# Patient Record
Sex: MALE | Race: BLACK OR AFRICAN AMERICAN | NOT HISPANIC OR LATINO
[De-identification: names, ages, dates, MRNs, and addresses within clinical notes are randomized per-mention and may not be internally consistent; named-entity substitution may affect disease eponyms.]

---

## 2019-03-01 PROBLEM — Z00.00 ENCOUNTER FOR PREVENTIVE HEALTH EXAMINATION: Status: ACTIVE | Noted: 2019-03-01

## 2019-03-12 ENCOUNTER — APPOINTMENT (OUTPATIENT)
Dept: UROLOGY | Facility: CLINIC | Age: 69
End: 2019-03-12
Payer: MEDICARE

## 2019-03-12 VITALS
BODY MASS INDEX: 29.2 KG/M2 | DIASTOLIC BLOOD PRESSURE: 71 MMHG | TEMPERATURE: 98 F | HEIGHT: 70 IN | SYSTOLIC BLOOD PRESSURE: 125 MMHG | WEIGHT: 204 LBS

## 2019-03-12 DIAGNOSIS — Z87.39 PERSONAL HISTORY OF OTHER DISEASES OF THE MUSCULOSKELETAL SYSTEM AND CONNECTIVE TISSUE: ICD-10-CM

## 2019-03-12 DIAGNOSIS — Z87.09 PERSONAL HISTORY OF OTHER DISEASES OF THE RESPIRATORY SYSTEM: ICD-10-CM

## 2019-03-12 DIAGNOSIS — Z87.19 PERSONAL HISTORY OF OTHER DISEASES OF THE DIGESTIVE SYSTEM: ICD-10-CM

## 2019-03-12 DIAGNOSIS — Z82.3 FAMILY HISTORY OF STROKE: ICD-10-CM

## 2019-03-12 DIAGNOSIS — Z85.46 PERSONAL HISTORY OF MALIGNANT NEOPLASM OF PROSTATE: ICD-10-CM

## 2019-03-12 DIAGNOSIS — Z43.3 ENCOUNTER FOR ATTENTION TO COLOSTOMY: ICD-10-CM

## 2019-03-12 PROCEDURE — 99204 OFFICE O/P NEW MOD 45 MIN: CPT

## 2019-03-12 RX ORDER — TAMSULOSIN HYDROCHLORIDE 0.4 MG/1
0.4 CAPSULE ORAL
Refills: 0 | Status: ACTIVE | COMMUNITY

## 2019-03-12 RX ORDER — FINASTERIDE 5 MG/1
5 TABLET, FILM COATED ORAL
Refills: 0 | Status: ACTIVE | COMMUNITY

## 2019-03-12 RX ORDER — ARIPIPRAZOLE 10 MG/1
10 TABLET ORAL
Refills: 0 | Status: ACTIVE | COMMUNITY

## 2019-03-12 RX ORDER — GABAPENTIN 600 MG/1
600 TABLET, FILM COATED ORAL
Refills: 0 | Status: ACTIVE | COMMUNITY

## 2019-03-12 RX ORDER — HYDROMORPHONE HCL 3 MG
SUPPOSITORY, RECTAL RECTAL
Refills: 0 | Status: ACTIVE | COMMUNITY

## 2019-03-12 RX ORDER — LAMOTRIGINE 25-50-100
25 & 50 & 100 KIT ORAL
Refills: 0 | Status: ACTIVE | COMMUNITY

## 2019-03-12 RX ORDER — SODIUM BICARBONATE
POWDER (GRAM) MISCELLANEOUS
Refills: 0 | Status: ACTIVE | COMMUNITY

## 2019-03-12 RX ORDER — ASPIRIN 81 MG
81 TABLET, DELAYED RELEASE (ENTERIC COATED) ORAL
Refills: 0 | Status: ACTIVE | COMMUNITY

## 2019-03-12 RX ORDER — PANTOPRAZOLE SODIUM 40 MG/1
GRANULE, DELAYED RELEASE ORAL
Refills: 0 | Status: ACTIVE | COMMUNITY

## 2019-03-12 RX ORDER — ALPHA-D-GALACTOSIDASE 600 UNIT
CAPSULE ORAL
Refills: 0 | Status: ACTIVE | COMMUNITY

## 2019-03-12 NOTE — ASSESSMENT
[FreeTextEntry1] : Very pleasant 68-year-old gentleman with history of prostate cancer, weak urinary stream, nocturia, BPH\par -Continue Flomax and finasteride for now\par -We discussed alternative potential etiologies of his urinary symptoms, including radiation cystitis, bladder cancer, urethral stricture\par -Urinalysis\par -urine culture\par -PSA\par -Cystoscopy at next visit

## 2019-03-12 NOTE — PHYSICAL EXAM
[General Appearance - Well Developed] : well developed [General Appearance - Well Nourished] : well nourished [Normal Appearance] : normal appearance [Well Groomed] : well groomed [General Appearance - In No Acute Distress] : no acute distress [Edema] : no peripheral edema [Respiration, Rhythm And Depth] : normal respiratory rhythm and effort [Exaggerated Use Of Accessory Muscles For Inspiration] : no accessory muscle use [Abdomen Soft] : soft [Abdomen Tenderness] : non-tender [Costovertebral Angle Tenderness] : no ~M costovertebral angle tenderness [FreeTextEntry1] : ileostomy, multiple abdominal scars from prior surgery [Urethral Meatus] : meatus normal [Urinary Bladder Findings] : the bladder was normal on palpation [Scrotum] : the scrotum was normal [Testes Mass (___cm)] : there were no testicular masses [No Prostate Nodules] : no prostate nodules [Normal Station and Gait] : the gait and station were normal for the patient's age [] : no rash [No Focal Deficits] : no focal deficits [Oriented To Time, Place, And Person] : oriented to person, place, and time [Affect] : the affect was normal [Mood] : the mood was normal [Not Anxious] : not anxious [No Palpable Adenopathy] : no palpable adenopathy

## 2019-03-12 NOTE — HISTORY OF PRESENT ILLNESS
[FreeTextEntry1] : Very pleasant 68-year-old gentleman with history of prostate cancer for which he underwent radiation approximately 5-6 years ago who presents for evaluation of urinary frequency and nocturia. Patient reports that the symptoms have progressively worsened over the last few months. He also reports a history of diverticulitis for which he underwent a colectomy and now has an ileostomy. He denies dysuria. No hematuria. No flank pain or suprapubic pain. He reports voiding with a weak urinary stream. He has been taking Proscar and Flomax without significant improvement in his urinary symptoms. No specific timing to his symptoms. No aggravating or alleviating factors that he knows of. [Urinary Retention] : no urinary retention [Urinary Urgency] : no urinary urgency [Urinary Frequency] : urinary frequency [Nocturia] : nocturia [Straining] : no straining [Weak Stream] : no weak stream [Dysuria] : no dysuria [Hematuria - Gross] : no gross hematuria [Bladder Spasm] : no bladder spasm [Abdominal Pain] : no abdominal pain [Flank Pain] : no flank pain [Fever] : no fever [Fatigue] : no fatigue [Nausea] : no nausea [Anorexia] : no anorexia

## 2019-03-12 NOTE — REVIEW OF SYSTEMS
[see HPI] : see HPI [Wake up at night to urinate  How many times?  ___] : wakes up to urinate [unfilled] times during the night [Slow urine stream] : slow urine stream [Negative] : Heme/Lymph

## 2019-03-13 LAB
APPEARANCE: CLEAR
BACTERIA: NEGATIVE
BILIRUBIN URINE: NEGATIVE
BLOOD URINE: NEGATIVE
COLOR: YELLOW
GLUCOSE QUALITATIVE U: NEGATIVE
HYALINE CASTS: 3 /LPF
KETONES URINE: NEGATIVE
LEUKOCYTE ESTERASE URINE: NEGATIVE
MICROSCOPIC-UA: NORMAL
NITRITE URINE: NEGATIVE
PH URINE: 6
PROTEIN URINE: ABNORMAL
PSA FREE FLD-MCNC: NORMAL %
PSA FREE SERPL-MCNC: <0.01 NG/ML
PSA SERPL-MCNC: <0.01 NG/ML
RED BLOOD CELLS URINE: 2 /HPF
SPECIFIC GRAVITY URINE: 1.01
SQUAMOUS EPITHELIAL CELLS: 0 /HPF
UROBILINOGEN URINE: NORMAL
WHITE BLOOD CELLS URINE: 1 /HPF

## 2019-03-15 LAB — BACTERIA UR CULT: NORMAL

## 2019-03-19 ENCOUNTER — APPOINTMENT (OUTPATIENT)
Dept: UROLOGY | Facility: CLINIC | Age: 69
End: 2019-03-19
Payer: MEDICARE

## 2019-03-19 VITALS
RESPIRATION RATE: 14 BRPM | WEIGHT: 204 LBS | HEIGHT: 70 IN | TEMPERATURE: 98.3 F | SYSTOLIC BLOOD PRESSURE: 110 MMHG | DIASTOLIC BLOOD PRESSURE: 65 MMHG | HEART RATE: 78 BPM | BODY MASS INDEX: 29.2 KG/M2

## 2019-03-19 PROCEDURE — 99213 OFFICE O/P EST LOW 20 MIN: CPT | Mod: 25

## 2019-03-19 PROCEDURE — 52000 CYSTOURETHROSCOPY: CPT

## 2019-03-19 NOTE — ASSESSMENT
[FreeTextEntry1] : Very pleasant 68-year-old gentleman with history of prostate cancer status post radiation with urinary frequency and nocturia\par -Cystoscopy today demonstrates no urothelial lesions or urethral strictures; it does demonstrate an enlarged prostate\par -We discussed surgical options for BPH, however we also discussed the high rate of incontinence after radiation for prostate cancer\par -Patient would like to forego surgery at this time\par -We discussed the option to double Flomax, which patient would like to hold off if possible\par -Follow up in 6 months

## 2019-03-19 NOTE — PHYSICAL EXAM
[General Appearance - Well Developed] : well developed [General Appearance - Well Nourished] : well nourished [Well Groomed] : well groomed [Normal Appearance] : normal appearance [Abdomen Soft] : soft [General Appearance - In No Acute Distress] : no acute distress [Abdomen Tenderness] : non-tender [FreeTextEntry1] : ileostomy, multiple abdominal scars from prior surgery [Costovertebral Angle Tenderness] : no ~M costovertebral angle tenderness [Urethral Meatus] : meatus normal [Urinary Bladder Findings] : the bladder was normal on palpation [Scrotum] : the scrotum was normal [Testes Mass (___cm)] : there were no testicular masses [Edema] : no peripheral edema [] : no respiratory distress [Respiration, Rhythm And Depth] : normal respiratory rhythm and effort [Exaggerated Use Of Accessory Muscles For Inspiration] : no accessory muscle use [Oriented To Time, Place, And Person] : oriented to person, place, and time [Affect] : the affect was normal [Mood] : the mood was normal [Not Anxious] : not anxious [Normal Station and Gait] : the gait and station were normal for the patient's age [No Palpable Adenopathy] : no palpable adenopathy [No Focal Deficits] : no focal deficits

## 2019-03-19 NOTE — HISTORY OF PRESENT ILLNESS
[FreeTextEntry1] : Very pleasant 68-year-old gentleman with history of prostate cancer for which he underwent radiation approximately 5-6 years ago who presents for follow up of urinary frequency and nocturia. At last visit, PSA was undetectable and UA and culture were negative for infection or hematuria. He continues report the same symptoms of urinary frequency and nocturia. He denies dysuria. No gross hematuria. No flank pain or suprapubic pain. No other complaints. [Urinary Retention] : no urinary retention [Urinary Frequency] : urinary frequency [Urinary Urgency] : no urinary urgency [Nocturia] : nocturia [Straining] : no straining [Dysuria] : no dysuria [Weak Stream] : no weak stream [Hematuria - Gross] : no gross hematuria [Bladder Spasm] : no bladder spasm [Abdominal Pain] : no abdominal pain [Fever] : no fever [Fatigue] : no fatigue [Flank Pain] : no flank pain [Anorexia] : no anorexia [Nausea] : no nausea

## 2019-04-04 ENCOUNTER — INPATIENT (INPATIENT)
Facility: HOSPITAL | Age: 69
LOS: 3 days | Discharge: NURSING, REHAB OR LONG-TERM CARE | DRG: 871 | End: 2019-04-08
Attending: INTERNAL MEDICINE | Admitting: INTERNAL MEDICINE
Payer: MEDICARE

## 2019-04-04 VITALS
OXYGEN SATURATION: 96 % | HEIGHT: 70 IN | HEART RATE: 103 BPM | TEMPERATURE: 103 F | SYSTOLIC BLOOD PRESSURE: 122 MMHG | WEIGHT: 205.03 LBS | RESPIRATION RATE: 18 BRPM | DIASTOLIC BLOOD PRESSURE: 65 MMHG

## 2019-04-04 DIAGNOSIS — N17.9 ACUTE KIDNEY FAILURE, UNSPECIFIED: ICD-10-CM

## 2019-04-04 DIAGNOSIS — C61 MALIGNANT NEOPLASM OF PROSTATE: ICD-10-CM

## 2019-04-04 DIAGNOSIS — A41.9 SEPSIS, UNSPECIFIED ORGANISM: ICD-10-CM

## 2019-04-04 DIAGNOSIS — Z93.9 ARTIFICIAL OPENING STATUS, UNSPECIFIED: Chronic | ICD-10-CM

## 2019-04-04 DIAGNOSIS — R50.9 FEVER, UNSPECIFIED: ICD-10-CM

## 2019-04-04 DIAGNOSIS — J44.9 CHRONIC OBSTRUCTIVE PULMONARY DISEASE, UNSPECIFIED: ICD-10-CM

## 2019-04-04 DIAGNOSIS — Z29.9 ENCOUNTER FOR PROPHYLACTIC MEASURES, UNSPECIFIED: ICD-10-CM

## 2019-04-04 DIAGNOSIS — K52.9 NONINFECTIVE GASTROENTERITIS AND COLITIS, UNSPECIFIED: ICD-10-CM

## 2019-04-04 DIAGNOSIS — E78.5 HYPERLIPIDEMIA, UNSPECIFIED: ICD-10-CM

## 2019-04-04 LAB
ALBUMIN SERPL ELPH-MCNC: 3.5 G/DL — SIGNIFICANT CHANGE UP (ref 3.5–5)
ALP SERPL-CCNC: 189 U/L — HIGH (ref 40–120)
ALT FLD-CCNC: 28 U/L DA — SIGNIFICANT CHANGE UP (ref 10–60)
ANION GAP SERPL CALC-SCNC: 8 MMOL/L — SIGNIFICANT CHANGE UP (ref 5–17)
APPEARANCE UR: CLEAR — SIGNIFICANT CHANGE UP
APTT BLD: 25.5 SEC — LOW (ref 27.5–36.3)
AST SERPL-CCNC: 33 U/L — SIGNIFICANT CHANGE UP (ref 10–40)
BACTERIA # UR AUTO: ABNORMAL /HPF
BASE EXCESS BLDA CALC-SCNC: -8.4 MMOL/L — LOW (ref -2–2)
BASOPHILS # BLD AUTO: 0.04 K/UL — SIGNIFICANT CHANGE UP (ref 0–0.2)
BASOPHILS NFR BLD AUTO: 0.3 % — SIGNIFICANT CHANGE UP (ref 0–2)
BILIRUB SERPL-MCNC: 0.6 MG/DL — SIGNIFICANT CHANGE UP (ref 0.2–1.2)
BILIRUB UR-MCNC: NEGATIVE — SIGNIFICANT CHANGE UP
BLOOD GAS COMMENTS ARTERIAL: SIGNIFICANT CHANGE UP
BUN SERPL-MCNC: 38 MG/DL — HIGH (ref 7–18)
CALCIUM SERPL-MCNC: 8.5 MG/DL — SIGNIFICANT CHANGE UP (ref 8.4–10.5)
CHLORIDE SERPL-SCNC: 115 MMOL/L — HIGH (ref 96–108)
CO2 SERPL-SCNC: 16 MMOL/L — LOW (ref 22–31)
COLOR SPEC: YELLOW — SIGNIFICANT CHANGE UP
CREAT SERPL-MCNC: 2.64 MG/DL — HIGH (ref 0.5–1.3)
DIFF PNL FLD: NEGATIVE — SIGNIFICANT CHANGE UP
EOSINOPHIL # BLD AUTO: 0.18 K/UL — SIGNIFICANT CHANGE UP (ref 0–0.5)
EOSINOPHIL NFR BLD AUTO: 1.4 % — SIGNIFICANT CHANGE UP (ref 0–6)
EPI CELLS # UR: ABNORMAL /HPF
FLU A RESULT: SIGNIFICANT CHANGE UP
FLU A RESULT: SIGNIFICANT CHANGE UP
FLUAV AG NPH QL: SIGNIFICANT CHANGE UP
FLUBV AG NPH QL: SIGNIFICANT CHANGE UP
GLUCOSE SERPL-MCNC: 124 MG/DL — HIGH (ref 70–99)
GLUCOSE UR QL: NEGATIVE — SIGNIFICANT CHANGE UP
HCO3 BLDA-SCNC: 17 MMOL/L — LOW (ref 23–27)
HCT VFR BLD CALC: 36.4 % — LOW (ref 39–50)
HGB BLD-MCNC: 11.6 G/DL — LOW (ref 13–17)
HOROWITZ INDEX BLDA+IHG-RTO: 21 — SIGNIFICANT CHANGE UP
IMM GRANULOCYTES NFR BLD AUTO: 0.5 % — SIGNIFICANT CHANGE UP (ref 0–1.5)
INR BLD: 1.16 RATIO — SIGNIFICANT CHANGE UP (ref 0.88–1.16)
KETONES UR-MCNC: NEGATIVE — SIGNIFICANT CHANGE UP
LACTATE SERPL-SCNC: 1.1 MMOL/L — SIGNIFICANT CHANGE UP (ref 0.7–2)
LEUKOCYTE ESTERASE UR-ACNC: NEGATIVE — SIGNIFICANT CHANGE UP
LYMPHOCYTES # BLD AUTO: 1.15 K/UL — SIGNIFICANT CHANGE UP (ref 1–3.3)
LYMPHOCYTES # BLD AUTO: 8.6 % — LOW (ref 13–44)
MCHC RBC-ENTMCNC: 27.6 PG — SIGNIFICANT CHANGE UP (ref 27–34)
MCHC RBC-ENTMCNC: 31.9 GM/DL — LOW (ref 32–36)
MCV RBC AUTO: 86.5 FL — SIGNIFICANT CHANGE UP (ref 80–100)
MONOCYTES # BLD AUTO: 1.06 K/UL — HIGH (ref 0–0.9)
MONOCYTES NFR BLD AUTO: 8 % — SIGNIFICANT CHANGE UP (ref 2–14)
NEUTROPHILS # BLD AUTO: 10.83 K/UL — HIGH (ref 1.8–7.4)
NEUTROPHILS NFR BLD AUTO: 81.2 % — HIGH (ref 43–77)
NITRITE UR-MCNC: NEGATIVE — SIGNIFICANT CHANGE UP
NRBC # BLD: 0 /100 WBCS — SIGNIFICANT CHANGE UP (ref 0–0)
NT-PROBNP SERPL-SCNC: 71 PG/ML — SIGNIFICANT CHANGE UP (ref 0–125)
PCO2 BLDA: 35 MMHG — SIGNIFICANT CHANGE UP (ref 32–46)
PH BLDA: 7.3 — LOW (ref 7.35–7.45)
PH UR: 5 — SIGNIFICANT CHANGE UP (ref 5–8)
PLATELET # BLD AUTO: 174 K/UL — SIGNIFICANT CHANGE UP (ref 150–400)
PO2 BLDA: 76 MMHG — SIGNIFICANT CHANGE UP (ref 74–108)
POTASSIUM SERPL-MCNC: 5.5 MMOL/L — HIGH (ref 3.5–5.3)
POTASSIUM SERPL-SCNC: 5.5 MMOL/L — HIGH (ref 3.5–5.3)
PROT SERPL-MCNC: 8.8 G/DL — HIGH (ref 6–8.3)
PROT UR-MCNC: 15
PROTHROM AB SERPL-ACNC: 12.9 SEC — SIGNIFICANT CHANGE UP (ref 10–12.9)
RBC # BLD: 4.21 M/UL — SIGNIFICANT CHANGE UP (ref 4.2–5.8)
RBC # FLD: 15.5 % — HIGH (ref 10.3–14.5)
RBC CASTS # UR COMP ASSIST: SIGNIFICANT CHANGE UP /HPF (ref 0–2)
RSV RESULT: SIGNIFICANT CHANGE UP
RSV RNA RESP QL NAA+PROBE: SIGNIFICANT CHANGE UP
SAO2 % BLDA: 95 % — SIGNIFICANT CHANGE UP (ref 92–96)
SODIUM SERPL-SCNC: 139 MMOL/L — SIGNIFICANT CHANGE UP (ref 135–145)
SP GR SPEC: 1.01 — SIGNIFICANT CHANGE UP (ref 1.01–1.02)
UROBILINOGEN FLD QL: NEGATIVE — SIGNIFICANT CHANGE UP
WBC # BLD: 13.33 K/UL — HIGH (ref 3.8–10.5)
WBC # FLD AUTO: 13.33 K/UL — HIGH (ref 3.8–10.5)
WBC UR QL: SIGNIFICANT CHANGE UP /HPF (ref 0–5)

## 2019-04-04 PROCEDURE — 99285 EMERGENCY DEPT VISIT HI MDM: CPT

## 2019-04-04 PROCEDURE — 93010 ELECTROCARDIOGRAM REPORT: CPT

## 2019-04-04 PROCEDURE — 71045 X-RAY EXAM CHEST 1 VIEW: CPT | Mod: 26

## 2019-04-04 PROCEDURE — 74176 CT ABD & PELVIS W/O CONTRAST: CPT | Mod: 26

## 2019-04-04 RX ORDER — TRAZODONE HCL 50 MG
25 TABLET ORAL AT BEDTIME
Qty: 0 | Refills: 0 | Status: DISCONTINUED | OUTPATIENT
Start: 2019-04-04 | End: 2019-04-08

## 2019-04-04 RX ORDER — ARIPIPRAZOLE 15 MG/1
12 TABLET ORAL DAILY
Qty: 0 | Refills: 0 | Status: DISCONTINUED | OUTPATIENT
Start: 2019-04-04 | End: 2019-04-05

## 2019-04-04 RX ORDER — FINASTERIDE 5 MG/1
5 TABLET, FILM COATED ORAL DAILY
Qty: 0 | Refills: 0 | Status: DISCONTINUED | OUTPATIENT
Start: 2019-04-04 | End: 2019-04-08

## 2019-04-04 RX ORDER — PANTOPRAZOLE SODIUM 20 MG/1
40 TABLET, DELAYED RELEASE ORAL
Qty: 0 | Refills: 0 | Status: DISCONTINUED | OUTPATIENT
Start: 2019-04-04 | End: 2019-04-08

## 2019-04-04 RX ORDER — TAMSULOSIN HYDROCHLORIDE 0.4 MG/1
0.4 CAPSULE ORAL AT BEDTIME
Qty: 0 | Refills: 0 | Status: DISCONTINUED | OUTPATIENT
Start: 2019-04-04 | End: 2019-04-08

## 2019-04-04 RX ORDER — FERROUS SULFATE 325(65) MG
325 TABLET ORAL DAILY
Qty: 0 | Refills: 0 | Status: DISCONTINUED | OUTPATIENT
Start: 2019-04-04 | End: 2019-04-08

## 2019-04-04 RX ORDER — SODIUM BICARBONATE 1 MEQ/ML
650 SYRINGE (ML) INTRAVENOUS THREE TIMES A DAY
Qty: 0 | Refills: 0 | Status: DISCONTINUED | OUTPATIENT
Start: 2019-04-04 | End: 2019-04-08

## 2019-04-04 RX ORDER — CEFTRIAXONE 500 MG/1
1 INJECTION, POWDER, FOR SOLUTION INTRAMUSCULAR; INTRAVENOUS ONCE
Qty: 0 | Refills: 0 | Status: COMPLETED | OUTPATIENT
Start: 2019-04-04 | End: 2019-04-04

## 2019-04-04 RX ORDER — SODIUM CHLORIDE 9 MG/ML
2900 INJECTION INTRAMUSCULAR; INTRAVENOUS; SUBCUTANEOUS ONCE
Qty: 0 | Refills: 0 | Status: COMPLETED | OUTPATIENT
Start: 2019-04-04 | End: 2019-04-04

## 2019-04-04 RX ORDER — ACETAMINOPHEN 500 MG
650 TABLET ORAL EVERY 6 HOURS
Qty: 0 | Refills: 0 | Status: DISCONTINUED | OUTPATIENT
Start: 2019-04-04 | End: 2019-04-08

## 2019-04-04 RX ORDER — HYDROMORPHONE HYDROCHLORIDE 2 MG/ML
4 INJECTION INTRAMUSCULAR; INTRAVENOUS; SUBCUTANEOUS EVERY 8 HOURS
Qty: 0 | Refills: 0 | Status: DISCONTINUED | OUTPATIENT
Start: 2019-04-04 | End: 2019-04-08

## 2019-04-04 RX ORDER — ASPIRIN/CALCIUM CARB/MAGNESIUM 324 MG
81 TABLET ORAL DAILY
Qty: 0 | Refills: 0 | Status: DISCONTINUED | OUTPATIENT
Start: 2019-04-04 | End: 2019-04-08

## 2019-04-04 RX ORDER — ACETAMINOPHEN 500 MG
1000 TABLET ORAL ONCE
Qty: 0 | Refills: 0 | Status: COMPLETED | OUTPATIENT
Start: 2019-04-04 | End: 2019-04-04

## 2019-04-04 RX ORDER — ALBUTEROL 90 UG/1
2 AEROSOL, METERED ORAL EVERY 6 HOURS
Qty: 0 | Refills: 0 | Status: DISCONTINUED | OUTPATIENT
Start: 2019-04-04 | End: 2019-04-08

## 2019-04-04 RX ORDER — HEPARIN SODIUM 5000 [USP'U]/ML
5000 INJECTION INTRAVENOUS; SUBCUTANEOUS EVERY 8 HOURS
Qty: 0 | Refills: 0 | Status: DISCONTINUED | OUTPATIENT
Start: 2019-04-04 | End: 2019-04-08

## 2019-04-04 RX ORDER — LAMOTRIGINE 25 MG/1
200 TABLET, ORALLY DISINTEGRATING ORAL DAILY
Qty: 0 | Refills: 0 | Status: DISCONTINUED | OUTPATIENT
Start: 2019-04-04 | End: 2019-04-08

## 2019-04-04 RX ORDER — GABAPENTIN 400 MG/1
200 CAPSULE ORAL THREE TIMES A DAY
Qty: 0 | Refills: 0 | Status: DISCONTINUED | OUTPATIENT
Start: 2019-04-04 | End: 2019-04-05

## 2019-04-04 RX ADMIN — SODIUM CHLORIDE 2900 MILLILITER(S): 9 INJECTION INTRAMUSCULAR; INTRAVENOUS; SUBCUTANEOUS at 17:55

## 2019-04-04 RX ADMIN — Medication 400 MILLIGRAM(S): at 22:50

## 2019-04-04 RX ADMIN — CEFTRIAXONE 100 GRAM(S): 500 INJECTION, POWDER, FOR SOLUTION INTRAMUSCULAR; INTRAVENOUS at 18:37

## 2019-04-04 RX ADMIN — SODIUM CHLORIDE 2900 MILLILITER(S): 9 INJECTION INTRAMUSCULAR; INTRAVENOUS; SUBCUTANEOUS at 16:33

## 2019-04-04 RX ADMIN — Medication 1000 MILLIGRAM(S): at 23:35

## 2019-04-04 NOTE — H&P ADULT - PROBLEM SELECTOR PLAN 3
[] Previous VTE                                                3  [] Thrombophilia                                             2  [] Lower limb paralysis                                   2    [] Current Cancer                                             2   [x] Immobilization > 24 hrs                              1  [] ICU/CCU stay > 24 hours                             1  [x] Age > 60                                                         1    IMPROVE VTE Score: 2; Heparin sub q for DVT prophy -c/w statin therapy  -f/u lipid panel -Creat of 2.6 on admission- likely multifactorial from longstanding htn and obstructive uropathy  -no history of known renal disease   -UA sig for small amt of protein   -f/u Urine lytes  -f/u CT abd for hydro  -s/p 2.6L bolus in ED- monitor for improvement

## 2019-04-04 NOTE — ED PROVIDER NOTE - OBJECTIVE STATEMENT
67 y/o M patient with no significant PMHx and no significant PSHx presents to the ED with flu-like symptoms. Patient endorses body aches, feelings of being unwell, shivers, and frequency. Patient states he was at the doctor and referred her for an evaluation. Patient denies cough, dysuria, and any other complaints. Patient denies recent travel. NKDA.

## 2019-04-04 NOTE — H&P ADULT - PROBLEM SELECTOR PLAN 8
[] Previous VTE                                                3  [] Thrombophilia                                             2  [] Lower limb paralysis                                   2    [] Current Cancer                                             2   [x] Immobilization > 24 hrs                              1  [] ICU/CCU stay > 24 hours                             1  [x] Age > 60                                                         1    IMPROVE VTE Score: 2; Heparin sub q for DVT prophy

## 2019-04-04 NOTE — H&P ADULT - PROBLEM SELECTOR PLAN 7
[] Previous VTE                                                3  [] Thrombophilia                                             2  [] Lower limb paralysis                                   2    [] Current Cancer                                             2   [x] Immobilization > 24 hrs                              1  [] ICU/CCU stay > 24 hours                             1  [x] Age > 60                                                         1    IMPROVE VTE Score: 2; Heparin sub q for DVT prophy -s/p radiation therapy in 2015  -currently in remission

## 2019-04-04 NOTE — H&P ADULT - NSICDXPASTMEDICALHX_GEN_ALL_CORE_FT
PAST MEDICAL HISTORY:  No pertinent past medical history PAST MEDICAL HISTORY:  Anemia     Bipolar disorder     BPH with urinary obstruction     CKD (chronic kidney disease)     COPD, mild     HLD (hyperlipidemia)     HTN (hypertension)     IBD (inflammatory bowel disease)     PAD (peripheral artery disease)     Prostate CA

## 2019-04-04 NOTE — H&P ADULT - PROBLEM SELECTOR PLAN 2
-Creat of 2.6 on admission  -no history of known renal disease   -UA sig for small amt of protein   -f/u Urine lytes  -f/u CT abd for hydro  -s/p 2.6L bolus in ED- monitor for improvement -Creat of 2.6 on admission- likely multifactorial from longstanding htn and obstructive uropathy  -no history of known renal disease   -UA sig for small amt of protein   -f/u Urine lytes  -f/u CT abd for hydro  -s/p 2.6L bolus in ED- monitor for improvement -Likely PNA in setting of infiltrative process to rt lower lobe on chest CT insetting of leukocytosis, and fever   -will treat for CAP   -c/w Azithro/ Rocephin   -f/u Blood cultures  -f/u legionella, and strep

## 2019-04-04 NOTE — H&P ADULT - PROBLEM/PLAN-1
- likely malignant though primary is unknown at this time   - will need collateral from [facility]   - GI consult appreciated -> will attempt to do therapeutic paracentesis  - f/u paracentesis fluid studies and culture  - albumin infusion   - pain control  - monitor for bleeding DISPLAY PLAN FREE TEXT

## 2019-04-04 NOTE — H&P ADULT - PROBLEM SELECTOR PLAN 1
-Fever and chills x  -CXR sig for mild pulm congestion   -UA negative  -flu neg; f/u full RVP -Febrile to 102.7 and chills x 1 day, WBC 13K- no cough or SOB  -CXR sig for mild pulm congestion   -UA negative  -flu neg; f/u full RVP  -Code sepsis in ED- s/p Rocephin 1 g   -Pt reports watery stool from ileostomy site- will send stool cultures   -f/u blood cultures and urine culture   -f/u CT abdomen   -ID: Dr. Santos -Febrile to 102.7 and chills x 1 day, WBC 13K- no cough or SOB  -CXR sig for mild pulm congestion   -UA negative  -flu neg; f/u full RVP  -Code sepsis in ED- s/p Rocephin 1 g   -Pt reports watery stool from ileostomy site- will send stool cultures   -f/u blood cultures and urine culture   -f/u CT abdomen

## 2019-04-04 NOTE — H&P ADULT - HISTORY OF PRESENT ILLNESS
67 y/o M patient with no significant PMHx and no significant PSHx presents to the ED with fever and chills x Pt is a 69 y/o M, from [facility], with PMHx of COPD (not on O2), HTN, HLD, Bipolar, anemia, GERD, Ileostomy, Prostate Ca s/p rad, BPH, PAD, and CKD (unknown stage) who presents to the ED with fever and chills x 1 day.  Pt states yesterday morning he was feeling lethargic with decreased appetite  He felt subjective fever and was shivering.  Pt does not remember exact temp, but knows it was >100.  Pt states some of his neighbors are sick with flu like symptoms. Pt denies Cough, abd pain, HA, dizziness, weakness, or N/V.  Pt reports that the feces from his ileostomy has been a lot more watery over the last few days.  Pt denies recent travel or recent antibiotic exposure.  Pt also states that his renal function has been declining, and he is scheduled to see a nephrologist soon. Pt also reports chronic LE pitting edema.  Pt denies CP, palpitations, blurred vision, slurred speech, extremity numbness/ weakness, confusion, or syncope.    In the ED, pt presents in no acute distress, vitals sig for T: 102.7,  remaining WNL, and EKG NSR

## 2019-04-04 NOTE — H&P ADULT - ASSESSMENT
Pt is a 67 y/o M, from [facility], with PMHx of COPD (not on O2), HTN, HLD, Bipolar, anemia, GERD, Ileostomy, Prostate Ca s/p rad, BPH, PAD, and CKD (unknown stage) who presents to the ED with fever and chills x 1 day.  In the ED, pt presents in no acute distress, vitals sig for T: 102.7,  remaining WNL, and EKG NSR.  Pt presents febrile with leukocytosis of 13k w/ PMN predominance.  Remaining labs sig for  K+ of 5.5 (hemolyzed specimen and Creat of 2.64 likely multifactorial from longstanding htn and obstructive uropathy.  CXR sig for mild pulm congestion. Pt is a 67 y/o M, from [facility], with PMHx of COPD (not on O2), HTN, HLD, Bipolar, anemia, GERD, Ileostomy, Prostate Ca s/p rad, BPH, PAD, and CKD (unknown stage) who presents to the ED with fever and chills x 1 day.  In the ED, pt presents in no acute distress, vitals sig for T: 102.7,  remaining WNL, and EKG NSR.  Pt presents febrile with leukocytosis of 13k w/ PMN predominance.  Remaining labs sig for  K+ of 5.5 (hemolyzed specimen and Creat of 2.64 likely multifactorial from longstanding htn and obstructive uropathy.  CXR sig for mild pulm congestion.  UA negative, and Flu negative.    Pt will be admitted to medicine for management of Sepsis of unknown etiology Pt is a 67 y/o M, from [facility], with PMHx of COPD (not on O2), HTN, HLD, Bipolar, anemia, GERD, Ileostomy, Prostate Ca s/p rad, BPH, PAD, and CKD (unknown stage) who presents to the ED with fever and chills x 1 day.  In the ED, pt presents in no acute distress, vitals sig for T: 102.7,  remaining WNL, and EKG NSR.  Pt presents febrile with leukocytosis of 13k w/ PMN predominance.  Remaining labs sig for  K+ of 5.5 (hemolyzed specimen and Creat of 2.64 likely multifactorial from longstanding htn and obstructive uropathy.  CXR sig for mild pulm congestion.  UA negative, and Flu negative.    Pt will be admitted to medicine for management of Sepsis 2/2 PNA

## 2019-04-04 NOTE — H&P ADULT - NSHPPHYSICALEXAM_GEN_ALL_CORE
Vital Signs Last 24 Hrs  T(C): 37.8 (04 Apr 2019 17:55), Max: 39.3 (04 Apr 2019 15:24)  T(F): 100 (04 Apr 2019 17:55), Max: 102.7 (04 Apr 2019 15:24)  HR: 89 (04 Apr 2019 17:55) (89 - 103)  BP: 122/69 (04 Apr 2019 17:55) (122/65 - 122/69)  RR: 18 (04 Apr 2019 17:55) (18 - 18)  SpO2: 96% (04 Apr 2019 17:55) (96% - 96%)

## 2019-04-04 NOTE — H&P ADULT - NSICDXPASTSURGICALHX_GEN_ALL_CORE_FT
PAST SURGICAL HISTORY:  No significant past surgical history PAST SURGICAL HISTORY:  History of creation of ostomy

## 2019-04-04 NOTE — ED ADULT NURSE NOTE - ED STAT RN HANDOFF DETAILS
Report given to ASHLI Shen, Pt to be transferred to ED Holding. pt resting in bed, no acute distress noted, denies chest pain, no shortness of breath indicated. Safety maintained.

## 2019-04-04 NOTE — ED ADULT NURSE NOTE - OBJECTIVE STATEMENT
pt is a 67 y/o male coming from  assisted living [facility] with c/o  shaking, fever and congestion today . pt  no signs of any distress, no  c/o shortness of breath pt airway patent with VSS. pt is a 69 y/o male coming from  assisted living [facility] with c/o  shaking, fever and congestion today . pt  no signs of any distress, no  c/o shortness of breath pt airway patent with VSS. RLQ ileostomy noted. Stoma intact. Pt preforms self care to ileostomy.

## 2019-04-05 DIAGNOSIS — J18.9 PNEUMONIA, UNSPECIFIED ORGANISM: ICD-10-CM

## 2019-04-05 LAB
AMYLASE P1 CFR SERPL: 113 U/L — SIGNIFICANT CHANGE UP (ref 25–115)
ANION GAP SERPL CALC-SCNC: 9 MMOL/L — SIGNIFICANT CHANGE UP (ref 5–17)
BASOPHILS # BLD AUTO: 0.04 K/UL — SIGNIFICANT CHANGE UP (ref 0–0.2)
BASOPHILS NFR BLD AUTO: 0.5 % — SIGNIFICANT CHANGE UP (ref 0–2)
BUN SERPL-MCNC: 34 MG/DL — HIGH (ref 7–18)
C DIFF BY PCR RESULT: SIGNIFICANT CHANGE UP
C DIFF TOX GENS STL QL NAA+PROBE: SIGNIFICANT CHANGE UP
CALCIUM SERPL-MCNC: 7.9 MG/DL — LOW (ref 8.4–10.5)
CHLORIDE SERPL-SCNC: 118 MMOL/L — HIGH (ref 96–108)
CHLORIDE UR-SCNC: 91 MMOL/L — SIGNIFICANT CHANGE UP (ref 55–125)
CHOLEST SERPL-MCNC: 173 MG/DL — SIGNIFICANT CHANGE UP (ref 10–199)
CO2 SERPL-SCNC: 13 MMOL/L — LOW (ref 22–31)
CREAT ?TM UR-MCNC: 75 MG/DL — SIGNIFICANT CHANGE UP
CREAT SERPL-MCNC: 2.4 MG/DL — HIGH (ref 0.5–1.3)
CULTURE RESULTS: SIGNIFICANT CHANGE UP
EOSINOPHIL # BLD AUTO: 0.36 K/UL — SIGNIFICANT CHANGE UP (ref 0–0.5)
EOSINOPHIL NFR BLD AUTO: 4.9 % — SIGNIFICANT CHANGE UP (ref 0–6)
GLUCOSE SERPL-MCNC: 113 MG/DL — HIGH (ref 70–99)
HAV IGM SER-ACNC: SIGNIFICANT CHANGE UP
HBA1C BLD-MCNC: 5.7 % — HIGH (ref 4–5.6)
HBV CORE IGM SER-ACNC: REACTIVE
HBV SURFACE AG SER-ACNC: SIGNIFICANT CHANGE UP
HCT VFR BLD CALC: 33.2 % — LOW (ref 39–50)
HCV AB S/CO SERPL IA: 11.93 S/CO — HIGH (ref 0–0.99)
HCV AB SERPL-IMP: REACTIVE
HDLC SERPL-MCNC: 45 MG/DL — SIGNIFICANT CHANGE UP
HGB BLD-MCNC: 10.5 G/DL — LOW (ref 13–17)
HIV 1+2 AB+HIV1 P24 AG SERPL QL IA: SIGNIFICANT CHANGE UP
IMM GRANULOCYTES NFR BLD AUTO: 0.7 % — SIGNIFICANT CHANGE UP (ref 0–1.5)
LEGIONELLA AG UR QL: NEGATIVE — SIGNIFICANT CHANGE UP
LIPID PNL WITH DIRECT LDL SERPL: 98 MG/DL — SIGNIFICANT CHANGE UP
LYMPHOCYTES # BLD AUTO: 0.96 K/UL — LOW (ref 1–3.3)
LYMPHOCYTES # BLD AUTO: 13 % — SIGNIFICANT CHANGE UP (ref 13–44)
MAGNESIUM SERPL-MCNC: 1.8 MG/DL — SIGNIFICANT CHANGE UP (ref 1.6–2.6)
MCHC RBC-ENTMCNC: 27.1 PG — SIGNIFICANT CHANGE UP (ref 27–34)
MCHC RBC-ENTMCNC: 31.6 GM/DL — LOW (ref 32–36)
MCV RBC AUTO: 85.8 FL — SIGNIFICANT CHANGE UP (ref 80–100)
MONOCYTES # BLD AUTO: 0.57 K/UL — SIGNIFICANT CHANGE UP (ref 0–0.9)
MONOCYTES NFR BLD AUTO: 7.7 % — SIGNIFICANT CHANGE UP (ref 2–14)
NEUTROPHILS # BLD AUTO: 5.39 K/UL — SIGNIFICANT CHANGE UP (ref 1.8–7.4)
NEUTROPHILS NFR BLD AUTO: 73.2 % — SIGNIFICANT CHANGE UP (ref 43–77)
NRBC # BLD: 0 /100 WBCS — SIGNIFICANT CHANGE UP (ref 0–0)
NT-PROBNP SERPL-SCNC: 228 PG/ML — HIGH (ref 0–125)
OSMOLALITY UR: 423 MOS/KG — SIGNIFICANT CHANGE UP (ref 50–1200)
PHOSPHATE SERPL-MCNC: 3.2 MG/DL — SIGNIFICANT CHANGE UP (ref 2.5–4.5)
PLATELET # BLD AUTO: 149 K/UL — LOW (ref 150–400)
POTASSIUM SERPL-MCNC: 4.9 MMOL/L — SIGNIFICANT CHANGE UP (ref 3.5–5.3)
POTASSIUM SERPL-SCNC: 4.9 MMOL/L — SIGNIFICANT CHANGE UP (ref 3.5–5.3)
POTASSIUM UR-SCNC: 34 MMOL/L — SIGNIFICANT CHANGE UP (ref 25–125)
RAPID RVP RESULT: SIGNIFICANT CHANGE UP
RBC # BLD: 3.87 M/UL — LOW (ref 4.2–5.8)
RBC # FLD: 15.5 % — HIGH (ref 10.3–14.5)
SODIUM SERPL-SCNC: 140 MMOL/L — SIGNIFICANT CHANGE UP (ref 135–145)
SODIUM UR-SCNC: 71 MMOL/L — SIGNIFICANT CHANGE UP (ref 40–220)
SPECIMEN SOURCE: SIGNIFICANT CHANGE UP
TOTAL CHOLESTEROL/HDL RATIO MEASUREMENT: 3.8 RATIO — SIGNIFICANT CHANGE UP (ref 3.4–9.6)
TRIGL SERPL-MCNC: 151 MG/DL — HIGH (ref 10–149)
TSH SERPL-MCNC: 1.36 UU/ML — SIGNIFICANT CHANGE UP (ref 0.34–4.82)
VIT B12 SERPL-MCNC: 567 PG/ML — SIGNIFICANT CHANGE UP (ref 232–1245)
WBC # BLD: 7.37 K/UL — SIGNIFICANT CHANGE UP (ref 3.8–10.5)
WBC # FLD AUTO: 7.37 K/UL — SIGNIFICANT CHANGE UP (ref 3.8–10.5)

## 2019-04-05 RX ORDER — SODIUM CHLORIDE 9 MG/ML
1000 INJECTION, SOLUTION INTRAVENOUS
Qty: 0 | Refills: 0 | Status: DISCONTINUED | OUTPATIENT
Start: 2019-04-05 | End: 2019-04-08

## 2019-04-05 RX ORDER — CEFTRIAXONE 500 MG/1
1 INJECTION, POWDER, FOR SOLUTION INTRAMUSCULAR; INTRAVENOUS EVERY 24 HOURS
Qty: 0 | Refills: 0 | Status: DISCONTINUED | OUTPATIENT
Start: 2019-04-05 | End: 2019-04-08

## 2019-04-05 RX ORDER — GABAPENTIN 400 MG/1
200 CAPSULE ORAL THREE TIMES A DAY
Qty: 0 | Refills: 0 | Status: DISCONTINUED | OUTPATIENT
Start: 2019-04-05 | End: 2019-04-08

## 2019-04-05 RX ORDER — AZITHROMYCIN 500 MG/1
500 TABLET, FILM COATED ORAL ONCE
Qty: 0 | Refills: 0 | Status: COMPLETED | OUTPATIENT
Start: 2019-04-05 | End: 2019-04-05

## 2019-04-05 RX ORDER — AZITHROMYCIN 500 MG/1
500 TABLET, FILM COATED ORAL EVERY 24 HOURS
Qty: 0 | Refills: 0 | Status: DISCONTINUED | OUTPATIENT
Start: 2019-04-06 | End: 2019-04-08

## 2019-04-05 RX ORDER — SODIUM BICARBONATE 1 MEQ/ML
50 SYRINGE (ML) INTRAVENOUS ONCE
Qty: 0 | Refills: 0 | Status: COMPLETED | OUTPATIENT
Start: 2019-04-05 | End: 2019-04-05

## 2019-04-05 RX ORDER — ARIPIPRAZOLE 15 MG/1
12.5 TABLET ORAL DAILY
Qty: 0 | Refills: 0 | Status: DISCONTINUED | OUTPATIENT
Start: 2019-04-05 | End: 2019-04-08

## 2019-04-05 RX ORDER — AZITHROMYCIN 500 MG/1
TABLET, FILM COATED ORAL
Qty: 0 | Refills: 0 | Status: DISCONTINUED | OUTPATIENT
Start: 2019-04-05 | End: 2019-04-08

## 2019-04-05 RX ADMIN — HYDROMORPHONE HYDROCHLORIDE 4 MILLIGRAM(S): 2 INJECTION INTRAMUSCULAR; INTRAVENOUS; SUBCUTANEOUS at 14:02

## 2019-04-05 RX ADMIN — CEFTRIAXONE 100 GRAM(S): 500 INJECTION, POWDER, FOR SOLUTION INTRAMUSCULAR; INTRAVENOUS at 07:04

## 2019-04-05 RX ADMIN — FINASTERIDE 5 MILLIGRAM(S): 5 TABLET, FILM COATED ORAL at 11:07

## 2019-04-05 RX ADMIN — PANTOPRAZOLE SODIUM 40 MILLIGRAM(S): 20 TABLET, DELAYED RELEASE ORAL at 07:03

## 2019-04-05 RX ADMIN — HYDROMORPHONE HYDROCHLORIDE 4 MILLIGRAM(S): 2 INJECTION INTRAMUSCULAR; INTRAVENOUS; SUBCUTANEOUS at 07:33

## 2019-04-05 RX ADMIN — GABAPENTIN 200 MILLIGRAM(S): 400 CAPSULE ORAL at 07:03

## 2019-04-05 RX ADMIN — HYDROMORPHONE HYDROCHLORIDE 4 MILLIGRAM(S): 2 INJECTION INTRAMUSCULAR; INTRAVENOUS; SUBCUTANEOUS at 07:01

## 2019-04-05 RX ADMIN — HYDROMORPHONE HYDROCHLORIDE 4 MILLIGRAM(S): 2 INJECTION INTRAMUSCULAR; INTRAVENOUS; SUBCUTANEOUS at 21:15

## 2019-04-05 RX ADMIN — HYDROMORPHONE HYDROCHLORIDE 4 MILLIGRAM(S): 2 INJECTION INTRAMUSCULAR; INTRAVENOUS; SUBCUTANEOUS at 22:00

## 2019-04-05 RX ADMIN — GABAPENTIN 200 MILLIGRAM(S): 400 CAPSULE ORAL at 14:02

## 2019-04-05 RX ADMIN — TAMSULOSIN HYDROCHLORIDE 0.4 MILLIGRAM(S): 0.4 CAPSULE ORAL at 21:15

## 2019-04-05 RX ADMIN — HEPARIN SODIUM 5000 UNIT(S): 5000 INJECTION INTRAVENOUS; SUBCUTANEOUS at 07:03

## 2019-04-05 RX ADMIN — SODIUM CHLORIDE 75 MILLILITER(S): 9 INJECTION, SOLUTION INTRAVENOUS at 03:33

## 2019-04-05 RX ADMIN — Medication 650 MILLIGRAM(S): at 07:03

## 2019-04-05 RX ADMIN — GABAPENTIN 200 MILLIGRAM(S): 400 CAPSULE ORAL at 21:15

## 2019-04-05 RX ADMIN — Medication 325 MILLIGRAM(S): at 11:07

## 2019-04-05 RX ADMIN — Medication 81 MILLIGRAM(S): at 11:07

## 2019-04-05 RX ADMIN — LAMOTRIGINE 200 MILLIGRAM(S): 25 TABLET, ORALLY DISINTEGRATING ORAL at 11:06

## 2019-04-05 RX ADMIN — Medication 650 MILLIGRAM(S): at 21:15

## 2019-04-05 RX ADMIN — Medication 50 MILLIEQUIVALENT(S): at 04:26

## 2019-04-05 RX ADMIN — HYDROMORPHONE HYDROCHLORIDE 4 MILLIGRAM(S): 2 INJECTION INTRAMUSCULAR; INTRAVENOUS; SUBCUTANEOUS at 14:26

## 2019-04-05 RX ADMIN — Medication 650 MILLIGRAM(S): at 14:02

## 2019-04-05 RX ADMIN — Medication 25 MILLIGRAM(S): at 21:15

## 2019-04-05 RX ADMIN — AZITHROMYCIN 250 MILLIGRAM(S): 500 TABLET, FILM COATED ORAL at 03:33

## 2019-04-05 RX ADMIN — ARIPIPRAZOLE 12.5 MILLIGRAM(S): 15 TABLET ORAL at 11:07

## 2019-04-05 NOTE — PROGRESS NOTE ADULT - SUBJECTIVE AND OBJECTIVE BOX
PGY 1 Note discussed with supervising resident and primary attending    Patient is a 68y old  Male who presents with a chief complaint of Body aches (2019 21:01)      INTERVAL HPI/OVERNIGHT EVENTS:  Pt seen and examined at bedside. Pt has no new co    MEDICATIONS  (STANDING):  ARIPiprazole 12.5 milliGRAM(s) Oral daily  aspirin enteric coated 81 milliGRAM(s) Oral daily  azithromycin  IVPB      cefTRIAXone   IVPB 1 Gram(s) IV Intermittent every 24 hours  ferrous    sulfate 325 milliGRAM(s) Oral daily  finasteride 5 milliGRAM(s) Oral daily  gabapentin 200 milliGRAM(s) Oral three times a day  heparin  Injectable 5000 Unit(s) SubCutaneous every 8 hours  HYDROmorphone   Tablet 4 milliGRAM(s) Oral every 8 hours  lactated ringers. 1000 milliLiter(s) (75 mL/Hr) IV Continuous <Continuous>  lamoTRIgine 200 milliGRAM(s) Oral daily  pantoprazole    Tablet 40 milliGRAM(s) Oral before breakfast  sodium bicarbonate 650 milliGRAM(s) Oral three times a day  tamsulosin 0.4 milliGRAM(s) Oral at bedtime  traZODone 25 milliGRAM(s) Oral at bedtime    MEDICATIONS  (PRN):  acetaminophen   Tablet .. 650 milliGRAM(s) Oral every 6 hours PRN Temp greater or equal to 38C (100.4F), Mild Pain (1 - 3)  ALBUTerol    90 MICROgram(s) HFA Inhaler 2 Puff(s) Inhalation every 6 hours PRN Shortness of Breath and/or Wheezing      __________________________________________________  REVIEW OF SYSTEMS:    CONSTITUTIONAL: No fever,   EYES: no acute visual disturbances  NECK: No pain or stiffness  RESPIRATORY: No cough; No shortness of breath  CARDIOVASCULAR: No chest pain, no palpitations  GASTROINTESTINAL: No pain. No nausea or vomiting; No diarrhea   NEUROLOGICAL: No headache or numbness, no tremors  MUSCULOSKELETAL: No joint pain, no muscle pain  GENITOURINARY: no dysuria, no frequency, no hesitancy  PSYCHIATRY: no depression , no anxiety  ALL OTHER  ROS negative        Vital Signs Last 24 Hrs  T(C): 36.5 (2019 08:38), Max: 39.3 (2019 15:24)  T(F): 97.7 (2019 08:38), Max: 102.7 (2019 15:24)  HR: 64 (2019 08:38) (64 - 103)  BP: 116/59 (2019 08:38) (116/59 - 130/69)  BP(mean): --  RR: 17 (2019 08:38) (17 - 18)  SpO2: 95% (2019 08:38) (94% - 97%)    ________________________________________________  PHYSICAL EXAM:  GENERAL: NAD  HEENT: Normocephalic;  conjunctivae and sclerae clear; moist mucous membranes;   NECK : supple  CHEST/LUNG: Clear to auscultation bilaterally with good air entry   HEART: S1 S2  regular; no murmurs, gallops or rubs  ABDOMEN: Soft, Nontender, Nondistended; Bowel sounds present  EXTREMITIES: no cyanosis; no edema; no calf tenderness  SKIN: warm and dry; no rash  NERVOUS SYSTEM:  Awake and alert; Oriented  to place, person and time ; no new deficits    _________________________________________________  LABS:                        10.5   7.37  )-----------( 149      ( 2019 10:22 )             33.2     04-05    140  |  118<H>  |  34<H>  ----------------------------<  113<H>  4.9   |  13<L>  |  2.40<H>    Ca    7.9<L>      2019 01:12  Phos  3.2     04-05  Mg     1.8     -05    TPro  8.8<H>  /  Alb  3.5  /  TBili  0.6  /  DBili  x   /  AST  33  /  ALT  28  /  AlkPhos  189<H>  04-04    PT/INR - ( 2019 16:34 )   PT: 12.9 sec;   INR: 1.16 ratio         PTT - ( 2019 16:34 )  PTT:25.5 sec  Urinalysis Basic - ( 2019 19:17 )    Color: Yellow / Appearance: Clear / S.010 / pH: x  Gluc: x / Ketone: Negative  / Bili: Negative / Urobili: Negative   Blood: x / Protein: 15 / Nitrite: Negative   Leuk Esterase: Negative / RBC: 0-2 /HPF / WBC 0-2 /HPF   Sq Epi: x / Non Sq Epi: Occasional /HPF / Bacteria: Trace /HPF      CAPILLARY BLOOD GLUCOSE        Plan of care was discussed with patient and /or primary care giver; all questions and concerns were addressed and care was aligned with patient's wishes.

## 2019-04-06 LAB
ANION GAP SERPL CALC-SCNC: 6 MMOL/L — SIGNIFICANT CHANGE UP (ref 5–17)
BUN SERPL-MCNC: 27 MG/DL — HIGH (ref 7–18)
CALCIUM SERPL-MCNC: 8.2 MG/DL — LOW (ref 8.4–10.5)
CHLORIDE SERPL-SCNC: 117 MMOL/L — HIGH (ref 96–108)
CO2 SERPL-SCNC: 20 MMOL/L — LOW (ref 22–31)
CREAT SERPL-MCNC: 2.18 MG/DL — HIGH (ref 0.5–1.3)
GLUCOSE SERPL-MCNC: 99 MG/DL — SIGNIFICANT CHANGE UP (ref 70–99)
HCT VFR BLD CALC: 33.7 % — LOW (ref 39–50)
HCV AB S/CO SERPL IA: 10.32 S/CO — HIGH (ref 0–0.99)
HCV AB SERPL-IMP: REACTIVE
HGB BLD-MCNC: 10.7 G/DL — LOW (ref 13–17)
MCHC RBC-ENTMCNC: 27.2 PG — SIGNIFICANT CHANGE UP (ref 27–34)
MCHC RBC-ENTMCNC: 31.8 GM/DL — LOW (ref 32–36)
MCV RBC AUTO: 85.8 FL — SIGNIFICANT CHANGE UP (ref 80–100)
NRBC # BLD: 0 /100 WBCS — SIGNIFICANT CHANGE UP (ref 0–0)
PLATELET # BLD AUTO: 165 K/UL — SIGNIFICANT CHANGE UP (ref 150–400)
POTASSIUM SERPL-MCNC: 4.8 MMOL/L — SIGNIFICANT CHANGE UP (ref 3.5–5.3)
POTASSIUM SERPL-SCNC: 4.8 MMOL/L — SIGNIFICANT CHANGE UP (ref 3.5–5.3)
RBC # BLD: 3.93 M/UL — LOW (ref 4.2–5.8)
RBC # FLD: 15.6 % — HIGH (ref 10.3–14.5)
S PNEUM AG SER QL: SIGNIFICANT CHANGE UP
SODIUM SERPL-SCNC: 143 MMOL/L — SIGNIFICANT CHANGE UP (ref 135–145)
WBC # BLD: 5.47 K/UL — SIGNIFICANT CHANGE UP (ref 3.8–10.5)
WBC # FLD AUTO: 5.47 K/UL — SIGNIFICANT CHANGE UP (ref 3.8–10.5)

## 2019-04-06 RX ADMIN — HYDROMORPHONE HYDROCHLORIDE 4 MILLIGRAM(S): 2 INJECTION INTRAMUSCULAR; INTRAVENOUS; SUBCUTANEOUS at 05:29

## 2019-04-06 RX ADMIN — HYDROMORPHONE HYDROCHLORIDE 4 MILLIGRAM(S): 2 INJECTION INTRAMUSCULAR; INTRAVENOUS; SUBCUTANEOUS at 23:00

## 2019-04-06 RX ADMIN — FINASTERIDE 5 MILLIGRAM(S): 5 TABLET, FILM COATED ORAL at 11:52

## 2019-04-06 RX ADMIN — PANTOPRAZOLE SODIUM 40 MILLIGRAM(S): 20 TABLET, DELAYED RELEASE ORAL at 05:32

## 2019-04-06 RX ADMIN — GABAPENTIN 200 MILLIGRAM(S): 400 CAPSULE ORAL at 22:12

## 2019-04-06 RX ADMIN — Medication 25 MILLIGRAM(S): at 22:12

## 2019-04-06 RX ADMIN — LAMOTRIGINE 200 MILLIGRAM(S): 25 TABLET, ORALLY DISINTEGRATING ORAL at 11:52

## 2019-04-06 RX ADMIN — CEFTRIAXONE 100 GRAM(S): 500 INJECTION, POWDER, FOR SOLUTION INTRAMUSCULAR; INTRAVENOUS at 07:25

## 2019-04-06 RX ADMIN — TAMSULOSIN HYDROCHLORIDE 0.4 MILLIGRAM(S): 0.4 CAPSULE ORAL at 22:12

## 2019-04-06 RX ADMIN — HYDROMORPHONE HYDROCHLORIDE 4 MILLIGRAM(S): 2 INJECTION INTRAMUSCULAR; INTRAVENOUS; SUBCUTANEOUS at 14:05

## 2019-04-06 RX ADMIN — GABAPENTIN 200 MILLIGRAM(S): 400 CAPSULE ORAL at 05:31

## 2019-04-06 RX ADMIN — Medication 325 MILLIGRAM(S): at 11:52

## 2019-04-06 RX ADMIN — Medication 650 MILLIGRAM(S): at 22:12

## 2019-04-06 RX ADMIN — HYDROMORPHONE HYDROCHLORIDE 4 MILLIGRAM(S): 2 INJECTION INTRAMUSCULAR; INTRAVENOUS; SUBCUTANEOUS at 13:35

## 2019-04-06 RX ADMIN — HYDROMORPHONE HYDROCHLORIDE 4 MILLIGRAM(S): 2 INJECTION INTRAMUSCULAR; INTRAVENOUS; SUBCUTANEOUS at 22:12

## 2019-04-06 RX ADMIN — AZITHROMYCIN 250 MILLIGRAM(S): 500 TABLET, FILM COATED ORAL at 04:13

## 2019-04-06 RX ADMIN — GABAPENTIN 200 MILLIGRAM(S): 400 CAPSULE ORAL at 13:34

## 2019-04-06 RX ADMIN — ARIPIPRAZOLE 12.5 MILLIGRAM(S): 15 TABLET ORAL at 11:53

## 2019-04-06 RX ADMIN — Medication 81 MILLIGRAM(S): at 11:52

## 2019-04-06 RX ADMIN — Medication 650 MILLIGRAM(S): at 05:31

## 2019-04-06 RX ADMIN — Medication 650 MILLIGRAM(S): at 13:34

## 2019-04-06 RX ADMIN — HYDROMORPHONE HYDROCHLORIDE 4 MILLIGRAM(S): 2 INJECTION INTRAMUSCULAR; INTRAVENOUS; SUBCUTANEOUS at 06:00

## 2019-04-07 LAB
ANION GAP SERPL CALC-SCNC: 7 MMOL/L — SIGNIFICANT CHANGE UP (ref 5–17)
BUN SERPL-MCNC: 25 MG/DL — HIGH (ref 7–18)
CALCIUM SERPL-MCNC: 8.6 MG/DL — SIGNIFICANT CHANGE UP (ref 8.4–10.5)
CHLORIDE SERPL-SCNC: 115 MMOL/L — HIGH (ref 96–108)
CO2 SERPL-SCNC: 19 MMOL/L — LOW (ref 22–31)
CREAT SERPL-MCNC: 2.14 MG/DL — HIGH (ref 0.5–1.3)
CULTURE RESULTS: SIGNIFICANT CHANGE UP
GLUCOSE SERPL-MCNC: 91 MG/DL — SIGNIFICANT CHANGE UP (ref 70–99)
HCT VFR BLD CALC: 37.4 % — LOW (ref 39–50)
HGB BLD-MCNC: 11.5 G/DL — LOW (ref 13–17)
MCHC RBC-ENTMCNC: 26.5 PG — LOW (ref 27–34)
MCHC RBC-ENTMCNC: 30.7 GM/DL — LOW (ref 32–36)
MCV RBC AUTO: 86.2 FL — SIGNIFICANT CHANGE UP (ref 80–100)
NRBC # BLD: 0 /100 WBCS — SIGNIFICANT CHANGE UP (ref 0–0)
PLATELET # BLD AUTO: 111 K/UL — LOW (ref 150–400)
POTASSIUM SERPL-MCNC: 4.9 MMOL/L — SIGNIFICANT CHANGE UP (ref 3.5–5.3)
POTASSIUM SERPL-SCNC: 4.9 MMOL/L — SIGNIFICANT CHANGE UP (ref 3.5–5.3)
RBC # BLD: 4.34 M/UL — SIGNIFICANT CHANGE UP (ref 4.2–5.8)
RBC # FLD: 15.3 % — HIGH (ref 10.3–14.5)
SODIUM SERPL-SCNC: 141 MMOL/L — SIGNIFICANT CHANGE UP (ref 135–145)
SPECIMEN SOURCE: SIGNIFICANT CHANGE UP
WBC # BLD: 5.72 K/UL — SIGNIFICANT CHANGE UP (ref 3.8–10.5)
WBC # FLD AUTO: 5.72 K/UL — SIGNIFICANT CHANGE UP (ref 3.8–10.5)

## 2019-04-07 RX ADMIN — Medication 325 MILLIGRAM(S): at 11:05

## 2019-04-07 RX ADMIN — HYDROMORPHONE HYDROCHLORIDE 4 MILLIGRAM(S): 2 INJECTION INTRAMUSCULAR; INTRAVENOUS; SUBCUTANEOUS at 23:46

## 2019-04-07 RX ADMIN — Medication 650 MILLIGRAM(S): at 13:40

## 2019-04-07 RX ADMIN — GABAPENTIN 200 MILLIGRAM(S): 400 CAPSULE ORAL at 21:52

## 2019-04-07 RX ADMIN — Medication 650 MILLIGRAM(S): at 21:52

## 2019-04-07 RX ADMIN — PANTOPRAZOLE SODIUM 40 MILLIGRAM(S): 20 TABLET, DELAYED RELEASE ORAL at 05:33

## 2019-04-07 RX ADMIN — ARIPIPRAZOLE 12.5 MILLIGRAM(S): 15 TABLET ORAL at 11:05

## 2019-04-07 RX ADMIN — GABAPENTIN 200 MILLIGRAM(S): 400 CAPSULE ORAL at 13:40

## 2019-04-07 RX ADMIN — AZITHROMYCIN 250 MILLIGRAM(S): 500 TABLET, FILM COATED ORAL at 04:12

## 2019-04-07 RX ADMIN — GABAPENTIN 200 MILLIGRAM(S): 400 CAPSULE ORAL at 05:31

## 2019-04-07 RX ADMIN — Medication 81 MILLIGRAM(S): at 11:05

## 2019-04-07 RX ADMIN — HYDROMORPHONE HYDROCHLORIDE 4 MILLIGRAM(S): 2 INJECTION INTRAMUSCULAR; INTRAVENOUS; SUBCUTANEOUS at 14:51

## 2019-04-07 RX ADMIN — HYDROMORPHONE HYDROCHLORIDE 4 MILLIGRAM(S): 2 INJECTION INTRAMUSCULAR; INTRAVENOUS; SUBCUTANEOUS at 05:31

## 2019-04-07 RX ADMIN — HYDROMORPHONE HYDROCHLORIDE 4 MILLIGRAM(S): 2 INJECTION INTRAMUSCULAR; INTRAVENOUS; SUBCUTANEOUS at 21:55

## 2019-04-07 RX ADMIN — Medication 650 MILLIGRAM(S): at 05:31

## 2019-04-07 RX ADMIN — Medication 25 MILLIGRAM(S): at 21:52

## 2019-04-07 RX ADMIN — LAMOTRIGINE 200 MILLIGRAM(S): 25 TABLET, ORALLY DISINTEGRATING ORAL at 11:05

## 2019-04-07 RX ADMIN — TAMSULOSIN HYDROCHLORIDE 0.4 MILLIGRAM(S): 0.4 CAPSULE ORAL at 21:52

## 2019-04-07 RX ADMIN — HYDROMORPHONE HYDROCHLORIDE 4 MILLIGRAM(S): 2 INJECTION INTRAMUSCULAR; INTRAVENOUS; SUBCUTANEOUS at 13:40

## 2019-04-07 RX ADMIN — CEFTRIAXONE 100 GRAM(S): 500 INJECTION, POWDER, FOR SOLUTION INTRAMUSCULAR; INTRAVENOUS at 08:54

## 2019-04-07 RX ADMIN — HYDROMORPHONE HYDROCHLORIDE 4 MILLIGRAM(S): 2 INJECTION INTRAMUSCULAR; INTRAVENOUS; SUBCUTANEOUS at 06:02

## 2019-04-07 RX ADMIN — FINASTERIDE 5 MILLIGRAM(S): 5 TABLET, FILM COATED ORAL at 11:05

## 2019-04-07 NOTE — PROGRESS NOTE ADULT - PROBLEM SELECTOR PLAN 1
Patient afebrile overnight. WBC WNL  F/u blood culture. urine legionella antigen  Continue IV ceftriaxone and azithromycin
Patient afebrile overnight  F/u blood culture. urine legionella antigen  Continue IV ceftriaxone and azithromycin

## 2019-04-07 NOTE — PROGRESS NOTE ADULT - SUBJECTIVE AND OBJECTIVE BOX
PGY 1 Note discussed with supervising resident and primary attending    Patient is a 68y old  Male who presents with a chief complaint of Body aches (07 Apr 2019 09:40)      INTERVAL HPI/OVERNIGHT EVENTS:  Pt seen and examined at bedside. Pt has no new complains.    MEDICATIONS  (STANDING):  ARIPiprazole 12.5 milliGRAM(s) Oral daily  aspirin enteric coated 81 milliGRAM(s) Oral daily  azithromycin  IVPB      azithromycin  IVPB 500 milliGRAM(s) IV Intermittent every 24 hours  cefTRIAXone   IVPB 1 Gram(s) IV Intermittent every 24 hours  ferrous    sulfate 325 milliGRAM(s) Oral daily  finasteride 5 milliGRAM(s) Oral daily  gabapentin 200 milliGRAM(s) Oral three times a day  heparin  Injectable 5000 Unit(s) SubCutaneous every 8 hours  HYDROmorphone   Tablet 4 milliGRAM(s) Oral every 8 hours  lactated ringers. 1000 milliLiter(s) (75 mL/Hr) IV Continuous <Continuous>  lamoTRIgine 200 milliGRAM(s) Oral daily  pantoprazole    Tablet 40 milliGRAM(s) Oral before breakfast  sodium bicarbonate 650 milliGRAM(s) Oral three times a day  tamsulosin 0.4 milliGRAM(s) Oral at bedtime  traZODone 25 milliGRAM(s) Oral at bedtime    MEDICATIONS  (PRN):  acetaminophen   Tablet .. 650 milliGRAM(s) Oral every 6 hours PRN Temp greater or equal to 38C (100.4F), Mild Pain (1 - 3)  ALBUTerol    90 MICROgram(s) HFA Inhaler 2 Puff(s) Inhalation every 6 hours PRN Shortness of Breath and/or Wheezing      __________________________________________________  REVIEW OF SYSTEMS:    CONSTITUTIONAL: No fever,   EYES: no acute visual disturbances  NECK: No pain or stiffness  RESPIRATORY: No cough; No shortness of breath  CARDIOVASCULAR: No chest pain, no palpitations  GASTROINTESTINAL: No pain. No nausea or vomiting; No diarrhea   NEUROLOGICAL: No headache or numbness, no tremors  MUSCULOSKELETAL: No joint pain, no muscle pain  GENITOURINARY: no dysuria, no frequency, no hesitancy  PSYCHIATRY: no depression , no anxiety  ALL OTHER  ROS negative        Vital Signs Last 24 Hrs  T(C): 36.8 (07 Apr 2019 07:59), Max: 36.8 (07 Apr 2019 07:59)  T(F): 98.3 (07 Apr 2019 07:59), Max: 98.3 (07 Apr 2019 07:59)  HR: 72 (07 Apr 2019 07:59) (62 - 72)  BP: 116/65 (07 Apr 2019 07:59) (112/56 - 116/65)  BP(mean): --  RR: 20 (07 Apr 2019 07:59) (16 - 20)  SpO2: 96% (07 Apr 2019 07:59) (92% - 96%)    ________________________________________________  PHYSICAL EXAM:  GENERAL: NAD  HEENT: Normocephalic;  conjunctivae and sclerae clear; moist mucous membranes;   NECK : supple  CHEST/LUNG: Clear to auscultation bilaterally with good air entry   HEART: S1 S2  regular; no murmurs, gallops or rubs  ABDOMEN: Soft, Nontender, Nondistended; Bowel sounds present  EXTREMITIES: no cyanosis; no edema; no calf tenderness  SKIN: warm and dry; no rash  NERVOUS SYSTEM:  Awake and alert; Oriented  to place, person and time ; no new deficits    _________________________________________________  LABS:                        11.5   5.72  )-----------( 111      ( 07 Apr 2019 08:04 )             37.4     04-07    141  |  115<H>  |  25<H>  ----------------------------<  91  4.9   |  19<L>  |  2.14<H>    Ca    8.6      07 Apr 2019 08:04          CAPILLARY BLOOD GLUCOSE                Plan of care was discussed with patient and /or primary care giver; all questions and concerns were addressed and care was aligned with patient's wishes.

## 2019-04-07 NOTE — PROGRESS NOTE ADULT - ASSESSMENT
Pt is a 69 y/o M, from [facility], with PMHx of COPD (not on O2), HTN, HLD, Bipolar, anemia, GERD, Ileostomy, Prostate Ca s/p rad, BPH, PAD, and CKD (unknown stage) who presents to the ED with fever and chills x 1 day.
Pt is a 67 y/o M, from [facility], with PMHx of COPD (not on O2), HTN, HLD, Bipolar, anemia, GERD, Ileostomy, Prostate Ca s/p rad, BPH, PAD, and CKD (unknown stage) who presents to the ED with fever and chills x 1 day.

## 2019-04-07 NOTE — PROGRESS NOTE ADULT - SUBJECTIVE AND OBJECTIVE BOX
Patient is a 68y old  Male who presents with a chief complaint of Body aches (05 Apr 2019 13:30)    PATIENT IS SEEN AND EXAMINED IN MEDICAL FLOOR.    ALLERGIES:  No Known Allergies      VITALS:    Vital Signs Last 24 Hrs  T(C): 36.8 (07 Apr 2019 07:59), Max: 36.8 (07 Apr 2019 07:59)  T(F): 98.3 (07 Apr 2019 07:59), Max: 98.3 (07 Apr 2019 07:59)  HR: 72 (07 Apr 2019 07:59) (62 - 72)  BP: 116/65 (07 Apr 2019 07:59) (112/56 - 116/65)  BP(mean): --  RR: 20 (07 Apr 2019 07:59) (16 - 20)  SpO2: 96% (07 Apr 2019 07:59) (92% - 96%)    LABS:    CBC Full  -  ( 07 Apr 2019 08:04 )  WBC Count : 5.72 K/uL  RBC Count : 4.34 M/uL  Hemoglobin : 11.5 g/dL  Hematocrit : 37.4 %  Platelet Count - Automated : 111 K/uL  Mean Cell Volume : 86.2 fl  Mean Cell Hemoglobin : 26.5 pg  Mean Cell Hemoglobin Concentration : 30.7 gm/dL  Auto Neutrophil # : x  Auto Lymphocyte # : x  Auto Monocyte # : x  Auto Eosinophil # : x  Auto Basophil # : x  Auto Neutrophil % : x  Auto Lymphocyte % : x  Auto Monocyte % : x  Auto Eosinophil % : x  Auto Basophil % : x      04-07    141  |  115<H>  |  25<H>  ----------------------------<  91  4.9   |  19<L>  |  2.14<H>    Ca    8.6      07 Apr 2019 08:04  Phos  3.2     04-05  Mg     1.8     04-05      CAPILLARY BLOOD GLUCOSE              Creatinine Trend: 2.14<--, 2.18<--, 2.40<--, 2.64<--  I&O's Summary          .Stool erasmo alex  04-05 @ 10:10   No enteric pathogens to date: Final culture pending  No enteric gram negative rods isolated  --  --      .Stool para marleni  04-05 @ 10:09   Testing in progress  --  --      .Urine  04-05 @ 01:11   <10,000 CFU/mL Normal Urogenital Celina  --  --      .Blood  04-04 @ 22:48   No growth to date.  --  --      .Blood  04-04 @ 22:47   No growth to date.  --  --          MEDICATIONS:    MEDICATIONS  (STANDING):  ARIPiprazole 12.5 milliGRAM(s) Oral daily  aspirin enteric coated 81 milliGRAM(s) Oral daily  azithromycin  IVPB      azithromycin  IVPB 500 milliGRAM(s) IV Intermittent every 24 hours  cefTRIAXone   IVPB 1 Gram(s) IV Intermittent every 24 hours  ferrous    sulfate 325 milliGRAM(s) Oral daily  finasteride 5 milliGRAM(s) Oral daily  gabapentin 200 milliGRAM(s) Oral three times a day  heparin  Injectable 5000 Unit(s) SubCutaneous every 8 hours  HYDROmorphone   Tablet 4 milliGRAM(s) Oral every 8 hours  lactated ringers. 1000 milliLiter(s) (75 mL/Hr) IV Continuous <Continuous>  lamoTRIgine 200 milliGRAM(s) Oral daily  pantoprazole    Tablet 40 milliGRAM(s) Oral before breakfast  sodium bicarbonate 650 milliGRAM(s) Oral three times a day  tamsulosin 0.4 milliGRAM(s) Oral at bedtime  traZODone 25 milliGRAM(s) Oral at bedtime      MEDICATIONS  (PRN):  acetaminophen   Tablet .. 650 milliGRAM(s) Oral every 6 hours PRN Temp greater or equal to 38C (100.4F), Mild Pain (1 - 3)  ALBUTerol    90 MICROgram(s) HFA Inhaler 2 Puff(s) Inhalation every 6 hours PRN Shortness of Breath and/or Wheezing      REVIEW OF SYSTEMS:                           ALL ROS DONE [ X   ]    CONSTITUTIONAL:  LETHARGIC [   ], FEVER [   ], UNRESPONSIVE [   ]  CVS:  CP  [   ], SOB, [   ], PALPITATIONS [   ], DIZZYNESS [   ]  RS: COUGH [   ], SPUTUM [   ]  GI: ABDOMINAL PAIN [   ], NAUSEA [   ], VOMITINGS [   ], DIARRHEA [   ], CONSTIPATION [   ]  :  DYSURIA [   ], NOCTURIA [   ], INCREASED FREQUENCY [   ], DRIBLING [   ],  SKELETAL: PAINFUL JOINTS [   ], SWOLLEN JOINTS [   ], NECK ACHE [   ], LOW BACK ACHE [   ],  SKIN : ULCERS [   ], RASH [   ], ITCHING [   ]  CNS: HEAD ACHE [   ], DOUBLE VISION [   ], BLURRED VISION [   ], AMS / CONFUSION [   ], SEIZURES [   ], WEAKNESS [   ],TINGLING / NUMBNESS [   ]    PHYSICAL EXAMINATION:  GENERAL APPEARANCE: NO DISTRESS  HEENT:  NO PALLOR, NO  JVD,  NO   NODES, NECK SUPPLE  CVS: S1 +, S2 +,   RS: AEEB,  OCCASIONAL  RALES +,   NO RONCHI  ABD: SOFT, NT, NO, BS +            ILEOSTOMY +  EXT: PE +  SKIN: WARM,   SKELETAL:  ROM ACCEPTABLE  CNS:  AAO X  3  , NO   DEFICITS    RADIOLOGY :    < from: CT Abdomen and Pelvis No Cont (04.04.19 @ 21:30) >  IMPRESSION:   Limited evaluation of the liver due to lack of intravenous contrast.   Within this limitation, no discernible abscess or mass identified.  Porcelain gallbladder.  Additional findings, as abov    < end of copied text >    < from: Xray Chest 1 View-PORTABLE IMMEDIATE (04.04.19 @ 17:11) >  AP view of the chest demonstrates mild pulmonary vascular congestion.   There is no focal consolidation or pleural effusion. The heart is   enlarged. The aorta is tortuous. There is no mediastinal or hilar mass.    Mild thoracic degenerative changes are present. An old healed right   fourth rib fracture is noted.    IMPRESSION:    Mild pulmonary vascular congestion. Mild cardiomegaly.      < end of copied text >    ASSESSMENT :     Fever  BPH with urinary obstruction  Prostate CA  CKD (chronic kidney disease)  PAD (peripheral artery disease)  HLD (hyperlipidemia)  HTN (hypertension)  IBD (inflammatory bowel disease)  Bipolar disorder  Anemia  COPD, mild  History of creation of ostomy      PLAN:  HPI:  Pt is a 69 y/o M, from [facility], with PMHx of COPD (not on O2), HTN, HLD, Bipolar, anemia, GERD, Ileostomy, Prostate Ca s/p rad, BPH, PAD, and CKD (unknown stage) who presents to the ED with fever and chills x 1 day.  Pt states yesterday morning he was feeling lethargic with decreased appetite  He felt subjective fever and was shivering.  Pt does not remember exact temp, but knows it was >100.  Pt states some of his neighbors are sick with flu like symptoms. Pt denies Cough, abd pain, HA, dizziness, weakness, or N/V.  Pt reports that the feces from his ileostomy has been a lot more watery over the last few days.  Pt denies recent travel or recent antibiotic exposure.  Pt also states that his renal function has been declining, and he is scheduled to see a nephrologist soon. Pt also reports chronic LE pitting edema.  Pt denies CP, palpitations, blurred vision, slurred speech, extremity numbness/ weakness, confusion, or syncope.    In the ED, pt presents in no acute distress, vitals sig for T: 102.7,  remaining WNL, and EKG NSR (04 Apr 2019 21:01)    - SUSPECT PNEUMONIA AND ACUTE BRONCHITIS ON IV. ROCEPHIN, AZITHROMYCIN, NEBS  - RESOLVED ABDOMINAL PAIN  - CHRONIC LOW BACK PAIN  - CKD STAGE 4 - FOLLOWING NEPHROLOGIST AS AN OUT PATIENT  - GI AND DVT PROPHYLAXIS  - DR. IVY Patient is a 68y old  Male who presents with a chief complaint of Body aches 04-06-19    PATIENT IS SEEN AND EXAMINED IN MEDICAL FLOOR.    ALLERGIES:  No Known Allergies      VITALS    T(F): 97.7  HR: 66  BP: 119/63  RR: 18  SpO2: 94%    LABS:    LABS REVIEWED ON 04-06-19      Creatinine Trend: 2.14<--, 2.18<--, 2.40<--, 2.64<--  I&O's Summary          .Stool erasmo alex  04-05 @ 10:10   No enteric pathogens to date: Final culture pending  No enteric gram negative rods isolated  --  --      .Stool para marleni  04-05 @ 10:09   Testing in progress  --  --      .Urine  04-05 @ 01:11   <10,000 CFU/mL Normal Urogenital Celina  --  --      .Blood  04-04 @ 22:48   No growth to date.  --  --      .Blood  04-04 @ 22:47   No growth to date.  --  --          MEDICATIONS:    MEDICATIONS  (STANDING):  ARIPiprazole 12.5 milliGRAM(s) Oral daily  aspirin enteric coated 81 milliGRAM(s) Oral daily  azithromycin  IVPB      azithromycin  IVPB 500 milliGRAM(s) IV Intermittent every 24 hours  cefTRIAXone   IVPB 1 Gram(s) IV Intermittent every 24 hours  ferrous    sulfate 325 milliGRAM(s) Oral daily  finasteride 5 milliGRAM(s) Oral daily  gabapentin 200 milliGRAM(s) Oral three times a day  heparin  Injectable 5000 Unit(s) SubCutaneous every 8 hours  HYDROmorphone   Tablet 4 milliGRAM(s) Oral every 8 hours  lactated ringers. 1000 milliLiter(s) (75 mL/Hr) IV Continuous <Continuous>  lamoTRIgine 200 milliGRAM(s) Oral daily  pantoprazole    Tablet 40 milliGRAM(s) Oral before breakfast  sodium bicarbonate 650 milliGRAM(s) Oral three times a day  tamsulosin 0.4 milliGRAM(s) Oral at bedtime  traZODone 25 milliGRAM(s) Oral at bedtime      MEDICATIONS  (PRN):  acetaminophen   Tablet .. 650 milliGRAM(s) Oral every 6 hours PRN Temp greater or equal to 38C (100.4F), Mild Pain (1 - 3)  ALBUTerol    90 MICROgram(s) HFA Inhaler 2 Puff(s) Inhalation every 6 hours PRN Shortness of Breath and/or Wheezing      REVIEW OF SYSTEMS:                           ALL ROS DONE [ X   ]    CONSTITUTIONAL:  LETHARGIC [   ], FEVER [   ], UNRESPONSIVE [   ]  CVS:  CP  [   ], SOB, [   ], PALPITATIONS [   ], DIZZYNESS [   ]  RS: COUGH [   ], SPUTUM [   ]  GI: ABDOMINAL PAIN [   ], NAUSEA [   ], VOMITINGS [   ], DIARRHEA [   ], CONSTIPATION [   ]  :  DYSURIA [   ], NOCTURIA [   ], INCREASED FREQUENCY [   ], DRIBLING [   ],  SKELETAL: PAINFUL JOINTS [   ], SWOLLEN JOINTS [   ], NECK ACHE [   ], LOW BACK ACHE [   ],  SKIN : ULCERS [   ], RASH [   ], ITCHING [   ]  CNS: HEAD ACHE [   ], DOUBLE VISION [   ], BLURRED VISION [   ], AMS / CONFUSION [   ], SEIZURES [   ], WEAKNESS [   ],TINGLING / NUMBNESS [   ]    PHYSICAL EXAMINATION:  GENERAL APPEARANCE: NO DISTRESS  HEENT:  NO PALLOR, NO  JVD,  NO   NODES, NECK SUPPLE  CVS: S1 +, S2 +,   RS: AEEB,  OCCASIONAL  RALES +,   NO RONCHI  ABD: SOFT, NT, NO, BS +            ILEOSTOMY +  EXT: PE +  SKIN: WARM,   SKELETAL:  ROM ACCEPTABLE  CNS:  AAO X  3  , NO   DEFICITS    RADIOLOGY :    < from: CT Abdomen and Pelvis No Cont (04.04.19 @ 21:30) >  IMPRESSION:   Limited evaluation of the liver due to lack of intravenous contrast.   Within this limitation, no discernible abscess or mass identified.  Porcelain gallbladder.  Additional findings, as abov    < end of copied text >    < from: Xray Chest 1 View-PORTABLE IMMEDIATE (04.04.19 @ 17:11) >  AP view of the chest demonstrates mild pulmonary vascular congestion.   There is no focal consolidation or pleural effusion. The heart is   enlarged. The aorta is tortuous. There is no mediastinal or hilar mass.    Mild thoracic degenerative changes are present. An old healed right   fourth rib fracture is noted.    IMPRESSION:    Mild pulmonary vascular congestion. Mild cardiomegaly.      < end of copied text >    ASSESSMENT :     Fever  BPH with urinary obstruction  Prostate CA  CKD (chronic kidney disease)  PAD (peripheral artery disease)  HLD (hyperlipidemia)  HTN (hypertension)  IBD (inflammatory bowel disease)  Bipolar disorder  Anemia  COPD, mild  History of creation of ostomy      PLAN:  HPI:  Pt is a 67 y/o M, from [facility], with PMHx of COPD (not on O2), HTN, HLD, Bipolar, anemia, GERD, Ileostomy, Prostate Ca s/p rad, BPH, PAD, and CKD (unknown stage) who presents to the ED with fever and chills x 1 day.  Pt states yesterday morning he was feeling lethargic with decreased appetite  He felt subjective fever and was shivering.  Pt does not remember exact temp, but knows it was >100.  Pt states some of his neighbors are sick with flu like symptoms. Pt denies Cough, abd pain, HA, dizziness, weakness, or N/V.  Pt reports that the feces from his ileostomy has been a lot more watery over the last few days.  Pt denies recent travel or recent antibiotic exposure.  Pt also states that his renal function has been declining, and he is scheduled to see a nephrologist soon. Pt also reports chronic LE pitting edema.  Pt denies CP, palpitations, blurred vision, slurred speech, extremity numbness/ weakness, confusion, or syncope.    In the ED, pt presents in no acute distress, vitals sig for T: 102.7,  remaining WNL, and EKG NSR (04 Apr 2019 21:01)    - SUSPECT PNEUMONIA AND ACUTE BRONCHITIS ON IV. ROCEPHIN, AZITHROMYCIN, NEBS  - RESOLVED ABDOMINAL PAIN  - CHRONIC LOW BACK PAIN  - CKD STAGE 4 - FOLLOWING NEPHROLOGIST AS AN OUT PATIENT  - GI AND DVT PROPHYLAXIS  - DR. IVY Patient is a 68y old  Male who presents with a chief complaint of Body aches 04-06-19    PATIENT IS SEEN AND EXAMINED IN MEDICAL FLOOR.    ALLERGIES:  No Known Allergies      VITALS    T(F): 97.7  HR: 66  BP: 119/63  RR: 18  SpO2: 94%    LABS:    LABS REVIEWED ON 04-06-19      Creatinine Trend: 2.14<--, 2.18<--, 2.40<--, 2.64<--  I&O's Summary          .Stool erasmo alex  04-05 @ 10:10   No enteric pathogens to date: Final culture pending  No enteric gram negative rods isolated  --  --      .Stool para marleni  04-05 @ 10:09   Testing in progress  --  --      .Urine  04-05 @ 01:11   <10,000 CFU/mL Normal Urogenital Celina  --  --      .Blood  04-04 @ 22:48   No growth to date.  --  --      .Blood  04-04 @ 22:47   No growth to date.  --  --          MEDICATIONS:    MEDICATIONS  (STANDING):  ARIPiprazole 12.5 milliGRAM(s) Oral daily  aspirin enteric coated 81 milliGRAM(s) Oral daily  azithromycin  IVPB      azithromycin  IVPB 500 milliGRAM(s) IV Intermittent every 24 hours  cefTRIAXone   IVPB 1 Gram(s) IV Intermittent every 24 hours  ferrous    sulfate 325 milliGRAM(s) Oral daily  finasteride 5 milliGRAM(s) Oral daily  gabapentin 200 milliGRAM(s) Oral three times a day  heparin  Injectable 5000 Unit(s) SubCutaneous every 8 hours  HYDROmorphone   Tablet 4 milliGRAM(s) Oral every 8 hours  lactated ringers. 1000 milliLiter(s) (75 mL/Hr) IV Continuous <Continuous>  lamoTRIgine 200 milliGRAM(s) Oral daily  pantoprazole    Tablet 40 milliGRAM(s) Oral before breakfast  sodium bicarbonate 650 milliGRAM(s) Oral three times a day  tamsulosin 0.4 milliGRAM(s) Oral at bedtime  traZODone 25 milliGRAM(s) Oral at bedtime      MEDICATIONS  (PRN):  acetaminophen   Tablet .. 650 milliGRAM(s) Oral every 6 hours PRN Temp greater or equal to 38C (100.4F), Mild Pain (1 - 3)  ALBUTerol    90 MICROgram(s) HFA Inhaler 2 Puff(s) Inhalation every 6 hours PRN Shortness of Breath and/or Wheezing      REVIEW OF SYSTEMS:                           ALL ROS DONE [ X   ]    CONSTITUTIONAL:  LETHARGIC [   ], FEVER [   ], UNRESPONSIVE [   ]  CVS:  CP  [   ], SOB, [   ], PALPITATIONS [   ], DIZZYNESS [   ]  RS: COUGH [   ], SPUTUM [   ]  GI: ABDOMINAL PAIN [   ], NAUSEA [   ], VOMITINGS [   ], DIARRHEA [   ], CONSTIPATION [   ]  :  DYSURIA [   ], NOCTURIA [   ], INCREASED FREQUENCY [   ], DRIBLING [   ],  SKELETAL: PAINFUL JOINTS [   ], SWOLLEN JOINTS [   ], NECK ACHE [   ], LOW BACK ACHE [   ],  SKIN : ULCERS [   ], RASH [   ], ITCHING [   ]  CNS: HEAD ACHE [   ], DOUBLE VISION [   ], BLURRED VISION [   ], AMS / CONFUSION [   ], SEIZURES [   ], WEAKNESS [   ],TINGLING / NUMBNESS [   ]    PHYSICAL EXAMINATION:  GENERAL APPEARANCE: NO DISTRESS  HEENT:  NO PALLOR, NO  JVD,  NO   NODES, NECK SUPPLE  CVS: S1 +, S2 +,   RS: AEEB,  OCCASIONAL  RALES +,   NO RONCHI  ABD: SOFT, NT, NO, BS +            ILEOSTOMY +  EXT: PE +  SKIN: WARM,   SKELETAL:  ROM ACCEPTABLE  CNS:  AAO X  3  , NO   DEFICITS    RADIOLOGY :    < from: CT Abdomen and Pelvis No Cont (04.04.19 @ 21:30) >  IMPRESSION:   Limited evaluation of the liver due to lack of intravenous contrast.   Within this limitation, no discernible abscess or mass identified.  Porcelain gallbladder.  Additional findings, as abov    < end of copied text >    < from: Xray Chest 1 View-PORTABLE IMMEDIATE (04.04.19 @ 17:11) >  AP view of the chest demonstrates mild pulmonary vascular congestion.   There is no focal consolidation or pleural effusion. The heart is   enlarged. The aorta is tortuous. There is no mediastinal or hilar mass.    Mild thoracic degenerative changes are present. An old healed right   fourth rib fracture is noted.    IMPRESSION:    Mild pulmonary vascular congestion. Mild cardiomegaly.      < end of copied text >    ASSESSMENT :     Fever  BPH with urinary obstruction  Prostate CA  CKD (chronic kidney disease)  PAD (peripheral artery disease)  HLD (hyperlipidemia)  HTN (hypertension)  IBD (inflammatory bowel disease)  Bipolar disorder  Anemia  COPD, mild  History of creation of ostomy      PLAN:  HPI:  Pt is a 69 y/o M, from [facility], with PMHx of COPD (not on O2), HTN, HLD, Bipolar, anemia, GERD, Ileostomy, Prostate Ca s/p rad, BPH, PAD, and CKD (unknown stage) who presents to the ED with fever and chills x 1 day.  Pt states yesterday morning he was feeling lethargic with decreased appetite  He felt subjective fever and was shivering.  Pt does not remember exact temp, but knows it was >100.  Pt states some of his neighbors are sick with flu like symptoms. Pt denies Cough, abd pain, HA, dizziness, weakness, or N/V.  Pt reports that the feces from his ileostomy has been a lot more watery over the last few days.  Pt denies recent travel or recent antibiotic exposure.  Pt also states that his renal function has been declining, and he is scheduled to see a nephrologist soon. Pt also reports chronic LE pitting edema.  Pt denies CP, palpitations, blurred vision, slurred speech, extremity numbness/ weakness, confusion, or syncope.    In the ED, pt presents in no acute distress, vitals sig for T: 102.7,  remaining WNL, and EKG NSR (04 Apr 2019 21:01)    - PATIENT WAS SEEN AND EXAMINED ON 04-06-19, BY ERROR NOTE WAS NOT PLACED  - SUSPECT PNEUMONIA AND ACUTE BRONCHITIS ON IV. ROCEPHIN, AZITHROMYCIN, NEBS  - RESOLVED ABDOMINAL PAIN  - CHRONIC LOW BACK PAIN  - CKD STAGE 4 - FOLLOWING NEPHROLOGIST AS AN OUT PATIENT  - GI AND DVT PROPHYLAXIS  - DR. IVY

## 2019-04-07 NOTE — PROGRESS NOTE ADULT - PROBLEM SELECTOR PLAN 3
Pt likely has CKD  Serum creatinine trending down  Monitor BMP  Avoid nephrotoxic medications
Pt likely has CKD  Serum creatinine trending down  Monitor BMP  Avoid nephrotoxic medications

## 2019-04-07 NOTE — PROGRESS NOTE ADULT - SUBJECTIVE AND OBJECTIVE BOX
Patient is a 68y old  Male who presents with a chief complaint of Body aches (07 Apr 2019 11:44)    PATIENT IS SEEN AND EXAMINED IN MEDICAL FLOOR.    ALLERGIES:  No Known Allergies      VITALS:    Vital Signs Last 24 Hrs  T(C): 36.8 (07 Apr 2019 07:59), Max: 36.8 (07 Apr 2019 07:59)  T(F): 98.3 (07 Apr 2019 07:59), Max: 98.3 (07 Apr 2019 07:59)  HR: 72 (07 Apr 2019 07:59) (62 - 72)  BP: 116/65 (07 Apr 2019 07:59) (112/56 - 116/65)  BP(mean): --  RR: 20 (07 Apr 2019 07:59) (16 - 20)  SpO2: 96% (07 Apr 2019 07:59) (92% - 96%)    LABS:    CBC Full  -  ( 07 Apr 2019 08:04 )  WBC Count : 5.72 K/uL  RBC Count : 4.34 M/uL  Hemoglobin : 11.5 g/dL  Hematocrit : 37.4 %  Platelet Count - Automated : 111 K/uL  Mean Cell Volume : 86.2 fl  Mean Cell Hemoglobin : 26.5 pg  Mean Cell Hemoglobin Concentration : 30.7 gm/dL  Auto Neutrophil # : x  Auto Lymphocyte # : x  Auto Monocyte # : x  Auto Eosinophil # : x  Auto Basophil # : x  Auto Neutrophil % : x  Auto Lymphocyte % : x  Auto Monocyte % : x  Auto Eosinophil % : x  Auto Basophil % : x      04-07    141  |  115<H>  |  25<H>  ----------------------------<  91  4.9   |  19<L>  |  2.14<H>    Ca    8.6      07 Apr 2019 08:04      CAPILLARY BLOOD GLUCOSE              Creatinine Trend: 2.14<--, 2.18<--, 2.40<--, 2.64<--  I&O's Summary          .Stool erasmo alex  04-05 @ 10:10   No enteric pathogens isolated.  (Stool culture examined for Salmonella,  Shigella, Campylobacter, Aeromonas, Plesiomonas,  Vibrio, E.coli O157 and Yersinia)  No enteric gram negative rods isolated  --  --      .Stool para marleni  04-05 @ 10:09   Testing in progress  --  --      .Urine  04-05 @ 01:11   <10,000 CFU/mL Normal Urogenital Celina  --  --      .Blood  04-04 @ 22:48   No growth to date.  --  --      .Blood  04-04 @ 22:47   No growth to date.  --  --          MEDICATIONS:    MEDICATIONS  (STANDING):  ARIPiprazole 12.5 milliGRAM(s) Oral daily  aspirin enteric coated 81 milliGRAM(s) Oral daily  azithromycin  IVPB      azithromycin  IVPB 500 milliGRAM(s) IV Intermittent every 24 hours  cefTRIAXone   IVPB 1 Gram(s) IV Intermittent every 24 hours  ferrous    sulfate 325 milliGRAM(s) Oral daily  finasteride 5 milliGRAM(s) Oral daily  gabapentin 200 milliGRAM(s) Oral three times a day  heparin  Injectable 5000 Unit(s) SubCutaneous every 8 hours  HYDROmorphone   Tablet 4 milliGRAM(s) Oral every 8 hours  lactated ringers. 1000 milliLiter(s) (75 mL/Hr) IV Continuous <Continuous>  lamoTRIgine 200 milliGRAM(s) Oral daily  pantoprazole    Tablet 40 milliGRAM(s) Oral before breakfast  sodium bicarbonate 650 milliGRAM(s) Oral three times a day  tamsulosin 0.4 milliGRAM(s) Oral at bedtime  traZODone 25 milliGRAM(s) Oral at bedtime      MEDICATIONS  (PRN):  acetaminophen   Tablet .. 650 milliGRAM(s) Oral every 6 hours PRN Temp greater or equal to 38C (100.4F), Mild Pain (1 - 3)  ALBUTerol    90 MICROgram(s) HFA Inhaler 2 Puff(s) Inhalation every 6 hours PRN Shortness of Breath and/or Wheezing      REVIEW OF SYSTEMS:                           ALL ROS DONE [ X   ]    CONSTITUTIONAL:  LETHARGIC [   ], FEVER [   ], UNRESPONSIVE [   ]  CVS:  CP  [   ], SOB, [   ], PALPITATIONS [   ], DIZZYNESS [   ]  RS: COUGH [   ], SPUTUM [   ]  GI: ABDOMINAL PAIN [   ], NAUSEA [   ], VOMITINGS [   ], DIARRHEA [   ], CONSTIPATION [   ]  :  DYSURIA [   ], NOCTURIA [   ], INCREASED FREQUENCY [   ], DRIBLING [   ],  SKELETAL: PAINFUL JOINTS [   ], SWOLLEN JOINTS [   ], NECK ACHE [   ], LOW BACK ACHE [   ],  SKIN : ULCERS [   ], RASH [   ], ITCHING [   ]  CNS: HEAD ACHE [   ], DOUBLE VISION [   ], BLURRED VISION [   ], AMS / CONFUSION [   ], SEIZURES [   ], WEAKNESS [   ],TINGLING / NUMBNESS [   ]    PHYSICAL EXAMINATION:  GENERAL APPEARANCE: NO DISTRESS  HEENT:  NO PALLOR, NO  JVD,  NO   NODES, NECK SUPPLE  CVS: S1 +, S2 +,   RS: AEEB,  OCCASIONAL  RALES +,   NO RONCHI  ABD: SOFT, NT, NO, BS +            ILEOSTOMY +  EXT: PE +  SKIN: WARM,   SKELETAL:  ROM ACCEPTABLE  CNS:  AAO X  3  , NO   DEFICITS    RADIOLOGY :    < from: CT Abdomen and Pelvis No Cont (04.04.19 @ 21:30) >  IMPRESSION:   Limited evaluation of the liver due to lack of intravenous contrast.   Within this limitation, no discernible abscess or mass identified.  Porcelain gallbladder.  Additional findings, as abov    < end of copied text >    < from: Xray Chest 1 View-PORTABLE IMMEDIATE (04.04.19 @ 17:11) >  AP view of the chest demonstrates mild pulmonary vascular congestion.   There is no focal consolidation or pleural effusion. The heart is   enlarged. The aorta is tortuous. There is no mediastinal or hilar mass.    Mild thoracic degenerative changes are present. An old healed right   fourth rib fracture is noted.    IMPRESSION:    Mild pulmonary vascular congestion. Mild cardiomegaly.      < end of copied text >    ASSESSMENT :     Fever  BPH with urinary obstruction  Prostate CA  CKD (chronic kidney disease)  PAD (peripheral artery disease)  HLD (hyperlipidemia)  HTN (hypertension)  IBD (inflammatory bowel disease)  Bipolar disorder  Anemia  COPD, mild  History of creation of ostomy      PLAN:  HPI:  Pt is a 67 y/o M, from [facility], with PMHx of COPD (not on O2), HTN, HLD, Bipolar, anemia, GERD, Ileostomy, Prostate Ca s/p rad, BPH, PAD, and CKD (unknown stage) who presents to the ED with fever and chills x 1 day.  Pt states yesterday morning he was feeling lethargic with decreased appetite  He felt subjective fever and was shivering.  Pt does not remember exact temp, but knows it was >100.  Pt states some of his neighbors are sick with flu like symptoms. Pt denies Cough, abd pain, HA, dizziness, weakness, or N/V.  Pt reports that the feces from his ileostomy has been a lot more watery over the last few days.  Pt denies recent travel or recent antibiotic exposure.  Pt also states that his renal function has been declining, and he is scheduled to see a nephrologist soon. Pt also reports chronic LE pitting edema.  Pt denies CP, palpitations, blurred vision, slurred speech, extremity numbness/ weakness, confusion, or syncope.    In the ED, pt presents in no acute distress, vitals sig for T: 102.7,  remaining WNL, and EKG NSR (04 Apr 2019 21:01)    - DC PLAN IN AM BACK TO [facility]  - SUSPECT PNEUMONIA AND ACUTE BRONCHITIS ON IV. ROCEPHIN, AZITHROMYCIN, NEBS  - RESOLVED ABDOMINAL PAIN  - CHRONIC LOW BACK PAIN  - CKD STAGE 4 - FOLLOWING NEPHROLOGIST AS AN OUT PATIENT  - GI AND DVT PROPHYLAXIS  - DR. IVY

## 2019-04-07 NOTE — PROGRESS NOTE ADULT - PROBLEM SELECTOR PLAN 2
Likely secondary to pneumonia  F/u Blood culture  Continue IV ceftriaxone and azithromycin
Likely secondary to pneumonia  F/u Blood culture  Continue IV ceftriaxone and azithromycin

## 2019-04-08 ENCOUNTER — TRANSCRIPTION ENCOUNTER (OUTPATIENT)
Age: 69
End: 2019-04-08

## 2019-04-08 VITALS
HEART RATE: 72 BPM | SYSTOLIC BLOOD PRESSURE: 102 MMHG | RESPIRATION RATE: 17 BRPM | OXYGEN SATURATION: 97 % | DIASTOLIC BLOOD PRESSURE: 57 MMHG | TEMPERATURE: 98 F

## 2019-04-08 LAB
ANION GAP SERPL CALC-SCNC: 8 MMOL/L — SIGNIFICANT CHANGE UP (ref 5–17)
BUN SERPL-MCNC: 26 MG/DL — HIGH (ref 7–18)
CALCIUM SERPL-MCNC: 8.8 MG/DL — SIGNIFICANT CHANGE UP (ref 8.4–10.5)
CHLORIDE SERPL-SCNC: 111 MMOL/L — HIGH (ref 96–108)
CO2 SERPL-SCNC: 18 MMOL/L — LOW (ref 22–31)
CREAT SERPL-MCNC: 2.28 MG/DL — HIGH (ref 0.5–1.3)
GLUCOSE SERPL-MCNC: 95 MG/DL — SIGNIFICANT CHANGE UP (ref 70–99)
HCT VFR BLD CALC: 38.3 % — LOW (ref 39–50)
HGB BLD-MCNC: 12.2 G/DL — LOW (ref 13–17)
MCHC RBC-ENTMCNC: 27.1 PG — SIGNIFICANT CHANGE UP (ref 27–34)
MCHC RBC-ENTMCNC: 31.9 GM/DL — LOW (ref 32–36)
MCV RBC AUTO: 84.9 FL — SIGNIFICANT CHANGE UP (ref 80–100)
NRBC # BLD: 0 /100 WBCS — SIGNIFICANT CHANGE UP (ref 0–0)
PLATELET # BLD AUTO: 182 K/UL — SIGNIFICANT CHANGE UP (ref 150–400)
POTASSIUM SERPL-MCNC: 4.8 MMOL/L — SIGNIFICANT CHANGE UP (ref 3.5–5.3)
POTASSIUM SERPL-SCNC: 4.8 MMOL/L — SIGNIFICANT CHANGE UP (ref 3.5–5.3)
RBC # BLD: 4.51 M/UL — SIGNIFICANT CHANGE UP (ref 4.2–5.8)
RBC # FLD: 15.1 % — HIGH (ref 10.3–14.5)
SODIUM SERPL-SCNC: 137 MMOL/L — SIGNIFICANT CHANGE UP (ref 135–145)
WBC # BLD: 5.44 K/UL — SIGNIFICANT CHANGE UP (ref 3.8–10.5)
WBC # FLD AUTO: 5.44 K/UL — SIGNIFICANT CHANGE UP (ref 3.8–10.5)

## 2019-04-08 PROCEDURE — 87581 M.PNEUMON DNA AMP PROBE: CPT

## 2019-04-08 PROCEDURE — 86803 HEPATITIS C AB TEST: CPT

## 2019-04-08 PROCEDURE — 87798 DETECT AGENT NOS DNA AMP: CPT

## 2019-04-08 PROCEDURE — 84300 ASSAY OF URINE SODIUM: CPT

## 2019-04-08 PROCEDURE — 80061 LIPID PANEL: CPT

## 2019-04-08 PROCEDURE — 87040 BLOOD CULTURE FOR BACTERIA: CPT

## 2019-04-08 PROCEDURE — 87631 RESP VIRUS 3-5 TARGETS: CPT

## 2019-04-08 PROCEDURE — 80074 ACUTE HEPATITIS PANEL: CPT

## 2019-04-08 PROCEDURE — 87486 CHLMYD PNEUM DNA AMP PROBE: CPT

## 2019-04-08 PROCEDURE — 83935 ASSAY OF URINE OSMOLALITY: CPT

## 2019-04-08 PROCEDURE — 87493 C DIFF AMPLIFIED PROBE: CPT

## 2019-04-08 PROCEDURE — 80048 BASIC METABOLIC PNL TOTAL CA: CPT

## 2019-04-08 PROCEDURE — 82570 ASSAY OF URINE CREATININE: CPT

## 2019-04-08 PROCEDURE — 87449 NOS EACH ORGANISM AG IA: CPT

## 2019-04-08 PROCEDURE — 85610 PROTHROMBIN TIME: CPT

## 2019-04-08 PROCEDURE — 84133 ASSAY OF URINE POTASSIUM: CPT

## 2019-04-08 PROCEDURE — 83735 ASSAY OF MAGNESIUM: CPT

## 2019-04-08 PROCEDURE — 84100 ASSAY OF PHOSPHORUS: CPT

## 2019-04-08 PROCEDURE — 83036 HEMOGLOBIN GLYCOSYLATED A1C: CPT

## 2019-04-08 PROCEDURE — 96374 THER/PROPH/DIAG INJ IV PUSH: CPT

## 2019-04-08 PROCEDURE — 87899 AGENT NOS ASSAY W/OPTIC: CPT

## 2019-04-08 PROCEDURE — 82436 ASSAY OF URINE CHLORIDE: CPT

## 2019-04-08 PROCEDURE — 93005 ELECTROCARDIOGRAM TRACING: CPT

## 2019-04-08 PROCEDURE — 84443 ASSAY THYROID STIM HORMONE: CPT

## 2019-04-08 PROCEDURE — 87521 HEPATITIS C PROBE&RVRS TRNSC: CPT

## 2019-04-08 PROCEDURE — 87045 FECES CULTURE AEROBIC BACT: CPT

## 2019-04-08 PROCEDURE — 71045 X-RAY EXAM CHEST 1 VIEW: CPT

## 2019-04-08 PROCEDURE — 87177 OVA AND PARASITES SMEARS: CPT

## 2019-04-08 PROCEDURE — 87633 RESP VIRUS 12-25 TARGETS: CPT

## 2019-04-08 PROCEDURE — 82607 VITAMIN B-12: CPT

## 2019-04-08 PROCEDURE — 87389 HIV-1 AG W/HIV-1&-2 AB AG IA: CPT

## 2019-04-08 PROCEDURE — 74176 CT ABD & PELVIS W/O CONTRAST: CPT

## 2019-04-08 PROCEDURE — 99285 EMERGENCY DEPT VISIT HI MDM: CPT | Mod: 25

## 2019-04-08 PROCEDURE — 87046 STOOL CULTR AEROBIC BACT EA: CPT

## 2019-04-08 PROCEDURE — 85730 THROMBOPLASTIN TIME PARTIAL: CPT

## 2019-04-08 PROCEDURE — 80053 COMPREHEN METABOLIC PANEL: CPT

## 2019-04-08 PROCEDURE — 83605 ASSAY OF LACTIC ACID: CPT

## 2019-04-08 PROCEDURE — 82803 BLOOD GASES ANY COMBINATION: CPT

## 2019-04-08 PROCEDURE — 85027 COMPLETE CBC AUTOMATED: CPT

## 2019-04-08 PROCEDURE — 83880 ASSAY OF NATRIURETIC PEPTIDE: CPT

## 2019-04-08 PROCEDURE — 81001 URINALYSIS AUTO W/SCOPE: CPT

## 2019-04-08 PROCEDURE — 36415 COLL VENOUS BLD VENIPUNCTURE: CPT

## 2019-04-08 PROCEDURE — 87086 URINE CULTURE/COLONY COUNT: CPT

## 2019-04-08 PROCEDURE — 82150 ASSAY OF AMYLASE: CPT

## 2019-04-08 RX ORDER — AZITHROMYCIN 500 MG/1
1 TABLET, FILM COATED ORAL
Qty: 3 | Refills: 0
Start: 2019-04-08 | End: 2019-04-10

## 2019-04-08 RX ORDER — CEFUROXIME AXETIL 250 MG
1 TABLET ORAL
Qty: 12 | Refills: 0 | OUTPATIENT
Start: 2019-04-08 | End: 2019-04-13

## 2019-04-08 RX ORDER — CEFUROXIME AXETIL 250 MG
1 TABLET ORAL
Qty: 6 | Refills: 0
Start: 2019-04-08 | End: 2019-04-10

## 2019-04-08 RX ORDER — AZITHROMYCIN 500 MG/1
1 TABLET, FILM COATED ORAL
Qty: 6 | Refills: 0 | OUTPATIENT
Start: 2019-04-08 | End: 2019-04-13

## 2019-04-08 RX ADMIN — HYDROMORPHONE HYDROCHLORIDE 4 MILLIGRAM(S): 2 INJECTION INTRAMUSCULAR; INTRAVENOUS; SUBCUTANEOUS at 06:29

## 2019-04-08 RX ADMIN — LAMOTRIGINE 200 MILLIGRAM(S): 25 TABLET, ORALLY DISINTEGRATING ORAL at 11:12

## 2019-04-08 RX ADMIN — CEFTRIAXONE 100 GRAM(S): 500 INJECTION, POWDER, FOR SOLUTION INTRAMUSCULAR; INTRAVENOUS at 05:51

## 2019-04-08 RX ADMIN — AZITHROMYCIN 250 MILLIGRAM(S): 500 TABLET, FILM COATED ORAL at 04:08

## 2019-04-08 RX ADMIN — Medication 325 MILLIGRAM(S): at 11:11

## 2019-04-08 RX ADMIN — GABAPENTIN 200 MILLIGRAM(S): 400 CAPSULE ORAL at 05:52

## 2019-04-08 RX ADMIN — ARIPIPRAZOLE 12.5 MILLIGRAM(S): 15 TABLET ORAL at 11:11

## 2019-04-08 RX ADMIN — Medication 650 MILLIGRAM(S): at 05:52

## 2019-04-08 RX ADMIN — PANTOPRAZOLE SODIUM 40 MILLIGRAM(S): 20 TABLET, DELAYED RELEASE ORAL at 05:52

## 2019-04-08 RX ADMIN — HYDROMORPHONE HYDROCHLORIDE 4 MILLIGRAM(S): 2 INJECTION INTRAMUSCULAR; INTRAVENOUS; SUBCUTANEOUS at 05:54

## 2019-04-08 RX ADMIN — FINASTERIDE 5 MILLIGRAM(S): 5 TABLET, FILM COATED ORAL at 11:11

## 2019-04-08 RX ADMIN — Medication 81 MILLIGRAM(S): at 11:11

## 2019-04-08 NOTE — DISCHARGE NOTE PROVIDER - HOSPITAL COURSE
HPI:    Pt is a 69 y/o M, from [facility], with PMHx of COPD (not on O2), HTN, HLD, Bipolar, anemia, GERD, Ileostomy, Prostate Ca s/p rad, BPH, PAD, and CKD (unknown stage) who presents to the ED with fever and chills x 1 day.  Pt states yesterday morning he was feeling lethargic with decreased appetite  He felt subjective fever and was shivering.  Pt does not remember exact temp, but knows it was >100.  Pt states some of his neighbors are sick with flu like symptoms. Pt denies Cough, abd pain, HA, dizziness, weakness, or N/V.  Pt reports that the feces from his ileostomy has been a lot more watery over the last few days.  Pt denies recent travel or recent antibiotic exposure.  Pt also states that his renal function has been declining, and he is scheduled to see a nephrologist soon. Pt also reports chronic LE pitting edema.  Pt denies CP, palpitations, blurred vision, slurred speech, extremity numbness/ weakness, confusion, or syncope.        In the ED, pt presents in no acute distress, vitals sig for T: 102.7,  remaining WNL, and EKG NSR (04 Apr 2019 21:01)        Pt is a 69 y/o M, from [facility], with PMHx of COPD (not on O2), HTN, HLD, Bipolar, anemia, GERD, Ileostomy, Prostate Ca s/p rad, BPH, PAD, and CKD (unknown stage) who presents to the ED with fever and chills x 1 day.          Problem/Plan - 1:    ·  Problem: PNA (pneumonia).  Plan: Patient afebrile overnight. WBC WNL    Blood culture. urine legionella antigen all negative    Treated with Continue IV ceftriaxone and azithromycin.     Pt to be discharged on oral Azithromycin and Ceftin to complete 10 day course of antibiotics.         Problem/Plan - 3:    ·  Problem: PAWAN (acute kidney injury).  Plan: Pt likely has CKD    Serum creatinine trended down    Monitor BMP    Avoid nephrotoxic medications.          Problem/Plan - 4:    ·  Problem: HLD (hyperlipidemia).  Plan: Continue with atorvastatin.          Problem/Plan - 5:    ·  Problem: COPD, mild.  Plan: Continue ventolin as needed.          Problem/Plan - 6:    Problem: Prostate CA. Plan: Currently in remission    s/p radiation therapy.

## 2019-04-08 NOTE — DISCHARGE NOTE NURSING/CASE MANAGEMENT/SOCIAL WORK - NSDCDPATPORTLINK_GEN_ALL_CORE
You can access the [facility] Patient Portal, offered by [facility], by registering with the following website: [email or web address]

## 2019-04-08 NOTE — DISCHARGE NOTE PROVIDER - NSDCCPCAREPLAN_GEN_ALL_CORE_FT
PRINCIPAL DISCHARGE DIAGNOSIS  Diagnosis: PNA (pneumonia)  Assessment and Plan of Treatment: You were diagnosed with pneumonia. Blood culture. urine legionella antigen all negative. Treated with Continue IV ceftriaxone and azithromycin. You are being discharged on oral Azithromycin and Ceftin to complete 10 day course of antibiotics. Please take them as prescribed. Follow up with your PCP and a pulmonologist within 1 week of discharge.      SECONDARY DISCHARGE DIAGNOSES  Diagnosis: Prostate cancer  Assessment and Plan of Treatment: You presented with history of Prostate CA. Follow up with your heme/onc specialist on a regular basis.    Diagnosis: COPD, mild  Assessment and Plan of Treatment: You presented with history of COPD. Continue taking Ventolin as needed.    Diagnosis: HLD (hyperlipidemia)  Assessment and Plan of Treatment: You presented with history of HLD (hyperlipidemia). Continue taking atorvastatin.

## 2019-04-09 LAB
CULTURE RESULTS: SIGNIFICANT CHANGE UP
CULTURE RESULTS: SIGNIFICANT CHANGE UP
SPECIMEN SOURCE: SIGNIFICANT CHANGE UP
SPECIMEN SOURCE: SIGNIFICANT CHANGE UP

## 2019-04-10 LAB
CULTURE RESULTS: SIGNIFICANT CHANGE UP
SPECIMEN SOURCE: SIGNIFICANT CHANGE UP

## 2019-07-29 ENCOUNTER — INPATIENT (INPATIENT)
Facility: HOSPITAL | Age: 69
LOS: 6 days | Discharge: NURSING, REHAB OR LONG-TERM CARE | DRG: 690 | End: 2019-08-05
Attending: INTERNAL MEDICINE | Admitting: INTERNAL MEDICINE
Payer: MEDICARE

## 2019-07-29 VITALS
WEIGHT: 205.03 LBS | HEART RATE: 84 BPM | HEIGHT: 70 IN | DIASTOLIC BLOOD PRESSURE: 73 MMHG | SYSTOLIC BLOOD PRESSURE: 119 MMHG | RESPIRATION RATE: 16 BRPM | TEMPERATURE: 98 F | OXYGEN SATURATION: 96 %

## 2019-07-29 DIAGNOSIS — Z93.9 ARTIFICIAL OPENING STATUS, UNSPECIFIED: Chronic | ICD-10-CM

## 2019-07-29 DIAGNOSIS — R31.0 GROSS HEMATURIA: ICD-10-CM

## 2019-07-29 PROBLEM — C61 MALIGNANT NEOPLASM OF PROSTATE: Chronic | Status: ACTIVE | Noted: 2019-04-04

## 2019-07-29 PROBLEM — D64.9 ANEMIA, UNSPECIFIED: Chronic | Status: ACTIVE | Noted: 2019-04-04

## 2019-07-29 PROBLEM — N40.1 BENIGN PROSTATIC HYPERPLASIA WITH LOWER URINARY TRACT SYMPTOMS: Chronic | Status: ACTIVE | Noted: 2019-04-04

## 2019-07-29 PROBLEM — F31.9 BIPOLAR DISORDER, UNSPECIFIED: Chronic | Status: ACTIVE | Noted: 2019-04-04

## 2019-07-29 PROBLEM — I10 ESSENTIAL (PRIMARY) HYPERTENSION: Chronic | Status: ACTIVE | Noted: 2019-04-04

## 2019-07-29 PROBLEM — K52.9 NONINFECTIVE GASTROENTERITIS AND COLITIS, UNSPECIFIED: Chronic | Status: ACTIVE | Noted: 2019-04-04

## 2019-07-29 PROBLEM — J44.9 CHRONIC OBSTRUCTIVE PULMONARY DISEASE, UNSPECIFIED: Chronic | Status: ACTIVE | Noted: 2019-04-04

## 2019-07-29 PROBLEM — E78.5 HYPERLIPIDEMIA, UNSPECIFIED: Chronic | Status: ACTIVE | Noted: 2019-04-04

## 2019-07-29 PROBLEM — N18.9 CHRONIC KIDNEY DISEASE, UNSPECIFIED: Chronic | Status: ACTIVE | Noted: 2019-04-04

## 2019-07-29 PROBLEM — I73.9 PERIPHERAL VASCULAR DISEASE, UNSPECIFIED: Chronic | Status: ACTIVE | Noted: 2019-04-04

## 2019-07-29 LAB
ALBUMIN SERPL ELPH-MCNC: 3.6 G/DL — SIGNIFICANT CHANGE UP (ref 3.5–5)
ALP SERPL-CCNC: 209 U/L — HIGH (ref 40–120)
ALT FLD-CCNC: 47 U/L DA — SIGNIFICANT CHANGE UP (ref 10–60)
ANION GAP SERPL CALC-SCNC: 10 MMOL/L — SIGNIFICANT CHANGE UP (ref 5–17)
APPEARANCE UR: ABNORMAL
AST SERPL-CCNC: 25 U/L — SIGNIFICANT CHANGE UP (ref 10–40)
BASOPHILS # BLD AUTO: 0.06 K/UL — SIGNIFICANT CHANGE UP (ref 0–0.2)
BASOPHILS NFR BLD AUTO: 0.7 % — SIGNIFICANT CHANGE UP (ref 0–2)
BILIRUB SERPL-MCNC: 0.4 MG/DL — SIGNIFICANT CHANGE UP (ref 0.2–1.2)
BILIRUB UR-MCNC: NEGATIVE — SIGNIFICANT CHANGE UP
BUN SERPL-MCNC: 40 MG/DL — HIGH (ref 7–18)
CALCIUM SERPL-MCNC: 8.6 MG/DL — SIGNIFICANT CHANGE UP (ref 8.4–10.5)
CHLORIDE SERPL-SCNC: 115 MMOL/L — HIGH (ref 96–108)
CO2 SERPL-SCNC: 15 MMOL/L — LOW (ref 22–31)
COLOR SPEC: ABNORMAL
CREAT SERPL-MCNC: 2.66 MG/DL — HIGH (ref 0.5–1.3)
DIFF PNL FLD: ABNORMAL
EOSINOPHIL # BLD AUTO: 0.17 K/UL — SIGNIFICANT CHANGE UP (ref 0–0.5)
EOSINOPHIL NFR BLD AUTO: 2 % — SIGNIFICANT CHANGE UP (ref 0–6)
GLUCOSE SERPL-MCNC: 114 MG/DL — HIGH (ref 70–99)
GLUCOSE UR QL: NEGATIVE — SIGNIFICANT CHANGE UP
HCT VFR BLD CALC: 39.8 % — SIGNIFICANT CHANGE UP (ref 39–50)
HGB BLD-MCNC: 12.8 G/DL — LOW (ref 13–17)
IMM GRANULOCYTES NFR BLD AUTO: 1.4 % — SIGNIFICANT CHANGE UP (ref 0–1.5)
KETONES UR-MCNC: ABNORMAL
LEUKOCYTE ESTERASE UR-ACNC: ABNORMAL
LYMPHOCYTES # BLD AUTO: 1.39 K/UL — SIGNIFICANT CHANGE UP (ref 1–3.3)
LYMPHOCYTES # BLD AUTO: 16.4 % — SIGNIFICANT CHANGE UP (ref 13–44)
MCHC RBC-ENTMCNC: 27.6 PG — SIGNIFICANT CHANGE UP (ref 27–34)
MCHC RBC-ENTMCNC: 32.2 GM/DL — SIGNIFICANT CHANGE UP (ref 32–36)
MCV RBC AUTO: 85.8 FL — SIGNIFICANT CHANGE UP (ref 80–100)
MONOCYTES # BLD AUTO: 0.56 K/UL — SIGNIFICANT CHANGE UP (ref 0–0.9)
MONOCYTES NFR BLD AUTO: 6.6 % — SIGNIFICANT CHANGE UP (ref 2–14)
NEUTROPHILS # BLD AUTO: 6.16 K/UL — SIGNIFICANT CHANGE UP (ref 1.8–7.4)
NEUTROPHILS NFR BLD AUTO: 72.9 % — SIGNIFICANT CHANGE UP (ref 43–77)
NITRITE UR-MCNC: POSITIVE
NRBC # BLD: 0 /100 WBCS — SIGNIFICANT CHANGE UP (ref 0–0)
PH UR: 7 — SIGNIFICANT CHANGE UP (ref 5–8)
PLATELET # BLD AUTO: 166 K/UL — SIGNIFICANT CHANGE UP (ref 150–400)
POTASSIUM SERPL-MCNC: 4.6 MMOL/L — SIGNIFICANT CHANGE UP (ref 3.5–5.3)
POTASSIUM SERPL-SCNC: 4.6 MMOL/L — SIGNIFICANT CHANGE UP (ref 3.5–5.3)
PROT SERPL-MCNC: 9.1 G/DL — HIGH (ref 6–8.3)
PROT UR-MCNC: 500 MG/DL
RBC # BLD: 4.64 M/UL — SIGNIFICANT CHANGE UP (ref 4.2–5.8)
RBC # FLD: 16.6 % — HIGH (ref 10.3–14.5)
SODIUM SERPL-SCNC: 140 MMOL/L — SIGNIFICANT CHANGE UP (ref 135–145)
SP GR SPEC: 1.01 — SIGNIFICANT CHANGE UP (ref 1.01–1.02)
UROBILINOGEN FLD QL: 1
WBC # BLD: 8.46 K/UL — SIGNIFICANT CHANGE UP (ref 3.8–10.5)
WBC # FLD AUTO: 8.46 K/UL — SIGNIFICANT CHANGE UP (ref 3.8–10.5)

## 2019-07-29 PROCEDURE — 99285 EMERGENCY DEPT VISIT HI MDM: CPT

## 2019-07-29 RX ORDER — CEFTRIAXONE 500 MG/1
1000 INJECTION, POWDER, FOR SOLUTION INTRAMUSCULAR; INTRAVENOUS ONCE
Refills: 0 | Status: COMPLETED | OUTPATIENT
Start: 2019-07-29 | End: 2019-07-29

## 2019-07-29 RX ORDER — ARIPIPRAZOLE 15 MG/1
10 TABLET ORAL DAILY
Refills: 0 | Status: DISCONTINUED | OUTPATIENT
Start: 2019-07-29 | End: 2019-07-29

## 2019-07-29 RX ORDER — ARIPIPRAZOLE 15 MG/1
12.5 TABLET ORAL DAILY
Refills: 0 | Status: DISCONTINUED | OUTPATIENT
Start: 2019-07-29 | End: 2019-08-05

## 2019-07-29 RX ORDER — ARIPIPRAZOLE 15 MG/1
2.5 TABLET ORAL
Qty: 0 | Refills: 0 | DISCHARGE

## 2019-07-29 RX ORDER — PANTOPRAZOLE SODIUM 20 MG/1
40 TABLET, DELAYED RELEASE ORAL
Refills: 0 | Status: DISCONTINUED | OUTPATIENT
Start: 2019-07-29 | End: 2019-08-05

## 2019-07-29 RX ORDER — CEFTRIAXONE 500 MG/1
1000 INJECTION, POWDER, FOR SOLUTION INTRAMUSCULAR; INTRAVENOUS EVERY 24 HOURS
Refills: 0 | Status: COMPLETED | OUTPATIENT
Start: 2019-07-29 | End: 2019-08-04

## 2019-07-29 RX ORDER — TRAZODONE HCL 50 MG
50 TABLET ORAL AT BEDTIME
Refills: 0 | Status: DISCONTINUED | OUTPATIENT
Start: 2019-07-29 | End: 2019-08-05

## 2019-07-29 RX ORDER — SODIUM BICARBONATE 1 MEQ/ML
650 SYRINGE (ML) INTRAVENOUS THREE TIMES A DAY
Refills: 0 | Status: DISCONTINUED | OUTPATIENT
Start: 2019-07-29 | End: 2019-08-04

## 2019-07-29 RX ORDER — LAMOTRIGINE 25 MG/1
200 TABLET, ORALLY DISINTEGRATING ORAL DAILY
Refills: 0 | Status: DISCONTINUED | OUTPATIENT
Start: 2019-07-29 | End: 2019-08-05

## 2019-07-29 RX ORDER — GABAPENTIN 400 MG/1
200 CAPSULE ORAL
Qty: 0 | Refills: 0 | DISCHARGE

## 2019-07-29 RX ORDER — FINASTERIDE 5 MG/1
5 TABLET, FILM COATED ORAL DAILY
Refills: 0 | Status: DISCONTINUED | OUTPATIENT
Start: 2019-07-29 | End: 2019-08-05

## 2019-07-29 RX ORDER — HYDROMORPHONE HYDROCHLORIDE 2 MG/ML
4 INJECTION INTRAMUSCULAR; INTRAVENOUS; SUBCUTANEOUS EVERY 8 HOURS
Refills: 0 | Status: COMPLETED | OUTPATIENT
Start: 2019-07-29 | End: 2019-08-05

## 2019-07-29 RX ORDER — GABAPENTIN 400 MG/1
300 CAPSULE ORAL THREE TIMES A DAY
Refills: 0 | Status: DISCONTINUED | OUTPATIENT
Start: 2019-07-29 | End: 2019-08-05

## 2019-07-29 RX ORDER — TAMSULOSIN HYDROCHLORIDE 0.4 MG/1
0.4 CAPSULE ORAL AT BEDTIME
Refills: 0 | Status: DISCONTINUED | OUTPATIENT
Start: 2019-07-29 | End: 2019-08-05

## 2019-07-29 RX ORDER — FERROUS SULFATE 325(65) MG
325 TABLET ORAL DAILY
Refills: 0 | Status: DISCONTINUED | OUTPATIENT
Start: 2019-07-29 | End: 2019-08-05

## 2019-07-29 RX ADMIN — CEFTRIAXONE 100 MILLIGRAM(S): 500 INJECTION, POWDER, FOR SOLUTION INTRAMUSCULAR; INTRAVENOUS at 22:11

## 2019-07-29 RX ADMIN — Medication 50 MILLIGRAM(S): at 22:16

## 2019-07-29 RX ADMIN — TAMSULOSIN HYDROCHLORIDE 0.4 MILLIGRAM(S): 0.4 CAPSULE ORAL at 22:15

## 2019-07-29 RX ADMIN — HYDROMORPHONE HYDROCHLORIDE 4 MILLIGRAM(S): 2 INJECTION INTRAMUSCULAR; INTRAVENOUS; SUBCUTANEOUS at 22:16

## 2019-07-29 RX ADMIN — GABAPENTIN 300 MILLIGRAM(S): 400 CAPSULE ORAL at 22:16

## 2019-07-29 RX ADMIN — HYDROMORPHONE HYDROCHLORIDE 4 MILLIGRAM(S): 2 INJECTION INTRAMUSCULAR; INTRAVENOUS; SUBCUTANEOUS at 22:23

## 2019-07-29 NOTE — H&P ADULT - NEGATIVE GASTROINTESTINAL SYMPTOMS
no nausea/no vomiting/no constipation/no diarrhea
[FreeTextEntry1] : blood draw\par PFT 3/11/2019\par normal flow rates\par  Normal lung volumes\par  DLCO 78 % pred \par \par CT chest February 25, 2019\par Right middle lobe nodule 1.6 x 1.2 cm\par Nodule context and mediastinum and there is a thin strand extending from the nodule to the anterior pleural space\par Mediastinal adenopathy\par \par ADDENDUM\par Data review March 12, 2019\par CBC within normal limits\par Alpha-1 antitrypsin titer normal range pending ANCA  ACE level asthma profile with Rast testing\par ANCA negative\par serum IgE 172\par Serum Rast testing March 18, 2019\par Positive Common ragweed\par Positive Jude grass\par Negative for mold\par Flu panel negative

## 2019-07-29 NOTE — H&P ADULT - PROBLEM SELECTOR PLAN 1
p/w painless hematuria with blood clots X 1 week  Hb stable  ?malignancy vs infection  c/w CBI  Uro on board p/w painless hematuria with blood clots X 1 week  Hb stable  ?malignancy vs infection  c/w CBI  FU PSA level  Uro on board

## 2019-07-29 NOTE — H&P ADULT - NSHPPHYSICALEXAM_GEN_ALL_CORE
Vital Signs Last 24 Hrs  T(C): 36.7 (30 Jul 2019 02:31), Max: 37.2 (29 Jul 2019 19:16)  T(F): 98.1 (30 Jul 2019 02:31), Max: 99 (29 Jul 2019 19:16)  HR: 79 (30 Jul 2019 02:31) (78 - 91)  BP: 164/84 (30 Jul 2019 02:31) (116/67 - 164/84)  BP(mean): --  RR: 18 (30 Jul 2019 02:31) (16 - 18)  SpO2: 97% (30 Jul 2019 02:31) (96% - 97%)

## 2019-07-29 NOTE — ED PROVIDER NOTE - CLINICAL SUMMARY MEDICAL DECISION MAKING FREE TEXT BOX
68 M with Hx prostate ca with gross hematuria with clots. Labs, UA, 3-way Koehler catheter insertion with CBI. Anticipate admission.

## 2019-07-29 NOTE — H&P ADULT - NSICDXPASTMEDICALHX_GEN_ALL_CORE_FT
PAST MEDICAL HISTORY:  Anemia     Bipolar disorder     BPH with urinary obstruction     CKD (chronic kidney disease)     COPD, mild     HLD (hyperlipidemia)     HTN (hypertension)     IBD (inflammatory bowel disease)     PAD (peripheral artery disease)     Prostate CA

## 2019-07-29 NOTE — H&P ADULT - ASSESSMENT
68M from [facility], with PMHx of prostate Ca (s/p radiation in 2015, now in remission), asthma, COPD (not on O2), HTN, HLD, Bipolar, anemia, GERD, Ileostomy s/p sigmoidal resection, PAD, and CKD presented to ED c/o hematuria X 1 week.     ED course: Vitals stable  Labs significant for Hb 12.8, Cr 2.66 (2.14-2.6 on last admission), UA positive  s/p Rocephin 1gm

## 2019-07-29 NOTE — ED PROVIDER NOTE - PROGRESS NOTE DETAILS
Dr. Rush, PMD, accepts admission.  He requests consult inpatient to Dr. Cruz, urology.  Pt doing well with CBI via 3-way urinary catheter. Informed MAR Dr. Caraballo and informed him that Dr. Rush requests consult to Dr. Cruz inpatient.  Requested  to [facility] surgery for consult to urology.

## 2019-07-29 NOTE — ED ADULT NURSE NOTE - OBJECTIVE STATEMENT
axox3 ,nad , bloody urination x 1 week and  pain while urination , pt has colostomy , from assisted living

## 2019-07-29 NOTE — H&P ADULT - PROBLEM SELECTOR PLAN 6
IMPROVE VTE Individual Risk Assessment    RISK                                                                Points    [  ] Previous VTE                                                  3  [  ] Thrombophilia                                               2  [  ] Lower limb paralysis                                      2        (unable to hold up >15 seconds)    [  ] Current Cancer                                              2         (within 6 months)  [  ] Immobilization > 24 hrs                                1  [  ] ICU/CCU stay > 24 hours                              1  [ x ] Age > 60                                                      1    IMPROVE VTE Score 1  will hold DVT PPx due t hematuria

## 2019-07-29 NOTE — ED PROVIDER NOTE - CARE PLAN
Principal Discharge DX:	Gross hematuria  Secondary Diagnosis:	Urinary tract infection with hematuria, site unspecified

## 2019-07-29 NOTE — H&P ADULT - PROBLEM SELECTOR PLAN 3
p/w Cr 2.66 (2.14-2.6 on last admission), UA positive p/w Cr 2.66 (2.14-2.6 on last admission),   likely his baseline   FU BMP daily

## 2019-07-29 NOTE — CONSULT NOTE ADULT - SUBJECTIVE AND OBJECTIVE BOX
HPI:  69 y/o male with PMHx of prostate ca (treated in 2015 with radiation, now in remission), ileostomy, asthma sent from [facility] by Dr. Rush to the ED c/o gross hematuria x 1 week. Pt noted to have worsening hematuria last night, passing blood clots with difficulty urinating. Pt denies abd pain, nausea, vomiting, dysuria, or any other complaints. Pt currently on ASA but not on anticoags. Pt does not have a urologist currently. No smoking or EtOH use. Hx IV drug use x 50 years ago. NKDA.    PAST MEDICAL & SURGICAL HISTORY:  BPH with urinary obstruction  Prostate CA  CKD (chronic kidney disease)  PAD (peripheral artery disease)  HLD (hyperlipidemia)  HTN (hypertension)  IBD (inflammatory bowel disease)  Bipolar disorder  Anemia  COPD, mild  History of creation of ostomy      MEDICATIONS  (STANDING):  ARIPiprazole 12.5 milliGRAM(s) Oral daily  cefTRIAXone   IVPB 1000 milliGRAM(s) IV Intermittent every 24 hours  ferrous    sulfate 325 milliGRAM(s) Oral daily  finasteride 5 milliGRAM(s) Oral daily  gabapentin 300 milliGRAM(s) Oral three times a day  HYDROmorphone   Tablet 4 milliGRAM(s) Oral every 8 hours  lamoTRIgine 200 milliGRAM(s) Oral daily  pantoprazole    Tablet 40 milliGRAM(s) Oral before breakfast  sodium bicarbonate 650 milliGRAM(s) Oral three times a day  tamsulosin 0.4 milliGRAM(s) Oral at bedtime  traZODone 50 milliGRAM(s) Oral at bedtime    MEDICATIONS  (PRN):      Allergies    No Known Allergies    Intolerances        SOCIAL HISTORY:  No drug use    FAMILY HISTORY:    Father without Cardiac history.    REVIEW OF SYSTEMS:  CONSTITUTIONAL: In no acute distress.        Vital Signs Last 24 Hrs  T(C): 37.2 (2019 19:16), Max: 37.2 (2019 19:16)  T(F): 99 (2019 19:16), Max: 99 (2019 19:16)  HR: 91 (2019 19:16) (84 - 91)  BP: 122/77 (2019 19:16) (119/73 - 122/77)  BP(mean): --  RR: 16 (2019 19:16) (16 - 16)  SpO2: 97% (2019 19:16) (96% - 97%)    Daily Height in cm: 177.8 (2019 16:12)    Daily     PHYSICAL EXAM:  CONSTITIONAL/GENERAL: NAD, well-groomed, well-developed  HEAD:  Atraumatic, Normocephalic  VISUAL/EYES: EOMI, PERRL, conjunctiva and sclera clear  ENMT: Moist mucous membranes, Good dentition, No lesions  NECK: Supple, No JVD  NERVOUS SYSTEM:  Alert & Oriented X3, Good concentration  RESPIRATORY/CHEST: Clear to percussion bilaterally; Normal respiratory effort  CARDIOVASCULAR/HEART: Regular rate and rhythm  GASTROINTESTINAL/ABDOMEN: Soft, Nontender, Nondistended; Bowel sounds present  GENITOURINARY: normal external genitalia  CBI to Koehler  BREASTS: no breast lumps  MUSCULOSKELETAL/EXTREMITIES:  No clubbing, cyanosis, or edema  VASCULAR: equal peripheral pulses  LYMPHATIC: No lymphadenopathy noted  SKIN: No rashes or lesions  PSYCHIATRIC: normal affect      LABS:                        12.8   8.46  )-----------( 166      ( 2019 17:36 )             39.8     Neutrophil %:       140  |  115<H>  |  40<H>  ----------------------------<  114<H>  4.6   |  15<L>  |  2.66<H>    Ca    8.6      2019 17:36    TPro  9.1<H>  /  Alb  3.6  /  TBili  0.4  /  DBili  x   /  AST  25  /  ALT  47  /  AlkPhos  209<H>        Urinalysis Basic - ( 2019 18:49 )    Color: Red / Appearance: bloody / S.010 / pH: x  Gluc: x / Ketone: Moderate  / Bili: Negative / Urobili: 1   Blood: x / Protein: 500 mg/dL / Nitrite: Positive   Leuk Esterase: Small / RBC: >50 /HPF / WBC 11-25 /HPF   Sq Epi: x / Non Sq Epi: x / Bacteria: Moderate /HPF                RADIOLOGY & ADDITIONAL STUDIES:

## 2019-07-29 NOTE — ED ADULT NURSE NOTE - PMH
Anemia    Bipolar disorder    BPH with urinary obstruction    CKD (chronic kidney disease)    COPD, mild    HLD (hyperlipidemia)    HTN (hypertension)    IBD (inflammatory bowel disease)    PAD (peripheral artery disease)    Prostate CA

## 2019-07-29 NOTE — ED PROVIDER NOTE - OBJECTIVE STATEMENT
67 y/o male with PMHx of prostate ca (treated in 2015 with radiation, now in remission), ileostomy, asthma sent from [facility] by Dr. Rush to the ED c/o gross hematuria x 1 week. Pt noted to have worsening hematuria last night, passing blood clots with difficulty urinating. Pt denies abd pain, nausea, vomiting, dysuria, or any other complaints. Pt currently on ASA but not on anticoags. Pt does not have a urologist currently. No smoking or EtOH use. Hx IV drug use x 50 years ago. NKDA.

## 2019-07-29 NOTE — CONSULT NOTE ADULT - PROBLEM SELECTOR RECOMMENDATION 9
Gross Hematuria  Three way Koehler inserted  CBI started  Admitted to Medicine  med mgmt of medical conditions  Repeat Labs in am

## 2019-07-29 NOTE — H&P ADULT - HISTORY OF PRESENT ILLNESS
68M from [facility], with PMHx of prostate Ca (s/p radiation in 2015, now in remission), asthma, COPD (not on O2), HTN, HLD, Bipolar, anemia, GERD, Ileostomy s/p sigmoidal resection, PAD, and CKD presented to ED c/o hematuria X 1 week. He states he gradually developed hematuria since wednesday, which was worsening and yesterday evening he noticed blood clots. He had appointment made by [facility] for urologist but due to worsening hematuria, he came to ED. He also endorses painful micturition, chronic increased urinary frequency, nocturia. He denies any fever, back pain, abdominal pain, nausea, vomiting.

## 2019-07-30 DIAGNOSIS — C61 MALIGNANT NEOPLASM OF PROSTATE: ICD-10-CM

## 2019-07-30 DIAGNOSIS — Z29.9 ENCOUNTER FOR PROPHYLACTIC MEASURES, UNSPECIFIED: ICD-10-CM

## 2019-07-30 DIAGNOSIS — N18.9 CHRONIC KIDNEY DISEASE, UNSPECIFIED: ICD-10-CM

## 2019-07-30 DIAGNOSIS — J44.9 CHRONIC OBSTRUCTIVE PULMONARY DISEASE, UNSPECIFIED: ICD-10-CM

## 2019-07-30 DIAGNOSIS — N39.0 URINARY TRACT INFECTION, SITE NOT SPECIFIED: ICD-10-CM

## 2019-07-30 DIAGNOSIS — R31.0 GROSS HEMATURIA: ICD-10-CM

## 2019-07-30 LAB
ALBUMIN SERPL ELPH-MCNC: 3.4 G/DL — LOW (ref 3.5–5)
ALP SERPL-CCNC: 188 U/L — HIGH (ref 40–120)
ALT FLD-CCNC: 45 U/L DA — SIGNIFICANT CHANGE UP (ref 10–60)
ANION GAP SERPL CALC-SCNC: 7 MMOL/L — SIGNIFICANT CHANGE UP (ref 5–17)
AST SERPL-CCNC: 24 U/L — SIGNIFICANT CHANGE UP (ref 10–40)
BASOPHILS # BLD AUTO: 0.04 K/UL — SIGNIFICANT CHANGE UP (ref 0–0.2)
BASOPHILS NFR BLD AUTO: 0.4 % — SIGNIFICANT CHANGE UP (ref 0–2)
BILIRUB SERPL-MCNC: 0.7 MG/DL — SIGNIFICANT CHANGE UP (ref 0.2–1.2)
BUN SERPL-MCNC: 36 MG/DL — HIGH (ref 7–18)
CALCIUM SERPL-MCNC: 8.6 MG/DL — SIGNIFICANT CHANGE UP (ref 8.4–10.5)
CHLORIDE SERPL-SCNC: 115 MMOL/L — HIGH (ref 96–108)
CHLORIDE UR-SCNC: 86 MMOL/L — SIGNIFICANT CHANGE UP (ref 55–125)
CHOLEST SERPL-MCNC: 209 MG/DL — HIGH (ref 10–199)
CO2 SERPL-SCNC: 20 MMOL/L — LOW (ref 22–31)
CREAT ?TM UR-MCNC: 66 MG/DL — SIGNIFICANT CHANGE UP
CREAT SERPL-MCNC: 2.29 MG/DL — HIGH (ref 0.5–1.3)
EOSINOPHIL # BLD AUTO: 0.18 K/UL — SIGNIFICANT CHANGE UP (ref 0–0.5)
EOSINOPHIL NFR BLD AUTO: 1.9 % — SIGNIFICANT CHANGE UP (ref 0–6)
FERRITIN SERPL-MCNC: 137 NG/ML — SIGNIFICANT CHANGE UP (ref 30–400)
GLUCOSE SERPL-MCNC: 100 MG/DL — HIGH (ref 70–99)
HBA1C BLD-MCNC: 6 % — HIGH (ref 4–5.6)
HCT VFR BLD CALC: 37.1 % — LOW (ref 39–50)
HDLC SERPL-MCNC: 52 MG/DL — SIGNIFICANT CHANGE UP
HGB BLD-MCNC: 11.9 G/DL — LOW (ref 13–17)
IMM GRANULOCYTES NFR BLD AUTO: 1.3 % — SIGNIFICANT CHANGE UP (ref 0–1.5)
IRON SATN MFR SERPL: 23 % — SIGNIFICANT CHANGE UP (ref 20–55)
IRON SATN MFR SERPL: 62 UG/DL — LOW (ref 65–170)
LIPID PNL WITH DIRECT LDL SERPL: 121 MG/DL — SIGNIFICANT CHANGE UP
LYMPHOCYTES # BLD AUTO: 0.9 K/UL — LOW (ref 1–3.3)
LYMPHOCYTES # BLD AUTO: 9.4 % — LOW (ref 13–44)
MAGNESIUM SERPL-MCNC: 2 MG/DL — SIGNIFICANT CHANGE UP (ref 1.6–2.6)
MCHC RBC-ENTMCNC: 27.4 PG — SIGNIFICANT CHANGE UP (ref 27–34)
MCHC RBC-ENTMCNC: 32.1 GM/DL — SIGNIFICANT CHANGE UP (ref 32–36)
MCV RBC AUTO: 85.3 FL — SIGNIFICANT CHANGE UP (ref 80–100)
MONOCYTES # BLD AUTO: 0.55 K/UL — SIGNIFICANT CHANGE UP (ref 0–0.9)
MONOCYTES NFR BLD AUTO: 5.8 % — SIGNIFICANT CHANGE UP (ref 2–14)
NEUTROPHILS # BLD AUTO: 7.77 K/UL — HIGH (ref 1.8–7.4)
NEUTROPHILS NFR BLD AUTO: 81.2 % — HIGH (ref 43–77)
NRBC # BLD: 0 /100 WBCS — SIGNIFICANT CHANGE UP (ref 0–0)
PHOSPHATE SERPL-MCNC: 3.7 MG/DL — SIGNIFICANT CHANGE UP (ref 2.5–4.5)
PLATELET # BLD AUTO: 166 K/UL — SIGNIFICANT CHANGE UP (ref 150–400)
POTASSIUM SERPL-MCNC: 4.7 MMOL/L — SIGNIFICANT CHANGE UP (ref 3.5–5.3)
POTASSIUM SERPL-SCNC: 4.7 MMOL/L — SIGNIFICANT CHANGE UP (ref 3.5–5.3)
POTASSIUM UR-SCNC: 27 MMOL/L — SIGNIFICANT CHANGE UP (ref 25–125)
PROT ?TM UR-MCNC: 78 MG/DL — HIGH (ref 0–12)
PROT SERPL-MCNC: 8.3 G/DL — SIGNIFICANT CHANGE UP (ref 6–8.3)
RBC # BLD: 4.35 M/UL — SIGNIFICANT CHANGE UP (ref 4.2–5.8)
RBC # FLD: 16.6 % — HIGH (ref 10.3–14.5)
SODIUM SERPL-SCNC: 142 MMOL/L — SIGNIFICANT CHANGE UP (ref 135–145)
SODIUM UR-SCNC: 70 MMOL/L — SIGNIFICANT CHANGE UP (ref 40–220)
TIBC SERPL-MCNC: 272 UG/DL — SIGNIFICANT CHANGE UP (ref 250–450)
TOTAL CHOLESTEROL/HDL RATIO MEASUREMENT: 4 RATIO — SIGNIFICANT CHANGE UP (ref 3.4–9.6)
TRIGL SERPL-MCNC: 182 MG/DL — HIGH (ref 10–149)
TSH SERPL-MCNC: 3.04 UU/ML — SIGNIFICANT CHANGE UP (ref 0.34–4.82)
UIBC SERPL-MCNC: 210 UG/DL — SIGNIFICANT CHANGE UP (ref 110–370)
URATE UR-MCNC: 20.5 MG/DL — SIGNIFICANT CHANGE UP
VIT B12 SERPL-MCNC: 656 PG/ML — SIGNIFICANT CHANGE UP (ref 232–1245)
WBC # BLD: 9.56 K/UL — SIGNIFICANT CHANGE UP (ref 3.8–10.5)
WBC # FLD AUTO: 9.56 K/UL — SIGNIFICANT CHANGE UP (ref 3.8–10.5)

## 2019-07-30 PROCEDURE — 76775 US EXAM ABDO BACK WALL LIM: CPT | Mod: 26

## 2019-07-30 RX ORDER — IPRATROPIUM/ALBUTEROL SULFATE 18-103MCG
3 AEROSOL WITH ADAPTER (GRAM) INHALATION EVERY 6 HOURS
Refills: 0 | Status: DISCONTINUED | OUTPATIENT
Start: 2019-07-30 | End: 2019-08-04

## 2019-07-30 RX ADMIN — TAMSULOSIN HYDROCHLORIDE 0.4 MILLIGRAM(S): 0.4 CAPSULE ORAL at 22:30

## 2019-07-30 RX ADMIN — FINASTERIDE 5 MILLIGRAM(S): 5 TABLET, FILM COATED ORAL at 14:43

## 2019-07-30 RX ADMIN — GABAPENTIN 300 MILLIGRAM(S): 400 CAPSULE ORAL at 14:42

## 2019-07-30 RX ADMIN — HYDROMORPHONE HYDROCHLORIDE 4 MILLIGRAM(S): 2 INJECTION INTRAMUSCULAR; INTRAVENOUS; SUBCUTANEOUS at 22:30

## 2019-07-30 RX ADMIN — Medication 650 MILLIGRAM(S): at 00:40

## 2019-07-30 RX ADMIN — Medication 50 MILLIGRAM(S): at 22:30

## 2019-07-30 RX ADMIN — HYDROMORPHONE HYDROCHLORIDE 4 MILLIGRAM(S): 2 INJECTION INTRAMUSCULAR; INTRAVENOUS; SUBCUTANEOUS at 23:15

## 2019-07-30 RX ADMIN — HYDROMORPHONE HYDROCHLORIDE 4 MILLIGRAM(S): 2 INJECTION INTRAMUSCULAR; INTRAVENOUS; SUBCUTANEOUS at 15:10

## 2019-07-30 RX ADMIN — LAMOTRIGINE 200 MILLIGRAM(S): 25 TABLET, ORALLY DISINTEGRATING ORAL at 14:40

## 2019-07-30 RX ADMIN — Medication 325 MILLIGRAM(S): at 14:42

## 2019-07-30 RX ADMIN — GABAPENTIN 300 MILLIGRAM(S): 400 CAPSULE ORAL at 06:47

## 2019-07-30 RX ADMIN — PANTOPRAZOLE SODIUM 40 MILLIGRAM(S): 20 TABLET, DELAYED RELEASE ORAL at 06:47

## 2019-07-30 RX ADMIN — Medication 650 MILLIGRAM(S): at 06:47

## 2019-07-30 RX ADMIN — HYDROMORPHONE HYDROCHLORIDE 4 MILLIGRAM(S): 2 INJECTION INTRAMUSCULAR; INTRAVENOUS; SUBCUTANEOUS at 16:10

## 2019-07-30 RX ADMIN — Medication 650 MILLIGRAM(S): at 22:30

## 2019-07-30 RX ADMIN — GABAPENTIN 300 MILLIGRAM(S): 400 CAPSULE ORAL at 22:30

## 2019-07-30 RX ADMIN — CEFTRIAXONE 100 MILLIGRAM(S): 500 INJECTION, POWDER, FOR SOLUTION INTRAMUSCULAR; INTRAVENOUS at 22:30

## 2019-07-30 RX ADMIN — Medication 650 MILLIGRAM(S): at 14:42

## 2019-07-30 NOTE — PROGRESS NOTE ADULT - ASSESSMENT
68M from [facility], with PMHx of prostate Ca (s/p radiation in 2015, now in remission), asthma, COPD (not on O2), HTN, HLD, Bipolar, anemia, GERD, Ileostomy s/p sigmoidal resection, PAD, and CKD presented to ED c/o hematuria X 1 week.     ED course: Vitals stable  Labs significant for Hb 12.8, Cr 2.66 (2.14-2.6 on last admission), UA positive  s/p Rocephin 1gm 68M from [facility], with PMHx of prostate Ca (s/p radiation in 2015, now in remission), asthma, COPD (not on O2), HTN, HLD, Bipolar, anemia, GERD, Ileostomy s/p sigmoidal resection, PAD, and CKD presented to ED c/o hematuria X 1 week.     ED course: Vitals stable  Labs significant for Hb 12.8, Cr 2.66 (2.14-2.6 on last admission), UA positive  s/p Rocephin 1gm  hx renal cysts 68M from [facility], with PMHx of prostate Ca (s/p radiation in 2015, now in remission), asthma, COPD (not on O2), HTN, HLD, Bipolar, anemia, GERD, Ileostomy s/p sigmoidal resection, PAD, and CKD presented to ED c/o hematuria X 1 week.     ED course: Vitals stable  Labs significant for Hb 12.8, Cr 2.66 (2.14-2.6 on last admission), UA positive  s/p Rocephin 1gm  hx renal cysts as seen on previous CT

## 2019-07-30 NOTE — CONSULT NOTE ADULT - SUBJECTIVE AND OBJECTIVE BOX
Patient is a 68y Male whom presented to the hospital with     HPI:  68M from [facility], with PMHx of prostate Ca (s/p radiation in 2015, now in remission), asthma, COPD (not on O2), HTN, HLD, Bipolar, anemia, GERD, Ileostomy s/p sigmoidal resection, PAD, and CKD presented to ED c/o hematuria X 1 week. He states he gradually developed hematuria since wednesday, which was worsening and yesterday evening he noticed blood clots. He had appointment made by [facility] Manuel Russell for urologist but due to worsening hematuria, he came to ED. He also endorses painful micturition, chronic increased urinary frequency, nocturia. He denies any fever, back pain, abdominal pain, nausea, vomiting. (2019 21:20)    pts current chart reviewed and case discussed with resident  pt denies pmh of renal ds, proteinuria, renal stones, recurrent uti or nephrotic edema or nephrotic syndrome  pt give pmh of retinopathy and s/p laser treatment  pt denies Nsaids use or otc other nephrotoxic supplements  PAST MEDICAL & SURGICAL HISTORY:  BPH with urinary obstruction  Prostate CA  CKD (chronic kidney disease)  PAD (peripheral artery disease)  HLD (hyperlipidemia)  HTN (hypertension)  IBD (inflammatory bowel disease)  Bipolar disorder  Anemia  COPD, mild  History of creation of ostomy      Home Medications: Reviewed    MEDICATIONS  (STANDING):  ALBUTerol/ipratropium for Nebulization 3 milliLiter(s) Nebulizer every 6 hours  ARIPiprazole 12.5 milliGRAM(s) Oral daily  cefTRIAXone   IVPB 1000 milliGRAM(s) IV Intermittent every 24 hours  ferrous    sulfate 325 milliGRAM(s) Oral daily  finasteride 5 milliGRAM(s) Oral daily  gabapentin 300 milliGRAM(s) Oral three times a day  HYDROmorphone   Tablet 4 milliGRAM(s) Oral every 8 hours  lamoTRIgine 200 milliGRAM(s) Oral daily  pantoprazole    Tablet 40 milliGRAM(s) Oral before breakfast  sodium bicarbonate 650 milliGRAM(s) Oral three times a day  tamsulosin 0.4 milliGRAM(s) Oral at bedtime  traZODone 50 milliGRAM(s) Oral at bedtime    MEDICATIONS  (PRN):      Allergies    No Known Allergies    Intolerances        SOCIAL HISTORY:  Alcohol use [X ] No  [ ] Yes  Smoking  [ X] No  [ ] Yes  Drug Abuse [X ] No  [ ] Yes  Tattoo [X ] No  [ ] Yes  History of Blood transfusion [ X] No  [ ] Yes    FAMILY HISTORY:      Diabetes [ ]  Hypertension [ ]  Kidney Stones [ ]  Heart Disease [ ]  Hyperlipidemia [ ]  Cancer [ ]    REVIEW OF SYSTEMS:  CONSTITUTIONAL: No weakness, fevers or chills  EYES/ENT: No visual changes;  No vertigo or throat pain   NECK: No pain or stiffness  RESPIRATORY: No cough, wheezing, hemoptysis; No shortness of breath  CARDIOVASCULAR: No chest pain or palpitations  GASTROINTESTINAL: No abdominal or epigastric pain. No nausea, vomiting, or hematemesis; No diarrhea or constipation. No melena or hematochezia.  GENITOURINARY: No dysuria, frequency or hematuria  NEUROLOGICAL: No numbness or weakness  SKIN: No itching, burning, rashes, or lesions   All other review of systems is negative unless indicated above    Vital Signs  T(F): 97.6 (19 @ 05:18), Max: 99 (19 @ 19:16)  HR: 79 (19 @ 05:18) (78 - 91)  BP: 125/79 (19 @ 05:18) (116/67 - 164/84)  ABP: --  RR: 18 (19 @ 05:18) (16 - 18)  SpO2: 95% (19 @ 05:18) (95% - 97%)  Wt(kg): --  CVP(cm H2O): --  CO: --  PCWP: --    I and O's:     @ 07:01  -   @ 07:00  --------------------------------------------------------  IN:    Continuous Bladder Irrigation: 3000 mL  Total IN: 3000 mL    OUT:    Continuous Bladder Irrigation: 4000 mL  Total OUT: 4000 mL    Total NET: -1000 mL        Daily Height in cm: 177.8 (2019 16:12)    Daily Weight in k.6 (2019 05:18)    PHYSICAL EXAM:  Constitutional: well developed, well nourished  and in nad  HEENT: PERRLA,  no icteric sclera and mild pallor of conjunctiva noted  Neck: No JVD, thyromegaly or adenopathy  Respiratory: reduced air entry lower lungs with no rales, wheezing or rhonchi  Cardiovascular: S1 and S2 normally heard  Gastrointestinal: soft, nondistended, nontender and normal bowel sounds heard  Extremities: No peripheral edema or cyanosis  Neurological: A/O x 3, no focal deficits  Psychiatric: Normal mood, normal affect  : No flank tenderness  Skin: No rashes        LABS:                        11.9   9.56  )-----------( 166      ( 2019 07:42 )             37.1     3.7  --            142  |  115<H>  |  36<H>  ----------------------------<  100<H>  4.7   |  20<L>  |  2.29<H>      140  |  115<H>  |  40<H>  ----------------------------<  114<H>  4.6   |  15<L>  |  2.66<H>    Ca    8.6      2019 07:42  Ca    8.6      2019 17:36  Phos  3.7       Mg     2.0         TPro  8.3  /  Alb  3.4<L>  /  TBili  0.7  /  DBili  x   /  AST  24  /  ALT  45  /  AlkPhos  188<H>    TPro  9.1<H>  /  Alb  3.6  /  TBili  0.4  /  DBili  x   /  AST  25  /  ALT  47  /  AlkPhos  209<H>            URINE STUDIES:  Urinalysis Basic - ( 2019 18:49 )    Color: Red / Appearance: bloody / S.010 / pH: x  Gluc: x / Ketone: Moderate  / Bili: Negative / Urobili: 1   Blood: x / Protein: 500 mg/dL / Nitrite: Positive   Leuk Esterase: Small / RBC: >50 /HPF / WBC 11-25 /HPF   Sq Epi: x / Non Sq Epi: x / Bacteria: Moderate /HPF                      RADIOLOGY & ADDITIONAL STUDIES: Patient is a 68y Male whom presented to the hospital with     HPI:  68M from [facility], with PMHx of prostate Ca (s/p radiation in 2015, now in remission), asthma, COPD (not on O2), HTN, HLD, Bipolar, anemia, GERD, Ileostomy s/p sigmoidal resection, PAD, and CKD presented to ED c/o hematuria X 1 week. He states he gradually developed hematuria since wednesday, which was worsening and yesterday evening he noticed blood clots. He had appointment made by Victor Manuel Russell for urologist but due to worsening hematuria, he came to ED. He also endorses painful micturition, chronic increased urinary frequency, nocturia. He denies any fever, back pain, abdominal pain, nausea, vomiting. (2019 21:20)    RENAL HPI :     pt denies pmh of renal ds, proteinuria, renal stones, recurrent uti or nephrotic edema or nephrotic syndrome  pt give pmh of retinopathy and s/p laser treatment  pt denies Nsaids use or otc other nephrotoxic supplements  PAST MEDICAL & SURGICAL HISTORY:  BPH with urinary obstruction  Prostate CA  CKD (chronic kidney disease)  PAD (peripheral artery disease)  HLD (hyperlipidemia)  HTN (hypertension)  IBD (inflammatory bowel disease)  Bipolar disorder  Anemia  COPD, mild  History of creation of ostomy      Home Medications: Reviewed    MEDICATIONS  (STANDING):  ALBUTerol/ipratropium for Nebulization 3 milliLiter(s) Nebulizer every 6 hours  ARIPiprazole 12.5 milliGRAM(s) Oral daily  cefTRIAXone   IVPB 1000 milliGRAM(s) IV Intermittent every 24 hours  ferrous    sulfate 325 milliGRAM(s) Oral daily  finasteride 5 milliGRAM(s) Oral daily  gabapentin 300 milliGRAM(s) Oral three times a day  HYDROmorphone   Tablet 4 milliGRAM(s) Oral every 8 hours  lamoTRIgine 200 milliGRAM(s) Oral daily  pantoprazole    Tablet 40 milliGRAM(s) Oral before breakfast  sodium bicarbonate 650 milliGRAM(s) Oral three times a day  tamsulosin 0.4 milliGRAM(s) Oral at bedtime  traZODone 50 milliGRAM(s) Oral at bedtime    MEDICATIONS  (PRN):      Allergies    No Known Allergies    SOCIAL HISTORY:  Alcohol use [X ] No  [ ] Yes  Smoking  [ X] No  [ ] Yes  Drug Abuse [X ] No  [ ] Yes  Tattoo [X ] No  [ ] Yes  History of Blood transfusion [ X] No  [ ] Yes    REVIEW OF SYSTEMS:  CONSTITUTIONAL: No weakness, fevers or chills  EYES/ENT: No visual changes;  No vertigo or throat pain   NECK: No pain or stiffness  RESPIRATORY: No cough, wheezing, hemoptysis; No shortness of breath  CARDIOVASCULAR: No chest pain or palpitations  GASTROINTESTINAL: No abdominal or epigastric pain. No nausea, vomiting, or hematemesis; No diarrhea or constipation. No melena or hematochezia.  GENITOURINARY: No dysuria, frequency or hematuria  NEUROLOGICAL: No numbness or weakness  SKIN: No itching, burning, rashes, or lesions   All other review of systems is negative unless indicated above    Vital Signs  T(F): 97.6 (19 @ 05:18), Max: 99 (19 @ 19:16)  HR: 79 (19 @ 05:18) (78 - 91)  BP: 125/79 (19 @ 05:18) (116/67 - 164/84)  ABP: --  RR: 18 (19 @ 05:18) (16 - 18)  SpO2: 95% (19 @ 05:18) (95% - 97%)  Wt(kg): --  CVP(cm H2O): --  CO: --  PCWP: --    I and O's:     @ 07:01  -   @ 07:00  --------------------------------------------------------  IN:    Continuous Bladder Irrigation: 3000 mL  Total IN: 3000 mL    OUT:    Continuous Bladder Irrigation: 4000 mL  Total OUT: 4000 mL    Total NET: -1000 mL        Daily Height in cm: 177.8 (2019 16:12)    Daily Weight in k.6 (2019 05:18)    PHYSICAL EXAM:  Constitutional: well developed, well nourished  and in nad  HEENT: PERRLA,  no icteric sclera and mild pallor of conjunctiva noted  Neck: No JVD, thyromegaly or adenopathy  Respiratory: reduced air entry lower lungs with no rales, wheezing or rhonchi  Cardiovascular: S1 and S2 normally heard  Gastrointestinal: soft, nondistended, nontender and normal bowel sounds heard  Extremities: No peripheral edema or cyanosis  Neurological: A/O x 3, no focal deficits  Psychiatric: Normal mood, normal affect  : No flank tenderness  Skin: No rashes        LABS:                        11.9   9.56  )-----------( 166      ( 2019 07:42 )             37.1     3.7  --            142  |  115<H>  |  36<H>  ----------------------------<  100<H>  4.7   |  20<L>  |  2.29<H>      140  |  115<H>  |  40<H>  ----------------------------<  114<H>  4.6   |  15<L>  |  2.66<H>    Ca    8.6      2019 07:42  Ca    8.6      2019 17:36  Phos  3.7       Mg     2.0         TPro  8.3  /  Alb  3.4<L>  /  TBili  0.7  /  DBili  x   /  AST  24  /  ALT  45  /  AlkPhos  188<H>    TPro  9.1<H>  /  Alb  3.6  /  TBili  0.4  /  DBili  x   /  AST  25  /  ALT  47  /  AlkPhos  209<H>            URINE STUDIES:  Urinalysis Basic - ( 2019 18:49 )    Color: Red / Appearance: bloody / S.010 / pH: x  Gluc: x / Ketone: Moderate  / Bili: Negative / Urobili: 1   Blood: x / Protein: 500 mg/dL / Nitrite: Positive   Leuk Esterase: Small / RBC: >50 /HPF / WBC 11-25 /HPF   Sq Epi: x / Non Sq Epi: x / Bacteria: Moderate /HPF      RADIOLOGY & ADDITIONAL STUDIES: Patient is a 68y Male whom presented to the hospital with gross hematuria with blood clots, noted to have abnormal renal function.    Medical HPI:  68M from [facility], with PMHx of prostate Ca (s/p radiation in 2015, now in remission), asthma, COPD (not on O2), HTN, HLD, Bipolar, anemia, GERD, Ileostomy s/p sigmoidal resection, PAD, and CKD presented to ED c/o hematuria X 1 week. He states he gradually developed hematuria since wednesday, which was worsening and yesterday evening he noticed blood clots. He had appointment made by Victor Manuel Russell for urologist but due to worsening hematuria, he came to ED. He also endorses painful micturition, chronic increased urinary frequency, nocturia. He denies any fever, back pain, abdominal pain, nausea, vomiting. (2019 21:20)    RENAL HPI :   pts chart reviewed and case discussed with resident  pt is known to have ckd-stage 3 as per old labs with baseline cr 2.1 to 2.20  pt denies pmh of heavy  proteinuria, renal stones, recurrent uti or nephrotic edema or nephrotic syndrome  pt gives long standing hx of htn with ckd x many yrs  pt denies Nsaids use or otc other nephrotoxic supplements recently  pt currently denies cp,sob,gi symptoms,skin rash, fever,chills ,joint pain or leg edema  pt is voiding mild blood tinged urine via phillips cath     PAST MEDICAL & SURGICAL HISTORY:  BPH with urinary obstruction  Prostate CA  CKD (chronic kidney disease)  PAD (peripheral artery disease)  HLD (hyperlipidemia)  HTN (hypertension)  IBD (inflammatory bowel disease)  Bipolar disorder  Anemia  COPD, mild  History of creation of ostomy      Home Medications: Reviewed    MEDICATIONS  (STANDING):  ALBUTerol/ipratropium for Nebulization 3 milliLiter(s) Nebulizer every 6 hours  ARIPiprazole 12.5 milliGRAM(s) Oral daily  cefTRIAXone   IVPB 1000 milliGRAM(s) IV Intermittent every 24 hours  ferrous    sulfate 325 milliGRAM(s) Oral daily  finasteride 5 milliGRAM(s) Oral daily  gabapentin 300 milliGRAM(s) Oral three times a day  HYDROmorphone   Tablet 4 milliGRAM(s) Oral every 8 hours  lamoTRIgine 200 milliGRAM(s) Oral daily  pantoprazole    Tablet 40 milliGRAM(s) Oral before breakfast  sodium bicarbonate 650 milliGRAM(s) Oral three times a day  tamsulosin 0.4 milliGRAM(s) Oral at bedtime  traZODone 50 milliGRAM(s) Oral at bedtime    MEDICATIONS  (PRN):      Allergies    No Known Allergies    SOCIAL HISTORY:  Alcohol use [X ] No  [ ] Yes  Smoking  [ X] No  [ ] Yes  Drug Abuse [X ] No  [ ] Yes  Tattoo [X ] No  [ ] Yes  History of Blood transfusion [ X] No  [ ] Yes    REVIEW OF SYSTEMS:  CONSTITUTIONAL: No weakness, fevers or chills  EYES/ENT: No visual changes;  No vertigo or throat pain   NECK: No pain or stiffness  RESPIRATORY: No cough, wheezing, hemoptysis; No shortness of breath  CARDIOVASCULAR: No chest pain or palpitations  GASTROINTESTINAL: No abdominal or epigastric pain. No nausea, vomiting, or hematemesis; No diarrhea or constipation. No melena or hematochezia.  GENITOURINARY: No dysuria, frequency , +hematuria with clots  NEUROLOGICAL: No numbness or weakness  SKIN: No itching, burning, rashes, or lesions   All other review of systems is negative unless indicated above    Vital Signs  T(F): 97.6 (19 @ 05:18), Max: 99 (19 @ 19:16)  HR: 79 (19 @ 05:18) (78 - 91)  BP: 125/79 (19 @ 05:18) (116/67 - 164/84)  ABP: --  RR: 18 (19 @ 05:18) (16 - 18)  SpO2: 95% (19 @ 05:18) (95% - 97%)  Wt(kg): --  CVP(cm H2O): --  CO: --  PCWP: --    I and O's:     @ 07:01  -   @ 07:00  --------------------------------------------------------  IN:    Continuous Bladder Irrigation: 3000 mL  Total IN: 3000 mL    OUT:    Continuous Bladder Irrigation: 4000 mL  Total OUT: 4000 mL    Total NET: -1000 mL        Daily Height in cm: 177.8 (2019 16:12)    Daily Weight in k.6 (2019 05:18)    PHYSICAL EXAM:  Constitutional: well developed, well nourished  and in nad  HEENT: PERRLA,  no icteric sclera and mild pallor of conjunctiva noted  Neck: No JVD, thyromegaly or adenopathy  Respiratory: reduced air entry lower lungs with no rales, wheezing or rhonchi  Cardiovascular: S1 and S2 normally heard  Gastrointestinal: soft, nondistended, nontender and normal bowel sounds heard  Extremities: No peripheral edema or cyanosis  Neurological: A/O x 3, no focal deficits  Psychiatric: Normal mood, normal affect  : No flank tenderness  Skin: No rashes        LABS:                        11.9   9.56  )-----------( 166      ( 2019 07:42 )             37.1     3.7  --            142  |  115<H>  |  36<H>  ----------------------------<  100<H>  4.7   |  20<L>  |  2.29<H>      140  |  115<H>  |  40<H>  ----------------------------<  114<H>  4.6   |  15<L>  |  2.66<H>    Ca    8.6      2019 07:42  Ca    8.6      2019 17:36  Phos  3.7       Mg     2.0         TPro  8.3  /  Alb  3.4<L>  /  TBili  0.7  /  DBili  x   /  AST  24  /  ALT  45  /  AlkPhos  188<H>    TPro  9.1<H>  /  Alb  3.6  /  TBili  0.4  /  DBili  x   /  AST  25  /  ALT  47  /  AlkPhos  209<H>            URINE STUDIES:  Urinalysis Basic - ( 2019 18:49 )    Color: Red / Appearance: bloody / S.010 / pH: x  Gluc: x / Ketone: Moderate  / Bili: Negative / Urobili: 1   Blood: x / Protein: 500 mg/dL / Nitrite: Positive   Leuk Esterase: Small / RBC: >50 /HPF / WBC 11-25 /HPF   Sq Epi: x / Non Sq Epi: x / Bacteria: Moderate /HPF      RADIOLOGY & ADDITIONAL STUDIES:      < from: US Renal (19 @ 12:19) >  EXAM:  US KIDNEY(S)                            PROCEDURE DATE:  2019          INTERPRETATION:  Renal ultrasound    Indication: Gross hematuria.    Renal ultrasound is performed without a previous ultrasound for   comparison. The right kidney measures 10.8 cm in length and the left   kidney measures 12.5 cm in length. Multiple renal cysts bilaterally   measuring up to 4.6 cm on the left. No evidence for hydronephrosis or   calculus in either kidney.    The IVC and aorta appear grossly unremarkable.    The urinary bladder is collapsed.    Impression: Bilateral renal cysts.    No hydronephrosis.    If clinically indicated, CT urogram may be pursued for better evaluation.    < end of copied text >  < from: Xray Chest 1 View-PORTABLE IMMEDIATE (19 @ 17:11) >  EXAM:  XR CHEST PORTABLE IMMED 1V                            PROCEDURE DATE:  2019          INTERPRETATION:  CLINICAL HISTORY: 68 years  Male with Sepsis.    COMPARISON: None    AP view of the chest demonstrates mild pulmonary vascular congestion.   There is no focal consolidation or pleural effusion. The heart is   enlarged. The aorta is tortuous. There is no mediastinal or hilar mass.    Mild thoracic degenerative changes are present. An old healed right   fourth rib fracture is noted.    IMPRESSION:    Mild pulmonary vascular congestion. Mild cardiomegaly.    < end of copied text >

## 2019-07-30 NOTE — PROGRESS NOTE ADULT - PROBLEM SELECTOR PLAN 1
occasionally painful hematuria with blood clots X 1 week  Hb stable 12.8   ?malignancy vs infection  3 way phillips with CBI started- clear urine in the bag  c/w CBI  FU PSA level  Uro on board occasionally painful hematuria with blood clots X 1 week  Hb stable 12.8   ?malignancy vs infection  3 way phillips with CBI started- clear urine in the bag  c/w CBI clam it continues to be clear  FU PSA level  Uro on board recommends Renal US. occasionally painful hematuria with blood clots X 1 week  Hb stable 12.8   ?malignancy vs infection  3 way phillips with CBI started- clear urine in the bag c/w slow drip as per Urology  f/u renal US    FU PSA level  Uro on board recommends Renal US.

## 2019-07-30 NOTE — PROGRESS NOTE ADULT - PROBLEM SELECTOR PLAN 3
p/w Cr 2.66 (2.14-2.6 on last admission),   likely his baseline   FU BMP daily p/w Cr 2.66 (2.14-2.6 on last admission),   likely his baseline   FU BMP daily  f/u Dr. Curran

## 2019-07-30 NOTE — PROGRESS NOTE ADULT - SUBJECTIVE AND OBJECTIVE BOX
Pt s- complaints  ICU Vital Signs Last 24 Hrs  T(C): 36.4 (30 Jul 2019 05:18), Max: 37.2 (29 Jul 2019 19:16)  T(F): 97.6 (30 Jul 2019 05:18), Max: 99 (29 Jul 2019 19:16)  HR: 79 (30 Jul 2019 05:18) (78 - 91)  BP: 125/79 (30 Jul 2019 05:18) (116/67 - 164/84)  BP(mean): --  ABP: --  ABP(mean): --  RR: 18 (30 Jul 2019 05:18) (16 - 18)  SpO2: 95% (30 Jul 2019 05:18) (95% - 97%)    A&Ox3 nad  Abd: soft nt nd no cvat  no penile/scrotalswelling or erythema    cbi running slowly, yellow urine in tubing                            11.9   9.56  )-----------( 166      ( 30 Jul 2019 07:42 )             37.1     07-30    142  |  115<H>  |  36<H>  ----------------------------<  100<H>  4.7   |  20<L>  |  x     Ca    8.6      30 Jul 2019 07:42  Mg     2.0     07-30    TPro  x   /  Alb  3.4<L>  /  TBili  x   /  DBili  x   /  AST  x   /  ALT  x   /  AlkPhos  x   07-30    Recent CT s- contrast shows bilat renal cysts.

## 2019-07-30 NOTE — CONSULT NOTE ADULT - ASSESSMENT
A/P A/P     1. CKD : likely 2/2 HTN . baseline around 2.2-2.4 , was slightly high on Admission now at baseline   - f/u US renal and Bladder   -f/u U lytes and Protein/cr ratio   - repeat UA and protein/cr ratio once off CBI   - -Keep patient euvolemic   -Avoid Nephrotoxic Meds/ Agents such as (NSAIDs, IV contrast, Aminoglycosides such as gentamicin, -Gadolinium contrast, Phosphate containing enemas, etc..)  -Adjust Medications according to eGFR.   - Monitor BMP daily     2. Hematuria:   - h/o Prostate ca s/p radiation   - urology f/u   - cont CBI as per urology   - avoid IV cont     3. HTN :   - BP accepttable   -cont with current meds   - keep BP < 130/80 A/P     1. CKD(stage 3) : likely 2/2 HTN/Renovascular ds  . baseline around 2.2-2.4 , was slightly high on Admission now at baseline   - f/u US  Bladder   -f/u U lytes and Protein/cr ratio   - repeat UA and protein/cr ratio once off CBI   - -Keep patient euvolemic   -Avoid Nephrotoxic Meds/ Agents such as (NSAIDs, IV contrast, Aminoglycosides such as gentamicin, -Gadolinium contrast, Phosphate containing enemas, etc..)  -Adjust Medications according to eGFR.   - Monitor BMP daily     2. Hematuria: with clots  - h/o Prostate ca s/p radiation   - urology f/u   - cont CBI as per urology   - avoid IV cont     3. HTN :   - BP accepttable   -cont with current meds   - keep BP < 130/80   -low salt diet  4.METABOLIC ACIDOSIS:Mild, secondary to ckd  -f/u co2 daily  -continue nahco3 tab

## 2019-07-30 NOTE — PROGRESS NOTE ADULT - PROBLEM SELECTOR PLAN 2
UA positive- f/u with u cultures  pt. symptomatic, afebrile, no leukocytosis  started on rocephin   FU UCx UA positive- f/u with u cultures  pt. symptomatic, afebrile, no leukocytosis  started on rocephin

## 2019-07-30 NOTE — PROGRESS NOTE ADULT - ASSESSMENT
hematuria, resolving .  hx CaP s/p radiation in past  Pt does not follow up with a Urologist as OP  hx renal cysts    will discuss with Urologist timing of d/c cbi  upon discontinuing cbi, obtain urine for cytology study  please obtain renal sono and compare images of prior CT to determine size/stability of renal lesions, Alternatively may obtain CT with contrast however considering Renal insuffiency, benefit may not outweigh risk of renal impairment. May consider obtaining any records of renal imaging from pcp to determine size stability rather than risk contrast study.  nephrology consult  PSA hematuria, resolving .  hx CaP s/p radiation in past  Pt does not follow up with a Urologist as OP  hx renal cysts    will discuss with Urologist timing of d/c cbi  upon discontinuing cbi, obtain urine for cytology study  please obtain renal sono and compare images of prior CT to determine size/stability of renal lesions, Alternatively may obtain CT with contrast however considering Renal insuffiency, benefit may not outweigh risk of renal impairment.   PSA

## 2019-07-30 NOTE — PROGRESS NOTE ADULT - SUBJECTIVE AND OBJECTIVE BOX
PGY 1 Note discussed with supervising resident and primary attending    Patient is a 68y old  Male who presents with a chief complaint of Hematuria (2019 22:10)      INTERVAL HPI/OVERNIGHT EVENTS: complains of discomfort around the catheter. Pt wasn't able to sleep well due to room temperature    MEDICATIONS  (STANDING):  ALBUTerol/ipratropium for Nebulization 3 milliLiter(s) Nebulizer every 6 hours  ARIPiprazole 12.5 milliGRAM(s) Oral daily  cefTRIAXone   IVPB 1000 milliGRAM(s) IV Intermittent every 24 hours  ferrous    sulfate 325 milliGRAM(s) Oral daily  finasteride 5 milliGRAM(s) Oral daily  gabapentin 300 milliGRAM(s) Oral three times a day  HYDROmorphone   Tablet 4 milliGRAM(s) Oral every 8 hours  lamoTRIgine 200 milliGRAM(s) Oral daily  pantoprazole    Tablet 40 milliGRAM(s) Oral before breakfast  sodium bicarbonate 650 milliGRAM(s) Oral three times a day  tamsulosin 0.4 milliGRAM(s) Oral at bedtime  traZODone 50 milliGRAM(s) Oral at bedtime    MEDICATIONS  (PRN):      __________________________________________________  REVIEW OF SYSTEMS:    CONSTITUTIONAL: No fever,   EYES: no acute visual disturbances  NECK: No pain or stiffness  RESPIRATORY: No cough; No shortness of breath  CARDIOVASCULAR: No chest pain, no palpitations  GASTROINTESTINAL: No pain. No nausea or vomiting;   NEUROLOGICAL: No headache or numbness, no tremors  MUSCULOSKELETAL: No joint pain, no muscle pain  GENITOURINARY:  phillips in place, mild discomfort around the area. painful when he moves.   PSYCHIATRY: no depression , no anxiety  ALL OTHER  ROS negative        Vital Signs Last 24 Hrs  T(C): 36.4 (2019 05:18), Max: 37.2 (2019 19:16)  T(F): 97.6 (2019 05:18), Max: 99 (2019 19:16)  HR: 79 (2019 05:18) (78 - 91)  BP: 125/79 (2019 05:18) (116/67 - 164/84)  BP(mean): --  RR: 18 (2019 05:18) (16 - 18)  SpO2: 95% (2019 05:18) (95% - 97%)    ________________________________________________  PHYSICAL EXAM:  GENERAL: NAD  HEENT: Normocephalic;  conjunctivae and sclerae clear; moist mucous membranes;   NECK : supple  CHEST/LUNG: Clear to auscultation bilaterally with good air entry   HEART: S1 S2  regular; no murmurs, gallops or rubs  ABDOMEN: Soft, Nontender, Nondistended; colostomy bag in place, site is clean, no inflammation noticed. Bowel sounds present  : 3 way phillips in place. urine is yellow.   EXTREMITIES: no cyanosis; no edema; no calf tenderness  SKIN: warm and dry; no rash  NERVOUS SYSTEM:  Awake and alert; Oriented  to place, person and time ; no new deficits    _________________________________________________  LABS:                        12.8   8.46  )-----------( 166      ( 2019 17:36 )             39.8     -    140  |  115<H>  |  40<H>  ----------------------------<  114<H>  4.6   |  15<L>  |  2.66<H>    Ca    8.6      2019 17:36    TPro  9.1<H>  /  Alb  3.6  /  TBili  0.4  /  DBili  x   /  AST  25  /  ALT  47  /  AlkPhos  209<H>        Urinalysis Basic - ( 2019 18:49 )    Color: Red / Appearance: bloody / S.010 / pH: x  Gluc: x / Ketone: Moderate  / Bili: Negative / Urobili: 1   Blood: x / Protein: 500 mg/dL / Nitrite: Positive   Leuk Esterase: Small / RBC: >50 /HPF / WBC 11-25 /HPF   Sq Epi: x / Non Sq Epi: x / Bacteria: Moderate /HPF      CAPILLARY BLOOD GLUCOSE            RADIOLOGY & ADDITIONAL TESTS:    Imaging Personally Reviewed:  NO    Consultant(s) Notes Reviewed:   YES  Care Discussed with Consultants :     Plan of care was discussed with patient and /or primary care giver; all questions and concerns were addressed and care was aligned with patient's wishes. PGY 1 Note discussed with supervising resident and primary attending    Patient is a 68y old  Male who presents with a chief complaint of Hematuria (2019 22:10)      INTERVAL HPI/OVERNIGHT EVENTS: complains of discomfort around the catheter. Pt wasn't able to sleep well due to room temperature. His phillips bag has clear urine with no pink residue since 3am.     MEDICATIONS  (STANDING):  ALBUTerol/ipratropium for Nebulization 3 milliLiter(s) Nebulizer every 6 hours  ARIPiprazole 12.5 milliGRAM(s) Oral daily  cefTRIAXone   IVPB 1000 milliGRAM(s) IV Intermittent every 24 hours  ferrous    sulfate 325 milliGRAM(s) Oral daily  finasteride 5 milliGRAM(s) Oral daily  gabapentin 300 milliGRAM(s) Oral three times a day  HYDROmorphone   Tablet 4 milliGRAM(s) Oral every 8 hours  lamoTRIgine 200 milliGRAM(s) Oral daily  pantoprazole    Tablet 40 milliGRAM(s) Oral before breakfast  sodium bicarbonate 650 milliGRAM(s) Oral three times a day  tamsulosin 0.4 milliGRAM(s) Oral at bedtime  traZODone 50 milliGRAM(s) Oral at bedtime    MEDICATIONS  (PRN):      __________________________________________________  REVIEW OF SYSTEMS:    CONSTITUTIONAL: No fever,   EYES: no acute visual disturbances  NECK: No pain or stiffness  RESPIRATORY: No cough; No shortness of breath  CARDIOVASCULAR: No chest pain, no palpitations  GASTROINTESTINAL: No pain. No nausea or vomiting;   NEUROLOGICAL: No headache or numbness, no tremors  MUSCULOSKELETAL: No joint pain, no muscle pain  GENITOURINARY:  phillips in place, mild discomfort around the area. painful when he moves.   PSYCHIATRY: no depression , no anxiety  ALL OTHER  ROS negative        Vital Signs Last 24 Hrs  T(C): 36.4 (2019 05:18), Max: 37.2 (2019 19:16)  T(F): 97.6 (2019 05:18), Max: 99 (2019 19:16)  HR: 79 (2019 05:18) (78 - 91)  BP: 125/79 (2019 05:18) (116/67 - 164/84)  BP(mean): --  RR: 18 (2019 05:18) (16 - 18)  SpO2: 95% (2019 05:18) (95% - 97%)    ________________________________________________  PHYSICAL EXAM:  GENERAL: NAD  HEENT: Normocephalic;  conjunctivae and sclerae clear; moist mucous membranes;   NECK : supple  CHEST/LUNG: Clear to auscultation bilaterally with good air entry   HEART: S1 S2  regular; no murmurs, gallops or rubs  ABDOMEN: Soft, Nontender, Nondistended; colostomy bag in place, site is clean, no inflammation noticed. Bowel sounds present  : 3 way phillips in place. urine is yellow.   EXTREMITIES: no cyanosis; no edema; no calf tenderness  SKIN: warm and dry; no rash  NERVOUS SYSTEM:  Awake and alert; Oriented  to place, person and time ; no new deficits    _________________________________________________  LABS:                        12.8   8.46  )-----------( 166      ( 2019 17:36 )             39.8     07-29    140  |  115<H>  |  40<H>  ----------------------------<  114<H>  4.6   |  15<L>  |  2.66<H>    Ca    8.6      2019 17:36    TPro  9.1<H>  /  Alb  3.6  /  TBili  0.4  /  DBili  x   /  AST  25  /  ALT  47  /  AlkPhos  209<H>  07-29      Urinalysis Basic - ( 2019 18:49 )    Color: Red / Appearance: bloody / S.010 / pH: x  Gluc: x / Ketone: Moderate  / Bili: Negative / Urobili: 1   Blood: x / Protein: 500 mg/dL / Nitrite: Positive   Leuk Esterase: Small / RBC: >50 /HPF / WBC 11-25 /HPF   Sq Epi: x / Non Sq Epi: x / Bacteria: Moderate /HPF      CAPILLARY BLOOD GLUCOSE            RADIOLOGY & ADDITIONAL TESTS:    Imaging Personally Reviewed:  NO    Consultant(s) Notes Reviewed:   YES  Care Discussed with Consultants :     Plan of care was discussed with patient and /or primary care giver; all questions and concerns were addressed and care was aligned with patient's wishes. PGY 1 Note discussed with supervising resident and primary attending    Patient is a 68y old  Male who presents with a chief complaint of Hematuria (2019 22:10)      INTERVAL HPI/OVERNIGHT EVENTS: complains of discomfort around the catheter. Pt wasn't able to sleep well due to room temperature. His phillips bag has clear urine with no pink residue since 3am.     MEDICATIONS  (STANDING):  ALBUTerol/ipratropium for Nebulization 3 milliLiter(s) Nebulizer every 6 hours  ARIPiprazole 12.5 milliGRAM(s) Oral daily  cefTRIAXone   IVPB 1000 milliGRAM(s) IV Intermittent every 24 hours  ferrous    sulfate 325 milliGRAM(s) Oral daily  finasteride 5 milliGRAM(s) Oral daily  gabapentin 300 milliGRAM(s) Oral three times a day  HYDROmorphone   Tablet 4 milliGRAM(s) Oral every 8 hours  lamoTRIgine 200 milliGRAM(s) Oral daily  pantoprazole    Tablet 40 milliGRAM(s) Oral before breakfast  sodium bicarbonate 650 milliGRAM(s) Oral three times a day  tamsulosin 0.4 milliGRAM(s) Oral at bedtime  traZODone 50 milliGRAM(s) Oral at bedtime    MEDICATIONS  (PRN):      __________________________________________________  REVIEW OF SYSTEMS:    CONSTITUTIONAL: No fever,   EYES: no acute visual disturbances  NECK: No pain or stiffness  RESPIRATORY: No cough; No shortness of breath  CARDIOVASCULAR: No chest pain, no palpitations  GASTROINTESTINAL: No pain. No nausea or vomiting;   NEUROLOGICAL: No headache or numbness, no tremors  MUSCULOSKELETAL: No joint pain, no muscle pain  GENITOURINARY:  phillips in place, mild discomfort around the area. painful when he moves.   PSYCHIATRY: no depression , no anxiety  ALL OTHER  ROS negative        Vital Signs Last 24 Hrs  T(C): 36.4 (2019 05:18), Max: 37.2 (2019 19:16)  T(F): 97.6 (2019 05:18), Max: 99 (2019 19:16)  HR: 79 (2019 05:18) (78 - 91)  BP: 125/79 (2019 05:18) (116/67 - 164/84)  BP(mean): --  RR: 18 (2019 05:18) (16 - 18)  SpO2: 95% (2019 05:18) (95% - 97%)    ________________________________________________  PHYSICAL EXAM:  GENERAL: NAD  HEENT: Normocephalic;  conjunctivae and sclerae clear; moist mucous membranes;   NECK : supple  CHEST/LUNG: Clear to auscultation bilaterally with good air entry   HEART: S1 S2  regular; no murmurs, gallops or rubs  ABDOMEN: Soft, Nontender, Nondistended; colostomy bag in place, site is clean, no inflammation noticed. Bowel sounds present  : 3 way phillips in place. urine is yellow.   EXTREMITIES: no cyanosis; no edema; no calf tenderness  SKIN: warm and dry; no rash  NERVOUS SYSTEM:  Awake and alert; Oriented  to place, person and time ; no new deficits    _________________________________________________  LABS:                        12.8   8.46  )-----------( 166      ( 2019 17:36 )             39.8     07-29    140  |  115<H>  |  40<H>  ----------------------------<  114<H>  4.6   |  15<L>  |  2.66<H>    Ca    8.6      2019 17:36    TPro  9.1<H>  /  Alb  3.6  /  TBili  0.4  /  DBili  x   /  AST  25  /  ALT  47  /  AlkPhos  209<H>  07-29      Urinalysis Basic - ( 2019 18:49 )    Color: Red / Appearance: bloody / S.010 / pH: x  Gluc: x / Ketone: Moderate  / Bili: Negative / Urobili: 1   Blood: x / Protein: 500 mg/dL / Nitrite: Positive   Leuk Esterase: Small / RBC: >50 /HPF / WBC 11-25 /HPF   Sq Epi: x / Non Sq Epi: x / Bacteria: Moderate /HPF        RADIOLOGY & ADDITIONAL TESTS:  < from: US Renal (19 @ 12:19) >    Impression: Bilateral renal cysts.    No hydronephrosis.    If clinically indicated, CT urogram may be pursued for better evaluation.    < end of copied text >    < from: CT Abdomen and Pelvis No Cont (19 @ 21:30) >  IMPRESSION:   Limited evaluation of the liver due to lack of intravenous contrast.   Within this limitation, no discernible abscess or mass identified.  Porcelain gallbladder.    < end of copied text >      Imaging Personally Reviewed:  yes    Consultant(s) Notes Reviewed:   YES  Care Discussed with Consultants :     Plan of care was discussed with patient and /or primary care giver; all questions and concerns were addressed and care was aligned with patient's wishes.

## 2019-07-31 LAB
ANION GAP SERPL CALC-SCNC: 5 MMOL/L — SIGNIFICANT CHANGE UP (ref 5–17)
BLD GP AB SCN SERPL QL: SIGNIFICANT CHANGE UP
BUN SERPL-MCNC: 37 MG/DL — HIGH (ref 7–18)
CALCIUM SERPL-MCNC: 8.6 MG/DL — SIGNIFICANT CHANGE UP (ref 8.4–10.5)
CHLORIDE SERPL-SCNC: 115 MMOL/L — HIGH (ref 96–108)
CO2 SERPL-SCNC: 21 MMOL/L — LOW (ref 22–31)
CREAT SERPL-MCNC: 2.38 MG/DL — HIGH (ref 0.5–1.3)
GLUCOSE SERPL-MCNC: 145 MG/DL — HIGH (ref 70–99)
HCT VFR BLD CALC: 33.6 % — LOW (ref 39–50)
HCT VFR BLD CALC: 35 % — LOW (ref 39–50)
HGB BLD-MCNC: 10.7 G/DL — LOW (ref 13–17)
HGB BLD-MCNC: 11 G/DL — LOW (ref 13–17)
MCHC RBC-ENTMCNC: 26.9 PG — LOW (ref 27–34)
MCHC RBC-ENTMCNC: 27 PG — SIGNIFICANT CHANGE UP (ref 27–34)
MCHC RBC-ENTMCNC: 31.4 GM/DL — LOW (ref 32–36)
MCHC RBC-ENTMCNC: 31.8 GM/DL — LOW (ref 32–36)
MCV RBC AUTO: 84.6 FL — SIGNIFICANT CHANGE UP (ref 80–100)
MCV RBC AUTO: 85.6 FL — SIGNIFICANT CHANGE UP (ref 80–100)
NRBC # BLD: 0 /100 WBCS — SIGNIFICANT CHANGE UP (ref 0–0)
NRBC # BLD: 0 /100 WBCS — SIGNIFICANT CHANGE UP (ref 0–0)
OSMOLALITY UR: 413 MOSM/KG — SIGNIFICANT CHANGE UP (ref 50–1200)
PLATELET # BLD AUTO: 126 K/UL — LOW (ref 150–400)
PLATELET # BLD AUTO: 131 K/UL — LOW (ref 150–400)
POTASSIUM SERPL-MCNC: 4.4 MMOL/L — SIGNIFICANT CHANGE UP (ref 3.5–5.3)
POTASSIUM SERPL-SCNC: 4.4 MMOL/L — SIGNIFICANT CHANGE UP (ref 3.5–5.3)
PSA FLD-MCNC: <0.01 NG/ML — SIGNIFICANT CHANGE UP (ref 0–4)
RBC # BLD: 3.97 M/UL — LOW (ref 4.2–5.8)
RBC # BLD: 4.09 M/UL — LOW (ref 4.2–5.8)
RBC # FLD: 16.4 % — HIGH (ref 10.3–14.5)
RBC # FLD: 16.4 % — HIGH (ref 10.3–14.5)
SODIUM SERPL-SCNC: 141 MMOL/L — SIGNIFICANT CHANGE UP (ref 135–145)
WBC # BLD: 7.32 K/UL — SIGNIFICANT CHANGE UP (ref 3.8–10.5)
WBC # BLD: 7.45 K/UL — SIGNIFICANT CHANGE UP (ref 3.8–10.5)
WBC # FLD AUTO: 7.32 K/UL — SIGNIFICANT CHANGE UP (ref 3.8–10.5)
WBC # FLD AUTO: 7.45 K/UL — SIGNIFICANT CHANGE UP (ref 3.8–10.5)

## 2019-07-31 RX ADMIN — HYDROMORPHONE HYDROCHLORIDE 4 MILLIGRAM(S): 2 INJECTION INTRAMUSCULAR; INTRAVENOUS; SUBCUTANEOUS at 16:53

## 2019-07-31 RX ADMIN — HYDROMORPHONE HYDROCHLORIDE 4 MILLIGRAM(S): 2 INJECTION INTRAMUSCULAR; INTRAVENOUS; SUBCUTANEOUS at 22:10

## 2019-07-31 RX ADMIN — GABAPENTIN 300 MILLIGRAM(S): 400 CAPSULE ORAL at 15:54

## 2019-07-31 RX ADMIN — FINASTERIDE 5 MILLIGRAM(S): 5 TABLET, FILM COATED ORAL at 12:16

## 2019-07-31 RX ADMIN — GABAPENTIN 300 MILLIGRAM(S): 400 CAPSULE ORAL at 06:02

## 2019-07-31 RX ADMIN — Medication 650 MILLIGRAM(S): at 21:31

## 2019-07-31 RX ADMIN — Medication 650 MILLIGRAM(S): at 15:54

## 2019-07-31 RX ADMIN — LAMOTRIGINE 200 MILLIGRAM(S): 25 TABLET, ORALLY DISINTEGRATING ORAL at 12:16

## 2019-07-31 RX ADMIN — HYDROMORPHONE HYDROCHLORIDE 4 MILLIGRAM(S): 2 INJECTION INTRAMUSCULAR; INTRAVENOUS; SUBCUTANEOUS at 06:47

## 2019-07-31 RX ADMIN — HYDROMORPHONE HYDROCHLORIDE 4 MILLIGRAM(S): 2 INJECTION INTRAMUSCULAR; INTRAVENOUS; SUBCUTANEOUS at 06:02

## 2019-07-31 RX ADMIN — HYDROMORPHONE HYDROCHLORIDE 4 MILLIGRAM(S): 2 INJECTION INTRAMUSCULAR; INTRAVENOUS; SUBCUTANEOUS at 15:53

## 2019-07-31 RX ADMIN — GABAPENTIN 300 MILLIGRAM(S): 400 CAPSULE ORAL at 21:31

## 2019-07-31 RX ADMIN — TAMSULOSIN HYDROCHLORIDE 0.4 MILLIGRAM(S): 0.4 CAPSULE ORAL at 21:31

## 2019-07-31 RX ADMIN — PANTOPRAZOLE SODIUM 40 MILLIGRAM(S): 20 TABLET, DELAYED RELEASE ORAL at 06:02

## 2019-07-31 RX ADMIN — ARIPIPRAZOLE 12.5 MILLIGRAM(S): 15 TABLET ORAL at 12:16

## 2019-07-31 RX ADMIN — Medication 50 MILLIGRAM(S): at 21:31

## 2019-07-31 RX ADMIN — Medication 650 MILLIGRAM(S): at 06:02

## 2019-07-31 RX ADMIN — Medication 325 MILLIGRAM(S): at 12:16

## 2019-07-31 RX ADMIN — CEFTRIAXONE 100 MILLIGRAM(S): 500 INJECTION, POWDER, FOR SOLUTION INTRAMUSCULAR; INTRAVENOUS at 21:31

## 2019-07-31 RX ADMIN — HYDROMORPHONE HYDROCHLORIDE 4 MILLIGRAM(S): 2 INJECTION INTRAMUSCULAR; INTRAVENOUS; SUBCUTANEOUS at 21:31

## 2019-07-31 NOTE — PROGRESS NOTE ADULT - ASSESSMENT
68M from [facility], with PMHx of prostate Ca (s/p radiation in 2015, now in remission), asthma, COPD (not on O2), HTN, HLD, Bipolar, anemia, GERD, Ileostomy s/p sigmoidal resection, PAD, and CKD presented to ED c/o hematuria X 1 week.     ED course: Vitals stable  Labs significant for Hb 12.8, Cr 2.66 (2.14-2.6 on last admission), UA positive  s/p Rocephin 1gm  hx renal cysts as seen on previous CT 68M from [facility], with PMHx of prostate Ca (s/p radiation in 2015, now in remission), asthma, COPD (not on O2), HTN, HLD, Bipolar, anemia, GERD, Ileostomy s/p sigmoidal resection, PAD, and CKD presented to ED c/o hematuria X 1 week. Pt got a three way phillips and was started with CBI. Pt continues to have gross hematuria and will be seen by urology for possible cystoscopy.     ED course: Vitals stable  Labs significant for Hb 12.8, Cr 2.66 (2.14-2.6 on last admission), UA positive  s/p Rocephin 1gm  hx renal cysts as seen on previous CT

## 2019-07-31 NOTE — PROGRESS NOTE ADULT - ASSESSMENT
A/P 67 yo M s/p radiation for prostate cancer in 2015 now with gross hematuria    - Continue CBI and titrate to light pink  - Cysto, possible clot evacuation, possible bladder fulguration in OR tomorrow A/P 69 yo M s/p radiation for prostate cancer in 2015 now with gross hematuria, Ucxr: +GNR    - Continue CBI and titrate to light pink  - continue IV antibiotics  - possible cystoscopy planning when completes treatment of UTI  - await final urine cultures   - continue medical management

## 2019-07-31 NOTE — PROGRESS NOTE ADULT - SUBJECTIVE AND OBJECTIVE BOX
Pt resting comfortably with no complaints at this time    Vital Signs Last 24 Hrs  T(C): 36.7 (31 Jul 2019 14:32), Max: 36.9 (30 Jul 2019 21:46)  T(F): 98 (31 Jul 2019 14:32), Max: 98.5 (30 Jul 2019 21:46)  HR: 81 (31 Jul 2019 14:32) (69 - 81)  BP: 114/65 (31 Jul 2019 14:32) (110/651 - 115/54)  BP(mean): --  RR: 18 (31 Jul 2019 14:32) (18 - 18)  SpO2: 97% (31 Jul 2019 14:32) (94% - 98%)    Abd: soft, nt/nd, colostomy  : CBI running at slow drip with pink urine. Manually irrigated with no clots                          10.7   7.32  )-----------( 131      ( 31 Jul 2019 15:14 )             33.6   07-31    141  |  115<H>  |  37<H>  ----------------------------<  145<H>  4.4   |  21<L>  |  2.38<H>    Ca    8.6      31 Jul 2019 09:34  Phos  3.7     07-30  Mg     2.0     07-30    TPro  8.3  /  Alb  3.4<L>  /  TBili  0.7  /  DBili  x   /  AST  24  /  ALT  45  /  AlkPhos  188<H>  07-30    EXAM:  US KIDNEY(S)                            PROCEDURE DATE:  07/30/2019          INTERPRETATION:  Renal ultrasound    Indication: Gross hematuria.    Renal ultrasound is performed without a previous ultrasound for   comparison. The right kidney measures 10.8 cm in length and the left   kidney measures 12.5 cm in length. Multiple renal cysts bilaterally   measuring up to 4.6 cm on the left. No evidence for hydronephrosis or   calculus in either kidney.    The IVC and aorta appear grossly unremarkable.    The urinary bladder is collapsed.    Impression: Bilateral renal cysts.    No hydronephrosis.

## 2019-07-31 NOTE — PROGRESS NOTE ADULT - PROBLEM SELECTOR PLAN 2
UA positive- f/u with u cultures  pt. symptomatic, afebrile, no leukocytosis  started on rocephin UA positive- f/u with u cultures  pt. symptomatic, afebrile, no leukocytosis- on rocephin

## 2019-07-31 NOTE — PROGRESS NOTE ADULT - PROBLEM SELECTOR PLAN 3
p/w Cr 2.66 (2.14-2.6 on last admission),   likely his baseline   FU BMP daily  f/u Dr. Curran p/w Cr 2.66 (2.14-2.6 on last admission), Cr still elevated  likely his baseline   FU BMP daily  f/u Dr. Curran

## 2019-07-31 NOTE — PROGRESS NOTE ADULT - PROBLEM SELECTOR PLAN 1
occasionally painful hematuria with blood clots X 1 week  Hb coming down from 12.8-> 11-- repeat cbc at noon   ?malignancy vs infection  3 way phillips with CBI started- pt is having gross hematuria- Urology on board and says to cont CBI   renal US showed cysts    FU PSA level  Uro on board recommends Renal US. occasionally painful hematuria with blood clots X 1 week  Hb coming down from 12.8-> 11-- repeat cbc at noon   ?malignancy vs infection  3 way phillips with CBI started- pt is having gross hematuria- Urology on board and says to cont CBI will f/u   renal US showed cysts  normal PSA levels  F/U bladder US

## 2019-07-31 NOTE — PROGRESS NOTE ADULT - SUBJECTIVE AND OBJECTIVE BOX
SUBJECTIVE:  pt feels fine and denies cp,sob,gi or gu/uremic  symptoms      Current meds:    ALBUTerol/ipratropium for Nebulization 3 milliLiter(s) Nebulizer every 6 hours  ARIPiprazole 12.5 milliGRAM(s) Oral daily  cefTRIAXone   IVPB 1000 milliGRAM(s) IV Intermittent every 24 hours  ferrous    sulfate 325 milliGRAM(s) Oral daily  finasteride 5 milliGRAM(s) Oral daily  gabapentin 300 milliGRAM(s) Oral three times a day  HYDROmorphone   Tablet 4 milliGRAM(s) Oral every 8 hours  lamoTRIgine 200 milliGRAM(s) Oral daily  pantoprazole    Tablet 40 milliGRAM(s) Oral before breakfast  sodium bicarbonate 650 milliGRAM(s) Oral three times a day  tamsulosin 0.4 milliGRAM(s) Oral at bedtime  traZODone 50 milliGRAM(s) Oral at bedtime      Vital Signs    T(F): 97.6 (19 @ 05:03), Max: 98.5 (19 @ 21:46)  HR: 69 (19 @ 05:03) (69 - 91)  BP: 115/54 (19 @ 05:03) (110/651 - 127/76)  ABP: --  RR: 18 (19 @ 05:03) (18 - 18)  SpO2: 98% (19 @ 05:03) (94% - 98%)  Wt(kg): --  CVP(cm H2O): --  CO: --  PCWP: --    I and O's:     @ 07:  -   @ 07:00  --------------------------------------------------------  IN:    Continuous Bladder Irrigation: 3000 mL  Total IN: 3000 mL    OUT:    Continuous Bladder Irrigation: 4000 mL  Total OUT: 4000 mL    Total NET: -1000 mL       @ 07:  -   @ 07:00  --------------------------------------------------------  IN:    Continuous Bladder Irrigation: 800 mL  Total IN: 800 mL    OUT:    Continuous Bladder Irrigation: 3000 mL  Total OUT: 3000 mL    Total NET: -2200 mL        Daily     Daily Weight in k.2 (2019 05:03)    PHYSICAL EXAM:  Constitutional: well developed, well nourished  and in nad  HEENT: PERRLA,  no icteric sclera and mild pallor of conjunctiva noted  Neck: No JVD, thyromegaly or adenopathy  Respiratory: reduced air entry lower lungs with no rales, wheezing or rhonchi  Cardiovascular: S1 and S2 normally heard  Gastrointestinal: soft, nondistended, nontender and normal bowel sounds heard  Extremities: No peripheral edema or cyanosis  Neurological: A/O x 3, no focal deficits  : No flank or cva tenderness palpated.  Skin: No rashes      LABS:    CBC:                          11.0   7.45  )-----------( 126      ( 2019 09:34 )             35.0           BMP:    07    141  |  115<H>  |  37<H>  ----------------------------<  145<H>  4.4   |  21<L>  |  2.38<H>      142  |  115<H>  |  36<H>  ----------------------------<  100<H>  4.7   |  20<L>  |  2.29<H>      140  |  115<H>  |  40<H>  ----------------------------<  114<H>  4.6   |  15<L>  |  2.66<H>    Ca    8.6      2019 09:34  Ca    8.6      2019 07:42  Ca    8.6      2019 17:36  Phos  3.7       Mg     2.0         TPro  8.3  /  Alb  3.4<L>  /  TBili  0.7  /  DBili  x   /  AST  24  /  ALT  45  /  AlkPhos  188<H>    TPro  9.1<H>  /  Alb  3.6  /  TBili  0.4  /  DBili  x   /  AST  25  /  ALT  47  /  AlkPhos  209<H>        Thyroid Stimulating Hormone, Serum: 3.04 uU/mL ( @ 07:42)      URINE STUDIES:        Creatinine, Random Urine: 66 mg/dL ( @ 13:07)  Potassium, Random Urine: 27 mmol/L ( @ 13:07)  Sodium, Random Urine: 70 mmol/L ( @ :07)  Chloride, Random Urine: 86 mmol/L ( @ 13:07)  Osmolality, Random Urine: 413 mosm/Kg ( @ 13:07)    RADIOLOGY & ADDITIONAL STUDIES:  < from: US Renal (19 @ 12:19) >  Renal ultrasound is performed without a previous ultrasound for   comparison. The right kidney measures 10.8 cm in length and the left   kidney measures 12.5 cm in length. Multiple renal cysts bilaterally   measuring up to 4.6 cm on the left. No evidence for hydronephrosis or   calculus in either kidney.    The IVC and aorta appear grossly unremarkable.    The urinary bladder is collapsed.    Impression: Bilateral renal cysts.    No hydronephrosis.      < end of copied text > SUBJECTIVE:  pt feels fine and denies cp,sob,gi or gu/uremic  symptoms  Pt started having hematuria again this am , restarted on CBI       Current meds:    ALBUTerol/ipratropium for Nebulization 3 milliLiter(s) Nebulizer every 6 hours  ARIPiprazole 12.5 milliGRAM(s) Oral daily  cefTRIAXone   IVPB 1000 milliGRAM(s) IV Intermittent every 24 hours  ferrous    sulfate 325 milliGRAM(s) Oral daily  finasteride 5 milliGRAM(s) Oral daily  gabapentin 300 milliGRAM(s) Oral three times a day  HYDROmorphone   Tablet 4 milliGRAM(s) Oral every 8 hours  lamoTRIgine 200 milliGRAM(s) Oral daily  pantoprazole    Tablet 40 milliGRAM(s) Oral before breakfast  sodium bicarbonate 650 milliGRAM(s) Oral three times a day  tamsulosin 0.4 milliGRAM(s) Oral at bedtime  traZODone 50 milliGRAM(s) Oral at bedtime      Vital Signs    T(F): 97.6 (19 @ 05:03), Max: 98.5 (19 @ 21:46)  HR: 69 (19 @ 05:03) (69 - 91)  BP: 115/54 (19 @ 05:03) (110/651 - 127/76)  ABP: --  RR: 18 (19 @ 05:03) (18 - 18)  SpO2: 98% (19 @ 05:03) (94% - 98%)  Wt(kg): --  CVP(cm H2O): --  CO: --  PCWP: --    I and O's:     @ 07:  -   @ 07:00  --------------------------------------------------------  IN:    Continuous Bladder Irrigation: 3000 mL  Total IN: 3000 mL    OUT:    Continuous Bladder Irrigation: 4000 mL  Total OUT: 4000 mL    Total NET: -1000 mL       @ 07:  -   @ 07:00  --------------------------------------------------------  IN:    Continuous Bladder Irrigation: 800 mL  Total IN: 800 mL    OUT:    Continuous Bladder Irrigation: 3000 mL  Total OUT: 3000 mL    Total NET: -2200 mL        Daily     Daily Weight in k.2 (2019 05:03)    PHYSICAL EXAM:  Constitutional: well developed, well nourished  and in nad  HEENT: PERRLA,  no icteric sclera and mild pallor of conjunctiva noted  Neck: No JVD, thyromegaly or adenopathy  Respiratory: reduced air entry lower lungs with no rales, wheezing or rhonchi  Cardiovascular: S1 and S2 normally heard  Gastrointestinal: soft, nondistended, nontender and normal bowel sounds heard  Extremities: No peripheral edema or cyanosis  Neurological: A/O x 3, no focal deficits  : No flank or cva tenderness palpated. FC in place , draining blood tinged urine   Skin: No rashes      LABS:    CBC:                          11.0   7.45  )-----------( 126      ( 2019 09:34 )             35.0           BMP:    07-    141  |  115<H>  |  37<H>  ----------------------------<  145<H>  4.4   |  21<L>  |  2.38<H>      142  |  115<H>  |  36<H>  ----------------------------<  100<H>  4.7   |  20<L>  |  2.29<H>  07    140  |  115<H>  |  40<H>  ----------------------------<  114<H>  4.6   |  15<L>  |  2.66<H>    Ca    8.6      2019 09:34  Ca    8.6      2019 07:42  Ca    8.6      2019 17:36  Phos  3.7       Mg     2.0         TPro  8.3  /  Alb  3.4<L>  /  TBili  0.7  /  DBili  x   /  AST  24  /  ALT  45  /  AlkPhos  188<H>    TPro  9.1<H>  /  Alb  3.6  /  TBili  0.4  /  DBili  x   /  AST  25  /  ALT  47  /  AlkPhos  209<H>        Thyroid Stimulating Hormone, Serum: 3.04 uU/mL ( @ 07:42)      URINE STUDIES:        Creatinine, Random Urine: 66 mg/dL ( @ 13:07)  Potassium, Random Urine: 27 mmol/L ( @ 13:07)  Sodium, Random Urine: 70 mmol/L ( @ 13:07)  Chloride, Random Urine: 86 mmol/L ( @ 13:07)  Osmolality, Random Urine: 413 mosm/Kg ( @ 13:07)    RADIOLOGY & ADDITIONAL STUDIES:  < from: US Renal (19 @ 12:19) >  Renal ultrasound is performed without a previous ultrasound for   comparison. The right kidney measures 10.8 cm in length and the left   kidney measures 12.5 cm in length. Multiple renal cysts bilaterally   measuring up to 4.6 cm on the left. No evidence for hydronephrosis or   calculus in either kidney.    The IVC and aorta appear grossly unremarkable.    The urinary bladder is collapsed.    Impression: Bilateral renal cysts.    No hydronephrosis.      < end of copied text > pt seen and examined with renal resident  SUBJECTIVE:  pt feels fine and denies cp,sob or gi symptoms  Pt started having hematuria again this am , restarted on CBI       Current meds:    ALBUTerol/ipratropium for Nebulization 3 milliLiter(s) Nebulizer every 6 hours  ARIPiprazole 12.5 milliGRAM(s) Oral daily  cefTRIAXone   IVPB 1000 milliGRAM(s) IV Intermittent every 24 hours  ferrous    sulfate 325 milliGRAM(s) Oral daily  finasteride 5 milliGRAM(s) Oral daily  gabapentin 300 milliGRAM(s) Oral three times a day  HYDROmorphone   Tablet 4 milliGRAM(s) Oral every 8 hours  lamoTRIgine 200 milliGRAM(s) Oral daily  pantoprazole    Tablet 40 milliGRAM(s) Oral before breakfast  sodium bicarbonate 650 milliGRAM(s) Oral three times a day  tamsulosin 0.4 milliGRAM(s) Oral at bedtime  traZODone 50 milliGRAM(s) Oral at bedtime      Vital Signs    T(F): 97.6 (19 @ 05:03), Max: 98.5 (19 @ 21:46)  HR: 69 (19 @ 05:03) (69 - 91)  BP: 115/54 (19 @ 05:03) (110/651 - 127/76)  ABP: --  RR: 18 (19 @ 05:03) (18 - 18)  SpO2: 98% (19 @ 05:03) (94% - 98%)  Wt(kg): --  CVP(cm H2O): --  CO: --  PCWP: --    I and O's:     @ 07:  -   @ 07:00  --------------------------------------------------------  IN:    Continuous Bladder Irrigation: 3000 mL  Total IN: 3000 mL    OUT:    Continuous Bladder Irrigation: 4000 mL  Total OUT: 4000 mL    Total NET: -1000 mL       @ 07:  -   @ 07:00  --------------------------------------------------------  IN:    Continuous Bladder Irrigation: 800 mL  Total IN: 800 mL    OUT:    Continuous Bladder Irrigation: 3000 mL  Total OUT: 3000 mL    Total NET: -2200 mL        Daily     Daily Weight in k.2 (2019 05:03)    PHYSICAL EXAM:  Constitutional: well developed, well nourished  and in nad  HEENT: PERRLA,  no icteric sclera and mild pallor of conjunctiva noted  Neck: No JVD, thyromegaly or adenopathy  Respiratory: reduced air entry lower lungs with no rales, wheezing or rhonchi  Cardiovascular: S1 and S2 normally heard  Gastrointestinal: soft, nondistended, nontender and normal bowel sounds heard  Extremities: No peripheral edema or cyanosis  Neurological: A/O x 3, no focal deficits  : No flank or cva tenderness palpated. FC in place , draining blood tinged urine   Skin: No rashes      LABS:    CBC:                          11.0   7.45  )-----------( 126      ( 2019 09:34 )             35.0           BMP:    07    141  |  115<H>  |  37<H>  ----------------------------<  145<H>  4.4   |  21<L>  |  2.38<H>      142  |  115<H>  |  36<H>  ----------------------------<  100<H>  4.7   |  20<L>  |  2.29<H>  07    140  |  115<H>  |  40<H>  ----------------------------<  114<H>  4.6   |  15<L>  |  2.66<H>    Ca    8.6      2019 09:34  Ca    8.6      2019 07:42  Ca    8.6      2019 17:36  Phos  3.7       Mg     2.0         TPro  8.3  /  Alb  3.4<L>  /  TBili  0.7  /  DBili  x   /  AST  24  /  ALT  45  /  AlkPhos  188<H>    TPro  9.1<H>  /  Alb  3.6  /  TBili  0.4  /  DBili  x   /  AST  25  /  ALT  47  /  AlkPhos  209<H>        Thyroid Stimulating Hormone, Serum: 3.04 uU/mL ( @ 07:42)      URINE STUDIES:  Creatinine, Random Urine: 66 mg/dL ( @ 13:07)  Potassium, Random Urine: 27 mmol/L ( @ 13:07)  Sodium, Random Urine: 70 mmol/L ( @ 13:07)  Chloride, Random Urine: 86 mmol/L ( @ 13:07)  Osmolality, Random Urine: 413 mosm/Kg ( @ 13:07)  Total Protein, Random Urine (19 @ 13:07)    Total Protein, Random Urine: 78 mg/dL      RADIOLOGY & ADDITIONAL STUDIES:  < from: US Renal (19 @ 12:19) >  Renal ultrasound is performed without a previous ultrasound for   comparison. The right kidney measures 10.8 cm in length and the left   kidney measures 12.5 cm in length. Multiple renal cysts bilaterally   measuring up to 4.6 cm on the left. No evidence for hydronephrosis or   calculus in either kidney.    The IVC and aorta appear grossly unremarkable.    The urinary bladder is collapsed.    Impression: Bilateral renal cysts.    No hydronephrosis.      < end of copied text >

## 2019-07-31 NOTE — PROGRESS NOTE ADULT - ASSESSMENT
A/P     1. CKD(stage 3) : likely 2/2 HTN/Renovascular ds  . baseline around 2.2-2.4 , was slightly high on Admission   - Cr stable around baseline    - f/u US  Bladder   - Pr/Cr ratio 1.2 g/day,not nephrotic range proteinuria   - repeat UA and protein/cr ratio once off CBI   - -Keep patient euvolemic   -Avoid Nephrotoxic Meds/ Agents such as (NSAIDs, IV contrast, Aminoglycosides such as gentamicin, -Gadolinium contrast, Phosphate containing enemas, etc..)  -Adjust Medications according to eGFR.   - Monitor BMP daily     2. Hematuria: with clots  - h/o Prostate ca s/p radiation   - urology f/u   - cont CBI as per urology   - avoid IV cont     3. HTN :   - BP acceptable   -cont with current meds   - keep BP < 130/80   -low salt diet    4.METABOLIC ACIDOSIS:Mild, secondary to ckd  -f/u co2 daily  -continue nahco3 tab A/P     1. CKD(stage 3) : likely 2/2 HTN/Renovascular ds  . baseline around 2.2-2.4 , was slightly high on Admission   - Cr stable around baseline    - f/u US  Bladder   - Pr/Cr ratio 1.2 g/day,not nephrotic range proteinuria   - repeat UA and protein/cr ratio once off CBI   - -Keep patient euvolemic   -Avoid Nephrotoxic Meds/ Agents such as (NSAIDs, IV contrast, Aminoglycosides such as gentamicin, -Gadolinium contrast, Phosphate containing enemas, etc..)  -Adjust Medications according to eGFR.   - Monitor BMP daily   - recc adding low dose ACE/ARB once acute events resolve and BP allows for proteinuria     2. Hematuria: with clots  - h/o Prostate ca s/p radiation   - urology f/u   - cont CBI as per urology   - avoid IV cont     3. HTN :   - BP acceptable   -cont with current meds   - keep BP < 130/80   -low salt diet    4.METABOLIC ACIDOSIS:Mild, secondary to ckd  -f/u co2 daily  -continue nahco3 tab A/P     1. CKD(stage 3) : likely 2/2 HTN/Renovascular ds  . baseline around 2.2-2.4 , was slightly high on Admission   - Cr stable around baseline    - f/u US  Bladder   - Pr/Cr ratio 1.2 g/day,not nephrotic range proteinuria , c/w htn induced ckd  - repeat UA and protein/cr ratio once off CBI   - -Keep patient euvolemic   -Avoid Nephrotoxic Meds/ Agents such as (NSAIDs, IV contrast, Aminoglycosides such as gentamicin, -Gadolinium contrast, Phosphate containing enemas, etc..)  -Adjust Medications according to eGFR.   - Monitor BMP daily   - recc adding low dose ACE/ARB once acute events resolve and BP allows for proteinuria     2. Hematuria: with clots  - h/o Prostate ca s/p radiation   - urology f/u   - cont CBI as per urology   - avoid IV cont     3. HTN :   - BP acceptable   -cont with current meds   - keep BP < 130/80   -low salt diet    4.METABOLIC ACIDOSIS:Mild, secondary to ckd  -f/u co2 daily  -continue nahco3 tab   5.ANEMIA: Mild  -f/u Hb daily  6.B/L RENAL CYSTS: most likley benign  -suggest repeat renal us in 3 months to check cyst stablity

## 2019-07-31 NOTE — PROGRESS NOTE ADULT - SUBJECTIVE AND OBJECTIVE BOX
PGY 1 Note discussed with supervising resident and primary attending    Patient is a 68y old  Male who presents with a chief complaint of Hematuria (2019 10:12)      INTERVAL HPI/OVERNIGHT EVENTS: offers no new complaints; current symptoms resolving    MEDICATIONS  (STANDING):  ALBUTerol/ipratropium for Nebulization 3 milliLiter(s) Nebulizer every 6 hours  ARIPiprazole 12.5 milliGRAM(s) Oral daily  cefTRIAXone   IVPB 1000 milliGRAM(s) IV Intermittent every 24 hours  ferrous    sulfate 325 milliGRAM(s) Oral daily  finasteride 5 milliGRAM(s) Oral daily  gabapentin 300 milliGRAM(s) Oral three times a day  HYDROmorphone   Tablet 4 milliGRAM(s) Oral every 8 hours  lamoTRIgine 200 milliGRAM(s) Oral daily  pantoprazole    Tablet 40 milliGRAM(s) Oral before breakfast  sodium bicarbonate 650 milliGRAM(s) Oral three times a day  tamsulosin 0.4 milliGRAM(s) Oral at bedtime  traZODone 50 milliGRAM(s) Oral at bedtime    MEDICATIONS  (PRN):      __________________________________________________  REVIEW OF SYSTEMS:    CONSTITUTIONAL: No fever,   EYES: no acute visual disturbances  NECK: No pain or stiffness  RESPIRATORY: No cough; No shortness of breath  CARDIOVASCULAR: No chest pain, no palpitations  GASTROINTESTINAL: No pain. No nausea or vomiting; No diarrhea   NEUROLOGICAL: No headache or numbness, no tremors  MUSCULOSKELETAL: No joint pain, no muscle pain  GENITOURINARY: no dysuria, no frequency, no hesitancy  PSYCHIATRY: no depression , no anxiety  ALL OTHER  ROS negative        Vital Signs Last 24 Hrs  T(C): 36.4 (2019 05:03), Max: 36.9 (2019 21:46)  T(F): 97.6 (2019 05:03), Max: 98.5 (2019 21:46)  HR: 69 (2019 05:03) (69 - 91)  BP: 115/54 (2019 05:03) (110/651 - 127/76)  BP(mean): --  RR: 18 (2019 05:03) (18 - 18)  SpO2: 98% (2019 05:03) (94% - 98%)    ________________________________________________  PHYSICAL EXAM:  GENERAL: NAD  HEENT: Normocephalic;  conjunctivae and sclerae clear; moist mucous membranes;   NECK : supple  CHEST/LUNG: Clear to auscultation bilaterally with good air entry   HEART: S1 S2  regular; no murmurs, gallops or rubs  ABDOMEN: Soft, Nontender, Nondistended; Bowel sounds present  EXTREMITIES: no cyanosis; no edema; no calf tenderness  SKIN: warm and dry; no rash  NERVOUS SYSTEM:  Awake and alert; Oriented  to place, person and time ; no new deficits    _________________________________________________  LABS:                        11.0   7.45  )-----------( 126      ( 2019 09:34 )             35.0     07-31    141  |  115<H>  |  37<H>  ----------------------------<  145<H>  4.4   |  21<L>  |  2.38<H>    Ca    8.6      2019 09:34  Phos  3.7     07-30  Mg     2.0     07-30    TPro  8.3  /  Alb  3.4<L>  /  TBili  0.7  /  DBili  x   /  AST  24  /  ALT  45  /  AlkPhos  188<H>  07-30      Urinalysis Basic - ( 2019 18:49 )    Color: Red / Appearance: bloody / S.010 / pH: x  Gluc: x / Ketone: Moderate  / Bili: Negative / Urobili: 1   Blood: x / Protein: 500 mg/dL / Nitrite: Positive   Leuk Esterase: Small / RBC: >50 /HPF / WBC 11-25 /HPF   Sq Epi: x / Non Sq Epi: x / Bacteria: Moderate /HPF      CAPILLARY BLOOD GLUCOSE            RADIOLOGY & ADDITIONAL TESTS:    Imaging Personally Reviewed:  YES/NO    Consultant(s) Notes Reviewed:   YES/ No    Care Discussed with Consultants :     Plan of care was discussed with patient and /or primary care giver; all questions and concerns were addressed and care was aligned with patient's wishes. PGY 1 Note discussed with supervising resident and primary attending    Patient is a 68y old  Male who presents with a chief complaint of Hematuria (2019 10:12)      INTERVAL HPI/OVERNIGHT EVENTS: has gross hematuria last night which resolved- uro PA saw him and clammed phillips- pt started having gross hematuria again at 11am - CBI restarted. hgb 11    MEDICATIONS  (STANDING):  ALBUTerol/ipratropium for Nebulization 3 milliLiter(s) Nebulizer every 6 hours  ARIPiprazole 12.5 milliGRAM(s) Oral daily  cefTRIAXone   IVPB 1000 milliGRAM(s) IV Intermittent every 24 hours  ferrous    sulfate 325 milliGRAM(s) Oral daily  finasteride 5 milliGRAM(s) Oral daily  gabapentin 300 milliGRAM(s) Oral three times a day  HYDROmorphone   Tablet 4 milliGRAM(s) Oral every 8 hours  lamoTRIgine 200 milliGRAM(s) Oral daily  pantoprazole    Tablet 40 milliGRAM(s) Oral before breakfast  sodium bicarbonate 650 milliGRAM(s) Oral three times a day  tamsulosin 0.4 milliGRAM(s) Oral at bedtime  traZODone 50 milliGRAM(s) Oral at bedtime    MEDICATIONS  (PRN):      __________________________________________________  REVIEW OF SYSTEMS:    CONSTITUTIONAL: No fever,   EYES: no acute visual disturbances  NECK: No pain or stiffness  RESPIRATORY: No cough; No shortness of breath  CARDIOVASCULAR: No chest pain, no palpitations  GASTROINTESTINAL: No pain. No nausea or vomiting; No diarrhea   NEUROLOGICAL: No headache or numbness, no tremors  MUSCULOSKELETAL: No joint pain, no muscle pain  GENITOURINARY: no dysuria, no frequency, no hesitancy  PSYCHIATRY: no depression , no anxiety  ALL OTHER  ROS negative        Vital Signs Last 24 Hrs  T(C): 36.4 (2019 05:03), Max: 36.9 (2019 21:46)  T(F): 97.6 (2019 05:03), Max: 98.5 (2019 21:46)  HR: 69 (2019 05:03) (69 - 91)  BP: 115/54 (2019 05:03) (110/651 - 127/76)  BP(mean): --  RR: 18 (2019 05:03) (18 - 18)  SpO2: 98% (2019 05:03) (94% - 98%)    ________________________________________________  PHYSICAL EXAM:  GENERAL: NAD  HEENT: Normocephalic;  conjunctivae and sclerae clear; moist mucous membranes;   NECK : supple  CHEST/LUNG: Clear to auscultation bilaterally with good air entry   HEART: S1 S2  regular; no murmurs, gallops or rubs  ABDOMEN: Soft, Nontender, Nondistended; Bowel sounds present- colostomy bag in place  EXTREMITIES: no cyanosis; no edema; no calf tenderness  : phillips in place. gross hematuria seen in the tube and bag  SKIN: warm and dry; no rash  NERVOUS SYSTEM:  Awake and alert; Oriented  to place, person and time ; no new deficits    _________________________________________________  LABS:                        11.0   7.45  )-----------( 126      ( 2019 09:34 )             35.0     07-31    141  |  115<H>  |  37<H>  ----------------------------<  145<H>  4.4   |  21<L>  |  2.38<H>    Ca    8.6      2019 09:34  Phos  3.7     07-30  Mg     2.0     07-30    TPro  8.3  /  Alb  3.4<L>  /  TBili  0.7  /  DBili  x   /  AST  24  /  ALT  45  /  AlkPhos  188<H>  07-30      Urinalysis Basic - ( 2019 18:49 )    Color: Red / Appearance: bloody / S.010 / pH: x  Gluc: x / Ketone: Moderate  / Bili: Negative / Urobili: 1   Blood: x / Protein: 500 mg/dL / Nitrite: Positive   Leuk Esterase: Small / RBC: >50 /HPF / WBC 11-25 /HPF   Sq Epi: x / Non Sq Epi: x / Bacteria: Moderate /HPF      CAPILLARY BLOOD GLUCOSE            RADIOLOGY & ADDITIONAL TESTS:   < from: US Renal (19 @ 12:19) >  Impression: Bilateral renal cysts.    No hydronephrosis.    < end of copied text >      Imaging Personally Reviewed:  YES  Consultant(s) Notes Reviewed:   YES  Care Discussed with Consultants :     Plan of care was discussed with patient and /or primary care giver; all questions and concerns were addressed and care was aligned with patient's wishes.

## 2019-08-01 DIAGNOSIS — D64.9 ANEMIA, UNSPECIFIED: ICD-10-CM

## 2019-08-01 LAB
-  AMIKACIN: SIGNIFICANT CHANGE UP
-  AMPICILLIN/SULBACTAM: SIGNIFICANT CHANGE UP
-  AMPICILLIN: SIGNIFICANT CHANGE UP
-  AZTREONAM: SIGNIFICANT CHANGE UP
-  CEFAZOLIN: SIGNIFICANT CHANGE UP
-  CEFEPIME: SIGNIFICANT CHANGE UP
-  CEFOXITIN: SIGNIFICANT CHANGE UP
-  CEFTRIAXONE: SIGNIFICANT CHANGE UP
-  CIPROFLOXACIN: SIGNIFICANT CHANGE UP
-  ERTAPENEM: SIGNIFICANT CHANGE UP
-  GENTAMICIN: SIGNIFICANT CHANGE UP
-  IMIPENEM: SIGNIFICANT CHANGE UP
-  LEVOFLOXACIN: SIGNIFICANT CHANGE UP
-  MEROPENEM: SIGNIFICANT CHANGE UP
-  NITROFURANTOIN: SIGNIFICANT CHANGE UP
-  PIPERACILLIN/TAZOBACTAM: SIGNIFICANT CHANGE UP
-  TIGECYCLINE: SIGNIFICANT CHANGE UP
-  TOBRAMYCIN: SIGNIFICANT CHANGE UP
-  TRIMETHOPRIM/SULFAMETHOXAZOLE: SIGNIFICANT CHANGE UP
ANION GAP SERPL CALC-SCNC: 8 MMOL/L — SIGNIFICANT CHANGE UP (ref 5–17)
APTT BLD: 24.7 SEC — LOW (ref 27.5–36.3)
BLD GP AB SCN SERPL QL: SIGNIFICANT CHANGE UP
BUN SERPL-MCNC: 37 MG/DL — HIGH (ref 7–18)
CALCIUM SERPL-MCNC: 8.6 MG/DL — SIGNIFICANT CHANGE UP (ref 8.4–10.5)
CHLORIDE SERPL-SCNC: 112 MMOL/L — HIGH (ref 96–108)
CO2 SERPL-SCNC: 20 MMOL/L — LOW (ref 22–31)
CREAT SERPL-MCNC: 2.3 MG/DL — HIGH (ref 0.5–1.3)
CULTURE RESULTS: SIGNIFICANT CHANGE UP
GLUCOSE SERPL-MCNC: 103 MG/DL — HIGH (ref 70–99)
HCT VFR BLD CALC: 34.5 % — LOW (ref 39–50)
HGB BLD-MCNC: 11.1 G/DL — LOW (ref 13–17)
INR BLD: 1.08 RATIO — SIGNIFICANT CHANGE UP (ref 0.88–1.16)
MAGNESIUM SERPL-MCNC: 2 MG/DL — SIGNIFICANT CHANGE UP (ref 1.6–2.6)
MCHC RBC-ENTMCNC: 27.2 PG — SIGNIFICANT CHANGE UP (ref 27–34)
MCHC RBC-ENTMCNC: 32.2 GM/DL — SIGNIFICANT CHANGE UP (ref 32–36)
MCV RBC AUTO: 84.6 FL — SIGNIFICANT CHANGE UP (ref 80–100)
METHOD TYPE: SIGNIFICANT CHANGE UP
NRBC # BLD: 0 /100 WBCS — SIGNIFICANT CHANGE UP (ref 0–0)
ORGANISM # SPEC MICROSCOPIC CNT: SIGNIFICANT CHANGE UP
ORGANISM # SPEC MICROSCOPIC CNT: SIGNIFICANT CHANGE UP
PHOSPHATE SERPL-MCNC: 3.3 MG/DL — SIGNIFICANT CHANGE UP (ref 2.5–4.5)
PLATELET # BLD AUTO: 162 K/UL — SIGNIFICANT CHANGE UP (ref 150–400)
POTASSIUM SERPL-MCNC: 4.6 MMOL/L — SIGNIFICANT CHANGE UP (ref 3.5–5.3)
POTASSIUM SERPL-SCNC: 4.6 MMOL/L — SIGNIFICANT CHANGE UP (ref 3.5–5.3)
PROTHROM AB SERPL-ACNC: 12 SEC — SIGNIFICANT CHANGE UP (ref 10–12.9)
RBC # BLD: 4.08 M/UL — LOW (ref 4.2–5.8)
RBC # FLD: 16.2 % — HIGH (ref 10.3–14.5)
SODIUM SERPL-SCNC: 140 MMOL/L — SIGNIFICANT CHANGE UP (ref 135–145)
SPECIMEN SOURCE: SIGNIFICANT CHANGE UP
WBC # BLD: 6.82 K/UL — SIGNIFICANT CHANGE UP (ref 3.8–10.5)
WBC # FLD AUTO: 6.82 K/UL — SIGNIFICANT CHANGE UP (ref 3.8–10.5)

## 2019-08-01 PROCEDURE — 99232 SBSQ HOSP IP/OBS MODERATE 35: CPT

## 2019-08-01 PROCEDURE — 76857 US EXAM PELVIC LIMITED: CPT | Mod: 26

## 2019-08-01 RX ORDER — LOSARTAN POTASSIUM 100 MG/1
25 TABLET, FILM COATED ORAL DAILY
Refills: 0 | Status: DISCONTINUED | OUTPATIENT
Start: 2019-08-01 | End: 2019-08-01

## 2019-08-01 RX ADMIN — HYDROMORPHONE HYDROCHLORIDE 4 MILLIGRAM(S): 2 INJECTION INTRAMUSCULAR; INTRAVENOUS; SUBCUTANEOUS at 06:37

## 2019-08-01 RX ADMIN — CEFTRIAXONE 100 MILLIGRAM(S): 500 INJECTION, POWDER, FOR SOLUTION INTRAMUSCULAR; INTRAVENOUS at 22:13

## 2019-08-01 RX ADMIN — GABAPENTIN 300 MILLIGRAM(S): 400 CAPSULE ORAL at 06:36

## 2019-08-01 RX ADMIN — LOSARTAN POTASSIUM 25 MILLIGRAM(S): 100 TABLET, FILM COATED ORAL at 17:50

## 2019-08-01 RX ADMIN — TAMSULOSIN HYDROCHLORIDE 0.4 MILLIGRAM(S): 0.4 CAPSULE ORAL at 22:13

## 2019-08-01 RX ADMIN — FINASTERIDE 5 MILLIGRAM(S): 5 TABLET, FILM COATED ORAL at 14:08

## 2019-08-01 RX ADMIN — Medication 50 MILLIGRAM(S): at 22:13

## 2019-08-01 RX ADMIN — LAMOTRIGINE 200 MILLIGRAM(S): 25 TABLET, ORALLY DISINTEGRATING ORAL at 14:07

## 2019-08-01 RX ADMIN — HYDROMORPHONE HYDROCHLORIDE 4 MILLIGRAM(S): 2 INJECTION INTRAMUSCULAR; INTRAVENOUS; SUBCUTANEOUS at 14:09

## 2019-08-01 RX ADMIN — GABAPENTIN 300 MILLIGRAM(S): 400 CAPSULE ORAL at 22:13

## 2019-08-01 RX ADMIN — HYDROMORPHONE HYDROCHLORIDE 4 MILLIGRAM(S): 2 INJECTION INTRAMUSCULAR; INTRAVENOUS; SUBCUTANEOUS at 23:15

## 2019-08-01 RX ADMIN — Medication 650 MILLIGRAM(S): at 15:51

## 2019-08-01 RX ADMIN — Medication 650 MILLIGRAM(S): at 22:13

## 2019-08-01 RX ADMIN — GABAPENTIN 300 MILLIGRAM(S): 400 CAPSULE ORAL at 14:09

## 2019-08-01 RX ADMIN — Medication 325 MILLIGRAM(S): at 14:08

## 2019-08-01 RX ADMIN — ARIPIPRAZOLE 12.5 MILLIGRAM(S): 15 TABLET ORAL at 14:09

## 2019-08-01 RX ADMIN — Medication 650 MILLIGRAM(S): at 06:36

## 2019-08-01 RX ADMIN — PANTOPRAZOLE SODIUM 40 MILLIGRAM(S): 20 TABLET, DELAYED RELEASE ORAL at 06:36

## 2019-08-01 RX ADMIN — HYDROMORPHONE HYDROCHLORIDE 4 MILLIGRAM(S): 2 INJECTION INTRAMUSCULAR; INTRAVENOUS; SUBCUTANEOUS at 22:18

## 2019-08-01 RX ADMIN — HYDROMORPHONE HYDROCHLORIDE 4 MILLIGRAM(S): 2 INJECTION INTRAMUSCULAR; INTRAVENOUS; SUBCUTANEOUS at 15:09

## 2019-08-01 NOTE — PROGRESS NOTE ADULT - PROBLEM SELECTOR PLAN 2
UA positive- culture positive for Ecoli PAN sensitive  pt. symptomatic, afebrile, no leukocytosis- on rocephin day 4 08/01) dc after 3 days

## 2019-08-01 NOTE — PROGRESS NOTE ADULT - ASSESSMENT
A/P     1. CKD(stage 3) : likely 2/2 HTN/Renovascular ds  . baseline around 2.2-2.4 , was slightly high on Admission   - Cr stable around baseline    - f/u US  Bladder   - Pr/Cr ratio 1.2 g/day,not nephrotic range proteinuria , c/w htn induced ckd  - repeat UA and protein/cr ratio once off CBI   - -Keep patient euvolemic   -Avoid Nephrotoxic Meds/ Agents such as (NSAIDs, IV contrast, Aminoglycosides such as gentamicin, -Gadolinium contrast, Phosphate containing enemas, etc..)  -Adjust Medications according to eGFR.   - Monitor BMP daily   - recc adding low dose ACE/ARB once acute events resolve and BP allows for proteinuria     2. Hematuria: with clots  - h/o Prostate ca s/p radiation   - urology f/u   - cont CBI as per urology   - avoid IV cont     3. HTN :   - BP acceptable   -cont with current meds   - keep BP < 130/80   -low salt diet    4.METABOLIC ACIDOSIS:Mild, secondary to ckd  -f/u co2 daily  -continue nahco3 tab     5.ANEMIA: Mild  -f/u Hb daily    6.B/L RENAL CYSTS: most likely benign  -suggest repeat renal us in 3 months to check cyst stability A/P     1. CKD(stage 3) : likely 2/2 HTN/Renovascular ds  . baseline around 2.2-2.4 , was slightly high on Admission   - Cr stable around baseline    - f/u US  Bladder   - Pr/Cr ratio 1.2 g/day,not nephrotic range proteinuria , c/w htn induced ckd  - repeat UA and protein/cr ratio once off CBI   - -Keep patient euvolemic   -Avoid Nephrotoxic Meds/ Agents such as (NSAIDs, IV contrast, Aminoglycosides such as gentamicin, -Gadolinium contrast, Phosphate containing enemas, etc..)  -Adjust Medications according to eGFR.   - Monitor BMP daily   - we will add low dsoe Losartan to treat proteinuria and f/u K and Cr closely next 24 to 48 hrs  -d/c Losartan if cr rises >0.3 mg from baseline or K >5.3    2. Hematuria: with clots  - h/o Prostate ca s/p radiation   - urology f/u   - cont CBI as per urology   - avoid IV cont     3. HTN :   - BP acceptable   -cont with current meds   - keep BP < 130/80   -low salt diet    4.METABOLIC ACIDOSIS:Mild, secondary to ckd  -f/u co2 daily  -continue nahco3 tab     5.ANEMIA: Mild  -f/u Hb daily    6.B/L RENAL CYSTS: most likely benign  -suggest repeat renal us in 3 months to check cyst stability

## 2019-08-01 NOTE — PROGRESS NOTE ADULT - SUBJECTIVE AND OBJECTIVE BOX
Patient seen and examined at bedside with no complaints.     Vital Signs Last 24 Hrs  T(F): 97.6 (08-01-19 @ 05:10), Max: 98 (07-31-19 @ 14:32)  HR: 75 (08-01-19 @ 05:10)  BP: 116/69 (08-01-19 @ 05:10)  RR: 18 (08-01-19 @ 05:10)  SpO2: 94% (08-01-19 @ 05:10)    GENERAL: Alert, NAD  CHEST/LUNG: respirations nonlabored  : CBI running at slow rate via three way catheter, urine output clear yellow     I&O's Detail    LABS:                        11.1   6.82  )-----------( 162      ( 01 Aug 2019 06:53 )             34.5     08-01    140  |  112<H>  |  37<H>  ----------------------------<  103<H>  4.6   |  20<L>  |  2.30<H>    Ca    8.6      01 Aug 2019 06:53  Phos  3.3     08-01  Mg     2.0     08-01    PT/INR - ( 01 Aug 2019 06:53 )   PT: 12.0 sec;   INR: 1.08 ratio       PTT - ( 01 Aug 2019 06:53 )  PTT:24.7 sec    Culture - Urine (07.30.19 @ 00:29)    -  Amikacin: S <=16    -  Ampicillin: S <=8 These ampicillin results predict results for amoxicillin    -  Ampicillin/Sulbactam: S <=8/4 Enterobacter, Citrobacter, and Serratia may develop resistance during prolonged therapy (3-4 days)    -  Aztreonam: S <=4    -  Cefazolin: S <=8 (MIC_CL_[id number]) For uncomplicated UTI with K. pneumoniae, E. coli, or P. mirablis: CONCHITA <=16 is sensitive and CONCHITA >=32 is resistant. This also predicts results for oral agents cefaclor, cefdinir, cefpodoxime, cefprozil, cefuroxime axetil, cephalexin and locarbef for uncomplicated UTI. Note that some isolates may be susceptible to these agents while testing resistant to cefazolin.    -  Cefepime: S <=4    -  Cefoxitin: S <=8    -  Ceftriaxone: S <=1 Enterobacter, Citrobacter, and Serratia may develop resistance during prolonged therapy    -  Ciprofloxacin: S <=1    -  Ertapenem: S <=1    -  Gentamicin: S <=4    -  Imipenem: S <=1    -  Levofloxacin: S <=2    -  Meropenem: S <=1    -  Nitrofurantoin: S <=32 Should not be used to treat pyelonephritis    -  Piperacillin/Tazobactam: S <=16    -  Tigecycline: S <=2    -  Tobramycin: S <=4    -  Trimethoprim/Sulfamethoxazole: S <=2/38    Specimen Source: .Urine    Culture Results:   >100,000 CFU/ml Escherichia coli    Organism Identification: Escherichia coli    Organism: Escherichia coli    Method Type: CONCHITA

## 2019-08-01 NOTE — PROGRESS NOTE ADULT - ASSESSMENT
A/P 67 yo M s/p radiation for prostate cancer in 2015 now with gross hematuria, Ucxr: +GNR    - Continue CBI and titrate to light pink  - continue IV antibiotics  - possible cystoscopy planning when completes treatment of UTI  - await final urine cultures   - continue medical management

## 2019-08-01 NOTE — PROGRESS NOTE ADULT - SUBJECTIVE AND OBJECTIVE BOX
PGY 1 Note discussed with supervising resident and primary attending    Patient is a 68y old  Male who presents with a chief complaint of Hematuria (01 Aug 2019 11:04)      INTERVAL HPI/OVERNIGHT EVENTS: offers no new complaints; current symptoms resolving    MEDICATIONS  (STANDING):  ALBUTerol/ipratropium for Nebulization 3 milliLiter(s) Nebulizer every 6 hours  ARIPiprazole 12.5 milliGRAM(s) Oral daily  cefTRIAXone   IVPB 1000 milliGRAM(s) IV Intermittent every 24 hours  ferrous    sulfate 325 milliGRAM(s) Oral daily  finasteride 5 milliGRAM(s) Oral daily  gabapentin 300 milliGRAM(s) Oral three times a day  HYDROmorphone   Tablet 4 milliGRAM(s) Oral every 8 hours  lamoTRIgine 200 milliGRAM(s) Oral daily  pantoprazole    Tablet 40 milliGRAM(s) Oral before breakfast  sodium bicarbonate 650 milliGRAM(s) Oral three times a day  tamsulosin 0.4 milliGRAM(s) Oral at bedtime  traZODone 50 milliGRAM(s) Oral at bedtime    MEDICATIONS  (PRN):      __________________________________________________  REVIEW OF SYSTEMS:    CONSTITUTIONAL: No fever,   EYES: no acute visual disturbances  NECK: No pain or stiffness  RESPIRATORY: No cough; No shortness of breath  CARDIOVASCULAR: No chest pain, no palpitations  GASTROINTESTINAL: No pain. No nausea or vomiting; No diarrhea   NEUROLOGICAL: No headache or numbness, no tremors  MUSCULOSKELETAL: No joint pain, no muscle pain  GENITOURINARY: no dysuria, no frequency, no hesitancy  PSYCHIATRY: no depression , no anxiety  ALL OTHER  ROS negative        Vital Signs Last 24 Hrs  T(C): 36.4 (01 Aug 2019 05:10), Max: 36.7 (31 Jul 2019 14:32)  T(F): 97.6 (01 Aug 2019 05:10), Max: 98 (31 Jul 2019 14:32)  HR: 75 (01 Aug 2019 05:10) (70 - 81)  BP: 116/69 (01 Aug 2019 05:10) (112/70 - 116/69)  BP(mean): --  RR: 18 (01 Aug 2019 05:10) (18 - 18)  SpO2: 94% (01 Aug 2019 05:10) (94% - 97%)    ________________________________________________  PHYSICAL EXAM:  GENERAL: NAD  HEENT: Normocephalic;  conjunctivae and sclerae clear; moist mucous membranes;   NECK : supple  CHEST/LUNG: Clear to auscultation bilaterally with good air entry   HEART: S1 S2  regular; no murmurs, gallops or rubs  ABDOMEN: Soft, Nontender, Nondistended; Bowel sounds present  EXTREMITIES: no cyanosis; no edema; no calf tenderness  SKIN: warm and dry; no rash  NERVOUS SYSTEM:  Awake and alert; Oriented  to place, person and time ; no new deficits    _________________________________________________  LABS:                        11.1   6.82  )-----------( 162      ( 01 Aug 2019 06:53 )             34.5     08-01    140  |  112<H>  |  37<H>  ----------------------------<  103<H>  4.6   |  20<L>  |  2.30<H>    Ca    8.6      01 Aug 2019 06:53  Phos  3.3     08-01  Mg     2.0     08-01      PT/INR - ( 01 Aug 2019 06:53 )   PT: 12.0 sec;   INR: 1.08 ratio         PTT - ( 01 Aug 2019 06:53 )  PTT:24.7 sec    CAPILLARY BLOOD GLUCOSE            RADIOLOGY & ADDITIONAL TESTS:    Imaging Personally Reviewed:  YES/NO    Consultant(s) Notes Reviewed:   YES/ No    Care Discussed with Consultants :     Plan of care was discussed with patient and /or primary care giver; all questions and concerns were addressed and care was aligned with patient's wishes. PGY 1 Note discussed with supervising resident and primary attending    Patient is a 68y old  Male who presents with a chief complaint of Hematuria (01 Aug 2019 11:04)      INTERVAL HPI/OVERNIGHT EVENTS: Pt had gross hematuria overnight CBI was unclamped and irrigated.     MEDICATIONS  (STANDING):  ALBUTerol/ipratropium for Nebulization 3 milliLiter(s) Nebulizer every 6 hours  ARIPiprazole 12.5 milliGRAM(s) Oral daily  cefTRIAXone   IVPB 1000 milliGRAM(s) IV Intermittent every 24 hours  ferrous    sulfate 325 milliGRAM(s) Oral daily  finasteride 5 milliGRAM(s) Oral daily  gabapentin 300 milliGRAM(s) Oral three times a day  HYDROmorphone   Tablet 4 milliGRAM(s) Oral every 8 hours  lamoTRIgine 200 milliGRAM(s) Oral daily  pantoprazole    Tablet 40 milliGRAM(s) Oral before breakfast  sodium bicarbonate 650 milliGRAM(s) Oral three times a day  tamsulosin 0.4 milliGRAM(s) Oral at bedtime  traZODone 50 milliGRAM(s) Oral at bedtime    MEDICATIONS  (PRN):      __________________________________________________  REVIEW OF SYSTEMS:    CONSTITUTIONAL: No fever,   EYES: no acute visual disturbances  NECK: No pain or stiffness  RESPIRATORY: No cough; No shortness of breath  CARDIOVASCULAR: No chest pain, no palpitations  GASTROINTESTINAL: No pain. No nausea or vomiting; No diarrhea   NEUROLOGICAL: No headache or numbness, no tremors  MUSCULOSKELETAL: No joint pain, no muscle pain  GENITOURINARY: blood tinged urine  PSYCHIATRY: no depression , no anxiety  ALL OTHER  ROS negative        Vital Signs Last 24 Hrs  T(C): 36.4 (01 Aug 2019 05:10), Max: 36.7 (31 Jul 2019 14:32)  T(F): 97.6 (01 Aug 2019 05:10), Max: 98 (31 Jul 2019 14:32)  HR: 75 (01 Aug 2019 05:10) (70 - 81)  BP: 116/69 (01 Aug 2019 05:10) (112/70 - 116/69)  BP(mean): --  RR: 18 (01 Aug 2019 05:10) (18 - 18)  SpO2: 94% (01 Aug 2019 05:10) (94% - 97%)    ________________________________________________  PHYSICAL EXAM:  GENERAL: NAD  HEENT: Normocephalic;  conjunctivae and sclerae clear; moist mucous membranes;   NECK : supple  CHEST/LUNG: Clear to auscultation bilaterally with good air entry   HEART: S1 S2  regular; no murmurs, gallops or rubs  ABDOMEN: Soft, Nontender, Nondistended; Bowel sounds present, colostomy bag in place  : phillips in place, blood tinged urine in bag.   EXTREMITIES: no cyanosis; no edema; no calf tenderness  SKIN: warm and dry; no rash  NERVOUS SYSTEM:  Awake and alert; Oriented  to place, person and time ; no new deficits    _________________________________________________  LABS:                        11.1   6.82  )-----------( 162      ( 01 Aug 2019 06:53 )             34.5     08-01    140  |  112<H>  |  37<H>  ----------------------------<  103<H>  4.6   |  20<L>  |  2.30<H>    Ca    8.6      01 Aug 2019 06:53  Phos  3.3     08-01  Mg     2.0     08-01      PT/INR - ( 01 Aug 2019 06:53 )   PT: 12.0 sec;   INR: 1.08 ratio         PTT - ( 01 Aug 2019 06:53 )  PTT:24.7 sec    Creatinine, Random Urine: 66 mg/dL (07-30 @ 13:07)  Potassium, Random Urine: 27 mmol/L (07-30 @ 13:07)  Sodium, Random Urine: 70 mmol/L (07-30 @ 13:07)  Chloride, Random Urine: 86 mmol/L (07-30 @ 13:07)  Osmolality, Random Urine: 413 mosm/Kg (07-30 @ 13:07)        RADIOLOGY & ADDITIONAL TESTS:  < from: US Urinary Bladder (08.01.19 @ 13:40) >  Note is made of an indwelling Phillips catheter; the bladder is not   well-distended. Bladder wall thickening cannot be excluded. No gross   intraluminal masses are identified. Further assessment for described   hematuria can be performed with dedicated CT urography.    < end of copied text >  < from: US Renal (07.30.19 @ 12:19) >    Impression: Bilateral renal cysts.    No hydronephrosis.    < end of copied text >      Imaging Personally Reviewed:  YES  Consultant(s) Notes Reviewed:   YES    Care Discussed with Consultants : Nephrology, Urology    Plan of care was discussed with patient and /or primary care giver; all questions and concerns were addressed and care was aligned with patient's wishes.

## 2019-08-01 NOTE — PROGRESS NOTE ADULT - PROBLEM SELECTOR PLAN 3
p/w Cr 2.66 (2.14-2.6 on last admission), Cr still elevated  likely his baseline   pt started on low dose Losartan 25mg 08/01- will monitor his BMP  f/u Dr. Curran

## 2019-08-01 NOTE — PROGRESS NOTE ADULT - ASSESSMENT
68M from [facility], with PMHx of prostate Ca (s/p radiation in 2015, now in remission), asthma, COPD (not on O2), HTN, HLD, Bipolar, anemia, GERD, Ileostomy s/p sigmoidal resection, PAD, and CKD presented to ED c/o hematuria X 1 week. Pt got a three way phillips and was started with CBI. Pt continues to have gross hematuria and will be seen by urology for possible cystoscopy as outpt when he completes abx course.

## 2019-08-01 NOTE — PROGRESS NOTE ADULT - PROBLEM SELECTOR PLAN 1
occasionally painful hematuria with blood clots X 1 week  Hb coming down from 12.8-> stablized at hg 11  ?malignancy vs infection  Bladder US did not show anything significant  3 way phillips with CBI on slow flow- pt is having blood tinged urine- Urology recommend clamping of CBI and do a trial of void tomorrow if he's not having gross hematuria.   renal US showed cysts- Urology will do a cystoscopy when the abx course is completed  normal PSA levels

## 2019-08-01 NOTE — PROGRESS NOTE ADULT - SUBJECTIVE AND OBJECTIVE BOX
SUBJECTIVE:  pt feels fine and denies cp,sob,gi or gu/uremic  symptoms  Pt still on low rate CBI , Urine clear now       Current meds:    ALBUTerol/ipratropium for Nebulization 3 milliLiter(s) Nebulizer every 6 hours  ARIPiprazole 12.5 milliGRAM(s) Oral daily  cefTRIAXone   IVPB 1000 milliGRAM(s) IV Intermittent every 24 hours  ferrous    sulfate 325 milliGRAM(s) Oral daily  finasteride 5 milliGRAM(s) Oral daily  gabapentin 300 milliGRAM(s) Oral three times a day  HYDROmorphone   Tablet 4 milliGRAM(s) Oral every 8 hours  lamoTRIgine 200 milliGRAM(s) Oral daily  pantoprazole    Tablet 40 milliGRAM(s) Oral before breakfast  sodium bicarbonate 650 milliGRAM(s) Oral three times a day  tamsulosin 0.4 milliGRAM(s) Oral at bedtime  traZODone 50 milliGRAM(s) Oral at bedtime      Vital Signs    T(F): 97.6 (19 @ 05:10), Max: 98 (19 @ 14:32)  HR: 75 (19 @ 05:10) (70 - 81)  BP: 116/69 (19 @ 05:10) (112/70 - 116/69)  ABP: --  RR: 18 (19 @ 05:10) (18 - 18)  SpO2: 94% (19 @ 05:10) (94% - 97%)  Wt(kg): --  CVP(cm H2O): --  CO: --  PCWP: --    I and O's:     @ 07:01  -   @ 07:00  --------------------------------------------------------  IN:    Continuous Bladder Irrigation: 800 mL  Total IN: 800 mL    OUT:    Continuous Bladder Irrigation: 3000 mL  Total OUT: 3000 mL    Total NET: -2200 mL        Daily     Daily Weight in k.2 (01 Aug 2019 05:10)    PHYSICAL EXAM:  Constitutional: well developed, well nourished  and in nad  HEENT: PERRLA,  no icteric sclera and mild pallor of conjunctiva noted  Neck: No JVD, thyromegaly or adenopathy  Respiratory: reduced air entry lower lungs with no rales, wheezing or rhonchi  Cardiovascular: S1 and S2 normally heard  Gastrointestinal: soft, nondistended, nontender and normal bowel sounds heard  Extremities: No peripheral edema or cyanosis  Neurological: A/O x 3, no focal deficits  : No flank or cva tenderness palpated.  Skin: No rashes      LABS:    CBC:                          11.1   6.82  )-----------( 162      ( 01 Aug 2019 06:53 )             34.5           BMP:        140  |  112<H>  |  37<H>  ----------------------------<  103<H>  4.6   |  20<L>  |  2.30<H>      141  |  115<H>  |  37<H>  ----------------------------<  145<H>  4.4   |  21<L>  |  2.38<H>      142  |  115<H>  |  36<H>  ----------------------------<  100<H>  4.7   |  20<L>  |  2.29<H>      140  |  115<H>  |  40<H>  ----------------------------<  114<H>  4.6   |  15<L>  |  2.66<H>    Ca    8.6      01 Aug 2019 06:53  Ca    8.6      2019 09:34  Ca    8.6      2019 07:42  Ca    8.6      2019 17:36  Phos  3.3       Phos  3.7       Mg     2.0       Mg     2.0         TPro  8.3  /  Alb  3.4<L>  /  TBili  0.7  /  DBili  x   /  AST  24  /  ALT  45  /  AlkPhos  188<H>    TPro  9.1<H>  /  Alb  3.6  /  TBili  0.4  /  DBili  x   /  AST  25  /  ALT  47  /  AlkPhos  209<H>        Thyroid Stimulating Hormone, Serum: 3.04 uU/mL ( @ 07:42)      URINE STUDIES:        Creatinine, Random Urine: 66 mg/dL ( @ 13:07)  Potassium, Random Urine: 27 mmol/L ( @ 13:07)  Sodium, Random Urine: 70 mmol/L ( @ 13:07)  Chloride, Random Urine: 86 mmol/L ( @ 13:07)  Osmolality, Random Urine: 413 mosm/Kg ( @ 13:07) pt seen and examined with renal resident  SUBJECTIVE:  pt feels fine and denies cp,sob,gi or gu/uremic  symptoms  Pt still on low rate CBI , Urine clear now       Current meds:    ALBUTerol/ipratropium for Nebulization 3 milliLiter(s) Nebulizer every 6 hours  ARIPiprazole 12.5 milliGRAM(s) Oral daily  cefTRIAXone   IVPB 1000 milliGRAM(s) IV Intermittent every 24 hours  ferrous    sulfate 325 milliGRAM(s) Oral daily  finasteride 5 milliGRAM(s) Oral daily  gabapentin 300 milliGRAM(s) Oral three times a day  HYDROmorphone   Tablet 4 milliGRAM(s) Oral every 8 hours  lamoTRIgine 200 milliGRAM(s) Oral daily  pantoprazole    Tablet 40 milliGRAM(s) Oral before breakfast  sodium bicarbonate 650 milliGRAM(s) Oral three times a day  tamsulosin 0.4 milliGRAM(s) Oral at bedtime  traZODone 50 milliGRAM(s) Oral at bedtime      Vital Signs    T(F): 97.6 (19 @ 05:10), Max: 98 (19 @ 14:32)  HR: 75 (19 @ 05:10) (70 - 81)  BP: 116/69 (19 @ 05:10) (112/70 - 116/69)  ABP: --  RR: 18 (19 @ 05:10) (18 - 18)  SpO2: 94% (19 @ 05:10) (94% - 97%)  Wt(kg): --  CVP(cm H2O): --  CO: --  PCWP: --    I and O's:     @ 07:01  -   @ 07:00  --------------------------------------------------------  IN:    Continuous Bladder Irrigation: 800 mL  Total IN: 800 mL    OUT:    Continuous Bladder Irrigation: 3000 mL  Total OUT: 3000 mL    Total NET: -2200 mL        Daily     Daily Weight in k.2 (01 Aug 2019 05:10)    PHYSICAL EXAM:  Constitutional: well developed, well nourished  and in nad  HEENT: PERRLA,  no icteric sclera and mild pallor of conjunctiva noted  Neck: No JVD, thyromegaly or adenopathy  Respiratory: reduced air entry lower lungs with no rales, wheezing or rhonchi  Cardiovascular: S1 and S2 normally heard  Gastrointestinal: soft, nondistended, nontender and normal bowel sounds heard  Extremities: No peripheral edema or cyanosis  Neurological: A/O x 3, no focal deficits  : No flank or cva tenderness palpated.  Skin: No rashes      LABS:    CBC:                          11.1   6.82  )-----------( 162      ( 01 Aug 2019 06:53 )             34.5           BMP:        140  |  112<H>  |  37<H>  ----------------------------<  103<H>  4.6   |  20<L>  |  2.30<H>      141  |  115<H>  |  37<H>  ----------------------------<  145<H>  4.4   |  21<L>  |  2.38<H>      142  |  115<H>  |  36<H>  ----------------------------<  100<H>  4.7   |  20<L>  |  2.29<H>      140  |  115<H>  |  40<H>  ----------------------------<  114<H>  4.6   |  15<L>  |  2.66<H>    Ca    8.6      01 Aug 2019 06:53  Ca    8.6      2019 09:34  Ca    8.6      2019 07:42  Ca    8.6      2019 17:36  Phos  3.3       Phos  3.7     -30  Mg     2.0       Mg     2.0     -30    TPro  8.3  /  Alb  3.4<L>  /  TBili  0.7  /  DBili  x   /  AST  24  /  ALT  45  /  AlkPhos  188<H>    TPro  9.1<H>  /  Alb  3.6  /  TBili  0.4  /  DBili  x   /  AST  25  /  ALT  47  /  AlkPhos  209<H>        Thyroid Stimulating Hormone, Serum: 3.04 uU/mL ( @ 07:42)      URINE STUDIES:        Creatinine, Random Urine: 66 mg/dL ( @ 13:07)  Potassium, Random Urine: 27 mmol/L ( @ 13:07)  Sodium, Random Urine: 70 mmol/L ( @ 13:07)  Chloride, Random Urine: 86 mmol/L ( @ 13:07)  Osmolality, Random Urine: 413 mosm/Kg ( @ 13:07)

## 2019-08-02 LAB
ANION GAP SERPL CALC-SCNC: 7 MMOL/L — SIGNIFICANT CHANGE UP (ref 5–17)
BUN SERPL-MCNC: 37 MG/DL — HIGH (ref 7–18)
CALCIUM SERPL-MCNC: 8.6 MG/DL — SIGNIFICANT CHANGE UP (ref 8.4–10.5)
CHLORIDE SERPL-SCNC: 113 MMOL/L — HIGH (ref 96–108)
CO2 SERPL-SCNC: 21 MMOL/L — LOW (ref 22–31)
CREAT SERPL-MCNC: 2.24 MG/DL — HIGH (ref 0.5–1.3)
GLUCOSE SERPL-MCNC: 94 MG/DL — SIGNIFICANT CHANGE UP (ref 70–99)
HCT VFR BLD CALC: 35.8 % — LOW (ref 39–50)
HGB BLD-MCNC: 11.1 G/DL — LOW (ref 13–17)
MAGNESIUM SERPL-MCNC: 2.1 MG/DL — SIGNIFICANT CHANGE UP (ref 1.6–2.6)
MCHC RBC-ENTMCNC: 26.9 PG — LOW (ref 27–34)
MCHC RBC-ENTMCNC: 31 GM/DL — LOW (ref 32–36)
MCV RBC AUTO: 86.9 FL — SIGNIFICANT CHANGE UP (ref 80–100)
NRBC # BLD: 0 /100 WBCS — SIGNIFICANT CHANGE UP (ref 0–0)
PHOSPHATE SERPL-MCNC: 3.5 MG/DL — SIGNIFICANT CHANGE UP (ref 2.5–4.5)
PLATELET # BLD AUTO: 162 K/UL — SIGNIFICANT CHANGE UP (ref 150–400)
POTASSIUM SERPL-MCNC: 5.3 MMOL/L — SIGNIFICANT CHANGE UP (ref 3.5–5.3)
POTASSIUM SERPL-SCNC: 5.3 MMOL/L — SIGNIFICANT CHANGE UP (ref 3.5–5.3)
RBC # BLD: 4.12 M/UL — LOW (ref 4.2–5.8)
RBC # FLD: 16.4 % — HIGH (ref 10.3–14.5)
SODIUM SERPL-SCNC: 141 MMOL/L — SIGNIFICANT CHANGE UP (ref 135–145)
WBC # BLD: 7.26 K/UL — SIGNIFICANT CHANGE UP (ref 3.8–10.5)
WBC # FLD AUTO: 7.26 K/UL — SIGNIFICANT CHANGE UP (ref 3.8–10.5)

## 2019-08-02 RX ADMIN — LAMOTRIGINE 200 MILLIGRAM(S): 25 TABLET, ORALLY DISINTEGRATING ORAL at 13:00

## 2019-08-02 RX ADMIN — GABAPENTIN 300 MILLIGRAM(S): 400 CAPSULE ORAL at 13:02

## 2019-08-02 RX ADMIN — Medication 325 MILLIGRAM(S): at 13:00

## 2019-08-02 RX ADMIN — FINASTERIDE 5 MILLIGRAM(S): 5 TABLET, FILM COATED ORAL at 13:02

## 2019-08-02 RX ADMIN — HYDROMORPHONE HYDROCHLORIDE 4 MILLIGRAM(S): 2 INJECTION INTRAMUSCULAR; INTRAVENOUS; SUBCUTANEOUS at 22:08

## 2019-08-02 RX ADMIN — CEFTRIAXONE 100 MILLIGRAM(S): 500 INJECTION, POWDER, FOR SOLUTION INTRAMUSCULAR; INTRAVENOUS at 21:31

## 2019-08-02 RX ADMIN — GABAPENTIN 300 MILLIGRAM(S): 400 CAPSULE ORAL at 21:31

## 2019-08-02 RX ADMIN — Medication 650 MILLIGRAM(S): at 06:22

## 2019-08-02 RX ADMIN — TAMSULOSIN HYDROCHLORIDE 0.4 MILLIGRAM(S): 0.4 CAPSULE ORAL at 21:31

## 2019-08-02 RX ADMIN — HYDROMORPHONE HYDROCHLORIDE 4 MILLIGRAM(S): 2 INJECTION INTRAMUSCULAR; INTRAVENOUS; SUBCUTANEOUS at 13:45

## 2019-08-02 RX ADMIN — Medication 650 MILLIGRAM(S): at 21:31

## 2019-08-02 RX ADMIN — HYDROMORPHONE HYDROCHLORIDE 4 MILLIGRAM(S): 2 INJECTION INTRAMUSCULAR; INTRAVENOUS; SUBCUTANEOUS at 07:00

## 2019-08-02 RX ADMIN — HYDROMORPHONE HYDROCHLORIDE 4 MILLIGRAM(S): 2 INJECTION INTRAMUSCULAR; INTRAVENOUS; SUBCUTANEOUS at 13:03

## 2019-08-02 RX ADMIN — HYDROMORPHONE HYDROCHLORIDE 4 MILLIGRAM(S): 2 INJECTION INTRAMUSCULAR; INTRAVENOUS; SUBCUTANEOUS at 06:20

## 2019-08-02 RX ADMIN — GABAPENTIN 300 MILLIGRAM(S): 400 CAPSULE ORAL at 06:23

## 2019-08-02 RX ADMIN — ARIPIPRAZOLE 12.5 MILLIGRAM(S): 15 TABLET ORAL at 13:01

## 2019-08-02 RX ADMIN — Medication 650 MILLIGRAM(S): at 13:00

## 2019-08-02 RX ADMIN — PANTOPRAZOLE SODIUM 40 MILLIGRAM(S): 20 TABLET, DELAYED RELEASE ORAL at 06:23

## 2019-08-02 RX ADMIN — HYDROMORPHONE HYDROCHLORIDE 4 MILLIGRAM(S): 2 INJECTION INTRAMUSCULAR; INTRAVENOUS; SUBCUTANEOUS at 22:46

## 2019-08-02 RX ADMIN — Medication 50 MILLIGRAM(S): at 21:31

## 2019-08-02 NOTE — PROGRESS NOTE ADULT - PROBLEM SELECTOR PLAN 1
occasionally painful hematuria with blood clots X 1 week  Hb coming down from 12.8-> stablized at hg 11  ?malignancy vs infection  Bladder US did not show anything significant  3 way phillips with CBI on slow flow- pt is having blood tinged urine- Urology recommend clamping of CBI and do a trial of void tomorrow if he's not having gross hematuria.   renal US showed cysts- Urology will do a cystoscopy when the abx course is completed  normal PSA levels occasionally painful hematuria with blood clots X 1 week  Hb coming down from 12.8-> stablized at hg 11  ?malignancy vs infection  Bladder US did not show anything significant  3 way phillips with CBI on slow flow- pt is having blood tinged urine- will do a trial of void when he's not having gross hematuria.   renal US showed cysts- Urology will do a cystoscopy when the abx course is completed   normal PSA levels

## 2019-08-02 NOTE — PROGRESS NOTE ADULT - SUBJECTIVE AND OBJECTIVE BOX
Pt s- new complaints. cbi was clamped from 4am.  ICU Vital Signs Last 24 Hrs  T(C): 36.7 (02 Aug 2019 05:45), Max: 36.7 (01 Aug 2019 14:19)  T(F): 98 (02 Aug 2019 05:45), Max: 98 (01 Aug 2019 14:19)  HR: 70 (02 Aug 2019 05:45) (70 - 79)  BP: 101/63 (02 Aug 2019 05:45) (101/63 - 120/70)  BP(mean): --  ABP: --  ABP(mean): --  RR: 18 (02 Aug 2019 05:45) (16 - 18)  SpO2: 94% (02 Aug 2019 05:45) (92% - 96%)    Abd: soft nt nd no cvat  no penile/scrotal swelling or erythema    cbi: bag yellow, tubing proximally with dark tea colored urine. no clots seen                          11.1   7.26  )-----------( 162      ( 02 Aug 2019 07:39 )             35.8

## 2019-08-02 NOTE — PROGRESS NOTE ADULT - SUBJECTIVE AND OBJECTIVE BOX
SUBJECTIVE:  pt feels fine and denies cp,sob,gi or gu/uremic  symptoms      Current meds:    ALBUTerol/ipratropium for Nebulization 3 milliLiter(s) Nebulizer every 6 hours  ARIPiprazole 12.5 milliGRAM(s) Oral daily  cefTRIAXone   IVPB 1000 milliGRAM(s) IV Intermittent every 24 hours  ferrous    sulfate 325 milliGRAM(s) Oral daily  finasteride 5 milliGRAM(s) Oral daily  gabapentin 300 milliGRAM(s) Oral three times a day  HYDROmorphone   Tablet 4 milliGRAM(s) Oral every 8 hours  lamoTRIgine 200 milliGRAM(s) Oral daily  pantoprazole    Tablet 40 milliGRAM(s) Oral before breakfast  sodium bicarbonate 650 milliGRAM(s) Oral three times a day  tamsulosin 0.4 milliGRAM(s) Oral at bedtime  traZODone 50 milliGRAM(s) Oral at bedtime      Vital Signs    T(F): 98 (19 @ 05:45), Max: 98 (19 @ 14:19)  HR: 70 (19 @ 05:45) (70 - 79)  BP: 101/63 (19 @ 05:45) (101/63 - 120/70)  ABP: --  RR: 18 (19 @ 05:45) (16 - 18)  SpO2: 94% (19 @ 05:45) (92% - 96%)  Wt(kg): --  CVP(cm H2O): --  CO: --  PCWP: --    I and O's:     @ 07:01  -   @ 07:00  --------------------------------------------------------  IN:    Continuous Bladder Irrigation: 1250 mL  Total IN: 1250 mL    OUT:    Continuous Bladder Irrigation: 83090 mL    Ileostomy: 230 mL    Voided: 50 mL  Total OUT: 64939 mL    Total NET: -97745 mL        Daily     Daily Weight in k.2 (02 Aug 2019 05:45)    PHYSICAL EXAM:  Constitutional: well developed, well nourished  and in nad  HEENT: PERRLA,  no icteric sclera and mild pallor of conjunctiva noted  Neck: No JVD, thyromegaly or adenopathy  Respiratory: reduced air entry lower lungs with no rales, wheezing or rhonchi  Cardiovascular: S1 and S2 normally heard  Gastrointestinal: soft, nondistended, nontender and normal bowel sounds heard  Extremities: No peripheral edema or cyanosis  Neurological: A/O x 3, no focal deficits  : No flank or cva tenderness palpated.  Skin: No rashes      LABS:    CBC:                          11.1   7.26  )-----------( 162      ( 02 Aug 2019 07:39 )             35.8           BMP:        141  |  113<H>  |  37<H>  ----------------------------<  94  5.3   |  21<L>  |  2.24<H>      140  |  112<H>  |  37<H>  ----------------------------<  103<H>  4.6   |  20<L>  |  2.30<H>      141  |  115<H>  |  37<H>  ----------------------------<  145<H>  4.4   |  21<L>  |  2.38<H>    Ca    8.6      02 Aug 2019 07:39  Ca    8.6      01 Aug 2019 06:53  Ca    8.6      2019 09:34  Phos  3.5       Phos  3.3       Mg     2.1       Mg     2.0                 URINE STUDIES:        Creatinine, Random Urine: 66 mg/dL ( @ 13:07)  Potassium, Random Urine: 27 mmol/L ( @ 13:07)  Sodium, Random Urine: 70 mmol/L ( @ 13:07)  Chloride, Random Urine: 86 mmol/L ( @ 13:07)  Osmolality, Random Urine: 413 mosm/Kg ( @ 13:07) SUBJECTIVE:  pt feels fine and denies cp,sob,gi or gu/uremic  symptoms  cont to have blood tinged urine , currently CBI clamped       Current meds:    ALBUTerol/ipratropium for Nebulization 3 milliLiter(s) Nebulizer every 6 hours  ARIPiprazole 12.5 milliGRAM(s) Oral daily  cefTRIAXone   IVPB 1000 milliGRAM(s) IV Intermittent every 24 hours  ferrous    sulfate 325 milliGRAM(s) Oral daily  finasteride 5 milliGRAM(s) Oral daily  gabapentin 300 milliGRAM(s) Oral three times a day  HYDROmorphone   Tablet 4 milliGRAM(s) Oral every 8 hours  lamoTRIgine 200 milliGRAM(s) Oral daily  pantoprazole    Tablet 40 milliGRAM(s) Oral before breakfast  sodium bicarbonate 650 milliGRAM(s) Oral three times a day  tamsulosin 0.4 milliGRAM(s) Oral at bedtime  traZODone 50 milliGRAM(s) Oral at bedtime      Vital Signs    T(F): 98 (19 @ 05:45), Max: 98 (19 @ 14:19)  HR: 70 (19 @ 05:45) (70 - 79)  BP: 101/63 (19 @ 05:45) (101/63 - 120/70)  ABP: --  RR: 18 (19 @ 05:45) (16 - 18)  SpO2: 94% (19 @ 05:45) (92% - 96%)  Wt(kg): --  CVP(cm H2O): --  CO: --  PCWP: --    I and O's:     @ 07:01  -   @ 07:00  --------------------------------------------------------  IN:    Continuous Bladder Irrigation: 1250 mL  Total IN: 1250 mL    OUT:    Continuous Bladder Irrigation: 61089 mL    Ileostomy: 230 mL    Voided: 50 mL  Total OUT: 62514 mL    Total NET: -68174 mL        Daily     Daily Weight in k.2 (02 Aug 2019 05:45)    PHYSICAL EXAM:  Constitutional: well developed, well nourished  and in nad  HEENT: PERRLA,  no icteric sclera and mild pallor of conjunctiva noted  Neck: No JVD, thyromegaly or adenopathy  Respiratory: reduced air entry lower lungs with no rales, wheezing or rhonchi  Cardiovascular: S1 and S2 normally heard  Gastrointestinal: soft, nondistended, nontender and normal bowel sounds heard  Extremities: No peripheral edema or cyanosis  Neurological: A/O x 3, no focal deficits  : No flank or cva tenderness palpated.  Skin: No rashes      LABS:    CBC:                          11.1   7.26  )-----------( 162      ( 02 Aug 2019 07:39 )             35.8           BMP:        141  |  113<H>  |  37<H>  ----------------------------<  94  5.3   |  21<L>  |  2.24<H>      140  |  112<H>  |  37<H>  ----------------------------<  103<H>  4.6   |  20<L>  |  2.30<H>      141  |  115<H>  |  37<H>  ----------------------------<  145<H>  4.4   |  21<L>  |  2.38<H>    Ca    8.6      02 Aug 2019 07:39  Ca    8.6      01 Aug 2019 06:53  Ca    8.6      2019 09:34  Phos  3.5       Phos  3.3       Mg     2.1       Mg     2.0                 URINE STUDIES:        Creatinine, Random Urine: 66 mg/dL ( @ 13:07)  Potassium, Random Urine: 27 mmol/L ( @ 13:07)  Sodium, Random Urine: 70 mmol/L ( @ 13:07)  Chloride, Random Urine: 86 mmol/L ( @ 13:07)  Osmolality, Random Urine: 413 mosm/Kg ( @ 13:07) SUBJECTIVE:  pt feels fine and denies cp,sob,gi or gu/uremic  symptoms  cont to have blood tinged urine , currently CBI clamped       Current meds:    ALBUTerol/ipratropium for Nebulization 3 milliLiter(s) Nebulizer every 6 hours  ARIPiprazole 12.5 milliGRAM(s) Oral daily  cefTRIAXone   IVPB 1000 milliGRAM(s) IV Intermittent every 24 hours  ferrous    sulfate 325 milliGRAM(s) Oral daily  finasteride 5 milliGRAM(s) Oral daily  gabapentin 300 milliGRAM(s) Oral three times a day  HYDROmorphone   Tablet 4 milliGRAM(s) Oral every 8 hours  lamoTRIgine 200 milliGRAM(s) Oral daily  pantoprazole    Tablet 40 milliGRAM(s) Oral before breakfast  sodium bicarbonate 650 milliGRAM(s) Oral three times a day  tamsulosin 0.4 milliGRAM(s) Oral at bedtime  traZODone 50 milliGRAM(s) Oral at bedtime      Vital Signs    T(F): 98 (19 @ 05:45), Max: 98 (19 @ 14:19)  HR: 70 (19 @ 05:45) (70 - 79)  BP: 101/63 (19 @ 05:45) (101/63 - 120/70)  ABP: --  RR: 18 (19 @ 05:45) (16 - 18)  SpO2: 94% (19 @ 05:45) (92% - 96%)  Wt(kg): --  CVP(cm H2O): --  CO: --  PCWP: --    I and O's:     @ 07:01  -   @ 07:00  --------------------------------------------------------  IN:    Continuous Bladder Irrigation: 1250 mL  Total IN: 1250 mL    OUT:    Continuous Bladder Irrigation: 68714 mL    Ileostomy: 230 mL    Voided: 50 mL  Total OUT: 76611 mL    Total NET: -27464 mL        Daily     Daily Weight in k.2 (02 Aug 2019 05:45)    PHYSICAL EXAM:  Constitutional: well developed, well nourished  and in nad  HEENT: PERRLA,  no icteric sclera and mild pallor of conjunctiva noted  Neck: No JVD, thyromegaly or adenopathy  Respiratory: reduced air entry lower lungs with no rales, wheezing or rhonchi  Cardiovascular: S1 and S2 normally heard  Gastrointestinal: soft, nondistended, nontender and normal bowel sounds heard  Extremities: No peripheral edema or cyanosis  Neurological: A/O x 3, no focal deficits  Skin: No rashes      LABS:    CBC:                          11.1   7.26  )-----------( 162      ( 02 Aug 2019 07:39 )             35.8           BMP:        141  |  113<H>  |  37<H>  ----------------------------<  94  5.3   |  21<L>  |  2.24<H>      140  |  112<H>  |  37<H>  ----------------------------<  103<H>  4.6   |  20<L>  |  2.30<H>      141  |  115<H>  |  37<H>  ----------------------------<  145<H>  4.4   |  21<L>  |  2.38<H>    Ca    8.6      02 Aug 2019 07:39  Ca    8.6      01 Aug 2019 06:53  Ca    8.6      2019 09:34  Phos  3.5       Phos  3.3       Mg     2.1       Mg     2.0                 URINE STUDIES:        Creatinine, Random Urine: 66 mg/dL ( @ 13:07)  Potassium, Random Urine: 27 mmol/L ( @ 13:07)  Sodium, Random Urine: 70 mmol/L ( @ 13:07)  Chloride, Random Urine: 86 mmol/L ( @ 13:07)  Osmolality, Random Urine: 413 mosm/Kg ( @ 13:07)

## 2019-08-02 NOTE — PROGRESS NOTE ADULT - SUBJECTIVE AND OBJECTIVE BOX
PGY 1 Note discussed with supervising resident and primary attending    Patient is a 68y old  Male who presents with a chief complaint of Hematuria (02 Aug 2019 10:05)      INTERVAL HPI/OVERNIGHT EVENTS: had hematuria overnight- urine was clear at 4am. phillips clamped.     MEDICATIONS  (STANDING):  ALBUTerol/ipratropium for Nebulization 3 milliLiter(s) Nebulizer every 6 hours  ARIPiprazole 12.5 milliGRAM(s) Oral daily  cefTRIAXone   IVPB 1000 milliGRAM(s) IV Intermittent every 24 hours  ferrous    sulfate 325 milliGRAM(s) Oral daily  finasteride 5 milliGRAM(s) Oral daily  gabapentin 300 milliGRAM(s) Oral three times a day  HYDROmorphone   Tablet 4 milliGRAM(s) Oral every 8 hours  lamoTRIgine 200 milliGRAM(s) Oral daily  pantoprazole    Tablet 40 milliGRAM(s) Oral before breakfast  sodium bicarbonate 650 milliGRAM(s) Oral three times a day  tamsulosin 0.4 milliGRAM(s) Oral at bedtime  traZODone 50 milliGRAM(s) Oral at bedtime    MEDICATIONS  (PRN):      __________________________________________________  REVIEW OF SYSTEMS:    CONSTITUTIONAL: No fever,   EYES: no acute visual disturbances  NECK: No pain or stiffness  RESPIRATORY: No cough; No shortness of breath  CARDIOVASCULAR: No chest pain, no palpitations  GASTROINTESTINAL: No pain. No nausea or vomiting; No diarrhea   NEUROLOGICAL: No headache or numbness, no tremors  MUSCULOSKELETAL: No joint pain, no muscle pain  GENITOURINARY: pt has phillips, no pain  PSYCHIATRY: no depression , no anxiety  ALL OTHER  ROS negative        Vital Signs Last 24 Hrs  T(C): 36.7 (02 Aug 2019 05:45), Max: 36.7 (01 Aug 2019 14:19)  T(F): 98 (02 Aug 2019 05:45), Max: 98 (01 Aug 2019 14:19)  HR: 70 (02 Aug 2019 05:45) (70 - 79)  BP: 101/63 (02 Aug 2019 05:45) (101/63 - 120/70)  BP(mean): --  RR: 18 (02 Aug 2019 05:45) (16 - 18)  SpO2: 94% (02 Aug 2019 05:45) (92% - 96%)    ________________________________________________  PHYSICAL EXAM:  GENERAL: NAD  HEENT: Normocephalic;  conjunctivae and sclerae clear; moist mucous membranes;   NECK : supple  CHEST/LUNG: Clear to auscultation bilaterally with good air entry   HEART: S1 S2  regular; no murmurs, gallops or rubs  ABDOMEN: Soft, Nontender, Nondistended; Bowel sounds present, colostomy bag in place  : 3 way phillips in place- clamped. No pain.   EXTREMITIES: no cyanosis; no edema; no calf tenderness  SKIN: warm and dry; no rash  NERVOUS SYSTEM:  Awake and alert; Oriented  to place, person and time ; no new deficits    _________________________________________________  LABS:                        11.1   7.26  )-----------( 162      ( 02 Aug 2019 07:39 )             35.8     08-02    141  |  113<H>  |  37<H>  ----------------------------<  94  5.3   |  21<L>  |  2.24<H>    Ca    8.6      02 Aug 2019 07:39  Phos  3.5     08-02  Mg     2.1     08-02      PT/INR - ( 01 Aug 2019 06:53 )   PT: 12.0 sec;   INR: 1.08 ratio         PTT - ( 01 Aug 2019 06:53 )  PTT:24.7 sec    CAPILLARY BLOOD GLUCOSE            RADIOLOGY & ADDITIONAL TESTS:  < from: US Urinary Bladder (08.01.19 @ 13:40) >    Note is made of an indwelling Phillips catheter; the bladder is not   well-distended. Bladder wall thickening cannot be excluded. No gross   intraluminal masses are identified. Further assessment for described   hematuria can be performed with dedicated CT urography.    < end of copied text >  < from: US Renal (07.30.19 @ 12:19) >  Impression: Bilateral renal cysts.    No hydronephrosis.    < end of copied text >    Imaging Personally Reviewed:  YES  Consultant(s) Notes Reviewed:   YES  Care Discussed with Consultants : Urology, Nephrology    Plan of care was discussed with patient and /or primary care giver; all questions and concerns were addressed and care was aligned with patient's wishes.

## 2019-08-02 NOTE — PROGRESS NOTE ADULT - ASSESSMENT
A/P     1. CKD(stage 3) : likely 2/2 HTN/Renovascular ds  . baseline around 2.2-2.4 , was slightly high on Admission   - Cr stable around baseline    - f/u US  Bladder   - Pr/Cr ratio 1.2 g/day,not nephrotic range proteinuria , c/w htn induced ckd  - repeat UA and protein/cr ratio once off CBI   - -Keep patient euvolemic   -Avoid Nephrotoxic Meds/ Agents such as (NSAIDs, IV contrast, Aminoglycosides such as gentamicin, -Gadolinium contrast, Phosphate containing enemas, etc..)  -Adjust Medications according to eGFR.   - Monitor BMP daily   - we will add low dsoe Losartan to treat proteinuria and f/u K and Cr closely next 24 to 48 hrs  -d/c Losartan if cr rises >0.3 mg from baseline or K >5.3    2. Hematuria: with clots  - h/o Prostate ca s/p radiation   - urology f/u   - cont CBI as per urology   - avoid IV cont     3. HTN :   - BP acceptable   -cont with current meds   - keep BP < 130/80   -low salt diet    4.METABOLIC ACIDOSIS:Mild, secondary to ckd  -f/u co2 daily  -continue nahco3 tab     5.ANEMIA: Mild  -f/u Hb daily    6.B/L RENAL CYSTS: most likely benign  -suggest repeat renal us in 3 months to check cyst stability A/P     1. CKD(stage 3) : likely 2/2 HTN/Renovascular ds  . baseline around 2.2-2.4 , was slightly high on Admission   - Cr stable around baseline    - f/u US  Bladder   - Pr/Cr ratio 1.2 g/day,not nephrotic range proteinuria , c/w htn induced ckd  - repeat UA and protein/cr ratio once off CBI   - -Keep patient euvolemic   -Avoid Nephrotoxic Meds/ Agents such as (NSAIDs, IV contrast, Aminoglycosides such as gentamicin, -Gadolinium contrast, Phosphate containing enemas, etc..)  -Adjust Medications according to eGFR.   - Monitor BMP daily   - recc adding low dose Losartan to treat proteinuria and f/u K and Cr closely next 24 to 48 hrs  -d/c Losartan if cr rises >0.3 mg from baseline or K >5.3    2. Hematuria: with clots  - h/o Prostate ca s/p radiation   - urology f/u   - avoid IV cont     3. HTN :   - BP on the low side   - keep BP < 130/80   -monitor BP     4.METABOLIC ACIDOSIS:Mild, secondary to ckd  -f/u co2 daily  -continue nahco3 tab     5.ANEMIA: Mild  -f/u Hb daily    6.B/L RENAL CYSTS: most likely benign  -suggest repeat renal us in 3 months to check cyst stability A/P     1. CKD(stage 3) : likely 2/2 HTN/Renovascular ds  . baseline around 2.2-2.4 , was slightly high on Admission   - Cr stable around baseline; remains unchanged.   - f/u US  Bladder   - Pr/Cr ratio 1.2 g/day. non-nephrotic range proteinuria , c/w htn induced ckd  - repeat UA and protein/cr ratio once off CBI   - -Keep patient euvolemic   -Avoid Nephrotoxic Meds/ Agents such as (NSAIDs, IV contrast, Aminoglycosides such as gentamicin, -Gadolinium contrast, Phosphate containing enemas, etc..)  -Adjust Medications according to eGFR.   - Monitor BMP daily   -added low losartan however, patient blood pressure was low and discontinued by primary team.     2. Hematuria: with clots  - h/o Prostate ca s/p radiation   - urology f/u   - avoid IV cont     3. HTN :   - BP on the low normal and off ARBs.  - keep BP >110/80 and < 130/80   -monitor BP     4.METABOLIC ACIDOSIS: Mild, secondary to ckd  -f/u co2 daily  -continue nahco3 tab     5.ANEMIA: Mild  -f/u Hb daily    6.B/L RENAL CYSTS: most likely benign  -suggest repeat renal us in 3 months to check cyst stability

## 2019-08-02 NOTE — PROGRESS NOTE ADULT - PROBLEM SELECTOR PLAN 2
UA positive- culture positive for Ecoli PAN sensitive  pt. symptomatic, afebrile, no leukocytosis- on rocephin day 4 08/01) dc after 3 days UA positive- culture positive for Ecoli PAN sensitive  pt. symptomatic, afebrile, no leukocytosis- on rocephin day 5 (08/01) dc after 2 days

## 2019-08-02 NOTE — PROGRESS NOTE ADULT - ASSESSMENT
68M from [facility], with PMHx of prostate Ca (s/p radiation in 2015, now in remission), asthma, COPD (not on O2), HTN, HLD, Bipolar, anemia, GERD, Ileostomy s/p sigmoidal resection, PAD, and CKD presented to ED c/o hematuria X 1 week. Pt got a three way phillips and was started with CBI. Pt continues to have gross hematuria and will be seen by urology for possible cystoscopy as outpt when he completes abx course. 68M from [facility], with PMHx of prostate Ca (s/p radiation in 2015, now in remission), asthma, COPD (not on O2), HTN, HLD, Bipolar, anemia, GERD, Ileostomy s/p sigmoidal resection, PAD, and CKD presented to ED c/o hematuria X 1 week. Pt got a three way phillips and was started with CBI. Pt continues to have gross hematuria and will likely need cystoscopy after he completes abx. course.

## 2019-08-02 NOTE — PROGRESS NOTE ADULT - PROBLEM SELECTOR PLAN 3
p/w Cr 2.66 (2.14-2.6 on last admission), Cr still elevated  likely his baseline   pt started on low dose Losartan 25mg 08/01- will monitor his BMP  f/u Dr. Curran p/w Cr 2.66 (2.14-2.6 on last admission), Cr stable at 1.67  likely his baseline   will monitor his BMP  f/u Dr. Curran

## 2019-08-02 NOTE — PROGRESS NOTE ADULT - ASSESSMENT
hematuria- was resolving but some sign of recurrent hematuria presently.  hx CaP s/p radiation treatment years ago  uti    observe until noon today. If no signs of clots formation and urine remains tea colored, will d/c catheter and trail of void.  abx per culture  keep cbi clamped for now

## 2019-08-03 ENCOUNTER — TRANSCRIPTION ENCOUNTER (OUTPATIENT)
Age: 69
End: 2019-08-03

## 2019-08-03 LAB
ANION GAP SERPL CALC-SCNC: 10 MMOL/L — SIGNIFICANT CHANGE UP (ref 5–17)
BUN SERPL-MCNC: 47 MG/DL — HIGH (ref 7–18)
CALCIUM SERPL-MCNC: 8.2 MG/DL — LOW (ref 8.4–10.5)
CHLORIDE SERPL-SCNC: 114 MMOL/L — HIGH (ref 96–108)
CO2 SERPL-SCNC: 18 MMOL/L — LOW (ref 22–31)
CREAT SERPL-MCNC: 2.86 MG/DL — HIGH (ref 0.5–1.3)
GLUCOSE SERPL-MCNC: 109 MG/DL — HIGH (ref 70–99)
HCT VFR BLD CALC: 35.9 % — LOW (ref 39–50)
HGB BLD-MCNC: 11.1 G/DL — LOW (ref 13–17)
MAGNESIUM SERPL-MCNC: 2 MG/DL — SIGNIFICANT CHANGE UP (ref 1.6–2.6)
MCHC RBC-ENTMCNC: 26.8 PG — LOW (ref 27–34)
MCHC RBC-ENTMCNC: 30.9 GM/DL — LOW (ref 32–36)
MCV RBC AUTO: 86.7 FL — SIGNIFICANT CHANGE UP (ref 80–100)
NRBC # BLD: 0 /100 WBCS — SIGNIFICANT CHANGE UP (ref 0–0)
PHOSPHATE SERPL-MCNC: 3.8 MG/DL — SIGNIFICANT CHANGE UP (ref 2.5–4.5)
PLATELET # BLD AUTO: 146 K/UL — LOW (ref 150–400)
POTASSIUM SERPL-MCNC: 5.1 MMOL/L — SIGNIFICANT CHANGE UP (ref 3.5–5.3)
POTASSIUM SERPL-SCNC: 5.1 MMOL/L — SIGNIFICANT CHANGE UP (ref 3.5–5.3)
RBC # BLD: 4.14 M/UL — LOW (ref 4.2–5.8)
RBC # FLD: 16.3 % — HIGH (ref 10.3–14.5)
SODIUM SERPL-SCNC: 142 MMOL/L — SIGNIFICANT CHANGE UP (ref 135–145)
WBC # BLD: 6.43 K/UL — SIGNIFICANT CHANGE UP (ref 3.8–10.5)
WBC # FLD AUTO: 6.43 K/UL — SIGNIFICANT CHANGE UP (ref 3.8–10.5)

## 2019-08-03 PROCEDURE — 99231 SBSQ HOSP IP/OBS SF/LOW 25: CPT

## 2019-08-03 RX ORDER — ASPIRIN/CALCIUM CARB/MAGNESIUM 324 MG
1 TABLET ORAL
Qty: 0 | Refills: 0 | DISCHARGE

## 2019-08-03 RX ORDER — IPRATROPIUM/ALBUTEROL SULFATE 18-103MCG
3 AEROSOL WITH ADAPTER (GRAM) INHALATION
Qty: 0 | Refills: 0 | DISCHARGE
Start: 2019-08-03

## 2019-08-03 RX ORDER — SODIUM CHLORIDE 9 MG/ML
1000 INJECTION INTRAMUSCULAR; INTRAVENOUS; SUBCUTANEOUS
Refills: 0 | Status: DISCONTINUED | OUTPATIENT
Start: 2019-08-03 | End: 2019-08-04

## 2019-08-03 RX ADMIN — HYDROMORPHONE HYDROCHLORIDE 4 MILLIGRAM(S): 2 INJECTION INTRAMUSCULAR; INTRAVENOUS; SUBCUTANEOUS at 21:59

## 2019-08-03 RX ADMIN — TAMSULOSIN HYDROCHLORIDE 0.4 MILLIGRAM(S): 0.4 CAPSULE ORAL at 21:58

## 2019-08-03 RX ADMIN — HYDROMORPHONE HYDROCHLORIDE 4 MILLIGRAM(S): 2 INJECTION INTRAMUSCULAR; INTRAVENOUS; SUBCUTANEOUS at 23:21

## 2019-08-03 RX ADMIN — HYDROMORPHONE HYDROCHLORIDE 4 MILLIGRAM(S): 2 INJECTION INTRAMUSCULAR; INTRAVENOUS; SUBCUTANEOUS at 13:20

## 2019-08-03 RX ADMIN — CEFTRIAXONE 100 MILLIGRAM(S): 500 INJECTION, POWDER, FOR SOLUTION INTRAMUSCULAR; INTRAVENOUS at 22:01

## 2019-08-03 RX ADMIN — Medication 650 MILLIGRAM(S): at 05:36

## 2019-08-03 RX ADMIN — Medication 650 MILLIGRAM(S): at 14:09

## 2019-08-03 RX ADMIN — PANTOPRAZOLE SODIUM 40 MILLIGRAM(S): 20 TABLET, DELAYED RELEASE ORAL at 06:41

## 2019-08-03 RX ADMIN — GABAPENTIN 300 MILLIGRAM(S): 400 CAPSULE ORAL at 13:25

## 2019-08-03 RX ADMIN — Medication 325 MILLIGRAM(S): at 12:10

## 2019-08-03 RX ADMIN — HYDROMORPHONE HYDROCHLORIDE 4 MILLIGRAM(S): 2 INJECTION INTRAMUSCULAR; INTRAVENOUS; SUBCUTANEOUS at 14:00

## 2019-08-03 RX ADMIN — Medication 650 MILLIGRAM(S): at 21:58

## 2019-08-03 RX ADMIN — FINASTERIDE 5 MILLIGRAM(S): 5 TABLET, FILM COATED ORAL at 12:11

## 2019-08-03 RX ADMIN — HYDROMORPHONE HYDROCHLORIDE 4 MILLIGRAM(S): 2 INJECTION INTRAMUSCULAR; INTRAVENOUS; SUBCUTANEOUS at 05:40

## 2019-08-03 RX ADMIN — Medication 50 MILLIGRAM(S): at 21:58

## 2019-08-03 RX ADMIN — LAMOTRIGINE 200 MILLIGRAM(S): 25 TABLET, ORALLY DISINTEGRATING ORAL at 12:11

## 2019-08-03 RX ADMIN — HYDROMORPHONE HYDROCHLORIDE 4 MILLIGRAM(S): 2 INJECTION INTRAMUSCULAR; INTRAVENOUS; SUBCUTANEOUS at 06:31

## 2019-08-03 RX ADMIN — GABAPENTIN 300 MILLIGRAM(S): 400 CAPSULE ORAL at 05:36

## 2019-08-03 RX ADMIN — GABAPENTIN 300 MILLIGRAM(S): 400 CAPSULE ORAL at 21:58

## 2019-08-03 RX ADMIN — ARIPIPRAZOLE 12.5 MILLIGRAM(S): 15 TABLET ORAL at 12:10

## 2019-08-03 NOTE — PROGRESS NOTE ADULT - PROBLEM SELECTOR PLAN 5
on remission. s/p radiation
on duoneb
on remission. s/p radiation
on duoneb
on remission. s/p radiation

## 2019-08-03 NOTE — DISCHARGE NOTE PROVIDER - CARE PROVIDER_API CALL
Nallely Curran (MD)  Internal Medicine; Nephrology  [address] [place] [address]  Phone: [phone]  Fax: [phone]  Follow Up Time:     Amy Cruz; MPH)  Urology  [address] [facility]  [place] [id number]  Phone: [phone]  Fax: [phone]  Follow Up Time: Amy Cruz; MPH)  Urology  [address] [facility], [address] [place] [address]  Phone: [phone]  Fax: [phone]  Follow Up Time:     Kirby Angel (MD)  Nephrology  [address] [place] [address]  Phone: [phone]  Fax: [phone]  Follow Up Time:

## 2019-08-03 NOTE — PROGRESS NOTE ADULT - PROBLEM SELECTOR PLAN 1
occasionally painful hematuria with blood clots X 1 week  Hb coming down from 12.8-> stablized at hg 11  ?malignancy vs infection  Bladder US did not show anything significant  3 way phillips with CBI on slow flow- pt is having blood tinged urine- will do a trial of void when he's not having gross hematuria.   renal US showed cysts- Urology will do a cystoscopy when the abx course is completed   normal PSA levels  -urology Dr. Cruz

## 2019-08-03 NOTE — PROGRESS NOTE ADULT - ASSESSMENT
1. CKD(stage 3) : likely 2/2 HTN/Renovascular ds  . baseline around 2.2-2.4   - Cr worsening today and low bp, will start IVFs NS 70cc/hr.   - f/u US  Bladder shows no distension.   - Pr/Cr ratio 1.2 g/day. non-nephrotic range proteinuria , c/w htn induced ckd  - repeat UA and protein/cr ratio once off CBI   - -Keep patient euvolemic   -Avoid Nephrotoxic Meds/ Agents such as (NSAIDs, IV contrast, Aminoglycosides such as gentamicin, -Gadolinium contrast, Phosphate containing enemas, etc..)  -Adjust Medications according to eGFR.   - Monitor BMP daily   -added low losartan however, patient blood pressure was low and discontinued by primary team.     2. Hematuria: with clots  - h/o Prostate ca s/p radiation   - urology f/u   - avoid IV cont     3. HTN :   - BP on the low normal and off ARBs.  - keep BP >110/80 and < 130/80   -monitor BP     4.METABOLIC ACIDOSIS: Mild, secondary to ckd  -f/u co2 daily  -continue nahco3 tab     5.ANEMIA: Mild  -f/u Hb daily    6.B/L RENAL CYSTS: most likely benign  -suggest repeat renal us in 3 months to check cyst stability

## 2019-08-03 NOTE — PROGRESS NOTE ADULT - ATTENDING COMMENTS
Pt seen and examined. Agree with note as written above    Continue care as stated above
Pt seen and examined. Agree with note as written above    Continue care as stated above
Pt seen and examined. Agree with note as written above    - Follow-up renal US  - Continue 3way Koehler with slow drip CBI  - Follow-up urine culture  - Urology to continue to follow
PATIENT WAS SEEN AND EXAMINED  - HEMATURIA DUE TO UTI , SUSPECTED RADIATION CYSTITIS, OLD CANCER PROSTATE , S/P RADIATION Rx. CONTINUE CBI, & UROLOGY F/UP  - SUSPECT UTI ON IV ROCEPHIN. F/UP URINE CXS  - MILD NORMOCYTIC ANEMIA  - GI PROPHYLAXIS  - DR. IVY

## 2019-08-03 NOTE — DISCHARGE NOTE PROVIDER - NSDCCPCAREPLAN_GEN_ALL_CORE_FT
PRINCIPAL DISCHARGE DIAGNOSIS  Diagnosis: Gross hematuria  Assessment and Plan of Treatment: you came with complains of having blood in urine for 1 week which worsened over time and you were advised by Dr. mckeon to come to the ED. we placed a phillips and irrigated your bladder during your stay. Your creatinine was elevated and looks like you have chronic kidney disease. you were also found to have a urinary tract infection and we started you on antibiotics. We looked at yoru kidneys and bladder with ultrasound machine but it did not tell us what was causing the blood in urine. Urologist came to see you and he recommeded a cystoscopy to take a cook at your bladder after you finish your antibiotics course. You continued to have blood clots for a few days and when you started having clear urine your phillips was taken out. please follow up with a urologist for further management of this issue and follow up with a nephrologist for your kidneys.      SECONDARY DISCHARGE DIAGNOSES  Diagnosis: Urinary tract infection with hematuria, site unspecified  Assessment and Plan of Treatment: you came with blood in your urine. your urinalysis was significant for urinary tract infection and we started you on antibiotic. urine cultures were positive for ecoli sensitive to (rocephin). you completed a 7 day course of IV antibiotics. PRINCIPAL DISCHARGE DIAGNOSIS  Diagnosis: Gross hematuria  Assessment and Plan of Treatment: you came with complains of having blood in urine for 1 week which worsened over time and you were advised by Dr. mckeon to come to the ED. we placed a phillips and irrigated your bladder during your stay. Your creatinine was elevated and looks like you have chronic kidney disease. you were also found to have a urinary tract infection and we started you on antibiotics. We looked at yoru kidneys and bladder with ultrasound machine but it did not tell us what was causing the blood in urine. Urologist came to see you and he recommeded a cystoscopy to take a cook at your bladder after you finish your antibiotics course. You continued to have blood clots for a few days and when you started having clear urine your phillips was taken out. please follow up with a urologist for further management of this issue and a cystoscopy is recommended. Please follow up with a nephrologist for your kidneys as you had elevated creatinine and have chronic kidney disease stage 3.      SECONDARY DISCHARGE DIAGNOSES  Diagnosis: Urinary tract infection with hematuria, site unspecified  Assessment and Plan of Treatment: you came with blood in your urine. your urinalysis was significant for urinary tract infection and we started you on antibiotic. urine cultures were positive for ecoli sensitive to (rocephin). you completed a 7 day course of IV antibiotics. Please follow up with your PCP PRINCIPAL DISCHARGE DIAGNOSIS  Diagnosis: Gross hematuria  Assessment and Plan of Treatment: You came with complains of having blood in urine for 1 week which worsened over time and you were advised by Dr. Pinto to come to the ED. we placed a phillips and irrigated your bladder during your stay. Your creatinine was elevated and looks like you have chronic kidney disease. you were also found to have a urinary tract infection and we started you on antibiotics. We looked at yoru kidneys and bladder with ultrasound machine but it did not tell us what was causing the blood in urine. Urologist came to see you and he recommeded a cystoscopy to take a look at your urinary bladder after you finish antibiotic course. You conitnued to have hematuria for few days and when you started to have clear urine your phillips was taken out. please follow up with a urologist for further management of this issue and a cystoscopy is recommended. Please follow up with a nephrologist for your kidneys as you had elevated creatinine and have chronic kidney disease stage 3.  we are hold aspirin secondary to hematuria, please follow up with your primary care doctor regarding restarting aspirin.      SECONDARY DISCHARGE DIAGNOSES  Diagnosis: Anemia, unspecified type  Assessment and Plan of Treatment: you came with gross hematuria and your hemoglobin was found to be decreasing we started you on iron supplement. Please continue to take it and I recommend you follow up with your PCP.    Diagnosis: CKD (chronic kidney disease)  Assessment and Plan of Treatment: CKD stage undetermined due to DM/HTN/obesity  Follow up with PCP within 1-2 weeks after discharge  -Concitnue renal diet  -Avoid Nephrotoxic Meds/ Agents such as (NSAIDs, IV contrast, Aminoglycosides such as gentamicin, -Gadolinium contrast, Phosphate containing enemas, etc..)  -Ask your doctor to adjust your medications according to eGfr    Diagnosis: COPD (chronic obstructive pulmonary disease)  Assessment and Plan of Treatment: continue taking all your home medications    Diagnosis: Urinary tract infection with hematuria, site unspecified  Assessment and Plan of Treatment: you came with blood in your urine. your urinalysis was significant for urinary tract infection and we started you on antibiotic. urine cultures were positive for ecoli sensitive to (rocephin). you completed a 7 day course of IV antibiotics. Please follow up with your PCP PRINCIPAL DISCHARGE DIAGNOSIS  Diagnosis: Gross hematuria  Assessment and Plan of Treatment: You came with complains of having blood in urine for 1 week which worsened over time and you were advised by Dr. Pinto to come to the ED. we placed a phillips and irrigated your bladder during your stay. Your creatinine was elevated and looks like you have chronic kidney disease. you were also found to have a urinary tract infection and we started you on antibiotics. We looked at yoru kidneys and bladder with ultrasound machine but it did not tell us what was causing the blood in urine. Urologist came to see you and he recommeded a cystoscopy to take a look at your urinary bladder after you finish antibiotic course. You conitnued to have hematuria for few days and when you started to have clear urine your phillips was taken out. please follow up with a urologist for further management of this issue and a cystoscopy is recommended. Please follow up with a nephrologist for your kidneys as you had elevated creatinine and have chronic kidney disease stage 3.  we are hold aspirin secondary to hematuria, please follow up with your primary care doctor regarding restarting aspirin.      SECONDARY DISCHARGE DIAGNOSES  Diagnosis: Urinary tract infection with hematuria, site unspecified  Assessment and Plan of Treatment: you came with blood in your urine. your urinalysis was significant for urinary tract infection and we started you on antibiotic. urine cultures were positive for ecoli sensitive to (rocephin). you completed a 7 day course of IV antibiotics. Please follow up with your PCP    Diagnosis: COPD (chronic obstructive pulmonary disease)  Assessment and Plan of Treatment: continue taking all your home medications    Diagnosis: CKD (chronic kidney disease)  Assessment and Plan of Treatment: CKD stage undetermined due to DM/HTN/obesity  Follow up with PCP within 1-2 weeks after discharge  -Concitnue renal diet  -Avoid Nephrotoxic Meds/ Agents such as (NSAIDs, IV contrast, Aminoglycosides such as gentamicin, -Gadolinium contrast, Phosphate containing enemas, etc..)  -Ask your doctor to adjust your medications according to eGfr  - follow up Dr. Angel in 1-2 weeks and follow up with Dr. Pinto in a week to repeat BMP and start Losartan    Diagnosis: Anemia, unspecified type  Assessment and Plan of Treatment: you came with gross hematuria and your hemoglobin was found to be decreasing we started you on iron supplement. Please continue to take it and I recommend you follow up with your PCP.

## 2019-08-03 NOTE — PROGRESS NOTE ADULT - PROBLEM SELECTOR PLAN 3
p/w Cr 2.66 (2.14-2.6 on last admission), Cr 2.68   likely his baseline   will monitor his BMP  f/u Dr. Curran

## 2019-08-03 NOTE — PROGRESS NOTE ADULT - PROBLEM SELECTOR PROBLEM 3
CKD (chronic kidney disease)

## 2019-08-03 NOTE — PROGRESS NOTE ADULT - PROBLEM SELECTOR PROBLEM 2
Urinary tract infection with hematuria, site unspecified

## 2019-08-03 NOTE — PROGRESS NOTE ADULT - SUBJECTIVE AND OBJECTIVE BOX
Patient seen and examined at bedside with no complaints.     Vital Signs Last 24 Hrs  T(F): 98.1 (08-03-19 @ 05:12), Max: 98.1 (08-03-19 @ 05:12)  HR: 70 (08-03-19 @ 05:12)  BP: 103/63 (08-03-19 @ 05:12)  RR: 18 (08-03-19 @ 05:12)  SpO2: 94% (08-03-19 @ 05:12)    GENERAL: Alert, NAD  CHEST/LUNG: respirations nonlabored  ABDOMEN: soft, Nontender, Nondistended  : three way phillips catheter in place draining clear yellow urine. CBI clamped. urine output 2150ml/24hours   EXTREMITIES:  no calf tenderness, No edema    I&O's Detail    02 Aug 2019 07:01  -  03 Aug 2019 07:00  --------------------------------------------------------  IN:  Total IN: 0 mL    OUT:    Voided: 2150 mL  Total OUT: 2150 mL    Total NET: -2150 mL    LABS:                        11.1   6.43  )-----------( 146      ( 03 Aug 2019 05:38 )             35.9     08-03    142  |  114<H>  |  47<H>  ----------------------------<  109<H>  5.1   |  18<L>  |  2.86<H>    Ca    8.2<L>      03 Aug 2019 05:38  Phos  3.8     08-03  Mg     2.0     08-03

## 2019-08-03 NOTE — DISCHARGE NOTE PROVIDER - PROVIDER TOKENS
PROVIDER:[TOKEN:[[id number]]],PROVIDER:[TOKEN:[[id number]]] PROVIDER:[TOKEN:[[id number]]],PROVIDER:[TOKEN:[[id number]]]

## 2019-08-03 NOTE — PROGRESS NOTE ADULT - PROBLEM SELECTOR PROBLEM 5
Prostate CA
COPD (chronic obstructive pulmonary disease)
Prostate CA
COPD (chronic obstructive pulmonary disease)
Prostate CA

## 2019-08-03 NOTE — DISCHARGE NOTE PROVIDER - HOSPITAL COURSE
HPI:    68M from [facility], with PMHx of prostate Ca (s/p radiation in 2015, now in remission), asthma, COPD (not on O2), HTN, HLD, Bipolar, anemia, GERD, Ileostomy s/p sigmoidal resection, PAD, and CKD presented to ED c/o hematuria X 1 week. He states he gradually developed hematuria since wednesday, which was worsening and yesterday evening he noticed blood clots. He had appointment made by Victor Manuel Russell for urologist but due to worsening hematuria, he came to ED. He also endorses painful micturition, chronic increased urinary frequency, nocturia. He denies any fever, back pain, abdominal pain, nausea, vomiting. His labs were significant for UTI and he was started on rocephin 1gm for a seven day course. Pt was admitted and seen by urology, he was placed with 3 way phillips and started on CBI . He continued to have gross hematuria and he hgb was monitored along with BMP. His hgb was stable at 11 and he was getting Fe supplement. Nephrology saw him and suggested to keep him off of nephrotoxins - his protein to creatinine ratio was 1.2 g/day which is not in the nephrotic range proteinuria. F/U with UA and protein once he is off of CBI. Urology wants to do a cystoscopy to r/o malignancy vs radiation cystitis. US bladder was not significant for anything. Renal US showed cysts that were consistent with the past CT. Pt will complete 7 day course of rocephin 08/04. HPI:    68M from [facility], with PMHx of prostate Ca (s/p radiation in 2015, now in remission), asthma, COPD (not on O2), HTN, HLD, Bipolar, anemia, GERD, Ileostomy s/p sigmoidal resection, PAD, and CKD presented to ED c/o hematuria X 1 week. He states he gradually developed hematuria since wednesday, which was worsening and yesterday evening he noticed blood clots. He had appointment made by [facility] Manuel Russell for urologist but due to worsening hematuria, he came to ED. He also endorses painful micturition, chronic increased urinary frequency, nocturia. He denies any fever, back pain, abdominal pain, nausea, vomiting. His labs were significant for UTI and he was started on rocephin 1gm for a seven day course. Pt was admitted and seen by urology, he was placed with 3 way phillips and started on CBI . He continued to have gross hematuria and he hgb was monitored along with BMP. His hgb was stable at 11 and he was getting Fe supplement. Nephrology saw him and suggested to keep him off of nephrotoxins - his protein to creatinine ratio was 1.2 g/day which is not in the nephrotic range proteinuria. F/U with UA and protein once he is off of CBI. Urology wants to do a cystoscopy to r/o malignancy vs radiation cystitis. US bladder was not significant for anything. Renal US showed cysts that were consistent with the past CT. Pt will complete 7 day course of rocephin 08/04.     CBI clamped 08/03 will do a TOV noon HPI:    68M from [facility], with PMHx of prostate Ca (s/p radiation in 2015, now in remission), asthma, COPD (not on O2), HTN, HLD, Bipolar, anemia, GERD, Ileostomy s/p sigmoidal resection, PAD, and CKD presented to ED c/o hematuria X 1 week. He states he gradually developed hematuria since wednesday, which was worsening and yesterday evening he noticed blood clots. He had appointment made by Victor Manuel Russell for urologist but due to worsening hematuria, he came to ED. He also endorses painful micturition, chronic increased urinary frequency, nocturia. He denies any fever, back pain, abdominal pain, nausea, vomiting. His labs were significant for UTI and he was started on rocephin 1gm for a seven day course. Pt was admitted and seen by urology, he was placed with 3 way phillips and started on CBI . He continued to have gross hematuria and he hgb was monitored along with BMP. His hgb was stable at 11 and he was getting Fe supplement. Nephrology saw him and suggested to keep him off of nephrotoxins - his protein to creatinine ratio was 1.2 g/day which is not in the nephrotic range proteinuria. F/U with UA and protein once he is off of CBI. Urology wants to do a cystoscopy to r/o malignancy vs radiation cystitis. US bladder was not significant for anything. Renal US showed cysts that were consistent with the past CT. Pt will completed 7 day course of rocephin 08/04.     CBI clamped 08/03 on TOV he had zaki urine which was clear later at night. Pt still has elevated Cr and is to follow up with nephrology as outpt.    Pt is stable and ready to discharged back to [facility] and is recommended to follow up with urology as out patient and get a cystoscopy.

## 2019-08-03 NOTE — PROGRESS NOTE ADULT - PROBLEM SELECTOR PLAN 4
Hb at admission 12.8-> stablized at hg 11 now  f/u cbc   on ferrous sulfate 325mg oral daily
Hb at admission 12.8-> stablized at hg 11 now  f/u cbc   on ferrous sulfate 325mg oral daily
on remission. s/p radiation
Hb at admission 12.8-> stablized at hg 11 now  f/u cbc   on ferrous sulfate 325mg oral daily
on remission. s/p radiation

## 2019-08-03 NOTE — PROGRESS NOTE ADULT - SUBJECTIVE AND OBJECTIVE BOX
PGY 1 Note discussed with supervising resident and primary attending    Patient is a 68y old  Male who presents with a chief complaint of Hematuria (02 Aug 2019 11:09)      INTERVAL HPI/OVERNIGHT EVENTS: offers no new complaints, pt had lightly pink tinged urine overnight. He has clear urine in the am. CBI clamped.    MEDICATIONS  (STANDING):  ALBUTerol/ipratropium for Nebulization 3 milliLiter(s) Nebulizer every 6 hours  ARIPiprazole 12.5 milliGRAM(s) Oral daily  cefTRIAXone   IVPB 1000 milliGRAM(s) IV Intermittent every 24 hours  ferrous    sulfate 325 milliGRAM(s) Oral daily  finasteride 5 milliGRAM(s) Oral daily  gabapentin 300 milliGRAM(s) Oral three times a day  HYDROmorphone   Tablet 4 milliGRAM(s) Oral every 8 hours  lamoTRIgine 200 milliGRAM(s) Oral daily  pantoprazole    Tablet 40 milliGRAM(s) Oral before breakfast  sodium bicarbonate 650 milliGRAM(s) Oral three times a day  sodium chloride 0.9%. 1000 milliLiter(s) (70 mL/Hr) IV Continuous <Continuous>  tamsulosin 0.4 milliGRAM(s) Oral at bedtime  traZODone 50 milliGRAM(s) Oral at bedtime    MEDICATIONS  (PRN):      __________________________________________________  REVIEW OF SYSTEMS:    CONSTITUTIONAL: No fever,   EYES: no acute visual disturbances  NECK: No pain or stiffness  RESPIRATORY: No cough; No shortness of breath  CARDIOVASCULAR: No chest pain, no palpitations  GASTROINTESTINAL: No pain. No nausea or vomiting; No diarrhea   NEUROLOGICAL: No headache or numbness, no tremors  MUSCULOSKELETAL: No joint pain, no muscle pain  GENITOURINARY: phillips in place, no pain.   PSYCHIATRY: no depression , no anxiety  ALL OTHER  ROS negative        Vital Signs Last 24 Hrs  T(C): 36.7 (03 Aug 2019 05:12), Max: 36.7 (02 Aug 2019 14:32)  T(F): 98.1 (03 Aug 2019 05:12), Max: 98.1 (03 Aug 2019 05:12)  HR: 70 (03 Aug 2019 05:12) (70 - 72)  BP: 103/63 (03 Aug 2019 05:12) (99/55 - 105/58)  BP(mean): --  RR: 18 (03 Aug 2019 05:12) (17 - 18)  SpO2: 94% (03 Aug 2019 05:12) (94% - 95%)    ________________________________________________  PHYSICAL EXAM:  GENERAL: NAD  HEENT: Normocephalic;  conjunctivae and sclerae clear; moist mucous membranes;   NECK : supple  CHEST/LUNG: Clear to auscultation bilaterally with good air entry   HEART: S1 S2  regular; no murmurs, gallops or rubs  ABDOMEN: Soft, Nontender, Nondistended; Bowel sounds present- colostomy bag in place  : phillips in place, currently clamped, clear urine in phillips.   EXTREMITIES: no cyanosis; no edema; no calf tenderness  SKIN: warm and dry; no rash  NERVOUS SYSTEM:  Awake and alert; Oriented  to place, person and time ; no new deficits    _________________________________________________  LABS:                        11.1   6.43  )-----------( 146      ( 03 Aug 2019 05:38 )             35.9     08-03    142  |  114<H>  |  47<H>  ----------------------------<  109<H>  5.1   |  18<L>  |  2.86<H>    Ca    8.2<L>      03 Aug 2019 05:38  Phos  3.8     08-03  Mg     2.0     08-03          CAPILLARY BLOOD GLUCOSE            RADIOLOGY & ADDITIONAL TESTS:  < from: US Urinary Bladder (08.01.19 @ 13:40) >  FINDINGS:    Note is made of an indwelling Phillips catheter; the bladder is not   well-distended. Bladder wall thickening cannot be excluded. No gross   intraluminal masses are identified. Further assessment for described   hematuria can be performed with dedicated CT urography.      < end of copied text >  < from: US Renal (07.30.19 @ 12:19) >    Impression: Bilateral renal cysts.    No hydronephrosis.    If clinically indicated, CT urogram may be pursued for better evaluation.    < end of copied text >        Imaging Personally Reviewed:  YES  Consultant(s) Notes Reviewed:   YES    Care Discussed with Consultants :     Plan of care was discussed with patient and /or primary care giver; all questions and concerns were addressed and care was aligned with patient's wishes.

## 2019-08-03 NOTE — PROGRESS NOTE ADULT - SUBJECTIVE AND OBJECTIVE BOX
NEPHROLOGY MEDICAL CARE, [facility] - Dr. Emerson Pond/ Dr. Nallely Curran/ Dr. Kirby Angel/ Dr. Pippa Reis    Patient was seen and examined at bedside.    CC: patient no sob and doing okay.     Vital Signs Last 24 Hrs  T(C): 36.7 (03 Aug 2019 05:12), Max: 36.7 (02 Aug 2019 14:32)  T(F): 98.1 (03 Aug 2019 05:12), Max: 98.1 (03 Aug 2019 05:12)  HR: 70 (03 Aug 2019 05:12) (70 - 72)  BP: 103/63 (03 Aug 2019 05:12) (99/55 - 105/58)  BP(mean): --  RR: 18 (03 Aug 2019 05:12) (17 - 18)  SpO2: 94% (03 Aug 2019 05:12) (94% - 95%)    08-02 @ 07:01  -  08-03 @ 07:00  --------------------------------------------------------  IN: 0 mL / OUT: 2150 mL / NET: -2150 mL        PHYSICAL EXAM:  Constitutional: well developed, well nourished  and in nad  HEENT: PERRLA,  no icteric sclera and mild pallor of conjunctiva noted  Neck: No JVD, thyromegaly or adenopathy  Respiratory: reduced air entry lower lungs with no rales, wheezing or rhonchi  Cardiovascular: S1 and S2 normally heard  Gastrointestinal: soft, nondistended, nontender and normal bowel sounds heard  Extremities: No peripheral edema or cyanosis  Neurological: A/O x 3, no focal deficits  Skin: No rashes    MEDICATIONS:  ALBUTerol/ipratropium for Nebulization 3 milliLiter(s) Nebulizer every 6 hours  ARIPiprazole 12.5 milliGRAM(s) Oral daily  cefTRIAXone   IVPB 1000 milliGRAM(s) IV Intermittent every 24 hours  ferrous    sulfate 325 milliGRAM(s) Oral daily  finasteride 5 milliGRAM(s) Oral daily  gabapentin 300 milliGRAM(s) Oral three times a day  HYDROmorphone   Tablet 4 milliGRAM(s) Oral every 8 hours  lamoTRIgine 200 milliGRAM(s) Oral daily  pantoprazole    Tablet 40 milliGRAM(s) Oral before breakfast  sodium bicarbonate 650 milliGRAM(s) Oral three times a day  sodium chloride 0.9%. 1000 milliLiter(s) IV Continuous <Continuous>  tamsulosin 0.4 milliGRAM(s) Oral at bedtime  traZODone 50 milliGRAM(s) Oral at bedtime      LABS:                        11.1   6.43  )-----------( 146      ( 03 Aug 2019 05:38 )             35.9     08-03    142  |  114<H>  |  47<H>  ----------------------------<  109<H>  5.1   |  18<L>  |  2.86<H>    Ca    8.2<L>      03 Aug 2019 05:38  Phos  3.8     08-03  Mg     2.0     08-03          Magnesium, Serum: 2.0 mg/dL (08-03 @ 05:38)  Phosphorus Level, Serum: 3.8 mg/dL (08-03 @ 05:38)    Urine studies    PTH and Vit D:

## 2019-08-03 NOTE — PROGRESS NOTE ADULT - PROBLEM SELECTOR PLAN 6
on duoneb
IMPROVE VTE Individual Risk Assessment    RISK                                                                Points    [  ] Previous VTE                                                  3  [  ] Thrombophilia                                               2  [  ] Lower limb paralysis                                      2        (unable to hold up >15 seconds)    [  ] Current Cancer                                              2         (within 6 months)  [  ] Immobilization > 24 hrs                                1  [  ] ICU/CCU stay > 24 hours                              1  [ x ] Age > 60                                                      1    IMPROVE VTE Score 1  will hold DVT PPx due t hematuria
on duoneb
IMPROVE VTE Individual Risk Assessment    RISK                                                                Points    [  ] Previous VTE                                                  3  [  ] Thrombophilia                                               2  [  ] Lower limb paralysis                                      2        (unable to hold up >15 seconds)    [  ] Current Cancer                                              2         (within 6 months)  [  ] Immobilization > 24 hrs                                1  [  ] ICU/CCU stay > 24 hours                              1  [ x ] Age > 60                                                      1    IMPROVE VTE Score 1  will hold DVT PPx due t hematuria
on duoneb

## 2019-08-03 NOTE — PROGRESS NOTE ADULT - ASSESSMENT
68M from [facility], with PMHx of prostate Ca (s/p radiation in 2015, now in remission), asthma, COPD (not on O2), HTN, HLD, Bipolar, anemia, GERD, Ileostomy s/p sigmoidal resection, PAD, and CKD presented to ED c/o hematuria X 1 week. Pt got a three way phillips and was started with CBI. Pt has clear urine this morning, we will do a trial of void and he will likely need cystoscopy after he completes abx. course.

## 2019-08-03 NOTE — PROGRESS NOTE ADULT - ASSESSMENT
A/P 67 yo M s/p radiation for prostate cancer in 2015 presented with gross hematuria, now resolved Ucxr: E. coli    - keep CBI clamped  - remove phillips catheter for TOV  - continue antibiotics  - when completes treatment of UTI  - continue medical management   - discharge planning per medicine  - outpatient cystoscopy next week in the office

## 2019-08-03 NOTE — PROGRESS NOTE ADULT - PROBLEM SELECTOR PROBLEM 6
COPD (chronic obstructive pulmonary disease)
COPD (chronic obstructive pulmonary disease)
Prophylactic measure
COPD (chronic obstructive pulmonary disease)
Prophylactic measure

## 2019-08-03 NOTE — PROGRESS NOTE ADULT - PROBLEM SELECTOR PLAN 2
UA positive- culture positive for Ecoli PAN sensitive  pt. symptomatic, afebrile, no leukocytosis- on rocephin day 6 (08/03) dc after 1 day

## 2019-08-03 NOTE — DISCHARGE NOTE PROVIDER - CARE PROVIDERS DIRECT ADDRESSES
,DirectAddress_Unknown,[email or web address],DirectAddress_Unknown

## 2019-08-04 LAB
ANION GAP SERPL CALC-SCNC: 7 MMOL/L — SIGNIFICANT CHANGE UP (ref 5–17)
BUN SERPL-MCNC: 46 MG/DL — HIGH (ref 7–18)
CALCIUM SERPL-MCNC: 8 MG/DL — LOW (ref 8.4–10.5)
CHLORIDE SERPL-SCNC: 118 MMOL/L — HIGH (ref 96–108)
CO2 SERPL-SCNC: 18 MMOL/L — LOW (ref 22–31)
CREAT SERPL-MCNC: 2.44 MG/DL — HIGH (ref 0.5–1.3)
GLUCOSE SERPL-MCNC: 103 MG/DL — HIGH (ref 70–99)
HCT VFR BLD CALC: 33.6 % — LOW (ref 39–50)
HGB BLD-MCNC: 10.5 G/DL — LOW (ref 13–17)
MAGNESIUM SERPL-MCNC: 2.2 MG/DL — SIGNIFICANT CHANGE UP (ref 1.6–2.6)
MCHC RBC-ENTMCNC: 26.9 PG — LOW (ref 27–34)
MCHC RBC-ENTMCNC: 31.3 GM/DL — LOW (ref 32–36)
MCV RBC AUTO: 86.2 FL — SIGNIFICANT CHANGE UP (ref 80–100)
NRBC # BLD: 0 /100 WBCS — SIGNIFICANT CHANGE UP (ref 0–0)
PHOSPHATE SERPL-MCNC: 3.6 MG/DL — SIGNIFICANT CHANGE UP (ref 2.5–4.5)
PLATELET # BLD AUTO: 151 K/UL — SIGNIFICANT CHANGE UP (ref 150–400)
POTASSIUM SERPL-MCNC: 4.9 MMOL/L — SIGNIFICANT CHANGE UP (ref 3.5–5.3)
POTASSIUM SERPL-SCNC: 4.9 MMOL/L — SIGNIFICANT CHANGE UP (ref 3.5–5.3)
RBC # BLD: 3.9 M/UL — LOW (ref 4.2–5.8)
RBC # FLD: 15.9 % — HIGH (ref 10.3–14.5)
SODIUM SERPL-SCNC: 143 MMOL/L — SIGNIFICANT CHANGE UP (ref 135–145)
WBC # BLD: 5.63 K/UL — SIGNIFICANT CHANGE UP (ref 3.8–10.5)
WBC # FLD AUTO: 5.63 K/UL — SIGNIFICANT CHANGE UP (ref 3.8–10.5)

## 2019-08-04 RX ORDER — SODIUM BICARBONATE 1 MEQ/ML
0.06 SYRINGE (ML) INTRAVENOUS
Qty: 75 | Refills: 0 | Status: DISCONTINUED | OUTPATIENT
Start: 2019-08-04 | End: 2019-08-05

## 2019-08-04 RX ORDER — IPRATROPIUM/ALBUTEROL SULFATE 18-103MCG
3 AEROSOL WITH ADAPTER (GRAM) INHALATION
Refills: 0 | Status: DISCONTINUED | OUTPATIENT
Start: 2019-08-04 | End: 2019-08-05

## 2019-08-04 RX ADMIN — FINASTERIDE 5 MILLIGRAM(S): 5 TABLET, FILM COATED ORAL at 12:19

## 2019-08-04 RX ADMIN — GABAPENTIN 300 MILLIGRAM(S): 400 CAPSULE ORAL at 21:29

## 2019-08-04 RX ADMIN — HYDROMORPHONE HYDROCHLORIDE 4 MILLIGRAM(S): 2 INJECTION INTRAMUSCULAR; INTRAVENOUS; SUBCUTANEOUS at 05:43

## 2019-08-04 RX ADMIN — HYDROMORPHONE HYDROCHLORIDE 4 MILLIGRAM(S): 2 INJECTION INTRAMUSCULAR; INTRAVENOUS; SUBCUTANEOUS at 22:30

## 2019-08-04 RX ADMIN — CEFTRIAXONE 100 MILLIGRAM(S): 500 INJECTION, POWDER, FOR SOLUTION INTRAMUSCULAR; INTRAVENOUS at 22:02

## 2019-08-04 RX ADMIN — GABAPENTIN 300 MILLIGRAM(S): 400 CAPSULE ORAL at 05:44

## 2019-08-04 RX ADMIN — SODIUM CHLORIDE 70 MILLILITER(S): 9 INJECTION INTRAMUSCULAR; INTRAVENOUS; SUBCUTANEOUS at 12:18

## 2019-08-04 RX ADMIN — ARIPIPRAZOLE 12.5 MILLIGRAM(S): 15 TABLET ORAL at 12:21

## 2019-08-04 RX ADMIN — Medication 70 MEQ/KG/HR: at 15:25

## 2019-08-04 RX ADMIN — PANTOPRAZOLE SODIUM 40 MILLIGRAM(S): 20 TABLET, DELAYED RELEASE ORAL at 05:44

## 2019-08-04 RX ADMIN — HYDROMORPHONE HYDROCHLORIDE 4 MILLIGRAM(S): 2 INJECTION INTRAMUSCULAR; INTRAVENOUS; SUBCUTANEOUS at 06:30

## 2019-08-04 RX ADMIN — LAMOTRIGINE 200 MILLIGRAM(S): 25 TABLET, ORALLY DISINTEGRATING ORAL at 12:20

## 2019-08-04 RX ADMIN — HYDROMORPHONE HYDROCHLORIDE 4 MILLIGRAM(S): 2 INJECTION INTRAMUSCULAR; INTRAVENOUS; SUBCUTANEOUS at 21:29

## 2019-08-04 RX ADMIN — Medication 650 MILLIGRAM(S): at 05:44

## 2019-08-04 RX ADMIN — Medication 325 MILLIGRAM(S): at 12:19

## 2019-08-04 RX ADMIN — HYDROMORPHONE HYDROCHLORIDE 4 MILLIGRAM(S): 2 INJECTION INTRAMUSCULAR; INTRAVENOUS; SUBCUTANEOUS at 15:14

## 2019-08-04 RX ADMIN — HYDROMORPHONE HYDROCHLORIDE 4 MILLIGRAM(S): 2 INJECTION INTRAMUSCULAR; INTRAVENOUS; SUBCUTANEOUS at 13:37

## 2019-08-04 RX ADMIN — Medication 50 MILLIGRAM(S): at 21:29

## 2019-08-04 RX ADMIN — TAMSULOSIN HYDROCHLORIDE 0.4 MILLIGRAM(S): 0.4 CAPSULE ORAL at 21:29

## 2019-08-04 RX ADMIN — GABAPENTIN 300 MILLIGRAM(S): 400 CAPSULE ORAL at 13:36

## 2019-08-04 NOTE — PROGRESS NOTE ADULT - SUBJECTIVE AND OBJECTIVE BOX
NEPHROLOGY MEDICAL CARE, [facility] - Dr. Emersno Pond/ Dr. Nallely Curran/ Dr. Kirby Angel/ Dr. Pippa Reis    Patient was seen and examined at bedside.    CC: patient is doing okay; phillips's cath removed by urology.    Vital Signs Last 24 Hrs  T(C): 36.7 (04 Aug 2019 05:15), Max: 36.7 (03 Aug 2019 14:20)  T(F): 98 (04 Aug 2019 05:15), Max: 98.1 (03 Aug 2019 14:20)  HR: 69 (04 Aug 2019 05:15) (66 - 69)  BP: 120/73 (04 Aug 2019 05:15) (104/63 - 120/73)  BP(mean): --  RR: 18 (04 Aug 2019 05:15) (18 - 18)  SpO2: 95% (04 Aug 2019 05:15) (95% - 95%)    08-03 @ 07:01  -  08-04 @ 07:00  --------------------------------------------------------  IN: 0 mL / OUT: 700 mL / NET: -700 mL        PHYSICAL EXAM:  Constitutional: well developed, well nourished  and in nad  HEENT: PERRLA,  no icteric sclera and mild pallor of conjunctiva noted  Neck: No JVD, thyromegaly or adenopathy  Respiratory: reduced air entry lower lungs with no rales, wheezing or rhonchi  Cardiovascular: S1 and S2 normally heard  Gastrointestinal: soft, nondistended, nontender and normal bowel sounds heard  Extremities: No peripheral edema or cyanosis  Neurological: A/O x 3, no focal deficits  Skin: No rashes      MEDICATIONS:  ALBUTerol/ipratropium for Nebulization 3 milliLiter(s) Nebulizer every 6 hours  ARIPiprazole 12.5 milliGRAM(s) Oral daily  cefTRIAXone   IVPB 1000 milliGRAM(s) IV Intermittent every 24 hours  ferrous    sulfate 325 milliGRAM(s) Oral daily  finasteride 5 milliGRAM(s) Oral daily  gabapentin 300 milliGRAM(s) Oral three times a day  HYDROmorphone   Tablet 4 milliGRAM(s) Oral every 8 hours  lamoTRIgine 200 milliGRAM(s) Oral daily  pantoprazole    Tablet 40 milliGRAM(s) Oral before breakfast  sodium bicarbonate 650 milliGRAM(s) Oral three times a day  sodium chloride 0.9%. 1000 milliLiter(s) IV Continuous <Continuous>  tamsulosin 0.4 milliGRAM(s) Oral at bedtime  traZODone 50 milliGRAM(s) Oral at bedtime      LABS:                        10.5   5.63  )-----------( 151      ( 04 Aug 2019 06:23 )             33.6     08-04    143  |  118<H>  |  46<H>  ----------------------------<  103<H>  4.9   |  18<L>  |  2.44<H>    Ca    8.0<L>      04 Aug 2019 06:23  Phos  3.6     08-04  Mg     2.2     08-04          Magnesium, Serum: 2.2 mg/dL (08-04 @ 06:23)  Phosphorus Level, Serum: 3.6 mg/dL (08-04 @ 06:23)    Urine studies    PTH and Vit D:

## 2019-08-04 NOTE — PROGRESS NOTE ADULT - ASSESSMENT
1. CKD(stage 3) : likely 2/2 HTN/Renovascular ds  . baseline around 2.2-2.4   - scr improved and close to baseline, give IVFs x 1 day and changed to 1/2 NS with 75meq of sodium bicarbonate at 70cc/hr x day.   - f/u US  Bladder shows no distension.   - Pr/Cr ratio 1.2 g/day. non-nephrotic range proteinuria , c/w htn induced ckd  - repeat UA and protein/cr ratio once off CBI   - -Keep patient euvolemic   -Avoid Nephrotoxic Meds/ Agents such as (NSAIDs, IV contrast, Aminoglycosides such as gentamicin, -Gadolinium contrast, Phosphate containing enemas, etc..)  -Adjust Medications according to eGFR.   - Monitor BMP daily   -added low losartan however, patient blood pressure was low and discontinued by primary team.     2. Hematuria: with clots  - h/o Prostate ca s/p radiation   - urology f/u   - avoid IV cont     3. HTN :   - BP on the low normal and off ARBs.  - keep BP >110/80 and < 130/80   -monitor BP     4.METABOLIC ACIDOSIS: Mild, secondary to ckd  -CO2 is trending down. f/u co2 daily  -fluids as above; hold Naho3 tabs for now and restart once fluids discontinued.      5.ANEMIA: Mild  -f/u Hb daily    6.B/L RENAL CYSTS: most likely benign  -suggest repeat renal us in 3 months to check cyst stability

## 2019-08-04 NOTE — PROGRESS NOTE ADULT - SUBJECTIVE AND OBJECTIVE BOX
Patient is a 68y old  Male who presents with a chief complaint of Hematuria (03 Aug 2019 12:27)    PATIENT IS SEEN AND EXAMINED IN MEDICAL FLOOR.    ALLERGIES:  No Known Allergies    Daily Weight in k.1 (04 Aug 2019 05:15)    VITALS:    Vital Signs Last 24 Hrs  T(C): 36.7 (04 Aug 2019 05:15), Max: 36.7 (03 Aug 2019 14:20)  T(F): 98 (04 Aug 2019 05:15), Max: 98.1 (03 Aug 2019 14:20)  HR: 69 (04 Aug 2019 05:15) (66 - 69)  BP: 120/73 (04 Aug 2019 05:15) (104/63 - 120/73)  BP(mean): --  RR: 18 (04 Aug 2019 05:15) (18 - 18)  SpO2: 95% (04 Aug 2019 05:15) (95% - 95%)    LABS:    CBC Full  -  ( 04 Aug 2019 06:23 )  WBC Count : 5.63 K/uL  RBC Count : 3.90 M/uL  Hemoglobin : 10.5 g/dL  Hematocrit : 33.6 %  Platelet Count - Automated : 151 K/uL  Mean Cell Volume : 86.2 fl  Mean Cell Hemoglobin : 26.9 pg  Mean Cell Hemoglobin Concentration : 31.3 gm/dL  Auto Neutrophil # : x  Auto Lymphocyte # : x  Auto Monocyte # : x  Auto Eosinophil # : x  Auto Basophil # : x  Auto Neutrophil % : x  Auto Lymphocyte % : x  Auto Monocyte % : x  Auto Eosinophil % : x  Auto Basophil % : x      08-04    143  |  118<H>  |  46<H>  ----------------------------<  103<H>  4.9   |  18<L>  |  2.44<H>    Ca    8.0<L>      04 Aug 2019 06:23  Phos  3.6     08-04  Mg     2.2     08-04      Creatinine Trend: 2.44<--, 2.86<--, 2.24<--, 2.30<--, 2.38<--, 2.29<--  I&O's Summary    03 Aug 2019 07:01  -  04 Aug 2019 07:00  --------------------------------------------------------  IN: 0 mL / OUT: 700 mL / NET: -700 mL      .Urine   @ 00:29   >100,000 CFU/ml Escherichia coli  --  Escherichia coli      MEDICATIONS:    MEDICATIONS  (STANDING):  ALBUTerol/ipratropium for Nebulization 3 milliLiter(s) Nebulizer every 6 hours  ARIPiprazole 12.5 milliGRAM(s) Oral daily  cefTRIAXone   IVPB 1000 milliGRAM(s) IV Intermittent every 24 hours  ferrous    sulfate 325 milliGRAM(s) Oral daily  finasteride 5 milliGRAM(s) Oral daily  gabapentin 300 milliGRAM(s) Oral three times a day  HYDROmorphone   Tablet 4 milliGRAM(s) Oral every 8 hours  lamoTRIgine 200 milliGRAM(s) Oral daily  pantoprazole    Tablet 40 milliGRAM(s) Oral before breakfast  sodium bicarbonate 650 milliGRAM(s) Oral three times a day  sodium chloride 0.9%. 1000 milliLiter(s) (70 mL/Hr) IV Continuous <Continuous>  tamsulosin 0.4 milliGRAM(s) Oral at bedtime  traZODone 50 milliGRAM(s) Oral at bedtime      MEDICATIONS  (PRN):      REVIEW OF SYSTEMS:                           ALL ROS DONE [ X   ]    CONSTITUTIONAL:  LETHARGIC [   ], FEVER [   ], UNRESPONSIVE [   ]  CVS:  CP  [   ], SOB, [   ], PALPITATIONS [   ], DIZZYNESS [   ]  RS: COUGH [   ], SPUTUM [   ]  GI: ABDOMINAL PAIN [   ], NAUSEA [   ], VOMITINGS [   ], DIARRHEA [   ], CONSTIPATION [   ]  :  DYSURIA [   ], NOCTURIA [   ], INCREASED FREQUENCY [   ], DRIBLING [   ],  SKELETAL: PAINFUL JOINTS [   ], SWOLLEN JOINTS [   ], NECK ACHE [   ], LOW BACK ACHE [   ],  SKIN : ULCERS [   ], RASH [   ], ITCHING [   ]  CNS: HEAD ACHE [   ], DOUBLE VISION [   ], BLURRED VISION [   ], AMS / CONFUSION [   ], SEIZURES [   ], WEAKNESS [   ],TINGLING / NUMBNESS [   ]    PHYSICAL EXAMINATION:  GENERAL APPEARANCE: NO DISTRESS  HEENT:  NO PALLOR, NO  JVD,  NO   NODES, NECK SUPPLE  CVS: S1 +, S2 +,   RS: AEEB,  OCCASIONAL  RALES +,   NO RONCHI  ABD: SOFT, NT, NO, BS +                                   COLOSTOMY +  EXT: NO PE  SKIN: WARM,   SKELETAL:  ROM ACCEPTABLE  CNS:  AAO X 3   ,NO   DEFICITS    RADIOLOGY :    < from: US Urinary Bladder (19 @ 13:40) >  FINDINGS:    Note is made of an indwelling Koehler catheter; the bladder is not   well-distended. Bladder wall thickening cannot be excluded. No gross   intraluminal masses are identified. Further assessment for described   hematuria can be performed with dedicated CT urography.    < end of copied text >    < from: CT Abdomen and Pelvis No Cont (19 @ 21:30) >  IMPRESSION:   Limited evaluation of the liver due to lack of intravenous contrast.   Within this limitation, no discernible abscess or mass identified.  Porcelain gallbladder.  Additional findings, as above.    < end of copied text >      ASSESSMENT :     Gross hematuria  BPH with urinary obstruction  Prostate CA  CKD (chronic kidney disease)  PAD (peripheral artery disease)  HLD (hyperlipidemia)  HTN (hypertension)  IBD (inflammatory bowel disease)  Bipolar disorder  Anemia  COPD, mild  History of creation of ostomy      PLAN:  HPI:  68M from [facility], with PMHx of prostate Ca (s/p radiation in , now in remission), asthma, COPD (not on O2), HTN, HLD, Bipolar, anemia, GERD, Ileostomy s/p sigmoidal resection, PAD, and CKD presented to ED c/o hematuria X 1 week. He states he gradually developed hematuria since wednesday, which was worsening and yesterday evening he noticed blood clots. He had appointment made by [facility] for urologist but due to worsening hematuria, he came to ED. He also endorses painful micturition, chronic increased urinary frequency, nocturia. He denies any fever, back pain, abdominal pain, nausea, vomiting. (2019 21:20)    - DC PLAN BACK TO [facility] IN AM , IF STABLE  - HEMATURIA SUSPECT DUE TO RADIATION CYSTITIS , OLD CANCER PROSTATE AND S/P RADIATION Rx - RESOLVING HEMATURIA , S/P CBI. STABLE Hb.  - UROLOGY F/UP TO OBTAIN CYSTOSCOPY  - UTI ( E.COLI ) ON IV ROCEPHIN  - GI AND DVT PROPHYLAXIS  -

## 2019-08-05 ENCOUNTER — TRANSCRIPTION ENCOUNTER (OUTPATIENT)
Age: 69
End: 2019-08-05

## 2019-08-05 VITALS
OXYGEN SATURATION: 98 % | TEMPERATURE: 98 F | RESPIRATION RATE: 18 BRPM | HEART RATE: 81 BPM | DIASTOLIC BLOOD PRESSURE: 70 MMHG | SYSTOLIC BLOOD PRESSURE: 149 MMHG

## 2019-08-05 PROCEDURE — 81001 URINALYSIS AUTO W/SCOPE: CPT

## 2019-08-05 PROCEDURE — 86901 BLOOD TYPING SEROLOGIC RH(D): CPT

## 2019-08-05 PROCEDURE — 87186 SC STD MICRODIL/AGAR DIL: CPT

## 2019-08-05 PROCEDURE — 82607 VITAMIN B-12: CPT

## 2019-08-05 PROCEDURE — 83036 HEMOGLOBIN GLYCOSYLATED A1C: CPT

## 2019-08-05 PROCEDURE — 99285 EMERGENCY DEPT VISIT HI MDM: CPT | Mod: 25

## 2019-08-05 PROCEDURE — 83735 ASSAY OF MAGNESIUM: CPT

## 2019-08-05 PROCEDURE — 80048 BASIC METABOLIC PNL TOTAL CA: CPT

## 2019-08-05 PROCEDURE — 86900 BLOOD TYPING SEROLOGIC ABO: CPT

## 2019-08-05 PROCEDURE — 80053 COMPREHEN METABOLIC PANEL: CPT

## 2019-08-05 PROCEDURE — 82728 ASSAY OF FERRITIN: CPT

## 2019-08-05 PROCEDURE — 83935 ASSAY OF URINE OSMOLALITY: CPT

## 2019-08-05 PROCEDURE — 84300 ASSAY OF URINE SODIUM: CPT

## 2019-08-05 PROCEDURE — G0103: CPT

## 2019-08-05 PROCEDURE — 76775 US EXAM ABDO BACK WALL LIM: CPT

## 2019-08-05 PROCEDURE — 85027 COMPLETE CBC AUTOMATED: CPT

## 2019-08-05 PROCEDURE — 85730 THROMBOPLASTIN TIME PARTIAL: CPT

## 2019-08-05 PROCEDURE — 84133 ASSAY OF URINE POTASSIUM: CPT

## 2019-08-05 PROCEDURE — 84560 ASSAY OF URINE/URIC ACID: CPT

## 2019-08-05 PROCEDURE — 87086 URINE CULTURE/COLONY COUNT: CPT

## 2019-08-05 PROCEDURE — 86850 RBC ANTIBODY SCREEN: CPT

## 2019-08-05 PROCEDURE — 83540 ASSAY OF IRON: CPT

## 2019-08-05 PROCEDURE — 80061 LIPID PANEL: CPT

## 2019-08-05 PROCEDURE — 76857 US EXAM PELVIC LIMITED: CPT

## 2019-08-05 PROCEDURE — 83550 IRON BINDING TEST: CPT

## 2019-08-05 PROCEDURE — 36415 COLL VENOUS BLD VENIPUNCTURE: CPT

## 2019-08-05 PROCEDURE — 84100 ASSAY OF PHOSPHORUS: CPT

## 2019-08-05 PROCEDURE — 82436 ASSAY OF URINE CHLORIDE: CPT

## 2019-08-05 PROCEDURE — 84443 ASSAY THYROID STIM HORMONE: CPT

## 2019-08-05 PROCEDURE — 97161 PT EVAL LOW COMPLEX 20 MIN: CPT

## 2019-08-05 PROCEDURE — 84156 ASSAY OF PROTEIN URINE: CPT

## 2019-08-05 PROCEDURE — 82570 ASSAY OF URINE CREATININE: CPT

## 2019-08-05 PROCEDURE — 85610 PROTHROMBIN TIME: CPT

## 2019-08-05 RX ORDER — ARIPIPRAZOLE 15 MG/1
2.5 TABLET ORAL
Qty: 0 | Refills: 0 | DISCHARGE

## 2019-08-05 RX ORDER — ARIPIPRAZOLE 15 MG/1
1.5 TABLET ORAL
Qty: 0 | Refills: 0 | DISCHARGE

## 2019-08-05 RX ADMIN — GABAPENTIN 300 MILLIGRAM(S): 400 CAPSULE ORAL at 06:16

## 2019-08-05 RX ADMIN — PANTOPRAZOLE SODIUM 40 MILLIGRAM(S): 20 TABLET, DELAYED RELEASE ORAL at 06:16

## 2019-08-05 RX ADMIN — LAMOTRIGINE 200 MILLIGRAM(S): 25 TABLET, ORALLY DISINTEGRATING ORAL at 13:21

## 2019-08-05 RX ADMIN — GABAPENTIN 300 MILLIGRAM(S): 400 CAPSULE ORAL at 13:21

## 2019-08-05 RX ADMIN — FINASTERIDE 5 MILLIGRAM(S): 5 TABLET, FILM COATED ORAL at 13:22

## 2019-08-05 RX ADMIN — ARIPIPRAZOLE 12.5 MILLIGRAM(S): 15 TABLET ORAL at 13:21

## 2019-08-05 RX ADMIN — Medication 325 MILLIGRAM(S): at 13:21

## 2019-08-05 RX ADMIN — HYDROMORPHONE HYDROCHLORIDE 4 MILLIGRAM(S): 2 INJECTION INTRAMUSCULAR; INTRAVENOUS; SUBCUTANEOUS at 07:35

## 2019-08-05 RX ADMIN — HYDROMORPHONE HYDROCHLORIDE 4 MILLIGRAM(S): 2 INJECTION INTRAMUSCULAR; INTRAVENOUS; SUBCUTANEOUS at 06:21

## 2019-08-05 RX ADMIN — HYDROMORPHONE HYDROCHLORIDE 4 MILLIGRAM(S): 2 INJECTION INTRAMUSCULAR; INTRAVENOUS; SUBCUTANEOUS at 13:20

## 2019-08-05 NOTE — PROGRESS NOTE ADULT - NSHPATTENDINGPLANDISCUSS_GEN_ALL_CORE
signed out to specials PA
patient and nurse.
patient.
pt/pts brother and resident
pt and resident
pt and resident
patient
D/W STAFF AND PATIENT

## 2019-08-05 NOTE — PROGRESS NOTE ADULT - ASSESSMENT
1. CKD(stage 3) : likely 2/2 HTN/Renovascular ds  . baseline around 2.2-2.4   - scr improved and close to baseline,   - f/u US  Bladder shows no distension, mass    - Pr/Cr ratio 1.2 g/day. non-nephrotic range proteinuria , c/w htn induced ckd  - repeat UA and protein/cr ratio once off CBI   - -Keep patient euvolemic   -Avoid Nephrotoxic Meds/ Agents such as (NSAIDs, IV contrast, Aminoglycosides such as gentamicin, -Gadolinium contrast, Phosphate containing enemas, etc..)  -Adjust Medications according to eGFR.   - Monitor BMP daily   -added low losartan however, patient blood pressure was low and discontinued by primary team.     2. Hematuria: with clots  - h/o Prostate ca s/p radiation   - urology f/u : OP cystoscopy in a week   - avoid IV cont     3. HTN :   - BP on the low normal and off ARBs.  - keep BP >110/80 and < 130/80   -monitor BP     4.METABOLIC ACIDOSIS: Mild, secondary to ckd  -CO2 is trending down. f/u co2 daily  -fluids as above;cont with NaHCO3       5.ANEMIA: Mild  -f/u Hb daily    6.B/L RENAL CYSTS: most likely benign  -suggest repeat renal us in 3 months to check cyst stability 1. CKD(stage 3) : likely 2/2 HTN/Renovascular ds  . baseline around 2.2-2.4   - scr improved and close to baseline,   - no new labs today   -  US  Bladder shows no distension, mass    - Pr/Cr ratio 1.2 g/day. non-nephrotic range proteinuria , c/w htn induced ckd  - repeat UA and protein/cr ratio once off CBI   - -Keep patient euvolemic   -Avoid Nephrotoxic Meds/ Agents such as (NSAIDs, IV contrast, Aminoglycosides such as gentamicin, -Gadolinium contrast, Phosphate containing enemas, etc..)  -Adjust Medications according to eGFR.   -added low losartan however, patient blood pressure was low and discontinued by primary team.   - Pt will f/u Dr coleman for repeat BMP and Regarding adding ACEi/ARBs as outpatient     2. Hematuria: with clots  - h/o Prostate ca s/p radiation   - urology f/u : OP cystoscopy in a week   - avoid IV cont     3. HTN :   - BP on the low normal and off ARBs.  - keep BP >110/80 and < 130/80   -monitor BP     4.METABOLIC ACIDOSIS: Mild, secondary to ckd  -CO2 is trending down. f/u co2 daily  -dc with NaHCO3 tabs   - Pt to f/u PCP Dr Coleman for BMP      5.ANEMIA: Mild  -f/u Hb daily    6.B/L RENAL CYSTS: most likely benign  -suggest repeat renal us in 3 months to check cyst stability 1. CKD(stage 3) : likely 2/2 HTN/Renovascular ds  . baseline around 2.2-2.4   - scr improved and close to baseline,   - no new labs today   -  US  Bladder shows no distension, mass    - Pr/Cr ratio 1.2 g/day. non-nephrotic range proteinuria , c/w htn induced ckd  - repeat UA and protein/cr ratio once off CBI   - -Keep patient euvolemic   -Avoid Nephrotoxic Meds/ Agents such as (NSAIDs, IV contrast, Aminoglycosides such as gentamicin, -Gadolinium contrast, Phosphate containing enemas, etc..)  -Adjust Medications according to eGFR.   -added low losartan however, patient blood pressure was low and discontinued by primary team.   - Pt will f/u Dr coleman for repeat BMP and Regarding adding ACEi/ARBs as outpatient   -pt will need to f/u with Dr Stanley Orr for further renal care    2. Hematuria: with clots  - h/o Prostate ca s/p radiation   - urology f/u : OP cystoscopy in a week   - avoid IV cont     3. HTN :   - BP on the low normal and off ARBs.  - keep BP >110/80 and < 130/80   -monitor BP     4.METABOLIC ACIDOSIS: Mild, secondary to ckd  -CO2 is trending down. f/u co2 daily  -dc with NaHCO3 tabs   - Pt to f/u PCP Dr Coleman for BMP      5.ANEMIA: Mild  -f/u Hb daily    6.B/L RENAL CYSTS: most likely benign  -suggest repeat renal us in 3 months to check cyst stability

## 2019-08-05 NOTE — PROGRESS NOTE ADULT - PROVIDER SPECIALTY LIST ADULT
Internal Medicine
Nephrology
Urology
Internal Medicine
Urology
Internal Medicine

## 2019-08-05 NOTE — DISCHARGE NOTE NURSING/CASE MANAGEMENT/SOCIAL WORK - NSDCDPATPORTLINK_GEN_ALL_CORE
You can access the [facility] Portal, offered by [facility], by registering with the following website: [email or web address]

## 2019-08-05 NOTE — DIETITIAN INITIAL EVALUATION ADULT. - PERTINENT LABORATORY DATA
08-04 Na143 mmol/L Glu 103 mg/dL<H> K+ 4.9 mmol/L Cr  2.44 mg/dL<H> BUN 46 mg/dL<H>   08-04 Phos 3.6 mg/dL    eGFR=30   07-30 Alb 3.4 g/dL<L>     07-30 [id number] 6.0 %<H>   07-30 Chol 209 mg/dL<H>  mg/dL HDL 52 mg/dL Trig 182 mg/dL<H>

## 2019-08-05 NOTE — PROGRESS NOTE ADULT - SUBJECTIVE AND OBJECTIVE BOX
SUBJECTIVE:  pt feels fine and denies cp,sob,gi or gu/uremic  symptoms.  No more hematuria , Pt able to void without any difficulty without FC       Current meds:    ALBUTerol/ipratropium for Nebulization. 3 milliLiter(s) Nebulizer two times a day  ARIPiprazole 12.5 milliGRAM(s) Oral daily  ferrous    sulfate 325 milliGRAM(s) Oral daily  finasteride 5 milliGRAM(s) Oral daily  gabapentin 300 milliGRAM(s) Oral three times a day  HYDROmorphone   Tablet 4 milliGRAM(s) Oral every 8 hours  lamoTRIgine 200 milliGRAM(s) Oral daily  pantoprazole    Tablet 40 milliGRAM(s) Oral before breakfast  sodium bicarbonate  Infusion 0.056 mEq/kG/Hr IV Continuous <Continuous>  tamsulosin 0.4 milliGRAM(s) Oral at bedtime  traZODone 50 milliGRAM(s) Oral at bedtime      Vital Signs    T(F): 97.7 (19 @ 06:09), Max: 97.9 (19 @ 14:36)  HR: 93 (19 @ 11:48) (75 - 93)  BP: 136/78 (19 @ 11:48) (122/68 - 138/79)  ABP: --  RR: 17 (19 @ 06:09) (17 - 18)  SpO2: 98% (19 @ 11:48) (96% - 98%)  Wt(kg): --  CVP(cm H2O): --  CO: --  PCWP: --    I and O's:     @ 07:01  -   @ 07:00  --------------------------------------------------------  IN:  Total IN: 0 mL    OUT:    Indwelling Catheter - Urethral: 700 mL  Total OUT: 700 mL    Total NET: -700 mL       @ 07:01  -   @ 12:40  --------------------------------------------------------  IN:  Total IN: 0 mL    OUT:    Voided: 600 mL  Total OUT: 600 mL    Total NET: -600 mL        Daily     Daily Weight in k.2 (05 Aug 2019 10:32)    PHYSICAL EXAM:  Constitutional: well developed, well nourished  and in nad  HEENT: PERRLA,  no icteric sclera and mild pallor of conjunctiva noted  Neck: No JVD, thyromegaly or adenopathy  Respiratory: reduced air entry lower lungs with no rales, wheezing or rhonchi  Cardiovascular: S1 and S2 normally heard  Gastrointestinal: soft, nondistended, nontender and normal bowel sounds heard  Extremities: No peripheral edema or cyanosis  Neurological: A/O x 3, no focal deficits  : No flank or cva tenderness palpated.  Skin: No rashes      LABS:    CBC:                          10.5   5.63  )-----------( 151      ( 04 Aug 2019 06:23 )             33.6           BMP:        143  |  118<H>  |  46<H>  ----------------------------<  103<H>  4.9   |  18<L>  |  2.44<H>      142  |  114<H>  |  47<H>  ----------------------------<  109<H>  5.1   |  18<L>  |  2.86<H>    Ca    8.0<L>      04 Aug 2019 06:23  Ca    8.2<L>      03 Aug 2019 05:38  Phos  3.6       Phos  3.8       Mg     2.2       Mg     2.0                 URINE STUDIES:        Creatinine, Random Urine: 66 mg/dL ( @ 13:07)  Potassium, Random Urine: 27 mmol/L ( @ 13:07)  Sodium, Random Urine: 70 mmol/L ( @ 13:07)  Chloride, Random Urine: 86 mmol/L ( @ 13:07)  Osmolality, Random Urine: 413 mosm/Kg ( @ 13:07)      RADIOLOGY & ADDITIONAL STUDIES:    < from: US Urinary Bladder (19 @ 13:40) >    FINDINGS:    Note is made of an indwelling Koehler catheter; the bladder is not   well-distended. Bladder wall thickening cannot be excluded. No gross   intraluminal masses are identified. Further assessment for described   hematuria can be performed with dedicated CT urography.    IMPRESSION: As above.        < end of copied text > SUBJECTIVE:  pt feels fine and denies cp,sob,gi or gu/uremic  symptoms.  No more hematuria , Pt able to void without any difficulty without FC       Current meds:    ALBUTerol/ipratropium for Nebulization. 3 milliLiter(s) Nebulizer two times a day  ARIPiprazole 12.5 milliGRAM(s) Oral daily  ferrous    sulfate 325 milliGRAM(s) Oral daily  finasteride 5 milliGRAM(s) Oral daily  gabapentin 300 milliGRAM(s) Oral three times a day  HYDROmorphone   Tablet 4 milliGRAM(s) Oral every 8 hours  lamoTRIgine 200 milliGRAM(s) Oral daily  pantoprazole    Tablet 40 milliGRAM(s) Oral before breakfast  sodium bicarbonate  Infusion 0.056 mEq/kG/Hr IV Continuous <Continuous>  tamsulosin 0.4 milliGRAM(s) Oral at bedtime  traZODone 50 milliGRAM(s) Oral at bedtime      Vital Signs    T(F): 97.7 (19 @ 06:09), Max: 97.9 (19 @ 14:36)  HR: 93 (19 @ 11:48) (75 - 93)  BP: 136/78 (19 @ 11:48) (122/68 - 138/79)  ABP: --  RR: 17 (19 @ 06:09) (17 - 18)  SpO2: 98% (19 @ 11:48) (96% - 98%)  Wt(kg): --  CVP(cm H2O): --  CO: --  PCWP: --    I and O's:     @ 07:01  -   @ 07:00  --------------------------------------------------------  IN:  Total IN: 0 mL    OUT:    Indwelling Catheter - Urethral: 700 mL  Total OUT: 700 mL    Total NET: -700 mL       @ 07:01  -   @ 12:40  --------------------------------------------------------  IN:  Total IN: 0 mL    OUT:    Voided: 600 mL  Total OUT: 600 mL    Total NET: -600 mL        Daily     Daily Weight in k.2 (05 Aug 2019 10:32)    PHYSICAL EXAM:  Constitutional: well developed, well nourished  and in nad  HEENT: PERRLA,  no icteric sclera and mild pallor of conjunctiva noted  Neck: No JVD, thyromegaly or adenopathy  Respiratory: reduced air entry lower lungs with no rales, wheezing or rhonchi  Cardiovascular: S1 and S2 normally heard  Gastrointestinal: soft, nondistended, nontender and normal bowel sounds heard  Extremities: No peripheral edema or cyanosis  Neurological: A/O x 3, no focal deficits  : No flank or cva tenderness palpated.  Skin: No rashes      LABS:    CBC:  no new labs from today                        10.5   5.63  )-----------( 151      ( 04 Aug 2019 06:23 )             33.6           BMP:    04    143  |  118<H>  |  46<H>  ----------------------------<  103<H>  4.9   |  18<L>  |  2.44<H>      142  |  114<H>  |  47<H>  ----------------------------<  109<H>  5.1   |  18<L>  |  2.86<H>    Ca    8.0<L>      04 Aug 2019 06:23  Ca    8.2<L>      03 Aug 2019 05:38  Phos  3.6       Phos  3.8       Mg     2.2       Mg     2.0                 URINE STUDIES:        Creatinine, Random Urine: 66 mg/dL ( @ 13:07)  Potassium, Random Urine: 27 mmol/L ( @ 13:07)  Sodium, Random Urine: 70 mmol/L ( @ 13:07)  Chloride, Random Urine: 86 mmol/L ( @ 13:07)  Osmolality, Random Urine: 413 mosm/Kg ( @ 13:07)      RADIOLOGY & ADDITIONAL STUDIES:    < from: US Urinary Bladder (19 @ 13:40) >    FINDINGS:    Note is made of an indwelling Koehler catheter; the bladder is not   well-distended. Bladder wall thickening cannot be excluded. No gross   intraluminal masses are identified. Further assessment for described   hematuria can be performed with dedicated CT urography.    IMPRESSION: As above.        < end of copied text > pt seen and examined with renal resident  SUBJECTIVE:  pt feels fine and denies cp,sob,gi or gu/uremic  symptoms.  No more hematuria , Pt able to void without any difficulty without FC       Current meds:    ALBUTerol/ipratropium for Nebulization. 3 milliLiter(s) Nebulizer two times a day  ARIPiprazole 12.5 milliGRAM(s) Oral daily  ferrous    sulfate 325 milliGRAM(s) Oral daily  finasteride 5 milliGRAM(s) Oral daily  gabapentin 300 milliGRAM(s) Oral three times a day  HYDROmorphone   Tablet 4 milliGRAM(s) Oral every 8 hours  lamoTRIgine 200 milliGRAM(s) Oral daily  pantoprazole    Tablet 40 milliGRAM(s) Oral before breakfast  sodium bicarbonate  Infusion 0.056 mEq/kG/Hr IV Continuous <Continuous>  tamsulosin 0.4 milliGRAM(s) Oral at bedtime  traZODone 50 milliGRAM(s) Oral at bedtime      Vital Signs    T(F): 97.7 (19 @ 06:09), Max: 97.9 (19 @ 14:36)  HR: 93 (19 @ 11:48) (75 - 93)  BP: 136/78 (19 @ 11:48) (122/68 - 138/79)  ABP: --  RR: 17 (19 @ 06:09) (17 - 18)  SpO2: 98% (19 @ 11:48) (96% - 98%)  Wt(kg): --  CVP(cm H2O): --  CO: --  PCWP: --    I and O's:     @ 07:01  -   @ 07:00  --------------------------------------------------------  IN:  Total IN: 0 mL    OUT:    Indwelling Catheter - Urethral: 700 mL  Total OUT: 700 mL    Total NET: -700 mL       @ 07:01  -   @ 12:40  --------------------------------------------------------  IN:  Total IN: 0 mL    OUT:    Voided: 600 mL  Total OUT: 600 mL    Total NET: -600 mL        Daily     Daily Weight in k.2 (05 Aug 2019 10:32)    PHYSICAL EXAM:  Constitutional: well developed, well nourished  and in nad  HEENT: PERRLA,  no icteric sclera and mild pallor of conjunctiva noted  Neck: No JVD, thyromegaly or adenopathy  Respiratory: reduced air entry lower lungs with no rales, wheezing or rhonchi  Cardiovascular: S1 and S2 normally heard  Gastrointestinal: soft, nondistended, nontender and normal bowel sounds heard  Extremities: No peripheral edema or cyanosis  Neurological: A/O x 3, no focal deficits  : No flank or cva tenderness palpated.  Skin: No rashes      LABS:    CBC:  no new labs from today                        10.5   5.63  )-----------( 151      ( 04 Aug 2019 06:23 )             33.6           BMP:    08-04    143  |  118<H>  |  46<H>  ----------------------------<  103<H>  4.9   |  18<L>  |  2.44<H>      142  |  114<H>  |  47<H>  ----------------------------<  109<H>  5.1   |  18<L>  |  2.86<H>    Ca    8.0<L>      04 Aug 2019 06:23  Ca    8.2<L>      03 Aug 2019 05:38  Phos  3.6       Phos  3.8       Mg     2.2       Mg     2.0     -            URINE STUDIES:        Creatinine, Random Urine: 66 mg/dL ( @ 13:07)  Potassium, Random Urine: 27 mmol/L ( @ 13:07)  Sodium, Random Urine: 70 mmol/L ( @ 13:07)  Chloride, Random Urine: 86 mmol/L ( @ 13:07)  Osmolality, Random Urine: 413 mosm/Kg ( @ 13:07)      RADIOLOGY & ADDITIONAL STUDIES:    < from: US Urinary Bladder (19 @ 13:40) >    FINDINGS:    Note is made of an indwelling Koehler catheter; the bladder is not   well-distended. Bladder wall thickening cannot be excluded. No gross   intraluminal masses are identified. Further assessment for described   hematuria can be performed with dedicated CT urography.    IMPRESSION: As above.        < end of copied text >

## 2019-08-05 NOTE — DIETITIAN INITIAL EVALUATION ADULT. - PERTINENT MEDS FT
MEDICATIONS  (STANDING):  ALBUTerol/ipratropium for Nebulization. 3 milliLiter(s) Nebulizer two times a day  ARIPiprazole 12.5 milliGRAM(s) Oral daily  ferrous    sulfate 325 milliGRAM(s) Oral daily  finasteride 5 milliGRAM(s) Oral daily  gabapentin 300 milliGRAM(s) Oral three times a day  HYDROmorphone   Tablet 4 milliGRAM(s) Oral every 8 hours  lamoTRIgine 200 milliGRAM(s) Oral daily  pantoprazole    Tablet 40 milliGRAM(s) Oral before breakfast  sodium bicarbonate  Infusion 0.056 mEq/kG/Hr (70 mL/Hr) IV Continuous <Continuous>  tamsulosin 0.4 milliGRAM(s) Oral at bedtime  traZODone 50 milliGRAM(s) Oral at bedtime    MEDICATIONS  (PRN):

## 2019-08-05 NOTE — PROGRESS NOTE ADULT - REASON FOR ADMISSION
Hematuria

## 2019-08-05 NOTE — DIETITIAN INITIAL EVALUATION ADULT. - OTHER INFO
Pt alert, oriented, well-communicated, lives at assisted living facility [facility]; appetite good, but varied intake depending on food served, denied recent wt changes, denied GI distress, chewing or swallowing problem at present, food choices obtained; Pending discharge planning to assisted living facility when medically ready; Pt not interested in diet education/nutrition information at present

## 2019-08-09 ENCOUNTER — INPATIENT (INPATIENT)
Facility: HOSPITAL | Age: 69
LOS: 6 days | Discharge: NURSING, REHAB OR LONG-TERM CARE | DRG: 669 | End: 2019-08-16
Attending: INTERNAL MEDICINE | Admitting: INTERNAL MEDICINE
Payer: MEDICARE

## 2019-08-09 VITALS
TEMPERATURE: 97 F | HEART RATE: 86 BPM | RESPIRATION RATE: 16 BRPM | DIASTOLIC BLOOD PRESSURE: 78 MMHG | WEIGHT: 210.1 LBS | SYSTOLIC BLOOD PRESSURE: 119 MMHG | OXYGEN SATURATION: 97 % | HEIGHT: 68 IN

## 2019-08-09 DIAGNOSIS — Z29.9 ENCOUNTER FOR PROPHYLACTIC MEASURES, UNSPECIFIED: ICD-10-CM

## 2019-08-09 DIAGNOSIS — N17.9 ACUTE KIDNEY FAILURE, UNSPECIFIED: ICD-10-CM

## 2019-08-09 DIAGNOSIS — R31.9 HEMATURIA, UNSPECIFIED: ICD-10-CM

## 2019-08-09 DIAGNOSIS — Z93.9 ARTIFICIAL OPENING STATUS, UNSPECIFIED: Chronic | ICD-10-CM

## 2019-08-09 DIAGNOSIS — N40.1 BENIGN PROSTATIC HYPERPLASIA WITH LOWER URINARY TRACT SYMPTOMS: ICD-10-CM

## 2019-08-09 LAB
ALBUMIN SERPL ELPH-MCNC: 3.9 G/DL — SIGNIFICANT CHANGE UP (ref 3.5–5)
ALP SERPL-CCNC: 190 U/L — HIGH (ref 40–120)
ALT FLD-CCNC: 51 U/L DA — SIGNIFICANT CHANGE UP (ref 10–60)
ANION GAP SERPL CALC-SCNC: 11 MMOL/L — SIGNIFICANT CHANGE UP (ref 5–17)
ANION GAP SERPL CALC-SCNC: 11 MMOL/L — SIGNIFICANT CHANGE UP (ref 5–17)
APPEARANCE UR: ABNORMAL
AST SERPL-CCNC: 30 U/L — SIGNIFICANT CHANGE UP (ref 10–40)
BACTERIA # UR AUTO: NEGATIVE /HPF — SIGNIFICANT CHANGE UP
BILIRUB SERPL-MCNC: 0.5 MG/DL — SIGNIFICANT CHANGE UP (ref 0.2–1.2)
BILIRUB UR-MCNC: NEGATIVE — SIGNIFICANT CHANGE UP
BUN SERPL-MCNC: 52 MG/DL — HIGH (ref 7–18)
BUN SERPL-MCNC: 55 MG/DL — HIGH (ref 7–18)
CALCIUM SERPL-MCNC: 9.1 MG/DL — SIGNIFICANT CHANGE UP (ref 8.4–10.5)
CALCIUM SERPL-MCNC: 9.2 MG/DL — SIGNIFICANT CHANGE UP (ref 8.4–10.5)
CHLORIDE SERPL-SCNC: 112 MMOL/L — HIGH (ref 96–108)
CHLORIDE SERPL-SCNC: 113 MMOL/L — HIGH (ref 96–108)
CO2 SERPL-SCNC: 13 MMOL/L — LOW (ref 22–31)
CO2 SERPL-SCNC: 14 MMOL/L — LOW (ref 22–31)
COLOR SPEC: ABNORMAL
CREAT SERPL-MCNC: 3.76 MG/DL — HIGH (ref 0.5–1.3)
CREAT SERPL-MCNC: 4.02 MG/DL — HIGH (ref 0.5–1.3)
DIFF PNL FLD: ABNORMAL
EPI CELLS # UR: ABNORMAL /HPF
GLUCOSE SERPL-MCNC: 117 MG/DL — HIGH (ref 70–99)
GLUCOSE SERPL-MCNC: 124 MG/DL — HIGH (ref 70–99)
GLUCOSE UR QL: NEGATIVE — SIGNIFICANT CHANGE UP
HCT VFR BLD CALC: 38.9 % — LOW (ref 39–50)
HCT VFR BLD CALC: 44.5 % — SIGNIFICANT CHANGE UP (ref 39–50)
HGB BLD-MCNC: 12.1 G/DL — LOW (ref 13–17)
HGB BLD-MCNC: 13.6 G/DL — SIGNIFICANT CHANGE UP (ref 13–17)
KETONES UR-MCNC: NEGATIVE — SIGNIFICANT CHANGE UP
LEUKOCYTE ESTERASE UR-ACNC: NEGATIVE — SIGNIFICANT CHANGE UP
MCHC RBC-ENTMCNC: 26.9 PG — LOW (ref 27–34)
MCHC RBC-ENTMCNC: 26.9 PG — LOW (ref 27–34)
MCHC RBC-ENTMCNC: 30.6 GM/DL — LOW (ref 32–36)
MCHC RBC-ENTMCNC: 31.1 GM/DL — LOW (ref 32–36)
MCV RBC AUTO: 86.6 FL — SIGNIFICANT CHANGE UP (ref 80–100)
MCV RBC AUTO: 88.1 FL — SIGNIFICANT CHANGE UP (ref 80–100)
NITRITE UR-MCNC: NEGATIVE — SIGNIFICANT CHANGE UP
NRBC # BLD: 0 /100 WBCS — SIGNIFICANT CHANGE UP (ref 0–0)
NRBC # BLD: 0 /100 WBCS — SIGNIFICANT CHANGE UP (ref 0–0)
PH UR: 6.5 — SIGNIFICANT CHANGE UP (ref 5–8)
PLATELET # BLD AUTO: 204 K/UL — SIGNIFICANT CHANGE UP (ref 150–400)
PLATELET # BLD AUTO: 214 K/UL — SIGNIFICANT CHANGE UP (ref 150–400)
POTASSIUM SERPL-MCNC: 5.5 MMOL/L — HIGH (ref 3.5–5.3)
POTASSIUM SERPL-MCNC: 5.7 MMOL/L — HIGH (ref 3.5–5.3)
POTASSIUM SERPL-SCNC: 5.5 MMOL/L — HIGH (ref 3.5–5.3)
POTASSIUM SERPL-SCNC: 5.7 MMOL/L — HIGH (ref 3.5–5.3)
PROT SERPL-MCNC: 9.9 G/DL — HIGH (ref 6–8.3)
PROT UR-MCNC: 500 MG/DL
RBC # BLD: 4.49 M/UL — SIGNIFICANT CHANGE UP (ref 4.2–5.8)
RBC # BLD: 5.05 M/UL — SIGNIFICANT CHANGE UP (ref 4.2–5.8)
RBC # FLD: 16.6 % — HIGH (ref 10.3–14.5)
RBC # FLD: 16.6 % — HIGH (ref 10.3–14.5)
RBC CASTS # UR COMP ASSIST: >50 /HPF (ref 0–2)
SODIUM SERPL-SCNC: 136 MMOL/L — SIGNIFICANT CHANGE UP (ref 135–145)
SODIUM SERPL-SCNC: 138 MMOL/L — SIGNIFICANT CHANGE UP (ref 135–145)
SP GR SPEC: 1.01 — SIGNIFICANT CHANGE UP (ref 1.01–1.02)
UROBILINOGEN FLD QL: NEGATIVE — SIGNIFICANT CHANGE UP
WBC # BLD: 11 K/UL — HIGH (ref 3.8–10.5)
WBC # BLD: 11.18 K/UL — HIGH (ref 3.8–10.5)
WBC # FLD AUTO: 11 K/UL — HIGH (ref 3.8–10.5)
WBC # FLD AUTO: 11.18 K/UL — HIGH (ref 3.8–10.5)
WBC UR QL: SIGNIFICANT CHANGE UP /HPF (ref 0–5)

## 2019-08-09 PROCEDURE — 99223 1ST HOSP IP/OBS HIGH 75: CPT | Mod: GC

## 2019-08-09 PROCEDURE — 51700 IRRIGATION OF BLADDER: CPT

## 2019-08-09 PROCEDURE — 99222 1ST HOSP IP/OBS MODERATE 55: CPT | Mod: 25

## 2019-08-09 PROCEDURE — 99285 EMERGENCY DEPT VISIT HI MDM: CPT

## 2019-08-09 RX ORDER — PANTOPRAZOLE SODIUM 20 MG/1
40 TABLET, DELAYED RELEASE ORAL
Refills: 0 | Status: DISCONTINUED | OUTPATIENT
Start: 2019-08-09 | End: 2019-08-13

## 2019-08-09 RX ORDER — SODIUM POLYSTYRENE SULFONATE 4.1 MEQ/G
30 POWDER, FOR SUSPENSION ORAL ONCE
Refills: 0 | Status: COMPLETED | OUTPATIENT
Start: 2019-08-09 | End: 2019-08-09

## 2019-08-09 RX ORDER — HYDROMORPHONE HYDROCHLORIDE 2 MG/ML
4 INJECTION INTRAMUSCULAR; INTRAVENOUS; SUBCUTANEOUS EVERY 8 HOURS
Refills: 0 | Status: DISCONTINUED | OUTPATIENT
Start: 2019-08-09 | End: 2019-08-13

## 2019-08-09 RX ORDER — GABAPENTIN 400 MG/1
300 CAPSULE ORAL THREE TIMES A DAY
Refills: 0 | Status: DISCONTINUED | OUTPATIENT
Start: 2019-08-09 | End: 2019-08-13

## 2019-08-09 RX ORDER — IPRATROPIUM/ALBUTEROL SULFATE 18-103MCG
3 AEROSOL WITH ADAPTER (GRAM) INHALATION EVERY 6 HOURS
Refills: 0 | Status: DISCONTINUED | OUTPATIENT
Start: 2019-08-09 | End: 2019-08-13

## 2019-08-09 RX ORDER — FERROUS SULFATE 325(65) MG
325 TABLET ORAL DAILY
Refills: 0 | Status: DISCONTINUED | OUTPATIENT
Start: 2019-08-09 | End: 2019-08-13

## 2019-08-09 RX ORDER — SODIUM CHLORIDE 9 MG/ML
1000 INJECTION INTRAMUSCULAR; INTRAVENOUS; SUBCUTANEOUS
Refills: 0 | Status: DISCONTINUED | OUTPATIENT
Start: 2019-08-09 | End: 2019-08-13

## 2019-08-09 RX ORDER — LAMOTRIGINE 25 MG/1
200 TABLET, ORALLY DISINTEGRATING ORAL DAILY
Refills: 0 | Status: DISCONTINUED | OUTPATIENT
Start: 2019-08-09 | End: 2019-08-13

## 2019-08-09 RX ORDER — FINASTERIDE 5 MG/1
5 TABLET, FILM COATED ORAL DAILY
Refills: 0 | Status: DISCONTINUED | OUTPATIENT
Start: 2019-08-09 | End: 2019-08-13

## 2019-08-09 RX ORDER — ARIPIPRAZOLE 15 MG/1
10 TABLET ORAL DAILY
Refills: 0 | Status: DISCONTINUED | OUTPATIENT
Start: 2019-08-09 | End: 2019-08-13

## 2019-08-09 RX ORDER — TRAZODONE HCL 50 MG
25 TABLET ORAL DAILY
Refills: 0 | Status: DISCONTINUED | OUTPATIENT
Start: 2019-08-09 | End: 2019-08-13

## 2019-08-09 RX ORDER — SODIUM BICARBONATE 1 MEQ/ML
650 SYRINGE (ML) INTRAVENOUS THREE TIMES A DAY
Refills: 0 | Status: DISCONTINUED | OUTPATIENT
Start: 2019-08-09 | End: 2019-08-10

## 2019-08-09 RX ORDER — TAMSULOSIN HYDROCHLORIDE 0.4 MG/1
0.4 CAPSULE ORAL AT BEDTIME
Refills: 0 | Status: DISCONTINUED | OUTPATIENT
Start: 2019-08-09 | End: 2019-08-13

## 2019-08-09 RX ORDER — SODIUM POLYSTYRENE SULFONATE 4.1 MEQ/G
15 POWDER, FOR SUSPENSION ORAL ONCE
Refills: 0 | Status: DISCONTINUED | OUTPATIENT
Start: 2019-08-09 | End: 2019-08-09

## 2019-08-09 RX ADMIN — SODIUM CHLORIDE 75 MILLILITER(S): 9 INJECTION INTRAMUSCULAR; INTRAVENOUS; SUBCUTANEOUS at 19:40

## 2019-08-09 RX ADMIN — TAMSULOSIN HYDROCHLORIDE 0.4 MILLIGRAM(S): 0.4 CAPSULE ORAL at 23:21

## 2019-08-09 RX ADMIN — Medication 650 MILLIGRAM(S): at 23:20

## 2019-08-09 RX ADMIN — GABAPENTIN 300 MILLIGRAM(S): 400 CAPSULE ORAL at 23:20

## 2019-08-09 RX ADMIN — SODIUM POLYSTYRENE SULFONATE 30 GRAM(S): 4.1 POWDER, FOR SUSPENSION ORAL at 18:32

## 2019-08-09 RX ADMIN — SODIUM CHLORIDE 75 MILLILITER(S): 9 INJECTION INTRAMUSCULAR; INTRAVENOUS; SUBCUTANEOUS at 23:16

## 2019-08-09 NOTE — ED ADULT NURSE NOTE - OBJECTIVE STATEMENT
biba with c/o blood in the urine,  as per patient was recently discharged from the hospital for the same, denies any abdominal pain nausea or vomiting.

## 2019-08-09 NOTE — ED PROVIDER NOTE - OBJECTIVE STATEMENT
prostate Ca (s/p radiation in 2015, now in remission), asthma, COPD (not on O2), HTN, HLD, Bipolar, anemia, GERD, Ileostomy s/p sigmoidal resection, PAD, and CKD recently admitted for hematuria and dc 3 days ago. was supposed to get bladder scope - has not had scheduled ye. no pain. notes blood again today wo pain / dysuria. +clots. recent cx showing e coli that is pan sensitive. no fevers. notes a bit of increased ostomy output overnight, now resolved.

## 2019-08-09 NOTE — ED PROVIDER NOTE - CARE PLAN
Principal Discharge DX:	PAWAN (acute kidney injury)  Goal:	likely due to obstruction from blood clot in urine  Secondary Diagnosis:	Hematuria, unspecified type

## 2019-08-09 NOTE — H&P ADULT - NSHPPHYSICALEXAM_GEN_ALL_CORE
CONSTITUTIONAL: Well appearing, well nourished, awake, alert and in no apparent distress  ENMT: Airway patent, Nasal mucosa clear. Mouth with normal mucosa. Throat has no vesicles, no oropharyngeal exudates and uvula is midline.  EYES: Clear bilaterally, pupils equal, round and reactive to light. EOMI.  CARDIAC: Normal rate, regular rhythm.  Heart sounds S1, S2.  No murmurs, rubs or gallops   RESPIRATORY: Breath sounds clear and equal bilaterally. No wheezes, rhales or rhonchi  MUSCULOSKELETAL: Spine appears normal, range of motion is not limited, no muscle or joint tenderness  EXTREMITIES: No edema, cyanosis or deformity   NEUROLOGICAL: Alert and oriented, no focal deficits, no motor or sensory deficits.  SKIN: No rash, skin turgor   ABDOMEN: soft, distended, non tender, ileostomy bas with greenish contents

## 2019-08-09 NOTE — H&P ADULT - ASSESSMENT
68M from [facility], with PMHx of prostate Ca (s/p radiation in 2015, now in remission), asthma, COPD (not on O2), HTN, HLD, Bipolar, anemia, GERD, Ileostomy s/p sigmoid resection, PAD, and CKD presented to ED c/o hematuria x 1 day; pt states he noticed blood and clots today while urinating, admits to difficulties urinating, denies dysuria, no abd pain, no back pain; Denies fever, chills, SOB or CP. Pt recently admitted to [facility] w/ Hematuria and UTI, ucx Ecoli, was d/c home on 8/5.  Urology was consulted at that time, outpatient follow up for cystoscopy recommended once UTI resolved and pt completed abx tx; Pt did not have a chance to follow up with Dr Cruz. Pt also reports some RESENDIZ since morning, but denies cough, fever, chills,     in ED, patient's Hb noted to be stable , Hb 13 baseline around 10, Creatinine 4.4 baseline 2.3 . Labs seem aberrant will repeat  one set and follow results.  Urology was consulted in ED, CBI was placed. Currently, urine is clear with no hematuria. 68M from [facility], with PMHx of prostate Ca (s/p radiation in 2015, now in remission), asthma, COPD (not on O2), HTN, HLD, Bipolar, anemia, GERD, Ileostomy s/p sigmoid resection, PAD, and CKD presented to ED c/o hematuria x 1 day; pt states he noticed blood and clots today while urinating, admits to difficulties urinating, denies dysuria, no abd pain, no back pain; Denies fever, chills, SOB or CP. Pt recently admitted to [facility] w/ Hematuria and UTI, ucx Ecoli, was d/c home on 8/5.  Urology was consulted at that time, outpatient follow up for cystoscopy recommended once UTI resolved and pt completed abx tx; Pt did not have a chance to follow up with Dr Cruz. Pt also reports some RESENDIZ since morning, but denies cough, fever, chills,     in ED, patient's Hb noted to be stable , Hb 13 baseline around 10, Creatinine 4.4 baseline 2.3 . Labs seem aberrant will repeat  one set and follow results.  Urology was consulted in ED, CBI was placed. Currently, urine is clear with no hematuria.       Patient is admitted for gross hematuria, needs further evaluation.

## 2019-08-09 NOTE — ED PROVIDER NOTE - CLINICAL SUMMARY MEDICAL DECISION MAKING FREE TEXT BOX
given recent admit for same will start w ua/ucx and dw uro. uro requested labs and will see pt - prob needs cbi.

## 2019-08-09 NOTE — H&P ADULT - HISTORY OF PRESENT ILLNESS
68M from [facility], with PMHx of prostate Ca (s/p radiation in 2015, now in remission), asthma, COPD (not on O2), HTN, HLD, Bipolar, anemia, GERD, Ileostomy s/p sigmoid resection, PAD, and CKD presented to ED c/o hematuria x 1 day; pt states he noticed blood and clots today while urinating, admits to difficulties urinating, denies dysuria, no abd pain, no back pain; Denies fever, chills, SOB or CP. Pt recently admitted to [facility] w/ Hematuria and UTI, ucx Ecoli, was d/c home on 8/5.  Urology was consulted at that time, outpatient follow up for cystoscopy recommended once UTI resolved and pt completed abx tx; Pt did not have a chance to follow up with Dr Cruz. Pt also reports some RESENDIZ since morning, but denies cough, fever, chills,     in ED, patient's Hb noted to be stable , Hb 13 baseline around 10, Creatinine 4.4 baseline 2.3 . Labs seem aberrant will repeat  one set and follow results.  Urology was consulted in ED, CBI was placed. Currently, urine is clear with no hematuria.

## 2019-08-09 NOTE — CONSULT NOTE ADULT - SUBJECTIVE AND OBJECTIVE BOX
Attending: Dr Cruz      HPI:  68M from [facility], with PMHx of prostate Ca (s/p radiation in , now in remission), asthma, COPD (not on O2), HTN, HLD, Bipolar, anemia, GERD, Ileostomy s/p sigmoid resection, PAD, and CKD presented to ED c/o hematuria x 1 day; pt states he noticed blood and clots today while urinating, admits to difficulties urinating, denies dysuria, no abd pain, no back pain; Denies fever, chills, SOB or CP. Pt recently admitted to [facility] w/ Hematuria and UTI, ucx Ecoli, was d/c home on , urology consulted at that time, outpatient follow up for cystoscopy recommended once UTI resolved and pt completed abx tx; Pt did not have a chance to follow up with Dr Cruz; Pt offers no other complaints.      PAST MEDICAL & SURGICAL HISTORY:  BPH with urinary obstruction  Prostate CA  CKD (chronic kidney disease)  PAD (peripheral artery disease)  HLD (hyperlipidemia)  HTN (hypertension)  IBD (inflammatory bowel disease)  Bipolar disorder  Anemia  COPD, mild  History of creation of ostomy        Allergies  No Known Allergies  Intolerances        SOCIAL HISTORY:  Unknown   FAMILY HISTORY:  Unknown     Vital Signs Last 24 Hrs  T(C): 36.3 (09 Aug 2019 10:38), Max: 36.3 (09 Aug 2019 10:38)  T(F): 97.3 (09 Aug 2019 10:38), Max: 97.3 (09 Aug 2019 10:38)  HR: 86 (09 Aug 2019 10:38) (86 - 86)  BP: 119/78 (09 Aug 2019 10:38) (119/78 - 119/78)  BP(mean): --  RR: 16 (09 Aug 2019 10:38) (16 - 16)  SpO2: 97% (09 Aug 2019 10:38) (97% - 97%)      Labs:                13.6   11.18 )-----------( 204      ( 09 Aug 2019 13:18 )             44.5       Urinalysis Basic - ( 09 Aug 2019 11:48 )    Color: Red / Appearance: very cloudy / S.015 / pH: x  Gluc: x / Ketone: Negative  / Bili: Negative / Urobili: Negative   Blood: x / Protein: 500 mg/dL / Nitrite: Negative   Leuk Esterase: Negative / RBC: >50 /HPF / WBC 0-2 /HPF   Sq Epi: x / Non Sq Epi: Occasional /HPF / Bacteria: Negative /HPF          RADIOLOGY & ADDITIONAL STUDIES:  < from: US Urinary Bladder (19 @ 13:40) >  FINDINGS:    Note is made of an indwelling Koehler catheter; the bladder is not   well-distended. Bladder wall thickening cannot be excluded. No gross   intraluminal masses are identified. Further assessment for described   hematuria can be performed with dedicated CT urography.    IMPRESSION: As above.    < end of copied text >      < from: US Renal (19 @ 12:19) >  Indication: Gross hematuria.    Renal ultrasound is performed without a previous ultrasound for   comparison. The right kidney measures 10.8 cm in length and the left   kidney measures 12.5 cm in length. Multiple renal cysts bilaterally   measuring up to 4.6 cm on the left. No evidence for hydronephrosis or   calculus in either kidney.    The IVC and aorta appear grossly unremarkable.    The urinary bladder is collapsed.    Impression: Bilateral renal cysts.    No hydronephrosis.    If clinically indicated, CT urogram may be pursued for better evaluation.    < end of copied text >

## 2019-08-09 NOTE — CONSULT NOTE ADULT - ASSESSMENT
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

## 2019-08-09 NOTE — H&P ADULT - PROBLEM SELECTOR PLAN 4
IMPROVE VTE score:  [ ] Previous VTE                                                3  [ ] Thrombophilia                                             2  [ ] Lower limb paralysis                                  2        (unable to hold up >15 seconds)    [ ] Current Cancer (within 6 months)            2   [x] Immobilization > 24 hrs                              1  [ ] ICU/CCU stay > 24 hours                            1  [x] Age > 60                                                         1  IMPROVE SCORE- 2  Will hold PPX given hematuria

## 2019-08-09 NOTE — CONSULT NOTE ADULT - GENITOURINARY COMMENTS
Under sterile conditions 22 Fr 3 way phillips catheter placed, urinary bladder irrigated until UO clear and no blood lots seen; CBI started at moderate gtt

## 2019-08-09 NOTE — H&P ADULT - ATTENDING COMMENTS
Patient was seen and examined by myself. Case was discussed with house staff in details. I have reviewed and agree with the plan as outlined above with edits where appropriate.    Vital Signs Last 24 Hrs  T(C): 36.3 (09 Aug 2019 10:38), Max: 36.3 (09 Aug 2019 10:38)  T(F): 97.3 (09 Aug 2019 10:38), Max: 97.3 (09 Aug 2019 10:38)  HR: 86 (09 Aug 2019 10:38) (86 - 86)  BP: 119/78 (09 Aug 2019 10:38) (119/78 - 119/78)  BP(mean): --  RR: 16 (09 Aug 2019 10:38) (16 - 16)  SpO2: 97% (09 Aug 2019 10:38) (97% - 97%)                          12.1   11.00 )-----------( 214      ( 09 Aug 2019 18:07 )             38.9     08-09    136  |  112<H>  |  55<H>  ----------------------------<  124<H>  5.7<H>   |  13<L>  |  4.02<H>    Ca    9.2      09 Aug 2019 13:18    TPro  9.9<H>  /  Alb  3.9  /  TBili  0.5  /  DBili  x   /  AST  30  /  ALT  51  /  AlkPhos  190<H>  08-09    A/P: 67 y/o M with h/o prostate cancer post radiation, ostomy following a complicated diverticular disease presenting to ED from [facility] with   1. Gross hematuria  2. Personal h/o prostate cancer  3. Chronic back pain  4. CKD 4 with acute renal failure  5. PAWAN likely dehydration from ostomy volume loss    Seen in consultation by the urology team- continuos bladder irrigation  Plan for cystoscopy this admission  Monitor cbc serially for anemia due to blood loss  Continue chronic pain medication for back pain  Monitor renal functions; avoid nephrotoxic agents.

## 2019-08-09 NOTE — H&P ADULT - PROBLEM SELECTOR PLAN 1
-Pt presented with gross hematuria, concern for malignancy.  -H/h stable at present.  -C/w CBI irrigation via 3 way Koehler.  -Serial H/H  -Hematuria improved at present after irrigation.   -Plan for cystoscopy on Tuesday   -Urologist Dr Cruz

## 2019-08-09 NOTE — ED ADULT NURSE NOTE - NSIMPLEMENTINTERV_GEN_ALL_ED
Implemented All Fall Risk Interventions:  [place] to call system. Call bell, personal items and telephone within reach. Instruct patient to call for assistance. Room bathroom lighting operational. Non-slip footwear when patient is off stretcher. Physically safe environment: no spills, clutter or unnecessary equipment. Stretcher in lowest position, wheels locked, appropriate side rails in place. Provide visual cue, wrist band, yellow gown, etc. Monitor gait and stability. Monitor for mental status changes and reorient to person, place, and time. Review medications for side effects contributing to fall risk. Reinforce activity limits and safety measures with patient and family.

## 2019-08-09 NOTE — H&P ADULT - PROBLEM SELECTOR PLAN 3
-Pt is noted to have PAWAN on CKD  -Could be post-obstructive VS pre-renal given dehydration   -Follow urine lytes   -Start IVF,  -Monitor BMP  -Renal Sono.

## 2019-08-10 DIAGNOSIS — N18.4 CHRONIC KIDNEY DISEASE, STAGE 4 (SEVERE): ICD-10-CM

## 2019-08-10 LAB
ANION GAP SERPL CALC-SCNC: 11 MMOL/L — SIGNIFICANT CHANGE UP (ref 5–17)
ANION GAP SERPL CALC-SCNC: 8 MMOL/L — SIGNIFICANT CHANGE UP (ref 5–17)
BASOPHILS # BLD AUTO: 0.04 K/UL — SIGNIFICANT CHANGE UP (ref 0–0.2)
BASOPHILS NFR BLD AUTO: 0.5 % — SIGNIFICANT CHANGE UP (ref 0–2)
BUN SERPL-MCNC: 52 MG/DL — HIGH (ref 7–18)
BUN SERPL-MCNC: 53 MG/DL — HIGH (ref 7–18)
CALCIUM SERPL-MCNC: 8.6 MG/DL — SIGNIFICANT CHANGE UP (ref 8.4–10.5)
CALCIUM SERPL-MCNC: 8.7 MG/DL — SIGNIFICANT CHANGE UP (ref 8.4–10.5)
CHLORIDE SERPL-SCNC: 113 MMOL/L — HIGH (ref 96–108)
CHLORIDE SERPL-SCNC: 114 MMOL/L — HIGH (ref 96–108)
CO2 SERPL-SCNC: 14 MMOL/L — LOW (ref 22–31)
CO2 SERPL-SCNC: 17 MMOL/L — LOW (ref 22–31)
CREAT ?TM UR-MCNC: <13 MG/DL — SIGNIFICANT CHANGE UP
CREAT SERPL-MCNC: 3.52 MG/DL — HIGH (ref 0.5–1.3)
CREAT SERPL-MCNC: 3.75 MG/DL — HIGH (ref 0.5–1.3)
CULTURE RESULTS: SIGNIFICANT CHANGE UP
EOSINOPHIL # BLD AUTO: 0.13 K/UL — SIGNIFICANT CHANGE UP (ref 0–0.5)
EOSINOPHIL NFR BLD AUTO: 1.5 % — SIGNIFICANT CHANGE UP (ref 0–6)
GLUCOSE SERPL-MCNC: 108 MG/DL — HIGH (ref 70–99)
GLUCOSE SERPL-MCNC: 98 MG/DL — SIGNIFICANT CHANGE UP (ref 70–99)
HCT VFR BLD CALC: 35 % — LOW (ref 39–50)
HGB BLD-MCNC: 11.1 G/DL — LOW (ref 13–17)
IMM GRANULOCYTES NFR BLD AUTO: 1.6 % — HIGH (ref 0–1.5)
LYMPHOCYTES # BLD AUTO: 0.75 K/UL — LOW (ref 1–3.3)
LYMPHOCYTES # BLD AUTO: 8.7 % — LOW (ref 13–44)
MCHC RBC-ENTMCNC: 27.1 PG — SIGNIFICANT CHANGE UP (ref 27–34)
MCHC RBC-ENTMCNC: 31.7 GM/DL — LOW (ref 32–36)
MCV RBC AUTO: 85.6 FL — SIGNIFICANT CHANGE UP (ref 80–100)
MONOCYTES # BLD AUTO: 0.55 K/UL — SIGNIFICANT CHANGE UP (ref 0–0.9)
MONOCYTES NFR BLD AUTO: 6.4 % — SIGNIFICANT CHANGE UP (ref 2–14)
NEUTROPHILS # BLD AUTO: 7.01 K/UL — SIGNIFICANT CHANGE UP (ref 1.8–7.4)
NEUTROPHILS NFR BLD AUTO: 81.3 % — HIGH (ref 43–77)
NRBC # BLD: 0 /100 WBCS — SIGNIFICANT CHANGE UP (ref 0–0)
PLATELET # BLD AUTO: 193 K/UL — SIGNIFICANT CHANGE UP (ref 150–400)
POTASSIUM SERPL-MCNC: 4.6 MMOL/L — SIGNIFICANT CHANGE UP (ref 3.5–5.3)
POTASSIUM SERPL-MCNC: 5.2 MMOL/L — SIGNIFICANT CHANGE UP (ref 3.5–5.3)
POTASSIUM SERPL-SCNC: 4.6 MMOL/L — SIGNIFICANT CHANGE UP (ref 3.5–5.3)
POTASSIUM SERPL-SCNC: 5.2 MMOL/L — SIGNIFICANT CHANGE UP (ref 3.5–5.3)
RBC # BLD: 4.09 M/UL — LOW (ref 4.2–5.8)
RBC # FLD: 16.2 % — HIGH (ref 10.3–14.5)
SODIUM SERPL-SCNC: 138 MMOL/L — SIGNIFICANT CHANGE UP (ref 135–145)
SODIUM SERPL-SCNC: 139 MMOL/L — SIGNIFICANT CHANGE UP (ref 135–145)
SODIUM UR-SCNC: 141 MMOL/L — SIGNIFICANT CHANGE UP (ref 40–220)
SPECIMEN SOURCE: SIGNIFICANT CHANGE UP
WBC # BLD: 8.62 K/UL — SIGNIFICANT CHANGE UP (ref 3.8–10.5)
WBC # FLD AUTO: 8.62 K/UL — SIGNIFICANT CHANGE UP (ref 3.8–10.5)

## 2019-08-10 PROCEDURE — 99232 SBSQ HOSP IP/OBS MODERATE 35: CPT

## 2019-08-10 PROCEDURE — 99232 SBSQ HOSP IP/OBS MODERATE 35: CPT | Mod: GC

## 2019-08-10 PROCEDURE — 76775 US EXAM ABDO BACK WALL LIM: CPT | Mod: 26

## 2019-08-10 RX ORDER — SODIUM BICARBONATE 1 MEQ/ML
650 SYRINGE (ML) INTRAVENOUS THREE TIMES A DAY
Refills: 0 | Status: COMPLETED | OUTPATIENT
Start: 2019-08-10 | End: 2019-08-12

## 2019-08-10 RX ORDER — HEPARIN SODIUM 5000 [USP'U]/ML
5000 INJECTION INTRAVENOUS; SUBCUTANEOUS EVERY 8 HOURS
Refills: 0 | Status: DISCONTINUED | OUTPATIENT
Start: 2019-08-10 | End: 2019-08-13

## 2019-08-10 RX ADMIN — Medication 650 MILLIGRAM(S): at 21:47

## 2019-08-10 RX ADMIN — Medication 25 MILLIGRAM(S): at 12:15

## 2019-08-10 RX ADMIN — ARIPIPRAZOLE 10 MILLIGRAM(S): 15 TABLET ORAL at 12:15

## 2019-08-10 RX ADMIN — PANTOPRAZOLE SODIUM 40 MILLIGRAM(S): 20 TABLET, DELAYED RELEASE ORAL at 05:10

## 2019-08-10 RX ADMIN — LAMOTRIGINE 200 MILLIGRAM(S): 25 TABLET, ORALLY DISINTEGRATING ORAL at 12:15

## 2019-08-10 RX ADMIN — Medication 325 MILLIGRAM(S): at 12:15

## 2019-08-10 RX ADMIN — FINASTERIDE 5 MILLIGRAM(S): 5 TABLET, FILM COATED ORAL at 12:15

## 2019-08-10 RX ADMIN — TAMSULOSIN HYDROCHLORIDE 0.4 MILLIGRAM(S): 0.4 CAPSULE ORAL at 21:47

## 2019-08-10 RX ADMIN — GABAPENTIN 300 MILLIGRAM(S): 400 CAPSULE ORAL at 13:41

## 2019-08-10 RX ADMIN — Medication 650 MILLIGRAM(S): at 05:10

## 2019-08-10 RX ADMIN — GABAPENTIN 300 MILLIGRAM(S): 400 CAPSULE ORAL at 05:10

## 2019-08-10 RX ADMIN — GABAPENTIN 300 MILLIGRAM(S): 400 CAPSULE ORAL at 21:47

## 2019-08-10 RX ADMIN — Medication 650 MILLIGRAM(S): at 13:41

## 2019-08-10 NOTE — PROGRESS NOTE ADULT - SUBJECTIVE AND OBJECTIVE BOX
MEDICAL ATTENDING NOTE    Patient is a 68y old  Male who presents with a chief complaint of hematuria (09 Aug 2019 17:02)      INTERVAL HPI/OVERNIGHT EVENTS: no new complaints    MEDICATIONS  (STANDING):  ARIPiprazole 10 milliGRAM(s) Oral daily  ferrous    sulfate 325 milliGRAM(s) Oral daily  finasteride 5 milliGRAM(s) Oral daily  gabapentin 300 milliGRAM(s) Oral three times a day  lamoTRIgine 200 milliGRAM(s) Oral daily  pantoprazole    Tablet 40 milliGRAM(s) Oral before breakfast  sodium bicarbonate 650 milliGRAM(s) Oral three times a day  sodium chloride 0.9%. 1000 milliLiter(s) (75 mL/Hr) IV Continuous <Continuous>  tamsulosin 0.4 milliGRAM(s) Oral at bedtime  traZODone 25 milliGRAM(s) Oral daily    MEDICATIONS  (PRN):  ALBUTerol/ipratropium for Nebulization 3 milliLiter(s) Nebulizer every 6 hours PRN Shortness of Breath and/or Wheezing  HYDROmorphone   Tablet 4 milliGRAM(s) Oral every 8 hours PRN Severe Pain (7 - 10)      __________________________________________________  ----------------------------------------------------------------------------------  REVIEW OF SYSTEMS: no fever, no SOB, No Chest pain; feels well      Vital Signs Last 24 Hrs  T(C): 36.6 (10 Aug 2019 05:29), Max: 36.8 (09 Aug 2019 18:50)  T(F): 97.8 (10 Aug 2019 05:29), Max: 98.2 (09 Aug 2019 18:50)  HR: 76 (10 Aug 2019 05:29) (76 - 88)  BP: 125/68 (10 Aug 2019 05:29) (117/68 - 125/68)  BP(mean): --  RR: 18 (10 Aug 2019 05:29) (17 - 18)  SpO2: 97% (10 Aug 2019 05:29) (96% - 99%)    _________________  PHYSICAL EXAM:  ---------------------------   NAD; Normocephalic;   LUNGS - no wheezing  HEART: S1 S2+   ABDOMEN: Soft, Nontender, non distended  EXTREMITIES: no cyanosis; no edema  NERVOUS SYSTEM:  Awake and alert; no new deficits    _________________________________________________  LABS:                        11.1   8.62  )-----------( 193      ( 10 Aug 2019 06:52 )             35.0     08-10    138  |  113<H>  |  52<H>  ----------------------------<  98  4.6   |  14<L>  |  3.52<H>    Ca    8.7      10 Aug 2019 06:52    TPro  9.9<H>  /  Alb  3.9  /  TBili  0.5  /  DBili  x   /  AST  30  /  ALT  51  /  AlkPhos  190<H>  08-      Urinalysis Basic - ( 09 Aug 2019 11:48 )    Color: Red / Appearance: very cloudy / S.015 / pH: x  Gluc: x / Ketone: Negative  / Bili: Negative / Urobili: Negative   Blood: x / Protein: 500 mg/dL / Nitrite: Negative   Leuk Esterase: Negative / RBC: >50 /HPF / WBC 0-2 /HPF   Sq Epi: x / Non Sq Epi: Occasional /HPF / Bacteria: Negative /HPF      CAPILLARY BLOOD GLUCOSE                Care Discussed with Consultants :     Plan of care was discussed with patient ; all questions and concerns were addressed and care was aligned with patient's wishes.  Patient has been advised to follow up with PMD upon discharge from the hospital.  Discharge plans discussed with nursing staff , case manger and . MEDICAL ATTENDING NOTE    Patient is a 68y old  Male who presents with a chief complaint of hematuria (09 Aug 2019 17:02)      INTERVAL HPI/OVERNIGHT EVENTS: offers no new complaints    MEDICATIONS  (STANDING):  ARIPiprazole 10 milliGRAM(s) Oral daily  ferrous    sulfate 325 milliGRAM(s) Oral daily  finasteride 5 milliGRAM(s) Oral daily  gabapentin 300 milliGRAM(s) Oral three times a day  lamoTRIgine 200 milliGRAM(s) Oral daily  pantoprazole    Tablet 40 milliGRAM(s) Oral before breakfast  sodium bicarbonate 650 milliGRAM(s) Oral three times a day  sodium chloride 0.9%. 1000 milliLiter(s) (75 mL/Hr) IV Continuous <Continuous>  tamsulosin 0.4 milliGRAM(s) Oral at bedtime  traZODone 25 milliGRAM(s) Oral daily    MEDICATIONS  (PRN):  ALBUTerol/ipratropium for Nebulization 3 milliLiter(s) Nebulizer every 6 hours PRN Shortness of Breath and/or Wheezing  HYDROmorphone   Tablet 4 milliGRAM(s) Oral every 8 hours PRN Severe Pain (7 - 10)      __________________________________________________  ----------------------------------------------------------------------------------  REVIEW OF SYSTEMS: no fever, no SOB, No Chest pain; feels better       Vital Signs Last 24 Hrs  T(C): 36.6 (10 Aug 2019 05:29), Max: 36.8 (09 Aug 2019 18:50)  T(F): 97.8 (10 Aug 2019 05:29), Max: 98.2 (09 Aug 2019 18:50)  HR: 76 (10 Aug 2019 05:29) (76 - 88)  BP: 125/68 (10 Aug 2019 05:29) (117/68 - 125/68)  BP(mean): --  RR: 18 (10 Aug 2019 05:29) (17 - 18)  SpO2: 97% (10 Aug 2019 05:29) (96% - 99%)    _________________  PHYSICAL EXAM:  ---------------------------   NAD; Normocephalic;   LUNGS - no wheezing  HEART: S1 S2+   ABDOMEN: Soft, Nontender, non distended; BS+  EXTREMITIES: no cyanosis; no edema  NERVOUS SYSTEM:  Awake and alert; no new deficits    _________________________________________________  LABS:                        11.1   8.62  )-----------( 193      ( 10 Aug 2019 06:52 )             35.0     08-10    138  |  113<H>  |  52<H>  ----------------------------<  98  4.6   |  14<L>  |  3.52<H>    Ca    8.7      10 Aug 2019 06:52    TPro  9.9<H>  /  Alb  3.9  /  TBili  0.5  /  DBili  x   /  AST  30  /  ALT  51  /  AlkPhos  190<H>  08-      Urinalysis Basic - ( 09 Aug 2019 11:48 )    Color: Red / Appearance: very cloudy / S.015 / pH: x  Gluc: x / Ketone: Negative  / Bili: Negative / Urobili: Negative   Blood: x / Protein: 500 mg/dL / Nitrite: Negative   Leuk Esterase: Negative / RBC: >50 /HPF / WBC 0-2 /HPF   Sq Epi: x / Non Sq Epi: Occasional /HPF / Bacteria: Negative /HPF      CAPILLARY BLOOD GLUCOSE                Care Discussed with Consultants :     Plan of care was discussed with patient ; all questions and concerns were addressed and care was aligned with patient's wishes.

## 2019-08-10 NOTE — PROGRESS NOTE ADULT - PROBLEM SELECTOR PLAN 4
CKD 4 with metabolic acidosis- continue with bicarb replacement. Increase dose to 1300  3 x daily x 12 days; check BMP in am CKD 4 with metabolic acidosis- continue with bicarb replacement. Increase dose to 1300mg  3 x daily x 2 days; check BMP in am

## 2019-08-10 NOTE — PROGRESS NOTE ADULT - ASSESSMENT
68M from [facility], with PMHx of prostate Ca (s/p radiation in 2015, now in remission), asthma, COPD (not on O2), HTN, HLD, Bipolar, anemia, GERD, Ileostomy s/p sigmoid resection, PAD, and CKD presented to ED c/o hematuria x 1 day; pt states he noticed blood and clots today while urinating, admits to difficulties urinating, denies dysuria, no abd pain, no back pain; Denies fever, chills, SOB or CP. Pt recently admitted to [facility] w/ Hematuria and UTI, ucx Ecoli, was d/c home on 8/5  Readmitted due to gross hemtauria

## 2019-08-10 NOTE — PROGRESS NOTE ADULT - SUBJECTIVE AND OBJECTIVE BOX
INTERVAL HPI/OVERNIGHT EVENTS:  Pt resting comfortably. No acute complaints.   CBI running at moderate rate.   Denies abd/back pain.    MEDICATIONS  (STANDING):  ARIPiprazole 10 milliGRAM(s) Oral daily  ferrous    sulfate 325 milliGRAM(s) Oral daily  finasteride 5 milliGRAM(s) Oral daily  gabapentin 300 milliGRAM(s) Oral three times a day  heparin  Injectable 5000 Unit(s) SubCutaneous every 8 hours  lamoTRIgine 200 milliGRAM(s) Oral daily  pantoprazole    Tablet 40 milliGRAM(s) Oral before breakfast  sodium bicarbonate 650 milliGRAM(s) Oral three times a day  sodium chloride 0.9%. 1000 milliLiter(s) (75 mL/Hr) IV Continuous <Continuous>  tamsulosin 0.4 milliGRAM(s) Oral at bedtime  traZODone 25 milliGRAM(s) Oral daily    MEDICATIONS  (PRN):  ALBUTerol/ipratropium for Nebulization 3 milliLiter(s) Nebulizer every 6 hours PRN Shortness of Breath and/or Wheezing  HYDROmorphone   Tablet 4 milliGRAM(s) Oral every 8 hours PRN Severe Pain (7 - 10)      Vital Signs Last 24 Hrs  T(C): 36.6 (10 Aug 2019 05:29), Max: 36.8 (09 Aug 2019 18:50)  T(F): 97.8 (10 Aug 2019 05:29), Max: 98.2 (09 Aug 2019 18:50)  HR: 76 (10 Aug 2019 05:29) (76 - 88)  BP: 125/68 (10 Aug 2019 05:29) (117/68 - 125/68)  BP(mean): --  RR: 18 (10 Aug 2019 05:29) (17 - 18)  SpO2: 97% (10 Aug 2019 05:29) (96% - 99%)    Physical:  General: A&Ox3. NAD.  Abdomen: Soft nondistended, no suprapubic tenderness.  Back: No CVAT b/l    I&O's Detail    09 Aug 2019 07:01  -  10 Aug 2019 07:00  --------------------------------------------------------  IN:  Total IN: 0 mL    OUT:    Ileostomy: 400 mL    Indwelling Catheter - Urethral: 4500 mL clear  Total OUT: 4900 mL    Total NET: -4900 mL      10 Aug 2019 07:01  -  10 Aug 2019 13:00  --------------------------------------------------------  IN:  Total IN: 0 mL    OUT:    Indwelling Catheter - Urethral: 1600 mL clear  Total OUT: 1600 mL    Total NET: -1600 mL    LABS:                        11.1   8.62  )-----------( 193      ( 10 Aug 2019 06:52 )             35.0             08-10    138  |  113<H>  |  52<H>  ----------------------------<  98  4.6   |  14<L>  |  3.52<H>    Ca    8.7      10 Aug 2019 06:52    TPro  9.9<H>  /  Alb  3.9  /  TBili  0.5  /  DBili  x   /  AST  30  /  ALT  51  /  AlkPhos  190<H>  08-09

## 2019-08-10 NOTE — PROGRESS NOTE ADULT - ASSESSMENT
68y.o. Male with improving hematuria    -CBI slowed, titrate as needed for hematuria  -Irrigate phillips catheter as needed  -Possible cysto 8/13/19

## 2019-08-11 LAB
ANION GAP SERPL CALC-SCNC: 9 MMOL/L — SIGNIFICANT CHANGE UP (ref 5–17)
BUN SERPL-MCNC: 45 MG/DL — HIGH (ref 7–18)
CALCIUM SERPL-MCNC: 8.8 MG/DL — SIGNIFICANT CHANGE UP (ref 8.4–10.5)
CHLORIDE SERPL-SCNC: 115 MMOL/L — HIGH (ref 96–108)
CO2 SERPL-SCNC: 17 MMOL/L — LOW (ref 22–31)
CREAT SERPL-MCNC: 2.81 MG/DL — HIGH (ref 0.5–1.3)
GLUCOSE SERPL-MCNC: 130 MG/DL — HIGH (ref 70–99)
POTASSIUM SERPL-MCNC: 4.8 MMOL/L — SIGNIFICANT CHANGE UP (ref 3.5–5.3)
POTASSIUM SERPL-SCNC: 4.8 MMOL/L — SIGNIFICANT CHANGE UP (ref 3.5–5.3)
SODIUM SERPL-SCNC: 141 MMOL/L — SIGNIFICANT CHANGE UP (ref 135–145)

## 2019-08-11 PROCEDURE — 99232 SBSQ HOSP IP/OBS MODERATE 35: CPT | Mod: GC

## 2019-08-11 PROCEDURE — 99232 SBSQ HOSP IP/OBS MODERATE 35: CPT

## 2019-08-11 RX ADMIN — Medication 650 MILLIGRAM(S): at 05:20

## 2019-08-11 RX ADMIN — FINASTERIDE 5 MILLIGRAM(S): 5 TABLET, FILM COATED ORAL at 12:07

## 2019-08-11 RX ADMIN — LAMOTRIGINE 200 MILLIGRAM(S): 25 TABLET, ORALLY DISINTEGRATING ORAL at 12:07

## 2019-08-11 RX ADMIN — PANTOPRAZOLE SODIUM 40 MILLIGRAM(S): 20 TABLET, DELAYED RELEASE ORAL at 05:20

## 2019-08-11 RX ADMIN — Medication 25 MILLIGRAM(S): at 12:07

## 2019-08-11 RX ADMIN — ARIPIPRAZOLE 10 MILLIGRAM(S): 15 TABLET ORAL at 12:07

## 2019-08-11 RX ADMIN — GABAPENTIN 300 MILLIGRAM(S): 400 CAPSULE ORAL at 05:20

## 2019-08-11 RX ADMIN — GABAPENTIN 300 MILLIGRAM(S): 400 CAPSULE ORAL at 21:18

## 2019-08-11 RX ADMIN — Medication 325 MILLIGRAM(S): at 12:07

## 2019-08-11 RX ADMIN — Medication 650 MILLIGRAM(S): at 21:18

## 2019-08-11 RX ADMIN — Medication 650 MILLIGRAM(S): at 13:37

## 2019-08-11 RX ADMIN — GABAPENTIN 300 MILLIGRAM(S): 400 CAPSULE ORAL at 13:37

## 2019-08-11 RX ADMIN — TAMSULOSIN HYDROCHLORIDE 0.4 MILLIGRAM(S): 0.4 CAPSULE ORAL at 21:18

## 2019-08-11 NOTE — PROGRESS NOTE ADULT - SUBJECTIVE AND OBJECTIVE BOX
MEDICAL ATTENDING NOTE    Patient is a 68y old  Male who presents with a chief complaint of hematuria (11 Aug 2019 10:27)      INTERVAL HPI/OVERNIGHT EVENTS: no new complaints    MEDICATIONS  (STANDING):  ARIPiprazole 10 milliGRAM(s) Oral daily  ferrous    sulfate 325 milliGRAM(s) Oral daily  finasteride 5 milliGRAM(s) Oral daily  gabapentin 300 milliGRAM(s) Oral three times a day  heparin  Injectable 5000 Unit(s) SubCutaneous every 8 hours  lamoTRIgine 200 milliGRAM(s) Oral daily  pantoprazole    Tablet 40 milliGRAM(s) Oral before breakfast  sodium bicarbonate 650 milliGRAM(s) Oral three times a day  sodium chloride 0.9%. 1000 milliLiter(s) (75 mL/Hr) IV Continuous <Continuous>  tamsulosin 0.4 milliGRAM(s) Oral at bedtime  traZODone 25 milliGRAM(s) Oral daily    MEDICATIONS  (PRN):  ALBUTerol/ipratropium for Nebulization 3 milliLiter(s) Nebulizer every 6 hours PRN Shortness of Breath and/or Wheezing  HYDROmorphone   Tablet 4 milliGRAM(s) Oral every 8 hours PRN Severe Pain (7 - 10)      __________________________________________________  ----------------------------------------------------------------------------------  REVIEW OF SYSTEMS: no fever, no SOB, No Chest pain; feels well      Vital Signs Last 24 Hrs  T(C): 36.6 (11 Aug 2019 05:18), Max: 37.2 (10 Aug 2019 20:25)  T(F): 97.9 (11 Aug 2019 05:18), Max: 98.9 (10 Aug 2019 20:25)  HR: 76 (11 Aug 2019 05:18) (76 - 76)  BP: 109/63 (11 Aug 2019 05:18) (108/65 - 114/69)  BP(mean): --  RR: 17 (11 Aug 2019 05:18) (17 - 17)  SpO2: 95% (11 Aug 2019 05:18) (95% - 97%)    _________________  PHYSICAL EXAM:  ---------------------------   NAD; Normocephalic;   LUNGS - no wheezing  HEART: S1 S2+   ABDOMEN: Soft, Nontender, non distended  EXTREMITIES: no cyanosis; no edema  NERVOUS SYSTEM:  Awake and alert; no new deficits    _________________________________________________  LABS:                        11.1   8.62  )-----------( 193      ( 10 Aug 2019 06:52 )             35.0     08-10    138  |  113<H>  |  52<H>  ----------------------------<  98  4.6   |  14<L>  |  3.52<H>    Ca    8.7      10 Aug 2019 06:52    TPro  9.9<H>  /  Alb  3.9  /  TBili  0.5  /  DBili  x   /  AST  30  /  ALT  51  /  AlkPhos  190<H>  08-09      Urinalysis Basic - ( 09 Aug 2019 11:48 )    Color: Red / Appearance: very cloudy / S.015 / pH: x  Gluc: x / Ketone: Negative  / Bili: Negative / Urobili: Negative   Blood: x / Protein: 500 mg/dL / Nitrite: Negative   Leuk Esterase: Negative / RBC: >50 /HPF / WBC 0-2 /HPF   Sq Epi: x / Non Sq Epi: Occasional /HPF / Bacteria: Negative /HPF      CAPILLARY BLOOD GLUCOSE                Care Discussed with Consultants :     Plan of care was discussed with patient ; all questions and concerns were addressed and care was aligned with patient's wishes. MEDICAL ATTENDING NOTE    Patient is a 68y old  Male who presents with a chief complaint of hematuria (11 Aug 2019 10:27)      INTERVAL HPI/OVERNIGHT EVENTS: no new complaints    MEDICATIONS  (STANDING):  ARIPiprazole 10 milliGRAM(s) Oral daily  ferrous    sulfate 325 milliGRAM(s) Oral daily  finasteride 5 milliGRAM(s) Oral daily  gabapentin 300 milliGRAM(s) Oral three times a day  heparin  Injectable 5000 Unit(s) SubCutaneous every 8 hours  lamoTRIgine 200 milliGRAM(s) Oral daily  pantoprazole    Tablet 40 milliGRAM(s) Oral before breakfast  sodium bicarbonate 650 milliGRAM(s) Oral three times a day  sodium chloride 0.9%. 1000 milliLiter(s) (75 mL/Hr) IV Continuous <Continuous>  tamsulosin 0.4 milliGRAM(s) Oral at bedtime  traZODone 25 milliGRAM(s) Oral daily    MEDICATIONS  (PRN):  ALBUTerol/ipratropium for Nebulization 3 milliLiter(s) Nebulizer every 6 hours PRN Shortness of Breath and/or Wheezing  HYDROmorphone   Tablet 4 milliGRAM(s) Oral every 8 hours PRN Severe Pain (7 - 10)      __________________________________________________  ----------------------------------------------------------------------------------  REVIEW OF SYSTEMS: no fever, no SOB, No Chest pain; feels well      Vital Signs Last 24 Hrs  T(C): 36.6 (11 Aug 2019 05:18), Max: 37.2 (10 Aug 2019 20:25)  T(F): 97.9 (11 Aug 2019 05:18), Max: 98.9 (10 Aug 2019 20:25)  HR: 76 (11 Aug 2019 05:18) (76 - 76)  BP: 109/63 (11 Aug 2019 05:18) (108/65 - 114/69)  BP(mean): --  RR: 17 (11 Aug 2019 05:18) (17 - 17)  SpO2: 95% (11 Aug 2019 05:18) (95% - 97%)    _________________  PHYSICAL EXAM:  ---------------------------   NAD; Normocephalic;   LUNGS - no wheezing  HEART: S1 S2+   ABDOMEN: Soft, Nontender, non distended  EXTREMITIES: no cyanosis; no edema  NERVOUS SYSTEM:  Awake and alert; no new deficits    _________________________________________________  LABS:                        11.1   8.62  )-----------( 193      ( 10 Aug 2019 06:52 )             35.0     08-11    141  |  115<H>  |  45<H>  ----------------------------<  130<H>  4.8   |  17<L>  |  2.81<H>    Ca    8.8      11 Aug 2019 12:05        Urinalysis Basic - ( 09 Aug 2019 11:48 )    Color: Red / Appearance: very cloudy / S.015 / pH: x  Gluc: x / Ketone: Negative  / Bili: Negative / Urobili: Negative   Blood: x / Protein: 500 mg/dL / Nitrite: Negative   Leuk Esterase: Negative / RBC: >50 /HPF / WBC 0-2 /HPF   Sq Epi: x / Non Sq Epi: Occasional /HPF / Bacteria: Negative /HPF      CAPILLARY BLOOD GLUCOSE                Care Discussed with Consultants :     Plan of care was discussed with patient ; all questions and concerns were addressed and care was aligned with patient's wishes. MEDICAL ATTENDING NOTE    Patient is a 68y old  Male who presents with a chief complaint of hematuria (11 Aug 2019 10:27)      INTERVAL HPI/OVERNIGHT EVENTS: no new complaints    MEDICATIONS  (STANDING):  ARIPiprazole 10 milliGRAM(s) Oral daily  ferrous    sulfate 325 milliGRAM(s) Oral daily  finasteride 5 milliGRAM(s) Oral daily  gabapentin 300 milliGRAM(s) Oral three times a day  heparin  Injectable 5000 Unit(s) SubCutaneous every 8 hours  lamoTRIgine 200 milliGRAM(s) Oral daily  pantoprazole    Tablet 40 milliGRAM(s) Oral before breakfast  sodium bicarbonate 650 milliGRAM(s) Oral three times a day  sodium chloride 0.9%. 1000 milliLiter(s) (75 mL/Hr) IV Continuous <Continuous>  tamsulosin 0.4 milliGRAM(s) Oral at bedtime  traZODone 25 milliGRAM(s) Oral daily    MEDICATIONS  (PRN):  ALBUTerol/ipratropium for Nebulization 3 milliLiter(s) Nebulizer every 6 hours PRN Shortness of Breath and/or Wheezing  HYDROmorphone   Tablet 4 milliGRAM(s) Oral every 8 hours PRN Severe Pain (7 - 10)      __________________________________________________  ----------------------------------------------------------------------------------  REVIEW OF SYSTEMS: no fever, no SOB, No Chest pain; feels well      Vital Signs Last 24 Hrs  T(C): 36.6 (11 Aug 2019 05:18), Max: 37.2 (10 Aug 2019 20:25)  T(F): 97.9 (11 Aug 2019 05:18), Max: 98.9 (10 Aug 2019 20:25)  HR: 76 (11 Aug 2019 05:18) (76 - 76)  BP: 109/63 (11 Aug 2019 05:18) (108/65 - 114/69)  BP(mean): --  RR: 17 (11 Aug 2019 05:18) (17 - 17)  SpO2: 95% (11 Aug 2019 05:18) (95% - 97%)    _________________  PHYSICAL EXAM:  ---------------------------   NAD; Normocephalic;   LUNGS - no wheezing  HEART: S1 S2+   ABDOMEN: Soft, Nontender, non distended; Ostomy with soft stool+  EXTREMITIES: no cyanosis; no edema  NERVOUS SYSTEM:  Awake and alert; no new deficits    _________________________________________________  LABS:                        11.1   8.62  )-----------( 193      ( 10 Aug 2019 06:52 )             35.0     08-11    141  |  115<H>  |  45<H>  ----------------------------<  130<H>  4.8   |  17<L>  |  2.81<H>    Ca    8.8      11 Aug 2019 12:05        Urinalysis Basic - ( 09 Aug 2019 11:48 )    Color: Red / Appearance: very cloudy / S.015 / pH: x  Gluc: x / Ketone: Negative  / Bili: Negative / Urobili: Negative   Blood: x / Protein: 500 mg/dL / Nitrite: Negative   Leuk Esterase: Negative / RBC: >50 /HPF / WBC 0-2 /HPF   Sq Epi: x / Non Sq Epi: Occasional /HPF / Bacteria: Negative /HPF      CAPILLARY BLOOD GLUCOSE                Care Discussed with Consultants :     Plan of care was discussed with patient ; all questions and concerns were addressed and care was aligned with patient's wishes.

## 2019-08-11 NOTE — MEDICAL STUDENT PROGRESS NOTE(EDUCATION) - SUBJECTIVE AND OBJECTIVE BOX
PGY 1 Note discussed with supervising resident and primary attending    Patient is a 68y old  Male who presents with a chief complaint of hematuria (11 Aug 2019 10:27)      INTERVAL HPI/OVERNIGHT EVENTS: offers no new complaints; current symptoms resolving    MEDICATIONS  (STANDING):  ARIPiprazole 10 milliGRAM(s) Oral daily  ferrous    sulfate 325 milliGRAM(s) Oral daily  finasteride 5 milliGRAM(s) Oral daily  gabapentin 300 milliGRAM(s) Oral three times a day  heparin  Injectable 5000 Unit(s) SubCutaneous every 8 hours  lamoTRIgine 200 milliGRAM(s) Oral daily  pantoprazole    Tablet 40 milliGRAM(s) Oral before breakfast  sodium bicarbonate 650 milliGRAM(s) Oral three times a day  sodium chloride 0.9%. 1000 milliLiter(s) (75 mL/Hr) IV Continuous <Continuous>  tamsulosin 0.4 milliGRAM(s) Oral at bedtime  traZODone 25 milliGRAM(s) Oral daily    MEDICATIONS  (PRN):  ALBUTerol/ipratropium for Nebulization 3 milliLiter(s) Nebulizer every 6 hours PRN Shortness of Breath and/or Wheezing  HYDROmorphone   Tablet 4 milliGRAM(s) Oral every 8 hours PRN Severe Pain (7 - 10)      __________________________________________________  REVIEW OF SYSTEMS:    CONSTITUTIONAL: No fever,   EYES: no acute visual disturbances  NECK: No pain or stiffness  RESPIRATORY: No cough; No shortness of breath  CARDIOVASCULAR: No chest pain, no palpitations  GASTROINTESTINAL: No pain. No nausea or vomiting; No diarrhea   NEUROLOGICAL: No headache or numbness, no tremors  MUSCULOSKELETAL: No joint pain, no muscle pain  GENITOURINARY: no dysuria, no frequency, no hesitancy  PSYCHIATRY: no depression , no anxiety  ALL OTHER  ROS negative        Vital Signs Last 24 Hrs  T(C): 36.6 (11 Aug 2019 05:18), Max: 37.2 (10 Aug 2019 20:25)  T(F): 97.9 (11 Aug 2019 05:18), Max: 98.9 (10 Aug 2019 20:25)  HR: 76 (11 Aug 2019 05:18) (76 - 76)  BP: 109/63 (11 Aug 2019 05:18) (108/65 - 114/69)  BP(mean): --  RR: 17 (11 Aug 2019 05:18) (17 - 17)  SpO2: 95% (11 Aug 2019 05:18) (95% - 97%)    ________________________________________________  PHYSICAL EXAM:  GENERAL: NAD  HEENT: Normocephalic;  conjunctivae and sclerae clear; moist mucous membranes;   NECK : supple  CHEST/LUNG: Clear to auscultation bilaterally with good air entry   HEART: S1 S2  regular; no murmurs, gallops or rubs  ABDOMEN: Soft, Nontender, Nondistended; Bowel sounds present  EXTREMITIES: no cyanosis; no edema; no calf tenderness  SKIN: warm and dry; no rash  NERVOUS SYSTEM:  Awake and alert; Oriented  to place, person and time ; no new deficits    _________________________________________________  LABS:                        11.1   8.62  )-----------( 193      ( 10 Aug 2019 06:52 )             35.0     08-10    138  |  113<H>  |  52<H>  ----------------------------<  98  4.6   |  14<L>  |  3.52<H>    Ca    8.7      10 Aug 2019 06:52    TPro  9.9<H>  /  Alb  3.9  /  TBili  0.5  /  DBili  x   /  AST  30  /  ALT  51  /  AlkPhos  190<H>        Urinalysis Basic - ( 09 Aug 2019 11:48 )    Color: Red / Appearance: very cloudy / S.015 / pH: x  Gluc: x / Ketone: Negative  / Bili: Negative / Urobili: Negative   Blood: x / Protein: 500 mg/dL / Nitrite: Negative   Leuk Esterase: Negative / RBC: >50 /HPF / WBC 0-2 /HPF   Sq Epi: x / Non Sq Epi: Occasional /HPF / Bacteria: Negative /HPF      CAPILLARY BLOOD GLUCOSE            RADIOLOGY & ADDITIONAL TESTS:    Imaging Personally Reviewed:  YES/NO    Consultant(s) Notes Reviewed:   YES/ No    Care Discussed with Consultants :     Plan of care was discussed with patient and /or primary care giver; all questions and concerns were addressed and care was aligned with patient's wishes. PGY 1 Note discussed with supervising resident and primary attending    Patient is a 68y old  Male who presents with a chief complaint of hematuria (11 Aug 2019 10:27)      INTERVAL HPI/OVERNIGHT EVENTS: offers no new complaints; current symptoms resolving, no blood in urine is found in phillips.     MEDICATIONS  (STANDING):  ARIPiprazole 10 milliGRAM(s) Oral daily  ferrous    sulfate 325 milliGRAM(s) Oral daily  finasteride 5 milliGRAM(s) Oral daily  gabapentin 300 milliGRAM(s) Oral three times a day  heparin  Injectable 5000 Unit(s) SubCutaneous every 8 hours  lamoTRIgine 200 milliGRAM(s) Oral daily  pantoprazole    Tablet 40 milliGRAM(s) Oral before breakfast  sodium bicarbonate 650 milliGRAM(s) Oral three times a day  sodium chloride 0.9%. 1000 milliLiter(s) (75 mL/Hr) IV Continuous <Continuous>  tamsulosin 0.4 milliGRAM(s) Oral at bedtime  traZODone 25 milliGRAM(s) Oral daily    MEDICATIONS  (PRN):  ALBUTerol/ipratropium for Nebulization 3 milliLiter(s) Nebulizer every 6 hours PRN Shortness of Breath and/or Wheezing  HYDROmorphone   Tablet 4 milliGRAM(s) Oral every 8 hours PRN Severe Pain (7 - 10)      __________________________________________________  REVIEW OF SYSTEMS:    CONSTITUTIONAL: No fever,   EYES: no acute visual disturbances  NECK: No pain or stiffness  RESPIRATORY: No cough; No shortness of breath  CARDIOVASCULAR: No chest pain, no palpitations  GASTROINTESTINAL: No pain. No nausea or vomiting; No diarrhea   NEUROLOGICAL: No headache or numbness, no tremors  MUSCULOSKELETAL: No joint pain, no muscle pain  GENITOURINARY: no dysuria, no frequency, no hesitancy  PSYCHIATRY: no depression , no anxiety  ALL OTHER  ROS negative        Vital Signs Last 24 Hrs  T(C): 36.6 (11 Aug 2019 05:18), Max: 37.2 (10 Aug 2019 20:25)  T(F): 97.9 (11 Aug 2019 05:18), Max: 98.9 (10 Aug 2019 20:25)  HR: 76 (11 Aug 2019 05:18) (76 - 76)  BP: 109/63 (11 Aug 2019 05:18) (108/65 - 114/69)  BP(mean): --  RR: 17 (11 Aug 2019 05:18) (17 - 17)  SpO2: 95% (11 Aug 2019 05:18) (95% - 97%)    ________________________________________________  PHYSICAL EXAM:  GENERAL: NAD  HEENT: Normocephalic;  conjunctivae and sclerae clear; moist mucous membranes;   NECK : supple  CHEST/LUNG: Clear to auscultation bilaterally with good air entry   HEART: S1 S2  regular; no murmurs, gallops or rubs  ABDOMEN: Soft, Nontender, Nondistended; Bowel sounds present  EXTREMITIES: no cyanosis; no edema; no calf tenderness  SKIN: warm and dry; no rash  NERVOUS SYSTEM:  Awake and alert; Oriented  to place, person and time ; no new deficits    _________________________________________________  LABS:                        11.1   8.62  )-----------( 193      ( 10 Aug 2019 06:52 )             35.0     08-10    138  |  113<H>  |  52<H>  ----------------------------<  98  4.6   |  14<L>  |  3.52<H>    Ca    8.7      10 Aug 2019 06:52    TPro  9.9<H>  /  Alb  3.9  /  TBili  0.5  /  DBili  x   /  AST  30  /  ALT  51  /  AlkPhos  190<H>  08      Urinalysis Basic - ( 09 Aug 2019 11:48 )    Color: Red / Appearance: very cloudy / S.015 / pH: x  Gluc: x / Ketone: Negative  / Bili: Negative / Urobili: Negative   Blood: x / Protein: 500 mg/dL / Nitrite: Negative   Leuk Esterase: Negative / RBC: >50 /HPF / WBC 0-2 /HPF   Sq Epi: x / Non Sq Epi: Occasional /HPF / Bacteria: Negative /HPF      CAPILLARY BLOOD GLUCOSE            RADIOLOGY & ADDITIONAL TESTS:    Imaging Personally Reviewed:  YES/NO    Consultant(s) Notes Reviewed:   YES/ No    Care Discussed with Consultants :     Plan of care was discussed with patient and /or primary care giver; all questions and concerns were addressed and care was aligned with patient's wishes.

## 2019-08-11 NOTE — PROGRESS NOTE ADULT - ASSESSMENT
68y.o. Male with resolving hematuria    -Keep CBI clamped as long as UO clear  -Plan for cysto 8/13/19  -Medical optimization and clearance

## 2019-08-11 NOTE — PROGRESS NOTE ADULT - SUBJECTIVE AND OBJECTIVE BOX
INTERVAL HPI/OVERNIGHT EVENTS:  Pt resting comfortably. No acute complaints.   CBI not running secondary to depletion of saline bags.   Denies abd/back pain    MEDICATIONS  (STANDING):  ARIPiprazole 10 milliGRAM(s) Oral daily  ferrous    sulfate 325 milliGRAM(s) Oral daily  finasteride 5 milliGRAM(s) Oral daily  gabapentin 300 milliGRAM(s) Oral three times a day  heparin  Injectable 5000 Unit(s) SubCutaneous every 8 hours  lamoTRIgine 200 milliGRAM(s) Oral daily  pantoprazole    Tablet 40 milliGRAM(s) Oral before breakfast  sodium bicarbonate 650 milliGRAM(s) Oral three times a day  sodium chloride 0.9%. 1000 milliLiter(s) (75 mL/Hr) IV Continuous <Continuous>  tamsulosin 0.4 milliGRAM(s) Oral at bedtime  traZODone 25 milliGRAM(s) Oral daily    MEDICATIONS  (PRN):  ALBUTerol/ipratropium for Nebulization 3 milliLiter(s) Nebulizer every 6 hours PRN Shortness of Breath and/or Wheezing  HYDROmorphone   Tablet 4 milliGRAM(s) Oral every 8 hours PRN Severe Pain (7 - 10)      Vital Signs Last 24 Hrs  T(C): 36.6 (11 Aug 2019 05:18), Max: 37.2 (10 Aug 2019 20:25)  T(F): 97.9 (11 Aug 2019 05:18), Max: 98.9 (10 Aug 2019 20:25)  HR: 76 (11 Aug 2019 05:18) (76 - 76)  BP: 109/63 (11 Aug 2019 05:18) (108/65 - 114/69)  BP(mean): --  RR: 17 (11 Aug 2019 05:18) (17 - 17)  SpO2: 95% (11 Aug 2019 05:18) (95% - 97%)    Physical:  General: A&Ox3. NAD.  Abdomen: Soft nondistended, no suprapubic tenderness.  Back: No CVAT b/l    I&O's Detail    10 Aug 2019 07:01  -  11 Aug 2019 07:00  --------------------------------------------------------  IN:  Total IN: 0 mL    OUT:    Ileostomy: 450 mL    Indwelling Catheter - Urethral: 5100 mL clear  Total OUT: 5550 mL    Total NET: -5550 mL    LABS:                        11.1   8.62  )-----------( 193      ( 10 Aug 2019 06:52 )             35.0             08-10    138  |  113<H>  |  52<H>  ----------------------------<  98  4.6   |  14<L>  |  3.52<H>    Ca    8.7      10 Aug 2019 06:52    TPro  9.9<H>  /  Alb  3.9  /  TBili  0.5  /  DBili  x   /  AST  30  /  ALT  51  /  AlkPhos  190<H>  08-09

## 2019-08-11 NOTE — PROGRESS NOTE ADULT - PROBLEM SELECTOR PLAN 3
-renal functions improved. ARF likely due to volume depletion and obstructive uropathy -renal functions improving. ARF likely due to volume depletion and obstructive uropathy

## 2019-08-11 NOTE — MEDICAL STUDENT PROGRESS NOTE(EDUCATION) - NS MD HP STUD ASPLAN PLAN FT
Assessment and Plan:   · Assessment		  68M from [facility], with PMHx of prostate Ca (s/p radiation in 2015, now in remission), asthma, COPD (not on O2), HTN, HLD, Bipolar, anemia, GERD, Ileostomy s/p sigmoid resection, PAD, and CKD presented to ED c/o hematuria x 1 day; pt states he noticed blood and clots today while urinating, admits to difficulties urinating, denies dysuria, no abd pain, no back pain; Denies fever, chills, SOB or CP. Pt recently admitted to [facility] w/ Hematuria and UTI, ucx Ecoli, was d/c home on 8/5  Readmitted due to gross hemtauria    Pt is off CBI, no blood in urine seen in phillips.      Problem/Plan - 1:  ·  Problem: Hematuria, unspecified type.  Plan: Hematuria resolved - CBI  now clamped  Urology follow up ongoing  Plan for cystoscopy likely on Tuesday.      Problem/Plan - 2:  ·  Problem: BPH with urinary obstruction.  Plan: Continue Finasteride and Flomax.      Problem/Plan - 3:  ·  Problem: PAWAN (acute kidney injury).  Plan: -renal functions improved. ARF likely due to volume depletion and obstructive uropathy.      Problem/Plan - 4:  ·  Problem: CKD (chronic kidney disease), stage IV.  Plan: CKD 4 with metabolic acidosis- continue with bicarb replacement. check BMP today.      Problem/Plan - 5:  ·  Problem: Need for prophylactic measure.  Plan: Start heparin for DVT prophylaxis as hematuria is mostly resolved.

## 2019-08-11 NOTE — PROGRESS NOTE ADULT - SUBJECTIVE AND OBJECTIVE BOX
PGY 1 Note discussed with supervising resident and primary attending    Patient is a 68y old  Male who presents with a chief complaint of hematuria (11 Aug 2019 11:31)      INTERVAL HPI/OVERNIGHT EVENTS: offers no new complaints; urine clear. CBI clamped    MEDICATIONS  (STANDING):  ARIPiprazole 10 milliGRAM(s) Oral daily  ferrous    sulfate 325 milliGRAM(s) Oral daily  finasteride 5 milliGRAM(s) Oral daily  gabapentin 300 milliGRAM(s) Oral three times a day  heparin  Injectable 5000 Unit(s) SubCutaneous every 8 hours  lamoTRIgine 200 milliGRAM(s) Oral daily  pantoprazole    Tablet 40 milliGRAM(s) Oral before breakfast  sodium bicarbonate 650 milliGRAM(s) Oral three times a day  sodium chloride 0.9%. 1000 milliLiter(s) (75 mL/Hr) IV Continuous <Continuous>  tamsulosin 0.4 milliGRAM(s) Oral at bedtime  traZODone 25 milliGRAM(s) Oral daily    MEDICATIONS  (PRN):  ALBUTerol/ipratropium for Nebulization 3 milliLiter(s) Nebulizer every 6 hours PRN Shortness of Breath and/or Wheezing  HYDROmorphone   Tablet 4 milliGRAM(s) Oral every 8 hours PRN Severe Pain (7 - 10)      __________________________________________________  REVIEW OF SYSTEMS:    CONSTITUTIONAL: No fever,   EYES: no acute visual disturbances  NECK: No pain or stiffness  RESPIRATORY: No cough; No shortness of breath  CARDIOVASCULAR: No chest pain, no palpitations  GASTROINTESTINAL: No pain. No nausea or vomiting; No diarrhea   NEUROLOGICAL: No headache or numbness, no tremors  MUSCULOSKELETAL: No joint pain, no muscle pain  GENITOURINARY: no dysuria, no frequency, no hesitancy, phillips in place. cbi clamped, urine clear, mild discomfort  PSYCHIATRY: no depression , no anxiety  ALL OTHER  ROS negative        Vital Signs Last 24 Hrs  T(C): 36.6 (11 Aug 2019 05:18), Max: 37.2 (10 Aug 2019 20:25)  T(F): 97.9 (11 Aug 2019 05:18), Max: 98.9 (10 Aug 2019 20:25)  HR: 76 (11 Aug 2019 05:18) (76 - 76)  BP: 109/63 (11 Aug 2019 05:18) (108/65 - 114/69)  BP(mean): --  RR: 17 (11 Aug 2019 05:18) (17 - 17)  SpO2: 95% (11 Aug 2019 05:18) (95% - 97%)    ________________________________________________  PHYSICAL EXAM:  GENERAL: NAD  HEENT: Normocephalic;  conjunctivae and sclerae clear; moist mucous membranes;   NECK : supple  CHEST/LUNG: Clear to auscultation bilaterally with good air entry   HEART: S1 S2  regular; no murmurs, gallops or rubs  ABDOMEN: Soft, Nontender, Nondistended; Bowel sounds present  EXTREMITIES: no cyanosis; no edema; no calf tenderness  SKIN: warm and dry; no rash  NERVOUS SYSTEM:  Awake and alert; Oriented  to place, person and time ; no new deficits    _________________________________________________  LABS:                        11.1   8.62  )-----------( 193      ( 10 Aug 2019 06:52 )             35.0     08-11    141  |  115<H>  |  45<H>  ----------------------------<  130<H>  4.8   |  17<L>  |  2.81<H>    Ca    8.8      11 Aug 2019 12:05    TPro  9.9<H>  /  Alb  3.9  /  TBili  0.5  /  DBili  x   /  AST  30  /  ALT  51  /  AlkPhos  190<H>  08-09        CAPILLARY BLOOD GLUCOSE            RADIOLOGY & ADDITIONAL TESTS:    < from: US Renal (08.10.19 @ 19:54) >    IMPRESSION:    No evidence of hydronephrosis.    Thank you for the courtesy ofthis referral.    < end of copied text >  Imaging Personally Reviewed:  YES  Consultant(s) Notes Reviewed:   YES    Care Discussed with Consultants :     Plan of care was discussed with patient and /or primary care giver; all questions and concerns were addressed and care was aligned with patient's wishes.

## 2019-08-11 NOTE — PROGRESS NOTE ADULT - ASSESSMENT
68M from [facility], with PMHx of prostate Ca (s/p radiation in 2015, now in remission), asthma, COPD (not on O2), HTN, HLD, Bipolar, anemia, GERD, Ileostomy s/p sigmoid resection, PAD, and CKD presented to ED c/o hematuria x 1 day; pt states he noticed blood and clots today while urinating, admits to difficulties urinating, denies dysuria, no abd pain, no back pain; Denies fever, chills, SOB or CP. Pt recently admitted to [facility] w/ Hematuria and UTI, ucx Ecoli, was d/c home on 8/5  Readmitted due to gross hemtauria

## 2019-08-11 NOTE — PROGRESS NOTE ADULT - ASSESSMENT
68M from [facility], with PMHx of prostate Ca (s/p radiation in 2015, now in remission), asthma, COPD (not on O2), HTN, HLD, Bipolar, anemia, GERD, Ileostomy s/p sigmoid resection, PAD, and CKD presented to ED c/o hematuria x 1 day; pt states he noticed blood and clots today while urinating, admits to difficulties urinating, denies dysuria, no abd pain, no back pain; Denies fever, chills, SOB or CP. Pt recently admitted to [facility] w/ Hematuria and UTI, ucx Ecoli, was d/c home on 8/5  Readmitted due to gross hemtauria started on 08/08- medical optimization needed- RCRI class 1 risk with 3.9 % risk of death, [place] , [place]

## 2019-08-11 NOTE — PROGRESS NOTE ADULT - PROBLEM SELECTOR PLAN 5
Start heparin for DVT prophylaxis as hematuria is mostly resolved Continue heparin for DVT prophylaxis

## 2019-08-12 ENCOUNTER — TRANSCRIPTION ENCOUNTER (OUTPATIENT)
Age: 69
End: 2019-08-12

## 2019-08-12 LAB
ANION GAP SERPL CALC-SCNC: 8 MMOL/L — SIGNIFICANT CHANGE UP (ref 5–17)
BUN SERPL-MCNC: 44 MG/DL — HIGH (ref 7–18)
CALCIUM SERPL-MCNC: 9.2 MG/DL — SIGNIFICANT CHANGE UP (ref 8.4–10.5)
CHLORIDE SERPL-SCNC: 115 MMOL/L — HIGH (ref 96–108)
CO2 SERPL-SCNC: 16 MMOL/L — LOW (ref 22–31)
CREAT SERPL-MCNC: 2.86 MG/DL — HIGH (ref 0.5–1.3)
GLUCOSE SERPL-MCNC: 91 MG/DL — SIGNIFICANT CHANGE UP (ref 70–99)
HCT VFR BLD CALC: 36.6 % — LOW (ref 39–50)
HGB BLD-MCNC: 11.9 G/DL — LOW (ref 13–17)
MAGNESIUM SERPL-MCNC: 1.9 MG/DL — SIGNIFICANT CHANGE UP (ref 1.6–2.6)
MCHC RBC-ENTMCNC: 26.9 PG — LOW (ref 27–34)
MCHC RBC-ENTMCNC: 32.5 GM/DL — SIGNIFICANT CHANGE UP (ref 32–36)
MCV RBC AUTO: 82.6 FL — SIGNIFICANT CHANGE UP (ref 80–100)
NRBC # BLD: 0 /100 WBCS — SIGNIFICANT CHANGE UP (ref 0–0)
PHOSPHATE SERPL-MCNC: 3.1 MG/DL — SIGNIFICANT CHANGE UP (ref 2.5–4.5)
PLATELET # BLD AUTO: 209 K/UL — SIGNIFICANT CHANGE UP (ref 150–400)
POTASSIUM SERPL-MCNC: 4.5 MMOL/L — SIGNIFICANT CHANGE UP (ref 3.5–5.3)
POTASSIUM SERPL-SCNC: 4.5 MMOL/L — SIGNIFICANT CHANGE UP (ref 3.5–5.3)
RBC # BLD: 4.43 M/UL — SIGNIFICANT CHANGE UP (ref 4.2–5.8)
RBC # FLD: 16.6 % — HIGH (ref 10.3–14.5)
SODIUM SERPL-SCNC: 139 MMOL/L — SIGNIFICANT CHANGE UP (ref 135–145)
WBC # BLD: 7.75 K/UL — SIGNIFICANT CHANGE UP (ref 3.8–10.5)
WBC # FLD AUTO: 7.75 K/UL — SIGNIFICANT CHANGE UP (ref 3.8–10.5)

## 2019-08-12 PROCEDURE — 99232 SBSQ HOSP IP/OBS MODERATE 35: CPT

## 2019-08-12 RX ORDER — CHLORHEXIDINE GLUCONATE 213 G/1000ML
1 SOLUTION TOPICAL ONCE
Refills: 0 | Status: DISCONTINUED | OUTPATIENT
Start: 2019-08-12 | End: 2019-08-13

## 2019-08-12 RX ADMIN — PANTOPRAZOLE SODIUM 40 MILLIGRAM(S): 20 TABLET, DELAYED RELEASE ORAL at 05:40

## 2019-08-12 RX ADMIN — Medication 325 MILLIGRAM(S): at 11:18

## 2019-08-12 RX ADMIN — LAMOTRIGINE 200 MILLIGRAM(S): 25 TABLET, ORALLY DISINTEGRATING ORAL at 11:17

## 2019-08-12 RX ADMIN — Medication 25 MILLIGRAM(S): at 11:17

## 2019-08-12 RX ADMIN — GABAPENTIN 300 MILLIGRAM(S): 400 CAPSULE ORAL at 05:40

## 2019-08-12 RX ADMIN — ARIPIPRAZOLE 10 MILLIGRAM(S): 15 TABLET ORAL at 11:18

## 2019-08-12 RX ADMIN — GABAPENTIN 300 MILLIGRAM(S): 400 CAPSULE ORAL at 13:01

## 2019-08-12 RX ADMIN — GABAPENTIN 300 MILLIGRAM(S): 400 CAPSULE ORAL at 22:00

## 2019-08-12 RX ADMIN — Medication 650 MILLIGRAM(S): at 05:40

## 2019-08-12 RX ADMIN — TAMSULOSIN HYDROCHLORIDE 0.4 MILLIGRAM(S): 0.4 CAPSULE ORAL at 22:00

## 2019-08-12 RX ADMIN — FINASTERIDE 5 MILLIGRAM(S): 5 TABLET, FILM COATED ORAL at 11:18

## 2019-08-12 RX ADMIN — Medication 650 MILLIGRAM(S): at 13:01

## 2019-08-12 NOTE — PROGRESS NOTE ADULT - SUBJECTIVE AND OBJECTIVE BOX
PGY 1 Note discussed with supervising resident and primary attending    Patient is a 68y old  Male who presents with a chief complaint of hematuria (12 Aug 2019 07:44)      INTERVAL HPI/OVERNIGHT EVENTS: offers no new complaints; current symptoms resolving    MEDICATIONS  (STANDING):  ARIPiprazole 10 milliGRAM(s) Oral daily  ferrous    sulfate 325 milliGRAM(s) Oral daily  finasteride 5 milliGRAM(s) Oral daily  gabapentin 300 milliGRAM(s) Oral three times a day  heparin  Injectable 5000 Unit(s) SubCutaneous every 8 hours  lamoTRIgine 200 milliGRAM(s) Oral daily  pantoprazole    Tablet 40 milliGRAM(s) Oral before breakfast  sodium bicarbonate 650 milliGRAM(s) Oral three times a day  sodium chloride 0.9%. 1000 milliLiter(s) (75 mL/Hr) IV Continuous <Continuous>  tamsulosin 0.4 milliGRAM(s) Oral at bedtime  traZODone 25 milliGRAM(s) Oral daily    MEDICATIONS  (PRN):  ALBUTerol/ipratropium for Nebulization 3 milliLiter(s) Nebulizer every 6 hours PRN Shortness of Breath and/or Wheezing  HYDROmorphone   Tablet 4 milliGRAM(s) Oral every 8 hours PRN Severe Pain (7 - 10)      __________________________________________________  REVIEW OF SYSTEMS:    CONSTITUTIONAL: No fever,   EYES: no acute visual disturbances  NECK: No pain or stiffness  RESPIRATORY: No cough; No shortness of breath  CARDIOVASCULAR: No chest pain, no palpitations  GASTROINTESTINAL: No pain. No nausea or vomiting; No diarrhea   NEUROLOGICAL: No headache or numbness, no tremors  MUSCULOSKELETAL: No joint pain, no muscle pain  GENITOURINARY: no dysuria, no frequency, no hesitancy  PSYCHIATRY: no depression , no anxiety  ALL OTHER  ROS negative        Vital Signs Last 24 Hrs  T(C): 36.7 (12 Aug 2019 05:09), Max: 37 (11 Aug 2019 20:32)  T(F): 98.1 (12 Aug 2019 05:09), Max: 98.6 (11 Aug 2019 20:32)  HR: 81 (12 Aug 2019 05:09) (73 - 81)  BP: 114/70 (12 Aug 2019 05:09) (109/60 - 114/70)  BP(mean): --  RR: 16 (12 Aug 2019 05:09) (16 - 17)  SpO2: 94% (12 Aug 2019 05:09) (94% - 99%)    ________________________________________________  PHYSICAL EXAM:  GENERAL: NAD  HEENT: Normocephalic;  conjunctivae and sclerae clear; moist mucous membranes;   NECK : supple  CHEST/LUNG: Clear to auscultation bilaterally with good air entry   HEART: S1 S2  regular; no murmurs, gallops or rubs  ABDOMEN: Soft, Nontender, Nondistended; Bowel sounds present  EXTREMITIES: no cyanosis; no edema; no calf tenderness  SKIN: warm and dry; no rash  NERVOUS SYSTEM:  Awake and alert; Oriented  to place, person and time ; no new deficits    _________________________________________________  LABS:                        11.9   7.75  )-----------( 209      ( 12 Aug 2019 06:58 )             36.6     08-12    139  |  115<H>  |  44<H>  ----------------------------<  91  4.5   |  16<L>  |  2.86<H>    Ca    9.2      12 Aug 2019 06:58  Phos  3.1     08-12  Mg     1.9     08-12          CAPILLARY BLOOD GLUCOSE            RADIOLOGY & ADDITIONAL TESTS:    Imaging Personally Reviewed:  YES/NO    Consultant(s) Notes Reviewed:   YES/ No    Care Discussed with Consultants :     Plan of care was discussed with patient and /or primary care giver; all questions and concerns were addressed and care was aligned with patient's wishes. PGY 1 Note discussed with supervising resident and primary attending    Patient is a 68y old  Male who presents with a chief complaint of hematuria (12 Aug 2019 07:44)      INTERVAL HPI/OVERNIGHT EVENTS: offers no new complaints; current symptoms resolving    MEDICATIONS  (STANDING):  ARIPiprazole 10 milliGRAM(s) Oral daily  ferrous    sulfate 325 milliGRAM(s) Oral daily  finasteride 5 milliGRAM(s) Oral daily  gabapentin 300 milliGRAM(s) Oral three times a day  heparin  Injectable 5000 Unit(s) SubCutaneous every 8 hours  lamoTRIgine 200 milliGRAM(s) Oral daily  pantoprazole    Tablet 40 milliGRAM(s) Oral before breakfast  sodium bicarbonate 650 milliGRAM(s) Oral three times a day  sodium chloride 0.9%. 1000 milliLiter(s) (75 mL/Hr) IV Continuous <Continuous>  tamsulosin 0.4 milliGRAM(s) Oral at bedtime  traZODone 25 milliGRAM(s) Oral daily    MEDICATIONS  (PRN):  ALBUTerol/ipratropium for Nebulization 3 milliLiter(s) Nebulizer every 6 hours PRN Shortness of Breath and/or Wheezing  HYDROmorphone   Tablet 4 milliGRAM(s) Oral every 8 hours PRN Severe Pain (7 - 10)      __________________________________________________  REVIEW OF SYSTEMS:    CONSTITUTIONAL: No fever,   EYES: no acute visual disturbances  NECK: No pain or stiffness  RESPIRATORY: No cough; No shortness of breath  CARDIOVASCULAR: No chest pain, no palpitations  GASTROINTESTINAL: No pain. No nausea or vomiting; No diarrhea, colostomy bag in place  NEUROLOGICAL: No headache or numbness, no tremors  MUSCULOSKELETAL: No joint pain, no muscle pain  GENITOURINARY: no dysuria, no frequency, no hesitancy, phillips with clear urine  PSYCHIATRY: no depression , no anxiety  ALL OTHER  ROS negative        Vital Signs Last 24 Hrs  T(C): 36.7 (12 Aug 2019 05:09), Max: 37 (11 Aug 2019 20:32)  T(F): 98.1 (12 Aug 2019 05:09), Max: 98.6 (11 Aug 2019 20:32)  HR: 81 (12 Aug 2019 05:09) (73 - 81)  BP: 114/70 (12 Aug 2019 05:09) (109/60 - 114/70)  BP(mean): --  RR: 16 (12 Aug 2019 05:09) (16 - 17)  SpO2: 94% (12 Aug 2019 05:09) (94% - 99%)    ________________________________________________  PHYSICAL EXAM:  GENERAL: NAD  HEENT: Normocephalic;  conjunctivae and sclerae clear; moist mucous membranes;   NECK : supple  CHEST/LUNG: Clear to auscultation bilaterally with good air entry   HEART: S1 S2  regular; no murmurs, gallops or rubs  ABDOMEN: Soft, Nontender, Nondistended; Bowel sounds present  EXTREMITIES: no cyanosis; no edema; no calf tenderness  SKIN: warm and dry; no rash  NERVOUS SYSTEM:  Awake and alert; Oriented  to place, person and time ; no new deficits    _________________________________________________  LABS:                        11.9   7.75  )-----------( 209      ( 12 Aug 2019 06:58 )             36.6     08-12    139  |  115<H>  |  44<H>  ----------------------------<  91  4.5   |  16<L>  |  2.86<H>    Ca    9.2      12 Aug 2019 06:58  Phos  3.1     08-12  Mg     1.9     08-12          CAPILLARY BLOOD GLUCOSE            RADIOLOGY & ADDITIONAL TESTS:  < from: US Renal (08.10.19 @ 19:54) >  IMPRESSION:    No evidence of hydronephrosis.    Thank you for the courtesy ofthis referral.        < end of copied text >    Imaging Personally Reviewed:  YES    Consultant(s) Notes Reviewed:   YES    Care Discussed with Consultants : urology    Plan of care was discussed with patient and /or primary care giver; all questions and concerns were addressed and care was aligned with patient's wishes.

## 2019-08-12 NOTE — PROGRESS NOTE ADULT - ASSESSMENT
68y.o. Male with resolved hematuria    -Keep CBI clamped as long as UO clear  -Plan for cysto 8/13/19  -Medical optimization and clearance needed, please note in chart   -Will follow

## 2019-08-12 NOTE — PROGRESS NOTE ADULT - SUBJECTIVE AND OBJECTIVE BOX
INTERVAL HPI/OVERNIGHT EVENTS:    Pt seen and examined at bedside. No acute complaints at this time. CBI clamped.     Vital Signs Last 24 Hrs  T(C): 36.7 (12 Aug 2019 05:09), Max: 37 (11 Aug 2019 20:32)  T(F): 98.1 (12 Aug 2019 05:09), Max: 98.6 (11 Aug 2019 20:32)  HR: 81 (12 Aug 2019 05:09) (73 - 81)  BP: 114/70 (12 Aug 2019 05:09) (109/60 - 114/70)  BP(mean): --  RR: 16 (12 Aug 2019 05:09) (16 - 17)  SpO2: 94% (12 Aug 2019 05:09) (94% - 99%)  I&O's Detail    11 Aug 2019 07:01  -  12 Aug 2019 07:00  --------------------------------------------------------  IN:  Total IN: 0 mL    OUT:    Indwelling Catheter - Urethral: 300 mL  Total OUT: 300 mL    Total NET: -300 mL        pantoprazole    Tablet 40 milliGRAM(s) Oral before breakfast      Physical Exam  General: AAOx3, No acute distress  Skin: No jaundice, no icterus  Abdomen: soft,  nondistended, nontender, no rebound tenderness, no guarding, no palpable masses  : Normal external genitalia, 3 way phillips in place, CBI clamped, UO yellow and clear   Extremities: non edematous, no calf pain bilaterally      Labs:    08-12    139  |  115<H>  |  44<H>  ----------------------------<  91  4.5   |  16<L>  |  2.86<H>    Ca    9.2      12 Aug 2019 06:58  Phos  3.1     08-12  Mg     1.9     08-12

## 2019-08-12 NOTE — PROGRESS NOTE ADULT - ASSESSMENT
68M from [facility], with PMHx of prostate Ca (s/p radiation in 2015, now in remission), asthma, COPD (not on O2), HTN, HLD, Bipolar, anemia, GERD, Ileostomy s/p sigmoid resection, PAD, and CKD presented to ED c/o hematuria x 1 day; pt states he noticed blood and clots today while urinating, admits to difficulties urinating, denies dysuria, no abd pain, no back pain; Denies fever, chills, SOB or CP. Pt recently admitted to [facility] w/ Hematuria and UTI, ucx Ecoli, was d/cd home on 8/5  Readmitted due to gross hemtauria started on 08/08- medical optimization needed- RCRI class 1 risk with 3.9 % risk of death, [place] , [place]

## 2019-08-13 ENCOUNTER — RESULT REVIEW (OUTPATIENT)
Age: 69
End: 2019-08-13

## 2019-08-13 LAB
ANION GAP SERPL CALC-SCNC: 8 MMOL/L — SIGNIFICANT CHANGE UP (ref 5–17)
APTT BLD: 25.7 SEC — LOW (ref 27.5–36.3)
BUN SERPL-MCNC: 46 MG/DL — HIGH (ref 7–18)
CALCIUM SERPL-MCNC: 9 MG/DL — SIGNIFICANT CHANGE UP (ref 8.4–10.5)
CHLORIDE SERPL-SCNC: 112 MMOL/L — HIGH (ref 96–108)
CO2 SERPL-SCNC: 17 MMOL/L — LOW (ref 22–31)
CREAT SERPL-MCNC: 3.07 MG/DL — HIGH (ref 0.5–1.3)
GLUCOSE SERPL-MCNC: 99 MG/DL — SIGNIFICANT CHANGE UP (ref 70–99)
HCT VFR BLD CALC: 37.5 % — LOW (ref 39–50)
HGB BLD-MCNC: 11.9 G/DL — LOW (ref 13–17)
INR BLD: 1.09 RATIO — SIGNIFICANT CHANGE UP (ref 0.88–1.16)
MAGNESIUM SERPL-MCNC: 1.9 MG/DL — SIGNIFICANT CHANGE UP (ref 1.6–2.6)
MCHC RBC-ENTMCNC: 27 PG — SIGNIFICANT CHANGE UP (ref 27–34)
MCHC RBC-ENTMCNC: 31.7 GM/DL — LOW (ref 32–36)
MCV RBC AUTO: 85 FL — SIGNIFICANT CHANGE UP (ref 80–100)
NRBC # BLD: 0 /100 WBCS — SIGNIFICANT CHANGE UP (ref 0–0)
PHOSPHATE SERPL-MCNC: 3.5 MG/DL — SIGNIFICANT CHANGE UP (ref 2.5–4.5)
PLATELET # BLD AUTO: 215 K/UL — SIGNIFICANT CHANGE UP (ref 150–400)
POTASSIUM SERPL-MCNC: 4.4 MMOL/L — SIGNIFICANT CHANGE UP (ref 3.5–5.3)
POTASSIUM SERPL-SCNC: 4.4 MMOL/L — SIGNIFICANT CHANGE UP (ref 3.5–5.3)
PROTHROM AB SERPL-ACNC: 12.1 SEC — SIGNIFICANT CHANGE UP (ref 10–12.9)
RBC # BLD: 4.41 M/UL — SIGNIFICANT CHANGE UP (ref 4.2–5.8)
RBC # FLD: 16.2 % — HIGH (ref 10.3–14.5)
SODIUM SERPL-SCNC: 137 MMOL/L — SIGNIFICANT CHANGE UP (ref 135–145)
WBC # BLD: 6.59 K/UL — SIGNIFICANT CHANGE UP (ref 3.8–10.5)
WBC # FLD AUTO: 6.59 K/UL — SIGNIFICANT CHANGE UP (ref 3.8–10.5)

## 2019-08-13 PROCEDURE — 52224 CYSTOSCOPY AND TREATMENT: CPT

## 2019-08-13 PROCEDURE — 52001 CYSTO W/IRRG&EVAC MLT CLOTS: CPT | Mod: 59

## 2019-08-13 PROCEDURE — 88305 TISSUE EXAM BY PATHOLOGIST: CPT | Mod: 26

## 2019-08-13 RX ORDER — FENTANYL CITRATE 50 UG/ML
25 INJECTION INTRAVENOUS
Refills: 0 | Status: DISCONTINUED | OUTPATIENT
Start: 2019-08-13 | End: 2019-08-13

## 2019-08-13 RX ORDER — GABAPENTIN 400 MG/1
300 CAPSULE ORAL THREE TIMES A DAY
Refills: 0 | Status: DISCONTINUED | OUTPATIENT
Start: 2019-08-13 | End: 2019-08-16

## 2019-08-13 RX ORDER — LAMOTRIGINE 25 MG/1
200 TABLET, ORALLY DISINTEGRATING ORAL DAILY
Refills: 0 | Status: DISCONTINUED | OUTPATIENT
Start: 2019-08-13 | End: 2019-08-16

## 2019-08-13 RX ORDER — TAMSULOSIN HYDROCHLORIDE 0.4 MG/1
0.4 CAPSULE ORAL AT BEDTIME
Refills: 0 | Status: DISCONTINUED | OUTPATIENT
Start: 2019-08-13 | End: 2019-08-16

## 2019-08-13 RX ORDER — HYDROMORPHONE HYDROCHLORIDE 2 MG/ML
4 INJECTION INTRAMUSCULAR; INTRAVENOUS; SUBCUTANEOUS EVERY 8 HOURS
Refills: 0 | Status: DISCONTINUED | OUTPATIENT
Start: 2019-08-13 | End: 2019-08-16

## 2019-08-13 RX ORDER — SODIUM BICARBONATE 1 MEQ/ML
0.06 SYRINGE (ML) INTRAVENOUS
Qty: 75 | Refills: 0 | Status: DISCONTINUED | OUTPATIENT
Start: 2019-08-13 | End: 2019-08-13

## 2019-08-13 RX ORDER — TRAZODONE HCL 50 MG
25 TABLET ORAL DAILY
Refills: 0 | Status: DISCONTINUED | OUTPATIENT
Start: 2019-08-13 | End: 2019-08-16

## 2019-08-13 RX ORDER — FINASTERIDE 5 MG/1
5 TABLET, FILM COATED ORAL DAILY
Refills: 0 | Status: DISCONTINUED | OUTPATIENT
Start: 2019-08-13 | End: 2019-08-16

## 2019-08-13 RX ORDER — HEPARIN SODIUM 5000 [USP'U]/ML
5000 INJECTION INTRAVENOUS; SUBCUTANEOUS EVERY 8 HOURS
Refills: 0 | Status: DISCONTINUED | OUTPATIENT
Start: 2019-08-13 | End: 2019-08-16

## 2019-08-13 RX ORDER — SODIUM BICARBONATE 1 MEQ/ML
0.06 SYRINGE (ML) INTRAVENOUS
Qty: 75 | Refills: 0 | Status: DISCONTINUED | OUTPATIENT
Start: 2019-08-13 | End: 2019-08-16

## 2019-08-13 RX ORDER — CEFAZOLIN SODIUM 1 G
1000 VIAL (EA) INJECTION EVERY 8 HOURS
Refills: 0 | Status: COMPLETED | OUTPATIENT
Start: 2019-08-13 | End: 2019-08-14

## 2019-08-13 RX ORDER — ONDANSETRON 8 MG/1
4 TABLET, FILM COATED ORAL ONCE
Refills: 0 | Status: DISCONTINUED | OUTPATIENT
Start: 2019-08-13 | End: 2019-08-13

## 2019-08-13 RX ORDER — ARIPIPRAZOLE 15 MG/1
10 TABLET ORAL DAILY
Refills: 0 | Status: DISCONTINUED | OUTPATIENT
Start: 2019-08-13 | End: 2019-08-16

## 2019-08-13 RX ORDER — HYDROMORPHONE HYDROCHLORIDE 2 MG/ML
1 INJECTION INTRAMUSCULAR; INTRAVENOUS; SUBCUTANEOUS ONCE
Refills: 0 | Status: DISCONTINUED | OUTPATIENT
Start: 2019-08-13 | End: 2019-08-13

## 2019-08-13 RX ORDER — PANTOPRAZOLE SODIUM 20 MG/1
40 TABLET, DELAYED RELEASE ORAL
Refills: 0 | Status: DISCONTINUED | OUTPATIENT
Start: 2019-08-13 | End: 2019-08-16

## 2019-08-13 RX ADMIN — Medication 25 MILLIGRAM(S): at 16:02

## 2019-08-13 RX ADMIN — GABAPENTIN 300 MILLIGRAM(S): 400 CAPSULE ORAL at 21:42

## 2019-08-13 RX ADMIN — HYDROMORPHONE HYDROCHLORIDE 1 MILLIGRAM(S): 2 INJECTION INTRAMUSCULAR; INTRAVENOUS; SUBCUTANEOUS at 16:15

## 2019-08-13 RX ADMIN — GABAPENTIN 300 MILLIGRAM(S): 400 CAPSULE ORAL at 05:26

## 2019-08-13 RX ADMIN — HYDROMORPHONE HYDROCHLORIDE 1 MILLIGRAM(S): 2 INJECTION INTRAMUSCULAR; INTRAVENOUS; SUBCUTANEOUS at 16:00

## 2019-08-13 RX ADMIN — TAMSULOSIN HYDROCHLORIDE 0.4 MILLIGRAM(S): 0.4 CAPSULE ORAL at 21:42

## 2019-08-13 RX ADMIN — HYDROMORPHONE HYDROCHLORIDE 4 MILLIGRAM(S): 2 INJECTION INTRAMUSCULAR; INTRAVENOUS; SUBCUTANEOUS at 21:55

## 2019-08-13 RX ADMIN — ARIPIPRAZOLE 10 MILLIGRAM(S): 15 TABLET ORAL at 16:02

## 2019-08-13 RX ADMIN — FINASTERIDE 5 MILLIGRAM(S): 5 TABLET, FILM COATED ORAL at 16:03

## 2019-08-13 RX ADMIN — Medication 100 MILLIGRAM(S): at 21:41

## 2019-08-13 RX ADMIN — HYDROMORPHONE HYDROCHLORIDE 4 MILLIGRAM(S): 2 INJECTION INTRAMUSCULAR; INTRAVENOUS; SUBCUTANEOUS at 22:25

## 2019-08-13 RX ADMIN — GABAPENTIN 300 MILLIGRAM(S): 400 CAPSULE ORAL at 16:03

## 2019-08-13 RX ADMIN — LAMOTRIGINE 200 MILLIGRAM(S): 25 TABLET, ORALLY DISINTEGRATING ORAL at 16:03

## 2019-08-13 NOTE — PROGRESS NOTE ADULT - SUBJECTIVE AND OBJECTIVE BOX
Post op check:  Pt is hemodynamically stable s/p cystoscopy with bladder biopsy on 8/13. No complaints at this time. Koehler w/o gross hematuria.     T(F): 98.3 (08-13-19 @ 13:07), Max: 98.3 (08-13-19 @ 13:07)  HR: 78 (08-13-19 @ 14:57) (70 - 80)  BP: 123/74 (08-13-19 @ 14:57) (96/63 - 128/75)  RR: 16 (08-13-19 @ 14:57) (13 - 18)  SpO2: 98% (08-13-19 @ 14:57) (96% - 100%)    PHYSICAL EXAM:  General: NAD, WDWN  Neuro:  Alert & oriented x 3  Lung: respirations nonlabored, good inspiratory effort on room air  Abdomen: soft, NTND.   : Koehler in place draining clear pink urine   Extremities: no pedal edema or calf tenderness noted     LABS:                        11.9   6.59  )-----------( 215      ( 13 Aug 2019 05:48 )             37.5     08-13    137  |  112<H>  |  46<H>  ----------------------------<  99  4.4   |  17<L>  |  3.07<H>    Ca    9.0      13 Aug 2019 05:47  Phos  3.5     08-13  Mg     1.9     08-13      PT/INR - ( 13 Aug 2019 05:48 )   PT: 12.1 sec;   INR: 1.09 ratio         PTT - ( 13 Aug 2019 05:48 )  PTT:25.7 sec    I&O:   I&O's Detail    12 Aug 2019 07:01  -  13 Aug 2019 07:00  --------------------------------------------------------  IN:  Total IN: 0 mL    OUT:    Ileostomy: 675 mL    Indwelling Catheter - Urethral: 500 mL  Total OUT: 1175 mL    Total NET: -1175 mL    Culture Results:   <10,000 CFU/mL Normal Urogenital Celina (08-09 @ 18:04)

## 2019-08-13 NOTE — PROGRESS NOTE ADULT - PROBLEM SELECTOR PLAN 3
CKD 4 with metabolic acidosis- continue with bicarb replacement.   BMP today showed low bicarb - bicarb replaced  f/u BMP tomorrow  BUN/Cr 46/3.09 - 08/13

## 2019-08-13 NOTE — PROGRESS NOTE ADULT - ASSESSMENT
68M from [facility], with PMHx of prostate Ca (s/p radiation in 2015, now in remission), asthma, COPD (not on O2), HTN, HLD, Bipolar, anemia, GERD, Ileostomy s/p sigmoid resection, PAD, and CKD presented to ED c/o hematuria x 1 day; pt states he noticed blood and clots today while urinating, admits to difficulties urinating, denies dysuria, no abd pain, no back pain; Denies fever, chills, SOB or CP. Pt recently admitted to [facility] w/ Hematuria and UTI, ucx Ecoli, was d/cd home on 8/5  Readmitted due to gross hemtauria started on 08/08- cystoscopy 08/13 by Dr. Cruz

## 2019-08-13 NOTE — PROGRESS NOTE ADULT - ASSESSMENT
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    - Regular diet  - Keep phillips in place, TOV 8/14 AM  - Post op abx x 24hrs  - Continue flomax, proscar  - Pain control PRN   - DVT ppx      Discussed with Dr. Cruz

## 2019-08-13 NOTE — PROGRESS NOTE ADULT - SUBJECTIVE AND OBJECTIVE BOX
PGY 1 Note discussed with supervising resident and primary attending    Patient is a 68y old  Male who presents with a chief complaint of hematuria (12 Aug 2019 12:02)      INTERVAL HPI/OVERNIGHT EVENTS: offers no new complaints, no blood in phillips , NPO for cystoscopy     MEDICATIONS  (STANDING):  ARIPiprazole 10 milliGRAM(s) Oral daily  ceFAZolin   IVPB 1000 milliGRAM(s) IV Intermittent every 8 hours  finasteride 5 milliGRAM(s) Oral daily  gabapentin 300 milliGRAM(s) Oral three times a day  heparin  Injectable 5000 Unit(s) SubCutaneous every 8 hours  lamoTRIgine 200 milliGRAM(s) Oral daily  pantoprazole    Tablet 40 milliGRAM(s) Oral before breakfast  sodium bicarbonate  Infusion 0.055 mEq/kG/Hr (70 mL/Hr) IV Continuous <Continuous>  tamsulosin 0.4 milliGRAM(s) Oral at bedtime  traZODone 25 milliGRAM(s) Oral daily    MEDICATIONS  (PRN):  HYDROmorphone   Tablet 4 milliGRAM(s) Oral every 8 hours PRN Severe Pain (7 - 10)      __________________________________________________  REVIEW OF SYSTEMS:    CONSTITUTIONAL: No fever,   EYES: no acute visual disturbances  NECK: No pain or stiffness  RESPIRATORY: No cough; No shortness of breath  CARDIOVASCULAR: No chest pain, no palpitations  GASTROINTESTINAL: No pain. No nausea or vomiting; No diarrhea   NEUROLOGICAL: No headache or numbness, no tremors  MUSCULOSKELETAL: No joint pain, no muscle pain  GENITOURINARY: no dysuria, no frequency, no hesitancy  PSYCHIATRY: no depression , no anxiety  ALL OTHER  ROS negative        Vital Signs Last 24 Hrs  T(C): 36.8 (13 Aug 2019 13:07), Max: 36.8 (13 Aug 2019 13:07)  T(F): 98.3 (13 Aug 2019 13:07), Max: 98.3 (13 Aug 2019 13:07)  HR: 78 (13 Aug 2019 14:57) (70 - 80)  BP: 123/74 (13 Aug 2019 14:57) (96/63 - 128/75)  BP(mean): --  RR: 16 (13 Aug 2019 14:57) (13 - 18)  SpO2: 98% (13 Aug 2019 14:57) (96% - 100%)    ________________________________________________  PHYSICAL EXAM:  GENERAL: NAD  HEENT: Normocephalic;  conjunctivae and sclerae clear; moist mucous membranes;   NECK : supple  CHEST/LUNG: Clear to auscultation bilaterally with good air entry   HEART: S1 S2  regular; no murmurs, gallops or rubs  ABDOMEN: Soft, Nontender, Nondistended; Bowel sounds present, colostomy bag in place  Gu: Phillips in place, clear urine  EXTREMITIES: no cyanosis; no edema; no calf tenderness  SKIN: warm and dry; no rash  NERVOUS SYSTEM:  Awake and alert; Oriented  to place, person and time ; no new deficits    _________________________________________________  LABS:                        11.9   6.59  )-----------( 215      ( 13 Aug 2019 05:48 )             37.5     08-13    137  |  112<H>  |  46<H>  ----------------------------<  99  4.4   |  17<L>  |  3.07<H>    Ca    9.0      13 Aug 2019 05:47  Phos  3.5     08-13  Mg     1.9     08-13      PT/INR - ( 13 Aug 2019 05:48 )   PT: 12.1 sec;   INR: 1.09 ratio         PTT - ( 13 Aug 2019 05:48 )  PTT:25.7 sec    CAPILLARY BLOOD GLUCOSE            RADIOLOGY & ADDITIONAL TESTS:  < from: US Renal (08.10.19 @ 19:54) >    IMPRESSION:    No evidence of hydronephrosis.    < end of copied text >    Imaging Personally Reviewed:  YES/NO    Consultant(s) Notes Reviewed:   YES/ No    Care Discussed with Consultants :     Plan of care was discussed with patient and /or primary care giver; all questions and concerns were addressed and care was aligned with patient's wishes.

## 2019-08-13 NOTE — PROGRESS NOTE ADULT - PROBLEM SELECTOR PLAN 4
·  Problem: Need for prophylactic measure.  Plan: IMPROVE VTE score:  [ ] Previous VTE                                                3  [ ] Thrombophilia                                             2  [ ] Lower limb paralysis                                  2        (unable to hold up >15 seconds)    [ ] Current Cancer (within 6 months)            2   [x] Immobilization > 24 hrs                              1  [ ] ICU/CCU stay > 24 hours                            1  [x] Age > 60                                                         1  IMPROVE SCORE- 2  on heparin subq

## 2019-08-13 NOTE — BRIEF OPERATIVE NOTE - NSICDXBRIEFPROCEDURE_GEN_ALL_CORE_FT
PROCEDURES:  Bladder biopsy 13-Aug-2019 14:02:11  Amy Cruz  Cystoscopy 13-Aug-2019 14:01:59  Amy Cruz

## 2019-08-14 ENCOUNTER — TRANSCRIPTION ENCOUNTER (OUTPATIENT)
Age: 69
End: 2019-08-14

## 2019-08-14 LAB
ANION GAP SERPL CALC-SCNC: 8 MMOL/L — SIGNIFICANT CHANGE UP (ref 5–17)
BUN SERPL-MCNC: 47 MG/DL — HIGH (ref 7–18)
CALCIUM SERPL-MCNC: 8.8 MG/DL — SIGNIFICANT CHANGE UP (ref 8.4–10.5)
CHLORIDE SERPL-SCNC: 110 MMOL/L — HIGH (ref 96–108)
CO2 SERPL-SCNC: 18 MMOL/L — LOW (ref 22–31)
CREAT SERPL-MCNC: 2.78 MG/DL — HIGH (ref 0.5–1.3)
GLUCOSE SERPL-MCNC: 103 MG/DL — HIGH (ref 70–99)
HCT VFR BLD CALC: 37 % — LOW (ref 39–50)
HGB BLD-MCNC: 11.8 G/DL — LOW (ref 13–17)
MAGNESIUM SERPL-MCNC: 1.9 MG/DL — SIGNIFICANT CHANGE UP (ref 1.6–2.6)
MCHC RBC-ENTMCNC: 27.1 PG — SIGNIFICANT CHANGE UP (ref 27–34)
MCHC RBC-ENTMCNC: 31.9 GM/DL — LOW (ref 32–36)
MCV RBC AUTO: 84.9 FL — SIGNIFICANT CHANGE UP (ref 80–100)
NRBC # BLD: 0 /100 WBCS — SIGNIFICANT CHANGE UP (ref 0–0)
PHOSPHATE SERPL-MCNC: 3.6 MG/DL — SIGNIFICANT CHANGE UP (ref 2.5–4.5)
PLATELET # BLD AUTO: 212 K/UL — SIGNIFICANT CHANGE UP (ref 150–400)
POTASSIUM SERPL-MCNC: 4.6 MMOL/L — SIGNIFICANT CHANGE UP (ref 3.5–5.3)
POTASSIUM SERPL-SCNC: 4.6 MMOL/L — SIGNIFICANT CHANGE UP (ref 3.5–5.3)
RBC # BLD: 4.36 M/UL — SIGNIFICANT CHANGE UP (ref 4.2–5.8)
RBC # FLD: 16 % — HIGH (ref 10.3–14.5)
SODIUM SERPL-SCNC: 136 MMOL/L — SIGNIFICANT CHANGE UP (ref 135–145)
WBC # BLD: 8.93 K/UL — SIGNIFICANT CHANGE UP (ref 3.8–10.5)
WBC # FLD AUTO: 8.93 K/UL — SIGNIFICANT CHANGE UP (ref 3.8–10.5)

## 2019-08-14 PROCEDURE — 99232 SBSQ HOSP IP/OBS MODERATE 35: CPT

## 2019-08-14 RX ORDER — SODIUM BICARBONATE 1 MEQ/ML
650 SYRINGE (ML) INTRAVENOUS THREE TIMES A DAY
Refills: 0 | Status: DISCONTINUED | OUTPATIENT
Start: 2019-08-14 | End: 2019-08-16

## 2019-08-14 RX ORDER — KETOROLAC TROMETHAMINE 30 MG/ML
30 SYRINGE (ML) INJECTION ONCE
Refills: 0 | Status: DISCONTINUED | OUTPATIENT
Start: 2019-08-14 | End: 2019-08-14

## 2019-08-14 RX ADMIN — HYDROMORPHONE HYDROCHLORIDE 4 MILLIGRAM(S): 2 INJECTION INTRAMUSCULAR; INTRAVENOUS; SUBCUTANEOUS at 18:19

## 2019-08-14 RX ADMIN — Medication 25 MILLIGRAM(S): at 14:11

## 2019-08-14 RX ADMIN — Medication 30 MILLIGRAM(S): at 18:08

## 2019-08-14 RX ADMIN — ARIPIPRAZOLE 10 MILLIGRAM(S): 15 TABLET ORAL at 11:32

## 2019-08-14 RX ADMIN — GABAPENTIN 300 MILLIGRAM(S): 400 CAPSULE ORAL at 21:57

## 2019-08-14 RX ADMIN — FINASTERIDE 5 MILLIGRAM(S): 5 TABLET, FILM COATED ORAL at 11:32

## 2019-08-14 RX ADMIN — HYDROMORPHONE HYDROCHLORIDE 4 MILLIGRAM(S): 2 INJECTION INTRAMUSCULAR; INTRAVENOUS; SUBCUTANEOUS at 19:56

## 2019-08-14 RX ADMIN — GABAPENTIN 300 MILLIGRAM(S): 400 CAPSULE ORAL at 14:09

## 2019-08-14 RX ADMIN — TAMSULOSIN HYDROCHLORIDE 0.4 MILLIGRAM(S): 0.4 CAPSULE ORAL at 21:57

## 2019-08-14 RX ADMIN — Medication 100 MILLIGRAM(S): at 14:09

## 2019-08-14 RX ADMIN — PANTOPRAZOLE SODIUM 40 MILLIGRAM(S): 20 TABLET, DELAYED RELEASE ORAL at 05:26

## 2019-08-14 RX ADMIN — LAMOTRIGINE 200 MILLIGRAM(S): 25 TABLET, ORALLY DISINTEGRATING ORAL at 11:31

## 2019-08-14 RX ADMIN — Medication 30 MILLIGRAM(S): at 11:30

## 2019-08-14 RX ADMIN — GABAPENTIN 300 MILLIGRAM(S): 400 CAPSULE ORAL at 05:22

## 2019-08-14 RX ADMIN — Medication 650 MILLIGRAM(S): at 21:57

## 2019-08-14 NOTE — PROGRESS NOTE ADULT - ASSESSMENT
Impression: 68y Male S/p cystoscopy and fulguration of the bladder, with bladder biopsy POD#1 secondary to gross hematuria    Plan:  - recommend TOV  - monitor urine output  - continue to trend BUN/Cr  - follow up pathology results   - continue Flomax/ Proscar  - continue medical management

## 2019-08-14 NOTE — DISCHARGE NOTE PROVIDER - CARE PROVIDER_API CALL
Gabino Rush)  Medicine  [id number] [address] [place] [address]  Phone: [phone]  Fax: [phone]  Follow Up Time:     Amy Cruz; MPH)  Urology  [address] [place] [address] [address] [place] [address] [place] [address]  Phone: [phone]  Fax: [phone]  Follow Up Time:

## 2019-08-14 NOTE — PROGRESS NOTE ADULT - ATTENDING COMMENTS
Agree with note as written above.    OR today for cystoscopy, possible clot evacuation and bladder fulguration
Pt seen and examined. Agree with note as written above    Continue plan as stated above
PATIENT IS SEEN AND EXAMINED  - RECURRENT HEMATURIA / SUSPECT RADIATION CYSTITIS / R/ MALIGNANCY  - MODERATE SURGICAL RISK FOR CYSTOSCOPY IN AM BY UROLOGY TEAM  - D/W STAFF AND PATIENT   - DR. IVY

## 2019-08-14 NOTE — PROGRESS NOTE ADULT - SUBJECTIVE AND OBJECTIVE BOX
S/p cystoscopy and fulguration of the bladder, with bladder biopsy POD#1  Patient seen and examined bedside resting comfortably.  States phillips is becoming bloody again.     T(F): 98.6 (19 @ 05:34), Max: 98.6 (19 @ 05:34)  HR: 78 (19 @ 05:34) (71 - 80)  BP: 110/66 (19 @ 05:34) (103/65 - 128/75)  RR: 18 (19 @ 05:34) (13 - 18)  SpO2: 98% (19 @ 05:34) (96% - 100%)    PHYSICAL EXAM:  General: NAD, WDWN  Neuro:  Alert & oriented x 3  HEENT: NCAT, EOMI, conjunctiva clear  Lung: respirations nonlabored, good inspiratory effort  Abdomen: soft, NTND  : Phillips catheter in place draining blood tinged urine, small amount of sediment noted in tubinml/24huors recorded    LABS:                        11.8   8.93  )-----------( 212      ( 14 Aug 2019 07:01 )             37.0     08-14    136  |  110<H>  |  47<H>  ----------------------------<  103<H>  4.6   |  18<L>  |  2.78<H>    Ca    8.8      14 Aug 2019 07:01  Phos  3.6     08-14  Mg     1.9     08-14    PT/INR - ( 13 Aug 2019 05:48 )   PT: 12.1 sec;   INR: 1.09 ratio       PTT - ( 13 Aug 2019 05:48 )  PTT:25.7 sec  I&O's Detail    13 Aug 2019 07:01  -  14 Aug 2019 07:00  --------------------------------------------------------  IN:  Total IN: 0 mL    OUT:    Indwelling Catheter - Urethral: 400 mL    Voided: 400 mL  Total OUT: 800 mL    Total NET: -800 mL

## 2019-08-14 NOTE — PROGRESS NOTE ADULT - PROBLEM SELECTOR PLAN 3
CKD 4 with metabolic acidosis- continue with bicarb replacement.   BMP today showed low bicarb - bicarb replaced  f/u BMP tomorrow  BUN/Cr 46/3.09 -> 47/2.78

## 2019-08-14 NOTE — PROGRESS NOTE ADULT - SUBJECTIVE AND OBJECTIVE BOX
PGY 1 Note discussed with supervising resident and primary attending    Patient is a 68y old  Male who presents with a chief complaint of hematuria (14 Aug 2019 08:30)      INTERVAL HPI/OVERNIGHT EVENTS: complains of severe penile pain. s/p cystoscopy and bladder bx 08/13- phillips has blood tinged urine    MEDICATIONS  (STANDING):  ARIPiprazole 10 milliGRAM(s) Oral daily  ceFAZolin   IVPB 1000 milliGRAM(s) IV Intermittent every 8 hours  finasteride 5 milliGRAM(s) Oral daily  gabapentin 300 milliGRAM(s) Oral three times a day  heparin  Injectable 5000 Unit(s) SubCutaneous every 8 hours  lamoTRIgine 200 milliGRAM(s) Oral daily  pantoprazole    Tablet 40 milliGRAM(s) Oral before breakfast  sodium bicarbonate  Infusion 0.055 mEq/kG/Hr (70 mL/Hr) IV Continuous <Continuous>  tamsulosin 0.4 milliGRAM(s) Oral at bedtime  traZODone 25 milliGRAM(s) Oral daily    MEDICATIONS  (PRN):  HYDROmorphone   Tablet 4 milliGRAM(s) Oral every 8 hours PRN Severe Pain (7 - 10)      __________________________________________________  REVIEW OF SYSTEMS:    CONSTITUTIONAL: No fever,   EYES: no acute visual disturbances  NECK: No pain or stiffness  RESPIRATORY: No cough; No shortness of breath  CARDIOVASCULAR: No chest pain, no palpitations  GASTROINTESTINAL: No pain. No nausea or vomiting; No diarrhea   NEUROLOGICAL: No headache or numbness, no tremors  MUSCULOSKELETAL: pain at the site of IV line.   GENITOURINARY: penile pain  PSYCHIATRY: no depression , no anxiety  ALL OTHER  ROS negative        Vital Signs Last 24 Hrs  T(C): 37 (14 Aug 2019 05:34), Max: 37 (14 Aug 2019 05:34)  T(F): 98.6 (14 Aug 2019 05:34), Max: 98.6 (14 Aug 2019 05:34)  HR: 78 (14 Aug 2019 05:34) (71 - 80)  BP: 110/66 (14 Aug 2019 05:34) (103/65 - 128/75)  BP(mean): --  RR: 18 (14 Aug 2019 05:34) (13 - 18)  SpO2: 98% (14 Aug 2019 05:34) (96% - 100%)    ________________________________________________  PHYSICAL EXAM:  GENERAL: NAD  HEENT: Normocephalic;  conjunctivae and sclerae clear; moist mucous membranes;   NECK : supple  CHEST/LUNG: Clear to auscultation bilaterally with good air entry   HEART: S1 S2  regular; no murmurs, gallops or rubs  ABDOMEN: Soft, Nontender, Nondistended; Bowel sounds present, colostomy bag in place  EXTREMITIES: no cyanosis; no edema; no calf tenderness  SKIN: warm and dry; no rash  NERVOUS SYSTEM:  Awake and alert; Oriented  to place, person and time ; no new deficits    _________________________________________________  LABS:                        11.8   8.93  )-----------( 212      ( 14 Aug 2019 07:01 )             37.0     08-14    136  |  110<H>  |  47<H>  ----------------------------<  103<H>  4.6   |  18<L>  |  2.78<H>    Ca    8.8      14 Aug 2019 07:01  Phos  3.6     08-14  Mg     1.9     08-14      PT/INR - ( 13 Aug 2019 05:48 )   PT: 12.1 sec;   INR: 1.09 ratio         PTT - ( 13 Aug 2019 05:48 )  PTT:25.7 sec    CAPILLARY BLOOD GLUCOSE            RADIOLOGY & ADDITIONAL TESTS:  < from: US Renal (08.10.19 @ 19:54) >  Multiple sonographic images of both kidneys were obtained in transverse   and longitudinal planes.     Both kidneys show similar increased echogenicity. The right kidney   measures 10 cm in length.  The left kidney measures 12.4 cm in length.   Bilateral renal cysts are again noted. No solid renal masses, calculi or   evidence of hydronephrosis are identified.  No perinephric fluid   collections are seen.    < end of copied text >    Imaging Personally Reviewed:  YES    Consultant(s) Notes Reviewed:   YES    Care Discussed with Consultants :     Plan of care was discussed with patient and /or primary care giver; all questions and concerns were addressed and care was aligned with patient's wishes.

## 2019-08-14 NOTE — PROGRESS NOTE ADULT - ASSESSMENT
68M from [facility], with PMHx of prostate Ca (s/p radiation in 2015, now in remission), asthma, COPD (not on O2), HTN, HLD, Bipolar, anemia, GERD, Ileostomy s/p sigmoid resection, PAD, and CKD presented to ED c/o hematuria x 1 day; pt states he noticed blood and clots today while urinating, admits to difficulties urinating, denies dysuria, no abd pain, no back pain; Denies fever, chills, SOB or CP. Pt recently admitted to [facility] w/ Hematuria and UTI, ucx Ecoli, was d/cd home on 8/5  Readmitted due to gross hemtauria started on 08/08- s/p cystoscopy and bx 08/13 by Dr. Cruz

## 2019-08-14 NOTE — DISCHARGE NOTE PROVIDER - HOSPITAL COURSE
68M from [facility], with PMHx of prostate Ca (s/p radiation in 2015, now in remission), asthma, COPD (not on O2), HTN, HLD, Bipolar, anemia, GERD, Ileostomy s/p sigmoid resection, PAD, and CKD presented to ED c/o hematuria x 1 day; pt states he noticed blood and clots today while urinating, admits to difficulties urinating, denies dysuria, no abd pain, no back pain; Denies fever, chills, SOB or CP. Pt recently admitted to [facility] w/ Hematuria and UTI, ucx Ecoli, was d/c home on 8/5.  Urology was consulted at that time, outpatient follow up for cystoscopy recommended once UTI resolved and pt completed abx tx; Pt did not have a chance to follow up with Dr Cruz. Pt also reports some RESENDIZ since morning, but denies cough, fever, chills,         in ED, patient's Hb noted to be stable , Hb 13 baseline around 10, Creatinine 4.4 baseline 2.3 . Labs seem aberrant will repeat  one set and follow results.    Urology was consulted in ED, CBI was placed. Currently, urine is clear with no hematuria.     Pt was admitted to get cystoscopy 08/13. Pt had the procedure and was placed back on phillips and started on 24 hr cefazolin post procedure- phillips shows blood tinged urine    Pt is stable and ready for discharge to follow up with urology as out patient. 68M from [facility], with PMHx of prostate Ca (s/p radiation in 2015, now in remission), asthma, COPD (not on O2), HTN, HLD, Bipolar, anemia, GERD, Ileostomy s/p sigmoid resection, PAD, and CKD presented to ED c/o hematuria x 1 day; pt states he noticed blood and clots today while urinating, admits to difficulties urinating, denies dysuria, no abd pain, no back pain; Denies fever, chills, SOB or CP. Pt recently admitted to [facility] w/ Hematuria and UTI, ucx Ecoli, was d/c home on 8/5.  Urology was consulted at that time, outpatient follow up for cystoscopy recommended once UTI resolved and pt completed abx tx; Pt did not have a chance to follow up with Dr Cruz. Pt also reports some RESENDIZ since morning, but denies cough, fever, chills,         in ED, patient's Hb noted to be stable , Hb 13 baseline around 10, Creatinine 4.4 baseline 2.3 . Labs seem aberrant will repeat  one set and follow results.    Urology was consulted in ED, CBI was placed. Currently, urine is clear with no hematuria.     Pt was admitted to get cystoscopy 08/13. Pt had the procedure with bx of bladder and was placed back on phillips and started on 24 hr cefazolin post procedure- phillips shows blood tinged urine    Pt is stable and ready for discharge to follow up with urology as out patient.

## 2019-08-14 NOTE — DISCHARGE NOTE PROVIDER - NSDCCPCAREPLAN_GEN_ALL_CORE_FT
PRINCIPAL DISCHARGE DIAGNOSIS  Diagnosis: Hematuria, unspecified type  Assessment and Plan of Treatment: You came with complains of having blood in urine. we placed a phillips and irrigated your bladder during your stay. Your creatinine was elevated and you have chronic kidney disease.  We looked at yoru kidneys and bladder with ultrasound machine but it did not tell us what was causing the blood in urine. Urologist came to see you and he recommeded a cystoscopy to take a look at your urinary bladder . You started to have clear urine. Your cystoscopy was done and bisopsy was odne please follow up with a urologist for further management of this issue and a cystoscopy is recommended. Please follow up with a nephrologist for your kidneys as you had elevated creatinine and have chronic kidney disease stage 3.  we are hold aspirin secondary to hematuria, please follow up with your primary care doctor regarding restarting aspirin.        SECONDARY DISCHARGE DIAGNOSES  Diagnosis: Hematuria, unspecified type  Assessment and Plan of Treatment: PRINCIPAL DISCHARGE DIAGNOSIS  Diagnosis: Hematuria, unspecified type  Assessment and Plan of Treatment: You came with complains of having blood in urine. we placed a phillips and irrigated your bladder during your stay. Your creatinine was elevated and you have chronic kidney disease.  We looked at yoru kidneys and bladder with ultrasound machine but it did not tell us what was causing the blood in urine. Urologist came to see you and he recommeded a cystoscopy to take a look at your urinary bladder . You started to have clear urine. Your cystoscopy was done and bisopsy was take from your bladder. please follow up with a urologist for further management.         SECONDARY DISCHARGE DIAGNOSES  Diagnosis: CKD (chronic kidney disease), stage IV  Assessment and Plan of Treatment: you came with elevated creatinine from your baseline. You have chronic kidney disease with metabolic acidosis and we gave you IV bicarbonate to replace the loss. Continue taking your oral bicarbonate at home and follow up with your nephrologist 1-2 weeks after discharge    Diagnosis: Anemia  Assessment and Plan of Treatment: You have anemia and you are taking iron supplements.. continue taking your medications    Diagnosis: BPH (benign prostatic hyperplasia)  Assessment and Plan of Treatment: Continue taking your flomax PRINCIPAL DISCHARGE DIAGNOSIS  Diagnosis: Hematuria, unspecified type  Assessment and Plan of Treatment: You came with complains of having blood in urine. we placed a phillips and irrigated your bladder during your stay. Your creatinine was elevated and you have chronic kidney disease.  We looked at yoru kidneys and bladder with ultrasound machine but it did not tell us what was causing the blood in urine. Urologist came to see you and he recommeded a cystoscopy to take a look at your urinary bladder . You started to have clear urine. Your cystoscopy was done and bisopsy was take from your bladder. please follow up with a urologist for further management.         SECONDARY DISCHARGE DIAGNOSES  Diagnosis: CKD (chronic kidney disease), stage IV  Assessment and Plan of Treatment: you came with elevated creatinine from your baseline. You have chronic kidney disease with metabolic acidosis and we gave you IV bicarbonate to replace the loss. Continue taking your oral bicarbonate at home and follow up with your nephrologist 1-2 weeks after discharge    Diagnosis: Anemia  Assessment and Plan of Treatment: You have anemia and you are taking iron supplements. continue taking your medications    Diagnosis: BPH (benign prostatic hyperplasia)  Assessment and Plan of Treatment: Continue taking your flomax

## 2019-08-15 LAB
ANION GAP SERPL CALC-SCNC: 7 MMOL/L — SIGNIFICANT CHANGE UP (ref 5–17)
BUN SERPL-MCNC: 54 MG/DL — HIGH (ref 7–18)
CALCIUM SERPL-MCNC: 8.8 MG/DL — SIGNIFICANT CHANGE UP (ref 8.4–10.5)
CHLORIDE SERPL-SCNC: 113 MMOL/L — HIGH (ref 96–108)
CO2 SERPL-SCNC: 17 MMOL/L — LOW (ref 22–31)
CREAT SERPL-MCNC: 3.01 MG/DL — HIGH (ref 0.5–1.3)
GLUCOSE SERPL-MCNC: 96 MG/DL — SIGNIFICANT CHANGE UP (ref 70–99)
POTASSIUM SERPL-MCNC: 4.6 MMOL/L — SIGNIFICANT CHANGE UP (ref 3.5–5.3)
POTASSIUM SERPL-SCNC: 4.6 MMOL/L — SIGNIFICANT CHANGE UP (ref 3.5–5.3)
SODIUM SERPL-SCNC: 137 MMOL/L — SIGNIFICANT CHANGE UP (ref 135–145)

## 2019-08-15 PROCEDURE — 99231 SBSQ HOSP IP/OBS SF/LOW 25: CPT

## 2019-08-15 RX ORDER — SODIUM CHLORIDE 9 MG/ML
1000 INJECTION INTRAMUSCULAR; INTRAVENOUS; SUBCUTANEOUS
Refills: 0 | Status: DISCONTINUED | OUTPATIENT
Start: 2019-08-15 | End: 2019-08-16

## 2019-08-15 RX ADMIN — Medication 650 MILLIGRAM(S): at 05:34

## 2019-08-15 RX ADMIN — Medication 25 MILLIGRAM(S): at 13:34

## 2019-08-15 RX ADMIN — ARIPIPRAZOLE 10 MILLIGRAM(S): 15 TABLET ORAL at 13:34

## 2019-08-15 RX ADMIN — Medication 650 MILLIGRAM(S): at 13:33

## 2019-08-15 RX ADMIN — LAMOTRIGINE 200 MILLIGRAM(S): 25 TABLET, ORALLY DISINTEGRATING ORAL at 13:33

## 2019-08-15 RX ADMIN — PANTOPRAZOLE SODIUM 40 MILLIGRAM(S): 20 TABLET, DELAYED RELEASE ORAL at 05:34

## 2019-08-15 RX ADMIN — GABAPENTIN 300 MILLIGRAM(S): 400 CAPSULE ORAL at 05:34

## 2019-08-15 RX ADMIN — Medication 650 MILLIGRAM(S): at 22:15

## 2019-08-15 RX ADMIN — GABAPENTIN 300 MILLIGRAM(S): 400 CAPSULE ORAL at 22:15

## 2019-08-15 RX ADMIN — TAMSULOSIN HYDROCHLORIDE 0.4 MILLIGRAM(S): 0.4 CAPSULE ORAL at 22:15

## 2019-08-15 RX ADMIN — GABAPENTIN 300 MILLIGRAM(S): 400 CAPSULE ORAL at 13:33

## 2019-08-15 RX ADMIN — SODIUM CHLORIDE 60 MILLILITER(S): 9 INJECTION INTRAMUSCULAR; INTRAVENOUS; SUBCUTANEOUS at 13:56

## 2019-08-15 RX ADMIN — FINASTERIDE 5 MILLIGRAM(S): 5 TABLET, FILM COATED ORAL at 13:34

## 2019-08-15 NOTE — PROGRESS NOTE ADULT - PROBLEM SELECTOR PROBLEM 1
Hematuria, unspecified type

## 2019-08-15 NOTE — DIETITIAN INITIAL EVALUATION ADULT. - PERTINENT LABORATORY DATA
08-15 Na137 mmol/L Glu 96 mg/dL K+ 4.6 mmol/L Cr  3.01 mg/dL<H> BUN 54 mg/dL<H>   08-14 Phos 3.6 mg/dL   08-09 Alb 3.9 g/dL     07-30 [id number] 6.0 %<H>   07-30 Chol 209 mg/dL<H>  mg/dL HDL 52 mg/dL Trig 182 mg/dL<H>

## 2019-08-15 NOTE — CONSULT NOTE ADULT - ASSESSMENT
A/P     1. PAWAN on CKD(stage 3) :  - patient presented with elevated creatinine which improved s/p cystoscopy noted to have an elevated level today likely from ischemic insult in setting of hypotension and from toradol  - suggest iv hydration with NS  - renal us with no hydronephrosis  - Keep patient euvolemic   - Avoid Nephrotoxic Meds/ Agents such as (NSAIDs, IV contrast, Aminoglycosides such as gentamicin, -Gadolinium contrast, Phosphate containing enemas, etc..)  - Adjust Medications according to eGFR  - Monitor BMP daily     2. METABOLIC ACIDOSIS: secondary to CKD  -continue nahco3 tab   -f/u CO2 daily    3. Hematuria: resolved  - s/p cystoscopy, biopsy s/o hemorrhagic cystitis  - h/o Prostate ca s/p radiation   - urology f/u   - avoid IV cont     4 HTN :   - BP acceptable   -cont with current meds   - keep BP < 130/80 A/P     1. PAWAN on CKD(stage 3) :  - patient presented with elevated creatinine which improved s/p cystoscopy noted to have an elevated level today likely from ischemic insult in setting of hypotension and from toradol  - suggest iv hydration with NS x 24 hrs and avoid Toradol   - renal us with no hydronephrosis  - Keep patient euvolemic   - Avoid Nephrotoxic Meds/ Agents such as (NSAIDs, IV contrast, Aminoglycosides such as gentamicin, -Gadolinium contrast, Phosphate containing enemas, etc..)  - Adjust Medications according to eGFR  - Monitor BMP daily     2. METABOLIC ACIDOSIS: secondary to CKD, normal AG   -continue nahco3 tab   -f/u CO2 daily    3. Hematuria: resolved  - s/p cystoscopy, biopsy s/o hemorrhagic cystitis  - h/o Prostate ca s/p radiation   - urology f/u   - avoid IV cont     4 HTN :   - BP acceptable   -cont with current meds   - keep BP < 130/80

## 2019-08-15 NOTE — PROGRESS NOTE ADULT - PROBLEM SELECTOR PROBLEM 4
CKD (chronic kidney disease), stage IV
Need for prophylactic measure
CKD (chronic kidney disease), stage IV

## 2019-08-15 NOTE — PROGRESS NOTE ADULT - ASSESSMENT
68y Male S/p cystoscopy with bladder biopsy on 8/13 for gross hematuria. Currently without hematuria and voiding well.     Pathology final results reviewed: benign urothelial mucosa.   Patient can be discharged from urology standpoint w/ outpatient follow up

## 2019-08-15 NOTE — DIETITIAN INITIAL EVALUATION ADULT. - NS FNS WEIGHT USED FOR CALC
Ht=5' 8"  ?    5' 10" ( recent admission)    MRK=958 lb    Admission fn=350 lb   BMI=/ideal ideal/Ht=5' 8"  ?    5' 10" ( recent admission)    FOJ=503 lb    Admission lp=529 lb   BMI=30.1

## 2019-08-15 NOTE — PROGRESS NOTE ADULT - ASSESSMENT
68M from [facility], with PMHx of prostate Ca (s/p radiation in 2015, now in remission), asthma, COPD (not on O2), HTN, HLD, Bipolar, anemia, GERD, Ileostomy s/p sigmoid resection, PAD, and CKD presented to ED c/o hematuria x 1 day; pt states he noticed blood and clots today while urinating, admits to difficulties urinating, denies dysuria, no abd pain, no back pain; Denies fever, chills, SOB or CP. Pt recently admitted to [facility] w/ Hematuria and UTI, ucx Ecoli, was d/cd home on 8/5  Readmitted due to gross hemtauria started on 08/08- s/p cystoscopy and bx 08/13 by Dr. Cruz 68M from [facility], with PMHx of prostate Ca (s/p radiation in 2015, now in remission), asthma, COPD (not on O2), HTN, HLD, Bipolar, anemia, GERD, Ileostomy s/p sigmoid resection, PAD, and CKD presented to ED c/o hematuria x 1 day; pt states he noticed blood and clots today while urinating, admits to difficulties urinating, denies dysuria, no abd pain, no back pain; Denies fever, chills, SOB or CP. Pt recently admitted to [facility] w/ Hematuria and UTI, ucx Ecoli, was d/cd home on 8/5  Readmitted due to gross hemtauria started on 08/08- s/p cystoscopy and bx 08/13 by Dr. Cruz.

## 2019-08-15 NOTE — DIETITIAN INITIAL EVALUATION ADULT. - OTHER INFO
Pt alert, oriented, from assisted living facility, well-communicated, but declining RD interview at present, having more important issues than nutrition per pt, just spoke to NS Manager; Pt known to RD from recent admission with Initial Nutrition Assessment 8/5/19; intake 100% at times per flow sheet, Discussed with RN, no nutrition related report at present; Pending discharge planning to assisted living facility when medically ready Pt alert, oriented, from assisted living facility, well-communicated, but declining RD interview at present, having more important issues than nutrition per pt, just spoke to NS Manager; Pt known to RD from recent admission with Initial Nutrition Assessment 8/5/19; intake 100% at times per flow sheet, Discussed with RN, no nutrition related report at present; Pending discharge planning to assisted living facility when medically ready; Discussed with RN

## 2019-08-15 NOTE — PROGRESS NOTE ADULT - SUBJECTIVE AND OBJECTIVE BOX
PGY 1 Note discussed with supervising resident and primary attending    Patient is a 68y old  Male who presents with a chief complaint of hematuria (15 Aug 2019 13:31)      INTERVAL HPI/OVERNIGHT EVENTS: offers no new complaints; pt is voiding appropriately, clear urine no blood    MEDICATIONS  (STANDING):  ARIPiprazole 10 milliGRAM(s) Oral daily  finasteride 5 milliGRAM(s) Oral daily  gabapentin 300 milliGRAM(s) Oral three times a day  heparin  Injectable 5000 Unit(s) SubCutaneous every 8 hours  lamoTRIgine 200 milliGRAM(s) Oral daily  pantoprazole    Tablet 40 milliGRAM(s) Oral before breakfast  sodium bicarbonate 650 milliGRAM(s) Oral three times a day  sodium bicarbonate  Infusion 0.055 mEq/kG/Hr (70 mL/Hr) IV Continuous <Continuous>  sodium chloride 0.9%. 1000 milliLiter(s) (60 mL/Hr) IV Continuous <Continuous>  tamsulosin 0.4 milliGRAM(s) Oral at bedtime  traZODone 25 milliGRAM(s) Oral daily    MEDICATIONS  (PRN):  HYDROmorphone   Tablet 4 milliGRAM(s) Oral every 8 hours PRN Severe Pain (7 - 10)      __________________________________________________  REVIEW OF SYSTEMS:    CONSTITUTIONAL: No fever,   EYES: no acute visual disturbances  NECK: No pain or stiffness  RESPIRATORY: No cough; No shortness of breath  CARDIOVASCULAR: No chest pain, no palpitations  GASTROINTESTINAL: No pain. No nausea or vomiting; No diarrhea   NEUROLOGICAL: No headache or numbness, no tremors  MUSCULOSKELETAL: No joint pain, no muscle pain  GENITOURINARY: no dysuria, no frequency, no hesitancy  PSYCHIATRY: no depression , no anxiety  ALL OTHER  ROS negative        Vital Signs Last 24 Hrs  T(C): 36.8 (15 Aug 2019 13:27), Max: 36.8 (15 Aug 2019 13:27)  T(F): 98.3 (15 Aug 2019 13:27), Max: 98.3 (15 Aug 2019 13:27)  HR: 77 (15 Aug 2019 13:27) (68 - 77)  BP: 116/70 (15 Aug 2019 13:27) (90/48 - 116/70)  BP(mean): --  RR: 16 (15 Aug 2019 13:27) (16 - 18)  SpO2: 96% (15 Aug 2019 13:27) (95% - 98%)    ________________________________________________  PHYSICAL EXAM:  GENERAL: NAD  HEENT: Normocephalic;  conjunctivae and sclerae clear; moist mucous membranes;   NECK : supple  CHEST/LUNG: Clear to auscultation bilaterally with good air entry   HEART: S1 S2  regular; no murmurs, gallops or rubs  ABDOMEN: Soft, Nontender, Nondistended; Bowel sounds present  EXTREMITIES: no cyanosis; no edema; no calf tenderness  SKIN: warm and dry; no rash  NERVOUS SYSTEM:  Awake and alert; Oriented  to place, person and time ; no new deficits    _________________________________________________  LABS:                        11.8   8.93  )-----------( 212      ( 14 Aug 2019 07:01 )             37.0     08-15    137  |  113<H>  |  54<H>  ----------------------------<  96  4.6   |  17<L>  |  3.01<H>    Ca    8.8      15 Aug 2019 06:00  Phos  3.6     08-14  Mg     1.9     08-14          CAPILLARY BLOOD GLUCOSE            RADIOLOGY & ADDITIONAL TESTS:    < from: US Renal (08.10.19 @ 19:54) >    IMPRESSION:    No evidence of hydronephrosis.    Thank you for the courtesy ofthis referral.      < end of copied text >  Imaging Personally Reviewed:  YES  Consultant(s) Notes Reviewed:   YES    Care Discussed with Consultants : nephrology    Plan of care was discussed with patient and /or primary care giver; all questions and concerns were addressed and care was aligned with patient's wishes.

## 2019-08-15 NOTE — PROGRESS NOTE ADULT - PROBLEM SELECTOR PROBLEM 2
BPH with urinary obstruction

## 2019-08-15 NOTE — PROGRESS NOTE ADULT - PROBLEM SELECTOR PROBLEM 3
PAWAN (acute kidney injury)
CKD (chronic kidney disease), stage IV
PAWAN (acute kidney injury)

## 2019-08-15 NOTE — PROGRESS NOTE ADULT - PROBLEM SELECTOR PLAN 3
CKD 4 with metabolic acidosis- continue with bicarb replacement.   BMP today showed low bicarb - bicarb replaced  f/u BMP tomorrow  BUN/Cr 46/3.09 -> 47/2.78 CKD 4 with metabolic acidosis- continue with bicarb replacement.   BMP today showed low bicarb - bicarb replaced  BUN/Cr 46/3.09 -> 47/2.78-> 54/3.01  as Per Dr. Curran rec. Pt will be given IVF NS 60ml rate  ABG ordered

## 2019-08-15 NOTE — PROGRESS NOTE ADULT - PROBLEM SELECTOR PLAN 1
Hematuria resolved with CBI  Urology follow up ongoing  Plan for cystoscopy likely on Tuesday ( or Monday if feasible per OR schedule)
Hematuria resolved   Urology follow up ongoing- Dr. Cruz   cystoscopy on Tuesday 08/13  hgb 11.9,   continue to monitor
Hematuria resolved  Urology follow up ongoing- Dr. Cruz  cystoscopy on Tuesday 08/13  hgb 11.8 stable,   continue to monitor  TOV today after phillips is removed 08/14
Hematuria resolved  Urology follow up ongoing- Dr. Cruz  cystoscopy on Tuesday 08/13  hgb 11.8 stable,   continue to monitor  TOV today after phillips is removed 08/14
Hematuria resolved - CBI  now clamped  Urology follow up ongoing  Plan for cystoscopy likely on Tuesday
Hematuria resolved - CBI  now clamped  Urology follow up ongoing- Dr. Cruz  Plan for cystoscopy likely on Tuesday 08/13  hgb 11, continue to monitor
Hematuria resolved - CBI  now clamped clear urine in phillips  Urology follow up ongoing- Dr. Cruz  Plan for cystoscopy likely on Tuesday 08/13  pt NPO over midnight   hgb 11.9,   continue to monitor

## 2019-08-15 NOTE — CONSULT NOTE ADULT - SUBJECTIVE AND OBJECTIVE BOX
Patient is a 68y Male whom presented to the hospital with     Medical HPI:  68M from [facility], with PMHx of prostate Ca (s/p radiation in 2015, now in remission), asthma, COPD (not on O2), HTN, HLD, Bipolar, anemia, GERD, Ileostomy s/p sigmoid resection, PAD, and CKD presented to ED c/o hematuria x 1 day; pt states he noticed blood and clots today while urinating, admits to difficulties urinating, denies dysuria, no abd pain, no back pain; Denies fever, chills, SOB or CP. Pt recently admitted to [facility] w/ Hematuria and UTI, ucx Ecoli, was d/c home on .  Urology was consulted at that time, outpatient follow up for cystoscopy recommended once UTI resolved and pt completed abx tx; Pt did not have a chance to follow up with Dr Cruz. Pt also reports some RESENDIZ since morning, but denies cough, fever, chills,     in ED, patient's Hb noted to be stable , Hb 13 baseline around 10, Creatinine 4.4 baseline 2.3 . Labs seem aberrant will repeat  one set and follow results.  Urology was consulted in ED, CBI was placed. Currently, urine is clear with no hematuria. (09 Aug 2019 17:02)    Renal HPI:  pts chart reviewed and case discussed with resident  pt is known to have ckd-stage 3 as per old labs with baseline cr 2.20-2.4 and metabolic acidosis  pt admitted to medical floor for hematuria s/p cystoscopy and bladder fulguration, noted to have elevated creatinine level today  pt denies pmh of heavy  proteinuria, renal stones, recurrent uti or nephrotic edema or nephrotic syndrome  pt gives long standing hx of htn with ckd x many yrs  pt currently denies cp,sob,gi symptoms,skin rash, fever,chills ,joint pain or leg edema        PAST MEDICAL & SURGICAL HISTORY:  BPH with urinary obstruction  Prostate CA  CKD (chronic kidney disease)  PAD (peripheral artery disease)  HLD (hyperlipidemia)  HTN (hypertension)  IBD (inflammatory bowel disease)  Bipolar disorder  Anemia  COPD, mild  History of creation of ostomy      Home Medications: Reviewed    MEDICATIONS  (STANDING):  ARIPiprazole 10 milliGRAM(s) Oral daily  finasteride 5 milliGRAM(s) Oral daily  gabapentin 300 milliGRAM(s) Oral three times a day  heparin  Injectable 5000 Unit(s) SubCutaneous every 8 hours  lamoTRIgine 200 milliGRAM(s) Oral daily  pantoprazole    Tablet 40 milliGRAM(s) Oral before breakfast  sodium bicarbonate 650 milliGRAM(s) Oral three times a day  sodium bicarbonate  Infusion 0.055 mEq/kG/Hr (70 mL/Hr) IV Continuous <Continuous>  sodium chloride 0.9%. 1000 milliLiter(s) (60 mL/Hr) IV Continuous <Continuous>  tamsulosin 0.4 milliGRAM(s) Oral at bedtime  traZODone 25 milliGRAM(s) Oral daily    MEDICATIONS  (PRN):  HYDROmorphone   Tablet 4 milliGRAM(s) Oral every 8 hours PRN Severe Pain (7 - 10)      Allergies    No Known Allergies    Intolerances        SOCIAL HISTORY:  Alcohol use [X ] No  [ ] Yes  Smoking  [ X] No  [ ] Yes  Drug Abuse [X ] No  [ ] Yes  Tattoo [X ] No  [ ] Yes  History of Blood transfusion [ X] No  [ ] Yes    FAMILY HISTORY:      Diabetes [ ]  Hypertension [ ]  Kidney Stones [ ]  Heart Disease [ ]  Hyperlipidemia [ ]  Cancer [ ]    REVIEW OF SYSTEMS:  CONSTITUTIONAL: No weakness, fevers or chills  EYES/ENT: No visual changes;  No vertigo or throat pain   NECK: No pain or stiffness  RESPIRATORY: No cough, wheezing, hemoptysis; No shortness of breath  CARDIOVASCULAR: No chest pain or palpitations  GASTROINTESTINAL: No abdominal or epigastric pain. No nausea, vomiting, or hematemesis; No diarrhea or constipation. No melena or hematochezia.  GENITOURINARY: No dysuria, frequency or hematuria  NEUROLOGICAL: No numbness or weakness  SKIN: No itching, burning, rashes, or lesions   All other review of systems is negative unless indicated above    Vital Signs  T(F): 98.3 (08-15-19 @ 13:27), Max: 98.3 (08-15-19 @ 13:27)  HR: 77 (08-15-19 @ :27) (68 - 77)  BP: 116/70 (08-15-19 @ 13:27) (90/48 - 116/70)  ABP: --  RR: 16 (08-15-19 @ 13:27) (16 - 18)  SpO2: 96% (08-15-19 @ 13:27) (95% - 98%)  Wt(kg): --  CVP(cm H2O): --  CO: --  PCWP: --    I and O's:     @ 07:  -   @ 07:00  --------------------------------------------------------  IN:  Total IN: 0 mL    OUT:    Indwelling Catheter - Urethral: 400 mL    Voided: 400 mL  Total OUT: 800 mL    Total NET: -800 mL       @ 07:  -  08-15 @ 07:00  --------------------------------------------------------  IN:  Total IN: 0 mL    OUT:    Ileostomy: 550 mL    Voided: 675 mL  Total OUT: 1225 mL    Total NET: -1225 mL        Daily     Daily Weight in k.2 (15 Aug 2019 10:37)    PHYSICAL EXAM  Constitutional: well developed, well nourished  and in nad  HEENT: PERRLA,  no icteric sclera and mild pallor of conjunctiva noted  Neck: No JVD, thyromegaly or adenopathy  Respiratory: reduced air entry lower lungs with no rales, wheezing or rhonchi  Cardiovascular: S1 and S2 normally heard  Gastrointestinal: soft, nondistended, nontender and normal bowel sounds heard, ileostomy with brown stool in bag  Extremities: No peripheral edema or cyanosis  Neurological: A/O x 3, no focal deficits  Psychiatric: Normal mood, normal affect  : No flank tenderness  Skin: No rashes        LABS:                        11.8   8.93  )-----------( 212      ( 14 Aug 2019 07:01 )             37.0     3.6  --  3.5  --        08-15    137  |  113<H>  |  54<H>  ----------------------------<  96  4.6   |  17<L>  |  3.01<H>      136  |  110<H>  |  47<H>  ----------------------------<  103<H>  4.6   |  18<L>  |  2.78<H>  08-13    137  |  112<H>  |  46<H>  ----------------------------<  99  4.4   |  17<L>  |  3.07<H>    Ca    8.8      15 Aug 2019 06:00  Ca    8.8      14 Aug 2019 07:01  Ca    9.0      13 Aug 2019 05:47  Phos  3.6     08-14  Phos  3.5     08-13  Mg     1.9     08-14  Mg     1.9     08-13            URINE STUDIES:  Sodium, Random Urine: 141 mmol/L (08-10-19 @ 00:13)  Creatinine, Random Urine: <13 mg/dL (08-10-19 @ 00:13)                RADIOLOGY & ADDITIONAL STUDIES:   < from: US Renal (08.10.19 @ 19:54) >  EXAM:  US KIDNEY(S)                            PROCEDURE DATE:  08/10/2019          INTERPRETATION:  Renal sonogram :    CLINICAL HISTORY: Acute renal failure    Comparison: 2019    Multiple sonographic images of both kidneys were obtained in transverse   and longitudinal planes.     Both kidneys show similar increased echogenicity. The right kidney   measures 10 cm in length.  The left kidney measures 12.4 cm in length.   Bilateral renal cysts are again noted. No solid renal masses, calculi or   evidence of hydronephrosis are identified.  No perinephric fluid   collections are seen.    The urinary bladder is collapsed and Koehler catheter is present in it.    IMPRESSION:    No evidence of hydronephrosis.    [facility] Patient is a 68y Male whom presented to the hospital with gross hematuria and katarzyna.  pt seen and examined with renal resident  Medical HPI:  68M from [facility], with PMHx of prostate Ca (s/p radiation in , now in remission), asthma, COPD (not on O2), HTN, HLD, Bipolar, anemia, GERD, Ileostomy s/p sigmoid resection, PAD, and CKD presented to ED c/o hematuria x 1 day; pt states he noticed blood and clots today while urinating, admits to difficulties urinating, denies dysuria, no abd pain, no back pain; Denies fever, chills, SOB or CP. Pt recently [facility] w/ Hematuria and UTI, ucx Ecoli, was d/c home on .  Urology was consulted at that time, outpatient follow up for cystoscopy recommended once UTI resolved and pt completed abx tx; Pt did not have a chance to follow up with Dr Cruz. Pt also reports some RESENDIZ since morning, but denies cough, fever, chills,     in ED, patient's Hb noted to be stable , Hb 13 baseline around 10, Creatinine 4.4 baseline 2.3 . Labs seem aberrant will repeat  one set and follow results.  Urology was consulted in ED, CBI was placed. Currently, urine is clear with no hematuria. (09 Aug 2019 17:02)    Renal HPI:  pts chart reviewed and case discussed with resident  pt is known to have ckd-stage 3 as per old labs with baseline cr 2.20-2.4 and metabolic acidosis  pt admitted to medical floor for hematuria s/p cystoscopy and bladder fulguration, noted to have elevated creatinine level above his baseline since admission  pt denies pmh of  renal stones, recurrent uti or nephrotic edema or nephrotic syndrome  pt gives long standing hx of htn/non nephrotic proteinuria  with ckd x many yrs  pt currently denies cp,sob,gi symptoms,skin rash, fever,chills ,joint pain or leg edema  pt is voiding normally.  pt was given 1 dose of Toradol x 1 dose recently.pt is also having low bp las 24 to 48 hrs      PAST MEDICAL & SURGICAL HISTORY:  BPH with urinary obstruction  Prostate CA  CKD (chronic kidney disease)  PAD (peripheral artery disease)  HLD (hyperlipidemia)  HTN (hypertension)  IBD (inflammatory bowel disease)  Bipolar disorder  Anemia  COPD, mild  History of creation of ostomy      Home Medications: Reviewed    MEDICATIONS  (STANDING):  ARIPiprazole 10 milliGRAM(s) Oral daily  finasteride 5 milliGRAM(s) Oral daily  gabapentin 300 milliGRAM(s) Oral three times a day  heparin  Injectable 5000 Unit(s) SubCutaneous every 8 hours  lamoTRIgine 200 milliGRAM(s) Oral daily  pantoprazole    Tablet 40 milliGRAM(s) Oral before breakfast  sodium bicarbonate 650 milliGRAM(s) Oral three times a day  sodium bicarbonate  Infusion 0.055 mEq/kG/Hr (70 mL/Hr) IV Continuous <Continuous>  sodium chloride 0.9%. 1000 milliLiter(s) (60 mL/Hr) IV Continuous <Continuous>  tamsulosin 0.4 milliGRAM(s) Oral at bedtime  traZODone 25 milliGRAM(s) Oral daily    MEDICATIONS  (PRN):  HYDROmorphone   Tablet 4 milliGRAM(s) Oral every 8 hours PRN Severe Pain (7 - 10)      Allergies    No Known Allergies    Intolerances        SOCIAL HISTORY:  Alcohol use [X ] No  [ ] Yes  Smoking  [ X] No  [ ] Yes  Drug Abuse [X ] No  [ ] Yes  Tattoo [X ] No  [ ] Yes  History of Blood transfusion [ X] No  [ ] Yes    FAMILY HISTORY:n/c        REVIEW OF SYSTEMS:  CONSTITUTIONAL: No weakness, fevers or chills  EYES/ENT: No visual changes;  No vertigo or throat pain   NECK: No pain or stiffness  RESPIRATORY: No cough, wheezing, hemoptysis; No shortness of breath  CARDIOVASCULAR: No chest pain or palpitations  GASTROINTESTINAL: No abdominal or epigastric pain. No nausea, vomiting, or hematemesis; No diarrhea or constipation. No melena or hematochezia.  GENITOURINARY: No dysuria, frequency or hematuria  NEUROLOGICAL: No numbness or weakness  SKIN: No itching, burning, rashes, or lesions   All other review of systems is negative unless indicated above    Vital Signs  T(F): 98.3 (08-15-19 @ 13:27), Max: 98.3 (08-15-19 @ 13:27)  HR: 77 (08-15-19 @ :) (68 - 77)  BP: 116/70 (08-15-19 @ 13:27) (90/48 - 116/70)  ABP: --  RR: 16 (08-15-19 @ 13:27) (16 - 18)  SpO2: 96% (08-15-19 @ 13:27) (95% - 98%)  Wt(kg): --  CVP(cm H2O): --  CO: --  PCWP: --    I and O's:     @ 07:01  -   @ 07:00  --------------------------------------------------------  IN:  Total IN: 0 mL    OUT:    Indwelling Catheter - Urethral: 400 mL    Voided: 400 mL  Total OUT: 800 mL    Total NET: -800 mL       @ 07:  -  08-15 @ 07:00  --------------------------------------------------------  IN:  Total IN: 0 mL    OUT:    Ileostomy: 550 mL    Voided: 675 mL  Total OUT: 1225 mL    Total NET: -1225 mL        Daily     Daily Weight in k.2 (15 Aug 2019 10:37)    PHYSICAL EXAM  Constitutional: well developed, well nourished  and in nad  HEENT: PERRLA,  no icteric sclera and mild pallor of conjunctiva noted  Neck: No JVD, thyromegaly or adenopathy  Respiratory: reduced air entry lower lungs with no rales, wheezing or rhonchi  Cardiovascular: S1 and S2 normally heard  Gastrointestinal: soft, nondistended, nontender and normal bowel sounds heard, ileostomy with brown stool in bag  Extremities: No peripheral edema or cyanosis  Neurological: A/O x 3, no focal deficits  Psychiatric: Normal mood, normal affect  : No flank tenderness  Skin: No rashes        LABS:                        11.8   8.93  )-----------( 212      ( 14 Aug 2019 07:01 )             37.0     3.6  --  3.5  --        08-15    137  |  113<H>  |  54<H>  ----------------------------<  96  4.6   |  17<L>  |  3.01<H>  08-14    136  |  110<H>  |  47<H>  ----------------------------<  103<H>  4.6   |  18<L>  |  2.78<H>      137  |  112<H>  |  46<H>  ----------------------------<  99  4.4   |  17<L>  |  3.07<H>    Ca    8.8      15 Aug 2019 06:00  Ca    8.8      14 Aug 2019 07:01  Ca    9.0      13 Aug 2019 05:47  Phos  3.6       Phos  3.5       Mg     1.9       Mg     1.9                 URINE STUDIES:  Sodium, Random Urine: 141 mmol/L (08-10-19 @ 00:13)  Creatinine, Random Urine: <13 mg/dL (08-10-19 @ 00:13)                RADIOLOGY & ADDITIONAL STUDIES:   < from: US Renal (08.10.19 @ 19:54) >  EXAM:  US KIDNEY(S)                            PROCEDURE DATE:  08/10/2019          INTERPRETATION:  Renal sonogram :    CLINICAL HISTORY: Acute renal failure    Comparison: 2019    Multiple sonographic images of both kidneys were obtained in transverse   and longitudinal planes.     Both kidneys show similar increased echogenicity. The right kidney   measures 10 cm in length.  The left kidney measures 12.4 cm in length.   Bilateral renal cysts are again noted. No solid renal masses, calculi or   evidence of hydronephrosis are identified.  No perinephric fluid   collections are seen.    The urinary bladder is collapsed and Koehler catheter is present in it.    IMPRESSION:    No evidence of hydronephrosis.    Thank you for the courtesy ofthis referral.

## 2019-08-15 NOTE — PROGRESS NOTE ADULT - PROBLEM SELECTOR PLAN 2
Continue Finasteride and Flomax

## 2019-08-15 NOTE — PROGRESS NOTE ADULT - SUBJECTIVE AND OBJECTIVE BOX
Pt seen at bedside. No complaints. Voiding independently without complaints. No blood tinged urine or dysuria.  No f/c.     T(F): 97.9 (08-15-19 @ 05:45), Max: 98 (08-14-19 @ 14:40)  HR: 69 (08-15-19 @ 05:45) (68 - 75)  BP: 104/59 (08-15-19 @ 05:45) (90/48 - 104/59)  RR: 17 (08-15-19 @ 05:45) (17 - 18)  SpO2: 95% (08-15-19 @ 05:45) (95% - 98%)      PHYSICAL EXAM:  General: NAD, WDWN  Neuro:  Alert & oriented x 3  Lung:  respirations nonlabored, good inspiratory effort on room air   Abdomen: soft, NTND.      LABS:                        11.8   8.93  )-----------( 212      ( 14 Aug 2019 07:01 )             37.0     08-15    137  |  113<H>  |  54<H>  ----------------------------<  96  4.6   |  17<L>  |  3.01<H>    Ca    8.8      15 Aug 2019 06:00  Phos  3.6     08-14  Mg     1.9     08-14    I&O's Detail    14 Aug 2019 07:01  -  15 Aug 2019 07:00  --------------------------------------------------------  IN:  Total IN: 0 mL    OUT:    Ileostomy: 550 mL    Voided: 675 mL  Total OUT: 1225 mL    Total NET: -1225 mL      Culture Results:   <10,000 CFU/mL Normal Urogenital Celina (08-09 @ 18:04)        Surgical Final Report          Final Diagnosis  Urinary bladder; biopsy:  Benign urothelial mucosa with marked acute and chronic  inflammation and recent parenchymal hemorrhage    Verified by: Zoila York M.D.  (Electronic Signature)  Reported on: 08/15/19 11:42 EDT, [address] [place] [id number]  ___________

## 2019-08-15 NOTE — CONSULT NOTE ADULT - CONSULT REASON
CKD with worsening creatinint CKD with worsening creatinine CKD with worsening creatinine(katarzyna)

## 2019-08-15 NOTE — DIETITIAN INITIAL EVALUATION ADULT. - PERTINENT MEDS FT
MEDICATIONS  (STANDING):  ARIPiprazole 10 milliGRAM(s) Oral daily  finasteride 5 milliGRAM(s) Oral daily  gabapentin 300 milliGRAM(s) Oral three times a day  heparin  Injectable 5000 Unit(s) SubCutaneous every 8 hours  lamoTRIgine 200 milliGRAM(s) Oral daily  pantoprazole    Tablet 40 milliGRAM(s) Oral before breakfast  sodium bicarbonate 650 milliGRAM(s) Oral three times a day  sodium bicarbonate  Infusion 0.055 mEq/kG/Hr (70 mL/Hr) IV Continuous <Continuous>  tamsulosin 0.4 milliGRAM(s) Oral at bedtime  traZODone 25 milliGRAM(s) Oral daily    MEDICATIONS  (PRN):  HYDROmorphone   Tablet 4 milliGRAM(s) Oral every 8 hours PRN Severe Pain (7 - 10)

## 2019-08-16 ENCOUNTER — TRANSCRIPTION ENCOUNTER (OUTPATIENT)
Age: 69
End: 2019-08-16

## 2019-08-16 VITALS
OXYGEN SATURATION: 98 % | RESPIRATION RATE: 17 BRPM | TEMPERATURE: 98 F | DIASTOLIC BLOOD PRESSURE: 65 MMHG | SYSTOLIC BLOOD PRESSURE: 122 MMHG | HEART RATE: 76 BPM

## 2019-08-16 LAB
ANION GAP SERPL CALC-SCNC: 5 MMOL/L — SIGNIFICANT CHANGE UP (ref 5–17)
BUN SERPL-MCNC: 44 MG/DL — HIGH (ref 7–18)
CALCIUM SERPL-MCNC: 8.5 MG/DL — SIGNIFICANT CHANGE UP (ref 8.4–10.5)
CHLORIDE SERPL-SCNC: 115 MMOL/L — HIGH (ref 96–108)
CO2 SERPL-SCNC: 19 MMOL/L — LOW (ref 22–31)
CREAT SERPL-MCNC: 2.37 MG/DL — HIGH (ref 0.5–1.3)
GLUCOSE SERPL-MCNC: 141 MG/DL — HIGH (ref 70–99)
HCT VFR BLD CALC: 35.9 % — LOW (ref 39–50)
HGB BLD-MCNC: 11.4 G/DL — LOW (ref 13–17)
MCHC RBC-ENTMCNC: 27 PG — SIGNIFICANT CHANGE UP (ref 27–34)
MCHC RBC-ENTMCNC: 31.8 GM/DL — LOW (ref 32–36)
MCV RBC AUTO: 85.1 FL — SIGNIFICANT CHANGE UP (ref 80–100)
NRBC # BLD: 0 /100 WBCS — SIGNIFICANT CHANGE UP (ref 0–0)
PLATELET # BLD AUTO: 198 K/UL — SIGNIFICANT CHANGE UP (ref 150–400)
POTASSIUM SERPL-MCNC: 4.6 MMOL/L — SIGNIFICANT CHANGE UP (ref 3.5–5.3)
POTASSIUM SERPL-SCNC: 4.6 MMOL/L — SIGNIFICANT CHANGE UP (ref 3.5–5.3)
RBC # BLD: 4.22 M/UL — SIGNIFICANT CHANGE UP (ref 4.2–5.8)
RBC # FLD: 16 % — HIGH (ref 10.3–14.5)
SODIUM SERPL-SCNC: 139 MMOL/L — SIGNIFICANT CHANGE UP (ref 135–145)
WBC # BLD: 6.82 K/UL — SIGNIFICANT CHANGE UP (ref 3.8–10.5)
WBC # FLD AUTO: 6.82 K/UL — SIGNIFICANT CHANGE UP (ref 3.8–10.5)

## 2019-08-16 PROCEDURE — 80053 COMPREHEN METABOLIC PANEL: CPT

## 2019-08-16 PROCEDURE — 99285 EMERGENCY DEPT VISIT HI MDM: CPT | Mod: 25

## 2019-08-16 PROCEDURE — 84100 ASSAY OF PHOSPHORUS: CPT

## 2019-08-16 PROCEDURE — 84300 ASSAY OF URINE SODIUM: CPT

## 2019-08-16 PROCEDURE — 86900 BLOOD TYPING SEROLOGIC ABO: CPT

## 2019-08-16 PROCEDURE — 87086 URINE CULTURE/COLONY COUNT: CPT

## 2019-08-16 PROCEDURE — 36415 COLL VENOUS BLD VENIPUNCTURE: CPT

## 2019-08-16 PROCEDURE — 85730 THROMBOPLASTIN TIME PARTIAL: CPT

## 2019-08-16 PROCEDURE — 85027 COMPLETE CBC AUTOMATED: CPT

## 2019-08-16 PROCEDURE — 82570 ASSAY OF URINE CREATININE: CPT

## 2019-08-16 PROCEDURE — 83735 ASSAY OF MAGNESIUM: CPT

## 2019-08-16 PROCEDURE — 88305 TISSUE EXAM BY PATHOLOGIST: CPT

## 2019-08-16 PROCEDURE — 86901 BLOOD TYPING SEROLOGIC RH(D): CPT

## 2019-08-16 PROCEDURE — 80048 BASIC METABOLIC PNL TOTAL CA: CPT

## 2019-08-16 PROCEDURE — 76775 US EXAM ABDO BACK WALL LIM: CPT

## 2019-08-16 PROCEDURE — 81001 URINALYSIS AUTO W/SCOPE: CPT

## 2019-08-16 PROCEDURE — 86850 RBC ANTIBODY SCREEN: CPT

## 2019-08-16 PROCEDURE — 85610 PROTHROMBIN TIME: CPT

## 2019-08-16 RX ORDER — HYDROMORPHONE HYDROCHLORIDE 2 MG/ML
1 INJECTION INTRAMUSCULAR; INTRAVENOUS; SUBCUTANEOUS
Qty: 0 | Refills: 0 | DISCHARGE

## 2019-08-16 RX ADMIN — Medication 25 MILLIGRAM(S): at 11:35

## 2019-08-16 RX ADMIN — LAMOTRIGINE 200 MILLIGRAM(S): 25 TABLET, ORALLY DISINTEGRATING ORAL at 11:35

## 2019-08-16 RX ADMIN — FINASTERIDE 5 MILLIGRAM(S): 5 TABLET, FILM COATED ORAL at 11:35

## 2019-08-16 RX ADMIN — ARIPIPRAZOLE 10 MILLIGRAM(S): 15 TABLET ORAL at 11:35

## 2019-08-16 RX ADMIN — Medication 650 MILLIGRAM(S): at 05:24

## 2019-08-16 RX ADMIN — GABAPENTIN 300 MILLIGRAM(S): 400 CAPSULE ORAL at 05:24

## 2019-08-16 RX ADMIN — PANTOPRAZOLE SODIUM 40 MILLIGRAM(S): 20 TABLET, DELAYED RELEASE ORAL at 05:24

## 2019-08-16 NOTE — DISCHARGE NOTE NURSING/CASE MANAGEMENT/SOCIAL WORK - NSDCDPATPORTLINK_GEN_ALL_CORE
You can access the [facility] Patient Portal, offered by [facility], by registering with the following website: [email or web address]

## 2019-08-16 NOTE — PROGRESS NOTE ADULT - SUBJECTIVE AND OBJECTIVE BOX
pt seen and examined.pts current chart reviewed and case discussed with resident covering.    SUBJECTIVE:    pt feels fine and denies cp,sob,gi or gu symptons    REVIEW OF SYSTEMS:  CONSTITUTIONAL: No weakness, fevers or chills  EYES/ENT: No visual changes;  No vertigo or throat pain   NECK: No pain or stiffness  RESPIRATORY: No cough, wheezing, hemoptysis; No shortness of breath  CARDIOVASCULAR: No chest pain or palpitations  GASTROINTESTINAL: No abdominal or epigastric pain. No nausea, vomiting, or hematemesis; No diarrhea or constipation. No melena or hematochezia.  GENITOURINARY: No dysuria, frequency , hematuria, flank pain or nocturia  NEUROLOGICAL: No numbness or weakness  SKIN: No itching, burning, rashes, or lesions   All other review of systems is negative unless indicated above    Current meds:    ARIPiprazole 10 milliGRAM(s) Oral daily  finasteride 5 milliGRAM(s) Oral daily  gabapentin 300 milliGRAM(s) Oral three times a day  heparin  Injectable 5000 Unit(s) SubCutaneous every 8 hours  HYDROmorphone   Tablet 4 milliGRAM(s) Oral every 8 hours PRN  lamoTRIgine 200 milliGRAM(s) Oral daily  pantoprazole    Tablet 40 milliGRAM(s) Oral before breakfast  sodium bicarbonate 650 milliGRAM(s) Oral three times a day  sodium bicarbonate  Infusion 0.055 mEq/kG/Hr IV Continuous <Continuous>  sodium chloride 0.9%. 1000 milliLiter(s) IV Continuous <Continuous>  tamsulosin 0.4 milliGRAM(s) Oral at bedtime  traZODone 25 milliGRAM(s) Oral daily      Vital Signs    T(F): 97.4 (19 @ 05:00), Max: 98.3 (08-15-19 @ 13:27)  HR: 64 (19 @ 05:00) (64 - 77)  BP: 105/65 (19 @ 05:00) (94/61 - 116/70)  ABP: --  RR: 17 (19 @ 05:00) (16 - 17)  SpO2: 96% (19 @ 05:00) (95% - 96%)  Wt(kg): --  CVP(cm H2O): --  CO: --  PCWP: --    I and O's:     @ 07:01  -  08-15 @ 07:00  --------------------------------------------------------  IN:  Total IN: 0 mL    OUT:    Ileostomy: 550 mL    Voided: 675 mL  Total OUT: 1225 mL    Total NET: -1225 mL        Daily     Daily Weight in k.8 (16 Aug 2019 05:00)    PHYSICAL EXAM  Constitutional: well developed, well nourished  and in nad  HEENT: PERRLA,  no icteric sclera and mild pallor of conjunctiva noted  Neck: No JVD, thyromegaly or adenopathy  Respiratory: reduced air entry lower lungs with no rales, wheezing or rhonchi  Cardiovascular: S1 and S2 normally heard  Gastrointestinal: soft, nondistended, nontender and normal bowel sounds heard, ileostomy with brown stool in bag  Extremities: No peripheral edema or cyanosis  Neurological: A/O x 3, no focal deficits  Psychiatric: Normal mood, normal affect  : No flank tenderness  Skin: No rashes    LABS:    CBC:                          11.4   6.82  )-----------( 198      ( 16 Aug 2019 10:37 )             35.9           BMP:    08-15    137  |  113<H>  |  54<H>  ----------------------------<  96  4.6   |  17<L>  |  3.01<H>      136  |  110<H>  |  47<H>  ----------------------------<  103<H>  4.6   |  18<L>  |  2.78<H>    Ca    8.8      15 Aug 2019 06:00  Ca    8.8      14 Aug 2019 07:01  Phos  3.6       Mg     1.9             URINE STUDIES:  Sodium, Random Urine: 141 mmol/L (08-10 @ 00:13)  Creatinine, Random Urine: <13 mg/dL (08-10 @ 00:13) pt seen and examined with renal resident    SUBJECTIVE:    pt feels fine and denies cp,sob,gi or gu symptoms  pt is voiding normally   REVIEW OF SYSTEMS:  CONSTITUTIONAL: No weakness, fevers or chills  EYES/ENT: No visual changes;  No vertigo or throat pain   NECK: No pain or stiffness  RESPIRATORY: No cough, wheezing, hemoptysis; No shortness of breath  CARDIOVASCULAR: No chest pain or palpitations  GASTROINTESTINAL: No abdominal or epigastric pain. No nausea, vomiting, or hematemesis; No diarrhea or constipation. No melena or hematochezia.  GENITOURINARY: No dysuria, frequency , hematuria, flank pain or nocturia  NEUROLOGICAL: No numbness or weakness  SKIN: No itching, burning, rashes, or lesions   All other review of systems is negative unless indicated above    Current meds:    ARIPiprazole 10 milliGRAM(s) Oral daily  finasteride 5 milliGRAM(s) Oral daily  gabapentin 300 milliGRAM(s) Oral three times a day  heparin  Injectable 5000 Unit(s) SubCutaneous every 8 hours  HYDROmorphone   Tablet 4 milliGRAM(s) Oral every 8 hours PRN  lamoTRIgine 200 milliGRAM(s) Oral daily  pantoprazole    Tablet 40 milliGRAM(s) Oral before breakfast  sodium bicarbonate 650 milliGRAM(s) Oral three times a day  sodium bicarbonate  Infusion 0.055 mEq/kG/Hr IV Continuous <Continuous>  sodium chloride 0.9%. 1000 milliLiter(s) IV Continuous <Continuous>  tamsulosin 0.4 milliGRAM(s) Oral at bedtime  traZODone 25 milliGRAM(s) Oral daily      Vital Signs    T(F): 97.4 (19 @ 05:00), Max: 98.3 (08-15-19 @ 13:27)  HR: 64 (19 @ 05:00) (64 - 77)  BP: 105/65 (19 @ 05:00) (94/61 - 116/70)  ABP: --  RR: 17 (19 @ 05:00) (16 - 17)  SpO2: 96% (19 @ 05:00) (95% - 96%)  Wt(kg): --  CVP(cm H2O): --  CO: --  PCWP: --    I and O's:     @ 07:01  -  08-15 @ 07:00  --------------------------------------------------------  IN:  Total IN: 0 mL    OUT:    Ileostomy: 550 mL    Voided: 675 mL  Total OUT: 1225 mL    Total NET: -1225 mL        Daily     Daily Weight in k.8 (16 Aug 2019 05:00)    PHYSICAL EXAM  Constitutional: well developed, well nourished  and in nad  HEENT: PERRLA,  no icteric sclera and mild pallor of conjunctiva noted  Neck: No JVD, thyromegaly or adenopathy  Respiratory: reduced air entry lower lungs with no rales, wheezing or rhonchi  Cardiovascular: S1 and S2 normally heard  Gastrointestinal: soft, nondistended, nontender and normal bowel sounds heard, ileostomy with brown stool in bag  Extremities: No peripheral edema or cyanosis  Neurological: A/O x 3, no focal deficits  Psychiatric: Normal mood, normal affect  : No flank tenderness  Skin: No rashes    LABS:    CBC:                          11.4   6.82  )-----------( 198      ( 16 Aug 2019 10:37 )             35.9           BMP:      139  |  115<H>  |  44<H>  ----------------------------<  141<H>  4.6   |  19<L>  |  2.37<H>    Ca    8.5      16 Aug 2019 10:37      08-15    137  |  113<H>  |  54<H>  ----------------------------<  96  4.6   |  17<L>  |  3.01<H>      136  |  110<H>  |  47<H>  ----------------------------<  103<H>  4.6   |  18<L>  |  2.78<H>    Ca    8.8      15 Aug 2019 06:00  Ca    8.8      14 Aug 2019 07:01  Phos  3.6     08-14  Mg     1.9     08-14        URINE STUDIES:  Sodium, Random Urine: 141 mmol/L (08-10 @ 00:13)  Creatinine, Random Urine: <13 mg/dL (08-10 @ 00:13)

## 2019-08-16 NOTE — PROGRESS NOTE ADULT - PROVIDER SPECIALTY LIST ADULT
Internal Medicine
Nephrology
Surgery
Urology
Internal Medicine
Internal Medicine

## 2019-08-16 NOTE — PROGRESS NOTE ADULT - REASON FOR ADMISSION
hematuria

## 2019-08-19 ENCOUNTER — APPOINTMENT (OUTPATIENT)
Dept: UROLOGY | Facility: CLINIC | Age: 69
End: 2019-08-19

## 2019-08-19 NOTE — ED PROVIDER NOTE - TEMPLATE
2019   [facility] PRIMARY CARE  [address] [place] [address]    Lenny Fong  : 1998  Age: 19 y.o. Sex: male    Subjective:  Chief Complaint   Patient presents with    Lesion(s)     on arm and chest - already on antibiotic        HPI:  The patient states that they have noticed erythema over the right upper arm and left chest the last 5 days. Patient seen his primary care physician on Friday he was placed on doxycycline due to the fact that he had a history of MRSA in the past.  Patient states his employer wants him to be off work due to the fact that he they cannot confirm that he does not have MRSA. Patient states the wound is red no warmth. The patient denies any wounds or breaks in the skin. Denies any trauma or injury. Denies fever of chills. Denies any drainage. Denies red streaking. Denies nausea, vomiting, malaise and or fatigue. Patient comes for evaluation. ROS:  Positive and pertinent negatives as per HPI. All other systems are reviewed and negative. Current Outpatient Medications:     doxycycline hyclate (VIBRA-TABS) 100 MG tablet, TAKE ONE TABLET BY MOUTH TWO TIMES A DAY, Disp: , Rfl: 0    mupirocin (BACTROBAN) 2 % ointment, APPLY 2 TIMES PER DAY, Disp: , Rfl: 0   No Known Allergies     Objective:  Vitals:    19 1034   BP: 132/78   Pulse: 92   Temp: 97.8 °F (36.6 °C)   SpO2: 97%   Weight: 293 lb (132.9 kg)   Height: 5' 9\" (1.753 m)        Exam:  Const: Appears healthy and well developed. No signs of acute distress present. Head/Face: Head is normocephalic, atraumatic. Facies is symmetric. ENMT: Buccal mucosa moist.  Neck: Trachea midline. No lymphoadenopathy or meningeal signs noted. Resp: No respiratory distress. Musculo: Pulses are equal bilaterally. Skin: Dry and warm. The patient does have erythema no warmth to the right upper arm and left chest area measures less than 0.5 x 1 cm area without fluctuance.   It
 Symptoms

## 2019-09-05 ENCOUNTER — APPOINTMENT (OUTPATIENT)
Dept: UROLOGY | Facility: CLINIC | Age: 69
End: 2019-09-05
Payer: MEDICARE

## 2019-09-05 VITALS
HEART RATE: 78 BPM | WEIGHT: 204 LBS | BODY MASS INDEX: 29.27 KG/M2 | SYSTOLIC BLOOD PRESSURE: 121 MMHG | DIASTOLIC BLOOD PRESSURE: 74 MMHG

## 2019-09-05 PROCEDURE — 99213 OFFICE O/P EST LOW 20 MIN: CPT

## 2019-09-05 NOTE — REVIEW OF SYSTEMS
[Told you have blood in urine on a urine test] : told blood was present in a urine test [see HPI] : see HPI [Negative] : Heme/Lymph

## 2019-09-08 NOTE — ASSESSMENT
[FreeTextEntry1] : 69 yo M with history of prostate cancer s/p EBRT with gross hematuria\par \par - Discussed possible etiologies for hematuria including enlarged prostate vs radiation cystitits vs upper tract disease\par - For now, will continue observation\par - Continue tamsulosin and finasteride\par - FU in 3 months. Counseled importance of seeking care should he develop intractable, persistent hematuria with urinary retention or pain or symptomatic anemia

## 2019-09-08 NOTE — HISTORY OF PRESENT ILLNESS
[FreeTextEntry1] : 67 yo M with history of prostate cancer s/p EBRT about 5 years ago\par Recently admitted for gross hematuria requiring CBI\par Intraoperative cystoscopy and clot evacuation revealed no specific etiology for hematuria\par Bladder biopsy was negative\par Was doing well since hospital discharge until a few days ago when he had 1 episode of red urine, no clots\par no pain, no LUTS\par no fever, chills

## 2019-12-02 ENCOUNTER — APPOINTMENT (OUTPATIENT)
Age: 69
End: 2019-12-02

## 2019-12-09 ENCOUNTER — APPOINTMENT (OUTPATIENT)
Age: 69
End: 2019-12-09
Payer: MEDICARE

## 2019-12-09 VITALS
BODY MASS INDEX: 29.2 KG/M2 | WEIGHT: 204 LBS | DIASTOLIC BLOOD PRESSURE: 61 MMHG | HEIGHT: 70 IN | SYSTOLIC BLOOD PRESSURE: 97 MMHG | HEART RATE: 90 BPM

## 2019-12-09 PROCEDURE — 99213 OFFICE O/P EST LOW 20 MIN: CPT

## 2019-12-09 NOTE — ASSESSMENT
[FreeTextEntry1] : 68 yo M with history of prsotate cancer and gross hematuria. Currently doing well\par \par - Reaffirmed behavioral modifications\par - Continue tamsulosin and finsteride\par - FU in 6 months

## 2019-12-09 NOTE — HISTORY OF PRESENT ILLNESS
[FreeTextEntry1] : 67 yo M with history of prostate cancer s/p EBRT about 5 years ago\par Recently admitted for gross hematuria requiring CBI\par Intraoperative cystoscopy and clot evacuation revealed no specific etiology for hematuria\par Bladder biopsy was negative\par Was doing well since hospital discharge until a few days ago when he had 1 episode of red urine, no clots\par no pain, no LUTS\par no fever, chills\par \par 12/9/19 Interval history: Doing well with no issues since last visit\par Feels like he is voiding adequately and no recurrent of gross hematuria

## 2019-12-09 NOTE — PHYSICAL EXAM
[General Appearance - Well Developed] : well developed [General Appearance - Well Nourished] : well nourished [Normal Appearance] : normal appearance [Well Groomed] : well groomed [General Appearance - In No Acute Distress] : no acute distress [Abdomen Soft] : soft [Abdomen Tenderness] : non-tender [Urethral Meatus] : meatus normal [Costovertebral Angle Tenderness] : no ~M costovertebral angle tenderness [Urinary Bladder Findings] : the bladder was normal on palpation [Scrotum] : the scrotum was normal [Testes Mass (___cm)] : there were no testicular masses [Edema] : no peripheral edema [] : no respiratory distress [Respiration, Rhythm And Depth] : normal respiratory rhythm and effort [Exaggerated Use Of Accessory Muscles For Inspiration] : no accessory muscle use [Affect] : the affect was normal [Oriented To Time, Place, And Person] : oriented to person, place, and time [Mood] : the mood was normal [Normal Station and Gait] : the gait and station were normal for the patient's age [Not Anxious] : not anxious [No Focal Deficits] : no focal deficits [No Palpable Adenopathy] : no palpable adenopathy

## 2019-12-23 ENCOUNTER — INPATIENT (INPATIENT)
Facility: HOSPITAL | Age: 69
LOS: 2 days | Discharge: NURSING, REHAB OR LONG-TERM CARE | DRG: 641 | End: 2019-12-26
Attending: INTERNAL MEDICINE | Admitting: INTERNAL MEDICINE
Payer: MEDICARE

## 2019-12-23 VITALS
TEMPERATURE: 98 F | SYSTOLIC BLOOD PRESSURE: 121 MMHG | OXYGEN SATURATION: 95 % | WEIGHT: 220.02 LBS | RESPIRATION RATE: 18 BRPM | HEIGHT: 70 IN | DIASTOLIC BLOOD PRESSURE: 70 MMHG | HEART RATE: 82 BPM

## 2019-12-23 DIAGNOSIS — Z29.9 ENCOUNTER FOR PROPHYLACTIC MEASURES, UNSPECIFIED: ICD-10-CM

## 2019-12-23 DIAGNOSIS — D64.9 ANEMIA, UNSPECIFIED: ICD-10-CM

## 2019-12-23 DIAGNOSIS — C61 MALIGNANT NEOPLASM OF PROSTATE: ICD-10-CM

## 2019-12-23 DIAGNOSIS — Z93.9 ARTIFICIAL OPENING STATUS, UNSPECIFIED: Chronic | ICD-10-CM

## 2019-12-23 DIAGNOSIS — J44.9 CHRONIC OBSTRUCTIVE PULMONARY DISEASE, UNSPECIFIED: ICD-10-CM

## 2019-12-23 DIAGNOSIS — I10 ESSENTIAL (PRIMARY) HYPERTENSION: ICD-10-CM

## 2019-12-23 DIAGNOSIS — N18.9 CHRONIC KIDNEY DISEASE, UNSPECIFIED: ICD-10-CM

## 2019-12-23 DIAGNOSIS — E87.5 HYPERKALEMIA: ICD-10-CM

## 2019-12-23 DIAGNOSIS — E78.5 HYPERLIPIDEMIA, UNSPECIFIED: ICD-10-CM

## 2019-12-23 LAB
ALBUMIN SERPL ELPH-MCNC: 3.6 G/DL — SIGNIFICANT CHANGE UP (ref 3.5–5)
ALP SERPL-CCNC: 149 U/L — HIGH (ref 40–120)
ALT FLD-CCNC: 30 U/L DA — SIGNIFICANT CHANGE UP (ref 10–60)
ANION GAP SERPL CALC-SCNC: 6 MMOL/L — SIGNIFICANT CHANGE UP (ref 5–17)
ANION GAP SERPL CALC-SCNC: 8 MMOL/L — SIGNIFICANT CHANGE UP (ref 5–17)
AST SERPL-CCNC: 35 U/L — SIGNIFICANT CHANGE UP (ref 10–40)
BASOPHILS # BLD AUTO: 0.04 K/UL — SIGNIFICANT CHANGE UP (ref 0–0.2)
BASOPHILS NFR BLD AUTO: 0.7 % — SIGNIFICANT CHANGE UP (ref 0–2)
BILIRUB SERPL-MCNC: 0.4 MG/DL — SIGNIFICANT CHANGE UP (ref 0.2–1.2)
BUN SERPL-MCNC: 62 MG/DL — HIGH (ref 7–18)
BUN SERPL-MCNC: 63 MG/DL — HIGH (ref 7–18)
CALCIUM SERPL-MCNC: 8.3 MG/DL — LOW (ref 8.4–10.5)
CALCIUM SERPL-MCNC: 8.7 MG/DL — SIGNIFICANT CHANGE UP (ref 8.4–10.5)
CHLORIDE SERPL-SCNC: 119 MMOL/L — HIGH (ref 96–108)
CHLORIDE SERPL-SCNC: 119 MMOL/L — HIGH (ref 96–108)
CO2 SERPL-SCNC: 13 MMOL/L — LOW (ref 22–31)
CO2 SERPL-SCNC: 16 MMOL/L — LOW (ref 22–31)
CREAT SERPL-MCNC: 3.73 MG/DL — HIGH (ref 0.5–1.3)
CREAT SERPL-MCNC: 3.85 MG/DL — HIGH (ref 0.5–1.3)
EOSINOPHIL # BLD AUTO: 0.33 K/UL — SIGNIFICANT CHANGE UP (ref 0–0.5)
EOSINOPHIL NFR BLD AUTO: 5.5 % — SIGNIFICANT CHANGE UP (ref 0–6)
GLUCOSE SERPL-MCNC: 102 MG/DL — HIGH (ref 70–99)
GLUCOSE SERPL-MCNC: 98 MG/DL — SIGNIFICANT CHANGE UP (ref 70–99)
HCT VFR BLD CALC: 34.2 % — LOW (ref 39–50)
HGB BLD-MCNC: 10.8 G/DL — LOW (ref 13–17)
IMM GRANULOCYTES NFR BLD AUTO: 0.5 % — SIGNIFICANT CHANGE UP (ref 0–1.5)
LYMPHOCYTES # BLD AUTO: 1.19 K/UL — SIGNIFICANT CHANGE UP (ref 1–3.3)
LYMPHOCYTES # BLD AUTO: 19.8 % — SIGNIFICANT CHANGE UP (ref 13–44)
MCHC RBC-ENTMCNC: 27.1 PG — SIGNIFICANT CHANGE UP (ref 27–34)
MCHC RBC-ENTMCNC: 31.6 GM/DL — LOW (ref 32–36)
MCV RBC AUTO: 85.9 FL — SIGNIFICANT CHANGE UP (ref 80–100)
MONOCYTES # BLD AUTO: 0.56 K/UL — SIGNIFICANT CHANGE UP (ref 0–0.9)
MONOCYTES NFR BLD AUTO: 9.3 % — SIGNIFICANT CHANGE UP (ref 2–14)
NEUTROPHILS # BLD AUTO: 3.86 K/UL — SIGNIFICANT CHANGE UP (ref 1.8–7.4)
NEUTROPHILS NFR BLD AUTO: 64.2 % — SIGNIFICANT CHANGE UP (ref 43–77)
NRBC # BLD: 0 /100 WBCS — SIGNIFICANT CHANGE UP (ref 0–0)
PLATELET # BLD AUTO: 104 K/UL — LOW (ref 150–400)
POTASSIUM SERPL-MCNC: 6.7 MMOL/L — CRITICAL HIGH (ref 3.5–5.3)
POTASSIUM SERPL-MCNC: 6.8 MMOL/L — CRITICAL HIGH (ref 3.5–5.3)
POTASSIUM SERPL-SCNC: 6.7 MMOL/L — CRITICAL HIGH (ref 3.5–5.3)
POTASSIUM SERPL-SCNC: 6.8 MMOL/L — CRITICAL HIGH (ref 3.5–5.3)
PROT SERPL-MCNC: 8.7 G/DL — HIGH (ref 6–8.3)
RBC # BLD: 3.98 M/UL — LOW (ref 4.2–5.8)
RBC # FLD: 16.4 % — HIGH (ref 10.3–14.5)
SODIUM SERPL-SCNC: 140 MMOL/L — SIGNIFICANT CHANGE UP (ref 135–145)
SODIUM SERPL-SCNC: 141 MMOL/L — SIGNIFICANT CHANGE UP (ref 135–145)
WBC # BLD: 6.01 K/UL — SIGNIFICANT CHANGE UP (ref 3.8–10.5)
WBC # FLD AUTO: 6.01 K/UL — SIGNIFICANT CHANGE UP (ref 3.8–10.5)

## 2019-12-23 PROCEDURE — 99285 EMERGENCY DEPT VISIT HI MDM: CPT

## 2019-12-23 PROCEDURE — 71045 X-RAY EXAM CHEST 1 VIEW: CPT | Mod: 26

## 2019-12-23 RX ORDER — SODIUM ZIRCONIUM CYCLOSILICATE 10 G/10G
10 POWDER, FOR SUSPENSION ORAL EVERY 8 HOURS
Refills: 0 | Status: DISCONTINUED | OUTPATIENT
Start: 2019-12-23 | End: 2019-12-24

## 2019-12-23 RX ORDER — DEXTROSE 50 % IN WATER 50 %
25 SYRINGE (ML) INTRAVENOUS ONCE
Refills: 0 | Status: COMPLETED | OUTPATIENT
Start: 2019-12-23 | End: 2019-12-23

## 2019-12-23 RX ORDER — FUROSEMIDE 40 MG
60 TABLET ORAL ONCE
Refills: 0 | Status: COMPLETED | OUTPATIENT
Start: 2019-12-23 | End: 2019-12-23

## 2019-12-23 RX ORDER — CALCIUM GLUCONATE 100 MG/ML
2 VIAL (ML) INTRAVENOUS ONCE
Refills: 0 | Status: COMPLETED | OUTPATIENT
Start: 2019-12-23 | End: 2019-12-23

## 2019-12-23 RX ORDER — INSULIN LISPRO 100/ML
10 VIAL (ML) SUBCUTANEOUS ONCE
Refills: 0 | Status: DISCONTINUED | OUTPATIENT
Start: 2019-12-23 | End: 2019-12-23

## 2019-12-23 RX ORDER — SODIUM POLYSTYRENE SULFONATE 4.1 MEQ/G
30 POWDER, FOR SUSPENSION ORAL ONCE
Refills: 0 | Status: DISCONTINUED | OUTPATIENT
Start: 2019-12-23 | End: 2019-12-23

## 2019-12-23 RX ORDER — IPRATROPIUM/ALBUTEROL SULFATE 18-103MCG
3 AEROSOL WITH ADAPTER (GRAM) INHALATION
Refills: 0 | Status: DISCONTINUED | OUTPATIENT
Start: 2019-12-23 | End: 2019-12-24

## 2019-12-23 RX ORDER — INSULIN HUMAN 100 [IU]/ML
10 INJECTION, SOLUTION SUBCUTANEOUS ONCE
Refills: 0 | Status: COMPLETED | OUTPATIENT
Start: 2019-12-23 | End: 2019-12-23

## 2019-12-23 RX ADMIN — Medication 200 GRAM(S): at 19:25

## 2019-12-23 RX ADMIN — INSULIN HUMAN 10 UNIT(S): 100 INJECTION, SOLUTION SUBCUTANEOUS at 19:27

## 2019-12-23 RX ADMIN — Medication 25 MILLILITER(S): at 19:10

## 2019-12-23 RX ADMIN — SODIUM ZIRCONIUM CYCLOSILICATE 10 GRAM(S): 10 POWDER, FOR SUSPENSION ORAL at 23:06

## 2019-12-23 RX ADMIN — Medication 60 MILLIGRAM(S): at 23:07

## 2019-12-23 NOTE — H&P ADULT - HISTORY OF PRESENT ILLNESS
69 year old Male from [facility], with PMHx of prostate Ca (s/p radiation in 2015, now in remission), asthma, COPD (not on O2), HTN, HLD, Bipolar, anemia, GERD, Ileostomy s/p sigmoid resection, PAD, and CKD presented to ED for hyperkalemia.   Per pt he had routine blood work and pt was told his potassium his very high. Pt states he follows nephrologist and was told his potassium is getting higher and was advised to avoid food in high potassium. Pt denies any recent infection, abx treatment, any NSAID use, fever, URI, chills, headache, n/v/d, cp, abdominal pain, palpitation, dizziness, urinary problem. Pt states he has sob secondary to copd but not unusual.       Full code

## 2019-12-23 NOTE — ED PROVIDER NOTE - CLINICAL SUMMARY MEDICAL DECISION MAKING FREE TEXT BOX
Patient presenting with concern for og hyperkalemia. ckd, not on dialysis. will obtain lab, ekg, assess for lyte abnormality. ed obs and reaassess

## 2019-12-23 NOTE — H&P ADULT - ASSESSMENT
69 year old Male from [facility], with PMHx of prostate Ca (s/p radiation in 2015, now in remission), asthma, COPD (not on O2), HTN, HLD, Bipolar, anemia, GERD, Ileostomy s/p sigmoid resection, PAD, and CKD presented to ED for hyperkalemia.

## 2019-12-23 NOTE — H&P ADULT - NSHPPHYSICALEXAM_GEN_ALL_CORE
Vital Signs Last 24 Hrs  T(C): 36.7 (23 Dec 2019 19:29), Max: 36.9 (23 Dec 2019 15:39)  T(F): 98.1 (23 Dec 2019 19:29), Max: 98.4 (23 Dec 2019 15:39)  HR: 82 (23 Dec 2019 19:29) (75 - 82)  BP: 138/64 (23 Dec 2019 19:29) (104/58 - 138/64)  BP(mean): --  RR: 18 (23 Dec 2019 19:29) (18 - 18)  SpO2: 95% (23 Dec 2019 19:29) (95% - 96%)    PHYSICAL EXAM:  GENERAL: NAD, well-developed  HEAD:  Atraumatic, Normocephalic  EYES: EOMI, PERRLA, conjunctiva and sclera clear  Mucous membrane moist   NECK: Supple, No JVD  CHEST/LUNG: Clear to auscultation bilaterally; No wheeze  HEART: Regular rate and rhythm; s1+ s2+  ABDOMEN: Soft, Nontender, Nondistended; ileostomy present on RLQ no erythema, swelling or tenderness, + epigastric ventral hernia, surgical scar present   EXTREMITIES: No clubbing, cyanosis, or edema  NEUROLOGY:AAOx3 non-focal  SKIN: No rashes or lesions

## 2019-12-23 NOTE — H&P ADULT - PROBLEM SELECTOR PLAN 2
PAWAN on CKD stage 3  CKD from atherosclerotic vascular disease as right kidney is 2.4 cm smaller than left kidney   baseline around ?2.5  unknown etiology for PAWAN  - f/u renal us to r/o hydronephrosis  - Keep patient euvolemic   - Avoid Nephrotoxic Meds/ Agents such as (NSAIDs, IV contrast, Aminoglycosides such as gentamicin, -Gadolinium contrast, Phosphate containing enemas, etc..)  - Adjust Medications according to eGFR  - Monitor BMP daily

## 2019-12-23 NOTE — ED ADULT NURSE NOTE - ED STAT RN HANDOFF DETAILS 2
received  pt.in bed at 1910 pt. is alert and oriented x3. admitted to tele  for hyperkalemia. on box D with NSR.  transfer to holding area.report given to low pope.no acute distress noted

## 2019-12-23 NOTE — H&P ADULT - NSHPSOCIALHISTORY_GEN_ALL_CORE
pt from [facility]   denies smoking, etoh pt from [facility]   denies smoking, etoh

## 2019-12-23 NOTE — ED PROVIDER NOTE - NS ED MD DISPO ISOLATION TYPES
The patient has had a medium formed brown stool while waiting to go home. The stool was negative for blood. None

## 2019-12-23 NOTE — H&P ADULT - PROBLEM SELECTOR PLAN 1
?from CKD  ekg NSR  s/p calcium gluconate, insulin, albuterol  started on Lokelma and will give iv lasix   repeat bmp in 4 hours and inform Dr. Curran   if Potassium remains elevated repeat calcium gluconate, insulin, albuterol and lasix if bp allows   monitor bmp   Plan discussed with Dr. Curran

## 2019-12-23 NOTE — H&P ADULT - PROBLEM SELECTOR PROBLEM 3
Patient Active Problem List    Diagnosis Date Noted   • MDS (myelodysplastic syndrome), low grade (CMS/[facility]) 02/21/2013     Priority: High     Overview Note:     Low-grade myelodysplastic syndrome with normal cytogenetics, diagnostic bone marrow biopsy performed on 03/04/2011  Stable CBC December 2017 September 2018, progressive anemia, hemoglobin 7.5, ANC 0.6, platelet count 120K, erythropoietin 74.8, ferritin ~ 600  Rx Aranesp 500 mcg q 3 weeks       • Malignant neoplasm of lower third of esophagus (CMS/[facility]) 08/12/2016     Priority: Medium   • Diverticulitis of colon (without mention of hemorrhage) 02/21/2013     Priority: Medium   • Gastroesophageal reflux disease with esophagitis 02/21/2013     Priority: Medium     Overview Note:     May 2012 EGD positive for erosive esophagitis, no Walker's esophagus, bravo capsule markedly positive for reflux.     • Asymptomatic varicose veins 02/21/2013     Priority: Medium   • PAF (paroxysmal atrial fibrillation) (CMS/[facility]) 09/12/2018     Priority: Low   • Esophageal candidiasis (CMS/[facility]) 08/01/2018     Priority: Low   • Hematuria, gross 07/16/2018     Priority: Low   • Normal left ventricular function , ejection fraction 62% per echo 9/20/2017 05/04/2018     Priority: Low   • Essential hypertension 05/04/2018     Priority: Low   • Mild left atrial enlargement 11/09/2017     Priority: Low   • Encounter for therapeutic drug monitoring 10/14/2016     Priority: Low   • Long term current use of anticoagulant therapy, on warfarin 10/14/2016     Priority: Low   • Paroxysmal atrial fibrillation (CMS/[facility]), on Tikosyn, CHADsVASc score is 2, on warfarin 10/10/2016     Priority: Low   • Basal cell carcinoma of skin 07/14/2016     Priority: Low   • PATITO on CPAP 05/02/2016     Priority: Low   • New York Heart Association class I 05/04/2014     Priority: Low   • Long-term use of high risk medication, on Tikosyn 05/04/2014     Priority: Low   • Unspecified vitamin D deficiency 08/14/2013      Priority: Low     Overview Note:     Level 29     • Actinic keratosis 02/21/2013     Priority: Low   • Allergic rhinitis due to pollen 02/21/2013     Priority: Low   • Hypertrophy of prostate without urinary obstruction and other lower urinary tract symptoms (LUTS) 02/21/2013     Priority: Low   • Coronary atherosclerosis of unspecified type of vessel, native or graft 02/21/2013     Priority: Low     Overview Note:     2002.  Mild on catheterization 2002, February 2011 catheterization showed nonocclusive coronary artery disease.  No intervention.     • Unspecified congenital cystic kidney disease 02/21/2013     Priority: Low   • Type 2 diabetes mellitus with diabetic polyneuropathy, without long-term current use of insulin (CMS/[facility]) 02/21/2013     Priority: Low   • Intrinsic eczema 02/21/2013     Priority: Low   • Pure hypercholesterolemia 02/21/2013     Priority: Low   • Lumbago 02/21/2013     Priority: Low   • Calculus of kidney 02/21/2013     Priority: Low   • Sleep related leg cramps 02/21/2013     Priority: Low   • Diabetic polyneuropathy (CMS/[facility]) 02/21/2013     Priority: Low   • GOA (generalized osteoarthritis) 02/21/2013     Priority: Low       HISTORY OF PRESENT ILLNESS:  Sebastián and his wife return to the office to initiate therapy with Aranesp for his progressive myelodysplastic syndrome. They were given printed information on myelodysplastic syndrome at the time with his diagnosis and was given printed information on Aranesp last visit. Patient and his wife had several questions regarding this medication as well as his disease, treatment plan, prognosis.    PAST MEDICAL HISTORY AND PAST SURGICAL HISTORY:  Reviewed and interval changes as noted above.    ALLERGIES AND DRUG INTOLERANCE:  Reviewed and interval changes as noted above    CURRENT MEDICATIONS:  Reviewed and interval changes as noted above    FAMILY HISTORY AND SOCIAL HISTORY:  Reviewed and interval changes as noted above.    REVIEW OF  SYSTEMS:  Per electronic medical record notes, reviewed and agreed upon.    PHYSICAL EXAMINATION:  Oncology Encounter Vitals [09/27/18 1500]   ONC OP Encounter Vitals Group      /62      Pulse 63      Resp 16      Temp 97.6 °F (36.4 °C)      Temp src Temporal      SpO2 99 %      Weight 218 lb 11.1 oz (99.2 kg)      Height       Pain Score  0      Pain Location       Pain Education?       BSA (Calculated - m2) - [employer]       BMI (Calculated)        ECOG Performance Status   0 - Fully active, able to carry on all predisease activities without restrictions.     General Appearance: 74 year old male in no acute distress.  Psychiatric: Mood and affect are normal.  Judgment and insight seems appropriate.  Neurologic:  Alert and oriented times 3.    SIGNIFICANT LABORATORY AND RADIOLOGY DATA:  Office Visit on 09/27/2018   Component Date Value   • VIA MED ONC PATHWAYS TAG 09/27/2018 [id number]    Lab Services on 09/27/2018   Component Date Value   • WBC 09/27/2018 2.2*   • RBC 09/27/2018 2.78*   • HGB 09/27/2018 9.3*   • HCT 09/27/2018 29.1*   • MCV 09/27/2018 104.7*   • MCH 09/27/2018 33.5    • MCHC 09/27/2018 32.0    • RDW-CV 09/27/2018 18.2*   • PLT 09/27/2018 160    • DIFF TYPE 09/27/2018 AUTOMATED DIFFERENTIAL    • Neutrophil 09/27/2018 49    • LYMPH 09/27/2018 41    • MONO 09/27/2018 10    • EOSIN 09/27/2018 0    • BASO 09/27/2018 0    • Absolute Neutrophil 09/27/2018 1.1*   • Absolute Lymph 09/27/2018 0.9*   • Absolute Mono 09/27/2018 0.2*   • Absolute Eos 09/27/2018 0.0*   • Absolute Baso 09/27/2018 0.0    Anti-Coag on 09/27/2018   Component Date Value   • INR-POC 09/27/2018 2.3    Lab Services on 09/20/2018   Component Date Value   • WBC 09/20/2018 1.8*   • RBC 09/20/2018 2.62*   • HGB 09/20/2018 8.7*   • HCT 09/20/2018 27.4*   • MCV 09/20/2018 104.6*   • MCH 09/20/2018 33.2    • MCHC 09/20/2018 31.8*   • RDW-CV 09/20/2018 18.8*   • PLT 09/20/2018 151    • DIFF TYPE 09/20/2018 AUTO DIFF verified by manual  smear review.    • Neutrophil 09/20/2018 56    • LYMPH 09/20/2018 36    • MONO 09/20/2018 8    • EOSIN 09/20/2018 0    • BASO 09/20/2018 0    • Absolute Neutrophil 09/20/2018 1.0*   • Absolute Lymph 09/20/2018 0.6*   • Absolute Mono 09/20/2018 0.1*   • Absolute Eos 09/20/2018 0.0*   • Absolute Baso 09/20/2018 0.0    • Fasting Status 09/20/2018 UNK    • Sodium 09/20/2018 137    • Potassium 09/20/2018 4.5    • Chloride 09/20/2018 104    • Carbon Dioxide 09/20/2018 26    • Anion Gap 09/20/2018 12    • Glucose 09/20/2018 242*   • BUN 09/20/2018 23*   • Creatinine 09/20/2018 0.73    • GFR Estimate,  Am* 09/20/2018 >90    • GFR Estimate, Non Rina* 09/20/2018 90    • BUN/Creatinine Ratio 09/20/2018 32*   • CALCIUM 09/20/2018 8.4    • TOTAL BILIRUBIN 09/20/2018 0.5    • AST/SGOT 09/20/2018 36    • ALT/SGPT 09/20/2018 42    • ALK PHOSPHATASE 09/20/2018 117    • TOTAL PROTEIN 09/20/2018 7.1    • Albumin 09/20/2018 3.1*   • GLOBULIN 09/20/2018 4.0    • A/G Ratio, Serum 09/20/2018 0.8*   • ERYTHROPOIETIN 09/20/2018 74.8*   • B12 09/20/2018 321    • FOLATE 09/20/2018 19.1    • Ferritin 09/20/2018 626*   Lab Services on 09/17/2018   Component Date Value   • INR CAPILLARY 09/17/2018 2.0    Hospital Outpatient Visit on 09/14/2018   Component Date Value   • Left Ventricular Interna* 09/14/2018 6.1    • Ejection Fraction 09/14/2018 60.0    • Left Internal Dimenson i* 09/14/2018 4.3    • Interventricular Septum * 09/14/2018 0.8    • LV End Systolic Longitud* 09/14/2018 -19.4    • Left Ventricular Posteri* 09/14/2018 0.8    • Ascending Aorta 09/14/2018 3.4    • LVOT 2D 09/14/2018 2.2    • AV Peak Velocity 09/14/2018 1.5    • MV Peak A Velocity 09/14/2018 0.8    • AV Peak Gradient 09/14/2018 8.4    • AV Mean Gradient 09/14/2018 3.7    • E Wave Decelaration Time 09/14/2018 300.9    • MV E Tissue Dimitrios Lat 09/14/2018 7.0    • DOP Calc LVOT Peak Dimitrios 09/14/2018 1.0    • MV E Tissue Dimitrios Med 09/14/2018 5.3    • LVOT VTI 09/14/2018 23.5     • MV E Wave Dimitrios/E Tissue V* 09/14/2018 13.0    • RV Tissue Doppler Free W* 09/14/2018 0.1    • Tricuspid Valve Peak Reg* 09/14/2018 2.5    • Aortic Valve Area 09/14/2018 2.6    • TV Estimated Right Arter* 09/14/2018 3.0    • MV Peak E Velocity 09/14/2018 0.7    • Est Right Vent Systolic * 09/14/2018 28.2        CLINICAL IMPRESSION AND PLAN:  Myelodysplastic syndrome with progressive and symptomatic anemia. Today I had a long conversation with the patient regarding the natural history of myelodysplastic syndrome and the rationale to initiate therapy with erythropoietin. I reviewed in detail toxicity profile of erythropoietin/Aranesp. I expect him to tolerate therapy well without any problems. I told him we would treat him for several months initially to assess response to therapy. They understand that therapy is being given with palliative intent, informed consent document was signed. The entirety of this 25 minute visit with the patient was spent with face-to-face counseling and coordination of care.           HTN (hypertension)

## 2019-12-23 NOTE — ED PROVIDER NOTE - OBJECTIVE STATEMENT
69 y.o presenting with concern for hyperkalemia, lives assisted living, was told his potassium was in the 6~. denies cp, sob, n, v, abd. endorses that he is a difficult blood draw

## 2019-12-24 LAB
24R-OH-CALCIDIOL SERPL-MCNC: 13.7 NG/ML — LOW (ref 30–80)
ALBUMIN SERPL ELPH-MCNC: 4 G/DL — SIGNIFICANT CHANGE UP (ref 3.5–5)
ALP SERPL-CCNC: 143 U/L — HIGH (ref 40–120)
ALT FLD-CCNC: 32 U/L DA — SIGNIFICANT CHANGE UP (ref 10–60)
ANION GAP SERPL CALC-SCNC: 10 MMOL/L — SIGNIFICANT CHANGE UP (ref 5–17)
ANION GAP SERPL CALC-SCNC: 8 MMOL/L — SIGNIFICANT CHANGE UP (ref 5–17)
ANION GAP SERPL CALC-SCNC: 9 MMOL/L — SIGNIFICANT CHANGE UP (ref 5–17)
AST SERPL-CCNC: 21 U/L — SIGNIFICANT CHANGE UP (ref 10–40)
BILIRUB SERPL-MCNC: 0.4 MG/DL — SIGNIFICANT CHANGE UP (ref 0.2–1.2)
BUN SERPL-MCNC: 61 MG/DL — HIGH (ref 7–18)
BUN SERPL-MCNC: 62 MG/DL — HIGH (ref 7–18)
BUN SERPL-MCNC: 64 MG/DL — HIGH (ref 7–18)
CALCIUM SERPL-MCNC: 10 MG/DL — SIGNIFICANT CHANGE UP (ref 8.4–10.5)
CALCIUM SERPL-MCNC: 9.2 MG/DL — SIGNIFICANT CHANGE UP (ref 8.4–10.5)
CALCIUM SERPL-MCNC: 9.6 MG/DL — SIGNIFICANT CHANGE UP (ref 8.4–10.5)
CHLORIDE SERPL-SCNC: 117 MMOL/L — HIGH (ref 96–108)
CO2 SERPL-SCNC: 12 MMOL/L — LOW (ref 22–31)
CO2 SERPL-SCNC: 14 MMOL/L — LOW (ref 22–31)
CO2 SERPL-SCNC: 16 MMOL/L — LOW (ref 22–31)
CREAT ?TM UR-MCNC: 84 MG/DL — SIGNIFICANT CHANGE UP
CREAT SERPL-MCNC: 3.16 MG/DL — HIGH (ref 0.5–1.3)
CREAT SERPL-MCNC: 3.31 MG/DL — HIGH (ref 0.5–1.3)
CREAT SERPL-MCNC: 3.31 MG/DL — HIGH (ref 0.5–1.3)
FERRITIN SERPL-MCNC: 182 NG/ML — SIGNIFICANT CHANGE UP (ref 30–400)
FOLATE SERPL-MCNC: 7.2 NG/ML — SIGNIFICANT CHANGE UP
GLUCOSE BLDC GLUCOMTR-MCNC: 82 MG/DL — SIGNIFICANT CHANGE UP (ref 70–99)
GLUCOSE SERPL-MCNC: 100 MG/DL — HIGH (ref 70–99)
GLUCOSE SERPL-MCNC: 115 MG/DL — HIGH (ref 70–99)
GLUCOSE SERPL-MCNC: 99 MG/DL — SIGNIFICANT CHANGE UP (ref 70–99)
HBA1C BLD-MCNC: 5.9 % — HIGH (ref 4–5.6)
HCT VFR BLD CALC: 36.4 % — LOW (ref 39–50)
HGB BLD-MCNC: 11.5 G/DL — LOW (ref 13–17)
LDH SERPL L TO P-CCNC: 176 U/L — SIGNIFICANT CHANGE UP (ref 120–225)
MAGNESIUM SERPL-MCNC: 1.7 MG/DL — SIGNIFICANT CHANGE UP (ref 1.6–2.6)
MCHC RBC-ENTMCNC: 26.6 PG — LOW (ref 27–34)
MCHC RBC-ENTMCNC: 31.6 GM/DL — LOW (ref 32–36)
MCV RBC AUTO: 84.1 FL — SIGNIFICANT CHANGE UP (ref 80–100)
NRBC # BLD: 0 /100 WBCS — SIGNIFICANT CHANGE UP (ref 0–0)
OSMOLALITY UR: 392 MOS/KG — SIGNIFICANT CHANGE UP (ref 50–1200)
PHOSPHATE SERPL-MCNC: 4.5 MG/DL — SIGNIFICANT CHANGE UP (ref 2.5–4.5)
PLATELET # BLD AUTO: 172 K/UL — SIGNIFICANT CHANGE UP (ref 150–400)
POTASSIUM SERPL-MCNC: 5.3 MMOL/L — SIGNIFICANT CHANGE UP (ref 3.5–5.3)
POTASSIUM SERPL-MCNC: 5.6 MMOL/L — HIGH (ref 3.5–5.3)
POTASSIUM SERPL-MCNC: 6.2 MMOL/L — CRITICAL HIGH (ref 3.5–5.3)
POTASSIUM SERPL-SCNC: 5.3 MMOL/L — SIGNIFICANT CHANGE UP (ref 3.5–5.3)
POTASSIUM SERPL-SCNC: 5.6 MMOL/L — HIGH (ref 3.5–5.3)
POTASSIUM SERPL-SCNC: 6.2 MMOL/L — CRITICAL HIGH (ref 3.5–5.3)
POTASSIUM UR-SCNC: 22 MMOL/L — LOW (ref 25–125)
PROT ?TM UR-MCNC: 26 MG/DL — HIGH (ref 0–12)
PROT SERPL-MCNC: 8.9 G/DL — HIGH (ref 6–8.3)
RBC # BLD: 4.33 M/UL — SIGNIFICANT CHANGE UP (ref 4.2–5.8)
RBC # FLD: 16.2 % — HIGH (ref 10.3–14.5)
SODIUM SERPL-SCNC: 139 MMOL/L — SIGNIFICANT CHANGE UP (ref 135–145)
SODIUM SERPL-SCNC: 140 MMOL/L — SIGNIFICANT CHANGE UP (ref 135–145)
SODIUM SERPL-SCNC: 141 MMOL/L — SIGNIFICANT CHANGE UP (ref 135–145)
SODIUM UR-SCNC: 60 MMOL/L — SIGNIFICANT CHANGE UP (ref 40–220)
TSH SERPL-MCNC: 3.33 UU/ML — SIGNIFICANT CHANGE UP (ref 0.34–4.82)
URATE UR-MCNC: 11.3 MG/DL — SIGNIFICANT CHANGE UP
VIT B12 SERPL-MCNC: 662 PG/ML — SIGNIFICANT CHANGE UP (ref 232–1245)
WBC # BLD: 5.62 K/UL — SIGNIFICANT CHANGE UP (ref 3.8–10.5)
WBC # FLD AUTO: 5.62 K/UL — SIGNIFICANT CHANGE UP (ref 3.8–10.5)

## 2019-12-24 RX ORDER — DEXTROSE 50 % IN WATER 50 %
25 SYRINGE (ML) INTRAVENOUS ONCE
Refills: 0 | Status: COMPLETED | OUTPATIENT
Start: 2019-12-24 | End: 2019-12-24

## 2019-12-24 RX ORDER — HEPARIN SODIUM 5000 [USP'U]/ML
5000 INJECTION INTRAVENOUS; SUBCUTANEOUS EVERY 12 HOURS
Refills: 0 | Status: DISCONTINUED | OUTPATIENT
Start: 2019-12-24 | End: 2019-12-26

## 2019-12-24 RX ORDER — PANTOPRAZOLE SODIUM 20 MG/1
40 TABLET, DELAYED RELEASE ORAL
Refills: 0 | Status: DISCONTINUED | OUTPATIENT
Start: 2019-12-24 | End: 2019-12-26

## 2019-12-24 RX ORDER — SODIUM BICARBONATE 1 MEQ/ML
650 SYRINGE (ML) INTRAVENOUS THREE TIMES A DAY
Refills: 0 | Status: DISCONTINUED | OUTPATIENT
Start: 2019-12-24 | End: 2019-12-26

## 2019-12-24 RX ORDER — SODIUM CHLORIDE 9 MG/ML
1000 INJECTION, SOLUTION INTRAVENOUS
Refills: 0 | Status: DISCONTINUED | OUTPATIENT
Start: 2019-12-24 | End: 2019-12-25

## 2019-12-24 RX ORDER — HYDROMORPHONE HYDROCHLORIDE 2 MG/ML
4 INJECTION INTRAMUSCULAR; INTRAVENOUS; SUBCUTANEOUS EVERY 8 HOURS
Refills: 0 | Status: DISCONTINUED | OUTPATIENT
Start: 2019-12-24 | End: 2019-12-25

## 2019-12-24 RX ORDER — TAMSULOSIN HYDROCHLORIDE 0.4 MG/1
1 CAPSULE ORAL
Qty: 0 | Refills: 0 | DISCHARGE

## 2019-12-24 RX ORDER — LAMOTRIGINE 25 MG/1
1 TABLET, ORALLY DISINTEGRATING ORAL
Qty: 0 | Refills: 0 | DISCHARGE

## 2019-12-24 RX ORDER — GABAPENTIN 400 MG/1
300 CAPSULE ORAL
Qty: 0 | Refills: 0 | DISCHARGE

## 2019-12-24 RX ORDER — FINASTERIDE 5 MG/1
5 TABLET, FILM COATED ORAL DAILY
Refills: 0 | Status: DISCONTINUED | OUTPATIENT
Start: 2019-12-24 | End: 2019-12-26

## 2019-12-24 RX ORDER — FINASTERIDE 5 MG/1
1 TABLET, FILM COATED ORAL
Qty: 0 | Refills: 0 | DISCHARGE

## 2019-12-24 RX ORDER — ARIPIPRAZOLE 15 MG/1
1 TABLET ORAL
Qty: 0 | Refills: 0 | DISCHARGE

## 2019-12-24 RX ORDER — TAMSULOSIN HYDROCHLORIDE 0.4 MG/1
0.4 CAPSULE ORAL AT BEDTIME
Refills: 0 | Status: DISCONTINUED | OUTPATIENT
Start: 2019-12-24 | End: 2019-12-26

## 2019-12-24 RX ORDER — ARIPIPRAZOLE 15 MG/1
15 TABLET ORAL DAILY
Refills: 0 | Status: DISCONTINUED | OUTPATIENT
Start: 2019-12-24 | End: 2019-12-26

## 2019-12-24 RX ORDER — SODIUM ZIRCONIUM CYCLOSILICATE 10 G/10G
10 POWDER, FOR SUSPENSION ORAL EVERY 8 HOURS
Refills: 0 | Status: COMPLETED | OUTPATIENT
Start: 2019-12-24 | End: 2019-12-25

## 2019-12-24 RX ORDER — CALCIUM GLUCONATE 100 MG/ML
2 VIAL (ML) INTRAVENOUS ONCE
Refills: 0 | Status: COMPLETED | OUTPATIENT
Start: 2019-12-24 | End: 2019-12-24

## 2019-12-24 RX ORDER — SIMETHICONE 80 MG/1
80 TABLET, CHEWABLE ORAL
Refills: 0 | Status: DISCONTINUED | OUTPATIENT
Start: 2019-12-24 | End: 2019-12-26

## 2019-12-24 RX ORDER — INSULIN HUMAN 100 [IU]/ML
10 INJECTION, SOLUTION SUBCUTANEOUS ONCE
Refills: 0 | Status: COMPLETED | OUTPATIENT
Start: 2019-12-24 | End: 2019-12-24

## 2019-12-24 RX ORDER — TRAZODONE HCL 50 MG
50 TABLET ORAL AT BEDTIME
Refills: 0 | Status: DISCONTINUED | OUTPATIENT
Start: 2019-12-24 | End: 2019-12-26

## 2019-12-24 RX ORDER — LAMOTRIGINE 25 MG/1
200 TABLET, ORALLY DISINTEGRATING ORAL DAILY
Refills: 0 | Status: DISCONTINUED | OUTPATIENT
Start: 2019-12-24 | End: 2019-12-26

## 2019-12-24 RX ORDER — CALCITRIOL 0.5 UG/1
0.25 CAPSULE ORAL DAILY
Refills: 0 | Status: DISCONTINUED | OUTPATIENT
Start: 2019-12-24 | End: 2019-12-26

## 2019-12-24 RX ADMIN — SODIUM ZIRCONIUM CYCLOSILICATE 10 GRAM(S): 10 POWDER, FOR SUSPENSION ORAL at 21:30

## 2019-12-24 RX ADMIN — ARIPIPRAZOLE 15 MILLIGRAM(S): 15 TABLET ORAL at 13:06

## 2019-12-24 RX ADMIN — HEPARIN SODIUM 5000 UNIT(S): 5000 INJECTION INTRAVENOUS; SUBCUTANEOUS at 05:53

## 2019-12-24 RX ADMIN — SIMETHICONE 80 MILLIGRAM(S): 80 TABLET, CHEWABLE ORAL at 05:54

## 2019-12-24 RX ADMIN — Medication 25 MILLILITER(S): at 07:03

## 2019-12-24 RX ADMIN — CALCITRIOL 0.25 MICROGRAM(S): 0.5 CAPSULE ORAL at 11:15

## 2019-12-24 RX ADMIN — PANTOPRAZOLE SODIUM 40 MILLIGRAM(S): 20 TABLET, DELAYED RELEASE ORAL at 05:54

## 2019-12-24 RX ADMIN — SODIUM CHLORIDE 60 MILLILITER(S): 9 INJECTION, SOLUTION INTRAVENOUS at 15:27

## 2019-12-24 RX ADMIN — Medication 650 MILLIGRAM(S): at 21:30

## 2019-12-24 RX ADMIN — Medication 200 GRAM(S): at 07:18

## 2019-12-24 RX ADMIN — TAMSULOSIN HYDROCHLORIDE 0.4 MILLIGRAM(S): 0.4 CAPSULE ORAL at 21:30

## 2019-12-24 RX ADMIN — SODIUM ZIRCONIUM CYCLOSILICATE 10 GRAM(S): 10 POWDER, FOR SUSPENSION ORAL at 05:53

## 2019-12-24 RX ADMIN — Medication 650 MILLIGRAM(S): at 13:05

## 2019-12-24 RX ADMIN — FINASTERIDE 5 MILLIGRAM(S): 5 TABLET, FILM COATED ORAL at 11:15

## 2019-12-24 RX ADMIN — INSULIN HUMAN 10 UNIT(S): 100 INJECTION, SOLUTION SUBCUTANEOUS at 07:15

## 2019-12-24 RX ADMIN — Medication 50 MILLIGRAM(S): at 21:30

## 2019-12-24 RX ADMIN — LAMOTRIGINE 200 MILLIGRAM(S): 25 TABLET, ORALLY DISINTEGRATING ORAL at 11:15

## 2019-12-24 RX ADMIN — SIMETHICONE 80 MILLIGRAM(S): 80 TABLET, CHEWABLE ORAL at 17:19

## 2019-12-24 RX ADMIN — Medication 650 MILLIGRAM(S): at 05:53

## 2019-12-24 RX ADMIN — SODIUM ZIRCONIUM CYCLOSILICATE 10 GRAM(S): 10 POWDER, FOR SUSPENSION ORAL at 15:27

## 2019-12-24 NOTE — PROGRESS NOTE ADULT - SUBJECTIVE AND OBJECTIVE BOX
PGY1 Note discussed with supervising resident and primary attending.    Patient is a 69y old  Male who presents with a chief complaint of Hyperkalemia (24 Dec 2019 14:43)    INTERVAL HPI/OVERNIGHT EVENTS:  - No acute events overnight  - Patient denies palpitations or cardiac abnormalities  - Volume status appears euvolemic  - Patient ileostomy is working and is full  - Patient is in good affect  - Tolerating a normal renal restricted diet  - Hemodynamically stable and afebrile     MEDICATIONS  (STANDING):  ARIPiprazole 15 milliGRAM(s) Oral daily  calcitriol   Capsule 0.25 MICROGram(s) Oral daily  finasteride 5 milliGRAM(s) Oral daily  heparin  Injectable 5000 Unit(s) SubCutaneous every 12 hours  lamoTRIgine 200 milliGRAM(s) Oral daily  pantoprazole    Tablet 40 milliGRAM(s) Oral before breakfast  simethicone 80 milliGRAM(s) Chew two times a day  sodium bicarbonate 650 milliGRAM(s) Oral three times a day  sodium chloride 0.45% 1000 milliLiter(s) (60 mL/Hr) IV Continuous <Continuous>  sodium zirconium cyclosilicate 10 Gram(s) Oral every 8 hours  tamsulosin 0.4 milliGRAM(s) Oral at bedtime  traZODone 50 milliGRAM(s) Oral at bedtime    MEDICATIONS  (PRN):  HYDROmorphone   Tablet 4 milliGRAM(s) Oral every 8 hours PRN Severe Pain (7 - 10)    Allergies    No Known Allergies    Intolerances    REVIEW OF SYSTEMS:  CONSTITUTIONAL: No fever, weight loss, or fatigue  RESPIRATORY: No cough, wheezing, chills or hemoptysis; No shortness of breath  CARDIOVASCULAR: No chest pain, palpitations, dizziness, or leg swelling  GASTROINTESTINAL: No abdominal or epigastric pain. No nausea, vomiting, or hematemesis; No diarrhea or constipation. No melena or hematochezia.  NEUROLOGICAL: No headaches, memory loss, loss of strength, numbness, or tremors  SKIN: No itching, burning, rashes, or lesions     Vital Signs Last 24 Hrs  T(C): 36.9 (24 Dec 2019 11:21), Max: 37.1 (24 Dec 2019 07:32)  T(F): 98.5 (24 Dec 2019 11:21), Max: 98.7 (24 Dec 2019 07:32)  HR: 78 (24 Dec 2019 11:21) (75 - 86)  BP: 100/64 (24 Dec 2019 11:21) (100/64 - 141/76)  BP(mean): --  RR: 18 (24 Dec 2019 11:21) (17 - 18)  SpO2: 98% (24 Dec 2019 11:21) (95% - 99%)    PHYSICAL EXAM:  GENERAL: NAD, well-groomed, well-developed  HEAD:  Atraumatic, Normocephalic  EYES: EOMI, PERRLA, conjunctiva and sclera clear  NECK: Supple, No JVD, Normal thyroid  CHEST/LUNG: Clear to percussion bilaterally; No rales, rhonchi, wheezing, or rubs  HEART: Regular rate and rhythm; No murmurs, rubs, or gallops  ABDOMEN: Right sided ileostomy bag in place.   NERVOUS SYSTEM:  Alert & Oriented X3, Good concentration; Motor Strength 5/5 B/L   EXTREMITIES:  2+ Peripheral Pulses, No clubbing, cyanosis, or edema    LABS:                        11.5   5.62  )-----------( 172      ( 24 Dec 2019 07:12 )             36.4     12-24    141  |  117<H>  |  61<H>  ----------------------------<  115<H>  5.3   |  16<L>  |  3.16<H>    Ca    10.0      24 Dec 2019 09:33  Phos  4.5     12-24  Mg     1.7     12-24    TPro  8.9<H>  /  Alb  4.0  /  TBili  0.4  /  DBili  x   /  AST  21  /  ALT  32  /  AlkPhos  143<H>  12-24    CAPILLARY BLOOD GLUCOSE    POCT Blood Glucose.: 82 mg/dL (24 Dec 2019 07:13)    RADIOLOGY & ADDITIONAL TESTS:    Chest XR    IMPRESSION:   Negativefor acute cardiopulmonary process.    RACQUEL MORENO M.D., ATTENDING RADIOLOGIST  This document has been electronically signed. Dec 24 2019 10:18AM    Imaging Personally Reviewed:  [ ] YES  [ ] NO    Consultant(s) Notes Reviewed:  [ ] YES  [ ] NO

## 2019-12-24 NOTE — PROGRESS NOTE ADULT - PROBLEM SELECTOR PLAN 2
PAWAN on CKD stage 3  CKD from atherosclerotic vascular disease as right kidney is 2.4 cm smaller than left kidney   baseline around ?2.5  unknown etiology for PAWAN  - f/u renal us to r/o hydronephrosis  - Keep patient euvolemic   - Avoid Nephrotoxic Meds/ Agents such as (NSAIDs, IV contrast, Aminoglycosides such as gentamicin, -Gadolinium contrast, Phosphate containing enemas, etc..)  - Adjust Medications according to eGFR  - Monitor BMP daily  12/24/2019: f/u Renal U/S results

## 2019-12-24 NOTE — CONSULT NOTE ADULT - SUBJECTIVE AND OBJECTIVE BOX
This consult is incomplete  Patient is a 69y Male whom presented to the hospital with     HPI:  69 year old Male from [facility], with PMHx of prostate Ca (s/p radiation in 2015, now in remission), asthma, COPD (not on O2), HTN, HLD, Bipolar, anemia, GERD, Ileostomy s/p sigmoid resection, PAD, and CKD presented to ED for hyperkalemia.   Per pt he had routine blood work and pt was told his potassium his very high. Pt states he follows nephrologist and was told his potassium is getting higher and was advised to avoid food in high potassium. Pt denies any recent infection, abx treatment, any NSAID use, fever, URI, chills, headache, n/v/d, cp, abdominal pain, palpitation, dizziness, urinary problem. Pt states he has sob secondary to copd but not unusual.       Full code (23 Dec 2019 21:18)    pts current chart reviewed and case discussed with resident  pt denies pmh of renal ds, proteinuria, renal stones, recurrent uti or nephrotic edema or nephrotic syndrome  pt give pmh of retinopathy and s/p laser treatment  pt denies Nsaids use or otc other nephrotoxic supplements  PAST MEDICAL & SURGICAL HISTORY:  BPH with urinary obstruction  Prostate CA  CKD (chronic kidney disease)  PAD (peripheral artery disease)  HLD (hyperlipidemia)  HTN (hypertension)  IBD (inflammatory bowel disease)  Bipolar disorder  Anemia  COPD, mild  History of creation of ostomy      Home Medications: Reviewed    MEDICATIONS  (STANDING):  ARIPiprazole 15 milliGRAM(s) Oral daily  calcitriol   Capsule 0.25 MICROGram(s) Oral daily  finasteride 5 milliGRAM(s) Oral daily  heparin  Injectable 5000 Unit(s) SubCutaneous every 12 hours  lamoTRIgine 200 milliGRAM(s) Oral daily  pantoprazole    Tablet 40 milliGRAM(s) Oral before breakfast  simethicone 80 milliGRAM(s) Chew two times a day  sodium bicarbonate 650 milliGRAM(s) Oral three times a day  sodium chloride 0.45% 1000 milliLiter(s) (60 mL/Hr) IV Continuous <Continuous>  sodium zirconium cyclosilicate 10 Gram(s) Oral every 8 hours  tamsulosin 0.4 milliGRAM(s) Oral at bedtime  traZODone 50 milliGRAM(s) Oral at bedtime    MEDICATIONS  (PRN):  HYDROmorphone   Tablet 4 milliGRAM(s) Oral every 8 hours PRN Severe Pain (7 - 10)      Allergies    No Known Allergies    Intolerances        SOCIAL HISTORY:  Alcohol use [X ] No  [ ] Yes  Smoking  [ X] No  [ ] Yes  Drug Abuse [X ] No  [ ] Yes  Tattoo [X ] No  [ ] Yes  History of Blood transfusion [ X] No  [ ] Yes    FAMILY HISTORY:      Diabetes [ ]  Hypertension [ ]  Kidney Stones [ ]  Heart Disease [ ]  Hyperlipidemia [ ]  Cancer [ ]    REVIEW OF SYSTEMS:  CONSTITUTIONAL: No weakness, fevers or chills  EYES/ENT: No visual changes;  No vertigo or throat pain   NECK: No pain or stiffness  RESPIRATORY: No cough, wheezing, hemoptysis; No shortness of breath  CARDIOVASCULAR: No chest pain or palpitations  GASTROINTESTINAL: No abdominal or epigastric pain. No nausea, vomiting, or hematemesis; No diarrhea or constipation. No melena or hematochezia.  GENITOURINARY: No dysuria, frequency or hematuria  NEUROLOGICAL: No numbness or weakness  SKIN: No itching, burning, rashes, or lesions   All other review of systems is negative unless indicated above    Vital Signs  T(F): 98.5 (19 @ 11:21), Max: 98.7 (19 @ 07:32)  HR: 78 (19 @ 11:21) (75 - 86)  BP: 100/64 (19 @ 11:21) (100/64 - 141/76)  ABP: --  RR: 18 (19 @ 11:21) (17 - 18)  SpO2: 98% (19 @ 11:21) (95% - 99%)  Wt(kg): --  CVP(cm H2O): --  CO: --  PCWP: --    I and O's:     @ 07:01  -   @ 14:43  --------------------------------------------------------  IN:    Oral Fluid: 430 mL  Total IN: 430 mL    OUT:  Total OUT: 0 mL    Total NET: 430 mL        Daily Height in cm: 177.8 (23 Dec 2019 14:59)    Daily Weight in k.6 (24 Dec 2019 04:36)    PHYSICAL EXAM:  Constitutional: well developed, well nourished  and in nad  HEENT: PERRLA,  no icteric sclera and mild pallor of conjunctiva noted  Neck: No JVD, thyromegaly or adenopathy  Respiratory: reduced air entry lower lungs with no rales, wheezing or rhonchi  Cardiovascular: S1 and S2 normally heard  Gastrointestinal: soft, nondistended, nontender and normal bowel sounds heard  Extremities: No peripheral edema or cyanosis  Neurological: A/O x 3, no focal deficits  Psychiatric: Normal mood, normal affect  : No flank tenderness  Skin: No rashes        LABS:                        11.5   5.62  )-----------( 172      ( 24 Dec 2019 07:12 )             36.4     4.5  --            141  |  117<H>  |  61<H>  ----------------------------<  115<H>  5.3   |  16<L>  |  3.16<H>  12    140  |  117<H>  |  62<H>  ----------------------------<  100<H>  5.6<H>   |  14<L>  |  3.31<H>  12    139  |  117<H>  |  64<H>  ----------------------------<  99  6.2<HH>   |  12<L>  |  3.31<H>      141  |  119<H>  |  63<H>  ----------------------------<  102<H>  6.8<HH>   |  16<L>  |  3.73<H>  12    140  |  119<H>  |  62<H>  ----------------------------<  98  6.7<HH>   |  13<L>  |  3.85<H>    Ca    10.0      24 Dec 2019 09:33  Ca    9.2      24 Dec 2019 04:03  Ca    9.6      24 Dec 2019 03:40  Ca    8.7      23 Dec 2019 17:35  Ca    8.3<L>      23 Dec 2019 16:25  Phos  4.5       Mg     1.7         TPro  8.9<H>  /  Alb  4.0  /  TBili  0.4  /  DBili  x   /  AST  21  /  ALT  32  /  AlkPhos  143<H>    TPro  8.7<H>  /  Alb  3.6  /  TBili  0.4  /  DBili  x   /  AST  35  /  ALT  30  /  AlkPhos  149<H>            URINE STUDIES:      Sodium, Random Urine: 60 mmol/L (19 @ 00:16)  Osmolality, Random Urine: 392 mos/kg (19 @ 00:16)  Potassium, Random Urine: 22 mmol/L (19 @ 00:16)  Creatinine, Random Urine: 84 mg/dL (19 @ 00:16)                RADIOLOGY & ADDITIONAL STUDIES: This consult is incomplete  Patient is a 69y Male whom presented to the hospital with hyperkalemia.    Medical HPI:  69 year old Male from [facility], with PMHx of prostate Ca (s/p radiation in 2015, now in remission), asthma, COPD (not on O2), HTN, HLD, Bipolar, anemia, GERD, Ileostomy s/p sigmoid resection, PAD, and CKD presented to ED for hyperkalemia.   Per pt he had routine blood work and pt was told his potassium his very high. Pt states he follows nephrologist and was told his potassium is getting higher and was advised to avoid food in high potassium. Pt denies any recent infection, abx treatment, any NSAID use, fever, URI, chills, headache, n/v/d, cp, abdominal pain, palpitation, dizziness, urinary problem. Pt states he has sob secondary to copd but not unusual.       Full code (23 Dec 2019 21:18)  Renal HPI:  pts chart reviewed and case discussed with resident  pt is known to have ckd-stage 3 as per old labs with baseline cr 2.20-2.4 and metabolic acidosis  pt denies pmh of  renal stones, recurrent uti or nephrotic edema or nephrotic syndrome  pt gives long standing hx of htn/non nephrotic proteinuria  with ckd x many yrs  pt currently denies cp,sob,gi symptoms,skin rash, fever,chills ,joint pain or leg edema  pt is voiding normally.  Pt was given Lokelma for high k with good results     PAST MEDICAL & SURGICAL HISTORY:  BPH with urinary obstruction  Prostate CA  CKD (chronic kidney disease)  PAD (peripheral artery disease)  HLD (hyperlipidemia)  HTN (hypertension)  IBD (inflammatory bowel disease)  Bipolar disorder  Anemia  COPD, mild  History of creation of ostomy      Home Medications: Reviewed    MEDICATIONS  (STANDING):  ARIPiprazole 15 milliGRAM(s) Oral daily  calcitriol   Capsule 0.25 MICROGram(s) Oral daily  finasteride 5 milliGRAM(s) Oral daily  heparin  Injectable 5000 Unit(s) SubCutaneous every 12 hours  lamoTRIgine 200 milliGRAM(s) Oral daily  pantoprazole    Tablet 40 milliGRAM(s) Oral before breakfast  simethicone 80 milliGRAM(s) Chew two times a day  sodium bicarbonate 650 milliGRAM(s) Oral three times a day  sodium chloride 0.45% 1000 milliLiter(s) (60 mL/Hr) IV Continuous <Continuous>  sodium zirconium cyclosilicate 10 Gram(s) Oral every 8 hours  tamsulosin 0.4 milliGRAM(s) Oral at bedtime  traZODone 50 milliGRAM(s) Oral at bedtime    MEDICATIONS  (PRN):  HYDROmorphone   Tablet 4 milliGRAM(s) Oral every 8 hours PRN Severe Pain (7 - 10)      Allergies    No Known Allergies    Intolerances        SOCIAL HISTORY:  Alcohol use [X ] No  [ ] Yes  Smoking  [ X] No  [ ] Yes  Drug Abuse [X ] No  [ ] Yes  Tattoo [X ] No  [ ] Yes  History of Blood transfusion [ X] No  [ ] Yes    FAMILY HISTORY:n/c      REVIEW OF SYSTEMS:  CONSTITUTIONAL: No weakness, fevers or chills  EYES/ENT: No visual changes;  No vertigo or throat pain   NECK: No pain or stiffness  RESPIRATORY: No cough, wheezing, hemoptysis; No shortness of breath  CARDIOVASCULAR: No chest pain or palpitations  GASTROINTESTINAL: No abdominal or epigastric pain. No nausea, vomiting, or hematemesis; No diarrhea or constipation. No melena or hematochezia.  GENITOURINARY: No dysuria, frequency or hematuria  NEUROLOGICAL: No numbness or weakness  SKIN: No itching, burning, rashes, or lesions   All other review of systems is negative unless indicated above    Vital Signs  T(F): 98.5 (19 @ 11:21), Max: 98.7 (19 @ 07:32)  HR: 78 (19 @ 11:21) (75 - 86)  BP: 100/64 (19 @ 11:21) (100/64 - 141/76)  ABP: --  RR: 18 (19 @ 11:21) (17 - 18)  SpO2: 98% (19 @ 11:21) (95% - 99%)  Wt(kg): --  CVP(cm H2O): --  CO: --  PCWP: --    I and O's:     @ 07:01  -   @ 14:43  --------------------------------------------------------  IN:    Oral Fluid: 430 mL  Total IN: 430 mL    OUT:  Total OUT: 0 mL    Total NET: 430 mL        Daily Height in cm: 177.8 (23 Dec 2019 14:59)    Daily Weight in k.6 (24 Dec 2019 04:36)    PHYSICAL EXAM:  Constitutional: well developed, well nourished  and in nad  HEENT: PERRLA,  no icteric sclera and mild pallor of conjunctiva noted  Neck: No JVD, thyromegaly or adenopathy  Respiratory: reduced air entry lower lungs with no rales, wheezing or rhonchi  Cardiovascular: S1 and S2 normally heard  Gastrointestinal: soft, nondistended, nontender and normal bowel sounds heard  Extremities: No peripheral edema or cyanosis  Neurological: A/O x 3, no focal deficits  Psychiatric: Normal mood, normal affect  : No flank tenderness  Skin: No rashes        LABS:                        11.5   5.62  )-----------( 172      ( 24 Dec 2019 07:12 )             36.4     4.5  --            141  |  117<H>  |  61<H>  ----------------------------<  115<H>  5.3   |  16<L>  |  3.16<H>  1224    140  |  117<H>  |  62<H>  ----------------------------<  100<H>  5.6<H>   |  14<L>  |  3.31<H>  1224    139  |  117<H>  |  64<H>  ----------------------------<  99  6.2<HH>   |  12<L>  |  3.31<H>  12    141  |  119<H>  |  63<H>  ----------------------------<  102<H>  6.8<HH>   |  16<L>  |  3.73<H>  12    140  |  119<H>  |  62<H>  ----------------------------<  98  6.7<HH>   |  13<L>  |  3.85<H>    Ca    10.0      24 Dec 2019 09:33  Ca    9.2      24 Dec 2019 04:03  Ca    9.6      24 Dec 2019 03:40  Ca    8.7      23 Dec 2019 17:35  Ca    8.3<L>      23 Dec 2019 16:25  Phos  4.5       Mg     1.7         TPro  8.9<H>  /  Alb  4.0  /  TBili  0.4  /  DBili  x   /  AST  21  /  ALT  32  /  AlkPhos  143<H>    TPro  8.7<H>  /  Alb  3.6  /  TBili  0.4  /  DBili  x   /  AST  35  /  ALT  30  /  AlkPhos  149<H>            URINE STUDIES:  Sodium, Random Urine: 60 mmol/L (19 @ 00:16)  Osmolality, Random Urine: 392 mos/kg (19 @ 00:16)  Potassium, Random Urine: 22 mmol/L (19 @ 00:16)  Creatinine, Random Urine: 84 mg/dL (19 @ 00:16)    Total Protein, Random Urine (.24.19 @ 00:16)    Total Protein, Random Urine: 26 mg/dL    Creatinine, Random Urine (.. @ 00:16)    Creatinine, Random Urine: 84: Reference Ranges have NOT been established for random urine analytes due  to variability in fluid intake and concentration. mg/dL                RADIOLOGY & ADDITIONAL STUDIES:    < from: Xray Chest 1 View- PORTABLE-Urgent (19 @ 23:12) >  EXAM:  XR CHEST PORTABLE URGENT 1V                            PROCEDURE DATE:  2019          INTERPRETATION:    DATE OF STUDY: 2019    PRIOR:19.    CLINICAL INDICATION: R/O fluid overload.    TECHNIQUE: portable chest.    FINDINGS:   The heart is top normal in size.  The cardiomediastinal silhouette is otherwise within normal limits.  Pulmonary vascularity is normal.  No focal consolidations. No pleural effusion or pneumothorax.  No acute bony findings.    IMPRESSION:   Negativefor acute cardiopulmonary process.    < end of copied text > Patient is a 69y Male whom presented to the hospital with hyperkalemia.    Medical HPI:  69 year old Male from [facility], with PMHx of prostate Ca (s/p radiation in 2015, now in remission), asthma, COPD (not on O2), HTN, HLD, Bipolar, anemia, GERD, Ileostomy s/p sigmoid resection, PAD, and CKD presented to ED for hyperkalemia.   Per pt he had routine blood work and pt was told his potassium his very high. Pt states he follows nephrologist and was told his potassium is getting higher and was advised to avoid food in high potassium. Pt denies any recent infection, abx treatment, any NSAID use, fever, URI, chills, headache, n/v/d, cp, abdominal pain, palpitation, dizziness, urinary problem. Pt states he has sob secondary to copd but not unusual.       Full code (23 Dec 2019 21:18)  Renal HPI:  pts chart reviewed and case discussed with resident  pt is known to have ckd-stage 3 as per old labs with baseline cr 2.20-2.4 and metabolic acidosis  pt denies pmh of  renal stones, recurrent uti or nephrotic edema or nephrotic syndrome  pt gives long standing hx of htn/non nephrotic proteinuria  with ckd x many yrs  pt currently denies cp,sob,gi symptoms,skin rash, fever,chills ,joint pain or leg edema  pt is voiding normally.  Pt was given Lokelma for high k with good results     PAST MEDICAL & SURGICAL HISTORY:  BPH with urinary obstruction  Prostate CA  CKD (chronic kidney disease)  PAD (peripheral artery disease)  HLD (hyperlipidemia)  HTN (hypertension)  IBD (inflammatory bowel disease)  Bipolar disorder  Anemia  COPD, mild  History of creation of ostomy      Home Medications: Reviewed    MEDICATIONS  (STANDING):  ARIPiprazole 15 milliGRAM(s) Oral daily  calcitriol   Capsule 0.25 MICROGram(s) Oral daily  finasteride 5 milliGRAM(s) Oral daily  heparin  Injectable 5000 Unit(s) SubCutaneous every 12 hours  lamoTRIgine 200 milliGRAM(s) Oral daily  pantoprazole    Tablet 40 milliGRAM(s) Oral before breakfast  simethicone 80 milliGRAM(s) Chew two times a day  sodium bicarbonate 650 milliGRAM(s) Oral three times a day  sodium chloride 0.45% 1000 milliLiter(s) (60 mL/Hr) IV Continuous <Continuous>  sodium zirconium cyclosilicate 10 Gram(s) Oral every 8 hours  tamsulosin 0.4 milliGRAM(s) Oral at bedtime  traZODone 50 milliGRAM(s) Oral at bedtime    MEDICATIONS  (PRN):  HYDROmorphone   Tablet 4 milliGRAM(s) Oral every 8 hours PRN Severe Pain (7 - 10)      Allergies    No Known Allergies    Intolerances        SOCIAL HISTORY:  Alcohol use [X ] No  [ ] Yes  Smoking  [ X] No  [ ] Yes  Drug Abuse [X ] No  [ ] Yes  Tattoo [X ] No  [ ] Yes  History of Blood transfusion [ X] No  [ ] Yes    FAMILY HISTORY:n/c      REVIEW OF SYSTEMS:  CONSTITUTIONAL: No weakness, fevers or chills  EYES/ENT: No visual changes;  No vertigo or throat pain   NECK: No pain or stiffness  RESPIRATORY: No cough, wheezing, hemoptysis; No shortness of breath  CARDIOVASCULAR: No chest pain or palpitations  GASTROINTESTINAL: No abdominal or epigastric pain. No nausea, vomiting, or hematemesis; No diarrhea or constipation. No melena or hematochezia.  GENITOURINARY: No dysuria, frequency or hematuria  NEUROLOGICAL: No numbness or weakness  SKIN: No itching, burning, rashes, or lesions   All other review of systems is negative unless indicated above    Vital Signs  T(F): 98.5 (19 @ 11:21), Max: 98.7 (19 @ 07:32)  HR: 78 (19 @ 11:21) (75 - 86)  BP: 100/64 (19 @ 11:21) (100/64 - 141/76)  ABP: --  RR: 18 (19 @ 11:21) (17 - 18)  SpO2: 98% (19 @ 11:21) (95% - 99%)  Wt(kg): --  CVP(cm H2O): --  CO: --  PCWP: --    I and O's:     @ 07:01  -   @ 14:43  --------------------------------------------------------  IN:    Oral Fluid: 430 mL  Total IN: 430 mL    OUT:  Total OUT: 0 mL    Total NET: 430 mL        Daily Height in cm: 177.8 (23 Dec 2019 14:59)    Daily Weight in k.6 (24 Dec 2019 04:36)    PHYSICAL EXAM:  Constitutional: well developed, well nourished  and in nad  HEENT: PERRLA,  no icteric sclera and mild pallor of conjunctiva noted  Neck: No JVD, thyromegaly or adenopathy  Respiratory: reduced air entry lower lungs with no rales, wheezing or rhonchi  Cardiovascular: S1 and S2 normally heard  Gastrointestinal: soft, nondistended, nontender and normal bowel sounds heard  Extremities: No peripheral edema or cyanosis  Neurological: A/O x 3, no focal deficits  Psychiatric: Normal mood, normal affect  : No flank tenderness  Skin: No rashes        LABS:                        11.5   5.62  )-----------( 172      ( 24 Dec 2019 07:12 )             36.4     4.5  --            141  |  117<H>  |  61<H>  ----------------------------<  115<H>  5.3   |  16<L>  |  3.16<H>  1224    140  |  117<H>  |  62<H>  ----------------------------<  100<H>  5.6<H>   |  14<L>  |  3.31<H>  1224    139  |  117<H>  |  64<H>  ----------------------------<  99  6.2<HH>   |  12<L>  |  3.31<H>  12    141  |  119<H>  |  63<H>  ----------------------------<  102<H>  6.8<HH>   |  16<L>  |  3.73<H>  12    140  |  119<H>  |  62<H>  ----------------------------<  98  6.7<HH>   |  13<L>  |  3.85<H>    Ca    10.0      24 Dec 2019 09:33  Ca    9.2      24 Dec 2019 04:03  Ca    9.6      24 Dec 2019 03:40  Ca    8.7      23 Dec 2019 17:35  Ca    8.3<L>      23 Dec 2019 16:25  Phos  4.5       Mg     1.7         TPro  8.9<H>  /  Alb  4.0  /  TBili  0.4  /  DBili  x   /  AST  21  /  ALT  32  /  AlkPhos  143<H>    TPro  8.7<H>  /  Alb  3.6  /  TBili  0.4  /  DBili  x   /  AST  35  /  ALT  30  /  AlkPhos  149<H>            URINE STUDIES:  Sodium, Random Urine: 60 mmol/L (19 @ 00:16)  Osmolality, Random Urine: 392 mos/kg (19 @ 00:16)  Potassium, Random Urine: 22 mmol/L (19 @ 00:16)  Creatinine, Random Urine: 84 mg/dL (19 @ 00:16)    Total Protein, Random Urine (12.24.19 @ 00:16)    Total Protein, Random Urine: 26 mg/dL    Creatinine, Random Urine (..19 @ 00:16)    Creatinine, Random Urine: 84: Reference Ranges have NOT been established for random urine analytes due  to variability in fluid intake and concentration. mg/dL                RADIOLOGY & ADDITIONAL STUDIES:    < from: Xray Chest 1 View- PORTABLE-Urgent (19 @ 23:12) >  EXAM:  XR CHEST PORTABLE URGENT 1V                            PROCEDURE DATE:  2019          INTERPRETATION:    DATE OF STUDY: 2019    PRIOR:19.    CLINICAL INDICATION: R/O fluid overload.    TECHNIQUE: portable chest.    FINDINGS:   The heart is top normal in size.  The cardiomediastinal silhouette is otherwise within normal limits.  Pulmonary vascularity is normal.  No focal consolidations. No pleural effusion or pneumothorax.  No acute bony findings.    IMPRESSION:   Negativefor acute cardiopulmonary process.    < end of copied text >

## 2019-12-24 NOTE — CONSULT NOTE ADULT - ASSESSMENT
A/P:  1. PAWAN on CKD(stage 3) :pawan is secondary to pre-renal azotemia with mild low bp  -suggest to add iv hydration  - f/u renal us  -pts ckd is secondary to htn with mild proteinuria ,secondary to ischemic renal ds  - Keep patient euvolemic   - Avoid Nephrotoxic Meds/ Agents such as (NSAIDs, IV contrast, Aminoglycosides such as gentamicin, -Gadolinium contrast, Phosphate containing enemas, etc..)  - Adjust Medications according to eGFR  - Monitor BMP daily     2. METABOLIC ACIDOSIS: secondary to CKD, normal AG   -continue nahco3 tab   -f/u CO2 daily    3. HYPERKALEMIA: secondary to non compliant with low k diet  - suggest Lokelma 10 gm po tid  -low k diet  -keep k >4 and <5  -f/u k level daily    4 HTN : bp low  -hold bp meds  - keep BP >110/70     5.R/O MBD: secondary to ckd  -pt has normal phos level  -f/u pth-intact

## 2019-12-24 NOTE — PROGRESS NOTE ADULT - ASSESSMENT
69 year old Male from [facility], with PMHx of prostate Ca (s/p radiation in 2015, now in remission), asthma, COPD (not on O2), HTN, HLD, Bipolar, anemia, GERD, Ileostomy s/p sigmoid resection, PAD, and CKD presented to ED for hyperkalemia.

## 2019-12-24 NOTE — PROGRESS NOTE ADULT - PROBLEM SELECTOR PLAN 1
?from CKD  ekg NSR  s/p calcium gluconate, insulin, albuterol  started on Lokelma and will give iv lasix   repeat bmp in 4 hours and inform Dr. Curran   if Potassium remains elevated repeat calcium gluconate, insulin, albuterol and lasix if bp allows   monitor bmp   Plan discussed with Dr. Curran  12/24/2019: K+ is 5.3 s/p Lokelma. Patient to have IV Fluids (1/2 NS + 1 amp Bicarb @ 60 ml/hour)

## 2019-12-25 LAB
ANION GAP SERPL CALC-SCNC: 10 MMOL/L — SIGNIFICANT CHANGE UP (ref 5–17)
BASOPHILS # BLD AUTO: 0.04 K/UL — SIGNIFICANT CHANGE UP (ref 0–0.2)
BASOPHILS NFR BLD AUTO: 0.7 % — SIGNIFICANT CHANGE UP (ref 0–2)
BUN SERPL-MCNC: 55 MG/DL — HIGH (ref 7–18)
CALCIUM SERPL-MCNC: 8.5 MG/DL — SIGNIFICANT CHANGE UP (ref 8.4–10.5)
CHLORIDE SERPL-SCNC: 116 MMOL/L — HIGH (ref 96–108)
CO2 SERPL-SCNC: 16 MMOL/L — LOW (ref 22–31)
CREAT SERPL-MCNC: 2.9 MG/DL — HIGH (ref 0.5–1.3)
EOSINOPHIL # BLD AUTO: 0.35 K/UL — SIGNIFICANT CHANGE UP (ref 0–0.5)
EOSINOPHIL NFR BLD AUTO: 5.7 % — SIGNIFICANT CHANGE UP (ref 0–6)
GLUCOSE SERPL-MCNC: 90 MG/DL — SIGNIFICANT CHANGE UP (ref 70–99)
HCT VFR BLD CALC: 34.5 % — LOW (ref 39–50)
HGB BLD-MCNC: 11 G/DL — LOW (ref 13–17)
IMM GRANULOCYTES NFR BLD AUTO: 0.7 % — SIGNIFICANT CHANGE UP (ref 0–1.5)
LYMPHOCYTES # BLD AUTO: 0.95 K/UL — LOW (ref 1–3.3)
LYMPHOCYTES # BLD AUTO: 15.6 % — SIGNIFICANT CHANGE UP (ref 13–44)
MAGNESIUM SERPL-MCNC: 1.7 MG/DL — SIGNIFICANT CHANGE UP (ref 1.6–2.6)
MCHC RBC-ENTMCNC: 26.5 PG — LOW (ref 27–34)
MCHC RBC-ENTMCNC: 31.9 GM/DL — LOW (ref 32–36)
MCV RBC AUTO: 83.1 FL — SIGNIFICANT CHANGE UP (ref 80–100)
MONOCYTES # BLD AUTO: 0.51 K/UL — SIGNIFICANT CHANGE UP (ref 0–0.9)
MONOCYTES NFR BLD AUTO: 8.4 % — SIGNIFICANT CHANGE UP (ref 2–14)
NEUTROPHILS # BLD AUTO: 4.2 K/UL — SIGNIFICANT CHANGE UP (ref 1.8–7.4)
NEUTROPHILS NFR BLD AUTO: 68.9 % — SIGNIFICANT CHANGE UP (ref 43–77)
NRBC # BLD: 0 /100 WBCS — SIGNIFICANT CHANGE UP (ref 0–0)
PHOSPHATE SERPL-MCNC: 3.5 MG/DL — SIGNIFICANT CHANGE UP (ref 2.5–4.5)
PLATELET # BLD AUTO: 167 K/UL — SIGNIFICANT CHANGE UP (ref 150–400)
POTASSIUM SERPL-MCNC: 4.9 MMOL/L — SIGNIFICANT CHANGE UP (ref 3.5–5.3)
POTASSIUM SERPL-SCNC: 4.9 MMOL/L — SIGNIFICANT CHANGE UP (ref 3.5–5.3)
RBC # BLD: 4.15 M/UL — LOW (ref 4.2–5.8)
RBC # FLD: 16.2 % — HIGH (ref 10.3–14.5)
SODIUM SERPL-SCNC: 142 MMOL/L — SIGNIFICANT CHANGE UP (ref 135–145)
WBC # BLD: 6.09 K/UL — SIGNIFICANT CHANGE UP (ref 3.8–10.5)
WBC # FLD AUTO: 6.09 K/UL — SIGNIFICANT CHANGE UP (ref 3.8–10.5)

## 2019-12-25 PROCEDURE — 76775 US EXAM ABDO BACK WALL LIM: CPT | Mod: 26

## 2019-12-25 RX ORDER — ERGOCALCIFEROL 1.25 MG/1
50000 CAPSULE ORAL
Refills: 0 | Status: DISCONTINUED | OUTPATIENT
Start: 2019-12-25 | End: 2019-12-26

## 2019-12-25 RX ORDER — SODIUM CHLORIDE 9 MG/ML
1000 INJECTION, SOLUTION INTRAVENOUS
Refills: 0 | Status: DISCONTINUED | OUTPATIENT
Start: 2019-12-25 | End: 2019-12-26

## 2019-12-25 RX ORDER — HYDROMORPHONE HYDROCHLORIDE 2 MG/ML
2 INJECTION INTRAMUSCULAR; INTRAVENOUS; SUBCUTANEOUS EVERY 8 HOURS
Refills: 0 | Status: DISCONTINUED | OUTPATIENT
Start: 2019-12-25 | End: 2019-12-26

## 2019-12-25 RX ADMIN — SODIUM CHLORIDE 60 MILLILITER(S): 9 INJECTION, SOLUTION INTRAVENOUS at 11:39

## 2019-12-25 RX ADMIN — TAMSULOSIN HYDROCHLORIDE 0.4 MILLIGRAM(S): 0.4 CAPSULE ORAL at 21:27

## 2019-12-25 RX ADMIN — ARIPIPRAZOLE 15 MILLIGRAM(S): 15 TABLET ORAL at 11:18

## 2019-12-25 RX ADMIN — SIMETHICONE 80 MILLIGRAM(S): 80 TABLET, CHEWABLE ORAL at 17:06

## 2019-12-25 RX ADMIN — Medication 650 MILLIGRAM(S): at 13:32

## 2019-12-25 RX ADMIN — SODIUM ZIRCONIUM CYCLOSILICATE 10 GRAM(S): 10 POWDER, FOR SUSPENSION ORAL at 13:32

## 2019-12-25 RX ADMIN — Medication 650 MILLIGRAM(S): at 21:27

## 2019-12-25 RX ADMIN — LAMOTRIGINE 200 MILLIGRAM(S): 25 TABLET, ORALLY DISINTEGRATING ORAL at 11:16

## 2019-12-25 RX ADMIN — SIMETHICONE 80 MILLIGRAM(S): 80 TABLET, CHEWABLE ORAL at 05:50

## 2019-12-25 RX ADMIN — FINASTERIDE 5 MILLIGRAM(S): 5 TABLET, FILM COATED ORAL at 11:16

## 2019-12-25 RX ADMIN — Medication 50 MILLIGRAM(S): at 21:27

## 2019-12-25 RX ADMIN — SODIUM ZIRCONIUM CYCLOSILICATE 10 GRAM(S): 10 POWDER, FOR SUSPENSION ORAL at 05:50

## 2019-12-25 RX ADMIN — Medication 650 MILLIGRAM(S): at 05:50

## 2019-12-25 RX ADMIN — PANTOPRAZOLE SODIUM 40 MILLIGRAM(S): 20 TABLET, DELAYED RELEASE ORAL at 05:50

## 2019-12-25 RX ADMIN — ERGOCALCIFEROL 50000 UNIT(S): 1.25 CAPSULE ORAL at 11:17

## 2019-12-25 RX ADMIN — CALCITRIOL 0.25 MICROGRAM(S): 0.5 CAPSULE ORAL at 11:17

## 2019-12-25 NOTE — PROGRESS NOTE ADULT - SUBJECTIVE AND OBJECTIVE BOX
PGY1 Note discussed with supervising resident and primary attending.    Patient is a 69y old  Male who presents with a chief complaint of Hyperkalemia (24 Dec 2019 15:05)    INTERVAL HPI/OVERNIGHT EVENTS:  - No acute events overnight  - Patient had his ileostomy bag changed (it was full)  - No acute cardiac events noted overnight  - Patient tolerating a normal diet  - Hemodynamically stable     MEDICATIONS  (STANDING):  ARIPiprazole 15 milliGRAM(s) Oral daily  calcitriol   Capsule 0.25 MICROGram(s) Oral daily  ergocalciferol 58347 Unit(s) Oral <User Schedule>  finasteride 5 milliGRAM(s) Oral daily  heparin  Injectable 5000 Unit(s) SubCutaneous every 12 hours  lamoTRIgine 200 milliGRAM(s) Oral daily  pantoprazole    Tablet 40 milliGRAM(s) Oral before breakfast  simethicone 80 milliGRAM(s) Chew two times a day  sodium bicarbonate 650 milliGRAM(s) Oral three times a day  sodium chloride 0.45% 1000 milliLiter(s) (60 mL/Hr) IV Continuous <Continuous>  sodium zirconium cyclosilicate 10 Gram(s) Oral every 8 hours  tamsulosin 0.4 milliGRAM(s) Oral at bedtime  traZODone 50 milliGRAM(s) Oral at bedtime    MEDICATIONS  (PRN):  HYDROmorphone   Tablet 2 milliGRAM(s) Oral every 8 hours PRN Severe Pain (7 - 10)    Allergies    No Known Allergies    Intolerances    REVIEW OF SYSTEMS:  CONSTITUTIONAL: No fever, weight loss, or fatigue  RESPIRATORY: No cough, wheezing, chills or hemoptysis; No shortness of breath  CARDIOVASCULAR: No chest pain, palpitations, dizziness, or leg swelling  GASTROINTESTINAL: No abdominal or epigastric pain. No nausea, vomiting, or hematemesis; No diarrhea or constipation. No melena or hematochezia.  NEUROLOGICAL: No headaches, memory loss, loss of strength, numbness, or tremors  SKIN: No itching, burning, rashes, or lesions     Vital Signs Last 24 Hrs  T(C): 37.1 (25 Dec 2019 07:26), Max: 37.2 (25 Dec 2019 04:50)  T(F): 98.7 (25 Dec 2019 07:26), Max: 98.9 (25 Dec 2019 04:50)  HR: 67 (25 Dec 2019 07:26) (66 - 78)  BP: 114/57 (25 Dec 2019 07:26) (95/50 - 117/73)  BP(mean): --  RR: 18 (25 Dec 2019 07:26) (17 - 18)  SpO2: 99% (25 Dec 2019 07:26) (94% - 100%)    PHYSICAL EXAM:  GENERAL: NAD, well-groomed, well-developed  HEAD:  Atraumatic, Normocephalic  EYES: EOMI, PERRLA, conjunctiva and sclera clear  NECK: Supple, No JVD, Normal thyroid  CHEST/LUNG: Clear to percussion bilaterally; No rales, rhonchi, wheezing, or rubs  HEART: Regular rate and rhythm; No murmurs, rubs, or gallops  ABDOMEN: Right sided ileostomy bag in place.   NERVOUS SYSTEM:  Alert & Oriented X3, Good concentration; Motor Strength 5/5 B/L   EXTREMITIES:  2+ Peripheral Pulses, No clubbing, cyanosis, or edema    LABS:                        11.0   6.09  )-----------( 167      ( 25 Dec 2019 07:17 )             34.5     12-25    142  |  116<H>  |  55<H>  ----------------------------<  90  4.9   |  16<L>  |  2.90<H>    Ca    8.5      25 Dec 2019 07:24  Phos  3.5     12-25  Mg     1.7     12-25    TPro  8.9<H>  /  Alb  4.0  /  TBili  0.4  /  DBili  x   /  AST  21  /  ALT  32  /  AlkPhos  143<H>  12-24    CAPILLARY BLOOD GLUCOSE    RADIOLOGY & ADDITIONAL TESTS:    Imaging Personally Reviewed:  [ ] YES  [ ] NO    Consultant(s) Notes Reviewed:  [ ] YES  [ ] NO

## 2019-12-25 NOTE — PROGRESS NOTE ADULT - PROBLEM SELECTOR PLAN 1
?from CKD  ekg NSR  s/p calcium gluconate, insulin, albuterol  started on Lokelma and will give iv lasix   repeat bmp in 4 hours and inform Dr. Curran   if Potassium remains elevated repeat calcium gluconate, insulin, albuterol and lasix if bp allows   monitor bmp   Plan discussed with Dr. Curran  12/24/2019: K+ is 5.3 s/p Lokelma. Patient to have IV Fluids (1/2 NS + 1 amp Bicarb @ 60 ml/hour)  12/25/2019: K+ is 4.9 (off IV fluids)

## 2019-12-25 NOTE — PROGRESS NOTE ADULT - ASSESSMENT
69 year old Male from [facility], with PMHx of prostate Ca (s/p radiation in 2015, now in remission), asthma, COPD (not on O2), HTN, HLD, Bipolar, anemia, GERD, Ileostomy s/p sigmoid resection, PAD, and CKD presented to ED for hyperkalemia.

## 2019-12-25 NOTE — PROGRESS NOTE ADULT - PROBLEM SELECTOR PLAN 2
PAWAN on CKD stage 3  CKD from atherosclerotic vascular disease as right kidney is 2.4 cm smaller than left kidney   baseline around ?2.5  unknown etiology for PAWAN  - f/u renal us to r/o hydronephrosis  - Keep patient euvolemic   - Avoid Nephrotoxic Meds/ Agents such as (NSAIDs, IV contrast, Aminoglycosides such as gentamicin, -Gadolinium contrast, Phosphate containing enemas, etc..)  - Adjust Medications according to eGFR  - Monitor BMP daily  12/24/2019: f/u Renal U/S results  12/25/2019: Renal U/S done, f/u results; Scr has dropped back down to baseline (2.9); Ergocalciferol 50,000 units PAWAN on CKD stage 3  CKD from atherosclerotic vascular disease as right kidney is 2.4 cm smaller than left kidney   baseline around ?2.5  unknown etiology for PAWAN  - f/u renal us to r/o hydronephrosis  - Keep patient euvolemic   - Avoid Nephrotoxic Meds/ Agents such as (NSAIDs, IV contrast, Aminoglycosides such as gentamicin, -Gadolinium contrast, Phosphate containing enemas, etc..)  - Adjust Medications according to eGFR  - Monitor BMP daily  12/24/2019: f/u Renal U/S results  12/25/2019: Renal U/S shows bilateral renal cysts and chronic medical renal disease; Scr has dropped back down to baseline (2.9); Ergocalciferol 50,000 units

## 2019-12-25 NOTE — PROGRESS NOTE ADULT - ASSESSMENT
A/P:  1. PAWAN on CKD(stage 3) :pawan is secondary to pre-renal azotemia with mild low bp  -suggest to add iv hydration  - f/u renal us  -pts ckd is secondary to htn with mild proteinuria ,secondary to ischemic renal ds  - Keep patient euvolemic   - Avoid Nephrotoxic Meds/ Agents such as (NSAIDs, IV contrast, Aminoglycosides such as gentamicin, -Gadolinium contrast, Phosphate containing enemas, etc..)  - Adjust Medications according to eGFR  - Monitor BMP daily     2. METABOLIC ACIDOSIS: secondary to CKD, normal AG   -continue nahco3 tab   -f/u CO2 daily    3. HYPERKALEMIA: secondary to non compliant with low k diet  - suggest Lokelma 10 gm po tid  -low k diet  -keep k >4 and <5  -f/u k level daily    4 HTN : bp low  -hold bp meds  - keep BP >110/70     5.R/O MBD: secondary to ckd  -pt has normal phos level  -f/u pth-intact A/P:  1. PAWAN on CKD(stage 3) :pawan is secondary to pre-renal azotemia   -pts renal function is improving  -  renal us shows no hydro and old b/l cyst with ckd parenchymal changes  -pts ckd is secondary to htn with mild proteinuria ,secondary to ischemic renal ds  - Keep patient euvolemic   - Avoid Nephrotoxic Meds/ Agents such as (NSAIDs, IV contrast, Aminoglycosides such as gentamicin, -Gadolinium contrast, Phosphate containing enemas, etc..)  - Adjust Medications according to eGFR  - Monitor BMP daily     2. METABOLIC ACIDOSIS: secondary to CKD, normal AG , improving  -continue nahco3 tab   -f/u CO2 daily    3. HYPERKALEMIA: secondary to non compliant with low k diet, now improved  - suggest to continue Lokelma 10 gm po tid prn  -low k diet  -keep k >4 and <5  -f/u k level daily    4 HTN : bp is acceptble  -hold bp meds  - keep BP >110/70     5.R/O MBD: secondary to ckd  -pt has normal phos level  -f/u pth-intact level

## 2019-12-25 NOTE — PROGRESS NOTE ADULT - SUBJECTIVE AND OBJECTIVE BOX
This note is incomplete  pt seen and examined.pts current chart reviewed and case discussed with resident covering.    SUBJECTIVE:  pt feels fine and denies cp,sob,gi or gu/uremic  symptons    REVIEW OF SYSTEMS:  CONSTITUTIONAL: No weakness, fevers or chills  EYES/ENT: No visual changes;  No vertigo or throat pain   NECK: No pain or stiffness  RESPIRATORY: No cough, wheezing, hemoptysis; No shortness of breath  CARDIOVASCULAR: No chest pain or palpitations  GASTROINTESTINAL: No abdominal or epigastric pain. No nausea, vomiting, or hematemesis; No diarrhea or constipation. No melena or hematochezia.  GENITOURINARY: No dysuria, frequency , hematuria, flank pain or nocturia  NEUROLOGICAL: No numbness or weakness  SKIN: No itching, burning, rashes, or lesions   All other review of systems is negative unless indicated above    Current meds:    ARIPiprazole 15 milliGRAM(s) Oral daily  calcitriol   Capsule 0.25 MICROGram(s) Oral daily  ergocalciferol 75106 Unit(s) Oral <User Schedule>  finasteride 5 milliGRAM(s) Oral daily  heparin  Injectable 5000 Unit(s) SubCutaneous every 12 hours  HYDROmorphone   Tablet 2 milliGRAM(s) Oral every 8 hours PRN  lamoTRIgine 200 milliGRAM(s) Oral daily  pantoprazole    Tablet 40 milliGRAM(s) Oral before breakfast  simethicone 80 milliGRAM(s) Chew two times a day  sodium bicarbonate 650 milliGRAM(s) Oral three times a day  sodium chloride 0.45% 1000 milliLiter(s) IV Continuous <Continuous>  tamsulosin 0.4 milliGRAM(s) Oral at bedtime  traZODone 50 milliGRAM(s) Oral at bedtime      Vital Signs    T(F): 97.9 (19 @ 15:15), Max: 98.9 (19 @ 04:50)  HR: 70 (19 @ 15:15) (66 - 76)  BP: 118/67 (19 @ 15:15) (95/50 - 122/61)  ABP: --  RR: 18 (19 @ 15:15) (17 - 18)  SpO2: 97% (19 @ 15:15) (94% - 100%)  Wt(kg): --  CVP(cm H2O): --  CO: --  PCWP: --    I and O's:     @ 07:  -   @ 07:00  --------------------------------------------------------  IN:    Oral Fluid: 430 mL  Total IN: 430 mL    OUT:  Total OUT: 0 mL    Total NET: 430 mL       @ 07:  -   @ 18:45  --------------------------------------------------------  IN:    Oral Fluid: 430 mL  Total IN: 430 mL    OUT:  Total OUT: 0 mL    Total NET: 430 mL        Daily     Daily Weight in k.4 (25 Dec 2019 04:50)    PHYSICAL EXAM:  Constitutional: well developed, well nourished  and in nad  HEENT: PERRLA,  no icteric sclera and mild pallor of conjunctiva noted  Neck: No JVD, thyromegaly or adenopathy  Respiratory: reduced air entry lower lungs with no rales, wheezing or rhonchi  Cardiovascular: S1 and S2 normally heard  Gastrointestinal: soft, nondistended, nontender and normal bowel sounds heard  Extremities: No peripheral edema or cyanosis  Neurological: A/O x 3, no focal deficits  : No flank or cva tenderness palpated.  Skin: No rashes      LABS:    CBC:                          11.0   6.09  )-----------( 167      ( 25 Dec 2019 07:17 )             34.5           BMP:        142  |  116<H>  |  55<H>  ----------------------------<  90  4.9   |  16<L>  |  2.90<H>      141  |  117<H>  |  61<H>  ----------------------------<  115<H>  5.3   |  16<L>  |  3.16<H>  1224    140  |  117<H>  |  62<H>  ----------------------------<  100<H>  5.6<H>   |  14<L>  |  3.31<H>  12    139  |  117<H>  |  64<H>  ----------------------------<  99  6.2<HH>   |  12<L>  |  3.31<H>  12    141  |  119<H>  |  63<H>  ----------------------------<  102<H>  6.8<HH>   |  16<L>  |  3.73<H>      140  |  119<H>  |  62<H>  ----------------------------<  98  6.7<HH>   |  13<L>  |  3.85<H>    Ca    8.5      25 Dec 2019 07:24  Ca    10.0      24 Dec 2019 09:33  Ca    9.2      24 Dec 2019 04:03  Ca    9.6      24 Dec 2019 03:40  Ca    8.7      23 Dec 2019 17:35  Ca    8.3<L>      23 Dec 2019 16:25  Phos  3.5     12-25  Phos  4.5     -24  Mg     1.7     12-25  Mg     1.7         TPro  8.9<H>  /  Alb  4.0  /  TBili  0.4  /  DBili  x   /  AST  21  /  ALT  32  /  AlkPhos  143<H>    TPro  8.7<H>  /  Alb  3.6  /  TBili  0.4  /  DBili  x   /  AST  35  /  ALT  30  /  AlkPhos  149<H>        Vitamin D, 25-Hydroxy: 13.7 ng/mL ( @ 09:22)  Thyroid Stimulating Hormone, Serum: 3.33 uU/mL ( @ 04:06)      URINE STUDIES:        Sodium, Random Urine: 60 mmol/L (12-24 @ 00:16)  Osmolality, Random Urine: 392 mos/kg (12-24 @ 00:16)  Potassium, Random Urine: 22 mmol/L (12- @ 00:16)  Creatinine, Random Urine: 84 mg/dL (- @ 00:16)                  RADIOLOGY & ADDITIONAL STUDIES: pt seen and examined.    SUBJECTIVE:  pt feels fine and denies cp,sob,gi or gu symptoms  pt is voiding normally  REVIEW OF SYSTEMS:  CONSTITUTIONAL: No weakness, fevers or chills  RESPIRATORY: No cough, wheezing, hemoptysis; No shortness of breath  CARDIOVASCULAR: No chest pain or palpitations  GASTROINTESTINAL: No abdominal or epigastric pain. No nausea, vomiting, or hematemesis; No diarrhea or constipation. No melena or hematochezia.  GENITOURINARY: No dysuria, frequency , hematuria, flank pain or nocturia  NEUROLOGICAL: No numbness or weakness  SKIN: No itching, burning, rashes, or lesions   All other review of systems is negative unless indicated above    Current meds:    ARIPiprazole 15 milliGRAM(s) Oral daily  calcitriol   Capsule 0.25 MICROGram(s) Oral daily  ergocalciferol 40014 Unit(s) Oral <User Schedule>  finasteride 5 milliGRAM(s) Oral daily  heparin  Injectable 5000 Unit(s) SubCutaneous every 12 hours  HYDROmorphone   Tablet 2 milliGRAM(s) Oral every 8 hours PRN  lamoTRIgine 200 milliGRAM(s) Oral daily  pantoprazole    Tablet 40 milliGRAM(s) Oral before breakfast  simethicone 80 milliGRAM(s) Chew two times a day  sodium bicarbonate 650 milliGRAM(s) Oral three times a day  sodium chloride 0.45% 1000 milliLiter(s) IV Continuous <Continuous>  tamsulosin 0.4 milliGRAM(s) Oral at bedtime  traZODone 50 milliGRAM(s) Oral at bedtime      Vital Signs    T(F): 97.9 (19 @ 15:15), Max: 98.9 (19 @ 04:50)  HR: 70 (19 @ 15:15) (66 - 76)  BP: 118/67 (19 @ 15:15) (95/50 - 122/61)  ABP: --  RR: 18 (19 @ 15:15) (17 - 18)  SpO2: 97% (19 @ 15:15) (94% - 100%)  Wt(kg): --  CVP(cm H2O): --  CO: --  PCWP: --    I and O's:     @ 07: @ 07:00  --------------------------------------------------------  IN:    Oral Fluid: 430 mL  Total IN: 430 mL    OUT:  Total OUT: 0 mL    Total NET: 430 mL       @ 07: @ 18:45  --------------------------------------------------------  IN:    Oral Fluid: 430 mL  Total IN: 430 mL    OUT:  Total OUT: 0 mL    Total NET: 430 mL        Daily     Daily Weight in k.4 (25 Dec 2019 04:50)    PHYSICAL EXAM:  Constitutional: well developed, well nourished  and in nad  HEENT: PERRLA,  no icteric sclera and mild pallor of conjunctiva noted  Neck: No JVD, thyromegaly or adenopathy  Respiratory: reduced air entry lower lungs with no rales, wheezing or rhonchi  Cardiovascular: S1 and S2 normally heard  Gastrointestinal: soft, nondistended, nontender and normal bowel sounds heard  Extremities: No peripheral edema or cyanosis  Neurological: A/O x 3, no focal deficits  Skin: No rashes    LABS:    CBC:                          11.0   6.09  )-----------( 167      ( 25 Dec 2019 07:17 )             34.5           BMP:        142  |  116<H>  |  55<H>  ----------------------------<  90  4.9   |  16<L>  |  2.90<H>  12    141  |  117<H>  |  61<H>  ----------------------------<  115<H>  5.3   |  16<L>  |  3.16<H>  12    140  |  117<H>  |  62<H>  ----------------------------<  100<H>  5.6<H>   |  14<L>  |  3.31<H>      139  |  117<H>  |  64<H>  ----------------------------<  99  6.2<HH>   |  12<L>  |  3.31<H>      141  |  119<H>  |  63<H>  ----------------------------<  102<H>  6.8<HH>   |  16<L>  |  3.73<H>      140  |  119<H>  |  62<H>  ----------------------------<  98  6.7<HH>   |  13<L>  |  3.85<H>    Ca    8.5      25 Dec 2019 07:24  Ca    10.0      24 Dec 2019 09:33  Ca    9.2      24 Dec 2019 04:03  Ca    9.6      24 Dec 2019 03:40  Ca    8.7      23 Dec 2019 17:35  Ca    8.3<L>      23 Dec 2019 16:25  Phos  3.5     12-25  Phos  4.5       Mg     1.7     12-25  Mg     1.7         TPro  8.9<H>  /  Alb  4.0  /  TBili  0.4  /  DBili  x   /  AST  21  /  ALT  32  /  AlkPhos  143<H>    TPro  8.7<H>  /  Alb  3.6  /  TBili  0.4  /  DBili  x   /  AST  35  /  ALT  30  /  AlkPhos  149<H>        Vitamin D, 25-Hydroxy: 13.7 ng/mL ( @ 09:22)  Thyroid Stimulating Hormone, Serum: 3.33 uU/mL ( @ 04:06)      URINE STUDIES:  Sodium, Random Urine: 60 mmol/L ( @ 00:16)  Osmolality, Random Urine: 392 mos/kg ( @ 00:16)  Potassium, Random Urine: 22 mmol/L ( @ 00:16)  Creatinine, Random Urine: 84 mg/dL ( @ 00:16)                  RADIOLOGY & ADDITIONAL STUDIES:    < from: US Renal (19 @ 11:24) >  EXAM:  US KIDNEY(S)                            PROCEDURE DATE:  2019          INTERPRETATION:  CLINICAL INFORMATION: Renal insufficiency    COMPARISON: 8/10/2019    TECHNIQUE: Sonography of the kidneys and bladder.     FINDINGS: Increased echogenicity of the renal parenchyma suggests chronic medical renal disease.    Right kidney:  10.2 cm. No hydronephrosis or renal calculi. Cystic foci identified measuring up to 2.4 cm.    Left kidney:  11.7 cm. No hydronephrosis or renal calculi. Cystic foci identified measuring up to 4.0 cm.    Urinary bladder: Not visualized, likely nondistended.    The visualized segments of the abdominal aorta and IVC appear unremarkable.    IMPRESSION:     No evidence for bilateral hydronephrosis. Bilateral renal cysts. Increased echogenicity of the renal parenchyma suggests chronic medical renal disease.      < end of copied text >

## 2019-12-26 ENCOUNTER — TRANSCRIPTION ENCOUNTER (OUTPATIENT)
Age: 69
End: 2019-12-26

## 2019-12-26 VITALS
SYSTOLIC BLOOD PRESSURE: 129 MMHG | DIASTOLIC BLOOD PRESSURE: 76 MMHG | TEMPERATURE: 98 F | RESPIRATION RATE: 18 BRPM | OXYGEN SATURATION: 98 % | HEART RATE: 78 BPM

## 2019-12-26 LAB
ANION GAP SERPL CALC-SCNC: 5 MMOL/L — SIGNIFICANT CHANGE UP (ref 5–17)
BASOPHILS # BLD AUTO: 0.04 K/UL — SIGNIFICANT CHANGE UP (ref 0–0.2)
BASOPHILS NFR BLD AUTO: 0.7 % — SIGNIFICANT CHANGE UP (ref 0–2)
BUN SERPL-MCNC: 47 MG/DL — HIGH (ref 7–18)
CALCIUM SERPL-MCNC: 8.2 MG/DL — LOW (ref 8.4–10.5)
CHLORIDE SERPL-SCNC: 121 MMOL/L — HIGH (ref 96–108)
CO2 SERPL-SCNC: 20 MMOL/L — LOW (ref 22–31)
CREAT SERPL-MCNC: 2.69 MG/DL — HIGH (ref 0.5–1.3)
EOSINOPHIL # BLD AUTO: 0.28 K/UL — SIGNIFICANT CHANGE UP (ref 0–0.5)
EOSINOPHIL NFR BLD AUTO: 4.8 % — SIGNIFICANT CHANGE UP (ref 0–6)
GLUCOSE SERPL-MCNC: 89 MG/DL — SIGNIFICANT CHANGE UP (ref 70–99)
HCT VFR BLD CALC: 33.7 % — LOW (ref 39–50)
HGB BLD-MCNC: 10.8 G/DL — LOW (ref 13–17)
IMM GRANULOCYTES NFR BLD AUTO: 0.3 % — SIGNIFICANT CHANGE UP (ref 0–1.5)
LYMPHOCYTES # BLD AUTO: 1.1 K/UL — SIGNIFICANT CHANGE UP (ref 1–3.3)
LYMPHOCYTES # BLD AUTO: 18.7 % — SIGNIFICANT CHANGE UP (ref 13–44)
MAGNESIUM SERPL-MCNC: 1.8 MG/DL — SIGNIFICANT CHANGE UP (ref 1.6–2.6)
MCHC RBC-ENTMCNC: 26.9 PG — LOW (ref 27–34)
MCHC RBC-ENTMCNC: 32 GM/DL — SIGNIFICANT CHANGE UP (ref 32–36)
MCV RBC AUTO: 83.8 FL — SIGNIFICANT CHANGE UP (ref 80–100)
MONOCYTES # BLD AUTO: 0.55 K/UL — SIGNIFICANT CHANGE UP (ref 0–0.9)
MONOCYTES NFR BLD AUTO: 9.4 % — SIGNIFICANT CHANGE UP (ref 2–14)
NEUTROPHILS # BLD AUTO: 3.89 K/UL — SIGNIFICANT CHANGE UP (ref 1.8–7.4)
NEUTROPHILS NFR BLD AUTO: 66.1 % — SIGNIFICANT CHANGE UP (ref 43–77)
NRBC # BLD: 0 /100 WBCS — SIGNIFICANT CHANGE UP (ref 0–0)
PHOSPHATE SERPL-MCNC: 3.2 MG/DL — SIGNIFICANT CHANGE UP (ref 2.5–4.5)
PLATELET # BLD AUTO: 164 K/UL — SIGNIFICANT CHANGE UP (ref 150–400)
POTASSIUM SERPL-MCNC: 4.8 MMOL/L — SIGNIFICANT CHANGE UP (ref 3.5–5.3)
POTASSIUM SERPL-SCNC: 4.8 MMOL/L — SIGNIFICANT CHANGE UP (ref 3.5–5.3)
RBC # BLD: 4.02 M/UL — LOW (ref 4.2–5.8)
RBC # FLD: 16.2 % — HIGH (ref 10.3–14.5)
SODIUM SERPL-SCNC: 146 MMOL/L — HIGH (ref 135–145)
WBC # BLD: 5.88 K/UL — SIGNIFICANT CHANGE UP (ref 3.8–10.5)
WBC # FLD AUTO: 5.88 K/UL — SIGNIFICANT CHANGE UP (ref 3.8–10.5)

## 2019-12-26 PROCEDURE — 85027 COMPLETE CBC AUTOMATED: CPT

## 2019-12-26 PROCEDURE — 93005 ELECTROCARDIOGRAM TRACING: CPT

## 2019-12-26 PROCEDURE — 82728 ASSAY OF FERRITIN: CPT

## 2019-12-26 PROCEDURE — 82962 GLUCOSE BLOOD TEST: CPT

## 2019-12-26 PROCEDURE — 99285 EMERGENCY DEPT VISIT HI MDM: CPT | Mod: 25

## 2019-12-26 PROCEDURE — 83519 RIA NONANTIBODY: CPT

## 2019-12-26 PROCEDURE — 83935 ASSAY OF URINE OSMOLALITY: CPT

## 2019-12-26 PROCEDURE — 84100 ASSAY OF PHOSPHORUS: CPT

## 2019-12-26 PROCEDURE — 82607 VITAMIN B-12: CPT

## 2019-12-26 PROCEDURE — 71045 X-RAY EXAM CHEST 1 VIEW: CPT

## 2019-12-26 PROCEDURE — 84133 ASSAY OF URINE POTASSIUM: CPT

## 2019-12-26 PROCEDURE — 82570 ASSAY OF URINE CREATININE: CPT

## 2019-12-26 PROCEDURE — 83735 ASSAY OF MAGNESIUM: CPT

## 2019-12-26 PROCEDURE — 84156 ASSAY OF PROTEIN URINE: CPT

## 2019-12-26 PROCEDURE — 82746 ASSAY OF FOLIC ACID SERUM: CPT

## 2019-12-26 PROCEDURE — 84443 ASSAY THYROID STIM HORMONE: CPT

## 2019-12-26 PROCEDURE — 84300 ASSAY OF URINE SODIUM: CPT

## 2019-12-26 PROCEDURE — 84560 ASSAY OF URINE/URIC ACID: CPT

## 2019-12-26 PROCEDURE — 76775 US EXAM ABDO BACK WALL LIM: CPT

## 2019-12-26 PROCEDURE — 80053 COMPREHEN METABOLIC PANEL: CPT

## 2019-12-26 PROCEDURE — 80048 BASIC METABOLIC PNL TOTAL CA: CPT

## 2019-12-26 PROCEDURE — 36415 COLL VENOUS BLD VENIPUNCTURE: CPT

## 2019-12-26 PROCEDURE — 83615 LACTATE (LD) (LDH) ENZYME: CPT

## 2019-12-26 PROCEDURE — 82306 VITAMIN D 25 HYDROXY: CPT

## 2019-12-26 PROCEDURE — 83036 HEMOGLOBIN GLYCOSYLATED A1C: CPT

## 2019-12-26 RX ORDER — SODIUM ZIRCONIUM CYCLOSILICATE 10 G/10G
10 POWDER, FOR SUSPENSION ORAL
Qty: 70 | Refills: 0
Start: 2019-12-26 | End: 2020-01-01

## 2019-12-26 RX ORDER — HYDROMORPHONE HYDROCHLORIDE 2 MG/ML
1 INJECTION INTRAMUSCULAR; INTRAVENOUS; SUBCUTANEOUS
Qty: 63 | Refills: 0
Start: 2019-12-26 | End: 2020-01-15

## 2019-12-26 RX ORDER — HYDROMORPHONE HYDROCHLORIDE 2 MG/ML
2 INJECTION INTRAMUSCULAR; INTRAVENOUS; SUBCUTANEOUS
Qty: 0 | Refills: 0 | DISCHARGE
Start: 2019-12-26 | End: 2020-01-15

## 2019-12-26 RX ORDER — SIMETHICONE 80 MG/1
1 TABLET, CHEWABLE ORAL
Qty: 0 | Refills: 0 | DISCHARGE

## 2019-12-26 RX ORDER — SIMETHICONE 80 MG/1
1 TABLET, CHEWABLE ORAL
Qty: 0 | Refills: 0 | DISCHARGE
Start: 2019-12-26

## 2019-12-26 RX ORDER — ERGOCALCIFEROL 1.25 MG/1
1 CAPSULE ORAL
Qty: 8 | Refills: 0
Start: 2019-12-26 | End: 2020-02-19

## 2019-12-26 RX ORDER — HYDROMORPHONE HYDROCHLORIDE 2 MG/ML
1 INJECTION INTRAMUSCULAR; INTRAVENOUS; SUBCUTANEOUS
Qty: 0 | Refills: 0 | DISCHARGE

## 2019-12-26 RX ADMIN — LAMOTRIGINE 200 MILLIGRAM(S): 25 TABLET, ORALLY DISINTEGRATING ORAL at 12:34

## 2019-12-26 RX ADMIN — ARIPIPRAZOLE 15 MILLIGRAM(S): 15 TABLET ORAL at 12:34

## 2019-12-26 RX ADMIN — FINASTERIDE 5 MILLIGRAM(S): 5 TABLET, FILM COATED ORAL at 12:34

## 2019-12-26 RX ADMIN — PANTOPRAZOLE SODIUM 40 MILLIGRAM(S): 20 TABLET, DELAYED RELEASE ORAL at 05:36

## 2019-12-26 RX ADMIN — SIMETHICONE 80 MILLIGRAM(S): 80 TABLET, CHEWABLE ORAL at 05:35

## 2019-12-26 RX ADMIN — Medication 650 MILLIGRAM(S): at 05:36

## 2019-12-26 RX ADMIN — CALCITRIOL 0.25 MICROGRAM(S): 0.5 CAPSULE ORAL at 12:34

## 2019-12-26 NOTE — DISCHARGE NOTE NURSING/CASE MANAGEMENT/SOCIAL WORK - PATIENT PORTAL LINK FT
You can access the FollowMyHealth Patient Portal offered by [facility] by registering at the following website: [email or web address]. By joining [facility]’s FollowMyHealth portal, you will also be able to view your health information using other applications (apps) compatible with our system.

## 2019-12-26 NOTE — DISCHARGE NOTE PROVIDER - HOSPITAL COURSE
69 year old Male from [facility], with PMHx of prostate Ca (s/p radiation in 2015, now in remission), asthma, COPD (not on O2), HTN, HLD, Bipolar, anemia, GERD, Ileostomy s/p sigmoid resection, PAD, and CKD presented to ED for hyperkalemia.     Per pt he had routine blood work and pt was told his potassium his very high. Pt states he follows nephrologist and was told his potassium is getting higher and was advised to avoid food in high potassium. Pt denies any recent infection, abx treatment, any NSAID use, fever, URI, chills, headache, n/v/d, cp, abdominal pain, palpitation, dizziness, urinary problem. Pt states he has sob secondary to copd but not unusual.           Full code         Hyperkalemia        Patient was admitted with hyperkalemia to 6.8. EKG was done promptly and showed normal sinus rhythm. Patient received albuterol, insulin, and calcium gluconate. Patient was also provided Lokelma and IV Lasix to correct the hyperkalemia. Following those intervention the patient's potassium corrected promptly to 4.9 on discharge. Patient was instructed to continue taking Lokelma 10 grams daily for 1 week to prevent the hyperkalemia again and follow up with a repeat BMP in 1 week with Dr. Trivedi for proper resolution.         CKD        Patient has a known history of CKD stage 3 due to potential atherosclerotic disease. Renal U/S was performed and showed bilateral renal cysts and chronic medical renal disease. Patient has a baseline serum creatinine around 2.8. Patient developed a mild PAWAN and received IV fluids with an amp of Bicarbonate for over 24 hours with proper resolution of the PAWAN and was discharged with a creatinine of 2.6. Patient was instructed to take supplemental Bicarbonate daily and follow up with Dr. Trivedi in 1 week for improvement following a repeat BMP in 1 week.         Patient was discharged hemodynamically stable and afebrile.

## 2019-12-26 NOTE — DISCHARGE NOTE PROVIDER - NSDCCPCAREPLAN_GEN_ALL_CORE_FT
PRINCIPAL DISCHARGE DIAGNOSIS  Diagnosis: Hyperkalemia  Assessment and Plan of Treatment: You were admitted with hyperkalemia to 6.8. EKG was done promptly and showed normal sinus rhythm. You received albuterol, insulin, and calcium gluconate. You were also provided Lokelma and IV Lasix to correct the hyperkalemia. Following those intervention your potassium corrected promptly to 4.9 on discharge.Please continue taking Lokelma 10 grams daily for 1 week to prevent the hyperkalemia again and follow up with a repeat BMP in 1 week with Dr. Trivedi for proper resolution.      SECONDARY DISCHARGE DIAGNOSES  Diagnosis: CKD (chronic kidney disease)  Assessment and Plan of Treatment: You have a known history of Chronic Kidney Disease stage 3 due to potential atherosclerotic disease. Renal ultrasound was performed and showed bilateral renal cysts and chronic medical renal disease. You have a baseline serum creatinine around 2.8 and developed a mild acute kidney injury (PAWAN) and received IV fluids with an amp of Bicarbonate for over 24 hours with proper resolution of the PAWAN and were discharged with a creatinine of 2.6. Please continue to take supplemental Bicarbonate daily and follow up with Dr. Trivedi in 1 week for improvement following a repeat BMP in 1 week.

## 2019-12-26 NOTE — DISCHARGE NOTE PROVIDER - NSDCMRMEDTOKEN_GEN_ALL_CORE_FT
Abilify 10 mg oral tablet: 1.5 tab(s) orally once a day    calcitriol 0.25 mcg oral capsule: 1 cap(s) orally once a day  Dilaudid 2 mg oral tablet: 1 tab(s) orally every 8 hours, As needed, Severe Pain (7 - 10) MDD:6 mg  ergocalciferol 50,000 intl units (1.25 mg) oral capsule: 1 cap(s) orally once a week   ferrous sulfate 325 mg (65 mg elemental iron) oral tablet: 1 tab(s) orally once a day  Flomax 0.4 mg oral capsule: 1 cap(s) orally once a day  gabapentin 300 mg oral tablet: 1 tab(s) orally 3 times a day  halobetasol 0.05% topical cream: Apply topically to affected area once a day  lamoTRIgine 200 mg oral tablet: 1 tab(s) orally once a day  lisinopril 5 mg oral tablet: 1 tab(s) orally once a day  Lokelma 10 g oral powder for reconstitution: 10 gram(s) orally once a day   Proscar 5 mg oral tablet: 1 tab(s) orally once a day  Protonix 40 mg oral delayed release tablet: 1 tab(s) orally once a day  simethicone 80 mg oral tablet, chewable: 1 tab(s) orally 2 times a day  sodium bicarbonate 650 mg oral tablet: 1 tab(s) orally 3 times a day (with meals)  traZODone 50 mg oral tablet: 0.5 tab(s) orally once a day (at bedtime)  Ventolin 90 mcg/inh inhalation aerosol:

## 2019-12-26 NOTE — DISCHARGE NOTE PROVIDER - PROVIDER TOKENS
PROVIDER:[TOKEN:[[id number]],FOLLOWUP:[2 weeks]],PROVIDER:[TOKEN:[[id number]],FOLLOWUP:[2 weeks]],PROVIDER:[TOKEN:[[id number]],FOLLOWUP:[2 weeks]],PROVIDER:[TOKEN:[[id number]],FOLLOWUP:[2 weeks]]

## 2019-12-26 NOTE — DISCHARGE NOTE PROVIDER - CARE PROVIDER_API CALL
Gabino Rush)  Medicine  [address] [address] [place] [address]  Phone: [phone]  Fax: [phone]  Follow Up Time: 2 weeks    Luis Antonio Fair)  Internal Medicine  [address] [place] [address]  Phone: [phone]  Fax: [phone]  Follow Up Time: 2 weeks    Nallely Curran)  Internal Medicine; Nephrology  [address] [place] [address]  Phone: [phone]  Fax: [phone]  Follow Up Time: 2 weeks    Kendall Hannon  Follow Up Time: 2 weeks

## 2020-01-01 NOTE — REASON FOR VISIT
[place] Progress Note  This is a  male born on 2022. Patient Active Problem List   Diagnosis    Liveborn infant by vaginal delivery    Term birth of  male   Faisal Carballo Nuchal cord, single gestation       Vital Signs:  Pulse 148   Temp 98.4 °F (36.9 °C)   Resp 46   Ht 52.1 cm Comment: Filed from Delivery Summary  Wt 3200 g Comment: Filed from Delivery Summary  HC 13.25\" (33.7 cm) Comment: Filed from Delivery Summary  BMI 11.80 kg/m²     Birth Weight: 112.9 oz (3200 g)     Wt Readings from Last 3 Encounters:   20 3200 g (38 %, Z= -0.30)*     * Growth percentiles are based on WHO (Boys, 0-2 years) data. Percent Weight Change Since Birth: 0%     Feeding Method Used: Breastfeeding    Recent Labs:   No results found for any previous visit. Immunization History   Administered Date(s) Administered    Hepatitis B Ped/Adol (Engerix-B, Recombivax HB) 2020         Physical Exam:  General Appearance: Healthy-appearing, vigorous infant, strong cry  Skin:   no jaundice;  no cyanosis; skin intact  Head:  Sutures mobile, fontanelles normal size  Eyes:   Clear  Mouth/ Throat: Lips, tongue and mucosa are pink, moist and intact  Neck:  Supple, symmetrical with full ROM  Chest:   Lungs clear to auscultation, respirations unlabored                Heart:   Regular rate & rhythm, normal S1 S2, no murmurs  Pulses: Strong equal brachial & femoral pulses, capillary refill <3 sec  Abdomen: Soft with normal bowel sounds, non-tender, no masses, no HSM  Hips:  Negative Rodriguez & Ortolani. Gluteal creases equal  :  Normal male genitalia  Extremities: Well-perfused, warm and dry  Neuro: Easily aroused. Positive root & suck. Symmetric tone, strength & reflexes.      Assessment: Term male infant, on exam infant exhibits normal tone suck and cry, is po feeding well,  breast , voided times one , due to void      Immunization History   Administered Date(s) Administered    Hepatitis B Ped/Adol (Engerix-B, Recombivax HB) 2020                 Total time with face to face with patient, exam and assessment, review of data and plan of care is 25 minutes                       Plan:  Continue Routine Care. Dr Catracho Ellsworth to review plan of care with mom  Anticipate discharge in 1 day(s).     Yennifer Cain ,2020,8:46 AM [Follow-up Visit ___] : a follow-up visit  for [unfilled]

## 2020-01-23 ENCOUNTER — INPATIENT (INPATIENT)
Facility: HOSPITAL | Age: 70
LOS: 4 days | Discharge: LEFT BEFORE DISCHARGE | DRG: 683 | End: 2020-01-28
Attending: INTERNAL MEDICINE | Admitting: INTERNAL MEDICINE
Payer: MEDICARE

## 2020-01-23 VITALS
OXYGEN SATURATION: 98 % | DIASTOLIC BLOOD PRESSURE: 78 MMHG | HEIGHT: 68 IN | HEART RATE: 77 BPM | TEMPERATURE: 98 F | SYSTOLIC BLOOD PRESSURE: 112 MMHG | WEIGHT: 201.94 LBS | RESPIRATION RATE: 16 BRPM

## 2020-01-23 DIAGNOSIS — E87.2 ACIDOSIS: ICD-10-CM

## 2020-01-23 DIAGNOSIS — I10 ESSENTIAL (PRIMARY) HYPERTENSION: ICD-10-CM

## 2020-01-23 DIAGNOSIS — N17.9 ACUTE KIDNEY FAILURE, UNSPECIFIED: ICD-10-CM

## 2020-01-23 DIAGNOSIS — M54.9 DORSALGIA, UNSPECIFIED: ICD-10-CM

## 2020-01-23 DIAGNOSIS — E87.5 HYPERKALEMIA: ICD-10-CM

## 2020-01-23 DIAGNOSIS — D64.9 ANEMIA, UNSPECIFIED: ICD-10-CM

## 2020-01-23 DIAGNOSIS — Z71.89 OTHER SPECIFIED COUNSELING: ICD-10-CM

## 2020-01-23 DIAGNOSIS — Z93.9 ARTIFICIAL OPENING STATUS, UNSPECIFIED: Chronic | ICD-10-CM

## 2020-01-23 DIAGNOSIS — Z29.9 ENCOUNTER FOR PROPHYLACTIC MEASURES, UNSPECIFIED: ICD-10-CM

## 2020-01-23 LAB
ALBUMIN SERPL ELPH-MCNC: 3.8 G/DL — SIGNIFICANT CHANGE UP (ref 3.5–5)
ALP SERPL-CCNC: 105 U/L — SIGNIFICANT CHANGE UP (ref 40–120)
ALT FLD-CCNC: 22 U/L DA — SIGNIFICANT CHANGE UP (ref 10–60)
ANION GAP SERPL CALC-SCNC: 4 MMOL/L — LOW (ref 5–17)
APPEARANCE UR: CLEAR — SIGNIFICANT CHANGE UP
AST SERPL-CCNC: 30 U/L — SIGNIFICANT CHANGE UP (ref 10–40)
BASE EXCESS BLDV CALC-SCNC: -12.2 MMOL/L — LOW (ref -2–2)
BASOPHILS # BLD AUTO: 0.05 K/UL — SIGNIFICANT CHANGE UP (ref 0–0.2)
BASOPHILS NFR BLD AUTO: 0.8 % — SIGNIFICANT CHANGE UP (ref 0–2)
BILIRUB SERPL-MCNC: 0.5 MG/DL — SIGNIFICANT CHANGE UP (ref 0.2–1.2)
BILIRUB UR-MCNC: NEGATIVE — SIGNIFICANT CHANGE UP
BLOOD GAS COMMENTS, VENOUS: SIGNIFICANT CHANGE UP
BUN SERPL-MCNC: 79 MG/DL — HIGH (ref 7–18)
CALCIUM SERPL-MCNC: 8.3 MG/DL — LOW (ref 8.4–10.5)
CHLORIDE SERPL-SCNC: 123 MMOL/L — HIGH (ref 96–108)
CK MB BLD-MCNC: 3 % — SIGNIFICANT CHANGE UP (ref 0–3.5)
CK MB CFR SERPL CALC: 23.3 NG/ML — HIGH (ref 0–3.6)
CK SERPL-CCNC: 767 U/L — HIGH (ref 35–232)
CO2 SERPL-SCNC: 12 MMOL/L — LOW (ref 22–31)
COLOR SPEC: YELLOW — SIGNIFICANT CHANGE UP
CREAT ?TM UR-MCNC: 93 MG/DL — SIGNIFICANT CHANGE UP
CREAT SERPL-MCNC: 4.26 MG/DL — HIGH (ref 0.5–1.3)
DIFF PNL FLD: ABNORMAL
EOSINOPHIL # BLD AUTO: 0.55 K/UL — HIGH (ref 0–0.5)
EOSINOPHIL NFR BLD AUTO: 9.2 % — HIGH (ref 0–6)
GLUCOSE SERPL-MCNC: 83 MG/DL — SIGNIFICANT CHANGE UP (ref 70–99)
GLUCOSE UR QL: NEGATIVE — SIGNIFICANT CHANGE UP
HCO3 BLDV-SCNC: 14 MMOL/L — LOW (ref 21–29)
HCT VFR BLD CALC: 28.9 % — LOW (ref 39–50)
HGB BLD-MCNC: 9 G/DL — LOW (ref 13–17)
HOROWITZ INDEX BLDV+IHG-RTO: 21 — SIGNIFICANT CHANGE UP
IMM GRANULOCYTES NFR BLD AUTO: 0.5 % — SIGNIFICANT CHANGE UP (ref 0–1.5)
KETONES UR-MCNC: NEGATIVE — SIGNIFICANT CHANGE UP
LACTATE SERPL-SCNC: 0.5 MMOL/L — LOW (ref 0.7–2)
LEUKOCYTE ESTERASE UR-ACNC: ABNORMAL
LYMPHOCYTES # BLD AUTO: 0.84 K/UL — LOW (ref 1–3.3)
LYMPHOCYTES # BLD AUTO: 14 % — SIGNIFICANT CHANGE UP (ref 13–44)
MCHC RBC-ENTMCNC: 27.3 PG — SIGNIFICANT CHANGE UP (ref 27–34)
MCHC RBC-ENTMCNC: 31.1 GM/DL — LOW (ref 32–36)
MCV RBC AUTO: 87.6 FL — SIGNIFICANT CHANGE UP (ref 80–100)
MONOCYTES # BLD AUTO: 0.44 K/UL — SIGNIFICANT CHANGE UP (ref 0–0.9)
MONOCYTES NFR BLD AUTO: 7.3 % — SIGNIFICANT CHANGE UP (ref 2–14)
NEUTROPHILS # BLD AUTO: 4.1 K/UL — SIGNIFICANT CHANGE UP (ref 1.8–7.4)
NEUTROPHILS NFR BLD AUTO: 68.2 % — SIGNIFICANT CHANGE UP (ref 43–77)
NITRITE UR-MCNC: NEGATIVE — SIGNIFICANT CHANGE UP
NRBC # BLD: 0 /100 WBCS — SIGNIFICANT CHANGE UP (ref 0–0)
PCO2 BLDV: 38 MMHG — SIGNIFICANT CHANGE UP (ref 35–50)
PH BLDV: 7.21 — LOW (ref 7.35–7.45)
PH UR: 5 — SIGNIFICANT CHANGE UP (ref 5–8)
PLATELET # BLD AUTO: 219 K/UL — SIGNIFICANT CHANGE UP (ref 150–400)
PO2 BLDV: 33 MMHG — SIGNIFICANT CHANGE UP (ref 25–45)
POTASSIUM SERPL-MCNC: 7.2 MMOL/L — CRITICAL HIGH (ref 3.5–5.3)
POTASSIUM SERPL-SCNC: 7.2 MMOL/L — CRITICAL HIGH (ref 3.5–5.3)
PROT SERPL-MCNC: 8.6 G/DL — HIGH (ref 6–8.3)
PROT UR-MCNC: 30 MG/DL
RBC # BLD: 3.3 M/UL — LOW (ref 4.2–5.8)
RBC # FLD: 16.6 % — HIGH (ref 10.3–14.5)
SAO2 % BLDV: 54 % — LOW (ref 67–88)
SODIUM SERPL-SCNC: 139 MMOL/L — SIGNIFICANT CHANGE UP (ref 135–145)
SODIUM UR-SCNC: 41 MMOL/L — SIGNIFICANT CHANGE UP (ref 40–220)
SP GR SPEC: 1.01 — SIGNIFICANT CHANGE UP (ref 1.01–1.02)
TROPONIN I SERPL-MCNC: <0.015 NG/ML — SIGNIFICANT CHANGE UP (ref 0–0.04)
UROBILINOGEN FLD QL: NEGATIVE — SIGNIFICANT CHANGE UP
WBC # BLD: 6.01 K/UL — SIGNIFICANT CHANGE UP (ref 3.8–10.5)
WBC # FLD AUTO: 6.01 K/UL — SIGNIFICANT CHANGE UP (ref 3.8–10.5)

## 2020-01-23 PROCEDURE — 99285 EMERGENCY DEPT VISIT HI MDM: CPT

## 2020-01-23 PROCEDURE — 72131 CT LUMBAR SPINE W/O DYE: CPT | Mod: 26

## 2020-01-23 RX ORDER — FINASTERIDE 5 MG/1
5 TABLET, FILM COATED ORAL DAILY
Refills: 0 | Status: DISCONTINUED | OUTPATIENT
Start: 2020-01-23 | End: 2020-01-28

## 2020-01-23 RX ORDER — ALBUTEROL 90 UG/1
2.5 AEROSOL, METERED ORAL ONCE
Refills: 0 | Status: COMPLETED | OUTPATIENT
Start: 2020-01-23 | End: 2020-01-23

## 2020-01-23 RX ORDER — INSULIN HUMAN 100 [IU]/ML
10 INJECTION, SOLUTION SUBCUTANEOUS ONCE
Refills: 0 | Status: COMPLETED | OUTPATIENT
Start: 2020-01-23 | End: 2020-01-23

## 2020-01-23 RX ORDER — TAMSULOSIN HYDROCHLORIDE 0.4 MG/1
0.4 CAPSULE ORAL AT BEDTIME
Refills: 0 | Status: DISCONTINUED | OUTPATIENT
Start: 2020-01-23 | End: 2020-01-28

## 2020-01-23 RX ORDER — HEPARIN SODIUM 5000 [USP'U]/ML
5000 INJECTION INTRAVENOUS; SUBCUTANEOUS EVERY 12 HOURS
Refills: 0 | Status: DISCONTINUED | OUTPATIENT
Start: 2020-01-23 | End: 2020-01-28

## 2020-01-23 RX ORDER — ARIPIPRAZOLE 15 MG/1
15 TABLET ORAL DAILY
Refills: 0 | Status: DISCONTINUED | OUTPATIENT
Start: 2020-01-23 | End: 2020-01-28

## 2020-01-23 RX ORDER — FERROUS SULFATE 325(65) MG
325 TABLET ORAL DAILY
Refills: 0 | Status: DISCONTINUED | OUTPATIENT
Start: 2020-01-23 | End: 2020-01-28

## 2020-01-23 RX ORDER — HYDROMORPHONE HYDROCHLORIDE 2 MG/ML
0.5 INJECTION INTRAMUSCULAR; INTRAVENOUS; SUBCUTANEOUS EVERY 6 HOURS
Refills: 0 | Status: DISCONTINUED | OUTPATIENT
Start: 2020-01-23 | End: 2020-01-28

## 2020-01-23 RX ORDER — DEXTROSE 50 % IN WATER 50 %
50 SYRINGE (ML) INTRAVENOUS ONCE
Refills: 0 | Status: COMPLETED | OUTPATIENT
Start: 2020-01-23 | End: 2020-01-23

## 2020-01-23 RX ORDER — SODIUM BICARBONATE 1 MEQ/ML
50 SYRINGE (ML) INTRAVENOUS ONCE
Refills: 0 | Status: COMPLETED | OUTPATIENT
Start: 2020-01-23 | End: 2020-01-23

## 2020-01-23 RX ORDER — PANTOPRAZOLE SODIUM 20 MG/1
40 TABLET, DELAYED RELEASE ORAL
Refills: 0 | Status: DISCONTINUED | OUTPATIENT
Start: 2020-01-23 | End: 2020-01-28

## 2020-01-23 RX ORDER — LAMOTRIGINE 25 MG/1
200 TABLET, ORALLY DISINTEGRATING ORAL DAILY
Refills: 0 | Status: DISCONTINUED | OUTPATIENT
Start: 2020-01-23 | End: 2020-01-28

## 2020-01-23 RX ORDER — GABAPENTIN 400 MG/1
300 CAPSULE ORAL THREE TIMES A DAY
Refills: 0 | Status: DISCONTINUED | OUTPATIENT
Start: 2020-01-23 | End: 2020-01-26

## 2020-01-23 RX ORDER — CALCITRIOL 0.5 UG/1
0.25 CAPSULE ORAL DAILY
Refills: 0 | Status: DISCONTINUED | OUTPATIENT
Start: 2020-01-23 | End: 2020-01-27

## 2020-01-23 RX ORDER — SODIUM CHLORIDE 9 MG/ML
1000 INJECTION INTRAMUSCULAR; INTRAVENOUS; SUBCUTANEOUS ONCE
Refills: 0 | Status: COMPLETED | OUTPATIENT
Start: 2020-01-23 | End: 2020-01-23

## 2020-01-23 RX ORDER — SODIUM BICARBONATE 1 MEQ/ML
650 SYRINGE (ML) INTRAVENOUS THREE TIMES A DAY
Refills: 0 | Status: DISCONTINUED | OUTPATIENT
Start: 2020-01-23 | End: 2020-01-27

## 2020-01-23 RX ORDER — CALCIUM GLUCONATE 100 MG/ML
1 VIAL (ML) INTRAVENOUS ONCE
Refills: 0 | Status: COMPLETED | OUTPATIENT
Start: 2020-01-23 | End: 2020-01-23

## 2020-01-23 RX ORDER — TRAZODONE HCL 50 MG
25 TABLET ORAL AT BEDTIME
Refills: 0 | Status: DISCONTINUED | OUTPATIENT
Start: 2020-01-23 | End: 2020-01-28

## 2020-01-23 RX ORDER — HYDROMORPHONE HYDROCHLORIDE 2 MG/ML
0.5 INJECTION INTRAMUSCULAR; INTRAVENOUS; SUBCUTANEOUS ONCE
Refills: 0 | Status: DISCONTINUED | OUTPATIENT
Start: 2020-01-23 | End: 2020-01-23

## 2020-01-23 RX ORDER — LISINOPRIL 2.5 MG/1
1 TABLET ORAL
Qty: 0 | Refills: 0 | DISCHARGE

## 2020-01-23 RX ORDER — MORPHINE SULFATE 50 MG/1
4 CAPSULE, EXTENDED RELEASE ORAL ONCE
Refills: 0 | Status: DISCONTINUED | OUTPATIENT
Start: 2020-01-23 | End: 2020-01-23

## 2020-01-23 RX ADMIN — MORPHINE SULFATE 4 MILLIGRAM(S): 50 CAPSULE, EXTENDED RELEASE ORAL at 17:21

## 2020-01-23 RX ADMIN — Medication 100 GRAM(S): at 19:43

## 2020-01-23 RX ADMIN — ALBUTEROL 2.5 MILLIGRAM(S): 90 AEROSOL, METERED ORAL at 19:42

## 2020-01-23 RX ADMIN — HYDROMORPHONE HYDROCHLORIDE 0.5 MILLIGRAM(S): 2 INJECTION INTRAMUSCULAR; INTRAVENOUS; SUBCUTANEOUS at 23:19

## 2020-01-23 RX ADMIN — SODIUM CHLORIDE 1000 MILLILITER(S): 9 INJECTION INTRAMUSCULAR; INTRAVENOUS; SUBCUTANEOUS at 21:42

## 2020-01-23 RX ADMIN — MORPHINE SULFATE 4 MILLIGRAM(S): 50 CAPSULE, EXTENDED RELEASE ORAL at 19:45

## 2020-01-23 RX ADMIN — Medication 1 GRAM(S): at 19:45

## 2020-01-23 RX ADMIN — INSULIN HUMAN 10 UNIT(S): 100 INJECTION, SOLUTION SUBCUTANEOUS at 19:44

## 2020-01-23 RX ADMIN — Medication 50 MILLILITER(S): at 21:42

## 2020-01-23 RX ADMIN — Medication 50 MILLIEQUIVALENT(S): at 19:44

## 2020-01-23 NOTE — H&P ADULT - PROBLEM SELECTOR PLAN 3
-Patient is noted to have NAG- metabolic acidosis   -CO2 12 which improved to 16 after sodium bicarb 50meq IV push   -VBG noted   -C/w sodium bicarb 650 TID, home dose   -Daily CO2 -patient c/o severe back pain which is chronic, however aggravated more recently. Also c/o urinary symptoms.   -In the setting of back pain and positive UA, suspect pyelonephritis, however back pain could be secondary to arthritis, disc herniation.  -CT lumbar spine: There is no fracture or compression or subluxation or abel osseous destruction. Degenerative disc disease.   -Follow urine cx   -Start IV Ceftriaxone   -C/w pain medications, he was on Dilaudid at home as well   -Follow renal and bladder sonogram.

## 2020-01-23 NOTE — H&P ADULT - PROBLEM SELECTOR PLAN 8
Primary team to discuss, patient currently in severe pain. IMPROVE VTE score:  [ ] Previous VTE                                                3  [ ] Thrombophilia                                             2  [ ] Lower limb paralysis                                  2        (unable to hold up >15 seconds)    [ ] Current Cancer (within 6 months)            2   x[] Immobilization > 24 hrs                              1  [ ] ICU/CCU stay > 24 hours                            1  [x] Age > 60                                                         1  heparin

## 2020-01-23 NOTE — ED PROVIDER NOTE - OBJECTIVE STATEMENT
70 y/o M with a PMHx of Prostate CA, CKD, PAD, HLD, HTN, IBD, Bipolar Disorder, COPD, Anemia and a PSHx of Ostomy presents to ED from [facility] c/o of back pain and leakage from colostomy. Pt denies abd pain. States that overall colostomy is functioning normally but reports a small hole that is allowing some leakage of brown stool onto him. NKDA.

## 2020-01-23 NOTE — H&P ADULT - PROBLEM SELECTOR PLAN 5
-Patient has hx of HTN, he is on lisinopril  -Will hold in the setting of PAWAN  -start amlodipine -patient p/w hyperkalemia secondary to CKD, poor compliance to renal diet  -No EKG changes were noted  -S/p IV cocktail   -Daily BMP  -He was given lokelma TID during his previous hospital visit.**

## 2020-01-23 NOTE — H&P ADULT - NSHPPHYSICALEXAM_GEN_ALL_CORE
CONSTITUTIONAL: Well appearing, well nourished, awake, alert and in no apparent distress  ENMT: Airway patent, Nasal mucosa clear. Mouth with normal mucosa. Throat has no vesicles, no oropharyngeal exudates and uvula is midline.  EYES: Clear bilaterally, pupils equal, round and reactive to light. EOMI.  CARDIAC: Normal rate, regular rhythm.  Heart sounds S1, S2.  No murmurs, rubs or gallops   RESPIRATORY: Breath sounds clear and equal bilaterally. No wheezes, rhales or rhonchi  MUSCULOSKELETAL: Spine appears normal, ROM could not be assessed , patient un -cooperative   EXTREMITIES: No edema, cyanosis or deformity   NEUROLOGICAL: Alert and oriented, no focal deficits, no motor or sensory deficits, SLR positive   SKIN: No rash, skin turgor   Abdomen: soft, non tender, left sided ileostomy bag with greenish material

## 2020-01-23 NOTE — H&P ADULT - PROBLEM SELECTOR PLAN 4
-patient p/w hyperkalemia secondary to CKD, poor compliance to renal diet  -No EKG changes were noted  -S/p IV cocktail   -Daily BMP  -He was given lokelma TID during his previous hospital visit.** -Patient is noted to have NAG- metabolic acidosis   -CO2 12 which improved to 16 after sodium bicarb 50meq IV push   -VBG noted   -C/w sodium bicarb 650 TID, home dose   -Daily CO2

## 2020-01-23 NOTE — H&P ADULT - PROBLEM SELECTOR PLAN 6
-Patient has anemia likely of chronic disease   Had recent anemia panel in December, no utility in repeating.   -C/w ferrous sulfate -Patient has hx of HTN, he is on lisinopril  -Will hold in the setting of PAWAN  -start amlodipine

## 2020-01-23 NOTE — H&P ADULT - PROBLEM SELECTOR PLAN 2
-patient c/o severe back pain which is chronic, however aggravated more recently. Also c/o urinary symptoms.   -In the setting of back pain and positive UA, suspect pyelonephritis, however back pain could be secondary to arthritis, disc herniation.  -CT lumbar spine: There is no fracture or compression or subluxation or abel osseous destruction. Degenerative disc disease.   -Follow urine cx   -Start IV Ceftriaxone   -C/w pain medications, he was on Dilaudid at home as well   -Follow renal and bladder sonogram. -In the setting of back pain and positive UA, suspect pyelonephritis, however back pain could be secondary to arthritis, disc herniation.  -CT lumbar spine: There is no fracture or compression or subluxation or abel osseous destruction. Degenerative disc disease.   -Will get MR Lumbosacral spine.   -Neuro Dr Cisse

## 2020-01-23 NOTE — H&P ADULT - HISTORY OF PRESENT ILLNESS
69 year old Male from [facility], with PMHx of prostate Ca (s/p radiation in 2015, now in remission), asthma, COPD (not on O2), HTN, HLD, Bipolar, anemia, GERD, Ileostomy s/p sigmoid resection, PAD, and CKD presented to ED with severe left lower back pain. Patient states that he has chronic back ache but for the last 2 weeks his pain has worsened to an extent that he has difficulty in ambulation given pain, denies any weakness, numbness, urinary or fecal incontinence. Patient reports that he has noticed increased out-put in his colostomy bag for last few days, also complaints of urinary frequency and burning micturition.    In ED, patient was noted to have abnormal labs , Cr 4.26, his baseline around a month ago was 2.6. Potassium was  noted to be 7.2, with bicarb of 12.

## 2020-01-23 NOTE — H&P ADULT - PROBLEM SELECTOR PLAN 1
-Patient is noted to have PAWAN on CKD stage 4  -Acute kidney injury could be pre-renal in the setting of increased ileostomy out-put and UTI.   -Follow urine lytes and osmolarity  -C/w trial of mild IV hydration   --Keep patient euvolemic and renal diet  -Avoid Nephrotoxic Meds/ Agents such as NSAIDs, Gadolinium contrast, Phosphate containing enemas, etc..)  -Adjust Medications according to eGFR  -Nephro consult- Dr Curran

## 2020-01-23 NOTE — ED ADULT NURSE NOTE - OBJECTIVE STATEMENT
general back pain for couple of days. states ileostomy bag was leaking from the bottom yesterday, but noted no leak today.

## 2020-01-23 NOTE — H&P ADULT - ASSESSMENT
69 year old Male from [facility], with PMHx of prostate Ca (s/p radiation in 2015, now in remission), asthma, COPD (not on O2), HTN, HLD, Bipolar, anemia, GERD, Ileostomy s/p sigmoid resection, PAD, and CKD presented to ED with severe left lower back pain, also complaints of urinary frequency and dysuria.   In ED, patient was noted to have abnormal labs , Cr 4.26, his baseline around a month ago was 2.6. Potassium was  noted to be 7.2, with bicarb of 12. Patient is admitted for back pain, suspected pyelonephritis, with PAWAN on CKD

## 2020-01-23 NOTE — ED PROVIDER NOTE - PROGRESS NOTE DETAILS
Jake: Plan of care signed out by Dr. Christiansen. Patient with PAWAN on CKD. No EKG changes. Discussed case with Dr. Cheek who rec repeat BMP after IVFs. Will see in consult. Dr. Rush aware of lab results.

## 2020-01-23 NOTE — ED PROVIDER NOTE - CLINICAL SUMMARY MEDICAL DECISION MAKING FREE TEXT BOX
Back pain severe as per pt not responding to oral Dilaudid. No focal deficits on exam. Order imaging, analgesia. Likely admission.

## 2020-01-23 NOTE — H&P ADULT - PROBLEM SELECTOR PLAN 7
IMPROVE VTE score:  [ ] Previous VTE                                                3  [ ] Thrombophilia                                             2  [ ] Lower limb paralysis                                  2        (unable to hold up >15 seconds)    [ ] Current Cancer (within 6 months)            2   x[] Immobilization > 24 hrs                              1  [ ] ICU/CCU stay > 24 hours                            1  [x] Age > 60                                                         1  heparin -Patient has anemia likely of chronic disease   Had recent anemia panel in December, no utility in repeating.   -C/w ferrous sulfate

## 2020-01-24 DIAGNOSIS — N39.0 URINARY TRACT INFECTION, SITE NOT SPECIFIED: ICD-10-CM

## 2020-01-24 LAB
ALBUMIN SERPL ELPH-MCNC: 3.3 G/DL — LOW (ref 3.5–5)
ALP SERPL-CCNC: 91 U/L — SIGNIFICANT CHANGE UP (ref 40–120)
ALT FLD-CCNC: 19 U/L DA — SIGNIFICANT CHANGE UP (ref 10–60)
ANION GAP SERPL CALC-SCNC: 4 MMOL/L — LOW (ref 5–17)
ANION GAP SERPL CALC-SCNC: 5 MMOL/L — SIGNIFICANT CHANGE UP (ref 5–17)
ANION GAP SERPL CALC-SCNC: 6 MMOL/L — SIGNIFICANT CHANGE UP (ref 5–17)
AST SERPL-CCNC: 23 U/L — SIGNIFICANT CHANGE UP (ref 10–40)
BASOPHILS # BLD AUTO: 0.04 K/UL — SIGNIFICANT CHANGE UP (ref 0–0.2)
BASOPHILS NFR BLD AUTO: 0.7 % — SIGNIFICANT CHANGE UP (ref 0–2)
BILIRUB SERPL-MCNC: 0.6 MG/DL — SIGNIFICANT CHANGE UP (ref 0.2–1.2)
BUN SERPL-MCNC: 71 MG/DL — HIGH (ref 7–18)
BUN SERPL-MCNC: 72 MG/DL — HIGH (ref 7–18)
BUN SERPL-MCNC: 83 MG/DL — HIGH (ref 7–18)
CALCIUM SERPL-MCNC: 8 MG/DL — LOW (ref 8.4–10.5)
CALCIUM SERPL-MCNC: 8.3 MG/DL — LOW (ref 8.4–10.5)
CALCIUM SERPL-MCNC: 8.6 MG/DL — SIGNIFICANT CHANGE UP (ref 8.4–10.5)
CALCIUM SERPL-MCNC: 8.8 MG/DL — SIGNIFICANT CHANGE UP (ref 8.4–10.5)
CHLORIDE SERPL-SCNC: 123 MMOL/L — HIGH (ref 96–108)
CHLORIDE SERPL-SCNC: 124 MMOL/L — HIGH (ref 96–108)
CHLORIDE SERPL-SCNC: 125 MMOL/L — HIGH (ref 96–108)
CHOLEST SERPL-MCNC: 99 MG/DL — SIGNIFICANT CHANGE UP (ref 10–199)
CO2 SERPL-SCNC: 13 MMOL/L — LOW (ref 22–31)
CO2 SERPL-SCNC: 15 MMOL/L — LOW (ref 22–31)
CO2 SERPL-SCNC: 16 MMOL/L — LOW (ref 22–31)
CREAT SERPL-MCNC: 3.56 MG/DL — HIGH (ref 0.5–1.3)
CREAT SERPL-MCNC: 3.69 MG/DL — HIGH (ref 0.5–1.3)
CREAT SERPL-MCNC: 4.3 MG/DL — HIGH (ref 0.5–1.3)
EOSINOPHIL # BLD AUTO: 0.42 K/UL — SIGNIFICANT CHANGE UP (ref 0–0.5)
EOSINOPHIL NFR BLD AUTO: 7.2 % — HIGH (ref 0–6)
ERYTHROCYTE [SEDIMENTATION RATE] IN BLOOD: 66 MM/HR — HIGH (ref 0–20)
GLUCOSE BLDC GLUCOMTR-MCNC: 104 MG/DL — HIGH (ref 70–99)
GLUCOSE BLDC GLUCOMTR-MCNC: 133 MG/DL — HIGH (ref 70–99)
GLUCOSE BLDC GLUCOMTR-MCNC: 155 MG/DL — HIGH (ref 70–99)
GLUCOSE BLDC GLUCOMTR-MCNC: 36 MG/DL — CRITICAL LOW (ref 70–99)
GLUCOSE BLDC GLUCOMTR-MCNC: 99 MG/DL — SIGNIFICANT CHANGE UP (ref 70–99)
GLUCOSE SERPL-MCNC: 108 MG/DL — HIGH (ref 70–99)
GLUCOSE SERPL-MCNC: 115 MG/DL — HIGH (ref 70–99)
GLUCOSE SERPL-MCNC: 97 MG/DL — SIGNIFICANT CHANGE UP (ref 70–99)
HBA1C BLD-MCNC: 5.6 % — SIGNIFICANT CHANGE UP (ref 4–5.6)
HCT VFR BLD CALC: 26 % — LOW (ref 39–50)
HDLC SERPL-MCNC: 48 MG/DL — SIGNIFICANT CHANGE UP
HGB BLD-MCNC: 8.2 G/DL — LOW (ref 13–17)
IMM GRANULOCYTES NFR BLD AUTO: 0.7 % — SIGNIFICANT CHANGE UP (ref 0–1.5)
LIPID PNL WITH DIRECT LDL SERPL: 36 MG/DL — SIGNIFICANT CHANGE UP
LYMPHOCYTES # BLD AUTO: 0.77 K/UL — LOW (ref 1–3.3)
LYMPHOCYTES # BLD AUTO: 13.2 % — SIGNIFICANT CHANGE UP (ref 13–44)
MAGNESIUM SERPL-MCNC: 1.2 MG/DL — LOW (ref 1.6–2.6)
MCHC RBC-ENTMCNC: 27.2 PG — SIGNIFICANT CHANGE UP (ref 27–34)
MCHC RBC-ENTMCNC: 31.5 GM/DL — LOW (ref 32–36)
MCV RBC AUTO: 86.1 FL — SIGNIFICANT CHANGE UP (ref 80–100)
MONOCYTES # BLD AUTO: 0.39 K/UL — SIGNIFICANT CHANGE UP (ref 0–0.9)
MONOCYTES NFR BLD AUTO: 6.7 % — SIGNIFICANT CHANGE UP (ref 2–14)
NEUTROPHILS # BLD AUTO: 4.19 K/UL — SIGNIFICANT CHANGE UP (ref 1.8–7.4)
NEUTROPHILS NFR BLD AUTO: 71.5 % — SIGNIFICANT CHANGE UP (ref 43–77)
NRBC # BLD: 0 /100 WBCS — SIGNIFICANT CHANGE UP (ref 0–0)
OSMOLALITY UR: 380 MOS/KG — SIGNIFICANT CHANGE UP (ref 50–1200)
PHOSPHATE SERPL-MCNC: 3.8 MG/DL — SIGNIFICANT CHANGE UP (ref 2.5–4.5)
PLATELET # BLD AUTO: 191 K/UL — SIGNIFICANT CHANGE UP (ref 150–400)
POTASSIUM SERPL-MCNC: 5.1 MMOL/L — SIGNIFICANT CHANGE UP (ref 3.5–5.3)
POTASSIUM SERPL-MCNC: 5.8 MMOL/L — HIGH (ref 3.5–5.3)
POTASSIUM SERPL-MCNC: 6.3 MMOL/L — CRITICAL HIGH (ref 3.5–5.3)
POTASSIUM SERPL-SCNC: 5.1 MMOL/L — SIGNIFICANT CHANGE UP (ref 3.5–5.3)
POTASSIUM SERPL-SCNC: 5.8 MMOL/L — HIGH (ref 3.5–5.3)
POTASSIUM SERPL-SCNC: 6.3 MMOL/L — CRITICAL HIGH (ref 3.5–5.3)
PROT SERPL-MCNC: 7.9 G/DL — SIGNIFICANT CHANGE UP (ref 6–8.3)
PTH-INTACT FLD-MCNC: 132 PG/ML — HIGH (ref 15–65)
RBC # BLD: 3.02 M/UL — LOW (ref 4.2–5.8)
RBC # FLD: 16.8 % — HIGH (ref 10.3–14.5)
SODIUM SERPL-SCNC: 142 MMOL/L — SIGNIFICANT CHANGE UP (ref 135–145)
SODIUM SERPL-SCNC: 144 MMOL/L — SIGNIFICANT CHANGE UP (ref 135–145)
SODIUM SERPL-SCNC: 145 MMOL/L — SIGNIFICANT CHANGE UP (ref 135–145)
TOTAL CHOLESTEROL/HDL RATIO MEASUREMENT: 2.1 RATIO — LOW (ref 3.4–9.6)
TRIGL SERPL-MCNC: 73 MG/DL — SIGNIFICANT CHANGE UP (ref 10–149)
TSH SERPL-MCNC: 1.78 UU/ML — SIGNIFICANT CHANGE UP (ref 0.34–4.82)
VIT B12 SERPL-MCNC: 779 PG/ML — SIGNIFICANT CHANGE UP (ref 232–1245)
WBC # BLD: 5.85 K/UL — SIGNIFICANT CHANGE UP (ref 3.8–10.5)
WBC # FLD AUTO: 5.85 K/UL — SIGNIFICANT CHANGE UP (ref 3.8–10.5)

## 2020-01-24 PROCEDURE — 93010 ELECTROCARDIOGRAM REPORT: CPT

## 2020-01-24 PROCEDURE — 99232 SBSQ HOSP IP/OBS MODERATE 35: CPT

## 2020-01-24 RX ORDER — SODIUM ZIRCONIUM CYCLOSILICATE 10 G/10G
10 POWDER, FOR SUSPENSION ORAL ONCE
Refills: 0 | Status: COMPLETED | OUTPATIENT
Start: 2020-01-24 | End: 2020-01-24

## 2020-01-24 RX ORDER — CALCIUM GLUCONATE 100 MG/ML
1 VIAL (ML) INTRAVENOUS ONCE
Refills: 0 | Status: DISCONTINUED | OUTPATIENT
Start: 2020-01-24 | End: 2020-01-24

## 2020-01-24 RX ORDER — CEFTRIAXONE 500 MG/1
1000 INJECTION, POWDER, FOR SOLUTION INTRAMUSCULAR; INTRAVENOUS EVERY 24 HOURS
Refills: 0 | Status: DISCONTINUED | OUTPATIENT
Start: 2020-01-24 | End: 2020-01-28

## 2020-01-24 RX ORDER — ALBUTEROL 90 UG/1
2.5 AEROSOL, METERED ORAL ONCE
Refills: 0 | Status: COMPLETED | OUTPATIENT
Start: 2020-01-24 | End: 2020-01-24

## 2020-01-24 RX ORDER — SODIUM CHLORIDE 9 MG/ML
50 INJECTION, SOLUTION INTRAVENOUS
Refills: 0 | Status: DISCONTINUED | OUTPATIENT
Start: 2020-01-24 | End: 2020-01-24

## 2020-01-24 RX ORDER — INSULIN HUMAN 100 [IU]/ML
10 INJECTION, SOLUTION SUBCUTANEOUS ONCE
Refills: 0 | Status: COMPLETED | OUTPATIENT
Start: 2020-01-24 | End: 2020-01-24

## 2020-01-24 RX ORDER — DEXTROSE 50 % IN WATER 50 %
100 SYRINGE (ML) INTRAVENOUS ONCE
Refills: 0 | Status: COMPLETED | OUTPATIENT
Start: 2020-01-24 | End: 2020-01-24

## 2020-01-24 RX ORDER — SODIUM ZIRCONIUM CYCLOSILICATE 10 G/10G
10 POWDER, FOR SUSPENSION ORAL ONCE
Refills: 0 | Status: DISCONTINUED | OUTPATIENT
Start: 2020-01-24 | End: 2020-01-24

## 2020-01-24 RX ORDER — SODIUM CHLORIDE 9 MG/ML
1000 INJECTION, SOLUTION INTRAVENOUS
Refills: 0 | Status: DISCONTINUED | OUTPATIENT
Start: 2020-01-24 | End: 2020-01-28

## 2020-01-24 RX ORDER — MAGNESIUM SULFATE 500 MG/ML
1 VIAL (ML) INJECTION ONCE
Refills: 0 | Status: COMPLETED | OUTPATIENT
Start: 2020-01-24 | End: 2020-01-24

## 2020-01-24 RX ORDER — ACETAMINOPHEN 500 MG
650 TABLET ORAL EVERY 6 HOURS
Refills: 0 | Status: DISCONTINUED | OUTPATIENT
Start: 2020-01-24 | End: 2020-01-28

## 2020-01-24 RX ORDER — IPRATROPIUM/ALBUTEROL SULFATE 18-103MCG
3 AEROSOL WITH ADAPTER (GRAM) INHALATION ONCE
Refills: 0 | Status: COMPLETED | OUTPATIENT
Start: 2020-01-24 | End: 2020-01-24

## 2020-01-24 RX ORDER — SODIUM POLYSTYRENE SULFONATE 4.1 MEQ/G
30 POWDER, FOR SUSPENSION ORAL ONCE
Refills: 0 | Status: COMPLETED | OUTPATIENT
Start: 2020-01-24 | End: 2020-01-24

## 2020-01-24 RX ORDER — AMLODIPINE BESYLATE 2.5 MG/1
5 TABLET ORAL DAILY
Refills: 0 | Status: DISCONTINUED | OUTPATIENT
Start: 2020-01-24 | End: 2020-01-24

## 2020-01-24 RX ORDER — GLUCAGON INJECTION, SOLUTION 0.5 MG/.1ML
1 INJECTION, SOLUTION SUBCUTANEOUS ONCE
Refills: 0 | Status: COMPLETED | OUTPATIENT
Start: 2020-01-24 | End: 2020-01-24

## 2020-01-24 RX ORDER — SODIUM CHLORIDE 9 MG/ML
1000 INJECTION INTRAMUSCULAR; INTRAVENOUS; SUBCUTANEOUS
Refills: 0 | Status: DISCONTINUED | OUTPATIENT
Start: 2020-01-24 | End: 2020-01-24

## 2020-01-24 RX ORDER — INSULIN HUMAN 100 [IU]/ML
5 INJECTION, SOLUTION SUBCUTANEOUS ONCE
Refills: 0 | Status: DISCONTINUED | OUTPATIENT
Start: 2020-01-24 | End: 2020-01-24

## 2020-01-24 RX ADMIN — Medication 650 MILLIGRAM(S): at 15:08

## 2020-01-24 RX ADMIN — GABAPENTIN 300 MILLIGRAM(S): 400 CAPSULE ORAL at 06:34

## 2020-01-24 RX ADMIN — FINASTERIDE 5 MILLIGRAM(S): 5 TABLET, FILM COATED ORAL at 13:40

## 2020-01-24 RX ADMIN — ALBUTEROL 2.5 MILLIGRAM(S): 90 AEROSOL, METERED ORAL at 13:40

## 2020-01-24 RX ADMIN — Medication 100 MILLILITER(S): at 13:24

## 2020-01-24 RX ADMIN — Medication 325 MILLIGRAM(S): at 13:40

## 2020-01-24 RX ADMIN — PANTOPRAZOLE SODIUM 40 MILLIGRAM(S): 20 TABLET, DELAYED RELEASE ORAL at 06:35

## 2020-01-24 RX ADMIN — ALBUTEROL 2.5 MILLIGRAM(S): 90 AEROSOL, METERED ORAL at 13:48

## 2020-01-24 RX ADMIN — Medication 650 MILLIGRAM(S): at 22:36

## 2020-01-24 RX ADMIN — AMLODIPINE BESYLATE 5 MILLIGRAM(S): 2.5 TABLET ORAL at 06:39

## 2020-01-24 RX ADMIN — Medication 100 MILLILITER(S): at 15:34

## 2020-01-24 RX ADMIN — ARIPIPRAZOLE 15 MILLIGRAM(S): 15 TABLET ORAL at 13:40

## 2020-01-24 RX ADMIN — CALCITRIOL 0.25 MICROGRAM(S): 0.5 CAPSULE ORAL at 15:08

## 2020-01-24 RX ADMIN — CEFTRIAXONE 100 MILLIGRAM(S): 500 INJECTION, POWDER, FOR SOLUTION INTRAMUSCULAR; INTRAVENOUS at 13:53

## 2020-01-24 RX ADMIN — GLUCAGON INJECTION, SOLUTION 1 MILLIGRAM(S): 0.5 INJECTION, SOLUTION SUBCUTANEOUS at 16:56

## 2020-01-24 RX ADMIN — HYDROMORPHONE HYDROCHLORIDE 0.5 MILLIGRAM(S): 2 INJECTION INTRAMUSCULAR; INTRAVENOUS; SUBCUTANEOUS at 04:01

## 2020-01-24 RX ADMIN — Medication 3 MILLILITER(S): at 03:57

## 2020-01-24 RX ADMIN — HYDROMORPHONE HYDROCHLORIDE 0.5 MILLIGRAM(S): 2 INJECTION INTRAMUSCULAR; INTRAVENOUS; SUBCUTANEOUS at 10:33

## 2020-01-24 RX ADMIN — GABAPENTIN 300 MILLIGRAM(S): 400 CAPSULE ORAL at 15:10

## 2020-01-24 RX ADMIN — LAMOTRIGINE 200 MILLIGRAM(S): 25 TABLET, ORALLY DISINTEGRATING ORAL at 15:09

## 2020-01-24 RX ADMIN — HYDROMORPHONE HYDROCHLORIDE 0.5 MILLIGRAM(S): 2 INJECTION INTRAMUSCULAR; INTRAVENOUS; SUBCUTANEOUS at 09:33

## 2020-01-24 RX ADMIN — TAMSULOSIN HYDROCHLORIDE 0.4 MILLIGRAM(S): 0.4 CAPSULE ORAL at 22:36

## 2020-01-24 RX ADMIN — SODIUM POLYSTYRENE SULFONATE 30 GRAM(S): 4.1 POWDER, FOR SUSPENSION ORAL at 13:22

## 2020-01-24 RX ADMIN — Medication 100 GRAM(S): at 13:40

## 2020-01-24 RX ADMIN — GABAPENTIN 300 MILLIGRAM(S): 400 CAPSULE ORAL at 22:36

## 2020-01-24 RX ADMIN — Medication 650 MILLIGRAM(S): at 06:34

## 2020-01-24 RX ADMIN — Medication 25 MILLIGRAM(S): at 22:36

## 2020-01-24 RX ADMIN — SODIUM ZIRCONIUM CYCLOSILICATE 5 GRAM(S): 10 POWDER, FOR SUSPENSION ORAL at 03:58

## 2020-01-24 RX ADMIN — HYDROMORPHONE HYDROCHLORIDE 0.5 MILLIGRAM(S): 2 INJECTION INTRAMUSCULAR; INTRAVENOUS; SUBCUTANEOUS at 03:57

## 2020-01-24 RX ADMIN — INSULIN HUMAN 10 UNIT(S): 100 INJECTION, SOLUTION SUBCUTANEOUS at 13:22

## 2020-01-24 RX ADMIN — HYDROMORPHONE HYDROCHLORIDE 0.5 MILLIGRAM(S): 2 INJECTION INTRAMUSCULAR; INTRAVENOUS; SUBCUTANEOUS at 02:29

## 2020-01-24 RX ADMIN — SODIUM CHLORIDE 60 MILLILITER(S): 9 INJECTION, SOLUTION INTRAVENOUS at 17:03

## 2020-01-24 NOTE — PROGRESS NOTE ADULT - PROBLEM SELECTOR PLAN 8
IMPROVE VTE score:  [ ] Previous VTE                                                3  [ ] Thrombophilia                                             2  [ ] Lower limb paralysis                                  2        (unable to hold up >15 seconds)    [ ] Current Cancer (within 6 months)            2   x[] Immobilization > 24 hrs                              1  [ ] ICU/CCU stay > 24 hours                            1  [x] Age > 60                                                         1  heparin

## 2020-01-24 NOTE — PROGRESS NOTE ADULT - PROBLEM SELECTOR PLAN 1
-Patient is noted to have PAWAN on CKD stage 4  -Acute kidney injury could be pre-renal in the setting of increased ileostomy out-put and UTI.   -Follow urine lytes and osmolarity  -C/w trial of mild IV hydration   --Keep patient euvolemic and renal diet  -Avoid Nephrotoxic Meds/ Agents such as NSAIDs, Gadolinium contrast, Phosphate containing enemas, etc..)  -Adjust Medications according to eGFR  -Nephro consult- Dr Curran -Patient is noted to have PAWAN on CKD stage 4  -Acute kidney injury could be pre-renal in the setting of increased ileostomy out-put and UTI.   -FeNa 1.9 (Intrinsic)  - Will discontinue IV hydration   --Keep patient euvolemic and renal diet  -Avoid Nephrotoxic Meds/ Agents such as NSAIDs, Gadolinium contrast, Phosphate containing enemas, etc..)  -Adjust Medications according to eGFR  -Nephro consult- Dr Curran -Patient is noted to have PAWAN on CKD stage 4  Cr 3.69 from 4.30, GFR 18  -Acute kidney injury could be pre-renal in the setting of increased ileostomy out-put and UTI.   -FeNa 1.9 (Intrinsic)  -Pt is not retaining urine  - Will discontinue IV hydration and monitor BMP daily  --Keep patient euvolemic and renal diet  -Avoid Nephrotoxic Meds/ Agents such as NSAIDs, Gadolinium contrast, Phosphate containing enemas, etc..)  -Adjust Medications according to eGFR  -Nephro consult- Dr Devon Reis

## 2020-01-24 NOTE — PROGRESS NOTE ADULT - SUBJECTIVE AND OBJECTIVE BOX
PGY 1 Note discussed with primary attending    Patient is a 69y old  Male who presents with a chief complaint of PAWAN on CKD, back pain (2020 23:09)      INTERVAL HPI/OVERNIGHT EVENTS: Pt admitted overnight with severe back pain  Pt was having hyperkalemia on presentation but asymptomatic      MEDICATIONS  (STANDING):  amLODIPine   Tablet 5 milliGRAM(s) Oral daily  ARIPiprazole 15 milliGRAM(s) Oral daily  calcitriol   Capsule 0.25 MICROGram(s) Oral daily  cefTRIAXone   IVPB 1000 milliGRAM(s) IV Intermittent every 24 hours  ferrous    sulfate 325 milliGRAM(s) Oral daily  finasteride 5 milliGRAM(s) Oral daily  gabapentin 300 milliGRAM(s) Oral three times a day  heparin  Injectable 5000 Unit(s) SubCutaneous every 12 hours  lamoTRIgine 200 milliGRAM(s) Oral daily  pantoprazole    Tablet 40 milliGRAM(s) Oral before breakfast  sodium bicarbonate 650 milliGRAM(s) Oral three times a day  sodium chloride 0.9%. 1000 milliLiter(s) (75 mL/Hr) IV Continuous <Continuous>  tamsulosin 0.4 milliGRAM(s) Oral at bedtime  traZODone 25 milliGRAM(s) Oral at bedtime    MEDICATIONS  (PRN):  acetaminophen   Tablet .. 650 milliGRAM(s) Oral every 6 hours PRN Moderate Pain (4 - 6)  HYDROmorphone  Injectable 0.5 milliGRAM(s) IV Push every 6 hours PRN Severe Pain (7 - 10)      __________________________________________________  REVIEW OF SYSTEMS:    CONSTITUTIONAL: No fever,   EYES: no acute visual disturbances  NECK: No pain or stiffness  RESPIRATORY: No cough; No shortness of breath  CARDIOVASCULAR: No chest pain, no palpitations  GASTROINTESTINAL: No pain. No nausea or vomiting; increased output in ileostomy bag   NEUROLOGICAL: No headache or numbness, no tremors  MUSCULOSKELETAL: No joint pain, Back pain on left flank but it is better than before  GENITOURINARY: dysuria, no frequency, no hesitancy  PSYCHIATRY: no depression , no anxiety  ALL OTHER  ROS negative        Vital Signs Last 24 Hrs  T(C): 36.6 (2020 04:49), Max: 37.1 (2020 03:21)  T(F): 97.8 (2020 04:49), Max: 98.7 (2020 03:21)  HR: 90 (2020 04:49) (77 - 90)  BP: 151/73 (2020 04:49) (111/65 - 151/73)  BP(mean): --  RR: 17 (2020 04:49) (16 - 18)  SpO2: 96% (2020 04:49) (96% - 98%)    ________________________________________________  PHYSICAL EXAM:  GENERAL: NAD  HEENT: Normocephalic;  conjunctivae and sclerae clear; moist mucous membranes;   NECK : supple  CHEST/LUNG: Clear to auscultation bilaterally with good air entry   HEART: S1 S2  regular; no murmurs, gallops or rubs  ABDOMEN: Soft, Nontender, Nondistended; Bowel sounds present, ileostomy bag in place  EXTREMITIES: no cyanosis; no edema; no calf tenderness  SKIN: warm and dry; no rash  NERVOUS SYSTEM:  Awake and alert; Oriented  to place, person and time ; no new deficits    _________________________________________________  LABS:                        9.0    6.01  )-----------( 219      ( 2020 17:37 )             28.9     01-23    145  |  124<H>  |  83<H>  ----------------------------<  97  5.8<H>   |  16<L>  |  4.30<H>    Ca    8.8      2020 23:35    TPro  8.6<H>  /  Alb  3.8  /  TBili  0.5  /  DBili  x   /  AST  30  /  ALT  22  /  AlkPhos  105  01-23      Urinalysis Basic - ( 2020 23:35 )    Color: Yellow / Appearance: Clear / S.010 / pH: x  Gluc: x / Ketone: Negative  / Bili: Negative / Urobili: Negative   Blood: x / Protein: 30 mg/dL / Nitrite: Negative   Leuk Esterase: Moderate / RBC: 10-25 /HPF / WBC >50 /HPF   Sq Epi: x / Non Sq Epi: Few /HPF / Bacteria: Many /HPF      CAPILLARY BLOOD GLUCOSE            RADIOLOGY & ADDITIONAL TESTS:    Imaging Personally Reviewed:  YES    Consultant(s) Notes Reviewed:   YES    Care Discussed with Consultants :     Plan of care was discussed with patient and /or primary care giver; all questions and concerns were addressed and care was aligned with patient's wishes. PGY 1 Note discussed with primary attending    Patient is a 69y old  Male who presents with a chief complaint of PAWAN on CKD, back pain (2020 23:09)      INTERVAL HPI/OVERNIGHT EVENTS: Pt admitted overnight with severe back pain  Pt was having hyperkalemia on presentation but asymptomatic      MEDICATIONS  (STANDING):  amLODIPine   Tablet 5 milliGRAM(s) Oral daily  ARIPiprazole 15 milliGRAM(s) Oral daily  calcitriol   Capsule 0.25 MICROGram(s) Oral daily  cefTRIAXone   IVPB 1000 milliGRAM(s) IV Intermittent every 24 hours  ferrous    sulfate 325 milliGRAM(s) Oral daily  finasteride 5 milliGRAM(s) Oral daily  gabapentin 300 milliGRAM(s) Oral three times a day  heparin  Injectable 5000 Unit(s) SubCutaneous every 12 hours  lamoTRIgine 200 milliGRAM(s) Oral daily  pantoprazole    Tablet 40 milliGRAM(s) Oral before breakfast  sodium bicarbonate 650 milliGRAM(s) Oral three times a day  sodium chloride 0.9%. 1000 milliLiter(s) (75 mL/Hr) IV Continuous <Continuous>  tamsulosin 0.4 milliGRAM(s) Oral at bedtime  traZODone 25 milliGRAM(s) Oral at bedtime    MEDICATIONS  (PRN):  acetaminophen   Tablet .. 650 milliGRAM(s) Oral every 6 hours PRN Moderate Pain (4 - 6)  HYDROmorphone  Injectable 0.5 milliGRAM(s) IV Push every 6 hours PRN Severe Pain (7 - 10)      __________________________________________________  REVIEW OF SYSTEMS:    CONSTITUTIONAL: No fever,   EYES: no acute visual disturbances  NECK: No pain or stiffness  RESPIRATORY: No cough; No shortness of breath  CARDIOVASCULAR: No chest pain, no palpitations  GASTROINTESTINAL: No pain. No nausea or vomiting; increased output in ileostomy bag   NEUROLOGICAL: No headache or numbness, no tremors  MUSCULOSKELETAL: No joint pain, Back pain on left flank but it is better than before   GENITOURINARY: dysuria, no frequency, no hesitancy  PSYCHIATRY: no depression , no anxiety  ALL OTHER  ROS negative        Vital Signs Last 24 Hrs  T(C): 36.6 (2020 04:49), Max: 37.1 (2020 03:21)  T(F): 97.8 (2020 04:49), Max: 98.7 (2020 03:21)  HR: 90 (2020 04:49) (77 - 90)  BP: 151/73 (2020 04:49) (111/65 - 151/73)  BP(mean): --  RR: 17 (2020 04:49) (16 - 18)  SpO2: 96% (2020 04:49) (96% - 98%)    ________________________________________________  PHYSICAL EXAM:  GENERAL: NAD  HEENT: Normocephalic;  conjunctivae and sclerae clear; moist mucous membranes;   NECK : supple  CHEST/LUNG: Clear to auscultation bilaterally with good air entry   HEART: S1 S2  regular; no murmurs, gallops or rubs  ABDOMEN: Soft, Nontender, distended; Bowel sounds present, ileostomy bag in place ,  EXTREMITIES: no cyanosis; no edema; no calf tenderness  SKIN: warm and dry; no rash  NERVOUS SYSTEM:  Awake and alert; Oriented  to place, person and time ; no new deficits    _________________________________________________  LABS:                        9.0    6.01  )-----------( 219      ( 2020 17:37 )             28.9     01-23    145  |  124<H>  |  83<H>  ----------------------------<  97  5.8<H>   |  16<L>  |  4.30<H>    Ca    8.8      2020 23:35    TPro  8.6<H>  /  Alb  3.8  /  TBili  0.5  /  DBili  x   /  AST  30  /  ALT  22  /  AlkPhos  105  01-23      Urinalysis Basic - ( 2020 23:35 )    Color: Yellow / Appearance: Clear / S.010 / pH: x  Gluc: x / Ketone: Negative  / Bili: Negative / Urobili: Negative   Blood: x / Protein: 30 mg/dL / Nitrite: Negative   Leuk Esterase: Moderate / RBC: 10-25 /HPF / WBC >50 /HPF   Sq Epi: x / Non Sq Epi: Few /HPF / Bacteria: Many /HPF      CAPILLARY BLOOD GLUCOSE            RADIOLOGY & ADDITIONAL TESTS:    Imaging Personally Reviewed:  YES    Consultant(s) Notes Reviewed:   YES    Care Discussed with Consultants :     Plan of care was discussed with patient and /or primary care giver; all questions and concerns were addressed and care was aligned with patient's wishes.

## 2020-01-24 NOTE — PROGRESS NOTE ADULT - ASSESSMENT
69 year old Male from [facility], with PMHx of prostate Ca (s/p radiation in 2015, now in remission), asthma, COPD (not on O2), HTN, HLD, Bipolar, anemia, GERD, Ileostomy s/p sigmoid resection, PAD, and CKD presented to ED with severe left lower back pain, also complaints of urinary frequency and dysuria.   In ED, patient was noted to have abnormal labs , Cr 4.26, his baseline around a month ago was 2.6. Potassium was  noted to be 7.2, with bicarb of 12. Patient is admitted for back pain, suspected pyelonephritis, with PAWAN on CKD

## 2020-01-24 NOTE — PROGRESS NOTE ADULT - PROBLEM SELECTOR PLAN 4
-Patient is noted to have NAG- metabolic acidosis   -CO2 12 which improved to 16 after sodium bicarb 50meq IV push   -VBG noted   -C/w sodium bicarb 650 TID, home dose   -Daily CO2

## 2020-01-24 NOTE — PROGRESS NOTE ADULT - PROBLEM SELECTOR PLAN 5
-patient p/w hyperkalemia secondary to CKD, poor compliance to renal diet  -No EKG changes were noted  -S/p IV cocktail   -Daily BMP  -He was given lokelma TID during his previous hospital visit.** -patient p/w hyperkalemia secondary to CKD, poor compliance to renal diet  -No EKG changes were noted  -S/p IV cocktail   -Daily BMP  -He was given lokelma TID during his previous hospital visit.**  01/24 Pt received cocktail in Am but potassium is still high. As per Dr thapa, Roberto Carlos takes week to work, will give kayexalate 2 doses and repeat BMP.  He will f/u in the afternoon

## 2020-01-24 NOTE — PROGRESS NOTE ADULT - PROBLEM SELECTOR PLAN 7
-Patient has anemia likely of chronic disease   Had recent anemia panel in December, no utility in repeating.   -C/w ferrous sulfate

## 2020-01-24 NOTE — PROGRESS NOTE ADULT - PROBLEM SELECTOR PLAN 3
-patient c/o severe back pain which is chronic, however aggravated more recently. Also c/o urinary symptoms.   -In the setting of back pain and positive UA, suspect pyelonephritis, however back pain could be secondary to arthritis, disc herniation.  -CT lumbar spine: There is no fracture or compression or subluxation or abel osseous destruction. Degenerative disc disease.   -Follow urine cx   -Start IV Ceftriaxone   -C/w pain medications, he was on Dilaudid at home as well   -Follow renal and bladder sonogram.

## 2020-01-24 NOTE — PROGRESS NOTE ADULT - PROBLEM SELECTOR PLAN 2
-In the setting of back pain and positive UA, suspect pyelonephritis, however back pain could be secondary to arthritis, disc herniation.  -CT lumbar spine: There is no fracture or compression or subluxation or abel osseous destruction. Degenerative disc disease.   -Will get MR Lumbosacral spine.   -Neuro Dr Cisse -In the setting of back pain and positive UA, suspect pyelonephritis, however back pain could be secondary to arthritis, disc herniation.  -CT lumbar spine: There is no fracture or compression or subluxation or abel osseous destruction. Degenerative disc disease.   -Will get MR Lumbosacral spine as pt also had history of prostacte cancer  -Neuro Dr Cisse

## 2020-01-24 NOTE — CONSULT NOTE ADULT - ASSESSMENT
Limited neurologic examination due to patient preferring to go back to sleep.       Reconsult PRN (pain persists, complains of or evidences focal/unilateral weakness or sensory loss, ...)

## 2020-01-24 NOTE — PROGRESS NOTE ADULT - PROBLEM SELECTOR PLAN 6
-Patient has hx of HTN, he is on lisinopril  -Will hold in the setting of PAWAN  -start amlodipine -Patient has hx of HTN, he is on lisinopril  -Will hold in the setting of PAWAN  -start amlodipine ,   01/24 As BP was running low, we have D/Shayne Amlodipine

## 2020-01-24 NOTE — CONSULT NOTE ADULT - SUBJECTIVE AND OBJECTIVE BOX
Pt referred for evaluation of back pain; working hypothesis is that pain is related to pyelonephritis.      HPI/PMH per Admission H&P:  [Start of quoted text]  "History of Present Illness:  Reason for Admission: PAWAN on CKD, back pain	  History of Present Illness: 	  69 year old Male from [facility], with PMHx of prostate Ca (s/p radiation in 2015, now in remission), asthma, COPD (not on O2), HTN, HLD, Bipolar, anemia, GERD, Ileostomy s/p sigmoid resection, PAD, and CKD presented to ED with severe left lower back pain. Patient states that he has chronic back ache but for the last 2 weeks his pain has worsened to an extent that he has difficulty in ambulation given pain, denies any weakness, numbness, urinary or fecal incontinence. Patient reports that he has noticed increased out-put in his colostomy bag for last few days, also complaints of urinary frequency and burning micturition.    In ED, patient was noted to have abnormal labs , Cr 4.26, his baseline around a month ago was 2.6. Potassium was  noted to be 7.2, with bicarb of 12.       Allergies and Intolerances:        Allergies:  	No Known Allergies:     Home Medications:   * Patient Currently Takes Medications as of 26-Dec-2019 11:21 documented in Structured Notes  · 	Dilaudid 2 mg oral tablet: 1 tab(s) orally every 8 hours, As needed, Severe Pain (7 - 10) MDD:6 mg  · 	ergocalciferol 50,000 intl units (1.25 mg) oral capsule: 1 cap(s) orally once a week   · 	Lokelma 10 g oral powder for reconstitution: 10 gram(s) orally once a day   · 	simethicone 80 mg oral tablet, chewable: 1 tab(s) orally 2 times a day  · 	ferrous sulfate 325 mg (65 mg elemental iron) oral tablet: 1 tab(s) orally once a day  · 	Protonix 40 mg oral delayed release tablet: 1 tab(s) orally once a day  · 	sodium bicarbonate 650 mg oral tablet: 1 tab(s) orally 3 times a day (with meals)  · 	traZODone 50 mg oral tablet: 0.5 tab(s) orally once a day (at bedtime)  · 	Ventolin 90 mcg/inh inhalation aerosol:   · 	Abilify 10 mg oral tablet: 1.5 tab(s) orally once a day  	  · 	calcitriol 0.25 mcg oral capsule: 1 cap(s) orally once a day  · 	Flomax 0.4 mg oral capsule: 1 cap(s) orally once a day  · 	gabapentin 300 mg oral tablet: 1 tab(s) orally 3 times a day  · 	halobetasol 0.05% topical cream: Apply topically to affected area once a day  · 	lamoTRIgine 200 mg oral tablet: 1 tab(s) orally once a day  · 	lisinopril 5 mg oral tablet: 1 tab(s) orally once a day  · 	Proscar 5 mg oral tablet: 1 tab(s) orally once a day    Patient History:    Social History:  Social History (marital status, living situation, occupation, tobacco use, alcohol and drug use, and sexual history): Denies smoking and alcohol use.	     Tobacco Screening:  · Core Measure Site	Yes	  · Has the patient used tobacco in the past 30 days?	No"	  [End of quoted text]    Pt asleep; awoke upon being greeted, in no obvious distress, and does not offer any additional or contradictory information to the above.    Per radiologist's report of [facility] CT:  "An IVC filter is noted. There is no fracture or compression or subluxation or abel osseous destruction. There is severe degenerative disc change with vacuum and mild annular osteophyte at L4-5. The other disc spaces are relatively maintained. There is no significant spinal stenosis. There is moderate facet arthropathy at L4-5 and L5-S1.    IMPRESSION:    No acute findings"    On EXAMINATION    Obese.   Grossly normal expression, comprehension, prosody, articulation.  Limited desire to be examined as he "feels too tired."    Turns in bed; moves all extremities in bed without any grossly evident asymmetry.  Refuses to sit up.  Flexes both hips supine in bed; barely wiggles toes.    Sensation grossly intact to pin.      Reflex                           Right    Left   Comment    Biceps                             1        tr  Triceps                            2        1  Patellar                           0         0  Gastroc                           0         0  Plantar                        mute    mute

## 2020-01-24 NOTE — PROGRESS NOTE ADULT - PROBLEM SELECTOR PLAN 9
Primary team to discuss, patient currently in severe pain. Pt is too drowsy to talk about goals of care at this point. tried calling the alternate number in the system but its not going through

## 2020-01-24 NOTE — CONSULT NOTE ADULT - SUBJECTIVE AND OBJECTIVE BOX
HPI:  69 year old Male from [facility], with PMHx of prostate Ca (s/p radiation in 2015, now in remission), asthma, COPD (not on O2), HTN, HLD, Bipolar, anemia, GERD, Ileostomy s/p sigmoid resection, PAD, and CKD presented to ED with severe left lower back pain. Patient states that he has chronic back ache but for the last 2 weeks his pain has worsened to an extent that he has difficulty in ambulation given pain, denies any weakness, numbness, urinary or fecal incontinence. Patient reports that he has noticed increased out-put in his colostomy bag for last few days, also complaints of urinary frequency and burning micturition.    In ED, patient was noted to have abnormal labs , Cr 4.26, his baseline around a month ago was 2.6. Potassium was  noted to be 7.2, with bicarb of 12.      PMH:   BPH with urinary obstruction  Prostate CA  CKD (chronic kidney disease)  PAD (peripheral artery disease)  HLD (hyperlipidemia)  HTN (hypertension)  IBD (inflammatory bowel disease)  Bipolar disorder  Anemia  COPD, mild      PSH:   History of creation of ostomy      FAMILY HISTORY:  NC    Social History:  non-smoker/ non-alcoholic     Home Meds:  MEDICATIONS  (STANDING):  ARIPiprazole 15 milliGRAM(s) Oral daily  calcitriol   Capsule 0.25 MICROGram(s) Oral daily  calcium gluconate IVPB 1 Gram(s) IV Intermittent once  cefTRIAXone   IVPB 1000 milliGRAM(s) IV Intermittent every 24 hours  ferrous    sulfate 325 milliGRAM(s) Oral daily  finasteride 5 milliGRAM(s) Oral daily  gabapentin 300 milliGRAM(s) Oral three times a day  heparin  Injectable 5000 Unit(s) SubCutaneous every 12 hours  lamoTRIgine 200 milliGRAM(s) Oral daily  pantoprazole    Tablet 40 milliGRAM(s) Oral before breakfast  sodium bicarbonate 650 milliGRAM(s) Oral three times a day  sodium polystyrene sulfonate Enema 30 Gram(s) Rectal once  tamsulosin 0.4 milliGRAM(s) Oral at bedtime  traZODone 25 milliGRAM(s) Oral at bedtime    MEDICATIONS  (PRN):  acetaminophen   Tablet .. 650 milliGRAM(s) Oral every 6 hours PRN Moderate Pain (4 - 6)  HYDROmorphone  Injectable 0.5 milliGRAM(s) IV Push every 6 hours PRN Severe Pain (7 - 10)      Allergies:  No Known Allergies    REVIEW OF SYSTEMS:    CONSTITUTIONAL: No fever or chills  EYES: No eye painor discharge  ENMT:  No throat pain  NECK: No pain or stiffness  RESPIRATORY: No cough. No shortness of breath  CARDIOVASCULAR: No chest pain, palpitations, dizziness, or leg swelling  GASTROINTESTINAL: No abdominal or epigastric pain. No nausea, vomiting, or diarrhea  GENITOURINARY: No hematuria  NEUROLOGICAL: No headaches  SKIN: No itching, burning, rashes    Vital Signs Last 24 Hrs  T(C): 36.4 (2020 13:41), Max: 37.1 (2020 03:21)  T(F): 97.5 (2020 13:41), Max: 98.7 (2020 03:21)  HR: 82 (2020 13:41) (80 - 90)  BP: 104/53 (2020 13:41) (104/53 - 151/73)  BP(mean): --  RR: 18 (2020 13:41) (16 - 18)  SpO2: 96% (2020 13:41) (96% - 99%)     @ 07:01  -   @ 07:00  --------------------------------------------------------  IN: 0 mL / OUT: 200 mL / NET: -200 mL    PHYSICAL EXAM:    GENERAL: NAD, well-nourished, well-developed  HEAD:  Atraumatic, Normocephalic  EYES: Sclera anicteric  ENMT: Moist mucous membranes  NECK: Supple, No JVD  NERVOUS SYSTEM:  Alert & Oriented X3  CHEST/LUNG: Clear to percussion bilaterally; No rales, rhonchi, wheezing  HEART: Regular rate and rhythm; No murmurs  ABDOMEN: Soft, Nontender, Nondistended; Bowel sounds present  EXTREMITIES:  No edema  SKIN: Normal turgor    LABS:                        8.2    5.85  )-----------( 191      ( 2020 10:32 )             26.0         144  |  125<H>  |  72<H>  ----------------------------<  115<H>  6.3<HH>   |  15<L>  |  3.69<H>    Ca    8.3<L>      2020 10:32  Phos  3.8       Mg     1.2         TPro  7.9  /  Alb  3.3<L>  /  TBili  0.6  /  DBili  x   /  AST  23  /  ALT  19  /  AlkPhos  91        Urinalysis Basic - ( 2020 23:35 )    Color: Yellow / Appearance: Clear / S.010 / pH: x  Gluc: x / Ketone: Negative  / Bili: Negative / Urobili: Negative   Blood: x / Protein: 30 mg/dL / Nitrite: Negative   Leuk Esterase: Moderate / RBC: 10-25 /HPF / WBC >50 /HPF   Sq Epi: x / Non Sq Epi: Few /HPF / Bacteria: Many /HPF    Urinary bladder sono; 100 ml residual    ASSESSMENT AND PLAN:  1. PAWAN secondary to volume depletion  2. Hyperkalemia due to PAWAN. There is no indication for emergent HD  3. UTI  4. Anemia; MF  IVF 1/2 NS at 60 ml/hr  Keep patient euvolemic and 2g K renal diet  Avoid Nephrtoxic Meds/ Agents such as NSAIDs, Gadolinium contrast, Phosphate containing enemas, etc..)  Adjust Medications according to eGFR  Use Kayexalate for acute hyperkalemia not Lokelma  AB Rx  Follow BMP, TSAT, H/H  Many thanks

## 2020-01-25 LAB
ANION GAP SERPL CALC-SCNC: 6 MMOL/L — SIGNIFICANT CHANGE UP (ref 5–17)
ANION GAP SERPL CALC-SCNC: 7 MMOL/L — SIGNIFICANT CHANGE UP (ref 5–17)
BASOPHILS # BLD AUTO: 0.03 K/UL — SIGNIFICANT CHANGE UP (ref 0–0.2)
BASOPHILS NFR BLD AUTO: 0.5 % — SIGNIFICANT CHANGE UP (ref 0–2)
BUN SERPL-MCNC: 51 MG/DL — HIGH (ref 7–18)
BUN SERPL-MCNC: 61 MG/DL — HIGH (ref 7–18)
CALCIUM SERPL-MCNC: 8.2 MG/DL — LOW (ref 8.4–10.5)
CALCIUM SERPL-MCNC: 8.8 MG/DL — SIGNIFICANT CHANGE UP (ref 8.4–10.5)
CHLORIDE SERPL-SCNC: 123 MMOL/L — HIGH (ref 96–108)
CHLORIDE SERPL-SCNC: 124 MMOL/L — HIGH (ref 96–108)
CO2 SERPL-SCNC: 14 MMOL/L — LOW (ref 22–31)
CO2 SERPL-SCNC: 14 MMOL/L — LOW (ref 22–31)
CREAT SERPL-MCNC: 2.89 MG/DL — HIGH (ref 0.5–1.3)
CREAT SERPL-MCNC: 3.07 MG/DL — HIGH (ref 0.5–1.3)
EOSINOPHIL # BLD AUTO: 0.46 K/UL — SIGNIFICANT CHANGE UP (ref 0–0.5)
EOSINOPHIL NFR BLD AUTO: 7.2 % — HIGH (ref 0–6)
GLUCOSE BLDC GLUCOMTR-MCNC: 105 MG/DL — HIGH (ref 70–99)
GLUCOSE BLDC GLUCOMTR-MCNC: 113 MG/DL — HIGH (ref 70–99)
GLUCOSE BLDC GLUCOMTR-MCNC: 135 MG/DL — HIGH (ref 70–99)
GLUCOSE BLDC GLUCOMTR-MCNC: 92 MG/DL — SIGNIFICANT CHANGE UP (ref 70–99)
GLUCOSE BLDC GLUCOMTR-MCNC: 93 MG/DL — SIGNIFICANT CHANGE UP (ref 70–99)
GLUCOSE SERPL-MCNC: 101 MG/DL — HIGH (ref 70–99)
GLUCOSE SERPL-MCNC: 132 MG/DL — HIGH (ref 70–99)
HCT VFR BLD CALC: 25.5 % — LOW (ref 39–50)
HGB BLD-MCNC: 8.2 G/DL — LOW (ref 13–17)
IMM GRANULOCYTES NFR BLD AUTO: 0.5 % — SIGNIFICANT CHANGE UP (ref 0–1.5)
LYMPHOCYTES # BLD AUTO: 0.7 K/UL — LOW (ref 1–3.3)
LYMPHOCYTES # BLD AUTO: 10.9 % — LOW (ref 13–44)
MAGNESIUM SERPL-MCNC: 1.4 MG/DL — LOW (ref 1.6–2.6)
MCHC RBC-ENTMCNC: 27.7 PG — SIGNIFICANT CHANGE UP (ref 27–34)
MCHC RBC-ENTMCNC: 32.2 GM/DL — SIGNIFICANT CHANGE UP (ref 32–36)
MCV RBC AUTO: 86.1 FL — SIGNIFICANT CHANGE UP (ref 80–100)
MONOCYTES # BLD AUTO: 0.44 K/UL — SIGNIFICANT CHANGE UP (ref 0–0.9)
MONOCYTES NFR BLD AUTO: 6.9 % — SIGNIFICANT CHANGE UP (ref 2–14)
NEUTROPHILS # BLD AUTO: 4.74 K/UL — SIGNIFICANT CHANGE UP (ref 1.8–7.4)
NEUTROPHILS NFR BLD AUTO: 74 % — SIGNIFICANT CHANGE UP (ref 43–77)
NRBC # BLD: 0 /100 WBCS — SIGNIFICANT CHANGE UP (ref 0–0)
PHOSPHATE SERPL-MCNC: 2.6 MG/DL — SIGNIFICANT CHANGE UP (ref 2.5–4.5)
PLATELET # BLD AUTO: 193 K/UL — SIGNIFICANT CHANGE UP (ref 150–400)
POTASSIUM SERPL-MCNC: 5.5 MMOL/L — HIGH (ref 3.5–5.3)
POTASSIUM SERPL-MCNC: 5.7 MMOL/L — HIGH (ref 3.5–5.3)
POTASSIUM SERPL-SCNC: 5.5 MMOL/L — HIGH (ref 3.5–5.3)
POTASSIUM SERPL-SCNC: 5.7 MMOL/L — HIGH (ref 3.5–5.3)
RBC # BLD: 2.96 M/UL — LOW (ref 4.2–5.8)
RBC # FLD: 16.9 % — HIGH (ref 10.3–14.5)
SODIUM SERPL-SCNC: 144 MMOL/L — SIGNIFICANT CHANGE UP (ref 135–145)
SODIUM SERPL-SCNC: 144 MMOL/L — SIGNIFICANT CHANGE UP (ref 135–145)
WBC # BLD: 6.4 K/UL — SIGNIFICANT CHANGE UP (ref 3.8–10.5)
WBC # FLD AUTO: 6.4 K/UL — SIGNIFICANT CHANGE UP (ref 3.8–10.5)

## 2020-01-25 RX ORDER — SODIUM ZIRCONIUM CYCLOSILICATE 10 G/10G
10 POWDER, FOR SUSPENSION ORAL ONCE
Refills: 0 | Status: COMPLETED | OUTPATIENT
Start: 2020-01-25 | End: 2020-01-25

## 2020-01-25 RX ORDER — INSULIN HUMAN 100 [IU]/ML
5 INJECTION, SOLUTION SUBCUTANEOUS ONCE
Refills: 0 | Status: DISCONTINUED | OUTPATIENT
Start: 2020-01-25 | End: 2020-01-25

## 2020-01-25 RX ORDER — SODIUM POLYSTYRENE SULFONATE 4.1 MEQ/G
30 POWDER, FOR SUSPENSION ORAL ONCE
Refills: 0 | Status: COMPLETED | OUTPATIENT
Start: 2020-01-25 | End: 2020-01-25

## 2020-01-25 RX ORDER — SODIUM CHLORIDE 9 MG/ML
1000 INJECTION INTRAMUSCULAR; INTRAVENOUS; SUBCUTANEOUS ONCE
Refills: 0 | Status: COMPLETED | OUTPATIENT
Start: 2020-01-25 | End: 2020-01-25

## 2020-01-25 RX ORDER — DEXTROSE 50 % IN WATER 50 %
50 SYRINGE (ML) INTRAVENOUS ONCE
Refills: 0 | Status: DISCONTINUED | OUTPATIENT
Start: 2020-01-25 | End: 2020-01-25

## 2020-01-25 RX ORDER — MAGNESIUM SULFATE 500 MG/ML
1 VIAL (ML) INJECTION ONCE
Refills: 0 | Status: COMPLETED | OUTPATIENT
Start: 2020-01-25 | End: 2020-01-25

## 2020-01-25 RX ADMIN — PANTOPRAZOLE SODIUM 40 MILLIGRAM(S): 20 TABLET, DELAYED RELEASE ORAL at 06:36

## 2020-01-25 RX ADMIN — GABAPENTIN 300 MILLIGRAM(S): 400 CAPSULE ORAL at 23:28

## 2020-01-25 RX ADMIN — Medication 650 MILLIGRAM(S): at 23:26

## 2020-01-25 RX ADMIN — Medication 650 MILLIGRAM(S): at 13:39

## 2020-01-25 RX ADMIN — HYDROMORPHONE HYDROCHLORIDE 0.5 MILLIGRAM(S): 2 INJECTION INTRAMUSCULAR; INTRAVENOUS; SUBCUTANEOUS at 21:45

## 2020-01-25 RX ADMIN — FINASTERIDE 5 MILLIGRAM(S): 5 TABLET, FILM COATED ORAL at 13:38

## 2020-01-25 RX ADMIN — Medication 25 MILLIGRAM(S): at 23:25

## 2020-01-25 RX ADMIN — TAMSULOSIN HYDROCHLORIDE 0.4 MILLIGRAM(S): 0.4 CAPSULE ORAL at 23:26

## 2020-01-25 RX ADMIN — Medication 100 GRAM(S): at 13:41

## 2020-01-25 RX ADMIN — HYDROMORPHONE HYDROCHLORIDE 0.5 MILLIGRAM(S): 2 INJECTION INTRAMUSCULAR; INTRAVENOUS; SUBCUTANEOUS at 21:30

## 2020-01-25 RX ADMIN — CALCITRIOL 0.25 MICROGRAM(S): 0.5 CAPSULE ORAL at 13:39

## 2020-01-25 RX ADMIN — Medication 325 MILLIGRAM(S): at 13:40

## 2020-01-25 RX ADMIN — GABAPENTIN 300 MILLIGRAM(S): 400 CAPSULE ORAL at 06:36

## 2020-01-25 RX ADMIN — ARIPIPRAZOLE 15 MILLIGRAM(S): 15 TABLET ORAL at 13:38

## 2020-01-25 RX ADMIN — SODIUM POLYSTYRENE SULFONATE 30 GRAM(S): 4.1 POWDER, FOR SUSPENSION ORAL at 13:41

## 2020-01-25 RX ADMIN — SODIUM ZIRCONIUM CYCLOSILICATE 10 GRAM(S): 10 POWDER, FOR SUSPENSION ORAL at 21:20

## 2020-01-25 RX ADMIN — SODIUM CHLORIDE 1000 MILLILITER(S): 9 INJECTION INTRAMUSCULAR; INTRAVENOUS; SUBCUTANEOUS at 16:55

## 2020-01-25 RX ADMIN — GABAPENTIN 300 MILLIGRAM(S): 400 CAPSULE ORAL at 13:47

## 2020-01-25 RX ADMIN — LAMOTRIGINE 200 MILLIGRAM(S): 25 TABLET, ORALLY DISINTEGRATING ORAL at 13:40

## 2020-01-25 RX ADMIN — CEFTRIAXONE 100 MILLIGRAM(S): 500 INJECTION, POWDER, FOR SOLUTION INTRAMUSCULAR; INTRAVENOUS at 13:47

## 2020-01-25 RX ADMIN — Medication 650 MILLIGRAM(S): at 06:36

## 2020-01-25 NOTE — PROGRESS NOTE ADULT - PROBLEM SELECTOR PLAN 2
-In the setting of back pain and positive UA, suspect pyelonephritis, however back pain could be secondary to arthritis, disc herniation.  -CT lumbar spine: There is no fracture or compression or subluxation or abel osseous destruction. Degenerative disc disease.   -Will get MR Lumbosacral spine as pt also had history of prostate cancer  -screening form is the chart.  -Neuro Dr Cisse

## 2020-01-25 NOTE — PROGRESS NOTE ADULT - PROBLEM SELECTOR PLAN 6
-Patient has hx of HTN, he is on lisinopril  -Will hold in the setting of PAWAN  -start amlodipine ,   01/24 As BP was running low, we have D/Shayne Amlodipine -Patient has hx of HTN, he is on Lisinopril  -Will hold in the setting of PAWAN  -start amlodipine ,   01/24 As BP was running low, we have D/Shayne Amlodipine  01/25 BP is on lower side. will given gentle hydration 60ml.hr NS  Na 140

## 2020-01-25 NOTE — PROGRESS NOTE ADULT - PROBLEM SELECTOR PLAN 3
-patient c/o severe back pain which is chronic, however aggravated more recently. Also c/o urinary symptoms.   -In the setting of back pain and positive UA, suspect pyelonephritis, however back pain could be secondary to arthritis, disc herniation.  -CT lumbar spine: There is no fracture or compression or subluxation or abel osseous destruction. Degenerative disc disease.   -Follow urine cx   -Start IV Ceftriaxone   -C/w pain medications, he was on Dilaudid at home as well   -Follow renal and bladder sonogram. -patient c/o severe back pain which is chronic, however aggravated more recently. Also c/o urinary symptoms.   -In the setting of back pain and positive UA, suspect pyelonephritis, however back pain could be secondary to arthritis, disc herniation.  -CT lumbar spine: There is no fracture or compression or subluxation or abel osseous destruction. Degenerative disc disease.   -Follow urine cx   -Cotnue IV Ceftriaxone   -C/w pain medications, he was on Dilaudid at home as well   -Follow renal and bladder sonogram.

## 2020-01-25 NOTE — PROGRESS NOTE ADULT - PROBLEM SELECTOR PLAN 4
-Patient is noted to have NAG- metabolic acidosis   -CO2 12 which improved to 16 after sodium bicarb 50meq IV push   -VBG noted   -C/w sodium bicarb 650 TID, home dose   -Daily CO2 -Patient is noted to have NAG- metabolic acidosis   -CO2 12 which improved to 16 after sodium bicarb 50meq IV push   -VBG noted   -C/w sodium bicarb 650 TID, home dose   -01/25 Serum Bicarb 14

## 2020-01-25 NOTE — PROGRESS NOTE ADULT - SUBJECTIVE AND OBJECTIVE BOX
Patient is a 69y old  Male who presents with a chief complaint of PAWAN on CKD, back pain (2020 15:21)    PATIENT IS SEEN AND EXAMINED IN MEDICAL FLOOR.    ERASTO [  X ]        ALLERGIES:  No Known Allergies       Daily Weight in k.6 (2020 05:05)    VITALS:    Vital Signs Last 24 Hrs  T(C): 37.2 (2020 14:46), Max: 37.4 (2020 20:07)  T(F): 99 (2020 14:46), Max: 99.3 (2020 20:07)  HR: 73 (2020 14:46) (73 - 84)  BP: 85/40 (2020 14:46) (85/40 - 116/53)  BP(mean): --  RR: 16 (2020 14:46) (16 - 17)  SpO2: 96% (2020 14:46) (95% - 98%)    LABS:    CBC Full  -  ( 2020 06:34 )  WBC Count : 6.40 K/uL  RBC Count : 2.96 M/uL  Hemoglobin : 8.2 g/dL  Hematocrit : 25.5 %  Platelet Count - Automated : 193 K/uL  Mean Cell Volume : 86.1 fl  Mean Cell Hemoglobin : 27.7 pg  Mean Cell Hemoglobin Concentration : 32.2 gm/dL  Auto Neutrophil # : 4.74 K/uL  Auto Lymphocyte # : 0.70 K/uL  Auto Monocyte # : 0.44 K/uL  Auto Eosinophil # : 0.46 K/uL  Auto Basophil # : 0.03 K/uL  Auto Neutrophil % : 74.0 %  Auto Lymphocyte % : 10.9 %  Auto Monocyte % : 6.9 %  Auto Eosinophil % : 7.2 %  Auto Basophil % : 0.5 %          144  |  124<H>  |  61<H>  ----------------------------<  101<H>  5.7<H>   |  14<L>  |  3.07<H>    Ca    8.2<L>      2020 06:34  Phos  2.6       Mg     1.4         TPro  7.9  /  Alb  3.3<L>  /  TBili  0.6  /  DBili  x   /  AST  23  /  ALT  19  /  AlkPhos  91  01-24    CAPILLARY BLOOD GLUCOSE      POCT Blood Glucose.: 105 mg/dL (2020 16:46)  POCT Blood Glucose.: 135 mg/dL (2020 10:51)  POCT Blood Glucose.: 92 mg/dL (2020 06:27)  POCT Blood Glucose.: 93 mg/dL (2020 02:12)  POCT Blood Glucose.: 104 mg/dL (2020 22:02)        LIVER FUNCTIONS - ( 2020 10:32 )  Alb: 3.3 g/dL / Pro: 7.9 g/dL / ALK PHOS: 91 U/L / ALT: 19 U/L DA / AST: 23 U/L / GGT: x           Creatinine Trend: 3.07<--, 3.56<--, 3.69<--, 4.30<--, 4.26<--  I&O's Summary    2020 07:  -  2020 07:00  --------------------------------------------------------  IN: 0 mL / OUT: 500 mL / NET: -500 mL    2020 07:  -  2020 18:21  --------------------------------------------------------  IN: 0 mL / OUT: 700 mL / NET: -700 mL                MEDICATIONS:    MEDICATIONS  (STANDING):  ARIPiprazole 15 milliGRAM(s) Oral daily  calcitriol   Capsule 0.25 MICROGram(s) Oral daily  cefTRIAXone   IVPB 1000 milliGRAM(s) IV Intermittent every 24 hours  dextrose 5% + sodium chloride 0.45%. 1000 milliLiter(s) (60 mL/Hr) IV Continuous <Continuous>  ferrous    sulfate 325 milliGRAM(s) Oral daily  finasteride 5 milliGRAM(s) Oral daily  gabapentin 300 milliGRAM(s) Oral three times a day  heparin  Injectable 5000 Unit(s) SubCutaneous every 12 hours  lamoTRIgine 200 milliGRAM(s) Oral daily  pantoprazole    Tablet 40 milliGRAM(s) Oral before breakfast  sodium bicarbonate 650 milliGRAM(s) Oral three times a day  tamsulosin 0.4 milliGRAM(s) Oral at bedtime  traZODone 25 milliGRAM(s) Oral at bedtime      MEDICATIONS  (PRN):  acetaminophen   Tablet .. 650 milliGRAM(s) Oral every 6 hours PRN Moderate Pain (4 - 6)  HYDROmorphone  Injectable 0.5 milliGRAM(s) IV Push every 6 hours PRN Severe Pain (7 - 10)      REVIEW OF SYSTEMS:                           ALL ROS DONE [ X   ]    CONSTITUTIONAL:  LETHARGIC [   ], FEVER [   ], UNRESPONSIVE [   ]  CVS:  CP  [   ], SOB, [   ], PALPITATIONS [   ], DIZZYNESS [   ]  RS: COUGH [   ], SPUTUM [   ]  GI: ABDOMINAL PAIN [   ], NAUSEA [   ], VOMITINGS [   ], DIARRHEA [   ], CONSTIPATION [   ]  :  DYSURIA [   ], NOCTURIA [   ], INCREASED FREQUENCY [   ], DRIBLING [   ],  SKELETAL: PAINFUL JOINTS [   ], SWOLLEN JOINTS [   ], NECK ACHE [   ], LOW BACK ACHE [   ],  SKIN : ULCERS [   ], RASH [   ], ITCHING [   ]  CNS: HEAD ACHE [   ], DOUBLE VISION [   ], BLURRED VISION [   ], AMS / CONFUSION [   ], SEIZURES [   ], WEAKNESS [   ],TINGLING / NUMBNESS [   ]    PHYSICAL EXAMINATION:  GENERAL APPEARANCE: NO DISTRESS  HEENT:  NO PALLOR, NO  JVD,  NO   NODES, NECK SUPPLE  CVS: S1 +, S2 +,   RS: AEEB,  OCCASIONAL  RALES +,   NO RONCHI  ABD: SOFT, NT, NO, BS +                                    ILEOSTOMY +  EXT: NO PE  SKIN: WARM,   SKELETAL:  ROM ACCEPTABLE  CNS:  AAO X  3  , NO  DEFICITS    RADIOLOGY :    < from: CT Lumbar Spine No Cont (20 @ 15:23) >    IMPRESSION:    No acute findings    < end of copied text >        ASSESSMENT :     Acute renal failure  BPH with urinary obstruction  Prostate CA  CKD (chronic kidney disease)  PAD (peripheral artery disease)  HLD (hyperlipidemia)  HTN (hypertension)  IBD (inflammatory bowel disease)  Bipolar disorder  Anemia  COPD, mild  History of creation of ostomy      PLAN:  HPI:  69 year old Male from [facility], with PMHx of prostate Ca (s/p radiation in , now in remission), asthma, COPD (not on O2), HTN, HLD, Bipolar, anemia, GERD, Ileostomy s/p sigmoid resection, PAD, and CKD presented to ED with severe left lower back pain. Patient states that he has chronic back ache but for the last 2 weeks his pain has worsened to an extent that he has difficulty in ambulation given pain, denies any weakness, numbness, urinary or fecal incontinence. Patient reports that he has noticed increased out-put in his colostomy bag for last few days, also complaints of urinary frequency and burning micturition.    In ED, patient was noted to have abnormal labs , Cr 4.26, his baseline around a month ago was 2.6. Potassium was  noted to be 7.2, with bicarb of 12. (2020 23:09)    - INTRACTABLE LOW BACK ACHE DUE TO LS SPINAL STENOSIS, CHRONIC LOW BACK ACHE. S/P CT LS SPINE DONE, PAIN RMEDS ASNEEDED. TO OBTAIN PT & OT EVALUATION, DC PLAN BACK TO [facility] OR [facility] PENDING PT EVAL  - HYPERKALEMIA DUE TO ARF , CKD STAGE 4 - S/P INSULIN, CALCIUM IV , AND IS ON KAYEXLATE. RENAL CONSULT F/UP DR. CHAWLA IS IN PROGRESS  - GI AND DVT PROPHYLAXIS  - DR. IVY

## 2020-01-25 NOTE — PROGRESS NOTE ADULT - PROBLEM SELECTOR PLAN 9
Pt is too drowsy to talk about goals of care at this point. tried calling the alternate number in the system but its not going through PT IS FULL CODE

## 2020-01-25 NOTE — PROGRESS NOTE ADULT - SUBJECTIVE AND OBJECTIVE BOX
PGY 1 Note discussed with primary attending    Patient is a 69y old  Male who presents with a chief complaint of PAWAN on CKD, back pain (2020 15:02)      INTERVAL HPI/OVERNIGHT EVENTS:   No acute event overnight reported by overnight team and nurses. Pt remained hemodynamically stable.  Pt seen and examined  at bed side.   blood sugar is stable. Potassium is 5.7 ,  No events on tele    MEDICATIONS  (STANDING):  ARIPiprazole 15 milliGRAM(s) Oral daily  calcitriol   Capsule 0.25 MICROGram(s) Oral daily  cefTRIAXone   IVPB 1000 milliGRAM(s) IV Intermittent every 24 hours  dextrose 5% + sodium chloride 0.45%. 1000 milliLiter(s) (60 mL/Hr) IV Continuous <Continuous>  ferrous    sulfate 325 milliGRAM(s) Oral daily  finasteride 5 milliGRAM(s) Oral daily  gabapentin 300 milliGRAM(s) Oral three times a day  heparin  Injectable 5000 Unit(s) SubCutaneous every 12 hours  lamoTRIgine 200 milliGRAM(s) Oral daily  magnesium sulfate  IVPB 1 Gram(s) IV Intermittent once  pantoprazole    Tablet 40 milliGRAM(s) Oral before breakfast  sodium bicarbonate 650 milliGRAM(s) Oral three times a day  sodium chloride 0.9% Bolus 1000 milliLiter(s) IV Bolus once  tamsulosin 0.4 milliGRAM(s) Oral at bedtime  traZODone 25 milliGRAM(s) Oral at bedtime    MEDICATIONS  (PRN):  acetaminophen   Tablet .. 650 milliGRAM(s) Oral every 6 hours PRN Moderate Pain (4 - 6)  HYDROmorphone  Injectable 0.5 milliGRAM(s) IV Push every 6 hours PRN Severe Pain (7 - 10)      __________________________________________________  REVIEW OF SYSTEMS:    CONSTITUTIONAL: No fever, Pt does not feel well.   EYES: no acute visual disturbances  NECK: No pain or stiffness  RESPIRATORY: No cough; No shortness of breath  CARDIOVASCULAR: No chest pain, no palpitations  GASTROINTESTINAL: No pain. No nausea or vomiting; No diarrhea   NEUROLOGICAL: No headache or numbness, no tremors  MUSCULOSKELETAL: No joint pain, no muscle pain  GENITOURINARY: no dysuria, no frequency, no hesitancy  PSYCHIATRY: no depression , no anxiety  ALL OTHER  ROS negative        Vital Signs Last 24 Hrs  T(C): 37.3 (2020 10:57), Max: 37.4 (2020 20:07)  T(F): 99.1 (2020 10:57), Max: 99.3 (2020 20:07)  HR: 84 (2020 10:57) (73 - 84)  BP: 96/50 (2020 10:57) (96/50 - 116/53)  BP(mean): --  RR: 16 (2020 10:57) (16 - 18)  SpO2: 96% (2020 10:57) (95% - 98%)    ________________________________________________  PHYSICAL EXAM:  GENERAL: NAD  HEENT: Normocephalic;  conjunctivae and sclerae clear; moist mucous membranes;   NECK : supple  CHEST/LUNG: Clear to auscultation bilaterally with good air entry   HEART: S1 S2  regular; no murmurs, gallops or rubs  ABDOMEN: Soft, Nontender, Nondistended; Bowel sounds present  EXTREMITIES: no cyanosis; no edema; no calf tenderness  SKIN: warm and dry; no rash  NERVOUS SYSTEM:  Awake and alert; Oriented  to place, person and time ; no new deficits    _________________________________________________  LABS:                        8.2    6.40  )-----------( 193      ( 2020 06:34 )             25.5     01-25    144  |  124<H>  |  61<H>  ----------------------------<  101<H>  5.7<H>   |  14<L>  |  3.07<H>    Ca    8.2<L>      2020 06:34  Phos  2.6       Mg     1.4         TPro  7.9  /  Alb  3.3<L>  /  TBili  0.6  /  DBili  x   /  AST  23  /  ALT  19  /  AlkPhos  91  -      Urinalysis Basic - ( 2020 23:35 )    Color: Yellow / Appearance: Clear / S.010 / pH: x  Gluc: x / Ketone: Negative  / Bili: Negative / Urobili: Negative   Blood: x / Protein: 30 mg/dL / Nitrite: Negative   Leuk Esterase: Moderate / RBC: 10-25 /HPF / WBC >50 /HPF   Sq Epi: x / Non Sq Epi: Few /HPF / Bacteria: Many /HPF      CAPILLARY BLOOD GLUCOSE      POCT Blood Glucose.: 135 mg/dL (2020 10:51)  POCT Blood Glucose.: 92 mg/dL (2020 06:27)  POCT Blood Glucose.: 93 mg/dL (2020 02:12)  POCT Blood Glucose.: 104 mg/dL (2020 22:02)  POCT Blood Glucose.: 155 mg/dL (2020 17:42)  POCT Blood Glucose.: 99 mg/dL (2020 16:08)  POCT Blood Glucose.: 133 mg/dL (2020 15:33)  POCT Blood Glucose.: 36 mg/dL (2020 15:15)        RADIOLOGY & ADDITIONAL TESTS:    Imaging Personally Reviewed:  YES    Consultant(s) Notes Reviewed:   YES/    Care Discussed with Consultants :     Plan of care was discussed with patient and /or primary care giver; all questions and concerns were addressed and care was aligned with patient's wishes.

## 2020-01-25 NOTE — PROGRESS NOTE ADULT - PROBLEM SELECTOR PLAN 1
-Patient is noted to have PAWAN on CKD stage 4  01/25 Cr 3.07, improving from 4.30, GFR 18  -Acute kidney injury could be pre-renal in the setting of increased ileostomy out-put and UTI. Pt received fluids over 24 hour so its hinting PAWAN is pre renal although FeNa 1.9 (Intrinsic)  -Pt is not retaining urine  -Avoid Nephrotoxic Meds/ Agents such as NSAIDs, Gadolinium contrast, Phosphate containing enemas, etc..)  -Adjust Medications according to eGFR  -Nephro consult- Dr Devon Reis  Daily BMP -Patient is noted to have PAWAN on CKD stage 4  01/24 pt received D5half NS, PAWAN improving. Likely pre renal   01/25:   Cr 3.07, improving from 4.30, GFR 18  -Pt received fluids over 24 hour so its hinting PAWAN is pre renal although FeNa 1.9 (Intrinsic)  -Pt is not retaining urine  -Avoid Nephrotoxic Meds/ Agents such as NSAIDs, Gadolinium contrast, Phosphate containing enemas, etc..)  -Adjust Medications according to eGFR  -Nephro consult- Dr Devon Reis  Daily BMP

## 2020-01-25 NOTE — PROGRESS NOTE ADULT - PROBLEM SELECTOR PLAN 5
-patient p/w hyperkalemia secondary to CKD, poor compliance to renal diet  -No EKG changes were noted  -S/p IV cocktail   -Daily BMP  -He was given lokelma TID during his previous hospital visit.**  01/24 Pt received cocktail in Am but potassium is still high. As per Dr thapa, Roberto Carlos takes week to work, will give kayexalate 2 doses and repeat BMP.  He will f/u in the afternoon -patient p/w hyperkalemia secondary to CKD, poor compliance to renal diet  -No EKG changes were noted  -S/p IV cocktail   -Daily BMP  -He was given lokelma TID during his previous hospital visit.**  01/24 Pt received cocktail in Am but potassium is still high. As per Dr thapa, Lokelma takes week to work, will give kayexalate 2 doses and repeat BMP.  01/24 Potassium is 5.7, initially it improved to 5, Will give another dose of kayexalate  f/u with Dr Thapa

## 2020-01-25 NOTE — PROGRESS NOTE ADULT - ASSESSMENT
69 year old Male from [facility], with PMHx of prostate Ca (s/p radiation in 2015, now in remission), asthma, COPD (not on O2), HTN, HLD, Bipolar, anemia, GERD, Ileostomy s/p sigmoid resection, PAD, and CKD presented to ED with severe left lower back pain, also complaints of urinary frequency and dysuria.   In ED, patient was noted to have abnormal labs , Cr 4.26, his baseline around a month ago was 2.6. Potassium was  noted to be 7.2, with bicarb of 12. Patient is admitted for back pain, suspected pyelonephritis, with PAWAN on CKD

## 2020-01-25 NOTE — PROGRESS NOTE ADULT - SUBJECTIVE AND OBJECTIVE BOX
CC: Patient denies CP, SOB, n/v/d, fever or chills.    Vital Signs Last 24 Hrs  T(C): 37.2 (2020 14:46), Max: 37.4 (2020 20:07)  T(F): 99 (2020 14:46), Max: 99.3 (2020 20:07)  HR: 73 (2020 14:46) (73 - 84)  BP: 85/40 (2020 14:46) (85/40 - 116/53)  BP(mean): --  RR: 16 (2020 14:46) (16 - 17)  SpO2: 96% (2020 14:46) (95% - 98%)     @ 07: @ 07:00  --------------------------------------------------------  IN: 0 mL / OUT: 500 mL / NET: -500 mL     @ 07: @ 15:22  --------------------------------------------------------  IN: 0 mL / OUT: 700 mL / NET: -700 mL    PHYSICAL EXAM:  GENERAL: NAD, well-nourished, well-developed  HEAD:  Atraumatic, Normocephalic  EYES: Sclera anicteric  ENMT: Moist mucous membranes  NECK: Supple, No JVD  NERVOUS SYSTEM:  Alert & Oriented X3  CHEST/LUNG: Clear to percussion bilaterally; No rales, rhonchi, wheezing  HEART: Regular rate and rhythm; No murmurs  ABDOMEN: Soft, Nontender, Nondistended; Bowel sounds present  EXTREMITIES:  No edema  SKIN: Normal turgor    MEDICATIONS:  acetaminophen   Tablet .. 650 milliGRAM(s) Oral every 6 hours PRN  ARIPiprazole 15 milliGRAM(s) Oral daily  calcitriol   Capsule 0.25 MICROGram(s) Oral daily  cefTRIAXone   IVPB 1000 milliGRAM(s) IV Intermittent every 24 hours  dextrose 5% + sodium chloride 0.45%. 1000 milliLiter(s) IV Continuous <Continuous>  ferrous    sulfate 325 milliGRAM(s) Oral daily  finasteride 5 milliGRAM(s) Oral daily  gabapentin 300 milliGRAM(s) Oral three times a day  heparin  Injectable 5000 Unit(s) SubCutaneous every 12 hours  HYDROmorphone  Injectable 0.5 milliGRAM(s) IV Push every 6 hours PRN  lamoTRIgine 200 milliGRAM(s) Oral daily  pantoprazole    Tablet 40 milliGRAM(s) Oral before breakfast  sodium bicarbonate 650 milliGRAM(s) Oral three times a day  sodium chloride 0.9% Bolus 1000 milliLiter(s) IV Bolus once  tamsulosin 0.4 milliGRAM(s) Oral at bedtime  traZODone 25 milliGRAM(s) Oral at bedtime      LABS:                        8.2    6.40  )-----------( 193      ( 2020 06:34 )             25.5     01-    144  |  124<H>  |  61<H>  ----------------------------<  101<H>  5.7<H>   |  14<L>  |  3.07<H>    Ca    8.2<L>      2020 06:34  Phos  2.6       Mg     1.4         TPro  7.9  /  Alb  3.3<L>  /  TBili  0.6  /  DBili  x   /  AST  23  /  ALT  19  /  AlkPhos  91        Urinalysis Basic - ( 2020 23:35 )    Color: Yellow / Appearance: Clear / S.010 / pH: x  Gluc: x / Ketone: Negative  / Bili: Negative / Urobili: Negative   Blood: x / Protein: 30 mg/dL / Nitrite: Negative   Leuk Esterase: Moderate / RBC: 10-25 /HPF / WBC >50 /HPF   Sq Epi: x / Non Sq Epi: Few /HPF / Bacteria: Many /HPF    ASSESSMENT AND PLAN:     1. PAWAN secondary to volume depletion improving  2. Hyperkalemia due to PAWAN improving. There is no indication for emergent HD  3. UTI  4. Anemia; MF  Keep patient euvolemic and 2g K renal diet  Avoid Nephrotoxic Meds/ Agents such as NSAIDs, Gadolinium contrast, Phosphate containing enemas, etc..)  Adjust Medications according to eGFR  Use Kayexalate for acute hyperkalemia not Lokelma  AB Rx  Follow BMP, TSAT, H/H

## 2020-01-26 ENCOUNTER — TRANSCRIPTION ENCOUNTER (OUTPATIENT)
Age: 70
End: 2020-01-26

## 2020-01-26 DIAGNOSIS — Z93.2 ILEOSTOMY STATUS: ICD-10-CM

## 2020-01-26 DIAGNOSIS — N18.3 CHRONIC KIDNEY DISEASE, STAGE 3 (MODERATE): ICD-10-CM

## 2020-01-26 LAB
ANION GAP SERPL CALC-SCNC: 4 MMOL/L — LOW (ref 5–17)
ANION GAP SERPL CALC-SCNC: 6 MMOL/L — SIGNIFICANT CHANGE UP (ref 5–17)
ANION GAP SERPL CALC-SCNC: 6 MMOL/L — SIGNIFICANT CHANGE UP (ref 5–17)
BASOPHILS # BLD AUTO: 0.03 K/UL — SIGNIFICANT CHANGE UP (ref 0–0.2)
BASOPHILS NFR BLD AUTO: 0.5 % — SIGNIFICANT CHANGE UP (ref 0–2)
BUN SERPL-MCNC: 39 MG/DL — HIGH (ref 7–18)
BUN SERPL-MCNC: 41 MG/DL — HIGH (ref 7–18)
BUN SERPL-MCNC: 46 MG/DL — HIGH (ref 7–18)
CALCIUM SERPL-MCNC: 8.4 MG/DL — SIGNIFICANT CHANGE UP (ref 8.4–10.5)
CALCIUM SERPL-MCNC: 8.4 MG/DL — SIGNIFICANT CHANGE UP (ref 8.4–10.5)
CALCIUM SERPL-MCNC: 8.6 MG/DL — SIGNIFICANT CHANGE UP (ref 8.4–10.5)
CHLORIDE SERPL-SCNC: 123 MMOL/L — HIGH (ref 96–108)
CHLORIDE SERPL-SCNC: 123 MMOL/L — HIGH (ref 96–108)
CHLORIDE SERPL-SCNC: 125 MMOL/L — HIGH (ref 96–108)
CO2 SERPL-SCNC: 16 MMOL/L — LOW (ref 22–31)
CREAT SERPL-MCNC: 2.58 MG/DL — HIGH (ref 0.5–1.3)
CREAT SERPL-MCNC: 2.63 MG/DL — HIGH (ref 0.5–1.3)
CREAT SERPL-MCNC: 2.65 MG/DL — HIGH (ref 0.5–1.3)
EOSINOPHIL # BLD AUTO: 0.52 K/UL — HIGH (ref 0–0.5)
EOSINOPHIL NFR BLD AUTO: 8.8 % — HIGH (ref 0–6)
GLUCOSE BLDC GLUCOMTR-MCNC: 108 MG/DL — HIGH (ref 70–99)
GLUCOSE BLDC GLUCOMTR-MCNC: 112 MG/DL — HIGH (ref 70–99)
GLUCOSE BLDC GLUCOMTR-MCNC: 115 MG/DL — HIGH (ref 70–99)
GLUCOSE BLDC GLUCOMTR-MCNC: 90 MG/DL — SIGNIFICANT CHANGE UP (ref 70–99)
GLUCOSE SERPL-MCNC: 87 MG/DL — SIGNIFICANT CHANGE UP (ref 70–99)
GLUCOSE SERPL-MCNC: 90 MG/DL — SIGNIFICANT CHANGE UP (ref 70–99)
GLUCOSE SERPL-MCNC: 99 MG/DL — SIGNIFICANT CHANGE UP (ref 70–99)
HCT VFR BLD CALC: 28.3 % — LOW (ref 39–50)
HGB BLD-MCNC: 8.8 G/DL — LOW (ref 13–17)
IMM GRANULOCYTES NFR BLD AUTO: 0.8 % — SIGNIFICANT CHANGE UP (ref 0–1.5)
LYMPHOCYTES # BLD AUTO: 0.9 K/UL — LOW (ref 1–3.3)
LYMPHOCYTES # BLD AUTO: 15.2 % — SIGNIFICANT CHANGE UP (ref 13–44)
MAGNESIUM SERPL-MCNC: 1.5 MG/DL — LOW (ref 1.6–2.6)
MAGNESIUM SERPL-MCNC: 1.6 MG/DL — SIGNIFICANT CHANGE UP (ref 1.6–2.6)
MCHC RBC-ENTMCNC: 26.7 PG — LOW (ref 27–34)
MCHC RBC-ENTMCNC: 31.1 GM/DL — LOW (ref 32–36)
MCV RBC AUTO: 85.8 FL — SIGNIFICANT CHANGE UP (ref 80–100)
MONOCYTES # BLD AUTO: 0.41 K/UL — SIGNIFICANT CHANGE UP (ref 0–0.9)
MONOCYTES NFR BLD AUTO: 6.9 % — SIGNIFICANT CHANGE UP (ref 2–14)
NEUTROPHILS # BLD AUTO: 4.02 K/UL — SIGNIFICANT CHANGE UP (ref 1.8–7.4)
NEUTROPHILS NFR BLD AUTO: 67.8 % — SIGNIFICANT CHANGE UP (ref 43–77)
NRBC # BLD: 0 /100 WBCS — SIGNIFICANT CHANGE UP (ref 0–0)
PHOSPHATE SERPL-MCNC: 2.3 MG/DL — LOW (ref 2.5–4.5)
PHOSPHATE SERPL-MCNC: 2.9 MG/DL — SIGNIFICANT CHANGE UP (ref 2.5–4.5)
PLATELET # BLD AUTO: 219 K/UL — SIGNIFICANT CHANGE UP (ref 150–400)
POTASSIUM SERPL-MCNC: 5.3 MMOL/L — SIGNIFICANT CHANGE UP (ref 3.5–5.3)
POTASSIUM SERPL-MCNC: 5.3 MMOL/L — SIGNIFICANT CHANGE UP (ref 3.5–5.3)
POTASSIUM SERPL-MCNC: 5.6 MMOL/L — HIGH (ref 3.5–5.3)
POTASSIUM SERPL-SCNC: 5.3 MMOL/L — SIGNIFICANT CHANGE UP (ref 3.5–5.3)
POTASSIUM SERPL-SCNC: 5.3 MMOL/L — SIGNIFICANT CHANGE UP (ref 3.5–5.3)
POTASSIUM SERPL-SCNC: 5.6 MMOL/L — HIGH (ref 3.5–5.3)
RBC # BLD: 3.3 M/UL — LOW (ref 4.2–5.8)
RBC # FLD: 17.1 % — HIGH (ref 10.3–14.5)
SODIUM SERPL-SCNC: 143 MMOL/L — SIGNIFICANT CHANGE UP (ref 135–145)
SODIUM SERPL-SCNC: 145 MMOL/L — SIGNIFICANT CHANGE UP (ref 135–145)
SODIUM SERPL-SCNC: 147 MMOL/L — HIGH (ref 135–145)
WBC # BLD: 5.93 K/UL — SIGNIFICANT CHANGE UP (ref 3.8–10.5)
WBC # FLD AUTO: 5.93 K/UL — SIGNIFICANT CHANGE UP (ref 3.8–10.5)

## 2020-01-26 PROCEDURE — 99233 SBSQ HOSP IP/OBS HIGH 50: CPT

## 2020-01-26 RX ORDER — MAGNESIUM SULFATE 500 MG/ML
2 VIAL (ML) INJECTION
Refills: 0 | Status: COMPLETED | OUTPATIENT
Start: 2020-01-26 | End: 2020-01-26

## 2020-01-26 RX ORDER — MAGNESIUM OXIDE 400 MG ORAL TABLET 241.3 MG
400 TABLET ORAL ONCE
Refills: 0 | Status: COMPLETED | OUTPATIENT
Start: 2020-01-26 | End: 2020-01-26

## 2020-01-26 RX ORDER — ALBUTEROL 90 UG/1
2.5 AEROSOL, METERED ORAL ONCE
Refills: 0 | Status: COMPLETED | OUTPATIENT
Start: 2020-01-26 | End: 2020-01-26

## 2020-01-26 RX ORDER — LOPERAMIDE HCL 2 MG
2 TABLET ORAL ONCE
Refills: 0 | Status: COMPLETED | OUTPATIENT
Start: 2020-01-26 | End: 2020-01-26

## 2020-01-26 RX ORDER — METHYLPREDNISOLONE 4 MG
500 TABLET ORAL ONCE
Refills: 0 | Status: DISCONTINUED | OUTPATIENT
Start: 2020-01-26 | End: 2020-01-26

## 2020-01-26 RX ORDER — SODIUM POLYSTYRENE SULFONATE 4.1 MEQ/G
30 POWDER, FOR SUSPENSION ORAL ONCE
Refills: 0 | Status: COMPLETED | OUTPATIENT
Start: 2020-01-26 | End: 2020-01-26

## 2020-01-26 RX ORDER — HYDROMORPHONE HYDROCHLORIDE 2 MG/ML
2 INJECTION INTRAMUSCULAR; INTRAVENOUS; SUBCUTANEOUS ONCE
Refills: 0 | Status: DISCONTINUED | OUTPATIENT
Start: 2020-01-26 | End: 2020-01-26

## 2020-01-26 RX ORDER — GABAPENTIN 400 MG/1
200 CAPSULE ORAL THREE TIMES A DAY
Refills: 0 | Status: DISCONTINUED | OUTPATIENT
Start: 2020-01-26 | End: 2020-01-28

## 2020-01-26 RX ORDER — LOPERAMIDE HCL 2 MG
2 TABLET ORAL
Refills: 0 | Status: DISCONTINUED | OUTPATIENT
Start: 2020-01-26 | End: 2020-01-28

## 2020-01-26 RX ORDER — DEXTROSE 50 % IN WATER 50 %
50 SYRINGE (ML) INTRAVENOUS ONCE
Refills: 0 | Status: COMPLETED | OUTPATIENT
Start: 2020-01-26 | End: 2020-01-26

## 2020-01-26 RX ORDER — SODIUM CHLORIDE 9 MG/ML
1000 INJECTION INTRAMUSCULAR; INTRAVENOUS; SUBCUTANEOUS
Refills: 0 | Status: DISCONTINUED | OUTPATIENT
Start: 2020-01-26 | End: 2020-01-26

## 2020-01-26 RX ORDER — SODIUM CHLORIDE 9 MG/ML
1000 INJECTION, SOLUTION INTRAVENOUS
Refills: 0 | Status: DISCONTINUED | OUTPATIENT
Start: 2020-01-26 | End: 2020-01-26

## 2020-01-26 RX ADMIN — Medication 650 MILLIGRAM(S): at 23:09

## 2020-01-26 RX ADMIN — LAMOTRIGINE 200 MILLIGRAM(S): 25 TABLET, ORALLY DISINTEGRATING ORAL at 13:01

## 2020-01-26 RX ADMIN — Medication 50 MILLILITER(S): at 08:53

## 2020-01-26 RX ADMIN — HYDROMORPHONE HYDROCHLORIDE 0.5 MILLIGRAM(S): 2 INJECTION INTRAMUSCULAR; INTRAVENOUS; SUBCUTANEOUS at 06:30

## 2020-01-26 RX ADMIN — HYDROMORPHONE HYDROCHLORIDE 0.5 MILLIGRAM(S): 2 INJECTION INTRAMUSCULAR; INTRAVENOUS; SUBCUTANEOUS at 16:38

## 2020-01-26 RX ADMIN — SODIUM CHLORIDE 60 MILLILITER(S): 9 INJECTION, SOLUTION INTRAVENOUS at 08:53

## 2020-01-26 RX ADMIN — FINASTERIDE 5 MILLIGRAM(S): 5 TABLET, FILM COATED ORAL at 13:00

## 2020-01-26 RX ADMIN — PANTOPRAZOLE SODIUM 40 MILLIGRAM(S): 20 TABLET, DELAYED RELEASE ORAL at 06:03

## 2020-01-26 RX ADMIN — ARIPIPRAZOLE 15 MILLIGRAM(S): 15 TABLET ORAL at 13:01

## 2020-01-26 RX ADMIN — CEFTRIAXONE 100 MILLIGRAM(S): 500 INJECTION, POWDER, FOR SOLUTION INTRAMUSCULAR; INTRAVENOUS at 13:01

## 2020-01-26 RX ADMIN — Medication 50 GRAM(S): at 21:15

## 2020-01-26 RX ADMIN — GABAPENTIN 200 MILLIGRAM(S): 400 CAPSULE ORAL at 23:08

## 2020-01-26 RX ADMIN — Medication 2 MILLIGRAM(S): at 19:45

## 2020-01-26 RX ADMIN — Medication 650 MILLIGRAM(S): at 06:04

## 2020-01-26 RX ADMIN — ALBUTEROL 2.5 MILLIGRAM(S): 90 AEROSOL, METERED ORAL at 13:37

## 2020-01-26 RX ADMIN — Medication 25 MILLIGRAM(S): at 23:09

## 2020-01-26 RX ADMIN — HEPARIN SODIUM 5000 UNIT(S): 5000 INJECTION INTRAVENOUS; SUBCUTANEOUS at 17:58

## 2020-01-26 RX ADMIN — GABAPENTIN 300 MILLIGRAM(S): 400 CAPSULE ORAL at 06:03

## 2020-01-26 RX ADMIN — GABAPENTIN 300 MILLIGRAM(S): 400 CAPSULE ORAL at 13:01

## 2020-01-26 RX ADMIN — Medication 325 MILLIGRAM(S): at 13:00

## 2020-01-26 RX ADMIN — HYDROMORPHONE HYDROCHLORIDE 2 MILLIGRAM(S): 2 INJECTION INTRAMUSCULAR; INTRAVENOUS; SUBCUTANEOUS at 23:46

## 2020-01-26 RX ADMIN — MAGNESIUM OXIDE 400 MG ORAL TABLET 400 MILLIGRAM(S): 241.3 TABLET ORAL at 23:52

## 2020-01-26 RX ADMIN — ALBUTEROL 2.5 MILLIGRAM(S): 90 AEROSOL, METERED ORAL at 08:52

## 2020-01-26 RX ADMIN — CALCITRIOL 0.25 MICROGRAM(S): 0.5 CAPSULE ORAL at 13:01

## 2020-01-26 RX ADMIN — SODIUM POLYSTYRENE SULFONATE 30 GRAM(S): 4.1 POWDER, FOR SUSPENSION ORAL at 08:52

## 2020-01-26 RX ADMIN — HEPARIN SODIUM 5000 UNIT(S): 5000 INJECTION INTRAVENOUS; SUBCUTANEOUS at 06:04

## 2020-01-26 RX ADMIN — HYDROMORPHONE HYDROCHLORIDE 0.5 MILLIGRAM(S): 2 INJECTION INTRAMUSCULAR; INTRAVENOUS; SUBCUTANEOUS at 17:38

## 2020-01-26 RX ADMIN — TAMSULOSIN HYDROCHLORIDE 0.4 MILLIGRAM(S): 0.4 CAPSULE ORAL at 23:09

## 2020-01-26 RX ADMIN — HYDROMORPHONE HYDROCHLORIDE 0.5 MILLIGRAM(S): 2 INJECTION INTRAMUSCULAR; INTRAVENOUS; SUBCUTANEOUS at 06:15

## 2020-01-26 NOTE — PROGRESS NOTE ADULT - PROBLEM SELECTOR PLAN 4
-Patient is noted to have NAG- metabolic acidosis   -CO2 12 which improved to 16 after sodium bicarb 50meq IV push   -VBG noted   -C/w sodium bicarb 650 TID, home dose   -01/25 Serum Bicarb 14 Increased output leading to dehydration and renal failure (PAWAN)  Will give Imodium x 1 dose stat then prn to target bag emptying 3 or 4 times a day.

## 2020-01-26 NOTE — PROGRESS NOTE ADULT - ATTENDING COMMENTS
Discussed with patient findings and plan of care  Discussed with RN PT evaluation and OOB to chair; Patient form Bianca Judd MOSCOSO    Discussed with patient findings and plan of care  Discussed with RN Hypomagnesemia likely from GI losses: Replace IV x 2 riders; repeat labs in AM    PT evaluation and OOB to chair; Patient form Bianca MOSCOSO    Discussed with patient findings and plan of care  Discussed with RN

## 2020-01-26 NOTE — PROGRESS NOTE ADULT - SUBJECTIVE AND OBJECTIVE BOX
CC: Patient denies CP, SOB, n/v, fever or chills.    Vital Signs Last 24 Hrs  T(C): 36.6 (26 Jan 2020 18:57), Max: 36.8 (26 Jan 2020 10:30)  T(F): 97.8 (26 Jan 2020 18:57), Max: 98.2 (26 Jan 2020 10:30)  HR: 77 (26 Jan 2020 18:57) (73 - 79)  BP: 105/59 (26 Jan 2020 18:57) (100/62 - 121/69)  BP(mean): --  RR: 18 (26 Jan 2020 18:57) (16 - 18)  SpO2: 98% (26 Jan 2020 18:57) (94% - 98%)    01-25 @ 07:01 - 01-26 @ 07:00  --------------------------------------------------------  IN: 1335 mL / OUT: 1750 mL / NET: -415 mL    01-26 @ 07:01 - 01-26 @ 20:13  --------------------------------------------------------  IN: 678 mL / OUT: 700 mL / NET: -22 mL    PHYSICAL EXAM:  GENERAL: NAD, well-nourished, well-developed  HEAD:  Atraumatic, Normocephalic  EYES: Sclera anicteric  ENMT: Moist mucous membranes  NECK: Supple, No JVD  NERVOUS SYSTEM:  Alert & Oriented X3  CHEST/LUNG: Clear to percussion bilaterally; No rales, rhonchi, wheezing  HEART: Regular rate and rhythm; No murmurs  ABDOMEN: Soft, Nontender, Nondistended; Bowel sounds present  EXTREMITIES:  No edema  SKIN: Normal turgor    MEDICATIONS:  acetaminophen   Tablet .. 650 milliGRAM(s) Oral every 6 hours PRN  ARIPiprazole 15 milliGRAM(s) Oral daily  calcitriol   Capsule 0.25 MICROGram(s) Oral daily  cefTRIAXone   IVPB 1000 milliGRAM(s) IV Intermittent every 24 hours  dextrose 5% + sodium chloride 0.45%. 1000 milliLiter(s) IV Continuous <Continuous>  ferrous    sulfate 325 milliGRAM(s) Oral daily  finasteride 5 milliGRAM(s) Oral daily  gabapentin 200 milliGRAM(s) Oral three times a day  heparin  Injectable 5000 Unit(s) SubCutaneous every 12 hours  HYDROmorphone  Injectable 0.5 milliGRAM(s) IV Push every 6 hours PRN  lamoTRIgine 200 milliGRAM(s) Oral daily  loperamide 2 milliGRAM(s) Oral two times a day PRN  magnesium sulfate  IVPB 2 Gram(s) IV Intermittent every 2 hours  pantoprazole    Tablet 40 milliGRAM(s) Oral before breakfast  sodium bicarbonate 650 milliGRAM(s) Oral three times a day  tamsulosin 0.4 milliGRAM(s) Oral at bedtime  traZODone 25 milliGRAM(s) Oral at bedtime      LABS:                        8.8    5.93  )-----------( 219      ( 26 Jan 2020 04:39 )             28.3     01-26    145  |  123<H>  |  39<H>  ----------------------------<  90  5.3   |  16<L>  |  2.58<H>    Ca    8.6      26 Jan 2020 14:07  Phos  2.3     01-26  Mg     1.5     01-26    ASSESSMENT AND PLAN:     1. PAWAN secondary to volume depletion from ileostomy drainage improving  2. Hyperkalemia due to PAWAN improving. There is no indication for emergent HD  3. UTI  4. Anemia; MF  Keep patient euvolemic and 2g K renal diet  Avoid Nephrotoxic Meds/ Agents such as NSAIDs, Gadolinium contrast, Phosphate containing enemas, etc..)  Adjust Medications according to eGFR  Excessive ileostomy drainage Rx as per PCP  Use Kayexalate for acute hyperkalemia not Lokelma  AB Rx  Follow BMP, TSAT, H/H

## 2020-01-26 NOTE — DISCHARGE NOTE PROVIDER - CARE PROVIDER_API CALL
Kirby Angel (MD)  Nephrology  [address] [place] [address]  Phone: [phone]  Fax: [phone]  Follow Up Time:

## 2020-01-26 NOTE — DISCHARGE NOTE PROVIDER - HOSPITAL COURSE
69 year old Male from [facility], with PMHx of prostate Ca (s/p radiation in 2015, now in remission), asthma, COPD (not on O2), HTN, HLD, Bipolar, anemia, GERD, Ileostomy s/p sigmoid resection, PAD, and CKD presented to ED with severe left lower back pain. Patient states that he has chronic back ache but for the last 2 weeks his pain has worsened to an extent that he has difficulty in ambulation given pain, denies any weakness, numbness, urinary or fecal incontinence. Patient reports that he has noticed increased out-put in his colostomy bag for last few days, also complaints of urinary frequency and burning micturition.      In ED, patient was noted to have abnormal labs , Cr 4.26, his baseline around a month ago was 2.6. Potassium was  noted to be 7.2, with bicarb of 12.           PAWAN (acute kidney injury).  Patient admitted with PAWAN on CKD stage 4 possibly prerenal due to increased output/ GI losses.    Avoid Nephrotoxic Meds/ Agents such as NSAIDs, Gadolinium contrast, Phosphate containing enemas, etc..)    Creatinine trended down with gentle hydration. No urinary obstruction was noted.     Daily BMP.         Hyperkalemia  Pt presented with high levels of potassium. He was asymptomatic and EKG was also normal. We gave him Cocktail which decreased his blood sugar. Pt received  multiple doses of Kayexalate. His Potassium came back to normal. Pt was given renal diet    Pt was also seen by Nephrologist        Back pain.  Plan: Pt presented with worsening of back pain. He has a history of Arthritis.     We got MRI of back ...    Pain was controlled with Gabapentin and PRN pain meds.         UTI (urinary tract infection).  UA was positive, We continued IV Ceftriaxone     Urine Cx >100,000 Gram negative rods; Follow up identification and sensitivity. ...............        Ileostomy in place.  Increased output leading to dehydration and renal failure (PAWAN)    We gave Imodium x 1 dose stat then prn to target bag emptying 3 or 4 times a day.          Stage 3 chronic kidney disease.  Patient with known CKD,reatinine at baseline 2.6;    Monitor closely; Follow up with Nephrologist    Hyperkalemia on admission due to PAWAN resolved.     Renal USG..........................         Metabolic acidosis.  Patient was noted to have NAG- metabolic acidosis likely due to a combination of Renal failure as well as GI losses     Continue sodium bicarb 650 mg thrice daily, home dose.        (hypertension).  Patient has hx of HTN,    Lisinopril was discontinued as BP was running low, it was closely monitored.         Anemia. Patient has anemia likely of chronic renal disease     Continued with ferrous sulfate once daily;         DVT Prophylaxis  Heparin SQ for DVT ppx.          Goals of care, counseling/discussion.  PT IS FULL CODE. 69 year old Male from [facility], with PMHx of prostate Ca (s/p radiation in 2015, now in remission), asthma, COPD (not on O2), HTN, HLD, Bipolar, anemia, GERD, Ileostomy s/p sigmoid resection, PAD, and CKD presented to ED with severe left lower back pain. Patient states that he has chronic back ache but for the last 2 weeks his pain has worsened to an extent that he has difficulty in ambulation given pain, denies any weakness, numbness, urinary or fecal incontinence. Patient reports that he has noticed increased out-put in his colostomy bag for last few days, also complaints of urinary frequency and burning micturition.      In ED, patient was noted to have abnormal labs , Cr 4.26, his baseline around a month ago was 2.6. Potassium was  noted to be 7.2, with bicarb of 12.           PAWAN (acute kidney injury).  Patient admitted with PAWAN on CKD stage 4 possibly prerenal due to increased output/ GI losses.    Avoid Nephrotoxic Meds/ Agents such as NSAIDs, Gadolinium contrast, Phosphate containing enemas, etc..)    Creatinine trended down with gentle hydration. No urinary obstruction was noted.     Daily BMP.         Hyperkalemia  Pt presented with high levels of potassium. He was asymptomatic and EKG was also normal. We gave him Cocktail which decreased his blood sugar. Pt received  multiple doses of Kayexalate. His Potassium came back to normal. Pt was given renal diet    Pt was also seen by Nephrologist        Back pain.  Plan: Pt presented with worsening of back pain. He has a history of Arthritis.     We got MRI of back ...    Pain was controlled with Gabapentin and PRN pain meds.         UTI (urinary tract infection).  UA was positive, We continued IV Ceftriaxone     Urine Cx >100,000 Gram negative rods; Positive for pan sensitive Klebsiella.         Ileostomy in place.  Increased output leading to dehydration and renal failure (PAWAN)    We gave Imodium x 1 dose stat then prn to target bag emptying 3 or 4 times a day.          Stage 3 chronic kidney disease.  Patient with known CKD,reatinine at baseline 2.6;    Monitor closely; Follow up with Nephrologist    Hyperkalemia on admission due to PAWAN resolved.     Renal USG was negative for hydronephrosis         Metabolic acidosis.  Patient was noted to have NAG- metabolic acidosis likely due to a combination of Renal failure as well as GI losses     Continue sodium bicarb 650 mg thrice daily, home dose.        (hypertension).  Patient has hx of HTN,    Lisinopril was discontinued as BP was running low, it was closely monitored.         Anemia. Patient has anemia likely of chronic renal disease     Continued with ferrous sulfate once daily;         DVT Prophylaxis  Heparin SQ for DVT ppx.          Goals of care, counseling/discussion.  PT IS FULL CODE.

## 2020-01-26 NOTE — PROGRESS NOTE ADULT - PROBLEM SELECTOR PLAN 6
-Patient has hx of HTN, he is on Lisinopril  -Will hold in the setting of PAWAN  -start amlodipine ,   01/24 As BP was running low, we have D/Shayne Amlodipine  01/25 BP is on lower side. will given gentle hydration 60ml.hr NS  Na 140 Patient is noted to have NAG- metabolic acidosis likely due to a combination of Renal failure as well as GI losses   Continue sodium bicarb 650 mg thrice daily, home dose

## 2020-01-26 NOTE — DISCHARGE NOTE PROVIDER - NSDCCPCAREPLAN_GEN_ALL_CORE_FT
PRINCIPAL DISCHARGE DIAGNOSIS  Diagnosis: PAWAN (acute kidney injury)  Assessment and Plan of Treatment: - You presented with Elevated Creatinine from your baseline  - You also have history of Chronic kidney disease  You have ielostomy bag which has increased output leading to dehydration overall.     - Please continue oral hydration as much as possible within the daily drinking limit of 2 L per day  - Dr Curran nephrologist was consulted   - You are medically stable to be discharged from the hospital.  - You need to follow up with your Primary Care Physician and Your nephrologist to get blood work and check your Creatinine level  in 1 week.  - You need to continue your medications regularly as prescribed.      SECONDARY DISCHARGE DIAGNOSES  Diagnosis: Stage 3 chronic kidney disease  Assessment and Plan of Treatment: You have a history of chronic kidney disease  You were given fluids and renal diet for your chronic kidney disease.   you were evalauted by nephrology consultant for worsening renal failure  your creatinine level is normal.   Please take your medication regularly.   Pleasde follow up with your Primary Care Physician within 1 week and advise to follow up with Dr. Curran within 2 weeks as outpatient to monitor kidney function.    Diagnosis: UTI (urinary tract infection)  Assessment and Plan of Treatment: - You were found to have a urinary tract infection on your admission.   - You were treated with 7 days of iv antibiotics in the hospital with the goal of clearing this infection   - Please complete Ceftin as prescribed above.   - Urinary tract infections can cause weakness, confusion, or spread to other organ systems in the body.  - If you experience continued symptoms of infection (fever, chills, weakness, or burning with urination), please follow up with your primary care provider.    Diagnosis: Ileostomy in place  Assessment and Plan of Treatment: Please monitor your daily ileostomy bag output. It should not be more than 3-4 bags in a day as it cn lead to dehydration .  Take good care of hygiene.    Diagnosis: Hyperkalemia  Assessment and Plan of Treatment: Yoir potassium levels are gih. It is mainly due to CKD and non compliance with diet and medications.  Please take low potassium diet, Avoid bananas, spinach, kale  Keep yourself hydrated.  Hyperkalemia can lead to serious caridac complicaions if not controlled    Diagnosis: Metabolic acidosis  Assessment and Plan of Treatment: Pleasecontinue taking bicarb tabs as prescribed.    Diagnosis: HTN (hypertension)  Assessment and Plan of Treatment: HTN (hypertension)    Diagnosis: Low back pain  Assessment and Plan of Treatment: MRI showed...  Arthritis..  pain management PRINCIPAL DISCHARGE DIAGNOSIS  Diagnosis: PAWAN (acute kidney injury)  Assessment and Plan of Treatment: - You presented with Elevated Creatinine from your baseline  - You also have history of Chronic kidney disease  You have ielostomy bag which has increased output leading to dehydration overall.     - Please continue oral hydration as much as possible within the daily drinking limit of 2 L per day  - Dr Curran nephrologist was consulted   - You are medically stable to be discharged from the hospital.  - You need to follow up with your Primary Care Physician and Your nephrologist to get blood work and check your Creatinine level  in 1 week.  - You need to continue your medications regularly as prescribed.      SECONDARY DISCHARGE DIAGNOSES  Diagnosis: Hyperkalemia  Assessment and Plan of Treatment: Your potassium levels are high. It is mainly due to CKD and non compliance with diet and medications.  Please take low potassium diet, Avoid bananas, spinach, kale  Keep yourself hydrated.  Hyperkalemia can lead to serious cardiac complications if not controlled  continue with Lokelma as instructed.   Follow up with Primary Care Physician within one week after discharge to inform of your recent hospitalization. Patient/Family was oriented on instruction.      Diagnosis: HTN (hypertension)  Assessment and Plan of Treatment: Lisinopril may be resumed upon kidney function improvement . please follow up with your PCP to follow up your kidney function.    Diagnosis: Metabolic acidosis  Assessment and Plan of Treatment: Pleasecontinue taking bicarb tabs as prescribed.    Diagnosis: UTI (urinary tract infection)  Assessment and Plan of Treatment: - You were found to have a urinary tract infection on your admission.   - You were treated with 7 days of iv antibiotics in the hospital with the goal of clearing this infection   - Please complete Ceftin as prescribed above.   - Urinary tract infections can cause weakness, confusion, or spread to other organ systems in the body.  - If you experience continued symptoms of infection (fever, chills, weakness, or burning with urination), please follow up with your primary care provider.    Diagnosis: Ileostomy in place  Assessment and Plan of Treatment: Please monitor your daily ileostomy bag output. It should not be more than 3-4 bags in a day as it cn lead to dehydration .  Take good care of hygiene.    Diagnosis: Stage 3 chronic kidney disease  Assessment and Plan of Treatment: You have a history of chronic kidney disease  You were given fluids and renal diet for your chronic kidney disease.   you were evalauted by nephrology consultant for worsening renal failure  your creatinine level is normal.   Please take your medication regularly.   Scarlet follow up with your Primary Care Physician within 1 week and advise to follow up with Dr. Curran within 2 weeks as outpatient to monitor kidney function.    Diagnosis: Low back pain  Assessment and Plan of Treatment: Continue with your pain medication and follow up with your PCP.

## 2020-01-26 NOTE — PROGRESS NOTE ADULT - PROBLEM SELECTOR PLAN 5
-patient p/w hyperkalemia secondary to CKD, poor compliance to renal diet  -No EKG changes were noted  -S/p IV cocktail   -Daily BMP  -He was given lokelma TID during his previous hospital visit.**  01/24 Pt received cocktail in Am but potassium is still high. As per Dr thapa, Lokelma takes week to work, will give kayexalate 2 doses and repeat BMP.  01/24 Potassium is 5.7, initially it improved to 5, Will give another dose of kayexalate  f/u with Dr Thapa Patient with known CKD now Creatinine at baseline 2.6;  Monitor closely; Follow up with Nephrologist  Hyperkalemia on admission due to PAWAN resolved

## 2020-01-26 NOTE — DISCHARGE NOTE PROVIDER - NSDCMRMEDTOKEN_GEN_ALL_CORE_FT
Abilify 10 mg oral tablet: 1.5 tab(s) orally once a day    calcitriol 0.25 mcg oral capsule: 1 cap(s) orally once a day  Dilaudid 2 mg oral tablet: 2 tab(s) orally every 8 hours, As Needed - Severe Pain (7 - 10) - 10) MDD:6 mg  ferrous sulfate 325 mg (65 mg elemental iron) oral tablet: 1 tab(s) orally once a day  Flomax 0.4 mg oral capsule: 1 cap(s) orally once a day  gabapentin 300 mg oral tablet: 1 tab(s) orally 3 times a day  halobetasol 0.05% topical cream: Apply topically to affected area once a day  lamoTRIgine 200 mg oral tablet: 1 tab(s) orally once a day  lisinopril 5 mg oral tablet: 0.5 tab(s) orally once a day  Proscar 5 mg oral tablet: 1 tab(s) orally once a day  Protonix 40 mg oral delayed release tablet: 1 tab(s) orally once a day  sodium bicarbonate 650 mg oral tablet: 1 tab(s) orally 3 times a day (with meals)  traZODone 50 mg oral tablet: 0.5 tab(s) orally once a day (at bedtime)  Ventolin 90 mcg/inh inhalation aerosol: Abilify 10 mg oral tablet: 1.5 tab(s) orally once a day    Dilaudid 2 mg oral tablet: 2 tab(s) orally every 8 hours, As Needed - Severe Pain (7 - 10) - 10) MDD:6 mg  ferrous sulfate 325 mg (65 mg elemental iron) oral tablet: 1 tab(s) orally once a day  Flomax 0.4 mg oral capsule: 1 cap(s) orally once a day  gabapentin 300 mg oral tablet: 1 tab(s) orally 3 times a day  halobetasol 0.05% topical cream: Apply topically to affected area once a day  lamoTRIgine 200 mg oral tablet: 1 tab(s) orally once a day  Proscar 5 mg oral tablet: 1 tab(s) orally once a day  Protonix 40 mg oral delayed release tablet: 1 tab(s) orally once a day  traZODone 50 mg oral tablet: 0.5 tab(s) orally once a day (at bedtime)  Ventolin 90 mcg/inh inhalation aerosol: Abilify 10 mg oral tablet: 1.5 tab(s) orally once a day    Dilaudid 2 mg oral tablet: 2 tab(s) orally every 8 hours, As Needed - Severe Pain (7 - 10) - 10) MDD:6 mg  ferrous sulfate 325 mg (65 mg elemental iron) oral tablet: 1 tab(s) orally once a day  Flomax 0.4 mg oral capsule: 1 cap(s) orally once a day  gabapentin 300 mg oral tablet: 1 tab(s) orally 3 times a day  halobetasol 0.05% topical cream: Apply topically to affected area once a day  lamoTRIgine 200 mg oral tablet: 1 tab(s) orally once a day  lidocaine 5% topical film:  topically   Proscar 5 mg oral tablet: 1 tab(s) orally once a day  Protonix 40 mg oral delayed release tablet: 1 tab(s) orally once a day  traZODone 50 mg oral tablet: 0.5 tab(s) orally once a day (at bedtime)  Ventolin 90 mcg/inh inhalation aerosol:

## 2020-01-26 NOTE — PROGRESS NOTE ADULT - PROBLEM SELECTOR PLAN 8
IMPROVE VTE score:  [ ] Previous VTE                                                3  [ ] Thrombophilia                                             2  [ ] Lower limb paralysis                                  2        (unable to hold up >15 seconds)    [ ] Current Cancer (within 6 months)            2   x[] Immobilization > 24 hrs                              1  [ ] ICU/CCU stay > 24 hours                            1  [x] Age > 60                                                         1  heparin Patient has anemia likely of chronic renal disease   Continue ferrous sulfate once daily;   Epogen if okay with Renal

## 2020-01-26 NOTE — PROGRESS NOTE ADULT - PROBLEM SELECTOR PLAN 7
-Patient has anemia likely of chronic disease   Had recent anemia panel in December, no utility in repeating.   -C/w ferrous sulfate -Patient has hx of HTN,  May resume Lisinopril once BP elevated and  if okay with renal as PAWAN and Hyperkalemia has resolved but needs close monitoring  Currently BP low normal possibly due to excess fluid losses from GI in ileostomy.

## 2020-01-26 NOTE — PROGRESS NOTE ADULT - PROBLEM SELECTOR PLAN 3
-patient c/o severe back pain which is chronic, however aggravated more recently. Also c/o urinary symptoms.   -In the setting of back pain and positive UA, suspect pyelonephritis, however back pain could be secondary to arthritis, disc herniation.  -CT lumbar spine: There is no fracture or compression or subluxation or abel osseous destruction. Degenerative disc disease.   -Follow urine cx   -Cotnue IV Ceftriaxone   -C/w pain medications, he was on Dilaudid at home as well   -Follow renal and bladder sonogram. Cotnue IV Ceftriaxone   Urine Cx >100,000 Gram negative rods; Follow up identification and sensitivity. Continue IV Ceftriaxone   Urine Cx >100,000 Gram negative rods; Follow up identification and sensitivity.

## 2020-01-26 NOTE — PROGRESS NOTE ADULT - ASSESSMENT
69 year old Male from [facility], with PMHx of prostate Ca (s/p radiation in 2015, now in remission), asthma, COPD (not on O2), HTN, HLD, Bipolar, anemia, GERD, Ileostomy s/p sigmoid resection, PAD, and CKD presented to ED with severe left lower back pain, also complaints of urinary frequency and dysuria.   In ED, patient was noted to have abnormal labs , Cr 4.26, his baseline around a month ago was 2.6. Potassium was  noted to be 7.2, with bicarb of 12. Patient is admitted for back pain, suspected pyelonephritis, with PAWAN on CKD 69 year old Male from [facility], with PMHx of prostate Ca (s/p radiation in 2015, now in remission), asthma, COPD (not on O2), HTN, HLD, Bipolar, anemia, GERD, Ileostomy s/p sigmoid resection, PAD, and CKD presented to ED with severe left lower back pain, also complaints of urinary frequency and dysuria.     In ED, patient was noted to have abnormal labs , Cr 4.26, his baseline around a month ago was 2.6. Potassium was  noted to be 7.2, with bicarb of 12. Patient is admitted for back pain, suspected pyelonephritis, with PAWAN on CKD

## 2020-01-26 NOTE — PROGRESS NOTE ADULT - PROBLEM SELECTOR PLAN 2
-In the setting of back pain and positive UA, suspect pyelonephritis, however back pain could be secondary to arthritis, disc herniation.  -CT lumbar spine: There is no fracture or compression or subluxation or abel osseous destruction. Degenerative disc disease.   -Will get MR Lumbosacral spine as pt also had history of prostate cancer  -screening form is the chart.  -Neuro Dr Cisse Pain is better controlled  Continue Gabapentin and PRN pain meds

## 2020-01-26 NOTE — PROGRESS NOTE ADULT - SUBJECTIVE AND OBJECTIVE BOX
MEDICAL ATTENDING NOTE: Attending Medicine Covering Dr. Rush    Patient is a 69y old  Male who presents with a chief complaint of PAWAN on CKD, back pain (25 Jan 2020 18:21)      INTERVAL HPI/OVERNIGHT EVENTS: no new complaints    MEDICATIONS  (STANDING):  ARIPiprazole 15 milliGRAM(s) Oral daily  calcitriol   Capsule 0.25 MICROGram(s) Oral daily  cefTRIAXone   IVPB 1000 milliGRAM(s) IV Intermittent every 24 hours  dextrose 5% + sodium chloride 0.45%. 1000 milliLiter(s) (60 mL/Hr) IV Continuous <Continuous>  dextrose 5% + sodium chloride 0.45%. 1000 milliLiter(s) (60 mL/Hr) IV Continuous <Continuous>  ferrous    sulfate 325 milliGRAM(s) Oral daily  finasteride 5 milliGRAM(s) Oral daily  gabapentin 300 milliGRAM(s) Oral three times a day  heparin  Injectable 5000 Unit(s) SubCutaneous every 12 hours  lamoTRIgine 200 milliGRAM(s) Oral daily  pantoprazole    Tablet 40 milliGRAM(s) Oral before breakfast  sodium bicarbonate 650 milliGRAM(s) Oral three times a day  tamsulosin 0.4 milliGRAM(s) Oral at bedtime  traZODone 25 milliGRAM(s) Oral at bedtime    MEDICATIONS  (PRN):  acetaminophen   Tablet .. 650 milliGRAM(s) Oral every 6 hours PRN Moderate Pain (4 - 6)  HYDROmorphone  Injectable 0.5 milliGRAM(s) IV Push every 6 hours PRN Severe Pain (7 - 10)      __________________________________________________  REVIEW OF SYSTEMS:    CONSTITUTIONAL: No fever,   EYES: no acute visual disturbances  NECK: No pain or stiffness  RESPIRATORY: No cough; No shortness of breath  CARDIOVASCULAR: No chest pain, no palpitations  GASTROINTESTINAL: No pain. No nausea or vomiting; No diarrhea   NEUROLOGICAL: No headache or numbness, no tremors  MUSCULOSKELETAL: No joint pain, no muscle pain  GENITOURINARY: no dysuria, no frequency, no hesitancy  PSYCHIATRY: no depression , no anxiety  ALL OTHER  ROS negative        Vital Signs Last 24 Hrs  T(C): 36.7 (26 Jan 2020 13:29), Max: 36.8 (25 Jan 2020 17:45)  T(F): 98 (26 Jan 2020 13:29), Max: 98.2 (25 Jan 2020 17:45)  HR: 77 (26 Jan 2020 13:29) (73 - 79)  BP: 100/62 (26 Jan 2020 13:29) (100/62 - 129/71)  BP(mean): --  RR: 18 (26 Jan 2020 13:29) (16 - 18)  SpO2: 95% (26 Jan 2020 13:29) (94% - 98%)    ________________________________________________  PHYSICAL EXAM:  GENERAL: NAD  HEENT: Normocephalic;  conjunctivae and sclerae clear; moist mucous membranes;   NECK : supple  CHEST/LUNG: Clear to auscultation bilaterally with good air entry   HEART: S1 S2  regular; no murmurs, gallops or rubs  ABDOMEN: Soft, Nontender, Nondistended; Bowel sounds present  EXTREMITIES: no cyanosis; no edema; no calf tenderness  SKIN: warm and dry; no rash  NERVOUS SYSTEM:  Awake and alert; Oriented  to place, person and time ; no new deficits    _________________________________________________  LABS:                        8.8    5.93  )-----------( 219      ( 26 Jan 2020 04:39 )             28.3     01-26    145  |  123<H>  |  39<H>  ----------------------------<  90  5.3   |  16<L>  |  2.58<H>    Ca    8.6      26 Jan 2020 14:07  Phos  2.3     01-26  Mg     1.5     01-26          CAPILLARY BLOOD GLUCOSE      POCT Blood Glucose.: 112 mg/dL (26 Jan 2020 11:57)  POCT Blood Glucose.: 90 mg/dL (26 Jan 2020 08:20)  POCT Blood Glucose.: 115 mg/dL (26 Jan 2020 04:41)  POCT Blood Glucose.: 108 mg/dL (26 Jan 2020 00:21)  POCT Blood Glucose.: 113 mg/dL (25 Jan 2020 20:36)  POCT Blood Glucose.: 105 mg/dL (25 Jan 2020 16:46)        RADIOLOGY & ADDITIONAL TESTS:    Imaging Personally Reviewed:  YES/NO    Consultant(s) Notes Reviewed:   YES/ No    Care Discussed with Consultants :     Plan of care was discussed with patient and /or primary care giver; all questions and concerns were addressed and care was aligned with patient's wishes. MEDICAL ATTENDING NOTE: Attending Medicine Covering Dr. Rush    Patient is a 69y old  Male who presents with a chief complaint of PAWAN on CKD, back pain (25 Jan 2020 18:21)      INTERVAL HPI/OVERNIGHT EVENTS: no new complaints except increased ileostomy output; baseline change 3 times a day now has to about 6 times. Back pain controlled. Able to ambulate to bathroom.     MEDICATIONS  (STANDING):  ARIPiprazole 15 milliGRAM(s) Oral daily  calcitriol   Capsule 0.25 MICROGram(s) Oral daily  cefTRIAXone   IVPB 1000 milliGRAM(s) IV Intermittent every 24 hours  dextrose 5% + sodium chloride 0.45%. 1000 milliLiter(s) (60 mL/Hr) IV Continuous <Continuous>  dextrose 5% + sodium chloride 0.45%. 1000 milliLiter(s) (60 mL/Hr) IV Continuous <Continuous>  ferrous    sulfate 325 milliGRAM(s) Oral daily  finasteride 5 milliGRAM(s) Oral daily  gabapentin 300 milliGRAM(s) Oral three times a day  heparin  Injectable 5000 Unit(s) SubCutaneous every 12 hours  lamoTRIgine 200 milliGRAM(s) Oral daily  pantoprazole    Tablet 40 milliGRAM(s) Oral before breakfast  sodium bicarbonate 650 milliGRAM(s) Oral three times a day  tamsulosin 0.4 milliGRAM(s) Oral at bedtime  traZODone 25 milliGRAM(s) Oral at bedtime    MEDICATIONS  (PRN):  acetaminophen   Tablet .. 650 milliGRAM(s) Oral every 6 hours PRN Moderate Pain (4 - 6)  HYDROmorphone  Injectable 0.5 milliGRAM(s) IV Push every 6 hours PRN Severe Pain (7 - 10)      __________________________________________________  REVIEW OF SYSTEMS:    CONSTITUTIONAL: No fever,   EYES: no acute visual disturbances  NECK: No pain or stiffness  RESPIRATORY: No cough; No shortness of breath  CARDIOVASCULAR: No chest pain, no palpitations  GASTROINTESTINAL: No pain. No nausea or vomiting; No diarrhea   NEUROLOGICAL: No headache or numbness, no tremors  MUSCULOSKELETAL: No joint pain, no muscle pain  GENITOURINARY: no dysuria, no frequency, no hesitancy  PSYCHIATRY: no depression , no anxiety  ALL OTHER  ROS negative        Vital Signs Last 24 Hrs  T(C): 36.7 (26 Jan 2020 13:29), Max: 36.8 (25 Jan 2020 17:45)  T(F): 98 (26 Jan 2020 13:29), Max: 98.2 (25 Jan 2020 17:45)  HR: 77 (26 Jan 2020 13:29) (73 - 79)  BP: 100/62 (26 Jan 2020 13:29) (100/62 - 129/71)  RR: 18 (26 Jan 2020 13:29) (16 - 18)  SpO2: 95% (26 Jan 2020 13:29) (94% - 98%)    ________________________________________________  PHYSICAL EXAM:  GENERAL: NAD  HEENT: Normocephalic;  conjunctivae and sclerae clear; moist mucous membranes;   NECK : supple  CHEST/LUNG: Clear to auscultation bilaterally with good air entry   HEART: S1 S2  regular; no murmurs, gallops or rubs  ABDOMEN: Soft, Nontender, Nondistended; Bowel sounds present  EXTREMITIES: no cyanosis; no edema; no calf tenderness  SKIN: warm and dry; no rash  NERVOUS SYSTEM:  Awake and alert; Oriented  to place, person and time ; no new deficits    _________________________________________________  LABS:                        8.8    5.93  )-----------( 219      ( 26 Jan 2020 04:39 )             28.3     01-26    145  |  123<H>  |  39<H>  ----------------------------<  90  5.3   |  16<L>  |  2.58<H>    Ca    8.6      26 Jan 2020 14:07  Phos  2.3     01-26  Mg     1.5     01-26    Culture - Urine (01.25.20 @ 01:01)    Specimen Source: .Urine    Culture Results: >100,000 CFU/ml Gram Negative Rods    US Renal (12.25.19 @ 11:24)     IMPRESSION: No evidence for bilateral hydronephrosis. Bilateral renal cysts. Increased echogenicity of the renal parenchyma suggests chronic medical renal disease.    POCT Blood Glucose.: 112 mg/dL (26 Jan 2020 11:57)  POCT Blood Glucose.: 90 mg/dL (26 Jan 2020 08:20)  POCT Blood Glucose.: 115 mg/dL (26 Jan 2020 04:41)  POCT Blood Glucose.: 108 mg/dL (26 Jan 2020 00:21)  POCT Blood Glucose.: 113 mg/dL (25 Jan 2020 20:36)  POCT Blood Glucose.: 105 mg/dL (25 Jan 2020 16:46)        RADIOLOGY & ADDITIONAL TESTS:    Imaging Personally Reviewed:  YES/NO    Consultant(s) Notes Reviewed:   YES/ No    Care Discussed with Consultants :     Plan of care was discussed with patient and /or primary care giver; all questions and concerns were addressed and care was aligned with patient's wishes.

## 2020-01-26 NOTE — PROGRESS NOTE ADULT - PROBLEM SELECTOR PLAN 1
-Patient is noted to have PAWAN on CKD stage 4  01/24 pt received D5half NS, PAWAN improving. Likely pre renal   01/25:   Cr 3.07, improving from 4.30, GFR 18  -Pt received fluids over 24 hour so its hinting PAWAN is pre renal although FeNa 1.9 (Intrinsic)  -Pt is not retaining urine  -Avoid Nephrotoxic Meds/ Agents such as NSAIDs, Gadolinium contrast, Phosphate containing enemas, etc..)  -Adjust Medications according to eGFR  -Nephro consult- Dr Devon Reis  Daily BMP -Patient admitted with PAWAN on CKD stage 4 possibly prerenal due to increased output/ GI losses.  Avoid Nephrotoxic Meds/ Agents such as NSAIDs, Gadolinium contrast, Phosphate containing enemas, etc..)  Nephro follow up: Dr. Curran/ Dr. Cerrato  Daily BMP -Patient admitted with PAWAN on CKD stage 4 possibly prerenal due to increased output/ GI losses.  Avoid Nephrotoxic Meds/ Agents such as NSAIDs, Gadolinium contrast, Phosphate containing enemas, etc..)  Renal USG essentially normal  Nephro follow up: Dr. Curran/ Dr. Cerrato  Daily BMP

## 2020-01-27 LAB
-  AMIKACIN: SIGNIFICANT CHANGE UP
-  AMPICILLIN/SULBACTAM: SIGNIFICANT CHANGE UP
-  AMPICILLIN: SIGNIFICANT CHANGE UP
-  AZTREONAM: SIGNIFICANT CHANGE UP
-  CEFAZOLIN: SIGNIFICANT CHANGE UP
-  CEFEPIME: SIGNIFICANT CHANGE UP
-  CEFOXITIN: SIGNIFICANT CHANGE UP
-  CEFTRIAXONE: SIGNIFICANT CHANGE UP
-  CIPROFLOXACIN: SIGNIFICANT CHANGE UP
-  GENTAMICIN: SIGNIFICANT CHANGE UP
-  IMIPENEM: SIGNIFICANT CHANGE UP
-  LEVOFLOXACIN: SIGNIFICANT CHANGE UP
-  MEROPENEM: SIGNIFICANT CHANGE UP
-  NITROFURANTOIN: SIGNIFICANT CHANGE UP
-  PIPERACILLIN/TAZOBACTAM: SIGNIFICANT CHANGE UP
-  TIGECYCLINE: SIGNIFICANT CHANGE UP
-  TOBRAMYCIN: SIGNIFICANT CHANGE UP
-  TRIMETHOPRIM/SULFAMETHOXAZOLE: SIGNIFICANT CHANGE UP
ANION GAP SERPL CALC-SCNC: 5 MMOL/L — SIGNIFICANT CHANGE UP (ref 5–17)
BUN SERPL-MCNC: 35 MG/DL — HIGH (ref 7–18)
CALCIUM SERPL-MCNC: 8.7 MG/DL — SIGNIFICANT CHANGE UP (ref 8.4–10.5)
CHLORIDE SERPL-SCNC: 118 MMOL/L — HIGH (ref 96–108)
CHLORIDE UR-SCNC: 63 MMOL/L — SIGNIFICANT CHANGE UP (ref 55–125)
CO2 SERPL-SCNC: 18 MMOL/L — LOW (ref 22–31)
CREAT ?TM UR-MCNC: 102 MG/DL — SIGNIFICANT CHANGE UP
CREAT SERPL-MCNC: 2.45 MG/DL — HIGH (ref 0.5–1.3)
CULTURE RESULTS: SIGNIFICANT CHANGE UP
GLUCOSE BLDC GLUCOMTR-MCNC: 103 MG/DL — HIGH (ref 70–99)
GLUCOSE BLDC GLUCOMTR-MCNC: 126 MG/DL — HIGH (ref 70–99)
GLUCOSE BLDC GLUCOMTR-MCNC: 87 MG/DL — SIGNIFICANT CHANGE UP (ref 70–99)
GLUCOSE BLDC GLUCOMTR-MCNC: 88 MG/DL — SIGNIFICANT CHANGE UP (ref 70–99)
GLUCOSE BLDC GLUCOMTR-MCNC: 90 MG/DL — SIGNIFICANT CHANGE UP (ref 70–99)
GLUCOSE BLDC GLUCOMTR-MCNC: 93 MG/DL — SIGNIFICANT CHANGE UP (ref 70–99)
GLUCOSE SERPL-MCNC: 93 MG/DL — SIGNIFICANT CHANGE UP (ref 70–99)
METHOD TYPE: SIGNIFICANT CHANGE UP
ORGANISM # SPEC MICROSCOPIC CNT: SIGNIFICANT CHANGE UP
ORGANISM # SPEC MICROSCOPIC CNT: SIGNIFICANT CHANGE UP
OSMOLALITY UR: 402 MOS/KG — SIGNIFICANT CHANGE UP (ref 50–1200)
POTASSIUM SERPL-MCNC: 5.9 MMOL/L — HIGH (ref 3.5–5.3)
POTASSIUM SERPL-SCNC: 5.9 MMOL/L — HIGH (ref 3.5–5.3)
POTASSIUM UR-SCNC: 20 MMOL/L — LOW (ref 25–125)
PROT ?TM UR-MCNC: 36 MG/DL — HIGH (ref 0–12)
SODIUM SERPL-SCNC: 141 MMOL/L — SIGNIFICANT CHANGE UP (ref 135–145)
SODIUM UR-SCNC: 47 MMOL/L — SIGNIFICANT CHANGE UP (ref 40–220)
SPECIMEN SOURCE: SIGNIFICANT CHANGE UP
URATE UR-MCNC: 19.2 MG/DL — SIGNIFICANT CHANGE UP

## 2020-01-27 PROCEDURE — 99233 SBSQ HOSP IP/OBS HIGH 50: CPT | Mod: GC

## 2020-01-27 PROCEDURE — 99231 SBSQ HOSP IP/OBS SF/LOW 25: CPT

## 2020-01-27 RX ORDER — SODIUM ZIRCONIUM CYCLOSILICATE 10 G/10G
5 POWDER, FOR SUSPENSION ORAL THREE TIMES A DAY
Refills: 0 | Status: DISCONTINUED | OUTPATIENT
Start: 2020-01-27 | End: 2020-01-27

## 2020-01-27 RX ORDER — SODIUM ZIRCONIUM CYCLOSILICATE 10 G/10G
10 POWDER, FOR SUSPENSION ORAL THREE TIMES A DAY
Refills: 0 | Status: DISCONTINUED | OUTPATIENT
Start: 2020-01-27 | End: 2020-01-27

## 2020-01-27 RX ORDER — SODIUM BICARBONATE 1 MEQ/ML
1300 SYRINGE (ML) INTRAVENOUS THREE TIMES A DAY
Refills: 0 | Status: DISCONTINUED | OUTPATIENT
Start: 2020-01-27 | End: 2020-01-28

## 2020-01-27 RX ORDER — SODIUM ZIRCONIUM CYCLOSILICATE 10 G/10G
10 POWDER, FOR SUSPENSION ORAL THREE TIMES A DAY
Refills: 0 | Status: DISCONTINUED | OUTPATIENT
Start: 2020-01-27 | End: 2020-01-28

## 2020-01-27 RX ORDER — ACETAMINOPHEN 500 MG
1000 TABLET ORAL ONCE
Refills: 0 | Status: COMPLETED | OUTPATIENT
Start: 2020-01-27 | End: 2020-01-27

## 2020-01-27 RX ADMIN — Medication 1300 MILLIGRAM(S): at 12:50

## 2020-01-27 RX ADMIN — GABAPENTIN 200 MILLIGRAM(S): 400 CAPSULE ORAL at 13:04

## 2020-01-27 RX ADMIN — FINASTERIDE 5 MILLIGRAM(S): 5 TABLET, FILM COATED ORAL at 12:43

## 2020-01-27 RX ADMIN — GABAPENTIN 200 MILLIGRAM(S): 400 CAPSULE ORAL at 06:55

## 2020-01-27 RX ADMIN — CEFTRIAXONE 100 MILLIGRAM(S): 500 INJECTION, POWDER, FOR SOLUTION INTRAMUSCULAR; INTRAVENOUS at 13:03

## 2020-01-27 RX ADMIN — HYDROMORPHONE HYDROCHLORIDE 2 MILLIGRAM(S): 2 INJECTION INTRAMUSCULAR; INTRAVENOUS; SUBCUTANEOUS at 00:18

## 2020-01-27 RX ADMIN — SODIUM ZIRCONIUM CYCLOSILICATE 10 GRAM(S): 10 POWDER, FOR SUSPENSION ORAL at 22:29

## 2020-01-27 RX ADMIN — LAMOTRIGINE 200 MILLIGRAM(S): 25 TABLET, ORALLY DISINTEGRATING ORAL at 12:42

## 2020-01-27 RX ADMIN — HYDROMORPHONE HYDROCHLORIDE 0.5 MILLIGRAM(S): 2 INJECTION INTRAMUSCULAR; INTRAVENOUS; SUBCUTANEOUS at 18:46

## 2020-01-27 RX ADMIN — PANTOPRAZOLE SODIUM 40 MILLIGRAM(S): 20 TABLET, DELAYED RELEASE ORAL at 06:55

## 2020-01-27 RX ADMIN — GABAPENTIN 200 MILLIGRAM(S): 400 CAPSULE ORAL at 22:29

## 2020-01-27 RX ADMIN — HYDROMORPHONE HYDROCHLORIDE 0.5 MILLIGRAM(S): 2 INJECTION INTRAMUSCULAR; INTRAVENOUS; SUBCUTANEOUS at 13:00

## 2020-01-27 RX ADMIN — Medication 650 MILLIGRAM(S): at 06:55

## 2020-01-27 RX ADMIN — Medication 1300 MILLIGRAM(S): at 22:30

## 2020-01-27 RX ADMIN — TAMSULOSIN HYDROCHLORIDE 0.4 MILLIGRAM(S): 0.4 CAPSULE ORAL at 22:29

## 2020-01-27 RX ADMIN — HEPARIN SODIUM 5000 UNIT(S): 5000 INJECTION INTRAVENOUS; SUBCUTANEOUS at 06:55

## 2020-01-27 RX ADMIN — HEPARIN SODIUM 5000 UNIT(S): 5000 INJECTION INTRAVENOUS; SUBCUTANEOUS at 16:44

## 2020-01-27 RX ADMIN — Medication 400 MILLIGRAM(S): at 16:44

## 2020-01-27 RX ADMIN — Medication 325 MILLIGRAM(S): at 12:43

## 2020-01-27 RX ADMIN — HYDROMORPHONE HYDROCHLORIDE 0.5 MILLIGRAM(S): 2 INJECTION INTRAMUSCULAR; INTRAVENOUS; SUBCUTANEOUS at 12:41

## 2020-01-27 RX ADMIN — Medication 1000 MILLIGRAM(S): at 17:15

## 2020-01-27 RX ADMIN — CALCITRIOL 0.25 MICROGRAM(S): 0.5 CAPSULE ORAL at 12:42

## 2020-01-27 RX ADMIN — ARIPIPRAZOLE 15 MILLIGRAM(S): 15 TABLET ORAL at 12:42

## 2020-01-27 RX ADMIN — Medication 25 MILLIGRAM(S): at 22:29

## 2020-01-27 RX ADMIN — HYDROMORPHONE HYDROCHLORIDE 0.5 MILLIGRAM(S): 2 INJECTION INTRAMUSCULAR; INTRAVENOUS; SUBCUTANEOUS at 19:40

## 2020-01-27 NOTE — PHYSICAL THERAPY INITIAL EVALUATION ADULT - GENERAL OBSERVATIONS, REHAB EVAL
Consult received. EMR, radiology and labs reviewed. Patient received supine in bed, NAD, states feel better. Patient agreed to EVALUATION from Physical Therapist.

## 2020-01-27 NOTE — PROGRESS NOTE ADULT - PROBLEM SELECTOR PLAN 2
-In the setting of back pain and positive UA, suspect pyelonephritis, however back pain could be secondary to arthritis, disc herniation.  -CT lumbar spine: There is no fracture or compression or subluxation or abel osseous destruction. Degenerative disc disease.   -Will get MR Lumbosacral spine as pt also had history of prostate cancer  -screening form is the chart.  -Neuro Dr Candelario [facility] pain [facility]   PT : home

## 2020-01-27 NOTE — PROGRESS NOTE ADULT - SUBJECTIVE AND OBJECTIVE BOX
INTERVAL HPI/OVERNIGHT EVENTS: Persistent left lower back pain. Radiation to left leg and knee. No significant motor loss.        HEALTH ISSUES - PROBLEM Dx:  Stage 3 chronic kidney disease: Stage 3 chronic kidney disease  Ileostomy in place: Ileostomy in place  UTI (urinary tract infection): UTI (urinary tract infection)  Goals of care, counseling/discussion: Goals of care, counseling/discussion  DVT prophylaxis: DVT prophylaxis  Anemia: Anemia  HTN (hypertension): HTN (hypertension)  Back pain: Back pain  Hyperkalemia: Hyperkalemia  Metabolic acidosis: Metabolic acidosis  PAWAN (acute kidney injury): PAWAN (acute kidney injury)        MEDICATIONS  (STANDING):  ARIPiprazole 15 milliGRAM(s) Oral daily  calcitriol   Capsule 0.25 MICROGram(s) Oral daily  cefTRIAXone   IVPB 1000 milliGRAM(s) IV Intermittent every 24 hours  dextrose 5% + sodium chloride 0.45%. 1000 milliLiter(s) (60 mL/Hr) IV Continuous <Continuous>  ferrous    sulfate 325 milliGRAM(s) Oral daily  finasteride 5 milliGRAM(s) Oral daily  gabapentin 200 milliGRAM(s) Oral three times a day  heparin  Injectable 5000 Unit(s) SubCutaneous every 12 hours  lamoTRIgine 200 milliGRAM(s) Oral daily  pantoprazole    Tablet 40 milliGRAM(s) Oral before breakfast  sodium bicarbonate 650 milliGRAM(s) Oral three times a day  tamsulosin 0.4 milliGRAM(s) Oral at bedtime  traZODone 25 milliGRAM(s) Oral at bedtime    MEDICATIONS  (PRN):  acetaminophen   Tablet .. 650 milliGRAM(s) Oral every 6 hours PRN Moderate Pain (4 - 6)  HYDROmorphone  Injectable 0.5 milliGRAM(s) IV Push every 6 hours PRN Severe Pain (7 - 10)  loperamide 2 milliGRAM(s) Oral two times a day PRN If excess Ileostomy output      Allergies  No Known Allergies      REVIEW OF SYSTEMS  CONSTITUTIONAL: No new weakness, fevers or chills  EYES/ENT: No visual changes;  No vertigo or throat pain   NECK: No pain or stiffness  RESPIRATORY: No cough, wheezing, hemoptysis; No shortness of breath  CARDIOVASCULAR: No chest pain or palpitations  GASTROINTESTINAL: No abdominal or epigastric pain. No nausea, vomiting, or hematemesis  GENITOURINARY: No dysuria, frequency or hematuria  NEUROLOGICAL: Left lower back pain and tenderness   SKIN: No itching, burning, rashes, or lesions   All other review of systems is negative unless indicated above.      Vital Signs Last 24 Hrs  T(C): 36.7 (27 Jan 2020 05:06), Max: 36.7 (26 Jan 2020 13:29)  T(F): 98.1 (27 Jan 2020 05:06), Max: 98.1 (27 Jan 2020 05:06)  HR: 70 (27 Jan 2020 05:06) (69 - 77)  BP: 100/68 (27 Jan 2020 05:06) (100/62 - 117/66)  BP(mean): --  RR: 17 (27 Jan 2020 05:06) (17 - 18)  SpO2: 94% (27 Jan 2020 05:06) (94% - 98%)      PHYSICAL EXAM:  GENERAL: Well developed, Well nourished  male, NAD  HEENT:  Normocephalic/Atraumatic, reactive light reflex, moist mucous membranes  NECK: Supple, no JVD  RESP: Symmetric movement of the chest, clear to auscultation bilaterally  CVS:  S1 and S2 audible, no murmur, rubs or gallops noted  GI: Normal active bowel sounds present, abdomen soft, non tender, scar debbie  EXTREMITIES:  Left lower back pain with radiation to knee and left leg  PSYCH: Normal mood, normal affect observed    - Mental Status:  AAOx3; speech is fluent with intact naming, repetition, and comprehension  - Cranial Nerves II-XII:    II:  PERRLA; visual fields are full to confrontation  III, IV, VI:  EOMI, no nystagmus  VII:  face is symmetric with normal eye closure and smile  VIII:  hearing is intact to finger rub  XI:  head turning and shoulder shrug are intact bilaterally  XII:  tongue protrudes in the midline  - Motor:  strength is 3/5 throughout in lower extremity; normal muscle bulk and tone throughout; no pronator drift  - Reflexes:  2+ and symmetric at the biceps, knees;  plantar reflexes downgoing bilaterally  - Sensory:  intact to light touch, pin prick, vibration, and joint-position sense throughout        LABS:                        8.8    5.93  )-----------( 219      ( 26 Jan 2020 04:39 )             28.3     01-26    145  |  123<H>  |  39<H>  ----------------------------<  90  5.3   |  16<L>  |  2.58<H>    Ca    8.6      26 Jan 2020 14:07  Phos  2.3     01-26  Mg     1.5     01-26 INTERVAL HPI/OVERNIGHT EVENTS: Persistent left lower back pain. Radiation to left leg and knee. No significant motor loss.        HEALTH ISSUES - PROBLEM Dx:  Stage 3 chronic kidney disease: Stage 3 chronic kidney disease  Ileostomy in place: Ileostomy in place  UTI (urinary tract infection): UTI (urinary tract infection)  Goals of care, counseling/discussion: Goals of care, counseling/discussion  DVT prophylaxis: DVT prophylaxis  Anemia: Anemia  HTN (hypertension): HTN (hypertension)  Back pain: Back pain  Hyperkalemia: Hyperkalemia  Metabolic acidosis: Metabolic acidosis  PAWAN (acute kidney injury): PAWAN (acute kidney injury)        MEDICATIONS  (STANDING):  ARIPiprazole 15 milliGRAM(s) Oral daily  calcitriol   Capsule 0.25 MICROGram(s) Oral daily  cefTRIAXone   IVPB 1000 milliGRAM(s) IV Intermittent every 24 hours  dextrose 5% + sodium chloride 0.45%. 1000 milliLiter(s) (60 mL/Hr) IV Continuous <Continuous>  ferrous    sulfate 325 milliGRAM(s) Oral daily  finasteride 5 milliGRAM(s) Oral daily  gabapentin 200 milliGRAM(s) Oral three times a day  heparin  Injectable 5000 Unit(s) SubCutaneous every 12 hours  lamoTRIgine 200 milliGRAM(s) Oral daily  pantoprazole    Tablet 40 milliGRAM(s) Oral before breakfast  sodium bicarbonate 650 milliGRAM(s) Oral three times a day  tamsulosin 0.4 milliGRAM(s) Oral at bedtime  traZODone 25 milliGRAM(s) Oral at bedtime    MEDICATIONS  (PRN):  acetaminophen   Tablet .. 650 milliGRAM(s) Oral every 6 hours PRN Moderate Pain (4 - 6)  HYDROmorphone  Injectable 0.5 milliGRAM(s) IV Push every 6 hours PRN Severe Pain (7 - 10)  loperamide 2 milliGRAM(s) Oral two times a day PRN If excess Ileostomy output      Allergies  No Known Allergies      REVIEW OF SYSTEMS  CONSTITUTIONAL: No new weakness, fevers or chills  EYES/ENT: No visual changes;  No vertigo or throat pain   NECK: No pain or stiffness  RESPIRATORY: No cough, wheezing, hemoptysis; No shortness of breath  CARDIOVASCULAR: No chest pain or palpitations  GASTROINTESTINAL: No abdominal or epigastric pain. No nausea, vomiting, or hematemesis  GENITOURINARY: No dysuria, frequency or hematuria  NEUROLOGICAL: Left lower back pain and tenderness   SKIN: No itching, burning, rashes, or lesions   All other review of systems is negative unless indicated above.      Vital Signs Last 24 Hrs  T(C): 36.7 (27 Jan 2020 05:06), Max: 36.7 (26 Jan 2020 13:29)  T(F): 98.1 (27 Jan 2020 05:06), Max: 98.1 (27 Jan 2020 05:06)  HR: 70 (27 Jan 2020 05:06) (69 - 77)  BP: 100/68 (27 Jan 2020 05:06) (100/62 - 117/66)  BP(mean): --  RR: 17 (27 Jan 2020 05:06) (17 - 18)  SpO2: 94% (27 Jan 2020 05:06) (94% - 98%)      PHYSICAL EXAM:  GENERAL: Well developed, Well nourished  male, NAD  HEENT:  Normocephalic/Atraumatic, reactive light reflex, moist mucous membranes  NECK: Supple, no JVD  RESP: Symmetric movement of the chest, clear to auscultation bilaterally  CVS:  S1 and S2 audible, no murmur, rubs or gallops noted  GI: Normal active bowel sounds present, abdomen soft, non tender, scar debbie  EXTREMITIES:  Left lower back pain with radiation to knee and left leg  PSYCH: Normal mood, normal affect observed    - Mental Status:  AAOx3; speech is fluent with intact naming, repetition, and comprehension  - Cranial Nerves II-XII:    II:  PERRLA; visual fields are full to confrontation  III, IV, VI:  EOMI, no nystagmus  VII:  face is symmetric with normal eye closure and smile  VIII:  hearing is intact to finger rub  XI:  head turning and shoulder shrug are intact bilaterally  XII:  tongue protrudes in the midline  - Motor:  strength is 3/5 throughout in lower extremity; normal muscle bulk and tone throughout; no pronator drift  - Reflexes:  Left leg hyporeflexia;  plantar reflexes downgoing bilaterally  - Sensory:  intact to light touch, pin prick, vibration, and joint-position sense throughout        LABS:                        8.8    5.93  )-----------( 219      ( 26 Jan 2020 04:39 )             28.3     01-26    145  |  123<H>  |  39<H>  ----------------------------<  90  5.3   |  16<L>  |  2.58<H>    Ca    8.6      26 Jan 2020 14:07  Phos  2.3     01-26  Mg     1.5     01-26

## 2020-01-27 NOTE — PROGRESS NOTE ADULT - SUBJECTIVE AND OBJECTIVE BOX
pt seen and examined.pts current chart reviewed and case discussed with resident covering.    SUBJECTIVE:  pt feels fine and denies cp,sob,gi or gu/uremic  symptons    REVIEW OF SYSTEMS:  CONSTITUTIONAL: No weakness, fevers or chills  EYES/ENT: No visual changes;  No vertigo or throat pain   NECK: No pain or stiffness  RESPIRATORY: No cough, wheezing, hemoptysis; No shortness of breath  CARDIOVASCULAR: No chest pain or palpitations  GASTROINTESTINAL: No abdominal or epigastric pain. No nausea, vomiting, or hematemesis; No diarrhea or constipation. No melena or hematochezia.  GENITOURINARY: No dysuria, frequency , hematuria, flank pain or nocturia  NEUROLOGICAL: No numbness or weakness  SKIN: No itching, burning, rashes, or lesions   All other review of systems is negative unless indicated above    Current meds:    acetaminophen   Tablet .. 650 milliGRAM(s) Oral every 6 hours PRN  ARIPiprazole 15 milliGRAM(s) Oral daily  calcitriol   Capsule 0.25 MICROGram(s) Oral daily  cefTRIAXone   IVPB 1000 milliGRAM(s) IV Intermittent every 24 hours  dextrose 5% + sodium chloride 0.45%. 1000 milliLiter(s) IV Continuous <Continuous>  ferrous    sulfate 325 milliGRAM(s) Oral daily  finasteride 5 milliGRAM(s) Oral daily  gabapentin 200 milliGRAM(s) Oral three times a day  heparin  Injectable 5000 Unit(s) SubCutaneous every 12 hours  HYDROmorphone  Injectable 0.5 milliGRAM(s) IV Push every 6 hours PRN  lamoTRIgine 200 milliGRAM(s) Oral daily  loperamide 2 milliGRAM(s) Oral two times a day PRN  pantoprazole    Tablet 40 milliGRAM(s) Oral before breakfast  sodium bicarbonate 1300 milliGRAM(s) Oral three times a day  tamsulosin 0.4 milliGRAM(s) Oral at bedtime  traZODone 25 milliGRAM(s) Oral at bedtime      Vital Signs    T(F): 98.1 (20 @ 05:06), Max: 98.1 (20 @ 05:06)  HR: 70 (20 @ 05:06) (69 - 77)  BP: 100/68 (20 @ 05:06) (100/62 - 117/66)  ABP: --  RR: 17 (20 @ 05:06) (17 - 18)  SpO2: 94% (20 @ 05:06) (94% - 98%)  Wt(kg): --  CVP(cm H2O): --  CO: --  PCWP: --    I and O's:     @ 07:01  -   @ 07:00  --------------------------------------------------------  IN:    IV PiggyBack: 450 mL    Oral Fluid: 885 mL  Total IN: 1335 mL    OUT:    Colostomy: 300 mL    Voided: 1725 mL  Total OUT: 2025 mL    Total NET: -690 mL       @ 07:01  -   @ 07:00  --------------------------------------------------------  IN:    dextrose 5% + sodium chloride 0.45%.: 120 mL    Oral Fluid: 1123 mL  Total IN: 1243 mL    OUT:    Colostomy: 200 mL    Voided: 1675 mL  Total OUT: 1875 mL    Total NET: -632 mL        Daily     Daily Weight in k.8 (2020 05:06)    PHYSICAL EXAM:  Constitutional: well developed, well nourished  and in nad  HEENT: PERRLA,  no icteric sclera and mild pallor of conjunctiva noted  Neck: No JVD, thyromegaly or adenopathy  Respiratory: reduced air entry lower lungs with no rales, wheezing or rhonchi  Cardiovascular: S1 and S2 normally heard  Gastrointestinal: soft, nondistended, nontender and normal bowel sounds heard  Extremities: No peripheral edema or cyanosis  Neurological: A/O x 3, no focal deficits  : No flank or cva tenderness palpated.  Skin: No rashes      LABS:    CBC:                          8.8    5.93  )-----------( 219      ( 2020 04:39 )             28.3           BMP:    01-26    145  |  123<H>  |  39<H>  ----------------------------<  90  5.3   |  16<L>  |  2.58<H>      147<H>  |  125<H>  |  41<H>  ----------------------------<  87  5.3   |  16<L>  |  2.65<H>      143  |  123<H>  |  46<H>  ----------------------------<  99  5.6<H>   |  16<L>  |  2.63<H>      144  |  123<H>  |  51<H>  ----------------------------<  132<H>  5.5<H>   |  14<L>  |  2.89<H>      144  |  124<H>  |  61<H>  ----------------------------<  101<H>  5.7<H>   |  14<L>  |  3.07<H>      142  |  123<H>  |  71<H>  ----------------------------<  108<H>  5.1   |  13<L>  |  3.56<H>    Ca    8.6      2020 14:07  Ca    8.4      2020 12:41  Ca    8.4      2020 03:59  Ca    8.8      2020 18:46  Ca    8.2<L>      2020 06:34  Ca    8.0<L>      2020 16:05  Phos  2.3       Phos  2.9       Phos  2.6       Mg     1.5       Mg     1.6       Mg     1.4             Intact PTH: 132 pg/mL ( @ 15:01)      URINE STUDIES:        Potassium, Random Urine: 20 mmol/L ( @ 03:50)  Chloride, Random Urine: 63 mmol/L ( @ 03:50)  Sodium, Random Urine: 47 mmol/L ( @ 03:50)  Osmolality, Random Urine: 402 mos/kg ( @ 03:50)  Creatinine, Random Urine: 102 mg/dL ( @ 03:50)  Sodium, Random Urine: 41 mmol/L ( @ 23:35)  Creatinine, Random Urine: 93 mg/dL ( @ 23:35)  Osmolality, Random Urine: 380 mos/kg ( @ 23:35)                  RADIOLOGY & ADDITIONAL STUDIES: Note is incomplete  pt seen and examined.pts current chart reviewed and case discussed with resident covering.    SUBJECTIVE:  pt feels fine and denies cp,sob,gi or gu/uremic  symptons    REVIEW OF SYSTEMS:  CONSTITUTIONAL: No weakness, fevers or chills  EYES/ENT: No visual changes;  No vertigo or throat pain   NECK: No pain or stiffness  RESPIRATORY: No cough, wheezing, hemoptysis; No shortness of breath  CARDIOVASCULAR: No chest pain or palpitations  GASTROINTESTINAL: No abdominal or epigastric pain. No nausea, vomiting, or hematemesis; No diarrhea or constipation. No melena or hematochezia.  GENITOURINARY: No dysuria, frequency , hematuria, flank pain or nocturia  NEUROLOGICAL: No numbness or weakness  SKIN: No itching, burning, rashes, or lesions   All other review of systems is negative unless indicated above    Current meds:    acetaminophen   Tablet .. 650 milliGRAM(s) Oral every 6 hours PRN  ARIPiprazole 15 milliGRAM(s) Oral daily  calcitriol   Capsule 0.25 MICROGram(s) Oral daily  cefTRIAXone   IVPB 1000 milliGRAM(s) IV Intermittent every 24 hours  dextrose 5% + sodium chloride 0.45%. 1000 milliLiter(s) IV Continuous <Continuous>  ferrous    sulfate 325 milliGRAM(s) Oral daily  finasteride 5 milliGRAM(s) Oral daily  gabapentin 200 milliGRAM(s) Oral three times a day  heparin  Injectable 5000 Unit(s) SubCutaneous every 12 hours  HYDROmorphone  Injectable 0.5 milliGRAM(s) IV Push every 6 hours PRN  lamoTRIgine 200 milliGRAM(s) Oral daily  loperamide 2 milliGRAM(s) Oral two times a day PRN  pantoprazole    Tablet 40 milliGRAM(s) Oral before breakfast  sodium bicarbonate 1300 milliGRAM(s) Oral three times a day  tamsulosin 0.4 milliGRAM(s) Oral at bedtime  traZODone 25 milliGRAM(s) Oral at bedtime      Vital Signs    T(F): 98.1 (20 @ 05:06), Max: 98.1 (20 @ 05:06)  HR: 70 (20 @ 05:06) (69 - 77)  BP: 100/68 (20 @ 05:06) (100/62 - 117/66)  ABP: --  RR: 17 (20 @ 05:06) (17 - 18)  SpO2: 94% (20 @ 05:06) (94% - 98%)  Wt(kg): --  CVP(cm H2O): --  CO: --  PCWP: --    I and O's:     @ 07:01  -   @ 07:00  --------------------------------------------------------  IN:    IV PiggyBack: 450 mL    Oral Fluid: 885 mL  Total IN: 1335 mL    OUT:    Colostomy: 300 mL    Voided: 1725 mL  Total OUT: 2025 mL    Total NET: -690 mL       @ 07:01  -   @ 07:00  --------------------------------------------------------  IN:    dextrose 5% + sodium chloride 0.45%.: 120 mL    Oral Fluid: 1123 mL  Total IN: 1243 mL    OUT:    Colostomy: 200 mL    Voided: 1675 mL  Total OUT: 1875 mL    Total NET: -632 mL        Daily     Daily Weight in k.8 (2020 05:06)    PHYSICAL EXAM:  Constitutional: well developed, well nourished  and in nad  HEENT: PERRLA,  no icteric sclera and mild pallor of conjunctiva noted  Neck: No JVD, thyromegaly or adenopathy  Respiratory: reduced air entry lower lungs with no rales, wheezing or rhonchi  Cardiovascular: S1 and S2 normally heard  Gastrointestinal: soft, nondistended, nontender and normal bowel sounds heard  Extremities: No peripheral edema or cyanosis  Neurological: A/O x 3, no focal deficits  : No flank or cva tenderness palpated.  Skin: No rashes      LABS:    CBC:                          8.8    5.93  )-----------( 219      ( 2020 04:39 )             28.3           BMP:        145  |  123<H>  |  39<H>  ----------------------------<  90  5.3   |  16<L>  |  2.58<H>      147<H>  |  125<H>  |  41<H>  ----------------------------<  87  5.3   |  16<L>  |  2.65<H>      143  |  123<H>  |  46<H>  ----------------------------<  99  5.6<H>   |  16<L>  |  2.63<H>      144  |  123<H>  |  51<H>  ----------------------------<  132<H>  5.5<H>   |  14<L>  |  2.89<H>      144  |  124<H>  |  61<H>  ----------------------------<  101<H>  5.7<H>   |  14<L>  |  3.07<H>      142  |  123<H>  |  71<H>  ----------------------------<  108<H>  5.1   |  13<L>  |  3.56<H>    Ca    8.6      2020 14:07  Ca    8.4      2020 12:41  Ca    8.4      2020 03:59  Ca    8.8      2020 18:46  Ca    8.2<L>      2020 06:34  Ca    8.0<L>      2020 16:05  Phos  2.3       Phos  2.9       Phos  2.6       Mg     1.5       Mg     1.6       Mg     1.4             Intact PTH: 132 pg/mL ( @ 15:01)      URINE STUDIES:        Potassium, Random Urine: 20 mmol/L ( @ 03:50)  Chloride, Random Urine: 63 mmol/L ( @ 03:50)  Sodium, Random Urine: 47 mmol/L ( @ 03:50)  Osmolality, Random Urine: 402 mos/kg ( @ 03:50)  Creatinine, Random Urine: 102 mg/dL ( @ 03:50)  Sodium, Random Urine: 41 mmol/L ( @ 23:35)  Creatinine, Random Urine: 93 mg/dL ( @ 23:35)  Osmolality, Random Urine: 380 mos/kg ( @ 23:35)                  RADIOLOGY & ADDITIONAL STUDIES: pt seen and examined    SUBJECTIVE:  pt feels fine and denies cp,sob or gi symptoms  pt is having loose stool via colostomy and c/o left sided flank pain  pt is voiding normally  REVIEW OF SYSTEMS:  CONSTITUTIONAL: No weakness, fevers or chills  RESPIRATORY: No cough, wheezing, hemoptysis; No shortness of breath  CARDIOVASCULAR: No chest pain or palpitations  GASTROINTESTINAL: No abdominal or epigastric pain. No nausea, vomiting, or hematemesis; No diarrhea or constipation. No melena or hematochezia.  GENITOURINARY: No dysuria, frequency , hematuria, flank pain or nocturia  NEUROLOGICAL: No numbness or weakness  SKIN: No itching, burning, rashes, or lesions   All other review of systems is negative unless indicated above    Current meds:    acetaminophen   Tablet .. 650 milliGRAM(s) Oral every 6 hours PRN  ARIPiprazole 15 milliGRAM(s) Oral daily  calcitriol   Capsule 0.25 MICROGram(s) Oral daily  cefTRIAXone   IVPB 1000 milliGRAM(s) IV Intermittent every 24 hours  dextrose 5% + sodium chloride 0.45%. 1000 milliLiter(s) IV Continuous <Continuous>  ferrous    sulfate 325 milliGRAM(s) Oral daily  finasteride 5 milliGRAM(s) Oral daily  gabapentin 200 milliGRAM(s) Oral three times a day  heparin  Injectable 5000 Unit(s) SubCutaneous every 12 hours  HYDROmorphone  Injectable 0.5 milliGRAM(s) IV Push every 6 hours PRN  lamoTRIgine 200 milliGRAM(s) Oral daily  loperamide 2 milliGRAM(s) Oral two times a day PRN  pantoprazole    Tablet 40 milliGRAM(s) Oral before breakfast  sodium bicarbonate 1300 milliGRAM(s) Oral three times a day  tamsulosin 0.4 milliGRAM(s) Oral at bedtime  traZODone 25 milliGRAM(s) Oral at bedtime      Vital Signs    T(F): 98.1 (20 @ 05:06), Max: 98.1 (20 @ 05:06)  HR: 70 (20 @ 05:06) (69 - 77)  BP: 100/68 (20 @ 05:06) (100/62 - 117/66)  ABP: --  RR: 17 (20 @ 05:06) (17 - 18)  SpO2: 94% (20 @ 05:06) (94% - 98%)  Wt(kg): --  CVP(cm H2O): --  CO: --  PCWP: --    I and O's:     @ 07:01  -   @ 07:00  --------------------------------------------------------  IN:    IV PiggyBack: 450 mL    Oral Fluid: 885 mL  Total IN: 1335 mL    OUT:    Colostomy: 300 mL    Voided: 1725 mL  Total OUT: 2025 mL    Total NET: -690 mL       @ 07:  -   @ 07:00  --------------------------------------------------------  IN:    dextrose 5% + sodium chloride 0.45%.: 120 mL    Oral Fluid: 1123 mL  Total IN: 1243 mL    OUT:    Colostomy: 200 mL    Voided: 1675 mL  Total OUT: 1875 mL    Total NET: -632 mL        Daily     Daily Weight in k.8 (2020 05:06)    PHYSICAL EXAM:  Constitutional: well developed, well nourished  and in nad  HEENT: PERRLA,  no icteric sclera and mild pallor of conjunctiva noted  Neck: No JVD, thyromegaly or adenopathy  Respiratory: reduced air entry lower lungs with no rales, wheezing or rhonchi  Cardiovascular: S1 and S2 normally heard  Gastrointestinal: soft, nondistended, nontender and normal bowel sounds heard  +colostomy in RLQ with semi-liquid stool   Extremities: No peripheral edema or cyanosis  Neurological: A/O x 3, no focal deficits  : mild Left  cva tenderness present  Skin: No rashes      LABS:    CBC:                          8.8    5.93  )-----------( 219      ( 2020 04:39 )             28.3           BMP: no new bmp today        145  |  123<H>  |  39<H>  ----------------------------<  90  5.3   |  16<L>  |  2.58<H>      147<H>  |  125<H>  |  41<H>  ----------------------------<  87  5.3   |  16<L>  |  2.65<H>      143  |  123<H>  |  46<H>  ----------------------------<  99  5.6<H>   |  16<L>  |  2.63<H>      144  |  123<H>  |  51<H>  ----------------------------<  132<H>  5.5<H>   |  14<L>  |  2.89<H>      144  |  124<H>  |  61<H>  ----------------------------<  101<H>  5.7<H>   |  14<L>  |  3.07<H>      142  |  123<H>  |  71<H>  ----------------------------<  108<H>  5.1   |  13<L>  |  3.56<H>    Ca    8.6      2020 14:07  Ca    8.4      2020 12:41  Ca    8.4      2020 03:59  Ca    8.8      2020 18:46  Ca    8.2<L>      2020 06:34  Ca    8.0<L>      2020 16:05  Phos  2.3       Phos  2.9       Phos  2.6       Mg     1.5       Mg     1.6       Mg     1.4             Intact PTH: 132 pg/mL ( @ 15:01)      URINE STUDIES:  Potassium, Random Urine: 20 mmol/L ( @ 03:50)  Chloride, Random Urine: 63 mmol/L ( @ 03:50)  Sodium, Random Urine: 47 mmol/L ( @ 03:50)  Osmolality, Random Urine: 402 mos/kg ( @ 03:50)  Creatinine, Random Urine: 102 mg/dL ( @ 03:50)  Sodium, Random Urine: 41 mmol/L ( @ 23:35)  Creatinine, Random Urine: 93 mg/dL ( @ 23:35)  Osmolality, Random Urine: 380 mos/kg ( @ 23:35)  TTK.82%-very low , c/w Hypo-[place]and[place] state                RADIOLOGY & ADDITIONAL STUDIES:

## 2020-01-27 NOTE — PROGRESS NOTE ADULT - PROBLEM SELECTOR PLAN 3
-patient c/o severe back pain which is chronic, however aggravated more recently. Also c/o urinary symptoms.   -In the setting of back pain and positive UA, suspect pyelonephritis, however back pain could be secondary to arthritis, disc herniation.  -CT lumbar spine: There is no fracture or compression or subluxation or abel osseous destruction. Degenerative disc disease.   -Follow urine cx : klebsiella pan sensitive  -Cotnue IV Ceftriaxone   -C/w pain medications, he was on Dilaudid at home as well   -Follow renal and bladder sonogram : no hydronephrosis

## 2020-01-27 NOTE — PROGRESS NOTE ADULT - ASSESSMENT
A/P:  1. PAWAN secondary to volume depletion from ileostomy drainage , now improved and current cr is at his baseline  2. Hyperkalemia due to PAWAN improving.  3. UTI  4. Anemia; MF  5.METABOLIC ACIDOSIS;secondary to ckd  -add PO Nahco3 2 tab tid   6.CKD-MBD: pt has acceptble pth and phos level  -we will d/c calcitriol for now  7.BACK PAIN/LEFT CVA PAIN: secondary to LS arthritis , less likley Left pyleonephritis  Keep patient euvolemic and 2g K renal diet  Avoid Nephrotoxic Meds/ Agents such as NSAIDs, Gadolinium contrast, Phosphate containing enemas, etc..)  Adjust Medications according to eGFR  Excessive ileostomy drainage Rx as per PCP  -suggest to add Lokelma 10gm po tid to treat chronic high k  AB Rx as per medcial team and dose as per egfr on daily basis  Follow BMP and  H/H daily

## 2020-01-27 NOTE — PROGRESS NOTE ADULT - ASSESSMENT
69 year old Male from [facility], with PMHx of prostate Ca (s/p radiation in 2015, now in remission), asthma, COPD (not on O2), HTN, HLD, Bipolar, anemia, GERD, Ileostomy s/p sigmoid resection, PAD, and CKD presented to ED with severe left lower back pain, Admitted to medicine for L4-L5 severe osteoarthritis. 69 year old Male from [facility], with PMHx of prostate Ca (s/p radiation in 2015, now in remission), asthma, COPD (not on O2), HTN, HLD, Bipolar, anemia, GERD, Ileostomy s/p sigmoid resection, PAD, and CKD presented to ED with severe left lower back pain, Admitted to medicine for L4-L5 severe osteoarthritis.     Patient has left lower back point tenderness in paraspinal region and has radiation to left leg and knee. DTR are mildly hyporeflexive in Lt knee. No motor and sensory loss. There is some component of cross adductor reflex. Based on his CT spine he has severe degenerative changes in L4 and L5 which co-relate with his physical exam. He will get benefit from physical therapy and pain management. No need of further inpatient neurological intervention. 69 year old Male from [facility], with PMHx of prostate Ca (s/p radiation in 2015, now in remission), asthma, COPD (not on O2), HTN, HLD, Bipolar, anemia, GERD, Ileostomy s/p sigmoid resection, PAD, and CKD presented to ED with severe left lower back pain, Admitted to medicine for L4-L5 severe osteoarthritis.     Patient has left lower back point tenderness in paraspinal region and has radiation to left leg and knee. DTR demonstrates 2+in Lt knee with crossed adductor response on the right and brisker reflex 3/4 on the right knee with 1+ in the ankles and flexor plantar response bilaterally. No motor and sensory loss.  Based on his CT spine he has severe degenerative changes in L4 and L5 which co-relate with his physical exam. He will  benefit from physical therapy and pain management. Recommend pain management; will follow with you

## 2020-01-27 NOTE — PROGRESS NOTE ADULT - SUBJECTIVE AND OBJECTIVE BOX
Patient is a 69y old  Male who presents with a chief complaint of PAWAN on CKD, back pain (27 Jan 2020 13:22)      INTERVAL HPI/OVERNIGHT EVENTS: pt seen and examined, no overnight acute events, no new complaints except increased ileostomy output more than 6 times a day; Back pain controlled. Able to ambulate to bathroom.             FAMILY HISTORY:    Vital Signs Last 24 Hrs  T(C): 36.6 (27 Jan 2020 15:07), Max: 36.7 (27 Jan 2020 05:06)  T(F): 97.9 (27 Jan 2020 15:07), Max: 98.1 (27 Jan 2020 05:06)  HR: 69 (27 Jan 2020 15:07) (69 - 77)  BP: 116/66 (27 Jan 2020 15:07) (100/68 - 117/66)  BP(mean): --  RR: 17 (27 Jan 2020 15:07) (17 - 18)  SpO2: 99% (27 Jan 2020 15:07) (94% - 99%)    01-26-20 @ 07:01  -  01-27-20 @ 07:00  --------------------------------------------------------  IN: 1243 mL / OUT: 1875 mL / NET: -632 mL        PHYSICAL EXAM:  GENERAL: NAD, well-groomed, well-developed  HEAD:  Atraumatic, Normocephalic  EYES: EOMI, PERRLA, conjunctiva and sclera clear  ENMT: No tonsillar erythema, exudates, or enlargement; Moist mucous membranes, Good dentition, No lesions  NECK: Supple, No JVD, Normal thyroid  NERVOUS SYSTEM:  Alert & Oriented X3, Good concentration; Motor Strength 5/5 B/L upper and lower extremities; DTRs 2+ intact and symmetric  CHEST/LUNG: Clear to percussion bilaterally; No rales, rhonchi, wheezing, or rubs  HEART: Regular rate and rhythm; No murmurs, rubs, or gallops  ABDOMEN: Soft, Nontender, Nondistended; Bowel sounds present  EXTREMITIES:  2+ Peripheral Pulses, No clubbing, cyanosis, or edema  LYMPH: No lymphadenopathy noted  SKIN: No rashes or lesions    Consultant(s) Notes Reviewed:  [x ] YES  [ ] NO  Care Discussed with Consultants/Other Providers [ x] YES  [ ] NO    LABS:        RADIOLOGY & ADDITIONAL TESTS:    Imaging Personally Reviewed:  [ ] YES  [ ] NO  acetaminophen   Tablet .. 650 milliGRAM(s) Oral every 6 hours PRN  ARIPiprazole 15 milliGRAM(s) Oral daily  cefTRIAXone   IVPB 1000 milliGRAM(s) IV Intermittent every 24 hours  dextrose 5% + sodium chloride 0.45%. 1000 milliLiter(s) IV Continuous <Continuous>  ferrous    sulfate 325 milliGRAM(s) Oral daily  finasteride 5 milliGRAM(s) Oral daily  gabapentin 200 milliGRAM(s) Oral three times a day  heparin  Injectable 5000 Unit(s) SubCutaneous every 12 hours  HYDROmorphone  Injectable 0.5 milliGRAM(s) IV Push every 6 hours PRN  lamoTRIgine 200 milliGRAM(s) Oral daily  loperamide 2 milliGRAM(s) Oral two times a day PRN  pantoprazole    Tablet 40 milliGRAM(s) Oral before breakfast  sodium bicarbonate 1300 milliGRAM(s) Oral three times a day  sodium zirconium cyclosilicate 5 Gram(s) Oral three times a day  tamsulosin 0.4 milliGRAM(s) Oral at bedtime  traZODone 25 milliGRAM(s) Oral at bedtime      HEALTH ISSUES - PROBLEM Dx:  Stage 3 chronic kidney disease: Stage 3 chronic kidney disease  Ileostomy in place: Ileostomy in place  UTI (urinary tract infection): UTI (urinary tract infection)  Goals of care, counseling/discussion: Goals of care, counseling/discussion  DVT prophylaxis: DVT prophylaxis  Anemia: Anemia  HTN (hypertension): HTN (hypertension)  Back pain: Back pain  Hyperkalemia: Hyperkalemia  Metabolic acidosis: Metabolic acidosis  PAWAN (acute kidney injury): PAWAN (acute kidney injury) Patient is a 69y old  Male who presents with a chief complaint of PAWAN on CKD, back pain (27 Jan 2020 13:22)      INTERVAL HPI/OVERNIGHT EVENTS: pt seen and examined, no overnight acute events, no new complaints except increased ileostomy output more than 6 times a day; Back pain controlled. Able to ambulate to bathroom. Patient has left lower back point tenderness in paraspinal region and has radiation to left leg and knee.            FAMILY HISTORY:    Vital Signs Last 24 Hrs  T(C): 36.6 (27 Jan 2020 15:07), Max: 36.7 (27 Jan 2020 05:06)  T(F): 97.9 (27 Jan 2020 15:07), Max: 98.1 (27 Jan 2020 05:06)  HR: 69 (27 Jan 2020 15:07) (69 - 77)  BP: 116/66 (27 Jan 2020 15:07) (100/68 - 117/66)  BP(mean): --  RR: 17 (27 Jan 2020 15:07) (17 - 18)  SpO2: 99% (27 Jan 2020 15:07) (94% - 99%)    01-26-20 @ 07:01  -  01-27-20 @ 07:00  --------------------------------------------------------  IN: 1243 mL / OUT: 1875 mL / NET: -632 mL        GENERAL: NAD  HEENT: Normocephalic;  conjunctivae and sclerae clear; moist mucous membranes;   NECK : supple  CHEST/LUNG: Clear to auscultation bilaterally with good air entry   HEART: S1 S2  regular; no murmurs, gallops or rubs  ABDOMEN: Soft, Nontender, Nondistended; Bowel sounds present  EXTREMITIES: no cyanosis; no edema; no calf tenderness  SKIN: warm and dry; no rash  NERVOUS SYSTEM:  Awake and alert; Oriented  to place, person and time ; no new deficits    Consultant(s) Notes Reviewed:  [x ] YES  [ ] NO  Care Discussed with Consultants/Other Providers [ x] YES  [ ] NO    LABS:                      8.8    5.93  )-----------( 219      ( 26 Jan 2020 04:39 )             28.3   01-26    145  |  123<H>  |  39<H>  ----------------------------<  90  5.3   |  16<L>  |  2.58<H>    Ca    8.6      26 Jan 2020 14:07  Phos  2.3     01-26  Mg     1.5     01-26            RADIOLOGY & ADDITIONAL TESTS:    Imaging Personally Reviewed:  [ ] YES  [ ] NO  acetaminophen   Tablet .. 650 milliGRAM(s) Oral every 6 hours PRN  ARIPiprazole 15 milliGRAM(s) Oral daily  cefTRIAXone   IVPB 1000 milliGRAM(s) IV Intermittent every 24 hours  dextrose 5% + sodium chloride 0.45%. 1000 milliLiter(s) IV Continuous <Continuous>  ferrous    sulfate 325 milliGRAM(s) Oral daily  finasteride 5 milliGRAM(s) Oral daily  gabapentin 200 milliGRAM(s) Oral three times a day  heparin  Injectable 5000 Unit(s) SubCutaneous every 12 hours  HYDROmorphone  Injectable 0.5 milliGRAM(s) IV Push every 6 hours PRN  lamoTRIgine 200 milliGRAM(s) Oral daily  loperamide 2 milliGRAM(s) Oral two times a day PRN  pantoprazole    Tablet 40 milliGRAM(s) Oral before breakfast  sodium bicarbonate 1300 milliGRAM(s) Oral three times a day  sodium zirconium cyclosilicate 5 Gram(s) Oral three times a day  tamsulosin 0.4 milliGRAM(s) Oral at bedtime  traZODone 25 milliGRAM(s) Oral at bedtime      HEALTH ISSUES - PROBLEM Dx:  Stage 3 chronic kidney disease: Stage 3 chronic kidney disease  Ileostomy in place: Ileostomy in place  UTI (urinary tract infection): UTI (urinary tract infection)  Goals of care, counseling/discussion: Goals of care, counseling/discussion  DVT prophylaxis: DVT prophylaxis  Anemia: Anemia  HTN (hypertension): HTN (hypertension)  Back pain: Back pain  Hyperkalemia: Hyperkalemia  Metabolic acidosis: Metabolic acidosis  PAWAN (acute kidney injury): PAWAN (acute kidney injury)

## 2020-01-27 NOTE — PROGRESS NOTE ADULT - PROBLEM SELECTOR PLAN 1
-Patient is noted to have PAWAN on CKD stage 4  01/24 pt received D5half NS, PAWAN improving.   -Pt received fluids over 24 hour so its hinting PAWAN is pre renal although FeNa 1.9 (Intrinsic)  -Pt is not retaining urine  -Avoid Nephrotoxic Meds/ Agents such as NSAIDs, Gadolinium contrast, Phosphate containing enemas, etc..)  -Adjust Medications according to eGFR  -Nephro consult- Dr Devon Reis  Daily BMP

## 2020-01-27 NOTE — PROGRESS NOTE ADULT - ASSESSMENT
69 year old Male from [facility], with PMHx of prostate Ca (s/p radiation in 2015, now in remission), asthma, COPD (not on O2), HTN, HLD, Bipolar, anemia, GERD, Ileostomy s/p sigmoid resection, PAD, and CKD presented to ED with severe left lower back pain, also complaints of urinary frequency and dysuria.   In ED, patient was noted to have abnormal labs , Cr 4.26, his baseline around a month ago was 2.6. Potassium was  noted to be 7.2, with bicarb of 12. Patient is admitted for back pain, suspected pyelonephritis, with PAWAN on CKD

## 2020-01-27 NOTE — PROGRESS NOTE ADULT - PROBLEM SELECTOR PLAN 6
-Patient has hx of HTN, he is on Lisinopril  -Will hold in the setting of PAWAN  -start amlodipine ,

## 2020-01-27 NOTE — PROGRESS NOTE ADULT - PROBLEM SELECTOR PLAN 5
-patient p/w hyperkalemia secondary to CKD, poor compliance to renal diet  -No EKG changes were noted  -S/p IV cocktail   -Daily BMP  -He was given lokelma TID during his previous hospital visit.**  01/24 Pt received cocktail in Am but potassium is still high. As per Dr thapa, Lokelma takes week to work, will give kayexalate 2 doses and repeat BMP.  01/24 Potassium is 5.7, initially it improved to 5, Will give another dose of kayexalate  Dr borges [facility] Lokalma 10 TID for chronic hyperkalemia

## 2020-01-27 NOTE — PROGRESS NOTE ADULT - ATTENDING COMMENTS
Patient was examined and discussed with Dr. Penn.      Back pain is slightly improved.   Neurology consultation, reviewed and appreciated.     Plan:  Add lokelma          Continue analgesics          continue Rx UTI orally          Discharge to [facility] tomorrow.

## 2020-01-28 ENCOUNTER — TRANSCRIPTION ENCOUNTER (OUTPATIENT)
Age: 70
End: 2020-01-28

## 2020-01-28 VITALS
OXYGEN SATURATION: 96 % | DIASTOLIC BLOOD PRESSURE: 68 MMHG | HEART RATE: 63 BPM | SYSTOLIC BLOOD PRESSURE: 113 MMHG | TEMPERATURE: 98 F | RESPIRATION RATE: 17 BRPM

## 2020-01-28 DIAGNOSIS — M54.5 LOW BACK PAIN: ICD-10-CM

## 2020-01-28 LAB
ANION GAP SERPL CALC-SCNC: 6 MMOL/L — SIGNIFICANT CHANGE UP (ref 5–17)
BUN SERPL-MCNC: 36 MG/DL — HIGH (ref 7–18)
CALCIUM SERPL-MCNC: 8.7 MG/DL — SIGNIFICANT CHANGE UP (ref 8.4–10.5)
CHLORIDE SERPL-SCNC: 113 MMOL/L — HIGH (ref 96–108)
CO2 SERPL-SCNC: 17 MMOL/L — LOW (ref 22–31)
CREAT SERPL-MCNC: 2.59 MG/DL — HIGH (ref 0.5–1.3)
FERRITIN SERPL-MCNC: 371 NG/ML — SIGNIFICANT CHANGE UP (ref 30–400)
GLUCOSE BLDC GLUCOMTR-MCNC: 83 MG/DL — SIGNIFICANT CHANGE UP (ref 70–99)
GLUCOSE BLDC GLUCOMTR-MCNC: 84 MG/DL — SIGNIFICANT CHANGE UP (ref 70–99)
GLUCOSE BLDC GLUCOMTR-MCNC: 94 MG/DL — SIGNIFICANT CHANGE UP (ref 70–99)
GLUCOSE SERPL-MCNC: 101 MG/DL — HIGH (ref 70–99)
HCT VFR BLD CALC: 29 % — LOW (ref 39–50)
HGB BLD-MCNC: 9.1 G/DL — LOW (ref 13–17)
IRON SATN MFR SERPL: 28 % — SIGNIFICANT CHANGE UP (ref 20–55)
IRON SATN MFR SERPL: 61 UG/DL — LOW (ref 65–170)
MAGNESIUM SERPL-MCNC: 1.8 MG/DL — SIGNIFICANT CHANGE UP (ref 1.6–2.6)
MCHC RBC-ENTMCNC: 27.2 PG — SIGNIFICANT CHANGE UP (ref 27–34)
MCHC RBC-ENTMCNC: 31.4 GM/DL — LOW (ref 32–36)
MCV RBC AUTO: 86.6 FL — SIGNIFICANT CHANGE UP (ref 80–100)
NRBC # BLD: 0 /100 WBCS — SIGNIFICANT CHANGE UP (ref 0–0)
PHOSPHATE SERPL-MCNC: 3.2 MG/DL — SIGNIFICANT CHANGE UP (ref 2.5–4.5)
PLATELET # BLD AUTO: 211 K/UL — SIGNIFICANT CHANGE UP (ref 150–400)
POTASSIUM SERPL-MCNC: 4.9 MMOL/L — SIGNIFICANT CHANGE UP (ref 3.5–5.3)
POTASSIUM SERPL-SCNC: 4.9 MMOL/L — SIGNIFICANT CHANGE UP (ref 3.5–5.3)
RBC # BLD: 3.35 M/UL — LOW (ref 4.2–5.8)
RBC # FLD: 16.7 % — HIGH (ref 10.3–14.5)
SODIUM SERPL-SCNC: 136 MMOL/L — SIGNIFICANT CHANGE UP (ref 135–145)
TIBC SERPL-MCNC: 220 UG/DL — LOW (ref 250–450)
UIBC SERPL-MCNC: 159 UG/DL — SIGNIFICANT CHANGE UP (ref 110–370)
WBC # BLD: 6.32 K/UL — SIGNIFICANT CHANGE UP (ref 3.8–10.5)
WBC # FLD AUTO: 6.32 K/UL — SIGNIFICANT CHANGE UP (ref 3.8–10.5)

## 2020-01-28 PROCEDURE — 84156 ASSAY OF PROTEIN URINE: CPT

## 2020-01-28 PROCEDURE — 83540 ASSAY OF IRON: CPT

## 2020-01-28 PROCEDURE — 84100 ASSAY OF PHOSPHORUS: CPT

## 2020-01-28 PROCEDURE — 82803 BLOOD GASES ANY COMBINATION: CPT

## 2020-01-28 PROCEDURE — 80048 BASIC METABOLIC PNL TOTAL CA: CPT

## 2020-01-28 PROCEDURE — 82553 CREATINE MB FRACTION: CPT

## 2020-01-28 PROCEDURE — 84300 ASSAY OF URINE SODIUM: CPT

## 2020-01-28 PROCEDURE — 83935 ASSAY OF URINE OSMOLALITY: CPT

## 2020-01-28 PROCEDURE — 82310 ASSAY OF CALCIUM: CPT

## 2020-01-28 PROCEDURE — 99285 EMERGENCY DEPT VISIT HI MDM: CPT

## 2020-01-28 PROCEDURE — 72131 CT LUMBAR SPINE W/O DYE: CPT

## 2020-01-28 PROCEDURE — 82728 ASSAY OF FERRITIN: CPT

## 2020-01-28 PROCEDURE — 85027 COMPLETE CBC AUTOMATED: CPT

## 2020-01-28 PROCEDURE — 84484 ASSAY OF TROPONIN QUANT: CPT

## 2020-01-28 PROCEDURE — 99239 HOSP IP/OBS DSCHRG MGMT >30: CPT

## 2020-01-28 PROCEDURE — 80053 COMPREHEN METABOLIC PANEL: CPT

## 2020-01-28 PROCEDURE — 84560 ASSAY OF URINE/URIC ACID: CPT

## 2020-01-28 PROCEDURE — 85652 RBC SED RATE AUTOMATED: CPT

## 2020-01-28 PROCEDURE — 87040 BLOOD CULTURE FOR BACTERIA: CPT

## 2020-01-28 PROCEDURE — 84443 ASSAY THYROID STIM HORMONE: CPT

## 2020-01-28 PROCEDURE — 87186 SC STD MICRODIL/AGAR DIL: CPT

## 2020-01-28 PROCEDURE — 87086 URINE CULTURE/COLONY COUNT: CPT

## 2020-01-28 PROCEDURE — 82570 ASSAY OF URINE CREATININE: CPT

## 2020-01-28 PROCEDURE — 83970 ASSAY OF PARATHORMONE: CPT

## 2020-01-28 PROCEDURE — 80061 LIPID PANEL: CPT

## 2020-01-28 PROCEDURE — 82436 ASSAY OF URINE CHLORIDE: CPT

## 2020-01-28 PROCEDURE — 99221 1ST HOSP IP/OBS SF/LOW 40: CPT

## 2020-01-28 PROCEDURE — 84133 ASSAY OF URINE POTASSIUM: CPT

## 2020-01-28 PROCEDURE — 83550 IRON BINDING TEST: CPT

## 2020-01-28 PROCEDURE — 93005 ELECTROCARDIOGRAM TRACING: CPT

## 2020-01-28 PROCEDURE — 83036 HEMOGLOBIN GLYCOSYLATED A1C: CPT

## 2020-01-28 PROCEDURE — 36415 COLL VENOUS BLD VENIPUNCTURE: CPT

## 2020-01-28 PROCEDURE — 83605 ASSAY OF LACTIC ACID: CPT

## 2020-01-28 PROCEDURE — 97161 PT EVAL LOW COMPLEX 20 MIN: CPT

## 2020-01-28 PROCEDURE — 83735 ASSAY OF MAGNESIUM: CPT

## 2020-01-28 PROCEDURE — 94640 AIRWAY INHALATION TREATMENT: CPT

## 2020-01-28 PROCEDURE — 82607 VITAMIN B-12: CPT

## 2020-01-28 PROCEDURE — 82962 GLUCOSE BLOOD TEST: CPT

## 2020-01-28 PROCEDURE — 82550 ASSAY OF CK (CPK): CPT

## 2020-01-28 PROCEDURE — 81001 URINALYSIS AUTO W/SCOPE: CPT

## 2020-01-28 RX ORDER — CALCITRIOL 0.5 UG/1
1 CAPSULE ORAL
Qty: 0 | Refills: 0 | DISCHARGE

## 2020-01-28 RX ORDER — CEFUROXIME AXETIL 250 MG
250 TABLET ORAL EVERY 12 HOURS
Refills: 0 | Status: DISCONTINUED | OUTPATIENT
Start: 2020-01-28 | End: 2020-01-28

## 2020-01-28 RX ORDER — HYDROMORPHONE HYDROCHLORIDE 2 MG/ML
2 INJECTION INTRAMUSCULAR; INTRAVENOUS; SUBCUTANEOUS EVERY 6 HOURS
Refills: 0 | Status: DISCONTINUED | OUTPATIENT
Start: 2020-01-28 | End: 2020-01-28

## 2020-01-28 RX ORDER — SODIUM CHLORIDE 9 MG/ML
1000 INJECTION INTRAMUSCULAR; INTRAVENOUS; SUBCUTANEOUS
Refills: 0 | Status: DISCONTINUED | OUTPATIENT
Start: 2020-01-28 | End: 2020-01-28

## 2020-01-28 RX ORDER — SODIUM BICARBONATE 1 MEQ/ML
2 SYRINGE (ML) INTRAVENOUS
Qty: 180 | Refills: 0
Start: 2020-01-28 | End: 2020-02-26

## 2020-01-28 RX ORDER — LIDOCAINE 4 G/100G
0 CREAM TOPICAL
Qty: 0 | Refills: 0 | DISCHARGE
Start: 2020-01-28

## 2020-01-28 RX ORDER — LIDOCAINE 4 G/100G
1 CREAM TOPICAL
Qty: 30 | Refills: 0
Start: 2020-01-28 | End: 2020-02-26

## 2020-01-28 RX ORDER — SODIUM BICARBONATE 1 MEQ/ML
1 SYRINGE (ML) INTRAVENOUS
Qty: 0 | Refills: 0 | DISCHARGE

## 2020-01-28 RX ORDER — SODIUM ZIRCONIUM CYCLOSILICATE 10 G/10G
10 POWDER, FOR SUSPENSION ORAL
Qty: 70 | Refills: 0
Start: 2020-01-28 | End: 2020-02-03

## 2020-01-28 RX ORDER — LISINOPRIL 2.5 MG/1
0.5 TABLET ORAL
Qty: 0 | Refills: 0 | DISCHARGE

## 2020-01-28 RX ORDER — GABAPENTIN 400 MG/1
600 CAPSULE ORAL EVERY 12 HOURS
Refills: 0 | Status: DISCONTINUED | OUTPATIENT
Start: 2020-01-28 | End: 2020-01-28

## 2020-01-28 RX ORDER — LIDOCAINE 4 G/100G
1 CREAM TOPICAL DAILY
Refills: 0 | Status: DISCONTINUED | OUTPATIENT
Start: 2020-01-28 | End: 2020-01-28

## 2020-01-28 RX ADMIN — GABAPENTIN 200 MILLIGRAM(S): 400 CAPSULE ORAL at 06:06

## 2020-01-28 RX ADMIN — HYDROMORPHONE HYDROCHLORIDE 0.5 MILLIGRAM(S): 2 INJECTION INTRAMUSCULAR; INTRAVENOUS; SUBCUTANEOUS at 01:38

## 2020-01-28 RX ADMIN — SODIUM ZIRCONIUM CYCLOSILICATE 10 GRAM(S): 10 POWDER, FOR SUSPENSION ORAL at 06:06

## 2020-01-28 RX ADMIN — HYDROMORPHONE HYDROCHLORIDE 0.5 MILLIGRAM(S): 2 INJECTION INTRAMUSCULAR; INTRAVENOUS; SUBCUTANEOUS at 02:00

## 2020-01-28 RX ADMIN — HYDROMORPHONE HYDROCHLORIDE 0.5 MILLIGRAM(S): 2 INJECTION INTRAMUSCULAR; INTRAVENOUS; SUBCUTANEOUS at 10:43

## 2020-01-28 RX ADMIN — SODIUM CHLORIDE 75 MILLILITER(S): 9 INJECTION INTRAMUSCULAR; INTRAVENOUS; SUBCUTANEOUS at 13:53

## 2020-01-28 RX ADMIN — PANTOPRAZOLE SODIUM 40 MILLIGRAM(S): 20 TABLET, DELAYED RELEASE ORAL at 06:06

## 2020-01-28 RX ADMIN — Medication 1300 MILLIGRAM(S): at 06:06

## 2020-01-28 RX ADMIN — HEPARIN SODIUM 5000 UNIT(S): 5000 INJECTION INTRAVENOUS; SUBCUTANEOUS at 06:06

## 2020-01-28 NOTE — CONSULT NOTE ADULT - SUBJECTIVE AND OBJECTIVE BOX
Source of information: , Chart review, patient  Patient language: English  : n/a  HPI:  69 year old Male from [facility], with PMHx of prostate Ca (s/p radiation in 2015, now in remission), asthma, COPD (not on O2), HTN, HLD, Bipolar, anemia, GERD, Ileostomy s/p sigmoid resection, PAD, and CKD presented to ED with severe left lower back pain. Patient states that he has chronic back ache but for the last 2 weeks his pain has worsened to an extent that he has difficulty in ambulation given pain, denies any weakness, numbness, urinary or fecal incontinence. Patient reports that he has noticed increased out-put in his colostomy bag for last few days, also complaints of urinary frequency and burning micturition.    In ED, patient was noted to have abnormal labs , Cr 4.26, his baseline around a month ago was 2.6. Potassium was  noted to be 7.2, with bicarb of 12. (23 Jan 2020 23:09)    Patient is a 69y old  Male who presents with a chief complaint of PAWAN on CKD, back pain (28 Jan 2020 11:26).  Pt       CC:     PAIN SCORE:         SCALE USED: (1-10 VNRS)        Patient stated goal for pain control: to be able to take deep breaths, utilize incentive spirometer, get out of bed to chair and ambulate with tolerable pain control.     PAST MEDICAL & SURGICAL HISTORY:  BPH with urinary obstruction  Prostate CA  CKD (chronic kidney disease)  PAD (peripheral artery disease)  HLD (hyperlipidemia)  HTN (hypertension)  IBD (inflammatory bowel disease)  Bipolar disorder  Anemia  COPD, mild  History of creation of ostomy      FAMILY HISTORY:        SOCIAL HISTORY:  [ ] Denies Smoking, Alcohol, or Drug Use    Allergies    No Known Allergies    Intolerances        MEDICATIONS:    MEDICATIONS  (STANDING):  ARIPiprazole 15 milliGRAM(s) Oral daily  cefTRIAXone   IVPB 1000 milliGRAM(s) IV Intermittent every 24 hours  ferrous    sulfate 325 milliGRAM(s) Oral daily  finasteride 5 milliGRAM(s) Oral daily  gabapentin 600 milliGRAM(s) Oral every 12 hours  heparin  Injectable 5000 Unit(s) SubCutaneous every 12 hours  lamoTRIgine 200 milliGRAM(s) Oral daily  lidocaine   Patch 1 Patch Transdermal daily  pantoprazole    Tablet 40 milliGRAM(s) Oral before breakfast  sodium bicarbonate 1300 milliGRAM(s) Oral three times a day  sodium chloride 0.9%. 1000 milliLiter(s) (75 mL/Hr) IV Continuous <Continuous>  sodium zirconium cyclosilicate 10 Gram(s) Oral three times a day  tamsulosin 0.4 milliGRAM(s) Oral at bedtime  traZODone 25 milliGRAM(s) Oral at bedtime    MEDICATIONS  (PRN):  acetaminophen   Tablet .. 650 milliGRAM(s) Oral every 6 hours PRN Moderate Pain (4 - 6)  HYDROmorphone  Injectable 2 milliGRAM(s) IV Push every 6 hours PRN Severe Pain (7 - 10)  loperamide 2 milliGRAM(s) Oral two times a day PRN If excess Ileostomy output      Vital Signs Last 24 Hrs  T(C): 36.4 (28 Jan 2020 04:57), Max: 36.9 (28 Jan 2020 02:06)  T(F): 97.6 (28 Jan 2020 04:57), Max: 98.4 (28 Jan 2020 02:06)  HR: 63 (28 Jan 2020 04:57) (63 - 69)  BP: 113/68 (28 Jan 2020 04:57) (106/66 - 116/66)  BP(mean): --  RR: 17 (28 Jan 2020 04:57) (17 - 18)  SpO2: 96% (28 Jan 2020 04:57) (94% - 99%)    LABS:                          9.1    6.32  )-----------( 211      ( 28 Jan 2020 07:31 )             29.0     01-28    136  |  113<H>  |  36<H>  ----------------------------<  101<H>  4.9   |  17<L>  |  2.59<H>    Ca    8.7      28 Jan 2020 07:31  Phos  3.2     01-28  Mg     1.8     01-28            CAPILLARY BLOOD GLUCOSE      POCT Blood Glucose.: 83 mg/dL (28 Jan 2020 08:23)  POCT Blood Glucose.: 84 mg/dL (28 Jan 2020 05:44)  POCT Blood Glucose.: 94 mg/dL (28 Jan 2020 01:10)  POCT Blood Glucose.: 126 mg/dL (27 Jan 2020 21:06)  POCT Blood Glucose.: 90 mg/dL (27 Jan 2020 16:54)  POCT Blood Glucose.: 93 mg/dL (27 Jan 2020 13:04)      Radiology:    Drug Screen:  heparin  Injectable 5000 Unit(s) SubCutaneous every 12 hours    cefTRIAXone   IVPB 1000 milliGRAM(s) IV Intermittent every 24 hours        ORT Score -   Family Hx of substance abuse	Female	Male  Alcohol 	                                              1              3  Illegal drugs	                                      2              3  Rx drugs                                               4	      4  Personal Hx of substance abuse		  Alcohol 	                                               3	      3  Illegal drugs                                  	       4	      4  Rx drugs                                                5	      5  Age between 16- 45 years	               1             1  hx preadolescent sexual abuse	       3	      0  Psychological disease		  ADD, OCD, bipolar, schizophrenia	2	      2  Depression                                    	1	      1  Score totals 		  		  a score of 3 or lower indicates low risk for opioid abuse		  a score of 4-7 indicates moderate risk for opioid abuse		  a score of 8 or higher indicates high risk for opioid abuse	      REVIEW OF SYSTEMS:  CONSTITUTIONAL: No fever or fatigue  RESPIRATORY: No cough, wheezing, chills or hemoptysis; No shortness of breath  CARDIOVASCULAR: No chest pain, palpitations, dizziness, or leg swelling  GASTROINTESTINAL: No abdominal or epigastric pain. No nausea, vomiting; No diarrhea or constipation.   GENITOURINARY: No dysuria, frequency, hematuria, retention or incontinence  MUSCULOSKELETAL: No joint pain or swelling; No muscle, back, or extremity pain, no upper or lower motor strength weakness, no saddle anesthesia, bowel/bladder incontinence, no falls   PSYCHIATRIC: No depression, anxiety, mood swings, or difficulty sleeping    PHYSICAL EXAM:  GENERAL:  Alert & Oriented X3, NAD, Good concentration  CHEST/LUNG: Clear to auscultation bilaterally; No rales, rhonchi, wheezing, or rubs  HEART: Regular rate and rhythm; No murmurs, rubs, or gallops  ABDOMEN: Soft, Nontender, Nondistended; Bowel sounds present  EXTREMITIES:  2+ Peripheral Pulses, No cyanosis, or edema  MUSCULOSKELETAL: Motor Strength 5/5 B/L upper and lower extremities; moves all extremities equally against gravity; ROM intact; negative SRL  SKIN: No rashes or lesions    Risk factors associated with adverse outcomes related to opioid treatment  [ ]  Concurrent benzodiazepine use  [ ]  History/ Active substance use or alcohol use disorder  [ ] Psychiatric co-morbidity  [ ] Sleep apnea  [ ] COPD  [ ] BMI> 35  [ ] Liver dysfunction  [ ] Renal dysfunction  [ ] CHF  [ ] Smoker  [ ]  Age > 60 years    [ ]  [place]  Reviewed and Copied to Chart. See below. Source of information: , Chart review, patient  Patient language: English  : n/a  HPI:  69 year old Male from [facility], with PMHx of prostate Ca (s/p radiation in 2015, now in remission), asthma, COPD (not on O2), HTN, HLD, Bipolar, anemia, GERD, Ileostomy s/p sigmoid resection, PAD, and CKD presented to ED with severe left lower back pain. Patient states that he has chronic back ache but for the last 2 weeks his pain has worsened to an extent that he has difficulty in ambulation given pain, denies any weakness, numbness, urinary or fecal incontinence. Patient reports that he has noticed increased out-put in his colostomy bag for last few days, also complaints of urinary frequency and burning micturition.    In ED, patient was noted to have abnormal labs , Cr 4.26, his baseline around a month ago was 2.6. Potassium was  noted to be 7.2, with bicarb of 12. (23 Jan 2020 23:09)    Patient is a 69y old  Male who presents with a chief complaint of PAWAN on CKD, back pain (28 Jan 2020 11:26).  Pt admitted with urinary frequency and burning.  Pt treated for uti.  + elevated creatinine.  + history of chronic back pain. Pt had epidural inj in past - pt does not wish to have one more again.  Pain worsens on exertion.  + right sided radiculopathy. + ileostomy in place.     PAIN SCORE:  5/10      SCALE USED: (1-10 VNRS)    Patient stated goal for pain control: to be able to take deep breaths, utilize incentive spirometer, get out of bed to chair and ambulate with tolerable pain control.     PAST MEDICAL & SURGICAL HISTORY:  BPH with urinary obstruction  Prostate CA  CKD (chronic kidney disease)  PAD (peripheral artery disease)  HLD (hyperlipidemia)  HTN (hypertension)  IBD (inflammatory bowel disease)  Bipolar disorder  Anemia  COPD, mild  History of creation of ostomy      FAMILY HISTORY:        SOCIAL HISTORY:  [x] Denies Smoking, Alcohol, or Drug Use    Allergies    No Known Allergies    Intolerances        MEDICATIONS:    MEDICATIONS  (STANDING):  ARIPiprazole 15 milliGRAM(s) Oral daily  cefTRIAXone   IVPB 1000 milliGRAM(s) IV Intermittent every 24 hours  ferrous    sulfate 325 milliGRAM(s) Oral daily  finasteride 5 milliGRAM(s) Oral daily  gabapentin 600 milliGRAM(s) Oral every 12 hours  heparin  Injectable 5000 Unit(s) SubCutaneous every 12 hours  lamoTRIgine 200 milliGRAM(s) Oral daily  lidocaine   Patch 1 Patch Transdermal daily  pantoprazole    Tablet 40 milliGRAM(s) Oral before breakfast  sodium bicarbonate 1300 milliGRAM(s) Oral three times a day  sodium chloride 0.9%. 1000 milliLiter(s) (75 mL/Hr) IV Continuous <Continuous>  sodium zirconium cyclosilicate 10 Gram(s) Oral three times a day  tamsulosin 0.4 milliGRAM(s) Oral at bedtime  traZODone 25 milliGRAM(s) Oral at bedtime    MEDICATIONS  (PRN):  acetaminophen   Tablet .. 650 milliGRAM(s) Oral every 6 hours PRN Moderate Pain (4 - 6)  HYDROmorphone  Injectable 2 milliGRAM(s) IV Push every 6 hours PRN Severe Pain (7 - 10)  loperamide 2 milliGRAM(s) Oral two times a day PRN If excess Ileostomy output      Vital Signs Last 24 Hrs  T(C): 36.4 (28 Jan 2020 04:57), Max: 36.9 (28 Jan 2020 02:06)  T(F): 97.6 (28 Jan 2020 04:57), Max: 98.4 (28 Jan 2020 02:06)  HR: 63 (28 Jan 2020 04:57) (63 - 69)  BP: 113/68 (28 Jan 2020 04:57) (106/66 - 116/66)  BP(mean): --  RR: 17 (28 Jan 2020 04:57) (17 - 18)  SpO2: 96% (28 Jan 2020 04:57) (94% - 99%)    LABS:                          9.1    6.32  )-----------( 211      ( 28 Jan 2020 07:31 )             29.0     01-28    136  |  113<H>  |  36<H>  ----------------------------<  101<H>  4.9   |  17<L>  |  2.59<H>    Ca    8.7      28 Jan 2020 07:31  Phos  3.2     01-28  Mg     1.8     01-28            CAPILLARY BLOOD GLUCOSE      POCT Blood Glucose.: 83 mg/dL (28 Jan 2020 08:23)  POCT Blood Glucose.: 84 mg/dL (28 Jan 2020 05:44)  POCT Blood Glucose.: 94 mg/dL (28 Jan 2020 01:10)  POCT Blood Glucose.: 126 mg/dL (27 Jan 2020 21:06)  POCT Blood Glucose.: 90 mg/dL (27 Jan 2020 16:54)  POCT Blood Glucose.: 93 mg/dL (27 Jan 2020 13:04)      Radiology:  < from: CT Lumbar Spine No Cont (01.23.20 @ 15:23) >    EXAM:  CT LUMBAR SPINE                            PROCEDURE DATE:  01/23/2020          INTERPRETATION:  CT lumbar spine without contrast    History back pain    An IVC filter is noted. There is no fracture or compression or subluxation or abel osseous destruction. There is severe degenerative disc change with vacuum and mild annular osteophyte at L4-5. The other disc spaces are relatively maintained. There is no significant spinal stenosis. There is moderate facet arthropathy at L4-5 and L5-S1.    IMPRESSION:    No acute findings      < end of copied text >      Drug Screen:  heparin  Injectable 5000 Unit(s) SubCutaneous every 12 hours    cefTRIAXone   IVPB 1000 milliGRAM(s) IV Intermittent every 24 hours      REVIEW OF SYSTEMS:  CONSTITUTIONAL: No fever or fatigue  RESPIRATORY: No cough, wheezing, chills or hemoptysis; No shortness of breath  CARDIOVASCULAR: No chest pain, palpitations, dizziness, or leg swelling  GASTROINTESTINAL: No abdominal or epigastric pain. No nausea, vomiting; No diarrhea or constipation.   GENITOURINARY: No dysuria, frequency, hematuria, retention or incontinence  MUSCULOSKELETAL: + back pain + stiffness, + right sided radiculopathy    PSYCHIATRIC: No depression, anxiety, mood swings, or difficulty sleeping    PHYSICAL EXAM:  GENERAL:  Alert & Oriented X3, NAD, Good concentration  CHEST/LUNG: Clear to auscultation bilaterally; No rales, rhonchi, wheezing, or rubs  HEART: Regular rate and rhythm; No murmurs, rubs, or gallops  ABDOMEN: + distended, + ileostomy  EXTREMITIES:  2+ Peripheral Pulses, No cyanosis, or edema  MUSCULOSKELETAL: + lumbar spine tenderness - right sided,  + stiffness  positive  SRL      Risk factors associated with adverse outcomes related to opioid treatment  [ ]  Concurrent benzodiazepine use  [ ]  History/ Active substance use or alcohol use disorder  [ ] Psychiatric co-morbidity  [ ] Sleep apnea  [ ] COPD  [ ] BMI> 35  [ ] Liver dysfunction  [ ] Renal dysfunction  [ ] CHF  [ ] Smoker  [x ]  Age > 60 years    [x ]  [place]  Reviewed and Copied to Chart. See below.

## 2020-01-28 NOTE — PROGRESS NOTE ADULT - PROBLEM SELECTOR PROBLEM 1
PAWAN (acute kidney injury)

## 2020-01-28 NOTE — PROGRESS NOTE ADULT - NSHPATTENDINGPLANDISCUSS_GEN_ALL_CORE
pt and medical team
pt and medical team
Patient, Pain management, resident, Adult [facility].
Dr. Penn

## 2020-01-28 NOTE — PROGRESS NOTE ADULT - PROBLEM SELECTOR PLAN 5
-patient p/w hyperkalemia secondary to CKD, poor compliance to renal diet  -No EKG changes were noted  -S/p IV cocktail   -Daily BMP  -He was given lokelma TID during his previous hospital visit.**  01/24 Pt received cocktail in Am but potassium is still high. As per Dr thapa, Lokelma takes week to work, will give kayexalate 2 doses and repeat BMP.  01/24 Potassium is 5.7, initially it improved to 5, Will give another dose of kayexalate  Dr borges [facility] Lokalma 10 TID for chronic hyperkalemia

## 2020-01-28 NOTE — PROGRESS NOTE ADULT - ASSESSMENT
69 year old Male from [facility], with PMHx of prostate Ca (s/p radiation in 2015, now in remission), asthma, COPD (not on O2), HTN, HLD, Bipolar, anemia, GERD, Ileostomy s/p sigmoid resection, PAD, and CKD presented to ED with severe left lower back pain, also complaints of urinary frequency and dysuria.   In ED, patient was noted to have abnormal labs , Cr 4.26, his baseline around a month ago was 2.6. Potassium was  noted to be 7.2, with bicarb of 12. Patient is admitted for back pain, suspected pyelonephritis, with PAWAN on CKD

## 2020-01-28 NOTE — PROGRESS NOTE ADULT - ASSESSMENT
A/P:  1. PAWAN secondary to volume depletion from ileostomy drainage , now improved and current SCr is at his baseline    2. Hyperkalemia due to PAWAN: resolved    3. UTI: Klebsiella UTI on CTX D4    4. Anemia; MF    5.METABOLIC ACIDOSIS; secondary to ckd  -c/w NaHCO3 2 tabs TID    6.CKD-MBD: pt has acceptble pth and phos level  -we will d/c calcitriol for now    7.BACK PAIN/LEFT CVA PAIN: secondary to LS arthritis , less likely Left pyelonephritis  Keep patient euvolemic and 2g K renal diet  Avoid Nephrotoxic Meds/ Agents such as NSAIDs, Gadolinium contrast, Phosphate containing enemas, etc..)  Adjust Medications according to eGFR  Excessive ileostomy drainage Rx as per PCP    -suggest to add Lokelma 10gm po tid to treat chronic high k  AB Rx as per medical team and dose as per egfr on daily basis  Follow BMP and  H/H daily A/P:  1. PAWAN secondary to volume depletion from ileostomy drainage , now improved and current SCr is at his baseline    2. Hyperkalemia due to PAWAN: resolved  suggest to continue  Lokelma 5gm po tid to treat chronic high k  3. UTI: Klebsiella UTI on CTX D4  AB Rx as per medical team and dose as per egfr on daily basis  4. Anemia; MF    5.METABOLIC ACIDOSIS; secondary to ckd and Excessive ileostomy drainage   -c/w NaHCO3 2 tabs TID  GI f/u if pt continue to have excessive Ileostomy drainage    6.CKD-MBD: pt has acceptble pth and phos level  -we will d/c calcitriol for now  -suggest to get PTH level/phos and ca level in 1 month  and adjust meds as per results  Keep patient euvolemic and 2g K renal diet  Avoid Nephrotoxic Meds/ Agents such as NSAIDs, Gadolinium contrast, Phosphate containing enemas, etc..)  Adjust Medications according to eGFR  -pt needs reanl f/u with Dr Kirby Angel in 2-3 weeks    7.BACK PAIN/LEFT CVA PAIN: secondary to LS arthritis , less likely Left pyelonephritis  -plan as per medical team

## 2020-01-28 NOTE — PROGRESS NOTE ADULT - SUBJECTIVE AND OBJECTIVE BOX
pt seen and examined.pts current chart reviewed and case discussed with resident covering.    SUBJECTIVE:  Patient seen and examined at bedside, reports feeling okay.     REVIEW OF SYSTEMS:  CONSTITUTIONAL: No weakness, fevers or chills  EYES/ENT: No visual changes;  No vertigo or throat pain   NECK: No pain or stiffness  RESPIRATORY: No cough, wheezing, hemoptysis; No shortness of breath  CARDIOVASCULAR: No chest pain or palpitations  GASTROINTESTINAL: No abdominal or epigastric pain. No nausea, vomiting, or hematemesis; No diarrhea or constipation. No melena or hematochezia.  GENITOURINARY: No dysuria, frequency , hematuria, flank pain or nocturia  NEUROLOGICAL: No numbness or weakness  SKIN: No itching, burning, rashes, or lesions   All other review of systems is negative unless indicated above    Current meds:    acetaminophen   Tablet .. 650 milliGRAM(s) Oral every 6 hours PRN  ARIPiprazole 15 milliGRAM(s) Oral daily  cefTRIAXone   IVPB 1000 milliGRAM(s) IV Intermittent every 24 hours  ferrous    sulfate 325 milliGRAM(s) Oral daily  finasteride 5 milliGRAM(s) Oral daily  gabapentin 600 milliGRAM(s) Oral every 12 hours  heparin  Injectable 5000 Unit(s) SubCutaneous every 12 hours  HYDROmorphone  Injectable 2 milliGRAM(s) IV Push every 6 hours PRN  lamoTRIgine 200 milliGRAM(s) Oral daily  lidocaine   Patch 1 Patch Transdermal daily  loperamide 2 milliGRAM(s) Oral two times a day PRN  pantoprazole    Tablet 40 milliGRAM(s) Oral before breakfast  sodium bicarbonate 1300 milliGRAM(s) Oral three times a day  sodium chloride 0.9%. 1000 milliLiter(s) IV Continuous <Continuous>  sodium zirconium cyclosilicate 10 Gram(s) Oral three times a day  tamsulosin 0.4 milliGRAM(s) Oral at bedtime  traZODone 25 milliGRAM(s) Oral at bedtime      Vital Signs    T(F): 97.6 (01-28-20 @ 04:57), Max: 98.4 (01-28-20 @ 02:06)  HR: 63 (01-28-20 @ 04:57) (63 - 69)  BP: 113/68 (01-28-20 @ 04:57) (106/66 - 116/66)  ABP: --  RR: 17 (01-28-20 @ 04:57) (17 - 18)  SpO2: 96% (01-28-20 @ 04:57) (94% - 99%)  Wt(kg): --  CVP(cm H2O): --  CO: --  PCWP: --    I and O's:    01-26 @ 07:01  -  01-27 @ 07:00  --------------------------------------------------------  IN:    dextrose 5% + sodium chloride 0.45%.: 120 mL    Oral Fluid: 1123 mL  Total IN: 1243 mL    OUT:    Colostomy: 200 mL    Voided: 1675 mL  Total OUT: 1875 mL    Total NET: -632 mL      01-27 @ 07:01  -  01-28 @ 07:00  --------------------------------------------------------  IN:    Oral Fluid: 240 mL  Total IN: 240 mL    OUT:    Colostomy: 800 mL    Voided: 300 mL  Total OUT: 1100 mL    Total NET: -860 mL        Daily     Daily     PHYSICAL EXAM:  Constitutional: WDWN male in NAD  Respiratory: CTA B/L  Cardiovascular: RRR, S1S2nL  Extremities: No ECC        LABS:    CBC:                          9.1    6.32  )-----------( 211      ( 28 Jan 2020 07:31 )             29.0           BMP:    01-28    136  |  113<H>  |  36<H>  ----------------------------<  101<H>  4.9   |  17<L>  |  2.59<H>  01-27    141  |  118<H>  |  35<H>  ----------------------------<  93  5.9<H>   |  18<L>  |  2.45<H>  01-26    145  |  123<H>  |  39<H>  ----------------------------<  90  5.3   |  16<L>  |  2.58<H>  01-26    147<H>  |  125<H>  |  41<H>  ----------------------------<  87  5.3   |  16<L>  |  2.65<H>  01-26    143  |  123<H>  |  46<H>  ----------------------------<  99  5.6<H>   |  16<L>  |  2.63<H>  01-25    144  |  123<H>  |  51<H>  ----------------------------<  132<H>  5.5<H>   |  14<L>  |  2.89<H>    Ca    8.7      28 Jan 2020 07:31  Ca    8.7      27 Jan 2020 16:56  Ca    8.6      26 Jan 2020 14:07  Ca    8.4      26 Jan 2020 12:41  Ca    8.4      26 Jan 2020 03:59  Ca    8.8      25 Jan 2020 18:46  Phos  3.2     01-28  Phos  2.3     01-26  Phos  2.9     01-26  Mg     1.8     01-28  Mg     1.5     01-26  Mg     1.6     01-26            URINE STUDIES:        Potassium, Random Urine: 20 mmol/L (01-27 @ 03:50)  Chloride, Random Urine: 63 mmol/L (01-27 @ 03:50)  Sodium, Random Urine: 47 mmol/L (01-27 @ 03:50)  Osmolality, Random Urine: 402 mos/kg (01-27 @ 03:50)  Creatinine, Random Urine: 102 mg/dL (01-27 @ 03:50)  Sodium, Random Urine: 41 mmol/L (01-23 @ 23:35)  Creatinine, Random Urine: 93 mg/dL (01-23 @ 23:35)  Osmolality, Random Urine: 380 mos/kg (01-23 @ 23:35)                  RADIOLOGY & ADDITIONAL STUDIES: pt seen and examined.    SUBJECTIVE:  Patient seen and examined at bedside, reports feeling okay.     REVIEW OF SYSTEMS:  CONSTITUTIONAL: No weakness, fevers or chills  RESPIRATORY: No cough, wheezing, hemoptysis; No shortness of breath  CARDIOVASCULAR: No chest pain or palpitations  GASTROINTESTINAL: No abdominal or epigastric pain. No nausea, vomiting, or hematemesis; No diarrhea or constipation. No melena or hematochezia.  GENITOURINARY: No dysuria, frequency , hematuria, flank pain or nocturia  NEUROLOGICAL: No numbness or weakness  SKIN: No itching, burning, rashes, or lesions   All other review of systems is negative unless indicated above    Current meds:    acetaminophen   Tablet .. 650 milliGRAM(s) Oral every 6 hours PRN  ARIPiprazole 15 milliGRAM(s) Oral daily  cefTRIAXone   IVPB 1000 milliGRAM(s) IV Intermittent every 24 hours  ferrous    sulfate 325 milliGRAM(s) Oral daily  finasteride 5 milliGRAM(s) Oral daily  gabapentin 600 milliGRAM(s) Oral every 12 hours  heparin  Injectable 5000 Unit(s) SubCutaneous every 12 hours  HYDROmorphone  Injectable 2 milliGRAM(s) IV Push every 6 hours PRN  lamoTRIgine 200 milliGRAM(s) Oral daily  lidocaine   Patch 1 Patch Transdermal daily  loperamide 2 milliGRAM(s) Oral two times a day PRN  pantoprazole    Tablet 40 milliGRAM(s) Oral before breakfast  sodium bicarbonate 1300 milliGRAM(s) Oral three times a day  sodium chloride 0.9%. 1000 milliLiter(s) IV Continuous <Continuous>  sodium zirconium cyclosilicate 10 Gram(s) Oral three times a day  tamsulosin 0.4 milliGRAM(s) Oral at bedtime  traZODone 25 milliGRAM(s) Oral at bedtime      Vital Signs    T(F): 97.6 (01-28-20 @ 04:57), Max: 98.4 (01-28-20 @ 02:06)  HR: 63 (01-28-20 @ 04:57) (63 - 69)  BP: 113/68 (01-28-20 @ 04:57) (106/66 - 116/66)  ABP: --  RR: 17 (01-28-20 @ 04:57) (17 - 18)  SpO2: 96% (01-28-20 @ 04:57) (94% - 99%)  Wt(kg): --  CVP(cm H2O): --  CO: --  PCWP: --    I and O's:    01-26 @ 07:01  -  01-27 @ 07:00  --------------------------------------------------------  IN:    dextrose 5% + sodium chloride 0.45%.: 120 mL    Oral Fluid: 1123 mL  Total IN: 1243 mL    OUT:    Colostomy: 200 mL    Voided: 1675 mL  Total OUT: 1875 mL    Total NET: -632 mL      01-27 @ 07:01  -  01-28 @ 07:00  --------------------------------------------------------  IN:    Oral Fluid: 240 mL  Total IN: 240 mL    OUT:    Colostomy: 800 mL    Voided: 300 mL  Total OUT: 1100 mL    Total NET: -860 mL        Daily     Daily     PHYSICAL EXAM:  Constitutional: WDWN male in NAD  Respiratory: CTA B/L  Cardiovascular: RRR, S1S2nL  Abdomen; soft, non distended , non tender and normal bs heard  +colostomy with liquid stool noted  Extremities: No ECC        LABS:    CBC:                          9.1    6.32  )-----------( 211      ( 28 Jan 2020 07:31 )             29.0           BMP:    01-28    136  |  113<H>  |  36<H>  ----------------------------<  101<H>  4.9   |  17<L>  |  2.59<H>  01-27    141  |  118<H>  |  35<H>  ----------------------------<  93  5.9<H>   |  18<L>  |  2.45<H>  01-26    145  |  123<H>  |  39<H>  ----------------------------<  90  5.3   |  16<L>  |  2.58<H>  01-26    147<H>  |  125<H>  |  41<H>  ----------------------------<  87  5.3   |  16<L>  |  2.65<H>  01-26    143  |  123<H>  |  46<H>  ----------------------------<  99  5.6<H>   |  16<L>  |  2.63<H>  01-25    144  |  123<H>  |  51<H>  ----------------------------<  132<H>  5.5<H>   |  14<L>  |  2.89<H>    Ca    8.7      28 Jan 2020 07:31  Ca    8.7      27 Jan 2020 16:56  Ca    8.6      26 Jan 2020 14:07  Ca    8.4      26 Jan 2020 12:41  Ca    8.4      26 Jan 2020 03:59  Ca    8.8      25 Jan 2020 18:46  Phos  3.2     01-28  Phos  2.3     01-26  Phos  2.9     01-26  Mg     1.8     01-28  Mg     1.5     01-26  Mg     1.6     01-26            URINE STUDIES:        Potassium, Random Urine: 20 mmol/L (01-27 @ 03:50)  Chloride, Random Urine: 63 mmol/L (01-27 @ 03:50)  Sodium, Random Urine: 47 mmol/L (01-27 @ 03:50)  Osmolality, Random Urine: 402 mos/kg (01-27 @ 03:50)  Creatinine, Random Urine: 102 mg/dL (01-27 @ 03:50)  Sodium, Random Urine: 41 mmol/L (01-23 @ 23:35)  Creatinine, Random Urine: 93 mg/dL (01-23 @ 23:35)  Osmolality, Random Urine: 380 mos/kg (01-23 @ 23:35)

## 2020-01-28 NOTE — DISCHARGE NOTE NURSING/CASE MANAGEMENT/SOCIAL WORK - PATIENT PORTAL LINK FT
You can access the FollowMyHealth Patient Portal offered by [facility] by registering at the following website: [email or web address]. By joining [facility]’s FollowMyHealth portal, you will also be able to view your health information using other applications (apps) compatible with our system.

## 2020-01-28 NOTE — PROGRESS NOTE ADULT - ATTENDING COMMENTS
Pain management consultation, appreciated.     Will increase dose of gabapentin 600 mg bid and add lidocaine patch.     PAWAN/CKD, improved.  Ileostomy loss may be contributing to PAWAN.  Advised patient to try to replace ileostomy losses with oral intake.

## 2020-01-28 NOTE — CONSULT NOTE ADULT - PROBLEM SELECTOR RECOMMENDATION 9
- gabapentin 600mg po q 12 hours - increased dose  - ct spine - no acute findings. Pt states that   - lidocaine patch daily  - dc iv hydromorphone  - acetaminophen 650mgpo q 6 hours prn moderate pain  - lidocaine patch daily  - hold stool softeners  - follow up with dr. estrada - [phone].  (pain mgt)

## 2020-01-28 NOTE — PROGRESS NOTE ADULT - SUBJECTIVE AND OBJECTIVE BOX
Patient is a 69y old  Male who presents with a chief complaint of PAWAN on CKD, back pain (27 Jan 2020 17:16)      INTERVAL HPI/OVERNIGHT EVENTS:      REVIEW OF SYSTEMS:  CONSTITUTIONAL: No fever, weight loss, or fatigue  EYES: No eye pain, visual disturbances, or discharge  ENMT:  No difficulty hearing, tinnitus, vertigo; No sinus or throat pain  NECK: No pain or stiffness  BREASTS: No pain, masses, or nipple discharge  RESPIRATORY: No cough, wheezing, chills or hemoptysis; No shortness of breath  CARDIOVASCULAR: No chest pain, palpitations, dizziness, or leg swelling  GASTROINTESTINAL: No abdominal or epigastric pain. No nausea, vomiting, or hematemesis; No diarrhea or constipation. No melena or hematochezia.  GENITOURINARY: No dysuria, frequency, hematuria, or incontinence  NEUROLOGICAL: No headaches, memory loss, loss of strength, numbness, or tremors  SKIN: No itching, burning, rashes, or lesions   LYMPH NODES: No enlarged glands  ENDOCRINE: No heat or cold intolerance; No hair loss  MUSCULOSKELETAL: No joint pain or swelling; No muscle, back, or extremity pain  HEME/LYMPH: No easy bruising, or bleeding gums  ALLERY AND IMMUNOLOGIC: No hives or eczema    FAMILY HISTORY:    Vital Signs Last 24 Hrs  T(C): 36.4 (28 Jan 2020 04:57), Max: 36.9 (28 Jan 2020 02:06)  T(F): 97.6 (28 Jan 2020 04:57), Max: 98.4 (28 Jan 2020 02:06)  HR: 63 (28 Jan 2020 04:57) (63 - 69)  BP: 113/68 (28 Jan 2020 04:57) (106/66 - 116/66)  BP(mean): --  RR: 17 (28 Jan 2020 04:57) (17 - 18)  SpO2: 96% (28 Jan 2020 04:57) (94% - 99%)    01-26-20 @ 07:01  -  01-27-20 @ 07:00  --------------------------------------------------------  IN: 1243 mL / OUT: 1875 mL / NET: -632 mL    01-27-20 @ 07:01  - 01-28-20 @ 05:50  --------------------------------------------------------  IN: 240 mL / OUT: 1100 mL / NET: -860 mL        PHYSICAL EXAM:  GENERAL: NAD, well-groomed, well-developed  HEAD:  Atraumatic, Normocephalic  EYES: EOMI, PERRLA, conjunctiva and sclera clear  ENMT: No tonsillar erythema, exudates, or enlargement; Moist mucous membranes, Good dentition, No lesions  NECK: Supple, No JVD, Normal thyroid  NERVOUS SYSTEM:  Alert & Oriented X3, Good concentration; Motor Strength 5/5 B/L upper and lower extremities; DTRs 2+ intact and symmetric  CHEST/LUNG: Clear to percussion bilaterally; No rales, rhonchi, wheezing, or rubs  HEART: Regular rate and rhythm; No murmurs, rubs, or gallops  ABDOMEN: Soft, Nontender, Nondistended; Bowel sounds present  EXTREMITIES:  2+ Peripheral Pulses, No clubbing, cyanosis, or edema  LYMPH: No lymphadenopathy noted  SKIN: No rashes or lesions    Consultant(s) Notes Reviewed:  [x ] YES  [ ] NO  Care Discussed with Consultants/Other Providers [ x] YES  [ ] NO    LABS:        RADIOLOGY & ADDITIONAL TESTS:    Imaging Personally Reviewed:  [ ] YES  [ ] NO  acetaminophen   Tablet .. 650 milliGRAM(s) Oral every 6 hours PRN  ARIPiprazole 15 milliGRAM(s) Oral daily  cefTRIAXone   IVPB 1000 milliGRAM(s) IV Intermittent every 24 hours  dextrose 5% + sodium chloride 0.45%. 1000 milliLiter(s) IV Continuous <Continuous>  ferrous    sulfate 325 milliGRAM(s) Oral daily  finasteride 5 milliGRAM(s) Oral daily  gabapentin 200 milliGRAM(s) Oral three times a day  heparin  Injectable 5000 Unit(s) SubCutaneous every 12 hours  HYDROmorphone  Injectable 0.5 milliGRAM(s) IV Push every 6 hours PRN  lamoTRIgine 200 milliGRAM(s) Oral daily  loperamide 2 milliGRAM(s) Oral two times a day PRN  pantoprazole    Tablet 40 milliGRAM(s) Oral before breakfast  sodium bicarbonate 1300 milliGRAM(s) Oral three times a day  sodium zirconium cyclosilicate 10 Gram(s) Oral three times a day  tamsulosin 0.4 milliGRAM(s) Oral at bedtime  traZODone 25 milliGRAM(s) Oral at bedtime      HEALTH ISSUES - PROBLEM Dx:  Stage 3 chronic kidney disease: Stage 3 chronic kidney disease  Ileostomy in place: Ileostomy in place  UTI (urinary tract infection): UTI (urinary tract infection)  Goals of care, counseling/discussion: Goals of care, counseling/discussion  DVT prophylaxis: DVT prophylaxis  Anemia: Anemia  HTN (hypertension): HTN (hypertension)  Back pain: Back pain  Hyperkalemia: Hyperkalemia  Metabolic acidosis: Metabolic acidosis  PAWAN (acute kidney injury): PAWAN (acute kidney injury)

## 2020-02-04 ENCOUNTER — INPATIENT (INPATIENT)
Facility: HOSPITAL | Age: 70
LOS: 6 days | Discharge: NURSING, REHAB OR LONG-TERM CARE | DRG: 640 | End: 2020-02-11
Attending: INTERNAL MEDICINE | Admitting: INTERNAL MEDICINE
Payer: MEDICARE

## 2020-02-04 VITALS
OXYGEN SATURATION: 96 % | HEART RATE: 83 BPM | HEIGHT: 67 IN | DIASTOLIC BLOOD PRESSURE: 59 MMHG | RESPIRATION RATE: 18 BRPM | TEMPERATURE: 98 F | WEIGHT: 201.06 LBS | SYSTOLIC BLOOD PRESSURE: 113 MMHG

## 2020-02-04 DIAGNOSIS — Z93.9 ARTIFICIAL OPENING STATUS, UNSPECIFIED: Chronic | ICD-10-CM

## 2020-02-04 LAB
ALBUMIN SERPL ELPH-MCNC: 3.8 G/DL — SIGNIFICANT CHANGE UP (ref 3.5–5)
ALP SERPL-CCNC: 127 U/L — HIGH (ref 40–120)
ALT FLD-CCNC: 25 U/L DA — SIGNIFICANT CHANGE UP (ref 10–60)
ANION GAP SERPL CALC-SCNC: 11 MMOL/L — SIGNIFICANT CHANGE UP (ref 5–17)
AST SERPL-CCNC: 20 U/L — SIGNIFICANT CHANGE UP (ref 10–40)
BASOPHILS # BLD AUTO: 0.01 K/UL — SIGNIFICANT CHANGE UP (ref 0–0.2)
BASOPHILS NFR BLD AUTO: 0.1 % — SIGNIFICANT CHANGE UP (ref 0–2)
BILIRUB SERPL-MCNC: 0.3 MG/DL — SIGNIFICANT CHANGE UP (ref 0.2–1.2)
BUN SERPL-MCNC: 90 MG/DL — HIGH (ref 7–18)
CALCIUM SERPL-MCNC: 8.5 MG/DL — SIGNIFICANT CHANGE UP (ref 8.4–10.5)
CHLORIDE SERPL-SCNC: 107 MMOL/L — SIGNIFICANT CHANGE UP (ref 96–108)
CO2 SERPL-SCNC: 11 MMOL/L — LOW (ref 22–31)
CREAT SERPL-MCNC: 8.42 MG/DL — HIGH (ref 0.5–1.3)
EOSINOPHIL # BLD AUTO: 0.18 K/UL — SIGNIFICANT CHANGE UP (ref 0–0.5)
EOSINOPHIL NFR BLD AUTO: 1.8 % — SIGNIFICANT CHANGE UP (ref 0–6)
GLUCOSE SERPL-MCNC: 116 MG/DL — HIGH (ref 70–99)
HCT VFR BLD CALC: 30.3 % — LOW (ref 39–50)
HGB BLD-MCNC: 9.6 G/DL — LOW (ref 13–17)
IMM GRANULOCYTES NFR BLD AUTO: 0.4 % — SIGNIFICANT CHANGE UP (ref 0–1.5)
LYMPHOCYTES # BLD AUTO: 0.88 K/UL — LOW (ref 1–3.3)
LYMPHOCYTES # BLD AUTO: 8.6 % — LOW (ref 13–44)
MCHC RBC-ENTMCNC: 27.5 PG — SIGNIFICANT CHANGE UP (ref 27–34)
MCHC RBC-ENTMCNC: 31.7 GM/DL — LOW (ref 32–36)
MCV RBC AUTO: 86.8 FL — SIGNIFICANT CHANGE UP (ref 80–100)
MONOCYTES # BLD AUTO: 1.02 K/UL — HIGH (ref 0–0.9)
MONOCYTES NFR BLD AUTO: 9.9 % — SIGNIFICANT CHANGE UP (ref 2–14)
NEUTROPHILS # BLD AUTO: 8.15 K/UL — HIGH (ref 1.8–7.4)
NEUTROPHILS NFR BLD AUTO: 79.2 % — HIGH (ref 43–77)
NRBC # BLD: 0 /100 WBCS — SIGNIFICANT CHANGE UP (ref 0–0)
PLATELET # BLD AUTO: 134 K/UL — LOW (ref 150–400)
POTASSIUM SERPL-MCNC: 7.9 MMOL/L — CRITICAL HIGH (ref 3.5–5.3)
POTASSIUM SERPL-SCNC: 7.9 MMOL/L — CRITICAL HIGH (ref 3.5–5.3)
PROT SERPL-MCNC: 8.7 G/DL — HIGH (ref 6–8.3)
RBC # BLD: 3.49 M/UL — LOW (ref 4.2–5.8)
RBC # FLD: 16.3 % — HIGH (ref 10.3–14.5)
SODIUM SERPL-SCNC: 129 MMOL/L — LOW (ref 135–145)
WBC # BLD: 10.28 K/UL — SIGNIFICANT CHANGE UP (ref 3.8–10.5)
WBC # FLD AUTO: 10.28 K/UL — SIGNIFICANT CHANGE UP (ref 3.8–10.5)

## 2020-02-04 RX ORDER — HYDROMORPHONE HYDROCHLORIDE 2 MG/ML
2 INJECTION INTRAMUSCULAR; INTRAVENOUS; SUBCUTANEOUS ONCE
Refills: 0 | Status: DISCONTINUED | OUTPATIENT
Start: 2020-02-04 | End: 2020-02-04

## 2020-02-04 RX ORDER — LIDOCAINE 4 G/100G
1 CREAM TOPICAL ONCE
Refills: 0 | Status: COMPLETED | OUTPATIENT
Start: 2020-02-04 | End: 2020-02-04

## 2020-02-04 RX ORDER — GABAPENTIN 400 MG/1
200 CAPSULE ORAL ONCE
Refills: 0 | Status: COMPLETED | OUTPATIENT
Start: 2020-02-04 | End: 2020-02-04

## 2020-02-04 RX ADMIN — GABAPENTIN 200 MILLIGRAM(S): 400 CAPSULE ORAL at 21:59

## 2020-02-04 RX ADMIN — LIDOCAINE 1 PATCH: 4 CREAM TOPICAL at 20:15

## 2020-02-04 RX ADMIN — HYDROMORPHONE HYDROCHLORIDE 2 MILLIGRAM(S): 2 INJECTION INTRAMUSCULAR; INTRAVENOUS; SUBCUTANEOUS at 20:16

## 2020-02-04 RX ADMIN — HYDROMORPHONE HYDROCHLORIDE 2 MILLIGRAM(S): 2 INJECTION INTRAMUSCULAR; INTRAVENOUS; SUBCUTANEOUS at 20:14

## 2020-02-04 NOTE — ED PROVIDER NOTE - OBJECTIVE STATEMENT
70 y/o M pt with a PMHx of Chronic pain (on Dilaudid), Anemia, Bipolar disorder, BPH, CKD, COPD, HLD, HTN, and Prostate Cancer and a PSHx of an ostomy placement presents to ED c/o atraumatic back and hip pain. Pt states he normally ambulates w/a walker and says he was walking around today when he exacerbation pain. Pt was given meds in living facility w/no improvement. Pt denies trauma, or any other acute complaints. NKDA.

## 2020-02-04 NOTE — ED PROVIDER NOTE - PROGRESS NOTE DETAILS
Aaron LOJA: Received sign out from Dr. Christiansen -repeat BMP K 7.6 Cr 8.67. EKG junctional with wide complexes ant lateral leads. trop neg. Pt is chest pain free. Hyperkalemia correction medications administered. Renal Dr. Bolanos endorsed and will arrange HD. ICU team in attendance and will accept pt.

## 2020-02-05 DIAGNOSIS — E87.5 HYPERKALEMIA: ICD-10-CM

## 2020-02-05 LAB
ALBUMIN SERPL ELPH-MCNC: 3 G/DL — LOW (ref 3.5–5)
ALBUMIN SERPL ELPH-MCNC: 3.2 G/DL — LOW (ref 3.5–5)
ALBUMIN SERPL ELPH-MCNC: 3.5 G/DL — SIGNIFICANT CHANGE UP (ref 3.5–5)
ALP SERPL-CCNC: 102 U/L — SIGNIFICANT CHANGE UP (ref 40–120)
ALP SERPL-CCNC: 116 U/L — SIGNIFICANT CHANGE UP (ref 40–120)
ALP SERPL-CCNC: 95 U/L — SIGNIFICANT CHANGE UP (ref 40–120)
ALT FLD-CCNC: 20 U/L DA — SIGNIFICANT CHANGE UP (ref 10–60)
ALT FLD-CCNC: 23 U/L DA — SIGNIFICANT CHANGE UP (ref 10–60)
ALT FLD-CCNC: 23 U/L DA — SIGNIFICANT CHANGE UP (ref 10–60)
ANION GAP SERPL CALC-SCNC: 10 MMOL/L — SIGNIFICANT CHANGE UP (ref 5–17)
ANION GAP SERPL CALC-SCNC: 12 MMOL/L — SIGNIFICANT CHANGE UP (ref 5–17)
ANION GAP SERPL CALC-SCNC: 12 MMOL/L — SIGNIFICANT CHANGE UP (ref 5–17)
ANION GAP SERPL CALC-SCNC: 6 MMOL/L — SIGNIFICANT CHANGE UP (ref 5–17)
APPEARANCE UR: CLEAR — SIGNIFICANT CHANGE UP
APTT BLD: 21.1 SEC — LOW (ref 27.5–36.3)
APTT BLD: >200 SEC — CRITICAL HIGH (ref 27.5–36.3)
AST SERPL-CCNC: 19 U/L — SIGNIFICANT CHANGE UP (ref 10–40)
AST SERPL-CCNC: 30 U/L — SIGNIFICANT CHANGE UP (ref 10–40)
AST SERPL-CCNC: 40 U/L — SIGNIFICANT CHANGE UP (ref 10–40)
BACTERIA # UR AUTO: ABNORMAL /HPF
BASOPHILS # BLD AUTO: 0.02 K/UL — SIGNIFICANT CHANGE UP (ref 0–0.2)
BASOPHILS NFR BLD AUTO: 0.2 % — SIGNIFICANT CHANGE UP (ref 0–2)
BILIRUB SERPL-MCNC: 0.4 MG/DL — SIGNIFICANT CHANGE UP (ref 0.2–1.2)
BILIRUB SERPL-MCNC: 0.5 MG/DL — SIGNIFICANT CHANGE UP (ref 0.2–1.2)
BILIRUB SERPL-MCNC: 0.6 MG/DL — SIGNIFICANT CHANGE UP (ref 0.2–1.2)
BILIRUB UR-MCNC: NEGATIVE — SIGNIFICANT CHANGE UP
BUN SERPL-MCNC: 48 MG/DL — HIGH (ref 7–18)
BUN SERPL-MCNC: 51 MG/DL — HIGH (ref 7–18)
BUN SERPL-MCNC: 86 MG/DL — HIGH (ref 7–18)
BUN SERPL-MCNC: 92 MG/DL — HIGH (ref 7–18)
CALCIUM SERPL-MCNC: 7.7 MG/DL — LOW (ref 8.4–10.5)
CALCIUM SERPL-MCNC: 8.2 MG/DL — LOW (ref 8.4–10.5)
CALCIUM SERPL-MCNC: 8.4 MG/DL — SIGNIFICANT CHANGE UP (ref 8.4–10.5)
CALCIUM SERPL-MCNC: 8.4 MG/DL — SIGNIFICANT CHANGE UP (ref 8.4–10.5)
CHLORIDE SERPL-SCNC: 106 MMOL/L — SIGNIFICANT CHANGE UP (ref 96–108)
CHLORIDE SERPL-SCNC: 107 MMOL/L — SIGNIFICANT CHANGE UP (ref 96–108)
CHLORIDE SERPL-SCNC: 108 MMOL/L — SIGNIFICANT CHANGE UP (ref 96–108)
CHLORIDE SERPL-SCNC: 110 MMOL/L — HIGH (ref 96–108)
CHLORIDE UR-SCNC: 33 MMOL/L — SIGNIFICANT CHANGE UP
CK MB CFR SERPL CALC: 6.3 NG/ML — HIGH (ref 0–3.6)
CO2 SERPL-SCNC: 12 MMOL/L — LOW (ref 22–31)
CO2 SERPL-SCNC: 12 MMOL/L — LOW (ref 22–31)
CO2 SERPL-SCNC: 21 MMOL/L — LOW (ref 22–31)
CO2 SERPL-SCNC: 23 MMOL/L — SIGNIFICANT CHANGE UP (ref 22–31)
COLOR SPEC: YELLOW — SIGNIFICANT CHANGE UP
CREAT ?TM UR-MCNC: 138 MG/DL — SIGNIFICANT CHANGE UP
CREAT SERPL-MCNC: 4.76 MG/DL — HIGH (ref 0.5–1.3)
CREAT SERPL-MCNC: 5.07 MG/DL — HIGH (ref 0.5–1.3)
CREAT SERPL-MCNC: 8.67 MG/DL — HIGH (ref 0.5–1.3)
CREAT SERPL-MCNC: 8.76 MG/DL — HIGH (ref 0.5–1.3)
DIFF PNL FLD: ABNORMAL
EOSINOPHIL # BLD AUTO: 0.3 K/UL — SIGNIFICANT CHANGE UP (ref 0–0.5)
EOSINOPHIL NFR BLD AUTO: 3.1 % — SIGNIFICANT CHANGE UP (ref 0–6)
EPI CELLS # UR: SIGNIFICANT CHANGE UP /HPF
GLUCOSE SERPL-MCNC: 110 MG/DL — HIGH (ref 70–99)
GLUCOSE SERPL-MCNC: 129 MG/DL — HIGH (ref 70–99)
GLUCOSE SERPL-MCNC: 81 MG/DL — SIGNIFICANT CHANGE UP (ref 70–99)
GLUCOSE SERPL-MCNC: 93 MG/DL — SIGNIFICANT CHANGE UP (ref 70–99)
GLUCOSE UR QL: NEGATIVE — SIGNIFICANT CHANGE UP
HAV IGM SER-ACNC: SIGNIFICANT CHANGE UP
HBV CORE IGM SER-ACNC: REACTIVE
HBV SURFACE AG SER-ACNC: SIGNIFICANT CHANGE UP
HCT VFR BLD CALC: 24.3 % — LOW (ref 39–50)
HCT VFR BLD CALC: 26.8 % — LOW (ref 39–50)
HCV AB S/CO SERPL IA: 11.27 S/CO — HIGH (ref 0–0.99)
HCV AB SERPL-IMP: REACTIVE
HGB BLD-MCNC: 8.1 G/DL — LOW (ref 13–17)
HGB BLD-MCNC: 8.5 G/DL — LOW (ref 13–17)
HYALINE CASTS # UR AUTO: ABNORMAL /LPF
IMM GRANULOCYTES NFR BLD AUTO: 0.6 % — SIGNIFICANT CHANGE UP (ref 0–1.5)
INR BLD: 1.08 RATIO — SIGNIFICANT CHANGE UP (ref 0.88–1.16)
INR BLD: 1.12 RATIO — SIGNIFICANT CHANGE UP (ref 0.88–1.16)
KETONES UR-MCNC: NEGATIVE — SIGNIFICANT CHANGE UP
LEUKOCYTE ESTERASE UR-ACNC: ABNORMAL
LYMPHOCYTES # BLD AUTO: 1.13 K/UL — SIGNIFICANT CHANGE UP (ref 1–3.3)
LYMPHOCYTES # BLD AUTO: 11.6 % — LOW (ref 13–44)
MAGNESIUM SERPL-MCNC: 1.6 MG/DL — SIGNIFICANT CHANGE UP (ref 1.6–2.6)
MAGNESIUM SERPL-MCNC: 1.6 MG/DL — SIGNIFICANT CHANGE UP (ref 1.6–2.6)
MAGNESIUM SERPL-MCNC: 1.7 MG/DL — SIGNIFICANT CHANGE UP (ref 1.6–2.6)
MAGNESIUM SERPL-MCNC: 1.7 MG/DL — SIGNIFICANT CHANGE UP (ref 1.6–2.6)
MCHC RBC-ENTMCNC: 27.6 PG — SIGNIFICANT CHANGE UP (ref 27–34)
MCHC RBC-ENTMCNC: 27.9 PG — SIGNIFICANT CHANGE UP (ref 27–34)
MCHC RBC-ENTMCNC: 31.7 GM/DL — LOW (ref 32–36)
MCHC RBC-ENTMCNC: 33.3 GM/DL — SIGNIFICANT CHANGE UP (ref 32–36)
MCV RBC AUTO: 83.8 FL — SIGNIFICANT CHANGE UP (ref 80–100)
MCV RBC AUTO: 87 FL — SIGNIFICANT CHANGE UP (ref 80–100)
MONOCYTES # BLD AUTO: 1.35 K/UL — HIGH (ref 0–0.9)
MONOCYTES NFR BLD AUTO: 13.8 % — SIGNIFICANT CHANGE UP (ref 2–14)
MRSA PCR RESULT.: SIGNIFICANT CHANGE UP
NEUTROPHILS # BLD AUTO: 6.89 K/UL — SIGNIFICANT CHANGE UP (ref 1.8–7.4)
NEUTROPHILS NFR BLD AUTO: 70.7 % — SIGNIFICANT CHANGE UP (ref 43–77)
NITRITE UR-MCNC: NEGATIVE — SIGNIFICANT CHANGE UP
NRBC # BLD: 0 /100 WBCS — SIGNIFICANT CHANGE UP (ref 0–0)
NRBC # BLD: 0 /100 WBCS — SIGNIFICANT CHANGE UP (ref 0–0)
OSMOLALITY UR: 325 MOS/KG — SIGNIFICANT CHANGE UP (ref 50–1200)
PH UR: 5 — SIGNIFICANT CHANGE UP (ref 5–8)
PHOSPHATE SERPL-MCNC: 3.5 MG/DL — SIGNIFICANT CHANGE UP (ref 2.5–4.5)
PHOSPHATE SERPL-MCNC: 4.1 MG/DL — SIGNIFICANT CHANGE UP (ref 2.5–4.5)
PHOSPHATE SERPL-MCNC: 4.5 MG/DL — SIGNIFICANT CHANGE UP (ref 2.5–4.5)
PHOSPHATE SERPL-MCNC: 5.3 MG/DL — HIGH (ref 2.5–4.5)
PLATELET # BLD AUTO: 168 K/UL — SIGNIFICANT CHANGE UP (ref 150–400)
PLATELET # BLD AUTO: 170 K/UL — SIGNIFICANT CHANGE UP (ref 150–400)
POTASSIUM SERPL-MCNC: 4.6 MMOL/L — SIGNIFICANT CHANGE UP (ref 3.5–5.3)
POTASSIUM SERPL-MCNC: 4.7 MMOL/L — SIGNIFICANT CHANGE UP (ref 3.5–5.3)
POTASSIUM SERPL-MCNC: 6.7 MMOL/L — CRITICAL HIGH (ref 3.5–5.3)
POTASSIUM SERPL-MCNC: 7.5 MMOL/L — CRITICAL HIGH (ref 3.5–5.3)
POTASSIUM SERPL-SCNC: 4.6 MMOL/L — SIGNIFICANT CHANGE UP (ref 3.5–5.3)
POTASSIUM SERPL-SCNC: 4.7 MMOL/L — SIGNIFICANT CHANGE UP (ref 3.5–5.3)
POTASSIUM SERPL-SCNC: 6.7 MMOL/L — CRITICAL HIGH (ref 3.5–5.3)
POTASSIUM SERPL-SCNC: 7.5 MMOL/L — CRITICAL HIGH (ref 3.5–5.3)
POTASSIUM UR-SCNC: 37 MMOL/L — SIGNIFICANT CHANGE UP
PROT SERPL-MCNC: 6.8 G/DL — SIGNIFICANT CHANGE UP (ref 6–8.3)
PROT SERPL-MCNC: 7.3 G/DL — SIGNIFICANT CHANGE UP (ref 6–8.3)
PROT SERPL-MCNC: 7.6 G/DL — SIGNIFICANT CHANGE UP (ref 6–8.3)
PROT UR-MCNC: 30 MG/DL
PROTHROM AB SERPL-ACNC: 12 SEC — SIGNIFICANT CHANGE UP (ref 10–12.9)
PROTHROM AB SERPL-ACNC: 12.5 SEC — SIGNIFICANT CHANGE UP (ref 10–12.9)
RBC # BLD: 2.9 M/UL — LOW (ref 4.2–5.8)
RBC # BLD: 3.08 M/UL — LOW (ref 4.2–5.8)
RBC # FLD: 16.1 % — HIGH (ref 10.3–14.5)
RBC # FLD: 16.3 % — HIGH (ref 10.3–14.5)
RBC CASTS # UR COMP ASSIST: ABNORMAL /HPF (ref 0–2)
S AUREUS DNA NOSE QL NAA+PROBE: SIGNIFICANT CHANGE UP
SODIUM SERPL-SCNC: 130 MMOL/L — LOW (ref 135–145)
SODIUM SERPL-SCNC: 132 MMOL/L — LOW (ref 135–145)
SODIUM SERPL-SCNC: 138 MMOL/L — SIGNIFICANT CHANGE UP (ref 135–145)
SODIUM SERPL-SCNC: 139 MMOL/L — SIGNIFICANT CHANGE UP (ref 135–145)
SODIUM UR-SCNC: 37 MMOL/L — SIGNIFICANT CHANGE UP
SP GR SPEC: 1.01 — SIGNIFICANT CHANGE UP (ref 1.01–1.02)
TROPONIN I SERPL-MCNC: <0.015 NG/ML — SIGNIFICANT CHANGE UP (ref 0–0.04)
UROBILINOGEN FLD QL: NEGATIVE — SIGNIFICANT CHANGE UP
WBC # BLD: 7.22 K/UL — SIGNIFICANT CHANGE UP (ref 3.8–10.5)
WBC # BLD: 9.75 K/UL — SIGNIFICANT CHANGE UP (ref 3.8–10.5)
WBC # FLD AUTO: 7.22 K/UL — SIGNIFICANT CHANGE UP (ref 3.8–10.5)
WBC # FLD AUTO: 9.75 K/UL — SIGNIFICANT CHANGE UP (ref 3.8–10.5)
WBC UR QL: ABNORMAL /HPF (ref 0–5)

## 2020-02-05 PROCEDURE — 71045 X-RAY EXAM CHEST 1 VIEW: CPT | Mod: 26

## 2020-02-05 PROCEDURE — 36800 INSERTION OF CANNULA: CPT

## 2020-02-05 PROCEDURE — 99285 EMERGENCY DEPT VISIT HI MDM: CPT

## 2020-02-05 PROCEDURE — 74176 CT ABD & PELVIS W/O CONTRAST: CPT | Mod: 26

## 2020-02-05 RX ORDER — ATORVASTATIN CALCIUM 80 MG/1
10 TABLET, FILM COATED ORAL AT BEDTIME
Refills: 0 | Status: DISCONTINUED | OUTPATIENT
Start: 2020-02-05 | End: 2020-02-11

## 2020-02-05 RX ORDER — SODIUM CHLORIDE 9 MG/ML
1000 INJECTION INTRAMUSCULAR; INTRAVENOUS; SUBCUTANEOUS
Refills: 0 | Status: DISCONTINUED | OUTPATIENT
Start: 2020-02-05 | End: 2020-02-05

## 2020-02-05 RX ORDER — FINASTERIDE 5 MG/1
5 TABLET, FILM COATED ORAL DAILY
Refills: 0 | Status: DISCONTINUED | OUTPATIENT
Start: 2020-02-05 | End: 2020-02-11

## 2020-02-05 RX ORDER — ARIPIPRAZOLE 15 MG/1
15 TABLET ORAL DAILY
Refills: 0 | Status: DISCONTINUED | OUTPATIENT
Start: 2020-02-05 | End: 2020-02-11

## 2020-02-05 RX ORDER — INSULIN HUMAN 100 [IU]/ML
10 INJECTION, SOLUTION SUBCUTANEOUS ONCE
Refills: 0 | Status: COMPLETED | OUTPATIENT
Start: 2020-02-05 | End: 2020-02-05

## 2020-02-05 RX ORDER — PANTOPRAZOLE SODIUM 20 MG/1
40 TABLET, DELAYED RELEASE ORAL
Refills: 0 | Status: DISCONTINUED | OUTPATIENT
Start: 2020-02-05 | End: 2020-02-11

## 2020-02-05 RX ORDER — TAMSULOSIN HYDROCHLORIDE 0.4 MG/1
0.4 CAPSULE ORAL AT BEDTIME
Refills: 0 | Status: DISCONTINUED | OUTPATIENT
Start: 2020-02-05 | End: 2020-02-05

## 2020-02-05 RX ORDER — SODIUM POLYSTYRENE SULFONATE 4.1 MEQ/G
30 POWDER, FOR SUSPENSION ORAL ONCE
Refills: 0 | Status: DISCONTINUED | OUTPATIENT
Start: 2020-02-05 | End: 2020-02-05

## 2020-02-05 RX ORDER — SODIUM BICARBONATE 1 MEQ/ML
0.16 SYRINGE (ML) INTRAVENOUS
Qty: 150 | Refills: 0 | Status: DISCONTINUED | OUTPATIENT
Start: 2020-02-05 | End: 2020-02-05

## 2020-02-05 RX ORDER — SODIUM CHLORIDE 9 MG/ML
1000 INJECTION INTRAMUSCULAR; INTRAVENOUS; SUBCUTANEOUS ONCE
Refills: 0 | Status: COMPLETED | OUTPATIENT
Start: 2020-02-05 | End: 2020-02-05

## 2020-02-05 RX ORDER — CHLORHEXIDINE GLUCONATE 213 G/1000ML
1 SOLUTION TOPICAL
Refills: 0 | Status: DISCONTINUED | OUTPATIENT
Start: 2020-02-05 | End: 2020-02-11

## 2020-02-05 RX ORDER — ALBUTEROL 90 UG/1
2.5 AEROSOL, METERED ORAL ONCE
Refills: 0 | Status: COMPLETED | OUTPATIENT
Start: 2020-02-05 | End: 2020-02-05

## 2020-02-05 RX ORDER — CALCIUM GLUCONATE 100 MG/ML
1 VIAL (ML) INTRAVENOUS ONCE
Refills: 0 | Status: COMPLETED | OUTPATIENT
Start: 2020-02-05 | End: 2020-02-05

## 2020-02-05 RX ORDER — CEFTRIAXONE 500 MG/1
INJECTION, POWDER, FOR SOLUTION INTRAMUSCULAR; INTRAVENOUS
Refills: 0 | Status: DISCONTINUED | OUTPATIENT
Start: 2020-02-05 | End: 2020-02-10

## 2020-02-05 RX ORDER — HYDROMORPHONE HYDROCHLORIDE 2 MG/ML
0.5 INJECTION INTRAMUSCULAR; INTRAVENOUS; SUBCUTANEOUS ONCE
Refills: 0 | Status: DISCONTINUED | OUTPATIENT
Start: 2020-02-05 | End: 2020-02-05

## 2020-02-05 RX ORDER — INSULIN LISPRO 100/ML
VIAL (ML) SUBCUTANEOUS EVERY 6 HOURS
Refills: 0 | Status: DISCONTINUED | OUTPATIENT
Start: 2020-02-05 | End: 2020-02-06

## 2020-02-05 RX ORDER — GABAPENTIN 400 MG/1
600 CAPSULE ORAL
Refills: 0 | Status: DISCONTINUED | OUTPATIENT
Start: 2020-02-05 | End: 2020-02-11

## 2020-02-05 RX ORDER — CEFTRIAXONE 500 MG/1
1000 INJECTION, POWDER, FOR SOLUTION INTRAMUSCULAR; INTRAVENOUS EVERY 24 HOURS
Refills: 0 | Status: DISCONTINUED | OUTPATIENT
Start: 2020-02-06 | End: 2020-02-10

## 2020-02-05 RX ORDER — GABAPENTIN 400 MG/1
1 CAPSULE ORAL
Qty: 0 | Refills: 0 | DISCHARGE

## 2020-02-05 RX ORDER — SODIUM BICARBONATE 1 MEQ/ML
650 SYRINGE (ML) INTRAVENOUS THREE TIMES A DAY
Refills: 0 | Status: DISCONTINUED | OUTPATIENT
Start: 2020-02-05 | End: 2020-02-06

## 2020-02-05 RX ORDER — CEFTRIAXONE 500 MG/1
1000 INJECTION, POWDER, FOR SOLUTION INTRAMUSCULAR; INTRAVENOUS ONCE
Refills: 0 | Status: COMPLETED | OUTPATIENT
Start: 2020-02-05 | End: 2020-02-05

## 2020-02-05 RX ORDER — LIDOCAINE 4 G/100G
1 CREAM TOPICAL DAILY
Refills: 0 | Status: DISCONTINUED | OUTPATIENT
Start: 2020-02-05 | End: 2020-02-08

## 2020-02-05 RX ORDER — ASPIRIN/CALCIUM CARB/MAGNESIUM 324 MG
325 TABLET ORAL ONCE
Refills: 0 | Status: DISCONTINUED | OUTPATIENT
Start: 2020-02-05 | End: 2020-02-05

## 2020-02-05 RX ORDER — DEXTROSE 50 % IN WATER 50 %
50 SYRINGE (ML) INTRAVENOUS ONCE
Refills: 0 | Status: COMPLETED | OUTPATIENT
Start: 2020-02-05 | End: 2020-02-05

## 2020-02-05 RX ORDER — SODIUM BICARBONATE 1 MEQ/ML
50 SYRINGE (ML) INTRAVENOUS ONCE
Refills: 0 | Status: COMPLETED | OUTPATIENT
Start: 2020-02-05 | End: 2020-02-05

## 2020-02-05 RX ADMIN — Medication 50 MILLILITER(S): at 01:33

## 2020-02-05 RX ADMIN — Medication 650 MILLIGRAM(S): at 09:08

## 2020-02-05 RX ADMIN — Medication 100 GRAM(S): at 01:18

## 2020-02-05 RX ADMIN — ARIPIPRAZOLE 15 MILLIGRAM(S): 15 TABLET ORAL at 12:49

## 2020-02-05 RX ADMIN — HYDROMORPHONE HYDROCHLORIDE 0.5 MILLIGRAM(S): 2 INJECTION INTRAMUSCULAR; INTRAVENOUS; SUBCUTANEOUS at 15:26

## 2020-02-05 RX ADMIN — CEFTRIAXONE 100 MILLIGRAM(S): 500 INJECTION, POWDER, FOR SOLUTION INTRAMUSCULAR; INTRAVENOUS at 09:26

## 2020-02-05 RX ADMIN — SODIUM CHLORIDE 100 MILLILITER(S): 9 INJECTION INTRAMUSCULAR; INTRAVENOUS; SUBCUTANEOUS at 22:02

## 2020-02-05 RX ADMIN — SODIUM CHLORIDE 100 MILLILITER(S): 9 INJECTION INTRAMUSCULAR; INTRAVENOUS; SUBCUTANEOUS at 12:30

## 2020-02-05 RX ADMIN — LIDOCAINE 1 PATCH: 4 CREAM TOPICAL at 23:37

## 2020-02-05 RX ADMIN — ATORVASTATIN CALCIUM 10 MILLIGRAM(S): 80 TABLET, FILM COATED ORAL at 22:02

## 2020-02-05 RX ADMIN — HYDROMORPHONE HYDROCHLORIDE 0.5 MILLIGRAM(S): 2 INJECTION INTRAMUSCULAR; INTRAVENOUS; SUBCUTANEOUS at 15:24

## 2020-02-05 RX ADMIN — Medication 650 MILLIGRAM(S): at 22:02

## 2020-02-05 RX ADMIN — Medication 650 MILLIGRAM(S): at 15:25

## 2020-02-05 RX ADMIN — ALBUTEROL 2.5 MILLIGRAM(S): 90 AEROSOL, METERED ORAL at 01:19

## 2020-02-05 RX ADMIN — PANTOPRAZOLE SODIUM 40 MILLIGRAM(S): 20 TABLET, DELAYED RELEASE ORAL at 09:10

## 2020-02-05 RX ADMIN — SODIUM CHLORIDE 150 MILLILITER(S): 9 INJECTION INTRAMUSCULAR; INTRAVENOUS; SUBCUTANEOUS at 02:56

## 2020-02-05 RX ADMIN — FINASTERIDE 5 MILLIGRAM(S): 5 TABLET, FILM COATED ORAL at 12:48

## 2020-02-05 RX ADMIN — LIDOCAINE 1 PATCH: 4 CREAM TOPICAL at 21:03

## 2020-02-05 RX ADMIN — LIDOCAINE 1 PATCH: 4 CREAM TOPICAL at 08:04

## 2020-02-05 RX ADMIN — Medication 1 GRAM(S): at 01:21

## 2020-02-05 RX ADMIN — LIDOCAINE 1 PATCH: 4 CREAM TOPICAL at 12:46

## 2020-02-05 RX ADMIN — GABAPENTIN 600 MILLIGRAM(S): 400 CAPSULE ORAL at 09:10

## 2020-02-05 RX ADMIN — CHLORHEXIDINE GLUCONATE 1 APPLICATION(S): 213 SOLUTION TOPICAL at 06:11

## 2020-02-05 RX ADMIN — SODIUM CHLORIDE 3000 MILLILITER(S): 9 INJECTION INTRAMUSCULAR; INTRAVENOUS; SUBCUTANEOUS at 01:21

## 2020-02-05 RX ADMIN — GABAPENTIN 600 MILLIGRAM(S): 400 CAPSULE ORAL at 17:28

## 2020-02-05 RX ADMIN — LIDOCAINE 1 PATCH: 4 CREAM TOPICAL at 09:03

## 2020-02-05 RX ADMIN — INSULIN HUMAN 10 UNIT(S): 100 INJECTION, SOLUTION SUBCUTANEOUS at 01:19

## 2020-02-05 RX ADMIN — Medication 50 MILLILITER(S): at 01:19

## 2020-02-05 RX ADMIN — SODIUM CHLORIDE 1000 MILLILITER(S): 9 INJECTION INTRAMUSCULAR; INTRAVENOUS; SUBCUTANEOUS at 01:21

## 2020-02-05 RX ADMIN — ALBUTEROL 2.5 MILLIGRAM(S): 90 AEROSOL, METERED ORAL at 01:18

## 2020-02-05 NOTE — PROGRESS NOTE ADULT - SUBJECTIVE AND OBJECTIVE BOX
[facility] DIVISION OF KIDNEY DISEASES AND HYPERTENSION -- HEMODIALYSIS NOTE  [phone]--------------------------------------------------------------------------------  Chief Complaint: ESRD/Ongoing hemodialysis requirement    24 hour events/subjective:    pt seen on dialysis tolerating well   bp stable  catheter functioning well    PAST HISTORY  --------------------------------------------------------------------------------  No significant changes to PMH, PSH, FHx, SHx, unless otherwise noted    ALLERGIES & MEDICATIONS  --------------------------------------------------------------------------------  Allergies    No Known Allergies    Intolerances      Standing Inpatient Medications  ARIPiprazole 15 milliGRAM(s) Oral daily  atorvastatin 10 milliGRAM(s) Oral at bedtime  cefTRIAXone   IVPB      cefTRIAXone   IVPB 1000 milliGRAM(s) IV Intermittent once  chlorhexidine 2% Cloths 1 Application(s) Topical <User Schedule>  finasteride 5 milliGRAM(s) Oral daily  gabapentin 600 milliGRAM(s) Oral two times a day  insulin lispro (HumaLOG) corrective regimen sliding scale   SubCutaneous every 6 hours  lidocaine   Patch 1 Patch Transdermal daily  pantoprazole    Tablet 40 milliGRAM(s) Oral before breakfast  sodium bicarbonate 650 milliGRAM(s) Oral three times a day  sodium chloride 0.9%. 1000 milliLiter(s) IV Continuous <Continuous>    PRN Inpatient Medications      REVIEW OF SYSTEMS  --------------------------------------------------------------------------------  As above.    VITALS/PHYSICAL EXAM  --------------------------------------------------------------------------------  T(C): 36.6 (02-05-20 @ 02:45), Max: 36.7 (02-04-20 @ 19:00)  HR: 91 (02-05-20 @ 04:00) (81 - 95)  BP: 118/51 (02-05-20 @ 04:00) (93/49 - 118/51)  RR: 15 (02-05-20 @ 04:00) (15 - 21)  SpO2: 100% (02-05-20 @ 04:00) (96% - 100%)  Wt(kg): --  Height (cm): 177.8 (02-05-20 @ 02:45)  Weight (kg): 89.5 (02-05-20 @ 02:45)  BMI (kg/m2): 28.3 (02-05-20 @ 02:45)  BSA (m2): 2.08 (02-05-20 @ 02:45)      02-04-20 @ 07:01  -  02-05-20 @ 07:00  --------------------------------------------------------  IN: 0 mL / OUT: 200 mL / NET: -200 mL      Physical Exam:  	Gen: NAD  	HEENT: BRANDON  	Pulm: CTA B/L  	CV: S1S2  	Abd: Soft  	Ext: No LE edema B/L  	Neuro: Awake  	Skin: Warm and Dry   	Vascular access: femoral catheter    LABS/STUDIES  --------------------------------------------------------------------------------              8.5    9.75  >-----------<  170      [02-05-20 @ 04:38]              26.8     132  |  108  |  86  ----------------------------<  81      [02-05-20 @ 04:38]  6.7   |  12  |  8.76        Ca     8.2     [02-05-20 @ 04:38]      Mg     1.7     [02-05-20 @ 04:38]      Phos  4.5     [02-05-20 @ 04:38]    TPro  7.6  /  Alb  3.5  /  TBili  0.4  /  DBili  x   /  AST  19  /  ALT  23  /  AlkPhos  116  [02-05-20 @ 04:38]    PT/INR: PT 12.5 , INR 1.12       [02-05-20 @ 04:38]  PTT: 21.1       [02-05-20 @ 04:38]    Troponin <0.015      [02-05-20 @ 01:03]    Iron 61, TIBC 220, %sat 28      [01-28-20 @ 07:31]  Ferritin 371      [01-28-20 @ 14:41]  PTH -- (Ca 8.6)      [01-24-20 @ 15:01]   132  Vitamin D (25OH) 13.7      [12-24-19 @ 09:22]  HbA1c 5.6      [01-24-20 @ 15:03]  TSH 1.78      [01-24-20 @ 10:32]  Lipid: chol 99, TG 73, HDL 48, LDL 36      [01-24-20 @ 10:32]

## 2020-02-05 NOTE — CHART NOTE - NSCHARTNOTEFT_GEN_A_CORE
69 year old Male from [facility], with PMHx of prostate Ca (s/p radiation in 2015, now in remission), asthma, COPD (not on O2), HTN, HLD, Bipolar, anemia, GERD, Ileostomy s/p sigmoid resection, PAD, and CKD presented to ED with Generalized back pain that has been worsening for several weeks.  In the ED, pt presents in mild painful distress, vitals WNL, and EKG concerning for long AL segments, and wide QRS intervals.  Remaining labs sig for K+ of 7.5 with EKG changes, and Creat of 8 (baseline 2.5).  In the ED, pt received the hyperkalemia cocktail consisting of insulin, dextrose, albuterol, calcium, and Kayexalate.  Nephrology consulted emergently who recommended emergent HD overnight for hyperkalemia and EKG changes.  Pt admitted to the ICU for management of symptomatic hyperkalemia requiring new HD. Vascular was consulted and emergent [place] was placed. Hemodialysis was done and started on Bicarb drip. Cardiologist was consulted for EKG changes and it is due to Hyperkalemia. Repeat K after HD was normal 4.6 and repeat EKG was normal sinus rhythm with no ischemic changes.     Assessment:  -Severe Hyperkalemia with EKG changes  -Acute Renal Failure on CKD   -Hypertension  -Bi-Polar disorder   -COPD/ Asthma  -Anemia of chronic renal disease   -PAD      Plan:  Neuro:  -Currently AO x 3   -Bi-Polar disorder- c/w Abilify therapy  -wild hold trazodone and lamotrigine in the setting of ARF     CV:  -Hypertension- will hold antihypertensives as blood pressure currently on the lower side; resume as clinically indicated   PAD- c/w asa therapy   f/u ECHO    Pulm:  -CXR clear, saturating 100% on room air  -COPD not on home O2-no concern for exacerbation- c/w duoneb therapy as needed    ID:  Minimal urine OP   c/w  Rocephin therapy with concern for cystitis     Nephro:  -Acute Renal Failure on CKD  s/p HD in am  c/w NS    f/u Nephro Dr. Vasquez     GI:  -Renal/ low sodium diet     Heme:  -anemia of chronic renal disease- currently at baseline    Endo:    -target CBG < 180  -f/u HbA1c    Prophy:  -Heparin S/C for DVT prophy    Things to follow  f/u BMP and Nephrologist for further HD sessions  f/u ECHO  c/w Rocphin for cystitis till 2/8  work up for Calcification of Gall bladder       GOC   FULL CODE 69 year old Male from [facility], with PMHx of prostate Ca (s/p radiation in 2015, now in remission), asthma, COPD (not on O2), HTN, HLD, Bipolar, anemia, GERD, Ileostomy s/p sigmoid resection, PAD, and CKD presented to ED with Generalized back pain that has been worsening for several weeks.  In the ED, pt presents in mild painful distress, vitals WNL, and EKG concerning for long [place] segments, and wide QRS intervals.  Remaining labs sig for K+ of 7.5 with EKG changes, and Creat of 8 (baseline 2.5).  In the ED, pt received the hyperkalemia cocktail consisting of insulin, dextrose, albuterol, calcium, and Kayexalate.  Nephrology consulted emergently who recommended emergent HD overnight for hyperkalemia and EKG changes.  Pt admitted to the ICU for management of symptomatic hyperkalemia requiring new HD. Vascular was consulted and emergent [place] was placed. Hemodialysis was done and started on Bicarb drip. Cardiologist was consulted for EKG changes and it is due to Hyperkalemia. Repeat K after HD was normal 4.6 and repeat EKG was normal sinus rhythm with no ischemic changes.       Plan:  Neuro:  -Currently AO x 3   -Bi-Polar disorder- c/w Abilify therapy  -wild hold trazodone and lamotrigine in the setting of ARF     CV:  -Hypertension- will hold antihypertensives as blood pressure currently on the lower side; resume as clinically indicated   PAD- c/w asa therapy   f/u ECHO    Pulm:  -CXR clear, saturating 100% on room air  -COPD not on home O2-no concern for exacerbation- c/w duoneb therapy as needed    ID:  Minimal urine OP   c/w  Rocephin therapy with concern for cystitis     Nephro:  -Acute Renal Failure on CKD  s/p HD in am  c/w NS    f/u Nephro Dr. Vasquez     GI:  -Renal/ low sodium diet     Heme:  -anemia of chronic renal disease- currently at baseline    Endo:    -target CBG < 180  -f/u HbA1c    Prophy:  -Heparin S/C for DVT prophy    Things to follow  f/u BMP and Nephrologist for further HD sessions  f/u ECHO  c/w Rocphin for cystitis till 2/8  work up for Calcification of Gall bladder       GOC   FULL CODE 69 year old Male from [facility], with PMHx of prostate Ca (s/p radiation in 2015, now in remission), asthma, COPD (not on O2), HTN, HLD, Bipolar, anemia, GERD, Ileostomy s/p sigmoid resection, PAD, and CKD presented to ED with Generalized back pain that has been worsening for several weeks.  In the ED, pt presents in mild painful distress, vitals WNL, and EKG concerning for long [place] segments, and wide QRS intervals.  Remaining labs sig for K+ of 7.5 with EKG changes, and Creat of 8 (baseline 2.5).  In the ED, pt received the hyperkalemia cocktail consisting of insulin, dextrose, albuterol, calcium, and Kayexalate.  Nephrology consulted emergently who recommended emergent HD overnight for hyperkalemia and EKG changes.  Pt admitted to the ICU for management of symptomatic hyperkalemia requiring new HD. Vascular was consulted and emergent [place] was placed. Hemodialysis was done and started on Bicarb drip. Cardiologist was consulted for EKG changes and it is due to Hyperkalemia. Repeat K after HD was normal 4.6 and repeat EKG was normal sinus rhythm with no ischemic changes. Hyperkalemia 6 on 2/6 and s/p Cocktail and HD. Repeat K is normal. Started on Bicarb drip  and stopped after Few hours.      Plan:  Neuro:  -Currently AO x 3   -Bi-Polar disorder- c/w Abilify therapy  -wild hold trazodone and lamotrigine in the setting of ARF     CV:  -Hypertension- will hold antihypertensives as blood pressure currently on the lower side; resume as clinically indicated   PAD- c/w asa therapy   f/u ECHO    Pulm:  -CXR clear, saturating 100% on room air  -COPD not on home O2-no concern for exacerbation- c/w duoneb therapy as needed    ID:  Minimal urine OP   c/w  Rocephin therapy with concern for cystitis     Nephro:  -Acute Renal Failure on CKD  s/p HD in am  c/w NS    f/u Nephro Dr. Vasquez     GI:  -Renal/ low sodium diet     Heme:  -anemia of chronic renal disease- currently at baseline    Endo:    -target CBG < 180  -f/u HbA1c    Prophy:  -Heparin S/C for DVT prophy    Things to follow  f/u BMP and Nephrologist for further HD sessions  f/u ECHO  c/w Rocphin for cystitis till 2/8  work up for Calcification of Gall bladder       GOC   FULL CODE 69 year old Male from [facility], with PMHx of prostate Ca (s/p radiation in 2015, now in remission), asthma, COPD (not on O2), HTN, HLD, Bipolar, anemia, GERD, Ileostomy s/p sigmoid resection, PAD, and CKD presented to ED with Generalized back pain that has been worsening for several weeks.  In the ED, pt presents in mild painful distress, vitals WNL, and EKG concerning for long [place] segments, and wide QRS intervals.  Remaining labs sig for K+ of 7.5 with EKG changes, and Creat of 8 (baseline 2.5).  In the ED, pt received the hyperkalemia cocktail consisting of insulin, dextrose, albuterol, calcium, and Kayexalate.  Nephrology consulted emergently who recommended emergent HD overnight for hyperkalemia and EKG changes.  Pt admitted to the ICU for management of symptomatic hyperkalemia requiring new HD. Vascular was consulted and emergent [place] was placed. Hemodialysis was done and started on Bicarb drip. Cardiologist was consulted for EKG changes and it is due to Hyperkalemia. Repeat K after HD was normal 4.6 and repeat EKG was normal sinus rhythm with no ischemic changes. Hyperkalemia 6 on 2/6 and s/p Cocktail and HD. Repeat K is normal. Started on Bicarb drip  and stopped after Few hours.      Plan:  Neuro:  -Currently AO x 3   -Bi-Polar disorder- c/w Abilify therapy  -wild hold trazodone and lamotrigine in the setting of ARF     CV:  -Hypertension- will hold antihypertensives as blood pressure currently on the lower side; resume as clinically indicated   PAD- c/w asa therapy   f/u ECHO    Pulm:  -CXR clear, saturating 100% on room air  -COPD not on home O2-no concern for exacerbation- c/w duoneb therapy as needed    ID:  Minimal urine OP   c/w  Rocephin therapy with concern for cystitis     Nephro:  -Acute Renal Failure on CKD  s/p HD in am  c/w NS    f/u Nephro Dr. Vasquez     GI:  -Renal/ low sodium diet     Heme:  -anemia of chronic renal disease- currently at baseline    Endo:    -target CBG < 180  -f/u HbA1c    Prophy:  -Heparin S/C for DVT prophy    Things to follow  f/u BMP and Nephrologist for further HD sessions  f/u ECHO  c/w Rocphin for cystitis till 2/8  work up for Calcification of Gall bladder   Pt was at NH ON dilauded PO may start as needed      GOC   FULL CODE

## 2020-02-05 NOTE — PROCEDURE NOTE - SUPERVISORY STATEMENT
Groin HD cath placed by BECKY WINSLOW was notified yest ~ 19:00  Site okDC when feasibleIf long term HD needed consider tunnelled cath by IRRecnosult prn

## 2020-02-05 NOTE — ED ADULT NURSE NOTE - NSIMPLEMENTINTERV_GEN_ALL_ED
Implemented All Fall Risk Interventions:  [place] to call system. Call bell, personal items and telephone within reach. Instruct patient to call for assistance. Room bathroom lighting operational. Non-slip footwear when patient is off stretcher. Physically safe environment: no spills, clutter or unnecessary equipment. Stretcher in lowest position, wheels locked, appropriate side rails in place. Provide visual cue, wrist band, yellow gown, etc. Monitor gait and stability. Monitor for mental status changes and reorient to person, place, and time. Review medications for side effects contributing to fall risk. Reinforce activity limits and safety measures with patient and family.

## 2020-02-05 NOTE — PROGRESS NOTE ADULT - ASSESSMENT
69 year old Male from [facility], with PMHx of prostate Ca (s/p radiation in 2015, now in remission), asthma, COPD (not on O2), HTN, HLD, Bipolar, anemia, GERD, Ileostomy s/p sigmoid resection, PAD, and CKD presented to ED with Generalized pain that has been worsening for several weeks.  Pt states he has chronic back pain for which he was admitted for 2 weeks prior.  He states the facility gave him pain medication, but the pain did not improve.  Pt states he has not urinated for 24hrs, but feels the urge to void.  Pt denies CP, palpitations, HA, dizziness, fever, or syncope.     In the ED, pt presents in mild painful distress, vitals WNL, and EKG concerning for long [place] segments, and wide QRS intervals.  Remaining labs sig for K+ of 7.5 with EKG changes, and Creat of 8 (baseline 2.5).  In the ED, pt received the hyperkalemia cocktail consisting of insulin, dextrose, albuterol, calcium, and Kayexalate.  Nephrology contacted emergently who recommended emergent HD overnight for hyperkalemia and EKG changes.      Pt transferred to the ICU for management of symptomatic hyperkalemia requiring new HD    Assessment:  -Severe Hyperkalemia with EKG changes  -Acute Renal Failure on CKD   -Hypertension  -Bi-Polar disorder   -COPD/ Asthma  -Anemia of chronic renal disease   -PAD      Plan:  Neuro:  -Currently AO x 3 on admission  -Bi-Polar disorder- c/w Abilify therapy  -wild hold trazodone and lamotrigine in the setting of ARF     CV:  -Hypertension- will hold antihypertensives as blood pressure currently on the lower side; resume as clinically indicated   -Will attain baseline echo for cardiac evaluation   -PAD- c/w asa therapy     Pulm:  -CXR clear, saturating 100% on room air  -COPD not on home O2-no concern for exacerbation- c/w duoneb therapy as needed    ID:  -Pt remains afebrile w/o leukocytosis on admission   -CT abd concerning for thickened bladder wall concerning for cystitis  -No urine OP post phillips placement  -will start empiric rocephin therapy with concern for cystitis     Nephro:  -Acute Renal Failure on CKD   No UOP post phillips placement, but will attempt to attain urine lytes if pt makes urine   placing emergent shiley catheter for emergent HD tonight in the setting of severe hyperkalemia    f/u Nephro recommendations- Dr. Vasquez consulted     GI:  -Renal/ low sodium diet     Heme:  -anemia of chronic renal disease- currently at baseline   consider     Endo:  -target CBG < 180  -f/u HbA1c    Prophy:  -Heparin S/C for DVT prophy    Dispo:  - monitor in the ICU. Prognosis guarded based on Renal function and electrolyte abnormality    GOC   FULL CODE 69 year old Male from [facility], with PMHx of prostate Ca (s/p radiation in 2015, now in remission), asthma, COPD (not on O2), HTN, HLD, Bipolar, anemia, GERD, Ileostomy s/p sigmoid resection, PAD, and CKD presented to ED with Generalized pain that has been worsening for several weeks.  Pt states he has chronic back pain for which he was admitted for 2 weeks prior.  He states the facility gave him pain medication, but the pain did not improve.  Pt states he has not urinated for 24hrs, but feels the urge to void.  Pt denies CP, palpitations, HA, dizziness, fever, or syncope.     In the ED, pt presents in mild painful distress, vitals WNL, and EKG concerning for long [place] segments, and wide QRS intervals.  Remaining labs sig for K+ of 7.5 with EKG changes, and Creat of 8 (baseline 2.5).  In the ED, pt received the hyperkalemia cocktail consisting of insulin, dextrose, albuterol, calcium, and Kayexalate.  Nephrology contacted emergently who recommended emergent HD overnight for hyperkalemia and EKG changes.      Pt admitted to the ICU for management of symptomatic hyperkalemia requiring new HD    Assessment:  -Severe Hyperkalemia with EKG changes  -Acute Renal Failure on CKD   -Hypertension  -Bi-Polar disorder   -COPD/ Asthma  -Anemia of chronic renal disease   -PAD      Plan:  Neuro:  -Currently AO x 3   -Bi-Polar disorder- c/w Abilify therapy  -wild hold trazodone and lamotrigine in the setting of ARF     CV:  -Hypertension- will hold antihypertensives as blood pressure currently on the lower side; resume as clinically indicated   PAD- c/w asa therapy   f/u ECHO    Pulm:  -CXR clear, saturating 100% on room air  -COPD not on home O2-no concern for exacerbation- c/w duoneb therapy as needed    ID:  Minimal urine OP   c/w  Rocephin therapy with concern for cystitis     Nephro:  -Acute Renal Failure on CKD  s/p HD in am  c/w NS    f/u Nephro Dr. Vasquez     GI:  -Renal/ low sodium diet     Heme:  -anemia of chronic renal disease- currently at baseline      Endo:    -target CBG < 180  -f/u HbA1c    Prophy:  -Heparin S/C for DVT prophy    Dispo:  - monitor in the ICU. Prognosis guarded based on Renal function and electrolyte abnormality    GOC   FULL CODE

## 2020-02-05 NOTE — PROGRESS NOTE ADULT - SUBJECTIVE AND OBJECTIVE BOX
INTERVAL HPI/OVERNIGHT EVENTS: ***    PRESSORS: [ ] YES [ ] NO  WHICH:    ANTIBIOTICS:                  DATE STARTED:  ANTIBIOTICS:                  DATE STARTED:  ANTIBIOTICS:                  DATE STARTED:    Antimicrobial:  cefTRIAXone   IVPB      cefTRIAXone   IVPB 1000 milliGRAM(s) IV Intermittent once    Cardiovascular:    Pulmonary:    Hematalogic:    Other:  ARIPiprazole 15 milliGRAM(s) Oral daily  atorvastatin 10 milliGRAM(s) Oral at bedtime  chlorhexidine 2% Cloths 1 Application(s) Topical <User Schedule>  finasteride 5 milliGRAM(s) Oral daily  gabapentin 600 milliGRAM(s) Oral two times a day  insulin lispro (HumaLOG) corrective regimen sliding scale   SubCutaneous every 6 hours  lidocaine   Patch 1 Patch Transdermal daily  pantoprazole    Tablet 40 milliGRAM(s) Oral before breakfast  sodium bicarbonate 650 milliGRAM(s) Oral three times a day  sodium chloride 0.9%. 1000 milliLiter(s) IV Continuous <Continuous>    ARIPiprazole 15 milliGRAM(s) Oral daily  atorvastatin 10 milliGRAM(s) Oral at bedtime  cefTRIAXone   IVPB      cefTRIAXone   IVPB 1000 milliGRAM(s) IV Intermittent once  chlorhexidine 2% Cloths 1 Application(s) Topical <User Schedule>  finasteride 5 milliGRAM(s) Oral daily  gabapentin 600 milliGRAM(s) Oral two times a day  insulin lispro (HumaLOG) corrective regimen sliding scale   SubCutaneous every 6 hours  lidocaine   Patch 1 Patch Transdermal daily  pantoprazole    Tablet 40 milliGRAM(s) Oral before breakfast  sodium bicarbonate 650 milliGRAM(s) Oral three times a day  sodium chloride 0.9%. 1000 milliLiter(s) IV Continuous <Continuous>    Drug Dosing Weight  Height (cm): 177.8 (2020 02:45)  Weight (kg): 89.5 (2020 02:45)  BMI (kg/m2): 28.3 (2020 02:45)  BSA (m2): 2.08 (2020 02:45)    CENTRAL LINE: [ ] YES [ ] NO  LOCATION:   DATE INSERTED:  REMOVE: [ ] YES [ ] NO  EXPLAIN:    MAURER: [ ] YES [ ] NO    DATE INSERTED:  REMOVE:  [ ] YES [ ] NO  EXPLAIN:    A-LINE:  [ ] YES [ ] NO  LOCATION:   DATE INSERTED:  REMOVE:  [ ] YES [ ] NO  EXPLAIN:    PMH -reviewed admission note, no change since admission  Heart faliure: acute [ ] chronic [ ] acute or chronic [ ] diastolic [ ] systolic [ ] combied systolic and diastolic[ ]  PAWAN: ATN[ ] renal medullary necrosis [ ] CKD I [ ]CKDII [ ]CKD III [ ]CKD IV [ ]CKD V [ ]Other pathological lesions [ ]  Abdominal Nutrition Status: malnutrition [ ] cachexia [ ] morbid obesity/BMI=40 [ ] Supplement ordered [___________]     ICU Vital Signs Last 24 Hrs  T(C): 36.6 (2020 02:45), Max: 36.7 (2020 19:00)  T(F): 97.8 (2020 02:45), Max: 98.1 (2020 19:00)  HR: 91 (2020 04:00) (81 - 95)  BP: 118/51 (2020 04:00) (93/49 - 118/51)  BP(mean): 67 (2020 04:00) (61 - 67)  ABP: --  ABP(mean): --  RR: 15 (2020 04:00) (15 - 21)  SpO2: 100% (2020 04:00) (96% - 100%)            -04 @ 07:01  -  02-05 @ 07:00  --------------------------------------------------------  IN: 0 mL / OUT: 200 mL / NET: -200 mL            PHYSICAL EXAM:    GENERAL: [ ]NAD, [ ]well-groomed, [ ]well-developed  HEAD:  [ ]Atraumatic, [ ]Normocephalic  EYES: [ ]EOMI, [ ]PERRLA, [ ]conjunctiva and sclera clear  ENMT: [ ]No tonsillar erythema, exudates, or enlargement; [ ]Moist mucous membranes, [ ]Good dentition, [ ]No lesions  NECK: [ ]Supple, normal appearance, [ ]No JVD; [ ]Normal thyroid; [ ]Trachea midline  NERVOUS SYSTEM:  [ ]Alert & Oriented X3, [ ]Good concentration; [ ]Motor Strength 5/5 B/L upper and lower extremities; [ ]DTRs 2+ intact and symmetric  CHEST/LUNG: [ ]No chest deformity; [ ]Normal percussion bilaterally; [ ]No rales, rhonchi, wheezing   HEART: [ ]Regular rate and rhythm; [ ]No murmurs, rubs, or gallops  ABDOMEN: [ ]Soft, Nontender, Nondistended; [ ]Bowel sounds present  EXTREMITIES:  [ ]2+ Peripheral Pulses, [ ]No clubbing, cyanosis, or edema  LYMPH: [ ]No lymphadenopathy noted  SKIN: [ ]No rashes or lesions; [ ]Good capillary refill      LABS:  CBC Full  -  ( 2020 04:38 )  WBC Count : 9.75 K/uL  RBC Count : 3.08 M/uL  Hemoglobin : 8.5 g/dL  Hematocrit : 26.8 %  Platelet Count - Automated : 170 K/uL  Mean Cell Volume : 87.0 fl  Mean Cell Hemoglobin : 27.6 pg  Mean Cell Hemoglobin Concentration : 31.7 gm/dL  Auto Neutrophil # : 6.89 K/uL  Auto Lymphocyte # : 1.13 K/uL  Auto Monocyte # : 1.35 K/uL  Auto Eosinophil # : 0.30 K/uL  Auto Basophil # : 0.02 K/uL  Auto Neutrophil % : 70.7 %  Auto Lymphocyte % : 11.6 %  Auto Monocyte % : 13.8 %  Auto Eosinophil % : 3.1 %  Auto Basophil % : 0.2 %    02-05    132<L>  |  108  |  86<H>  ----------------------------<  81  6.7<HH>   |  12<L>  |  8.76<H>    Ca    8.2<L>      2020 04:38  Phos  4.5     02-05  Mg     1.7     02-05    TPro  7.6  /  Alb  3.5  /  TBili  0.4  /  DBili  x   /  AST  19  /  ALT  23  /  AlkPhos  116  02-05    PT/INR - ( 2020 04:38 )   PT: 12.5 sec;   INR: 1.12 ratio         PTT - ( 2020 04:38 )  PTT:21.1 sec  Urinalysis Basic - ( 2020 04:42 )    Color: Yellow / Appearance: Clear / S.015 / pH: x  Gluc: x / Ketone: Negative  / Bili: Negative / Urobili: Negative   Blood: x / Protein: 30 mg/dL / Nitrite: Negative   Leuk Esterase: Moderate / RBC: 5-10 /HPF / WBC 11-25 /HPF   Sq Epi: x / Non Sq Epi: Few /HPF / Bacteria: Moderate /HPF          RADIOLOGY & ADDITIONAL STUDIES REVIEWED:  ***    [ ]GOALS OF CARE DISCUSSION WITH PATIENT/FAMILY/PROXY:    CRITICAL CARE TIME SPENT: 35 minutes INTERVAL HPI/OVERNIGHT EVENTS: Pt is c/o back pain and is not well controlled with pain medications.    PRESSORS: [ ] YES x ] NO  WHICH:    Ceftriaxone     Antimicrobial:  cefTRIAXone   IVPB      cefTRIAXone   IVPB 1000 milliGRAM(s) IV Intermittent once    Cardiovascular:    Pulmonary:    Hematalogic:    Other:  ARIPiprazole 15 milliGRAM(s) Oral daily  atorvastatin 10 milliGRAM(s) Oral at bedtime  chlorhexidine 2% Cloths 1 Application(s) Topical <User Schedule>  finasteride 5 milliGRAM(s) Oral daily  gabapentin 600 milliGRAM(s) Oral two times a day  insulin lispro (HumaLOG) corrective regimen sliding scale   SubCutaneous every 6 hours  lidocaine   Patch 1 Patch Transdermal daily  pantoprazole    Tablet 40 milliGRAM(s) Oral before breakfast  sodium bicarbonate 650 milliGRAM(s) Oral three times a day  sodium chloride 0.9%. 1000 milliLiter(s) IV Continuous <Continuous>    ARIPiprazole 15 milliGRAM(s) Oral daily  atorvastatin 10 milliGRAM(s) Oral at bedtime  cefTRIAXone   IVPB      cefTRIAXone   IVPB 1000 milliGRAM(s) IV Intermittent once  chlorhexidine 2% Cloths 1 Application(s) Topical <User Schedule>  finasteride 5 milliGRAM(s) Oral daily  gabapentin 600 milliGRAM(s) Oral two times a day  insulin lispro (HumaLOG) corrective regimen sliding scale   SubCutaneous every 6 hours  lidocaine   Patch 1 Patch Transdermal daily  pantoprazole    Tablet 40 milliGRAM(s) Oral before breakfast  sodium bicarbonate 650 milliGRAM(s) Oral three times a day  sodium chloride 0.9%. 1000 milliLiter(s) IV Continuous <Continuous>    Drug Dosing Weight  Height (cm): 177.8 (2020 02:45)  Weight (kg): 89.5 (2020 02:45)  BMI (kg/m2): 28.3 (2020 02:45)  BSA (m2): 2.08 (2020 02:45)    CENTRAL LINE: [ ] YES [x ] NO  LOCATION:   DATE INSERTED:  REMOVE: [ ] YES [ ] NO  EXPLAIN:    MAURER: [x ] YES [ ] NO    DATE INSERTED:   REMOVE:  [ ] YES [ ] NO  EXPLAIN:    A-LINE:  [ ] YES [x ] NO  LOCATION:   DATE INSERTED:  REMOVE:  [ ] YES [ ] NO  EXPLAIN:        ICU Vital Signs Last 24 Hrs  T(C): 36.6 (2020 02:45), Max: 36.7 (2020 19:00)  T(F): 97.8 (2020 02:45), Max: 98.1 (2020 19:00)  HR: 91 (2020 04:00) (81 - 95)  BP: 118/51 (2020 04:00) (93/49 - 118/51)  BP(mean): 67 (2020 04:00) (61 - 67)  ABP: --  ABP(mean): --  RR: 15 (2020 04:00) (15 - 21)  SpO2: 100% (2020 04:00) (96% - 100%)            - @ 07:01  -  - @ 07:00  --------------------------------------------------------  IN: 0 mL / OUT: 200 mL / NET: -200 mL      PHYSICAL EXAM:  GENERAL: NAD, obese  HEAD:  Atraumatic, Normocephalic  EYES:  conjunctiva and sclera clear  NECK: Supple, No JVD, Normal thyroid, Rt EJ  CHEST/LUNG: Clear to auscultation. Clear to percussion bilaterally; No rales, rhonchi, wheezing, or rubs  HEART: Regular rate and rhythm; No murmurs, rubs, or gallops  ABDOMEN: Soft, Nontender, Nondistended; Bowel sounds present, Rt colostomy bag in place  GENITOURINARY Lt julio in place  NERVOUS SYSTEM:  Alert & Oriented X3,    EXTREMITIES:  2+ Peripheral Pulses, No clubbing, cyanosis, or edema  SKIN: warm dry        LABS:  CBC Full  -  ( 2020 04:38 )  WBC Count : 9.75 K/uL  RBC Count : 3.08 M/uL  Hemoglobin : 8.5 g/dL  Hematocrit : 26.8 %  Platelet Count - Automated : 170 K/uL  Mean Cell Volume : 87.0 fl  Mean Cell Hemoglobin : 27.6 pg  Mean Cell Hemoglobin Concentration : 31.7 gm/dL  Auto Neutrophil # : 6.89 K/uL  Auto Lymphocyte # : 1.13 K/uL  Auto Monocyte # : 1.35 K/uL  Auto Eosinophil # : 0.30 K/uL  Auto Basophil # : 0.02 K/uL  Auto Neutrophil % : 70.7 %  Auto Lymphocyte % : 11.6 %  Auto Monocyte % : 13.8 %  Auto Eosinophil % : 3.1 %  Auto Basophil % : 0.2 %    02-05    132<L>  |  108  |  86<H>  ----------------------------<  81  6.7<HH>   |  12<L>  |  8.76<H>    Ca    8.2<L>      2020 04:38  Phos  4.5     02-05  Mg     1.7     -05    TPro  7.6  /  Alb  3.5  /  TBili  0.4  /  DBili  x   /  AST  19  /  ALT  23  /  AlkPhos  116  02-05    PT/INR - ( 2020 04:38 )   PT: 12.5 sec;   INR: 1.12 ratio         PTT - ( 2020 04:38 )  PTT:21.1 sec  Urinalysis Basic - ( 2020 04:42 )    Color: Yellow / Appearance: Clear / S.015 / pH: x  Gluc: x / Ketone: Negative  / Bili: Negative / Urobili: Negative   Blood: x / Protein: 30 mg/dL / Nitrite: Negative   Leuk Esterase: Moderate / RBC: 5-10 /HPF / WBC 11-25 /HPF   Sq Epi: x / Non Sq Epi: Few /HPF / Bacteria: Moderate /HPF          RADIOLOGY & ADDITIONAL STUDIES REVIEWED:  ***    [ ]GOALS OF CARE DISCUSSION WITH PATIENT/FAMILY/PROXY:    CRITICAL CARE TIME SPENT: 35 minutes

## 2020-02-05 NOTE — PROGRESS NOTE ADULT - ASSESSMENT
pt seen on dialysis tolerating well   bp stable  catheter functioning well  continue hd as ordered with2 K bath

## 2020-02-05 NOTE — H&P ADULT - ATTENDING COMMENTS
69 year old Male from [facility], with PMHx of prostate Ca (s/p radiation in 2015, now in remission), asthma, COPD (not on O2), HTN, HLD, Bipolar, anemia, GERD, Ileostomy s/p sigmoid resection, PAD, and CKD presented to ED with Generalized pain that has been worsening for several weeks.  Pt states he has chronic back pain for which he was admitted for 2 weeks prior.  He states the facility gave him pain medication, but the pain did not improve.  Pt states he has not urinated for 24hrs, but feels the urge to void.  Pt denies CP, palpitations, HA, dizziness, fever, or syncope.     In the ED, pt presents in mild painful distress, vitals WNL, and EKG concerning for long AL segments, and wide QRS intervals.  Remaining labs sig for K+ of 7.5 with EKG changes, and Creat of 8 (baseline 2.5).  In the ED, pt received the hyperkalemia cocktail consisting of insulin, dextrose, albuterol, calcium, and Kayexalate.  Nephrology contacted emergently who recommended emergent HD overnight for hyperkalemia and EKG changes.      Pt transferred to the ICU for management of symptomatic hyperkalemia requiring new HD    Assessment:  -Severe Hyperkalemia with EKG changes - medical management aggressively pending Shiley placement and initiation of HD. Repeat BMP Q4  -Acute Renal Failure on CKD - RO Obstructive uropathy with CT abd, Start IVF hydration  -Hypertension  -Bi-Polar disorder   -COPD/ Asthma  -Anemia of chronic renal disease   -PAD  Start empiric rocephin therapy with concern for cystitis   Heparin S/C for DVT prophy

## 2020-02-05 NOTE — CONSULT NOTE ADULT - SUBJECTIVE AND OBJECTIVE BOX
Time of visit:    CHIEF COMPLAINT: Patient is a 69y old  Male who presents with a chief complaint of Acute Renal Failure (2020 09:31)      HPI:  69 year old Male from [facility], with PMHx of prostate Ca (s/p radiation in , now in remission), asthma, COPD (not on O2), HTN, HLD, Bipolar, anemia, GERD, Ileostomy s/p sigmoid resection, PAD, and CKD presented to ED with Generalized pain that has been worsening for several weeks.  Pt states he has chronic back pain for which he was admitted for 2 weeks prior.  He states the facility gave him pain medication, but the pain did not improve.  Pt states he has not urinated for 24hrs, but feels the urge to void.  Pt denies CP, palpitations, HA, dizziness, fever, or syncope.     In the ED, pt presents in mild painful distress, vitals WNL, and EKG concerning for long [place] segments, and wide QRS intervals (2020 03:12)   Patient seen and examined.     PAST MEDICAL & SURGICAL HISTORY:  BPH with urinary obstruction  Prostate CA  CKD (chronic kidney disease)  PAD (peripheral artery disease)  HLD (hyperlipidemia)  HTN (hypertension)  IBD (inflammatory bowel disease)  Bipolar disorder  Anemia  COPD, mild  History of creation of ostomy      Allergies    No Known Allergies    Intolerances        MEDICATIONS  (STANDING):  ARIPiprazole 15 milliGRAM(s) Oral daily  atorvastatin 10 milliGRAM(s) Oral at bedtime  cefTRIAXone   IVPB      chlorhexidine 2% Cloths 1 Application(s) Topical <User Schedule>  finasteride 5 milliGRAM(s) Oral daily  gabapentin 600 milliGRAM(s) Oral two times a day  insulin lispro (HumaLOG) corrective regimen sliding scale   SubCutaneous every 6 hours  lidocaine   Patch 1 Patch Transdermal daily  pantoprazole    Tablet 40 milliGRAM(s) Oral before breakfast  sodium bicarbonate 650 milliGRAM(s) Oral three times a day  sodium chloride 0.9%. 1000 milliLiter(s) (100 mL/Hr) IV Continuous <Continuous>      MEDICATIONS  (PRN):   Medications up to date at time of exam.    Medications up to date at time of exam.    FAMILY HISTORY:      SOCIAL HISTORY  Smoking History: [   ] smoking/smoke exposure, [   ] former smoker  Living Condition: [   ] apartment, [   ] private house  Work History:   Travel History: denies recent travel  Illicit Substance Use: denies  Alcohol Use: denies    REVIEW OF SYSTEMS:    CONSTITUTIONAL:  denies fevers, chills, sweats, weight loss    HEENT:  denies diplopia or blurred vision, sore throat or runny nose.    CARDIOVASCULAR:  denies pressure, squeezing, tightness, or heaviness about the chest; no palpitations.    RESPIRATORY:  denies SOB, cough, RESENDIZ, wheezing.    GASTROINTESTINAL:  denies abdominal pain, nausea, vomiting or diarrhea.    GENITOURINARY: denies dysuria, frequency or urgency.    NEUROLOGIC:  denies numbness, tingling, seizures or weakness.    PSYCHIATRIC:  denies disorder of thought or mood.    MSK: denies swelling, redness      PHYSICAL EXAMINATION:    GENERAL: The patient is a well-developed, well-nourished, in no apparent distress.     Vital Signs Last 24 Hrs  T(C): 36.5 (2020 07:45), Max: 36.7 (2020 19:00)  T(F): 97.7 (2020 07:45), Max: 98.1 (2020 19:00)  HR: 88 (2020 11:00) (81 - 97)  BP: 98/49 (2020 11:00) (93/49 - 118/51)  BP(mean): 60 (2020 11:00) (60 - 76)  RR: 14 (2020 11:00) (13 - 21)  SpO2: 97% (2020 11:00) (96% - 100%)   (if applicable)    Chest Tube (if applicable)    HEENT: Head is normocephalic and atraumatic. Extraocular muscles are intact. Mucous membranes are moist.     NECK: Supple, no palpable adenopathy.    LUNGS: Clear to auscultation, no wheezing, rales, or rhonchi.    HEART: Regular rate and rhythm without murmur.    ABDOMEN: Soft, nontender, and nondistended.  No hepatosplenomegaly is noted.    RENAL: No difficulty voiding, no pelvic pain    EXTREMITIES: Without any cyanosis, clubbing, rash, lesions or edema.    NEUROLOGIC: Awake, alert, oriented, grossly intact    SKIN: Warm, dry, good turgor.      LABS:                        8.1    7.22  )-----------( 168      ( 2020 09:21 )             24.3     02-05    138  |  107  |  51<H>  ----------------------------<  93  4.6   |  21<L>  |  5.07<H>    Ca    8.4      2020 09:21  Phos  4.1     02-05  Mg     1.6     02-05    TPro  7.3  /  Alb  3.2<L>  /  TBili  0.6  /  DBili  x   /  AST  30  /  ALT  20  /  AlkPhos  102  02-05    PT/INR - ( 2020 04:38 )   PT: 12.5 sec;   INR: 1.12 ratio         PTT - ( 2020 04:38 )  PTT:21.1 sec  Urinalysis Basic - ( 2020 04:42 )    Color: Yellow / Appearance: Clear / S.015 / pH: x  Gluc: x / Ketone: Negative  / Bili: Negative / Urobili: Negative   Blood: x / Protein: 30 mg/dL / Nitrite: Negative   Leuk Esterase: Moderate / RBC: 5-10 /HPF / WBC 11-25 /HPF   Sq Epi: x / Non Sq Epi: Few /HPF / Bacteria: Moderate /HPF        CARDIAC MARKERS ( 2020 01:03 )  <0.015 ng/mL / x     / x     / x     / 6.3 ng/mL                MICROBIOLOGY: (if applicable)    RADIOLOGY & ADDITIONAL STUDIES:  EKG:   CXR:  ECHO:    IMPRESSION: 69y Male PAST MEDICAL & SURGICAL HISTORY:  BPH with urinary obstruction  Prostate CA  CKD (chronic kidney disease)  PAD (peripheral artery disease)  HLD (hyperlipidemia)  HTN (hypertension)  IBD (inflammatory bowel disease)  Bipolar disorder  Anemia  COPD, mild  History of creation of ostomy   p/w                   RECOMMENDATIONS: Time of visit:    CHIEF COMPLAINT: Patient is a 69y old  Male who presents with a chief complaint of Acute Renal Failure (2020 09:31)      HPI:  69 year old Male from [facility], with PMHx of prostate Ca (s/p radiation in , now in remission), asthma, COPD (not on O2), HTN, HLD, Bipolar, anemia, GERD, Ileostomy s/p sigmoid resection, PAD, and CKD presented to ED with Generalized pain that has been worsening for several weeks.  Pt states he has chronic back pain for which he was admitted for 2 weeks prior.  He states the facility gave him pain medication, but the pain did not improve.  Pt states he has not urinated for 24hrs, but feels the urge to void.  Pt denies CP, palpitations, HA, dizziness, fever, or syncope.     In the ED, pt presents in mild painful distress, vitals WNL, and EKG concerning for long [place] segments, and wide QRS intervals (2020 03:12)   Patient seen and examined.     PAST MEDICAL & SURGICAL HISTORY:  BPH with urinary obstruction  Prostate CA  CKD (chronic kidney disease)  PAD (peripheral artery disease)  HLD (hyperlipidemia)  HTN (hypertension)  IBD (inflammatory bowel disease)  Bipolar disorder  Anemia  COPD, mild  History of creation of ostomy      Allergies    No Known Allergies    Intolerances        MEDICATIONS  (STANDING):  ARIPiprazole 15 milliGRAM(s) Oral daily  atorvastatin 10 milliGRAM(s) Oral at bedtime  cefTRIAXone   IVPB      chlorhexidine 2% Cloths 1 Application(s) Topical <User Schedule>  finasteride 5 milliGRAM(s) Oral daily  gabapentin 600 milliGRAM(s) Oral two times a day  insulin lispro (HumaLOG) corrective regimen sliding scale   SubCutaneous every 6 hours  lidocaine   Patch 1 Patch Transdermal daily  pantoprazole    Tablet 40 milliGRAM(s) Oral before breakfast  sodium bicarbonate 650 milliGRAM(s) Oral three times a day  sodium chloride 0.9%. 1000 milliLiter(s) (100 mL/Hr) IV Continuous <Continuous>      MEDICATIONS  (PRN):   Medications up to date at time of exam.    Medications up to date at time of exam.    FAMILY HISTORY:      SOCIAL HISTORY  Smoking History: [   ] smoking/smoke exposure, [   ] former smoker  Living Condition: [   ] apartment, [   ] private house  Work History:   Travel History: denies recent travel  Illicit Substance Use: denies  Alcohol Use: denies    REVIEW OF SYSTEMS:    CONSTITUTIONAL:  denies fevers, chills, sweats, weight loss    HEENT:  denies diplopia or blurred vision, sore throat or runny nose.    CARDIOVASCULAR:  denies pressure, squeezing, tightness, or heaviness about the chest; no palpitations.    RESPIRATORY:  denies SOB, cough, RESENDIZ, wheezing.    GASTROINTESTINAL:  denies abdominal pain, nausea, vomiting or diarrhea.    GENITOURINARY: denies dysuria, frequency or urgency.    NEUROLOGIC:  denies numbness, tingling, seizures or weakness.    PSYCHIATRIC:  denies disorder of thought or mood.    MSK: denies swelling, redness      PHYSICAL EXAMINATION:    GENERAL: The patient is a well-developed, well-nourished, in no apparent distress.     Vital Signs Last 24 Hrs  T(C): 36.5 (2020 07:45), Max: 36.7 (2020 19:00)  T(F): 97.7 (2020 07:45), Max: 98.1 (2020 19:00)  HR: 88 (2020 11:00) (81 - 97)  BP: 98/49 (2020 11:00) (93/49 - 118/51)  BP(mean): 60 (2020 11:00) (60 - 76)  RR: 14 (2020 11:00) (13 - 21)  SpO2: 97% (2020 11:00) (96% - 100%)   (if applicable)    Chest Tube (if applicable)    HEENT: Head is normocephalic and atraumatic. Extraocular muscles are intact. Mucous membranes are moist.     NECK: Supple, no palpable adenopathy.    LUNGS: Clear to auscultation, no wheezing, rales, or rhonchi.    HEART: Regular rate and rhythm without murmur.    ABDOMEN: Soft, nontender, and nondistended.  No hepatosplenomegaly is noted.    RENAL: No difficulty voiding, no pelvic pain    EXTREMITIES: Without any cyanosis, clubbing, rash, lesions or edema.    NEUROLOGIC: Awake, alert, oriented, grossly intact    SKIN: Warm, dry, good turgor.      LABS:                        8.1    7.22  )-----------( 168      ( 2020 09:21 )             24.3     02-05    138  |  107  |  51<H>  ----------------------------<  93  4.6   |  21<L>  |  5.07<H>    Ca    8.4      2020 09:21  Phos  4.1     02-05  Mg     1.6     02-05    TPro  7.3  /  Alb  3.2<L>  /  TBili  0.6  /  DBili  x   /  AST  30  /  ALT  20  /  AlkPhos  102  02-05    PT/INR - ( 2020 04:38 )   PT: 12.5 sec;   INR: 1.12 ratio         PTT - ( 2020 04:38 )  PTT:21.1 sec  Urinalysis Basic - ( 2020 04:42 )    Color: Yellow / Appearance: Clear / S.015 / pH: x  Gluc: x / Ketone: Negative  / Bili: Negative / Urobili: Negative   Blood: x / Protein: 30 mg/dL / Nitrite: Negative   Leuk Esterase: Moderate / RBC: 5-10 /HPF / WBC 11-25 /HPF   Sq Epi: x / Non Sq Epi: Few /HPF / Bacteria: Moderate /HPF        CARDIAC MARKERS ( 2020 01:03 )  <0.015 ng/mL / x     / x     / x     / 6.3 ng/mL                MICROBIOLOGY: (if applicable)    RADIOLOGY & ADDITIONAL STUDIES:  EKG:   CXR:  < from: Xray Chest 1 View-PORTABLE IMMEDIATE (20 @ 01:31) >    EXAM:  XR CHEST PORTABLE IMMED 1V                            PROCEDURE DATE:  2020          INTERPRETATION:  AP chest on 2020 at 1:18 AM. Patient is short of breath.    Poor inspiration crowds the chest. Heart may be enlarged.    There are rather mild basilar atelectatic findings.    Chest is similar to 2019.    IMPRESSION: Unchanged chest as above.                LEELA MORALES M.D., ATTENDING RADIOLOGIST  This document has been electronically signed. 2020  8:08AM                < end of copied text >  ECHO:    IMPRESSION: 69y Male PAST MEDICAL & SURGICAL HISTORY:  BPH with urinary obstruction  Prostate CA  CKD (chronic kidney disease)  PAD (peripheral artery disease)  HLD (hyperlipidemia)  HTN (hypertension)  IBD (inflammatory bowel disease)  Bipolar disorder  Anemia  COPD, mild  History of creation of ostomy   p/w           69 year old Male from [facility], with PMHx of prostate Ca (s/p radiation in 2015, now in remission), asthma, COPD (not on O2), HTN, HLD, Bipolar, anemia, GERD, Ileostomy s/p sigmoid resection, PAD, and CKD presented to ED with Generalized pain that has been worsening for several weeks.  Pt states he has chronic back pain for which he was admitted for 2 weeks prior.  He states the facility gave him pain medication, but the pain did not improve.  Pt states he has not urinated for 24hrs, but feels the urge to void.  Pt denies CP, palpitations, HA, dizziness, fever, or syncope.     In the ED, pt presents in mild painful distress, vitals WNL, and EKG concerning for long TX segments, and wide QRS intervals.  Remaining labs sig for K+ of 7.5 with EKG changes, and Creat of 8 (baseline 2.5).  In the ED, pt received the hyperkalemia cocktail consisting of insulin, dextrose, albuterol, calcium, and Kayexalate.  Nephrology contacted emergently who recommended emergent HD overnight for hyperkalemia and EKG changes.      Pt transferred to the ICU for management of symptomatic hyperkalemia requiring new HD COPD stable    Assessment:  -Severe Hyperkalemia with EKG changes  -Acute Renal Failure on CKD   -Hypertension  -Bi-Polar disorder   -COPD/ Asthma  -Anemia of chronic renal disease   -PAD      Sugg  -diuresis  -cards f/u  -O2 supp as needed  -monitor K  -dvt/gi prophy

## 2020-02-05 NOTE — CONSULT NOTE ADULT - SUBJECTIVE AND OBJECTIVE BOX
HISTORY OF PRESENT ILLNESS: HPI:  69 year old Male from [facility], with PMHx of prostate Ca (s/p radiation in 2015, now in remission), asthma, COPD (not on O2) chronic dyspnea on exertion, HTN, HLD, Bipolar, anemia, GERD, Ileostomy s/p sigmoid resection, PAD, normal LV and RV function on recent echo in our office, normal perfusion on nuclear stress 1/20 and CKD presented to ED with Generalized pain that has been worsening for several weeks.  Pt states he has chronic back pain for which he was admitted for 2 weeks prior.  He states the facility gave him pain medication, but the pain did not improve.  Pt states he has not urinated for 24hrs, but feels the urge to void.  Pt denies CP, palpitations, HA, dizziness, fever, or syncope.     In the ED, pt presents in mild painful distress, vitals WNL, and EKG concerning for long NV segments, and wide QRS intervals (05 Feb 2020 03:12)      PAST MEDICAL & SURGICAL HISTORY:  BPH with urinary obstruction  Prostate CA  CKD (chronic kidney disease)  PAD (peripheral artery disease)  HLD (hyperlipidemia)  HTN (hypertension)  IBD (inflammatory bowel disease)  Bipolar disorder  Anemia  COPD, mild  History of creation of ostomy          MEDICATIONS:  MEDICATIONS  (STANDING):  ARIPiprazole 15 milliGRAM(s) Oral daily  atorvastatin 10 milliGRAM(s) Oral at bedtime  cefTRIAXone   IVPB      chlorhexidine 2% Cloths 1 Application(s) Topical <User Schedule>  finasteride 5 milliGRAM(s) Oral daily  gabapentin 600 milliGRAM(s) Oral two times a day  insulin lispro (HumaLOG) corrective regimen sliding scale   SubCutaneous every 6 hours  lidocaine   Patch 1 Patch Transdermal daily  pantoprazole    Tablet 40 milliGRAM(s) Oral before breakfast  sodium bicarbonate 650 milliGRAM(s) Oral three times a day      Allergies    No Known Allergies    Intolerances        FAMILY HISTORY:    Non-contributary for premature coronary disease or sudden cardiac death    SOCIAL HISTORY:    [ ] Non-smoker  [ ] Smoker  [ ] Alcohol    FLU VACCINE THIS YEAR STARTS IN AUGUST:  [ ] Yes    [ ] No    IF OVER 65 HAVE YOU EVER HAD A PNA VACCINE:  [ ] Yes    [ ] No       [ ] N/A      REVIEW OF SYSTEMS:  [ ]chest pain  [  ]shortness of breath  [  ]palpitations  [  ]syncope  [ ]near syncope [ ]upper extremity weakness   [ ] lower extremity weakness  [  ]diplopia  [  ]altered mental status   [  ]fevers  [ ]chills [ ]nausea  [ ]vomitting  [  ]dysphagia    [ ]abdominal pain  [ ]melena  [ ]BRBPR    [  ]epistaxis  [  ]rash    [ ]lower extremity edema        [ ] All others negative	  [ ] Unable to obtain      LABS:	 	    CARDIAC MARKERS:  CARDIAC MARKERS ( 05 Feb 2020 01:03 )  <0.015 ng/mL / x     / x     / x     / 6.3 ng/mL                              8.1    7.22  )-----------( 168      ( 05 Feb 2020 09:21 )             24.3     Hb Trend: 8.1<--, 8.5<--, 9.6<--    02-05    132<L>  |  108  |  86<H>  ----------------------------<  81  6.7<HH>   |  12<L>  |  8.76<H>    Ca    8.2<L>      05 Feb 2020 04:38  Phos  4.5     02-05  Mg     1.7     02-05    TPro  7.6  /  Alb  3.5  /  TBili  0.4  /  DBili  x   /  AST  19  /  ALT  23  /  AlkPhos  116  02-05    Creatinine Trend: 8.76<--, 8.67<--, 8.42<--, 2.59<--, 2.45<--, 2.58<--    Coags:  PT/INR - ( 05 Feb 2020 04:38 )   PT: 12.5 sec;   INR: 1.12 ratio         PTT - ( 05 Feb 2020 04:38 )  PTT:21.1 sec    proBNP:   Lipid Profile:   HgA1c:   TSH:         PHYSICAL EXAM:  T(C): 36.5 (02-05-20 @ 07:45), Max: 36.7 (02-04-20 @ 19:00)  HR: 92 (02-05-20 @ 09:00) (81 - 97)  BP: 102/55 (02-05-20 @ 09:00) (93/49 - 118/51)  RR: 14 (02-05-20 @ 09:00) (13 - 21)  SpO2: 100% (02-05-20 @ 04:00) (96% - 100%)  Wt(kg): --   BMI (kg/m2): 28.3 (02-05-20 @ 02:45)  I&O's Summary    04 Feb 2020 07:01  -  05 Feb 2020 07:00  --------------------------------------------------------  IN: 0 mL / OUT: 200 mL / NET: -200 mL        Gen: Appears well in NAD  HEENT:  (-)icterus (-)pallor  CV: N S1 S2 1/6 MARCELLE (+)2 Pulses B/l  Resp:  Clear to ausculatation B/L, normal effort  GI: (+) BS Soft, NT, ND  Lymph:  (-)Edema, (-)obvious lymphadenopathy  Skin: Warm to touch, Normal turgor  Psych: Appropriate mood and affect        TELEMETRY: 	      ECG:  	    RADIOLOGY:         CXR:     ASSESSMENT/PLAN: 	69y Male HISTORY OF PRESENT ILLNESS: HPI:  69 year old Male from [facility], with PMHx of prostate Ca (s/p radiation in 2015, now in remission), asthma, COPD (not on O2) chronic dyspnea on exertion, HTN, HLD, Bipolar, anemia, GERD, Ileostomy s/p sigmoid resection, PAD, normal LV and RV function on recent echo in our office, normal perfusion on nuclear stress 1/20 and CKD presented to ED with Generalized pain that has been worsening for several weeks.  Pt states he has chronic back pain for which he was admitted for 2 weeks prior.  He states the facility gave him pain medication, but the pain did not improve.  Pt states he has not urinated for 24hrs, but feels the urge to void.  Pt denies CP, palpitations, HA, dizziness, fever, or syncope.     In the ED, he was found with acute on chronic renal failure, hyper kalemia with associated EKG changes      PAST MEDICAL & SURGICAL HISTORY:  BPH with urinary obstruction  Prostate CA  CKD (chronic kidney disease)  PAD (peripheral artery disease)  HLD (hyperlipidemia)  HTN (hypertension)  IBD (inflammatory bowel disease)  Bipolar disorder  Anemia  COPD, mild  History of creation of ostomy          MEDICATIONS:  MEDICATIONS  (STANDING):  ARIPiprazole 15 milliGRAM(s) Oral daily  atorvastatin 10 milliGRAM(s) Oral at bedtime  cefTRIAXone   IVPB      chlorhexidine 2% Cloths 1 Application(s) Topical <User Schedule>  finasteride 5 milliGRAM(s) Oral daily  gabapentin 600 milliGRAM(s) Oral two times a day  insulin lispro (HumaLOG) corrective regimen sliding scale   SubCutaneous every 6 hours  lidocaine   Patch 1 Patch Transdermal daily  pantoprazole    Tablet 40 milliGRAM(s) Oral before breakfast  sodium bicarbonate 650 milliGRAM(s) Oral three times a day      Allergies    No Known Allergies    Intolerances        FAMILY HISTORY:    Non-contributary for premature coronary disease or sudden cardiac death    SOCIAL HISTORY:    [ X] Non-smoker  [ ] Smoker  [ ] Alcohol    FLU VACCINE THIS YEAR STARTS IN AUGUST:  [x ] Yes    [ ] No    IF OVER 65 HAVE YOU EVER HAD A PNA VACCINE:  [ ] Yes    [x ] No       [ ] N/A      REVIEW OF SYSTEMS:  [ ]chest pain  [  ]shortness of breath  [  ]palpitations  [  ]syncope  [ ]near syncope [ ]upper extremity weakness   [ ] lower extremity weakness  [  ]diplopia  [  ]altered mental status   [  ]fevers  [ ]chills [ ]nausea  [ ]vomitting  [  ]dysphagia    [ ]abdominal pain  [ ]melena  [ ]BRBPR    [  ]epistaxis  [  ]rash    [ ]lower extremity edema        [x ] All others negative	  [ ] Unable to obtain      LABS:	 	    CARDIAC MARKERS:  CARDIAC MARKERS ( 05 Feb 2020 01:03 )  <0.015 ng/mL / x     / x     / x     / 6.3 ng/mL                              8.1    7.22  )-----------( 168      ( 05 Feb 2020 09:21 )             24.3     Hb Trend: 8.1<--, 8.5<--, 9.6<--    02-05    132<L>  |  108  |  86<H>  ----------------------------<  81  6.7<HH>   |  12<L>  |  8.76<H>    Ca    8.2<L>      05 Feb 2020 04:38  Phos  4.5     02-05  Mg     1.7     02-05    TPro  7.6  /  Alb  3.5  /  TBili  0.4  /  DBili  x   /  AST  19  /  ALT  23  /  AlkPhos  116  02-05    Creatinine Trend: 8.76<--, 8.67<--, 8.42<--, 2.59<--, 2.45<--, 2.58<--    Coags:  PT/INR - ( 05 Feb 2020 04:38 )   PT: 12.5 sec;   INR: 1.12 ratio         PTT - ( 05 Feb 2020 04:38 )  PTT:21.1 sec    PHYSICAL EXAM:  T(C): 36.5 (02-05-20 @ 07:45), Max: 36.7 (02-04-20 @ 19:00)  HR: 92 (02-05-20 @ 09:00) (81 - 97)  BP: 102/55 (02-05-20 @ 09:00) (93/49 - 118/51)  RR: 14 (02-05-20 @ 09:00) (13 - 21)  SpO2: 100% (02-05-20 @ 04:00) (96% - 100%)  Wt(kg): --   BMI (kg/m2): 28.3 (02-05-20 @ 02:45)  I&O's Summary    04 Feb 2020 07:01  -  05 Feb 2020 07:00  --------------------------------------------------------  IN: 0 mL / OUT: 200 mL / NET: -200 mL        HEENT:  (-)icterus (-)pallor  CV: N S1 S2 1/6 MARCELLE (+)2 Pulses B/l  Resp:  Clear to ausculatation B/L, normal effort  GI: (+) BS Soft, NT, ND  Lymph:  (-)Edema, (-)obvious lymphadenopathy  Skin: Warm to touch, Normal turgor  Psych: Appropriate mood and affect        TELEMETRY: 	  Sinus     ECG:  	Sinus 90 BPM, 1 degree AV block QRS widening c/w hyperkalemia    RADIOLOGY:         CXR:   < from: Xray Chest 1 View-PORTABLE IMMEDIATE (02.05.20 @ 01:31) >  Poor inspiration crowds the chest. Heart may be enlarged.    There are rather mild basilar atelectatic findings.      < end of copied text >      ASSESSMENT/PLAN: 	69y Male MHx of prostate Ca (s/p radiation in 2015, now in remission), asthma, COPD (not on O2) chronic dyspnea on exertion, HTN, HLD, Bipolar, anemia, GERD, Ileostomy s/p sigmoid resection, PAD, normal LV and RV function on recent echo in our office, normal perfusion on nuclear stress 1/20 and CKD presented to ED with Generalized pain that has been worsening for several weeks.     - EKG inconsistent with acute injury.. more consistent with electrolyte derangement   - Repeat EKG now that his K+ has normalized  - no clinical CHF  - Renal f/u    I once again thank you for allowing me to participate in the care of our mutual patient.  If you have any questions or concerns please do not hesitate to contact me.    Joey Archer MD, [facility]  BEEPER [phone]

## 2020-02-05 NOTE — H&P ADULT - ASSESSMENT
69 year old Male from [facility], with PMHx of prostate Ca (s/p radiation in 2015, now in remission), asthma, COPD (not on O2), HTN, HLD, Bipolar, anemia, GERD, Ileostomy s/p sigmoid resection, PAD, and CKD presented to ED with Generalized pain that has been worsening for several weeks.  Pt states he has chronic back pain for which he was admitted for 2 weeks prior.  He states the facility gave him pain medication, but the pain did not improve.  Pt states he has not urinated for 24hrs, but feels the urge to void.  Pt denies CP, palpitations, HA, dizziness, fever, or syncope.     In the ED, pt presents in mild painful distress, vitals WNL, and EKG concerning for long LA segments, and wide QRS intervals.  Remaining labs sig for K+ of 7.5 with EKG changes, and Creat of 8 (baseline 2.5).  In the ED, pt received the hyperkalemia cocktail consisting of insulin, dextrose, albuterol, calcium, and Kayexalate.  Nephrology contacted emergently who recommended emergent HD overnight for hyperkalemia and EKG changes.      Pt transferred to the ICU for management of symptomatic hyperkalemia requiring new HD    Assessment:  -Hyperkalemia with EKG changes  -Acute Renal Failure on CKD   -Hypertension  -Bi-Polar disorder   -COPD/ Asthma  -Anemia of chronic renal disease   -PAD      Plan:  Neuro:  -Currently AO x 3 on admission  -Bi-Polar disorder- c/w Abilify therapy  -wild hold trazodone and lamotrigine in the setting of ARF     CV:  -Hypertension- will hold antihypertensives as blood pressure currently on the lower side; resume as clinically indicated   -Will attain baseline echo for cardiac evaluation     Pulm:  -CXR clear, saturating 100% on room air  -No pulmonary concerns at this time     ID:  -Pt remains afebrile w/o leukocytosis on admission   -CT abd concerning for thickened bladder wall concerning for cystitis  -No urine OP post phillips placement  -will start empiric rocephin therapy with concern for cystitis     Nephro:  -Acute Renal Failure on CKD   No UOP post phillips placement, but will attempt to attain urine lytes if pt makes urine   placing emergent shiley catheter for emergent HD tonight in the setting of severe hyperkalemia    f/u Nephro recommendations- Dr. Vasquez consulted     GI:  -Renal/ low sodium diet     Heme:  -anemia of chronic renal disease- currently at baseline   consider     Endo:  - target CBG < 180    Prophy:  - Heparin S/C    Dispo:  - monitor in the ICU. Prognosis guarded based on mental status.    GOC   FULL CODE 69 year old Male from [facility], with PMHx of prostate Ca (s/p radiation in 2015, now in remission), asthma, COPD (not on O2), HTN, HLD, Bipolar, anemia, GERD, Ileostomy s/p sigmoid resection, PAD, and CKD presented to ED with Generalized pain that has been worsening for several weeks.  Pt states he has chronic back pain for which he was admitted for 2 weeks prior.  He states the facility gave him pain medication, but the pain did not improve.  Pt states he has not urinated for 24hrs, but feels the urge to void.  Pt denies CP, palpitations, HA, dizziness, fever, or syncope.     In the ED, pt presents in mild painful distress, vitals WNL, and EKG concerning for long TN segments, and wide QRS intervals.  Remaining labs sig for K+ of 7.5 with EKG changes, and Creat of 8 (baseline 2.5).  In the ED, pt received the hyperkalemia cocktail consisting of insulin, dextrose, albuterol, calcium, and Kayexalate.  Nephrology contacted emergently who recommended emergent HD overnight for hyperkalemia and EKG changes.      Pt transferred to the ICU for management of symptomatic hyperkalemia requiring new HD    Assessment:  -Severe Hyperkalemia with EKG changes  -Acute Renal Failure on CKD   -Hypertension  -Bi-Polar disorder   -COPD/ Asthma  -Anemia of chronic renal disease   -PAD      Plan:  Neuro:  -Currently AO x 3 on admission  -Bi-Polar disorder- c/w Abilify therapy  -wild hold trazodone and lamotrigine in the setting of ARF     CV:  -Hypertension- will hold antihypertensives as blood pressure currently on the lower side; resume as clinically indicated   -Will attain baseline echo for cardiac evaluation   -PAD- c/w asa therapy     Pulm:  -CXR clear, saturating 100% on room air  -COPD not on home O2-no concern for exacerbation- c/w duoneb therapy as needed    ID:  -Pt remains afebrile w/o leukocytosis on admission   -CT abd concerning for thickened bladder wall concerning for cystitis  -No urine OP post phillips placement  -will start empiric rocephin therapy with concern for cystitis     Nephro:  -Acute Renal Failure on CKD   No UOP post phillips placement, but will attempt to attain urine lytes if pt makes urine   placing emergent shiley catheter for emergent HD tonight in the setting of severe hyperkalemia    f/u Nephro recommendations- Dr. Vasquez consulted     GI:  -Renal/ low sodium diet     Heme:  -anemia of chronic renal disease- currently at baseline   consider     Endo:  -target CBG < 180  -f/u HbA1c    Prophy:  -Heparin S/C for DVT prophy    Dispo:  - monitor in the ICU. Prognosis guarded based on Renal function and electrolyte abnormality    GOC   FULL CODE

## 2020-02-05 NOTE — CONSULT NOTE ADULT - ASSESSMENT
69 year old Male from [facility], with PMHx of prostate Ca (s/p radiation in 2015, now in remission), asthma, COPD (not on O2), HTN, HLD, Bipolar, anemia, GERD, Ileostomy s/p sigmoid resection, PAD, and CKD presented to ED with Generalized pain that has been worsening for several weeks. foudn to have Velia       VELIA  Scr 2.5 on ( 1/28/2020)  elevated to 8.9 on ( 2/5/2020)  VELIA etiology? possibly ATN from persistant pre-renal state  FeNA suggestive of ATN  PT oliguric   Plan for HD sec to hyperkalemia and oliguria  Check Renal sonogram  Recommend IVF ( D5 with 150mEQ of bicarb at 100c/hr ) - monitor urine outp closely and fluid status -- if no urine oupt d/c IVF  MONitor BMP  AVoid further nephrotoxics, NSIADS RCA    HYperkalemia  sec to RF   HD with 2 k baht   low K diet    Acidosis  Non AG  bicarb as above  HD today    Hyponatremia  work up inconclusive  continue hydration as above  monitor closely

## 2020-02-05 NOTE — H&P ADULT - NSHPPHYSICALEXAM_GEN_ALL_CORE
ICU Vital Signs Last 24 Hrs  T(C): 36.6 (05 Feb 2020 02:45), Max: 36.7 (04 Feb 2020 19:00)  T(F): 97.8 (05 Feb 2020 02:45), Max: 98.1 (04 Feb 2020 19:00)  HR: 93 (05 Feb 2020 03:00) (81 - 95)  BP: 109/48 (05 Feb 2020 03:00) (93/49 - 113/59)  BP(mean): 62 (05 Feb 2020 03:00) (61 - 62)  RR: 21 (05 Feb 2020 03:00) (18 - 21)  SpO2: 100% (05 Feb 2020 03:00) (96% - 100%)

## 2020-02-05 NOTE — PROGRESS NOTE ADULT - SUBJECTIVE AND OBJECTIVE BOX
called by ER For hyperkalemia of 7.9 on repeat labs. discussed with er attending to put phillips in to r/o obstruction and get dhiley. pt with hyperkalemia of 7.9 with ekg changes, would need urgent dialysis. dialysis  rn on called paged. full consult to follow called by ER For hyperkalemia of 7.9 on repeat labs. discussed with er attending to put phillips in to r/o obstruction and get shiley. advised to start bicarb drip ( D5 with 150 meq of bicarb at 100cc/hr) .  pt with hyperkalemia of 7.9 with ekg changes, would need urgent dialysis. dialysis  rn on called paged. full consult to follow

## 2020-02-05 NOTE — ED ADULT NURSE REASSESSMENT NOTE - NS ED NURSE REASSESS COMMENT FT1
11pm serum K 7.9 .repeat sent poassium critically high .ivinserted in REJ by  MD ,calcium gluconate ,dextrose 50 % ,reg insulin 10 units ,sodium bicarbonate given .neb treatment with albuterol .83 5 done .ns saline bolus done .blood sent stat for PT/PTT and TYPE and SCREEN . ICU eval done by  .report given to icu RN DELFINA ,CT of abd pelvis done stat and transferred the pt to ICU  with RAY ,MD and RN

## 2020-02-05 NOTE — H&P ADULT - HISTORY OF PRESENT ILLNESS
69 year old Male from [facility], with PMHx of prostate Ca (s/p radiation in 2015, now in remission), asthma, COPD (not on O2), HTN, HLD, Bipolar, anemia, GERD, Ileostomy s/p sigmoid resection, PAD, and CKD presented to ED with Generalized pain that has been worsening for several weeks.  Pt states he has chronic back pain for which he was admitted for 2 weeks prior.  He states the facility gave him pain medication, but the pain did not improve.  Pt states he has not urinated for 24hrs, but feels the urge to void.  Pt denies CP, palpitations, HA, dizziness, fever, or syncope.     In the ED, pt presents in mild painful distress, vitals WNL, and EKG concerning for long MN segments, and wide QRS intervals

## 2020-02-05 NOTE — CONSULT NOTE ADULT - SUBJECTIVE AND OBJECTIVE BOX
[facility]   MD EVITA PALUMBO MD RUORU WONG, PA    TEL:  OFFICE: [phone]  DR CAPUTO CELL: [phone]  DAGOBERTO ACEVEDO CELL: [phone]  DR. SALAZAR CELL: [phone]  DR. PAIZ CELl: [phone]    FROM 5 PM- 7 AM PLEASE CALL ANSWERING SERVICE AT [phone]    -- INITIAL RENAL CONSULT NOTE  --------------------------------------------------------------------------------  HPI:  69 year old Male from [facility], with PMHx of prostate Ca (s/p radiation in 2015, now in remission), asthma, COPD (not on O2), HTN, HLD, Bipolar, anemia, GERD, Ileostomy s/p sigmoid resection, PAD, and CKD presented to ED with Generalized pain that has been worsening for several weeks  Nephrology consulted for PAWAN and hyperkalemia       PAST HISTORY  --------------------------------------------------------------------------------  PAST MEDICAL & SURGICAL HISTORY:  BPH with urinary obstruction  Prostate CA  CKD (chronic kidney disease)  PAD (peripheral artery disease)  HLD (hyperlipidemia)  HTN (hypertension)  IBD (inflammatory bowel disease)  Bipolar disorder  Anemia  COPD, mild  History of creation of ostomy    FAMILY HISTORY:    PAST SOCIAL HISTORY:    ALLERGIES & MEDICATIONS  --------------------------------------------------------------------------------  Allergies    No Known Allergies    Intolerances      Standing Inpatient Medications  ARIPiprazole 15 milliGRAM(s) Oral daily  atorvastatin 10 milliGRAM(s) Oral at bedtime  cefTRIAXone   IVPB      cefTRIAXone   IVPB 1000 milliGRAM(s) IV Intermittent once  chlorhexidine 2% Cloths 1 Application(s) Topical <User Schedule>  finasteride 5 milliGRAM(s) Oral daily  gabapentin 600 milliGRAM(s) Oral two times a day  insulin lispro (HumaLOG) corrective regimen sliding scale   SubCutaneous every 6 hours  lidocaine   Patch 1 Patch Transdermal daily  pantoprazole    Tablet 40 milliGRAM(s) Oral before breakfast  sodium bicarbonate 650 milliGRAM(s) Oral three times a day  sodium chloride 0.9%. 1000 milliLiter(s) IV Continuous <Continuous>    PRN Inpatient Medications      REVIEW OF SYSTEMS  --------------------------------------------------------------------------------  Gen: No fevers/chills  Skin: No rashes  Head/Eyes/Ears: Normal hearing,  Normal vision   Respiratory: No dyspnea, cough  CV: No chest pain  GI: No abdominal pain, diarrhea, constipation, nausea, vomiting  : difficult urination   MSK: No  edema  Heme: No easy bruising or bleeding  Psych: No significant depression    All other systems were reviewed and are negative, except as noted.    VITALS/PHYSICAL EXAM  --------------------------------------------------------------------------------  T(C): 36.6 (02-05-20 @ 02:45), Max: 36.7 (02-04-20 @ 19:00)  HR: 91 (02-05-20 @ 04:00) (81 - 95)  BP: 118/51 (02-05-20 @ 04:00) (93/49 - 118/51)  RR: 15 (02-05-20 @ 04:00) (15 - 21)  SpO2: 100% (02-05-20 @ 04:00) (96% - 100%)  Wt(kg): --  Height (cm): 177.8 (02-05-20 @ 02:45)  Weight (kg): 89.5 (02-05-20 @ 02:45)  BMI (kg/m2): 28.3 (02-05-20 @ 02:45)  BSA (m2): 2.08 (02-05-20 @ 02:45)      02-04-20 @ 07:01  -  02-05-20 @ 07:00  --------------------------------------------------------  IN: 0 mL / OUT: 200 mL / NET: -200 mL      Physical Exam:  	Gen: NAD  	HEENT: MMM  	Pulm: CTA B/L  	CV: S1S2  	Abd: Soft, +BS   	Ext: No LE edema B/L  	Neuro: Awake alert  	Skin: Warm and dry  	Vascular access: femoral HD catheter left    LABS/STUDIES  --------------------------------------------------------------------------------              8.5    9.75  >-----------<  170      [02-05-20 @ 04:38]              26.8     132  |  108  |  86  ----------------------------<  81      [02-05-20 @ 04:38]  6.7   |  12  |  8.76        Ca     8.2     [02-05-20 @ 04:38]      Mg     1.7     [02-05-20 @ 04:38]      Phos  4.5     [02-05-20 @ 04:38]    TPro  7.6  /  Alb  3.5  /  TBili  0.4  /  DBili  x   /  AST  19  /  ALT  23  /  AlkPhos  116  [02-05-20 @ 04:38]    PT/INR: PT 12.5 , INR 1.12       [02-05-20 @ 04:38]  PTT: 21.1       [02-05-20 @ 04:38]    Troponin <0.015      [02-05-20 @ 01:03]    Creatinine Trend:  SCr 8.76 [02-05 @ 04:38]  SCr 8.67 [02-05 @ 00:21]  SCr 8.42 [02-04 @ 21:31]  SCr 2.59 [01-28 @ 07:31]  SCr 2.45 [01-27 @ 16:56]    Urinalysis - [02-05-20 @ 04:42]      Color Yellow / Appearance Clear / SG 1.015 / pH 5.0      Gluc Negative / Ketone Negative  / Bili Negative / Urobili Negative       Blood Moderate / Protein 30 / Leuk Est Moderate / Nitrite Negative      RBC 5-10 / WBC 11-25 / Hyaline 0-2 / Gran  / Sq Epi  / Non Sq Epi Few / Bacteria Moderate    Urine Creatinine 138      [02-05-20 @ 06:09]  Urine Sodium 37      [02-05-20 @ 06:09]  Urine Potassium 37      [02-05-20 @ 06:09]  Urine Chloride 33      [02-05-20 @ 06:09]  Urine Osmolality 325      [02-05-20 @ 06:09]    Iron 61, TIBC 220, %sat 28      [01-28-20 @ 07:31]  Ferritin 371      [01-28-20 @ 14:41]  PTH -- (Ca 8.6)      [01-24-20 @ 15:01]   132  Vitamin D (25OH) 13.7      [12-24-19 @ 09:22]  HbA1c 5.6      [01-24-20 @ 15:03]  TSH 1.78      [01-24-20 @ 10:32]  Lipid: chol 99, TG 73, HDL 48, LDL 36      [01-24-20 @ 10:32]    HBsAg Nonreact      [04-05-19 @ 10:07]  HCV 10.32, Reactive      [04-05-19 @ 15:16]  HIV Nonreact      [04-05-19 @ 09:52]

## 2020-02-05 NOTE — CHART NOTE - NSCHARTNOTEFT_GEN_A_CORE
Patient s/p insertion of left femoral shiley 2/5 and HD. Left groin clean dry and intact, no signs of hematoma or ecchymosis. Continue HD per renal.

## 2020-02-06 LAB
ALBUMIN SERPL ELPH-MCNC: 3 G/DL — LOW (ref 3.5–5)
ALP SERPL-CCNC: 90 U/L — SIGNIFICANT CHANGE UP (ref 40–120)
ALT FLD-CCNC: 21 U/L DA — SIGNIFICANT CHANGE UP (ref 10–60)
ANION GAP SERPL CALC-SCNC: 5 MMOL/L — SIGNIFICANT CHANGE UP (ref 5–17)
ANION GAP SERPL CALC-SCNC: 5 MMOL/L — SIGNIFICANT CHANGE UP (ref 5–17)
AST SERPL-CCNC: 42 U/L — HIGH (ref 10–40)
BASOPHILS # BLD AUTO: 0.04 K/UL — SIGNIFICANT CHANGE UP (ref 0–0.2)
BASOPHILS NFR BLD AUTO: 0.6 % — SIGNIFICANT CHANGE UP (ref 0–2)
BILIRUB SERPL-MCNC: 0.6 MG/DL — SIGNIFICANT CHANGE UP (ref 0.2–1.2)
BUN SERPL-MCNC: 24 MG/DL — HIGH (ref 7–18)
BUN SERPL-MCNC: 51 MG/DL — HIGH (ref 7–18)
CALCIUM SERPL-MCNC: 7.8 MG/DL — LOW (ref 8.4–10.5)
CALCIUM SERPL-MCNC: 8 MG/DL — LOW (ref 8.4–10.5)
CHLORIDE SERPL-SCNC: 110 MMOL/L — HIGH (ref 96–108)
CHLORIDE SERPL-SCNC: 112 MMOL/L — HIGH (ref 96–108)
CO2 SERPL-SCNC: 21 MMOL/L — LOW (ref 22–31)
CO2 SERPL-SCNC: 27 MMOL/L — SIGNIFICANT CHANGE UP (ref 22–31)
CREAT SERPL-MCNC: 2.09 MG/DL — HIGH (ref 0.5–1.3)
CREAT SERPL-MCNC: 3.65 MG/DL — HIGH (ref 0.5–1.3)
EOSINOPHIL # BLD AUTO: 0.42 K/UL — SIGNIFICANT CHANGE UP (ref 0–0.5)
EOSINOPHIL NFR BLD AUTO: 6.8 % — HIGH (ref 0–6)
GLUCOSE SERPL-MCNC: 104 MG/DL — HIGH (ref 70–99)
GLUCOSE SERPL-MCNC: 84 MG/DL — SIGNIFICANT CHANGE UP (ref 70–99)
HCT VFR BLD CALC: 23.7 % — LOW (ref 39–50)
HCV RNA FLD QL NAA+PROBE: SIGNIFICANT CHANGE UP
HCV RNA SPEC QL PROBE+SIG AMP: SIGNIFICANT CHANGE UP
HGB BLD-MCNC: 7.7 G/DL — LOW (ref 13–17)
IMM GRANULOCYTES NFR BLD AUTO: 0.3 % — SIGNIFICANT CHANGE UP (ref 0–1.5)
LYMPHOCYTES # BLD AUTO: 0.93 K/UL — LOW (ref 1–3.3)
LYMPHOCYTES # BLD AUTO: 15 % — SIGNIFICANT CHANGE UP (ref 13–44)
MAGNESIUM SERPL-MCNC: 1.6 MG/DL — SIGNIFICANT CHANGE UP (ref 1.6–2.6)
MAGNESIUM SERPL-MCNC: 1.7 MG/DL — SIGNIFICANT CHANGE UP (ref 1.6–2.6)
MCHC RBC-ENTMCNC: 27.9 PG — SIGNIFICANT CHANGE UP (ref 27–34)
MCHC RBC-ENTMCNC: 32.5 GM/DL — SIGNIFICANT CHANGE UP (ref 32–36)
MCV RBC AUTO: 85.9 FL — SIGNIFICANT CHANGE UP (ref 80–100)
MONOCYTES # BLD AUTO: 0.88 K/UL — SIGNIFICANT CHANGE UP (ref 0–0.9)
MONOCYTES NFR BLD AUTO: 14.2 % — HIGH (ref 2–14)
NEUTROPHILS # BLD AUTO: 3.9 K/UL — SIGNIFICANT CHANGE UP (ref 1.8–7.4)
NEUTROPHILS NFR BLD AUTO: 63.1 % — SIGNIFICANT CHANGE UP (ref 43–77)
NRBC # BLD: 0 /100 WBCS — SIGNIFICANT CHANGE UP (ref 0–0)
PHOSPHATE SERPL-MCNC: 2.6 MG/DL — SIGNIFICANT CHANGE UP (ref 2.5–4.5)
PHOSPHATE SERPL-MCNC: 2.8 MG/DL — SIGNIFICANT CHANGE UP (ref 2.5–4.5)
PLATELET # BLD AUTO: 164 K/UL — SIGNIFICANT CHANGE UP (ref 150–400)
POTASSIUM SERPL-MCNC: 3.9 MMOL/L — SIGNIFICANT CHANGE UP (ref 3.5–5.3)
POTASSIUM SERPL-MCNC: 6 MMOL/L — HIGH (ref 3.5–5.3)
POTASSIUM SERPL-SCNC: 3.9 MMOL/L — SIGNIFICANT CHANGE UP (ref 3.5–5.3)
POTASSIUM SERPL-SCNC: 6 MMOL/L — HIGH (ref 3.5–5.3)
PROT SERPL-MCNC: 7.3 G/DL — SIGNIFICANT CHANGE UP (ref 6–8.3)
RBC # BLD: 2.76 M/UL — LOW (ref 4.2–5.8)
RBC # FLD: 16.6 % — HIGH (ref 10.3–14.5)
SODIUM SERPL-SCNC: 138 MMOL/L — SIGNIFICANT CHANGE UP (ref 135–145)
SODIUM SERPL-SCNC: 142 MMOL/L — SIGNIFICANT CHANGE UP (ref 135–145)
WBC # BLD: 6.19 K/UL — SIGNIFICANT CHANGE UP (ref 3.8–10.5)
WBC # FLD AUTO: 6.19 K/UL — SIGNIFICANT CHANGE UP (ref 3.8–10.5)

## 2020-02-06 PROCEDURE — 93010 ELECTROCARDIOGRAM REPORT: CPT

## 2020-02-06 RX ORDER — ALBUTEROL 90 UG/1
2.5 AEROSOL, METERED ORAL ONCE
Refills: 0 | Status: COMPLETED | OUTPATIENT
Start: 2020-02-06 | End: 2020-02-06

## 2020-02-06 RX ORDER — ACETAMINOPHEN 500 MG
1000 TABLET ORAL ONCE
Refills: 0 | Status: COMPLETED | OUTPATIENT
Start: 2020-02-06 | End: 2020-02-06

## 2020-02-06 RX ORDER — INSULIN HUMAN 100 [IU]/ML
10 INJECTION, SOLUTION SUBCUTANEOUS ONCE
Refills: 0 | Status: COMPLETED | OUTPATIENT
Start: 2020-02-06 | End: 2020-02-06

## 2020-02-06 RX ORDER — DEXTROSE 50 % IN WATER 50 %
50 SYRINGE (ML) INTRAVENOUS ONCE
Refills: 0 | Status: COMPLETED | OUTPATIENT
Start: 2020-02-06 | End: 2020-02-06

## 2020-02-06 RX ORDER — SODIUM POLYSTYRENE SULFONATE 4.1 MEQ/G
15 POWDER, FOR SUSPENSION ORAL ONCE
Refills: 0 | Status: COMPLETED | OUTPATIENT
Start: 2020-02-06 | End: 2020-02-06

## 2020-02-06 RX ORDER — MIDODRINE HYDROCHLORIDE 2.5 MG/1
2.5 TABLET ORAL THREE TIMES A DAY
Refills: 0 | Status: DISCONTINUED | OUTPATIENT
Start: 2020-02-06 | End: 2020-02-11

## 2020-02-06 RX ORDER — SODIUM BICARBONATE 1 MEQ/ML
0.17 SYRINGE (ML) INTRAVENOUS
Qty: 150 | Refills: 0 | Status: DISCONTINUED | OUTPATIENT
Start: 2020-02-06 | End: 2020-02-06

## 2020-02-06 RX ORDER — HYDROMORPHONE HYDROCHLORIDE 2 MG/ML
0.5 INJECTION INTRAMUSCULAR; INTRAVENOUS; SUBCUTANEOUS ONCE
Refills: 0 | Status: DISCONTINUED | OUTPATIENT
Start: 2020-02-06 | End: 2020-02-06

## 2020-02-06 RX ORDER — CALCIUM GLUCONATE 100 MG/ML
1 VIAL (ML) INTRAVENOUS ONCE
Refills: 0 | Status: COMPLETED | OUTPATIENT
Start: 2020-02-06 | End: 2020-02-06

## 2020-02-06 RX ORDER — IPRATROPIUM/ALBUTEROL SULFATE 18-103MCG
3 AEROSOL WITH ADAPTER (GRAM) INHALATION ONCE
Refills: 0 | Status: DISCONTINUED | OUTPATIENT
Start: 2020-02-06 | End: 2020-02-06

## 2020-02-06 RX ADMIN — Medication 100 MEQ/KG/HR: at 08:38

## 2020-02-06 RX ADMIN — SODIUM POLYSTYRENE SULFONATE 15 GRAM(S): 4.1 POWDER, FOR SUSPENSION ORAL at 08:40

## 2020-02-06 RX ADMIN — CHLORHEXIDINE GLUCONATE 1 APPLICATION(S): 213 SOLUTION TOPICAL at 05:32

## 2020-02-06 RX ADMIN — MIDODRINE HYDROCHLORIDE 2.5 MILLIGRAM(S): 2.5 TABLET ORAL at 19:37

## 2020-02-06 RX ADMIN — ALBUTEROL 2.5 MILLIGRAM(S): 90 AEROSOL, METERED ORAL at 08:52

## 2020-02-06 RX ADMIN — HYDROMORPHONE HYDROCHLORIDE 0.5 MILLIGRAM(S): 2 INJECTION INTRAMUSCULAR; INTRAVENOUS; SUBCUTANEOUS at 22:00

## 2020-02-06 RX ADMIN — Medication 1000 MILLIGRAM(S): at 20:59

## 2020-02-06 RX ADMIN — Medication 50 MILLILITER(S): at 08:38

## 2020-02-06 RX ADMIN — GABAPENTIN 600 MILLIGRAM(S): 400 CAPSULE ORAL at 05:32

## 2020-02-06 RX ADMIN — FINASTERIDE 5 MILLIGRAM(S): 5 TABLET, FILM COATED ORAL at 14:13

## 2020-02-06 RX ADMIN — Medication 650 MILLIGRAM(S): at 05:31

## 2020-02-06 RX ADMIN — Medication 1000 MILLIGRAM(S): at 06:57

## 2020-02-06 RX ADMIN — Medication 100 GRAM(S): at 08:38

## 2020-02-06 RX ADMIN — HYDROMORPHONE HYDROCHLORIDE 0.5 MILLIGRAM(S): 2 INJECTION INTRAMUSCULAR; INTRAVENOUS; SUBCUTANEOUS at 21:40

## 2020-02-06 RX ADMIN — PANTOPRAZOLE SODIUM 40 MILLIGRAM(S): 20 TABLET, DELAYED RELEASE ORAL at 05:31

## 2020-02-06 RX ADMIN — GABAPENTIN 600 MILLIGRAM(S): 400 CAPSULE ORAL at 17:04

## 2020-02-06 RX ADMIN — INSULIN HUMAN 10 UNIT(S): 100 INJECTION, SOLUTION SUBCUTANEOUS at 08:41

## 2020-02-06 RX ADMIN — HYDROMORPHONE HYDROCHLORIDE 0.5 MILLIGRAM(S): 2 INJECTION INTRAMUSCULAR; INTRAVENOUS; SUBCUTANEOUS at 15:10

## 2020-02-06 RX ADMIN — CEFTRIAXONE 100 MILLIGRAM(S): 500 INJECTION, POWDER, FOR SOLUTION INTRAMUSCULAR; INTRAVENOUS at 08:38

## 2020-02-06 RX ADMIN — ARIPIPRAZOLE 15 MILLIGRAM(S): 15 TABLET ORAL at 14:14

## 2020-02-06 RX ADMIN — MIDODRINE HYDROCHLORIDE 2.5 MILLIGRAM(S): 2.5 TABLET ORAL at 14:14

## 2020-02-06 RX ADMIN — HYDROMORPHONE HYDROCHLORIDE 0.5 MILLIGRAM(S): 2 INJECTION INTRAMUSCULAR; INTRAVENOUS; SUBCUTANEOUS at 16:10

## 2020-02-06 RX ADMIN — Medication 400 MILLIGRAM(S): at 05:29

## 2020-02-06 RX ADMIN — Medication 400 MILLIGRAM(S): at 20:21

## 2020-02-06 NOTE — PROGRESS NOTE ADULT - SUBJECTIVE AND OBJECTIVE BOX
Time of Visit:  Patient seen and examined.     MEDICATIONS  (STANDING):  ARIPiprazole 15 milliGRAM(s) Oral daily  atorvastatin 10 milliGRAM(s) Oral at bedtime  cefTRIAXone   IVPB      cefTRIAXone   IVPB 1000 milliGRAM(s) IV Intermittent every 24 hours  chlorhexidine 2% Cloths 1 Application(s) Topical <User Schedule>  finasteride 5 milliGRAM(s) Oral daily  gabapentin 600 milliGRAM(s) Oral two times a day  lidocaine   Patch 1 Patch Transdermal daily  midodrine. 2.5 milliGRAM(s) Oral three times a day  pantoprazole    Tablet 40 milliGRAM(s) Oral before breakfast      MEDICATIONS  (PRN):       Medications up to date at time of exam.      PHYSICAL EXAMINATION:  Patient has no new complaints.  GENERAL: The patient is a well-developed, well-nourished, in no apparent distress.     Vital Signs Last 24 Hrs  T(C): 36.7 (2020 16:00), Max: 37 (2020 06:00)  T(F): 98.1 (2020 16:00), Max: 98.6 (2020 06:00)  HR: 82 (2020 18:00) (76 - 90)  BP: 120/57 (2020 18:00) (85/41 - 122/62)  BP(mean): 70 (2020 18:00) (51 - 77)  RR: 15 (2020 18:00) (13 - 29)  SpO2: 95% (2020 18:00) (93% - 99%)   (if applicable)    Chest Tube (if applicable)    HEENT: Head is normocephalic and atraumatic. Extraocular muscles are intact. Mucous membranes are moist.     NECK: Supple, no palpable adenopathy.    LUNGS: Clear to auscultation, no wheezing, rales, or rhonchi.    HEART: Regular rate and rhythm without murmur.    ABDOMEN: Soft, nontender, and nondistended.  No hepatosplenomegaly is noted.    : No painful voiding, no pelvic pain    EXTREMITIES: Without any cyanosis, clubbing, rash, lesions or edema.    NEUROLOGIC: Awake, alert, oriented, grossly intact    SKIN: Warm, dry, good turgor.      LABS:                        7.7    6.19  )-----------( 164      ( 2020 06:56 )             23.7     02-06    142  |  110<H>  |  24<H>  ----------------------------<  104<H>  3.9   |  27  |  2.09<H>    Ca    8.0<L>      2020 15:15  Phos  2.6     02-06  Mg     1.7     02-06    TPro  7.3  /  Alb  3.0<L>  /  TBili  0.6  /  DBili  x   /  AST  42<H>  /  ALT  21  /  AlkPhos  90  02-06    PT/INR - ( 2020 04:38 )   PT: 12.5 sec;   INR: 1.12 ratio         PTT - ( 2020 04:38 )  PTT:21.1 sec  Urinalysis Basic - ( 2020 04:42 )    Color: Yellow / Appearance: Clear / S.015 / pH: x  Gluc: x / Ketone: Negative  / Bili: Negative / Urobili: Negative   Blood: x / Protein: 30 mg/dL / Nitrite: Negative   Leuk Esterase: Moderate / RBC: 5-10 /HPF / WBC 11-25 /HPF   Sq Epi: x / Non Sq Epi: Few /HPF / Bacteria: Moderate /HPF        CARDIAC MARKERS ( 2020 01:03 )  <0.015 ng/mL / x     / x     / x     / 6.3 ng/mL                MICROBIOLOGY: (if applicable)    RADIOLOGY & ADDITIONAL STUDIES:  EKG:   CXR:  ECHO:    IMPRESSION: 69y Male PAST MEDICAL & SURGICAL HISTORY:  BPH with urinary obstruction  Prostate CA  CKD (chronic kidney disease)  PAD (peripheral artery disease)  HLD (hyperlipidemia)  HTN (hypertension)  IBD (inflammatory bowel disease)  Bipolar disorder  Anemia  COPD, mild  History of creation of ostomy   p/w           RECOMMENDATIONS:

## 2020-02-06 NOTE — PROGRESS NOTE ADULT - ASSESSMENT
69 year old Male from [facility], with PMHx of prostate Ca (s/p radiation in 2015, now in remission), asthma, COPD (not on O2), HTN, HLD, Bipolar, anemia, GERD, Ileostomy s/p sigmoid resection, PAD, and CKD presented to ED with Generalized pain that has been worsening for several weeks.  Pt states he has chronic back pain for which he was admitted for 2 weeks prior.  He states the facility gave him pain medication, but the pain did not improve.  Pt states he has not urinated for 24hrs, but feels the urge to void.  Pt denies CP, palpitations, HA, dizziness, fever, or syncope.     In the ED, pt presents in mild painful distress, vitals WNL, and EKG concerning for long TN segments, and wide QRS intervals.  Remaining labs sig for K+ of 7.5 with EKG changes, and Creat of 8 (baseline 2.5).  In the ED, pt received the hyperkalemia cocktail consisting of insulin, dextrose, albuterol, calcium, and Kayexalate.  Nephrology contacted emergently who recommended emergent HD overnight for hyperkalemia and EKG changes.      Pt admitted to the ICU for management of symptomatic hyperkalemia requiring new HD    Assessment:  -Severe Hyperkalemia with EKG changes  -Acute Renal Failure on CKD   -Hypertension  -Bi-Polar disorder   -COPD/ Asthma  -Anemia of chronic renal disease   -PAD      Plan:  Neuro:  -Currently AO x 3   -Bi-Polar disorder- c/w Abilify therapy  -wild hold trazodone and lamotrigine in the setting of ARF     CV:  -Hypertension- will hold antihypertensives as blood pressure currently on the lower side; resume as clinically indicated   PAD- c/w asa therapy   f/u ECHO    Pulm:  -CXR clear, saturating 100% on room air  -COPD not on home O2-no concern for exacerbation- c/w duoneb therapy as needed    ID:  Minimal urine OP   c/w  Rocephin therapy with concern for cystitis     Nephro:  -Acute Renal Failure on CKD  s/p HD in am  c/w NS    f/u Nephro Dr. Vasquez     GI:  -Renal/ low sodium diet     Heme:  -anemia of chronic renal disease- currently at baseline      Endo:    -target CBG < 180  -f/u HbA1c    Prophy:  -Heparin S/C for DVT prophy    Dispo:  - monitor in the ICU. Prognosis guarded based on Renal function and electrolyte abnormality    GOC   FULL CODE 69 year old Male from [facility], with PMHx of prostate Ca (s/p radiation in 2015, now in remission), asthma, COPD (not on O2), HTN, HLD, Bipolar, anemia, GERD, Ileostomy s/p sigmoid resection, PAD, and CKD presented to ED with Generalized pain that has been worsening for several weeks.  Pt states he has chronic back pain for which he was admitted for 2 weeks prior.  He states the facility gave him pain medication, but the pain did not improve.  Pt states he has not urinated for 24hrs, but feels the urge to void.  Pt denies CP, palpitations, HA, dizziness, fever, or syncope. Pt admitted to the ICU for management of symptomatic hyperkalemia requiring new HD    Assessment:  -Severe Hyperkalemia with EKG changes  -Acute Renal Failure on CKD   -Hypertension  -Bi-Polar disorder   -COPD/ Asthma  -Anemia of chronic renal disease   -PAD  Plan:  Neuro:  -Currently AO x 3   -Bi-Polar disorder- c/w Abilify therapy  -wild hold trazodone and lamotrigine in the setting of ARF   CV:  -Hypertension- will hold antihypertensives as blood pressure currently on the lower side; resume as clinically indicated   Monitor BP  PAD- c/w asa therapy   f/u ECHO  Pulm:  -CXR clear, saturating 100% on room air  -COPD not on home O2-no concern for exacerbation- c/w duoneb therapy as needed    ID:c/w  Rocephin therapy with concern for cystitis   Nephro:  -Acute Renal Failure on CKD  Hyperkalemia 6, s/p cocktail  EKG showed NSR  s/p HD in am  Started on Bicarb drip   f/u Nephro Dr. Vasquez   GI:  -Renal/ low sodium diet     Heme:  -anemia of chronic renal disease- currently at baseline  Endo:  target CBG < 180  -f/u HbA1c  Prophy:  -Heparin S/C for DVT prophy           FULL CODE 69 year old Male from [facility], with PMHx of prostate Ca (s/p radiation in 2015, now in remission), asthma, COPD (not on O2), HTN, HLD, Bipolar, anemia, GERD, Ileostomy s/p sigmoid resection, PAD, and CKD presented to ED with Generalized pain that has been worsening for several weeks.  Pt states he has chronic back pain for which he was admitted for 2 weeks prior.  He states the facility gave him pain medication, but the pain did not improve.  Pt states he has not urinated for 24hrs, but feels the urge to void.  Pt denies CP, palpitations, HA, dizziness, fever, or syncope. Pt admitted to the ICU for management of symptomatic hyperkalemia requiring new HD    Assessment:  -Severe Hyperkalemia with EKG changes  -Acute Renal Failure on CKD   -Hypertension  -Bi-Polar disorder   -COPD/ Asthma  -Anemia of chronic renal disease   -PAD  Plan:  Neuro:  -Currently AO x 3   -Bi-Polar disorder- c/w Abilify therapy  -wild hold trazodone and lamotrigine in the setting of ARF   CV:  -Hypertension- will hold antihypertensives as blood pressure currently on the lower side; resume as clinically indicated   Monitor BP  PAD- c/w asa therapy   f/u ECHO  Pulm:  -CXR clear, saturating 100% on room air  -COPD not on home O2-no concern for exacerbation- c/w duoneb therapy as needed    ID:c/w  Rocephin therapy with concern for cystitis   Nephro:  -Acute Renal Failure on CKD  Hyperkalemia 6, s/p cocktail  EKG showed NSR  s/p HD in am  Started on Bicarb drip   f/u Nephro Dr. Vasquez   GI:  -Renal/ low sodium diet     Heme:  -anemia of chronic renal disease- currently at baseline  Endo:  target CBG < 180  -f/u HbA1c  Prophy:  -Heparin S/C for DVT prophy  FULL CODE

## 2020-02-06 NOTE — PROGRESS NOTE ADULT - SUBJECTIVE AND OBJECTIVE BOX
Patient denies chest pain or shortness of breath.   Review of systems otherwise (-)  	  MEDICATIONS:  MEDICATIONS  (STANDING):  ARIPiprazole 15 milliGRAM(s) Oral daily  atorvastatin 10 milliGRAM(s) Oral at bedtime  cefTRIAXone   IVPB      cefTRIAXone   IVPB 1000 milliGRAM(s) IV Intermittent every 24 hours  chlorhexidine 2% Cloths 1 Application(s) Topical <User Schedule>  finasteride 5 milliGRAM(s) Oral daily  gabapentin 600 milliGRAM(s) Oral two times a day  insulin lispro (HumaLOG) corrective regimen sliding scale   SubCutaneous every 6 hours  lidocaine   Patch 1 Patch Transdermal daily  midodrine. 2.5 milliGRAM(s) Oral three times a day  pantoprazole    Tablet 40 milliGRAM(s) Oral before breakfast  sodium bicarbonate  Infusion 0.168 mEq/kG/Hr (100 mL/Hr) IV Continuous <Continuous>      LABS:	 	    CARDIAC MARKERS:  CARDIAC MARKERS ( 05 Feb 2020 01:03 )  <0.015 ng/mL / x     / x     / x     / 6.3 ng/mL                                7.7    6.19  )-----------( 164      ( 06 Feb 2020 06:56 )             23.7     Hemoglobin: 7.7 g/dL (02-06 @ 06:56)  Hemoglobin: 8.1 g/dL (02-05 @ 09:21)  Hemoglobin: 8.5 g/dL (02-05 @ 04:38)  Hemoglobin: 9.6 g/dL (02-04 @ 21:31)      02-06    138  |  112<H>  |  51<H>  ----------------------------<  84  6.0<H>   |  21<L>  |  3.65<H>    Ca    7.8<L>      06 Feb 2020 06:56  Phos  2.8     02-06  Mg     1.6     02-06    TPro  7.3  /  Alb  3.0<L>  /  TBili  0.6  /  DBili  x   /  AST  42<H>  /  ALT  21  /  AlkPhos  90  02-06    Creatinine Trend: 3.65<--, 4.76<--, 5.07<--, 8.76<--, 8.67<--, 8.42<--      PHYSICAL EXAM:  T(C): 37 (02-06-20 @ 06:00), Max: 37 (02-06-20 @ 06:00)  HR: 86 (02-06-20 @ 09:00) (76 - 95)  BP: 99/47 (02-06-20 @ 09:00) (85/41 - 111/53)  RR: 17 (02-06-20 @ 09:00) (13 - 29)  SpO2: 96% (02-06-20 @ 09:00) (93% - 100%)  Wt(kg): --  I&O's Summary    05 Feb 2020 07:01  -  06 Feb 2020 07:00  --------------------------------------------------------  IN: 2100 mL / OUT: 2950 mL / NET: -850 mL    06 Feb 2020 07:01  -  06 Feb 2020 12:48  --------------------------------------------------------  IN: 560 mL / OUT: 590 mL / NET: -30 mL          Gen: Appears well in NAD  HEENT:  (-)icterus (-)pallor  CV: N S1 S2 1/6 MARCELLE (+)2 Pulses B/l  Resp:  Clear to ausculatation B/L, normal effort  GI: (+) BS Soft, NT, ND  Lymph:  (-)Edema, (-)obvious lymphadenopathy  Skin: Warm to touch, Normal turgor  Psych: Appropriate mood and affect      TELEMETRY: 	  Sinus        ASSESSMENT/PLAN: 	69y  Male f prostate Ca (s/p radiation in 2015, now in remission), asthma, COPD (not on O2) chronic dyspnea on exertion, HTN, HLD, Bipolar, anemia, GERD, Ileostomy s/p sigmoid resection, PAD, normal LV and RV function on recent echo in our office, normal perfusion on nuclear stress 1/20 and CKD presented to ED with Generalized pain that has been worsening for several weeks.     - EKG inconsistent with acute injury.. more consistent with electrolyte derangement   - Started on HD or hyperkalemia  - f/u renal sono  - supportive care per LIYA Archer MD, [facility]  BEEPER [phone]

## 2020-02-06 NOTE — PROGRESS NOTE ADULT - SUBJECTIVE AND OBJECTIVE BOX
[facility]   MD EVITA PALUMBO MD RUORU WONG, PA    TEL:  OFFICE: [phone]  DR CAPUTO CELL: [phone]  DAGOBERTO ACEVEDO CELL: [phone]  DR. SALAZAR CELL: [phone]  DR. PAIZ CELL: [phone]    FROM 5 PM - 7 AM PLEASE CALL ANSWERING SERVICE: [phone]    RENAL FOLLOW UP NOTE  --------------------------------------------------------------------------------  HPI:      Pt seen and examined at bedside.        PAST HISTORY  --------------------------------------------------------------------------------  No significant changes to PMH, PSH, FHx, SHx, unless otherwise noted    ALLERGIES & MEDICATIONS  --------------------------------------------------------------------------------  Allergies    No Known Allergies    Intolerances      Standing Inpatient Medications  ARIPiprazole 15 milliGRAM(s) Oral daily  atorvastatin 10 milliGRAM(s) Oral at bedtime  cefTRIAXone   IVPB      cefTRIAXone   IVPB 1000 milliGRAM(s) IV Intermittent every 24 hours  chlorhexidine 2% Cloths 1 Application(s) Topical <User Schedule>  finasteride 5 milliGRAM(s) Oral daily  gabapentin 600 milliGRAM(s) Oral two times a day  insulin lispro (HumaLOG) corrective regimen sliding scale   SubCutaneous every 6 hours  lidocaine   Patch 1 Patch Transdermal daily  pantoprazole    Tablet 40 milliGRAM(s) Oral before breakfast  sodium bicarbonate  Infusion 0.168 mEq/kG/Hr IV Continuous <Continuous>    PRN Inpatient Medications      REVIEW OF SYSTEMS  --------------------------------------------------------------------------------  General: no fever  CVS: no chest pain  MSK: no edema     VITALS/PHYSICAL EXAM  --------------------------------------------------------------------------------  T(C): 37 (02-06-20 @ 06:00), Max: 37 (02-06-20 @ 06:00)  HR: 86 (02-06-20 @ 09:00) (76 - 95)  BP: 99/47 (02-06-20 @ 09:00) (85/41 - 111/53)  RR: 17 (02-06-20 @ 09:00) (13 - 29)  SpO2: 96% (02-06-20 @ 09:00) (93% - 100%)  Wt(kg): --  Height (cm): 177.8 (02-05-20 @ 02:45)  Weight (kg): 89.5 (02-05-20 @ 02:45)  BMI (kg/m2): 28.3 (02-05-20 @ 02:45)  BSA (m2): 2.08 (02-05-20 @ 02:45)      02-05-20 @ 07:01  -  02-06-20 @ 07:00  --------------------------------------------------------  IN: 2100 mL / OUT: 2950 mL / NET: -850 mL    02-06-20 @ 07:01  -  02-06-20 @ 09:36  --------------------------------------------------------  IN: 560 mL / OUT: 130 mL / NET: 430 mL      Physical Exam:  	Gen: NAD  	HEENT: MMM  	Pulm: Coarse breath sounds  B/L  	CV: S1S2  	Abd: Soft, +BS  	Ext: No LE edema B/L                      Neuro: Awake alert  	Skin: Warm and Dry   	Vascular access: femoral hd catheter     LABS/STUDIES  --------------------------------------------------------------------------------              7.7    6.19  >-----------<  164      [02-06-20 @ 06:56]              23.7     138  |  112  |  51  ----------------------------<  84      [02-06-20 @ 06:56]  6.0   |  21  |  3.65        Ca     7.8     [02-06-20 @ 06:56]      Mg     1.6     [02-06-20 @ 06:56]      Phos  2.8     [02-06-20 @ 06:56]    TPro  7.3  /  Alb  3.0  /  TBili  0.6  /  DBili  x   /  AST  42  /  ALT  21  /  AlkPhos  90  [02-06-20 @ 06:56]    PT/INR: PT 12.5 , INR 1.12       [02-05-20 @ 04:38]  PTT: 21.1       [02-05-20 @ 04:38]    Troponin <0.015      [02-05-20 @ 01:03]    Creatinine Trend:  SCr 3.65 [02-06 @ 06:56]  SCr 4.76 [02-05 @ 16:23]  SCr 5.07 [02-05 @ 09:21]  SCr 8.76 [02-05 @ 04:38]  SCr 8.67 [02-05 @ 00:21]    Urinalysis - [02-05-20 @ 04:42]      Color Yellow / Appearance Clear / SG 1.015 / pH 5.0      Gluc Negative / Ketone Negative  / Bili Negative / Urobili Negative       Blood Moderate / Protein 30 / Leuk Est Moderate / Nitrite Negative      RBC 5-10 / WBC 11-25 / Hyaline 0-2 / Gran  / Sq Epi  / Non Sq Epi Few / Bacteria Moderate    Urine Creatinine 138      [02-05-20 @ 06:09]  Urine Sodium 37      [02-05-20 @ 06:09]  Urine Potassium 37      [02-05-20 @ 06:09]  Urine Chloride 33      [02-05-20 @ 06:09]  Urine Osmolality 325      [02-05-20 @ 06:09]    Iron 61, TIBC 220, %sat 28      [01-28-20 @ 07:31]  Ferritin 371      [01-28-20 @ 14:41]  PTH -- (Ca 8.6)      [01-24-20 @ 15:01]   132  Vitamin D (25OH) 13.7      [12-24-19 @ 09:22]  HbA1c 5.6      [01-24-20 @ 15:03]  TSH 1.78      [01-24-20 @ 10:32]  Lipid: chol 99, TG 73, HDL 48, LDL 36      [01-24-20 @ 10:32]    HBsAg Nonreact      [02-05-20 @ 10:06]  HCV 11.27, Reactive      [02-05-20 @ 10:06]

## 2020-02-06 NOTE — PHARMACOTHERAPY INTERVENTION NOTE - COMMENTS
pt was on sodium Bicarbonate tabs and md ordered  sodium Bicarbonate infusion  s;poke to Md and sodium bicarbonate tabs was D/C

## 2020-02-06 NOTE — PROGRESS NOTE ADULT - ASSESSMENT
69 year old Male from [facility], with PMHx of prostate Ca (s/p radiation in 2015, now in remission), asthma, COPD (not on O2), HTN, HLD, Bipolar, anemia, GERD, Ileostomy s/p sigmoid resection, PAD, and CKD presented to ED with Generalized pain that has been worsening for several weeks. foudn to have Velia       VELIA  Scr 2.5 on ( 1/28/2020)  elevated to 8.9 on ( 2/5/2020)  VELIA etiology? possibly ATN from persistant pre-renal state  FeNA suggestive of ATN  PT with improving urine outpt today  s/p HD on 2/5   Plan for HD today for hyperkalemia  Check Renal sonogram  Recommend IVF ( D5 with 150mEQ of bicarb at 100c/hr ) - monitor urine outp closely and fluid status --  MONitor BMP  AVoid further nephrotoxics, NSIADS RCA    HYperkalemia  sec to RF   HD with 2 k bath  low K diet    Acidosis  Non AG  bicarb as above  HD today    Hyponatremia  work up inconclusive  monitor closely

## 2020-02-06 NOTE — PROGRESS NOTE ADULT - SUBJECTIVE AND OBJECTIVE BOX
INTERVAL HPI/OVERNIGHT EVENTS: ***    PRESSORS: [ ] YES [ ] NO  WHICH:    ANTIBIOTICS:                  DATE STARTED:  ANTIBIOTICS:                  DATE STARTED:  ANTIBIOTICS:                  DATE STARTED:    Antimicrobial:  cefTRIAXone   IVPB      cefTRIAXone   IVPB 1000 milliGRAM(s) IV Intermittent every 24 hours    Cardiovascular:    Pulmonary:    Hematalogic:    Other:  ARIPiprazole 15 milliGRAM(s) Oral daily  atorvastatin 10 milliGRAM(s) Oral at bedtime  chlorhexidine 2% Cloths 1 Application(s) Topical <User Schedule>  finasteride 5 milliGRAM(s) Oral daily  gabapentin 600 milliGRAM(s) Oral two times a day  insulin lispro (HumaLOG) corrective regimen sliding scale   SubCutaneous every 6 hours  lidocaine   Patch 1 Patch Transdermal daily  pantoprazole    Tablet 40 milliGRAM(s) Oral before breakfast  sodium bicarbonate 650 milliGRAM(s) Oral three times a day    ARIPiprazole 15 milliGRAM(s) Oral daily  atorvastatin 10 milliGRAM(s) Oral at bedtime  cefTRIAXone   IVPB      cefTRIAXone   IVPB 1000 milliGRAM(s) IV Intermittent every 24 hours  chlorhexidine 2% Cloths 1 Application(s) Topical <User Schedule>  finasteride 5 milliGRAM(s) Oral daily  gabapentin 600 milliGRAM(s) Oral two times a day  insulin lispro (HumaLOG) corrective regimen sliding scale   SubCutaneous every 6 hours  lidocaine   Patch 1 Patch Transdermal daily  pantoprazole    Tablet 40 milliGRAM(s) Oral before breakfast  sodium bicarbonate 650 milliGRAM(s) Oral three times a day    Drug Dosing Weight  Height (cm): 177.8 (2020 02:45)  Weight (kg): 89.5 (2020 02:45)  BMI (kg/m2): 28.3 (2020 02:45)  BSA (m2): 2.08 (2020 02:45)    CENTRAL LINE: [ ] YES [ ] NO  LOCATION:   DATE INSERTED:  REMOVE: [ ] YES [ ] NO  EXPLAIN:    MAURER: [ ] YES [ ] NO    DATE INSERTED:  REMOVE:  [ ] YES [ ] NO  EXPLAIN:    A-LINE:  [ ] YES [ ] NO  LOCATION:   DATE INSERTED:  REMOVE:  [ ] YES [ ] NO  EXPLAIN:    PMH -reviewed admission note, no change since admission  Heart faliure: acute [ ] chronic [ ] acute or chronic [ ] diastolic [ ] systolic [ ] combied systolic and diastolic[ ]  PAWAN: ATN[ ] renal medullary necrosis [ ] CKD I [ ]CKDII [ ]CKD III [ ]CKD IV [ ]CKD V [ ]Other pathological lesions [ ]  Abdominal Nutrition Status: malnutrition [ ] cachexia [ ] morbid obesity/BMI=40 [ ] Supplement ordered [___________]     ICU Vital Signs Last 24 Hrs  T(C): 37 (2020 06:00), Max: 37 (2020 06:00)  T(F): 98.6 (2020 06:00), Max: 98.6 (2020 06:00)  HR: 76 (2020 07:00) (76 - 97)  BP: 95/47 (2020 07:00) (85/41 - 117/63)  BP(mean): 58 (2020 07:00) (51 - 76)  ABP: --  ABP(mean): --  RR: 17 (2020 07:00) (13 - 29)  SpO2: 95% (2020 07:00) (93% - 100%)            02-05 @ 07:01  -  02-06 @ 07:00  --------------------------------------------------------  IN: 2100 mL / OUT: 2950 mL / NET: -850 mL            PHYSICAL EXAM:    GENERAL: [ ]NAD, [ ]well-groomed, [ ]well-developed  HEAD:  [ ]Atraumatic, [ ]Normocephalic  EYES: [ ]EOMI, [ ]PERRLA, [ ]conjunctiva and sclera clear  ENMT: [ ]No tonsillar erythema, exudates, or enlargement; [ ]Moist mucous membranes, [ ]Good dentition, [ ]No lesions  NECK: [ ]Supple, normal appearance, [ ]No JVD; [ ]Normal thyroid; [ ]Trachea midline  NERVOUS SYSTEM:  [ ]Alert & Oriented X3, [ ]Good concentration; [ ]Motor Strength 5/5 B/L upper and lower extremities; [ ]DTRs 2+ intact and symmetric  CHEST/LUNG: [ ]No chest deformity; [ ]Normal percussion bilaterally; [ ]No rales, rhonchi, wheezing   HEART: [ ]Regular rate and rhythm; [ ]No murmurs, rubs, or gallops  ABDOMEN: [ ]Soft, Nontender, Nondistended; [ ]Bowel sounds present  EXTREMITIES:  [ ]2+ Peripheral Pulses, [ ]No clubbing, cyanosis, or edema  LYMPH: [ ]No lymphadenopathy noted  SKIN: [ ]No rashes or lesions; [ ]Good capillary refill      LABS:  CBC Full  -  ( 2020 06:56 )  WBC Count : 6.19 K/uL  RBC Count : 2.76 M/uL  Hemoglobin : 7.7 g/dL  Hematocrit : 23.7 %  Platelet Count - Automated : 164 K/uL  Mean Cell Volume : 85.9 fl  Mean Cell Hemoglobin : 27.9 pg  Mean Cell Hemoglobin Concentration : 32.5 gm/dL  Auto Neutrophil # : 3.90 K/uL  Auto Lymphocyte # : 0.93 K/uL  Auto Monocyte # : 0.88 K/uL  Auto Eosinophil # : 0.42 K/uL  Auto Basophil # : 0.04 K/uL  Auto Neutrophil % : 63.1 %  Auto Lymphocyte % : 15.0 %  Auto Monocyte % : 14.2 %  Auto Eosinophil % : 6.8 %  Auto Basophil % : 0.6 %    02-    138  |  112<H>  |  51<H>  ----------------------------<  84  6.0<H>   |  21<L>  |  x     Ca    7.8<L>      2020 06:56  Phos  3.5     02-05  Mg     1.6     02-06    TPro  x   /  Alb  3.0<L>  /  TBili  x   /  DBili  x   /  AST  x   /  ALT  x   /  AlkPhos  x   02-06    PT/INR - ( 2020 04:38 )   PT: 12.5 sec;   INR: 1.12 ratio         PTT - ( 2020 04:38 )  PTT:21.1 sec  Urinalysis Basic - ( 2020 04:42 )    Color: Yellow / Appearance: Clear / S.015 / pH: x  Gluc: x / Ketone: Negative  / Bili: Negative / Urobili: Negative   Blood: x / Protein: 30 mg/dL / Nitrite: Negative   Leuk Esterase: Moderate / RBC: 5-10 /HPF / WBC 11-25 /HPF   Sq Epi: x / Non Sq Epi: Few /HPF / Bacteria: Moderate /HPF          RADIOLOGY & ADDITIONAL STUDIES REVIEWED:  ***    [ ]GOALS OF CARE DISCUSSION WITH PATIENT/FAMILY/PROXY:    CRITICAL CARE TIME SPENT: 35 minutes INTERVAL HPI/OVERNIGHT EVENTS: Pt is laying comfortably on bed.    PRESSORS: [ ] YES [ x] NO  WHICH:    Ceftriaxone 2/    Antimicrobial:  cefTRIAXone   IVPB      cefTRIAXone   IVPB 1000 milliGRAM(s) IV Intermittent every 24 hours    Cardiovascular:    Pulmonary:    Hematalogic:    Other:  ARIPiprazole 15 milliGRAM(s) Oral daily  atorvastatin 10 milliGRAM(s) Oral at bedtime  chlorhexidine 2% Cloths 1 Application(s) Topical <User Schedule>  finasteride 5 milliGRAM(s) Oral daily  gabapentin 600 milliGRAM(s) Oral two times a day  insulin lispro (HumaLOG) corrective regimen sliding scale   SubCutaneous every 6 hours  lidocaine   Patch 1 Patch Transdermal daily  pantoprazole    Tablet 40 milliGRAM(s) Oral before breakfast  sodium bicarbonate 650 milliGRAM(s) Oral three times a day    ARIPiprazole 15 milliGRAM(s) Oral daily  atorvastatin 10 milliGRAM(s) Oral at bedtime  cefTRIAXone   IVPB      cefTRIAXone   IVPB 1000 milliGRAM(s) IV Intermittent every 24 hours  chlorhexidine 2% Cloths 1 Application(s) Topical <User Schedule>  finasteride 5 milliGRAM(s) Oral daily  gabapentin 600 milliGRAM(s) Oral two times a day  insulin lispro (HumaLOG) corrective regimen sliding scale   SubCutaneous every 6 hours  lidocaine   Patch 1 Patch Transdermal daily  pantoprazole    Tablet 40 milliGRAM(s) Oral before breakfast  sodium bicarbonate 650 milliGRAM(s) Oral three times a day    Drug Dosing Weight  Height (cm): 177.8 (2020 02:45)  Weight (kg): 89.5 (2020 02:45)  BMI (kg/m2): 28.3 (2020 02:45)  BSA (m2): 2.08 (2020 02:45)        CENTRAL LINE: [ ] YES [x ] NO  LOCATION:   DATE INSERTED:  REMOVE: [ ] YES [ ] NO  EXPLAIN:    MAURER: [x ] YES [ ] NO    DATE INSERTED:   REMOVE:  [ ] YES [ ] NO  EXPLAIN:    A-LINE:  [ ] YES [x ] NO  LOCATION:   DATE INSERTED:  REMOVE:  [ ] YES [ ] NO  EXPLAIN:      ICU Vital Signs Last 24 Hrs  T(C): 37 (2020 06:00), Max: 37 (2020 06:00)  T(F): 98.6 (2020 06:00), Max: 98.6 (2020 06:00)  HR: 76 (2020 07:00) (76 - 97)  BP: 95/47 (2020 07:00) (85/41 - 117/63)  BP(mean): 58 (2020 07:00) (51 - 76)  ABP: --  ABP(mean): --  RR: 17 (2020 07:00) (13 - 29)  SpO2: 95% (2020 07:00) (93% - 100%)             @ 07:01  -  02-06 @ 07:00  --------------------------------------------------------  IN: 2100 mL / OUT: 2950 mL / NET: -850 mL            PHYSICAL EXAM:    GENERAL: NAD, obese  HEAD:  Atraumatic, Normocephalic  EYES:  conjunctiva and sclera clear  NECK: Supple, No JVD, Normal thyroid, Rt EJ  CHEST/LUNG: Clear to auscultation. Clear to percussion bilaterally; No rales, rhonchi, wheezing, or rubs  HEART: Regular rate and rhythm; No murmurs, rubs, or gallops  ABDOMEN: Soft, Nontender, Nondistended; Bowel sounds present, Rt colostomy bag in place  GENITOURINARY Lt julio in place  NERVOUS SYSTEM:  Alert & Oriented X3,    EXTREMITIES:  2+ Peripheral Pulses, No clubbing, cyanosis, or edema  SKIN: warm      LABS:  CBC Full  -  ( 2020 06:56 )  WBC Count : 6.19 K/uL  RBC Count : 2.76 M/uL  Hemoglobin : 7.7 g/dL  Hematocrit : 23.7 %  Platelet Count - Automated : 164 K/uL  Mean Cell Volume : 85.9 fl  Mean Cell Hemoglobin : 27.9 pg  Mean Cell Hemoglobin Concentration : 32.5 gm/dL  Auto Neutrophil # : 3.90 K/uL  Auto Lymphocyte # : 0.93 K/uL  Auto Monocyte # : 0.88 K/uL  Auto Eosinophil # : 0.42 K/uL  Auto Basophil # : 0.04 K/uL  Auto Neutrophil % : 63.1 %  Auto Lymphocyte % : 15.0 %  Auto Monocyte % : 14.2 %  Auto Eosinophil % : 6.8 %  Auto Basophil % : 0.6 %    02-    138  |  112<H>  |  51<H>  ----------------------------<  84  6.0<H>   |  21<L>  |  x     Ca    7.8<L>      2020 06:56  Phos  3.5     02-  Mg     1.6     -    TPro  x   /  Alb  3.0<L>  /  TBili  x   /  DBili  x   /  AST  x   /  ALT  x   /  AlkPhos  x   02-    PT/INR - ( 2020 04:38 )   PT: 12.5 sec;   INR: 1.12 ratio         PTT - ( 2020 04:38 )  PTT:21.1 sec  Urinalysis Basic - ( 2020 04:42 )    Color: Yellow / Appearance: Clear / S.015 / pH: x  Gluc: x / Ketone: Negative  / Bili: Negative / Urobili: Negative   Blood: x / Protein: 30 mg/dL / Nitrite: Negative   Leuk Esterase: Moderate / RBC: 5-10 /HPF / WBC 11-25 /HPF   Sq Epi: x / Non Sq Epi: Few /HPF / Bacteria: Moderate /HPF          RADIOLOGY & ADDITIONAL STUDIES REVIEWED:  ***    [ ]GOALS OF CARE DISCUSSION WITH PATIENT/FAMILY/PROXY:    CRITICAL CARE TIME SPENT: 35 minutes

## 2020-02-07 LAB
ALBUMIN SERPL ELPH-MCNC: 3.1 G/DL — LOW (ref 3.5–5)
ALP SERPL-CCNC: 87 U/L — SIGNIFICANT CHANGE UP (ref 40–120)
ALT FLD-CCNC: 20 U/L DA — SIGNIFICANT CHANGE UP (ref 10–60)
ANION GAP SERPL CALC-SCNC: 4 MMOL/L — LOW (ref 5–17)
AST SERPL-CCNC: 37 U/L — SIGNIFICANT CHANGE UP (ref 10–40)
BASOPHILS # BLD AUTO: 0.03 K/UL — SIGNIFICANT CHANGE UP (ref 0–0.2)
BASOPHILS NFR BLD AUTO: 0.6 % — SIGNIFICANT CHANGE UP (ref 0–2)
BILIRUB SERPL-MCNC: 0.4 MG/DL — SIGNIFICANT CHANGE UP (ref 0.2–1.2)
BUN SERPL-MCNC: 27 MG/DL — HIGH (ref 7–18)
CALCIUM SERPL-MCNC: 8.2 MG/DL — LOW (ref 8.4–10.5)
CHLORIDE SERPL-SCNC: 111 MMOL/L — HIGH (ref 96–108)
CO2 SERPL-SCNC: 26 MMOL/L — SIGNIFICANT CHANGE UP (ref 22–31)
CREAT SERPL-MCNC: 2.15 MG/DL — HIGH (ref 0.5–1.3)
EOSINOPHIL # BLD AUTO: 0.47 K/UL — SIGNIFICANT CHANGE UP (ref 0–0.5)
EOSINOPHIL NFR BLD AUTO: 9.7 % — HIGH (ref 0–6)
GLUCOSE BLDC GLUCOMTR-MCNC: 86 MG/DL — SIGNIFICANT CHANGE UP (ref 70–99)
GLUCOSE BLDC GLUCOMTR-MCNC: 90 MG/DL — SIGNIFICANT CHANGE UP (ref 70–99)
GLUCOSE SERPL-MCNC: 88 MG/DL — SIGNIFICANT CHANGE UP (ref 70–99)
HCT VFR BLD CALC: 25.6 % — LOW (ref 39–50)
HGB BLD-MCNC: 8.1 G/DL — LOW (ref 13–17)
IMM GRANULOCYTES NFR BLD AUTO: 1 % — SIGNIFICANT CHANGE UP (ref 0–1.5)
LYMPHOCYTES # BLD AUTO: 0.96 K/UL — LOW (ref 1–3.3)
LYMPHOCYTES # BLD AUTO: 19.9 % — SIGNIFICANT CHANGE UP (ref 13–44)
MAGNESIUM SERPL-MCNC: 1.6 MG/DL — SIGNIFICANT CHANGE UP (ref 1.6–2.6)
MCHC RBC-ENTMCNC: 27.1 PG — SIGNIFICANT CHANGE UP (ref 27–34)
MCHC RBC-ENTMCNC: 31.6 GM/DL — LOW (ref 32–36)
MCV RBC AUTO: 85.6 FL — SIGNIFICANT CHANGE UP (ref 80–100)
MONOCYTES # BLD AUTO: 0.64 K/UL — SIGNIFICANT CHANGE UP (ref 0–0.9)
MONOCYTES NFR BLD AUTO: 13.3 % — SIGNIFICANT CHANGE UP (ref 2–14)
NEUTROPHILS # BLD AUTO: 2.68 K/UL — SIGNIFICANT CHANGE UP (ref 1.8–7.4)
NEUTROPHILS NFR BLD AUTO: 55.5 % — SIGNIFICANT CHANGE UP (ref 43–77)
NRBC # BLD: 0 /100 WBCS — SIGNIFICANT CHANGE UP (ref 0–0)
PHOSPHATE SERPL-MCNC: 2.9 MG/DL — SIGNIFICANT CHANGE UP (ref 2.5–4.5)
PLATELET # BLD AUTO: 158 K/UL — SIGNIFICANT CHANGE UP (ref 150–400)
POTASSIUM SERPL-MCNC: 4.7 MMOL/L — SIGNIFICANT CHANGE UP (ref 3.5–5.3)
POTASSIUM SERPL-SCNC: 4.7 MMOL/L — SIGNIFICANT CHANGE UP (ref 3.5–5.3)
PROT SERPL-MCNC: 7.3 G/DL — SIGNIFICANT CHANGE UP (ref 6–8.3)
RBC # BLD: 2.99 M/UL — LOW (ref 4.2–5.8)
RBC # FLD: 16.9 % — HIGH (ref 10.3–14.5)
SODIUM SERPL-SCNC: 141 MMOL/L — SIGNIFICANT CHANGE UP (ref 135–145)
WBC # BLD: 4.83 K/UL — SIGNIFICANT CHANGE UP (ref 3.8–10.5)
WBC # FLD AUTO: 4.83 K/UL — SIGNIFICANT CHANGE UP (ref 3.8–10.5)

## 2020-02-07 RX ORDER — ACETAMINOPHEN 500 MG
650 TABLET ORAL EVERY 4 HOURS
Refills: 0 | Status: DISCONTINUED | OUTPATIENT
Start: 2020-02-07 | End: 2020-02-11

## 2020-02-07 RX ORDER — SODIUM CHLORIDE 9 MG/ML
1000 INJECTION INTRAMUSCULAR; INTRAVENOUS; SUBCUTANEOUS
Refills: 0 | Status: DISCONTINUED | OUTPATIENT
Start: 2020-02-07 | End: 2020-02-11

## 2020-02-07 RX ORDER — HYDROMORPHONE HYDROCHLORIDE 2 MG/ML
2 INJECTION INTRAMUSCULAR; INTRAVENOUS; SUBCUTANEOUS EVERY 8 HOURS
Refills: 0 | Status: DISCONTINUED | OUTPATIENT
Start: 2020-02-07 | End: 2020-02-07

## 2020-02-07 RX ORDER — HEPARIN SODIUM 5000 [USP'U]/ML
5000 INJECTION INTRAVENOUS; SUBCUTANEOUS EVERY 12 HOURS
Refills: 0 | Status: DISCONTINUED | OUTPATIENT
Start: 2020-02-07 | End: 2020-02-11

## 2020-02-07 RX ORDER — OXYCODONE AND ACETAMINOPHEN 5; 325 MG/1; MG/1
1 TABLET ORAL EVERY 4 HOURS
Refills: 0 | Status: DISCONTINUED | OUTPATIENT
Start: 2020-02-07 | End: 2020-02-08

## 2020-02-07 RX ORDER — TRAMADOL HYDROCHLORIDE 50 MG/1
75 TABLET ORAL EVERY 6 HOURS
Refills: 0 | Status: DISCONTINUED | OUTPATIENT
Start: 2020-02-07 | End: 2020-02-08

## 2020-02-07 RX ORDER — CHLORHEXIDINE GLUCONATE 213 G/1000ML
1 SOLUTION TOPICAL DAILY
Refills: 0 | Status: DISCONTINUED | OUTPATIENT
Start: 2020-02-07 | End: 2020-02-11

## 2020-02-07 RX ADMIN — MIDODRINE HYDROCHLORIDE 2.5 MILLIGRAM(S): 2.5 TABLET ORAL at 17:42

## 2020-02-07 RX ADMIN — HYDROMORPHONE HYDROCHLORIDE 2 MILLIGRAM(S): 2 INJECTION INTRAMUSCULAR; INTRAVENOUS; SUBCUTANEOUS at 10:14

## 2020-02-07 RX ADMIN — CEFTRIAXONE 100 MILLIGRAM(S): 500 INJECTION, POWDER, FOR SOLUTION INTRAMUSCULAR; INTRAVENOUS at 10:15

## 2020-02-07 RX ADMIN — CHLORHEXIDINE GLUCONATE 1 APPLICATION(S): 213 SOLUTION TOPICAL at 14:48

## 2020-02-07 RX ADMIN — HYDROMORPHONE HYDROCHLORIDE 2 MILLIGRAM(S): 2 INJECTION INTRAMUSCULAR; INTRAVENOUS; SUBCUTANEOUS at 10:50

## 2020-02-07 RX ADMIN — CHLORHEXIDINE GLUCONATE 1 APPLICATION(S): 213 SOLUTION TOPICAL at 05:52

## 2020-02-07 RX ADMIN — TRAMADOL HYDROCHLORIDE 75 MILLIGRAM(S): 50 TABLET ORAL at 15:32

## 2020-02-07 RX ADMIN — MIDODRINE HYDROCHLORIDE 2.5 MILLIGRAM(S): 2.5 TABLET ORAL at 05:51

## 2020-02-07 RX ADMIN — PANTOPRAZOLE SODIUM 40 MILLIGRAM(S): 20 TABLET, DELAYED RELEASE ORAL at 05:51

## 2020-02-07 RX ADMIN — SODIUM CHLORIDE 75 MILLILITER(S): 9 INJECTION INTRAMUSCULAR; INTRAVENOUS; SUBCUTANEOUS at 10:16

## 2020-02-07 RX ADMIN — HEPARIN SODIUM 5000 UNIT(S): 5000 INJECTION INTRAVENOUS; SUBCUTANEOUS at 17:42

## 2020-02-07 RX ADMIN — FINASTERIDE 5 MILLIGRAM(S): 5 TABLET, FILM COATED ORAL at 14:46

## 2020-02-07 RX ADMIN — GABAPENTIN 600 MILLIGRAM(S): 400 CAPSULE ORAL at 17:42

## 2020-02-07 RX ADMIN — ARIPIPRAZOLE 15 MILLIGRAM(S): 15 TABLET ORAL at 12:47

## 2020-02-07 RX ADMIN — GABAPENTIN 600 MILLIGRAM(S): 400 CAPSULE ORAL at 05:50

## 2020-02-07 RX ADMIN — MIDODRINE HYDROCHLORIDE 2.5 MILLIGRAM(S): 2.5 TABLET ORAL at 12:44

## 2020-02-07 RX ADMIN — TRAMADOL HYDROCHLORIDE 75 MILLIGRAM(S): 50 TABLET ORAL at 16:43

## 2020-02-07 NOTE — PHYSICAL THERAPY INITIAL EVALUATION ADULT - DIAGNOSIS, PT EVAL
Patient presented with generalized pain, weakness on B knees impairing the ability to ambulate further at this time

## 2020-02-07 NOTE — PROGRESS NOTE ADULT - ASSESSMENT
69 year old Male from [facility], with PMHx of prostate Ca (s/p radiation in 2015, now in remission), asthma, COPD (not on O2), HTN, HLD, Bipolar, anemia, GERD, Ileostomy s/p sigmoid resection, PAD, and CKD presented to ED with Generalized pain that has been worsening for several weeks. foudn to have Velia       VELIA  Scr 2.5 on ( 1/28/2020)  elevated to 8.9 on ( 2/5/2020)  VELIA etiology? possibly ATN from persistant pre-renal state  FeNA suggestive of ATN  PT non oliguric   s/p HD on 2/5 and 2/6 for hyperkalemia  PRecommend NS at 75cc/hr for one day   Check Renal sonogram  MONitor BMP  AVoid further nephrotoxics, NSIADS RCA    HYperkalemia  sec to RF   improved with HD  low K diet    Acidosis  Non AG  improved     Hyponatremia  work up inconclusive  monitor closely

## 2020-02-07 NOTE — PROGRESS NOTE ADULT - SUBJECTIVE AND OBJECTIVE BOX
PGY 1 Note discussed with supervising resident and primary attending    Patient is a 69y old  Male who presents with a chief complaint of Acute Renal Failure (07 Feb 2020 09:34)      INTERVAL HPI/OVERNIGHT EVENTS: Patient seen and examined at the bedside. Patient says he is having back     MEDICATIONS  (STANDING):  ARIPiprazole 15 milliGRAM(s) Oral daily  atorvastatin 10 milliGRAM(s) Oral at bedtime  cefTRIAXone   IVPB      cefTRIAXone   IVPB 1000 milliGRAM(s) IV Intermittent every 24 hours  chlorhexidine 2% Cloths 1 Application(s) Topical <User Schedule>  chlorhexidine 4% Liquid 1 Application(s) Topical daily  finasteride 5 milliGRAM(s) Oral daily  gabapentin 600 milliGRAM(s) Oral two times a day  heparin  Injectable 5000 Unit(s) SubCutaneous every 12 hours  lidocaine   Patch 1 Patch Transdermal daily  midodrine. 2.5 milliGRAM(s) Oral three times a day  pantoprazole    Tablet 40 milliGRAM(s) Oral before breakfast  sodium chloride 0.9%. 1000 milliLiter(s) (75 mL/Hr) IV Continuous <Continuous>    MEDICATIONS  (PRN):  HYDROmorphone   Tablet 2 milliGRAM(s) Oral every 8 hours PRN Severe Pain (7 - 10)      __________________________________________________  REVIEW OF SYSTEMS:    CONSTITUTIONAL: No fever,   EYES: no acute visual disturbances  NECK: No pain or stiffness  RESPIRATORY: No cough; No shortness of breath  CARDIOVASCULAR: No chest pain, no palpitations  GASTROINTESTINAL: No pain. No nausea or vomiting; No diarrhea   NEUROLOGICAL: No headache or numbness, no tremors  MUSCULOSKELETAL: No joint pain, no muscle pain  GENITOURINARY: no dysuria, no frequency, no hesitancy  PSYCHIATRY: no depression , no anxiety  ALL OTHER  ROS negative        Vital Signs Last 24 Hrs  T(C): 36.8 (07 Feb 2020 11:38), Max: 36.9 (07 Feb 2020 02:20)  T(F): 98.2 (07 Feb 2020 11:38), Max: 98.5 (07 Feb 2020 02:20)  HR: 85 (07 Feb 2020 12:22) (67 - 86)  BP: 131/56 (07 Feb 2020 12:22) (110/57 - 131/56)  BP(mean): 69 (07 Feb 2020 11:38) (64 - 81)  RR: 18 (07 Feb 2020 11:38) (6 - 22)  SpO2: 98% (07 Feb 2020 12:22) (93% - 98%)    ________________________________________________  PHYSICAL EXAM:  GENERAL: NAD  HEENT: Normocephalic;  conjunctivae and sclerae clear; moist mucous membranes;   NECK : supple  CHEST/LUNG: Clear to auscultation bilaterally with good air entry   HEART: S1 S2  regular; no murmurs, gallops or rubs  ABDOMEN: Soft, Nontender, Nondistended; Bowel sounds present  EXTREMITIES: no cyanosis; no edema; no calf tenderness  SKIN: warm and dry; no rash  NERVOUS SYSTEM:  Awake and alert; Oriented  to place, person and time ; no new deficits    _________________________________________________  LABS:                        8.1    4.83  )-----------( 158      ( 07 Feb 2020 06:11 )             25.6     02-07    141  |  111<H>  |  27<H>  ----------------------------<  88  4.7   |  26  |  2.15<H>    Ca    8.2<L>      07 Feb 2020 06:11  Phos  2.9     02-07  Mg     1.6     02-07    TPro  7.3  /  Alb  3.1<L>  /  TBili  0.4  /  DBili  x   /  AST  37  /  ALT  20  /  AlkPhos  87  02-07        CAPILLARY BLOOD GLUCOSE      POCT Blood Glucose.: 90 mg/dL (07 Feb 2020 12:13)  POCT Blood Glucose.: 86 mg/dL (07 Feb 2020 08:08)  POCT Blood Glucose.: 169 mg/dL (06 Feb 2020 16:52)        RADIOLOGY & ADDITIONAL TESTS:    Imaging Personally Reviewed:  YES/NO    Consultant(s) Notes Reviewed:   YES/ No    Care Discussed with Consultants :     Plan of care was discussed with patient and /or primary care giver; all questions and concerns were addressed and care was aligned with patient's wishes. PGY 1 Note discussed with supervising resident and primary attending    Patient is a 69y old  Male who presents with a chief complaint of Acute Renal Failure (07 Feb 2020 09:34)      INTERVAL HPI/OVERNIGHT EVENTS: Patient seen and examined at the bedside. Patient says he is having back pain radiating to legs which is chronic and asking to see pain management.     MEDICATIONS  (STANDING):  ARIPiprazole 15 milliGRAM(s) Oral daily  atorvastatin 10 milliGRAM(s) Oral at bedtime  cefTRIAXone   IVPB      cefTRIAXone   IVPB 1000 milliGRAM(s) IV Intermittent every 24 hours  chlorhexidine 2% Cloths 1 Application(s) Topical <User Schedule>  chlorhexidine 4% Liquid 1 Application(s) Topical daily  finasteride 5 milliGRAM(s) Oral daily  gabapentin 600 milliGRAM(s) Oral two times a day  heparin  Injectable 5000 Unit(s) SubCutaneous every 12 hours  lidocaine   Patch 1 Patch Transdermal daily  midodrine. 2.5 milliGRAM(s) Oral three times a day  pantoprazole    Tablet 40 milliGRAM(s) Oral before breakfast  sodium chloride 0.9%. 1000 milliLiter(s) (75 mL/Hr) IV Continuous <Continuous>    MEDICATIONS  (PRN):  HYDROmorphone   Tablet 2 milliGRAM(s) Oral every 8 hours PRN Severe Pain (7 - 10)      __________________________________________________  REVIEW OF SYSTEMS:    CONSTITUTIONAL: No fever,   EYES: no acute visual disturbances  NECK: No pain or stiffness  RESPIRATORY: No cough; No shortness of breath  CARDIOVASCULAR: No chest pain, no palpitations  GASTROINTESTINAL: No pain. No nausea or vomiting; No diarrhea   NEUROLOGICAL: No headache or numbness, no tremors  MUSCULOSKELETAL: +back pain  GENITOURINARY: no dysuria, no frequency, no hesitancy  PSYCHIATRY: no depression , no anxiety  ALL OTHER  ROS negative        Vital Signs Last 24 Hrs  T(C): 36.8 (07 Feb 2020 11:38), Max: 36.9 (07 Feb 2020 02:20)  T(F): 98.2 (07 Feb 2020 11:38), Max: 98.5 (07 Feb 2020 02:20)  HR: 85 (07 Feb 2020 12:22) (67 - 86)  BP: 131/56 (07 Feb 2020 12:22) (110/57 - 131/56)  BP(mean): 69 (07 Feb 2020 11:38) (64 - 81)  RR: 18 (07 Feb 2020 11:38) (6 - 22)  SpO2: 98% (07 Feb 2020 12:22) (93% - 98%)    ________________________________________________  PHYSICAL EXAM:  GENERAL: NAD, healthy man sitting in bed  HEENT: Normocephalic;  conjunctivae and sclerae clear; moist mucous membranes;   NECK : supple  CHEST/LUNG: Clear to auscultation bilaterally with good air entry   HEART: S1 S2  regular; no murmurs, gallops or rubs  ABDOMEN: Soft, Nontender, Nondistended; Bowel sounds present, Rt colostomy bag in place  GENITOURINARY: Lt shiley in place  Musculoskeletal: back pain   SKIN: warm and dry; no rash  NERVOUS SYSTEM:  Awake and alert; Oriented  to place, person and time ; no new deficits    _________________________________________________  LABS:                        8.1    4.83  )-----------( 158      ( 07 Feb 2020 06:11 )             25.6     02-07    141  |  111<H>  |  27<H>  ----------------------------<  88  4.7   |  26  |  2.15<H>    Ca    8.2<L>      07 Feb 2020 06:11  Phos  2.9     02-07  Mg     1.6     02-07    TPro  7.3  /  Alb  3.1<L>  /  TBili  0.4  /  DBili  x   /  AST  37  /  ALT  20  /  AlkPhos  87  02-07        CAPILLARY BLOOD GLUCOSE      POCT Blood Glucose.: 90 mg/dL (07 Feb 2020 12:13)  POCT Blood Glucose.: 86 mg/dL (07 Feb 2020 08:08)  POCT Blood Glucose.: 169 mg/dL (06 Feb 2020 16:52)        RADIOLOGY & ADDITIONAL TESTS:    Imaging Personally Reviewed:  YES/NO    Consultant(s) Notes Reviewed:   YES/ No    Care Discussed with Consultants :     Plan of care was discussed with patient and /or primary care giver; all questions and concerns were addressed and care was aligned with patient's wishes.

## 2020-02-07 NOTE — PROGRESS NOTE ADULT - SUBJECTIVE AND OBJECTIVE BOX
Time of Visit:  Patient seen and examined.     MEDICATIONS  (STANDING):  ARIPiprazole 15 milliGRAM(s) Oral daily  atorvastatin 10 milliGRAM(s) Oral at bedtime  cefTRIAXone   IVPB      cefTRIAXone   IVPB 1000 milliGRAM(s) IV Intermittent every 24 hours  chlorhexidine 2% Cloths 1 Application(s) Topical <User Schedule>  chlorhexidine 4% Liquid 1 Application(s) Topical daily  finasteride 5 milliGRAM(s) Oral daily  gabapentin 600 milliGRAM(s) Oral two times a day  heparin  Injectable 5000 Unit(s) SubCutaneous every 12 hours  lidocaine   Patch 1 Patch Transdermal daily  midodrine. 2.5 milliGRAM(s) Oral three times a day  pantoprazole    Tablet 40 milliGRAM(s) Oral before breakfast  sodium chloride 0.9%. 1000 milliLiter(s) (75 mL/Hr) IV Continuous <Continuous>      MEDICATIONS  (PRN):  acetaminophen   Tablet .. 650 milliGRAM(s) Oral every 4 hours PRN Mild Pain (1 - 3)  oxycodone    5 mG/acetaminophen 325 mG 1 Tablet(s) Oral every 4 hours PRN Moderate Pain (4 - 6)  traMADol 75 milliGRAM(s) Oral every 6 hours PRN Severe Pain (7 - 10)       Medications up to date at time of exam.    ROS; No fever, chills, cough, congestion.   PHYSICAL EXAMINATION:    Vital Signs Last 24 Hrs  T(C): 36.8 (07 Feb 2020 11:38), Max: 36.9 (07 Feb 2020 02:20)  T(F): 98.2 (07 Feb 2020 11:38), Max: 98.5 (07 Feb 2020 02:20)  HR: 85 (07 Feb 2020 12:22) (67 - 86)  BP: 131/56 (07 Feb 2020 12:22) (110/57 - 131/56)  BP(mean): 69 (07 Feb 2020 11:38) (64 - 81)  RR: 18 (07 Feb 2020 11:38) (6 - 22)  SpO2: 98% (07 Feb 2020 12:22) (93% - 98%)   (if applicable)    General; Alert and oriented. No acute distress. Able to answer question with no SOB.     HEENT: Head is normocephalic and atraumatic. Extraocular muscles are intact. Mucous membranes are moist.     NECK: Supple, no palpable adenopathy.    LUNGS: Clear to auscultation, no wheezing, rales, or rhonchi. No use of accessory muscle.     HEART: S1 S2 Regular rate and no click/ rub.     ABDOMEN: Soft, nontender, and nondistended.  No abdominal guarding. Active bowel sounds.     EXTREMITIES: Without any cyanosis, clubbing, rash, lesions or edema.    NEUROLOGIC: Awake, alert, oriented.     SKIN: Warm, dry, good turgor.      LABS:                        8.1    4.83  )-----------( 158      ( 07 Feb 2020 06:11 )             25.6     02-07    141  |  111<H>  |  27<H>  ----------------------------<  88  4.7   |  26  |  2.15<H>    Ca    8.2<L>      07 Feb 2020 06:11  Phos  2.9     02-07  Mg     1.6     02-07    TPro  7.3  /  Alb  3.1<L>  /  TBili  0.4  /  DBili  x   /  AST  37  /  ALT  20  /  AlkPhos  87  02-07    RADIOLOGY & ADDITIONAL STUDIES:  EKG:   CXR: < from: Xray Chest 1 View-PORTABLE IMMEDIATE (02.05.20 @ 01:31) >  PROCEDURE DATE:  02/05/2020          INTERPRETATION:  AP chest on February 5, 2020 at 1:18 AM. Patient is short of breath.    Poor inspiration crowds the chest. Heart may be enlarged.    There are rather mild basilar atelectatic findings.    Chest is similar to December 23, 2019.    IMPRESSION: Unchanged chest as above.      IMPRESSION: 69y Male PAST MEDICAL & SURGICAL HISTORY:  BPH with urinary obstruction  Prostate CA  CKD (chronic kidney disease)  PAD (peripheral artery disease)  HLD (hyperlipidemia)  HTN (hypertension)  IBD (inflammatory bowel disease)  Bipolar disorder  Anemia  COPD, mild  History of creation of ostomy     Impression; 68 Y/O Male presented with Generalized pain that has been worsening for several weeks.  Has  Asthma and COPD but no exacerbation on exam.     Suggestion;  O2 saturation 96% room air. No need for continuous Oxygen supplementation .  Pulmonary oral hygiene care.  DVT/ GI prophylactic. Time of Visit:  Patient seen and examined.     MEDICATIONS  (STANDING):  ARIPiprazole 15 milliGRAM(s) Oral daily  atorvastatin 10 milliGRAM(s) Oral at bedtime  cefTRIAXone   IVPB      cefTRIAXone   IVPB 1000 milliGRAM(s) IV Intermittent every 24 hours  chlorhexidine 2% Cloths 1 Application(s) Topical <User Schedule>  chlorhexidine 4% Liquid 1 Application(s) Topical daily  finasteride 5 milliGRAM(s) Oral daily  gabapentin 600 milliGRAM(s) Oral two times a day  heparin  Injectable 5000 Unit(s) SubCutaneous every 12 hours  lidocaine   Patch 1 Patch Transdermal daily  midodrine. 2.5 milliGRAM(s) Oral three times a day  pantoprazole    Tablet 40 milliGRAM(s) Oral before breakfast  sodium chloride 0.9%. 1000 milliLiter(s) (75 mL/Hr) IV Continuous <Continuous>      MEDICATIONS  (PRN):  acetaminophen   Tablet .. 650 milliGRAM(s) Oral every 4 hours PRN Mild Pain (1 - 3)  oxycodone    5 mG/acetaminophen 325 mG 1 Tablet(s) Oral every 4 hours PRN Moderate Pain (4 - 6)  traMADol 75 milliGRAM(s) Oral every 6 hours PRN Severe Pain (7 - 10)       Medications up to date at time of exam.    ROS; No fever, chills, cough, congestion.   PHYSICAL EXAMINATION:    Vital Signs Last 24 Hrs  T(C): 36.8 (07 Feb 2020 11:38), Max: 36.9 (07 Feb 2020 02:20)  T(F): 98.2 (07 Feb 2020 11:38), Max: 98.5 (07 Feb 2020 02:20)  HR: 85 (07 Feb 2020 12:22) (67 - 86)  BP: 131/56 (07 Feb 2020 12:22) (110/57 - 131/56)  BP(mean): 69 (07 Feb 2020 11:38) (64 - 81)  RR: 18 (07 Feb 2020 11:38) (6 - 22)  SpO2: 98% (07 Feb 2020 12:22) (93% - 98%)   (if applicable)    General; Alert and oriented. No acute distress. Able to answer question with no SOB.     HEENT: Head is normocephalic and atraumatic. Extraocular muscles are intact. Mucous membranes are moist.     NECK: Supple, no palpable adenopathy.    LUNGS: Clear to auscultation, no wheezing, rales, or rhonchi. No use of accessory muscle.     HEART: S1 S2 Regular rate and no click/ rub.     ABDOMEN: Soft, nontender, and nondistended.  No abdominal guarding. Active bowel sounds.     EXTREMITIES: Without any cyanosis, clubbing, rash, lesions or edema.    NEUROLOGIC: Awake, alert, oriented.     SKIN: Warm, dry, good turgor.      LABS:                        8.1    4.83  )-----------( 158      ( 07 Feb 2020 06:11 )             25.6     02-07    141  |  111<H>  |  27<H>  ----------------------------<  88  4.7   |  26  |  2.15<H>    Ca    8.2<L>      07 Feb 2020 06:11  Phos  2.9     02-07  Mg     1.6     02-07    TPro  7.3  /  Alb  3.1<L>  /  TBili  0.4  /  DBili  x   /  AST  37  /  ALT  20  /  AlkPhos  87  02-07    RADIOLOGY & ADDITIONAL STUDIES:  EKG:   CXR: < from: Xray Chest 1 View-PORTABLE IMMEDIATE (02.05.20 @ 01:31) >  PROCEDURE DATE:  02/05/2020          INTERPRETATION:  AP chest on February 5, 2020 at 1:18 AM. Patient is short of breath.    Poor inspiration crowds the chest. Heart may be enlarged.    There are rather mild basilar atelectatic findings.    Chest is similar to December 23, 2019.    IMPRESSION: Unchanged chest as above.      IMPRESSION: 69y Male PAST MEDICAL & SURGICAL HISTORY:  BPH with urinary obstruction  Prostate CA  CKD (chronic kidney disease)  PAD (peripheral artery disease)  HLD (hyperlipidemia)  HTN (hypertension)  IBD (inflammatory bowel disease)  Bipolar disorder  Anemia  COPD, mild  History of creation of ostomy     Impression; 68 Y/O Male presented with Generalized pain that has been worsening for several weeks.  Has  Asthma and COPD but no exacerbation on exam.     Suggestion;  O2 saturation 96% room air. No need for continuous Oxygen supplementation .  Pulmonary oral hygiene care.  DVT/ GI prophylactic.   duonebs q 6 h prn

## 2020-02-07 NOTE — PROGRESS NOTE ADULT - SUBJECTIVE AND OBJECTIVE BOX
Patient denies chest pain or shortness of breath.   Review of systems otherwise (-)  	  ARIPiprazole 15 milliGRAM(s) Oral daily  atorvastatin 10 milliGRAM(s) Oral at bedtime  cefTRIAXone   IVPB      cefTRIAXone   IVPB 1000 milliGRAM(s) IV Intermittent every 24 hours  chlorhexidine 2% Cloths 1 Application(s) Topical <User Schedule>  finasteride 5 milliGRAM(s) Oral daily  gabapentin 600 milliGRAM(s) Oral two times a day  HYDROmorphone   Tablet 2 milliGRAM(s) Oral every 8 hours PRN  lidocaine   Patch 1 Patch Transdermal daily  midodrine. 2.5 milliGRAM(s) Oral three times a day  pantoprazole    Tablet 40 milliGRAM(s) Oral before breakfast  sodium chloride 0.9%. 1000 milliLiter(s) IV Continuous <Continuous>                            8.1    4.83  )-----------( 158      ( 07 Feb 2020 06:11 )             25.6       Hemoglobin: 8.1 g/dL (02-07 @ 06:11)  Hemoglobin: 7.7 g/dL (02-06 @ 06:56)  Hemoglobin: 8.1 g/dL (02-05 @ 09:21)  Hemoglobin: 8.5 g/dL (02-05 @ 04:38)  Hemoglobin: 9.6 g/dL (02-04 @ 21:31)      02-07    141  |  111<H>  |  27<H>  ----------------------------<  88  4.7   |  26  |  2.15<H>    Ca    8.2<L>      07 Feb 2020 06:11  Phos  2.9     02-07  Mg     1.6     02-07    TPro  7.3  /  Alb  3.1<L>  /  TBili  0.4  /  DBili  x   /  AST  37  /  ALT  20  /  AlkPhos  87  02-07    Creatinine Trend: 2.15<--, 2.09<--, 3.65<--, 4.76<--, 5.07<--, 8.76<--    COAGS:     CARDIAC MARKERS ( 05 Feb 2020 01:03 )  <0.015 ng/mL / x     / x     / x     / 6.3 ng/mL        T(C): 36.6 (02-07-20 @ 07:44), Max: 36.9 (02-06-20 @ 10:15)  HR: 71 (02-07-20 @ 07:44) (67 - 86)  BP: 129/66 (02-07-20 @ 07:44) (98/44 - 129/66)  RR: 16 (02-07-20 @ 07:44) (6 - 22)  SpO2: 96% (02-07-20 @ 07:44) (93% - 99%)  Wt(kg): --    I&O's Summary    06 Feb 2020 07:01  -  07 Feb 2020 07:00  --------------------------------------------------------  IN: 1540 mL / OUT: 2276 mL / NET: -736 mL        Gen: Appears well in NAD  HEENT:  (-)icterus (-)pallor  CV: N S1 S2 1/6 MARCELLE (+)2 Pulses B/l  Resp:  Clear to ausculatation B/L, normal effort  GI: (+) BS Soft, NT, ND  Lymph:  (-)Edema, (-)obvious lymphadenopathy  Skin: Warm to touch, Normal turgor  Psych: Appropriate mood and affect      TELEMETRY: 	  Sinus        ASSESSMENT/PLAN: 	69y  Male f prostate Ca (s/p radiation in 2015, now in remission), asthma, COPD (not on O2) chronic dyspnea on exertion, HTN, HLD, Bipolar, anemia, GERD, Ileostomy s/p sigmoid resection, PAD, normal LV and RV function on recent echo in our office, normal perfusion on nuclear stress 1/20 and CKD presented to ED with Generalized pain that has been worsening for several weeks.     - EKG inconsistent with acute injury.. more consistent with electrolyte derangement, now normalized   - Started on HD for hyperkalemia  - f/u renal sono      Joey Archer MD, [facility]  BEEPER [phone]

## 2020-02-07 NOTE — PROGRESS NOTE ADULT - ASSESSMENT
69 year old Male from [facility], with PMHx of prostate Ca (s/p radiation in 2015, now in remission), asthma, COPD (not on O2), HTN, HLD, Bipolar, anemia, GERD, Ileostomy s/p sigmoid resection, PAD, and CKD presented to ED with Generalized pain that has been worsening for several weeks.  Pt states he has chronic back pain for which he was admitted for 2 weeks prior.  He states the facility gave him pain medication, but the pain did not improve.  Pt states he has not urinated for 24hrs, but feels the urge to void.  Pt denies CP, palpitations, HA, dizziness, fever, or syncope. Pt admitted to the ICU for management of symptomatic hyperkalemia requiring new HD    Assessment:  -Severe Hyperkalemia with EKG changes  -Acute Renal Failure on CKD   -Hypertension  -Bi-Polar disorder   -COPD/ Asthma  -Anemia of chronic renal disease   -PAD  Plan:  Neuro:  -Currently AO x 3   -Bi-Polar disorder- c/w Abilify therapy  -wild hold trazodone and lamotrigine in the setting of ARF   CV:  -Hypertension- antihypertensives held as blood pressure currently on the lower side; resume as clinically indicated   -Monitor BP  -PAD- c/w asa therapy   -f/u ECHO  Pulm:  -CXR clear, saturating 100% on room air  -COPD not on home O2-no concern for exacerbation- c/w duoneb therapy as needed  ID:c/w  Rocephin therapy with concern for cystitis   Hep C Positive  Nephro:  -Acute Renal Failure on CKD  -hyperkalemia resolved  -started on NS @75cc/h for 24h  - HD yesterday  - f/u Nephro Dr. Vasquez  - f/u renal ultrasound   GI:  -Renal/ low sodium diet     Heme:  -anemia of chronic renal disease- currently at baseline  Endo:  target CBG < 180  -HbA1c 5.6  Musculoskeletal:  pt has chronic back pain and wants to see a pain manager  on lidocaine patch and gabapentin 600mg bid as per Pain management recs from last admission  tylenol, percocet and tramadol prn for mild, moderate and severe pain  Prophy:  -Heparin S/C for DVT prophy  FULL CODE

## 2020-02-08 LAB
ALBUMIN SERPL ELPH-MCNC: 2.9 G/DL — LOW (ref 3.5–5)
ALP SERPL-CCNC: 82 U/L — SIGNIFICANT CHANGE UP (ref 40–120)
ALT FLD-CCNC: 18 U/L DA — SIGNIFICANT CHANGE UP (ref 10–60)
ANION GAP SERPL CALC-SCNC: 3 MMOL/L — LOW (ref 5–17)
AST SERPL-CCNC: 28 U/L — SIGNIFICANT CHANGE UP (ref 10–40)
BILIRUB SERPL-MCNC: 0.4 MG/DL — SIGNIFICANT CHANGE UP (ref 0.2–1.2)
BUN SERPL-MCNC: 26 MG/DL — HIGH (ref 7–18)
CALCIUM SERPL-MCNC: 8.5 MG/DL — SIGNIFICANT CHANGE UP (ref 8.4–10.5)
CHLORIDE SERPL-SCNC: 114 MMOL/L — HIGH (ref 96–108)
CO2 SERPL-SCNC: 25 MMOL/L — SIGNIFICANT CHANGE UP (ref 22–31)
CREAT SERPL-MCNC: 1.98 MG/DL — HIGH (ref 0.5–1.3)
GLUCOSE SERPL-MCNC: 80 MG/DL — SIGNIFICANT CHANGE UP (ref 70–99)
HCT VFR BLD CALC: 26 % — LOW (ref 39–50)
HGB BLD-MCNC: 8.1 G/DL — LOW (ref 13–17)
MAGNESIUM SERPL-MCNC: 1.3 MG/DL — LOW (ref 1.6–2.6)
MCHC RBC-ENTMCNC: 27.3 PG — SIGNIFICANT CHANGE UP (ref 27–34)
MCHC RBC-ENTMCNC: 31.2 GM/DL — LOW (ref 32–36)
MCV RBC AUTO: 87.5 FL — SIGNIFICANT CHANGE UP (ref 80–100)
NRBC # BLD: 0 /100 WBCS — SIGNIFICANT CHANGE UP (ref 0–0)
PHOSPHATE SERPL-MCNC: 2.6 MG/DL — SIGNIFICANT CHANGE UP (ref 2.5–4.5)
PLATELET # BLD AUTO: 164 K/UL — SIGNIFICANT CHANGE UP (ref 150–400)
POTASSIUM SERPL-MCNC: 4.9 MMOL/L — SIGNIFICANT CHANGE UP (ref 3.5–5.3)
POTASSIUM SERPL-SCNC: 4.9 MMOL/L — SIGNIFICANT CHANGE UP (ref 3.5–5.3)
PROT SERPL-MCNC: 7 G/DL — SIGNIFICANT CHANGE UP (ref 6–8.3)
RBC # BLD: 2.97 M/UL — LOW (ref 4.2–5.8)
RBC # FLD: 16.5 % — HIGH (ref 10.3–14.5)
SODIUM SERPL-SCNC: 142 MMOL/L — SIGNIFICANT CHANGE UP (ref 135–145)
WBC # BLD: 5.28 K/UL — SIGNIFICANT CHANGE UP (ref 3.8–10.5)
WBC # FLD AUTO: 5.28 K/UL — SIGNIFICANT CHANGE UP (ref 3.8–10.5)

## 2020-02-08 RX ORDER — LACTULOSE 10 G/15ML
20 SOLUTION ORAL
Refills: 0 | Status: DISCONTINUED | OUTPATIENT
Start: 2020-02-08 | End: 2020-02-11

## 2020-02-08 RX ORDER — HYDROMORPHONE HYDROCHLORIDE 2 MG/ML
4 INJECTION INTRAMUSCULAR; INTRAVENOUS; SUBCUTANEOUS THREE TIMES A DAY
Refills: 0 | Status: DISCONTINUED | OUTPATIENT
Start: 2020-02-08 | End: 2020-02-11

## 2020-02-08 RX ADMIN — HYDROMORPHONE HYDROCHLORIDE 4 MILLIGRAM(S): 2 INJECTION INTRAMUSCULAR; INTRAVENOUS; SUBCUTANEOUS at 21:42

## 2020-02-08 RX ADMIN — PANTOPRAZOLE SODIUM 40 MILLIGRAM(S): 20 TABLET, DELAYED RELEASE ORAL at 05:48

## 2020-02-08 RX ADMIN — MIDODRINE HYDROCHLORIDE 2.5 MILLIGRAM(S): 2.5 TABLET ORAL at 13:18

## 2020-02-08 RX ADMIN — FINASTERIDE 5 MILLIGRAM(S): 5 TABLET, FILM COATED ORAL at 13:18

## 2020-02-08 RX ADMIN — GABAPENTIN 600 MILLIGRAM(S): 400 CAPSULE ORAL at 17:57

## 2020-02-08 RX ADMIN — HYDROMORPHONE HYDROCHLORIDE 4 MILLIGRAM(S): 2 INJECTION INTRAMUSCULAR; INTRAVENOUS; SUBCUTANEOUS at 22:10

## 2020-02-08 RX ADMIN — Medication 10 MILLIGRAM(S): at 21:42

## 2020-02-08 RX ADMIN — HEPARIN SODIUM 5000 UNIT(S): 5000 INJECTION INTRAVENOUS; SUBCUTANEOUS at 05:49

## 2020-02-08 RX ADMIN — CHLORHEXIDINE GLUCONATE 1 APPLICATION(S): 213 SOLUTION TOPICAL at 13:20

## 2020-02-08 RX ADMIN — MIDODRINE HYDROCHLORIDE 2.5 MILLIGRAM(S): 2.5 TABLET ORAL at 17:58

## 2020-02-08 RX ADMIN — CEFTRIAXONE 100 MILLIGRAM(S): 500 INJECTION, POWDER, FOR SOLUTION INTRAMUSCULAR; INTRAVENOUS at 10:41

## 2020-02-08 RX ADMIN — HYDROMORPHONE HYDROCHLORIDE 4 MILLIGRAM(S): 2 INJECTION INTRAMUSCULAR; INTRAVENOUS; SUBCUTANEOUS at 15:28

## 2020-02-08 RX ADMIN — MIDODRINE HYDROCHLORIDE 2.5 MILLIGRAM(S): 2.5 TABLET ORAL at 05:48

## 2020-02-08 RX ADMIN — HYDROMORPHONE HYDROCHLORIDE 4 MILLIGRAM(S): 2 INJECTION INTRAMUSCULAR; INTRAVENOUS; SUBCUTANEOUS at 17:40

## 2020-02-08 RX ADMIN — GABAPENTIN 600 MILLIGRAM(S): 400 CAPSULE ORAL at 05:48

## 2020-02-08 RX ADMIN — HEPARIN SODIUM 5000 UNIT(S): 5000 INJECTION INTRAVENOUS; SUBCUTANEOUS at 17:58

## 2020-02-08 RX ADMIN — CHLORHEXIDINE GLUCONATE 1 APPLICATION(S): 213 SOLUTION TOPICAL at 05:50

## 2020-02-08 RX ADMIN — ARIPIPRAZOLE 15 MILLIGRAM(S): 15 TABLET ORAL at 13:18

## 2020-02-08 RX ADMIN — LACTULOSE 20 GRAM(S): 10 SOLUTION ORAL at 15:39

## 2020-02-08 NOTE — PROGRESS NOTE ADULT - ASSESSMENT
69 year old Male from [facility], with PMHx of prostate Ca (s/p radiation in 2015, now in remission), asthma, COPD (not on O2), HTN, HLD, Bipolar, anemia, GERD, Ileostomy s/p sigmoid resection, PAD, and CKD presented to ED with Generalized pain that has been worsening for several weeks. foudn to have Velia       VELIA  Scr 2.5 on ( 1/28/2020)  elevated to 8.9 on ( 2/5/2020)  VELIA etiology? possibly ATN from persistant pre-renal state  FeNA suggestive of ATN  Pt non oliguric today. Likely renal recovery. Scr improving   Likely no further need for HD. ATN resolving   s/p HD on 2/5 and 2/6 for hyperkalemia. Can removed eliseley tmrw if Scr continues to improve  Check New Renal sonogram  Monitor BMP  Avoid further nephrotoxics, NSAIDS RCA    Hyperkalemia  Remains WNL   sec to RF   improved with HD  low K diet    Acidosis  Non AG  improved     Hyponatremia  Resolved  monitor closely

## 2020-02-08 NOTE — PROGRESS NOTE ADULT - SUBJECTIVE AND OBJECTIVE BOX
pt seen and examined, no complaints, ROS - .          acetaminophen   Tablet .. 650 milliGRAM(s) Oral every 4 hours PRN  ARIPiprazole 15 milliGRAM(s) Oral daily  atorvastatin 10 milliGRAM(s) Oral at bedtime  cefTRIAXone   IVPB      cefTRIAXone   IVPB 1000 milliGRAM(s) IV Intermittent every 24 hours  chlorhexidine 2% Cloths 1 Application(s) Topical <User Schedule>  chlorhexidine 4% Liquid 1 Application(s) Topical daily  finasteride 5 milliGRAM(s) Oral daily  gabapentin 600 milliGRAM(s) Oral two times a day  heparin  Injectable 5000 Unit(s) SubCutaneous every 12 hours  lidocaine   Patch 1 Patch Transdermal daily  midodrine. 2.5 milliGRAM(s) Oral three times a day  oxycodone    5 mG/acetaminophen 325 mG 1 Tablet(s) Oral every 4 hours PRN  pantoprazole    Tablet 40 milliGRAM(s) Oral before breakfast  sodium chloride 0.9%. 1000 milliLiter(s) IV Continuous <Continuous>  traMADol 75 milliGRAM(s) Oral every 6 hours PRN                            8.1    4.83  )-----------( 158      ( 07 Feb 2020 06:11 )             25.6       Hemoglobin: 8.1 g/dL (02-07 @ 06:11)  Hemoglobin: 7.7 g/dL (02-06 @ 06:56)  Hemoglobin: 8.1 g/dL (02-05 @ 09:21)  Hemoglobin: 8.5 g/dL (02-05 @ 04:38)  Hemoglobin: 9.6 g/dL (02-04 @ 21:31)      02-07    141  |  111<H>  |  27<H>  ----------------------------<  88  4.7   |  26  |  2.15<H>    Ca    8.2<L>      07 Feb 2020 06:11  Phos  2.9     02-07  Mg     1.6     02-07    TPro  7.3  /  Alb  3.1<L>  /  TBili  0.4  /  DBili  x   /  AST  37  /  ALT  20  /  AlkPhos  87  02-07    Creatinine Trend: 2.15<--, 2.09<--, 3.65<--, 4.76<--, 5.07<--, 8.76<--    COAGS:           T(C): 37.1 (02-08-20 @ 05:30), Max: 37.1 (02-08-20 @ 05:30)  HR: 64 (02-08-20 @ 05:30) (64 - 85)  BP: 118/58 (02-08-20 @ 05:30) (116/55 - 146/71)  RR: 18 (02-08-20 @ 05:30) (16 - 18)  SpO2: 96% (02-08-20 @ 05:30) (95% - 100%)  Wt(kg): --    I&O's Summary    07 Feb 2020 07:01  -  08 Feb 2020 07:00  --------------------------------------------------------  IN: 675 mL / OUT: 1450 mL / NET: -775 mL      Gen: Appears well in NAD  HEENT:  (-)icterus (-)pallor  CV: N S1 S2 1/6 MARCELLE (+)2 Pulses B/l  Resp:  Clear to ausculatation B/L, normal effort  GI: (+) BS Soft, NT, ND  Lymph:  (-)Edema, (-)obvious lymphadenopathy  Skin: Warm to touch, Normal turgor  Psych: Appropriate mood and affect      TELEMETRY: 	  Sinus        ASSESSMENT/PLAN: 	69y  Male f prostate Ca (s/p radiation in 2015, now in remission), asthma, COPD (not on O2) chronic dyspnea on exertion, HTN, HLD, Bipolar, anemia, GERD, Ileostomy s/p sigmoid resection, PAD, normal LV and RV function on recent echo in our office, normal perfusion on nuclear stress 1/20 and CKD presented to ED with Generalized pain that has been worsening for several weeks.     monitor lytes   ABX per medicine   Bp tolerating Proamatine   HD per renal    GI / DVT prophylaxis.   keep K>4, mag >2.0

## 2020-02-08 NOTE — PROGRESS NOTE ADULT - SUBJECTIVE AND OBJECTIVE BOX
Time of Visit:  Patient seen and examined.     MEDICATIONS  (STANDING):  ARIPiprazole 15 milliGRAM(s) Oral daily  atorvastatin 10 milliGRAM(s) Oral at bedtime  bisacodyl 10 milliGRAM(s) Oral at bedtime  cefTRIAXone   IVPB      cefTRIAXone   IVPB 1000 milliGRAM(s) IV Intermittent every 24 hours  chlorhexidine 2% Cloths 1 Application(s) Topical <User Schedule>  chlorhexidine 4% Liquid 1 Application(s) Topical daily  finasteride 5 milliGRAM(s) Oral daily  gabapentin 600 milliGRAM(s) Oral two times a day  heparin  Injectable 5000 Unit(s) SubCutaneous every 12 hours  HYDROmorphone   Tablet 4 milliGRAM(s) Oral three times a day  lactulose Syrup 20 Gram(s) Oral two times a day  midodrine. 2.5 milliGRAM(s) Oral three times a day  pantoprazole    Tablet 40 milliGRAM(s) Oral before breakfast  sodium chloride 0.9%. 1000 milliLiter(s) (75 mL/Hr) IV Continuous <Continuous>      MEDICATIONS  (PRN):  acetaminophen   Tablet .. 650 milliGRAM(s) Oral every 4 hours PRN Mild Pain (1 - 3)       Medications up to date at time of exam.      PHYSICAL EXAMINATION:  Patient has no new complaints.  GENERAL: The patient is a well-developed, well-nourished, in no apparent distress.     Vital Signs Last 24 Hrs  T(C): 36.7 (08 Feb 2020 15:22), Max: 37.1 (08 Feb 2020 05:30)  T(F): 98 (08 Feb 2020 15:22), Max: 98.7 (08 Feb 2020 05:30)  HR: 63 (08 Feb 2020 15:22) (63 - 67)  BP: 126/63 (08 Feb 2020 15:22) (110/61 - 146/71)  BP(mean): --  RR: 18 (08 Feb 2020 15:22) (18 - 18)  SpO2: 98% (08 Feb 2020 15:22) (96% - 100%)   (if applicable)    Chest Tube (if applicable)    HEENT: Head is normocephalic and atraumatic. Extraocular muscles are intact. Mucous membranes are moist.     NECK: Supple, no palpable adenopathy.    LUNGS: Clear to auscultation, no wheezing, rales, or rhonchi.    HEART: Regular rate and rhythm without murmur.    ABDOMEN: Soft, nontender, and nondistended.  No hepatosplenomegaly is noted.    : No painful voiding, no pelvic pain    EXTREMITIES: Without any cyanosis, clubbing, rash, lesions or edema.    NEUROLOGIC: Awake, alert, oriented, grossly intact    SKIN: Warm, dry, good turgor.      LABS:                        8.1    5.28  )-----------( 164      ( 08 Feb 2020 07:12 )             26.0     02-08    142  |  114<H>  |  26<H>  ----------------------------<  80  4.9   |  25  |  1.98<H>    Ca    8.5      08 Feb 2020 07:12  Phos  2.6     02-08  Mg     1.3     02-08    TPro  7.0  /  Alb  2.9<L>  /  TBili  0.4  /  DBili  x   /  AST  28  /  ALT  18  /  AlkPhos  82  02-08                        MICROBIOLOGY: (if applicable)    RADIOLOGY & ADDITIONAL STUDIES:  EKG:   CXR:  ECHO:    IMPRESSION: 69y Male PAST MEDICAL & SURGICAL HISTORY:  BPH with urinary obstruction  Prostate CA  CKD (chronic kidney disease)  PAD (peripheral artery disease)  HLD (hyperlipidemia)  HTN (hypertension)  IBD (inflammatory bowel disease)  Bipolar disorder  Anemia  COPD, mild  History of creation of ostomy   p/w         Impression; 68 Y/O Male presented with Generalized pain that has been worsening for several weeks.  Has  Asthma and COPD stable    Suggestion;  -continue duonebs   -evaluate antibx  -tele monitor as per cards Time of Visit:  Patient seen and examined.     MEDICATIONS  (STANDING):  ARIPiprazole 15 milliGRAM(s) Oral daily  atorvastatin 10 milliGRAM(s) Oral at bedtime  bisacodyl 10 milliGRAM(s) Oral at bedtime  cefTRIAXone   IVPB      cefTRIAXone   IVPB 1000 milliGRAM(s) IV Intermittent every 24 hours  chlorhexidine 2% Cloths 1 Application(s) Topical <User Schedule>  chlorhexidine 4% Liquid 1 Application(s) Topical daily  finasteride 5 milliGRAM(s) Oral daily  gabapentin 600 milliGRAM(s) Oral two times a day  heparin  Injectable 5000 Unit(s) SubCutaneous every 12 hours  HYDROmorphone   Tablet 4 milliGRAM(s) Oral three times a day  lactulose Syrup 20 Gram(s) Oral two times a day  midodrine. 2.5 milliGRAM(s) Oral three times a day  pantoprazole    Tablet 40 milliGRAM(s) Oral before breakfast  sodium chloride 0.9%. 1000 milliLiter(s) (75 mL/Hr) IV Continuous <Continuous>      MEDICATIONS  (PRN):  acetaminophen   Tablet .. 650 milliGRAM(s) Oral every 4 hours PRN Mild Pain (1 - 3)       Medications up to date at time of exam.      PHYSICAL EXAMINATION:  Patient has no new complaints.  GENERAL: The patient is a well-developed, well-nourished, in no apparent distress.     Vital Signs Last 24 Hrs  T(C): 36.7 (08 Feb 2020 15:22), Max: 37.1 (08 Feb 2020 05:30)  T(F): 98 (08 Feb 2020 15:22), Max: 98.7 (08 Feb 2020 05:30)  HR: 63 (08 Feb 2020 15:22) (63 - 67)  BP: 126/63 (08 Feb 2020 15:22) (110/61 - 146/71)  BP(mean): --  RR: 18 (08 Feb 2020 15:22) (18 - 18)  SpO2: 98% (08 Feb 2020 15:22) (96% - 100%)   (if applicable)    Chest Tube (if applicable)    HEENT: Head is normocephalic and atraumatic. Extraocular muscles are intact. Mucous membranes are moist.     NECK: Supple, no palpable adenopathy.    LUNGS: Clear to auscultation, no wheezing, rales, or rhonchi.    HEART: Regular rate and rhythm without murmur.    ABDOMEN: Soft, nontender, and nondistended.  No hepatosplenomegaly is noted.    : No painful voiding, no pelvic pain    EXTREMITIES: Without any cyanosis, clubbing, rash, lesions or edema.    NEUROLOGIC: Awake, alert, oriented, grossly intact    SKIN: Warm, dry, good turgor.      LABS:                        8.1    5.28  )-----------( 164      ( 08 Feb 2020 07:12 )             26.0     02-08    142  |  114<H>  |  26<H>  ----------------------------<  80  4.9   |  25  |  1.98<H>    Ca    8.5      08 Feb 2020 07:12  Phos  2.6     02-08  Mg     1.3     02-08    TPro  7.0  /  Alb  2.9<L>  /  TBili  0.4  /  DBili  x   /  AST  28  /  ALT  18  /  AlkPhos  82  02-08                        MICROBIOLOGY: (if applicable)    RADIOLOGY & ADDITIONAL STUDIES:  EKG:   CXR:  ECHO:    IMPRESSION: 69y Male PAST MEDICAL & SURGICAL HISTORY:  BPH with urinary obstruction  Prostate CA  CKD (chronic kidney disease)  PAD (peripheral artery disease)  HLD (hyperlipidemia)  HTN (hypertension)  IBD (inflammatory bowel disease)  Bipolar disorder  Anemia  COPD, mild  History of creation of ostomy   p/w         Impression; 68 Y/O Male presented with Generalized pain that has been worsening for several weeks.  Has  Asthma and COPD stable Started on HD as per Neph.. f/u d/c dialysis cath    Suggestion;  -continue duonebs   -evaluate antibx  -tele monitor as per cards

## 2020-02-08 NOTE — PROGRESS NOTE ADULT - SUBJECTIVE AND OBJECTIVE BOX
PGY 1 Note discussed with supervising resident and primary attending    Patient is a 69y old  Male who presents with a chief complaint of Acute Renal Failure (08 Feb 2020 12:00)      INTERVAL HPI/OVERNIGHT EVENTS: Patient seen and examined at the bedside. Pt says he feels tired, still has back pain.    MEDICATIONS  (STANDING):  ARIPiprazole 15 milliGRAM(s) Oral daily  atorvastatin 10 milliGRAM(s) Oral at bedtime  cefTRIAXone   IVPB      cefTRIAXone   IVPB 1000 milliGRAM(s) IV Intermittent every 24 hours  chlorhexidine 2% Cloths 1 Application(s) Topical <User Schedule>  chlorhexidine 4% Liquid 1 Application(s) Topical daily  finasteride 5 milliGRAM(s) Oral daily  gabapentin 600 milliGRAM(s) Oral two times a day  heparin  Injectable 5000 Unit(s) SubCutaneous every 12 hours  lidocaine   Patch 1 Patch Transdermal daily  midodrine. 2.5 milliGRAM(s) Oral three times a day  pantoprazole    Tablet 40 milliGRAM(s) Oral before breakfast  sodium chloride 0.9%. 1000 milliLiter(s) (75 mL/Hr) IV Continuous <Continuous>    MEDICATIONS  (PRN):  acetaminophen   Tablet .. 650 milliGRAM(s) Oral every 4 hours PRN Mild Pain (1 - 3)  oxycodone    5 mG/acetaminophen 325 mG 1 Tablet(s) Oral every 4 hours PRN Moderate Pain (4 - 6)  traMADol 75 milliGRAM(s) Oral every 6 hours PRN Severe Pain (7 - 10)      __________________________________________________  REVIEW OF SYSTEMS:  CONSTITUTIONAL: No fever, +fatigue  EYES: no acute visual disturbances  NECK: No pain or stiffness  RESPIRATORY: No cough; No shortness of breath  CARDIOVASCULAR: No chest pain, no palpitations  GASTROINTESTINAL: No pain. No nausea or vomiting; No diarrhea   NEUROLOGICAL: No headache or numbness, no tremors  MUSCULOSKELETAL: +back pain  GENITOURINARY: no dysuria, no frequency, no hesitancy  PSYCHIATRY: no depression , no anxiety  ALL OTHER  ROS negative          Vital Signs Last 24 Hrs  T(C): 36.6 (08 Feb 2020 11:12), Max: 37.1 (08 Feb 2020 05:30)  T(F): 97.8 (08 Feb 2020 11:12), Max: 98.7 (08 Feb 2020 05:30)  HR: 63 (08 Feb 2020 11:12) (63 - 79)  BP: 110/61 (08 Feb 2020 11:12) (110/61 - 146/71)  BP(mean): --  RR: 18 (08 Feb 2020 11:12) (18 - 18)  SpO2: 98% (08 Feb 2020 11:12) (95% - 100%)    ________________________________________________  PHYSICAL EXAM:  GENERAL: NAD, healthy man sitting in bed  HEENT: Normocephalic;  conjunctivae and sclerae clear; moist mucous membranes;   NECK : supple  CHEST/LUNG: Clear to auscultation bilaterally with good air entry   HEART: S1 S2  regular; no murmurs, gallops or rubs  ABDOMEN: Soft, Nontender, Nondistended; Bowel sounds present, Rt ileostomy bag in place  GENITOURINARY: Lt shiley in place  Musculoskeletal: back pain   SKIN: warm and dry; no rash  NERVOUS SYSTEM:  Awake and alert; Oriented  to place, person and time ; no new deficits    _________________________________________________  LABS:                        8.1    5.28  )-----------( 164      ( 08 Feb 2020 07:12 )             26.0     02-08    142  |  114<H>  |  26<H>  ----------------------------<  80  4.9   |  25  |  1.98<H>    Ca    8.5      08 Feb 2020 07:12  Phos  2.6     02-08  Mg     1.3     02-08    TPro  7.0  /  Alb  2.9<L>  /  TBili  0.4  /  DBili  x   /  AST  28  /  ALT  18  /  AlkPhos  82  02-08        CAPILLARY BLOOD GLUCOSE            RADIOLOGY & ADDITIONAL TESTS:    Imaging Personally Reviewed:  YES/NO    Consultant(s) Notes Reviewed:   YES/ No    Care Discussed with Consultants :     Plan of care was discussed with patient and /or primary care giver; all questions and concerns were addressed and care was aligned with patient's wishes.

## 2020-02-08 NOTE — PROGRESS NOTE ADULT - ASSESSMENT
69 year old Male from [facility], with PMHx of prostate Ca (s/p radiation in 2015, now in remission), asthma, COPD (not on O2), HTN, HLD, Bipolar, anemia, GERD, Ileostomy s/p sigmoid resection, PAD, and CKD presented to ED with Generalized pain that has been worsening for several weeks.  Pt states he has chronic back pain for which he was admitted for 2 weeks prior.  He states the facility gave him pain medication, but the pain did not improve.  Pt states he has not urinated for 24hrs, but feels the urge to void.  Pt denies CP, palpitations, HA, dizziness, fever, or syncope. Pt admitted to the ICU for management of symptomatic hyperkalemia requiring new HD    Assessment:  -Severe Hyperkalemia with EKG changes  -Acute Renal Failure on CKD   -Hypertension  -Bi-Polar disorder   -COPD/ Asthma  -Anemia of chronic renal disease   -PAD  Plan:  Neuro:  -Currently AO x 3   -Bi-Polar disorder- c/w Abilify therapy  -wild hold trazodone and lamotrigine in the setting of ARF   CV:  -Hypertension- antihypertensives held as blood pressure currently on the lower side; resume as clinically indicated   -Monitor BP  -PAD- c/w asa therapy   -f/u ECHO  Pulm:  -CXR clear, saturating 100% on room air  -COPD not on home O2-no concern for exacerbation- c/w duoneb therapy as needed  ID:c/w  Rocephin therapy with concern for cystitis   Hep C Positive  Nephro:  -Acute Renal Failure on CKD  -hyperkalemia resolved  -Patient's cr is improving, likely was ATN from persistent pre-renal state  - likely no further need for HD  - Can removed shiley tomorrow if Scr continues to improve  - f/u Nephro Dr. Vasquez  - f/u renal ultrasound   GI:  -Renal/ low sodium diet     Heme:  -anemia of chronic renal disease- currently at baseline  Endo:  target CBG < 180  -HbA1c 5.6  Musculoskeletal:  pt has chronic back pain and wants to see a pain manager  on lidocaine patch and gabapentin 600mg bid as per Pain management recs from last admission  tylenol, percocet and tramadol prn for mild, moderate and severe pain  Prophy:  -Heparin S/C for DVT prophy  FULL CODE

## 2020-02-08 NOTE — PROGRESS NOTE ADULT - SUBJECTIVE AND OBJECTIVE BOX
[facility]   MD Clyde Trujillo MD, D.O. Ruoru Wong, PA    From 7 AM - 5 PM:  OFFICE: [phone]  Dr. North cell: [phone]  Dr. Vasquez cell: [phone]  Dr. Carnes cell: [phone]  DEE Hogan cell: [phone]    From 5 PM - 7 AM: Answering Service: [phone]      RENAL FOLLOW UP NOTE  --------------------------------------------------------------------------------  HPI: Pt seen and examined at bedside.   Denies SOB, chest pain or LE edema     PAST HISTORY  --------------------------------------------------------------------------------  No significant changes to PMH, PSH, FHx, SHx, unless otherwise noted    ALLERGIES & MEDICATIONS  --------------------------------------------------------------------------------  Allergies    No Known Allergies    Intolerances      Standing Inpatient Medications  ARIPiprazole 15 milliGRAM(s) Oral daily  atorvastatin 10 milliGRAM(s) Oral at bedtime  cefTRIAXone   IVPB      cefTRIAXone   IVPB 1000 milliGRAM(s) IV Intermittent every 24 hours  chlorhexidine 2% Cloths 1 Application(s) Topical <User Schedule>  chlorhexidine 4% Liquid 1 Application(s) Topical daily  finasteride 5 milliGRAM(s) Oral daily  gabapentin 600 milliGRAM(s) Oral two times a day  heparin  Injectable 5000 Unit(s) SubCutaneous every 12 hours  lidocaine   Patch 1 Patch Transdermal daily  midodrine. 2.5 milliGRAM(s) Oral three times a day  pantoprazole    Tablet 40 milliGRAM(s) Oral before breakfast  sodium chloride 0.9%. 1000 milliLiter(s) IV Continuous <Continuous>    PRN Inpatient Medications  acetaminophen   Tablet .. 650 milliGRAM(s) Oral every 4 hours PRN  oxycodone    5 mG/acetaminophen 325 mG 1 Tablet(s) Oral every 4 hours PRN  traMADol 75 milliGRAM(s) Oral every 6 hours PRN      REVIEW OF SYSTEMS  --------------------------------------------------------------------------------  General: no fever  CVS: no chest pain  RESP: no sob, no cough  ABD: no abdominal pain  : no dysuria  MSK: no edema     VITALS/PHYSICAL EXAM  --------------------------------------------------------------------------------  T(C): 36.6 (02-08-20 @ 11:12), Max: 37.1 (02-08-20 @ 05:30)  HR: 63 (02-08-20 @ 11:12) (63 - 85)  BP: 110/61 (02-08-20 @ 11:12) (110/61 - 146/71)  RR: 18 (02-08-20 @ 11:12) (18 - 18)  SpO2: 98% (02-08-20 @ 11:12) (95% - 100%)  Wt(kg): --        02-07-20 @ 07:01  -  02-08-20 @ 07:00  --------------------------------------------------------  IN: 675 mL / OUT: 1450 mL / NET: -775 mL    02-08-20 @ 07:01  -  02-08-20 @ 12:01  --------------------------------------------------------  IN: 200 mL / OUT: 0 mL / NET: 200 mL      Physical Exam:  	Gen: NAD  	HEENT: MMM  	Pulm: CTA B/L  	CV: S1S2  	Abd: Soft, +BS  	Ext: No LE edema B/L                      Neuro: Awake   	Skin: Warm and Dry   	Vascular access: + femoral shiley     LABS/STUDIES  --------------------------------------------------------------------------------              8.1    5.28  >-----------<  164      [02-08-20 @ 07:12]              26.0     142  |  114  |  26  ----------------------------<  80      [02-08-20 @ 07:12]  4.9   |  25  |  1.98        Ca     8.5     [02-08-20 @ 07:12]      Mg     1.3     [02-08-20 @ 07:12]      Phos  2.6     [02-08-20 @ 07:12]    TPro  7.0  /  Alb  2.9  /  TBili  0.4  /  DBili  x   /  AST  28  /  ALT  18  /  AlkPhos  82  [02-08-20 @ 07:12]    Creatinine Trend:  SCr 1.98 [02-08 @ 07:12]  SCr 2.15 [02-07 @ 06:11]  SCr 2.09 [02-06 @ 15:15]  SCr 3.65 [02-06 @ 06:56]  SCr 4.76 [02-05 @ 16:23]    Urinalysis - [02-05-20 @ 04:42]      Color Yellow / Appearance Clear / SG 1.015 / pH 5.0      Gluc Negative / Ketone Negative  / Bili Negative / Urobili Negative       Blood Moderate / Protein 30 / Leuk Est Moderate / Nitrite Negative      RBC 5-10 / WBC 11-25 / Hyaline 0-2 / Gran  / Sq Epi  / Non Sq Epi Few / Bacteria Moderate    Urine Creatinine 138      [02-05-20 @ 06:09]  Urine Sodium 37      [02-05-20 @ 06:09]  Urine Potassium 37      [02-05-20 @ 06:09]  Urine Chloride 33      [02-05-20 @ 06:09]  Urine Osmolality 325      [02-05-20 @ 06:09]    Iron 61, TIBC 220, %sat 28      [01-28-20 @ 07:31]  Ferritin 371      [01-28-20 @ 14:41]  PTH -- (Ca 8.6)      [01-24-20 @ 15:01]   132  Vitamin D (25OH) 13.7      [12-24-19 @ 09:22]  HbA1c 5.6      [01-24-20 @ 15:03]  TSH 1.78      [01-24-20 @ 10:32]  Lipid: chol 99, TG 73, HDL 48, LDL 36      [01-24-20 @ 10:32]    HBsAg Nonreact      [02-05-20 @ 10:06]  HCV 11.27, Reactive      [02-05-20 @ 10:06]

## 2020-02-09 LAB
ALBUMIN SERPL ELPH-MCNC: 3.2 G/DL — LOW (ref 3.5–5)
ALP SERPL-CCNC: 85 U/L — SIGNIFICANT CHANGE UP (ref 40–120)
ALT FLD-CCNC: 25 U/L DA — SIGNIFICANT CHANGE UP (ref 10–60)
ANION GAP SERPL CALC-SCNC: 6 MMOL/L — SIGNIFICANT CHANGE UP (ref 5–17)
AST SERPL-CCNC: 28 U/L — SIGNIFICANT CHANGE UP (ref 10–40)
BILIRUB SERPL-MCNC: 0.3 MG/DL — SIGNIFICANT CHANGE UP (ref 0.2–1.2)
BUN SERPL-MCNC: 24 MG/DL — HIGH (ref 7–18)
CALCIUM SERPL-MCNC: 9 MG/DL — SIGNIFICANT CHANGE UP (ref 8.4–10.5)
CHLORIDE SERPL-SCNC: 116 MMOL/L — HIGH (ref 96–108)
CO2 SERPL-SCNC: 21 MMOL/L — LOW (ref 22–31)
CREAT SERPL-MCNC: 1.96 MG/DL — HIGH (ref 0.5–1.3)
GLUCOSE SERPL-MCNC: 86 MG/DL — SIGNIFICANT CHANGE UP (ref 70–99)
HCT VFR BLD CALC: 27.9 % — LOW (ref 39–50)
HGB BLD-MCNC: 8.6 G/DL — LOW (ref 13–17)
MAGNESIUM SERPL-MCNC: 1.4 MG/DL — LOW (ref 1.6–2.6)
MCHC RBC-ENTMCNC: 27.3 PG — SIGNIFICANT CHANGE UP (ref 27–34)
MCHC RBC-ENTMCNC: 30.8 GM/DL — LOW (ref 32–36)
MCV RBC AUTO: 88.6 FL — SIGNIFICANT CHANGE UP (ref 80–100)
NRBC # BLD: 0 /100 WBCS — SIGNIFICANT CHANGE UP (ref 0–0)
PHOSPHATE SERPL-MCNC: 3.2 MG/DL — SIGNIFICANT CHANGE UP (ref 2.5–4.5)
PLATELET # BLD AUTO: 175 K/UL — SIGNIFICANT CHANGE UP (ref 150–400)
POTASSIUM SERPL-MCNC: 4.9 MMOL/L — SIGNIFICANT CHANGE UP (ref 3.5–5.3)
POTASSIUM SERPL-SCNC: 4.9 MMOL/L — SIGNIFICANT CHANGE UP (ref 3.5–5.3)
PROT SERPL-MCNC: 7.5 G/DL — SIGNIFICANT CHANGE UP (ref 6–8.3)
RBC # BLD: 3.15 M/UL — LOW (ref 4.2–5.8)
RBC # FLD: 16.3 % — HIGH (ref 10.3–14.5)
SODIUM SERPL-SCNC: 143 MMOL/L — SIGNIFICANT CHANGE UP (ref 135–145)
WBC # BLD: 5.03 K/UL — SIGNIFICANT CHANGE UP (ref 3.8–10.5)
WBC # FLD AUTO: 5.03 K/UL — SIGNIFICANT CHANGE UP (ref 3.8–10.5)

## 2020-02-09 RX ADMIN — HYDROMORPHONE HYDROCHLORIDE 4 MILLIGRAM(S): 2 INJECTION INTRAMUSCULAR; INTRAVENOUS; SUBCUTANEOUS at 14:50

## 2020-02-09 RX ADMIN — HEPARIN SODIUM 5000 UNIT(S): 5000 INJECTION INTRAVENOUS; SUBCUTANEOUS at 05:35

## 2020-02-09 RX ADMIN — HYDROMORPHONE HYDROCHLORIDE 4 MILLIGRAM(S): 2 INJECTION INTRAMUSCULAR; INTRAVENOUS; SUBCUTANEOUS at 14:18

## 2020-02-09 RX ADMIN — CEFTRIAXONE 100 MILLIGRAM(S): 500 INJECTION, POWDER, FOR SOLUTION INTRAMUSCULAR; INTRAVENOUS at 12:02

## 2020-02-09 RX ADMIN — HYDROMORPHONE HYDROCHLORIDE 4 MILLIGRAM(S): 2 INJECTION INTRAMUSCULAR; INTRAVENOUS; SUBCUTANEOUS at 23:21

## 2020-02-09 RX ADMIN — HYDROMORPHONE HYDROCHLORIDE 4 MILLIGRAM(S): 2 INJECTION INTRAMUSCULAR; INTRAVENOUS; SUBCUTANEOUS at 22:05

## 2020-02-09 RX ADMIN — LACTULOSE 20 GRAM(S): 10 SOLUTION ORAL at 05:35

## 2020-02-09 RX ADMIN — PANTOPRAZOLE SODIUM 40 MILLIGRAM(S): 20 TABLET, DELAYED RELEASE ORAL at 05:36

## 2020-02-09 RX ADMIN — GABAPENTIN 600 MILLIGRAM(S): 400 CAPSULE ORAL at 17:28

## 2020-02-09 RX ADMIN — HYDROMORPHONE HYDROCHLORIDE 4 MILLIGRAM(S): 2 INJECTION INTRAMUSCULAR; INTRAVENOUS; SUBCUTANEOUS at 06:00

## 2020-02-09 RX ADMIN — FINASTERIDE 5 MILLIGRAM(S): 5 TABLET, FILM COATED ORAL at 12:02

## 2020-02-09 RX ADMIN — CHLORHEXIDINE GLUCONATE 1 APPLICATION(S): 213 SOLUTION TOPICAL at 10:04

## 2020-02-09 RX ADMIN — MIDODRINE HYDROCHLORIDE 2.5 MILLIGRAM(S): 2.5 TABLET ORAL at 05:36

## 2020-02-09 RX ADMIN — ARIPIPRAZOLE 15 MILLIGRAM(S): 15 TABLET ORAL at 12:02

## 2020-02-09 RX ADMIN — HYDROMORPHONE HYDROCHLORIDE 4 MILLIGRAM(S): 2 INJECTION INTRAMUSCULAR; INTRAVENOUS; SUBCUTANEOUS at 05:35

## 2020-02-09 RX ADMIN — GABAPENTIN 600 MILLIGRAM(S): 400 CAPSULE ORAL at 05:35

## 2020-02-09 NOTE — PROGRESS NOTE ADULT - SUBJECTIVE AND OBJECTIVE BOX
Patient is a 69y old  Male who presents with a chief complaint of Acute Renal Failure (2020 16:30)    PATIENT IS SEEN AND EXAMINED IN MEDICAL FLOOR.    ALLERGIES:  No Known Allergies    Daily Weight in k.4 (2020 04:34)    VITALS:    Vital Signs Last 24 Hrs  T(C): 36.4 (2020 15:06), Max: 36.9 (2020 20:52)  T(F): 97.6 (2020 15:06), Max: 98.5 (2020 20:52)  HR: 78 (2020 15:06) (55 - 78)  BP: 128/78 (2020 15:06) (114/54 - 130/76)  BP(mean): --  RR: 18 (2020 15:06) (18 - 18)  SpO2: 100% (2020 15:06) (91% - 100%)    LABS:    CBC Full  -  ( 2020 06:34 )  WBC Count : 5.03 K/uL  RBC Count : 3.15 M/uL  Hemoglobin : 8.6 g/dL  Hematocrit : 27.9 %  Platelet Count - Automated : 175 K/uL  Mean Cell Volume : 88.6 fl  Mean Cell Hemoglobin : 27.3 pg  Mean Cell Hemoglobin Concentration : 30.8 gm/dL  Auto Neutrophil # : x  Auto Lymphocyte # : x  Auto Monocyte # : x  Auto Eosinophil # : x  Auto Basophil # : x  Auto Neutrophil % : x  Auto Lymphocyte % : x  Auto Monocyte % : x  Auto Eosinophil % : x  Auto Basophil % : x      02-    143  |  116<H>  |  24<H>  ----------------------------<  86  4.9   |  21<L>  |  1.96<H>    Ca    9.0      2020 06:34  Phos  3.2     02-09  Mg     1.4     02-09    TPro  7.5  /  Alb  3.2<L>  /  TBili  0.3  /  DBili  x   /  AST  28  /  ALT  25  /  AlkPhos  85  02-09    LIVER FUNCTIONS - ( 2020 06:34 )  Alb: 3.2 g/dL / Pro: 7.5 g/dL / ALK PHOS: 85 U/L / ALT: 25 U/L DA / AST: 28 U/L / GGT: x           Creatinine Trend: 1.96<--, 1.98<--, 2.15<--, 2.09<--, 3.65<--, 4.76<--  I&O's Summary    2020 07:01  -  2020 07:00  --------------------------------------------------------  IN: 430 mL / OUT: 1750 mL / NET: -1320 mL      .Blood   @ 10:19   No growth to date.  --  --      .Blood   @ 22:29   No growth at 5 days.  --  --      .Blood   @ 22:26   No growth at 5 days.  --  --      .Urine   @ 01:01   >100,000 CFU/ml Klebsiella pneumoniae  --  Klebsiella pneumoniae          MEDICATIONS:    MEDICATIONS  (STANDING):  ARIPiprazole 15 milliGRAM(s) Oral daily  atorvastatin 10 milliGRAM(s) Oral at bedtime  bisacodyl 10 milliGRAM(s) Oral at bedtime  cefTRIAXone   IVPB      cefTRIAXone   IVPB 1000 milliGRAM(s) IV Intermittent every 24 hours  chlorhexidine 2% Cloths 1 Application(s) Topical <User Schedule>  chlorhexidine 4% Liquid 1 Application(s) Topical daily  finasteride 5 milliGRAM(s) Oral daily  gabapentin 600 milliGRAM(s) Oral two times a day  heparin  Injectable 5000 Unit(s) SubCutaneous every 12 hours  HYDROmorphone   Tablet 4 milliGRAM(s) Oral three times a day  lactulose Syrup 20 Gram(s) Oral two times a day  midodrine. 2.5 milliGRAM(s) Oral three times a day  pantoprazole    Tablet 40 milliGRAM(s) Oral before breakfast  sodium chloride 0.9%. 1000 milliLiter(s) (75 mL/Hr) IV Continuous <Continuous>      MEDICATIONS  (PRN):  acetaminophen   Tablet .. 650 milliGRAM(s) Oral every 4 hours PRN Mild Pain (1 - 3)      REVIEW OF SYSTEMS:                           ALL ROS DONE [ X   ]    CONSTITUTIONAL:  LETHARGIC [   ], FEVER [   ], UNRESPONSIVE [   ]  CVS:  CP  [   ], SOB, [   ], PALPITATIONS [   ], DIZZYNESS [   ]  RS: COUGH [   ], SPUTUM [   ]  GI: ABDOMINAL PAIN [   ], NAUSEA [   ], VOMITINGS [   ], DIARRHEA [   ], CONSTIPATION [   ]  :  DYSURIA [   ], NOCTURIA [   ], INCREASED FREQUENCY [   ], DRIBLING [   ],  SKELETAL: PAINFUL JOINTS [   ], SWOLLEN JOINTS [   ], NECK ACHE [   ], LOW BACK ACHE [   ],  SKIN : ULCERS [   ], RASH [   ], ITCHING [   ]  CNS: HEAD ACHE [   ], DOUBLE VISION [   ], BLURRED VISION [   ], AMS / CONFUSION [   ], SEIZURES [   ], WEAKNESS [   ],TINGLING / NUMBNESS [   ]    PHYSICAL EXAMINATION:  GENERAL APPEARANCE: NO DISTRESS  HEENT:  NO PALLOR, NO  JVD,  NO   NODES, NECK SUPPLE  CVS: S1 +, S2 +,   RS: AEEB,  OCCASIONAL  RALES +,   NO RONCHI  ABD: SOFT, NT, NO, BS +                                   ILEOSTOMY +  EXT: NO PE  SKIN: WARM,   SKELETAL:  ROM ACCEPTABLE  CNS:  AAO X 3 , NO  DEFICITS    RADIOLOGY :    < from: CT Abdomen and Pelvis No Cont (20 @ 02:28) >    IMPRESSION: No evidence for mechanical small bowel obstruction. Patient status post colectomy with right-sided ileostomy. No free intraperitoneal air or fluid identified.    < end of copied text >      < from: Xray Chest 1 View-PORTABLE IMMEDIATE (20 @ 01:31) >    INTERPRETATION:  AP chest on 2020 at 1:18 AM. Patient is short of breath.    Poor inspiration crowds the chest. Heart may be enlarged.    There are rather mild basilar atelectatic findings.    Chest is similar to 2019.    IMPRESSION: Unchanged chest as above.    < end of copied text >      ASSESSMENT :     Hyperkalemia  BPH with urinary obstruction  Prostate CA  CKD (chronic kidney disease)  PAD (peripheral artery disease)  HLD (hyperlipidemia)  HTN (hypertension)  IBD (inflammatory bowel disease)  Bipolar disorder  Anemia  COPD, mild  History of creation of ostomy      PLAN:  HPI:  69 year old Male from [facility], with PMHx of prostate Ca (s/p radiation in , now in remission), asthma, COPD (not on O2), HTN, HLD, Bipolar, anemia, GERD, Ileostomy s/p sigmoid resection, PAD, and CKD presented to ED with Generalized pain that has been worsening for several weeks.  Pt states he has chronic back pain for which he was admitted for 2 weeks prior.  He states the facility gave him pain medication, but the pain did not improve.  Pt states he has not urinated for 24hrs, but feels the urge to void.  Pt denies CP, palpitations, HA, dizziness, fever, or syncope.     In the ED, pt presents in mild painful distress, vitals WNL, and EKG concerning for long [place] segments, and wide QRS intervals (2020 03:12)    - DC PLAN TO [facility] - IN AM IF STABLE - TO [facility]  - INTRACTABLE LOW BACK ACHE DUE TO LS SPINAL STENOSIS, CHRONIC LOW BACK ACHE. S/P CT LS SPINE DONE, PAIN RMEDS ASNEEDED. TO OBTAIN PT & OT EVALUATION, DC PLAN BACK TO [facility] OR Health system PENDING PT EVAL  - HYPERKALEMIA DUE TO ARF , CKD STAGE 4 - S/P INSULIN, CALCIUM IV , AND IS ON KAYEXALATE. RENAL CONSULT F/UP DR. CHAWLA IS IN PROGRESS, S/P ICU F/UP AND EMERGENCY HD.   - GI AND DVT PROPHYLAXIS  - DR. IVY

## 2020-02-09 NOTE — PROGRESS NOTE ADULT - ATTENDING COMMENTS
CARDIOLOGY ATTENDING    Agree with above. No further inpatient cardiac workup expected. F/U with Jeremi as outpatient as scheduled.
CARDIOLOGY ATTENDING    Agree with above. No further inpatient cardiac workup expected. F/U with Jeremi as outpatient as scheduled.
Assessment  1.Acute Renal Failure on CKD   2. Metabolic acidosis   3. Hyperkalemia  4. Hypertension  5. Bi-Polar disorder   6. COPD/ Asthma  7. Anemia of chronic renal disease   8. PAD  9. Cystitis     Plan  HD per nephrology  monitor urine output  Cont. phillips  IV fluid resuscitation  Oral diet  Avoid nsaids of other nephrotoxic agents  Check Echo  Aspirin/statin  IV antibiotics for cystitis, check urine culture  Hold antihypertensives for now  Cont. psych medications  OOB to chair  PT evaluation
PATIENT WAS SEEN AND EXAMINED  - SEVERE HYPERKALEMIA DUE TO ARF / CKD STAGE 4 - S/P ICU F/UP, DOWN GRADED TODAY TO FLOOR, ON HD WITH LEFT FEMORAL HD CATHETER. WILL D/W RENAL MD - DR. CHAWLA AND DECIDE IF PATIENT REQUIRES HD FURTHER, IF SO, WILL GET HD CATHETER INSERTED IN LEFT NECK AND ESTABLISH HD CENTER AS OUT PATIENT   - INTRACTABLE LOW BACK ACHE DUE TO LS SPINAL STENOSIS. WILL ADJUST PAIN Rx. S/P PT / OT EVALUATION - DC PLAN TO [facility]  - ANEMIA OF CKD   - GI AND DVT PROPHYLAXIS  - DR. IVY
Assessment  1.Acute Renal Failure on CKD   2. Metabolic acidosis   3. Hyperkalemia  4. Hypertension  5. Bi-Polar disorder   6. COPD/ Asthma  7. Anemia of chronic renal disease   8. PAD  9. Cystitis     Plan  HD today given hyperkalemia  monitor urine output  Cont. phillips  IV fluid resuscitation  Oral diet  Avoid nsaids of other nephrotoxic agents  Check Echo  Aspirin/statin  IV antibiotics for cystitis  Hold antihypertensives for now  Cont. psych medications  OOB to chair  PT evaluation  Transfer out of ICu today

## 2020-02-09 NOTE — PROGRESS NOTE ADULT - SUBJECTIVE AND OBJECTIVE BOX
pt seen and examined, no complaints,  no events overnight         acetaminophen   Tablet .. 650 milliGRAM(s) Oral every 4 hours PRN  ARIPiprazole 15 milliGRAM(s) Oral daily  atorvastatin 10 milliGRAM(s) Oral at bedtime  bisacodyl 10 milliGRAM(s) Oral at bedtime  cefTRIAXone   IVPB      cefTRIAXone   IVPB 1000 milliGRAM(s) IV Intermittent every 24 hours  chlorhexidine 2% Cloths 1 Application(s) Topical <User Schedule>  chlorhexidine 4% Liquid 1 Application(s) Topical daily  finasteride 5 milliGRAM(s) Oral daily  gabapentin 600 milliGRAM(s) Oral two times a day  heparin  Injectable 5000 Unit(s) SubCutaneous every 12 hours  HYDROmorphone   Tablet 4 milliGRAM(s) Oral three times a day  lactulose Syrup 20 Gram(s) Oral two times a day  midodrine. 2.5 milliGRAM(s) Oral three times a day  pantoprazole    Tablet 40 milliGRAM(s) Oral before breakfast  sodium chloride 0.9%. 1000 milliLiter(s) IV Continuous <Continuous>                            8.6    5.03  )-----------( 175      ( 09 Feb 2020 06:34 )             27.9       Hemoglobin: 8.6 g/dL (02-09 @ 06:34)  Hemoglobin: 8.1 g/dL (02-08 @ 07:12)  Hemoglobin: 8.1 g/dL (02-07 @ 06:11)  Hemoglobin: 7.7 g/dL (02-06 @ 06:56)  Hemoglobin: 8.1 g/dL (02-05 @ 09:21)      02-09    143  |  116<H>  |  24<H>  ----------------------------<  86  4.9   |  21<L>  |  1.96<H>    Ca    9.0      09 Feb 2020 06:34  Phos  3.2     02-09  Mg     1.4     02-09    TPro  7.5  /  Alb  3.2<L>  /  TBili  0.3  /  DBili  x   /  AST  28  /  ALT  25  /  AlkPhos  85  02-09    Creatinine Trend: 1.96<--, 1.98<--, 2.15<--, 2.09<--, 3.65<--, 4.76<--    COAGS:           T(C): 36.8 (02-09-20 @ 07:47), Max: 36.9 (02-08-20 @ 20:52)  HR: 60 (02-09-20 @ 07:47) (55 - 68)  BP: 117/84 (02-09-20 @ 07:47) (110/61 - 126/63)  RR: 18 (02-09-20 @ 07:47) (18 - 18)  SpO2: 93% (02-09-20 @ 07:47) (91% - 98%)  Wt(kg): --    I&O's Summary    08 Feb 2020 07:01  -  09 Feb 2020 07:00  --------------------------------------------------------  IN: 430 mL / OUT: 1750 mL / NET: -1320 mL      Gen: Appears well in NAD  HEENT:  (-)icterus (-)pallor  CV: N S1 S2 1/6 MARCELLE (+)2 Pulses B/l  Resp:  Clear to ausculatation B/L, normal effort  GI: (+) BS Soft, NT, ND  Lymph:  (-)Edema, (-)obvious lymphadenopathy  Skin: Warm to touch, Normal turgor  Psych: Appropriate mood and affect      TELEMETRY: 	  Sinus        ASSESSMENT/PLAN: 	69y  Male f prostate Ca (s/p radiation in 2015, now in remission), asthma, COPD (not on O2) chronic dyspnea on exertion, HTN, HLD, Bipolar, anemia, GERD, Ileostomy s/p sigmoid resection, PAD, normal LV and RV function on recent echo in our office, normal perfusion on nuclear stress 1/20 and CKD presented to ED with Generalized pain that has been worsening for several weeks.     monitor lytes   ABX per medicine   Bp tolerating Proamatine   HD per renal    GI / DVT prophylaxis.   keep K>4, mag >2.0   No further inpatient cardiac workup expected.   F/U with Lyandres as outpatient as scheduled.

## 2020-02-09 NOTE — PROGRESS NOTE ADULT - ASSESSMENT
69 year old Male from [facility], with PMHx of prostate Ca (s/p radiation in 2015, now in remission), asthma, COPD (not on O2), HTN, HLD, Bipolar, anemia, GERD, Ileostomy s/p sigmoid resection, PAD, and CKD presented to ED with Generalized pain that has been worsening for several weeks. foudn to have Velia       VELIA  Scr 2.5 on ( 1/28/2020)  elevated to 8.9 on ( 2/5/2020)  VELIA etiology? possibly ATN from persistant pre-renal state  FeNA suggestive of ATN  Pt non oliguric today. Likely renal recovery. Scr stable    No further need for HD. ATN resolving. Can removed temporary dialysis catheter (shiley) today   s/p HD on 2/5 and 2/6 for hyperkalemia.   Check New Renal sonogram  Monitor BMP  Avoid further nephrotoxics, NSAIDS RCA    Hyperkalemia  Remains WNL   sec to RF   improved with HD  low K diet    Acidosis  Non AG  improved     Hyponatremia  Resolved  monitor closely

## 2020-02-09 NOTE — PROGRESS NOTE ADULT - SUBJECTIVE AND OBJECTIVE BOX
[facility] [place] [facility]   MD Clyde Trujillo MD, D.O. Ruoru Wong, PA    From 7 AM - 5 PM:  OFFICE: [phone]  Dr. North cell: [phone]  Dr. Vasquez cell: [phone]  Dr. Carnes cell: [phone]  DEE Hogan cell: [phone]    From 5 PM - 7 AM: Answering Service: [phone]      RENAL FOLLOW UP NOTE  --------------------------------------------------------------------------------  HPI: Pt seen and examined at bedside.   Denies SOB, chest pain or LE edema     PAST HISTORY  --------------------------------------------------------------------------------  No significant changes to PMH, PSH, FHx, SHx, unless otherwise noted    ALLERGIES & MEDICATIONS  --------------------------------------------------------------------------------  Allergies    No Known Allergies    Intolerances      Standing Inpatient Medications  ARIPiprazole 15 milliGRAM(s) Oral daily  atorvastatin 10 milliGRAM(s) Oral at bedtime  bisacodyl 10 milliGRAM(s) Oral at bedtime  cefTRIAXone   IVPB      cefTRIAXone   IVPB 1000 milliGRAM(s) IV Intermittent every 24 hours  chlorhexidine 2% Cloths 1 Application(s) Topical <User Schedule>  chlorhexidine 4% Liquid 1 Application(s) Topical daily  finasteride 5 milliGRAM(s) Oral daily  gabapentin 600 milliGRAM(s) Oral two times a day  heparin  Injectable 5000 Unit(s) SubCutaneous every 12 hours  HYDROmorphone   Tablet 4 milliGRAM(s) Oral three times a day  lactulose Syrup 20 Gram(s) Oral two times a day  midodrine. 2.5 milliGRAM(s) Oral three times a day  pantoprazole    Tablet 40 milliGRAM(s) Oral before breakfast  sodium chloride 0.9%. 1000 milliLiter(s) IV Continuous <Continuous>    PRN Inpatient Medications  acetaminophen   Tablet .. 650 milliGRAM(s) Oral every 4 hours PRN      REVIEW OF SYSTEMS  --------------------------------------------------------------------------------  General: no fever  CVS: no chest pain  RESP: no sob, no cough  ABD: no abdominal pain  : no dysuria  MSK: no edema     VITALS/PHYSICAL EXAM  --------------------------------------------------------------------------------  T(C): 36.4 (02-09-20 @ 15:06), Max: 36.9 (02-08-20 @ 20:52)  HR: 78 (02-09-20 @ 15:06) (55 - 78)  BP: 128/78 (02-09-20 @ 15:06) (114/54 - 130/76)  RR: 18 (02-09-20 @ 15:06) (18 - 18)  SpO2: 100% (02-09-20 @ 15:06) (91% - 100%)  Wt(kg): --        02-08-20 @ 07:01  -  02-09-20 @ 07:00  --------------------------------------------------------  IN: 430 mL / OUT: 1750 mL / NET: -1320 mL      Physical Exam:  	Gen: NAD  	HEENT: MMM  	Pulm: CTA B/L  	CV: S1S2  	Abd: Soft, +BS  	Ext: No LE edema B/L                      Neuro: Awake   	Skin: Warm and Dry   	Vascular access: + femoral Non-tunneled HD cath    LABS/STUDIES  --------------------------------------------------------------------------------              8.6    5.03  >-----------<  175      [02-09-20 @ 06:34]              27.9     143  |  116  |  24  ----------------------------<  86      [02-09-20 @ 06:34]  4.9   |  21  |  1.96        Ca     9.0     [02-09-20 @ 06:34]      Mg     1.4     [02-09-20 @ 06:34]      Phos  3.2     [02-09-20 @ 06:34]    TPro  7.5  /  Alb  3.2  /  TBili  0.3  /  DBili  x   /  AST  28  /  ALT  25  /  AlkPhos  85  [02-09-20 @ 06:34]    Creatinine Trend:  SCr 1.96 [02-09 @ 06:34]  SCr 1.98 [02-08 @ 07:12]  SCr 2.15 [02-07 @ 06:11]  SCr 2.09 [02-06 @ 15:15]  SCr 3.65 [02-06 @ 06:56]    Urinalysis - [02-05-20 @ 04:42]      Color Yellow / Appearance Clear / SG 1.015 / pH 5.0      Gluc Negative / Ketone Negative  / Bili Negative / Urobili Negative       Blood Moderate / Protein 30 / Leuk Est Moderate / Nitrite Negative      RBC 5-10 / WBC 11-25 / Hyaline 0-2 / Gran  / Sq Epi  / Non Sq Epi Few / Bacteria Moderate    Urine Creatinine 138      [02-05-20 @ 06:09]  Urine Sodium 37      [02-05-20 @ 06:09]  Urine Potassium 37      [02-05-20 @ 06:09]  Urine Chloride 33      [02-05-20 @ 06:09]  Urine Osmolality 325      [02-05-20 @ 06:09]    Iron 61, TIBC 220, %sat 28      [01-28-20 @ 07:31]  Ferritin 371      [01-28-20 @ 14:41]  PTH -- (Ca 8.6)      [01-24-20 @ 15:01]   132  Vitamin D (25OH) 13.7      [12-24-19 @ 09:22]  HbA1c 5.6      [01-24-20 @ 15:03]  TSH 1.78      [01-24-20 @ 10:32]  Lipid: chol 99, TG 73, HDL 48, LDL 36      [01-24-20 @ 10:32]    HBsAg Nonreact      [02-05-20 @ 10:06]  HCV 11.27, Reactive      [02-05-20 @ 10:06]

## 2020-02-10 ENCOUNTER — TRANSCRIPTION ENCOUNTER (OUTPATIENT)
Age: 70
End: 2020-02-10

## 2020-02-10 LAB
ALBUMIN SERPL ELPH-MCNC: 3.4 G/DL — LOW (ref 3.5–5)
ALP SERPL-CCNC: 86 U/L — SIGNIFICANT CHANGE UP (ref 40–120)
ALT FLD-CCNC: 28 U/L DA — SIGNIFICANT CHANGE UP (ref 10–60)
ANION GAP SERPL CALC-SCNC: 6 MMOL/L — SIGNIFICANT CHANGE UP (ref 5–17)
APTT BLD: 26.4 SEC — LOW (ref 27.5–36.3)
AST SERPL-CCNC: 24 U/L — SIGNIFICANT CHANGE UP (ref 10–40)
BILIRUB SERPL-MCNC: 0.5 MG/DL — SIGNIFICANT CHANGE UP (ref 0.2–1.2)
BUN SERPL-MCNC: 27 MG/DL — HIGH (ref 7–18)
CALCIUM SERPL-MCNC: 9.2 MG/DL — SIGNIFICANT CHANGE UP (ref 8.4–10.5)
CHLORIDE SERPL-SCNC: 116 MMOL/L — HIGH (ref 96–108)
CO2 SERPL-SCNC: 21 MMOL/L — LOW (ref 22–31)
CREAT SERPL-MCNC: 2.07 MG/DL — HIGH (ref 0.5–1.3)
CULTURE RESULTS: SIGNIFICANT CHANGE UP
CULTURE RESULTS: SIGNIFICANT CHANGE UP
GLUCOSE SERPL-MCNC: 91 MG/DL — SIGNIFICANT CHANGE UP (ref 70–99)
HCT VFR BLD CALC: 28.8 % — LOW (ref 39–50)
HGB BLD-MCNC: 8.9 G/DL — LOW (ref 13–17)
INR BLD: 1.15 RATIO — SIGNIFICANT CHANGE UP (ref 0.88–1.16)
MAGNESIUM SERPL-MCNC: 1.4 MG/DL — LOW (ref 1.6–2.6)
MCHC RBC-ENTMCNC: 27.5 PG — SIGNIFICANT CHANGE UP (ref 27–34)
MCHC RBC-ENTMCNC: 30.9 GM/DL — LOW (ref 32–36)
MCV RBC AUTO: 88.9 FL — SIGNIFICANT CHANGE UP (ref 80–100)
NRBC # BLD: 0 /100 WBCS — SIGNIFICANT CHANGE UP (ref 0–0)
PHOSPHATE SERPL-MCNC: 3.6 MG/DL — SIGNIFICANT CHANGE UP (ref 2.5–4.5)
PLATELET # BLD AUTO: 184 K/UL — SIGNIFICANT CHANGE UP (ref 150–400)
POTASSIUM SERPL-MCNC: 5 MMOL/L — SIGNIFICANT CHANGE UP (ref 3.5–5.3)
POTASSIUM SERPL-SCNC: 5 MMOL/L — SIGNIFICANT CHANGE UP (ref 3.5–5.3)
PROT SERPL-MCNC: 7.7 G/DL — SIGNIFICANT CHANGE UP (ref 6–8.3)
PROTHROM AB SERPL-ACNC: 12.8 SEC — SIGNIFICANT CHANGE UP (ref 10–12.9)
RBC # BLD: 3.24 M/UL — LOW (ref 4.2–5.8)
RBC # FLD: 16.3 % — HIGH (ref 10.3–14.5)
SODIUM SERPL-SCNC: 143 MMOL/L — SIGNIFICANT CHANGE UP (ref 135–145)
SPECIMEN SOURCE: SIGNIFICANT CHANGE UP
SPECIMEN SOURCE: SIGNIFICANT CHANGE UP
WBC # BLD: 6.66 K/UL — SIGNIFICANT CHANGE UP (ref 3.8–10.5)
WBC # FLD AUTO: 6.66 K/UL — SIGNIFICANT CHANGE UP (ref 3.8–10.5)

## 2020-02-10 RX ORDER — CEFUROXIME AXETIL 250 MG
500 TABLET ORAL EVERY 12 HOURS
Refills: 0 | Status: DISCONTINUED | OUTPATIENT
Start: 2020-02-10 | End: 2020-02-11

## 2020-02-10 RX ADMIN — FINASTERIDE 5 MILLIGRAM(S): 5 TABLET, FILM COATED ORAL at 11:44

## 2020-02-10 RX ADMIN — HYDROMORPHONE HYDROCHLORIDE 4 MILLIGRAM(S): 2 INJECTION INTRAMUSCULAR; INTRAVENOUS; SUBCUTANEOUS at 06:26

## 2020-02-10 RX ADMIN — GABAPENTIN 600 MILLIGRAM(S): 400 CAPSULE ORAL at 18:45

## 2020-02-10 RX ADMIN — GABAPENTIN 600 MILLIGRAM(S): 400 CAPSULE ORAL at 05:53

## 2020-02-10 RX ADMIN — HYDROMORPHONE HYDROCHLORIDE 4 MILLIGRAM(S): 2 INJECTION INTRAMUSCULAR; INTRAVENOUS; SUBCUTANEOUS at 05:47

## 2020-02-10 RX ADMIN — PANTOPRAZOLE SODIUM 40 MILLIGRAM(S): 20 TABLET, DELAYED RELEASE ORAL at 05:47

## 2020-02-10 RX ADMIN — Medication 500 MILLIGRAM(S): at 18:46

## 2020-02-10 RX ADMIN — MIDODRINE HYDROCHLORIDE 2.5 MILLIGRAM(S): 2.5 TABLET ORAL at 11:44

## 2020-02-10 RX ADMIN — CHLORHEXIDINE GLUCONATE 1 APPLICATION(S): 213 SOLUTION TOPICAL at 11:50

## 2020-02-10 RX ADMIN — HYDROMORPHONE HYDROCHLORIDE 4 MILLIGRAM(S): 2 INJECTION INTRAMUSCULAR; INTRAVENOUS; SUBCUTANEOUS at 13:55

## 2020-02-10 RX ADMIN — CHLORHEXIDINE GLUCONATE 1 APPLICATION(S): 213 SOLUTION TOPICAL at 05:47

## 2020-02-10 RX ADMIN — HYDROMORPHONE HYDROCHLORIDE 4 MILLIGRAM(S): 2 INJECTION INTRAMUSCULAR; INTRAVENOUS; SUBCUTANEOUS at 21:28

## 2020-02-10 RX ADMIN — HEPARIN SODIUM 5000 UNIT(S): 5000 INJECTION INTRAVENOUS; SUBCUTANEOUS at 18:46

## 2020-02-10 RX ADMIN — ARIPIPRAZOLE 15 MILLIGRAM(S): 15 TABLET ORAL at 11:44

## 2020-02-10 RX ADMIN — HYDROMORPHONE HYDROCHLORIDE 4 MILLIGRAM(S): 2 INJECTION INTRAMUSCULAR; INTRAVENOUS; SUBCUTANEOUS at 22:38

## 2020-02-10 RX ADMIN — HYDROMORPHONE HYDROCHLORIDE 4 MILLIGRAM(S): 2 INJECTION INTRAMUSCULAR; INTRAVENOUS; SUBCUTANEOUS at 13:19

## 2020-02-10 RX ADMIN — HEPARIN SODIUM 5000 UNIT(S): 5000 INJECTION INTRAVENOUS; SUBCUTANEOUS at 05:47

## 2020-02-10 NOTE — DISCHARGE NOTE PROVIDER - CARE PROVIDER_API CALL
Gabino Rush)  Medicine  [address] [place] [address]  Phone: [phone]  Fax: [phone]  Established Patient  Follow Up Time: 2 weeks

## 2020-02-10 NOTE — CHART NOTE - NSCHARTNOTEFT_GEN_A_CORE
Called by medical team that pt no longer requires HD. L femoral shiley removed at bedside. Pt tolerated procedure. Hemostasis achieved. Bedrest for 2 hours. Nurse and pt aware. Serial groin checks for bleeding. May remove dressing in 2 days. Called by medical team that pt no longer requires HD. L femoral shiley removed at bedside. Pt tolerated procedure. Hemostasis achieved. Bedrest for 2 hours. Nurse and pt aware. Serial groin checks for bleeding. May remove dressing in 2 days.    As above  Cath Dc'ed by PA  reconsult prn

## 2020-02-10 NOTE — PROGRESS NOTE ADULT - SUBJECTIVE AND OBJECTIVE BOX
PGY 1 Note discussed with supervising resident and primary attending    Patient is a 69y old  Male who presents with a chief complaint of Acute Renal Failure (08 Feb 2020 12:00)      INTERVAL HPI/OVERNIGHT EVENTS: Patient seen and examined at the bedside. No acute events overnight  Pt noted by nurse yesterday to have feces leaking from colostomy contaminating shiley dressing. Dressing replaced and cleaned carefully with no pain or irritation noted around the site.     MEDICATIONS  (STANDING):  ARIPiprazole 15 milliGRAM(s) Oral daily  atorvastatin 10 milliGRAM(s) Oral at bedtime  bisacodyl 10 milliGRAM(s) Oral at bedtime  cefTRIAXone   IVPB      cefTRIAXone   IVPB 1000 milliGRAM(s) IV Intermittent every 24 hours  chlorhexidine 2% Cloths 1 Application(s) Topical <User Schedule>  chlorhexidine 4% Liquid 1 Application(s) Topical daily  finasteride 5 milliGRAM(s) Oral daily  gabapentin 600 milliGRAM(s) Oral two times a day  heparin  Injectable 5000 Unit(s) SubCutaneous every 12 hours  HYDROmorphone   Tablet 4 milliGRAM(s) Oral three times a day  lactulose Syrup 20 Gram(s) Oral two times a day  midodrine. 2.5 milliGRAM(s) Oral three times a day  pantoprazole    Tablet 40 milliGRAM(s) Oral before breakfast  sodium chloride 0.9%. 1000 milliLiter(s) (75 mL/Hr) IV Continuous <Continuous>    MEDICATIONS  (PRN):  acetaminophen   Tablet .. 650 milliGRAM(s) Oral every 4 hours PRN Mild Pain (1 - 3)        __________________________________________________  REVIEW OF SYSTEMS:  CONSTITUTIONAL: No fever, +fatigue  EYES: no acute visual disturbances  NECK: No pain or stiffness  RESPIRATORY: No cough; No shortness of breath  CARDIOVASCULAR: No chest pain, no palpitations  GASTROINTESTINAL: No pain. No nausea or vomiting; No diarrhea   NEUROLOGICAL: No headache or numbness, no tremors  MUSCULOSKELETAL: +back pain  GENITOURINARY: no dysuria, no frequency, no hesitancy  PSYCHIATRY: no depression , no anxiety  ALL OTHER  ROS negative        Vital Signs Last 24 Hrs  T(C): 36.9 (10 Feb 2020 07:56), Max: 37.1 (09 Feb 2020 23:18)  T(F): 98.5 (10 Feb 2020 07:56), Max: 98.8 (09 Feb 2020 23:18)  HR: 65 (10 Feb 2020 07:56) (63 - 78)  BP: 114/68 (10 Feb 2020 07:56) (114/68 - 130/76)  BP(mean): --  RR: 18 (10 Feb 2020 07:56) (17 - 18)  SpO2: 97% (10 Feb 2020 07:56) (95% - 100%)    ________________________________________________  PHYSICAL EXAM:  GENERAL: NAD, healthy man sitting in bed  HEENT: Normocephalic;  conjunctivae and sclerae clear; moist mucous membranes;   NECK : supple  CHEST/LUNG: Clear to auscultation bilaterally with good air entry   HEART: S1 S2  regular; no murmurs, gallops or rubs  ABDOMEN: Soft, Nontender, Nondistended; Bowel sounds present, Rt ileostomy bag in place  GENITOURINARY: Lt shiley in place  Musculoskeletal: back pain   SKIN: warm and dry; no rash  NERVOUS SYSTEM:  Awake and alert; Oriented  to place, person and time ; no new deficits    _________________________________________________  LABS:                                   8.9    6.66  )-----------( 184      ( 10 Feb 2020 07:45 )             28.8     02-10    143  |  116<H>  |  27<H>  ----------------------------<  91  5.0   |  21<L>  |  2.07<H>    Ca    9.2      10 Feb 2020 07:45  Phos  3.6     02-10  Mg     1.4     02-10    TPro  7.7  /  Alb  3.4<L>  /  TBili  0.5  /  DBili  x   /  AST  24  /  ALT  28  /  AlkPhos  86  02-10            CAPILLARY BLOOD GLUCOSE            RADIOLOGY & ADDITIONAL TESTS:    Imaging Personally Reviewed:  YES/NO    Consultant(s) Notes Reviewed:   YES/ No    Care Discussed with Consultants :     Plan of care was discussed with patient and /or primary care giver; all questions and concerns were addressed and care was aligned with patient's wishes.

## 2020-02-10 NOTE — PROGRESS NOTE ADULT - SUBJECTIVE AND OBJECTIVE BOX
Time of Visit:  Patient seen and examined.     MEDICATIONS  (STANDING):  ARIPiprazole 15 milliGRAM(s) Oral daily  atorvastatin 10 milliGRAM(s) Oral at bedtime  bisacodyl 10 milliGRAM(s) Oral at bedtime  cefuroxime   Tablet 500 milliGRAM(s) Oral every 12 hours  chlorhexidine 2% Cloths 1 Application(s) Topical <User Schedule>  chlorhexidine 4% Liquid 1 Application(s) Topical daily  finasteride 5 milliGRAM(s) Oral daily  gabapentin 600 milliGRAM(s) Oral two times a day  heparin  Injectable 5000 Unit(s) SubCutaneous every 12 hours  HYDROmorphone   Tablet 4 milliGRAM(s) Oral three times a day  lactulose Syrup 20 Gram(s) Oral two times a day  midodrine. 2.5 milliGRAM(s) Oral three times a day  pantoprazole    Tablet 40 milliGRAM(s) Oral before breakfast  sodium chloride 0.9%. 1000 milliLiter(s) (75 mL/Hr) IV Continuous <Continuous>      MEDICATIONS  (PRN):  acetaminophen   Tablet .. 650 milliGRAM(s) Oral every 4 hours PRN Mild Pain (1 - 3)       Medications up to date at time of exam.      PHYSICAL EXAMINATION:  Patient has no new complaints.  GENERAL: The patient is a well-developed, well-nourished, in no apparent distress.     Vital Signs Last 24 Hrs  T(C): 36.7 (10 Feb 2020 16:34), Max: 37.1 (09 Feb 2020 23:18)  T(F): 98 (10 Feb 2020 16:34), Max: 98.8 (09 Feb 2020 23:18)  HR: 67 (10 Feb 2020 16:34) (63 - 72)  BP: 136/72 (10 Feb 2020 16:34) (114/68 - 136/72)  BP(mean): --  RR: 18 (10 Feb 2020 16:34) (17 - 18)  SpO2: 99% (10 Feb 2020 16:34) (95% - 99%)   (if applicable)    Chest Tube (if applicable)    HEENT: Head is normocephalic and atraumatic. Extraocular muscles are intact. Mucous membranes are moist.     NECK: Supple, no palpable adenopathy.    LUNGS: Clear to auscultation, no wheezing, rales, or rhonchi.    HEART: Regular rate and rhythm without murmur.    ABDOMEN: Soft, nontender, and nondistended.  No hepatosplenomegaly is noted.    : No painful voiding, no pelvic pain    EXTREMITIES: Without any cyanosis, clubbing, rash, lesions or edema.    NEUROLOGIC: Awake, alert, oriented, grossly intact    SKIN: Warm, dry, good turgor.      LABS:                        8.9    6.66  )-----------( 184      ( 10 Feb 2020 07:45 )             28.8     02-10    143  |  116<H>  |  27<H>  ----------------------------<  91  5.0   |  21<L>  |  2.07<H>    Ca    9.2      10 Feb 2020 07:45  Phos  3.6     02-10  Mg     1.4     02-10    TPro  7.7  /  Alb  3.4<L>  /  TBili  0.5  /  DBili  x   /  AST  24  /  ALT  28  /  AlkPhos  86  02-10    PT/INR - ( 10 Feb 2020 10:41 )   PT: 12.8 sec;   INR: 1.15 ratio         PTT - ( 10 Feb 2020 10:41 )  PTT:26.4 sec                    MICROBIOLOGY: (if applicable)    RADIOLOGY & ADDITIONAL STUDIES:  EKG:   CXR:  ECHO:    IMPRESSION: 69y Male PAST MEDICAL & SURGICAL HISTORY:  BPH with urinary obstruction  Prostate CA  CKD (chronic kidney disease)  PAD (peripheral artery disease)  HLD (hyperlipidemia)  HTN (hypertension)  IBD (inflammatory bowel disease)  Bipolar disorder  Anemia  COPD, mild  History of creation of ostomy   p/w         Impression; 70 Y/O Male presented with Generalized pain that has been worsening for several weeks.  Has  Asthma and COPD stable Started on HD as per Neph.. f/u d/c dialysis cath    Suggestion;  -continue duonebs   -evaluate antibx  -tele monitor as per cards

## 2020-02-10 NOTE — PROGRESS NOTE ADULT - ASSESSMENT
69 year old Male from [facility], with PMHx of prostate Ca (s/p radiation in 2015, now in remission), asthma, COPD (not on O2), HTN, HLD, Bipolar, anemia, GERD, Ileostomy s/p sigmoid resection, PAD, and CKD presented to ED with Generalized pain that has been worsening for several weeks. foudn to have Velia       VELIA  Scr 2.5 on ( 1/28/2020)  elevated to 8.9 on ( 2/5/2020)  VELIA etiology? possibly ATN from persistant pre-renal state  FeNA suggestive of ATN  Pt non oliguric today. Likely renal recovery. Scr stable    No further need for HD. ATN resolving. Can removed temporary dialysis catheter (shiley) today   s/p HD on 2/5 and 2/6 for hyperkalemia.   Check New Renal sonogram  Monitor BMP  Avoid further nephrotoxics, NSAIDS RCA    Hyperkalemia  Remains WNL   sec to RF   improved with HD  low K diet    Acidosis  Non AG  improved     Hyponatremia  Resolved  monitor closely

## 2020-02-10 NOTE — PROGRESS NOTE ADULT - SUBJECTIVE AND OBJECTIVE BOX
[facility] [place] [facility]   MD EVITA PALUMBO MD RUORU WONG, PA    TEL:  OFFICE: [phone]  DR CAPUTO CELL: [phone]  DAGOBERTO ACEVEDO CELL: [phone]  DR. SALAZAR CELL: [phone]  DR. PAIZ CELL: [phone]    FROM 5 PM - 7 AM PLEASE CALL ANSWERING SERVICE: [phone]    RENAL FOLLOW UP NOTE  --------------------------------------------------------------------------------  HPI:      Pt seen and examined at bedside.   Denies SOB, chest pain     PAST HISTORY  --------------------------------------------------------------------------------  No significant changes to PMH, PSH, FHx, SHx, unless otherwise noted    ALLERGIES & MEDICATIONS  --------------------------------------------------------------------------------  Allergies    No Known Allergies    Intolerances      Standing Inpatient Medications  ARIPiprazole 15 milliGRAM(s) Oral daily  atorvastatin 10 milliGRAM(s) Oral at bedtime  bisacodyl 10 milliGRAM(s) Oral at bedtime  cefuroxime   Tablet 500 milliGRAM(s) Oral every 12 hours  chlorhexidine 2% Cloths 1 Application(s) Topical <User Schedule>  chlorhexidine 4% Liquid 1 Application(s) Topical daily  finasteride 5 milliGRAM(s) Oral daily  gabapentin 600 milliGRAM(s) Oral two times a day  heparin  Injectable 5000 Unit(s) SubCutaneous every 12 hours  HYDROmorphone   Tablet 4 milliGRAM(s) Oral three times a day  lactulose Syrup 20 Gram(s) Oral two times a day  midodrine. 2.5 milliGRAM(s) Oral three times a day  pantoprazole    Tablet 40 milliGRAM(s) Oral before breakfast  sodium chloride 0.9%. 1000 milliLiter(s) IV Continuous <Continuous>    PRN Inpatient Medications  acetaminophen   Tablet .. 650 milliGRAM(s) Oral every 4 hours PRN      REVIEW OF SYSTEMS  --------------------------------------------------------------------------------  General: no fever  CVS: no chest pain  RESP: no sob, no cough  ABD: no abdominal pain  MSK: no edema     VITALS/PHYSICAL EXAM  --------------------------------------------------------------------------------  T(C): 36.9 (02-10-20 @ 07:56), Max: 37.1 (02-09-20 @ 23:18)  HR: 65 (02-10-20 @ 07:56) (63 - 78)  BP: 114/68 (02-10-20 @ 07:56) (114/68 - 130/76)  RR: 18 (02-10-20 @ 07:56) (17 - 18)  SpO2: 97% (02-10-20 @ 07:56) (95% - 100%)  Wt(kg): --        02-09-20 @ 07:01  -  02-10-20 @ 07:00  --------------------------------------------------------  IN: 0 mL / OUT: 700 mL / NET: -700 mL      Physical Exam:  	Gen: NAD  	HEENT: MMM  	Pulm: CTA B/L  	CV: S1S2  	Abd: Soft, +BS  	Ext: No LE edema B/L                      Neuro: Awake alert  	Skin: Warm and Dry   	SHAREE phillips    LABS/STUDIES  --------------------------------------------------------------------------------              8.9    6.66  >-----------<  184      [02-10-20 @ 07:45]              28.8     143  |  116  |  27  ----------------------------<  91      [02-10-20 @ 07:45]  5.0   |  21  |  2.07        Ca     9.2     [02-10-20 @ 07:45]      Mg     1.4     [02-10-20 @ 07:45]      Phos  3.6     [02-10-20 @ 07:45]    TPro  7.7  /  Alb  3.4  /  TBili  0.5  /  DBili  x   /  AST  24  /  ALT  28  /  AlkPhos  86  [02-10-20 @ 07:45]    PT/INR: PT 12.8 , INR 1.15       [02-10-20 @ 10:41]      Creatinine Trend:  SCr 2.07 [02-10 @ 07:45]  SCr 1.96 [02-09 @ 06:34]  SCr 1.98 [02-08 @ 07:12]  SCr 2.15 [02-07 @ 06:11]  SCr 2.09 [02-06 @ 15:15]    Urinalysis - [02-05-20 @ 04:42]      Color Yellow / Appearance Clear / SG 1.015 / pH 5.0      Gluc Negative / Ketone Negative  / Bili Negative / Urobili Negative       Blood Moderate / Protein 30 / Leuk Est Moderate / Nitrite Negative      RBC 5-10 / WBC 11-25 / Hyaline 0-2 / Gran  / Sq Epi  / Non Sq Epi Few / Bacteria Moderate    Urine Creatinine 138      [02-05-20 @ 06:09]  Urine Sodium 37      [02-05-20 @ 06:09]  Urine Potassium 37      [02-05-20 @ 06:09]  Urine Chloride 33      [02-05-20 @ 06:09]  Urine Osmolality 325      [02-05-20 @ 06:09]    Iron 61, TIBC 220, %sat 28      [01-28-20 @ 07:31]  Ferritin 371      [01-28-20 @ 14:41]  PTH -- (Ca 8.6)      [01-24-20 @ 15:01]   132  Vitamin D (25OH) 13.7      [12-24-19 @ 09:22]  HbA1c 5.6      [01-24-20 @ 15:03]  TSH 1.78      [01-24-20 @ 10:32]  Lipid: chol 99, TG 73, HDL 48, LDL 36      [01-24-20 @ 10:32]    HBsAg Nonreact      [02-05-20 @ 10:06]  HCV 11.27, Reactive      [02-05-20 @ 10:06]

## 2020-02-10 NOTE — PROGRESS NOTE ADULT - ASSESSMENT
69 year old Male from [facility], with PMHx of prostate Ca (s/p radiation in 2015, now in remission), asthma, COPD (not on O2), HTN, HLD, Bipolar, anemia, GERD, Ileostomy s/p sigmoid resection, PAD, and CKD presented to ED with Generalized pain that has been worsening for several weeks.  Pt states he has chronic back pain for which he was admitted for 2 weeks prior.  He states the facility gave him pain medication, but the pain did not improve.  Pt states he has not urinated for 24hrs, but feels the urge to void.  Pt denies CP, palpitations, HA, dizziness, fever, or syncope. Pt admitted to the ICU for management of symptomatic hyperkalemia requiring emergent HD    Assessment:  -Severe Hyperkalemia with EKG changes  -Acute Renal Failure on CKD   -Hypertension  -Bi-Polar disorder   -COPD/ Asthma  -Anemia of chronic renal disease   -PAD  Plan:  Neuro:  -Currently AO x 3   -Bi-Polar disorder- c/w Abilify therapy  -wild hold trazodone and lamotrigine in the setting of ARF   CV:  -Hypertension- antihypertensives held as blood pressure currently on the lower side; resume as clinically indicated   -Monitor BP  -PAD- c/w asa therapy   -f/u ECHO  Pulm:  -CXR clear, saturating 100% on room air  -COPD not on home O2-no concern for exacerbation- c/w duoneb therapy as needed  ID:c/w  Rocephin therapy with concern for cystitis   Hep C Positive  Nephro:  -Acute Renal Failure on CKD  -hyperkalemia resolved  -Patient's cr is improving, likely was ATN from persistent pre-renal state  - likely no further need for HD  -Will remove magdalena today  - f/u Nephro Dr. Vasquez  - f/u renal ultrasound   GI:  -Renal/ low sodium diet     Heme:  -anemia of chronic renal disease- currently at baseline  Endo:  target CBG < 180  -HbA1c 5.6  Musculoskeletal:  pt has chronic back pain and wants to see a pain manager  on lidocaine patch and gabapentin 600mg bid as per Pain management recs from last admission  tylenol, percocet and tramadol prn for mild, moderate and severe pain  Prophy:  -Heparin S/C for DVT prophy  FULL CODE

## 2020-02-10 NOTE — PROGRESS NOTE ADULT - SUBJECTIVE AND OBJECTIVE BOX
Patient denies CP, SOB Review of systems otherwise (-)    acetaminophen   Tablet .. 650 milliGRAM(s) Oral every 4 hours PRN  ARIPiprazole 15 milliGRAM(s) Oral daily  atorvastatin 10 milliGRAM(s) Oral at bedtime  bisacodyl 10 milliGRAM(s) Oral at bedtime  cefuroxime   Tablet 500 milliGRAM(s) Oral every 12 hours  chlorhexidine 2% Cloths 1 Application(s) Topical <User Schedule>  chlorhexidine 4% Liquid 1 Application(s) Topical daily  finasteride 5 milliGRAM(s) Oral daily  gabapentin 600 milliGRAM(s) Oral two times a day  heparin  Injectable 5000 Unit(s) SubCutaneous every 12 hours  HYDROmorphone   Tablet 4 milliGRAM(s) Oral three times a day  lactulose Syrup 20 Gram(s) Oral two times a day  midodrine. 2.5 milliGRAM(s) Oral three times a day  pantoprazole    Tablet 40 milliGRAM(s) Oral before breakfast  sodium chloride 0.9%. 1000 milliLiter(s) IV Continuous <Continuous>                            8.9    6.66  )-----------( 184      ( 10 Feb 2020 07:45 )             28.8       Hemoglobin: 8.9 g/dL (02-10 @ 07:45)  Hemoglobin: 8.6 g/dL (02-09 @ 06:34)  Hemoglobin: 8.1 g/dL (02-08 @ 07:12)  Hemoglobin: 8.1 g/dL (02-07 @ 06:11)  Hemoglobin: 7.7 g/dL (02-06 @ 06:56)      02-10    143  |  116<H>  |  27<H>  ----------------------------<  91  5.0   |  21<L>  |  2.07<H>    Ca    9.2      10 Feb 2020 07:45  Phos  3.6     02-10  Mg     1.4     02-10    TPro  7.7  /  Alb  3.4<L>  /  TBili  0.5  /  DBili  x   /  AST  24  /  ALT  28  /  AlkPhos  86  02-10    Creatinine Trend: 2.07<--, 1.96<--, 1.98<--, 2.15<--, 2.09<--, 3.65<--    COAGS: PT/INR - ( 10 Feb 2020 10:41 )   PT: 12.8 sec;   INR: 1.15 ratio         PTT - ( 10 Feb 2020 10:41 )  PTT:26.4 sec          T(C): 36.7 (02-10-20 @ 11:30), Max: 37.1 (02-09-20 @ 23:18)  HR: 69 (02-10-20 @ 11:30) (63 - 78)  BP: 118/63 (02-10-20 @ 11:30) (114/68 - 130/76)  RR: 18 (02-10-20 @ 11:30) (17 - 18)  SpO2: 96% (02-10-20 @ 11:30) (95% - 100%)  Wt(kg): --    I&O's Summary    09 Feb 2020 07:01  -  10 Feb 2020 07:00  --------------------------------------------------------  IN: 0 mL / OUT: 700 mL / NET: -700 mL          Gen: Appears well in NAD  HEENT:  (-)icterus (-)pallor  CV: N S1 S2 1/6 MARCELLE (+)2 Pulses B/l  Resp:  Clear to ausculatation B/L, normal effort  GI: (+) BS Soft, NT, ND  Lymph:  (-)Edema, (-)obvious lymphadenopathy  Skin: Warm to touch, Normal turgor  Psych: Appropriate mood and affect      TELEMETRY: 	  Sinus        ASSESSMENT/PLAN: 	69y  Male f prostate Ca (s/p radiation in 2015, now in remission), asthma, COPD (not on O2) chronic dyspnea on exertion, HTN, HLD, Bipolar, anemia, GERD, Ileostomy s/p sigmoid resection, PAD, normal LV and RV function on recent echo in our office, normal perfusion on nuclear stress 1/20 and CKD presented to ED with Generalized pain that has been worsening for several weeks.     - Electrolyte management per renal  - no clinical CHF  - No further inpatient cardiac workup expected.   - F/U with Jeremi as outpatient as scheduled.     Joey Archer MD, [facility]  BEEPER [phone]

## 2020-02-10 NOTE — DISCHARGE NOTE PROVIDER - NSDCCPCAREPLAN_GEN_ALL_CORE_FT
PRINCIPAL DISCHARGE DIAGNOSIS  Diagnosis: Hyperkalemia  Assessment and Plan of Treatment: Your electrolyes have been monitored throughout your stay. You were admitted for severe symptomatic hyperkalemia with EKG changes. You were monitored in ICU and received emergent hemodialysis. Your potassium has normalized and remained stable. Your medications were continued throughout your stay. Please follow up with your primary care provider to ensure continued optimal management.      SECONDARY DISCHARGE DIAGNOSES  Diagnosis: Renal failure (ARF), acute on chronic  Assessment and Plan of Treatment: PRINCIPAL DISCHARGE DIAGNOSIS  Diagnosis: Hyperkalemia  Assessment and Plan of Treatment: Your electrolyes have been monitored throughout your stay. You were admitted for severe symptomatic hyperkalemia with EKG changes. You were monitored in ICU and received emergent hemodialysis. Your potassium has normalized and remained stable. Your medications were continued throughout your stay. Please follow up with your primary care provider to ensure continued optimal management.      SECONDARY DISCHARGE DIAGNOSES  Diagnosis: Renal failure (ARF), acute on chronic  Assessment and Plan of Treatment: CKD stage 3  Follow up with PCP within 1-2 weeks after discharge  -Continue renal diet  -Avoid Nephrotoxic Meds/ Agents such as (NSAIDs, IV contrast, Aminoglycosides such as gentamicin, Gadolinium contrast, Phosphate containing enemas, etc..)  -Ask your doctor to adjust your medications according to eGFR

## 2020-02-10 NOTE — DISCHARGE NOTE PROVIDER - NSDCMRMEDTOKEN_GEN_ALL_CORE_FT
Abilify 10 mg oral tablet: 1.5 tab(s) orally once a day    atorvastatin 10 mg oral tablet: 1 tab(s) orally once a day  Dilaudid 2 mg oral tablet: 2 tab(s) orally every 8 hours, As Needed - Severe Pain (7 - 10) - 10) MDD:6 mg  ferrous sulfate 325 mg (65 mg elemental iron) oral tablet: 1 tab(s) orally once a day  Flomax 0.4 mg oral capsule: 1 cap(s) orally once a day  gabapentin 600 mg oral tablet: 1 tab(s) orally 2 times a day  halobetasol 0.05% topical cream: Apply topically to affected area once a day  lamoTRIgine 200 mg oral tablet: 1 tab(s) orally once a day  lidocaine 5% topical film:  topically   Proscar 5 mg oral tablet: 1 tab(s) orally once a day  Protonix 40 mg oral delayed release tablet: 1 tab(s) orally once a day  sodium bicarbonate 650 mg oral tablet: 1 tab(s) orally 3 times a day  traZODone 50 mg oral tablet: 0.5 tab(s) orally once a day (at bedtime)  Ventolin 90 mcg/inh inhalation aerosol: Abilify 10 mg oral tablet: 1.5 tab(s) orally once a day    atorvastatin 10 mg oral tablet: 1 tab(s) orally once a day  cefuroxime 500 mg oral tablet: 1 tab(s) orally every 12 hours  Dilaudid 2 mg oral tablet: 2 tab(s) orally every 8 hours, As Needed - Severe Pain (7 - 10) - 10) MDD:6 mg  ferrous sulfate 325 mg (65 mg elemental iron) oral tablet: 1 tab(s) orally once a day  Flomax 0.4 mg oral capsule: 1 cap(s) orally once a day  gabapentin 600 mg oral tablet: 1 tab(s) orally 2 times a day  halobetasol 0.05% topical cream: Apply topically to affected area once a day  lamoTRIgine 200 mg oral tablet: 1 tab(s) orally once a day  midodrine 2.5 mg oral tablet: 1 tab(s) orally 3 times a day  Proscar 5 mg oral tablet: 1 tab(s) orally once a day  Protonix 40 mg oral delayed release tablet: 1 tab(s) orally once a day  traZODone 50 mg oral tablet: 0.5 tab(s) orally once a day (at bedtime)  Ventolin 90 mcg/inh inhalation aerosol: Abilify 10 mg oral tablet: 1.5 tab(s) orally once a day    atorvastatin 10 mg oral tablet: 1 tab(s) orally once a day  cefuroxime 500 mg oral tablet: 1 tab(s) orally every 12 hours  Dilaudid 2 mg oral tablet: 2 tab(s) orally every 8 hours, As Needed - Severe Pain (7 - 10) - 10) MDD:6 mg  ferrous sulfate 325 mg (65 mg elemental iron) oral tablet: 1 tab(s) orally once a day  Flomax 0.4 mg oral capsule: 1 cap(s) orally once a day  gabapentin 600 mg oral tablet: 1 tab(s) orally 2 times a day  halobetasol 0.05% topical cream: Apply topically to affected area once a day  lamoTRIgine 200 mg oral tablet: 1 tab(s) orally once a day  midodrine 2.5 mg oral tablet: 1 tab(s) orally 3 times a day  Proscar 5 mg oral tablet: 1 tab(s) orally once a day  Protonix 40 mg oral delayed release tablet: 1 tab(s) orally once a day  sodium bicarbonate 650 mg oral tablet: 1 tab(s) orally 3 times a day (with meals)   traZODone 50 mg oral tablet: 0.5 tab(s) orally once a day (at bedtime)  Ventolin 90 mcg/inh inhalation aerosol:

## 2020-02-10 NOTE — DISCHARGE NOTE PROVIDER - NSDCQMPCI_CARD_ALL_CORE
Vaccine Information Statement(s) for was given today. This has been reviewed, questions answered, and verbal consent given by Parent for injection(s) and administration of Influenza (Inactivated).    Patient tolerated without incident. See immunization grid for documentation.    
No

## 2020-02-11 ENCOUNTER — TRANSCRIPTION ENCOUNTER (OUTPATIENT)
Age: 70
End: 2020-02-11

## 2020-02-11 VITALS
RESPIRATION RATE: 18 BRPM | DIASTOLIC BLOOD PRESSURE: 65 MMHG | HEART RATE: 64 BPM | OXYGEN SATURATION: 97 % | SYSTOLIC BLOOD PRESSURE: 112 MMHG | TEMPERATURE: 98 F

## 2020-02-11 LAB
ANION GAP SERPL CALC-SCNC: 8 MMOL/L — SIGNIFICANT CHANGE UP (ref 5–17)
BUN SERPL-MCNC: 32 MG/DL — HIGH (ref 7–18)
CALCIUM SERPL-MCNC: 8.7 MG/DL — SIGNIFICANT CHANGE UP (ref 8.4–10.5)
CHLORIDE SERPL-SCNC: 116 MMOL/L — HIGH (ref 96–108)
CO2 SERPL-SCNC: 18 MMOL/L — LOW (ref 22–31)
CREAT SERPL-MCNC: 2.1 MG/DL — HIGH (ref 0.5–1.3)
GLUCOSE SERPL-MCNC: 86 MG/DL — SIGNIFICANT CHANGE UP (ref 70–99)
HCT VFR BLD CALC: 29.4 % — LOW (ref 39–50)
HGB BLD-MCNC: 9.1 G/DL — LOW (ref 13–17)
MCHC RBC-ENTMCNC: 27.2 PG — SIGNIFICANT CHANGE UP (ref 27–34)
MCHC RBC-ENTMCNC: 31 GM/DL — LOW (ref 32–36)
MCV RBC AUTO: 88 FL — SIGNIFICANT CHANGE UP (ref 80–100)
NRBC # BLD: 0 /100 WBCS — SIGNIFICANT CHANGE UP (ref 0–0)
PLATELET # BLD AUTO: 190 K/UL — SIGNIFICANT CHANGE UP (ref 150–400)
POTASSIUM SERPL-MCNC: 5 MMOL/L — SIGNIFICANT CHANGE UP (ref 3.5–5.3)
POTASSIUM SERPL-SCNC: 5 MMOL/L — SIGNIFICANT CHANGE UP (ref 3.5–5.3)
RBC # BLD: 3.34 M/UL — LOW (ref 4.2–5.8)
RBC # FLD: 16.3 % — HIGH (ref 10.3–14.5)
SODIUM SERPL-SCNC: 142 MMOL/L — SIGNIFICANT CHANGE UP (ref 135–145)
WBC # BLD: 6.52 K/UL — SIGNIFICANT CHANGE UP (ref 3.8–10.5)
WBC # FLD AUTO: 6.52 K/UL — SIGNIFICANT CHANGE UP (ref 3.8–10.5)

## 2020-02-11 PROCEDURE — 82962 GLUCOSE BLOOD TEST: CPT

## 2020-02-11 PROCEDURE — 36415 COLL VENOUS BLD VENIPUNCTURE: CPT

## 2020-02-11 PROCEDURE — 97116 GAIT TRAINING THERAPY: CPT

## 2020-02-11 PROCEDURE — 99285 EMERGENCY DEPT VISIT HI MDM: CPT | Mod: 25

## 2020-02-11 PROCEDURE — 87641 MR-STAPH DNA AMP PROBE: CPT

## 2020-02-11 PROCEDURE — 86900 BLOOD TYPING SEROLOGIC ABO: CPT

## 2020-02-11 PROCEDURE — 93005 ELECTROCARDIOGRAM TRACING: CPT

## 2020-02-11 PROCEDURE — 96374 THER/PROPH/DIAG INJ IV PUSH: CPT

## 2020-02-11 PROCEDURE — 84300 ASSAY OF URINE SODIUM: CPT

## 2020-02-11 PROCEDURE — 87521 HEPATITIS C PROBE&RVRS TRNSC: CPT

## 2020-02-11 PROCEDURE — 84100 ASSAY OF PHOSPHORUS: CPT

## 2020-02-11 PROCEDURE — 71045 X-RAY EXAM CHEST 1 VIEW: CPT

## 2020-02-11 PROCEDURE — 74176 CT ABD & PELVIS W/O CONTRAST: CPT

## 2020-02-11 PROCEDURE — 82553 CREATINE MB FRACTION: CPT

## 2020-02-11 PROCEDURE — 80048 BASIC METABOLIC PNL TOTAL CA: CPT

## 2020-02-11 PROCEDURE — 81001 URINALYSIS AUTO W/SCOPE: CPT

## 2020-02-11 PROCEDURE — 93306 TTE W/DOPPLER COMPLETE: CPT

## 2020-02-11 PROCEDURE — 87040 BLOOD CULTURE FOR BACTERIA: CPT

## 2020-02-11 PROCEDURE — 84133 ASSAY OF URINE POTASSIUM: CPT

## 2020-02-11 PROCEDURE — 96375 TX/PRO/DX INJ NEW DRUG ADDON: CPT

## 2020-02-11 PROCEDURE — 84484 ASSAY OF TROPONIN QUANT: CPT

## 2020-02-11 PROCEDURE — 87640 STAPH A DNA AMP PROBE: CPT

## 2020-02-11 PROCEDURE — 83935 ASSAY OF URINE OSMOLALITY: CPT

## 2020-02-11 PROCEDURE — 85027 COMPLETE CBC AUTOMATED: CPT

## 2020-02-11 PROCEDURE — 94640 AIRWAY INHALATION TREATMENT: CPT

## 2020-02-11 PROCEDURE — 85730 THROMBOPLASTIN TIME PARTIAL: CPT

## 2020-02-11 PROCEDURE — 83735 ASSAY OF MAGNESIUM: CPT

## 2020-02-11 PROCEDURE — 97530 THERAPEUTIC ACTIVITIES: CPT

## 2020-02-11 PROCEDURE — 80074 ACUTE HEPATITIS PANEL: CPT

## 2020-02-11 PROCEDURE — 86901 BLOOD TYPING SEROLOGIC RH(D): CPT

## 2020-02-11 PROCEDURE — 86850 RBC ANTIBODY SCREEN: CPT

## 2020-02-11 PROCEDURE — 99261: CPT

## 2020-02-11 PROCEDURE — 85610 PROTHROMBIN TIME: CPT

## 2020-02-11 PROCEDURE — 82436 ASSAY OF URINE CHLORIDE: CPT

## 2020-02-11 PROCEDURE — 80053 COMPREHEN METABOLIC PANEL: CPT

## 2020-02-11 PROCEDURE — 82570 ASSAY OF URINE CREATININE: CPT

## 2020-02-11 RX ORDER — SODIUM BICARBONATE 1 MEQ/ML
1 SYRINGE (ML) INTRAVENOUS
Qty: 90 | Refills: 0
Start: 2020-02-11 | End: 2020-03-11

## 2020-02-11 RX ORDER — CEFUROXIME AXETIL 250 MG
1 TABLET ORAL
Qty: 6 | Refills: 0
Start: 2020-02-11 | End: 2020-02-13

## 2020-02-11 RX ORDER — SODIUM BICARBONATE 1 MEQ/ML
650 SYRINGE (ML) INTRAVENOUS THREE TIMES A DAY
Refills: 0 | Status: DISCONTINUED | OUTPATIENT
Start: 2020-02-11 | End: 2020-02-11

## 2020-02-11 RX ORDER — MIDODRINE HYDROCHLORIDE 2.5 MG/1
1 TABLET ORAL
Qty: 90 | Refills: 0
Start: 2020-02-11 | End: 2020-03-11

## 2020-02-11 RX ORDER — SODIUM BICARBONATE 1 MEQ/ML
1 SYRINGE (ML) INTRAVENOUS
Qty: 0 | Refills: 0 | DISCHARGE

## 2020-02-11 RX ADMIN — HYDROMORPHONE HYDROCHLORIDE 4 MILLIGRAM(S): 2 INJECTION INTRAMUSCULAR; INTRAVENOUS; SUBCUTANEOUS at 14:30

## 2020-02-11 RX ADMIN — HYDROMORPHONE HYDROCHLORIDE 4 MILLIGRAM(S): 2 INJECTION INTRAMUSCULAR; INTRAVENOUS; SUBCUTANEOUS at 13:59

## 2020-02-11 RX ADMIN — CHLORHEXIDINE GLUCONATE 1 APPLICATION(S): 213 SOLUTION TOPICAL at 05:59

## 2020-02-11 RX ADMIN — Medication 500 MILLIGRAM(S): at 05:58

## 2020-02-11 RX ADMIN — HEPARIN SODIUM 5000 UNIT(S): 5000 INJECTION INTRAVENOUS; SUBCUTANEOUS at 05:58

## 2020-02-11 RX ADMIN — HYDROMORPHONE HYDROCHLORIDE 4 MILLIGRAM(S): 2 INJECTION INTRAMUSCULAR; INTRAVENOUS; SUBCUTANEOUS at 05:59

## 2020-02-11 RX ADMIN — PANTOPRAZOLE SODIUM 40 MILLIGRAM(S): 20 TABLET, DELAYED RELEASE ORAL at 05:59

## 2020-02-11 RX ADMIN — MIDODRINE HYDROCHLORIDE 2.5 MILLIGRAM(S): 2.5 TABLET ORAL at 11:54

## 2020-02-11 RX ADMIN — HYDROMORPHONE HYDROCHLORIDE 4 MILLIGRAM(S): 2 INJECTION INTRAMUSCULAR; INTRAVENOUS; SUBCUTANEOUS at 06:58

## 2020-02-11 RX ADMIN — Medication 650 MILLIGRAM(S): at 13:58

## 2020-02-11 RX ADMIN — FINASTERIDE 5 MILLIGRAM(S): 5 TABLET, FILM COATED ORAL at 11:54

## 2020-02-11 RX ADMIN — ARIPIPRAZOLE 15 MILLIGRAM(S): 15 TABLET ORAL at 11:54

## 2020-02-11 RX ADMIN — GABAPENTIN 600 MILLIGRAM(S): 400 CAPSULE ORAL at 05:58

## 2020-02-11 RX ADMIN — MIDODRINE HYDROCHLORIDE 2.5 MILLIGRAM(S): 2.5 TABLET ORAL at 05:58

## 2020-02-11 NOTE — PROGRESS NOTE ADULT - SUBJECTIVE AND OBJECTIVE BOX
Time of Visit:  Patient seen and examined.     MEDICATIONS  (STANDING):  ARIPiprazole 15 milliGRAM(s) Oral daily  atorvastatin 10 milliGRAM(s) Oral at bedtime  bisacodyl 10 milliGRAM(s) Oral at bedtime  cefuroxime   Tablet 500 milliGRAM(s) Oral every 12 hours  finasteride 5 milliGRAM(s) Oral daily  gabapentin 600 milliGRAM(s) Oral two times a day  heparin  Injectable 5000 Unit(s) SubCutaneous every 12 hours  HYDROmorphone   Tablet 4 milliGRAM(s) Oral three times a day  lactulose Syrup 20 Gram(s) Oral two times a day  midodrine. 2.5 milliGRAM(s) Oral three times a day  pantoprazole    Tablet 40 milliGRAM(s) Oral before breakfast  sodium bicarbonate 650 milliGRAM(s) Oral three times a day  sodium chloride 0.9%. 1000 milliLiter(s) (75 mL/Hr) IV Continuous <Continuous>      MEDICATIONS  (PRN):  acetaminophen   Tablet .. 650 milliGRAM(s) Oral every 4 hours PRN Mild Pain (1 - 3)       Medications up to date at time of exam.    ROS; No fever, chills, cough, congestion.   PHYSICAL EXAMINATION:      Vital Signs Last 24 Hrs  T(C): 36.8 (11 Feb 2020 11:38), Max: 36.9 (11 Feb 2020 04:58)  T(F): 98.2 (11 Feb 2020 11:38), Max: 98.5 (11 Feb 2020 04:58)  HR: 61 (11 Feb 2020 11:38) (61 - 80)  BP: 108/61 (11 Feb 2020 11:38) (106/56 - 136/72)  BP(mean): --  RR: 18 (11 Feb 2020 11:38) (18 - 19)  SpO2: 95% (11 Feb 2020 11:38) (94% - 99%)   (if applicable)    General; Alert and oriented. No acute distress. Able to answer question with no SOB.     HEENT: Head is normocephalic and atraumatic. No nasal tenderness. Extraocular muscles are intact. Mucous membranes are moist.     NECK: Supple, no palpable adenopathy.    LUNGS: Clear to auscultation, no wheezing, rales, or rhonchi. No use of accessory muscle.     HEART: S1 S2 Regular rate and no click/ rub.     ABDOMEN: Soft, nontender, and nondistended.  No abdominal guarding. Active bowel sounds.     EXTREMITIES: Without any cyanosis, clubbing, rash, lesions or edema.    NEUROLOGIC: Awake, alert, oriented.     SKIN: Warm and moist. Non diaphoretic.     LABS:                        9.1    6.52  )-----------( 190      ( 11 Feb 2020 07:52 )             29.4     02-11    142  |  116<H>  |  32<H>  ----------------------------<  86  5.0   |  18<L>  |  2.10<H>    Ca    8.7      11 Feb 2020 07:52  Phos  3.6     02-10  Mg     1.4     02-10    TPro  7.7  /  Alb  3.4<L>  /  TBili  0.5  /  DBili  x   /  AST  24  /  ALT  28  /  AlkPhos  86  02-10    PT/INR - ( 10 Feb 2020 10:41 )   PT: 12.8 sec;   INR: 1.15 ratio         PTT - ( 10 Feb 2020 10:41 )  PTT:26.4 sec      RADIOLOGY & ADDITIONAL STUDIES:  EKG:   CXR:< from: Xray Chest 1 View-PORTABLE IMMEDIATE (02.05.20 @ 01:31) >    PROCEDURE DATE:  02/05/2020          INTERPRETATION:  AP chest on February 5, 2020 at 1:18 AM. Patient is short of breath.    Poor inspiration crowds the chest. Heart may be enlarged.    There are rather mild basilar atelectatic findings.    Chest is similar to December 23, 2019.    IMPRESSION: Unchanged chest as above.    < end of copied text >     ECHO:    IMPRESSION: 69y Male PAST MEDICAL & SURGICAL HISTORY:  BPH with urinary obstruction  Prostate CA  CKD (chronic kidney disease)  PAD (peripheral artery disease)  HLD (hyperlipidemia)  HTN (hypertension)  IBD (inflammatory bowel disease)  Bipolar disorder  Anemia  COPD, mild  History of creation of ostomy     Impression; 68 Y/O Male presented with Generalized pain that has been worsening for several weeks.  Has  Asthma and COPD but no exacerbation on exam.     Suggestion;  O2 saturation 96% room air. No need for continuous Oxygen supplementation .  Pulmonary oral hygiene care.  DVT/ GI prophylactic.   Consider starting on PRN Duoneb q 6 h via nebulization.

## 2020-02-11 NOTE — DIETITIAN INITIAL EVALUATION ADULT. - PERTINENT MEDS FT
MEDICATIONS  (STANDING):  ARIPiprazole 15 milliGRAM(s) Oral daily  atorvastatin 10 milliGRAM(s) Oral at bedtime  bisacodyl 10 milliGRAM(s) Oral at bedtime  cefuroxime   Tablet 500 milliGRAM(s) Oral every 12 hours  finasteride 5 milliGRAM(s) Oral daily  gabapentin 600 milliGRAM(s) Oral two times a day  heparin  Injectable 5000 Unit(s) SubCutaneous every 12 hours  HYDROmorphone   Tablet 4 milliGRAM(s) Oral three times a day  lactulose Syrup 20 Gram(s) Oral two times a day  midodrine. 2.5 milliGRAM(s) Oral three times a day  pantoprazole    Tablet 40 milliGRAM(s) Oral before breakfast  sodium bicarbonate 650 milliGRAM(s) Oral three times a day  sodium chloride 0.9%. 1000 milliLiter(s) (75 mL/Hr) IV Continuous <Continuous>

## 2020-02-11 NOTE — PROGRESS NOTE ADULT - ASSESSMENT
69 year old Male from [facility], with PMHx of prostate Ca (s/p radiation in 2015, now in remission), asthma, COPD (not on O2), HTN, HLD, Bipolar, anemia, GERD, Ileostomy s/p sigmoid resection, PAD, and CKD presented to ED with Generalized pain that has been worsening for several weeks.  Pt states he has chronic back pain for which he was admitted for 2 weeks prior.  He states the facility gave him pain medication, but the pain did not improve.  Pt states he has not urinated for 24hrs, but feels the urge to void.  Pt denies CP, palpitations, HA, dizziness, fever, or syncope. Pt admitted to the ICU for management of symptomatic hyperkalemia requiring emergent HD    Assessment:  -Severe Hyperkalemia with EKG changes  -Acute Renal Failure on CKD   -Hypertension  -Bi-Polar disorder   -COPD/ Asthma  -Anemia of chronic renal disease   -PAD  Plan:  Neuro:  -Currently AO x 3   -Bi-Polar disorder- c/w Abilify therapy  -wild hold trazodone and lamotrigine in the setting of ARF   CV:  -Hypertension- antihypertensives held as blood pressure currently on the lower side; resume as clinically indicated   -Monitor BP  -PAD- c/w asa therapy   -f/u ECHO  Pulm:  -CXR clear, saturating 100% on room air  -COPD not on home O2-no concern for exacerbation- c/w duoneb therapy as needed  ID:c/w  Rocephin therapy with concern for cystitis   Hep C Positive  Nephro:  -Acute Renal Failure on CKD  -hyperkalemia resolved  -Patient's cr is improving, likely was ATN from persistent pre-renal state  - likely no further need for HD  -magdalena pretty  - f/u Nephro Dr. Vasquez  - f/u renal ultrasound   GI:  -Renal/ low sodium diet     Heme:  -anemia of chronic renal disease- currently at baseline  Endo:  target CBG < 180  -HbA1c 5.6  Musculoskeletal:  pt has chronic back pain and wants to see a pain manager  on lidocaine patch and gabapentin 600mg bid as per Pain management recs from last admission  tylenol, percocet and tramadol prn for mild, moderate and severe pain  Prophy:  -Heparin S/C for DVT prophy  FULL CODE

## 2020-02-11 NOTE — PROGRESS NOTE ADULT - SUBJECTIVE AND OBJECTIVE BOX
INTERVAL HPI/OVERNIGHT EVENTS:    Pt seen and examined at bedside. No acute complaints at this time.     Vital Signs Last 24 Hrs  T(C): 36.8 (11 Feb 2020 07:18), Max: 36.9 (11 Feb 2020 04:58)  T(F): 98.2 (11 Feb 2020 07:18), Max: 98.5 (11 Feb 2020 04:58)  HR: 64 (11 Feb 2020 07:18) (62 - 80)  BP: 110/64 (11 Feb 2020 07:18) (106/56 - 136/72)  BP(mean): --  RR: 18 (11 Feb 2020 07:18) (18 - 19)  SpO2: 96% (11 Feb 2020 07:18) (94% - 99%)  I&O's Detail    10 Feb 2020 07:01  -  11 Feb 2020 07:00  --------------------------------------------------------  IN:    Oral Fluid: 275 mL  Total IN: 275 mL    OUT:    Colostomy: 600 mL    Voided: 800 mL  Total OUT: 1400 mL    Total NET: -1125 mL        bisacodyl 10 milliGRAM(s) Oral at bedtime  cefuroxime   Tablet 500 milliGRAM(s) Oral every 12 hours  lactulose Syrup 20 Gram(s) Oral two times a day  pantoprazole    Tablet 40 milliGRAM(s) Oral before breakfast      Physical Exam  General: AAOx3, No acute distress  Skin: No jaundice, no icterus  Abdomen: soft,  nondistended, nontender, no rebound tenderness, no guarding, no palpable masses, ostomy in place, functioning, stool in stoma bag  : Normal external genitalia, left groin, no active bleed, no ecchymosis   Extremities: non edematous, no calf pain bilaterally        Labs:                        9.1    6.52  )-----------( 190      ( 11 Feb 2020 07:52 )             29.4     02-11    142  |  116<H>  |  32<H>  ----------------------------<  86  5.0   |  18<L>  |  2.10<H>    Ca    8.7      11 Feb 2020 07:52  Phos  3.6     02-10  Mg     1.4     02-10    TPro  7.7  /  Alb  3.4<L>  /  TBili  0.5  /  DBili  x   /  AST  24  /  ALT  28  /  AlkPhos  86  02-10    PT/INR - ( 10 Feb 2020 10:41 )   PT: 12.8 sec;   INR: 1.15 ratio         PTT - ( 10 Feb 2020 10:41 )  PTT:26.4 sec

## 2020-02-11 NOTE — DIETITIAN INITIAL EVALUATION ADULT. - PERTINENT LABORATORY DATA
02-11 Na142 mmol/L Glu 86 mg/dL K+ 5.0 mmol/L Cr  2.10 mg/dL<H> BUN 32 mg/dL<H>   02-10 Phos 3.6 mg/dL   02-10 Alb 3.4 g/dL<L>     01-24 UnvsinczosP0M 5.6 %   01-24 Chol 99 mg/dL LDL 36 mg/dL HDL 48 mg/dL Trig 73 mg/dL

## 2020-02-11 NOTE — PROGRESS NOTE ADULT - SUBJECTIVE AND OBJECTIVE BOX
Patient denies CP, SOB Review of systems otherwise (-)    acetaminophen   Tablet .. 650 milliGRAM(s) Oral every 4 hours PRN  ARIPiprazole 15 milliGRAM(s) Oral daily  atorvastatin 10 milliGRAM(s) Oral at bedtime  bisacodyl 10 milliGRAM(s) Oral at bedtime  cefuroxime   Tablet 500 milliGRAM(s) Oral every 12 hours  finasteride 5 milliGRAM(s) Oral daily  gabapentin 600 milliGRAM(s) Oral two times a day  heparin  Injectable 5000 Unit(s) SubCutaneous every 12 hours  HYDROmorphone   Tablet 4 milliGRAM(s) Oral three times a day  lactulose Syrup 20 Gram(s) Oral two times a day  midodrine. 2.5 milliGRAM(s) Oral three times a day  pantoprazole    Tablet 40 milliGRAM(s) Oral before breakfast  sodium bicarbonate 650 milliGRAM(s) Oral three times a day  sodium chloride 0.9%. 1000 milliLiter(s) IV Continuous <Continuous>                            9.1    6.52  )-----------( 190      ( 11 Feb 2020 07:52 )             29.4       Hemoglobin: 9.1 g/dL (02-11 @ 07:52)  Hemoglobin: 8.9 g/dL (02-10 @ 07:45)  Hemoglobin: 8.6 g/dL (02-09 @ 06:34)  Hemoglobin: 8.1 g/dL (02-08 @ 07:12)  Hemoglobin: 8.1 g/dL (02-07 @ 06:11)      02-11    142  |  116<H>  |  32<H>  ----------------------------<  86  5.0   |  18<L>  |  2.10<H>    Ca    8.7      11 Feb 2020 07:52  Phos  3.6     02-10  Mg     1.4     02-10    TPro  7.7  /  Alb  3.4<L>  /  TBili  0.5  /  DBili  x   /  AST  24  /  ALT  28  /  AlkPhos  86  02-10    Creatinine Trend: 2.10<--, 2.07<--, 1.96<--, 1.98<--, 2.15<--, 2.09<--    COAGS:           T(C): 36.8 (02-11-20 @ 11:38), Max: 36.9 (02-11-20 @ 04:58)  HR: 61 (02-11-20 @ 11:38) (61 - 80)  BP: 108/61 (02-11-20 @ 11:38) (106/56 - 136/72)  RR: 18 (02-11-20 @ 11:38) (18 - 19)  SpO2: 95% (02-11-20 @ 11:38) (94% - 99%)  Wt(kg): --    I&O's Summary    10 Feb 2020 07:01  -  11 Feb 2020 07:00  --------------------------------------------------------  IN: 275 mL / OUT: 1400 mL / NET: -1125 mL      Gen: Appears well in NAD  HEENT:  (-)icterus (-)pallor  CV: N S1 S2 1/6 MARCELLE (+)2 Pulses B/l  Resp:  Clear to ausculatation B/L, normal effort  GI: (+) BS Soft, NT, ND  Lymph:  (-)Edema, (-)obvious lymphadenopathy  Skin: Warm to touch, Normal turgor  Psych: Appropriate mood and affect      TELEMETRY: 	  off        ASSESSMENT/PLAN: 	69y  Male f prostate Ca (s/p radiation in 2015, now in remission), asthma, COPD (not on O2) chronic dyspnea on exertion, HTN, HLD, Bipolar, anemia, GERD, Ileostomy s/p sigmoid resection, PAD, normal LV and RV function on recent echo in our office, normal perfusion on nuclear stress 1/20 and CKD presented to ED with Generalized pain that has been worsening for several weeks.     - Electrolyte management per renal  - no clinical CHF  - No further inpatient cardiac workup expected.   - F/U with Jeremi as outpatient as scheduled.   - D/C plan per medical team    Joey Archer MD, [facility]  BEEPER [phone]

## 2020-02-11 NOTE — DIETITIAN INITIAL EVALUATION ADULT. - OTHER INFO
Pt alert, oriented, well-communicated, but declining RD interview at present, claiming being discharged; Limited intake/wt change history data available at present; intake 100% at times per flow sheet; for rehab per team Pt alert, oriented, well-communicated, but declining RD interview at present, claiming being discharged; Limited intake/wt change history data available at present; intake 100% at times per flow sheet; PAWAN, now stable with s/p HD for hyperkalemis, no further need for HD per Nephrologist; for rehab placement per team

## 2020-02-11 NOTE — DISCHARGE NOTE NURSING/CASE MANAGEMENT/SOCIAL WORK - PATIENT PORTAL LINK FT
You can access the FollowMyHealth Patient Portal offered by [facility] by registering at the following website: [email or web address]. By joining [employer]’s FollowMyHealth portal, you will also be able to view your health information using other applications (apps) compatible with our system.

## 2020-02-11 NOTE — PROGRESS NOTE ADULT - SUBJECTIVE AND OBJECTIVE BOX
PGY 1 Note discussed with supervising resident and primary attending    Patient is a 69y old  Male who presents with a chief complaint of Acute Renal Failure (08 Feb 2020 12:00)      INTERVAL HPI/OVERNIGHT EVENTS: Patient seen and examined at the bedside. No acute events overnight  Shiley removed yesterday with no evidence of bleeding or complication  Koehler removed and pt passed trial of void.     MEDICATIONS  (STANDING):  ARIPiprazole 15 milliGRAM(s) Oral daily  atorvastatin 10 milliGRAM(s) Oral at bedtime  bisacodyl 10 milliGRAM(s) Oral at bedtime  cefuroxime   Tablet 500 milliGRAM(s) Oral every 12 hours  finasteride 5 milliGRAM(s) Oral daily  gabapentin 600 milliGRAM(s) Oral two times a day  heparin  Injectable 5000 Unit(s) SubCutaneous every 12 hours  HYDROmorphone   Tablet 4 milliGRAM(s) Oral three times a day  lactulose Syrup 20 Gram(s) Oral two times a day  midodrine. 2.5 milliGRAM(s) Oral three times a day  pantoprazole    Tablet 40 milliGRAM(s) Oral before breakfast  sodium chloride 0.9%. 1000 milliLiter(s) (75 mL/Hr) IV Continuous <Continuous>    MEDICATIONS  (PRN):  acetaminophen   Tablet .. 650 milliGRAM(s) Oral every 4 hours PRN Mild Pain (1 - 3)        __________________________________________________  REVIEW OF SYSTEMS:  CONSTITUTIONAL: No fever, +fatigue  EYES: no acute visual disturbances  NECK: No pain or stiffness  RESPIRATORY: No cough; No shortness of breath  CARDIOVASCULAR: No chest pain, no palpitations  GASTROINTESTINAL: No pain. No nausea or vomiting; No diarrhea   NEUROLOGICAL: No headache or numbness, no tremors  MUSCULOSKELETAL: +back pain  GENITOURINARY: no dysuria, no frequency, no hesitancy  PSYCHIATRY: no depression , no anxiety  ALL OTHER  ROS negative          ________________________________________________  Vital Signs Last 24 Hrs  T(C): 36.8 (11 Feb 2020 07:18), Max: 36.9 (11 Feb 2020 04:58)  T(F): 98.2 (11 Feb 2020 07:18), Max: 98.5 (11 Feb 2020 04:58)  HR: 64 (11 Feb 2020 07:18) (62 - 80)  BP: 110/64 (11 Feb 2020 07:18) (106/56 - 136/72)  BP(mean): --  RR: 18 (11 Feb 2020 07:18) (18 - 19)  SpO2: 96% (11 Feb 2020 07:18) (94% - 99%)    PHYSICAL EXAM:  GENERAL: NAD, healthy man sitting in bed  HEENT: Normocephalic;  conjunctivae and sclerae clear; moist mucous membranes;   NECK : supple  CHEST/LUNG: Clear to auscultation bilaterally with good air entry   HEART: S1 S2  regular; no murmurs, gallops or rubs  ABDOMEN: Soft, Nontender, Nondistended; Bowel sounds present, Rt ileostomy bag in place  Musculoskeletal: back pain   SKIN: warm and dry; no rash  NERVOUS SYSTEM:  Awake and alert; Oriented  to place, person and time ; no new deficits    _________________________________________________  LABS:                                              9.1    6.52  )-----------( 190      ( 11 Feb 2020 07:52 )             29.4       02-11    142  |  116<H>  |  32<H>  ----------------------------<  86  5.0   |  18<L>  |  2.10<H>    Ca    8.7      11 Feb 2020 07:52  Phos  3.6     02-10  Mg     1.4     02-10    TPro  7.7  /  Alb  3.4<L>  /  TBili  0.5  /  DBili  x   /  AST  24  /  ALT  28  /  AlkPhos  86  02-10              CAPILLARY BLOOD GLUCOSE            RADIOLOGY & ADDITIONAL TESTS:    Imaging Personally Reviewed:  YES/NO    Consultant(s) Notes Reviewed:   YES/ No    Care Discussed with Consultants :     Plan of care was discussed with patient and /or primary care giver; all questions and concerns were addressed and care was aligned with patient's wishes.

## 2020-02-11 NOTE — PROGRESS NOTE ADULT - ASSESSMENT
69 year old Male from [facility], with PMHx of prostate Ca (s/p radiation in 2015, now in remission), asthma, COPD (not on O2), HTN, HLD, Bipolar, anemia, GERD, Ileostomy s/p sigmoid resection, PAD, and CKD presented to ED with Generalized pain that has been worsening for several weeks. foudn to have Velia       VELIA  Scr 2.5 on ( 1/28/2020)  elevated to 8.9 on ( 2/5/2020)  VELIA etiology? possibly ATN from persistant pre-renal state  FeNA suggestive of ATN  Renal function relatively stable   No further need for HD. ATN resolving. dialysis catheter removed  s/p HD on 2/5 and 2/6 for hyperkalemia.   Check New Renal sonogram  Monitor BMP  Avoid further nephrotoxics, NSAIDS RCA    Hyperkalemia  Remains WNL   sec to RF   improved with HD  low K diet    Acidosis  Non AG  Start sodium bicarb 650mg TID   MOnitor serum Co2    Hyponatremia  Resolved  monitor closely

## 2020-02-11 NOTE — PROGRESS NOTE ADULT - ASSESSMENT
68 y/o Male w/ resolved PAWAN, no longer requiring HD, femoral shiley removed 2/10    -Left groin w/o evidence of active bleed   -Care as per medical team   -Reconsult as needed

## 2020-02-11 NOTE — PROGRESS NOTE ADULT - SUBJECTIVE AND OBJECTIVE BOX
[facility]   MD EVITA PALUMBO MD RUORU WONG, PA    TEL:  OFFICE: [phone]  DR CAPUTO CELL: [phone]  DAGOBERTO ACEVEDO CELL: [phone]  DR. SALAZAR CELL: [phone]  DR. PAIZ CELL: [phone]    FROM 5 PM - 7 AM PLEASE CALL ANSWERING SERVICE: [phone]    RENAL FOLLOW UP NOTE  --------------------------------------------------------------------------------  HPI:      Pt seen and examined at bedside.   Denies SOB, chest pain     PAST HISTORY  --------------------------------------------------------------------------------  No significant changes to PMH, PSH, FHx, SHx, unless otherwise noted    ALLERGIES & MEDICATIONS  --------------------------------------------------------------------------------  Allergies    No Known Allergies    Intolerances      Standing Inpatient Medications  ARIPiprazole 15 milliGRAM(s) Oral daily  atorvastatin 10 milliGRAM(s) Oral at bedtime  bisacodyl 10 milliGRAM(s) Oral at bedtime  cefuroxime   Tablet 500 milliGRAM(s) Oral every 12 hours  finasteride 5 milliGRAM(s) Oral daily  gabapentin 600 milliGRAM(s) Oral two times a day  heparin  Injectable 5000 Unit(s) SubCutaneous every 12 hours  HYDROmorphone   Tablet 4 milliGRAM(s) Oral three times a day  lactulose Syrup 20 Gram(s) Oral two times a day  midodrine. 2.5 milliGRAM(s) Oral three times a day  pantoprazole    Tablet 40 milliGRAM(s) Oral before breakfast  sodium bicarbonate 650 milliGRAM(s) Oral three times a day  sodium chloride 0.9%. 1000 milliLiter(s) IV Continuous <Continuous>    PRN Inpatient Medications  acetaminophen   Tablet .. 650 milliGRAM(s) Oral every 4 hours PRN      REVIEW OF SYSTEMS  --------------------------------------------------------------------------------  General: no fever  CVS: no chest pain  RESP: no sob, no cough  ABD: no abdominal pain  : no dysuria,  MSK: no edema     VITALS/PHYSICAL EXAM  --------------------------------------------------------------------------------  T(C): 36.8 (02-11-20 @ 11:38), Max: 36.9 (02-11-20 @ 04:58)  HR: 61 (02-11-20 @ 11:38) (61 - 80)  BP: 108/61 (02-11-20 @ 11:38) (106/56 - 136/72)  RR: 18 (02-11-20 @ 11:38) (18 - 19)  SpO2: 95% (02-11-20 @ 11:38) (94% - 99%)  Wt(kg): --        02-10-20 @ 07:01  -  02-11-20 @ 07:00  --------------------------------------------------------  IN: 275 mL / OUT: 1400 mL / NET: -1125 mL      Physical Exam:  	Gen: NAD  	HEENT: MMM  	Pulm: CTA B/L  	CV: S1S2  	Abd: Soft, +BS  	Ext: No LE edema B/L                      Neuro: Awake alert  	Skin: Warm and Dry   	Zia phillips    LABS/STUDIES  --------------------------------------------------------------------------------              9.1    6.52  >-----------<  190      [02-11-20 @ 07:52]              29.4     142  |  116  |  32  ----------------------------<  86      [02-11-20 @ 07:52]  5.0   |  18  |  2.10        Ca     8.7     [02-11-20 @ 07:52]      Mg     1.4     [02-10-20 @ 07:45]      Phos  3.6     [02-10-20 @ 07:45]    TPro  7.7  /  Alb  3.4  /  TBili  0.5  /  DBili  x   /  AST  24  /  ALT  28  /  AlkPhos  86  [02-10-20 @ 07:45]    PT/INR: PT 12.8 , INR 1.15       [02-10-20 @ 10:41]  PTT: 26.4       [02-10-20 @ 10:41]      Creatinine Trend:  SCr 2.10 [02-11 @ 07:52]  SCr 2.07 [02-10 @ 07:45]  SCr 1.96 [02-09 @ 06:34]  SCr 1.98 [02-08 @ 07:12]  SCr 2.15 [02-07 @ 06:11]    Urinalysis - [02-05-20 @ 04:42]      Color Yellow / Appearance Clear / SG 1.015 / pH 5.0      Gluc Negative / Ketone Negative  / Bili Negative / Urobili Negative       Blood Moderate / Protein 30 / Leuk Est Moderate / Nitrite Negative      RBC 5-10 / WBC 11-25 / Hyaline 0-2 / Gran  / Sq Epi  / Non Sq Epi Few / Bacteria Moderate    Urine Creatinine 138      [02-05-20 @ 06:09]  Urine Sodium 37      [02-05-20 @ 06:09]  Urine Potassium 37      [02-05-20 @ 06:09]  Urine Chloride 33      [02-05-20 @ 06:09]  Urine Osmolality 325      [02-05-20 @ 06:09]    Iron 61, TIBC 220, %sat 28      [01-28-20 @ 07:31]  Ferritin 371      [01-28-20 @ 14:41]  PTH -- (Ca 8.6)      [01-24-20 @ 15:01]   132  Vitamin D (25OH) 13.7      [12-24-19 @ 09:22]  HbA1c 5.6      [01-24-20 @ 15:03]  TSH 1.78      [01-24-20 @ 10:32]  Lipid: chol 99, TG 73, HDL 48, LDL 36      [01-24-20 @ 10:32]    HBsAg Nonreact      [02-05-20 @ 10:06]  HCV 11.27, Reactive      [02-05-20 @ 10:06]

## 2020-02-11 NOTE — PROGRESS NOTE ADULT - REASON FOR ADMISSION
Acute Renal Failure

## 2020-04-03 ENCOUNTER — INPATIENT (INPATIENT)
Facility: HOSPITAL | Age: 70
LOS: 10 days | Discharge: ANOTHER HOSPITAL | DRG: 871 | End: 2020-04-14
Attending: INTERNAL MEDICINE | Admitting: INTERNAL MEDICINE
Payer: MEDICARE

## 2020-04-03 VITALS
HEART RATE: 94 BPM | SYSTOLIC BLOOD PRESSURE: 122 MMHG | DIASTOLIC BLOOD PRESSURE: 81 MMHG | OXYGEN SATURATION: 95 % | RESPIRATION RATE: 18 BRPM | TEMPERATURE: 99 F | WEIGHT: 203.05 LBS | HEIGHT: 70 IN

## 2020-04-03 DIAGNOSIS — R41.82 ALTERED MENTAL STATUS, UNSPECIFIED: ICD-10-CM

## 2020-04-03 DIAGNOSIS — Z93.9 ARTIFICIAL OPENING STATUS, UNSPECIFIED: Chronic | ICD-10-CM

## 2020-04-03 LAB
ALBUMIN SERPL ELPH-MCNC: 4.1 G/DL — SIGNIFICANT CHANGE UP (ref 3.5–5)
ALP SERPL-CCNC: 135 U/L — HIGH (ref 40–120)
ALT FLD-CCNC: 33 U/L DA — SIGNIFICANT CHANGE UP (ref 10–60)
ANION GAP SERPL CALC-SCNC: 9 MMOL/L — SIGNIFICANT CHANGE UP (ref 5–17)
APTT BLD: 21.4 SEC — LOW (ref 27.5–36.3)
AST SERPL-CCNC: 88 U/L — HIGH (ref 10–40)
BASOPHILS # BLD AUTO: 0.02 K/UL — SIGNIFICANT CHANGE UP (ref 0–0.2)
BASOPHILS NFR BLD AUTO: 0.2 % — SIGNIFICANT CHANGE UP (ref 0–2)
BILIRUB SERPL-MCNC: 0.7 MG/DL — SIGNIFICANT CHANGE UP (ref 0.2–1.2)
BUN SERPL-MCNC: 117 MG/DL — HIGH (ref 7–18)
CALCIUM SERPL-MCNC: 8.8 MG/DL — SIGNIFICANT CHANGE UP (ref 8.4–10.5)
CHLORIDE SERPL-SCNC: 115 MMOL/L — HIGH (ref 96–108)
CO2 SERPL-SCNC: 14 MMOL/L — LOW (ref 22–31)
CREAT SERPL-MCNC: 5.84 MG/DL — HIGH (ref 0.5–1.3)
EOSINOPHIL # BLD AUTO: 0.01 K/UL — SIGNIFICANT CHANGE UP (ref 0–0.5)
EOSINOPHIL NFR BLD AUTO: 0.1 % — SIGNIFICANT CHANGE UP (ref 0–6)
GLUCOSE SERPL-MCNC: 117 MG/DL — HIGH (ref 70–99)
HCT VFR BLD CALC: 36.6 % — LOW (ref 39–50)
HGB BLD-MCNC: 11.9 G/DL — LOW (ref 13–17)
IMM GRANULOCYTES NFR BLD AUTO: 1.1 % — SIGNIFICANT CHANGE UP (ref 0–1.5)
INR BLD: 1.06 RATIO — SIGNIFICANT CHANGE UP (ref 0.88–1.16)
LACTATE SERPL-SCNC: 0.9 MMOL/L — SIGNIFICANT CHANGE UP (ref 0.7–2)
LYMPHOCYTES # BLD AUTO: 0.84 K/UL — LOW (ref 1–3.3)
LYMPHOCYTES # BLD AUTO: 8.9 % — LOW (ref 13–44)
MCHC RBC-ENTMCNC: 27.5 PG — SIGNIFICANT CHANGE UP (ref 27–34)
MCHC RBC-ENTMCNC: 32.5 GM/DL — SIGNIFICANT CHANGE UP (ref 32–36)
MCV RBC AUTO: 84.7 FL — SIGNIFICANT CHANGE UP (ref 80–100)
MONOCYTES # BLD AUTO: 1.1 K/UL — HIGH (ref 0–0.9)
MONOCYTES NFR BLD AUTO: 11.7 % — SIGNIFICANT CHANGE UP (ref 2–14)
NEUTROPHILS # BLD AUTO: 7.36 K/UL — SIGNIFICANT CHANGE UP (ref 1.8–7.4)
NEUTROPHILS NFR BLD AUTO: 78 % — HIGH (ref 43–77)
NRBC # BLD: 0 /100 WBCS — SIGNIFICANT CHANGE UP (ref 0–0)
PLATELET # BLD AUTO: 212 K/UL — SIGNIFICANT CHANGE UP (ref 150–400)
POTASSIUM SERPL-MCNC: 5.5 MMOL/L — HIGH (ref 3.5–5.3)
POTASSIUM SERPL-SCNC: 5.5 MMOL/L — HIGH (ref 3.5–5.3)
PROT SERPL-MCNC: 11 G/DL — HIGH (ref 6–8.3)
PROTHROM AB SERPL-ACNC: 12 SEC — SIGNIFICANT CHANGE UP (ref 10–12.9)
RBC # BLD: 4.32 M/UL — SIGNIFICANT CHANGE UP (ref 4.2–5.8)
RBC # FLD: 15.3 % — HIGH (ref 10.3–14.5)
SODIUM SERPL-SCNC: 138 MMOL/L — SIGNIFICANT CHANGE UP (ref 135–145)
WBC # BLD: 9.43 K/UL — SIGNIFICANT CHANGE UP (ref 3.8–10.5)
WBC # FLD AUTO: 9.43 K/UL — SIGNIFICANT CHANGE UP (ref 3.8–10.5)

## 2020-04-03 PROCEDURE — 71045 X-RAY EXAM CHEST 1 VIEW: CPT | Mod: 26

## 2020-04-03 PROCEDURE — 99285 EMERGENCY DEPT VISIT HI MDM: CPT | Mod: CS

## 2020-04-03 PROCEDURE — 70450 CT HEAD/BRAIN W/O DYE: CPT | Mod: 26

## 2020-04-03 RX ORDER — SODIUM CHLORIDE 9 MG/ML
1000 INJECTION INTRAMUSCULAR; INTRAVENOUS; SUBCUTANEOUS
Refills: 0 | Status: DISCONTINUED | OUTPATIENT
Start: 2020-04-03 | End: 2020-04-04

## 2020-04-03 RX ADMIN — SODIUM CHLORIDE 75 MILLILITER(S): 9 INJECTION INTRAMUSCULAR; INTRAVENOUS; SUBCUTANEOUS at 21:37

## 2020-04-03 NOTE — ED PROVIDER NOTE - CARE PLAN
Principal Discharge DX:	Altered mental status, unspecified altered mental status type  Secondary Diagnosis:	Acute renal failure superimposed on chronic kidney disease, unspecified CKD stage, unspecified acute renal failure type

## 2020-04-04 DIAGNOSIS — I95.9 HYPOTENSION, UNSPECIFIED: ICD-10-CM

## 2020-04-04 DIAGNOSIS — F31.9 BIPOLAR DISORDER, UNSPECIFIED: ICD-10-CM

## 2020-04-04 DIAGNOSIS — N17.9 ACUTE KIDNEY FAILURE, UNSPECIFIED: ICD-10-CM

## 2020-04-04 DIAGNOSIS — G93.40 ENCEPHALOPATHY, UNSPECIFIED: ICD-10-CM

## 2020-04-04 DIAGNOSIS — Z29.9 ENCOUNTER FOR PROPHYLACTIC MEASURES, UNSPECIFIED: ICD-10-CM

## 2020-04-04 LAB
ANION GAP SERPL CALC-SCNC: 13 MMOL/L — SIGNIFICANT CHANGE UP (ref 5–17)
ANION GAP SERPL CALC-SCNC: 8 MMOL/L — SIGNIFICANT CHANGE UP (ref 5–17)
APPEARANCE UR: CLEAR — SIGNIFICANT CHANGE UP
BACTERIA # UR AUTO: ABNORMAL /HPF
BASE EXCESS BLDA CALC-SCNC: -13.8 MMOL/L — LOW (ref -2–2)
BILIRUB UR-MCNC: NEGATIVE — SIGNIFICANT CHANGE UP
BLOOD GAS COMMENTS ARTERIAL: SIGNIFICANT CHANGE UP
BUN SERPL-MCNC: 104 MG/DL — HIGH (ref 7–18)
BUN SERPL-MCNC: 107 MG/DL — HIGH (ref 7–18)
CALCIUM SERPL-MCNC: 8.3 MG/DL — LOW (ref 8.4–10.5)
CALCIUM SERPL-MCNC: 8.5 MG/DL — SIGNIFICANT CHANGE UP (ref 8.4–10.5)
CHLORIDE SERPL-SCNC: 119 MMOL/L — HIGH (ref 96–108)
CHLORIDE SERPL-SCNC: 121 MMOL/L — HIGH (ref 96–108)
CO2 SERPL-SCNC: 10 MMOL/L — CRITICAL LOW (ref 22–31)
CO2 SERPL-SCNC: 14 MMOL/L — LOW (ref 22–31)
COLOR SPEC: YELLOW — SIGNIFICANT CHANGE UP
CREAT ?TM UR-MCNC: 87 MG/DL — SIGNIFICANT CHANGE UP
CREAT SERPL-MCNC: 4.32 MG/DL — HIGH (ref 0.5–1.3)
CREAT SERPL-MCNC: 5.09 MG/DL — HIGH (ref 0.5–1.3)
DIFF PNL FLD: ABNORMAL
FLU A RESULT: SIGNIFICANT CHANGE UP
FLU A RESULT: SIGNIFICANT CHANGE UP
FLUAV AG NPH QL: SIGNIFICANT CHANGE UP
FLUBV AG NPH QL: SIGNIFICANT CHANGE UP
GLUCOSE BLDC GLUCOMTR-MCNC: 105 MG/DL — HIGH (ref 70–99)
GLUCOSE BLDC GLUCOMTR-MCNC: 146 MG/DL — HIGH (ref 70–99)
GLUCOSE SERPL-MCNC: 101 MG/DL — HIGH (ref 70–99)
GLUCOSE SERPL-MCNC: 102 MG/DL — HIGH (ref 70–99)
GLUCOSE UR QL: NEGATIVE — SIGNIFICANT CHANGE UP
HCO3 BLDA-SCNC: 12 MMOL/L — LOW (ref 23–27)
HOROWITZ INDEX BLDA+IHG-RTO: 21 — SIGNIFICANT CHANGE UP
KETONES UR-MCNC: NEGATIVE — SIGNIFICANT CHANGE UP
LEUKOCYTE ESTERASE UR-ACNC: NEGATIVE — SIGNIFICANT CHANGE UP
NITRITE UR-MCNC: NEGATIVE — SIGNIFICANT CHANGE UP
PCO2 BLDA: 26 MMHG — LOW (ref 32–46)
PH BLDA: 7.27 — LOW (ref 7.35–7.45)
PH UR: 5 — SIGNIFICANT CHANGE UP (ref 5–8)
PO2 BLDA: 112 MMHG — HIGH (ref 74–108)
POTASSIUM SERPL-MCNC: 4.8 MMOL/L — SIGNIFICANT CHANGE UP (ref 3.5–5.3)
POTASSIUM SERPL-MCNC: 5.6 MMOL/L — HIGH (ref 3.5–5.3)
POTASSIUM SERPL-SCNC: 4.8 MMOL/L — SIGNIFICANT CHANGE UP (ref 3.5–5.3)
POTASSIUM SERPL-SCNC: 5.6 MMOL/L — HIGH (ref 3.5–5.3)
PROT ?TM UR-MCNC: 79 MG/DL — HIGH (ref 0–12)
PROT UR-MCNC: 30 MG/DL
RBC CASTS # UR COMP ASSIST: SIGNIFICANT CHANGE UP /HPF (ref 0–2)
RSV RESULT: SIGNIFICANT CHANGE UP
RSV RNA RESP QL NAA+PROBE: SIGNIFICANT CHANGE UP
SAO2 % BLDA: 98 % — HIGH (ref 92–96)
SARS-COV-2 RNA SPEC QL NAA+PROBE: DETECTED
SODIUM SERPL-SCNC: 142 MMOL/L — SIGNIFICANT CHANGE UP (ref 135–145)
SODIUM SERPL-SCNC: 143 MMOL/L — SIGNIFICANT CHANGE UP (ref 135–145)
SODIUM UR-SCNC: 26 MMOL/L — SIGNIFICANT CHANGE UP
SP GR SPEC: 1.01 — SIGNIFICANT CHANGE UP (ref 1.01–1.02)
UROBILINOGEN FLD QL: NEGATIVE — SIGNIFICANT CHANGE UP
WBC UR QL: SIGNIFICANT CHANGE UP /HPF (ref 0–5)

## 2020-04-04 RX ORDER — GABAPENTIN 400 MG/1
600 CAPSULE ORAL
Refills: 0 | Status: DISCONTINUED | OUTPATIENT
Start: 2020-04-04 | End: 2020-04-14

## 2020-04-04 RX ORDER — SODIUM ZIRCONIUM CYCLOSILICATE 10 G/10G
5 POWDER, FOR SUSPENSION ORAL ONCE
Refills: 0 | Status: COMPLETED | OUTPATIENT
Start: 2020-04-04 | End: 2020-04-04

## 2020-04-04 RX ORDER — TAMSULOSIN HYDROCHLORIDE 0.4 MG/1
0.4 CAPSULE ORAL AT BEDTIME
Refills: 0 | Status: DISCONTINUED | OUTPATIENT
Start: 2020-04-04 | End: 2020-04-14

## 2020-04-04 RX ORDER — MIDODRINE HYDROCHLORIDE 2.5 MG/1
2.5 TABLET ORAL THREE TIMES A DAY
Refills: 0 | Status: DISCONTINUED | OUTPATIENT
Start: 2020-04-04 | End: 2020-04-04

## 2020-04-04 RX ORDER — SODIUM BICARBONATE 1 MEQ/ML
0.11 SYRINGE (ML) INTRAVENOUS
Qty: 150 | Refills: 0 | Status: DISCONTINUED | OUTPATIENT
Start: 2020-04-04 | End: 2020-04-04

## 2020-04-04 RX ORDER — ASCORBIC ACID 60 MG
1000 TABLET,CHEWABLE ORAL DAILY
Refills: 0 | Status: DISCONTINUED | OUTPATIENT
Start: 2020-04-04 | End: 2020-04-06

## 2020-04-04 RX ORDER — LAMOTRIGINE 25 MG/1
200 TABLET, ORALLY DISINTEGRATING ORAL DAILY
Refills: 0 | Status: DISCONTINUED | OUTPATIENT
Start: 2020-04-04 | End: 2020-04-14

## 2020-04-04 RX ORDER — HEPARIN SODIUM 5000 [USP'U]/ML
5000 INJECTION INTRAVENOUS; SUBCUTANEOUS EVERY 12 HOURS
Refills: 0 | Status: DISCONTINUED | OUTPATIENT
Start: 2020-04-04 | End: 2020-04-14

## 2020-04-04 RX ORDER — DEXTROSE 50 % IN WATER 50 %
50 SYRINGE (ML) INTRAVENOUS ONCE
Refills: 0 | Status: COMPLETED | OUTPATIENT
Start: 2020-04-04 | End: 2020-04-04

## 2020-04-04 RX ORDER — ACETAMINOPHEN 500 MG
650 TABLET ORAL EVERY 6 HOURS
Refills: 0 | Status: DISCONTINUED | OUTPATIENT
Start: 2020-04-04 | End: 2020-04-06

## 2020-04-04 RX ORDER — HYDROMORPHONE HYDROCHLORIDE 2 MG/ML
4 INJECTION INTRAMUSCULAR; INTRAVENOUS; SUBCUTANEOUS EVERY 8 HOURS
Refills: 0 | Status: DISCONTINUED | OUTPATIENT
Start: 2020-04-04 | End: 2020-04-04

## 2020-04-04 RX ORDER — SODIUM BICARBONATE 1 MEQ/ML
1300 SYRINGE (ML) INTRAVENOUS THREE TIMES A DAY
Refills: 0 | Status: DISCONTINUED | OUTPATIENT
Start: 2020-04-04 | End: 2020-04-06

## 2020-04-04 RX ORDER — SODIUM BICARBONATE 1 MEQ/ML
1300 SYRINGE (ML) INTRAVENOUS DAILY
Refills: 0 | Status: DISCONTINUED | OUTPATIENT
Start: 2020-04-04 | End: 2020-04-06

## 2020-04-04 RX ORDER — SODIUM BICARBONATE 1 MEQ/ML
650 SYRINGE (ML) INTRAVENOUS THREE TIMES A DAY
Refills: 0 | Status: DISCONTINUED | OUTPATIENT
Start: 2020-04-04 | End: 2020-04-04

## 2020-04-04 RX ORDER — INSULIN HUMAN 100 [IU]/ML
10 INJECTION, SOLUTION SUBCUTANEOUS ONCE
Refills: 0 | Status: COMPLETED | OUTPATIENT
Start: 2020-04-04 | End: 2020-04-04

## 2020-04-04 RX ORDER — PANTOPRAZOLE SODIUM 20 MG/1
40 TABLET, DELAYED RELEASE ORAL
Refills: 0 | Status: DISCONTINUED | OUTPATIENT
Start: 2020-04-04 | End: 2020-04-14

## 2020-04-04 RX ORDER — SODIUM ZIRCONIUM CYCLOSILICATE 10 G/10G
10 POWDER, FOR SUSPENSION ORAL ONCE
Refills: 0 | Status: COMPLETED | OUTPATIENT
Start: 2020-04-04 | End: 2020-04-05

## 2020-04-04 RX ORDER — ATORVASTATIN CALCIUM 80 MG/1
10 TABLET, FILM COATED ORAL AT BEDTIME
Refills: 0 | Status: DISCONTINUED | OUTPATIENT
Start: 2020-04-04 | End: 2020-04-14

## 2020-04-04 RX ORDER — FINASTERIDE 5 MG/1
5 TABLET, FILM COATED ORAL DAILY
Refills: 0 | Status: DISCONTINUED | OUTPATIENT
Start: 2020-04-04 | End: 2020-04-14

## 2020-04-04 RX ORDER — ARIPIPRAZOLE 15 MG/1
15 TABLET ORAL DAILY
Refills: 0 | Status: DISCONTINUED | OUTPATIENT
Start: 2020-04-04 | End: 2020-04-14

## 2020-04-04 RX ORDER — TRAZODONE HCL 50 MG
25 TABLET ORAL AT BEDTIME
Refills: 0 | Status: DISCONTINUED | OUTPATIENT
Start: 2020-04-04 | End: 2020-04-14

## 2020-04-04 RX ORDER — THIAMINE MONONITRATE (VIT B1) 100 MG
200 TABLET ORAL DAILY
Refills: 0 | Status: DISCONTINUED | OUTPATIENT
Start: 2020-04-04 | End: 2020-04-06

## 2020-04-04 RX ADMIN — Medication 650 MILLIGRAM(S): at 06:38

## 2020-04-04 RX ADMIN — Medication 1300 MILLIGRAM(S): at 13:17

## 2020-04-04 RX ADMIN — GABAPENTIN 600 MILLIGRAM(S): 400 CAPSULE ORAL at 18:36

## 2020-04-04 RX ADMIN — Medication 25 MILLIGRAM(S): at 21:19

## 2020-04-04 RX ADMIN — HEPARIN SODIUM 5000 UNIT(S): 5000 INJECTION INTRAVENOUS; SUBCUTANEOUS at 06:39

## 2020-04-04 RX ADMIN — PANTOPRAZOLE SODIUM 40 MILLIGRAM(S): 20 TABLET, DELAYED RELEASE ORAL at 06:38

## 2020-04-04 RX ADMIN — HEPARIN SODIUM 5000 UNIT(S): 5000 INJECTION INTRAVENOUS; SUBCUTANEOUS at 17:39

## 2020-04-04 RX ADMIN — Medication 50 MILLILITER(S): at 04:11

## 2020-04-04 RX ADMIN — GABAPENTIN 600 MILLIGRAM(S): 400 CAPSULE ORAL at 06:39

## 2020-04-04 RX ADMIN — Medication 1300 MILLIGRAM(S): at 09:26

## 2020-04-04 RX ADMIN — INSULIN HUMAN 10 UNIT(S): 100 INJECTION, SOLUTION SUBCUTANEOUS at 04:21

## 2020-04-04 RX ADMIN — Medication 1000 MILLIGRAM(S): at 12:14

## 2020-04-04 RX ADMIN — ATORVASTATIN CALCIUM 10 MILLIGRAM(S): 80 TABLET, FILM COATED ORAL at 21:19

## 2020-04-04 RX ADMIN — LAMOTRIGINE 200 MILLIGRAM(S): 25 TABLET, ORALLY DISINTEGRATING ORAL at 12:14

## 2020-04-04 RX ADMIN — Medication 650 MILLIGRAM(S): at 21:21

## 2020-04-04 RX ADMIN — Medication 1300 MILLIGRAM(S): at 21:20

## 2020-04-04 RX ADMIN — SODIUM ZIRCONIUM CYCLOSILICATE 5 GRAM(S): 10 POWDER, FOR SUSPENSION ORAL at 04:11

## 2020-04-04 RX ADMIN — Medication 200 MILLIGRAM(S): at 12:14

## 2020-04-04 RX ADMIN — TAMSULOSIN HYDROCHLORIDE 0.4 MILLIGRAM(S): 0.4 CAPSULE ORAL at 21:19

## 2020-04-04 RX ADMIN — ARIPIPRAZOLE 15 MILLIGRAM(S): 15 TABLET ORAL at 12:15

## 2020-04-04 RX ADMIN — SODIUM CHLORIDE 75 MILLILITER(S): 9 INJECTION INTRAMUSCULAR; INTRAVENOUS; SUBCUTANEOUS at 04:12

## 2020-04-04 RX ADMIN — Medication 1300 MILLIGRAM(S): at 22:46

## 2020-04-04 RX ADMIN — FINASTERIDE 5 MILLIGRAM(S): 5 TABLET, FILM COATED ORAL at 12:14

## 2020-04-04 NOTE — CHART NOTE - NSCHARTNOTEFT_GEN_A_CORE
Received Critical Value Notification re BMP CO2 10, labs and ABG reviewed w/ Nephrology Dr Pond, as per Renal recommendations will give STAT one dose of Sodium Bicarbonate 1300 mg; unable to give sodium bicarbonate drip due to no IV access

## 2020-04-04 NOTE — H&P ADULT - PROBLEM SELECTOR PLAN 4
IMPROVE VTE Individual Risk Assessment          RISK                                                          Points  [  ] Previous VTE                                                3  [  ] Thrombophilia                                             2  [  ] Lower limb paralysis                                   2        (unable to hold up >15 seconds)    [  ] Current Cancer                                             2         (within 6 months)  [ X ] Immobilization > 24 hrs                              1  [  ] ICU/CCU stay > 24 hours                             1  [ X ] Age > 60                                                         1    IMPROVE VTE Score: 2    heparin subq for dvt ppx takes midodrine tid at [facility]  normotensive at present  hold midodrine - resume if warranted

## 2020-04-04 NOTE — CONSULT NOTE ADULT - SUBJECTIVE AND OBJECTIVE BOX
NEPHROLOGY MEDICAL CARE, [facility] - Dr. Emerson Pond/ Dr. Nallely Curran/ Dr. Kirby Angel/ Dr. Pippa Reis    Patient was seen and examined at bedside.    HPI:  69 year old Male from [facility], with PMHx of prostate Ca (s/p radiation in , now in remission), asthma, COPD (not on O2), HTN, HLD, Bipolar, anemia, GERD, Ileostomy s/p sigmoid resection, PAD, and CKD is [facility] the ED from [facility] for AMS and weakness. Patient is AAOx2 on my exam and denies any complaints, however not a reliable historian. No acute distress. Pt was admitted with scr around 5.8 and improved to 5.0 with IVFs. Pt's baseline scr was around 2.0mg/dl and after reviewing records, pt had Skyler due to ATN requiring HD but had renal recovery.     GOC: Full code. (2020 00:54)      PMH:   BPH with urinary obstruction  Prostate CA  CKD (chronic kidney disease)  PAD (peripheral artery disease)  HLD (hyperlipidemia)  HTN (hypertension)  IBD (inflammatory bowel disease)  Bipolar disorder  Anemia  COPD, mild        PSH:   History of creation of ostomy        FAMILY HISTORY:  No pertinent family history in first degree relatives      Social History:  non-smoker/ non-alcoholic     Home Meds:  MEDICATIONS  (STANDING):  ARIPiprazole 15 milliGRAM(s) Oral daily  ascorbic acid 1000 milliGRAM(s) Oral daily  atorvastatin 10 milliGRAM(s) Oral at bedtime  finasteride 5 milliGRAM(s) Oral daily  gabapentin 600 milliGRAM(s) Oral two times a day  heparin  Injectable 5000 Unit(s) SubCutaneous every 12 hours  lamoTRIgine 200 milliGRAM(s) Oral daily  pantoprazole    Tablet 40 milliGRAM(s) Oral before breakfast  sodium bicarbonate 1300 milliGRAM(s) Oral three times a day  tamsulosin 0.4 milliGRAM(s) Oral at bedtime  thiamine 200 milliGRAM(s) Oral daily  traZODone 25 milliGRAM(s) Oral at bedtime    MEDICATIONS  (PRN):  HYDROmorphone   Tablet 4 milliGRAM(s) Oral every 8 hours PRN Severe Pain (7 - 10)      Allergies:  Allergies    No Known Allergies    Intolerances        REVIEW OF SYSTEMS:  unable to obtain    Vital Signs Last 24 Hrs  T(C): 37.6 (2020 15:57), Max: 37.6 (2020 15:57)  T(F): 99.6 (2020 15:57), Max: 99.6 (2020 15:57)  HR: 99 (2020 15:57) (76 - 99)  BP: 130/72 (2020 15:57) (124/65 - 137/70)  BP(mean): --  RR: 20 (2020 15:57) (18 - 20)  SpO2: 100% (2020 15:57) (94% - 100%)     @ 07:01  -  -04 @ 17:27  --------------------------------------------------------  IN: 0 mL / OUT: 600 mL / NET: -600 mL        PHYSICAL EXAM:  GENERAL: No acute respiratory distress.  EYES: conjunctiva and sclera clear  ENMT: Atraumatic, Normocephalic, supple, No JVD present. Moist mucous membranes  RESPIRATORY: b/l poor air entry; No rales, rhonchi, wheezing  CARDIOVASCULAR: S1S2+; no m/r/g  GASTROINTESTINAL: Soft, Non-tender, Nondistended; Bowel sounds present, no hepatosplenomegaly.   CENTRAL NERVOUS SYSTEM:  Awake, Alert & Oriented X1-2;, No focal deficits present.   EXTREMITIES:  2+ Peripheral Pulses and  trace edema, No Cyanosis  SKIN: No rashes      LABS:                        11.9   9.43  )-----------( 212      ( 2020 14:41 )             36.6         143  |  121<H>  |  107<H>  ----------------------------<  102<H>  5.6<H>   |  14<L>  |  5.09<H>    Ca    8.3<L>      2020 02:39    TPro  11.0<H>  /  Alb  4.1  /  TBili  0.7  /  DBili  x   /  AST  88<H>  /  ALT  33  /  AlkPhos  135<H>  04-03    PT/INR - ( 2020 14:41 )   PT: 12.0 sec;   INR: 1.06 ratio         PTT - ( 2020 14:41 )  PTT:21.4 sec  Urinalysis Basic - ( 2020 09:31 )    Color: Yellow / Appearance: Clear / S.015 / pH: x  Gluc: x / Ketone: Negative  / Bili: Negative / Urobili: Negative   Blood: x / Protein: 30 mg/dL / Nitrite: Negative   Leuk Esterase: Negative / RBC: 0-2 /HPF / WBC 0-2 /HPF   Sq Epi: x / Non Sq Epi: x / Bacteria: Few /HPF        Urine studies  Sodium, Random Urine: 26 mmol/L ( @ 09:31)  Creatinine, Random Urine: 87 mg/dL ( @ 09:31)      Medications:  ARIPiprazole 15 milliGRAM(s) Oral daily  ascorbic acid 1000 milliGRAM(s) Oral daily  atorvastatin 10 milliGRAM(s) Oral at bedtime  finasteride 5 milliGRAM(s) Oral daily  gabapentin 600 milliGRAM(s) Oral two times a day  heparin  Injectable 5000 Unit(s) SubCutaneous every 12 hours  HYDROmorphone   Tablet 4 milliGRAM(s) Oral every 8 hours PRN  lamoTRIgine 200 milliGRAM(s) Oral daily  pantoprazole    Tablet 40 milliGRAM(s) Oral before breakfast  sodium bicarbonate 1300 milliGRAM(s) Oral three times a day  tamsulosin 0.4 milliGRAM(s) Oral at bedtime  thiamine 200 milliGRAM(s) Oral daily  traZODone 25 milliGRAM(s) Oral at bedtime      RADIOLOGY & ADDITIONAL TESTS:

## 2020-04-04 NOTE — H&P ADULT - PROBLEM SELECTOR PLAN 5
IMPROVE VTE Individual Risk Assessment          RISK                                                          Points  [  ] Previous VTE                                                3  [  ] Thrombophilia                                             2  [  ] Lower limb paralysis                                   2        (unable to hold up >15 seconds)    [  ] Current Cancer                                             2         (within 6 months)  [ X ] Immobilization > 24 hrs                              1  [  ] ICU/CCU stay > 24 hours                             1  [ X ] Age > 60                                                         1    IMPROVE VTE Score: 2    heparin subq for dvt ppx

## 2020-04-04 NOTE — H&P ADULT - PROBLEM SELECTOR PLAN 2
/ Cr 5.84 on admission  baseline creatinine is around 2  patient clinically dehydrated  gentle IV hydration  avoid nephrotoxins  f/u urine lytes  / Cr 5.84 on admission  baseline creatinine is around 2  patient clinically dehydrated  gentle IV hydration  avoid nephrotoxins  f/u urine lytes  Nephro consulted - Dr. Pond

## 2020-04-04 NOTE — H&P ADULT - PROBLEM SELECTOR PLAN 1
likely infectious in origin  CXR shows no clear infiltrate on my assessment  O2 sat 100% on room air  afebrile, no WBC count  f/u COVID-19 PCR  supportive care

## 2020-04-04 NOTE — H&P ADULT - NSHPPHYSICALEXAM_GEN_ALL_CORE
Vital Signs Last 24 Hrs  T(C): 37 (03 Apr 2020 23:00), Max: 37.2 (03 Apr 2020 12:20)  T(F): 98.6 (03 Apr 2020 23:00), Max: 98.9 (03 Apr 2020 12:20)  HR: 82 (03 Apr 2020 23:00) (80 - 94)  BP: 127/72 (03 Apr 2020 23:00) (114/62 - 137/70)  BP(mean): --  RR: 19 (03 Apr 2020 23:00) (18 - 19)  SpO2: 100% (03 Apr 2020 23:00) (95% - 100%)

## 2020-04-04 NOTE — H&P ADULT - ATTENDING COMMENTS
PATIENT IS A 70YO MALE FROM [facility] WITH PMHX OF PROSTATE CA [S/P RADIATION 2015, IN REMISSION], ASTHMA, COPD, HTN, HLD, BIPOLAR DX, ANEMIA, GERD, ILEOSTOMY S/P SIGMOID RESECTION, PAD AND CKD3 TRANSFERRED FOR EVALUATION OF AMS/WEAKNESS.    # ACUTE METABOLIC ENCEPHALOPATHY  # PAWAN ON CKD 3 - SUSPECT PRE-RENAL AZOTEMIA - NEPHROLOGY CONSULTED  # COVD19 PNEUMONIA AND COPD  # METABOLIC ACIDOSIS S/T PAWAN - ON SODIUM BICARB  # HYPERKALEMIA S/T PAWAN - ON LOKELMA   # GI AND DVT PPX    MACARENA IVY M.D. COVERING FOR JEFRY IVY M.D.

## 2020-04-04 NOTE — CONSULT NOTE ADULT - ASSESSMENT
1. PAWAN due to pre-renal azotemia due to volume depletion.   -scr improved with fluids. unable to give fluids because no IV access.   -recommend: urinalysis, urine lytes (uosm, urine sodium, urine creatinine, chloride, potassium), spot protein to creatinine ratio  -order renal sono to assess kidney size and r/o hydronephrosis if COVID neg.   -Adjust meds to eGFR and avoid IV Gadolinium contrast,NSAIDs, and phosphate enema.  -Monitor I/O's daily.   -Monitor SMA daily.  2. CKD stage 3 due h/o renal recovery from ATN.  -baseline scr around 2.0mg/dL   -Keep patient euvolemic and renal diet  -Avoid Nephrotoxic Meds/ Agents such as (NSAIDs, IV contrast, Aminoglycosides such as gentamicin, -Gadolinium contrast, Phosphate containing enemas, etc..)  -Adjust Medications according to eGFR  3. Hyperkalemia due to pawan on ckd  -continue conservative management and give loklemia. monitor K.  4. h/o Hypotension: bp is acceptable and midodrine as needed  5. MBD: check phos and PTH in am.  6. Encephalopathy due to unclear eitology:  r/o COVID. plan as per primary team. 1. PAWAN due to pre-renal azotemia due to volume depletion.   -scr improved with fluids. unable to give fluids because no IV access.   -recommend: urinalysis, urine lytes (uosm, urine sodium, urine creatinine, chloride, potassium), spot protein to creatinine ratio  -order renal sono to assess kidney size and r/o hydronephrosis if COVID neg.   -Adjust meds to eGFR and avoid IV Gadolinium contrast,NSAIDs, and phosphate enema.  -Monitor I/O's daily.   -Monitor SMA daily.  2. CKD stage 3 due h/o renal recovery from ATN.  -baseline scr around 2.0mg/dL   -Keep patient euvolemic and renal diet  -Avoid Nephrotoxic Meds/ Agents such as (NSAIDs, IV contrast, Aminoglycosides such as gentamicin, -Gadolinium contrast, Phosphate containing enemas, etc..)  -Adjust Medications according to eGFR  3. Hyperkalemia due to pawan on ckd  -continue conservative management and give loklemia. monitor K.  4. h/o Hypotension: bp is acceptable and midodrine as needed  5. MBD: check phos and PTH in am.  6. Encephalopathy due to unclear eitology:  r/o COVID. plan as per primary team.  7. Acidosis due to renal failure:  -unable to give sodium bicarbonate drip due to no iv access; add sodium bicarbonate 1300mg bid  obtain abg.

## 2020-04-04 NOTE — H&P ADULT - HISTORY OF PRESENT ILLNESS
69 year old Male from [facility], with PMHx of prostate Ca (s/p radiation in 2015, now in remission), asthma, COPD (not on O2), HTN, HLD, Bipolar, anemia, GERD, Ileostomy s/p sigmoid resection, PAD, and CKD is [facility] the ED from [facility] for AMS and weakness. Patient is AAOx2 on my exam and denies any complaints, however not a reliable historian. No acute distress.    GOC: Full code.

## 2020-04-05 ENCOUNTER — TRANSCRIPTION ENCOUNTER (OUTPATIENT)
Age: 70
End: 2020-04-05

## 2020-04-05 LAB
ALBUMIN SERPL ELPH-MCNC: 3.4 G/DL — LOW (ref 3.5–5)
ALP SERPL-CCNC: 98 U/L — SIGNIFICANT CHANGE UP (ref 40–120)
ALT FLD-CCNC: 35 U/L DA — SIGNIFICANT CHANGE UP (ref 10–60)
ANION GAP SERPL CALC-SCNC: 10 MMOL/L — SIGNIFICANT CHANGE UP (ref 5–17)
APPEARANCE UR: CLEAR — SIGNIFICANT CHANGE UP
AST SERPL-CCNC: 116 U/L — HIGH (ref 10–40)
BACTERIA # UR AUTO: ABNORMAL /HPF
BASOPHILS # BLD AUTO: 0 K/UL — SIGNIFICANT CHANGE UP (ref 0–0.2)
BASOPHILS NFR BLD AUTO: 0 % — SIGNIFICANT CHANGE UP (ref 0–2)
BILIRUB SERPL-MCNC: 0.7 MG/DL — SIGNIFICANT CHANGE UP (ref 0.2–1.2)
BILIRUB UR-MCNC: NEGATIVE — SIGNIFICANT CHANGE UP
BUN SERPL-MCNC: 95 MG/DL — HIGH (ref 7–18)
CALCIUM SERPL-MCNC: 8.7 MG/DL — SIGNIFICANT CHANGE UP (ref 8.4–10.5)
CHLORIDE SERPL-SCNC: 118 MMOL/L — HIGH (ref 96–108)
CO2 SERPL-SCNC: 15 MMOL/L — LOW (ref 22–31)
COLOR SPEC: YELLOW — SIGNIFICANT CHANGE UP
CREAT ?TM UR-MCNC: 100 MG/DL — SIGNIFICANT CHANGE UP
CREAT SERPL-MCNC: 4.21 MG/DL — HIGH (ref 0.5–1.3)
DIFF PNL FLD: ABNORMAL
EOSINOPHIL # BLD AUTO: 0 K/UL — SIGNIFICANT CHANGE UP (ref 0–0.5)
EOSINOPHIL NFR BLD AUTO: 0 % — SIGNIFICANT CHANGE UP (ref 0–6)
EPI CELLS # UR: SIGNIFICANT CHANGE UP /HPF
GLUCOSE SERPL-MCNC: 116 MG/DL — HIGH (ref 70–99)
GLUCOSE UR QL: NEGATIVE — SIGNIFICANT CHANGE UP
HCT VFR BLD CALC: 37.4 % — LOW (ref 39–50)
HGB BLD-MCNC: 11.7 G/DL — LOW (ref 13–17)
HYALINE CASTS # UR AUTO: ABNORMAL /LPF
KETONES UR-MCNC: NEGATIVE — SIGNIFICANT CHANGE UP
LEUKOCYTE ESTERASE UR-ACNC: NEGATIVE — SIGNIFICANT CHANGE UP
LYMPHOCYTES # BLD AUTO: 1.1 K/UL — SIGNIFICANT CHANGE UP (ref 1–3.3)
LYMPHOCYTES # BLD AUTO: 15 % — SIGNIFICANT CHANGE UP (ref 13–44)
MCHC RBC-ENTMCNC: 27.8 PG — SIGNIFICANT CHANGE UP (ref 27–34)
MCHC RBC-ENTMCNC: 31.3 GM/DL — LOW (ref 32–36)
MCV RBC AUTO: 88.8 FL — SIGNIFICANT CHANGE UP (ref 80–100)
MONOCYTES # BLD AUTO: 0.95 K/UL — HIGH (ref 0–0.9)
MONOCYTES NFR BLD AUTO: 13 % — SIGNIFICANT CHANGE UP (ref 2–14)
NEUTROPHILS # BLD AUTO: 5.11 K/UL — SIGNIFICANT CHANGE UP (ref 1.8–7.4)
NEUTROPHILS NFR BLD AUTO: 70 % — SIGNIFICANT CHANGE UP (ref 43–77)
NITRITE UR-MCNC: NEGATIVE — SIGNIFICANT CHANGE UP
OSMOLALITY UR: 431 MOS/KG — SIGNIFICANT CHANGE UP (ref 50–1200)
PH UR: 5 — SIGNIFICANT CHANGE UP (ref 5–8)
PLATELET # BLD AUTO: 177 K/UL — SIGNIFICANT CHANGE UP (ref 150–400)
POTASSIUM SERPL-MCNC: 4.7 MMOL/L — SIGNIFICANT CHANGE UP (ref 3.5–5.3)
POTASSIUM SERPL-SCNC: 4.7 MMOL/L — SIGNIFICANT CHANGE UP (ref 3.5–5.3)
POTASSIUM UR-SCNC: 26 MMOL/L — SIGNIFICANT CHANGE UP
PROT SERPL-MCNC: 9 G/DL — HIGH (ref 6–8.3)
PROT UR-MCNC: 100
RAPID RVP RESULT: SIGNIFICANT CHANGE UP
RBC # BLD: 4.21 M/UL — SIGNIFICANT CHANGE UP (ref 4.2–5.8)
RBC # FLD: 15.9 % — HIGH (ref 10.3–14.5)
RBC CASTS # UR COMP ASSIST: ABNORMAL /HPF (ref 0–2)
SODIUM SERPL-SCNC: 143 MMOL/L — SIGNIFICANT CHANGE UP (ref 135–145)
SODIUM UR-SCNC: 17 MMOL/L — SIGNIFICANT CHANGE UP
SP GR SPEC: 1.01 — SIGNIFICANT CHANGE UP (ref 1.01–1.02)
UROBILINOGEN FLD QL: NEGATIVE — SIGNIFICANT CHANGE UP
UUN UR-MCNC: 846 MG/DL — SIGNIFICANT CHANGE UP
WBC # BLD: 7.3 K/UL — SIGNIFICANT CHANGE UP (ref 3.8–10.5)
WBC # FLD AUTO: 7.3 K/UL — SIGNIFICANT CHANGE UP (ref 3.8–10.5)
WBC UR QL: SIGNIFICANT CHANGE UP /HPF (ref 0–5)

## 2020-04-05 RX ADMIN — FINASTERIDE 5 MILLIGRAM(S): 5 TABLET, FILM COATED ORAL at 11:53

## 2020-04-05 RX ADMIN — Medication 1000 MILLIGRAM(S): at 11:53

## 2020-04-05 RX ADMIN — Medication 1300 MILLIGRAM(S): at 11:53

## 2020-04-05 RX ADMIN — Medication 1300 MILLIGRAM(S): at 21:22

## 2020-04-05 RX ADMIN — ARIPIPRAZOLE 15 MILLIGRAM(S): 15 TABLET ORAL at 11:53

## 2020-04-05 RX ADMIN — LAMOTRIGINE 200 MILLIGRAM(S): 25 TABLET, ORALLY DISINTEGRATING ORAL at 11:53

## 2020-04-05 RX ADMIN — TAMSULOSIN HYDROCHLORIDE 0.4 MILLIGRAM(S): 0.4 CAPSULE ORAL at 21:22

## 2020-04-05 RX ADMIN — ATORVASTATIN CALCIUM 10 MILLIGRAM(S): 80 TABLET, FILM COATED ORAL at 21:22

## 2020-04-05 RX ADMIN — GABAPENTIN 600 MILLIGRAM(S): 400 CAPSULE ORAL at 06:09

## 2020-04-05 RX ADMIN — Medication 200 MILLIGRAM(S): at 11:53

## 2020-04-05 RX ADMIN — Medication 25 MILLIGRAM(S): at 21:22

## 2020-04-05 RX ADMIN — HEPARIN SODIUM 5000 UNIT(S): 5000 INJECTION INTRAVENOUS; SUBCUTANEOUS at 06:09

## 2020-04-05 RX ADMIN — Medication 1300 MILLIGRAM(S): at 06:09

## 2020-04-05 RX ADMIN — Medication 650 MILLIGRAM(S): at 19:10

## 2020-04-05 RX ADMIN — PANTOPRAZOLE SODIUM 40 MILLIGRAM(S): 20 TABLET, DELAYED RELEASE ORAL at 06:09

## 2020-04-05 RX ADMIN — SODIUM ZIRCONIUM CYCLOSILICATE 10 GRAM(S): 10 POWDER, FOR SUSPENSION ORAL at 08:17

## 2020-04-05 RX ADMIN — GABAPENTIN 600 MILLIGRAM(S): 400 CAPSULE ORAL at 17:04

## 2020-04-05 RX ADMIN — HEPARIN SODIUM 5000 UNIT(S): 5000 INJECTION INTRAVENOUS; SUBCUTANEOUS at 17:04

## 2020-04-05 NOTE — PROGRESS NOTE ADULT - SUBJECTIVE AND OBJECTIVE BOX
Patient is a 69y old  Male who presents with a chief complaint of AMS (05 Apr 2020 15:33)    PATIENT IS SEEN AND EXAMINED IN MEDICAL FLOOR.    ALLERGIES:  No Known Allergies    VITALS:    Vital Signs Last 24 Hrs  T(C): 37.8 (05 Apr 2020 15:24), Max: 39.6 (04 Apr 2020 21:20)  T(F): 100.1 (05 Apr 2020 15:24), Max: 103.2 (04 Apr 2020 21:20)  HR: 92 (05 Apr 2020 15:24) (89 - 102)  BP: 122/64 (05 Apr 2020 15:24) (116/60 - 146/70)  BP(mean): --  RR: 19 (05 Apr 2020 15:24) (19 - 22)  SpO2: 99% (05 Apr 2020 15:24) (95% - 99%)    LABS:    CBC Full  -  ( 05 Apr 2020 10:39 )  WBC Count : 7.30 K/uL  RBC Count : 4.21 M/uL  Hemoglobin : 11.7 g/dL  Hematocrit : 37.4 %  Platelet Count - Automated : 177 K/uL  Mean Cell Volume : 88.8 fl  Mean Cell Hemoglobin : 27.8 pg  Mean Cell Hemoglobin Concentration : 31.3 gm/dL  Auto Neutrophil # : 5.11 K/uL  Auto Lymphocyte # : 1.10 K/uL  Auto Monocyte # : 0.95 K/uL  Auto Eosinophil # : 0.00 K/uL  Auto Basophil # : 0.00 K/uL  Auto Neutrophil % : 70.0 %  Auto Lymphocyte % : 15.0 %  Auto Monocyte % : 13.0 %  Auto Eosinophil % : 0.0 %  Auto Basophil % : 0.0 %      04-05    143  |  118<H>  |  95<H>  ----------------------------<  116<H>  4.7   |  15<L>  |  4.21<H>    Ca    8.7      05 Apr 2020 10:39    TPro  9.0<H>  /  Alb  3.4<L>  /  TBili  0.7  /  DBili  x   /  AST  116<H>  /  ALT  35  /  AlkPhos  98  04-05    CAPILLARY BLOOD GLUCOSE    LIVER FUNCTIONS - ( 05 Apr 2020 10:39 )  Alb: 3.4 g/dL / Pro: 9.0 g/dL / ALK PHOS: 98 U/L / ALT: 35 U/L DA / AST: 116 U/L / GGT: x           Creatinine Trend: 4.21<--, 4.32<--, 5.09<--, 5.84<--  I&O's Summary    04 Apr 2020 07:01  -  05 Apr 2020 07:00  --------------------------------------------------------  IN: 0 mL / OUT: 850 mL / NET: -850 mL    05 Apr 2020 07:01  -  05 Apr 2020 18:48  --------------------------------------------------------  IN: 592 mL / OUT: 200 mL / NET: 392 mL        ABG - ( 04 Apr 2020 16:06 )  pH, Arterial: 7.27  pH, Blood: x     /  pCO2: 26    /  pO2: 112   / HCO3: 12    / Base Excess: -13.8 /  SaO2: 98          .Blood  02-05 @ 10:19   No growth at 5 days.  --  --      .Blood  01-25 @ 22:29   No growth at 5 days.  --  --      .Blood  01-25 @ 22:26   No growth at 5 days.  --  --      .Urine  01-25 @ 01:01   >100,000 CFU/ml Klebsiella pneumoniae  --  Klebsiella pneumoniae      MEDICATIONS:    MEDICATIONS  (STANDING):  ARIPiprazole 15 milliGRAM(s) Oral daily  ascorbic acid 1000 milliGRAM(s) Oral daily  atorvastatin 10 milliGRAM(s) Oral at bedtime  finasteride 5 milliGRAM(s) Oral daily  gabapentin 600 milliGRAM(s) Oral two times a day  heparin  Injectable 5000 Unit(s) SubCutaneous every 12 hours  lamoTRIgine 200 milliGRAM(s) Oral daily  pantoprazole    Tablet 40 milliGRAM(s) Oral before breakfast  sodium bicarbonate 1300 milliGRAM(s) Oral three times a day  sodium bicarbonate 1300 milliGRAM(s) Oral daily  tamsulosin 0.4 milliGRAM(s) Oral at bedtime  thiamine 200 milliGRAM(s) Oral daily  traZODone 25 milliGRAM(s) Oral at bedtime      MEDICATIONS  (PRN):  acetaminophen   Tablet .. 650 milliGRAM(s) Oral every 6 hours PRN Temp greater or equal to 38C (100.4F)  HYDROmorphone   Tablet 4 milliGRAM(s) Oral every 8 hours PRN Severe Pain (7 - 10)      REVIEW OF SYSTEMS:                           ALL ROS DONE [ X   ]    CONSTITUTIONAL:  LETHARGIC [   ], FEVER [   ], UNRESPONSIVE [   ]  CVS:  CP  [   ], SOB, [   ], PALPITATIONS [   ], DIZZYNESS [   ]  RS: COUGH [   ], SPUTUM [   ]  GI: ABDOMINAL PAIN [   ], NAUSEA [   ], VOMITINGS [   ], DIARRHEA [   ], CONSTIPATION [   ]  :  DYSURIA [   ], NOCTURIA [   ], INCREASED FREQUENCY [   ], DRIBLING [   ],  SKELETAL: PAINFUL JOINTS [   ], SWOLLEN JOINTS [   ], NECK ACHE [   ], LOW BACK ACHE [   ],  SKIN : ULCERS [   ], RASH [   ], ITCHING [   ]  CNS: HEAD ACHE [   ], DOUBLE VISION [   ], BLURRED VISION [   ], AMS / CONFUSION [   ], SEIZURES [   ], WEAKNESS [   ],TINGLING / NUMBNESS [   ]    PHYSICAL EXAMINATION:  GENERAL APPEARANCE: NO DISTRESS  HEENT:  NO PALLOR, NO  JVD,  NO   NODES, NECK SUPPLE  CVS: S1 +, S2 +,   RS: AEEB,  OCCASIONAL  RALES +,   NO RONCHI  ABD: SOFT, NT, NO, BS +  EXT: NO PE  SKIN: WARM,   SKELETAL:  ROM ACCEPTABLE  CNS:  AAO X  2 ,   NO DEFICITS    RADIOLOGY :      ASSESSMENT :     Altered mental status  BPH with urinary obstruction  Prostate CA  CKD (chronic kidney disease)  PAD (peripheral artery disease)  HLD (hyperlipidemia)  HTN (hypertension)  IBD (inflammatory bowel disease)  Bipolar disorder  Anemia  COPD, mild  No pertinent past medical history  History of creation of ostomy  No significant past surgical history      PLAN:  HPI:  69 year old Male from [facility], with PMHx of prostate Ca (s/p radiation in 2015, now in remission), asthma, COPD (not on O2), HTN, HLD, Bipolar, anemia, GERD, Ileostomy s/p sigmoid resection, PAD, and CKD is [facility] the ED from [place] for AMS and weakness. Patient is AAOx2 on my exam and denies any complaints, however not a reliable historian. No acute distress.    [facility] [place]. (04 Apr 2020 00:54)    PATIENT IS A 68YO MALE FROM [facility] WITH PMHX OF PROSTATE CA [S/P RADIATION 2015, IN REMISSION], ASTHMA, COPD, HTN, HLD, BIPOLAR DX, ANEMIA, GERD, ILEOSTOMY S/P SIGMOID RESECTION, PAD AND CKD3 TRANSFERRED FOR EVALUATION OF AMS/WEAKNESS.    # ACUTE METABOLIC ENCEPHALOPATHY  # PAWAN ON CKD 3 - SUSPECT PRE-RENAL AZOTEMIA - NEPHROLOGY CONSULTED, MILD IMPROVEMENT WITH PO FLUID INTAKE  # COVD19 PNEUMONIA AND COPD  # METABOLIC ACIDOSIS S/T PAWAN - ON SODIUM BICARB  # HYPERKALEMIA S/T PAWAN - ON LOKELMA   # GI AND DVT PPX    MACARENA IVY M.D. COVERING FOR JEFRY IVY M.D.

## 2020-04-05 NOTE — PROGRESS NOTE ADULT - ASSESSMENT
1. PAWAN due to pre-renal azotemia due to volume depletion vs r/o ATN.   -scr improved with fluids. Scr is slowly without fluids; encourage oral intake.  unable to give fluids because no IV access.   -urinalysis, urine lytes noted and Fena is 0.51%  -unable to perform renal sono due to covid.  -Adjust meds to eGFR and avoid IV Gadolinium contrast,NSAIDs, and phosphate enema.  -Monitor I/O's daily.   -Monitor SMA daily.  2. CKD stage 3 due h/o renal recovery from ATN.  -baseline scr around 2.0mg/dL with non-nephrotic range proteinuria (uPCR 900mg)  -Keep patient euvolemic and renal diet  -Avoid Nephrotoxic Meds/ Agents such as (NSAIDs, IV contrast, Aminoglycosides such as gentamicin, -Gadolinium contrast, Phosphate containing enemas, etc..)  -Adjust Medications according to eGFR  3. Hyperkalemia due to pawan on ckd  -stable. give loklemia if K >5.5. monitor K.  4. h/o Hypotension: bp is acceptable and midodrine as needed  5. MBD: f/u phos and PTH in am.  6. Encephalopathy due to unclear etiology.  COVID +ve pna. plan as per primary team.  7. Acidosis due to renal failure:  -unable to give sodium bicarbonate drip due to no iv access; improving and continue due to sodium bicarbonate 1300mg bid   abg noted.  8. Anemia:  -hb is stable.  -F/u CBC daily  -transfuse if HB < 7.0.

## 2020-04-05 NOTE — PROGRESS NOTE ADULT - SUBJECTIVE AND OBJECTIVE BOX
NEPHROLOGY MEDICAL CARE, [facility] - Dr. Emerson Pond/ Dr. Nallely Curran/ Dr. Kirby Angel/ Dr. Pippa Reis    Patient was seen and examined at bedside.    CC: patient is NAD    Vital Signs Last 24 Hrs  T(C): 37.8 (2020 15:24), Max: 39.6 (2020 21:20)  T(F): 100.1 (2020 15:24), Max: 103.2 (2020 21:20)  HR: 92 (2020 15:24) (89 - 102)  BP: 122/64 (2020 15:24) (116/60 - 146/70)  BP(mean): --  RR: 19 (2020 15:24) (19 - 22)  SpO2: 99% (2020 15:24) (95% - 100%)     @ 07:  -  05 @ 07:00  --------------------------------------------------------  IN: 0 mL / OUT: 850 mL / NET: -850 mL     @ 07:  -   @ 15:33  --------------------------------------------------------  IN: 356 mL / OUT: 200 mL / NET: 156 mL        PHYSICAL EXAM:  GENERAL: No acute respiratory distress.  EYES: conjunctiva and sclera clear  ENMT: Atraumatic, Normocephalic, supple, No JVD present. Moist mucous membranes  RESPIRATORY: b/l poor air entry; No rales, rhonchi, wheezing  CARDIOVASCULAR: S1S2+; no m/r/g  GASTROINTESTINAL: Soft, Non-tender, Nondistended; Bowel sounds present, no hepatosplenomegaly.   CENTRAL NERVOUS SYSTEM:  Awake, Alert & Oriented X1-2;, No focal deficits present.   EXTREMITIES:  2+ Peripheral Pulses and  trace edema, No Cyanosis  SKIN: No rashes      MEDICATIONS:  acetaminophen   Tablet .. 650 milliGRAM(s) Oral every 6 hours PRN  ARIPiprazole 15 milliGRAM(s) Oral daily  ascorbic acid 1000 milliGRAM(s) Oral daily  atorvastatin 10 milliGRAM(s) Oral at bedtime  finasteride 5 milliGRAM(s) Oral daily  gabapentin 600 milliGRAM(s) Oral two times a day  heparin  Injectable 5000 Unit(s) SubCutaneous every 12 hours  HYDROmorphone   Tablet 4 milliGRAM(s) Oral every 8 hours PRN  lamoTRIgine 200 milliGRAM(s) Oral daily  pantoprazole    Tablet 40 milliGRAM(s) Oral before breakfast  sodium bicarbonate 1300 milliGRAM(s) Oral three times a day  sodium bicarbonate 1300 milliGRAM(s) Oral daily  tamsulosin 0.4 milliGRAM(s) Oral at bedtime  thiamine 200 milliGRAM(s) Oral daily  traZODone 25 milliGRAM(s) Oral at bedtime      LABS:                        11.7   7.30  )-----------( 177      ( 2020 10:39 )             37.4     -    143  |  118<H>  |  95<H>  ----------------------------<  116<H>  4.7   |  15<L>  |  4.21<H>    Ca    8.7      2020 10:39    TPro  9.0<H>  /  Alb  3.4<L>  /  TBili  0.7  /  DBili  x   /  AST  116<H>  /  ALT  35  /  AlkPhos  98  -      Urinalysis Basic - ( 2020 07:41 )    Color: Yellow / Appearance: Clear / S.015 / pH: x  Gluc: x / Ketone: Negative  / Bili: Negative / Urobili: Negative   Blood: x / Protein: 100 / Nitrite: Negative   Leuk Esterase: Negative / RBC: 5-10 /HPF / WBC 0-2 /HPF   Sq Epi: x / Non Sq Epi: Few /HPF / Bacteria: Few /HPF        Urine studies  Urea Nitrogen,  Random Urine: 846 mg/dL ( @ 11:38)  Potassium, Random Urine: 26 mmol/L ( @ 07:41)  Osmolality, Random Urine: 431 mos/kg ( @ 07:41)  Sodium, Random Urine: 17 mmol/L ( @ 07:41)  Creatinine, Random Urine: 100 mg/dL ( @ 07:41)  Sodium, Random Urine: 26 mmol/L ( @ 09:31)  Creatinine, Random Urine: 87 mg/dL ( @ 09:31)    PTH and Vit D:

## 2020-04-06 DIAGNOSIS — B34.2 CORONAVIRUS INFECTION, UNSPECIFIED: ICD-10-CM

## 2020-04-06 DIAGNOSIS — J12.9 VIRAL PNEUMONIA, UNSPECIFIED: ICD-10-CM

## 2020-04-06 DIAGNOSIS — N17.9 ACUTE KIDNEY FAILURE, UNSPECIFIED: ICD-10-CM

## 2020-04-06 DIAGNOSIS — Z29.9 ENCOUNTER FOR PROPHYLACTIC MEASURES, UNSPECIFIED: ICD-10-CM

## 2020-04-06 DIAGNOSIS — G93.41 METABOLIC ENCEPHALOPATHY: ICD-10-CM

## 2020-04-06 DIAGNOSIS — Z02.9 ENCOUNTER FOR ADMINISTRATIVE EXAMINATIONS, UNSPECIFIED: ICD-10-CM

## 2020-04-06 LAB
ALBUMIN SERPL ELPH-MCNC: 3.2 G/DL — LOW (ref 3.5–5)
ALP SERPL-CCNC: 94 U/L — SIGNIFICANT CHANGE UP (ref 40–120)
ALT FLD-CCNC: 32 U/L DA — SIGNIFICANT CHANGE UP (ref 10–60)
ANION GAP SERPL CALC-SCNC: 10 MMOL/L — SIGNIFICANT CHANGE UP (ref 5–17)
AST SERPL-CCNC: 114 U/L — HIGH (ref 10–40)
BILIRUB SERPL-MCNC: 0.7 MG/DL — SIGNIFICANT CHANGE UP (ref 0.2–1.2)
BUN SERPL-MCNC: 84 MG/DL — HIGH (ref 7–18)
CALCIUM SERPL-MCNC: 8.6 MG/DL — SIGNIFICANT CHANGE UP (ref 8.4–10.5)
CHLORIDE SERPL-SCNC: 116 MMOL/L — HIGH (ref 96–108)
CO2 SERPL-SCNC: 14 MMOL/L — LOW (ref 22–31)
CREAT SERPL-MCNC: 3.86 MG/DL — HIGH (ref 0.5–1.3)
GLUCOSE SERPL-MCNC: 112 MG/DL — HIGH (ref 70–99)
POTASSIUM SERPL-MCNC: 4.7 MMOL/L — SIGNIFICANT CHANGE UP (ref 3.5–5.3)
POTASSIUM SERPL-SCNC: 4.7 MMOL/L — SIGNIFICANT CHANGE UP (ref 3.5–5.3)
PROT SERPL-MCNC: 9.4 G/DL — HIGH (ref 6–8.3)
SODIUM SERPL-SCNC: 140 MMOL/L — SIGNIFICANT CHANGE UP (ref 135–145)

## 2020-04-06 PROCEDURE — 71045 X-RAY EXAM CHEST 1 VIEW: CPT | Mod: 26

## 2020-04-06 RX ORDER — SODIUM BICARBONATE 1 MEQ/ML
1300 SYRINGE (ML) INTRAVENOUS THREE TIMES A DAY
Refills: 0 | Status: DISCONTINUED | OUTPATIENT
Start: 2020-04-06 | End: 2020-04-10

## 2020-04-06 RX ORDER — SODIUM BICARBONATE 1 MEQ/ML
1300 SYRINGE (ML) INTRAVENOUS THREE TIMES A DAY
Refills: 0 | Status: DISCONTINUED | OUTPATIENT
Start: 2020-04-06 | End: 2020-04-06

## 2020-04-06 RX ORDER — AZITHROMYCIN 500 MG/1
250 TABLET, FILM COATED ORAL DAILY
Refills: 0 | Status: DISCONTINUED | OUTPATIENT
Start: 2020-04-07 | End: 2020-04-07

## 2020-04-06 RX ORDER — AZITHROMYCIN 500 MG/1
500 TABLET, FILM COATED ORAL ONCE
Refills: 0 | Status: COMPLETED | OUTPATIENT
Start: 2020-04-06 | End: 2020-04-06

## 2020-04-06 RX ORDER — ACETAMINOPHEN 500 MG
650 TABLET ORAL EVERY 6 HOURS
Refills: 0 | Status: DISCONTINUED | OUTPATIENT
Start: 2020-04-06 | End: 2020-04-14

## 2020-04-06 RX ORDER — SODIUM BICARBONATE 1 MEQ/ML
650 SYRINGE (ML) INTRAVENOUS THREE TIMES A DAY
Refills: 0 | Status: DISCONTINUED | OUTPATIENT
Start: 2020-04-06 | End: 2020-04-06

## 2020-04-06 RX ADMIN — Medication 25 MILLIGRAM(S): at 22:06

## 2020-04-06 RX ADMIN — Medication 1300 MILLIGRAM(S): at 22:07

## 2020-04-06 RX ADMIN — GABAPENTIN 600 MILLIGRAM(S): 400 CAPSULE ORAL at 17:31

## 2020-04-06 RX ADMIN — ATORVASTATIN CALCIUM 10 MILLIGRAM(S): 80 TABLET, FILM COATED ORAL at 22:06

## 2020-04-06 RX ADMIN — TAMSULOSIN HYDROCHLORIDE 0.4 MILLIGRAM(S): 0.4 CAPSULE ORAL at 22:06

## 2020-04-06 RX ADMIN — Medication 650 MILLIGRAM(S): at 01:55

## 2020-04-06 RX ADMIN — GABAPENTIN 600 MILLIGRAM(S): 400 CAPSULE ORAL at 04:52

## 2020-04-06 RX ADMIN — Medication 650 MILLIGRAM(S): at 14:05

## 2020-04-06 RX ADMIN — ARIPIPRAZOLE 15 MILLIGRAM(S): 15 TABLET ORAL at 12:01

## 2020-04-06 RX ADMIN — Medication 1000 MILLIGRAM(S): at 12:04

## 2020-04-06 RX ADMIN — Medication 650 MILLIGRAM(S): at 22:09

## 2020-04-06 RX ADMIN — FINASTERIDE 5 MILLIGRAM(S): 5 TABLET, FILM COATED ORAL at 12:02

## 2020-04-06 RX ADMIN — PANTOPRAZOLE SODIUM 40 MILLIGRAM(S): 20 TABLET, DELAYED RELEASE ORAL at 04:54

## 2020-04-06 RX ADMIN — Medication 650 MILLIGRAM(S): at 17:31

## 2020-04-06 RX ADMIN — LAMOTRIGINE 200 MILLIGRAM(S): 25 TABLET, ORALLY DISINTEGRATING ORAL at 12:02

## 2020-04-06 RX ADMIN — HEPARIN SODIUM 5000 UNIT(S): 5000 INJECTION INTRAVENOUS; SUBCUTANEOUS at 04:53

## 2020-04-06 RX ADMIN — Medication 1300 MILLIGRAM(S): at 12:02

## 2020-04-06 RX ADMIN — AZITHROMYCIN 500 MILLIGRAM(S): 500 TABLET, FILM COATED ORAL at 18:47

## 2020-04-06 RX ADMIN — Medication 200 MILLIGRAM(S): at 12:03

## 2020-04-06 RX ADMIN — HEPARIN SODIUM 5000 UNIT(S): 5000 INJECTION INTRAVENOUS; SUBCUTANEOUS at 17:32

## 2020-04-06 RX ADMIN — Medication 1300 MILLIGRAM(S): at 04:53

## 2020-04-06 NOTE — PROGRESS NOTE ADULT - PROBLEM SELECTOR PLAN 3
+ COVID 19 PCR  O2 sat 95% on room air  + fever  c/w tylenol  discussed w/ dr. Singh for possible midline access- not ideal for risk for infection  continue po fluid  if fever persists with hypoxia, consider Plaquenil

## 2020-04-06 NOTE — DISCHARGE NOTE PROVIDER - NSDCCPCAREPLAN_GEN_ALL_CORE_FT
Please advise pt was placed on clobetasol ointment for thickened areas on  Arms given on 11-16-18 .  Dx prurigo nodularis     Exam on 11-16-18 noted lichenified excoriated papules on the right arm       Insurance is asking if betamethasone, desoximetasone , mometasone furoate , triamcinolone or halbetasol which are covered have been tried . Can we switch to one of these ?   PRINCIPAL DISCHARGE DIAGNOSIS  Diagnosis: Pneumonia due to 2019 novel coronavirus  Assessment and Plan of Treatment: You have been found to have viral pneumonia secondary to Covid-19. Please maintain quarantine for 14 more days. Seek immediate medical attention if you develop shortness of breath. See additional instructions below.        SECONDARY DISCHARGE DIAGNOSES  Diagnosis: Acute renal failure superimposed on chronic kidney disease, unspecified CKD stage, unspecified acute renal failure type  Assessment and Plan of Treatment: You were found to have abnormal kidney function. We have been monitoring your kidney function daily.  You were also evaluated by a nephrologist.    Repeat BMP in 3 days at rehab center  Follow up with Nephrology Dr. Angel

## 2020-04-06 NOTE — DISCHARGE NOTE PROVIDER - NSDCFUSCHEDAPPT_GEN_ALL_CORE_FT
[facility] ; 06/15/2020 ; [facility] [address] [facility] ; 06/15/2020 ; [facility] [address] [facility] ; 06/15/2020 ; [facility] [address] [facility] ; 06/15/2020 ; [facility] [address]

## 2020-04-06 NOTE — DISCHARGE NOTE PROVIDER - CARE PROVIDER_API CALL
Gabino Rush)  [place] [address]  Phone: [phone]  Fax: [phone]  Follow Up Time: Gabino Rush)  Medicine  [id number] [address] [place] [address]  Phone: [phone]  Fax: [phone]  Follow Up Time:     Kirby Angel)  Nephrology  [address] [place] [address]  Phone: [phone]  Fax: [phone]  Follow Up Time:

## 2020-04-06 NOTE — PROGRESS NOTE ADULT - PROBLEM SELECTOR PLAN 2
Due to + COVID infection  Tmax 102F   repeated CXR shows increased bibasilar infiltrate  pO2 sat 95% room air  c/w supportive care  f/u CBC  monitor fever  c/w Tylenol q 6h  give free water x 4 daily  started Zithromax x 5 days per PMD. monitor ECG

## 2020-04-06 NOTE — DISCHARGE NOTE PROVIDER - PROVIDER TOKENS
PROVIDER:[TOKEN:[[id number]]] PROVIDER:[TOKEN:[[id number]]],PROVIDER:[TOKEN:[[id number]]]

## 2020-04-06 NOTE — PROGRESS NOTE ADULT - SUBJECTIVE AND OBJECTIVE BOX
HPI- 69 year old Male from [facility], with PMHx of prostate Ca (s/p radiation in 2015, now in remission), asthma, COPD (not on O2), HTN, HLD, Bipolar, anemia, GERD, Ileostomy s/p sigmoid resection, PAD, and CKD [place] [facility] and weakness.   Pt is admitted for acute metabolic encephalopathy due to PNA and COVID infection. pO2 sat remains > 94% on room air. Pt is found to be febrile and PAWAN.  Followed by nephrology. unable to get IV access at this time. Cr slowly trending down. discussed with dr. Singh for possible midline access. planned for free water now and reassess next day.       GOC: Full code.      INTERVAL HPI/OVERNIGHT EVENTS: + fever    MEDICATIONS  (STANDING):  acetaminophen   Tablet .. 650 milliGRAM(s) Oral every 6 hours  ARIPiprazole 15 milliGRAM(s) Oral daily  atorvastatin 10 milliGRAM(s) Oral at bedtime  finasteride 5 milliGRAM(s) Oral daily  gabapentin 600 milliGRAM(s) Oral two times a day  heparin  Injectable 5000 Unit(s) SubCutaneous every 12 hours  lamoTRIgine 200 milliGRAM(s) Oral daily  pantoprazole    Tablet 40 milliGRAM(s) Oral before breakfast  sodium bicarbonate 1300 milliGRAM(s) Oral three times a day  tamsulosin 0.4 milliGRAM(s) Oral at bedtime  traZODone 25 milliGRAM(s) Oral at bedtime    MEDICATIONS  (PRN):  HYDROmorphone   Tablet 4 milliGRAM(s) Oral every 8 hours PRN Severe Pain (7 - 10)      __________________________________________________  REVIEW OF SYSTEMS:    CONSTITUTIONAL: No fever,   EYES: no acute visual disturbances  NECK: No pain or stiffness  RESPIRATORY: No cough; No shortness of breath  CARDIOVASCULAR: No chest pain, no palpitations  GASTROINTESTINAL: No pain. No nausea or vomiting; No diarrhea   NEUROLOGICAL: No headache or numbness, no tremors  MUSCULOSKELETAL: No joint pain, no muscle pain  GENITOURINARY: no dysuria, no frequency, no hesitancy  PSYCHIATRY: no depression , no anxiety  ALL OTHER  ROS negative        Vital Signs Last 24 Hrs  T(C): 38.4 (2020 16:05), Max: 39.3 (2020 14:03)  T(F): 101.2 (2020 16:05), Max: 102.8 (2020 14:03)  HR: 101 (2020 16:05) (81 - 101)  BP: 107/69 (2020 16:05) (107/69 - 150/67)  BP(mean): --  RR: 20 (2020 16:05) (18 - 20)  SpO2: 95% (2020 16:05) (95% - 100%)    ________________________________________________  PHYSICAL EXAM:  GENERAL: NAD  HEENT: Normocephalic;  conjunctivae and sclerae clear; moist mucous membranes;   NECK : supple  CHEST/LUNG: Clear to auscultation bilaterally with good air entry   HEART: S1 S2  regular; no murmurs, gallops or rubs  ABDOMEN: Soft, Nontender, Nondistended; Bowel sounds present  EXTREMITIES: no cyanosis; no edema; no calf tenderness  SKIN: warm and dry; no rash  NERVOUS SYSTEM:  Awake and alert; Oriented  to place, person and time ; no new deficits    _________________________________________________  LABS:                        11.7   7.30  )-----------( 177      ( 2020 10:39 )             37.4     04-06    140  |  116<H>  |  84<H>  ----------------------------<  112<H>  4.7   |  14<L>  |  3.86<H>    Ca    8.6      2020 15:55    TPro  9.4<H>  /  Alb  3.2<L>  /  TBili  0.7  /  DBili  x   /  AST  114<H>  /  ALT  32  /  AlkPhos  94  04-06      Urinalysis Basic - ( 2020 07:41 )    Color: Yellow / Appearance: Clear / S.015 / pH: x  Gluc: x / Ketone: Negative  / Bili: Negative / Urobili: Negative   Blood: x / Protein: 100 / Nitrite: Negative   Leuk Esterase: Negative / RBC: 5-10 /HPF / WBC 0-2 /HPF   Sq Epi: x / Non Sq Epi: Few /HPF / Bacteria: Few /HPF      CAPILLARY BLOOD GLUCOSE            RADIOLOGY & ADDITIONAL TESTS:    Imaging  Reviewed:  YES    Consultant(s) Notes Reviewed:   YES      Plan of care was discussed with patient and /or primary care giver; all questions and concerns were addressed HPI- 69 year old Male from [facility], with PMHx of prostate Ca (s/p radiation in 2015, now in remission), asthma, COPD (not on O2), HTN, HLD, Bipolar, anemia, GERD, Ileostomy s/p sigmoid resection, PAD, and CKD is [facility] ED from [facility] for AMS and weakness.   Pt is admitted for acute metabolic encephalopathy due to PNA and COVID infection. pO2 sat remains > 94% on room air. Pt is found to be febrile and PAWAN.  Followed by nephrology. unable to get IV access at this time. Cr slowly trending down. discussed with dr. Singh for possible midline access. planned for free water now and reassess next day.       GOC: Full code.      INTERVAL HPI/OVERNIGHT EVENTS: + fever    MEDICATIONS  (STANDING):  acetaminophen   Tablet .. 650 milliGRAM(s) Oral every 6 hours  ARIPiprazole 15 milliGRAM(s) Oral daily  atorvastatin 10 milliGRAM(s) Oral at bedtime  finasteride 5 milliGRAM(s) Oral daily  gabapentin 600 milliGRAM(s) Oral two times a day  heparin  Injectable 5000 Unit(s) SubCutaneous every 12 hours  lamoTRIgine 200 milliGRAM(s) Oral daily  pantoprazole    Tablet 40 milliGRAM(s) Oral before breakfast  sodium bicarbonate 1300 milliGRAM(s) Oral three times a day  tamsulosin 0.4 milliGRAM(s) Oral at bedtime  traZODone 25 milliGRAM(s) Oral at bedtime    MEDICATIONS  (PRN):  HYDROmorphone   Tablet 4 milliGRAM(s) Oral every 8 hours PRN Severe Pain (7 - 10)      __________________________________________________  REVIEW OF SYSTEMS:  Limited ROS due to current illness  CONSTITUTIONAL: + fever,   RESPIRATORY: No cough; No shortness of breath  CARDIOVASCULAR: No chest pain, no palpitations  GASTROINTESTINAL: No pain. No nausea or vomiting; No diarrhea   NEUROLOGICAL: No headache or numbness, no tremors  MUSCULOSKELETAL: No joint pain, no muscle pain  GENITOURINARY: no dysuria, no frequency, no hesitancy         Vital Signs Last 24 Hrs  T(C): 38.4 (2020 16:05), Max: 39.3 (2020 14:03)  T(F): 101.2 (2020 16:05), Max: 102.8 (2020 14:03)  HR: 101 (2020 16:05) (81 - 101)  BP: 107/69 (2020 16:05) (107/69 - 150/67)  BP(mean): --  RR: 20 (2020 16:05) (18 - 20)  SpO2: 95% (2020 16:05) (95% - 100%)    ________________________________________________  PHYSICAL EXAM:  GENERAL: NAD. conversant . ill apperaing  HEENT: Normocephalic;  conjunctivae and sclerae clear; moist mucous membranes;   NECK : supple  CHEST/LUNG: Clear to auscultation bilaterally with fair air entry. Eupneic  HEART: S1 S2  regular; no murmurs, gallops or rubs  ABDOMEN: Soft, Nontender, Nondistended; Bowel sounds present  EXTREMITIES: no cyanosis; no edema; no calf tenderness  SKIN: warm and dry; no rash  NERVOUS SYSTEM:  Awake and alert; Oriented  to place, person      _________________________________________________  LABS:                        11.7   7.30  )-----------( 177      ( 2020 10:39 )             37.4     04-06    140  |  116<H>  |  84<H>  ----------------------------<  112<H>  4.7   |  14<L>  |  3.86<H>    Ca    8.6      2020 15:55    TPro  9.4<H>  /  Alb  3.2<L>  /  TBili  0.7  /  DBili  x   /  AST  114<H>  /  ALT  32  /  AlkPhos  94  04-06      Urinalysis Basic - ( 2020 07:41 )    Color: Yellow / Appearance: Clear / S.015 / pH: x  Gluc: x / Ketone: Negative  / Bili: Negative / Urobili: Negative   Blood: x / Protein: 100 / Nitrite: Negative   Leuk Esterase: Negative / RBC: 5-10 /HPF / WBC 0-2 /HPF   Sq Epi: x / Non Sq Epi: Few /HPF / Bacteria: Few /HPF      CAPILLARY BLOOD GLUCOSE            RADIOLOGY & ADDITIONAL TESTS:    Imaging  Reviewed:  YES  < from: Xray Chest 1 View-PORTABLE IMMEDIATE (20 @ 16:35) >    EXAM:  XR CHEST PORTABLE IMMED 1V                            PROCEDURE DATE:  2020          INTERPRETATION:  CLINICAL INDICATION: 69 years  Male with fever. PNA.    COMPARISON: 4/3/2020    Patient is rotated to the left.    AP view of the chest demonstrates increasing bibasilar interstitial infiltrates. There is no pleural effusion. The left costophrenic angle is partially omitted. There is no pneumothorax.    The heart is normal in size. There is no mediastinal or hilar mass.     The pulmonary vasculature is normal.     Mild thoracic degenerative changes are present.    IMPRESSION:    Increasing bibasilar interstitial infiltrates.                FLOYD VELAZCO M.D., ATTENDING RADIOLOGIST  This document has been electronically signed. 2020  4:32PM                < end of copied text >    Consultant(s) Notes Reviewed:   YES  Nephrology    Plan of care was discussed with patient and   all questions and concerns were addressed   called family Gustafson [phone] not available this time

## 2020-04-06 NOTE — PROGRESS NOTE ADULT - ASSESSMENT
1. PAWAN due to pre-renal azotemia due to volume depletion vs r/o ATN.   -Scr is improving , encourage oral intake.  unable to give fluids because no IV access.   -suggest central line placement   -urinalysis, urine lytes noted and Fena is 0.51%  -unable to perform renal sono due to covid.  -Adjust meds to eGFR and avoid IV Gadolinium contrast,NSAIDs, and phosphate enema.  -Monitor I/O's daily.   -Monitor SMA daily.  2. CKD stage 3 due h/o renal recovery from ATN.  -baseline scr around 2.0mg/dL with non-nephrotic range proteinuria (uPCR 900mg)  -Keep patient euvolemic and renal diet  -Avoid Nephrotoxic Meds/ Agents such as (NSAIDs, IV contrast, Aminoglycosides such as gentamicin, -Gadolinium contrast, Phosphate containing enemas, etc..)  -Adjust Medications according to eGFR  3. Hyperkalemia:improved  - monitor K daily  -add low k diet  4.Hx of hypotension:keep sbp>110   -may add Midodrine prn  5. MBD: f/u phos and PTH in am.  6. Encephalopathy due to unclear etiology.  COVID +ve pna. plan as per primary team.  7. Acidosis due to renal failure:  -unable to give sodium bicarbonate drip due to no iv access; improving and continue due to sodium bicarbonate 1300mg bid   abg noted.  8. Anemia:mild  -hb is stable.  -F/u CBC daily  -transfuse if HB < 7.0.

## 2020-04-06 NOTE — DISCHARGE NOTE PROVIDER - NSDCMRMEDTOKEN_GEN_ALL_CORE_FT
Abilify 10 mg oral tablet: 1.5 tab(s) orally once a day    atorvastatin 10 mg oral tablet: 1 tab(s) orally once a day  Dilaudid 2 mg oral tablet: 2 tab(s) orally every 8 hours, As Needed - Severe Pain (7 - 10) - 10) MDD:6 mg  ferrous sulfate 325 mg (65 mg elemental iron) oral tablet: 1 tab(s) orally once a day  Flomax 0.4 mg oral capsule: 1 cap(s) orally once a day  gabapentin 600 mg oral tablet: 1 tab(s) orally 2 times a day  halobetasol 0.05% topical cream: Apply topically to affected area once a day  lamoTRIgine 200 mg oral tablet: 1 tab(s) orally once a day  midodrine 2.5 mg oral tablet: 1 tab(s) orally 3 times a day  Proscar 5 mg oral tablet: 1 tab(s) orally once a day  Protonix 40 mg oral delayed release tablet: 1 tab(s) orally once a day  sodium bicarbonate 650 mg oral tablet: 1 tab(s) orally 3 times a day (with meals)   traZODone 50 mg oral tablet: 0.5 tab(s) orally once a day (at bedtime)  Ventolin 90 mcg/inh inhalation aerosol: Abilify 10 mg oral tablet: 1.5 tab(s) orally once a day    atorvastatin 10 mg oral tablet: 1 tab(s) orally once a day  Dilaudid 2 mg oral tablet: 2 tab(s) orally every 8 hours, As Needed - Severe Pain (7 - 10) - 10) MDD:6 mg  epoetin anoop: 6000 unit(s) subcutaneous 3 times a week, M-W-F  ferrous sulfate 325 mg (65 mg elemental iron) oral tablet: 1 tab(s) orally once a day  Flomax 0.4 mg oral capsule: 1 cap(s) orally once a day  gabapentin 600 mg oral tablet: 1 tab(s) orally 2 times a day  halobetasol 0.05% topical cream: Apply topically to affected area once a day  lamoTRIgine 200 mg oral tablet: 1 tab(s) orally once a day  midodrine 2.5 mg oral tablet: 1 tab(s) orally 3 times a day  Proscar 5 mg oral tablet: 1 tab(s) orally once a day  Protonix 40 mg oral delayed release tablet: 1 tab(s) orally once a day  sodium bicarbonate 650 mg oral tablet: 1 tab(s) orally 3 times a day (with meals)   traZODone 50 mg oral tablet: 0.5 tab(s) orally once a day (at bedtime)

## 2020-04-06 NOTE — DISCHARGE NOTE PROVIDER - NSDCCAREPROVSEEN_GEN_ALL_CORE_FT
Gabino Rush # HYPOXIC RESPIRATORY FAILURE S/T MULTIFOCAL COVID19 PNEUMONIA, SEPSIS SUSPECT SECONDARY BACTERIAL PNEUMONIA - WILL CONTINUE PO ANTIBIOTICS, F/U BLOOD CULTURES [NGTD]  # COPD  # PAWAN ON CKD 3-4 - PRE-RENAL AZOTEMIA VS. MORE LIKELY ATN S/T COVID19 - NEPHROLOGY CONSULTED, IMPROVED WITH IVF. MONITOR WITH F/U BMP 1 WEEK POST-DISCHARGE. WILL NEED OUTPATIENT NEPHROLOGY F/U.  # CHRONIC BACK PAIN - CONTINUE DILAUDID + ADD LIDODERM PATCHES  # NORMOCYTIC ANEMIA SUSPECT SECONDARY TO RENAL DX   # ACUTE METABOLIC ENCEPHALOPATHY - RESOLVED  # METABOLIC ACIDOSIS S/T PAWAN - IMPROVED ON SODIUM BICARB DRIP   # TRANSAMINITIS - RESOLVED  # HYPERKALEMIA S/T PAWAN - RESOLVED, D/C LOKELMA  # D/C PLANNING TO [facility] REHAB AS [facility] DOES NOT HAVE AN ISOLATION FLOOR    MACARENA IVY M.D. COVERING FOR JEFRY IVY M.D. # HYPOXIC RESPIRATORY FAILURE S/T MULTIFOCAL COVID19 PNEUMONIA, SEPSIS SUSPECT SECONDARY BACTERIAL PNEUMONIA - WILL CONTINUE PO ANTIBIOTICS, F/U BLOOD CULTURES [NGTD] - NO SEPSIS PRESENT ON ADMISSION  # COPD  # PAWAN ON CKD 3-4 - PRE-RENAL AZOTEMIA VS. MORE LIKELY ATN S/T COVID19 - NEPHROLOGY CONSULTED, IMPROVED WITH IVF. MONITOR WITH F/U BMP 1 WEEK POST-DISCHARGE. WILL NEED OUTPATIENT NEPHROLOGY F/U.  # CHRONIC BACK PAIN - CONTINUE DILAUDID + ADD LIDODERM PATCHES  # NORMOCYTIC ANEMIA SUSPECT SECONDARY TO RENAL DX   # ACUTE METABOLIC ENCEPHALOPATHY - RESOLVED  # METABOLIC ACIDOSIS S/T PAWAN - IMPROVED ON SODIUM BICARB DRIP   # TRANSAMINITIS - RESOLVED  # HYPERKALEMIA S/T PAWAN - RESOLVED, D/C LOKELMA  # D/C PLANNING TO [facility] REHAB AS [facility] DOES NOT HAVE AN ISOLATION FLOOR    MACARENA IVY M.D. COVERING FOR JEFRY IVY M.D.

## 2020-04-06 NOTE — PROGRESS NOTE ADULT - SUBJECTIVE AND OBJECTIVE BOX
Note is incomplete  pt seen and examined.pts current chart reviewed and case discussed with resident covering.    SUBJECTIVE:  pt feels fine and denies cp,sob,gi or gu/uremic  symptons    REVIEW OF SYSTEMS:  CONSTITUTIONAL: No weakness, fevers or chills  EYES/ENT: No visual changes;  No vertigo or throat pain   NECK: No pain or stiffness  RESPIRATORY: No cough, wheezing, hemoptysis; No shortness of breath  CARDIOVASCULAR: No chest pain or palpitations  GASTROINTESTINAL: No abdominal or epigastric pain. No nausea, vomiting, or hematemesis; No diarrhea or constipation. No melena or hematochezia.  GENITOURINARY: No dysuria, frequency , hematuria, flank pain or nocturia  NEUROLOGICAL: No numbness or weakness  SKIN: No itching, burning, rashes, or lesions   All other review of systems is negative unless indicated above    Current meds:    acetaminophen   Tablet .. 650 milliGRAM(s) Oral every 6 hours PRN  ARIPiprazole 15 milliGRAM(s) Oral daily  ascorbic acid 1000 milliGRAM(s) Oral daily  atorvastatin 10 milliGRAM(s) Oral at bedtime  finasteride 5 milliGRAM(s) Oral daily  gabapentin 600 milliGRAM(s) Oral two times a day  heparin  Injectable 5000 Unit(s) SubCutaneous every 12 hours  HYDROmorphone   Tablet 4 milliGRAM(s) Oral every 8 hours PRN  lamoTRIgine 200 milliGRAM(s) Oral daily  pantoprazole    Tablet 40 milliGRAM(s) Oral before breakfast  sodium bicarbonate 650 milliGRAM(s) Oral three times a day  tamsulosin 0.4 milliGRAM(s) Oral at bedtime  thiamine 200 milliGRAM(s) Oral daily  traZODone 25 milliGRAM(s) Oral at bedtime      Vital Signs    T(F): 102.8 (04-06-20 @ 14:03), Max: 102.8 (04-06-20 @ 14:03)  HR: 100 (04-06-20 @ 08:54) (81 - 100)  BP: 150/67 (04-06-20 @ 08:54) (122/64 - 150/67)  ABP: --  RR: 20 (04-06-20 @ 08:54) (18 - 20)  SpO2: 98% (04-06-20 @ 08:54) (98% - 100%)  Wt(kg): --  CVP(cm H2O): --  CO: --  PCWP: --    I and O's:    04-04 @ 07:01  -  04-05 @ 07:00  --------------------------------------------------------  IN:  Total IN: 0 mL    OUT:    Voided: 850 mL  Total OUT: 850 mL    Total NET: -850 mL      04-05 @ 07:01  -  04-06 @ 07:00  --------------------------------------------------------  IN:    Oral Fluid: 910 mL  Total IN: 910 mL    OUT:    Voided: 500 mL  Total OUT: 500 mL    Total NET: 410 mL      04-06 @ 07:01  -  04-06 @ 14:06  --------------------------------------------------------  IN:    Oral Fluid: 440 mL  Total IN: 440 mL    OUT:    Ileostomy: 300 mL  Total OUT: 300 mL    Total NET: 140 mL        Daily     Daily     PHYSICAL EXAM:  Constitutional: well developed, well nourished  and in nad  HEENT: PERRLA,  no icteric sclera and mild pallor of conjunctiva noted  Neck: No JVD, thyromegaly or adenopathy  Respiratory: reduced air entry lower lungs with no rales, wheezing or rhonchi  Cardiovascular: S1 and S2 normally heard  Gastrointestinal: soft, nondistended, nontender and normal bowel sounds heard  Extremities: No peripheral edema or cyanosis  Neurological: A/O x 3, no focal deficits  : No flank or cva tenderness palpated.  Skin: No rashes      LABS:    CBC:                          11.7   7.30  )-----------( 177      ( 05 Apr 2020 10:39 )             37.4           BMP:    04-05    143  |  118<H>  |  95<H>  ----------------------------<  116<H>  4.7   |  15<L>  |  4.21<H>  04-04    142  |  119<H>  |  104<H>  ----------------------------<  101<H>  4.8   |  10<LL>  |  4.32<H>  04-04    143  |  121<H>  |  107<H>  ----------------------------<  102<H>  5.6<H>   |  14<L>  |  5.09<H>  04-03    138  |  115<H>  |  117<H>  ----------------------------<  117<H>  5.5<H>   |  14<L>  |  5.84<H>    Ca    8.7      05 Apr 2020 10:39  Ca    8.5      04 Apr 2020 20:46  Ca    8.3<L>      04 Apr 2020 02:39  Ca    8.8      03 Apr 2020 14:41    TPro  9.0<H>  /  Alb  3.4<L>  /  TBili  0.7  /  DBili  x   /  AST  116<H>  /  ALT  35  /  AlkPhos  98  04-05  TPro  11.0<H>  /  Alb  4.1  /  TBili  0.7  /  DBili  x   /  AST  88<H>  /  ALT  33  /  AlkPhos  135<H>  04-03          URINE STUDIES:        Potassium, Random Urine: 26 mmol/L (04-05 @ 07:41)  Osmolality, Random Urine: 431 mos/kg (04-05 @ 07:41)  Sodium, Random Urine: 17 mmol/L (04-05 @ 07:41)  Creatinine, Random Urine: 100 mg/dL (04-05 @ 07:41)  Sodium, Random Urine: 26 mmol/L (04-04 @ 09:31)  Creatinine, Random Urine: 87 mg/dL (04-04 @ 09:31)                  RADIOLOGY & ADDITIONAL STUDIES: pt seen and examined.    SUBJECTIVE:  pt  denies cp,sob,gi or gu symptoms  pt with poor po inatke and no iv access to give iv fluid  pt is encouraged to drink po water as much as possible  suggest central line placement for iv access/iv fluid and blood test drawing as pt has a poor peripheral iv access   U/O   Current meds:    acetaminophen   Tablet .. 650 milliGRAM(s) Oral every 6 hours PRN  ARIPiprazole 15 milliGRAM(s) Oral daily  ascorbic acid 1000 milliGRAM(s) Oral daily  atorvastatin 10 milliGRAM(s) Oral at bedtime  finasteride 5 milliGRAM(s) Oral daily  gabapentin 600 milliGRAM(s) Oral two times a day  heparin  Injectable 5000 Unit(s) SubCutaneous every 12 hours  HYDROmorphone   Tablet 4 milliGRAM(s) Oral every 8 hours PRN  lamoTRIgine 200 milliGRAM(s) Oral daily  pantoprazole    Tablet 40 milliGRAM(s) Oral before breakfast  sodium bicarbonate 650 milliGRAM(s) Oral three times a day  tamsulosin 0.4 milliGRAM(s) Oral at bedtime  thiamine 200 milliGRAM(s) Oral daily  traZODone 25 milliGRAM(s) Oral at bedtime      Vital Signs    T(F): 102.8 (04-06-20 @ 14:03), Max: 102.8 (04-06-20 @ 14:03)  HR: 100 (04-06-20 @ 08:54) (81 - 100)  BP: 150/67 (04-06-20 @ 08:54) (122/64 - 150/67)  ABP: --  RR: 20 (04-06-20 @ 08:54) (18 - 20)  SpO2: 98% (04-06-20 @ 08:54) (98% - 100%)  Wt(kg): --  CVP(cm H2O): --  CO: --  PCWP: --    I and O's:    04-04 @ 07:01  -  04-05 @ 07:00  --------------------------------------------------------  IN:  Total IN: 0 mL    OUT:    Voided: 850 mL  Total OUT: 850 mL    Total NET: -850 mL      04-05 @ 07:01  -  04-06 @ 07:00  --------------------------------------------------------  IN:    Oral Fluid: 910 mL  Total IN: 910 mL    OUT:    Voided: 500 mL  Total OUT: 500 mL    Total NET: 410 mL      04-06 @ 07:01  - 04-06 @ 14:06  --------------------------------------------------------  IN:    Oral Fluid: 440 mL  Total IN: 440 mL    OUT:    Ileostomy: 300 mL  Total OUT: 300 mL    Total NET: 140 mL        Daily     Daily     PHYSICAL EXAM:  GENERAL: No acute respiratory distress.  EYES: conjunctiva and sclera clear  ENMT: Atraumatic, Normocephalic, supple, No JVD present.  RESPIRATORY: b/l poor air entry; No rales, rhonchi, wheezing  CARDIOVASCULAR: S1S2+; no m/r/g  GASTROINTESTINAL: Soft, Non-tender, Nondistended; Bowel sounds present,   CENTRAL NERVOUS SYSTEM:  Awake, Alert & Oriented X1-2;, No focal deficits present.   EXTREMITIES:  trace edema, No Cyanosis  SKIN: No rashes      LABS:    CBC:                          11.7   7.30  )-----------( 177      ( 05 Apr 2020 10:39 )             37.4           BMP:    04-05    Comprehensive Metabolic Panel (04.06.20 @ 15:55)    Sodium, Serum: 140 mmol/L    Potassium, Serum: 4.7 mmol/L    Chloride, Serum: 116 mmol/L    Carbon Dioxide, Serum: 14 mmol/L    Anion Gap, Serum: 10 mmol/L    Blood Urea Nitrogen, Serum: 84 mg/dL    Creatinine, Serum: 3.86 mg/dL    Glucose, Serum: 112 mg/dL    Calcium, Total Serum: 8.6 mg/dL    Protein Total, Serum: 9.4 g/dL    Albumin, Serum: 3.2 g/dL    Bilirubin Total, Serum: 0.7 mg/dL    Alkaline Phosphatase, Serum: 94 U/L    Aspartate Aminotransferase (AST/SGOT): 114 U/L    Alanine Aminotransferase (ALT/SGPT): 32 U/L DA    eGFR if Non : 15: Interpretative comment  elimination of creatinine. The eGFR equation is not recommended for use  in patients with unstable creatinine levels. mL/min/1.73M2    eGFR if African American: 17 mL/min/1.73M2        143  |  118<H>  |  95<H>  ----------------------------<  116<H>  4.7   |  15<L>  |  4.21<H>  04-04    142  |  119<H>  |  104<H>  ----------------------------<  101<H>  4.8   |  10<LL>  |  4.32<H>  04-04    143  |  121<H>  |  107<H>  ----------------------------<  102<H>  5.6<H>   |  14<L>  |  5.09<H>  04-03    138  |  115<H>  |  117<H>  ----------------------------<  117<H>  5.5<H>   |  14<L>  |  5.84<H>    Ca    8.7      05 Apr 2020 10:39  Ca    8.5      04 Apr 2020 20:46  Ca    8.3<L>      04 Apr 2020 02:39  Ca    8.8      03 Apr 2020 14:41    TPro  9.0<H>  /  Alb  3.4<L>  /  TBili  0.7  /  DBili  x   /  AST  116<H>  /  ALT  35  /  AlkPhos  98  04-05  TPro  11.0<H>  /  Alb  4.1  /  TBili  0.7  /  DBili  x   /  AST  88<H>  /  ALT  33  /  AlkPhos  135<H>  04-03          URINE STUDIES:  Potassium, Random Urine: 26 mmol/L (04-05 @ 07:41)  Osmolality, Random Urine: 431 mos/kg (04-05 @ 07:41)  Sodium, Random Urine: 17 mmol/L (04-05 @ 07:41)  Creatinine, Random Urine: 100 mg/dL (04-05 @ 07:41)  Sodium, Random Urine: 26 mmol/L (04-04 @ 09:31)  Creatinine, Random Urine: 87 mg/dL (04-04 @ 09:31)                  RADIOLOGY & ADDITIONAL STUDIES:    < from: Xray Chest 1 View-PORTABLE IMMEDIATE (04.06.20 @ 16:35) >  EXAM:  XR CHEST PORTABLE IMMED 1V                            PROCEDURE DATE:  04/06/2020          INTERPRETATION:  CLINICAL INDICATION: 69 years  Male with fever. PNA.    COMPARISON: 4/3/2020    Patient is rotated to the left.    AP view of the chest demonstrates increasing bibasilar interstitial infiltrates. There is no pleural effusion. The left costophrenic angle is partially omitted. There is no pneumothorax.    The heart is normal in size. There is no mediastinal or hilar mass.     The pulmonary vasculature is normal.     Mild thoracic degenerative changes are present.    IMPRESSION:    Increasing bibasilar interstitial infiltrates.    < end of copied text >

## 2020-04-06 NOTE — PROGRESS NOTE ADULT - PROBLEM SELECTOR PLAN 4
CKD 3   Cr trending down to 3.8 from 4.2  unable IVF. c/w po fluid  c/w Sodium bicarb 1300mg TID  Dr. Kumar nephrology following

## 2020-04-06 NOTE — PROGRESS NOTE ADULT - SUBJECTIVE AND OBJECTIVE BOX
Patient is a 69y old  Male who presents with a chief complaint of AMS (06 Apr 2020 14:06)    PATIENT IS SEEN AND EXAMINED IN MEDICAL FLOOR.    ALLERGIES:  No Known Allergies    VITALS:    Vital Signs Last 24 Hrs  T(C): 38.4 (06 Apr 2020 16:05), Max: 39.3 (06 Apr 2020 14:03)  T(F): 101.2 (06 Apr 2020 16:05), Max: 102.8 (06 Apr 2020 14:03)  HR: 101 (06 Apr 2020 16:05) (81 - 101)  BP: 107/69 (06 Apr 2020 16:05) (107/69 - 150/67)  BP(mean): --  RR: 20 (06 Apr 2020 16:05) (18 - 20)  SpO2: 95% (06 Apr 2020 16:05) (95% - 100%)    LABS:    CBC Full  -  ( 05 Apr 2020 10:39 )  WBC Count : 7.30 K/uL  RBC Count : 4.21 M/uL  Hemoglobin : 11.7 g/dL  Hematocrit : 37.4 %  Platelet Count - Automated : 177 K/uL  Mean Cell Volume : 88.8 fl  Mean Cell Hemoglobin : 27.8 pg  Mean Cell Hemoglobin Concentration : 31.3 gm/dL  Auto Neutrophil # : 5.11 K/uL  Auto Lymphocyte # : 1.10 K/uL  Auto Monocyte # : 0.95 K/uL  Auto Eosinophil # : 0.00 K/uL  Auto Basophil # : 0.00 K/uL  Auto Neutrophil % : 70.0 %  Auto Lymphocyte % : 15.0 %  Auto Monocyte % : 13.0 %  Auto Eosinophil % : 0.0 %  Auto Basophil % : 0.0 %      04-06    140  |  116<H>  |  84<H>  ----------------------------<  112<H>  4.7   |  14<L>  |  3.86<H>    Ca    8.6      06 Apr 2020 15:55    TPro  9.4<H>  /  Alb  3.2<L>  /  TBili  0.7  /  DBili  x   /  AST  114<H>  /  ALT  32  /  AlkPhos  94  04-06    CAPILLARY BLOOD GLUCOSE      LIVER FUNCTIONS - ( 06 Apr 2020 15:55 )  Alb: 3.2 g/dL / Pro: 9.4 g/dL / ALK PHOS: 94 U/L / ALT: 32 U/L DA / AST: 114 U/L / GGT: x           Creatinine Trend: 3.86<--, 4.21<--, 4.32<--, 5.09<--, 5.84<--  I&O's Summary    05 Apr 2020 07:01  -  06 Apr 2020 07:00  --------------------------------------------------------  IN: 910 mL / OUT: 500 mL / NET: 410 mL    06 Apr 2020 07:01  -  06 Apr 2020 17:05  --------------------------------------------------------  IN: 440 mL / OUT: 300 mL / NET: 140 mL      MEDICATIONS:    MEDICATIONS  (STANDING):  acetaminophen   Tablet .. 650 milliGRAM(s) Oral every 6 hours  ARIPiprazole 15 milliGRAM(s) Oral daily  atorvastatin 10 milliGRAM(s) Oral at bedtime  finasteride 5 milliGRAM(s) Oral daily  gabapentin 600 milliGRAM(s) Oral two times a day  heparin  Injectable 5000 Unit(s) SubCutaneous every 12 hours  lamoTRIgine 200 milliGRAM(s) Oral daily  pantoprazole    Tablet 40 milliGRAM(s) Oral before breakfast  sodium bicarbonate 1300 milliGRAM(s) Oral three times a day  tamsulosin 0.4 milliGRAM(s) Oral at bedtime  traZODone 25 milliGRAM(s) Oral at bedtime      MEDICATIONS  (PRN):  HYDROmorphone   Tablet 4 milliGRAM(s) Oral every 8 hours PRN Severe Pain (7 - 10)      REVIEW OF SYSTEMS:                           ALL ROS DONE [ X   ]    CONSTITUTIONAL:  LETHARGIC [   ], FEVER [   ], UNRESPONSIVE [   ]  CVS:  CP  [   ], SOB, [   ], PALPITATIONS [   ], DIZZYNESS [   ]  RS: COUGH [   ], SPUTUM [   ]  GI: ABDOMINAL PAIN [   ], NAUSEA [   ], VOMITINGS [   ], DIARRHEA [   ], CONSTIPATION [   ]  :  DYSURIA [   ], NOCTURIA [   ], INCREASED FREQUENCY [   ], DRIBLING [   ],  SKELETAL: PAINFUL JOINTS [   ], SWOLLEN JOINTS [   ], NECK ACHE [   ], LOW BACK ACHE [   ],  SKIN : ULCERS [   ], RASH [   ], ITCHING [   ]  CNS: HEAD ACHE [   ], DOUBLE VISION [   ], BLURRED VISION [   ], AMS / CONFUSION [   ], SEIZURES [   ], WEAKNESS [   ],TINGLING / NUMBNESS [   ]    PHYSICAL EXAMINATION:  GENERAL APPEARANCE: NO DISTRESS  HEENT:  NO PALLOR, NO  JVD,  NO   NODES, NECK SUPPLE  CVS: S1 +, S2 +,   RS: AEEB,  OCCASIONAL  RALES +,   NO RONCHI  ABD: SOFT, NT, NO, BS +  EXT: NO PE  SKIN: WARM,   SKELETAL:  ROM ACCEPTABLE  CNS:  AAO X  2 ,   NO DEFICITS    RADIOLOGY :    < from: Xray Chest 1 View-PORTABLE IMMEDIATE (04.06.20 @ 16:35) >  IMPRESSION:    Increasing bibasilar interstitial infiltrates.    < end of copied text >      ASSESSMENT :     Altered mental status  BPH with urinary obstruction  Prostate CA  CKD (chronic kidney disease)  PAD (peripheral artery disease)  HLD (hyperlipidemia)  HTN (hypertension)  IBD (inflammatory bowel disease)  Bipolar disorder  Anemia  COPD, mild  No pertinent past medical history  History of creation of ostomy  No significant past surgical history      PLAN:  HPI:  69 year old Male from [facility], with PMHx of prostate Ca (s/p radiation in 2015, now in remission), asthma, COPD (not on O2), HTN, HLD, Bipolar, anemia, GERD, Ileostomy s/p sigmoid resection, PAD, and CKD is [place] for [place] and weakness. Patient is AAOx2 on my exam and denies any complaints, however not a reliable historian. No acute distress.    GOC: Full code. (04 Apr 2020 00:54)    PATIENT IS A 68YO MALE FROM [facility] WITH PMHX OF PROSTATE CA [S/P RADIATION 2015, IN REMISSION], ASTHMA, COPD, HTN, HLD, BIPOLAR DX, ANEMIA, GERD, ILEOSTOMY S/P SIGMOID RESECTION, PAD AND CKD3 TRANSFERRED FOR EVALUATION OF AMS/WEAKNESS.    OVERNIGHT: FEVER NOT ABATED BY ANTIPYRETIC, REPEAT CXR DEMONSTRATED WORSENING BIBASILAR INFILTRATES    # MULTIFOCAL COVID19 PNEUMONIA, SEPSIS   # COPD  # PAWAN ON CKD 3 - SUSPECT PRE-RENAL AZOTEMIA - NEPHROLOGY CONSULTED, IMPROVEMENT WITH PO FLUID INTAKE  # ACUTE METABOLIC ENCEPHALOPATHY - RESOLVED  # METABOLIC ACIDOSIS S/T PAWAN - ON SODIUM BICARB  # TRANSAMINITIS   # HYPERKALEMIA S/T PAWAN - RESOLVED  # GI AND DVT PPX    MACARENA IVY M.D. COVERING FOR JEFRY IVY M.D.

## 2020-04-06 NOTE — DISCHARGE NOTE PROVIDER - NSDCFUADDINST_GEN_ALL_CORE_FT
CORONAVIRUS INSTRUCTIONS:   Based on your current clinical status and stability, it has been determined that you no longer need hospitalization and can recover while remaining in self-quarantine at home. You should follow the prevention steps below until a healthcare provider or local or state health department says you can return to your normal activities.     1. You should restrict activities outside your home, except for getting medical care.   2. Do not go to work, school, or public areas.   3. Avoid using public transportation, ride-sharing, or taxis.   4. Separate yourself from other people and animals in your home as much as possible.  When you are around other people (e.g., sharing a room or vehicle) you should wear a facemask.  5. Wash your hands often with soap and water for at least 20 seconds, especially after blowing your nose, coughing, or sneezing; going to the bathroom; and before eating or preparing food.  6. Cover your mouth and nose with a tissue when you cough or sneeze. Throw used tissues in a lined trash can. Immediately wash your hands with soap and water for at least 20 seconds  7. High touch surfaces include counters, tabletops, doorknobs, bathroom fixtures, toilets, phones, keyboards, tablets, and bedside tables.  8. Avoid sharing dishes, drinking glasses, cups, eating utensils, towels, or bedding with other people or pets in your home. After using these items, they should be washed thoroughly with soap and water.  You are strongly advised to seek prompt medical attention if your illness worsens or you develop new symptoms like fever or difficulty breathing.   Please call  [phone] to speak with your discharging physician if you have any questions.

## 2020-04-06 NOTE — DISCHARGE NOTE PROVIDER - HOSPITAL COURSE
69 year old Male from [facility], with PMHx of prostate Ca (s/p radiation in 2015, now in remission), asthma, COPD (not on O2), HTN, HLD, Bipolar, anemia, GERD, Ileostomy s/p sigmoid resection, PAD, and CKD is [place] ED [place] for [place] and weakness. Patient is AAOx2 on my exam and denies any complaints, however not a reliable historian. No acute distress. Admitted for encephalopathy, likely infectious. CXR with right infrahilar opacity suggests right sided infiltrate. Covid position. Treated with antibiotics. Pt also with APWAN due to pre renal azotemia due to volume depletion vs. r/o ATN.  Unable to perform renal ultrasound due to covid. Baseline creatinine is around 2. Gentle hydration. Nephrology consulted.  Pt with metabolic acidosis, sodium bicarb given.         NOT COMPLETE          CORONAVIRUS INSTRUCTIONS:     Based on your current clinical status and stability, it has been determined that you no longer need hospitalization and can recover while remaining in self-quarantine at home. You should follow the prevention steps below until a healthcare provider or local or state health department says you can return to your normal activities.         1. You should restrict activities outside your home, except for getting medical care.     2. Do not go to work, school, or public areas.     3. Avoid using public transportation, ride-sharing, or taxis.     4. Separate yourself from other people and animals in your home as much as possible.  When you are around other people (e.g., sharing a room or vehicle) you should wear a facemask.    5. Wash your hands often with soap and water for at least 20 seconds, especially after blowing your nose, coughing, or sneezing; going to the bathroom; and before eating or preparing food.    6. Cover your mouth and nose with a tissue when you cough or sneeze. Throw used tissues in a lined trash can. Immediately wash your hands with soap and water for at least 20 seconds    7. High touch surfaces include counters, tabletops, doorknobs, bathroom fixtures, toilets, phones, keyboards, tablets, and bedside tables.    8. Avoid sharing dishes, drinking glasses, cups, eating utensils, towels, or bedding with other people or pets in your home. After using these items, they should be washed thoroughly with soap and water.    You are strongly advised to seek prompt medical attention if your illness worsens or you develop new symptoms like fever or difficulty breathing.     Please call  [phone] to speak with your discharging physician if you have any questions. 69 year old Male from [facility], with PMHx of prostate Ca (s/p radiation in 2015, now in remission), asthma, COPD (not on O2), HTN, HLD, Bipolar, anemia, GERD, Ileostomy s/p sigmoid resection, PAD, and CKD is [facility] the ED from [place] for AMS and weakness. Patient is AAOx2 on my exam and denies any complaints, however not a reliable historian. No acute distress. Admitted for encephalopathy, likely infectious. CXR with right infrahilar opacity suggests right sided infiltrate. Covid positive. Treated with antibiotics. Pt also with PAWAN due to pre renal azotemia due to volume depletion vs. r/o ATN.  Unable to perform renal ultrasound due to covid. Baseline creatinine is around 2. Gentle hydration given. Nephrology consulted.  Pt with metabolic acidosis, sodium bicarb given.    Creatinine on 4/14/20 is 3.36.        Patient's oxygen saturation is >94% on roomair.         NOT COMPLETE          CORONAVIRUS INSTRUCTIONS:     Based on your current clinical status and stability, it has been determined that you no longer need hospitalization and can recover while remaining in self-quarantine at home. You should follow the prevention steps below until a healthcare provider or local or state health department says you can return to your normal activities.         1. You should restrict activities outside your home, except for getting medical care.     2. Do not go to work, school, or public areas.     3. Avoid using public transportation, ride-sharing, or taxis.     4. Separate yourself from other people and animals in your home as much as possible.  When you are around other people (e.g., sharing a room or vehicle) you should wear a facemask.    5. Wash your hands often with soap and water for at least 20 seconds, especially after blowing your nose, coughing, or sneezing; going to the bathroom; and before eating or preparing food.    6. Cover your mouth and nose with a tissue when you cough or sneeze. Throw used tissues in a lined trash can. Immediately wash your hands with soap and water for at least 20 seconds    7. High touch surfaces include counters, tabletops, doorknobs, bathroom fixtures, toilets, phones, keyboards, tablets, and bedside tables.    8. Avoid sharing dishes, drinking glasses, cups, eating utensils, towels, or bedding with other people or pets in your home. After using these items, they should be washed thoroughly with soap and water.    You are strongly advised to seek prompt medical attention if your illness worsens or you develop new symptoms like fever or difficulty breathing.     Please call  [phone] to speak with your discharging physician if you have any questions.

## 2020-04-07 LAB
ALBUMIN SERPL ELPH-MCNC: 3.3 G/DL — LOW (ref 3.5–5)
ALP SERPL-CCNC: 93 U/L — SIGNIFICANT CHANGE UP (ref 40–120)
ALT FLD-CCNC: 33 U/L DA — SIGNIFICANT CHANGE UP (ref 10–60)
ANION GAP SERPL CALC-SCNC: 10 MMOL/L — SIGNIFICANT CHANGE UP (ref 5–17)
AST SERPL-CCNC: 103 U/L — HIGH (ref 10–40)
BASOPHILS # BLD AUTO: 0.03 K/UL — SIGNIFICANT CHANGE UP (ref 0–0.2)
BASOPHILS NFR BLD AUTO: 0.2 % — SIGNIFICANT CHANGE UP (ref 0–2)
BILIRUB SERPL-MCNC: 0.6 MG/DL — SIGNIFICANT CHANGE UP (ref 0.2–1.2)
BUN SERPL-MCNC: 81 MG/DL — HIGH (ref 7–18)
CALCIUM SERPL-MCNC: 8.5 MG/DL — SIGNIFICANT CHANGE UP (ref 8.4–10.5)
CALCIUM SERPL-MCNC: 8.9 MG/DL — SIGNIFICANT CHANGE UP (ref 8.4–10.5)
CHLORIDE SERPL-SCNC: 112 MMOL/L — HIGH (ref 96–108)
CO2 SERPL-SCNC: 14 MMOL/L — LOW (ref 22–31)
CREAT SERPL-MCNC: 3.92 MG/DL — HIGH (ref 0.5–1.3)
EOSINOPHIL # BLD AUTO: 0.03 K/UL — SIGNIFICANT CHANGE UP (ref 0–0.5)
EOSINOPHIL NFR BLD AUTO: 0.2 % — SIGNIFICANT CHANGE UP (ref 0–6)
GLUCOSE SERPL-MCNC: 95 MG/DL — SIGNIFICANT CHANGE UP (ref 70–99)
HCT VFR BLD CALC: 32.6 % — LOW (ref 39–50)
HGB BLD-MCNC: 10.8 G/DL — LOW (ref 13–17)
IMM GRANULOCYTES NFR BLD AUTO: 2 % — HIGH (ref 0–1.5)
LYMPHOCYTES # BLD AUTO: 1.24 K/UL — SIGNIFICANT CHANGE UP (ref 1–3.3)
LYMPHOCYTES # BLD AUTO: 9.1 % — LOW (ref 13–44)
MCHC RBC-ENTMCNC: 27.6 PG — SIGNIFICANT CHANGE UP (ref 27–34)
MCHC RBC-ENTMCNC: 33.1 GM/DL — SIGNIFICANT CHANGE UP (ref 32–36)
MCV RBC AUTO: 83.4 FL — SIGNIFICANT CHANGE UP (ref 80–100)
MONOCYTES # BLD AUTO: 0.68 K/UL — SIGNIFICANT CHANGE UP (ref 0–0.9)
MONOCYTES NFR BLD AUTO: 5 % — SIGNIFICANT CHANGE UP (ref 2–14)
NEUTROPHILS # BLD AUTO: 11.31 K/UL — HIGH (ref 1.8–7.4)
NEUTROPHILS NFR BLD AUTO: 83.5 % — HIGH (ref 43–77)
NRBC # BLD: 0 /100 WBCS — SIGNIFICANT CHANGE UP (ref 0–0)
PHOSPHATE SERPL-MCNC: 3.4 MG/DL — SIGNIFICANT CHANGE UP (ref 2.5–4.5)
PLATELET # BLD AUTO: 217 K/UL — SIGNIFICANT CHANGE UP (ref 150–400)
POTASSIUM SERPL-MCNC: 4.6 MMOL/L — SIGNIFICANT CHANGE UP (ref 3.5–5.3)
POTASSIUM SERPL-SCNC: 4.6 MMOL/L — SIGNIFICANT CHANGE UP (ref 3.5–5.3)
PROT SERPL-MCNC: 9.2 G/DL — HIGH (ref 6–8.3)
PTH-INTACT FLD-MCNC: 182 PG/ML — HIGH (ref 15–65)
RBC # BLD: 3.91 M/UL — LOW (ref 4.2–5.8)
RBC # FLD: 15.4 % — HIGH (ref 10.3–14.5)
SODIUM SERPL-SCNC: 136 MMOL/L — SIGNIFICANT CHANGE UP (ref 135–145)
WBC # BLD: 13.56 K/UL — HIGH (ref 3.8–10.5)
WBC # FLD AUTO: 13.56 K/UL — HIGH (ref 3.8–10.5)

## 2020-04-07 RX ORDER — CEFUROXIME AXETIL 250 MG
500 TABLET ORAL EVERY 24 HOURS
Refills: 0 | Status: DISCONTINUED | OUTPATIENT
Start: 2020-04-07 | End: 2020-04-07

## 2020-04-07 RX ORDER — HYDROXYCHLOROQUINE SULFATE 200 MG
200 TABLET ORAL EVERY 12 HOURS
Refills: 0 | Status: COMPLETED | OUTPATIENT
Start: 2020-04-08 | End: 2020-04-12

## 2020-04-07 RX ORDER — HYDROXYCHLOROQUINE SULFATE 200 MG
TABLET ORAL
Refills: 0 | Status: COMPLETED | OUTPATIENT
Start: 2020-04-07 | End: 2020-04-12

## 2020-04-07 RX ORDER — HYDROXYCHLOROQUINE SULFATE 200 MG
400 TABLET ORAL EVERY 12 HOURS
Refills: 0 | Status: COMPLETED | OUTPATIENT
Start: 2020-04-07 | End: 2020-04-08

## 2020-04-07 RX ADMIN — HEPARIN SODIUM 5000 UNIT(S): 5000 INJECTION INTRAVENOUS; SUBCUTANEOUS at 05:32

## 2020-04-07 RX ADMIN — Medication 1300 MILLIGRAM(S): at 22:19

## 2020-04-07 RX ADMIN — Medication 650 MILLIGRAM(S): at 17:05

## 2020-04-07 RX ADMIN — ARIPIPRAZOLE 15 MILLIGRAM(S): 15 TABLET ORAL at 12:21

## 2020-04-07 RX ADMIN — Medication 650 MILLIGRAM(S): at 05:33

## 2020-04-07 RX ADMIN — ATORVASTATIN CALCIUM 10 MILLIGRAM(S): 80 TABLET, FILM COATED ORAL at 22:19

## 2020-04-07 RX ADMIN — Medication 25 MILLIGRAM(S): at 22:19

## 2020-04-07 RX ADMIN — FINASTERIDE 5 MILLIGRAM(S): 5 TABLET, FILM COATED ORAL at 12:21

## 2020-04-07 RX ADMIN — LAMOTRIGINE 200 MILLIGRAM(S): 25 TABLET, ORALLY DISINTEGRATING ORAL at 12:21

## 2020-04-07 RX ADMIN — HEPARIN SODIUM 5000 UNIT(S): 5000 INJECTION INTRAVENOUS; SUBCUTANEOUS at 17:06

## 2020-04-07 RX ADMIN — AZITHROMYCIN 250 MILLIGRAM(S): 500 TABLET, FILM COATED ORAL at 12:21

## 2020-04-07 RX ADMIN — Medication 650 MILLIGRAM(S): at 12:20

## 2020-04-07 RX ADMIN — Medication 1300 MILLIGRAM(S): at 13:06

## 2020-04-07 RX ADMIN — TAMSULOSIN HYDROCHLORIDE 0.4 MILLIGRAM(S): 0.4 CAPSULE ORAL at 22:19

## 2020-04-07 RX ADMIN — Medication 400 MILLIGRAM(S): at 17:40

## 2020-04-07 RX ADMIN — PANTOPRAZOLE SODIUM 40 MILLIGRAM(S): 20 TABLET, DELAYED RELEASE ORAL at 05:34

## 2020-04-07 RX ADMIN — GABAPENTIN 600 MILLIGRAM(S): 400 CAPSULE ORAL at 05:33

## 2020-04-07 RX ADMIN — Medication 1300 MILLIGRAM(S): at 05:33

## 2020-04-07 RX ADMIN — GABAPENTIN 600 MILLIGRAM(S): 400 CAPSULE ORAL at 17:05

## 2020-04-07 NOTE — PROGRESS NOTE ADULT - SUBJECTIVE AND OBJECTIVE BOX
Patient is a 69y old  Male who presents with a chief complaint of AMS (07 Apr 2020 15:03)    PATIENT IS SEEN AND EXAMINED IN MEDICAL FLOOR.  NGT [    ]    ERASTO [   ]      GT [   ]    ALLERGIES:  No Known Allergies      Daily     Daily     VITALS:    Vital Signs Last 24 Hrs  T(C): 38 (07 Apr 2020 15:46), Max: 38.2 (07 Apr 2020 07:38)  T(F): 100.4 (07 Apr 2020 15:46), Max: 100.7 (07 Apr 2020 07:38)  HR: 96 (07 Apr 2020 15:46) (84 - 96)  BP: 117/67 (07 Apr 2020 15:46) (117/67 - 133/71)  BP(mean): --  RR: 20 (07 Apr 2020 15:46) (20 - 20)  SpO2: 94% (07 Apr 2020 15:46) (94% - 97%)    LABS:    CBC Full  -  ( 07 Apr 2020 12:37 )  WBC Count : 13.56 K/uL  RBC Count : 3.91 M/uL  Hemoglobin : 10.8 g/dL  Hematocrit : 32.6 %  Platelet Count - Automated : 217 K/uL  Mean Cell Volume : 83.4 fl  Mean Cell Hemoglobin : 27.6 pg  Mean Cell Hemoglobin Concentration : 33.1 gm/dL  Auto Neutrophil # : 11.31 K/uL  Auto Lymphocyte # : 1.24 K/uL  Auto Monocyte # : 0.68 K/uL  Auto Eosinophil # : 0.03 K/uL  Auto Basophil # : 0.03 K/uL  Auto Neutrophil % : 83.5 %  Auto Lymphocyte % : 9.1 %  Auto Monocyte % : 5.0 %  Auto Eosinophil % : 0.2 %  Auto Basophil % : 0.2 %      04-07    136  |  112<H>  |  81<H>  ----------------------------<  95  4.6   |  14<L>  |  3.92<H>    Ca    8.5      07 Apr 2020 12:37  Phos  3.4     04-07    TPro  9.2<H>  /  Alb  3.3<L>  /  TBili  0.6  /  DBili  x   /  AST  103<H>  /  ALT  33  /  AlkPhos  93  04-07    CAPILLARY BLOOD GLUCOSE            LIVER FUNCTIONS - ( 07 Apr 2020 12:37 )  Alb: 3.3 g/dL / Pro: 9.2 g/dL / ALK PHOS: 93 U/L / ALT: 33 U/L DA / AST: 103 U/L / GGT: x           Creatinine Trend: 3.92<--, 3.86<--, 4.21<--, 4.32<--, 5.09<--, 5.84<--  I&O's Summary    06 Apr 2020 07:01  -  07 Apr 2020 07:00  --------------------------------------------------------  IN: 1190 mL / OUT: 1000 mL / NET: 190 mL    07 Apr 2020 07:01  -  07 Apr 2020 16:50  --------------------------------------------------------  IN: 430 mL / OUT: 0 mL / NET: 430 mL            .Blood  02-05 @ 10:19   No growth at 5 days.  --  --      .Blood  01-25 @ 22:29   No growth at 5 days.  --  --      .Blood  01-25 @ 22:26   No growth at 5 days.  --  --      .Urine  01-25 @ 01:01   >100,000 CFU/ml Klebsiella pneumoniae  --  Klebsiella pneumoniae          MEDICATIONS:    MEDICATIONS  (STANDING):  acetaminophen   Tablet .. 650 milliGRAM(s) Oral every 6 hours  ARIPiprazole 15 milliGRAM(s) Oral daily  atorvastatin 10 milliGRAM(s) Oral at bedtime  azithromycin   Tablet 250 milliGRAM(s) Oral daily  cefuroxime   Tablet 500 milliGRAM(s) Oral every 24 hours  finasteride 5 milliGRAM(s) Oral daily  gabapentin 600 milliGRAM(s) Oral two times a day  heparin  Injectable 5000 Unit(s) SubCutaneous every 12 hours  lamoTRIgine 200 milliGRAM(s) Oral daily  pantoprazole    Tablet 40 milliGRAM(s) Oral before breakfast  sodium bicarbonate 1300 milliGRAM(s) Oral three times a day  tamsulosin 0.4 milliGRAM(s) Oral at bedtime  traZODone 25 milliGRAM(s) Oral at bedtime      MEDICATIONS  (PRN):  HYDROmorphone   Tablet 4 milliGRAM(s) Oral every 8 hours PRN Severe Pain (7 - 10)      REVIEW OF SYSTEMS:                           ALL ROS DONE [ X   ]    CONSTITUTIONAL:  LETHARGIC [   ], FEVER [   ], UNRESPONSIVE [   ]  CVS:  CP  [   ], SOB, [   ], PALPITATIONS [   ], DIZZYNESS [   ]  RS: COUGH [   ], SPUTUM [   ]  GI: ABDOMINAL PAIN [   ], NAUSEA [   ], VOMITINGS [   ], DIARRHEA [   ], CONSTIPATION [   ]  :  DYSURIA [   ], NOCTURIA [   ], INCREASED FREQUENCY [   ], DRIBLING [   ],  SKELETAL: PAINFUL JOINTS [   ], SWOLLEN JOINTS [   ], NECK ACHE [   ], LOW BACK ACHE [   ],  SKIN : ULCERS [   ], RASH [   ], ITCHING [   ]  CNS: HEAD ACHE [   ], DOUBLE VISION [   ], BLURRED VISION [   ], AMS / CONFUSION [   ], SEIZURES [   ], WEAKNESS [   ],TINGLING / NUMBNESS [   ]    PHYSICAL EXAMINATION:  GENERAL APPEARANCE: NO DISTRESS  HEENT:  NO PALLOR, NO  JVD,  NO   NODES, NECK SUPPLE  CVS: S1 +, S2 +,   RS: AEEB,  OCCASIONAL  RALES +,   NO RONCHI  ABD: SOFT, NT, NO, BS +  EXT: NO PE  SKIN: WARM,   SKELETAL:  ROM ACCEPTABLE  CNS:  AAO X  2 ,   NO DEFICITS    RADIOLOGY :    < from: Xray Chest 1 View-PORTABLE IMMEDIATE (04.06.20 @ 16:35) >  IMPRESSION:    Increasing bibasilar interstitial infiltrates.    < end of copied text >      ASSESSMENT :     Altered mental status  BPH with urinary obstruction  Prostate CA  CKD (chronic kidney disease)  PAD (peripheral artery disease)  HLD (hyperlipidemia)  HTN (hypertension)  IBD (inflammatory bowel disease)  Bipolar disorder  Anemia  COPD, mild  No pertinent past medical history  History of creation of ostomy  No significant past surgical history      PLAN:  HPI:  69 year old Male from [facility], with PMHx of prostate Ca (s/p radiation in 2015, now in remission), asthma, COPD (not on O2), HTN, HLD, Bipolar, anemia, GERD, Ileostomy s/p sigmoid resection, PAD, and CKD is [place] [facility] [place] for AMS and weakness. Patient is AAOx2 on my exam and denies any complaints, however not a reliable historian. No acute distress.    [facility]: Full code. (04 Apr 2020 00:54)    PATIENT IS A 68YO MALE FROM [facility] WITH PMHX OF PROSTATE CA [S/P RADIATION 2015, IN REMISSION], ASTHMA, COPD, HTN, HLD, BIPOLAR DX, ANEMIA, GERD, ILEOSTOMY S/P SIGMOID RESECTION, PAD AND CKD3 TRANSFERRED FOR EVALUATION OF AMS/WEAKNESS.    OVERNIGHT: PERSISTENT FEVER AND DRY COUGH    # MULTIFOCAL COVID19 PNEUMONIA, SEPSIS SUSPECT SECONDARY BACTERIAL PNEUMONIA - GIVEN DIFFICULTY OBTAINING IV ACCESS - WILL CONTINUE PO ANTIBIOTICS  # COPD  # PAWAN ON CKD 3 - PRE-RENAL AZOTEMIA VS. ATN - NEPHROLOGY CONSULTED, PO INTAKE  # ACUTE METABOLIC ENCEPHALOPATHY - RESOLVED  # METABOLIC ACIDOSIS S/T PAWAN - ON SODIUM BICARB  # TRANSAMINITIS   # HYPERKALEMIA S/T PAWAN - RESOLVED  # GI AND DVT PPX    MACARENA IVY M.D. COVERING FOR JEFRY IVY M.D. Patient is a 69y old  Male who presents with a chief complaint of AMS (07 Apr 2020 15:03)    PATIENT IS SEEN AND EXAMINED IN MEDICAL FLOOR.  NGT [    ]    ERASTO [   ]      GT [   ]    ALLERGIES:  No Known Allergies      Daily     Daily     VITALS:    Vital Signs Last 24 Hrs  T(C): 38 (07 Apr 2020 15:46), Max: 38.2 (07 Apr 2020 07:38)  T(F): 100.4 (07 Apr 2020 15:46), Max: 100.7 (07 Apr 2020 07:38)  HR: 96 (07 Apr 2020 15:46) (84 - 96)  BP: 117/67 (07 Apr 2020 15:46) (117/67 - 133/71)  BP(mean): --  RR: 20 (07 Apr 2020 15:46) (20 - 20)  SpO2: 94% (07 Apr 2020 15:46) (94% - 97%)    LABS:    CBC Full  -  ( 07 Apr 2020 12:37 )  WBC Count : 13.56 K/uL  RBC Count : 3.91 M/uL  Hemoglobin : 10.8 g/dL  Hematocrit : 32.6 %  Platelet Count - Automated : 217 K/uL  Mean Cell Volume : 83.4 fl  Mean Cell Hemoglobin : 27.6 pg  Mean Cell Hemoglobin Concentration : 33.1 gm/dL  Auto Neutrophil # : 11.31 K/uL  Auto Lymphocyte # : 1.24 K/uL  Auto Monocyte # : 0.68 K/uL  Auto Eosinophil # : 0.03 K/uL  Auto Basophil # : 0.03 K/uL  Auto Neutrophil % : 83.5 %  Auto Lymphocyte % : 9.1 %  Auto Monocyte % : 5.0 %  Auto Eosinophil % : 0.2 %  Auto Basophil % : 0.2 %      04-07    136  |  112<H>  |  81<H>  ----------------------------<  95  4.6   |  14<L>  |  3.92<H>    Ca    8.5      07 Apr 2020 12:37  Phos  3.4     04-07    TPro  9.2<H>  /  Alb  3.3<L>  /  TBili  0.6  /  DBili  x   /  AST  103<H>  /  ALT  33  /  AlkPhos  93  04-07    CAPILLARY BLOOD GLUCOSE            LIVER FUNCTIONS - ( 07 Apr 2020 12:37 )  Alb: 3.3 g/dL / Pro: 9.2 g/dL / ALK PHOS: 93 U/L / ALT: 33 U/L DA / AST: 103 U/L / GGT: x           Creatinine Trend: 3.92<--, 3.86<--, 4.21<--, 4.32<--, 5.09<--, 5.84<--  I&O's Summary    06 Apr 2020 07:01  -  07 Apr 2020 07:00  --------------------------------------------------------  IN: 1190 mL / OUT: 1000 mL / NET: 190 mL    07 Apr 2020 07:01  -  07 Apr 2020 16:50  --------------------------------------------------------  IN: 430 mL / OUT: 0 mL / NET: 430 mL            .Blood  02-05 @ 10:19   No growth at 5 days.  --  --      .Blood  01-25 @ 22:29   No growth at 5 days.  --  --      .Blood  01-25 @ 22:26   No growth at 5 days.  --  --      .Urine  01-25 @ 01:01   >100,000 CFU/ml Klebsiella pneumoniae  --  Klebsiella pneumoniae          MEDICATIONS:    MEDICATIONS  (STANDING):  acetaminophen   Tablet .. 650 milliGRAM(s) Oral every 6 hours  ARIPiprazole 15 milliGRAM(s) Oral daily  atorvastatin 10 milliGRAM(s) Oral at bedtime  azithromycin   Tablet 250 milliGRAM(s) Oral daily  cefuroxime   Tablet 500 milliGRAM(s) Oral every 24 hours  finasteride 5 milliGRAM(s) Oral daily  gabapentin 600 milliGRAM(s) Oral two times a day  heparin  Injectable 5000 Unit(s) SubCutaneous every 12 hours  lamoTRIgine 200 milliGRAM(s) Oral daily  pantoprazole    Tablet 40 milliGRAM(s) Oral before breakfast  sodium bicarbonate 1300 milliGRAM(s) Oral three times a day  tamsulosin 0.4 milliGRAM(s) Oral at bedtime  traZODone 25 milliGRAM(s) Oral at bedtime      MEDICATIONS  (PRN):  HYDROmorphone   Tablet 4 milliGRAM(s) Oral every 8 hours PRN Severe Pain (7 - 10)      REVIEW OF SYSTEMS:                           ALL ROS DONE [ X   ]    CONSTITUTIONAL:  LETHARGIC [   ], FEVER [   ], UNRESPONSIVE [   ]  CVS:  CP  [   ], SOB, [   ], PALPITATIONS [   ], DIZZYNESS [   ]  RS: COUGH [   ], SPUTUM [   ]  GI: ABDOMINAL PAIN [   ], NAUSEA [   ], VOMITINGS [   ], DIARRHEA [   ], CONSTIPATION [   ]  :  DYSURIA [   ], NOCTURIA [   ], INCREASED FREQUENCY [   ], DRIBLING [   ],  SKELETAL: PAINFUL JOINTS [   ], SWOLLEN JOINTS [   ], NECK ACHE [   ], LOW BACK ACHE [   ],  SKIN : ULCERS [   ], RASH [   ], ITCHING [   ]  CNS: HEAD ACHE [   ], DOUBLE VISION [   ], BLURRED VISION [   ], AMS / CONFUSION [   ], SEIZURES [   ], WEAKNESS [   ],TINGLING / NUMBNESS [   ]    PHYSICAL EXAMINATION:  GENERAL APPEARANCE: NO DISTRESS  HEENT:  NO PALLOR, NO  JVD,  NO   NODES, NECK SUPPLE  CVS: S1 +, S2 +,   RS: AEEB,  OCCASIONAL  RALES +,   NO RONCHI  ABD: SOFT, NT, NO, BS +  EXT: NO PE  SKIN: WARM,   SKELETAL:  ROM ACCEPTABLE  CNS:  AAO X  2 ,   NO DEFICITS    RADIOLOGY :    < from: Xray Chest 1 View-PORTABLE IMMEDIATE (04.06.20 @ 16:35) >  IMPRESSION:    Increasing bibasilar interstitial infiltrates.    < end of copied text >      ASSESSMENT :     Altered mental status  BPH with urinary obstruction  Prostate CA  CKD (chronic kidney disease)  PAD (peripheral artery disease)  HLD (hyperlipidemia)  HTN (hypertension)  IBD (inflammatory bowel disease)  Bipolar disorder  Anemia  COPD, mild  No pertinent past medical history  History of creation of ostomy  No significant past surgical history      PLAN:  HPI:  69 year old Male from [facility], with PMHx of prostate Ca (s/p radiation in 2015, now in remission), asthma, COPD (not on O2), HTN, HLD, Bipolar, anemia, GERD, Ileostomy s/p sigmoid resection, PAD, and CKD is [facility] the ED from [place] for AMS and weakness. Patient is AAOx2 on my exam and denies any complaints, however not a reliable historian. No acute distress.    [facility]: Full code. (04 Apr 2020 00:54)    PATIENT IS A 68YO MALE FROM [facility] WITH PMHX OF PROSTATE CA [S/P RADIATION 2015, IN REMISSION], ASTHMA, COPD, HTN, HLD, BIPOLAR DX, ANEMIA, GERD, ILEOSTOMY S/P SIGMOID RESECTION, PAD AND CKD3 TRANSFERRED FOR EVALUATION OF AMS/WEAKNESS.    OVERNIGHT: PERSISTENT FEVER AND DRY COUGH    # MULTIFOCAL COVID19 PNEUMONIA, SEPSIS SUSPECT SECONDARY BACTERIAL PNEUMONIA - GIVEN DIFFICULTY OBTAINING IV ACCESS - WILL CONTINUE PO ANTIBIOTICS  # COPD  # PAWAN ON CKD 3 - PRE-RENAL AZOTEMIA VS. ATN - NEPHROLOGY CONSULTED, PO INTAKE  # ACUTE METABOLIC ENCEPHALOPATHY - RESOLVED  # METABOLIC ACIDOSIS S/T PAWAN - ON SODIUM BICARB   # TRANSAMINITIS   # HYPERKALEMIA S/T PAWAN - RESOLVED  # GI AND DVT PPX    MACARENA IVY M.D. COVERING FOR JEFRY IVY M.D.

## 2020-04-07 NOTE — PROGRESS NOTE ADULT - SUBJECTIVE AND OBJECTIVE BOX
Note is incomplete  pt seen and examined.pts current chart reviewed and case discussed with resident covering.    SUBJECTIVE:  pt feels fine and denies cp,sob,gi or gu/uremic  symptons    REVIEW OF SYSTEMS:  CONSTITUTIONAL: No weakness, fevers or chills  EYES/ENT: No visual changes;  No vertigo or throat pain   NECK: No pain or stiffness  RESPIRATORY: No cough, wheezing, hemoptysis; No shortness of breath  CARDIOVASCULAR: No chest pain or palpitations  GASTROINTESTINAL: No abdominal or epigastric pain. No nausea, vomiting, or hematemesis; No diarrhea or constipation. No melena or hematochezia.  GENITOURINARY: No dysuria, frequency , hematuria, flank pain or nocturia  NEUROLOGICAL: No numbness or weakness  SKIN: No itching, burning, rashes, or lesions   All other review of systems is negative unless indicated above    Current meds:    acetaminophen   Tablet .. 650 milliGRAM(s) Oral every 6 hours  ARIPiprazole 15 milliGRAM(s) Oral daily  atorvastatin 10 milliGRAM(s) Oral at bedtime  azithromycin   Tablet 250 milliGRAM(s) Oral daily  finasteride 5 milliGRAM(s) Oral daily  gabapentin 600 milliGRAM(s) Oral two times a day  heparin  Injectable 5000 Unit(s) SubCutaneous every 12 hours  HYDROmorphone   Tablet 4 milliGRAM(s) Oral every 8 hours PRN  lamoTRIgine 200 milliGRAM(s) Oral daily  pantoprazole    Tablet 40 milliGRAM(s) Oral before breakfast  sodium bicarbonate 1300 milliGRAM(s) Oral three times a day  tamsulosin 0.4 milliGRAM(s) Oral at bedtime  traZODone 25 milliGRAM(s) Oral at bedtime      Vital Signs    T(F): 100.7 (04-07-20 @ 07:38), Max: 101.2 (04-06-20 @ 16:05)  HR: 90 (04-07-20 @ 07:38) (84 - 101)  BP: 133/71 (04-07-20 @ 07:38) (107/69 - 133/71)  ABP: --  RR: 20 (04-07-20 @ 07:38) (20 - 20)  SpO2: 97% (04-07-20 @ 07:38) (95% - 97%)  Wt(kg): --  CVP(cm H2O): --  CO: --  PCWP: --    I and O's:    04-05 @ 07:01  -  04-06 @ 07:00  --------------------------------------------------------  IN:    Oral Fluid: 910 mL  Total IN: 910 mL    OUT:    Voided: 500 mL  Total OUT: 500 mL    Total NET: 410 mL      04-06 @ 07:01  -  04-07 @ 07:00  --------------------------------------------------------  IN:    Oral Fluid: 1190 mL  Total IN: 1190 mL    OUT:    Ileostomy: 700 mL    Voided: 300 mL  Total OUT: 1000 mL    Total NET: 190 mL      04-07 @ 07:01  -  04-07 @ 15:04  --------------------------------------------------------  IN:    Oral Fluid: 430 mL  Total IN: 430 mL    OUT:  Total OUT: 0 mL    Total NET: 430 mL        Daily     Daily     PHYSICAL EXAM:  Constitutional: well developed, well nourished  and in nad  HEENT: PERRLA,  no icteric sclera and mild pallor of conjunctiva noted  Neck: No JVD, thyromegaly or adenopathy  Respiratory: reduced air entry lower lungs with no rales, wheezing or rhonchi  Cardiovascular: S1 and S2 normally heard  Gastrointestinal: soft, nondistended, nontender and normal bowel sounds heard  Extremities: No peripheral edema or cyanosis  Neurological: A/O x 3, no focal deficits  : No flank or cva tenderness palpated.  Skin: No rashes      LABS:    CBC:                          10.8   13.56 )-----------( 217      ( 07 Apr 2020 12:37 )             32.6           BMP:    04-07    136  |  112<H>  |  81<H>  ----------------------------<  95  4.6   |  14<L>  |  3.92<H>  04-06    140  |  116<H>  |  84<H>  ----------------------------<  112<H>  4.7   |  14<L>  |  3.86<H>  04-05    143  |  118<H>  |  95<H>  ----------------------------<  116<H>  4.7   |  15<L>  |  4.21<H>  04-04    142  |  119<H>  |  104<H>  ----------------------------<  101<H>  4.8   |  10<LL>  |  4.32<H>    Ca    8.5      07 Apr 2020 12:37  Ca    8.6      06 Apr 2020 15:55  Ca    8.7      05 Apr 2020 10:39  Ca    8.5      04 Apr 2020 20:46  Phos  3.4     04-07    TPro  9.2<H>  /  Alb  3.3<L>  /  TBili  0.6  /  DBili  x   /  AST  103<H>  /  ALT  33  /  AlkPhos  93  04-07  TPro  9.4<H>  /  Alb  3.2<L>  /  TBili  0.7  /  DBili  x   /  AST  114<H>  /  ALT  32  /  AlkPhos  94  04-06  TPro  9.0<H>  /  Alb  3.4<L>  /  TBili  0.7  /  DBili  x   /  AST  116<H>  /  ALT  35  /  AlkPhos  98  04-05          URINE STUDIES:        Potassium, Random Urine: 26 mmol/L (04-05 @ 07:41)  Osmolality, Random Urine: 431 mos/kg (04-05 @ 07:41)  Sodium, Random Urine: 17 mmol/L (04-05 @ 07:41)  Creatinine, Random Urine: 100 mg/dL (04-05 @ 07:41)  Sodium, Random Urine: 26 mmol/L (04-04 @ 09:31)  Creatinine, Random Urine: 87 mg/dL (04-04 @ 09:31)                  RADIOLOGY & ADDITIONAL STUDIES: pt seen and examined    SUBJECTIVE:  pt  denies cp,sob,gi or gu symptoms  pt is voiding normally   pt has no iv access  Current meds:    acetaminophen   Tablet .. 650 milliGRAM(s) Oral every 6 hours  ARIPiprazole 15 milliGRAM(s) Oral daily  atorvastatin 10 milliGRAM(s) Oral at bedtime  azithromycin   Tablet 250 milliGRAM(s) Oral daily  finasteride 5 milliGRAM(s) Oral daily  gabapentin 600 milliGRAM(s) Oral two times a day  heparin  Injectable 5000 Unit(s) SubCutaneous every 12 hours  HYDROmorphone   Tablet 4 milliGRAM(s) Oral every 8 hours PRN  lamoTRIgine 200 milliGRAM(s) Oral daily  pantoprazole    Tablet 40 milliGRAM(s) Oral before breakfast  sodium bicarbonate 1300 milliGRAM(s) Oral three times a day  tamsulosin 0.4 milliGRAM(s) Oral at bedtime  traZODone 25 milliGRAM(s) Oral at bedtime      Vital Signs    T(F): 100.7 (04-07-20 @ 07:38), Max: 101.2 (04-06-20 @ 16:05)  HR: 90 (04-07-20 @ 07:38) (84 - 101)  BP: 133/71 (04-07-20 @ 07:38) (107/69 - 133/71)  ABP: --  RR: 20 (04-07-20 @ 07:38) (20 - 20)  SpO2: 97% (04-07-20 @ 07:38) (95% - 97%)  Wt(kg): --  CVP(cm H2O): --  CO: --  PCWP: --    I and O's:    04-05 @ 07:01  -  04-06 @ 07:00  --------------------------------------------------------  IN:    Oral Fluid: 910 mL  Total IN: 910 mL    OUT:    Voided: 500 mL  Total OUT: 500 mL    Total NET: 410 mL      04-06 @ 07:01  -  04-07 @ 07:00  --------------------------------------------------------  IN:    Oral Fluid: 1190 mL  Total IN: 1190 mL    OUT:    Ileostomy: 700 mL    Voided: 300 mL  Total OUT: 1000 mL    Total NET: 190 mL      04-07 @ 07:01  -  04-07 @ 15:04  --------------------------------------------------------  IN:    Oral Fluid: 430 mL  Total IN: 430 mL    OUT:  Total OUT: 0 mL    Total NET: 430 mL        Daily     Daily     PHYSICAL EXAM:  GENERAL: No acute respiratory distress.  EYES: conjunctiva and sclera clear  ENMT: Atraumatic, Normocephalic, supple, No JVD present.  RESPIRATORY: b/l poor air entry; No rales, rhonchi, wheezing  CARDIOVASCULAR: S1S2+; no m/r/g  GASTROINTESTINAL: Soft, Non-tender, Nondistended; Bowel sounds present,   CENTRAL NERVOUS SYSTEM:  Awake, Alert & Oriented X2-3, No focal deficits present.   EXTREMITIES:  trace edema, No Cyanosis  SKIN: No rashes      LABS:    CBC:                          10.8   13.56 )-----------( 217      ( 07 Apr 2020 12:37 )             32.6           BMP:    04-07    136  |  112<H>  |  81<H>  ----------------------------<  95  4.6   |  14<L>  |  3.92<H>  04-06    140  |  116<H>  |  84<H>  ----------------------------<  112<H>  4.7   |  14<L>  |  3.86<H>  04-05    143  |  118<H>  |  95<H>  ----------------------------<  116<H>  4.7   |  15<L>  |  4.21<H>  04-04    142  |  119<H>  |  104<H>  ----------------------------<  101<H>  4.8   |  10<LL>  |  4.32<H>    Ca    8.5      07 Apr 2020 12:37  Ca    8.6      06 Apr 2020 15:55  Ca    8.7      05 Apr 2020 10:39  Ca    8.5      04 Apr 2020 20:46  Phos  3.4     04-07    TPro  9.2<H>  /  Alb  3.3<L>  /  TBili  0.6  /  DBili  x   /  AST  103<H>  /  ALT  33  /  AlkPhos  93  04-07  TPro  9.4<H>  /  Alb  3.2<L>  /  TBili  0.7  /  DBili  x   /  AST  114<H>  /  ALT  32  /  AlkPhos  94  04-06  TPro  9.0<H>  /  Alb  3.4<L>  /  TBili  0.7  /  DBili  x   /  AST  116<H>  /  ALT  35  /  AlkPhos  98  04-05    Parathyroid Hormone Intact, Serum (04.07.20 @ 17:50)    Intact PTH: 182: PTH METHOD: Roche  Guide for Interpretation of PTH and Calcium Results                           Calcium             PTH                           MG/DL               PG/ML  Normal                   8.4-10.5            15-65  Primary  Hyperparathyroidism      >10.5               >50  Non-PTH Hypercalcemia    >10.5               0-20  Hypoparathyroidism       <8.4                0-20  Pseudohypoparathyroid    <8.4                >50  This is intended as a guide only. Factors such as sunlight exposure,  Vitamin D status and renal function should be evaluated along with  clinical presentation. pg/mL            URINE STUDIES:  Potassium, Random Urine: 26 mmol/L (04-05 @ 07:41)  Osmolality, Random Urine: 431 mos/kg (04-05 @ 07:41)  Sodium, Random Urine: 17 mmol/L (04-05 @ 07:41)  Creatinine, Random Urine: 100 mg/dL (04-05 @ 07:41)  Sodium, Random Urine: 26 mmol/L (04-04 @ 09:31)  Creatinine, Random Urine: 87 mg/dL (04-04 @ 09:31)                  RADIOLOGY & ADDITIONAL STUDIES:  < from: Xray Chest 1 View-PORTABLE IMMEDIATE (04.06.20 @ 16:35) >  EXAM:  XR CHEST PORTABLE IMMED 1V                            PROCEDURE DATE:  04/06/2020          INTERPRETATION:  CLINICAL INDICATION: 69 years  Male with fever. PNA.    COMPARISON: 4/3/2020    Patient is rotated to the left.    AP view of the chest demonstrates increasing bibasilar interstitial infiltrates. There is no pleural effusion. The left costophrenic angle is partially omitted. There is no pneumothorax.    The heart is normal in size. There is no mediastinal or hilar mass.     The pulmonary vasculature is normal.     Mild thoracic degenerative changes are present.    IMPRESSION:    Increasing bibasilar interstitial infiltrates.      < end of copied text >

## 2020-04-07 NOTE — PROGRESS NOTE ADULT - ASSESSMENT
1. PAWAN due to pre-renal azotemia due to volume depletion vs r/o ATN.   -Scr is mildly worsening  , encourage oral intake.  unable to give fluids because as pt has no IV access.   -suggest central line placement as per medical team  -urinalysis, urine lytes noted and Fena is 0.51%  -unable to perform renal sono due to covid.  -Adjust meds to eGFR and avoid IV Gadolinium contrast,NSAIDs, and phosphate enema.  -Monitor I/O's daily.   -Monitor SMA daily.  -may need hd if pt develops acidosis , hyperkalemia , fluid overload or uremia  2. CKD stage 3 due h/o renal recovery from ATN.  -baseline scr around 2.0mg/dL with non-nephrotic range proteinuria (uPCR 900mg)  -Keep patient euvolemic and renal diet  -Avoid Nephrotoxic Meds/ Agents such as (NSAIDs, IV contrast, Aminoglycosides such as gentamicin, -Gadolinium contrast, Phosphate containing enemas, etc..)  -Adjust Medications according to eGFR  3. Hyperkalemia:improved  - monitor K daily  -continue  low k diet  4.Hx of hypotension: improved   -may add Midodrine prn  5. MBD: with normal phos level  6. Encephalopathy due to unclear etiology.  COVID +ve pna. plan as per primary team.  7. Acidosis due to renal failure:  -unable to give sodium bicarbonate drip due to no iv access; improving and continue due to sodium bicarbonate 1300mg bid   abg noted.  8. Anemia:mild  -hb is stable.  -F/u CBC daily  -transfuse if HB < 7.0.

## 2020-04-08 RX ADMIN — Medication 200 MILLIGRAM(S): at 18:02

## 2020-04-08 RX ADMIN — ARIPIPRAZOLE 15 MILLIGRAM(S): 15 TABLET ORAL at 11:52

## 2020-04-08 RX ADMIN — Medication 1300 MILLIGRAM(S): at 21:43

## 2020-04-08 RX ADMIN — TAMSULOSIN HYDROCHLORIDE 0.4 MILLIGRAM(S): 0.4 CAPSULE ORAL at 21:44

## 2020-04-08 RX ADMIN — Medication 1300 MILLIGRAM(S): at 13:07

## 2020-04-08 RX ADMIN — Medication 650 MILLIGRAM(S): at 00:33

## 2020-04-08 RX ADMIN — FINASTERIDE 5 MILLIGRAM(S): 5 TABLET, FILM COATED ORAL at 11:52

## 2020-04-08 RX ADMIN — GABAPENTIN 600 MILLIGRAM(S): 400 CAPSULE ORAL at 18:02

## 2020-04-08 RX ADMIN — LAMOTRIGINE 200 MILLIGRAM(S): 25 TABLET, ORALLY DISINTEGRATING ORAL at 11:52

## 2020-04-08 RX ADMIN — Medication 400 MILLIGRAM(S): at 05:30

## 2020-04-08 RX ADMIN — Medication 25 MILLIGRAM(S): at 21:43

## 2020-04-08 RX ADMIN — Medication 650 MILLIGRAM(S): at 18:03

## 2020-04-08 RX ADMIN — Medication 650 MILLIGRAM(S): at 11:52

## 2020-04-08 RX ADMIN — HEPARIN SODIUM 5000 UNIT(S): 5000 INJECTION INTRAVENOUS; SUBCUTANEOUS at 18:03

## 2020-04-08 RX ADMIN — GABAPENTIN 600 MILLIGRAM(S): 400 CAPSULE ORAL at 06:32

## 2020-04-08 RX ADMIN — PANTOPRAZOLE SODIUM 40 MILLIGRAM(S): 20 TABLET, DELAYED RELEASE ORAL at 06:32

## 2020-04-08 RX ADMIN — Medication 650 MILLIGRAM(S): at 05:31

## 2020-04-08 RX ADMIN — HEPARIN SODIUM 5000 UNIT(S): 5000 INJECTION INTRAVENOUS; SUBCUTANEOUS at 05:32

## 2020-04-08 RX ADMIN — ATORVASTATIN CALCIUM 10 MILLIGRAM(S): 80 TABLET, FILM COATED ORAL at 21:44

## 2020-04-08 RX ADMIN — Medication 1300 MILLIGRAM(S): at 05:31

## 2020-04-08 NOTE — PROGRESS NOTE ADULT - SUBJECTIVE AND OBJECTIVE BOX
Note is incomplete  pt seen and examined.pts current chart reviewed and case discussed with resident covering.    SUBJECTIVE:  pt feels fine and denies cp,sob,gi or gu/uremic  symptons    REVIEW OF SYSTEMS:  CONSTITUTIONAL: No weakness, fevers or chills  EYES/ENT: No visual changes;  No vertigo or throat pain   NECK: No pain or stiffness  RESPIRATORY: No cough, wheezing, hemoptysis; No shortness of breath  CARDIOVASCULAR: No chest pain or palpitations  GASTROINTESTINAL: No abdominal or epigastric pain. No nausea, vomiting, or hematemesis; No diarrhea or constipation. No melena or hematochezia.  GENITOURINARY: No dysuria, frequency , hematuria, flank pain or nocturia  NEUROLOGICAL: No numbness or weakness  SKIN: No itching, burning, rashes, or lesions   All other review of systems is negative unless indicated above    Current meds:    acetaminophen   Tablet .. 650 milliGRAM(s) Oral every 6 hours  ARIPiprazole 15 milliGRAM(s) Oral daily  atorvastatin 10 milliGRAM(s) Oral at bedtime  finasteride 5 milliGRAM(s) Oral daily  gabapentin 600 milliGRAM(s) Oral two times a day  heparin  Injectable 5000 Unit(s) SubCutaneous every 12 hours  HYDROmorphone   Tablet 4 milliGRAM(s) Oral every 8 hours PRN  hydroxychloroquine   Oral   hydroxychloroquine 200 milliGRAM(s) Oral every 12 hours  lamoTRIgine 200 milliGRAM(s) Oral daily  levoFLOXacin  Tablet 250 milliGRAM(s) Oral every 24 hours  pantoprazole    Tablet 40 milliGRAM(s) Oral before breakfast  sodium bicarbonate 1300 milliGRAM(s) Oral three times a day  tamsulosin 0.4 milliGRAM(s) Oral at bedtime  traZODone 25 milliGRAM(s) Oral at bedtime      Vital Signs    T(F): 99.8 (04-08-20 @ 07:47), Max: 101 (04-08-20 @ 00:31)  HR: 82 (04-08-20 @ 07:47) (82 - 99)  BP: 101/60 (04-08-20 @ 07:47) (101/60 - 119/68)  ABP: --  RR: 20 (04-08-20 @ 07:47) (19 - 20)  SpO2: 97% (04-08-20 @ 07:47) (94% - 99%)  Wt(kg): --  CVP(cm H2O): --  CO: --  PCWP: --    I and O's:    04-06 @ 07:01  -  04-07 @ 07:00  --------------------------------------------------------  IN:    Oral Fluid: 1190 mL  Total IN: 1190 mL    OUT:    Ileostomy: 700 mL    Voided: 300 mL  Total OUT: 1000 mL    Total NET: 190 mL      04-07 @ 07:01  -  04-08 @ 07:00  --------------------------------------------------------  IN:    Oral Fluid: 966 mL  Total IN: 966 mL    OUT:    Ileostomy: 300 mL    Voided: 350 mL  Total OUT: 650 mL    Total NET: 316 mL      04-08 @ 07:01  -  04-08 @ 15:07  --------------------------------------------------------  IN:    Oral Fluid: 430 mL  Total IN: 430 mL    OUT:  Total OUT: 0 mL    Total NET: 430 mL        Daily     Daily     PHYSICAL EXAM:  Constitutional: well developed, well nourished  and in nad  HEENT: PERRLA,  no icteric sclera and mild pallor of conjunctiva noted  Neck: No JVD, thyromegaly or adenopathy  Respiratory: reduced air entry lower lungs with no rales, wheezing or rhonchi  Cardiovascular: S1 and S2 normally heard  Gastrointestinal: soft, nondistended, nontender and normal bowel sounds heard  Extremities: No peripheral edema or cyanosis  Neurological: A/O x 3, no focal deficits  : No flank or cva tenderness palpated.  Skin: No rashes      LABS:    CBC:                          10.8   13.56 )-----------( 217      ( 07 Apr 2020 12:37 )             32.6           BMP:    04-07    136  |  112<H>  |  81<H>  ----------------------------<  95  4.6   |  14<L>  |  3.92<H>  04-06    140  |  116<H>  |  84<H>  ----------------------------<  112<H>  4.7   |  14<L>  |  3.86<H>    Ca    8.5      07 Apr 2020 12:37  Ca    8.6      06 Apr 2020 15:55  Phos  3.4     04-07    TPro  9.2<H>  /  Alb  3.3<L>  /  TBili  0.6  /  DBili  x   /  AST  103<H>  /  ALT  33  /  AlkPhos  93  04-07  TPro  9.4<H>  /  Alb  3.2<L>  /  TBili  0.7  /  DBili  x   /  AST  114<H>  /  ALT  32  /  AlkPhos  94  04-06      Intact PTH: 182 pg/mL (04-07 @ 17:50)      URINE STUDIES:        Potassium, Random Urine: 26 mmol/L (04-05 @ 07:41)  Osmolality, Random Urine: 431 mos/kg (04-05 @ 07:41)  Sodium, Random Urine: 17 mmol/L (04-05 @ 07:41)  Creatinine, Random Urine: 100 mg/dL (04-05 @ 07:41)  Sodium, Random Urine: 26 mmol/L (04-04 @ 09:31)  Creatinine, Random Urine: 87 mg/dL (04-04 @ 09:31)                  RADIOLOGY & ADDITIONAL STUDIES: pt seen and examined.    SUBJECTIVE:  pt  denies cp,sob,gi or gu symptoms  No labs drawn as pt has poor iv access  pt is voiding but u/o is unknown  Current meds:    acetaminophen   Tablet .. 650 milliGRAM(s) Oral every 6 hours  ARIPiprazole 15 milliGRAM(s) Oral daily  atorvastatin 10 milliGRAM(s) Oral at bedtime  finasteride 5 milliGRAM(s) Oral daily  gabapentin 600 milliGRAM(s) Oral two times a day  heparin  Injectable 5000 Unit(s) SubCutaneous every 12 hours  HYDROmorphone   Tablet 4 milliGRAM(s) Oral every 8 hours PRN  hydroxychloroquine   Oral   hydroxychloroquine 200 milliGRAM(s) Oral every 12 hours  lamoTRIgine 200 milliGRAM(s) Oral daily  levoFLOXacin  Tablet 250 milliGRAM(s) Oral every 24 hours  pantoprazole    Tablet 40 milliGRAM(s) Oral before breakfast  sodium bicarbonate 1300 milliGRAM(s) Oral three times a day  tamsulosin 0.4 milliGRAM(s) Oral at bedtime  traZODone 25 milliGRAM(s) Oral at bedtime      Vital Signs    T(F): 99.8 (04-08-20 @ 07:47), Max: 101 (04-08-20 @ 00:31)  HR: 82 (04-08-20 @ 07:47) (82 - 99)  BP: 101/60 (04-08-20 @ 07:47) (101/60 - 119/68)  ABP: --  RR: 20 (04-08-20 @ 07:47) (19 - 20)  SpO2: 97% (04-08-20 @ 07:47) (94% - 99%)  Wt(kg): --  CVP(cm H2O): --  CO: --  PCWP: --    I and O's:    04-06 @ 07:01  -  04-07 @ 07:00  --------------------------------------------------------  IN:    Oral Fluid: 1190 mL  Total IN: 1190 mL    OUT:    Ileostomy: 700 mL    Voided: 300 mL  Total OUT: 1000 mL    Total NET: 190 mL      04-07 @ 07:01  -  04-08 @ 07:00  --------------------------------------------------------  IN:    Oral Fluid: 966 mL  Total IN: 966 mL    OUT:    Ileostomy: 300 mL    Voided: 350 mL  Total OUT: 650 mL    Total NET: 316 mL      04-08 @ 07:01  -  04-08 @ 15:07  --------------------------------------------------------  IN:    Oral Fluid: 430 mL  Total IN: 430 mL    OUT:  Total OUT: 0 mL    Total NET: 430 mL        Daily     Daily     PHYSICAL EXAM:  GENERAL: No acute respiratory distress.  EYES: conjunctiva and sclera clear  ENMT: Atraumatic, Normocephalic, supple, No JVD present.  RESPIRATORY: b/l poor air entry; No rales, rhonchi, wheezing  CARDIOVASCULAR: S1S2+; no m/r/g  GASTROINTESTINAL: Soft, Non-tender, Nondistended; Bowel sounds present,   CENTRAL NERVOUS SYSTEM:  Awake, Alert & Oriented X2-3, No focal deficits present.   EXTREMITIES:  trace edema, No Cyanosis  SKIN: No rashes      LABS:    CBC:                          10.8   13.56 )-----------( 217      ( 07 Apr 2020 12:37 )             32.6           BMP:    04-07    136  |  112<H>  |  81<H>  ----------------------------<  95  4.6   |  14<L>  |  3.92<H>  04-06    140  |  116<H>  |  84<H>  ----------------------------<  112<H>  4.7   |  14<L>  |  3.86<H>    Ca    8.5      07 Apr 2020 12:37  Ca    8.6      06 Apr 2020 15:55  Phos  3.4     04-07    TPro  9.2<H>  /  Alb  3.3<L>  /  TBili  0.6  /  DBili  x   /  AST  103<H>  /  ALT  33  /  AlkPhos  93  04-07  TPro  9.4<H>  /  Alb  3.2<L>  /  TBili  0.7  /  DBili  x   /  AST  114<H>  /  ALT  32  /  AlkPhos  94  04-06      Intact PTH: 182 pg/mL (04-07 @ 17:50)

## 2020-04-08 NOTE — PROGRESS NOTE ADULT - ASSESSMENT
Impression and Plan:    1. Acute hypoxemic respiratory failure likely due to viral pneumonia r/t COVID-19 superimposed with bacterial infection  2.  Hypoalbuminemia  3.  Abnormal Liver function tests due to acute viral illness  4. PAWAN on CKD likely due to COVID related ATN   5. metabolic encephalopathy resolved    WBC uptrended  Scr slightly uptrended from yesterday but remains lower than on admission  - continue empiric antibiotics   - continue hydroxychloroquine for COVID related illness  - continue to monitor closely  - continue supplemental oxygen as need  - Supportive measures  - Continue Isolation precautions based on COVID-19 infection control protocol  - con't GI/DVT PPX  - SW for post dc needs

## 2020-04-08 NOTE — PROGRESS NOTE ADULT - SUBJECTIVE AND OBJECTIVE BOX
Chart reviewed.  Patient seen and examined.    CC: Patient is a 69y old  Male who presents with a chief complaint of AMS (08 Apr 2020 15:07)    HPI: 69 year old Male from [facility], with PMHx of prostate Ca (s/p radiation in 2015, now in remission), asthma, COPD (not on O2), HTN, HLD, Bipolar, anemia, GERD, Ileostomy s/p sigmoid resection, PAD, and CKD is [facility] ED [facility] for [facility] and weakness. Patient is AAOx2 on my exam and denies any complaints, however not a reliable historian. No acute distress.  GOC: Full code. (04 Apr 2020 00:54)      MEDICATIONS  (STANDING):  acetaminophen   Tablet .. 650 milliGRAM(s) Oral every 6 hours  ARIPiprazole 15 milliGRAM(s) Oral daily  atorvastatin 10 milliGRAM(s) Oral at bedtime  finasteride 5 milliGRAM(s) Oral daily  gabapentin 600 milliGRAM(s) Oral two times a day  heparin  Injectable 5000 Unit(s) SubCutaneous every 12 hours  hydroxychloroquine   Oral   hydroxychloroquine 200 milliGRAM(s) Oral every 12 hours  lamoTRIgine 200 milliGRAM(s) Oral daily  levoFLOXacin  Tablet 250 milliGRAM(s) Oral every 24 hours  pantoprazole    Tablet 40 milliGRAM(s) Oral before breakfast  sodium bicarbonate 1300 milliGRAM(s) Oral three times a day  tamsulosin 0.4 milliGRAM(s) Oral at bedtime  traZODone 25 milliGRAM(s) Oral at bedtime    MEDICATIONS  (PRN):  HYDROmorphone   Tablet 4 milliGRAM(s) Oral every 8 hours PRN Severe Pain (7 - 10)      VITALS:  Vital Signs Last 24 Hrs  T(C): 37.2 (08 Apr 2020 15:42), Max: 38.3 (08 Apr 2020 00:31)  T(F): 99 (08 Apr 2020 15:42), Max: 101 (08 Apr 2020 00:31)  HR: 82 (08 Apr 2020 15:42) (82 - 99)  BP: 101/61 (08 Apr 2020 15:42) (101/60 - 119/68)  BP(mean): --  RR: 19 (08 Apr 2020 15:42) (19 - 20)  SpO2: 90% (08 Apr 2020 15:42) (90% - 99%)      FOCUS PHYSICAL EXAM:  GENERAL: NAD  Head: AT/NC  eyes: nonicteric  Mouth: dry membranes  RESP: even and unlabored breathing on room air  BREAST:  not examined  NEURO: AAOX3, follows all commands, able to move extremities spontaneously  EXT: no c/c/e    LABS:                      10.8   13.56 )-----------( 217      ( 07 Apr 2020 12:37 )             32.6     04-07    136  |  112<H>  |  81<H>  ----------------------------<  95  4.6   |  14<L>  |  3.92<H>    Ca    8.5      07 Apr 2020 12:37  Phos  3.4     04-07    TPro  9.2<H>  /  Alb  3.3<L>  /  TBili  0.6  /  DBili  x   /  AST  103<H>  /  ALT  33  /  AlkPhos  93  04-07  LIVER FUNCTIONS - ( 07 Apr 2020 12:37 )  Alb: 3.3 g/dL / Pro: 9.2 g/dL / ALK PHOS: 93 U/L / ALT: 33 U/L DA / AST: 103 U/L / GGT: x           Creatinine, Serum: 3.86 mg/dL (04.06.20 @ 15:55)  Creatinine, Serum: 5.84 mg/dL (04.03.20 @ 14:41)  OLDER/BASELINE DATA  Creatinine, Serum: 2.10 mg/dL (02.11.20 @ 07:52)

## 2020-04-08 NOTE — PROGRESS NOTE ADULT - ASSESSMENT
1. PAWAN due to pre-renal azotemia due to volume depletion vs r/o ATN.   -pts renal function is unknown today as no new labs drawn  - encourage oral intake.  unable to give fluids because as pt has no IV access.   -suggest central line placement as per medical team  -urinalysis, urine lytes noted and Fena is 0.51%  -unable to perform renal sono due to covid.  -Adjust meds to eGFR and avoid IV Gadolinium contrast,NSAIDs, and phosphate enema.  -Monitor I/O's daily.   -Monitor SMA daily.  -may need hd if pt develops acidosis , hyperkalemia , fluid overload or uremia  2. CKD stage 3 due h/o renal recovery from ATN.  -baseline scr around 2.0mg/dL with non-nephrotic range proteinuria (uPCR 900mg)  -Keep patient euvolemic and renal diet  -Avoid Nephrotoxic Meds/ Agents such as (NSAIDs, IV contrast, Aminoglycosides such as gentamicin, -Gadolinium contrast, Phosphate containing enemas, etc..)  -Adjust Medications according to eGFR  3. Hyperkalemia: k is unknown today -no labs drawn  - monitor K daily  -continue  low k diet  4.Hx of hypotension: improved   -may add Midodrine prn  5. MBD: with normal phos level  -pt has mild high normal pth-no intervention needed at this time  COVID +ve pna. plan as per primary team.  6. Acidosis due to renal failure:  -unable to give sodium bicarbonate drip due to no iv access; improving and continue due to sodium bicarbonate 1300mg bid   abg noted.  7. Anemia:multifactorial-mild  -no epogen needed at this time  -F/u CBC daily  -transfuse if HB < 7.0.

## 2020-04-09 LAB
ALBUMIN SERPL ELPH-MCNC: 3 G/DL — LOW (ref 3.5–5)
ALP SERPL-CCNC: 91 U/L — SIGNIFICANT CHANGE UP (ref 40–120)
ALT FLD-CCNC: 37 U/L DA — SIGNIFICANT CHANGE UP (ref 10–60)
ANION GAP SERPL CALC-SCNC: 13 MMOL/L — SIGNIFICANT CHANGE UP (ref 5–17)
ANISOCYTOSIS BLD QL: SLIGHT — SIGNIFICANT CHANGE UP
AST SERPL-CCNC: 102 U/L — HIGH (ref 10–40)
BASOPHILS # BLD AUTO: 0 K/UL — SIGNIFICANT CHANGE UP (ref 0–0.2)
BASOPHILS NFR BLD AUTO: 0 % — SIGNIFICANT CHANGE UP (ref 0–2)
BILIRUB SERPL-MCNC: 0.6 MG/DL — SIGNIFICANT CHANGE UP (ref 0.2–1.2)
BUN SERPL-MCNC: 107 MG/DL — HIGH (ref 7–18)
BURR CELLS BLD QL SMEAR: PRESENT — SIGNIFICANT CHANGE UP
CALCIUM SERPL-MCNC: 8.6 MG/DL — SIGNIFICANT CHANGE UP (ref 8.4–10.5)
CHLORIDE SERPL-SCNC: 108 MMOL/L — SIGNIFICANT CHANGE UP (ref 96–108)
CK SERPL-CCNC: 806 U/L — HIGH (ref 35–232)
CO2 SERPL-SCNC: 13 MMOL/L — LOW (ref 22–31)
CREAT SERPL-MCNC: 4.48 MG/DL — HIGH (ref 0.5–1.3)
EOSINOPHIL # BLD AUTO: 0.08 K/UL — SIGNIFICANT CHANGE UP (ref 0–0.5)
EOSINOPHIL NFR BLD AUTO: 1 % — SIGNIFICANT CHANGE UP (ref 0–6)
GLUCOSE SERPL-MCNC: 87 MG/DL — SIGNIFICANT CHANGE UP (ref 70–99)
HCT VFR BLD CALC: 35.2 % — LOW (ref 39–50)
HGB BLD-MCNC: 11.2 G/DL — LOW (ref 13–17)
LYMPHOCYTES # BLD AUTO: 1.6 K/UL — SIGNIFICANT CHANGE UP (ref 1–3.3)
LYMPHOCYTES # BLD AUTO: 21 % — SIGNIFICANT CHANGE UP (ref 13–44)
MAGNESIUM SERPL-MCNC: 2.4 MG/DL — SIGNIFICANT CHANGE UP (ref 1.6–2.6)
MANUAL SMEAR VERIFICATION: SIGNIFICANT CHANGE UP
MCHC RBC-ENTMCNC: 27.7 PG — SIGNIFICANT CHANGE UP (ref 27–34)
MCHC RBC-ENTMCNC: 31.8 GM/DL — LOW (ref 32–36)
MCV RBC AUTO: 87.1 FL — SIGNIFICANT CHANGE UP (ref 80–100)
MONOCYTES # BLD AUTO: 0.31 K/UL — SIGNIFICANT CHANGE UP (ref 0–0.9)
MONOCYTES NFR BLD AUTO: 4 % — SIGNIFICANT CHANGE UP (ref 2–14)
NEUTROPHILS # BLD AUTO: 5.58 K/UL — SIGNIFICANT CHANGE UP (ref 1.8–7.4)
NEUTROPHILS NFR BLD AUTO: 71 % — SIGNIFICANT CHANGE UP (ref 43–77)
NEUTS BAND # BLD: 2 % — SIGNIFICANT CHANGE UP (ref 0–8)
NRBC # BLD: 0 /100 — SIGNIFICANT CHANGE UP (ref 0–0)
PHOSPHATE SERPL-MCNC: 5.5 MG/DL — HIGH (ref 2.5–4.5)
PLAT MORPH BLD: NORMAL — SIGNIFICANT CHANGE UP
PLATELET # BLD AUTO: 224 K/UL — SIGNIFICANT CHANGE UP (ref 150–400)
PLATELET COUNT - ESTIMATE: NORMAL — SIGNIFICANT CHANGE UP
POIKILOCYTOSIS BLD QL AUTO: SLIGHT — SIGNIFICANT CHANGE UP
POTASSIUM SERPL-MCNC: 5.9 MMOL/L — HIGH (ref 3.5–5.3)
POTASSIUM SERPL-SCNC: 5.9 MMOL/L — HIGH (ref 3.5–5.3)
PROT SERPL-MCNC: 9.8 G/DL — HIGH (ref 6–8.3)
RBC # BLD: 4.04 M/UL — LOW (ref 4.2–5.8)
RBC # FLD: 15.8 % — HIGH (ref 10.3–14.5)
RBC BLD AUTO: ABNORMAL
SODIUM SERPL-SCNC: 134 MMOL/L — LOW (ref 135–145)
VARIANT LYMPHS # BLD: 1 % — SIGNIFICANT CHANGE UP (ref 0–6)
WBC # BLD: 7.64 K/UL — SIGNIFICANT CHANGE UP (ref 3.8–10.5)
WBC # FLD AUTO: 7.64 K/UL — SIGNIFICANT CHANGE UP (ref 3.8–10.5)

## 2020-04-09 RX ORDER — SODIUM POLYSTYRENE SULFONATE 4.1 MEQ/G
15 POWDER, FOR SUSPENSION ORAL ONCE
Refills: 0 | Status: COMPLETED | OUTPATIENT
Start: 2020-04-09 | End: 2020-04-09

## 2020-04-09 RX ORDER — SODIUM ZIRCONIUM CYCLOSILICATE 10 G/10G
10 POWDER, FOR SUSPENSION ORAL THREE TIMES A DAY
Refills: 0 | Status: COMPLETED | OUTPATIENT
Start: 2020-04-10 | End: 2020-04-11

## 2020-04-09 RX ADMIN — Medication 200 MILLIGRAM(S): at 17:15

## 2020-04-09 RX ADMIN — FINASTERIDE 5 MILLIGRAM(S): 5 TABLET, FILM COATED ORAL at 11:16

## 2020-04-09 RX ADMIN — ARIPIPRAZOLE 15 MILLIGRAM(S): 15 TABLET ORAL at 11:15

## 2020-04-09 RX ADMIN — Medication 1300 MILLIGRAM(S): at 05:45

## 2020-04-09 RX ADMIN — LAMOTRIGINE 200 MILLIGRAM(S): 25 TABLET, ORALLY DISINTEGRATING ORAL at 11:15

## 2020-04-09 RX ADMIN — SODIUM POLYSTYRENE SULFONATE 15 GRAM(S): 4.1 POWDER, FOR SUSPENSION ORAL at 17:16

## 2020-04-09 RX ADMIN — HEPARIN SODIUM 5000 UNIT(S): 5000 INJECTION INTRAVENOUS; SUBCUTANEOUS at 05:45

## 2020-04-09 RX ADMIN — GABAPENTIN 600 MILLIGRAM(S): 400 CAPSULE ORAL at 17:16

## 2020-04-09 RX ADMIN — Medication 25 MILLIGRAM(S): at 22:16

## 2020-04-09 RX ADMIN — PANTOPRAZOLE SODIUM 40 MILLIGRAM(S): 20 TABLET, DELAYED RELEASE ORAL at 09:28

## 2020-04-09 RX ADMIN — GABAPENTIN 600 MILLIGRAM(S): 400 CAPSULE ORAL at 05:46

## 2020-04-09 RX ADMIN — HEPARIN SODIUM 5000 UNIT(S): 5000 INJECTION INTRAVENOUS; SUBCUTANEOUS at 17:16

## 2020-04-09 RX ADMIN — TAMSULOSIN HYDROCHLORIDE 0.4 MILLIGRAM(S): 0.4 CAPSULE ORAL at 22:16

## 2020-04-09 RX ADMIN — Medication 200 MILLIGRAM(S): at 05:45

## 2020-04-09 RX ADMIN — Medication 1300 MILLIGRAM(S): at 12:15

## 2020-04-09 RX ADMIN — Medication 1300 MILLIGRAM(S): at 22:16

## 2020-04-09 RX ADMIN — ATORVASTATIN CALCIUM 10 MILLIGRAM(S): 80 TABLET, FILM COATED ORAL at 22:15

## 2020-04-09 NOTE — DIETITIAN INITIAL EVALUATION ADULT. - OTHER INFO
Pt admitted from [facility] due to AMS. Pt currently being treated for resp failure due to PNA; on room air, PAWAN on CKD and comorbidities. PMH of prostate cancer (remission), CKD stage 3, PAD, HLD, HTN, IBD, COPD, anemia, bipolar disorder. Pt with ileostomy. No edema noted. Pt admitted from [facility] due to AMS. Pt currently being treated for resp failure due to PNA; on room air, PAWAN on CKD and comorbidities. PMH of prostate cancer (remission), CKD stage 3, PAD, HLD, HTN, IBD, COPD, anemia, bipolar disorder. Pt with ileostomy. No edema.  noted. Per RN no GI distress and pt isn't eating well, mainly just eats the sides.

## 2020-04-09 NOTE — PROGRESS NOTE ADULT - SUBJECTIVE AND OBJECTIVE BOX
Chart reviewed.  Patient seen and examined.    CC: Patient is a 69y old  Male who presents with a chief complaint of AMS (08 Apr 2020 15:07)    HPI: 69 year old Male from [facility], with PMHx of prostate Ca (s/p radiation in 2015, now in remission), asthma, COPD (not on O2), HTN, HLD, Bipolar, anemia, GERD, Ileostomy s/p sigmoid resection, PAD, and CKD is [facility] the ED [facility] for [facility] and weakness. Patient is AAOx2 on my exam and denies any complaints, however not a reliable historian. No acute distress.  GOC: Full code. (04 Apr 2020 00:54)    Subjective/ROS: feels "OK' breathing is ok; denies CP/palpitation/SOB/HA/dizziness/abd pain/n/v/d/f/c    MEDICATIONS  (STANDING):  acetaminophen   Tablet .. 650 milliGRAM(s) Oral every 6 hours  ARIPiprazole 15 milliGRAM(s) Oral daily  atorvastatin 10 milliGRAM(s) Oral at bedtime  finasteride 5 milliGRAM(s) Oral daily  gabapentin 600 milliGRAM(s) Oral two times a day  heparin  Injectable 5000 Unit(s) SubCutaneous every 12 hours  hydroxychloroquine   Oral   hydroxychloroquine 200 milliGRAM(s) Oral every 12 hours  lamoTRIgine 200 milliGRAM(s) Oral daily  levoFLOXacin  Tablet 250 milliGRAM(s) Oral every 24 hours  pantoprazole    Tablet 40 milliGRAM(s) Oral before breakfast  sodium bicarbonate 1300 milliGRAM(s) Oral three times a day  sodium polystyrene sulfonate Suspension 15 Gram(s) Oral once  tamsulosin 0.4 milliGRAM(s) Oral at bedtime  traZODone 25 milliGRAM(s) Oral at bedtime    MEDICATIONS  (PRN):  HYDROmorphone   Tablet 4 milliGRAM(s) Oral every 8 hours PRN Severe Pain (7 - 10)    Vital Signs Last 24 Hrs  T(C): 37 (09 Apr 2020 12:06), Max: 37.2 (08 Apr 2020 15:42)  T(F): 98.6 (09 Apr 2020 12:06), Max: 99 (08 Apr 2020 15:42)  HR: 84 (09 Apr 2020 12:06) (78 - 85)  BP: 98/60 (09 Apr 2020 12:06) (98/60 - 115/76)  BP(mean): --  RR: 17 (09 Apr 2020 12:06) (17 - 20)  SpO2: 94% (09 Apr 2020 12:06) (90% - 97%)    FOCUS PHYSICAL EXAM:  GENERAL: NAD  Head: AT/NC  eyes: nonicteric  Mouth: dry membranes  RESP: even and unlabored breathing on room air  BREAST:  not examined  NEURO: AAOX3, follows all commands, able to move extremities spontaneously but he reports movement is less than his baseline  EXT: multiple resolving bruise to BUE 2/2 IV/blood draw, otherwise no c/c/e    LABS:                      10.8   13.56 )-----------( 217      ( 07 Apr 2020 12:37 )             32.6     04-07    136  |  112<H>  |  81<H>  ----------------------------<  95  4.6   |  14<L>  |  3.92<H>    Ca    8.5      07 Apr 2020 12:37  Phos  3.4     04-07    TPro  9.2<H>  /  Alb  3.3<L>  /  TBili  0.6  /  DBili  x   /  AST  103<H>  /  ALT  33  /  AlkPhos  93  04-07  LIVER FUNCTIONS - ( 07 Apr 2020 12:37 )  Alb: 3.3 g/dL / Pro: 9.2 g/dL / ALK PHOS: 93 U/L / ALT: 33 U/L DA / AST: 103 U/L / GGT: x           Creatinine, Serum: 3.86 mg/dL (04.06.20 @ 15:55)  Creatinine, Serum: 5.84 mg/dL (04.03.20 @ 14:41)  OLDER/BASELINE DATA  Creatinine, Serum: 2.10 mg/dL (02.11.20 @ 07:52)

## 2020-04-09 NOTE — PROGRESS NOTE ADULT - ASSESSMENT
Impression and Plan:    1. Acute hypoxemic respiratory failure likely due to viral pneumonia r/t COVID-19 superimposed with bacterial infection  2.  Hypoalbuminemia  3.  Abnormal Liver function tests due to acute viral illness  4. PAWAN on CKD likely due to COVID related ATN   5. metabolic encephalopathy resolved    AM labs pending collection: primary RN will collect now  Scr above baseline; will need to trend diligently  - continue empiric antibiotics   - continue hydroxychloroquine for COVID related illness  - continue to monitor closely  - continue supplemental oxygen as need  - Supportive measures  - Continue Isolation precautions based on COVID-19 infection control protocol  - con't GI/DVT PPX  - SW for post dc needs  ** Plan of care d/w patient and attending Impression and Plan:    1. Acute hypoxemic respiratory failure likely due to viral pneumonia r/t COVID-19 superimposed with bacterial infection  2.  Hypoalbuminemia  3.  Abnormal Liver function tests due to acute viral illness  4. PAWAN on CKD likely due to COVID related ATN   5. metabolic encephalopathy resolved    AM labs pending collection: primary RN will collect now   Scr above baseline; will need to trend diligently given pt with high risk for worsening renal function  - continue empiric antibiotics   - continue hydroxychloroquine for COVID related illness  - continue to monitor closely  - continue supplemental oxygen as need  - Supportive measures  - Continue Isolation precautions based on COVID-19 infection control protocol  - con't GI/DVT PPX  - SW for post dc needs  ** Plan of care d/w patient and attending

## 2020-04-09 NOTE — PROGRESS NOTE ADULT - ASSESSMENT
1. PAWAN due to pre-renal azotemia due to volume depletion vs r/o ATN secondary to covid infection  -pt will need iv hydration, we will request surgery for central line placement  - encourage oral intake now and add iv hydraion once pt has central line in place tomorrow  -urinalysis, urine lytes noted and Fena is 0.51%  -unable to perform renal sono due to covid.  -Adjust meds to eGFR and avoid IV Gadolinium contrast,NSAIDs, and phosphate enema.  -Monitor I/O's daily.   -Monitor SMA daily.  -may need hd if pt develops acidosis , hyperkalemia , fluid overload or uremia  2. CKD stage 3 due h/o renal recovery from ATN.  -baseline scr around 2.0mg/dL with non-nephrotic range proteinuria (uPCR 900mg)  -Keep patient euvolemic and renal diet  -Avoid Nephrotoxic Meds/ Agents such as (NSAIDs, IV contrast, Aminoglycosides such as gentamicin, -Gadolinium contrast, Phosphate containing enemas, etc..)  -Adjust Medications according to eGFR  3. Hyperkalemia: k is high, was given Kayexalate   -add lokelma 10 gm daily  - monitor K daily  -continue  low k diet  4.Hx of hypotension: now bp is low normal  -may add Midodrine prn for sbp<100  5. MBD: with normal phos level  -pt has mild high normal pth-no intervention needed at this time  COVID +ve pna. plan as per primary team.  6. Acidosis due to renal failure:  -unable to give sodium bicarbonate drip due to no iv acces , we will  continue due to sodium bicarbonate 1300mg tid  7. Anemia:multifactorial-mild  -no epogen needed at this time  -F/u CBC daily  -transfuse if HB < 7.0.

## 2020-04-09 NOTE — DIETITIAN INITIAL EVALUATION ADULT. - PROBLEM SELECTOR PLAN 2
/ Cr 5.84 on admission  baseline creatinine is around 2  patient clinically dehydrated  gentle IV hydration  avoid nephrotoxins  f/u urine lytes  Nephro consulted - Dr. Pond

## 2020-04-09 NOTE — DIETITIAN INITIAL EVALUATION ADULT. - PERTINENT MEDS FT
MEDICATIONS  (STANDING):  acetaminophen   Tablet .. 650 milliGRAM(s) Oral every 6 hours  ARIPiprazole 15 milliGRAM(s) Oral daily  atorvastatin 10 milliGRAM(s) Oral at bedtime  finasteride 5 milliGRAM(s) Oral daily  gabapentin 600 milliGRAM(s) Oral two times a day  heparin  Injectable 5000 Unit(s) SubCutaneous every 12 hours  hydroxychloroquine   Oral   hydroxychloroquine 200 milliGRAM(s) Oral every 12 hours  lamoTRIgine 200 milliGRAM(s) Oral daily  levoFLOXacin  Tablet 250 milliGRAM(s) Oral every 24 hours  pantoprazole    Tablet 40 milliGRAM(s) Oral before breakfast  sodium bicarbonate 1300 milliGRAM(s) Oral three times a day  sodium polystyrene sulfonate Suspension 15 Gram(s) Oral once  tamsulosin 0.4 milliGRAM(s) Oral at bedtime  traZODone 25 milliGRAM(s) Oral at bedtime    MEDICATIONS  (PRN):  HYDROmorphone   Tablet 4 milliGRAM(s) Oral every 8 hours PRN Severe Pain (7 - 10)

## 2020-04-09 NOTE — PROGRESS NOTE ADULT - SUBJECTIVE AND OBJECTIVE BOX
Note is incomplete  pt seen and examined.pts current chart reviewed and case discussed with resident covering.    SUBJECTIVE:  pt feels fine and denies cp,sob,gi or gu/uremic  symptons    REVIEW OF SYSTEMS:  CONSTITUTIONAL: No weakness, fevers or chills  EYES/ENT: No visual changes;  No vertigo or throat pain   NECK: No pain or stiffness  RESPIRATORY: No cough, wheezing, hemoptysis; No shortness of breath  CARDIOVASCULAR: No chest pain or palpitations  GASTROINTESTINAL: No abdominal or epigastric pain. No nausea, vomiting, or hematemesis; No diarrhea or constipation. No melena or hematochezia.  GENITOURINARY: No dysuria, frequency , hematuria, flank pain or nocturia  NEUROLOGICAL: No numbness or weakness  SKIN: No itching, burning, rashes, or lesions   All other review of systems is negative unless indicated above    Current meds:    acetaminophen   Tablet .. 650 milliGRAM(s) Oral every 6 hours  ARIPiprazole 15 milliGRAM(s) Oral daily  atorvastatin 10 milliGRAM(s) Oral at bedtime  finasteride 5 milliGRAM(s) Oral daily  gabapentin 600 milliGRAM(s) Oral two times a day  heparin  Injectable 5000 Unit(s) SubCutaneous every 12 hours  HYDROmorphone   Tablet 4 milliGRAM(s) Oral every 8 hours PRN  hydroxychloroquine   Oral   hydroxychloroquine 200 milliGRAM(s) Oral every 12 hours  lamoTRIgine 200 milliGRAM(s) Oral daily  levoFLOXacin  Tablet 250 milliGRAM(s) Oral every 24 hours  pantoprazole    Tablet 40 milliGRAM(s) Oral before breakfast  sodium bicarbonate 1300 milliGRAM(s) Oral three times a day  sodium polystyrene sulfonate Suspension 15 Gram(s) Oral once  tamsulosin 0.4 milliGRAM(s) Oral at bedtime  traZODone 25 milliGRAM(s) Oral at bedtime      Vital Signs    T(F): 98.2 (20 @ 15:58), Max: 98.9 (20 @ 07:58)  HR: 80 (20 @ 15:58) (78 - 85)  BP: 107/70 (20 @ 15:58) (98/60 - 115/76)  ABP: --  RR: 17 (20 @ 15:58) (17 - 20)  SpO2: 99% (20 @ 15:58) (94% - 99%)  Wt(kg): --  CVP(cm H2O): --  CO: --  PCWP: --    I and O's:     @ 07:01  -   @ 07:00  --------------------------------------------------------  IN:    Oral Fluid: 966 mL  Total IN: 966 mL    OUT:    Ileostomy: 300 mL    Voided: 350 mL  Total OUT: 650 mL    Total NET: 316 mL       @ 07:  -   @ 07:00  --------------------------------------------------------  IN:    Oral Fluid: 1230 mL  Total IN: 1230 mL    OUT:    Ileostomy: 300 mL    Voided: 1400 mL  Total OUT: 1700 mL    Total NET: -470 mL        Daily     Daily Weight in k.1 (2020 14:46)    PHYSICAL EXAM:  Constitutional: well developed, well nourished  and in nad  HEENT: PERRLA,  no icteric sclera and mild pallor of conjunctiva noted  Neck: No JVD, thyromegaly or adenopathy  Respiratory: reduced air entry lower lungs with no rales, wheezing or rhonchi  Cardiovascular: S1 and S2 normally heard  Gastrointestinal: soft, nondistended, nontender and normal bowel sounds heard  Extremities: No peripheral edema or cyanosis  Neurological: A/O x 3, no focal deficits  : No flank or cva tenderness palpated.  Skin: No rashes      LABS:    CBC:                          11.2   7.64  )-----------( 224      ( 2020 13:46 )             35.2           BMP:        134<L>  |  108  |  107<H>  ----------------------------<  87  5.9<H>   |  13<L>  |  4.48<H>      136  |  112<H>  |  81<H>  ----------------------------<  95  4.6   |  14<L>  |  3.92<H>    Ca    8.6      2020 13:46  Ca    8.5      2020 12:37  Phos  5.5       Phos  3.4       Mg     2.4         TPro  9.8<H>  /  Alb  3.0<L>  /  TBili  0.6  /  DBili  x   /  AST  102<H>  /  ALT  37  /  AlkPhos  91    TPro  9.2<H>  /  Alb  3.3<L>  /  TBili  0.6  /  DBili  x   /  AST  103<H>  /  ALT  33  /  AlkPhos  93        Intact PTH: 182 pg/mL ( @ 17:50)      URINE STUDIES:        Potassium, Random Urine: 26 mmol/L ( @ 07:41)  Osmolality, Random Urine: 431 mos/kg ( @ 07:41)  Sodium, Random Urine: 17 mmol/L ( @ 07:41)  Creatinine, Random Urine: 100 mg/dL ( @ 07:41)  Sodium, Random Urine: 26 mmol/L ( @ 09:31)  Creatinine, Random Urine: 87 mg/dL ( @ 09:31)                  RADIOLOGY & ADDITIONAL STUDIES: pt seen and examined.    SUBJECTIVE:  pt is awake,alert and in nad.  pts reanl function is worsening , needs iv fluid but pt has no iv access   -discussed with pt for central line for iv hydration and iv nahco3 infusion pt agree  spoke to Dr Rush ,we will request Dr Holt for central line placement   pt is voiding but u/o is unknown    Current meds:    acetaminophen   Tablet .. 650 milliGRAM(s) Oral every 6 hours  ARIPiprazole 15 milliGRAM(s) Oral daily  atorvastatin 10 milliGRAM(s) Oral at bedtime  finasteride 5 milliGRAM(s) Oral daily  gabapentin 600 milliGRAM(s) Oral two times a day  heparin  Injectable 5000 Unit(s) SubCutaneous every 12 hours  HYDROmorphone   Tablet 4 milliGRAM(s) Oral every 8 hours PRN  hydroxychloroquine   Oral   hydroxychloroquine 200 milliGRAM(s) Oral every 12 hours  lamoTRIgine 200 milliGRAM(s) Oral daily  levoFLOXacin  Tablet 250 milliGRAM(s) Oral every 24 hours  pantoprazole    Tablet 40 milliGRAM(s) Oral before breakfast  sodium bicarbonate 1300 milliGRAM(s) Oral three times a day  sodium polystyrene sulfonate Suspension 15 Gram(s) Oral once  tamsulosin 0.4 milliGRAM(s) Oral at bedtime  traZODone 25 milliGRAM(s) Oral at bedtime      Vital Signs    T(F): 98.2 (20 @ 15:58), Max: 98.9 (20 @ 07:58)  HR: 80 (20 @ 15:58) (78 - 85)  BP: 107/70 (20 @ 15:58) (98/60 - 115/76)  ABP: --  RR: 17 (20 @ 15:58) (17 - 20)  SpO2: 99% (20 @ 15:58) (94% - 99%)  Wt(kg): --  CVP(cm H2O): --  CO: --  PCWP: --    I and O's:     @ 07:01  -   @ 07:00  --------------------------------------------------------  IN:    Oral Fluid: 966 mL  Total IN: 966 mL    OUT:    Ileostomy: 300 mL    Voided: 350 mL  Total OUT: 650 mL    Total NET: 316 mL       @ 07:01  -   @ 07:00  --------------------------------------------------------  IN:    Oral Fluid: 1230 mL  Total IN: 1230 mL    OUT:    Ileostomy: 300 mL    Voided: 1400 mL  Total OUT: 1700 mL    Total NET: -470 mL        Daily     Daily Weight in k.1 (2020 14:46)      LABS:    CBC:                          11.2   7.64  )-----------( 224      ( 2020 13:46 )             35.2           BMP:        134<L>  |  108  |  107<H>  ----------------------------<  87  5.9<H>   |  13<L>  |  4.48<H>      136  |  112<H>  |  81<H>  ----------------------------<  95  4.6   |  14<L>  |  3.92<H>    Ca    8.6      2020 13:46  Ca    8.5      2020 12:37  Phos  5.5       Phos  3.4       Mg     2.4         TPro  9.8<H>  /  Alb  3.0<L>  /  TBili  0.6  /  DBili  x   /  AST  102<H>  /  ALT  37  /  AlkPhos  91    TPro  9.2<H>  /  Alb  3.3<L>  /  TBili  0.6  /  DBili  x   /  AST  103<H>  /  ALT  33  /  AlkPhos  93        Intact PTH: 182 pg/mL ( @ 17:50)

## 2020-04-10 LAB
ALBUMIN SERPL ELPH-MCNC: 3.1 G/DL — LOW (ref 3.5–5)
ALP SERPL-CCNC: 92 U/L — SIGNIFICANT CHANGE UP (ref 40–120)
ALT FLD-CCNC: 37 U/L DA — SIGNIFICANT CHANGE UP (ref 10–60)
ANION GAP SERPL CALC-SCNC: 12 MMOL/L — SIGNIFICANT CHANGE UP (ref 5–17)
APTT BLD: 24.3 SEC — LOW (ref 27.5–36.3)
AST SERPL-CCNC: 75 U/L — HIGH (ref 10–40)
BASOPHILS # BLD AUTO: 0.02 K/UL — SIGNIFICANT CHANGE UP (ref 0–0.2)
BASOPHILS NFR BLD AUTO: 0.3 % — SIGNIFICANT CHANGE UP (ref 0–2)
BILIRUB SERPL-MCNC: 0.5 MG/DL — SIGNIFICANT CHANGE UP (ref 0.2–1.2)
BUN SERPL-MCNC: 112 MG/DL — HIGH (ref 7–18)
CALCIUM SERPL-MCNC: 8.7 MG/DL — SIGNIFICANT CHANGE UP (ref 8.4–10.5)
CHLORIDE SERPL-SCNC: 109 MMOL/L — HIGH (ref 96–108)
CK SERPL-CCNC: 459 U/L — HIGH (ref 35–232)
CO2 SERPL-SCNC: 16 MMOL/L — LOW (ref 22–31)
CREAT SERPL-MCNC: 4.14 MG/DL — HIGH (ref 0.5–1.3)
CRP SERPL-MCNC: 6.98 MG/DL — HIGH (ref 0–0.4)
D DIMER BLD IA.RAPID-MCNC: 2137 NG/ML DDU — HIGH
EOSINOPHIL # BLD AUTO: 0.17 K/UL — SIGNIFICANT CHANGE UP (ref 0–0.5)
EOSINOPHIL NFR BLD AUTO: 2.2 % — SIGNIFICANT CHANGE UP (ref 0–6)
GLUCOSE SERPL-MCNC: 104 MG/DL — HIGH (ref 70–99)
HCT VFR BLD CALC: 32.6 % — LOW (ref 39–50)
HGB BLD-MCNC: 10.6 G/DL — LOW (ref 13–17)
IMM GRANULOCYTES NFR BLD AUTO: 1.4 % — SIGNIFICANT CHANGE UP (ref 0–1.5)
INR BLD: 1.09 RATIO — SIGNIFICANT CHANGE UP (ref 0.88–1.16)
LYMPHOCYTES # BLD AUTO: 1.08 K/UL — SIGNIFICANT CHANGE UP (ref 1–3.3)
LYMPHOCYTES # BLD AUTO: 14 % — SIGNIFICANT CHANGE UP (ref 13–44)
MAGNESIUM SERPL-MCNC: 2.4 MG/DL — SIGNIFICANT CHANGE UP (ref 1.6–2.6)
MCHC RBC-ENTMCNC: 27.5 PG — SIGNIFICANT CHANGE UP (ref 27–34)
MCHC RBC-ENTMCNC: 32.5 GM/DL — SIGNIFICANT CHANGE UP (ref 32–36)
MCV RBC AUTO: 84.7 FL — SIGNIFICANT CHANGE UP (ref 80–100)
MONOCYTES # BLD AUTO: 0.56 K/UL — SIGNIFICANT CHANGE UP (ref 0–0.9)
MONOCYTES NFR BLD AUTO: 7.3 % — SIGNIFICANT CHANGE UP (ref 2–14)
NEUTROPHILS # BLD AUTO: 5.75 K/UL — SIGNIFICANT CHANGE UP (ref 1.8–7.4)
NEUTROPHILS NFR BLD AUTO: 74.8 % — SIGNIFICANT CHANGE UP (ref 43–77)
NRBC # BLD: 0 /100 WBCS — SIGNIFICANT CHANGE UP (ref 0–0)
PHOSPHATE SERPL-MCNC: 5.6 MG/DL — HIGH (ref 2.5–4.5)
PLATELET # BLD AUTO: 233 K/UL — SIGNIFICANT CHANGE UP (ref 150–400)
POTASSIUM SERPL-MCNC: 4.3 MMOL/L — SIGNIFICANT CHANGE UP (ref 3.5–5.3)
POTASSIUM SERPL-SCNC: 4.3 MMOL/L — SIGNIFICANT CHANGE UP (ref 3.5–5.3)
PROT SERPL-MCNC: 9.5 G/DL — HIGH (ref 6–8.3)
PROTHROM AB SERPL-ACNC: 12.4 SEC — SIGNIFICANT CHANGE UP (ref 10–12.9)
RBC # BLD: 3.85 M/UL — LOW (ref 4.2–5.8)
RBC # FLD: 15.3 % — HIGH (ref 10.3–14.5)
SODIUM SERPL-SCNC: 137 MMOL/L — SIGNIFICANT CHANGE UP (ref 135–145)
WBC # BLD: 7.69 K/UL — SIGNIFICANT CHANGE UP (ref 3.8–10.5)
WBC # FLD AUTO: 7.69 K/UL — SIGNIFICANT CHANGE UP (ref 3.8–10.5)

## 2020-04-10 PROCEDURE — 36569 INSJ PICC 5 YR+ W/O IMAGING: CPT

## 2020-04-10 RX ORDER — LIDOCAINE HCL 20 MG/ML
10 VIAL (ML) INJECTION ONCE
Refills: 0 | Status: DISCONTINUED | OUTPATIENT
Start: 2020-04-10 | End: 2020-04-14

## 2020-04-10 RX ORDER — SODIUM CHLORIDE 9 MG/ML
1000 INJECTION, SOLUTION INTRAVENOUS
Refills: 0 | Status: DISCONTINUED | OUTPATIENT
Start: 2020-04-10 | End: 2020-04-10

## 2020-04-10 RX ORDER — SODIUM BICARBONATE 1 MEQ/ML
0.1 SYRINGE (ML) INTRAVENOUS
Qty: 150 | Refills: 0 | Status: DISCONTINUED | OUTPATIENT
Start: 2020-04-10 | End: 2020-04-13

## 2020-04-10 RX ADMIN — ARIPIPRAZOLE 15 MILLIGRAM(S): 15 TABLET ORAL at 13:14

## 2020-04-10 RX ADMIN — ATORVASTATIN CALCIUM 10 MILLIGRAM(S): 80 TABLET, FILM COATED ORAL at 21:22

## 2020-04-10 RX ADMIN — Medication 60 MEQ/KG/HR: at 16:49

## 2020-04-10 RX ADMIN — HEPARIN SODIUM 5000 UNIT(S): 5000 INJECTION INTRAVENOUS; SUBCUTANEOUS at 17:08

## 2020-04-10 RX ADMIN — SODIUM ZIRCONIUM CYCLOSILICATE 10 GRAM(S): 10 POWDER, FOR SUSPENSION ORAL at 21:25

## 2020-04-10 RX ADMIN — Medication 1300 MILLIGRAM(S): at 05:16

## 2020-04-10 RX ADMIN — GABAPENTIN 600 MILLIGRAM(S): 400 CAPSULE ORAL at 17:08

## 2020-04-10 RX ADMIN — FINASTERIDE 5 MILLIGRAM(S): 5 TABLET, FILM COATED ORAL at 13:14

## 2020-04-10 RX ADMIN — SODIUM ZIRCONIUM CYCLOSILICATE 10 GRAM(S): 10 POWDER, FOR SUSPENSION ORAL at 13:14

## 2020-04-10 RX ADMIN — LAMOTRIGINE 200 MILLIGRAM(S): 25 TABLET, ORALLY DISINTEGRATING ORAL at 13:14

## 2020-04-10 RX ADMIN — Medication 1300 MILLIGRAM(S): at 13:14

## 2020-04-10 RX ADMIN — PANTOPRAZOLE SODIUM 40 MILLIGRAM(S): 20 TABLET, DELAYED RELEASE ORAL at 05:15

## 2020-04-10 RX ADMIN — TAMSULOSIN HYDROCHLORIDE 0.4 MILLIGRAM(S): 0.4 CAPSULE ORAL at 23:20

## 2020-04-10 RX ADMIN — HEPARIN SODIUM 5000 UNIT(S): 5000 INJECTION INTRAVENOUS; SUBCUTANEOUS at 05:15

## 2020-04-10 RX ADMIN — Medication 200 MILLIGRAM(S): at 05:15

## 2020-04-10 RX ADMIN — Medication 650 MILLIGRAM(S): at 23:21

## 2020-04-10 RX ADMIN — Medication 25 MILLIGRAM(S): at 21:27

## 2020-04-10 RX ADMIN — SODIUM ZIRCONIUM CYCLOSILICATE 10 GRAM(S): 10 POWDER, FOR SUSPENSION ORAL at 05:16

## 2020-04-10 RX ADMIN — GABAPENTIN 600 MILLIGRAM(S): 400 CAPSULE ORAL at 05:16

## 2020-04-10 RX ADMIN — Medication 200 MILLIGRAM(S): at 17:08

## 2020-04-10 NOTE — PROGRESS NOTE ADULT - SUBJECTIVE AND OBJECTIVE BOX
NEPHROLOGY MEDICAL CARE, [facility] - Dr. Emerson Pond/ Dr. Nallely Curran/ Dr. Kirby Angel/ Dr. Pippa Reis    Patient was seen and examined at bedside.    CC: patient is not sob; no uremic symptoms present.    Vital Signs Last 24 Hrs  T(C): 36.8 (10 Apr 2020 11:41), Max: 37 (10 Apr 2020 07:48)  T(F): 98.2 (10 Apr 2020 11:41), Max: 98.6 (10 Apr 2020 07:48)  HR: 82 (10 Apr 2020 11:41) (65 - 83)  BP: 105/64 (10 Apr 2020 11:41) (105/64 - 120/70)  BP(mean): --  RR: 18 (10 Apr 2020 11:41) (17 - 18)  SpO2: 98% (10 Apr 2020 11:41) (95% - 98%)    04-09 @ 07:01  -  04-10 @ 07:00  --------------------------------------------------------  IN: 0 mL / OUT: 200 mL / NET: -200 mL    04-10 @ 07:01  -  04-10 @ 16:40  --------------------------------------------------------  IN: 200 mL / OUT: 275 mL / NET: -75 mL        PHYSICAL EXAM:  GENERAL: No acute respiratory distress.  EYES: conjunctiva and sclera clear  ENMT: Atraumatic, Normocephalic, supple, No JVD present.  RESPIRATORY: b/l poor air entry; No rales, rhonchi, wheezing  CARDIOVASCULAR: S1S2+; no m/r/g  GASTROINTESTINAL: Soft, Non-tender, Nondistended; Bowel sounds present,   CENTRAL NERVOUS SYSTEM:  Awake, Alert & Oriented X2-3, No focal deficits present.   EXTREMITIES:  trace edema, No Cyanosis; Rt arm iv access.  SKIN: No rashes      MEDICATIONS:  acetaminophen   Tablet .. 650 milliGRAM(s) Oral every 6 hours  ARIPiprazole 15 milliGRAM(s) Oral daily  atorvastatin 10 milliGRAM(s) Oral at bedtime  finasteride 5 milliGRAM(s) Oral daily  gabapentin 600 milliGRAM(s) Oral two times a day  heparin  Injectable 5000 Unit(s) SubCutaneous every 12 hours  HYDROmorphone   Tablet 4 milliGRAM(s) Oral every 8 hours PRN  hydroxychloroquine   Oral   hydroxychloroquine 200 milliGRAM(s) Oral every 12 hours  lamoTRIgine 200 milliGRAM(s) Oral daily  levoFLOXacin  Tablet 250 milliGRAM(s) Oral every 24 hours  lidocaine 1% Injectable 10 milliLiter(s) Local Injection once  pantoprazole    Tablet 40 milliGRAM(s) Oral before breakfast  sodium bicarbonate  Infusion 0.098 mEq/kG/Hr IV Continuous <Continuous>  sodium zirconium cyclosilicate 10 Gram(s) Oral three times a day  tamsulosin 0.4 milliGRAM(s) Oral at bedtime  traZODone 25 milliGRAM(s) Oral at bedtime      LABS:                        10.6   7.69  )-----------( 233      ( 10 Apr 2020 14:15 )             32.6     04-10    137  |  109<H>  |  112<H>  ----------------------------<  104<H>  4.3   |  16<L>  |  4.14<H>    Ca    8.7      10 Apr 2020 13:15  Phos  5.6     04-10  Mg     2.4     04-10    TPro  9.5<H>  /  Alb  3.1<L>  /  TBili  0.5  /  DBili  x   /  AST  75<H>  /  ALT  37  /  AlkPhos  92  04-10    PT/INR - ( 10 Apr 2020 13:15 )   PT: 12.4 sec;   INR: 1.09 ratio         PTT - ( 10 Apr 2020 13:15 )  PTT:24.3 sec    Magnesium, Serum: 2.4 mg/dL (04-10 @ 13:15)  Phosphorus Level, Serum: 5.6 mg/dL (04-10 @ 13:15)    Urine studies    PTH and Vit D:

## 2020-04-10 NOTE — PROGRESS NOTE ADULT - SUBJECTIVE AND OBJECTIVE BOX
Patient is a 69y old  Male who presents with a chief complaint of AMS (10 Apr 2020 16:46)    PATIENT IS SEEN AND EXAMINED IN MEDICAL FLOOR.  NGT [    ]    ERASTO [   ]      GT [   ]    ALLERGIES:  No Known Allergies      Daily     Daily     VITALS:    Vital Signs Last 24 Hrs  T(C): 37.1 (10 Apr 2020 16:57), Max: 37.1 (10 Apr 2020 16:57)  T(F): 98.7 (10 Apr 2020 16:57), Max: 98.7 (10 Apr 2020 16:57)  HR: 78 (10 Apr 2020 16:57) (65 - 83)  BP: 104/66 (10 Apr 2020 16:57) (104/66 - 120/70)  BP(mean): --  RR: 18 (10 Apr 2020 16:57) (17 - 18)  SpO2: 97% (10 Apr 2020 16:57) (95% - 98%)    LABS:    CBC Full  -  ( 10 Apr 2020 14:15 )  WBC Count : 7.69 K/uL  RBC Count : 3.85 M/uL  Hemoglobin : 10.6 g/dL  Hematocrit : 32.6 %  Platelet Count - Automated : 233 K/uL  Mean Cell Volume : 84.7 fl  Mean Cell Hemoglobin : 27.5 pg  Mean Cell Hemoglobin Concentration : 32.5 gm/dL  Auto Neutrophil # : 5.75 K/uL  Auto Lymphocyte # : 1.08 K/uL  Auto Monocyte # : 0.56 K/uL  Auto Eosinophil # : 0.17 K/uL  Auto Basophil # : 0.02 K/uL  Auto Neutrophil % : 74.8 %  Auto Lymphocyte % : 14.0 %  Auto Monocyte % : 7.3 %  Auto Eosinophil % : 2.2 %  Auto Basophil % : 0.3 %    PT/INR - ( 10 Apr 2020 13:15 )   PT: 12.4 sec;   INR: 1.09 ratio         PTT - ( 10 Apr 2020 13:15 )  PTT:24.3 sec  04-10    137  |  109<H>  |  112<H>  ----------------------------<  104<H>  4.3   |  16<L>  |  4.14<H>    Ca    8.7      10 Apr 2020 13:15  Phos  5.6     04-10  Mg     2.4     04-10    TPro  9.5<H>  /  Alb  3.1<L>  /  TBili  0.5  /  DBili  x   /  AST  75<H>  /  ALT  37  /  AlkPhos  92  04-10    CAPILLARY BLOOD GLUCOSE        CARDIAC MARKERS ( 10 Apr 2020 13:15 )  x     / x     / 459 U/L / x     / x      CARDIAC MARKERS ( 09 Apr 2020 13:46 )  x     / x     / 806 U/L / x     / x          LIVER FUNCTIONS - ( 10 Apr 2020 13:15 )  Alb: 3.1 g/dL / Pro: 9.5 g/dL / ALK PHOS: 92 U/L / ALT: 37 U/L DA / AST: 75 U/L / GGT: x           Creatinine Trend: 4.14<--, 4.48<--, 3.92<--, 3.86<--, 4.21<--, 4.32<--  I&O's Summary    09 Apr 2020 07:01  -  10 Apr 2020 07:00  --------------------------------------------------------  IN: 0 mL / OUT: 200 mL / NET: -200 mL    10 Apr 2020 07:01  -  10 Apr 2020 19:29  --------------------------------------------------------  IN: 350 mL / OUT: 475 mL / NET: -125 mL            .Blood  04-07 @ 18:15   No growth to date.  --  --      .Blood  02-05 @ 10:19   No growth at 5 days.  --  --      .Blood  01-25 @ 22:29   No growth at 5 days.  --  --      .Blood  01-25 @ 22:26   No growth at 5 days.  --  --      .Urine  01-25 @ 01:01   >100,000 CFU/ml Klebsiella pneumoniae  --  Klebsiella pneumoniae          MEDICATIONS:    MEDICATIONS  (STANDING):  acetaminophen   Tablet .. 650 milliGRAM(s) Oral every 6 hours  ARIPiprazole 15 milliGRAM(s) Oral daily  atorvastatin 10 milliGRAM(s) Oral at bedtime  finasteride 5 milliGRAM(s) Oral daily  gabapentin 600 milliGRAM(s) Oral two times a day  heparin  Injectable 5000 Unit(s) SubCutaneous every 12 hours  hydroxychloroquine   Oral   hydroxychloroquine 200 milliGRAM(s) Oral every 12 hours  lamoTRIgine 200 milliGRAM(s) Oral daily  levoFLOXacin  Tablet 250 milliGRAM(s) Oral every 24 hours  lidocaine 1% Injectable 10 milliLiter(s) Local Injection once  pantoprazole    Tablet 40 milliGRAM(s) Oral before breakfast  sodium bicarbonate  Infusion 0.098 mEq/kG/Hr (60 mL/Hr) IV Continuous <Continuous>  sodium zirconium cyclosilicate 10 Gram(s) Oral three times a day  tamsulosin 0.4 milliGRAM(s) Oral at bedtime  traZODone 25 milliGRAM(s) Oral at bedtime      MEDICATIONS  (PRN):  HYDROmorphone   Tablet 4 milliGRAM(s) Oral every 8 hours PRN Severe Pain (7 - 10)      REVIEW OF SYSTEMS:                           ALL ROS DONE [ X   ]    CONSTITUTIONAL:  LETHARGIC [   ], FEVER [   ], UNRESPONSIVE [   ]  CVS:  CP  [   ], SOB, [   ], PALPITATIONS [   ], DIZZYNESS [   ]  RS: COUGH [   ], SPUTUM [   ]  GI: ABDOMINAL PAIN [   ], NAUSEA [   ], VOMITINGS [   ], DIARRHEA [   ], CONSTIPATION [   ]  :  DYSURIA [   ], NOCTURIA [   ], INCREASED FREQUENCY [   ], DRIBLING [   ],  SKELETAL: PAINFUL JOINTS [   ], SWOLLEN JOINTS [   ], NECK ACHE [   ], LOW BACK ACHE [   ],  SKIN : ULCERS [   ], RASH [   ], ITCHING [   ]  CNS: HEAD ACHE [   ], DOUBLE VISION [   ], BLURRED VISION [   ], AMS / CONFUSION [   ], SEIZURES [   ], WEAKNESS [   ],TINGLING / NUMBNESS [   ]    PHYSICAL EXAMINATION:  GENERAL APPEARANCE: NO DISTRESS  HEENT:  NO PALLOR, NO  JVD,  NO   NODES, NECK SUPPLE  CVS: S1 +, S2 +,   RS: AEEB,  OCCASIONAL  RALES +,     MILD B/L RONCHI  ABD: SOFT, NT, NO, BS +  EXT: NO PE  SKIN: WARM,        RIGHT BRACHIAL VEIN PERIPHERAL CENTRAL LINE +  SKELETAL:  ROM ACCEPTABLE  CNS:  AAO X  2-3 , NO   DEFICITS    RADIOLOGY :    < from: Xray Chest 1 View-PORTABLE IMMEDIATE (04.06.20 @ 16:35) >  IMPRESSION:    Increasing bibasilar interstitial infiltrates.    < end of copied text >    ASSESSMENT :     Altered mental status  BPH with urinary obstruction  Prostate CA  CKD (chronic kidney disease)  PAD (peripheral artery disease)  HLD (hyperlipidemia)  HTN (hypertension)  IBD (inflammatory bowel disease)  Bipolar disorder  Anemia  COPD, mild  No pertinent past medical history  History of creation of ostomy  No significant past surgical history      PLAN:  HPI:  69 year old Male from [facility], with PMHx of prostate Ca (s/p radiation in 2015, now in remission), asthma, COPD (not on O2), HTN, HLD, Bipolar, anemia, GERD, Ileostomy s/p sigmoid resection, PAD, and CKD is [place] ED [place] for AMS and weakness. Patient is AAOx2 on my exam and denies any complaints, however not a reliable historian. No acute distress.    GOC: Full code. (04 Apr 2020 00:54)    HPI:  69 year old Male from [facility], with PMHx of prostate Ca (s/p radiation in 2015, now in remission), asthma, COPD (not on O2), HTN, HLD, Bipolar, anemia, GERD, Ileostomy s/p sigmoid resection, PAD, and CKD is sent to the ED from [place] for AMS and weakness. Patient is AAOx2 on my exam and denies any complaints, however not a reliable historian. No acute distress.    GOC: Full code. (04 Apr 2020 00:54)    PATIENT IS A 68YO MALE FROM [facility] WITH PMHX OF PROSTATE CA [S/P RADIATION 2015, IN REMISSION], ASTHMA, COPD, HTN, HLD, BIPOLAR DX, ANEMIA, GERD, ILEOSTOMY S/P SIGMOID RESECTION, PAD AND CKD3 TRANSFERRED FOR EVALUATION OF AMS/WEAKNESS.      # MULTIFOCAL COVID19 PNEUMONIA, SEPSIS SUSPECT SECONDARY BACTERIAL PNEUMONIA - WILL CONTINUE PO ANTIBIOTICS, F/U BLOOD CULTURES [NGTD]  # COPD  # PAWAN ON CKD 3 - PRE-RENAL AZOTEMIA VS. MORE LIKELY ATN S/T COVID19 - NEPHROLOGY CONSULTED, PO INTAKE, MONITOR URINE OUTPUT; VASCULAR CONSULTED AND PERIPHERAL ACCESS OBTAINED. IF NEEDED, WILL EMERGENTLY PLACED ACCESS FOR HD.  # ACUTE METABOLIC ENCEPHALOPATHY - RESOLVED  # METABOLIC ACIDOSIS S/T PAWAN - ON SODIUM BICARB   # TRANSAMINITIS   # HYPERKALEMIA S/T PAWAN - RESOLVED  # GI AND DVT PPX    MACARENA IVY M.D. COVERING FOR JEFRY IVY M.D.

## 2020-04-10 NOTE — CONSULT NOTE ADULT - SUBJECTIVE AND OBJECTIVE BOX
Vascular Surgery Consultation Note    Patient is a 69y old  Male who presents with a chief complaint of AMS (09 Apr 2020 16:45)      HPI:  69 year old Male from [facility], with PMHx of prostate Ca (s/p radiation in 2015, now in remission), asthma, COPD (not on O2), HTN, HLD, Bipolar, anemia, GERD, Ileostomy s/p sigmoid resection, PAD, and CKD is sent [place] the ED from [place] for AMS and weakness. Patient is AAOx2 on my exam and denies any complaints, however not a reliable historian. No acute distress. Pt is COVID+.  Pt with worsening CKD and vascular surgery consulted for emergent hemodialysis catheter placement. Per patient, has had central lines in  the past in right side of his neck.  He denies having any previous vascular access in the lower extremity veins.    GOC: Full code. (04 Apr 2020 00:54)      PAST MEDICAL & SURGICAL HISTORY:  BPH with urinary obstruction  Prostate CA  CKD (chronic kidney disease)  PAD (peripheral artery disease)  HLD (hyperlipidemia)  HTN (hypertension)  IBD (inflammatory bowel disease)  Bipolar disorder  Anemia  COPD, mild  History of creation of ostomy      Allergies    No Known Allergies    Intolerances        MEDICATIONS  (STANDING):  acetaminophen   Tablet .. 650 milliGRAM(s) Oral every 6 hours  ARIPiprazole 15 milliGRAM(s) Oral daily  atorvastatin 10 milliGRAM(s) Oral at bedtime  dextrose 5% + sodium chloride 0.45%. 1000 milliLiter(s) (70 mL/Hr) IV Continuous <Continuous>  finasteride 5 milliGRAM(s) Oral daily  gabapentin 600 milliGRAM(s) Oral two times a day  heparin  Injectable 5000 Unit(s) SubCutaneous every 12 hours  hydroxychloroquine   Oral   hydroxychloroquine 200 milliGRAM(s) Oral every 12 hours  lamoTRIgine 200 milliGRAM(s) Oral daily  levoFLOXacin  Tablet 250 milliGRAM(s) Oral every 24 hours  lidocaine 1% Injectable 10 milliLiter(s) Local Injection once  pantoprazole    Tablet 40 milliGRAM(s) Oral before breakfast  sodium bicarbonate 1300 milliGRAM(s) Oral three times a day  sodium bicarbonate  Infusion 0.098 mEq/kG/Hr (60 mL/Hr) IV Continuous <Continuous>  sodium zirconium cyclosilicate 10 Gram(s) Oral three times a day  tamsulosin 0.4 milliGRAM(s) Oral at bedtime  traZODone 25 milliGRAM(s) Oral at bedtime    MEDICATIONS  (PRN):  HYDROmorphone   Tablet 4 milliGRAM(s) Oral every 8 hours PRN Severe Pain (7 - 10)      Vital Signs Last 24 Hrs  T(C): 36.8 (10 Apr 2020 11:41), Max: 37 (10 Apr 2020 07:48)  T(F): 98.2 (10 Apr 2020 11:41), Max: 98.6 (10 Apr 2020 07:48)  HR: 82 (10 Apr 2020 11:41) (65 - 83)  BP: 105/64 (10 Apr 2020 11:41) (105/64 - 120/70)  BP(mean): --  RR: 18 (10 Apr 2020 11:41) (17 - 18)  SpO2: 98% (10 Apr 2020 11:41) (95% - 99%)    Physical Exam:  Gen: awake, alert oriented NAD  HEENT: anicteric  Chest: right well healed chest scar  Ext: warm to touch no c/c/e    Labs:                          10.6   7.69  )-----------( 233      ( 10 Apr 2020 14:15 )             32.6     04-10    137  |  109<H>  |  112<H>  ----------------------------<  104<H>  4.3   |  16<L>  |  4.14<H>    Ca    8.7      10 Apr 2020 13:15  Phos  5.6     04-10  Mg     2.4     04-10    TPro  9.5<H>  /  Alb  3.1<L>  /  TBili  0.5  /  DBili  x   /  AST  75<H>  /  ALT  37  /  AlkPhos  92  04-10    PT/INR - ( 10 Apr 2020 13:15 )   PT: 12.4 sec;   INR: 1.09 ratio         PTT - ( 10 Apr 2020 13:15 )  PTT:24.3 sec

## 2020-04-10 NOTE — PROCEDURE NOTE - NSICDXPROCEDURE_GEN_ALL_CORE_FT
PROCEDURES:  US guided placement of non-tunneled central venous catheter 10-Apr-2020 15:00:53  Yakelin Ahmadi

## 2020-04-10 NOTE — CONSULT NOTE ADULT - ASSESSMENT
70 yo M, COVID+, worsening CKD. Peripheral central line placed in R brachial vein.  Review of labs showed improvement of creatinine and potassium  1. D/w Dr. Mcintosh, nephrology, will hold off for HD right now  2. Recommend flush peripheral central line after each use  3. Procedure note to follow  4. Seen with Dr. Holt

## 2020-04-10 NOTE — PROGRESS NOTE ADULT - SUBJECTIVE AND OBJECTIVE BOX
Asked for periph IV access    R brach v dual lumen cath placed 7Fr, 16cm  USG puncture    Good flow    Please keep ports flushed/heparinized to prevent thrombosis

## 2020-04-10 NOTE — PROGRESS NOTE ADULT - ASSESSMENT
1. PAWAN due to pre-renal azotemia due to volume depletion vs r/o ATN.   -scr improved to today and K has improved and no urgent need for HD; however, if renal function worsens, acidosis , hyperkalemia , fluid overload or uremia then patient will require HD. Patient was explained in detail the risks (hypotension, angina, MI, and cardiac arrest), benefits and alternative modalities (no dialysis and conservative medical management) for dialysis. Patient given time to ask questions and all questions were answered. Patient gave consent for renal replacement therapy if necessary.  -Iv access was placed and start IVFs with sodium bicarbonate drip.   -urinalysis, urine lytes noted and Fena is 0.51%  -unable to perform renal sono due to covid.  -Adjust meds to eGFR and avoid IV Gadolinium contrast,NSAIDs, and phosphate enema.  -Monitor I/O's daily.   -Monitor SMA daily.  2. CKD stage 3 due h/o renal recovery from ATN.  -baseline scr around 2.0mg/dL with non-nephrotic range proteinuria (uPCR 900mg)  -Keep patient euvolemic and renal diet  -Avoid Nephrotoxic Meds/ Agents such as (NSAIDs, IV contrast, Aminoglycosides such as gentamicin, -Gadolinium contrast, Phosphate containing enemas, etc..)  -Adjust Medications according to eGFR  3. Hyperkalemia: improved and continue loklemia for now.  - monitor K daily  -continue  low k diet  4.Hx of hypotension: improved   -may add Midodrine prn  5. MBD: with normal phos level  -pt has mild high normal pth-no intervention needed at this time  COVID +ve pna. plan as per primary team.  6. Acidosis due to renal failure:  -d/c sodium bicarbonate tabs and fluids as above.    abg noted.  7. Anemia:multifactorial-mild  -no epogen needed at this time  -F/u CBC daily  -transfuse if HB < 7.0.

## 2020-04-11 LAB
ALBUMIN SERPL ELPH-MCNC: 2.8 G/DL — LOW (ref 3.5–5)
ALP SERPL-CCNC: 81 U/L — SIGNIFICANT CHANGE UP (ref 40–120)
ALT FLD-CCNC: 30 U/L DA — SIGNIFICANT CHANGE UP (ref 10–60)
ANION GAP SERPL CALC-SCNC: 12 MMOL/L — SIGNIFICANT CHANGE UP (ref 5–17)
APTT BLD: 25.4 SEC — LOW (ref 27.5–36.3)
AST SERPL-CCNC: 55 U/L — HIGH (ref 10–40)
BASOPHILS # BLD AUTO: 0.02 K/UL — SIGNIFICANT CHANGE UP (ref 0–0.2)
BASOPHILS NFR BLD AUTO: 0.3 % — SIGNIFICANT CHANGE UP (ref 0–2)
BILIRUB SERPL-MCNC: 0.6 MG/DL — SIGNIFICANT CHANGE UP (ref 0.2–1.2)
BUN SERPL-MCNC: 97 MG/DL — HIGH (ref 7–18)
CALCIUM SERPL-MCNC: 8.2 MG/DL — LOW (ref 8.4–10.5)
CHLORIDE SERPL-SCNC: 107 MMOL/L — SIGNIFICANT CHANGE UP (ref 96–108)
CK SERPL-CCNC: 286 U/L — HIGH (ref 35–232)
CO2 SERPL-SCNC: 21 MMOL/L — LOW (ref 22–31)
CREAT SERPL-MCNC: 3.32 MG/DL — HIGH (ref 0.5–1.3)
CRP SERPL-MCNC: 6.07 MG/DL — HIGH (ref 0–0.4)
CULTURE RESULTS: SIGNIFICANT CHANGE UP
D DIMER BLD IA.RAPID-MCNC: 2802 NG/ML DDU — HIGH
EOSINOPHIL # BLD AUTO: 0.17 K/UL — SIGNIFICANT CHANGE UP (ref 0–0.5)
EOSINOPHIL NFR BLD AUTO: 2.3 % — SIGNIFICANT CHANGE UP (ref 0–6)
GLUCOSE SERPL-MCNC: 186 MG/DL — HIGH (ref 70–99)
HAV IGM SER-ACNC: SIGNIFICANT CHANGE UP
HBV CORE IGM SER-ACNC: REACTIVE
HBV SURFACE AG SER-ACNC: SIGNIFICANT CHANGE UP
HCT VFR BLD CALC: 29.7 % — LOW (ref 39–50)
HCV AB S/CO SERPL IA: 10.5 S/CO — HIGH (ref 0–0.99)
HCV AB SERPL-IMP: REACTIVE
HGB BLD-MCNC: 9.9 G/DL — LOW (ref 13–17)
IMM GRANULOCYTES NFR BLD AUTO: 1.7 % — HIGH (ref 0–1.5)
INR BLD: 1.08 RATIO — SIGNIFICANT CHANGE UP (ref 0.88–1.16)
LYMPHOCYTES # BLD AUTO: 0.81 K/UL — LOW (ref 1–3.3)
LYMPHOCYTES # BLD AUTO: 10.8 % — LOW (ref 13–44)
MAGNESIUM SERPL-MCNC: 2.3 MG/DL — SIGNIFICANT CHANGE UP (ref 1.6–2.6)
MCHC RBC-ENTMCNC: 27.7 PG — SIGNIFICANT CHANGE UP (ref 27–34)
MCHC RBC-ENTMCNC: 33.3 GM/DL — SIGNIFICANT CHANGE UP (ref 32–36)
MCV RBC AUTO: 83.2 FL — SIGNIFICANT CHANGE UP (ref 80–100)
MONOCYTES # BLD AUTO: 0.69 K/UL — SIGNIFICANT CHANGE UP (ref 0–0.9)
MONOCYTES NFR BLD AUTO: 9.2 % — SIGNIFICANT CHANGE UP (ref 2–14)
NEUTROPHILS # BLD AUTO: 5.66 K/UL — SIGNIFICANT CHANGE UP (ref 1.8–7.4)
NEUTROPHILS NFR BLD AUTO: 75.7 % — SIGNIFICANT CHANGE UP (ref 43–77)
NRBC # BLD: 0 /100 WBCS — SIGNIFICANT CHANGE UP (ref 0–0)
PHOSPHATE SERPL-MCNC: 4.9 MG/DL — HIGH (ref 2.5–4.5)
PLATELET # BLD AUTO: 235 K/UL — SIGNIFICANT CHANGE UP (ref 150–400)
POTASSIUM SERPL-MCNC: 3.7 MMOL/L — SIGNIFICANT CHANGE UP (ref 3.5–5.3)
POTASSIUM SERPL-SCNC: 3.7 MMOL/L — SIGNIFICANT CHANGE UP (ref 3.5–5.3)
PROT SERPL-MCNC: 8.5 G/DL — HIGH (ref 6–8.3)
PROTHROM AB SERPL-ACNC: 12.2 SEC — SIGNIFICANT CHANGE UP (ref 10–12.9)
RBC # BLD: 3.57 M/UL — LOW (ref 4.2–5.8)
RBC # FLD: 15 % — HIGH (ref 10.3–14.5)
SODIUM SERPL-SCNC: 140 MMOL/L — SIGNIFICANT CHANGE UP (ref 135–145)
SPECIMEN SOURCE: SIGNIFICANT CHANGE UP
WBC # BLD: 7.48 K/UL — SIGNIFICANT CHANGE UP (ref 3.8–10.5)
WBC # FLD AUTO: 7.48 K/UL — SIGNIFICANT CHANGE UP (ref 3.8–10.5)

## 2020-04-11 RX ADMIN — HEPARIN SODIUM 5000 UNIT(S): 5000 INJECTION INTRAVENOUS; SUBCUTANEOUS at 05:08

## 2020-04-11 RX ADMIN — SODIUM ZIRCONIUM CYCLOSILICATE 10 GRAM(S): 10 POWDER, FOR SUSPENSION ORAL at 05:07

## 2020-04-11 RX ADMIN — FINASTERIDE 5 MILLIGRAM(S): 5 TABLET, FILM COATED ORAL at 11:03

## 2020-04-11 RX ADMIN — PANTOPRAZOLE SODIUM 40 MILLIGRAM(S): 20 TABLET, DELAYED RELEASE ORAL at 07:10

## 2020-04-11 RX ADMIN — Medication 200 MILLIGRAM(S): at 05:07

## 2020-04-11 RX ADMIN — GABAPENTIN 600 MILLIGRAM(S): 400 CAPSULE ORAL at 05:14

## 2020-04-11 RX ADMIN — ATORVASTATIN CALCIUM 10 MILLIGRAM(S): 80 TABLET, FILM COATED ORAL at 21:58

## 2020-04-11 RX ADMIN — Medication 60 MEQ/KG/HR: at 10:43

## 2020-04-11 RX ADMIN — Medication 650 MILLIGRAM(S): at 11:02

## 2020-04-11 RX ADMIN — Medication 650 MILLIGRAM(S): at 05:07

## 2020-04-11 RX ADMIN — ARIPIPRAZOLE 15 MILLIGRAM(S): 15 TABLET ORAL at 11:02

## 2020-04-11 RX ADMIN — TAMSULOSIN HYDROCHLORIDE 0.4 MILLIGRAM(S): 0.4 CAPSULE ORAL at 21:58

## 2020-04-11 RX ADMIN — GABAPENTIN 600 MILLIGRAM(S): 400 CAPSULE ORAL at 17:28

## 2020-04-11 RX ADMIN — Medication 650 MILLIGRAM(S): at 17:28

## 2020-04-11 RX ADMIN — Medication 200 MILLIGRAM(S): at 17:28

## 2020-04-11 RX ADMIN — HEPARIN SODIUM 5000 UNIT(S): 5000 INJECTION INTRAVENOUS; SUBCUTANEOUS at 17:28

## 2020-04-11 RX ADMIN — SODIUM ZIRCONIUM CYCLOSILICATE 10 GRAM(S): 10 POWDER, FOR SUSPENSION ORAL at 13:17

## 2020-04-11 RX ADMIN — LAMOTRIGINE 200 MILLIGRAM(S): 25 TABLET, ORALLY DISINTEGRATING ORAL at 11:02

## 2020-04-11 RX ADMIN — Medication 25 MILLIGRAM(S): at 21:58

## 2020-04-11 RX ADMIN — SODIUM ZIRCONIUM CYCLOSILICATE 10 GRAM(S): 10 POWDER, FOR SUSPENSION ORAL at 22:46

## 2020-04-11 NOTE — PROGRESS NOTE ADULT - SUBJECTIVE AND OBJECTIVE BOX
Patient is a 69y old  Male who presents with a chief complaint of AMS (11 Apr 2020 14:32)    PATIENT IS SEEN AND EXAMINED IN MEDICAL FLOOR.    ALLERGIES:  No Known Allergies      VITALS:    Vital Signs Last 24 Hrs  T(C): 36.5 (11 Apr 2020 15:23), Max: 37.1 (10 Apr 2020 16:57)  T(F): 97.7 (11 Apr 2020 15:23), Max: 98.8 (10 Apr 2020 21:15)  HR: 80 (11 Apr 2020 15:23) (62 - 84)  BP: 99/59 (11 Apr 2020 15:23) (99/59 - 117/65)  BP(mean): --  RR: 18 (11 Apr 2020 15:23) (18 - 19)  SpO2: 97% (11 Apr 2020 15:23) (96% - 100%)    LABS:    CBC Full  -  ( 11 Apr 2020 07:17 )  WBC Count : 7.48 K/uL  RBC Count : 3.57 M/uL  Hemoglobin : 9.9 g/dL  Hematocrit : 29.7 %  Platelet Count - Automated : 235 K/uL  Mean Cell Volume : 83.2 fl  Mean Cell Hemoglobin : 27.7 pg  Mean Cell Hemoglobin Concentration : 33.3 gm/dL  Auto Neutrophil # : 5.66 K/uL  Auto Lymphocyte # : 0.81 K/uL  Auto Monocyte # : 0.69 K/uL  Auto Eosinophil # : 0.17 K/uL  Auto Basophil # : 0.02 K/uL  Auto Neutrophil % : 75.7 %  Auto Lymphocyte % : 10.8 %  Auto Monocyte % : 9.2 %  Auto Eosinophil % : 2.3 %  Auto Basophil % : 0.3 %    PT/INR - ( 11 Apr 2020 07:17 )   PT: 12.2 sec;   INR: 1.08 ratio         PTT - ( 11 Apr 2020 07:17 )  PTT:25.4 sec  04-11    140  |  107  |  97<H>  ----------------------------<  186<H>  3.7   |  21<L>  |  3.32<H>    Ca    8.2<L>      11 Apr 2020 07:17  Phos  4.9     04-11  Mg     2.3     04-11    TPro  8.5<H>  /  Alb  2.8<L>  /  TBili  0.6  /  DBili  x   /  AST  55<H>  /  ALT  30  /  AlkPhos  81  04-11      CARDIAC MARKERS ( 11 Apr 2020 07:17 )  x     / x     / 286 U/L / x     / x      CARDIAC MARKERS ( 10 Apr 2020 13:15 )  x     / x     / 459 U/L / x     / x          LIVER FUNCTIONS - ( 11 Apr 2020 07:17 )  Alb: 2.8 g/dL / Pro: 8.5 g/dL / ALK PHOS: 81 U/L / ALT: 30 U/L DA / AST: 55 U/L / GGT: x           Creatinine Trend: 3.32<--, 4.14<--, 4.48<--, 3.92<--, 3.86<--, 4.21<--  I&O's Summary    10 Apr 2020 07:01  -  11 Apr 2020 07:00  --------------------------------------------------------  IN: 350 mL / OUT: 1025 mL / NET: -675 mL    11 Apr 2020 07:01  -  11 Apr 2020 15:40  --------------------------------------------------------  IN: 250 mL / OUT: 0 mL / NET: 250 mL      .Blood  04-07 @ 18:15   No growth to date.  --  --      .Blood  02-05 @ 10:19   No growth at 5 days.  --  --      .Blood  01-25 @ 22:29   No growth at 5 days.  --  --      .Blood  01-25 @ 22:26   No growth at 5 days.  --  --      .Urine  01-25 @ 01:01   >100,000 CFU/ml Klebsiella pneumoniae  --  Klebsiella pneumoniae          MEDICATIONS:    MEDICATIONS  (STANDING):  acetaminophen   Tablet .. 650 milliGRAM(s) Oral every 6 hours  ARIPiprazole 15 milliGRAM(s) Oral daily  atorvastatin 10 milliGRAM(s) Oral at bedtime  finasteride 5 milliGRAM(s) Oral daily  gabapentin 600 milliGRAM(s) Oral two times a day  heparin  Injectable 5000 Unit(s) SubCutaneous every 12 hours  hydroxychloroquine   Oral   hydroxychloroquine 200 milliGRAM(s) Oral every 12 hours  lamoTRIgine 200 milliGRAM(s) Oral daily  levoFLOXacin  Tablet 250 milliGRAM(s) Oral every 24 hours  lidocaine 1% Injectable 10 milliLiter(s) Local Injection once  pantoprazole    Tablet 40 milliGRAM(s) Oral before breakfast  sodium bicarbonate  Infusion 0.098 mEq/kG/Hr (60 mL/Hr) IV Continuous <Continuous>  sodium zirconium cyclosilicate 10 Gram(s) Oral three times a day  tamsulosin 0.4 milliGRAM(s) Oral at bedtime  traZODone 25 milliGRAM(s) Oral at bedtime      MEDICATIONS  (PRN):  HYDROmorphone   Tablet 4 milliGRAM(s) Oral every 8 hours PRN Severe Pain (7 - 10)      REVIEW OF SYSTEMS:                           ALL ROS DONE [ X   ]    CONSTITUTIONAL:  LETHARGIC [   ], FEVER [   ], UNRESPONSIVE [   ]  CVS:  CP  [   ], SOB, [   ], PALPITATIONS [   ], DIZZYNESS [   ]  RS: COUGH [   ], SPUTUM [   ]  GI: ABDOMINAL PAIN [   ], NAUSEA [   ], VOMITINGS [   ], DIARRHEA [   ], CONSTIPATION [   ]  :  DYSURIA [   ], NOCTURIA [   ], INCREASED FREQUENCY [   ], DRIBLING [   ],  SKELETAL: PAINFUL JOINTS [   ], SWOLLEN JOINTS [   ], NECK ACHE [   ], LOW BACK ACHE [   ],  SKIN : ULCERS [   ], RASH [   ], ITCHING [   ]  CNS: HEAD ACHE [   ], DOUBLE VISION [   ], BLURRED VISION [   ], AMS / CONFUSION [   ], SEIZURES [   ], WEAKNESS [   ],TINGLING / NUMBNESS [   ]    PHYSICAL EXAMINATION:  GENERAL APPEARANCE: NO DISTRESS  HEENT:  NO PALLOR, NO  JVD,  NO   NODES, NECK SUPPLE  CVS: S1 +, S2 +,   RS: AEEB,  OCCASIONAL  RALES +,   NO RONCHI  ABD: SOFT, NT, NO, BS +  EXT: NO PE  SKIN: WARM,   SKELETAL:  ROM ACCEPTABLE  CNS:  AAO X 2-3   , NO  DEFICITS      COVID-19 PCR: Detected:   This test has been validated by [employer] to be accurate;  though it has not been FDA cleared/approved by the usual pathway.  As with all laboratory tests, results should be correlated with clinical  findings.  https://www.fda.gov/media/996342/download  https://www.fda.gov/media/597400/download (04.04.20 @ 13:48)      RADIOLOGY :    < from: Xray Chest 1 View-PORTABLE IMMEDIATE (04.06.20 @ 16:35) >    IMPRESSION:    Increasing bibasilar interstitial infiltrates.    < end of copied text >        ASSESSMENT :     Altered mental status  BPH with urinary obstruction  Prostate CA  CKD (chronic kidney disease)  PAD (peripheral artery disease)  HLD (hyperlipidemia)  HTN (hypertension)  IBD (inflammatory bowel disease)  Bipolar disorder  Anemia  COPD, mild  No pertinent past medical history  History of creation of ostomy      PLAN:  HPI:  69 year old Male from [facility], with PMHx of prostate Ca (s/p radiation in 2015, now in remission), asthma, COPD (not on O2), HTN, HLD, Bipolar, anemia, GERD, Ileostomy s/p sigmoid resection, PAD, and CKD is [facility] for [facility] and weakness. Patient is AAOx2 on my exam and denies any complaints, however not a reliable historian. No acute distress.    GOC: Full code. (04 Apr 2020 00:54)    PATIENT IS A 70YO MALE FROM [facility] WITH PMHX OF PROSTATE CA [S/P RADIATION 2015, IN REMISSION], ASTHMA, COPD, HTN, HLD, BIPOLAR DX, ANEMIA, GERD, ILEOSTOMY S/P SIGMOID RESECTION, PAD AND CKD3 TRANSFERRED FOR EVALUATION OF AMS/WEAKNESS.      # HYPOXIC RESPIRATORY FAILURE S/T MULTIFOCAL COVID19 PNEUMONIA, SEPSIS SUSPECT SECONDARY BACTERIAL PNEUMONIA - WILL CONTINUE PO ANTIBIOTICS, F/U BLOOD CULTURES [NGTD]  # COPD  # PAWAN ON CKD 3-4 - PRE-RENAL AZOTEMIA VS. MORE LIKELY ATN S/T COVID19 - NEPHROLOGY CONSULTED, PO INTAKE, MONITOR URINE OUTPUT; VASCULAR CONSULTED AND PERIPHERAL ACCESS OBTAINED. IF NEEDED, WILL EMERGENTLY PLACED ACCESS FOR HD.  # ACUTE METABOLIC ENCEPHALOPATHY - RESOLVED  # METABOLIC ACIDOSIS S/T PAWAN - ON SODIUM BICARB   # TRANSAMINITIS   # HYPERKALEMIA S/T PAWAN - RESOLVED  # GI AND DVT PPX    MACARENA IVY M.D. COVERING FOR JEFRY IVY M.D. Patient is a 69y old  Male who presents with a chief complaint of AMS (11 Apr 2020 14:32)    PATIENT IS SEEN AND EXAMINED IN MEDICAL FLOOR.    ALLERGIES:  No Known Allergies      VITALS:    Vital Signs Last 24 Hrs  T(C): 36.5 (11 Apr 2020 15:23), Max: 37.1 (10 Apr 2020 16:57)  T(F): 97.7 (11 Apr 2020 15:23), Max: 98.8 (10 Apr 2020 21:15)  HR: 80 (11 Apr 2020 15:23) (62 - 84)  BP: 99/59 (11 Apr 2020 15:23) (99/59 - 117/65)  BP(mean): --  RR: 18 (11 Apr 2020 15:23) (18 - 19)  SpO2: 97% (11 Apr 2020 15:23) (96% - 100%)    LABS:    CBC Full  -  ( 11 Apr 2020 07:17 )  WBC Count : 7.48 K/uL  RBC Count : 3.57 M/uL  Hemoglobin : 9.9 g/dL  Hematocrit : 29.7 %  Platelet Count - Automated : 235 K/uL  Mean Cell Volume : 83.2 fl  Mean Cell Hemoglobin : 27.7 pg  Mean Cell Hemoglobin Concentration : 33.3 gm/dL  Auto Neutrophil # : 5.66 K/uL  Auto Lymphocyte # : 0.81 K/uL  Auto Monocyte # : 0.69 K/uL  Auto Eosinophil # : 0.17 K/uL  Auto Basophil # : 0.02 K/uL  Auto Neutrophil % : 75.7 %  Auto Lymphocyte % : 10.8 %  Auto Monocyte % : 9.2 %  Auto Eosinophil % : 2.3 %  Auto Basophil % : 0.3 %    PT/INR - ( 11 Apr 2020 07:17 )   PT: 12.2 sec;   INR: 1.08 ratio         PTT - ( 11 Apr 2020 07:17 )  PTT:25.4 sec  04-11    140  |  107  |  97<H>  ----------------------------<  186<H>  3.7   |  21<L>  |  3.32<H>    Ca    8.2<L>      11 Apr 2020 07:17  Phos  4.9     04-11  Mg     2.3     04-11    TPro  8.5<H>  /  Alb  2.8<L>  /  TBili  0.6  /  DBili  x   /  AST  55<H>  /  ALT  30  /  AlkPhos  81  04-11      CARDIAC MARKERS ( 11 Apr 2020 07:17 )  x     / x     / 286 U/L / x     / x      CARDIAC MARKERS ( 10 Apr 2020 13:15 )  x     / x     / 459 U/L / x     / x          LIVER FUNCTIONS - ( 11 Apr 2020 07:17 )  Alb: 2.8 g/dL / Pro: 8.5 g/dL / ALK PHOS: 81 U/L / ALT: 30 U/L DA / AST: 55 U/L / GGT: x           Creatinine Trend: 3.32<--, 4.14<--, 4.48<--, 3.92<--, 3.86<--, 4.21<--  I&O's Summary    10 Apr 2020 07:01  -  11 Apr 2020 07:00  --------------------------------------------------------  IN: 350 mL / OUT: 1025 mL / NET: -675 mL    11 Apr 2020 07:01  -  11 Apr 2020 15:40  --------------------------------------------------------  IN: 250 mL / OUT: 0 mL / NET: 250 mL      .Blood  04-07 @ 18:15   No growth to date.  --  --      .Blood  02-05 @ 10:19   No growth at 5 days.  --  --      .Blood  01-25 @ 22:29   No growth at 5 days.  --  --      .Blood  01-25 @ 22:26   No growth at 5 days.  --  --      .Urine  01-25 @ 01:01   >100,000 CFU/ml Klebsiella pneumoniae  --  Klebsiella pneumoniae          MEDICATIONS:    MEDICATIONS  (STANDING):  acetaminophen   Tablet .. 650 milliGRAM(s) Oral every 6 hours  ARIPiprazole 15 milliGRAM(s) Oral daily  atorvastatin 10 milliGRAM(s) Oral at bedtime  finasteride 5 milliGRAM(s) Oral daily  gabapentin 600 milliGRAM(s) Oral two times a day  heparin  Injectable 5000 Unit(s) SubCutaneous every 12 hours  hydroxychloroquine   Oral   hydroxychloroquine 200 milliGRAM(s) Oral every 12 hours  lamoTRIgine 200 milliGRAM(s) Oral daily  levoFLOXacin  Tablet 250 milliGRAM(s) Oral every 24 hours  lidocaine 1% Injectable 10 milliLiter(s) Local Injection once  pantoprazole    Tablet 40 milliGRAM(s) Oral before breakfast  sodium bicarbonate  Infusion 0.098 mEq/kG/Hr (60 mL/Hr) IV Continuous <Continuous>  sodium zirconium cyclosilicate 10 Gram(s) Oral three times a day  tamsulosin 0.4 milliGRAM(s) Oral at bedtime  traZODone 25 milliGRAM(s) Oral at bedtime      MEDICATIONS  (PRN):  HYDROmorphone   Tablet 4 milliGRAM(s) Oral every 8 hours PRN Severe Pain (7 - 10)      REVIEW OF SYSTEMS:                           ALL ROS DONE [ X   ]    CONSTITUTIONAL:  LETHARGIC [   ], FEVER [   ], UNRESPONSIVE [   ]  CVS:  CP  [   ], SOB, [   ], PALPITATIONS [   ], DIZZYNESS [   ]  RS: COUGH [   ], SPUTUM [   ]  GI: ABDOMINAL PAIN [   ], NAUSEA [   ], VOMITINGS [   ], DIARRHEA [   ], CONSTIPATION [   ]  :  DYSURIA [   ], NOCTURIA [   ], INCREASED FREQUENCY [   ], DRIBLING [   ],  SKELETAL: PAINFUL JOINTS [   ], SWOLLEN JOINTS [   ], NECK ACHE [   ], LOW BACK ACHE [   ],  SKIN : ULCERS [   ], RASH [   ], ITCHING [   ]  CNS: HEAD ACHE [   ], DOUBLE VISION [   ], BLURRED VISION [   ], AMS / CONFUSION [   ], SEIZURES [   ], WEAKNESS [   ],TINGLING / NUMBNESS [   ]    PHYSICAL EXAMINATION:  GENERAL APPEARANCE: NO DISTRESS  HEENT:  NO PALLOR, NO  JVD,  NO   NODES, NECK SUPPLE  CVS: S1 +, S2 +,   RS: AEEB,  OCCASIONAL  RALES +,   NO RONCHI  ABD: SOFT, NT, NO, BS +  EXT: NO PE  SKIN: WARM,   SKELETAL:  ROM ACCEPTABLE  CNS:  AAO X 2-3   , NO  DEFICITS      COVID-19 PCR: Detected:   This test has been validated by [employer] to be accurate;  though it has not been FDA cleared/approved by the usual pathway.  As with all laboratory tests, results should be correlated with clinical  findings.  https://www.fda.gov/media/759748/download  https://www.fda.gov/media/080114/download (04.04.20 @ 13:48)      RADIOLOGY :    < from: Xray Chest 1 View-PORTABLE IMMEDIATE (04.06.20 @ 16:35) >    IMPRESSION:    Increasing bibasilar interstitial infiltrates.    < end of copied text >        ASSESSMENT :     Altered mental status  BPH with urinary obstruction  Prostate CA  CKD (chronic kidney disease)  PAD (peripheral artery disease)  HLD (hyperlipidemia)  HTN (hypertension)  IBD (inflammatory bowel disease)  Bipolar disorder  Anemia  COPD, mild  No pertinent past medical history  History of creation of ostomy      PLAN:  HPI:  69 year old Male from [facility], with PMHx of prostate Ca (s/p radiation in 2015, now in remission), asthma, COPD (not on O2), HTN, HLD, Bipolar, anemia, GERD, Ileostomy s/p sigmoid resection, PAD, and CKD is [facility] ED [facility] for AMS and weakness. Patient is AAOx2 on my exam and denies any complaints, however not a reliable historian. No acute distress.    GOC: Full code. (04 Apr 2020 00:54)    PATIENT IS A 70YO MALE FROM [facility] WITH PMHX OF PROSTATE CA [S/P RADIATION 2015, IN REMISSION], ASTHMA, COPD, HTN, HLD, BIPOLAR DX, ANEMIA, GERD, ILEOSTOMY S/P SIGMOID RESECTION, PAD AND CKD3 TRANSFERRED FOR EVALUATION OF AMS/WEAKNESS.      # HYPOXIC RESPIRATORY FAILURE S/T MULTIFOCAL COVID19 PNEUMONIA, SEPSIS SUSPECT SECONDARY BACTERIAL PNEUMONIA - WILL CONTINUE PO ANTIBIOTICS, F/U BLOOD CULTURES [NGTD]  # COPD  # PAWAN ON CKD 3-4 - PRE-RENAL AZOTEMIA VS. MORE LIKELY ATN S/T COVID19 - NEPHROLOGY CONSULTED, PO INTAKE, MONITOR URINE OUTPUT; VASCULAR CONSULTED AND PERIPHERAL ACCESS OBTAINED. IF NEEDED, WILL EMERGENTLY PLACED ACCESS FOR HD. IMPROVING WITH IVF. STRICT IS AND OS.  # ACUTE METABOLIC ENCEPHALOPATHY - RESOLVED  # METABOLIC ACIDOSIS S/T PAWAN - ON SODIUM BICARB DRIP  # TRANSAMINITIS   # HYPERKALEMIA S/T PAWAN - RESOLVED  # GI AND DVT PPX    MACARENA IVY M.D. COVERING FOR JEFRY IVY M.D.

## 2020-04-11 NOTE — PROGRESS NOTE ADULT - ASSESSMENT
1. PAWAN due to pre-renal azotemia due to volume depletion vs r/o ATN.   -pts renal function is improving however, if renal function worsens, acidosis , hyperkalemia , fluid overload or uremia then patient will require HD. Patient was explained in detail the risks (hypotension, angina, MI, and cardiac arrest), benefits and alternative modalities (no dialysis and conservative medical management) for dialysis. Patient given time to ask questions and all questions were answered. Patient gave consent for renal replacement therapy if necessary.  -urinalysis, urine lytes noted and Fena is 0.51%  -unable to perform renal sono due to covid.  -Adjust meds to eGFR and avoid IV Gadolinium contrast,NSAIDs, and phosphate enema.  -Monitor I/O's daily.   -Monitor SMA daily.  2. CKD stage 3 due h/o renal recovery from ATN.  -baseline scr around 2.0mg/dL with non-nephrotic range proteinuria (uPCR 900mg)  -Keep patient euvolemic and renal diet  -Avoid Nephrotoxic Meds/ Agents such as (NSAIDs, IV contrast, Aminoglycosides such as gentamicin, -Gadolinium contrast, Phosphate containing enemas, etc..)  -Adjust Medications according to eGFR  3. Hyperkalemia: improved   - monitor K daily  -continue  low k diet  4.Hx of hypotension: improved   -may add Midodrine prn  5. MBD: with normal phos level  -pt has mild high normal pth-no intervention needed at this time  COVID +ve pna. plan as per primary team.  6. Acidosis due to renal failure: improving   -continue iv nahco2 x 24 hrs  -f/u co2 daily  7. Anemia:multifactorial-mild  -no epogen needed at this time  -F/u CBC daily  -transfuse if HB < 7.0.

## 2020-04-11 NOTE — PROGRESS NOTE ADULT - SUBJECTIVE AND OBJECTIVE BOX
Note is incomplete  pt seen and examined.    SUBJECTIVE:  pt is doing better   pt denies cp,sob ,gi or gu symptoms  pt is voiding ok  pts renal function is improving       Current meds:    acetaminophen   Tablet .. 650 milliGRAM(s) Oral every 6 hours  ARIPiprazole 15 milliGRAM(s) Oral daily  atorvastatin 10 milliGRAM(s) Oral at bedtime  finasteride 5 milliGRAM(s) Oral daily  gabapentin 600 milliGRAM(s) Oral two times a day  heparin  Injectable 5000 Unit(s) SubCutaneous every 12 hours  HYDROmorphone   Tablet 4 milliGRAM(s) Oral every 8 hours PRN  hydroxychloroquine   Oral   hydroxychloroquine 200 milliGRAM(s) Oral every 12 hours  lamoTRIgine 200 milliGRAM(s) Oral daily  levoFLOXacin  Tablet 250 milliGRAM(s) Oral every 24 hours  lidocaine 1% Injectable 10 milliLiter(s) Local Injection once  pantoprazole    Tablet 40 milliGRAM(s) Oral before breakfast  sodium bicarbonate  Infusion 0.098 mEq/kG/Hr IV Continuous <Continuous>  sodium zirconium cyclosilicate 10 Gram(s) Oral three times a day  tamsulosin 0.4 milliGRAM(s) Oral at bedtime  traZODone 25 milliGRAM(s) Oral at bedtime      Vital Signs    T(F): 97.9 (04-11-20 @ 11:00), Max: 98.8 (04-10-20 @ 21:15)  HR: 81 (04-11-20 @ 11:00) (62 - 84)  BP: 116/66 (04-11-20 @ 11:00) (104/66 - 117/65)  ABP: --  RR: 18 (04-11-20 @ 11:00) (18 - 19)  SpO2: 98% (04-11-20 @ 11:00) (96% - 100%)  Wt(kg): --  CVP(cm H2O): --  CO: --  PCWP: --    I and O's:    04-09 @ 07:01  -  04-10 @ 07:00  --------------------------------------------------------  IN:  Total IN: 0 mL    OUT:    Ileostomy: 200 mL  Total OUT: 200 mL    Total NET: -200 mL      04-10 @ 07:01  -  04-11 @ 07:00  --------------------------------------------------------  IN:    IV PiggyBack: 150 mL    Oral Fluid: 200 mL  Total IN: 350 mL    OUT:    Ileostomy: 550 mL    Voided: 475 mL  Total OUT: 1025 mL    Total NET: -675 mL        Daily     Daily     PHYSICAL EXAM:  Constitutional: well developed, well nourished  and in nad  HEENT: PERRLA,  no icteric sclera and mild pallor of conjunctiva noted  Neck: No JVD, thyromegaly or adenopathy  Respiratory: reduced air entry lower lungs with no rales, wheezing or rhonchi  Cardiovascular: S1 and S2 normally heard  Gastrointestinal: soft, nondistended, nontender and normal bowel sounds heard  Extremities: No peripheral edema or cyanosis  Neurological: A/O x 3, no focal deficits  : No flank or cva tenderness palpated.  Skin: No rashes      LABS:    CBC:                          9.9    7.48  )-----------( 235      ( 11 Apr 2020 07:17 )             29.7           BMP:    04-11    140  |  107  |  97<H>  ----------------------------<  186<H>  3.7   |  21<L>  |  3.32<H>  04-10    137  |  109<H>  |  112<H>  ----------------------------<  104<H>  4.3   |  16<L>  |  4.14<H>  04-09    134<L>  |  108  |  107<H>  ----------------------------<  87  5.9<H>   |  13<L>  |  4.48<H>    Ca    8.2<L>      11 Apr 2020 07:17  Ca    8.7      10 Apr 2020 13:15  Ca    8.6      09 Apr 2020 13:46  Phos  4.9     04-11  Phos  5.6     04-10  Phos  5.5     04-09  Mg     2.3     04-11  Mg     2.4     04-10  Mg     2.4     04-09    TPro  8.5<H>  /  Alb  2.8<L>  /  TBili  0.6  /  DBili  x   /  AST  55<H>  /  ALT  30  /  AlkPhos  81  04-11  TPro  9.5<H>  /  Alb  3.1<L>  /  TBili  0.5  /  DBili  x   /  AST  75<H>  /  ALT  37  /  AlkPhos  92  04-10  TPro  9.8<H>  /  Alb  3.0<L>  /  TBili  0.6  /  DBili  x   /  AST  102<H>  /  ALT  37  /  AlkPhos  91  04-09          URINE STUDIES:        Potassium, Random Urine: 26 mmol/L (04-05 @ 07:41)  Osmolality, Random Urine: 431 mos/kg (04-05 @ 07:41)  Sodium, Random Urine: 17 mmol/L (04-05 @ 07:41)  Creatinine, Random Urine: 100 mg/dL (04-05 @ 07:41)                  RADIOLOGY & ADDITIONAL STUDIES: pt seen and examined.    SUBJECTIVE:  pt is doing better   pt denies cp,sob ,gi or gu symptoms  pt is voiding ok  pts renal function is improving       Current meds:    acetaminophen   Tablet .. 650 milliGRAM(s) Oral every 6 hours  ARIPiprazole 15 milliGRAM(s) Oral daily  atorvastatin 10 milliGRAM(s) Oral at bedtime  finasteride 5 milliGRAM(s) Oral daily  gabapentin 600 milliGRAM(s) Oral two times a day  heparin  Injectable 5000 Unit(s) SubCutaneous every 12 hours  HYDROmorphone   Tablet 4 milliGRAM(s) Oral every 8 hours PRN  hydroxychloroquine   Oral   hydroxychloroquine 200 milliGRAM(s) Oral every 12 hours  lamoTRIgine 200 milliGRAM(s) Oral daily  levoFLOXacin  Tablet 250 milliGRAM(s) Oral every 24 hours  lidocaine 1% Injectable 10 milliLiter(s) Local Injection once  pantoprazole    Tablet 40 milliGRAM(s) Oral before breakfast  sodium bicarbonate  Infusion 0.098 mEq/kG/Hr IV Continuous <Continuous>  sodium zirconium cyclosilicate 10 Gram(s) Oral three times a day  tamsulosin 0.4 milliGRAM(s) Oral at bedtime  traZODone 25 milliGRAM(s) Oral at bedtime      Vital Signs    T(F): 97.9 (04-11-20 @ 11:00), Max: 98.8 (04-10-20 @ 21:15)  HR: 81 (04-11-20 @ 11:00) (62 - 84)  BP: 116/66 (04-11-20 @ 11:00) (104/66 - 117/65)  ABP: --  RR: 18 (04-11-20 @ 11:00) (18 - 19)  SpO2: 98% (04-11-20 @ 11:00) (96% - 100%)  Wt(kg): --  CVP(cm H2O): --  CO: --  PCWP: --    I and O's:    04-09 @ 07:01  -  04-10 @ 07:00  --------------------------------------------------------  IN:  Total IN: 0 mL    OUT:    Ileostomy: 200 mL  Total OUT: 200 mL    Total NET: -200 mL      04-10 @ 07:01  -  04-11 @ 07:00  --------------------------------------------------------  IN:    IV PiggyBack: 150 mL    Oral Fluid: 200 mL  Total IN: 350 mL    OUT:    Ileostomy: 550 mL    Voided: 475 mL  Total OUT: 1025 mL    Total NET: -675 mL        Daily     Daily         LABS:    CBC:                          9.9    7.48  )-----------( 235      ( 11 Apr 2020 07:17 )             29.7     BMP:    04-11    140  |  107  |  97<H>  ----------------------------<  186<H>  3.7   |  21<L>  |  3.32<H>  04-10    137  |  109<H>  |  112<H>  ----------------------------<  104<H>  4.3   |  16<L>  |  4.14<H>  04-09    134<L>  |  108  |  107<H>  ----------------------------<  87  5.9<H>   |  13<L>  |  4.48<H>    Ca    8.2<L>      11 Apr 2020 07:17  Ca    8.7      10 Apr 2020 13:15  Ca    8.6      09 Apr 2020 13:46  Phos  4.9     04-11  Phos  5.6     04-10  Phos  5.5     04-09  Mg     2.3     04-11  Mg     2.4     04-10  Mg     2.4     04-09    TPro  8.5<H>  /  Alb  2.8<L>  /  TBili  0.6  /  DBili  x   /  AST  55<H>  /  ALT  30  /  AlkPhos  81  04-11  TPro  9.5<H>  /  Alb  3.1<L>  /  TBili  0.5  /  DBili  x   /  AST  75<H>  /  ALT  37  /  AlkPhos  92  04-10  TPro  9.8<H>  /  Alb  3.0<L>  /  TBili  0.6  /  DBili  x   /  AST  102<H>  /  ALT  37  /  AlkPhos  91  04-09          :

## 2020-04-11 NOTE — CHART NOTE - NSCHARTNOTEFT_GEN_A_CORE
Chart reviewed.  Patient seen and examined.    CC: Patient is a 69y old  Male who presents with a chief complaint of AMS (08 Apr 2020 15:07)    HPI: 69 year old Male from [facility], with PMHx of prostate Ca (s/p radiation in 2015, now in remission), asthma, COPD (not on O2), HTN, HLD, Bipolar, anemia, GERD, Ileostomy s/p sigmoid resection, PAD, CKD3 who on 4/3/2020 presented from [facility] due  AMS and weakness.  Found to be COVID-19 positive and in worsening renal failure.    Subjective/ROS: feels "OK' breathing is ok; denies CP/palpitation/SOB/HA/dizziness/abd pain/n/v/d/f/c    MEDICATIONS  (STANDING):  acetaminophen   Tablet .. 650 milliGRAM(s) Oral every 6 hours  ARIPiprazole 15 milliGRAM(s) Oral daily  atorvastatin 10 milliGRAM(s) Oral at bedtime  finasteride 5 milliGRAM(s) Oral daily  gabapentin 600 milliGRAM(s) Oral two times a day  heparin  Injectable 5000 Unit(s) SubCutaneous every 12 hours  hydroxychloroquine   Oral   hydroxychloroquine 200 milliGRAM(s) Oral every 12 hours  lamoTRIgine 200 milliGRAM(s) Oral daily  levoFLOXacin  Tablet 250 milliGRAM(s) Oral every 24 hours  lidocaine 1% Injectable 10 milliLiter(s) Local Injection once  pantoprazole    Tablet 40 milliGRAM(s) Oral before breakfast  sodium bicarbonate  Infusion 0.098 mEq/kG/Hr (60 mL/Hr) IV Continuous <Continuous>  sodium zirconium cyclosilicate 10 Gram(s) Oral three times a day  tamsulosin 0.4 milliGRAM(s) Oral at bedtime  traZODone 25 milliGRAM(s) Oral at bedtime    MEDICATIONS  (PRN):  HYDROmorphone   Tablet 4 milliGRAM(s) Oral every 8 hours PRN Severe Pain (7 - 10)      Vital Signs Last 24 Hrs  T(C): 36.6 (11 Apr 2020 11:00), Max: 37.1 (10 Apr 2020 16:57)  T(F): 97.9 (11 Apr 2020 11:00), Max: 98.8 (10 Apr 2020 21:15)  HR: 81 (11 Apr 2020 11:00) (62 - 84)  BP: 116/66 (11 Apr 2020 11:00) (104/66 - 117/65)  BP(mean): --  RR: 18 (11 Apr 2020 11:00) (18 - 19)  SpO2: 98% (11 Apr 2020 11:00) (96% - 100%)    FOCUS PHYSICAL EXAM:  GENERAL: NAD  Head: AT/NC  eyes: nonicteric  Mouth: dry membranes  RESP: even and unlabored breathing on room air  BREAST:  not examined  NEURO: AAOX3, follows all commands, able to move extremities spontaneously but he reports movement is less than his baseline  EXT: multiple resolving bruise to BUE 2/2 IV/blood draw, otherwise no c/c/e    LABS:                        9.9    7.48  )-----------( 235      ( 11 Apr 2020 07:17 )             29.7     04-11    140  |  107  |  97<H>  ----------------------------<  186<H>  3.7   |  21<L>  |  3.32<H>    Ca    8.2<L>      11 Apr 2020 07:17  Phos  4.9     04-11  Mg     2.3     04-11    TPro  8.5<H>  /  Alb  2.8<L>  /  TBili  0.6  /  DBili  x   /  AST  55<H>  /  ALT  30  /  AlkPhos  81  04-11    CARDIAC MARKERS ( 11 Apr 2020 07:17 )  x     / x     / 286 U/L / x     / x      CARDIAC MARKERS ( 10 Apr 2020 13:15 )  x     / x     / 459 U/L / x     / x        LIVER FUNCTIONS - ( 11 Apr 2020 07:17 )  Alb: 2.8 g/dL / Pro: 8.5 g/dL / ALK PHOS: 81 U/L / ALT: 30 U/L DA / AST: 55 U/L / GGT: x           PT/INR - ( 11 Apr 2020 07:17 )   PT: 12.2 sec;   INR: 1.08 ratio       PTT - ( 11 Apr 2020 07:17 )  PTT:25.4 sec         OLDER/BASELINE DATA  Creatinine, Serum: 2.10 mg/dL (02.11.20 @ 07:52)    Impression and Plan:    1. Acute hypoxemic respiratory failure likely due to viral pneumonia r/t COVID-19 superimposed with bacterial infection  2.  Hypoalbuminemia  3.  Abnormal Liver function tests due to acute viral illness  4. PAWAN on CKD likely due to COVID related ATN   5. metabolic encephalopathy resolved  6. Hyperkalemia     - potassium within norm today  - Scr trending down to near baseline  - con't to trend BUN/Scr  - continue empiric antibiotics   - continue hydroxychloroquine for COVID related illness  - continue to monitor closely  - Supportive measures  - Continue Isolation precautions based on COVID-19 infection control protocol  - con't GI/DVT PPX  - SW for post dc needs

## 2020-04-12 LAB
ALBUMIN SERPL ELPH-MCNC: 2.9 G/DL — LOW (ref 3.5–5)
ALP SERPL-CCNC: 93 U/L — SIGNIFICANT CHANGE UP (ref 40–120)
ALT FLD-CCNC: 35 U/L DA — SIGNIFICANT CHANGE UP (ref 10–60)
ANION GAP SERPL CALC-SCNC: 7 MMOL/L — SIGNIFICANT CHANGE UP (ref 5–17)
APTT BLD: 24.3 SEC — LOW (ref 27.5–36.3)
AST SERPL-CCNC: 57 U/L — HIGH (ref 10–40)
BASOPHILS # BLD AUTO: 0.02 K/UL — SIGNIFICANT CHANGE UP (ref 0–0.2)
BASOPHILS NFR BLD AUTO: 0.2 % — SIGNIFICANT CHANGE UP (ref 0–2)
BILIRUB SERPL-MCNC: 0.6 MG/DL — SIGNIFICANT CHANGE UP (ref 0.2–1.2)
BUN SERPL-MCNC: 100 MG/DL — HIGH (ref 7–18)
CALCIUM SERPL-MCNC: 8.4 MG/DL — SIGNIFICANT CHANGE UP (ref 8.4–10.5)
CHLORIDE SERPL-SCNC: 105 MMOL/L — SIGNIFICANT CHANGE UP (ref 96–108)
CO2 SERPL-SCNC: 26 MMOL/L — SIGNIFICANT CHANGE UP (ref 22–31)
CREAT SERPL-MCNC: 3.19 MG/DL — HIGH (ref 0.5–1.3)
CRP SERPL-MCNC: 6.61 MG/DL — HIGH (ref 0–0.4)
D DIMER BLD IA.RAPID-MCNC: 2025 NG/ML DDU — HIGH
EOSINOPHIL # BLD AUTO: 0.22 K/UL — SIGNIFICANT CHANGE UP (ref 0–0.5)
EOSINOPHIL NFR BLD AUTO: 2.7 % — SIGNIFICANT CHANGE UP (ref 0–6)
GLUCOSE SERPL-MCNC: 137 MG/DL — HIGH (ref 70–99)
HCT VFR BLD CALC: 29.4 % — LOW (ref 39–50)
HGB BLD-MCNC: 9.7 G/DL — LOW (ref 13–17)
IMM GRANULOCYTES NFR BLD AUTO: 2 % — HIGH (ref 0–1.5)
INR BLD: 1.13 RATIO — SIGNIFICANT CHANGE UP (ref 0.88–1.16)
LYMPHOCYTES # BLD AUTO: 0.84 K/UL — LOW (ref 1–3.3)
LYMPHOCYTES # BLD AUTO: 10.4 % — LOW (ref 13–44)
MAGNESIUM SERPL-MCNC: 2.1 MG/DL — SIGNIFICANT CHANGE UP (ref 1.6–2.6)
MCHC RBC-ENTMCNC: 27.5 PG — SIGNIFICANT CHANGE UP (ref 27–34)
MCHC RBC-ENTMCNC: 33 GM/DL — SIGNIFICANT CHANGE UP (ref 32–36)
MCV RBC AUTO: 83.3 FL — SIGNIFICANT CHANGE UP (ref 80–100)
MONOCYTES # BLD AUTO: 0.66 K/UL — SIGNIFICANT CHANGE UP (ref 0–0.9)
MONOCYTES NFR BLD AUTO: 8.2 % — SIGNIFICANT CHANGE UP (ref 2–14)
NEUTROPHILS # BLD AUTO: 6.14 K/UL — SIGNIFICANT CHANGE UP (ref 1.8–7.4)
NEUTROPHILS NFR BLD AUTO: 76.5 % — SIGNIFICANT CHANGE UP (ref 43–77)
NRBC # BLD: 0 /100 WBCS — SIGNIFICANT CHANGE UP (ref 0–0)
PHOSPHATE SERPL-MCNC: 4.3 MG/DL — SIGNIFICANT CHANGE UP (ref 2.5–4.5)
PLATELET # BLD AUTO: 256 K/UL — SIGNIFICANT CHANGE UP (ref 150–400)
POTASSIUM SERPL-MCNC: 3.5 MMOL/L — SIGNIFICANT CHANGE UP (ref 3.5–5.3)
POTASSIUM SERPL-SCNC: 3.5 MMOL/L — SIGNIFICANT CHANGE UP (ref 3.5–5.3)
PROT SERPL-MCNC: 8.8 G/DL — HIGH (ref 6–8.3)
PROTHROM AB SERPL-ACNC: 12.8 SEC — SIGNIFICANT CHANGE UP (ref 10–12.9)
RBC # BLD: 3.53 M/UL — LOW (ref 4.2–5.8)
RBC # FLD: 14.8 % — HIGH (ref 10.3–14.5)
SODIUM SERPL-SCNC: 138 MMOL/L — SIGNIFICANT CHANGE UP (ref 135–145)
WBC # BLD: 8.04 K/UL — SIGNIFICANT CHANGE UP (ref 3.8–10.5)
WBC # FLD AUTO: 8.04 K/UL — SIGNIFICANT CHANGE UP (ref 3.8–10.5)

## 2020-04-12 RX ORDER — MIDODRINE HYDROCHLORIDE 2.5 MG/1
2.5 TABLET ORAL THREE TIMES A DAY
Refills: 0 | Status: DISCONTINUED | OUTPATIENT
Start: 2020-04-12 | End: 2020-04-13

## 2020-04-12 RX ORDER — ERYTHROPOIETIN 10000 [IU]/ML
6000 INJECTION, SOLUTION INTRAVENOUS; SUBCUTANEOUS
Refills: 0 | Status: DISCONTINUED | OUTPATIENT
Start: 2020-04-13 | End: 2020-04-14

## 2020-04-12 RX ORDER — LIDOCAINE 4 G/100G
1 CREAM TOPICAL DAILY
Refills: 0 | Status: DISCONTINUED | OUTPATIENT
Start: 2020-04-12 | End: 2020-04-14

## 2020-04-12 RX ADMIN — HEPARIN SODIUM 5000 UNIT(S): 5000 INJECTION INTRAVENOUS; SUBCUTANEOUS at 07:33

## 2020-04-12 RX ADMIN — GABAPENTIN 600 MILLIGRAM(S): 400 CAPSULE ORAL at 14:23

## 2020-04-12 RX ADMIN — PANTOPRAZOLE SODIUM 40 MILLIGRAM(S): 20 TABLET, DELAYED RELEASE ORAL at 07:33

## 2020-04-12 RX ADMIN — ARIPIPRAZOLE 15 MILLIGRAM(S): 15 TABLET ORAL at 11:14

## 2020-04-12 RX ADMIN — Medication 650 MILLIGRAM(S): at 18:31

## 2020-04-12 RX ADMIN — LAMOTRIGINE 200 MILLIGRAM(S): 25 TABLET, ORALLY DISINTEGRATING ORAL at 11:14

## 2020-04-12 RX ADMIN — FINASTERIDE 5 MILLIGRAM(S): 5 TABLET, FILM COATED ORAL at 11:15

## 2020-04-12 RX ADMIN — ATORVASTATIN CALCIUM 10 MILLIGRAM(S): 80 TABLET, FILM COATED ORAL at 22:50

## 2020-04-12 RX ADMIN — HEPARIN SODIUM 5000 UNIT(S): 5000 INJECTION INTRAVENOUS; SUBCUTANEOUS at 18:30

## 2020-04-12 RX ADMIN — Medication 650 MILLIGRAM(S): at 12:00

## 2020-04-12 RX ADMIN — TAMSULOSIN HYDROCHLORIDE 0.4 MILLIGRAM(S): 0.4 CAPSULE ORAL at 23:10

## 2020-04-12 RX ADMIN — Medication 650 MILLIGRAM(S): at 00:35

## 2020-04-12 RX ADMIN — Medication 200 MILLIGRAM(S): at 07:34

## 2020-04-12 RX ADMIN — Medication 650 MILLIGRAM(S): at 23:06

## 2020-04-12 RX ADMIN — Medication 650 MILLIGRAM(S): at 07:33

## 2020-04-12 RX ADMIN — LIDOCAINE 1 PATCH: 4 CREAM TOPICAL at 15:00

## 2020-04-12 RX ADMIN — MIDODRINE HYDROCHLORIDE 2.5 MILLIGRAM(S): 2.5 TABLET ORAL at 18:42

## 2020-04-12 NOTE — PROGRESS NOTE ADULT - SUBJECTIVE AND OBJECTIVE BOX
Patient is a 69y old  Male who presents with a chief complaint of AMS (12 Apr 2020 15:37)    PATIENT IS SEEN AND EXAMINED IN MEDICAL FLOOR.    ALLERGIES:  No Known Allergies    VITALS:    Vital Signs Last 24 Hrs  T(C): 36.9 (12 Apr 2020 20:54), Max: 36.9 (12 Apr 2020 04:39)  T(F): 98.5 (12 Apr 2020 20:54), Max: 98.5 (12 Apr 2020 04:39)  HR: 86 (12 Apr 2020 20:54) (79 - 86)  BP: 95/58 (12 Apr 2020 20:54) (91/63 - 108/52)  BP(mean): --  RR: 18 (12 Apr 2020 20:54) (18 - 18)  SpO2: 93% (12 Apr 2020 20:54) (93% - 98%)    LABS:    CBC Full  -  ( 12 Apr 2020 05:55 )  WBC Count : 8.04 K/uL  RBC Count : 3.53 M/uL  Hemoglobin : 9.7 g/dL  Hematocrit : 29.4 %  Platelet Count - Automated : 256 K/uL  Mean Cell Volume : 83.3 fl  Mean Cell Hemoglobin : 27.5 pg  Mean Cell Hemoglobin Concentration : 33.0 gm/dL  Auto Neutrophil # : 6.14 K/uL  Auto Lymphocyte # : 0.84 K/uL  Auto Monocyte # : 0.66 K/uL  Auto Eosinophil # : 0.22 K/uL  Auto Basophil # : 0.02 K/uL  Auto Neutrophil % : 76.5 %  Auto Lymphocyte % : 10.4 %  Auto Monocyte % : 8.2 %  Auto Eosinophil % : 2.7 %  Auto Basophil % : 0.2 %    PT/INR - ( 12 Apr 2020 05:55 )   PT: 12.8 sec;   INR: 1.13 ratio         PTT - ( 12 Apr 2020 05:55 )  PTT:24.3 sec  04-12    138  |  105  |  100<H>  ----------------------------<  137<H>  3.5   |  26  |  3.19<H>    Ca    8.4      12 Apr 2020 05:55  Phos  4.3     04-12  Mg     2.1     04-12    TPro  8.8<H>  /  Alb  2.9<L>  /  TBili  0.6  /  DBili  x   /  AST  57<H>  /  ALT  35  /  AlkPhos  93  04-12    CAPILLARY BLOOD GLUCOSE        CARDIAC MARKERS ( 11 Apr 2020 07:17 )  x     / x     / 286 U/L / x     / x          LIVER FUNCTIONS - ( 12 Apr 2020 05:55 )  Alb: 2.9 g/dL / Pro: 8.8 g/dL / ALK PHOS: 93 U/L / ALT: 35 U/L DA / AST: 57 U/L / GGT: x           Creatinine Trend: 3.19<--, 3.32<--, 4.14<--, 4.48<--, 3.92<--, 3.86<--  I&O's Summary    11 Apr 2020 07:01  -  12 Apr 2020 07:00  --------------------------------------------------------  IN: 250 mL / OUT: 0 mL / NET: 250 mL    .Blood  04-07 @ 18:15   No Growth Final  --  --      .Blood  02-05 @ 10:19   No growth at 5 days.  --  --      .Blood  01-25 @ 22:29   No growth at 5 days.  --  --      .Blood  01-25 @ 22:26   No growth at 5 days.  --  --      .Urine  01-25 @ 01:01   >100,000 CFU/ml Klebsiella pneumoniae  --  Klebsiella pneumoniae    MEDICATIONS:    MEDICATIONS  (STANDING):  acetaminophen   Tablet .. 650 milliGRAM(s) Oral every 6 hours  ARIPiprazole 15 milliGRAM(s) Oral daily  atorvastatin 10 milliGRAM(s) Oral at bedtime  finasteride 5 milliGRAM(s) Oral daily  gabapentin 600 milliGRAM(s) Oral two times a day  heparin  Injectable 5000 Unit(s) SubCutaneous every 12 hours  lamoTRIgine 200 milliGRAM(s) Oral daily  levoFLOXacin  Tablet 250 milliGRAM(s) Oral every 24 hours  lidocaine   Patch 1 Patch Transdermal daily  lidocaine 1% Injectable 10 milliLiter(s) Local Injection once  midodrine. 2.5 milliGRAM(s) Oral three times a day  pantoprazole    Tablet 40 milliGRAM(s) Oral before breakfast  sodium bicarbonate  Infusion 0.098 mEq/kG/Hr (60 mL/Hr) IV Continuous <Continuous>  tamsulosin 0.4 milliGRAM(s) Oral at bedtime  traZODone 25 milliGRAM(s) Oral at bedtime      MEDICATIONS  (PRN):      REVIEW OF SYSTEMS:                           ALL ROS DONE [ X   ]    CONSTITUTIONAL:  LETHARGIC [   ], FEVER [   ], UNRESPONSIVE [   ]  CVS:  CP  [   ], SOB, [   ], PALPITATIONS [   ], DIZZYNESS [   ]  RS: COUGH [   ], SPUTUM [   ]  GI: ABDOMINAL PAIN [   ], NAUSEA [   ], VOMITINGS [   ], DIARRHEA [   ], CONSTIPATION [   ]  :  DYSURIA [   ], NOCTURIA [   ], INCREASED FREQUENCY [   ], DRIBLING [   ],  SKELETAL: PAINFUL JOINTS [   ], SWOLLEN JOINTS [   ], NECK ACHE [   ], LOW BACK ACHE [   ],  SKIN : ULCERS [   ], RASH [   ], ITCHING [   ]  CNS: HEAD ACHE [   ], DOUBLE VISION [   ], BLURRED VISION [   ], AMS / CONFUSION [   ], SEIZURES [   ], WEAKNESS [   ],TINGLING / NUMBNESS [   ]      PHYSICAL EXAMINATION:  GENERAL APPEARANCE: NO DISTRESS  HEENT:  NO PALLOR, NO  JVD,  NO   NODES, NECK SUPPLE  CVS: S1 +, S2 +,   RS: AEEB,  OCCASIONAL  RALES +,   NO RONCHI  ABD: SOFT, NT, NO, BS +  EXT: NO PE  SKIN: WARM,   SKELETAL:  ROM ACCEPTABLE  CNS:  AAO X 2-3   , NO  DEFICITS      COVID-19 PCR: Detected:   This test has been validated by [facility] to be accurate;  though it has not been FDA cleared/approved by the usual pathway.  As with all laboratory tests, results should be correlated with clinical  findings.  https://www.fda.gov/media/206972/download  https://www.fda.gov/media/944718/download (04.04.20 @ 13:48)      RADIOLOGY :    < from: Xray Chest 1 View-PORTABLE IMMEDIATE (04.06.20 @ 16:35) >    IMPRESSION:    Increasing bibasilar interstitial infiltrates.    < end of copied text >        ASSESSMENT :     Altered mental status  BPH with urinary obstruction  Prostate CA  CKD (chronic kidney disease)  PAD (peripheral artery disease)  HLD (hyperlipidemia)  HTN (hypertension)  IBD (inflammatory bowel disease)  Bipolar disorder  Anemia  COPD, mild  No pertinent past medical history  History of creation of ostomy      PLAN:  HPI:  69 year old Male from [facility], with PMHx of prostate Ca (s/p radiation in 2015, now in remission), asthma, COPD (not on O2), HTN, HLD, Bipolar, anemia, GERD, Ileostomy s/p sigmoid resection, PAD, and CKD is sent to the ED from [place] for AMS and weakness. Patient is AAOx2 on my exam and denies any complaints, however not a reliable historian. No acute distress.    GOC: Full code. (04 Apr 2020 00:54)    PATIENT IS A 70YO MALE FROM [facility] WITH PMHX OF PROSTATE CA [S/P RADIATION 2015, IN REMISSION], ASTHMA, COPD, HTN, HLD, BIPOLAR DX, ANEMIA, GERD, ILEOSTOMY S/P SIGMOID RESECTION, PAD AND CKD3 TRANSFERRED FOR EVALUATION OF AMS/WEAKNESS.      # HYPOXIC RESPIRATORY FAILURE S/T MULTIFOCAL COVID19 PNEUMONIA, SEPSIS SUSPECT SECONDARY BACTERIAL PNEUMONIA - WILL CONTINUE PO ANTIBIOTICS, F/U BLOOD CULTURES [NGTD]  # COPD  # PAWAN ON CKD 3-4 - PRE-RENAL AZOTEMIA VS. MORE LIKELY ATN S/T COVID19 - NEPHROLOGY CONSULTED, PO INTAKE, MONITOR URINE OUTPUT; VASCULAR CONSULTED AND PERIPHERAL ACCESS OBTAINED. IF NEEDED, WILL EMERGENTLY PLACED ACCESS FOR HD. IMPROVING WITH IVF. STRICT IS AND OS.  # CHRONIC BACK PAIN - CONTINUE DILAUDID + ADD LIDODERM PATCHES  # NORMOCYTIC ANEMIA SUSPECT SECONDARY TO RENAL DX   # ACUTE METABOLIC ENCEPHALOPATHY - RESOLVED  # METABOLIC ACIDOSIS S/T PAWAN - ON SODIUM BICARB DRIP  # TRANSAMINITIS   # HYPERKALEMIA S/T PAWAN - RESOLVED  # GI AND DVT PPX    MACARENA IVY M.D. COVERING FOR JEFRY IVY M.D.

## 2020-04-12 NOTE — PROGRESS NOTE ADULT - SUBJECTIVE AND OBJECTIVE BOX
Note is incomplete  pt seen and examined.    SUBJECTIVE:  pt feels ok and denies cp,sob ,gi or gu symptoms  pts reanl function is slowly improving        Current meds:    acetaminophen   Tablet .. 650 milliGRAM(s) Oral every 6 hours  ARIPiprazole 15 milliGRAM(s) Oral daily  atorvastatin 10 milliGRAM(s) Oral at bedtime  finasteride 5 milliGRAM(s) Oral daily  gabapentin 600 milliGRAM(s) Oral two times a day  heparin  Injectable 5000 Unit(s) SubCutaneous every 12 hours  lamoTRIgine 200 milliGRAM(s) Oral daily  levoFLOXacin  Tablet 250 milliGRAM(s) Oral every 24 hours  lidocaine   Patch 1 Patch Transdermal daily  lidocaine 1% Injectable 10 milliLiter(s) Local Injection once  midodrine. 2.5 milliGRAM(s) Oral three times a day  pantoprazole    Tablet 40 milliGRAM(s) Oral before breakfast  sodium bicarbonate  Infusion 0.098 mEq/kG/Hr IV Continuous <Continuous>  tamsulosin 0.4 milliGRAM(s) Oral at bedtime  traZODone 25 milliGRAM(s) Oral at bedtime      Vital Signs    T(F): 98.4 (04-12-20 @ 14:38), Max: 98.5 (04-12-20 @ 04:39)  HR: 84 (04-12-20 @ 14:38) (81 - 87)  BP: 91/63 (04-12-20 @ 14:38) (91/63 - 121/69)  ABP: --  RR: 18 (04-12-20 @ 14:38) (18 - 18)  SpO2: 96% (04-12-20 @ 14:38) (96% - 98%)  Wt(kg): --  CVP(cm H2O): --  CO: --  PCWP: --    I and O's:    04-10 @ 07:01  -  04-11 @ 07:00  --------------------------------------------------------  IN:    IV PiggyBack: 150 mL    Oral Fluid: 200 mL  Total IN: 350 mL    OUT:    Ileostomy: 550 mL    Voided: 475 mL  Total OUT: 1025 mL    Total NET: -675 mL      04-11 @ 07:01  -  04-12 @ 07:00  --------------------------------------------------------  IN:    Oral Fluid: 250 mL  Total IN: 250 mL    OUT:  Total OUT: 0 mL    Total NET: 250 mL        Daily     Daily     PHYSICAL EXAM:  Constitutional: well developed, well nourished  and in nad  HEENT: PERRLA,  no icteric sclera and mild pallor of conjunctiva noted  Neck: No JVD, thyromegaly or adenopathy  Respiratory: reduced air entry lower lungs with no rales, wheezing or rhonchi  Cardiovascular: S1 and S2 normally heard  Gastrointestinal: soft, nondistended, nontender and normal bowel sounds heard  Extremities: No peripheral edema or cyanosis  Neurological: A/O x 3, no focal deficits  : No flank or cva tenderness palpated.  Skin: No rashes      LABS:    CBC:                          9.7    8.04  )-----------( 256      ( 12 Apr 2020 05:55 )             29.4           BMP:    04-12    138  |  105  |  100<H>  ----------------------------<  137<H>  3.5   |  26  |  3.19<H>  04-11    140  |  107  |  97<H>  ----------------------------<  186<H>  3.7   |  21<L>  |  3.32<H>  04-10    137  |  109<H>  |  112<H>  ----------------------------<  104<H>  4.3   |  16<L>  |  4.14<H>    Ca    8.4      12 Apr 2020 05:55  Ca    8.2<L>      11 Apr 2020 07:17  Ca    8.7      10 Apr 2020 13:15  Phos  4.3     04-12  Phos  4.9     04-11  Phos  5.6     04-10  Mg     2.1     04-12  Mg     2.3     04-11  Mg     2.4     04-10    TPro  8.8<H>  /  Alb  2.9<L>  /  TBili  0.6  /  DBili  x   /  AST  57<H>  /  ALT  35  /  AlkPhos  93  04-12  TPro  8.5<H>  /  Alb  2.8<L>  /  TBili  0.6  /  DBili  x   /  AST  55<H>  /  ALT  30  /  AlkPhos  81  04-11  TPro  9.5<H>  /  Alb  3.1<L>  /  TBili  0.5  /  DBili  x   /  AST  75<H>  /  ALT  37  /  AlkPhos  92  04-10          URINE STUDIES:                        RADIOLOGY & ADDITIONAL STUDIES: pt seen and examined.    SUBJECTIVE:  pt feels ok and denies cp,sob ,gi or gu symptoms  pts reanl function is slowly improving        Current meds:    acetaminophen   Tablet .. 650 milliGRAM(s) Oral every 6 hours  ARIPiprazole 15 milliGRAM(s) Oral daily  atorvastatin 10 milliGRAM(s) Oral at bedtime  finasteride 5 milliGRAM(s) Oral daily  gabapentin 600 milliGRAM(s) Oral two times a day  heparin  Injectable 5000 Unit(s) SubCutaneous every 12 hours  lamoTRIgine 200 milliGRAM(s) Oral daily  levoFLOXacin  Tablet 250 milliGRAM(s) Oral every 24 hours  lidocaine   Patch 1 Patch Transdermal daily  lidocaine 1% Injectable 10 milliLiter(s) Local Injection once  midodrine. 2.5 milliGRAM(s) Oral three times a day  pantoprazole    Tablet 40 milliGRAM(s) Oral before breakfast  sodium bicarbonate  Infusion 0.098 mEq/kG/Hr IV Continuous <Continuous>  tamsulosin 0.4 milliGRAM(s) Oral at bedtime  traZODone 25 milliGRAM(s) Oral at bedtime      Vital Signs    T(F): 98.4 (04-12-20 @ 14:38), Max: 98.5 (04-12-20 @ 04:39)  HR: 84 (04-12-20 @ 14:38) (81 - 87)  BP: 91/63 (04-12-20 @ 14:38) (91/63 - 121/69)  ABP: --  RR: 18 (04-12-20 @ 14:38) (18 - 18)  SpO2: 96% (04-12-20 @ 14:38) (96% - 98%)  Wt(kg): --  CVP(cm H2O): --  CO: --  PCWP: --    I and O's:    04-10 @ 07:01  -  04-11 @ 07:00  --------------------------------------------------------  IN:    IV PiggyBack: 150 mL    Oral Fluid: 200 mL  Total IN: 350 mL    OUT:    Ileostomy: 550 mL    Voided: 475 mL  Total OUT: 1025 mL    Total NET: -675 mL      04-11 @ 07:01  -  04-12 @ 07:00  --------------------------------------------------------  IN:    Oral Fluid: 250 mL  Total IN: 250 mL    OUT:  Total OUT: 0 mL    Total NET: 250 mL        Daily     Daily       LABS:    CBC:                          9.7    8.04  )-----------( 256      ( 12 Apr 2020 05:55 )             29.4           BMP:    04-12    138  |  105  |  100<H>  ----------------------------<  137<H>  3.5   |  26  |  3.19<H>  04-11    140  |  107  |  97<H>  ----------------------------<  186<H>  3.7   |  21<L>  |  3.32<H>  04-10    137  |  109<H>  |  112<H>  ----------------------------<  104<H>  4.3   |  16<L>  |  4.14<H>    Ca    8.4      12 Apr 2020 05:55  Ca    8.2<L>      11 Apr 2020 07:17  Ca    8.7      10 Apr 2020 13:15  Phos  4.3     04-12  Phos  4.9     04-11  Phos  5.6     04-10  Mg     2.1     04-12  Mg     2.3     04-11  Mg     2.4     04-10    TPro  8.8<H>  /  Alb  2.9<L>  /  TBili  0.6  /  DBili  x   /  AST  57<H>  /  ALT  35  /  AlkPhos  93  04-12  TPro  8.5<H>  /  Alb  2.8<L>  /  TBili  0.6  /  DBili  x   /  AST  55<H>  /  ALT  30  /  AlkPhos  81  04-11  TPro  9.5<H>  /  Alb  3.1<L>  /  TBili  0.5  /  DBili  x   /  AST  75<H>  /  ALT  37  /  AlkPhos  92  04-10

## 2020-04-12 NOTE — PROGRESS NOTE ADULT - ASSESSMENT
1. PAWAN due to pre-renal azotemia due to volume depletion vs r/o ATN.   -pts renal function is improving , no need for hd   -Adjust meds to eGFR and avoid IV Gadolinium contrast,NSAIDs, and phosphate enema.  -Monitor I/O's daily.   -Monitor SMA daily.  2. CKD stage 3 due h/o renal recovery from ATN.  -baseline scr around 2.0mg/dL with non-nephrotic range proteinuria (uPCR 900mg)  -Keep patient euvolemic and renal diet  -Avoid Nephrotoxic Meds/ Agents such as (NSAIDs, IV contrast, Aminoglycosides such as gentamicin, -Gadolinium contrast, Phosphate containing enemas, etc..)  -Adjust Medications according to eGFR  3. Hyperkalemia: improved   - monitor K daily  -continue  low k diet  4.Hx of hypotension: bp still low  - Midodrine as ordered  5. MBD: with normal phos level  -pt has mild high normal pth-no intervention needed at this time  COVID +ve pna. plan as per primary team.  6. Acidosis due to renal failure: improving   -continue iv nahco2 x 24 hrs  -f/u co2 daily  7. Anemia:multifactorial-mild  -add  epogen as ordered  -F/u CBC daily

## 2020-04-13 DIAGNOSIS — R74.0 NONSPECIFIC ELEVATION OF LEVELS OF TRANSAMINASE AND LACTIC ACID DEHYDROGENASE [LDH]: ICD-10-CM

## 2020-04-13 DIAGNOSIS — D64.9 ANEMIA, UNSPECIFIED: ICD-10-CM

## 2020-04-13 DIAGNOSIS — U07.1 COVID-19: ICD-10-CM

## 2020-04-13 LAB
ALBUMIN SERPL ELPH-MCNC: 3 G/DL — LOW (ref 3.5–5)
ALP SERPL-CCNC: 86 U/L — SIGNIFICANT CHANGE UP (ref 40–120)
ALT FLD-CCNC: 30 U/L DA — SIGNIFICANT CHANGE UP (ref 10–60)
ANION GAP SERPL CALC-SCNC: 9 MMOL/L — SIGNIFICANT CHANGE UP (ref 5–17)
ANISOCYTOSIS BLD QL: SLIGHT — SIGNIFICANT CHANGE UP
APTT BLD: 27 SEC — LOW (ref 27.5–36.3)
AST SERPL-CCNC: 38 U/L — SIGNIFICANT CHANGE UP (ref 10–40)
BASOPHILS # BLD AUTO: 0 K/UL — SIGNIFICANT CHANGE UP (ref 0–0.2)
BASOPHILS NFR BLD AUTO: 0 % — SIGNIFICANT CHANGE UP (ref 0–2)
BILIRUB SERPL-MCNC: 0.5 MG/DL — SIGNIFICANT CHANGE UP (ref 0.2–1.2)
BUN SERPL-MCNC: 82 MG/DL — HIGH (ref 7–18)
CALCIUM SERPL-MCNC: 8.9 MG/DL — SIGNIFICANT CHANGE UP (ref 8.4–10.5)
CHLORIDE SERPL-SCNC: 101 MMOL/L — SIGNIFICANT CHANGE UP (ref 96–108)
CK SERPL-CCNC: 183 U/L — SIGNIFICANT CHANGE UP (ref 35–232)
CO2 SERPL-SCNC: 28 MMOL/L — SIGNIFICANT CHANGE UP (ref 22–31)
CREAT SERPL-MCNC: 3.21 MG/DL — HIGH (ref 0.5–1.3)
CRP SERPL-MCNC: 6.61 MG/DL — HIGH (ref 0–0.4)
D DIMER BLD IA.RAPID-MCNC: 2406 NG/ML DDU — HIGH
EOSINOPHIL # BLD AUTO: 0.15 K/UL — SIGNIFICANT CHANGE UP (ref 0–0.5)
EOSINOPHIL NFR BLD AUTO: 2 % — SIGNIFICANT CHANGE UP (ref 0–6)
GLUCOSE SERPL-MCNC: 135 MG/DL — HIGH (ref 70–99)
HCT VFR BLD CALC: 29.3 % — LOW (ref 39–50)
HCV RNA FLD QL NAA+PROBE: SIGNIFICANT CHANGE UP
HCV RNA SPEC QL PROBE+SIG AMP: SIGNIFICANT CHANGE UP
HGB BLD-MCNC: 9.6 G/DL — LOW (ref 13–17)
INR BLD: 1.21 RATIO — HIGH (ref 0.88–1.16)
LYMPHOCYTES # BLD AUTO: 0.7 K/UL — LOW (ref 1–3.3)
LYMPHOCYTES # BLD AUTO: 9 % — LOW (ref 13–44)
MAGNESIUM SERPL-MCNC: 2.2 MG/DL — SIGNIFICANT CHANGE UP (ref 1.6–2.6)
MANUAL SMEAR VERIFICATION: SIGNIFICANT CHANGE UP
MCHC RBC-ENTMCNC: 27.8 PG — SIGNIFICANT CHANGE UP (ref 27–34)
MCHC RBC-ENTMCNC: 32.8 GM/DL — SIGNIFICANT CHANGE UP (ref 32–36)
MCV RBC AUTO: 84.9 FL — SIGNIFICANT CHANGE UP (ref 80–100)
METAMYELOCYTES # FLD: 2 % — HIGH (ref 0–0)
MONOCYTES # BLD AUTO: 0.62 K/UL — SIGNIFICANT CHANGE UP (ref 0–0.9)
MONOCYTES NFR BLD AUTO: 8 % — SIGNIFICANT CHANGE UP (ref 2–14)
MYELOCYTES NFR BLD: 1 % — HIGH (ref 0–0)
NEUTROPHILS # BLD AUTO: 6.04 K/UL — SIGNIFICANT CHANGE UP (ref 1.8–7.4)
NEUTROPHILS NFR BLD AUTO: 78 % — HIGH (ref 43–77)
NRBC # BLD: 0 /100 — SIGNIFICANT CHANGE UP (ref 0–0)
PHOSPHATE SERPL-MCNC: 3.4 MG/DL — SIGNIFICANT CHANGE UP (ref 2.5–4.5)
PLAT MORPH BLD: NORMAL — SIGNIFICANT CHANGE UP
PLATELET # BLD AUTO: 276 K/UL — SIGNIFICANT CHANGE UP (ref 150–400)
PLATELET COUNT - ESTIMATE: NORMAL — SIGNIFICANT CHANGE UP
POIKILOCYTOSIS BLD QL AUTO: SLIGHT — SIGNIFICANT CHANGE UP
POTASSIUM SERPL-MCNC: 3.8 MMOL/L — SIGNIFICANT CHANGE UP (ref 3.5–5.3)
POTASSIUM SERPL-SCNC: 3.8 MMOL/L — SIGNIFICANT CHANGE UP (ref 3.5–5.3)
PROT SERPL-MCNC: 9 G/DL — HIGH (ref 6–8.3)
PROTHROM AB SERPL-ACNC: 13.8 SEC — HIGH (ref 10–12.9)
RBC # BLD: 3.45 M/UL — LOW (ref 4.2–5.8)
RBC # FLD: 14.7 % — HIGH (ref 10.3–14.5)
RBC BLD AUTO: ABNORMAL
SODIUM SERPL-SCNC: 138 MMOL/L — SIGNIFICANT CHANGE UP (ref 135–145)
WBC # BLD: 7.74 K/UL — SIGNIFICANT CHANGE UP (ref 3.8–10.5)
WBC # FLD AUTO: 7.74 K/UL — SIGNIFICANT CHANGE UP (ref 3.8–10.5)

## 2020-04-13 RX ORDER — SODIUM CHLORIDE 9 MG/ML
500 INJECTION INTRAMUSCULAR; INTRAVENOUS; SUBCUTANEOUS ONCE
Refills: 0 | Status: COMPLETED | OUTPATIENT
Start: 2020-04-13 | End: 2020-04-13

## 2020-04-13 RX ORDER — MIDODRINE HYDROCHLORIDE 2.5 MG/1
5 TABLET ORAL THREE TIMES A DAY
Refills: 0 | Status: DISCONTINUED | OUTPATIENT
Start: 2020-04-13 | End: 2020-04-14

## 2020-04-13 RX ADMIN — HEPARIN SODIUM 5000 UNIT(S): 5000 INJECTION INTRAVENOUS; SUBCUTANEOUS at 17:27

## 2020-04-13 RX ADMIN — GABAPENTIN 600 MILLIGRAM(S): 400 CAPSULE ORAL at 13:47

## 2020-04-13 RX ADMIN — TAMSULOSIN HYDROCHLORIDE 0.4 MILLIGRAM(S): 0.4 CAPSULE ORAL at 21:27

## 2020-04-13 RX ADMIN — GABAPENTIN 600 MILLIGRAM(S): 400 CAPSULE ORAL at 06:31

## 2020-04-13 RX ADMIN — MIDODRINE HYDROCHLORIDE 2.5 MILLIGRAM(S): 2.5 TABLET ORAL at 08:04

## 2020-04-13 RX ADMIN — LAMOTRIGINE 200 MILLIGRAM(S): 25 TABLET, ORALLY DISINTEGRATING ORAL at 12:05

## 2020-04-13 RX ADMIN — SODIUM CHLORIDE 250 MILLILITER(S): 9 INJECTION INTRAMUSCULAR; INTRAVENOUS; SUBCUTANEOUS at 15:17

## 2020-04-13 RX ADMIN — ATORVASTATIN CALCIUM 10 MILLIGRAM(S): 80 TABLET, FILM COATED ORAL at 21:27

## 2020-04-13 RX ADMIN — ARIPIPRAZOLE 15 MILLIGRAM(S): 15 TABLET ORAL at 12:05

## 2020-04-13 RX ADMIN — PANTOPRAZOLE SODIUM 40 MILLIGRAM(S): 20 TABLET, DELAYED RELEASE ORAL at 08:05

## 2020-04-13 RX ADMIN — ERYTHROPOIETIN 6000 UNIT(S): 10000 INJECTION, SOLUTION INTRAVENOUS; SUBCUTANEOUS at 10:53

## 2020-04-13 RX ADMIN — MIDODRINE HYDROCHLORIDE 5 MILLIGRAM(S): 2.5 TABLET ORAL at 16:33

## 2020-04-13 RX ADMIN — MIDODRINE HYDROCHLORIDE 5 MILLIGRAM(S): 2.5 TABLET ORAL at 21:27

## 2020-04-13 RX ADMIN — MIDODRINE HYDROCHLORIDE 2.5 MILLIGRAM(S): 2.5 TABLET ORAL at 12:05

## 2020-04-13 RX ADMIN — Medication 60 MEQ/KG/HR: at 05:35

## 2020-04-13 RX ADMIN — Medication 25 MILLIGRAM(S): at 21:27

## 2020-04-13 RX ADMIN — FINASTERIDE 5 MILLIGRAM(S): 5 TABLET, FILM COATED ORAL at 12:05

## 2020-04-13 RX ADMIN — HEPARIN SODIUM 5000 UNIT(S): 5000 INJECTION INTRAVENOUS; SUBCUTANEOUS at 08:04

## 2020-04-13 NOTE — PROGRESS NOTE ADULT - ATTENDING COMMENTS
HPI:  69 year old Male from [facility], with PMHx of prostate Ca (s/p radiation in 2015, now in remission), asthma, COPD (not on O2), HTN, HLD, Bipolar, anemia, GERD, Ileostomy s/p sigmoid resection, PAD, and CKD is [place] the ED from [place] for AMS and weakness. Patient is AAOx2 on my exam and denies any complaints, however not a reliable historian. No acute distress.    GOC: Full code. (04 Apr 2020 00:54)    PATIENT IS A 70YO MALE FROM [facility] WITH PMHX OF PROSTATE CA [S/P RADIATION 2015, IN REMISSION], ASTHMA, COPD, HTN, HLD, BIPOLAR DX, ANEMIA, GERD, ILEOSTOMY S/P SIGMOID RESECTION, PAD AND CKD3 TRANSFERRED FOR EVALUATION OF AMS/WEAKNESS.      # MULTIFOCAL COVID19 PNEUMONIA, SEPSIS SUSPECT SECONDARY BACTERIAL PNEUMONIA - GIVEN DIFFICULTY OBTAINING IV ACCESS - WILL CONTINUE PO ANTIBIOTICS, F/U BLOOD CULTURES  # COPD  # PAWAN ON CKD 3 - PRE-RENAL AZOTEMIA VS. MORE LIKELY ATN S/T COVID19 - NEPHROLOGY CONSULTED, PO INTAKE, MONITOR URINE OUTPUT  # ACUTE METABOLIC ENCEPHALOPATHY - RESOLVED  # METABOLIC ACIDOSIS S/T PAWAN - ON SODIUM BICARB   # TRANSAMINITIS   # HYPERKALEMIA S/T PAWAN - RESOLVED  # GI AND DVT PPX    MACARENA IVY M.D. COVERING FOR JEFRY IVY M.D.
HPI:  69 year old Male from [facility], with PMHx of prostate Ca (s/p radiation in 2015, now in remission), asthma, COPD (not on O2), HTN, HLD, Bipolar, anemia, GERD, Ileostomy s/p sigmoid resection, PAD, and CKD is sent to the ED from [place] for AMS and weakness. Patient is AAOx2 on my exam and denies any complaints, however not a reliable historian. No acute distress.    GOC: Full code. (04 Apr 2020 00:54)    PATIENT IS A 68YO MALE FROM [facility] WITH PMHX OF PROSTATE CA [S/P RADIATION 2015, IN REMISSION], ASTHMA, COPD, HTN, HLD, BIPOLAR DX, ANEMIA, GERD, ILEOSTOMY S/P SIGMOID RESECTION, PAD AND CKD3 TRANSFERRED FOR EVALUATION OF AMS/WEAKNESS.      # MULTIFOCAL COVID19 PNEUMONIA, SEPSIS SUSPECT SECONDARY BACTERIAL PNEUMONIA - GIVEN DIFFICULTY OBTAINING IV ACCESS - WILL CONTINUE PO ANTIBIOTICS, F/U BLOOD CULTURES [NGTD]  # COPD  # PAWAN ON CKD 3 - PRE-RENAL AZOTEMIA VS. MORE LIKELY ATN S/T COVID19 - NEPHROLOGY CONSULTED, PO INTAKE, MONITOR URINE OUTPUT  # ACUTE METABOLIC ENCEPHALOPATHY - RESOLVED  # METABOLIC ACIDOSIS S/T PAWAN - ON SODIUM BICARB   # TRANSAMINITIS   # HYPERKALEMIA S/T PAWAN - GIVEN KAYEXYLATE  # GI AND DVT PPX    MACARENA IVY M.D. COVERING FOR JEFRY IVY M.D.
PLAN:  HPI:  69 year old Male from [facility], with PMHx of prostate Ca (s/p radiation in 2015, now in remission), asthma, COPD (not on O2), HTN, HLD, Bipolar, anemia, GERD, Ileostomy s/p sigmoid resection, PAD, and CKD is sent to the ED from [place] for AMS and weakness. Patient is AAOx2 on my exam and denies any complaints, however not a reliable historian. No acute distress.    GOC: Full code. (04 Apr 2020 00:54)    PATIENT IS A 68YO MALE FROM [facility] WITH PMHX OF PROSTATE CA [S/P RADIATION 2015, IN REMISSION], ASTHMA, COPD, HTN, HLD, BIPOLAR DX, ANEMIA, GERD, ILEOSTOMY S/P SIGMOID RESECTION, PAD AND CKD3 TRANSFERRED FOR EVALUATION OF AMS/WEAKNESS.    OVERNIGHT: PERSISTENT FEVER AND DRY COUGH    # MULTIFOCAL COVID19 PNEUMONIA, SEPSIS SUSPECT SECONDARY BACTERIAL PNEUMONIA - GIVEN DIFFICULTY OBTAINING IV ACCESS - WILL CONTINUE PO ANTIBIOTICS  # COPD  # PAWAN ON CKD 3 - PRE-RENAL AZOTEMIA VS. ATN - NEPHROLOGY CONSULTED, PO INTAKE  # ACUTE METABOLIC ENCEPHALOPATHY - RESOLVED  # METABOLIC ACIDOSIS S/T PAWAN - ON SODIUM BICARB   # TRANSAMINITIS   # HYPERKALEMIA S/T PAWAN - RESOLVED  # GI AND DVT PPX    MACARENA IVY M.D. COVERING FOR JEFRY IVY M.D.
HPI:  69 year old Male from [facility], with PMHx of prostate Ca (s/p radiation in 2015, now in remission), asthma, COPD (not on O2), HTN, HLD, Bipolar, anemia, GERD, Ileostomy s/p sigmoid resection, PAD, and CKD is sent to the ED from [place] for AMS and weakness. Patient is AAOx2 on my exam and denies any complaints, however not a reliable historian. No acute distress.    GOC: Full code. (04 Apr 2020 00:54)    PATIENT IS A 68YO MALE FROM [facility] WITH PMHX OF PROSTATE CA [S/P RADIATION 2015, IN REMISSION], ASTHMA, COPD, HTN, HLD, BIPOLAR DX, ANEMIA, GERD, ILEOSTOMY S/P SIGMOID RESECTION, PAD AND CKD3 TRANSFERRED FOR EVALUATION OF AMS/WEAKNESS.      # HYPOXIC RESPIRATORY FAILURE S/T MULTIFOCAL COVID19 PNEUMONIA, SEPSIS SUSPECT SECONDARY BACTERIAL PNEUMONIA - WILL CONTINUE PO ANTIBIOTICS, F/U BLOOD CULTURES [NGTD]  # COPD  # PAWAN ON CKD 3-4 - PRE-RENAL AZOTEMIA VS. MORE LIKELY ATN S/T COVID19 - NEPHROLOGY CONSULTED, IMPROVING WITH IVF. STRICT IS AND OS.  # CHRONIC BACK PAIN - CONTINUE DILAUDID + ADD LIDODERM PATCHES  # NORMOCYTIC ANEMIA SUSPECT SECONDARY TO RENAL DX   # ACUTE METABOLIC ENCEPHALOPATHY - RESOLVED  # METABOLIC ACIDOSIS S/T PAWAN - SODIUM BICARB DRIP DISCONTINUED TODAY  # TRANSAMINITIS - RESOLVED  # HYPERKALEMIA S/T PAWAN - RESOLVED  # [facility] AS [facility] DOES NOT HAVE AN ISOLATION FLOOR  # GI AND DVT PPX    MACARENA IVY M.D. COVERING FOR JEFRY IVY M.D.

## 2020-04-13 NOTE — PROGRESS NOTE ADULT - PROBLEM SELECTOR PLAN 6
either [facility] ( if he stays hospital more than 2 weeks )  or [place]/ [facility] when his Cr improves for discharge c/w heparin for DVT ppx

## 2020-04-13 NOTE — PROGRESS NOTE ADULT - PROBLEM SELECTOR PLAN 2
PAWAN on CKD likely due to COVID related ATN  baseline scr around 2.0mg/dL with non-nephrotic range proteinuria (uPCR 900mg)  Keep patient euvolemic and renal diet  Avoid Nephrotoxic Meds/ Agents such as (NSAIDs, IV contrast, Aminoglycosides such as gentamicin, -Gadolinium contrast, Phosphate containing enemas, etc..)  Adjust Medications according to eGFR  c/w IVF  monitor BMP BP was low this morning  s/p  bolus  f/u BP   c/w midodrine

## 2020-04-13 NOTE — PROGRESS NOTE ADULT - SUBJECTIVE AND OBJECTIVE BOX
Note is incomplete  pt seen and examined.    SUBJECTIVE:  pt denies cp,sob or gi symptoms  pt is voiding but U/O is unknown  bp still low,s/p iv saline bolus given      Current meds:    acetaminophen   Tablet .. 650 milliGRAM(s) Oral every 6 hours  ARIPiprazole 15 milliGRAM(s) Oral daily  atorvastatin 10 milliGRAM(s) Oral at bedtime  epoetin-anoop-epbx (RETACRIT) Injectable 6000 Unit(s) SubCutaneous <User Schedule>  finasteride 5 milliGRAM(s) Oral daily  gabapentin 600 milliGRAM(s) Oral two times a day  heparin  Injectable 5000 Unit(s) SubCutaneous every 12 hours  lamoTRIgine 200 milliGRAM(s) Oral daily  levoFLOXacin  Tablet 250 milliGRAM(s) Oral every 24 hours  lidocaine   Patch 1 Patch Transdermal daily  lidocaine 1% Injectable 10 milliLiter(s) Local Injection once  midodrine. 5 milliGRAM(s) Oral three times a day  pantoprazole    Tablet 40 milliGRAM(s) Oral before breakfast  sodium chloride 0.9% Bolus 500 milliLiter(s) IV Bolus once  tamsulosin 0.4 milliGRAM(s) Oral at bedtime  traZODone 25 milliGRAM(s) Oral at bedtime      Vital Signs    T(F): 98.1 (04-13-20 @ 11:24), Max: 98.7 (04-13-20 @ 08:19)  HR: 80 (04-13-20 @ 11:24) (79 - 88)  BP: 88/48 (04-13-20 @ 11:24) (88/48 - 127/76)  ABP: --  RR: 18 (04-13-20 @ 11:24) (18 - 18)  SpO2: 99% (04-13-20 @ 11:24) (93% - 99%)  Wt(kg): --  CVP(cm H2O): --  CO: --  PCWP: --    I and O's:    04-11 @ 07:01  -  04-12 @ 07:00  --------------------------------------------------------  IN:    Oral Fluid: 250 mL  Total IN: 250 mL    OUT:  Total OUT: 0 mL    Total NET: 250 mL        Daily     Daily       LABS:    CBC:                          9.6    7.74  )-----------( 276      ( 13 Apr 2020 11:37 )             29.3           BMP:    04-13    138  |  101  |  82<H>  ----------------------------<  135<H>  3.8   |  28  |  3.21<H>  04-12    138  |  105  |  100<H>  ----------------------------<  137<H>  3.5   |  26  |  3.19<H>  04-11    140  |  107  |  97<H>  ----------------------------<  186<H>  3.7   |  21<L>  |  3.32<H>    Ca    8.9      13 Apr 2020 11:37  Ca    8.4      12 Apr 2020 05:55  Ca    8.2<L>      11 Apr 2020 07:17  Phos  3.4     04-13  Phos  4.3     04-12  Phos  4.9     04-11  Mg     2.2     04-13  Mg     2.1     04-12  Mg     2.3     04-11    TPro  9.0<H>  /  Alb  3.0<L>  /  TBili  0.5  /  DBili  x   /  AST  38  /  ALT  30  /  AlkPhos  86  04-13  TPro  8.8<H>  /  Alb  2.9<L>  /  TBili  0.6  /  DBili  x   /  AST  57<H>  /  ALT  35  /  AlkPhos  93  04-12  TPro  8.5<H>  /  Alb  2.8<L>  /  TBili  0.6  /  DBili  x   /  AST  55<H>  /  ALT  30  /  AlkPhos  81  04-11 pt seen and examined.    SUBJECTIVE:  pt denies cp,sob or gi symptoms  pt is voiding but U/O is unknown  bp still low,s/p iv saline bolus given      Current meds:    acetaminophen   Tablet .. 650 milliGRAM(s) Oral every 6 hours  ARIPiprazole 15 milliGRAM(s) Oral daily  atorvastatin 10 milliGRAM(s) Oral at bedtime  epoetin-anoop-epbx (RETACRIT) Injectable 6000 Unit(s) SubCutaneous <User Schedule>  finasteride 5 milliGRAM(s) Oral daily  gabapentin 600 milliGRAM(s) Oral two times a day  heparin  Injectable 5000 Unit(s) SubCutaneous every 12 hours  lamoTRIgine 200 milliGRAM(s) Oral daily  levoFLOXacin  Tablet 250 milliGRAM(s) Oral every 24 hours  lidocaine   Patch 1 Patch Transdermal daily  lidocaine 1% Injectable 10 milliLiter(s) Local Injection once  midodrine. 5 milliGRAM(s) Oral three times a day  pantoprazole    Tablet 40 milliGRAM(s) Oral before breakfast  sodium chloride 0.9% Bolus 500 milliLiter(s) IV Bolus once  tamsulosin 0.4 milliGRAM(s) Oral at bedtime  traZODone 25 milliGRAM(s) Oral at bedtime      Vital Signs    T(F): 98.1 (04-13-20 @ 11:24), Max: 98.7 (04-13-20 @ 08:19)  HR: 80 (04-13-20 @ 11:24) (79 - 88)  BP: 88/48 (04-13-20 @ 11:24) (88/48 - 127/76)  ABP: --  RR: 18 (04-13-20 @ 11:24) (18 - 18)  SpO2: 99% (04-13-20 @ 11:24) (93% - 99%)  Wt(kg): --  CVP(cm H2O): --  CO: --  PCWP: --    I and O's:    04-11 @ 07:01  -  04-12 @ 07:00  --------------------------------------------------------  IN:    Oral Fluid: 250 mL  Total IN: 250 mL    OUT:  Total OUT: 0 mL    Total NET: 250 mL        Daily     Daily       LABS:    CBC:                          9.6    7.74  )-----------( 276      ( 13 Apr 2020 11:37 )             29.3           BMP:    04-13    138  |  101  |  82<H>  ----------------------------<  135<H>  3.8   |  28  |  3.21<H>  04-12    138  |  105  |  100<H>  ----------------------------<  137<H>  3.5   |  26  |  3.19<H>  04-11    140  |  107  |  97<H>  ----------------------------<  186<H>  3.7   |  21<L>  |  3.32<H>    Ca    8.9      13 Apr 2020 11:37  Ca    8.4      12 Apr 2020 05:55  Ca    8.2<L>      11 Apr 2020 07:17  Phos  3.4     04-13  Phos  4.3     04-12  Phos  4.9     04-11  Mg     2.2     04-13  Mg     2.1     04-12  Mg     2.3     04-11    TPro  9.0<H>  /  Alb  3.0<L>  /  TBili  0.5  /  DBili  x   /  AST  38  /  ALT  30  /  AlkPhos  86  04-13  TPro  8.8<H>  /  Alb  2.9<L>  /  TBili  0.6  /  DBili  x   /  AST  57<H>  /  ALT  35  /  AlkPhos  93  04-12  TPro  8.5<H>  /  Alb  2.8<L>  /  TBili  0.6  /  DBili  x   /  AST  55<H>  /  ALT  30  /  AlkPhos  81  04-11

## 2020-04-13 NOTE — PROGRESS NOTE ADULT - PROBLEM SELECTOR PLAN 1
(+) COVID -19 PCR   completed ceftriaxone and zithromax for HAP and plaquenil for COVID -19 related hypoxia  also will complete levaquin course tonight   c/w isolation order  c/w supplemental o2 if needed
Due to PNA and COVID 19 postitive  More alert at baseline AOx2  fair po intake  supportive care  unable IVF due to poor access  c/w free water

## 2020-04-13 NOTE — PROGRESS NOTE ADULT - PROBLEM SELECTOR PLAN 7
either [facility] ( if he stays hospital more than 2 weeks )  or [facility] when his Cr improves for discharge

## 2020-04-13 NOTE — PROGRESS NOTE ADULT - PROBLEM SELECTOR PLAN 4
chronic   no active bleeding noted  monitor CBC related to viral infection  resolved now  monitor LFTs

## 2020-04-13 NOTE — PROGRESS NOTE ADULT - SUBJECTIVE AND OBJECTIVE BOX
NP Note discussed with  Primary Attending    Patient is a 69y old  Male who presents with a chief complaint of AMS (12 Apr 2020 21:00)    HPI - 69 year old Male from [facility], with PMHx of prostate Ca (s/p radiation in 2015, now in remission), asthma, COPD (not on O2), HTN, HLD, Bipolar, anemia, GERD, Ileostomy s/p sigmoid resection, PAD, and CKD is sent to the ED from [facility] for AMS and weakness. Patient is AAOx2 on my exam and denies any complaints, however not a reliable historian. No acute distress.    Admitted to medicine for acute respiratory failure with hypoxia 2/2 COVID -19 infection  Pt is breathing comfortably on RA, non- labored.   GOC : Full code   INTERVAL HPI/OVERNIGHT EVENTS: no new complaints    MEDICATIONS  (STANDING):  acetaminophen   Tablet .. 650 milliGRAM(s) Oral every 6 hours  ARIPiprazole 15 milliGRAM(s) Oral daily  atorvastatin 10 milliGRAM(s) Oral at bedtime  epoetin-anoop-epbx (RETACRIT) Injectable 6000 Unit(s) SubCutaneous <User Schedule>  finasteride 5 milliGRAM(s) Oral daily  gabapentin 600 milliGRAM(s) Oral two times a day  heparin  Injectable 5000 Unit(s) SubCutaneous every 12 hours  lamoTRIgine 200 milliGRAM(s) Oral daily  levoFLOXacin  Tablet 250 milliGRAM(s) Oral every 24 hours  lidocaine   Patch 1 Patch Transdermal daily  lidocaine 1% Injectable 10 milliLiter(s) Local Injection once  midodrine. 2.5 milliGRAM(s) Oral three times a day  pantoprazole    Tablet 40 milliGRAM(s) Oral before breakfast  sodium bicarbonate  Infusion 0.098 mEq/kG/Hr (60 mL/Hr) IV Continuous <Continuous>  sodium chloride 0.9% Bolus 500 milliLiter(s) IV Bolus once  tamsulosin 0.4 milliGRAM(s) Oral at bedtime  traZODone 25 milliGRAM(s) Oral at bedtime    MEDICATIONS  (PRN):      __________________________________________________  REVIEW OF SYSTEMS:    CONSTITUTIONAL: No fever,   EYES: no acute visual disturbances  NECK: No pain or stiffness  RESPIRATORY: No cough; No shortness of breath  CARDIOVASCULAR: No chest pain, no palpitations  GASTROINTESTINAL: No pain. No nausea or vomiting; No diarrhea   NEUROLOGICAL: No headache or numbness, no tremors  MUSCULOSKELETAL: No joint pain, no muscle pain  GENITOURINARY: no dysuria, no frequency, no hesitancy  PSYCHIATRY: no depression , no anxiety  ALL OTHER  ROS negative        Vital Signs Last 24 Hrs  T(C): 36.7 (13 Apr 2020 11:24), Max: 37.1 (13 Apr 2020 08:19)  T(F): 98.1 (13 Apr 2020 11:24), Max: 98.7 (13 Apr 2020 08:19)  HR: 80 (13 Apr 2020 11:24) (79 - 88)  BP: 88/48 (13 Apr 2020 11:24) (88/48 - 127/76)  BP(mean): --  RR: 18 (13 Apr 2020 11:24) (18 - 18)  SpO2: 99% (13 Apr 2020 11:24) (93% - 99%)    ________________________________________________  PHYSICAL EXAM:  GENERAL: NAD  HEENT: Normocephalic;  conjunctivae and sclerae clear; moist mucous membranes;   NECK : supple  CHEST/LUNG: Clear to auscultation bilaterally with good air entry   HEART: S1 S2  regular; no murmurs, gallops or rubs  ABDOMEN: Soft, Nontender, Nondistended; Bowel sounds present  EXTREMITIES: no cyanosis; no edema; no calf tenderness  SKIN: warm and dry; no rash  NERVOUS SYSTEM:  Awake and alert; Oriented  to place, person and time ; no new deficits    _________________________________________________  LABS:                        9.6    7.74  )-----------( 276      ( 13 Apr 2020 11:37 )             29.3     04-13    138  |  101  |  82<H>  ----------------------------<  135<H>  3.8   |  28  |  3.21<H>    Ca    8.9      13 Apr 2020 11:37  Phos  3.4     04-13  Mg     2.2     04-13    TPro  9.0<H>  /  Alb  3.0<L>  /  TBili  0.5  /  DBili  x   /  AST  38  /  ALT  30  /  AlkPhos  86  04-13    PT/INR - ( 13 Apr 2020 11:37 )   PT: 13.8 sec;   INR: 1.21 ratio         PTT - ( 13 Apr 2020 11:37 )  PTT:27.0 sec    CAPILLARY BLOOD GLUCOSE            RADIOLOGY & ADDITIONAL TESTS:  < from: Xray Chest 1 View-PORTABLE IMMEDIATE (04.06.20 @ 16:35) >  EXAM:  XR CHEST PORTABLE IMMED 1V                            PROCEDURE DATE:  04/06/2020          INTERPRETATION:  CLINICAL INDICATION: 69 years  Male with fever. PNA.    COMPARISON: 4/3/2020    Patient is rotated to the left.    AP view of the chest demonstrates increasing bibasilar interstitial infiltrates. There is no pleural effusion. The left costophrenic angle is partially omitted. There is no pneumothorax.    The heart is normal in size. There is no mediastinal or hilar mass.     The pulmonary vasculature is normal.     Mild thoracic degenerative changes are present.    IMPRESSION:    Increasing bibasilar interstitial infiltrates.                FLOYD VELAZCO M.D., ATTENDING RADIOLOGIST  This document has been electronically signed. Apr 6 2020  4:32PM          < end of copied text >    Imaging Personally Reviewed:  YES    Consultant(s) Notes Reviewed:   YES    Care Discussed with Consultants : nephrology     Plan of care was discussed with patient and /or primary care giver; all questions and concerns were addressed and care was aligned with patient's wishes.

## 2020-04-13 NOTE — PROGRESS NOTE ADULT - PROBLEM SELECTOR PLAN 3
related to viral infection  resolved now  monitor LFTs PAWAN on CKD likely due to COVID related ATN  baseline scr around 2.0mg/dL with non-nephrotic range proteinuria (uPCR 900mg)  Keep patient euvolemic and renal diet  Avoid Nephrotoxic Meds/ Agents such as (NSAIDs, IV contrast, Aminoglycosides such as gentamicin, -Gadolinium contrast, Phosphate containing enemas, etc..)  Adjust Medications according to eGFR  c/w IVF  monitor BMP

## 2020-04-14 ENCOUNTER — TRANSCRIPTION ENCOUNTER (OUTPATIENT)
Age: 70
End: 2020-04-14

## 2020-04-14 VITALS
TEMPERATURE: 99 F | DIASTOLIC BLOOD PRESSURE: 62 MMHG | OXYGEN SATURATION: 100 % | HEART RATE: 82 BPM | RESPIRATION RATE: 18 BRPM | SYSTOLIC BLOOD PRESSURE: 93 MMHG

## 2020-04-14 LAB
ALBUMIN SERPL ELPH-MCNC: 3.2 G/DL — LOW (ref 3.5–5)
ALP SERPL-CCNC: 95 U/L — SIGNIFICANT CHANGE UP (ref 40–120)
ALT FLD-CCNC: 41 U/L DA — SIGNIFICANT CHANGE UP (ref 10–60)
ANION GAP SERPL CALC-SCNC: 9 MMOL/L — SIGNIFICANT CHANGE UP (ref 5–17)
APTT BLD: 25.6 SEC — LOW (ref 27.5–36.3)
AST SERPL-CCNC: 48 U/L — HIGH (ref 10–40)
BASOPHILS # BLD AUTO: 0.04 K/UL — SIGNIFICANT CHANGE UP (ref 0–0.2)
BASOPHILS NFR BLD AUTO: 0.4 % — SIGNIFICANT CHANGE UP (ref 0–2)
BILIRUB SERPL-MCNC: 0.4 MG/DL — SIGNIFICANT CHANGE UP (ref 0.2–1.2)
BUN SERPL-MCNC: 90 MG/DL — HIGH (ref 7–18)
CALCIUM SERPL-MCNC: 9.1 MG/DL — SIGNIFICANT CHANGE UP (ref 8.4–10.5)
CHLORIDE SERPL-SCNC: 103 MMOL/L — SIGNIFICANT CHANGE UP (ref 96–108)
CK SERPL-CCNC: 163 U/L — SIGNIFICANT CHANGE UP (ref 35–232)
CO2 SERPL-SCNC: 25 MMOL/L — SIGNIFICANT CHANGE UP (ref 22–31)
CREAT SERPL-MCNC: 3.36 MG/DL — HIGH (ref 0.5–1.3)
CRP SERPL-MCNC: 5.1 MG/DL — HIGH (ref 0–0.4)
D DIMER BLD IA.RAPID-MCNC: 2757 NG/ML DDU — HIGH
EOSINOPHIL # BLD AUTO: 0.24 K/UL — SIGNIFICANT CHANGE UP (ref 0–0.5)
EOSINOPHIL NFR BLD AUTO: 2.6 % — SIGNIFICANT CHANGE UP (ref 0–6)
GLUCOSE SERPL-MCNC: 100 MG/DL — HIGH (ref 70–99)
HCT VFR BLD CALC: 31.8 % — LOW (ref 39–50)
HGB BLD-MCNC: 10.1 G/DL — LOW (ref 13–17)
IMM GRANULOCYTES NFR BLD AUTO: 2.1 % — HIGH (ref 0–1.5)
INR BLD: 1.17 RATIO — HIGH (ref 0.88–1.16)
LYMPHOCYTES # BLD AUTO: 1.17 K/UL — SIGNIFICANT CHANGE UP (ref 1–3.3)
LYMPHOCYTES # BLD AUTO: 12.9 % — LOW (ref 13–44)
MAGNESIUM SERPL-MCNC: 2.3 MG/DL — SIGNIFICANT CHANGE UP (ref 1.6–2.6)
MCHC RBC-ENTMCNC: 27.1 PG — SIGNIFICANT CHANGE UP (ref 27–34)
MCHC RBC-ENTMCNC: 31.8 GM/DL — LOW (ref 32–36)
MCV RBC AUTO: 85.3 FL — SIGNIFICANT CHANGE UP (ref 80–100)
MONOCYTES # BLD AUTO: 0.75 K/UL — SIGNIFICANT CHANGE UP (ref 0–0.9)
MONOCYTES NFR BLD AUTO: 8.3 % — SIGNIFICANT CHANGE UP (ref 2–14)
NEUTROPHILS # BLD AUTO: 6.67 K/UL — SIGNIFICANT CHANGE UP (ref 1.8–7.4)
NEUTROPHILS NFR BLD AUTO: 73.7 % — SIGNIFICANT CHANGE UP (ref 43–77)
NRBC # BLD: 0 /100 WBCS — SIGNIFICANT CHANGE UP (ref 0–0)
PHOSPHATE SERPL-MCNC: 4 MG/DL — SIGNIFICANT CHANGE UP (ref 2.5–4.5)
PLATELET # BLD AUTO: 318 K/UL — SIGNIFICANT CHANGE UP (ref 150–400)
POTASSIUM SERPL-MCNC: 4 MMOL/L — SIGNIFICANT CHANGE UP (ref 3.5–5.3)
POTASSIUM SERPL-SCNC: 4 MMOL/L — SIGNIFICANT CHANGE UP (ref 3.5–5.3)
PROT SERPL-MCNC: 9.4 G/DL — HIGH (ref 6–8.3)
PROTHROM AB SERPL-ACNC: 13.3 SEC — HIGH (ref 10–12.9)
RBC # BLD: 3.73 M/UL — LOW (ref 4.2–5.8)
RBC # FLD: 14.6 % — HIGH (ref 10.3–14.5)
SODIUM SERPL-SCNC: 137 MMOL/L — SIGNIFICANT CHANGE UP (ref 135–145)
WBC # BLD: 9.06 K/UL — SIGNIFICANT CHANGE UP (ref 3.8–10.5)
WBC # FLD AUTO: 9.06 K/UL — SIGNIFICANT CHANGE UP (ref 3.8–10.5)

## 2020-04-14 RX ORDER — SODIUM ZIRCONIUM CYCLOSILICATE 10 G/10G
7 POWDER, FOR SUSPENSION ORAL
Qty: 49 | Refills: 0
Start: 2020-04-14 | End: 2020-04-20

## 2020-04-14 RX ORDER — ALBUTEROL 90 UG/1
0 AEROSOL, METERED ORAL
Qty: 0 | Refills: 0 | DISCHARGE

## 2020-04-14 RX ORDER — ALBUTEROL 90 UG/1
2 AEROSOL, METERED ORAL
Qty: 1 | Refills: 0
Start: 2020-04-14 | End: 2020-05-13

## 2020-04-14 RX ORDER — ERYTHROPOIETIN 10000 [IU]/ML
6000 INJECTION, SOLUTION INTRAVENOUS; SUBCUTANEOUS
Qty: 0 | Refills: 0 | DISCHARGE
Start: 2020-04-14

## 2020-04-14 RX ADMIN — MIDODRINE HYDROCHLORIDE 5 MILLIGRAM(S): 2.5 TABLET ORAL at 07:09

## 2020-04-14 RX ADMIN — LIDOCAINE 1 PATCH: 4 CREAM TOPICAL at 11:03

## 2020-04-14 RX ADMIN — GABAPENTIN 600 MILLIGRAM(S): 400 CAPSULE ORAL at 00:29

## 2020-04-14 RX ADMIN — Medication 650 MILLIGRAM(S): at 11:51

## 2020-04-14 RX ADMIN — MIDODRINE HYDROCHLORIDE 5 MILLIGRAM(S): 2.5 TABLET ORAL at 11:03

## 2020-04-14 RX ADMIN — Medication 650 MILLIGRAM(S): at 05:17

## 2020-04-14 RX ADMIN — HEPARIN SODIUM 5000 UNIT(S): 5000 INJECTION INTRAVENOUS; SUBCUTANEOUS at 05:17

## 2020-04-14 RX ADMIN — ARIPIPRAZOLE 15 MILLIGRAM(S): 15 TABLET ORAL at 11:04

## 2020-04-14 RX ADMIN — PANTOPRAZOLE SODIUM 40 MILLIGRAM(S): 20 TABLET, DELAYED RELEASE ORAL at 05:18

## 2020-04-14 RX ADMIN — FINASTERIDE 5 MILLIGRAM(S): 5 TABLET, FILM COATED ORAL at 11:03

## 2020-04-14 RX ADMIN — Medication 650 MILLIGRAM(S): at 00:29

## 2020-04-14 RX ADMIN — LAMOTRIGINE 200 MILLIGRAM(S): 25 TABLET, ORALLY DISINTEGRATING ORAL at 11:03

## 2020-04-14 NOTE — PROGRESS NOTE ADULT - SUBJECTIVE AND OBJECTIVE BOX
Note is incomplete  pt seen and examined.    SUBJECTIVE:  pt feels ok and denies cp or sob  bp is low normal but improving on Midodrine          Current meds:    acetaminophen   Tablet .. 650 milliGRAM(s) Oral every 6 hours  ARIPiprazole 15 milliGRAM(s) Oral daily  atorvastatin 10 milliGRAM(s) Oral at bedtime  epoetin-anoop-epbx (RETACRIT) Injectable 6000 Unit(s) SubCutaneous <User Schedule>  finasteride 5 milliGRAM(s) Oral daily  gabapentin 600 milliGRAM(s) Oral two times a day  heparin  Injectable 5000 Unit(s) SubCutaneous every 12 hours  lamoTRIgine 200 milliGRAM(s) Oral daily  lidocaine   Patch 1 Patch Transdermal daily  lidocaine 1% Injectable 10 milliLiter(s) Local Injection once  midodrine. 5 milliGRAM(s) Oral three times a day  pantoprazole    Tablet 40 milliGRAM(s) Oral before breakfast  tamsulosin 0.4 milliGRAM(s) Oral at bedtime  traZODone 25 milliGRAM(s) Oral at bedtime      Vital Signs    T(F): 98.6 (04-14-20 @ 11:52), Max: 99 (04-13-20 @ 15:24)  HR: 82 (04-14-20 @ 11:52) (78 - 84)  BP: 93/62 (04-14-20 @ 11:52) (93/62 - 115/63)  ABP: --  RR: 18 (04-14-20 @ 11:52) (16 - 18)  SpO2: 100% (04-14-20 @ 11:52) (95% - 100%)  Wt(kg): --  CVP(cm H2O): --  CO: --  PCWP: --    I and O's:    04-13 @ 07:01  -  04-14 @ 07:00  --------------------------------------------------------  IN:    Oral Fluid: 630 mL  Total IN: 630 mL    OUT:    Ileostomy: 200 mL    Voided: 425 mL  Total OUT: 625 mL    Total NET: 5 mL        Daily     Daily     PHYSICAL EXAM:  Constitutional: well developed, well nourished  and in nad  HEENT: PERRLA,  no icteric sclera and mild pallor of conjunctiva noted  Neck: No JVD, thyromegaly or adenopathy  Respiratory: reduced air entry lower lungs with no rales, wheezing or rhonchi  Cardiovascular: S1 and S2 normally heard  Gastrointestinal: soft, nondistended, nontender and normal bowel sounds heard  Extremities: No peripheral edema or cyanosis  Neurological: A/O x 3, no focal deficits  : No flank or cva tenderness palpated.  Skin: No rashes      LABS:    CBC:                          10.1   9.06  )-----------( 318      ( 14 Apr 2020 05:38 )             31.8           BMP:    04-14    137  |  103  |  90<H>  ----------------------------<  100<H>  4.0   |  25  |  3.36<H>  04-13    138  |  101  |  82<H>  ----------------------------<  135<H>  3.8   |  28  |  3.21<H>  04-12    138  |  105  |  100<H>  ----------------------------<  137<H>  3.5   |  26  |  3.19<H>    Ca    9.1      14 Apr 2020 05:38  Ca    8.9      13 Apr 2020 11:37  Ca    8.4      12 Apr 2020 05:55  Phos  4.0     04-14  Phos  3.4     04-13  Phos  4.3     04-12  Mg     2.3     04-14  Mg     2.2     04-13  Mg     2.1     04-12    TPro  9.4<H>  /  Alb  3.2<L>  /  TBili  0.4  /  DBili  x   /  AST  48<H>  /  ALT  41  /  AlkPhos  95  04-14  TPro  9.0<H>  /  Alb  3.0<L>  /  TBili  0.5  /  DBili  x   /  AST  38  /  ALT  30  /  AlkPhos  86  04-13  TPro  8.8<H>  /  Alb  2.9<L>  /  TBili  0.6  /  DBili  x   /  AST  57<H>  /  ALT  35  /  AlkPhos  93  04-12          URINE STUDIES:                        RADIOLOGY & ADDITIONAL STUDIES: pt seen and examined.    SUBJECTIVE:  pt feels ok and denies cp or sob  bp is low normal but improving on Midodrine          Current meds:    acetaminophen   Tablet .. 650 milliGRAM(s) Oral every 6 hours  ARIPiprazole 15 milliGRAM(s) Oral daily  atorvastatin 10 milliGRAM(s) Oral at bedtime  epoetin-anoop-epbx (RETACRIT) Injectable 6000 Unit(s) SubCutaneous <User Schedule>  finasteride 5 milliGRAM(s) Oral daily  gabapentin 600 milliGRAM(s) Oral two times a day  heparin  Injectable 5000 Unit(s) SubCutaneous every 12 hours  lamoTRIgine 200 milliGRAM(s) Oral daily  lidocaine   Patch 1 Patch Transdermal daily  lidocaine 1% Injectable 10 milliLiter(s) Local Injection once  midodrine. 5 milliGRAM(s) Oral three times a day  pantoprazole    Tablet 40 milliGRAM(s) Oral before breakfast  tamsulosin 0.4 milliGRAM(s) Oral at bedtime  traZODone 25 milliGRAM(s) Oral at bedtime      Vital Signs    T(F): 98.6 (04-14-20 @ 11:52), Max: 99 (04-13-20 @ 15:24)  HR: 82 (04-14-20 @ 11:52) (78 - 84)  BP: 93/62 (04-14-20 @ 11:52) (93/62 - 115/63)  ABP: --  RR: 18 (04-14-20 @ 11:52) (16 - 18)  SpO2: 100% (04-14-20 @ 11:52) (95% - 100%)  Wt(kg): --  CVP(cm H2O): --  CO: --  PCWP: --    I and O's:    04-13 @ 07:01  -  04-14 @ 07:00  --------------------------------------------------------  IN:    Oral Fluid: 630 mL  Total IN: 630 mL    OUT:    Ileostomy: 200 mL    Voided: 425 mL  Total OUT: 625 mL    Total NET: 5 mL        Daily     Daily         LABS:    CBC:                          10.1   9.06  )-----------( 318      ( 14 Apr 2020 05:38 )             31.8           BMP:    04-14    137  |  103  |  90<H>  ----------------------------<  100<H>  4.0   |  25  |  3.36<H>  04-13    138  |  101  |  82<H>  ----------------------------<  135<H>  3.8   |  28  |  3.21<H>  04-12    138  |  105  |  100<H>  ----------------------------<  137<H>  3.5   |  26  |  3.19<H>    Ca    9.1      14 Apr 2020 05:38  Ca    8.9      13 Apr 2020 11:37  Ca    8.4      12 Apr 2020 05:55  Phos  4.0     04-14  Phos  3.4     04-13  Phos  4.3     04-12  Mg     2.3     04-14  Mg     2.2     04-13  Mg     2.1     04-12    TPro  9.4<H>  /  Alb  3.2<L>  /  TBili  0.4  /  DBili  x   /  AST  48<H>  /  ALT  41  /  AlkPhos  95  04-14  TPro  9.0<H>  /  Alb  3.0<L>  /  TBili  0.5  /  DBili  x   /  AST  38  /  ALT  30  /  AlkPhos  86  04-13  TPro  8.8<H>  /  Alb  2.9<L>  /  TBili  0.6  /  DBili  x   /  AST  57<H>  /  ALT  35  /  AlkPhos  93  04-12

## 2020-04-14 NOTE — DISCHARGE NOTE NURSING/CASE MANAGEMENT/SOCIAL WORK - PATIENT PORTAL LINK FT
You can access the FollowMyHealth Patient Portal offered by [facility] by registering at the following website: [email or web address]. By joining [facility]’s FollowMyHealth portal, you will also be able to view your health information using other applications (apps) compatible with our system.

## 2020-04-14 NOTE — PROGRESS NOTE ADULT - ASSESSMENT
1. PAWAN due to pre-renal azotemia due to volume depletion vs mild ATN,    -pts renal function is stable last 24 hrs , no need for hd   -ok for discharge back to  today  -suggest to f/u bmp in 3 days at NH   -may need dialysis in the future if renal function declines further  -Adjust meds to eGFR and avoid IV Gadolinium contrast,NSAIDs, and phosphate enema.  -Monitor I/O's daily.   -Monitor SMA daily.  2. CKD stage 3 due h/o renal recovery from ATN.  -baseline scr around 2.0mg/dL with non-nephrotic range proteinuria (uPCR 900mg)  -Keep patient euvolemic and renal diet  -Avoid Nephrotoxic Meds/ Agents such as (NSAIDs, IV contrast, Aminoglycosides such as gentamicin, -Gadolinium contrast, Phosphate containing enemas, etc..)  -Adjust Medications according to eGFR  -needs renal f/u as outpatient in 2-3 weeks  3. Hyperkalemia: improved   - monitor K daily  -continue  low k diet  -may add Lokelma PRN for k >5.3 at NH   -k is normal today and may d/c Lokelma for now   4.Hx of hypotension: bp is improving   - we will continue Midodrine dose to 5mg po tid  5. MBD: with normal phos level  -pt has mild high normal pth-no intervention needed at this time  COVID +ve pna. plan as per primary team.  6. Acidosis due to renal failure: improved  -f/u co2 daily  7. Anemia:multifactorial-mild  -continue   epogen as ordered  -F/u CBC daily

## 2020-04-14 NOTE — PROGRESS NOTE ADULT - NSREFPHYEXINPTDOCREFER_GEN_ALL_CORE
as per my prior exam

## 2020-04-14 NOTE — PROGRESS NOTE ADULT - NSHPATTENDINGPLANDISCUSS_GEN_ALL_CORE
FLOOR STAFF
patient and medical team
patient and nurse.
FLOOR STAFF
STAFF
FLOOR STAFF
patient and medical team
pt and np
FLOOR STAFF

## 2020-05-10 NOTE — CHART NOTE - NSCHARTNOTEFT_GEN_A_CORE
I, Wendie Welsh MD attest that, to the best of my knowledge based on clinical presentation and findings documented in the medical record, this patient had sepsis present on admission due to COVID-19, as evidenced by -137/62-70, HR 94, creatinine 2.07, lymphopenia, chest x-ray with opacities, and metabolic encephalopathy that resolved with treatment.

## 2020-05-28 PROCEDURE — 85730 THROMBOPLASTIN TIME PARTIAL: CPT

## 2020-05-28 PROCEDURE — 87040 BLOOD CULTURE FOR BACTERIA: CPT

## 2020-05-28 PROCEDURE — 36415 COLL VENOUS BLD VENIPUNCTURE: CPT

## 2020-05-28 PROCEDURE — 83970 ASSAY OF PARATHORMONE: CPT

## 2020-05-28 PROCEDURE — 86140 C-REACTIVE PROTEIN: CPT

## 2020-05-28 PROCEDURE — 85610 PROTHROMBIN TIME: CPT

## 2020-05-28 PROCEDURE — 82570 ASSAY OF URINE CREATININE: CPT

## 2020-05-28 PROCEDURE — 82962 GLUCOSE BLOOD TEST: CPT

## 2020-05-28 PROCEDURE — 83605 ASSAY OF LACTIC ACID: CPT

## 2020-05-28 PROCEDURE — 80074 ACUTE HEPATITIS PANEL: CPT

## 2020-05-28 PROCEDURE — 82803 BLOOD GASES ANY COMBINATION: CPT

## 2020-05-28 PROCEDURE — 87633 RESP VIRUS 12-25 TARGETS: CPT

## 2020-05-28 PROCEDURE — 84300 ASSAY OF URINE SODIUM: CPT

## 2020-05-28 PROCEDURE — 85379 FIBRIN DEGRADATION QUANT: CPT

## 2020-05-28 PROCEDURE — 83735 ASSAY OF MAGNESIUM: CPT

## 2020-05-28 PROCEDURE — 80053 COMPREHEN METABOLIC PANEL: CPT

## 2020-05-28 PROCEDURE — 82310 ASSAY OF CALCIUM: CPT

## 2020-05-28 PROCEDURE — 87635 SARS-COV-2 COVID-19 AMP PRB: CPT

## 2020-05-28 PROCEDURE — 87631 RESP VIRUS 3-5 TARGETS: CPT

## 2020-05-28 PROCEDURE — 82550 ASSAY OF CK (CPK): CPT

## 2020-05-28 PROCEDURE — 87521 HEPATITIS C PROBE&RVRS TRNSC: CPT

## 2020-05-28 PROCEDURE — 70450 CT HEAD/BRAIN W/O DYE: CPT

## 2020-05-28 PROCEDURE — 81001 URINALYSIS AUTO W/SCOPE: CPT

## 2020-05-28 PROCEDURE — 87581 M.PNEUMON DNA AMP PROBE: CPT

## 2020-05-28 PROCEDURE — 87798 DETECT AGENT NOS DNA AMP: CPT

## 2020-05-28 PROCEDURE — 85027 COMPLETE CBC AUTOMATED: CPT

## 2020-05-28 PROCEDURE — 71045 X-RAY EXAM CHEST 1 VIEW: CPT

## 2020-05-28 PROCEDURE — 84133 ASSAY OF URINE POTASSIUM: CPT

## 2020-05-28 PROCEDURE — 99285 EMERGENCY DEPT VISIT HI MDM: CPT | Mod: 25

## 2020-05-28 PROCEDURE — 87486 CHLMYD PNEUM DNA AMP PROBE: CPT

## 2020-05-28 PROCEDURE — 84156 ASSAY OF PROTEIN URINE: CPT

## 2020-05-28 PROCEDURE — 83935 ASSAY OF URINE OSMOLALITY: CPT

## 2020-05-28 PROCEDURE — 84100 ASSAY OF PHOSPHORUS: CPT

## 2020-05-28 PROCEDURE — 84540 ASSAY OF URINE/UREA-N: CPT

## 2020-05-28 PROCEDURE — 80048 BASIC METABOLIC PNL TOTAL CA: CPT

## 2020-06-15 ENCOUNTER — APPOINTMENT (OUTPATIENT)
Age: 70
End: 2020-06-15

## 2020-07-06 NOTE — PATIENT PROFILE ADULT - NSPROPTRIGHTSUPPORTPHONE_GEN_A_NUR
[id number]
Alert and oriented to person, place, time/situation. normal mood and affect. no apparent risk to self or others.

## 2020-07-31 NOTE — PATIENT PROFILE ADULT - STATED REASON FOR ADMISSION
HPI:   Patient presents with:  Ear Pain: both ears? Tone Dias is a 21 month old male who presents with ear pain and possible ear infection. Symptoms include: bilateral ear pain.    Onset of symptoms was 3 days ago, and have been gradually worse
high potassium

## 2020-10-05 NOTE — ED ADULT NURSE NOTE - DRUG PRE-SCREENING (DAST -1)
How Severe Is Your Cyst?: moderate Is This A New Presentation, Or A Follow-Up?: Follow Up Cyst Additional History: Pt states her cyst is enlarging and can itch at times. Statement Selected

## 2020-12-08 ENCOUNTER — APPOINTMENT (OUTPATIENT)
Dept: UROLOGY | Facility: CLINIC | Age: 70
End: 2020-12-08
Payer: MEDICARE

## 2020-12-08 VITALS
TEMPERATURE: 98.7 F | HEIGHT: 70 IN | BODY MASS INDEX: 28.77 KG/M2 | WEIGHT: 201 LBS | SYSTOLIC BLOOD PRESSURE: 130 MMHG | HEART RATE: 80 BPM | OXYGEN SATURATION: 98 % | DIASTOLIC BLOOD PRESSURE: 78 MMHG

## 2020-12-08 PROCEDURE — 99214 OFFICE O/P EST MOD 30 MIN: CPT

## 2020-12-08 NOTE — ASSESSMENT
[FreeTextEntry1] : Very pleasant 70-year-old gentleman who presents for follow-up of history of prostate cancer, gross hematuria\par -PSA\par -BMP\par -urinalysis\par -urine culture\par -urine cytology\par -CT Urogram\par -cystoscopy\par -Extensive discussion of the potential etiologies of gross hematuria, as well as the need to complete full work up.  Patient understands and wishes to proceed.

## 2020-12-08 NOTE — HISTORY OF PRESENT ILLNESS
[FreeTextEntry1] : Very pleasant 70-year-old gentleman who presents for follow-up for gross hematuria and history of prostate cancer status post radiation.  He reports gross hematuria approximately 2 weeks ago and then subsequently again today.  He denies dysuria.  No flank pain or suprapubic pain.  He was prescribed antibiotics, however this has not helped his symptoms.  He denies nausea or vomiting.  He feels like he empties his bladder completely.  No other complaints. [Urinary Retention] : no urinary retention [Urinary Urgency] : no urinary urgency [Urinary Frequency] : urinary frequency [Nocturia] : nocturia [Straining] : no straining [Weak Stream] : no weak stream [Dysuria] : no dysuria [Hematuria - Gross] : no gross hematuria [Bladder Spasm] : no bladder spasm [Abdominal Pain] : no abdominal pain [Flank Pain] : no flank pain [Fever] : no fever [Fatigue] : no fatigue [Nausea] : no nausea [Anorexia] : no anorexia

## 2020-12-08 NOTE — PHYSICAL EXAM

## 2020-12-11 LAB
ANION GAP SERPL CALC-SCNC: 12 MMOL/L
APPEARANCE: ABNORMAL
BACTERIA UR CULT: NORMAL
BACTERIA: NEGATIVE
BILIRUBIN URINE: NEGATIVE
BLOOD URINE: ABNORMAL
BUN SERPL-MCNC: 27 MG/DL
CALCIUM SERPL-MCNC: 9.6 MG/DL
CHLORIDE SERPL-SCNC: 114 MMOL/L
CO2 SERPL-SCNC: 19 MMOL/L
COLOR: NORMAL
CREAT SERPL-MCNC: 2.46 MG/DL
GLUCOSE QUALITATIVE U: NEGATIVE
GLUCOSE SERPL-MCNC: 120 MG/DL
HYALINE CASTS: 3 /LPF
KETONES URINE: NEGATIVE
LEUKOCYTE ESTERASE URINE: ABNORMAL
MICROSCOPIC-UA: NORMAL
NITRITE URINE: NEGATIVE
PH URINE: 6
POTASSIUM SERPL-SCNC: 4.8 MMOL/L
PROTEIN URINE: ABNORMAL
PSA SERPL-MCNC: <0.01 NG/ML
RED BLOOD CELLS URINE: >720 /HPF
SODIUM SERPL-SCNC: 144 MMOL/L
SPECIFIC GRAVITY URINE: 1.02
SQUAMOUS EPITHELIAL CELLS: 2 /HPF
URINE CYTOLOGY: NORMAL
UROBILINOGEN URINE: NORMAL
WHITE BLOOD CELLS URINE: 77 /HPF

## 2020-12-19 NOTE — ED PROVIDER NOTE - BIRTH SEX
Pt recently dc home from hospital, pt prescribed new meds and has not been taking the meds since dc due to miscommunication with family. Leona (daughter)  Male

## 2020-12-22 ENCOUNTER — APPOINTMENT (OUTPATIENT)
Dept: UROLOGY | Facility: CLINIC | Age: 70
End: 2020-12-22
Payer: MEDICARE

## 2020-12-22 VITALS
TEMPERATURE: 97.3 F | HEIGHT: 70 IN | OXYGEN SATURATION: 95 % | SYSTOLIC BLOOD PRESSURE: 133 MMHG | DIASTOLIC BLOOD PRESSURE: 75 MMHG | WEIGHT: 201 LBS | BODY MASS INDEX: 28.77 KG/M2 | HEART RATE: 71 BPM

## 2020-12-22 PROCEDURE — 52224 CYSTOSCOPY AND TREATMENT: CPT

## 2021-01-01 ENCOUNTER — APPOINTMENT (OUTPATIENT)
Dept: UROLOGY | Facility: CLINIC | Age: 71
End: 2021-01-01
Payer: MEDICARE

## 2021-01-01 DIAGNOSIS — R31.0 GROSS HEMATURIA: ICD-10-CM

## 2021-01-01 DIAGNOSIS — D49.4 NEOPLASM OF UNSPECIFIED BEHAVIOR OF BLADDER: ICD-10-CM

## 2021-01-01 DIAGNOSIS — Z85.46 PERSONAL HISTORY OF MALIGNANT NEOPLASM OF PROSTATE: ICD-10-CM

## 2021-01-01 DIAGNOSIS — R35.0 FREQUENCY OF MICTURITION: ICD-10-CM

## 2021-01-01 DIAGNOSIS — R39.12 POOR URINARY STREAM: ICD-10-CM

## 2021-01-01 DIAGNOSIS — N35.919 UNSPECIFIED URETHRAL STRICTURE, MALE, UNSPECIFIED SITE: ICD-10-CM

## 2021-01-01 LAB
APPEARANCE: CLEAR
BACTERIA UR CULT: NORMAL
BACTERIA: NEGATIVE
BILIRUBIN URINE: NEGATIVE
BLOOD URINE: NEGATIVE
CALCIUM OXALATE CRYSTALS: NEGATIVE
COLOR: NORMAL
GLUCOSE QUALITATIVE U: NEGATIVE
GRANULAR CASTS: 0 /LPF
HYALINE CASTS: 4 /LPF
KETONES URINE: NEGATIVE
LEUKOCYTE ESTERASE URINE: NEGATIVE
MICROSCOPIC-UA: NORMAL
NITRITE URINE: NEGATIVE
PH URINE: 6
PROTEIN URINE: ABNORMAL
PSA SERPL-MCNC: <0.01 NG/ML
RED BLOOD CELLS URINE: 0 /HPF
SPECIFIC GRAVITY URINE: >=1.03
SQUAMOUS EPITHELIAL CELLS: 0 /HPF
TRIPLE PHOSPHATE CRYSTALS: NEGATIVE
URIC ACID CRYSTALS: NEGATIVE
URINE COMMENTS: NORMAL
URINE CYTOLOGY: NORMAL
UROBILINOGEN URINE: NORMAL
WHITE BLOOD CELLS URINE: 0 /HPF

## 2021-01-01 PROCEDURE — 99214 OFFICE O/P EST MOD 30 MIN: CPT

## 2021-01-01 RX ORDER — BETAMETHASONE DIPROPIONATE 0.5 MG/G
0.05 LOTION TOPICAL
Qty: 60 | Refills: 0 | Status: ACTIVE | COMMUNITY
Start: 2021-02-26

## 2021-01-01 RX ORDER — MIDODRINE HYDROCHLORIDE 2.5 MG/1
2.5 TABLET ORAL
Qty: 90 | Refills: 0 | Status: ACTIVE | COMMUNITY
Start: 2021-03-10

## 2021-01-01 RX ORDER — TRAZODONE HYDROCHLORIDE 50 MG/1
50 TABLET ORAL
Qty: 15 | Refills: 0 | Status: ACTIVE | COMMUNITY
Start: 2021-03-02

## 2021-01-01 RX ORDER — LAMOTRIGINE 200 MG/1
200 TABLET ORAL
Qty: 30 | Refills: 0 | Status: ACTIVE | COMMUNITY
Start: 2021-03-02

## 2021-01-01 RX ORDER — ARIPIPRAZOLE 15 MG/1
15 TABLET ORAL
Qty: 30 | Refills: 0 | Status: ACTIVE | COMMUNITY
Start: 2021-03-02

## 2021-01-01 RX ORDER — HYDROMORPHONE HYDROCHLORIDE 2 MG/1
2 TABLET ORAL
Qty: 120 | Refills: 0 | Status: ACTIVE | COMMUNITY
Start: 2021-03-08

## 2021-01-01 RX ORDER — CALCITRIOL 0.25 UG/1
0.25 CAPSULE, LIQUID FILLED ORAL
Qty: 30 | Refills: 0 | Status: ACTIVE | COMMUNITY
Start: 2021-03-02

## 2021-01-01 RX ORDER — CLOBETASOL PROPIONATE 0.5 MG/G
0.05 OINTMENT TOPICAL
Qty: 60 | Refills: 0 | Status: ACTIVE | COMMUNITY
Start: 2021-02-25

## 2021-01-01 RX ORDER — PANTOPRAZOLE 40 MG/1
40 TABLET, DELAYED RELEASE ORAL
Qty: 30 | Refills: 0 | Status: ACTIVE | COMMUNITY
Start: 2021-02-25

## 2021-01-01 RX ORDER — ATORVASTATIN CALCIUM 10 MG/1
10 TABLET, FILM COATED ORAL
Qty: 30 | Refills: 0 | Status: ACTIVE | COMMUNITY
Start: 2021-03-02

## 2021-01-04 ENCOUNTER — LABORATORY RESULT (OUTPATIENT)
Age: 71
End: 2021-01-04

## 2021-01-05 ENCOUNTER — APPOINTMENT (OUTPATIENT)
Dept: UROLOGY | Facility: CLINIC | Age: 71
End: 2021-01-05
Payer: MEDICARE

## 2021-01-05 VITALS — DIASTOLIC BLOOD PRESSURE: 80 MMHG | SYSTOLIC BLOOD PRESSURE: 115 MMHG | RESPIRATION RATE: 15 BRPM | HEART RATE: 68 BPM

## 2021-01-05 PROCEDURE — 52224 CYSTOSCOPY AND TREATMENT: CPT

## 2021-01-12 ENCOUNTER — APPOINTMENT (OUTPATIENT)
Dept: UROLOGY | Facility: CLINIC | Age: 71
End: 2021-01-12
Payer: MEDICARE

## 2021-01-12 PROCEDURE — 99213 OFFICE O/P EST LOW 20 MIN: CPT

## 2021-01-12 NOTE — HISTORY OF PRESENT ILLNESS
[FreeTextEntry1] : Very pleasant 70-year-old gentleman presents for follow-up of erythematous patches and bladder wall status post bladder biopsy last week. I spoke with the pathologist today who reports benign urothelial tissue. He reports no problems after the biopsy. No dysuria. No gross hematuria. No flank pain or suprapubic pain. No nausea or vomiting. No other complaints. [Urinary Retention] : no urinary retention [Urinary Urgency] : no urinary urgency [Urinary Frequency] : urinary frequency [Nocturia] : nocturia [Straining] : no straining [Weak Stream] : no weak stream [Dysuria] : no dysuria [Hematuria - Gross] : no gross hematuria [Bladder Spasm] : no bladder spasm [Abdominal Pain] : no abdominal pain [Flank Pain] : no flank pain [Fever] : no fever [Fatigue] : no fatigue [Nausea] : no nausea [Anorexia] : no anorexia

## 2021-01-12 NOTE — ASSESSMENT
[FreeTextEntry1] : Very pleasant 70-year-old gentleman who presents for follow-up of erythematous patches and bladder wall, history of prostate cancer status post radiation\par -Biopsy results confirmed with pathologist and reviewed with the patient\par -We discuss potential etiologies of erythematous bladder wall patches, including radiation cystitis\par -Follow-up in 6 months or sooner if necessary

## 2021-01-15 LAB — CORE LAB BIOPSY: NORMAL

## 2021-03-25 NOTE — PROGRESS NOTE ADULT - SUBJECTIVE AND OBJECTIVE BOX
[facility]   MD EVITA PALUMBO MD RUORU WONG, PA    TEL:  OFFICE: [phone]  DR CAPUTO CELL: [phone]  DAGOBERTO ACEVEDO CELL: [phone]  DR. SALAZAR CELL: [phone]  DR. PAIZ CELL: [phone]    FROM 5 PM - 7 AM PLEASE CALL ANSWERING SERVICE: [phone]    RENAL FOLLOW UP NOTE  --------------------------------------------------------------------------------  HPI:      Pt seen and examined at bedside.   Denies SOB, chest pain     PAST HISTORY  --------------------------------------------------------------------------------  No significant changes to PMH, PSH, FHx, SHx, unless otherwise noted    ALLERGIES & MEDICATIONS  --------------------------------------------------------------------------------  Allergies    No Known Allergies    Intolerances      Standing Inpatient Medications  ARIPiprazole 15 milliGRAM(s) Oral daily  atorvastatin 10 milliGRAM(s) Oral at bedtime  cefTRIAXone   IVPB      cefTRIAXone   IVPB 1000 milliGRAM(s) IV Intermittent every 24 hours  chlorhexidine 2% Cloths 1 Application(s) Topical <User Schedule>  finasteride 5 milliGRAM(s) Oral daily  gabapentin 600 milliGRAM(s) Oral two times a day  lidocaine   Patch 1 Patch Transdermal daily  midodrine. 2.5 milliGRAM(s) Oral three times a day  pantoprazole    Tablet 40 milliGRAM(s) Oral before breakfast    PRN Inpatient Medications  HYDROmorphone   Tablet 2 milliGRAM(s) Oral every 8 hours PRN      REVIEW OF SYSTEMS  --------------------------------------------------------------------------------  General: no fever  CVS: no chest pain  RESP: no sob, no cough  ABD: no abdominal pain  : no dysuria,  MSK: no edema     VITALS/PHYSICAL EXAM  --------------------------------------------------------------------------------  T(C): 36.6 (02-07-20 @ 07:44), Max: 36.9 (02-06-20 @ 10:15)  HR: 71 (02-07-20 @ 07:44) (67 - 87)  BP: 129/66 (02-07-20 @ 07:44) (98/44 - 129/66)  RR: 16 (02-07-20 @ 07:44) (6 - 25)  SpO2: 96% (02-07-20 @ 07:44) (93% - 99%)  Wt(kg): --        02-06-20 @ 07:01  -  02-07-20 @ 07:00  --------------------------------------------------------  IN: 1540 mL / OUT: 2276 mL / NET: -736 mL      Physical Exam:  	Gen: NAD  	HEENT: MMM  	Pulm: CTA B/L  	CV: S1S2  	Abd: Soft, +BS  	Ext: No LE edema B/L                      Neuro: Awake non focal  	Skin: Warm and Dry   	SHAREE phillips    LABS/STUDIES  --------------------------------------------------------------------------------              8.1    4.83  >-----------<  158      [02-07-20 @ 06:11]              25.6     x   |  x   |  x   ----------------------------<  x       [02-07-20 @ 06:11]  x    |  x   |  2.15        Ca     8.0     [02-06-20 @ 15:15]      Mg     1.6     [02-07-20 @ 06:11]      Phos  2.9     [02-07-20 @ 06:11]    TPro  7.3  /  Alb  x   /  TBili  0.4  /  DBili  x   /  AST  37  /  ALT  20  /  AlkPhos  87  [02-07-20 @ 06:11]          Creatinine Trend:  SCr 2.15 [02-07 @ 06:11]  SCr 2.09 [02-06 @ 15:15]  SCr 3.65 [02-06 @ 06:56]  SCr 4.76 [02-05 @ 16:23]  SCr 5.07 [02-05 @ 09:21]    Urinalysis - [02-05-20 @ 04:42]      Color Yellow / Appearance Clear / SG 1.015 / pH 5.0      Gluc Negative / Ketone Negative  / Bili Negative / Urobili Negative       Blood Moderate / Protein 30 / Leuk Est Moderate / Nitrite Negative      RBC 5-10 / WBC 11-25 / Hyaline 0-2 / Gran  / Sq Epi  / Non Sq Epi Few / Bacteria Moderate    Urine Creatinine 138      [02-05-20 @ 06:09]  Urine Sodium 37      [02-05-20 @ 06:09]  Urine Potassium 37      [02-05-20 @ 06:09]  Urine Chloride 33      [02-05-20 @ 06:09]  Urine Osmolality 325      [02-05-20 @ 06:09]    Iron 61, TIBC 220, %sat 28      [01-28-20 @ 07:31]  Ferritin 371      [01-28-20 @ 14:41]  PTH -- (Ca 8.6)      [01-24-20 @ 15:01]   132  Vitamin D (25OH) 13.7      [12-24-19 @ 09:22]  HbA1c 5.6      [01-24-20 @ 15:03]  TSH 1.78      [01-24-20 @ 10:32]  Lipid: chol 99, TG 73, HDL 48, LDL 36      [01-24-20 @ 10:32]    HBsAg Nonreact      [02-05-20 @ 10:06]  HCV 11.27, Reactive      [02-05-20 @ 10:06] -c/w ASA 81mg daily  -c/w Atorvastatin 80mg qHS. -c/w ASA 81mg daily  -c/w Atorvastatin 80mg qHS. -c/w ASA 81mg daily  -c/w Atorvastatin 80mg qHS. -c/w ASA 81mg daily  -c/w Atorvastatin 80mg qHS.

## 2021-05-05 NOTE — CHART NOTE - NSCHARTNOTEFT_GEN_A_CORE
It was noted that hematuria was observed in the urinary catheter in the later evening hours. Irrigation of the bladder continued despite the observation. Patient was stable.     Primary care team to follow up in the am.   Thanks
No

## 2021-05-30 NOTE — H&P ADULT - RS GEN PE MLT RESP DETAILS PC
Alert & oriented; no sensory, motor or coordination deficits, normal reflexes good air movement/airway patent/no rhonchi/no wheezes/clear to auscultation bilaterally/no rales/respirations non-labored/breath sounds equal negative

## 2021-07-13 PROBLEM — R31.0 GROSS HEMATURIA: Status: ACTIVE | Noted: 2019-09-08

## 2021-07-13 PROBLEM — D49.4 BLADDER TUMOR: Status: ACTIVE | Noted: 2021-01-05

## 2021-07-13 PROBLEM — R35.0 INCREASED URINARY FREQUENCY: Status: ACTIVE | Noted: 2019-03-12

## 2021-07-13 PROBLEM — R39.12 WEAK URINARY STREAM: Status: ACTIVE | Noted: 2019-03-12

## 2021-07-13 PROBLEM — Z85.46 HISTORY OF PROSTATE CANCER: Status: ACTIVE | Noted: 2019-03-12

## 2021-07-13 PROBLEM — N35.919 URETHRAL STRICTURE: Status: ACTIVE | Noted: 2020-12-22

## 2021-07-13 NOTE — ASSESSMENT
[FreeTextEntry1] : Very pleasant 70-year-old gentleman who presents for follow-up of gross hematuria, history of prostate cancer status post radiation, radiation cystitis, erectile dysfunction\par -Prior PSA undetectable\par -Repeat PSA today\par -We discussed options for treatment of erectile dysfunction at this time he would like to defer\par -Urinalysis\par -Urine culture\par -Urine cytology\par -Follow-up in 6 months if urine studies and PSA are normal

## 2021-07-13 NOTE — HISTORY OF PRESENT ILLNESS
[FreeTextEntry1] : Very pleasant 70-year-old gentleman presents for follow-up of erythematous patches and bladder wall status post bladder biopsy 6 months ago.  This demonstrated benign urothelial tissue. He reports no problems after the biopsy. No dysuria. No gross hematuria. No flank pain or suprapubic pain. No nausea or vomiting. No other complaints.\par \par Patient underwent treatment for prostate cancer with a radical prostatectomy in the past.  Most recent PSA undetectable from December 2020. 1

## 2022-01-01 ENCOUNTER — RESULT REVIEW (OUTPATIENT)
Age: 72
End: 2022-01-01

## 2022-01-01 ENCOUNTER — INPATIENT (INPATIENT)
Facility: HOSPITAL | Age: 72
LOS: 21 days | DRG: 435 | End: 2022-05-20
Attending: SURGERY | Admitting: SURGERY
Payer: MEDICARE

## 2022-01-01 VITALS
HEART RATE: 88 BPM | WEIGHT: 216.05 LBS | TEMPERATURE: 100 F | RESPIRATION RATE: 20 BRPM | SYSTOLIC BLOOD PRESSURE: 127 MMHG | HEIGHT: 70 IN | DIASTOLIC BLOOD PRESSURE: 73 MMHG | OXYGEN SATURATION: 92 %

## 2022-01-01 VITALS — RESPIRATION RATE: 9 BRPM | HEART RATE: 40 BPM

## 2022-01-01 DIAGNOSIS — Z51.5 ENCOUNTER FOR PALLIATIVE CARE: ICD-10-CM

## 2022-01-01 DIAGNOSIS — K50.90 CROHN'S DISEASE, UNSPECIFIED, WITHOUT COMPLICATIONS: ICD-10-CM

## 2022-01-01 DIAGNOSIS — K76.9 LIVER DISEASE, UNSPECIFIED: ICD-10-CM

## 2022-01-01 DIAGNOSIS — E43 UNSPECIFIED SEVERE PROTEIN-CALORIE MALNUTRITION: ICD-10-CM

## 2022-01-01 DIAGNOSIS — Z93.9 ARTIFICIAL OPENING STATUS, UNSPECIFIED: Chronic | ICD-10-CM

## 2022-01-01 DIAGNOSIS — E87.5 HYPERKALEMIA: ICD-10-CM

## 2022-01-01 DIAGNOSIS — Z29.9 ENCOUNTER FOR PROPHYLACTIC MEASURES, UNSPECIFIED: ICD-10-CM

## 2022-01-01 DIAGNOSIS — I10 ESSENTIAL (PRIMARY) HYPERTENSION: ICD-10-CM

## 2022-01-01 DIAGNOSIS — Z02.9 ENCOUNTER FOR ADMINISTRATIVE EXAMINATIONS, UNSPECIFIED: ICD-10-CM

## 2022-01-01 DIAGNOSIS — I95.9 HYPOTENSION, UNSPECIFIED: ICD-10-CM

## 2022-01-01 DIAGNOSIS — N18.9 CHRONIC KIDNEY DISEASE, UNSPECIFIED: ICD-10-CM

## 2022-01-01 DIAGNOSIS — R74.01 ELEVATION OF LEVELS OF LIVER TRANSAMINASE LEVELS: ICD-10-CM

## 2022-01-01 DIAGNOSIS — I50.9 HEART FAILURE, UNSPECIFIED: ICD-10-CM

## 2022-01-01 DIAGNOSIS — R16.0 HEPATOMEGALY, NOT ELSEWHERE CLASSIFIED: ICD-10-CM

## 2022-01-01 DIAGNOSIS — Z71.89 OTHER SPECIFIED COUNSELING: ICD-10-CM

## 2022-01-01 DIAGNOSIS — J96.01 ACUTE RESPIRATORY FAILURE WITH HYPOXIA: ICD-10-CM

## 2022-01-01 DIAGNOSIS — E44.0 MODERATE PROTEIN-CALORIE MALNUTRITION: ICD-10-CM

## 2022-01-01 DIAGNOSIS — R53.81 OTHER MALAISE: ICD-10-CM

## 2022-01-01 DIAGNOSIS — E78.5 HYPERLIPIDEMIA, UNSPECIFIED: ICD-10-CM

## 2022-01-01 DIAGNOSIS — N40.0 BENIGN PROSTATIC HYPERPLASIA WITHOUT LOWER URINARY TRACT SYMPTOMS: ICD-10-CM

## 2022-01-01 DIAGNOSIS — F31.9 BIPOLAR DISORDER, UNSPECIFIED: ICD-10-CM

## 2022-01-01 DIAGNOSIS — N18.4 CHRONIC KIDNEY DISEASE, STAGE 4 (SEVERE): ICD-10-CM

## 2022-01-01 DIAGNOSIS — I48.91 UNSPECIFIED ATRIAL FIBRILLATION: ICD-10-CM

## 2022-01-01 LAB
% ALBUMIN: 46.4 % — SIGNIFICANT CHANGE UP
% ALPHA 1: 8 % — SIGNIFICANT CHANGE UP
% ALPHA 2: 11.6 % — SIGNIFICANT CHANGE UP
% BETA: 12.8 % — SIGNIFICANT CHANGE UP
% GAMMA: 21.2 % — SIGNIFICANT CHANGE UP
A1AT SERPL-MCNC: 297 MG/DL — HIGH (ref 90–200)
A1C WITH ESTIMATED AVERAGE GLUCOSE RESULT: 6.1 % — HIGH (ref 4–5.6)
AFP-TM SERPL-MCNC: 5338 NG/ML — HIGH
ALBUMIN SERPL ELPH-MCNC: 2.6 G/DL — LOW (ref 3.5–5)
ALBUMIN SERPL ELPH-MCNC: 2.7 G/DL — LOW (ref 3.5–5)
ALBUMIN SERPL ELPH-MCNC: 2.8 G/DL — LOW (ref 3.5–5)
ALBUMIN SERPL ELPH-MCNC: 2.8 G/DL — LOW (ref 3.5–5)
ALBUMIN SERPL ELPH-MCNC: 2.9 G/DL — LOW (ref 3.5–5)
ALBUMIN SERPL ELPH-MCNC: 2.9 G/DL — LOW (ref 3.5–5)
ALBUMIN SERPL ELPH-MCNC: 3 G/DL — LOW (ref 3.5–5)
ALBUMIN SERPL ELPH-MCNC: 3.2 G/DL — LOW (ref 3.5–5)
ALBUMIN SERPL ELPH-MCNC: 3.2 G/DL — LOW (ref 3.5–5)
ALBUMIN SERPL ELPH-MCNC: 3.3 G/DL — LOW (ref 3.5–5)
ALBUMIN SERPL ELPH-MCNC: 3.3 G/DL — LOW (ref 3.5–5)
ALBUMIN SERPL ELPH-MCNC: 3.4 G/DL — LOW (ref 3.5–5)
ALBUMIN SERPL ELPH-MCNC: 3.4 G/DL — LOW (ref 3.5–5)
ALBUMIN SERPL ELPH-MCNC: 3.6 G/DL — SIGNIFICANT CHANGE UP (ref 3.6–5.5)
ALBUMIN/GLOB SERPL ELPH: 0.9 RATIO — SIGNIFICANT CHANGE UP
ALP SERPL-CCNC: 191 U/L — HIGH (ref 40–120)
ALP SERPL-CCNC: 192 U/L — HIGH (ref 40–120)
ALP SERPL-CCNC: 194 U/L — HIGH (ref 40–120)
ALP SERPL-CCNC: 197 U/L — HIGH (ref 40–120)
ALP SERPL-CCNC: 199 U/L — HIGH (ref 40–120)
ALP SERPL-CCNC: 199 U/L — HIGH (ref 40–120)
ALP SERPL-CCNC: 208 U/L — HIGH (ref 40–120)
ALP SERPL-CCNC: 216 U/L — HIGH (ref 40–120)
ALP SERPL-CCNC: 247 U/L — HIGH (ref 40–120)
ALP SERPL-CCNC: 254 U/L — HIGH (ref 40–120)
ALP SERPL-CCNC: 298 U/L — HIGH (ref 40–120)
ALP SERPL-CCNC: 302 U/L — HIGH (ref 40–120)
ALP SERPL-CCNC: 310 U/L — HIGH (ref 40–120)
ALP SERPL-CCNC: 311 U/L — HIGH (ref 40–120)
ALP SERPL-CCNC: 312 U/L — HIGH (ref 40–120)
ALP SERPL-CCNC: 336 U/L — HIGH (ref 40–120)
ALPHA1 GLOB SERPL ELPH-MCNC: 0.6 G/DL — HIGH (ref 0.1–0.4)
ALPHA2 GLOB SERPL ELPH-MCNC: 0.9 G/DL — SIGNIFICANT CHANGE UP (ref 0.5–1)
ALT FLD-CCNC: 110 U/L DA — HIGH (ref 10–60)
ALT FLD-CCNC: 111 U/L DA — HIGH (ref 10–60)
ALT FLD-CCNC: 121 U/L DA — HIGH (ref 10–60)
ALT FLD-CCNC: 66 U/L DA — HIGH (ref 10–60)
ALT FLD-CCNC: 66 U/L DA — HIGH (ref 10–60)
ALT FLD-CCNC: 73 U/L DA — HIGH (ref 10–60)
ALT FLD-CCNC: 74 U/L DA — HIGH (ref 10–60)
ALT FLD-CCNC: 75 U/L DA — HIGH (ref 10–60)
ALT FLD-CCNC: 83 U/L DA — HIGH (ref 10–60)
ALT FLD-CCNC: 83 U/L DA — HIGH (ref 10–60)
ALT FLD-CCNC: 84 U/L DA — HIGH (ref 10–60)
ALT FLD-CCNC: 85 U/L DA — HIGH (ref 10–60)
ALT FLD-CCNC: 87 U/L DA — HIGH (ref 10–60)
ALT FLD-CCNC: 92 U/L DA — HIGH (ref 10–60)
ALT FLD-CCNC: 95 U/L DA — HIGH (ref 10–60)
ALT FLD-CCNC: 98 U/L DA — HIGH (ref 10–60)
ANA TITR SER: NEGATIVE — SIGNIFICANT CHANGE UP
ANION GAP SERPL CALC-SCNC: 10 MMOL/L — SIGNIFICANT CHANGE UP (ref 5–17)
ANION GAP SERPL CALC-SCNC: 10 MMOL/L — SIGNIFICANT CHANGE UP (ref 5–17)
ANION GAP SERPL CALC-SCNC: 11 MMOL/L — SIGNIFICANT CHANGE UP (ref 5–17)
ANION GAP SERPL CALC-SCNC: 12 MMOL/L — SIGNIFICANT CHANGE UP (ref 5–17)
ANION GAP SERPL CALC-SCNC: 12 MMOL/L — SIGNIFICANT CHANGE UP (ref 5–17)
ANION GAP SERPL CALC-SCNC: 13 MMOL/L — SIGNIFICANT CHANGE UP (ref 5–17)
ANION GAP SERPL CALC-SCNC: 13 MMOL/L — SIGNIFICANT CHANGE UP (ref 5–17)
ANION GAP SERPL CALC-SCNC: 14 MMOL/L — SIGNIFICANT CHANGE UP (ref 5–17)
ANION GAP SERPL CALC-SCNC: 15 MMOL/L — SIGNIFICANT CHANGE UP (ref 5–17)
ANION GAP SERPL CALC-SCNC: 15 MMOL/L — SIGNIFICANT CHANGE UP (ref 5–17)
ANION GAP SERPL CALC-SCNC: 16 MMOL/L — SIGNIFICANT CHANGE UP (ref 5–17)
ANION GAP SERPL CALC-SCNC: 16 MMOL/L — SIGNIFICANT CHANGE UP (ref 5–17)
ANION GAP SERPL CALC-SCNC: 17 MMOL/L — SIGNIFICANT CHANGE UP (ref 5–17)
ANION GAP SERPL CALC-SCNC: 18 MMOL/L — HIGH (ref 5–17)
ANISOCYTOSIS BLD QL: SLIGHT — SIGNIFICANT CHANGE UP
APPEARANCE UR: CLEAR — SIGNIFICANT CHANGE UP
APPEARANCE UR: CLEAR — SIGNIFICANT CHANGE UP
APTT BLD: 25.9 SEC — LOW (ref 27.5–35.5)
APTT BLD: 26.5 SEC — LOW (ref 27.5–35.5)
APTT BLD: 27 SEC — LOW (ref 27.5–35.5)
APTT BLD: 27.1 SEC — LOW (ref 27.5–35.5)
APTT BLD: 30.8 SEC — SIGNIFICANT CHANGE UP (ref 27.5–35.5)
APTT BLD: 31.9 SEC — SIGNIFICANT CHANGE UP (ref 27.5–35.5)
APTT BLD: 39.2 SEC — HIGH (ref 27.5–35.5)
APTT BLD: 46.2 SEC — HIGH (ref 27.5–35.5)
APTT BLD: 62 SEC — HIGH (ref 27.5–35.5)
APTT BLD: 64 SEC — HIGH (ref 27.5–35.5)
APTT BLD: 72.1 SEC — HIGH (ref 27.5–35.5)
APTT BLD: 80.6 SEC — HIGH (ref 27.5–35.5)
AST SERPL-CCNC: 220 U/L — HIGH (ref 10–40)
AST SERPL-CCNC: 233 U/L — HIGH (ref 10–40)
AST SERPL-CCNC: 240 U/L — HIGH (ref 10–40)
AST SERPL-CCNC: 242 U/L — HIGH (ref 10–40)
AST SERPL-CCNC: 250 U/L — HIGH (ref 10–40)
AST SERPL-CCNC: 257 U/L — HIGH (ref 10–40)
AST SERPL-CCNC: 302 U/L — HIGH (ref 10–40)
AST SERPL-CCNC: 334 U/L — HIGH (ref 10–40)
AST SERPL-CCNC: 340 U/L — HIGH (ref 10–40)
AST SERPL-CCNC: 346 U/L — HIGH (ref 10–40)
AST SERPL-CCNC: 383 U/L — HIGH (ref 10–40)
AST SERPL-CCNC: 391 U/L — HIGH (ref 10–40)
AST SERPL-CCNC: 396 U/L — HIGH (ref 10–40)
AST SERPL-CCNC: 418 U/L — HIGH (ref 10–40)
AST SERPL-CCNC: 436 U/L — HIGH (ref 10–40)
AST SERPL-CCNC: 533 U/L — HIGH (ref 10–40)
AUTO DIFF PNL BLD: NEGATIVE — SIGNIFICANT CHANGE UP
B-GLOBULIN SERPL ELPH-MCNC: 1 G/DL — SIGNIFICANT CHANGE UP (ref 0.5–1)
BACTERIA # UR AUTO: ABNORMAL /HPF
BACTERIA # UR AUTO: ABNORMAL /HPF
BASE EXCESS BLDV CALC-SCNC: -1.3 MMOL/L — SIGNIFICANT CHANGE UP
BASE EXCESS BLDV CALC-SCNC: -11.6 MMOL/L — SIGNIFICANT CHANGE UP
BASOPHILS # BLD AUTO: 0 K/UL — SIGNIFICANT CHANGE UP (ref 0–0.2)
BASOPHILS # BLD AUTO: 0.03 K/UL — SIGNIFICANT CHANGE UP (ref 0–0.2)
BASOPHILS # BLD AUTO: 0.04 K/UL — SIGNIFICANT CHANGE UP (ref 0–0.2)
BASOPHILS # BLD AUTO: 0.12 K/UL — SIGNIFICANT CHANGE UP (ref 0–0.2)
BASOPHILS # BLD AUTO: 0.12 K/UL — SIGNIFICANT CHANGE UP (ref 0–0.2)
BASOPHILS NFR BLD AUTO: 0 % — SIGNIFICANT CHANGE UP (ref 0–2)
BASOPHILS NFR BLD AUTO: 0.3 % — SIGNIFICANT CHANGE UP (ref 0–2)
BASOPHILS NFR BLD AUTO: 0.5 % — SIGNIFICANT CHANGE UP (ref 0–2)
BASOPHILS NFR BLD AUTO: 0.8 % — SIGNIFICANT CHANGE UP (ref 0–2)
BASOPHILS NFR BLD AUTO: 0.8 % — SIGNIFICANT CHANGE UP (ref 0–2)
BILIRUB DIRECT SERPL-MCNC: 0.4 MG/DL — HIGH (ref 0–0.3)
BILIRUB SERPL-MCNC: 0.9 MG/DL — SIGNIFICANT CHANGE UP (ref 0.2–1.2)
BILIRUB SERPL-MCNC: 1 MG/DL — SIGNIFICANT CHANGE UP (ref 0.2–1.2)
BILIRUB SERPL-MCNC: 1.1 MG/DL — SIGNIFICANT CHANGE UP (ref 0.2–1.2)
BILIRUB SERPL-MCNC: 1.2 MG/DL — SIGNIFICANT CHANGE UP (ref 0.2–1.2)
BILIRUB SERPL-MCNC: 1.3 MG/DL — HIGH (ref 0.2–1.2)
BILIRUB SERPL-MCNC: 1.4 MG/DL — HIGH (ref 0.2–1.2)
BILIRUB SERPL-MCNC: 1.4 MG/DL — HIGH (ref 0.2–1.2)
BILIRUB SERPL-MCNC: 1.6 MG/DL — HIGH (ref 0.2–1.2)
BILIRUB SERPL-MCNC: 1.6 MG/DL — HIGH (ref 0.2–1.2)
BILIRUB UR-MCNC: NEGATIVE — SIGNIFICANT CHANGE UP
BILIRUB UR-MCNC: NEGATIVE — SIGNIFICANT CHANGE UP
BLD GP AB SCN SERPL QL: SIGNIFICANT CHANGE UP
BLOOD GAS COMMENTS, VENOUS: SIGNIFICANT CHANGE UP
BLOOD GAS COMMENTS, VENOUS: SIGNIFICANT CHANGE UP
BUN SERPL-MCNC: 101 MG/DL — HIGH (ref 7–18)
BUN SERPL-MCNC: 125 MG/DL — HIGH (ref 7–18)
BUN SERPL-MCNC: 127 MG/DL — HIGH (ref 7–18)
BUN SERPL-MCNC: 35 MG/DL — HIGH (ref 7–18)
BUN SERPL-MCNC: 36 MG/DL — HIGH (ref 7–18)
BUN SERPL-MCNC: 48 MG/DL — HIGH (ref 7–18)
BUN SERPL-MCNC: 53 MG/DL — HIGH (ref 7–18)
BUN SERPL-MCNC: 55 MG/DL — HIGH (ref 7–18)
BUN SERPL-MCNC: 61 MG/DL — HIGH (ref 7–18)
BUN SERPL-MCNC: 71 MG/DL — HIGH (ref 7–18)
BUN SERPL-MCNC: 72 MG/DL — HIGH (ref 7–18)
BUN SERPL-MCNC: 74 MG/DL — HIGH (ref 7–18)
BUN SERPL-MCNC: 77 MG/DL — HIGH (ref 7–18)
BUN SERPL-MCNC: 86 MG/DL — HIGH (ref 7–18)
BUN SERPL-MCNC: 86 MG/DL — HIGH (ref 7–18)
BUN SERPL-MCNC: 87 MG/DL — HIGH (ref 7–18)
BUN SERPL-MCNC: 88 MG/DL — HIGH (ref 7–18)
BUN SERPL-MCNC: 88 MG/DL — HIGH (ref 7–18)
BUN SERPL-MCNC: 89 MG/DL — HIGH (ref 7–18)
BUN SERPL-MCNC: 90 MG/DL — HIGH (ref 7–18)
BUN SERPL-MCNC: 91 MG/DL — HIGH (ref 7–18)
BUN SERPL-MCNC: 93 MG/DL — HIGH (ref 7–18)
BUN SERPL-MCNC: 99 MG/DL — HIGH (ref 7–18)
C-ANCA SER-ACNC: NEGATIVE — SIGNIFICANT CHANGE UP
C3 SERPL-MCNC: 190 MG/DL — HIGH (ref 81–157)
C4 SERPL-MCNC: 67 MG/DL — HIGH (ref 13–39)
CALCIUM SERPL-MCNC: 10.2 MG/DL — SIGNIFICANT CHANGE UP (ref 8.4–10.5)
CALCIUM SERPL-MCNC: 10.4 MG/DL — SIGNIFICANT CHANGE UP (ref 8.4–10.5)
CALCIUM SERPL-MCNC: 8.8 MG/DL — SIGNIFICANT CHANGE UP (ref 8.4–10.5)
CALCIUM SERPL-MCNC: 9.2 MG/DL — SIGNIFICANT CHANGE UP (ref 8.4–10.5)
CALCIUM SERPL-MCNC: 9.2 MG/DL — SIGNIFICANT CHANGE UP (ref 8.4–10.5)
CALCIUM SERPL-MCNC: 9.3 MG/DL — SIGNIFICANT CHANGE UP (ref 8.4–10.5)
CALCIUM SERPL-MCNC: 9.4 MG/DL — SIGNIFICANT CHANGE UP (ref 8.4–10.5)
CALCIUM SERPL-MCNC: 9.5 MG/DL — SIGNIFICANT CHANGE UP (ref 8.4–10.5)
CALCIUM SERPL-MCNC: 9.6 MG/DL — SIGNIFICANT CHANGE UP (ref 8.4–10.5)
CALCIUM SERPL-MCNC: 9.7 MG/DL — SIGNIFICANT CHANGE UP (ref 8.4–10.5)
CALCIUM SERPL-MCNC: 9.8 MG/DL — SIGNIFICANT CHANGE UP (ref 8.4–10.5)
CALCIUM SERPL-MCNC: 9.8 MG/DL — SIGNIFICANT CHANGE UP (ref 8.4–10.5)
CALPROTECTIN STL-MCNT: 99 UG/G — SIGNIFICANT CHANGE UP (ref 0–120)
CANCER AG125 SERPL-ACNC: 14 U/ML — SIGNIFICANT CHANGE UP
CANCER AG19-9 SERPL-ACNC: 4 U/ML — SIGNIFICANT CHANGE UP
CEA SERPL-MCNC: 2.9 NG/ML — SIGNIFICANT CHANGE UP (ref 0–3.8)
CERULOPLASMIN SERPL-MCNC: 36 MG/DL — HIGH (ref 15–30)
CHLORIDE SERPL-SCNC: 101 MMOL/L — SIGNIFICANT CHANGE UP (ref 96–108)
CHLORIDE SERPL-SCNC: 104 MMOL/L — SIGNIFICANT CHANGE UP (ref 96–108)
CHLORIDE SERPL-SCNC: 105 MMOL/L — SIGNIFICANT CHANGE UP (ref 96–108)
CHLORIDE SERPL-SCNC: 106 MMOL/L — SIGNIFICANT CHANGE UP (ref 96–108)
CHLORIDE SERPL-SCNC: 107 MMOL/L — SIGNIFICANT CHANGE UP (ref 96–108)
CHLORIDE SERPL-SCNC: 107 MMOL/L — SIGNIFICANT CHANGE UP (ref 96–108)
CHLORIDE SERPL-SCNC: 112 MMOL/L — HIGH (ref 96–108)
CHLORIDE SERPL-SCNC: 114 MMOL/L — HIGH (ref 96–108)
CHLORIDE SERPL-SCNC: 115 MMOL/L — HIGH (ref 96–108)
CHLORIDE SERPL-SCNC: 115 MMOL/L — HIGH (ref 96–108)
CHLORIDE SERPL-SCNC: 91 MMOL/L — LOW (ref 96–108)
CHLORIDE SERPL-SCNC: 94 MMOL/L — LOW (ref 96–108)
CHLORIDE SERPL-SCNC: 94 MMOL/L — LOW (ref 96–108)
CHLORIDE SERPL-SCNC: 95 MMOL/L — LOW (ref 96–108)
CHLORIDE SERPL-SCNC: 96 MMOL/L — SIGNIFICANT CHANGE UP (ref 96–108)
CHLORIDE SERPL-SCNC: 96 MMOL/L — SIGNIFICANT CHANGE UP (ref 96–108)
CHLORIDE SERPL-SCNC: 98 MMOL/L — SIGNIFICANT CHANGE UP (ref 96–108)
CHOLEST SERPL-MCNC: 129 MG/DL — SIGNIFICANT CHANGE UP
CK SERPL-CCNC: 42 U/L — SIGNIFICANT CHANGE UP (ref 35–232)
CO2 SERPL-SCNC: 13 MMOL/L — LOW (ref 22–31)
CO2 SERPL-SCNC: 15 MMOL/L — LOW (ref 22–31)
CO2 SERPL-SCNC: 16 MMOL/L — LOW (ref 22–31)
CO2 SERPL-SCNC: 17 MMOL/L — LOW (ref 22–31)
CO2 SERPL-SCNC: 18 MMOL/L — LOW (ref 22–31)
CO2 SERPL-SCNC: 19 MMOL/L — LOW (ref 22–31)
CO2 SERPL-SCNC: 19 MMOL/L — LOW (ref 22–31)
CO2 SERPL-SCNC: 21 MMOL/L — LOW (ref 22–31)
CO2 SERPL-SCNC: 22 MMOL/L — SIGNIFICANT CHANGE UP (ref 22–31)
CO2 SERPL-SCNC: 24 MMOL/L — SIGNIFICANT CHANGE UP (ref 22–31)
CO2 SERPL-SCNC: 25 MMOL/L — SIGNIFICANT CHANGE UP (ref 22–31)
COLOR SPEC: YELLOW — SIGNIFICANT CHANGE UP
COLOR SPEC: YELLOW — SIGNIFICANT CHANGE UP
COMMENT - URINE: SIGNIFICANT CHANGE UP
CREAT ?TM UR-MCNC: 59 MG/DL — SIGNIFICANT CHANGE UP
CREAT SERPL-MCNC: 10 MG/DL — HIGH (ref 0.5–1.3)
CREAT SERPL-MCNC: 11 MG/DL — HIGH (ref 0.5–1.3)
CREAT SERPL-MCNC: 3.28 MG/DL — HIGH (ref 0.5–1.3)
CREAT SERPL-MCNC: 3.43 MG/DL — HIGH (ref 0.5–1.3)
CREAT SERPL-MCNC: 3.54 MG/DL — HIGH (ref 0.5–1.3)
CREAT SERPL-MCNC: 3.71 MG/DL — HIGH (ref 0.5–1.3)
CREAT SERPL-MCNC: 3.82 MG/DL — HIGH (ref 0.5–1.3)
CREAT SERPL-MCNC: 4.38 MG/DL — HIGH (ref 0.5–1.3)
CREAT SERPL-MCNC: 4.39 MG/DL — HIGH (ref 0.5–1.3)
CREAT SERPL-MCNC: 4.47 MG/DL — HIGH (ref 0.5–1.3)
CREAT SERPL-MCNC: 4.51 MG/DL — HIGH (ref 0.5–1.3)
CREAT SERPL-MCNC: 5.02 MG/DL — HIGH (ref 0.5–1.3)
CREAT SERPL-MCNC: 5.1 MG/DL — HIGH (ref 0.5–1.3)
CREAT SERPL-MCNC: 5.28 MG/DL — HIGH (ref 0.5–1.3)
CREAT SERPL-MCNC: 5.54 MG/DL — HIGH (ref 0.5–1.3)
CREAT SERPL-MCNC: 5.82 MG/DL — HIGH (ref 0.5–1.3)
CREAT SERPL-MCNC: 6.12 MG/DL — HIGH (ref 0.5–1.3)
CREAT SERPL-MCNC: 6.17 MG/DL — HIGH (ref 0.5–1.3)
CREAT SERPL-MCNC: 6.95 MG/DL — HIGH (ref 0.5–1.3)
CREAT SERPL-MCNC: 7.21 MG/DL — HIGH (ref 0.5–1.3)
CREAT SERPL-MCNC: 7.88 MG/DL — HIGH (ref 0.5–1.3)
CREAT SERPL-MCNC: 8.32 MG/DL — HIGH (ref 0.5–1.3)
CREAT SERPL-MCNC: 8.59 MG/DL — HIGH (ref 0.5–1.3)
CREAT SERPL-MCNC: 9.28 MG/DL — HIGH (ref 0.5–1.3)
CRYOGLOB SERPL-MCNC: NEGATIVE — SIGNIFICANT CHANGE UP
CULTURE RESULTS: SIGNIFICANT CHANGE UP
DIFF PNL FLD: ABNORMAL
DIFF PNL FLD: NEGATIVE — SIGNIFICANT CHANGE UP
DSDNA AB SER-ACNC: 12 IU/ML — SIGNIFICANT CHANGE UP
EGFR: 10 ML/MIN/1.73M2 — LOW
EGFR: 10 ML/MIN/1.73M2 — LOW
EGFR: 11 ML/MIN/1.73M2 — LOW
EGFR: 13 ML/MIN/1.73M2 — LOW
EGFR: 14 ML/MIN/1.73M2 — LOW
EGFR: 16 ML/MIN/1.73M2 — LOW
EGFR: 16 ML/MIN/1.73M2 — LOW
EGFR: 17 ML/MIN/1.73M2 — LOW
EGFR: 18 ML/MIN/1.73M2 — LOW
EGFR: 19 ML/MIN/1.73M2 — LOW
EGFR: 4 ML/MIN/1.73M2 — LOW
EGFR: 5 ML/MIN/1.73M2 — LOW
EGFR: 5 ML/MIN/1.73M2 — LOW
EGFR: 6 ML/MIN/1.73M2 — LOW
EGFR: 6 ML/MIN/1.73M2 — LOW
EGFR: 7 ML/MIN/1.73M2 — LOW
EGFR: 7 ML/MIN/1.73M2 — LOW
EGFR: 8 ML/MIN/1.73M2 — LOW
EGFR: 9 ML/MIN/1.73M2 — LOW
EGFR: 9 ML/MIN/1.73M2 — LOW
EOSINOPHIL # BLD AUTO: 0 K/UL — SIGNIFICANT CHANGE UP (ref 0–0.5)
EOSINOPHIL # BLD AUTO: 0.01 K/UL — SIGNIFICANT CHANGE UP (ref 0–0.5)
EOSINOPHIL # BLD AUTO: 0.02 K/UL — SIGNIFICANT CHANGE UP (ref 0–0.5)
EOSINOPHIL # BLD AUTO: 0.13 K/UL — SIGNIFICANT CHANGE UP (ref 0–0.5)
EOSINOPHIL # BLD AUTO: 0.21 K/UL — SIGNIFICANT CHANGE UP (ref 0–0.5)
EOSINOPHIL NFR BLD AUTO: 0 % — SIGNIFICANT CHANGE UP (ref 0–6)
EOSINOPHIL NFR BLD AUTO: 0.1 % — SIGNIFICANT CHANGE UP (ref 0–6)
EOSINOPHIL NFR BLD AUTO: 0.1 % — SIGNIFICANT CHANGE UP (ref 0–6)
EOSINOPHIL NFR BLD AUTO: 1.4 % — SIGNIFICANT CHANGE UP (ref 0–6)
EOSINOPHIL NFR BLD AUTO: 2.6 % — SIGNIFICANT CHANGE UP (ref 0–6)
EPI CELLS # UR: ABNORMAL /HPF
EPI CELLS # UR: SIGNIFICANT CHANGE UP /HPF
ESTIMATED AVERAGE GLUCOSE: 128 MG/DL — HIGH (ref 68–114)
FLUAV AG NPH QL: SIGNIFICANT CHANGE UP
FLUBV AG NPH QL: SIGNIFICANT CHANGE UP
GAMMA GLOBULIN: 1.6 G/DL — SIGNIFICANT CHANGE UP (ref 0.6–1.6)
GBM IGG SER-ACNC: 6 — SIGNIFICANT CHANGE UP (ref 0–20)
GLUCOSE BLDC GLUCOMTR-MCNC: 100 MG/DL — HIGH (ref 70–99)
GLUCOSE BLDC GLUCOMTR-MCNC: 111 MG/DL — HIGH (ref 70–99)
GLUCOSE BLDC GLUCOMTR-MCNC: 124 MG/DL — HIGH (ref 70–99)
GLUCOSE BLDC GLUCOMTR-MCNC: 165 MG/DL — HIGH (ref 70–99)
GLUCOSE SERPL-MCNC: 101 MG/DL — HIGH (ref 70–99)
GLUCOSE SERPL-MCNC: 102 MG/DL — HIGH (ref 70–99)
GLUCOSE SERPL-MCNC: 104 MG/DL — HIGH (ref 70–99)
GLUCOSE SERPL-MCNC: 104 MG/DL — HIGH (ref 70–99)
GLUCOSE SERPL-MCNC: 105 MG/DL — HIGH (ref 70–99)
GLUCOSE SERPL-MCNC: 107 MG/DL — HIGH (ref 70–99)
GLUCOSE SERPL-MCNC: 107 MG/DL — HIGH (ref 70–99)
GLUCOSE SERPL-MCNC: 112 MG/DL — HIGH (ref 70–99)
GLUCOSE SERPL-MCNC: 130 MG/DL — HIGH (ref 70–99)
GLUCOSE SERPL-MCNC: 131 MG/DL — HIGH (ref 70–99)
GLUCOSE SERPL-MCNC: 132 MG/DL — HIGH (ref 70–99)
GLUCOSE SERPL-MCNC: 140 MG/DL — HIGH (ref 70–99)
GLUCOSE SERPL-MCNC: 89 MG/DL — SIGNIFICANT CHANGE UP (ref 70–99)
GLUCOSE SERPL-MCNC: 91 MG/DL — SIGNIFICANT CHANGE UP (ref 70–99)
GLUCOSE SERPL-MCNC: 92 MG/DL — SIGNIFICANT CHANGE UP (ref 70–99)
GLUCOSE SERPL-MCNC: 93 MG/DL — SIGNIFICANT CHANGE UP (ref 70–99)
GLUCOSE SERPL-MCNC: 94 MG/DL — SIGNIFICANT CHANGE UP (ref 70–99)
GLUCOSE SERPL-MCNC: 97 MG/DL — SIGNIFICANT CHANGE UP (ref 70–99)
GLUCOSE SERPL-MCNC: 97 MG/DL — SIGNIFICANT CHANGE UP (ref 70–99)
GLUCOSE SERPL-MCNC: 98 MG/DL — SIGNIFICANT CHANGE UP (ref 70–99)
GLUCOSE SERPL-MCNC: 99 MG/DL — SIGNIFICANT CHANGE UP (ref 70–99)
GLUCOSE UR QL: NEGATIVE — SIGNIFICANT CHANGE UP
GLUCOSE UR QL: NEGATIVE — SIGNIFICANT CHANGE UP
HAV IGM SER-ACNC: SIGNIFICANT CHANGE UP
HAV IGM SER-ACNC: SIGNIFICANT CHANGE UP
HBV CORE AB SER-ACNC: REACTIVE
HBV CORE IGM SER-ACNC: REACTIVE
HBV CORE IGM SER-ACNC: REACTIVE
HBV DNA # SERPL NAA+PROBE: SIGNIFICANT CHANGE UP
HBV DNA SERPL NAA+PROBE-LOG#: SIGNIFICANT CHANGE UP LOGIU/ML
HBV E AB SER-ACNC: REACTIVE
HBV E AG SER-ACNC: SIGNIFICANT CHANGE UP
HBV SURFACE AB SER-ACNC: SIGNIFICANT CHANGE UP
HBV SURFACE AG SER-ACNC: SIGNIFICANT CHANGE UP
HBV SURFACE AG SER-ACNC: SIGNIFICANT CHANGE UP
HCO3 BLDV-SCNC: 15 MMOL/L — LOW (ref 22–29)
HCO3 BLDV-SCNC: 23 MMOL/L — SIGNIFICANT CHANGE UP (ref 22–29)
HCT VFR BLD CALC: 31.9 % — LOW (ref 39–50)
HCT VFR BLD CALC: 32 % — LOW (ref 39–50)
HCT VFR BLD CALC: 32.2 % — LOW (ref 39–50)
HCT VFR BLD CALC: 32.6 % — LOW (ref 39–50)
HCT VFR BLD CALC: 32.6 % — LOW (ref 39–50)
HCT VFR BLD CALC: 32.7 % — LOW (ref 39–50)
HCT VFR BLD CALC: 32.8 % — LOW (ref 39–50)
HCT VFR BLD CALC: 33.1 % — LOW (ref 39–50)
HCT VFR BLD CALC: 33.1 % — LOW (ref 39–50)
HCT VFR BLD CALC: 33.2 % — LOW (ref 39–50)
HCT VFR BLD CALC: 33.4 % — LOW (ref 39–50)
HCT VFR BLD CALC: 33.7 % — LOW (ref 39–50)
HCT VFR BLD CALC: 34.3 % — LOW (ref 39–50)
HCT VFR BLD CALC: 35 % — LOW (ref 39–50)
HCT VFR BLD CALC: 35.1 % — LOW (ref 39–50)
HCT VFR BLD CALC: 35.3 % — LOW (ref 39–50)
HCT VFR BLD CALC: 35.8 % — LOW (ref 39–50)
HCT VFR BLD CALC: 35.9 % — LOW (ref 39–50)
HCT VFR BLD CALC: 36 % — LOW (ref 39–50)
HCT VFR BLD CALC: 36.3 % — LOW (ref 39–50)
HCT VFR BLD CALC: 36.3 % — LOW (ref 39–50)
HCT VFR BLD CALC: 36.4 % — LOW (ref 39–50)
HCV AB S/CO SERPL IA: 8.63 S/CO — HIGH (ref 0–0.99)
HCV AB S/CO SERPL IA: 8.95 S/CO — HIGH (ref 0–0.99)
HCV AB SERPL-IMP: REACTIVE
HCV AB SERPL-IMP: REACTIVE
HCV GENTYP BLD NAA+PROBE: SIGNIFICANT CHANGE UP
HCV RNA FLD QL NAA+PROBE: SIGNIFICANT CHANGE UP
HCV RNA FLD QL NAA+PROBE: SIGNIFICANT CHANGE UP
HCV RNA SPEC NAA+PROBE-LOG IU: SIGNIFICANT CHANGE UP
HCV RNA SPEC NAA+PROBE-LOG IU: SIGNIFICANT CHANGE UP LOGIU/ML
HCV RNA SPEC QL PROBE+SIG AMP: SIGNIFICANT CHANGE UP
HCV RNA SPEC QL PROBE+SIG AMP: SIGNIFICANT CHANGE UP
HDLC SERPL-MCNC: 38 MG/DL — LOW
HGB BLD-MCNC: 10 G/DL — LOW (ref 13–17)
HGB BLD-MCNC: 10.1 G/DL — LOW (ref 13–17)
HGB BLD-MCNC: 10.2 G/DL — LOW (ref 13–17)
HGB BLD-MCNC: 10.3 G/DL — LOW (ref 13–17)
HGB BLD-MCNC: 10.3 G/DL — LOW (ref 13–17)
HGB BLD-MCNC: 10.4 G/DL — LOW (ref 13–17)
HGB BLD-MCNC: 10.5 G/DL — LOW (ref 13–17)
HGB BLD-MCNC: 10.5 G/DL — LOW (ref 13–17)
HGB BLD-MCNC: 10.6 G/DL — LOW (ref 13–17)
HGB BLD-MCNC: 10.8 G/DL — LOW (ref 13–17)
HGB BLD-MCNC: 11 G/DL — LOW (ref 13–17)
HGB BLD-MCNC: 11.2 G/DL — LOW (ref 13–17)
HGB BLD-MCNC: 11.5 G/DL — LOW (ref 13–17)
HGB BLD-MCNC: 11.6 G/DL — LOW (ref 13–17)
HGB BLD-MCNC: 11.6 G/DL — LOW (ref 13–17)
HGB BLD-MCNC: 11.7 G/DL — LOW (ref 13–17)
HIV 1+2 AB+HIV1 P24 AG SERPL QL IA: SIGNIFICANT CHANGE UP
HOROWITZ INDEX BLDV+IHG-RTO: 21 — SIGNIFICANT CHANGE UP
HOROWITZ INDEX BLDV+IHG-RTO: 21 — SIGNIFICANT CHANGE UP
HYPOCHROMIA BLD QL: SLIGHT — SIGNIFICANT CHANGE UP
IGA FLD-MCNC: 226 MG/DL — SIGNIFICANT CHANGE UP (ref 84–499)
IGA FLD-MCNC: 226 MG/DL — SIGNIFICANT CHANGE UP (ref 84–499)
IGG FLD-MCNC: 1633 MG/DL — SIGNIFICANT CHANGE UP (ref 610–1660)
IGG FLD-MCNC: 1723 MG/DL — HIGH (ref 610–1660)
IGM SERPL-MCNC: 121 MG/DL — SIGNIFICANT CHANGE UP (ref 35–242)
IGM SERPL-MCNC: 127 MG/DL — SIGNIFICANT CHANGE UP (ref 35–242)
IMM GRANULOCYTES NFR BLD AUTO: 0.8 % — SIGNIFICANT CHANGE UP (ref 0–1.5)
IMM GRANULOCYTES NFR BLD AUTO: 1.1 % — SIGNIFICANT CHANGE UP (ref 0–1.5)
IMM GRANULOCYTES NFR BLD AUTO: 7.7 % — HIGH (ref 0–1.5)
IMM GRANULOCYTES NFR BLD AUTO: 8 % — HIGH (ref 0–1.5)
INR BLD: 1.18 RATIO — HIGH (ref 0.88–1.16)
INR BLD: 1.18 RATIO — HIGH (ref 0.88–1.16)
INR BLD: 1.25 RATIO — HIGH (ref 0.88–1.16)
INR BLD: 1.27 RATIO — HIGH (ref 0.88–1.16)
INR BLD: 1.27 RATIO — HIGH (ref 0.88–1.16)
INR BLD: 1.32 RATIO — HIGH (ref 0.88–1.16)
INR BLD: 1.33 RATIO — HIGH (ref 0.88–1.16)
INR BLD: 1.46 RATIO — HIGH (ref 0.88–1.16)
INTERPRETATION SERPL IFE-IMP: SIGNIFICANT CHANGE UP
INTERPRETATION SERPL IFE-IMP: SIGNIFICANT CHANGE UP
KAPPA LC SER QL IFE: 10.4 MG/DL — HIGH (ref 0.33–1.94)
KAPPA LC SER QL IFE: 11.57 MG/DL — HIGH (ref 0.33–1.94)
KAPPA/LAMBDA FREE LIGHT CHAIN RATIO, SERUM: 1.37 RATIO — SIGNIFICANT CHANGE UP (ref 0.26–1.65)
KAPPA/LAMBDA FREE LIGHT CHAIN RATIO, SERUM: 1.41 RATIO — SIGNIFICANT CHANGE UP (ref 0.26–1.65)
KETONES UR-MCNC: NEGATIVE — SIGNIFICANT CHANGE UP
KETONES UR-MCNC: NEGATIVE — SIGNIFICANT CHANGE UP
LAMBDA LC SER QL IFE: 7.37 MG/DL — HIGH (ref 0.57–2.63)
LAMBDA LC SER QL IFE: 8.44 MG/DL — HIGH (ref 0.57–2.63)
LEUKOCYTE ESTERASE UR-ACNC: NEGATIVE — SIGNIFICANT CHANGE UP
LEUKOCYTE ESTERASE UR-ACNC: NEGATIVE — SIGNIFICANT CHANGE UP
LIDOCAIN IGE QN: 512 U/L — HIGH (ref 73–393)
LIPID PNL WITH DIRECT LDL SERPL: 69 MG/DL — SIGNIFICANT CHANGE UP
LKM AB SER-ACNC: <20.1 UNITS — SIGNIFICANT CHANGE UP (ref 0–20)
LYMPHOCYTES # BLD AUTO: 0.24 K/UL — LOW (ref 1–3.3)
LYMPHOCYTES # BLD AUTO: 0.4 K/UL — LOW (ref 1–3.3)
LYMPHOCYTES # BLD AUTO: 0.54 K/UL — LOW (ref 1–3.3)
LYMPHOCYTES # BLD AUTO: 0.58 K/UL — LOW (ref 1–3.3)
LYMPHOCYTES # BLD AUTO: 0.77 K/UL — LOW (ref 1–3.3)
LYMPHOCYTES # BLD AUTO: 2 % — LOW (ref 13–44)
LYMPHOCYTES # BLD AUTO: 2.7 % — LOW (ref 13–44)
LYMPHOCYTES # BLD AUTO: 5.2 % — LOW (ref 13–44)
LYMPHOCYTES # BLD AUTO: 6.1 % — LOW (ref 13–44)
LYMPHOCYTES # BLD AUTO: 6.8 % — LOW (ref 13–44)
MAGNESIUM SERPL-MCNC: 1.5 MG/DL — LOW (ref 1.6–2.6)
MAGNESIUM SERPL-MCNC: 1.6 MG/DL — SIGNIFICANT CHANGE UP (ref 1.6–2.6)
MAGNESIUM SERPL-MCNC: 1.8 MG/DL — SIGNIFICANT CHANGE UP (ref 1.6–2.6)
MAGNESIUM SERPL-MCNC: 2 MG/DL — SIGNIFICANT CHANGE UP (ref 1.6–2.6)
MAGNESIUM SERPL-MCNC: 2 MG/DL — SIGNIFICANT CHANGE UP (ref 1.6–2.6)
MAGNESIUM SERPL-MCNC: 2.1 MG/DL — SIGNIFICANT CHANGE UP (ref 1.6–2.6)
MAGNESIUM SERPL-MCNC: 2.2 MG/DL — SIGNIFICANT CHANGE UP (ref 1.6–2.6)
MAGNESIUM SERPL-MCNC: 2.3 MG/DL — SIGNIFICANT CHANGE UP (ref 1.6–2.6)
MAGNESIUM SERPL-MCNC: 2.4 MG/DL — SIGNIFICANT CHANGE UP (ref 1.6–2.6)
MAGNESIUM SERPL-MCNC: 2.4 MG/DL — SIGNIFICANT CHANGE UP (ref 1.6–2.6)
MAGNESIUM SERPL-MCNC: 2.5 MG/DL — SIGNIFICANT CHANGE UP (ref 1.6–2.6)
MANUAL SMEAR VERIFICATION: SIGNIFICANT CHANGE UP
MCHC RBC-ENTMCNC: 25.4 PG — LOW (ref 27–34)
MCHC RBC-ENTMCNC: 25.5 PG — LOW (ref 27–34)
MCHC RBC-ENTMCNC: 25.5 PG — LOW (ref 27–34)
MCHC RBC-ENTMCNC: 25.6 PG — LOW (ref 27–34)
MCHC RBC-ENTMCNC: 25.7 PG — LOW (ref 27–34)
MCHC RBC-ENTMCNC: 25.8 PG — LOW (ref 27–34)
MCHC RBC-ENTMCNC: 25.8 PG — LOW (ref 27–34)
MCHC RBC-ENTMCNC: 25.9 PG — LOW (ref 27–34)
MCHC RBC-ENTMCNC: 26 PG — LOW (ref 27–34)
MCHC RBC-ENTMCNC: 26.1 PG — LOW (ref 27–34)
MCHC RBC-ENTMCNC: 26.2 PG — LOW (ref 27–34)
MCHC RBC-ENTMCNC: 26.3 PG — LOW (ref 27–34)
MCHC RBC-ENTMCNC: 26.3 PG — LOW (ref 27–34)
MCHC RBC-ENTMCNC: 26.9 PG — LOW (ref 27–34)
MCHC RBC-ENTMCNC: 31.1 GM/DL — LOW (ref 32–36)
MCHC RBC-ENTMCNC: 31.3 GM/DL — LOW (ref 32–36)
MCHC RBC-ENTMCNC: 31.4 GM/DL — LOW (ref 32–36)
MCHC RBC-ENTMCNC: 31.4 GM/DL — LOW (ref 32–36)
MCHC RBC-ENTMCNC: 31.6 GM/DL — LOW (ref 32–36)
MCHC RBC-ENTMCNC: 31.6 GM/DL — LOW (ref 32–36)
MCHC RBC-ENTMCNC: 31.7 GM/DL — LOW (ref 32–36)
MCHC RBC-ENTMCNC: 31.7 GM/DL — LOW (ref 32–36)
MCHC RBC-ENTMCNC: 31.9 GM/DL — LOW (ref 32–36)
MCHC RBC-ENTMCNC: 32 GM/DL — SIGNIFICANT CHANGE UP (ref 32–36)
MCHC RBC-ENTMCNC: 32.1 GM/DL — SIGNIFICANT CHANGE UP (ref 32–36)
MCHC RBC-ENTMCNC: 32.1 GM/DL — SIGNIFICANT CHANGE UP (ref 32–36)
MCHC RBC-ENTMCNC: 32.4 GM/DL — SIGNIFICANT CHANGE UP (ref 32–36)
MCHC RBC-ENTMCNC: 32.5 GM/DL — SIGNIFICANT CHANGE UP (ref 32–36)
MCV RBC AUTO: 79.4 FL — LOW (ref 80–100)
MCV RBC AUTO: 80.3 FL — SIGNIFICANT CHANGE UP (ref 80–100)
MCV RBC AUTO: 80.7 FL — SIGNIFICANT CHANGE UP (ref 80–100)
MCV RBC AUTO: 80.9 FL — SIGNIFICANT CHANGE UP (ref 80–100)
MCV RBC AUTO: 81.2 FL — SIGNIFICANT CHANGE UP (ref 80–100)
MCV RBC AUTO: 81.3 FL — SIGNIFICANT CHANGE UP (ref 80–100)
MCV RBC AUTO: 81.4 FL — SIGNIFICANT CHANGE UP (ref 80–100)
MCV RBC AUTO: 81.5 FL — SIGNIFICANT CHANGE UP (ref 80–100)
MCV RBC AUTO: 81.6 FL — SIGNIFICANT CHANGE UP (ref 80–100)
MCV RBC AUTO: 81.7 FL — SIGNIFICANT CHANGE UP (ref 80–100)
MCV RBC AUTO: 82.2 FL — SIGNIFICANT CHANGE UP (ref 80–100)
MCV RBC AUTO: 82.2 FL — SIGNIFICANT CHANGE UP (ref 80–100)
MCV RBC AUTO: 82.3 FL — SIGNIFICANT CHANGE UP (ref 80–100)
MCV RBC AUTO: 82.3 FL — SIGNIFICANT CHANGE UP (ref 80–100)
MCV RBC AUTO: 82.4 FL — SIGNIFICANT CHANGE UP (ref 80–100)
MCV RBC AUTO: 82.5 FL — SIGNIFICANT CHANGE UP (ref 80–100)
MCV RBC AUTO: 82.6 FL — SIGNIFICANT CHANGE UP (ref 80–100)
MCV RBC AUTO: 82.9 FL — SIGNIFICANT CHANGE UP (ref 80–100)
MCV RBC AUTO: 83.8 FL — SIGNIFICANT CHANGE UP (ref 80–100)
MCV RBC AUTO: 83.9 FL — SIGNIFICANT CHANGE UP (ref 80–100)
MELD SCORE WITH DIALYSIS: 26 POINTS — SIGNIFICANT CHANGE UP
MELD SCORE WITHOUT DIALYSIS: 26 POINTS — SIGNIFICANT CHANGE UP
MONOCYTES # BLD AUTO: 0.35 K/UL — SIGNIFICANT CHANGE UP (ref 0–0.9)
MONOCYTES # BLD AUTO: 0.49 K/UL — SIGNIFICANT CHANGE UP (ref 0–0.9)
MONOCYTES # BLD AUTO: 0.6 K/UL — SIGNIFICANT CHANGE UP (ref 0–0.9)
MONOCYTES # BLD AUTO: 0.62 K/UL — SIGNIFICANT CHANGE UP (ref 0–0.9)
MONOCYTES # BLD AUTO: 0.73 K/UL — SIGNIFICANT CHANGE UP (ref 0–0.9)
MONOCYTES NFR BLD AUTO: 2.4 % — SIGNIFICANT CHANGE UP (ref 2–14)
MONOCYTES NFR BLD AUTO: 4 % — SIGNIFICANT CHANGE UP (ref 2–14)
MONOCYTES NFR BLD AUTO: 4 % — SIGNIFICANT CHANGE UP (ref 2–14)
MONOCYTES NFR BLD AUTO: 7.6 % — SIGNIFICANT CHANGE UP (ref 2–14)
MONOCYTES NFR BLD AUTO: 7.8 % — SIGNIFICANT CHANGE UP (ref 2–14)
MPO AB + PR3 PNL SER: SIGNIFICANT CHANGE UP
MRSA PCR RESULT.: DETECTED
MRSA PCR RESULT.: DETECTED
MYELOCYTES NFR BLD: 1 % — HIGH (ref 0–0)
NEUTROPHILS # BLD AUTO: 11.36 K/UL — HIGH (ref 1.8–7.4)
NEUTROPHILS # BLD AUTO: 12.23 K/UL — HIGH (ref 1.8–7.4)
NEUTROPHILS # BLD AUTO: 12.72 K/UL — HIGH (ref 1.8–7.4)
NEUTROPHILS # BLD AUTO: 6.51 K/UL — SIGNIFICANT CHANGE UP (ref 1.8–7.4)
NEUTROPHILS # BLD AUTO: 8 K/UL — HIGH (ref 1.8–7.4)
NEUTROPHILS NFR BLD AUTO: 81.5 % — HIGH (ref 43–77)
NEUTROPHILS NFR BLD AUTO: 81.9 % — HIGH (ref 43–77)
NEUTROPHILS NFR BLD AUTO: 83.5 % — HIGH (ref 43–77)
NEUTROPHILS NFR BLD AUTO: 86.3 % — HIGH (ref 43–77)
NEUTROPHILS NFR BLD AUTO: 91 % — HIGH (ref 43–77)
NEUTS BAND # BLD: 2 % — SIGNIFICANT CHANGE UP (ref 0–8)
NITRITE UR-MCNC: NEGATIVE — SIGNIFICANT CHANGE UP
NITRITE UR-MCNC: NEGATIVE — SIGNIFICANT CHANGE UP
NON HDL CHOLESTEROL: 91 MG/DL — SIGNIFICANT CHANGE UP
NRBC # BLD: 0 /100 WBCS — SIGNIFICANT CHANGE UP (ref 0–0)
NRBC # BLD: 0 /100 — SIGNIFICANT CHANGE UP (ref 0–0)
NRBC # BLD: 1 /100 WBCS — HIGH (ref 0–0)
NT-PROBNP SERPL-SCNC: 398 PG/ML — HIGH (ref 0–125)
P-ANCA SER-ACNC: NEGATIVE — SIGNIFICANT CHANGE UP
PCO2 BLDV: 35 MMHG — LOW (ref 42–55)
PCO2 BLDV: 36 MMHG — LOW (ref 42–55)
PH BLDV: 7.23 — LOW (ref 7.32–7.43)
PH BLDV: 7.42 — SIGNIFICANT CHANGE UP (ref 7.32–7.43)
PH UR: 5 — SIGNIFICANT CHANGE UP (ref 5–8)
PH UR: 6 — SIGNIFICANT CHANGE UP (ref 5–8)
PHOSPHATE SERPL-MCNC: 2.1 MG/DL — LOW (ref 2.5–4.5)
PHOSPHATE SERPL-MCNC: 3.8 MG/DL — SIGNIFICANT CHANGE UP (ref 2.5–4.5)
PHOSPHATE SERPL-MCNC: 4.1 MG/DL — SIGNIFICANT CHANGE UP (ref 2.5–4.5)
PHOSPHATE SERPL-MCNC: 5 MG/DL — HIGH (ref 2.5–4.5)
PHOSPHATE SERPL-MCNC: 5.2 MG/DL — HIGH (ref 2.5–4.5)
PHOSPHATE SERPL-MCNC: 5.3 MG/DL — HIGH (ref 2.5–4.5)
PHOSPHATE SERPL-MCNC: 5.4 MG/DL — HIGH (ref 2.5–4.5)
PHOSPHATE SERPL-MCNC: 5.4 MG/DL — HIGH (ref 2.5–4.5)
PHOSPHATE SERPL-MCNC: 6 MG/DL — HIGH (ref 2.5–4.5)
PHOSPHATE SERPL-MCNC: 6 MG/DL — HIGH (ref 2.5–4.5)
PLAT MORPH BLD: NORMAL — SIGNIFICANT CHANGE UP
PLATELET # BLD AUTO: 169 K/UL — SIGNIFICANT CHANGE UP (ref 150–400)
PLATELET # BLD AUTO: 178 K/UL — SIGNIFICANT CHANGE UP (ref 150–400)
PLATELET # BLD AUTO: 188 K/UL — SIGNIFICANT CHANGE UP (ref 150–400)
PLATELET # BLD AUTO: 194 K/UL — SIGNIFICANT CHANGE UP (ref 150–400)
PLATELET # BLD AUTO: 204 K/UL — SIGNIFICANT CHANGE UP (ref 150–400)
PLATELET # BLD AUTO: 204 K/UL — SIGNIFICANT CHANGE UP (ref 150–400)
PLATELET # BLD AUTO: 207 K/UL — SIGNIFICANT CHANGE UP (ref 150–400)
PLATELET # BLD AUTO: 223 K/UL — SIGNIFICANT CHANGE UP (ref 150–400)
PLATELET # BLD AUTO: 230 K/UL — SIGNIFICANT CHANGE UP (ref 150–400)
PLATELET # BLD AUTO: 242 K/UL — SIGNIFICANT CHANGE UP (ref 150–400)
PLATELET # BLD AUTO: 249 K/UL — SIGNIFICANT CHANGE UP (ref 150–400)
PLATELET # BLD AUTO: 267 K/UL — SIGNIFICANT CHANGE UP (ref 150–400)
PLATELET # BLD AUTO: 267 K/UL — SIGNIFICANT CHANGE UP (ref 150–400)
PLATELET # BLD AUTO: 269 K/UL — SIGNIFICANT CHANGE UP (ref 150–400)
PLATELET # BLD AUTO: 270 K/UL — SIGNIFICANT CHANGE UP (ref 150–400)
PLATELET # BLD AUTO: 271 K/UL — SIGNIFICANT CHANGE UP (ref 150–400)
PLATELET # BLD AUTO: 285 K/UL — SIGNIFICANT CHANGE UP (ref 150–400)
PLATELET # BLD AUTO: 285 K/UL — SIGNIFICANT CHANGE UP (ref 150–400)
PLATELET # BLD AUTO: 290 K/UL — SIGNIFICANT CHANGE UP (ref 150–400)
PLATELET # BLD AUTO: 299 K/UL — SIGNIFICANT CHANGE UP (ref 150–400)
PLATELET # BLD AUTO: 302 K/UL — SIGNIFICANT CHANGE UP (ref 150–400)
PLATELET # BLD AUTO: 303 K/UL — SIGNIFICANT CHANGE UP (ref 150–400)
PLATELET COUNT - ESTIMATE: NORMAL — SIGNIFICANT CHANGE UP
PO2 BLDV: 59 MMHG — SIGNIFICANT CHANGE UP
PO2 BLDV: 66 MMHG — SIGNIFICANT CHANGE UP
POIKILOCYTOSIS BLD QL AUTO: SLIGHT — SIGNIFICANT CHANGE UP
POTASSIUM SERPL-MCNC: 3.9 MMOL/L — SIGNIFICANT CHANGE UP (ref 3.5–5.3)
POTASSIUM SERPL-MCNC: 4.2 MMOL/L — SIGNIFICANT CHANGE UP (ref 3.5–5.3)
POTASSIUM SERPL-MCNC: 4.3 MMOL/L — SIGNIFICANT CHANGE UP (ref 3.5–5.3)
POTASSIUM SERPL-MCNC: 4.4 MMOL/L — SIGNIFICANT CHANGE UP (ref 3.5–5.3)
POTASSIUM SERPL-MCNC: 4.5 MMOL/L — SIGNIFICANT CHANGE UP (ref 3.5–5.3)
POTASSIUM SERPL-MCNC: 4.6 MMOL/L — SIGNIFICANT CHANGE UP (ref 3.5–5.3)
POTASSIUM SERPL-MCNC: 4.7 MMOL/L — SIGNIFICANT CHANGE UP (ref 3.5–5.3)
POTASSIUM SERPL-MCNC: 4.8 MMOL/L — SIGNIFICANT CHANGE UP (ref 3.5–5.3)
POTASSIUM SERPL-MCNC: 4.9 MMOL/L — SIGNIFICANT CHANGE UP (ref 3.5–5.3)
POTASSIUM SERPL-MCNC: 5 MMOL/L — SIGNIFICANT CHANGE UP (ref 3.5–5.3)
POTASSIUM SERPL-MCNC: 5 MMOL/L — SIGNIFICANT CHANGE UP (ref 3.5–5.3)
POTASSIUM SERPL-MCNC: 5.1 MMOL/L — SIGNIFICANT CHANGE UP (ref 3.5–5.3)
POTASSIUM SERPL-MCNC: 5.3 MMOL/L — SIGNIFICANT CHANGE UP (ref 3.5–5.3)
POTASSIUM SERPL-MCNC: 5.4 MMOL/L — HIGH (ref 3.5–5.3)
POTASSIUM SERPL-MCNC: 5.5 MMOL/L — HIGH (ref 3.5–5.3)
POTASSIUM SERPL-MCNC: 5.5 MMOL/L — HIGH (ref 3.5–5.3)
POTASSIUM SERPL-MCNC: 5.6 MMOL/L — HIGH (ref 3.5–5.3)
POTASSIUM SERPL-MCNC: 5.7 MMOL/L — HIGH (ref 3.5–5.3)
POTASSIUM SERPL-MCNC: 6.1 MMOL/L — HIGH (ref 3.5–5.3)
POTASSIUM SERPL-SCNC: 3.9 MMOL/L — SIGNIFICANT CHANGE UP (ref 3.5–5.3)
POTASSIUM SERPL-SCNC: 4.2 MMOL/L — SIGNIFICANT CHANGE UP (ref 3.5–5.3)
POTASSIUM SERPL-SCNC: 4.3 MMOL/L — SIGNIFICANT CHANGE UP (ref 3.5–5.3)
POTASSIUM SERPL-SCNC: 4.4 MMOL/L — SIGNIFICANT CHANGE UP (ref 3.5–5.3)
POTASSIUM SERPL-SCNC: 4.5 MMOL/L — SIGNIFICANT CHANGE UP (ref 3.5–5.3)
POTASSIUM SERPL-SCNC: 4.6 MMOL/L — SIGNIFICANT CHANGE UP (ref 3.5–5.3)
POTASSIUM SERPL-SCNC: 4.7 MMOL/L — SIGNIFICANT CHANGE UP (ref 3.5–5.3)
POTASSIUM SERPL-SCNC: 4.8 MMOL/L — SIGNIFICANT CHANGE UP (ref 3.5–5.3)
POTASSIUM SERPL-SCNC: 4.9 MMOL/L — SIGNIFICANT CHANGE UP (ref 3.5–5.3)
POTASSIUM SERPL-SCNC: 5 MMOL/L — SIGNIFICANT CHANGE UP (ref 3.5–5.3)
POTASSIUM SERPL-SCNC: 5 MMOL/L — SIGNIFICANT CHANGE UP (ref 3.5–5.3)
POTASSIUM SERPL-SCNC: 5.1 MMOL/L — SIGNIFICANT CHANGE UP (ref 3.5–5.3)
POTASSIUM SERPL-SCNC: 5.3 MMOL/L — SIGNIFICANT CHANGE UP (ref 3.5–5.3)
POTASSIUM SERPL-SCNC: 5.4 MMOL/L — HIGH (ref 3.5–5.3)
POTASSIUM SERPL-SCNC: 5.5 MMOL/L — HIGH (ref 3.5–5.3)
POTASSIUM SERPL-SCNC: 5.5 MMOL/L — HIGH (ref 3.5–5.3)
POTASSIUM SERPL-SCNC: 5.6 MMOL/L — HIGH (ref 3.5–5.3)
POTASSIUM SERPL-SCNC: 5.7 MMOL/L — HIGH (ref 3.5–5.3)
POTASSIUM SERPL-SCNC: 6.1 MMOL/L — HIGH (ref 3.5–5.3)
POTASSIUM UR-SCNC: 37 MMOL/L — SIGNIFICANT CHANGE UP
PROT ?TM UR-MCNC: 51 MG/DL — HIGH (ref 0–12)
PROT PATTERN SERPL ELPH-IMP: SIGNIFICANT CHANGE UP
PROT SERPL-MCNC: 7.7 G/DL — SIGNIFICANT CHANGE UP (ref 6–8.3)
PROT SERPL-MCNC: 7.8 G/DL — SIGNIFICANT CHANGE UP (ref 6–8.3)
PROT SERPL-MCNC: 7.8 G/DL — SIGNIFICANT CHANGE UP (ref 6–8.3)
PROT SERPL-MCNC: 7.9 G/DL — SIGNIFICANT CHANGE UP (ref 6–8.3)
PROT SERPL-MCNC: 7.9 G/DL — SIGNIFICANT CHANGE UP (ref 6–8.3)
PROT SERPL-MCNC: 8 G/DL — SIGNIFICANT CHANGE UP (ref 6–8.3)
PROT SERPL-MCNC: 8.1 G/DL — SIGNIFICANT CHANGE UP (ref 6–8.3)
PROT SERPL-MCNC: 8.2 G/DL — SIGNIFICANT CHANGE UP (ref 6–8.3)
PROT SERPL-MCNC: 8.5 G/DL — HIGH (ref 6–8.3)
PROT SERPL-MCNC: 8.8 G/DL — HIGH (ref 6–8.3)
PROT SERPL-MCNC: 8.8 G/DL — HIGH (ref 6–8.3)
PROT UR-MCNC: 30 MG/DL
PROT UR-MCNC: NEGATIVE — SIGNIFICANT CHANGE UP
PROTHROM AB SERPL-ACNC: 14.1 SEC — HIGH (ref 10.5–13.4)
PROTHROM AB SERPL-ACNC: 14.1 SEC — HIGH (ref 10.5–13.4)
PROTHROM AB SERPL-ACNC: 14.9 SEC — HIGH (ref 10.5–13.4)
PROTHROM AB SERPL-ACNC: 15.2 SEC — HIGH (ref 10.5–13.4)
PROTHROM AB SERPL-ACNC: 15.2 SEC — HIGH (ref 10.5–13.4)
PROTHROM AB SERPL-ACNC: 15.8 SEC — HIGH (ref 10.5–13.4)
PROTHROM AB SERPL-ACNC: 15.9 SEC — HIGH (ref 10.5–13.4)
PROTHROM AB SERPL-ACNC: 17.4 SEC — HIGH (ref 10.5–13.4)
PSA FLD-MCNC: <0.01 NG/ML — SIGNIFICANT CHANGE UP (ref 0–4)
PTH-INTACT FLD-MCNC: 41 PG/ML — SIGNIFICANT CHANGE UP (ref 15–65)
RBC # BLD: 3.87 M/UL — LOW (ref 4.2–5.8)
RBC # BLD: 3.9 M/UL — LOW (ref 4.2–5.8)
RBC # BLD: 3.95 M/UL — LOW (ref 4.2–5.8)
RBC # BLD: 3.99 M/UL — LOW (ref 4.2–5.8)
RBC # BLD: 4.02 M/UL — LOW (ref 4.2–5.8)
RBC # BLD: 4.02 M/UL — LOW (ref 4.2–5.8)
RBC # BLD: 4.03 M/UL — LOW (ref 4.2–5.8)
RBC # BLD: 4.04 M/UL — LOW (ref 4.2–5.8)
RBC # BLD: 4.04 M/UL — LOW (ref 4.2–5.8)
RBC # BLD: 4.07 M/UL — LOW (ref 4.2–5.8)
RBC # BLD: 4.09 M/UL — LOW (ref 4.2–5.8)
RBC # BLD: 4.09 M/UL — LOW (ref 4.2–5.8)
RBC # BLD: 4.22 M/UL — SIGNIFICANT CHANGE UP (ref 4.2–5.8)
RBC # BLD: 4.29 M/UL — SIGNIFICANT CHANGE UP (ref 4.2–5.8)
RBC # BLD: 4.3 M/UL — SIGNIFICANT CHANGE UP (ref 4.2–5.8)
RBC # BLD: 4.31 M/UL — SIGNIFICANT CHANGE UP (ref 4.2–5.8)
RBC # BLD: 4.32 M/UL — SIGNIFICANT CHANGE UP (ref 4.2–5.8)
RBC # BLD: 4.33 M/UL — SIGNIFICANT CHANGE UP (ref 4.2–5.8)
RBC # BLD: 4.41 M/UL — SIGNIFICANT CHANGE UP (ref 4.2–5.8)
RBC # BLD: 4.41 M/UL — SIGNIFICANT CHANGE UP (ref 4.2–5.8)
RBC # BLD: 4.46 M/UL — SIGNIFICANT CHANGE UP (ref 4.2–5.8)
RBC # BLD: 4.47 M/UL — SIGNIFICANT CHANGE UP (ref 4.2–5.8)
RBC # FLD: 17.4 % — HIGH (ref 10.3–14.5)
RBC # FLD: 17.5 % — HIGH (ref 10.3–14.5)
RBC # FLD: 17.6 % — HIGH (ref 10.3–14.5)
RBC # FLD: 17.7 % — HIGH (ref 10.3–14.5)
RBC # FLD: 18 % — HIGH (ref 10.3–14.5)
RBC # FLD: 18.1 % — HIGH (ref 10.3–14.5)
RBC # FLD: 18.1 % — HIGH (ref 10.3–14.5)
RBC # FLD: 18.2 % — HIGH (ref 10.3–14.5)
RBC # FLD: 18.3 % — HIGH (ref 10.3–14.5)
RBC # FLD: 18.5 % — HIGH (ref 10.3–14.5)
RBC # FLD: 18.7 % — HIGH (ref 10.3–14.5)
RBC # FLD: 18.9 % — HIGH (ref 10.3–14.5)
RBC # FLD: 19 % — HIGH (ref 10.3–14.5)
RBC # FLD: 19.5 % — HIGH (ref 10.3–14.5)
RBC BLD AUTO: ABNORMAL
RBC CASTS # UR COMP ASSIST: ABNORMAL /HPF (ref 0–2)
RBC CASTS # UR COMP ASSIST: SIGNIFICANT CHANGE UP /HPF (ref 0–2)
RHEUMATOID FACT SERPL-ACNC: 13 IU/ML — SIGNIFICANT CHANGE UP (ref 0–13)
S AUREUS DNA NOSE QL NAA+PROBE: DETECTED
S AUREUS DNA NOSE QL NAA+PROBE: DETECTED
SAO2 % BLDV: 87 % — SIGNIFICANT CHANGE UP
SAO2 % BLDV: 93.6 % — SIGNIFICANT CHANGE UP
SARS-COV-2 RNA SPEC QL NAA+PROBE: SIGNIFICANT CHANGE UP
SODIUM SERPL-SCNC: 127 MMOL/L — LOW (ref 135–145)
SODIUM SERPL-SCNC: 128 MMOL/L — LOW (ref 135–145)
SODIUM SERPL-SCNC: 130 MMOL/L — LOW (ref 135–145)
SODIUM SERPL-SCNC: 130 MMOL/L — LOW (ref 135–145)
SODIUM SERPL-SCNC: 131 MMOL/L — LOW (ref 135–145)
SODIUM SERPL-SCNC: 132 MMOL/L — LOW (ref 135–145)
SODIUM SERPL-SCNC: 133 MMOL/L — LOW (ref 135–145)
SODIUM SERPL-SCNC: 134 MMOL/L — LOW (ref 135–145)
SODIUM SERPL-SCNC: 135 MMOL/L — SIGNIFICANT CHANGE UP (ref 135–145)
SODIUM SERPL-SCNC: 137 MMOL/L — SIGNIFICANT CHANGE UP (ref 135–145)
SODIUM SERPL-SCNC: 139 MMOL/L — SIGNIFICANT CHANGE UP (ref 135–145)
SODIUM SERPL-SCNC: 139 MMOL/L — SIGNIFICANT CHANGE UP (ref 135–145)
SODIUM SERPL-SCNC: 142 MMOL/L — SIGNIFICANT CHANGE UP (ref 135–145)
SODIUM SERPL-SCNC: 142 MMOL/L — SIGNIFICANT CHANGE UP (ref 135–145)
SODIUM UR-SCNC: 47 MMOL/L — SIGNIFICANT CHANGE UP
SP GR SPEC: 1.01 — SIGNIFICANT CHANGE UP (ref 1.01–1.02)
SP GR SPEC: 1.01 — SIGNIFICANT CHANGE UP (ref 1.01–1.02)
SPECIMEN SOURCE: SIGNIFICANT CHANGE UP
TRIGL SERPL-MCNC: 110 MG/DL — SIGNIFICANT CHANGE UP
TROPONIN I, HIGH SENSITIVITY RESULT: 17.8 NG/L — SIGNIFICANT CHANGE UP
TSH SERPL-MCNC: 2.95 UU/ML — SIGNIFICANT CHANGE UP (ref 0.34–4.82)
UROBILINOGEN FLD QL: NEGATIVE — SIGNIFICANT CHANGE UP
UROBILINOGEN FLD QL: NEGATIVE — SIGNIFICANT CHANGE UP
UUN UR-MCNC: 484 MG/DL — SIGNIFICANT CHANGE UP
WBC # BLD: 10.49 K/UL — SIGNIFICANT CHANGE UP (ref 3.8–10.5)
WBC # BLD: 10.71 K/UL — HIGH (ref 3.8–10.5)
WBC # BLD: 11.44 K/UL — HIGH (ref 3.8–10.5)
WBC # BLD: 12.22 K/UL — HIGH (ref 3.8–10.5)
WBC # BLD: 12.87 K/UL — HIGH (ref 3.8–10.5)
WBC # BLD: 13.67 K/UL — HIGH (ref 3.8–10.5)
WBC # BLD: 14.74 K/UL — HIGH (ref 3.8–10.5)
WBC # BLD: 14.93 K/UL — HIGH (ref 3.8–10.5)
WBC # BLD: 7.62 K/UL — SIGNIFICANT CHANGE UP (ref 3.8–10.5)
WBC # BLD: 7.98 K/UL — SIGNIFICANT CHANGE UP (ref 3.8–10.5)
WBC # BLD: 7.99 K/UL — SIGNIFICANT CHANGE UP (ref 3.8–10.5)
WBC # BLD: 8.18 K/UL — SIGNIFICANT CHANGE UP (ref 3.8–10.5)
WBC # BLD: 8.32 K/UL — SIGNIFICANT CHANGE UP (ref 3.8–10.5)
WBC # BLD: 8.52 K/UL — SIGNIFICANT CHANGE UP (ref 3.8–10.5)
WBC # BLD: 8.71 K/UL — SIGNIFICANT CHANGE UP (ref 3.8–10.5)
WBC # BLD: 8.86 K/UL — SIGNIFICANT CHANGE UP (ref 3.8–10.5)
WBC # BLD: 8.88 K/UL — SIGNIFICANT CHANGE UP (ref 3.8–10.5)
WBC # BLD: 9.06 K/UL — SIGNIFICANT CHANGE UP (ref 3.8–10.5)
WBC # BLD: 9.2 K/UL — SIGNIFICANT CHANGE UP (ref 3.8–10.5)
WBC # BLD: 9.58 K/UL — SIGNIFICANT CHANGE UP (ref 3.8–10.5)
WBC # BLD: 9.59 K/UL — SIGNIFICANT CHANGE UP (ref 3.8–10.5)
WBC # BLD: 9.68 K/UL — SIGNIFICANT CHANGE UP (ref 3.8–10.5)
WBC # FLD AUTO: 10.49 K/UL — SIGNIFICANT CHANGE UP (ref 3.8–10.5)
WBC # FLD AUTO: 10.71 K/UL — HIGH (ref 3.8–10.5)
WBC # FLD AUTO: 11.44 K/UL — HIGH (ref 3.8–10.5)
WBC # FLD AUTO: 12.22 K/UL — HIGH (ref 3.8–10.5)
WBC # FLD AUTO: 12.87 K/UL — HIGH (ref 3.8–10.5)
WBC # FLD AUTO: 13.67 K/UL — HIGH (ref 3.8–10.5)
WBC # FLD AUTO: 14.74 K/UL — HIGH (ref 3.8–10.5)
WBC # FLD AUTO: 14.93 K/UL — HIGH (ref 3.8–10.5)
WBC # FLD AUTO: 7.62 K/UL — SIGNIFICANT CHANGE UP (ref 3.8–10.5)
WBC # FLD AUTO: 7.98 K/UL — SIGNIFICANT CHANGE UP (ref 3.8–10.5)
WBC # FLD AUTO: 7.99 K/UL — SIGNIFICANT CHANGE UP (ref 3.8–10.5)
WBC # FLD AUTO: 8.18 K/UL — SIGNIFICANT CHANGE UP (ref 3.8–10.5)
WBC # FLD AUTO: 8.32 K/UL — SIGNIFICANT CHANGE UP (ref 3.8–10.5)
WBC # FLD AUTO: 8.52 K/UL — SIGNIFICANT CHANGE UP (ref 3.8–10.5)
WBC # FLD AUTO: 8.71 K/UL — SIGNIFICANT CHANGE UP (ref 3.8–10.5)
WBC # FLD AUTO: 8.86 K/UL — SIGNIFICANT CHANGE UP (ref 3.8–10.5)
WBC # FLD AUTO: 8.88 K/UL — SIGNIFICANT CHANGE UP (ref 3.8–10.5)
WBC # FLD AUTO: 9.06 K/UL — SIGNIFICANT CHANGE UP (ref 3.8–10.5)
WBC # FLD AUTO: 9.2 K/UL — SIGNIFICANT CHANGE UP (ref 3.8–10.5)
WBC # FLD AUTO: 9.58 K/UL — SIGNIFICANT CHANGE UP (ref 3.8–10.5)
WBC # FLD AUTO: 9.59 K/UL — SIGNIFICANT CHANGE UP (ref 3.8–10.5)
WBC # FLD AUTO: 9.68 K/UL — SIGNIFICANT CHANGE UP (ref 3.8–10.5)
WBC UR QL: SIGNIFICANT CHANGE UP /HPF (ref 0–5)
WBC UR QL: SIGNIFICANT CHANGE UP /HPF (ref 0–5)

## 2022-01-01 PROCEDURE — G0103: CPT

## 2022-01-01 PROCEDURE — 84156 ASSAY OF PROTEIN URINE: CPT

## 2022-01-01 PROCEDURE — 77001 FLUOROGUIDE FOR VEIN DEVICE: CPT

## 2022-01-01 PROCEDURE — 74176 CT ABD & PELVIS W/O CONTRAST: CPT | Mod: 26

## 2022-01-01 PROCEDURE — 74018 RADEX ABDOMEN 1 VIEW: CPT | Mod: 26

## 2022-01-01 PROCEDURE — 99232 SBSQ HOSP IP/OBS MODERATE 35: CPT

## 2022-01-01 PROCEDURE — 77012 CT SCAN FOR NEEDLE BIOPSY: CPT | Mod: 26

## 2022-01-01 PROCEDURE — 97530 THERAPEUTIC ACTIVITIES: CPT

## 2022-01-01 PROCEDURE — 86706 HEP B SURFACE ANTIBODY: CPT

## 2022-01-01 PROCEDURE — 88307 TISSUE EXAM BY PATHOLOGIST: CPT

## 2022-01-01 PROCEDURE — 86255 FLUORESCENT ANTIBODY SCREEN: CPT

## 2022-01-01 PROCEDURE — 87045 FECES CULTURE AEROBIC BACT: CPT

## 2022-01-01 PROCEDURE — 87902 NFCT AGT GNTYP ALYS HEP C: CPT

## 2022-01-01 PROCEDURE — 77001 FLUOROGUIDE FOR VEIN DEVICE: CPT | Mod: 26

## 2022-01-01 PROCEDURE — 93010 ELECTROCARDIOGRAM REPORT: CPT | Mod: 76

## 2022-01-01 PROCEDURE — 83690 ASSAY OF LIPASE: CPT

## 2022-01-01 PROCEDURE — 74018 RADEX ABDOMEN 1 VIEW: CPT

## 2022-01-01 PROCEDURE — 71045 X-RAY EXAM CHEST 1 VIEW: CPT | Mod: 26

## 2022-01-01 PROCEDURE — 84100 ASSAY OF PHOSPHORUS: CPT

## 2022-01-01 PROCEDURE — 86036 ANCA SCREEN EACH ANTIBODY: CPT

## 2022-01-01 PROCEDURE — 93005 ELECTROCARDIOGRAM TRACING: CPT

## 2022-01-01 PROCEDURE — C1769: CPT

## 2022-01-01 PROCEDURE — 84165 PROTEIN E-PHORESIS SERUM: CPT

## 2022-01-01 PROCEDURE — C1750: CPT

## 2022-01-01 PROCEDURE — 87521 HEPATITIS C PROBE&RVRS TRNSC: CPT

## 2022-01-01 PROCEDURE — 99497 ADVNCD CARE PLAN 30 MIN: CPT

## 2022-01-01 PROCEDURE — 71250 CT THORAX DX C-: CPT | Mod: 26

## 2022-01-01 PROCEDURE — 83880 ASSAY OF NATRIURETIC PEPTIDE: CPT

## 2022-01-01 PROCEDURE — 78306 BONE IMAGING WHOLE BODY: CPT | Mod: 26

## 2022-01-01 PROCEDURE — 86692 HEPATITIS DELTA AGENT ANTBDY: CPT

## 2022-01-01 PROCEDURE — 99231 SBSQ HOSP IP/OBS SF/LOW 25: CPT

## 2022-01-01 PROCEDURE — 82803 BLOOD GASES ANY COMBINATION: CPT

## 2022-01-01 PROCEDURE — 80061 LIPID PANEL: CPT

## 2022-01-01 PROCEDURE — 86334 IMMUNOFIX E-PHORESIS SERUM: CPT

## 2022-01-01 PROCEDURE — 47000 NEEDLE BIOPSY OF LIVER PERQ: CPT

## 2022-01-01 PROCEDURE — 99233 SBSQ HOSP IP/OBS HIGH 50: CPT

## 2022-01-01 PROCEDURE — 86381 MITOCHONDRIAL ANTIBODY EACH: CPT

## 2022-01-01 PROCEDURE — 86304 IMMUNOASSAY TUMOR CA 125: CPT

## 2022-01-01 PROCEDURE — 99223 1ST HOSP IP/OBS HIGH 75: CPT

## 2022-01-01 PROCEDURE — 86160 COMPLEMENT ANTIGEN: CPT

## 2022-01-01 PROCEDURE — 82595 ASSAY OF CRYOGLOBULIN: CPT

## 2022-01-01 PROCEDURE — 99285 EMERGENCY DEPT VISIT HI MDM: CPT | Mod: 25

## 2022-01-01 PROCEDURE — 87635 SARS-COV-2 COVID-19 AMP PRB: CPT

## 2022-01-01 PROCEDURE — 83993 ASSAY FOR CALPROTECTIN FECAL: CPT

## 2022-01-01 PROCEDURE — 99285 EMERGENCY DEPT VISIT HI MDM: CPT

## 2022-01-01 PROCEDURE — 82784 ASSAY IGA/IGD/IGG/IGM EACH: CPT

## 2022-01-01 PROCEDURE — 71250 CT THORAX DX C-: CPT

## 2022-01-01 PROCEDURE — 76775 US EXAM ABDO BACK WALL LIM: CPT | Mod: 26

## 2022-01-01 PROCEDURE — 86900 BLOOD TYPING SEROLOGIC ABO: CPT

## 2022-01-01 PROCEDURE — 86376 MICROSOMAL ANTIBODY EACH: CPT

## 2022-01-01 PROCEDURE — 87389 HIV-1 AG W/HIV-1&-2 AB AG IA: CPT

## 2022-01-01 PROCEDURE — 87637 SARSCOV2&INF A&B&RSV AMP PRB: CPT

## 2022-01-01 PROCEDURE — 97116 GAIT TRAINING THERAPY: CPT

## 2022-01-01 PROCEDURE — 86790 VIRUS ANTIBODY NOS: CPT

## 2022-01-01 PROCEDURE — 86301 IMMUNOASSAY TUMOR CA 19-9: CPT

## 2022-01-01 PROCEDURE — 86704 HEP B CORE ANTIBODY TOTAL: CPT

## 2022-01-01 PROCEDURE — 99222 1ST HOSP IP/OBS MODERATE 55: CPT

## 2022-01-01 PROCEDURE — 86850 RBC ANTIBODY SCREEN: CPT

## 2022-01-01 PROCEDURE — 74181 MRI ABDOMEN W/O CONTRAST: CPT | Mod: 26

## 2022-01-01 PROCEDURE — 93010 ELECTROCARDIOGRAM REPORT: CPT

## 2022-01-01 PROCEDURE — 84484 ASSAY OF TROPONIN QUANT: CPT

## 2022-01-01 PROCEDURE — 80048 BASIC METABOLIC PNL TOTAL CA: CPT

## 2022-01-01 PROCEDURE — 82550 ASSAY OF CK (CPK): CPT

## 2022-01-01 PROCEDURE — 85027 COMPLETE CBC AUTOMATED: CPT

## 2022-01-01 PROCEDURE — 93306 TTE W/DOPPLER COMPLETE: CPT

## 2022-01-01 PROCEDURE — 82105 ALPHA-FETOPROTEIN SERUM: CPT

## 2022-01-01 PROCEDURE — 99233 SBSQ HOSP IP/OBS HIGH 50: CPT | Mod: GC

## 2022-01-01 PROCEDURE — 76937 US GUIDE VASCULAR ACCESS: CPT | Mod: 26

## 2022-01-01 PROCEDURE — 87517 HEPATITIS B DNA QUANT: CPT

## 2022-01-01 PROCEDURE — 99291 CRITICAL CARE FIRST HOUR: CPT

## 2022-01-01 PROCEDURE — 82103 ALPHA-1-ANTITRYPSIN TOTAL: CPT

## 2022-01-01 PROCEDURE — 71045 X-RAY EXAM CHEST 1 VIEW: CPT

## 2022-01-01 PROCEDURE — 94660 CPAP INITIATION&MGMT: CPT

## 2022-01-01 PROCEDURE — 84540 ASSAY OF URINE/UREA-N: CPT

## 2022-01-01 PROCEDURE — 77012 CT SCAN FOR NEEDLE BIOPSY: CPT

## 2022-01-01 PROCEDURE — 97162 PT EVAL MOD COMPLEX 30 MIN: CPT

## 2022-01-01 PROCEDURE — 82390 ASSAY OF CERULOPLASMIN: CPT

## 2022-01-01 PROCEDURE — 36556 INSERT NON-TUNNEL CV CATH: CPT

## 2022-01-01 PROCEDURE — 85610 PROTHROMBIN TIME: CPT

## 2022-01-01 PROCEDURE — 97110 THERAPEUTIC EXERCISES: CPT

## 2022-01-01 PROCEDURE — 85730 THROMBOPLASTIN TIME PARTIAL: CPT

## 2022-01-01 PROCEDURE — 76937 US GUIDE VASCULAR ACCESS: CPT

## 2022-01-01 PROCEDURE — 84133 ASSAY OF URINE POTASSIUM: CPT

## 2022-01-01 PROCEDURE — 84300 ASSAY OF URINE SODIUM: CPT

## 2022-01-01 PROCEDURE — 82570 ASSAY OF URINE CREATININE: CPT

## 2022-01-01 PROCEDURE — 36581 REPLACE TUNNELED CV CATH: CPT

## 2022-01-01 PROCEDURE — 87522 HEPATITIS C REVRS TRNSCRPJ: CPT

## 2022-01-01 PROCEDURE — 86225 DNA ANTIBODY NATIVE: CPT

## 2022-01-01 PROCEDURE — 86038 ANTINUCLEAR ANTIBODIES: CPT

## 2022-01-01 PROCEDURE — 80053 COMPREHEN METABOLIC PANEL: CPT

## 2022-01-01 PROCEDURE — 86901 BLOOD TYPING SEROLOGIC RH(D): CPT

## 2022-01-01 PROCEDURE — 87177 OVA AND PARASITES SMEARS: CPT

## 2022-01-01 PROCEDURE — 36415 COLL VENOUS BLD VENIPUNCTURE: CPT

## 2022-01-01 PROCEDURE — 74176 CT ABD & PELVIS W/O CONTRAST: CPT

## 2022-01-01 PROCEDURE — 87046 STOOL CULTR AEROBIC BACT EA: CPT

## 2022-01-01 PROCEDURE — 83970 ASSAY OF PARATHORMONE: CPT

## 2022-01-01 PROCEDURE — 83516 IMMUNOASSAY NONANTIBODY: CPT

## 2022-01-01 PROCEDURE — 82310 ASSAY OF CALCIUM: CPT

## 2022-01-01 PROCEDURE — P9047: CPT

## 2022-01-01 PROCEDURE — 83036 HEMOGLOBIN GLYCOSYLATED A1C: CPT

## 2022-01-01 PROCEDURE — C1752: CPT

## 2022-01-01 PROCEDURE — 87350 HEPATITIS BE AG IA: CPT

## 2022-01-01 PROCEDURE — 99261: CPT

## 2022-01-01 PROCEDURE — 84155 ASSAY OF PROTEIN SERUM: CPT

## 2022-01-01 PROCEDURE — 83735 ASSAY OF MAGNESIUM: CPT

## 2022-01-01 PROCEDURE — A9503: CPT

## 2022-01-01 PROCEDURE — 88307 TISSUE EXAM BY PATHOLOGIST: CPT | Mod: 26

## 2022-01-01 PROCEDURE — 84443 ASSAY THYROID STIM HORMONE: CPT

## 2022-01-01 PROCEDURE — 74181 MRI ABDOMEN W/O CONTRAST: CPT

## 2022-01-01 PROCEDURE — 81001 URINALYSIS AUTO W/SCOPE: CPT

## 2022-01-01 PROCEDURE — 82378 CARCINOEMBRYONIC ANTIGEN: CPT

## 2022-01-01 PROCEDURE — 87641 MR-STAPH DNA AMP PROBE: CPT

## 2022-01-01 PROCEDURE — 82248 BILIRUBIN DIRECT: CPT

## 2022-01-01 PROCEDURE — 78306 BONE IMAGING WHOLE BODY: CPT

## 2022-01-01 PROCEDURE — 82962 GLUCOSE BLOOD TEST: CPT

## 2022-01-01 PROCEDURE — 80074 ACUTE HEPATITIS PANEL: CPT

## 2022-01-01 PROCEDURE — 76775 US EXAM ABDO BACK WALL LIM: CPT

## 2022-01-01 PROCEDURE — 87640 STAPH A DNA AMP PROBE: CPT

## 2022-01-01 PROCEDURE — 85025 COMPLETE CBC W/AUTO DIFF WBC: CPT

## 2022-01-01 PROCEDURE — 86431 RHEUMATOID FACTOR QUANT: CPT

## 2022-01-01 PROCEDURE — 86707 HEPATITIS BE ANTIBODY: CPT

## 2022-01-01 RX ORDER — DIGOXIN 250 MCG
125 TABLET ORAL ONCE
Refills: 0 | Status: COMPLETED | OUTPATIENT
Start: 2022-01-01 | End: 2022-01-01

## 2022-01-01 RX ORDER — MUPIROCIN 20 MG/G
1 OINTMENT TOPICAL EVERY 12 HOURS
Refills: 0 | Status: COMPLETED | OUTPATIENT
Start: 2022-01-01 | End: 2022-01-01

## 2022-01-01 RX ORDER — TRAZODONE HCL 50 MG
25 TABLET ORAL AT BEDTIME
Refills: 0 | Status: DISCONTINUED | OUTPATIENT
Start: 2022-01-01 | End: 2022-01-01

## 2022-01-01 RX ORDER — LAMOTRIGINE 25 MG/1
200 TABLET, ORALLY DISINTEGRATING ORAL DAILY
Refills: 0 | Status: DISCONTINUED | OUTPATIENT
Start: 2022-01-01 | End: 2022-01-01

## 2022-01-01 RX ORDER — METOPROLOL TARTRATE 50 MG
25 TABLET ORAL
Refills: 0 | Status: DISCONTINUED | OUTPATIENT
Start: 2022-01-01 | End: 2022-01-01

## 2022-01-01 RX ORDER — SEVELAMER CARBONATE 2400 MG/1
800 POWDER, FOR SUSPENSION ORAL
Refills: 0 | Status: DISCONTINUED | OUTPATIENT
Start: 2022-01-01 | End: 2022-01-01

## 2022-01-01 RX ORDER — AMIODARONE HYDROCHLORIDE 400 MG/1
150 TABLET ORAL ONCE
Refills: 0 | Status: DISCONTINUED | OUTPATIENT
Start: 2022-01-01 | End: 2022-01-01

## 2022-01-01 RX ORDER — FUROSEMIDE 40 MG
40 TABLET ORAL
Refills: 0 | Status: DISCONTINUED | OUTPATIENT
Start: 2022-01-01 | End: 2022-01-01

## 2022-01-01 RX ORDER — GABAPENTIN 400 MG/1
1 CAPSULE ORAL
Qty: 0 | Refills: 0 | DISCHARGE

## 2022-01-01 RX ORDER — PANTOPRAZOLE SODIUM 20 MG/1
1 TABLET, DELAYED RELEASE ORAL
Qty: 0 | Refills: 0 | DISCHARGE

## 2022-01-01 RX ORDER — SODIUM BICARBONATE 1 MEQ/ML
650 SYRINGE (ML) INTRAVENOUS THREE TIMES A DAY
Refills: 0 | Status: DISCONTINUED | OUTPATIENT
Start: 2022-01-01 | End: 2022-01-01

## 2022-01-01 RX ORDER — SODIUM CHLORIDE 9 MG/ML
1000 INJECTION, SOLUTION INTRAVENOUS
Refills: 0 | Status: DISCONTINUED | OUTPATIENT
Start: 2022-01-01 | End: 2022-01-01

## 2022-01-01 RX ORDER — SODIUM CHLORIDE 9 MG/ML
1000 INJECTION INTRAMUSCULAR; INTRAVENOUS; SUBCUTANEOUS
Refills: 0 | Status: DISCONTINUED | OUTPATIENT
Start: 2022-01-01 | End: 2022-01-01

## 2022-01-01 RX ORDER — FENTANYL CITRATE 50 UG/ML
25 INJECTION INTRAVENOUS
Refills: 0 | Status: DISCONTINUED | OUTPATIENT
Start: 2022-01-01 | End: 2022-01-01

## 2022-01-01 RX ORDER — DILTIAZEM HCL 120 MG
30 CAPSULE, EXT RELEASE 24 HR ORAL
Refills: 0 | Status: DISCONTINUED | OUTPATIENT
Start: 2022-01-01 | End: 2022-01-01

## 2022-01-01 RX ORDER — LAMOTRIGINE 25 MG/1
1 TABLET, ORALLY DISINTEGRATING ORAL
Qty: 0 | Refills: 0 | DISCHARGE

## 2022-01-01 RX ORDER — ATORVASTATIN CALCIUM 80 MG/1
10 TABLET, FILM COATED ORAL AT BEDTIME
Refills: 0 | Status: DISCONTINUED | OUTPATIENT
Start: 2022-01-01 | End: 2022-01-01

## 2022-01-01 RX ORDER — GABAPENTIN 400 MG/1
100 CAPSULE ORAL THREE TIMES A DAY
Refills: 0 | Status: DISCONTINUED | OUTPATIENT
Start: 2022-01-01 | End: 2022-01-01

## 2022-01-01 RX ORDER — SODIUM ZIRCONIUM CYCLOSILICATE 10 G/10G
10 POWDER, FOR SUSPENSION ORAL ONCE
Refills: 0 | Status: COMPLETED | OUTPATIENT
Start: 2022-01-01 | End: 2022-01-01

## 2022-01-01 RX ORDER — DILTIAZEM HCL 120 MG
5 CAPSULE, EXT RELEASE 24 HR ORAL
Qty: 125 | Refills: 0 | Status: DISCONTINUED | OUTPATIENT
Start: 2022-01-01 | End: 2022-01-01

## 2022-01-01 RX ORDER — HEPARIN SODIUM 5000 [USP'U]/ML
4000 INJECTION INTRAVENOUS; SUBCUTANEOUS EVERY 6 HOURS
Refills: 0 | Status: DISCONTINUED | OUTPATIENT
Start: 2022-01-01 | End: 2022-01-01

## 2022-01-01 RX ORDER — ARIPIPRAZOLE 15 MG/1
15 TABLET ORAL DAILY
Refills: 0 | Status: DISCONTINUED | OUTPATIENT
Start: 2022-01-01 | End: 2022-01-01

## 2022-01-01 RX ORDER — DILTIAZEM HCL 120 MG
5 CAPSULE, EXT RELEASE 24 HR ORAL ONCE
Refills: 0 | Status: COMPLETED | OUTPATIENT
Start: 2022-01-01 | End: 2022-01-01

## 2022-01-01 RX ORDER — HEPARIN SODIUM 5000 [USP'U]/ML
INJECTION INTRAVENOUS; SUBCUTANEOUS
Qty: 25000 | Refills: 0 | Status: DISCONTINUED | OUTPATIENT
Start: 2022-01-01 | End: 2022-01-01

## 2022-01-01 RX ORDER — TAMSULOSIN HYDROCHLORIDE 0.4 MG/1
1 CAPSULE ORAL
Qty: 0 | Refills: 0 | DISCHARGE

## 2022-01-01 RX ORDER — HYDROMORPHONE HYDROCHLORIDE 2 MG/ML
1 INJECTION INTRAMUSCULAR; INTRAVENOUS; SUBCUTANEOUS ONCE
Refills: 0 | Status: DISCONTINUED | OUTPATIENT
Start: 2022-01-01 | End: 2022-01-01

## 2022-01-01 RX ORDER — AMIODARONE HYDROCHLORIDE 400 MG/1
0.5 TABLET ORAL
Qty: 900 | Refills: 0 | Status: DISCONTINUED | OUTPATIENT
Start: 2022-01-01 | End: 2022-01-01

## 2022-01-01 RX ORDER — CHLORHEXIDINE GLUCONATE 213 G/1000ML
1 SOLUTION TOPICAL DAILY
Refills: 0 | Status: DISCONTINUED | OUTPATIENT
Start: 2022-01-01 | End: 2022-01-01

## 2022-01-01 RX ORDER — PHENYLEPHRINE HYDROCHLORIDE 10 MG/ML
0.1 INJECTION INTRAVENOUS
Qty: 40 | Refills: 0 | Status: DISCONTINUED | OUTPATIENT
Start: 2022-01-01 | End: 2022-01-01

## 2022-01-01 RX ORDER — HALOBETASOL PROPIONATE 0.5 MG/G
1 OINTMENT TOPICAL
Qty: 0 | Refills: 0 | DISCHARGE

## 2022-01-01 RX ORDER — HYDROMORPHONE HYDROCHLORIDE 2 MG/ML
0.5 INJECTION INTRAMUSCULAR; INTRAVENOUS; SUBCUTANEOUS ONCE
Refills: 0 | Status: DISCONTINUED | OUTPATIENT
Start: 2022-01-01 | End: 2022-01-01

## 2022-01-01 RX ORDER — ONDANSETRON 8 MG/1
4 TABLET, FILM COATED ORAL ONCE
Refills: 0 | Status: DISCONTINUED | OUTPATIENT
Start: 2022-01-01 | End: 2022-01-01

## 2022-01-01 RX ORDER — FUROSEMIDE 40 MG
40 TABLET ORAL ONCE
Refills: 0 | Status: COMPLETED | OUTPATIENT
Start: 2022-01-01 | End: 2022-01-01

## 2022-01-01 RX ORDER — METOPROLOL TARTRATE 50 MG
2.5 TABLET ORAL ONCE
Refills: 0 | Status: COMPLETED | OUTPATIENT
Start: 2022-01-01 | End: 2022-01-01

## 2022-01-01 RX ORDER — ARIPIPRAZOLE 15 MG/1
1 TABLET ORAL
Qty: 0 | Refills: 0 | DISCHARGE

## 2022-01-01 RX ORDER — MIDODRINE HYDROCHLORIDE 2.5 MG/1
2.5 TABLET ORAL THREE TIMES A DAY
Refills: 0 | Status: DISCONTINUED | OUTPATIENT
Start: 2022-01-01 | End: 2022-01-01

## 2022-01-01 RX ORDER — AMIODARONE HYDROCHLORIDE 400 MG/1
1 TABLET ORAL
Qty: 900 | Refills: 0 | Status: DISCONTINUED | OUTPATIENT
Start: 2022-01-01 | End: 2022-01-01

## 2022-01-01 RX ORDER — ALBUMIN HUMAN 25 %
100 VIAL (ML) INTRAVENOUS ONCE
Refills: 0 | Status: COMPLETED | OUTPATIENT
Start: 2022-01-01 | End: 2022-01-01

## 2022-01-01 RX ORDER — ACETAMINOPHEN 500 MG
650 TABLET ORAL ONCE
Refills: 0 | Status: COMPLETED | OUTPATIENT
Start: 2022-01-01 | End: 2022-01-01

## 2022-01-01 RX ORDER — FUROSEMIDE 40 MG
40 TABLET ORAL DAILY
Refills: 0 | Status: DISCONTINUED | OUTPATIENT
Start: 2022-01-01 | End: 2022-01-01

## 2022-01-01 RX ORDER — SODIUM BICARBONATE 1 MEQ/ML
0.11 SYRINGE (ML) INTRAVENOUS
Qty: 150 | Refills: 0 | Status: DISCONTINUED | OUTPATIENT
Start: 2022-01-01 | End: 2022-01-01

## 2022-01-01 RX ORDER — APIXABAN 2.5 MG/1
1 TABLET, FILM COATED ORAL
Qty: 60 | Refills: 0
Start: 2022-01-01 | End: 2022-06-15

## 2022-01-01 RX ORDER — CALCITRIOL 0.5 UG/1
0.25 CAPSULE ORAL DAILY
Refills: 0 | Status: DISCONTINUED | OUTPATIENT
Start: 2022-01-01 | End: 2022-01-01

## 2022-01-01 RX ORDER — HEPARIN SODIUM 5000 [USP'U]/ML
5000 INJECTION INTRAVENOUS; SUBCUTANEOUS EVERY 8 HOURS
Refills: 0 | Status: DISCONTINUED | OUTPATIENT
Start: 2022-01-01 | End: 2022-01-01

## 2022-01-01 RX ORDER — CALCITRIOL 0.5 UG/1
1 CAPSULE ORAL
Qty: 0 | Refills: 0 | DISCHARGE

## 2022-01-01 RX ORDER — HEPARIN SODIUM 5000 [USP'U]/ML
8000 INJECTION INTRAVENOUS; SUBCUTANEOUS ONCE
Refills: 0 | Status: DISCONTINUED | OUTPATIENT
Start: 2022-01-01 | End: 2022-01-01

## 2022-01-01 RX ORDER — FUROSEMIDE 40 MG
1 TABLET ORAL
Qty: 0 | Refills: 0 | DISCHARGE

## 2022-01-01 RX ORDER — CALCIUM GLUCONATE 100 MG/ML
2 VIAL (ML) INTRAVENOUS ONCE
Refills: 0 | Status: COMPLETED | OUTPATIENT
Start: 2022-01-01 | End: 2022-01-01

## 2022-01-01 RX ORDER — MIDODRINE HYDROCHLORIDE 2.5 MG/1
10 TABLET ORAL THREE TIMES A DAY
Refills: 0 | Status: DISCONTINUED | OUTPATIENT
Start: 2022-01-01 | End: 2022-01-01

## 2022-01-01 RX ORDER — FERROUS SULFATE 325(65) MG
1 TABLET ORAL
Qty: 0 | Refills: 0 | DISCHARGE

## 2022-01-01 RX ORDER — HEPARIN SODIUM 5000 [USP'U]/ML
8000 INJECTION INTRAVENOUS; SUBCUTANEOUS ONCE
Refills: 0 | Status: COMPLETED | OUTPATIENT
Start: 2022-01-01 | End: 2022-01-01

## 2022-01-01 RX ORDER — POLYETHYLENE GLYCOL 3350 17 G/17G
17 POWDER, FOR SOLUTION ORAL DAILY
Refills: 0 | Status: DISCONTINUED | OUTPATIENT
Start: 2022-01-01 | End: 2022-01-01

## 2022-01-01 RX ORDER — ARIPIPRAZOLE 15 MG/1
1.5 TABLET ORAL
Qty: 0 | Refills: 0 | DISCHARGE

## 2022-01-01 RX ORDER — FERROUS SULFATE 325(65) MG
325 TABLET ORAL DAILY
Refills: 0 | Status: DISCONTINUED | OUTPATIENT
Start: 2022-01-01 | End: 2022-01-01

## 2022-01-01 RX ORDER — AMIODARONE HYDROCHLORIDE 400 MG/1
150 TABLET ORAL ONCE
Refills: 0 | Status: COMPLETED | OUTPATIENT
Start: 2022-01-01 | End: 2022-01-01

## 2022-01-01 RX ORDER — DIGOXIN 250 MCG
500 TABLET ORAL ONCE
Refills: 0 | Status: DISCONTINUED | OUTPATIENT
Start: 2022-01-01 | End: 2022-01-01

## 2022-01-01 RX ORDER — FINASTERIDE 5 MG/1
1 TABLET, FILM COATED ORAL
Qty: 0 | Refills: 0 | DISCHARGE

## 2022-01-01 RX ORDER — TRAMADOL HYDROCHLORIDE 50 MG/1
50 TABLET ORAL EVERY 6 HOURS
Refills: 0 | Status: DISCONTINUED | OUTPATIENT
Start: 2022-01-01 | End: 2022-01-01

## 2022-01-01 RX ORDER — MORPHINE SULFATE 50 MG/1
2 CAPSULE, EXTENDED RELEASE ORAL
Refills: 0 | Status: DISCONTINUED | OUTPATIENT
Start: 2022-01-01 | End: 2022-01-01

## 2022-01-01 RX ORDER — SENNA PLUS 8.6 MG/1
2 TABLET ORAL AT BEDTIME
Refills: 0 | Status: DISCONTINUED | OUTPATIENT
Start: 2022-01-01 | End: 2022-01-01

## 2022-01-01 RX ORDER — MIDODRINE HYDROCHLORIDE 2.5 MG/1
5 TABLET ORAL THREE TIMES A DAY
Refills: 0 | Status: DISCONTINUED | OUTPATIENT
Start: 2022-01-01 | End: 2022-01-01

## 2022-01-01 RX ORDER — MUPIROCIN 20 MG/G
1 OINTMENT TOPICAL EVERY 12 HOURS
Refills: 0 | Status: DISCONTINUED | OUTPATIENT
Start: 2022-01-01 | End: 2022-01-01

## 2022-01-01 RX ORDER — IOHEXOL 300 MG/ML
30 INJECTION, SOLUTION INTRAVENOUS ONCE
Refills: 0 | Status: COMPLETED | OUTPATIENT
Start: 2022-01-01 | End: 2022-01-01

## 2022-01-01 RX ORDER — HEPARIN SODIUM 5000 [USP'U]/ML
8000 INJECTION INTRAVENOUS; SUBCUTANEOUS EVERY 6 HOURS
Refills: 0 | Status: DISCONTINUED | OUTPATIENT
Start: 2022-01-01 | End: 2022-01-01

## 2022-01-01 RX ORDER — DIGOXIN 250 MCG
250 TABLET ORAL ONCE
Refills: 0 | Status: COMPLETED | OUTPATIENT
Start: 2022-01-01 | End: 2022-01-01

## 2022-01-01 RX ORDER — MORPHINE SULFATE 50 MG/1
2 CAPSULE, EXTENDED RELEASE ORAL ONCE
Refills: 0 | Status: DISCONTINUED | OUTPATIENT
Start: 2022-01-01 | End: 2022-01-01

## 2022-01-01 RX ORDER — METOPROLOL TARTRATE 50 MG
12.5 TABLET ORAL EVERY 8 HOURS
Refills: 0 | Status: DISCONTINUED | OUTPATIENT
Start: 2022-01-01 | End: 2022-01-01

## 2022-01-01 RX ORDER — FINASTERIDE 5 MG/1
5 TABLET, FILM COATED ORAL DAILY
Refills: 0 | Status: DISCONTINUED | OUTPATIENT
Start: 2022-01-01 | End: 2022-01-01

## 2022-01-01 RX ORDER — PANTOPRAZOLE SODIUM 20 MG/1
40 TABLET, DELAYED RELEASE ORAL
Refills: 0 | Status: DISCONTINUED | OUTPATIENT
Start: 2022-01-01 | End: 2022-01-01

## 2022-01-01 RX ORDER — TAMSULOSIN HYDROCHLORIDE 0.4 MG/1
0.4 CAPSULE ORAL AT BEDTIME
Refills: 0 | Status: DISCONTINUED | OUTPATIENT
Start: 2022-01-01 | End: 2022-01-01

## 2022-01-01 RX ORDER — GABAPENTIN 400 MG/1
600 CAPSULE ORAL
Refills: 0 | Status: DISCONTINUED | OUTPATIENT
Start: 2022-01-01 | End: 2022-01-01

## 2022-01-01 RX ORDER — DIGOXIN 250 MCG
500 TABLET ORAL ONCE
Refills: 0 | Status: COMPLETED | OUTPATIENT
Start: 2022-01-01 | End: 2022-01-01

## 2022-01-01 RX ORDER — DIGOXIN 250 MCG
250 TABLET ORAL ONCE
Refills: 0 | Status: DISCONTINUED | OUTPATIENT
Start: 2022-01-01 | End: 2022-01-01

## 2022-01-01 RX ORDER — DILTIAZEM HCL 120 MG
10 CAPSULE, EXT RELEASE 24 HR ORAL ONCE
Refills: 0 | Status: DISCONTINUED | OUTPATIENT
Start: 2022-01-01 | End: 2022-01-01

## 2022-01-01 RX ORDER — FUROSEMIDE 40 MG
60 TABLET ORAL ONCE
Refills: 0 | Status: COMPLETED | OUTPATIENT
Start: 2022-01-01 | End: 2022-01-01

## 2022-01-01 RX ORDER — TRAZODONE HCL 50 MG
0.5 TABLET ORAL
Qty: 0 | Refills: 0 | DISCHARGE

## 2022-01-01 RX ORDER — ATORVASTATIN CALCIUM 80 MG/1
1 TABLET, FILM COATED ORAL
Qty: 0 | Refills: 0 | DISCHARGE

## 2022-01-01 RX ORDER — HYDROMORPHONE HYDROCHLORIDE 2 MG/ML
0.5 INJECTION INTRAMUSCULAR; INTRAVENOUS; SUBCUTANEOUS EVERY 4 HOURS
Refills: 0 | Status: DISCONTINUED | OUTPATIENT
Start: 2022-01-01 | End: 2022-01-01

## 2022-01-01 RX ADMIN — LAMOTRIGINE 200 MILLIGRAM(S): 25 TABLET, ORALLY DISINTEGRATING ORAL at 12:41

## 2022-01-01 RX ADMIN — Medication 2.5 MILLIGRAM(S): at 12:56

## 2022-01-01 RX ADMIN — SEVELAMER CARBONATE 800 MILLIGRAM(S): 2400 POWDER, FOR SUSPENSION ORAL at 13:55

## 2022-01-01 RX ADMIN — GABAPENTIN 600 MILLIGRAM(S): 400 CAPSULE ORAL at 18:42

## 2022-01-01 RX ADMIN — CHLORHEXIDINE GLUCONATE 1 APPLICATION(S): 213 SOLUTION TOPICAL at 14:02

## 2022-01-01 RX ADMIN — SEVELAMER CARBONATE 800 MILLIGRAM(S): 2400 POWDER, FOR SUSPENSION ORAL at 08:52

## 2022-01-01 RX ADMIN — SEVELAMER CARBONATE 800 MILLIGRAM(S): 2400 POWDER, FOR SUSPENSION ORAL at 08:57

## 2022-01-01 RX ADMIN — ARIPIPRAZOLE 15 MILLIGRAM(S): 15 TABLET ORAL at 13:02

## 2022-01-01 RX ADMIN — GABAPENTIN 100 MILLIGRAM(S): 400 CAPSULE ORAL at 06:51

## 2022-01-01 RX ADMIN — Medication 25 MILLIGRAM(S): at 05:43

## 2022-01-01 RX ADMIN — ATORVASTATIN CALCIUM 10 MILLIGRAM(S): 80 TABLET, FILM COATED ORAL at 22:12

## 2022-01-01 RX ADMIN — PANTOPRAZOLE SODIUM 40 MILLIGRAM(S): 20 TABLET, DELAYED RELEASE ORAL at 06:10

## 2022-01-01 RX ADMIN — Medication 325 MILLIGRAM(S): at 12:36

## 2022-01-01 RX ADMIN — PANTOPRAZOLE SODIUM 40 MILLIGRAM(S): 20 TABLET, DELAYED RELEASE ORAL at 06:05

## 2022-01-01 RX ADMIN — FINASTERIDE 5 MILLIGRAM(S): 5 TABLET, FILM COATED ORAL at 12:28

## 2022-01-01 RX ADMIN — Medication 25 MILLIGRAM(S): at 22:12

## 2022-01-01 RX ADMIN — Medication 650 MILLIGRAM(S): at 05:32

## 2022-01-01 RX ADMIN — HEPARIN SODIUM 5000 UNIT(S): 5000 INJECTION INTRAVENOUS; SUBCUTANEOUS at 13:08

## 2022-01-01 RX ADMIN — SEVELAMER CARBONATE 800 MILLIGRAM(S): 2400 POWDER, FOR SUSPENSION ORAL at 15:04

## 2022-01-01 RX ADMIN — SODIUM ZIRCONIUM CYCLOSILICATE 10 GRAM(S): 10 POWDER, FOR SUSPENSION ORAL at 14:34

## 2022-01-01 RX ADMIN — MIDODRINE HYDROCHLORIDE 2.5 MILLIGRAM(S): 2.5 TABLET ORAL at 12:28

## 2022-01-01 RX ADMIN — PANTOPRAZOLE SODIUM 40 MILLIGRAM(S): 20 TABLET, DELAYED RELEASE ORAL at 07:01

## 2022-01-01 RX ADMIN — MIDODRINE HYDROCHLORIDE 2.5 MILLIGRAM(S): 2.5 TABLET ORAL at 17:30

## 2022-01-01 RX ADMIN — MIDODRINE HYDROCHLORIDE 2.5 MILLIGRAM(S): 2.5 TABLET ORAL at 13:20

## 2022-01-01 RX ADMIN — Medication 325 MILLIGRAM(S): at 13:19

## 2022-01-01 RX ADMIN — GABAPENTIN 600 MILLIGRAM(S): 400 CAPSULE ORAL at 18:23

## 2022-01-01 RX ADMIN — CHLORHEXIDINE GLUCONATE 1 APPLICATION(S): 213 SOLUTION TOPICAL at 12:12

## 2022-01-01 RX ADMIN — MIDODRINE HYDROCHLORIDE 2.5 MILLIGRAM(S): 2.5 TABLET ORAL at 17:07

## 2022-01-01 RX ADMIN — LAMOTRIGINE 200 MILLIGRAM(S): 25 TABLET, ORALLY DISINTEGRATING ORAL at 13:47

## 2022-01-01 RX ADMIN — MUPIROCIN 1 APPLICATION(S): 20 OINTMENT TOPICAL at 06:13

## 2022-01-01 RX ADMIN — ARIPIPRAZOLE 15 MILLIGRAM(S): 15 TABLET ORAL at 12:38

## 2022-01-01 RX ADMIN — Medication 25 MILLIGRAM(S): at 06:56

## 2022-01-01 RX ADMIN — HEPARIN SODIUM 5000 UNIT(S): 5000 INJECTION INTRAVENOUS; SUBCUTANEOUS at 21:49

## 2022-01-01 RX ADMIN — SEVELAMER CARBONATE 800 MILLIGRAM(S): 2400 POWDER, FOR SUSPENSION ORAL at 16:18

## 2022-01-01 RX ADMIN — HEPARIN SODIUM 1800 UNIT(S)/HR: 5000 INJECTION INTRAVENOUS; SUBCUTANEOUS at 19:26

## 2022-01-01 RX ADMIN — MIDODRINE HYDROCHLORIDE 2.5 MILLIGRAM(S): 2.5 TABLET ORAL at 11:21

## 2022-01-01 RX ADMIN — MIDODRINE HYDROCHLORIDE 5 MILLIGRAM(S): 2.5 TABLET ORAL at 06:50

## 2022-01-01 RX ADMIN — Medication 25 MILLIGRAM(S): at 21:51

## 2022-01-01 RX ADMIN — MIDODRINE HYDROCHLORIDE 5 MILLIGRAM(S): 2.5 TABLET ORAL at 06:43

## 2022-01-01 RX ADMIN — Medication 650 MILLIGRAM(S): at 23:31

## 2022-01-01 RX ADMIN — CHLORHEXIDINE GLUCONATE 1 APPLICATION(S): 213 SOLUTION TOPICAL at 13:20

## 2022-01-01 RX ADMIN — GABAPENTIN 100 MILLIGRAM(S): 400 CAPSULE ORAL at 22:11

## 2022-01-01 RX ADMIN — GABAPENTIN 600 MILLIGRAM(S): 400 CAPSULE ORAL at 17:38

## 2022-01-01 RX ADMIN — MUPIROCIN 1 APPLICATION(S): 20 OINTMENT TOPICAL at 19:25

## 2022-01-01 RX ADMIN — TAMSULOSIN HYDROCHLORIDE 0.4 MILLIGRAM(S): 0.4 CAPSULE ORAL at 22:11

## 2022-01-01 RX ADMIN — MIDODRINE HYDROCHLORIDE 2.5 MILLIGRAM(S): 2.5 TABLET ORAL at 13:44

## 2022-01-01 RX ADMIN — MUPIROCIN 1 APPLICATION(S): 20 OINTMENT TOPICAL at 04:44

## 2022-01-01 RX ADMIN — Medication 25 MILLIGRAM(S): at 22:32

## 2022-01-01 RX ADMIN — MIDODRINE HYDROCHLORIDE 2.5 MILLIGRAM(S): 2.5 TABLET ORAL at 18:22

## 2022-01-01 RX ADMIN — Medication 650 MILLIGRAM(S): at 21:50

## 2022-01-01 RX ADMIN — MIDODRINE HYDROCHLORIDE 5 MILLIGRAM(S): 2.5 TABLET ORAL at 17:48

## 2022-01-01 RX ADMIN — FINASTERIDE 5 MILLIGRAM(S): 5 TABLET, FILM COATED ORAL at 22:14

## 2022-01-01 RX ADMIN — Medication 650 MILLIGRAM(S): at 17:41

## 2022-01-01 RX ADMIN — SEVELAMER CARBONATE 800 MILLIGRAM(S): 2400 POWDER, FOR SUSPENSION ORAL at 21:48

## 2022-01-01 RX ADMIN — Medication 650 MILLIGRAM(S): at 06:34

## 2022-01-01 RX ADMIN — CALCITRIOL 0.25 MICROGRAM(S): 0.5 CAPSULE ORAL at 22:52

## 2022-01-01 RX ADMIN — Medication 325 MILLIGRAM(S): at 12:44

## 2022-01-01 RX ADMIN — AMIODARONE HYDROCHLORIDE 150 MILLIGRAM(S): 400 TABLET ORAL at 17:41

## 2022-01-01 RX ADMIN — SEVELAMER CARBONATE 800 MILLIGRAM(S): 2400 POWDER, FOR SUSPENSION ORAL at 19:41

## 2022-01-01 RX ADMIN — FINASTERIDE 5 MILLIGRAM(S): 5 TABLET, FILM COATED ORAL at 12:13

## 2022-01-01 RX ADMIN — MIDODRINE HYDROCHLORIDE 2.5 MILLIGRAM(S): 2.5 TABLET ORAL at 12:37

## 2022-01-01 RX ADMIN — Medication 60 MILLIGRAM(S): at 15:53

## 2022-01-01 RX ADMIN — SEVELAMER CARBONATE 800 MILLIGRAM(S): 2400 POWDER, FOR SUSPENSION ORAL at 13:20

## 2022-01-01 RX ADMIN — TAMSULOSIN HYDROCHLORIDE 0.4 MILLIGRAM(S): 0.4 CAPSULE ORAL at 21:24

## 2022-01-01 RX ADMIN — HEPARIN SODIUM 1800 UNIT(S)/HR: 5000 INJECTION INTRAVENOUS; SUBCUTANEOUS at 07:04

## 2022-01-01 RX ADMIN — Medication 25 MILLIGRAM(S): at 23:05

## 2022-01-01 RX ADMIN — MIDODRINE HYDROCHLORIDE 2.5 MILLIGRAM(S): 2.5 TABLET ORAL at 13:59

## 2022-01-01 RX ADMIN — HEPARIN SODIUM 5000 UNIT(S): 5000 INJECTION INTRAVENOUS; SUBCUTANEOUS at 05:17

## 2022-01-01 RX ADMIN — GABAPENTIN 100 MILLIGRAM(S): 400 CAPSULE ORAL at 21:18

## 2022-01-01 RX ADMIN — CHLORHEXIDINE GLUCONATE 1 APPLICATION(S): 213 SOLUTION TOPICAL at 12:36

## 2022-01-01 RX ADMIN — GABAPENTIN 600 MILLIGRAM(S): 400 CAPSULE ORAL at 05:17

## 2022-01-01 RX ADMIN — MORPHINE SULFATE 2 MILLIGRAM(S): 50 CAPSULE, EXTENDED RELEASE ORAL at 01:33

## 2022-01-01 RX ADMIN — Medication 650 MILLIGRAM(S): at 21:24

## 2022-01-01 RX ADMIN — Medication 325 MILLIGRAM(S): at 12:42

## 2022-01-01 RX ADMIN — TAMSULOSIN HYDROCHLORIDE 0.4 MILLIGRAM(S): 0.4 CAPSULE ORAL at 23:05

## 2022-01-01 RX ADMIN — MIDODRINE HYDROCHLORIDE 2.5 MILLIGRAM(S): 2.5 TABLET ORAL at 06:33

## 2022-01-01 RX ADMIN — TAMSULOSIN HYDROCHLORIDE 0.4 MILLIGRAM(S): 0.4 CAPSULE ORAL at 21:47

## 2022-01-01 RX ADMIN — GABAPENTIN 100 MILLIGRAM(S): 400 CAPSULE ORAL at 06:00

## 2022-01-01 RX ADMIN — MIDODRINE HYDROCHLORIDE 5 MILLIGRAM(S): 2.5 TABLET ORAL at 12:35

## 2022-01-01 RX ADMIN — HEPARIN SODIUM 1800 UNIT(S)/HR: 5000 INJECTION INTRAVENOUS; SUBCUTANEOUS at 15:25

## 2022-01-01 RX ADMIN — Medication 60 MILLIGRAM(S): at 21:40

## 2022-01-01 RX ADMIN — SEVELAMER CARBONATE 800 MILLIGRAM(S): 2400 POWDER, FOR SUSPENSION ORAL at 08:21

## 2022-01-01 RX ADMIN — Medication 5 MILLIGRAM(S): at 11:22

## 2022-01-01 RX ADMIN — TRAMADOL HYDROCHLORIDE 50 MILLIGRAM(S): 50 TABLET ORAL at 22:35

## 2022-01-01 RX ADMIN — HEPARIN SODIUM 4000 UNIT(S): 5000 INJECTION INTRAVENOUS; SUBCUTANEOUS at 23:33

## 2022-01-01 RX ADMIN — FINASTERIDE 5 MILLIGRAM(S): 5 TABLET, FILM COATED ORAL at 11:20

## 2022-01-01 RX ADMIN — MIDODRINE HYDROCHLORIDE 2.5 MILLIGRAM(S): 2.5 TABLET ORAL at 18:44

## 2022-01-01 RX ADMIN — Medication 2.5 MILLIGRAM(S): at 13:03

## 2022-01-01 RX ADMIN — Medication 25 MILLIGRAM(S): at 17:03

## 2022-01-01 RX ADMIN — SEVELAMER CARBONATE 800 MILLIGRAM(S): 2400 POWDER, FOR SUSPENSION ORAL at 12:33

## 2022-01-01 RX ADMIN — CHLORHEXIDINE GLUCONATE 1 APPLICATION(S): 213 SOLUTION TOPICAL at 12:16

## 2022-01-01 RX ADMIN — HEPARIN SODIUM 1800 UNIT(S)/HR: 5000 INJECTION INTRAVENOUS; SUBCUTANEOUS at 11:19

## 2022-01-01 RX ADMIN — Medication 25 MILLIGRAM(S): at 21:54

## 2022-01-01 RX ADMIN — Medication 650 MILLIGRAM(S): at 13:44

## 2022-01-01 RX ADMIN — Medication 650 MILLIGRAM(S): at 06:03

## 2022-01-01 RX ADMIN — SEVELAMER CARBONATE 800 MILLIGRAM(S): 2400 POWDER, FOR SUSPENSION ORAL at 09:46

## 2022-01-01 RX ADMIN — GABAPENTIN 600 MILLIGRAM(S): 400 CAPSULE ORAL at 19:42

## 2022-01-01 RX ADMIN — Medication 125 MICROGRAM(S): at 13:04

## 2022-01-01 RX ADMIN — FINASTERIDE 5 MILLIGRAM(S): 5 TABLET, FILM COATED ORAL at 12:11

## 2022-01-01 RX ADMIN — GABAPENTIN 100 MILLIGRAM(S): 400 CAPSULE ORAL at 22:10

## 2022-01-01 RX ADMIN — LAMOTRIGINE 200 MILLIGRAM(S): 25 TABLET, ORALLY DISINTEGRATING ORAL at 13:56

## 2022-01-01 RX ADMIN — SODIUM CHLORIDE 50 MILLILITER(S): 9 INJECTION INTRAMUSCULAR; INTRAVENOUS; SUBCUTANEOUS at 19:42

## 2022-01-01 RX ADMIN — Medication 325 MILLIGRAM(S): at 13:46

## 2022-01-01 RX ADMIN — PANTOPRAZOLE SODIUM 40 MILLIGRAM(S): 20 TABLET, DELAYED RELEASE ORAL at 05:54

## 2022-01-01 RX ADMIN — MIDODRINE HYDROCHLORIDE 2.5 MILLIGRAM(S): 2.5 TABLET ORAL at 18:21

## 2022-01-01 RX ADMIN — HEPARIN SODIUM 1800 UNIT(S)/HR: 5000 INJECTION INTRAVENOUS; SUBCUTANEOUS at 08:23

## 2022-01-01 RX ADMIN — Medication 650 MILLIGRAM(S): at 05:54

## 2022-01-01 RX ADMIN — ARIPIPRAZOLE 15 MILLIGRAM(S): 15 TABLET ORAL at 13:57

## 2022-01-01 RX ADMIN — GABAPENTIN 600 MILLIGRAM(S): 400 CAPSULE ORAL at 17:42

## 2022-01-01 RX ADMIN — GABAPENTIN 600 MILLIGRAM(S): 400 CAPSULE ORAL at 06:50

## 2022-01-01 RX ADMIN — Medication 125 MICROGRAM(S): at 06:03

## 2022-01-01 RX ADMIN — FINASTERIDE 5 MILLIGRAM(S): 5 TABLET, FILM COATED ORAL at 13:09

## 2022-01-01 RX ADMIN — Medication 650 MILLIGRAM(S): at 18:18

## 2022-01-01 RX ADMIN — Medication 40 MILLIGRAM(S): at 06:56

## 2022-01-01 RX ADMIN — SEVELAMER CARBONATE 800 MILLIGRAM(S): 2400 POWDER, FOR SUSPENSION ORAL at 13:44

## 2022-01-01 RX ADMIN — MIDODRINE HYDROCHLORIDE 10 MILLIGRAM(S): 2.5 TABLET ORAL at 06:02

## 2022-01-01 RX ADMIN — PANTOPRAZOLE SODIUM 40 MILLIGRAM(S): 20 TABLET, DELAYED RELEASE ORAL at 06:15

## 2022-01-01 RX ADMIN — MIDODRINE HYDROCHLORIDE 2.5 MILLIGRAM(S): 2.5 TABLET ORAL at 05:15

## 2022-01-01 RX ADMIN — ARIPIPRAZOLE 15 MILLIGRAM(S): 15 TABLET ORAL at 13:43

## 2022-01-01 RX ADMIN — Medication 5 MG/HR: at 21:17

## 2022-01-01 RX ADMIN — SEVELAMER CARBONATE 800 MILLIGRAM(S): 2400 POWDER, FOR SUSPENSION ORAL at 17:48

## 2022-01-01 RX ADMIN — SEVELAMER CARBONATE 800 MILLIGRAM(S): 2400 POWDER, FOR SUSPENSION ORAL at 12:37

## 2022-01-01 RX ADMIN — Medication 325 MILLIGRAM(S): at 13:55

## 2022-01-01 RX ADMIN — LAMOTRIGINE 200 MILLIGRAM(S): 25 TABLET, ORALLY DISINTEGRATING ORAL at 17:42

## 2022-01-01 RX ADMIN — FINASTERIDE 5 MILLIGRAM(S): 5 TABLET, FILM COATED ORAL at 13:19

## 2022-01-01 RX ADMIN — MIDODRINE HYDROCHLORIDE 2.5 MILLIGRAM(S): 2.5 TABLET ORAL at 12:35

## 2022-01-01 RX ADMIN — MIDODRINE HYDROCHLORIDE 2.5 MILLIGRAM(S): 2.5 TABLET ORAL at 12:42

## 2022-01-01 RX ADMIN — SEVELAMER CARBONATE 800 MILLIGRAM(S): 2400 POWDER, FOR SUSPENSION ORAL at 17:31

## 2022-01-01 RX ADMIN — MUPIROCIN 1 APPLICATION(S): 20 OINTMENT TOPICAL at 05:19

## 2022-01-01 RX ADMIN — HEPARIN SODIUM 1800 UNIT(S)/HR: 5000 INJECTION INTRAVENOUS; SUBCUTANEOUS at 16:14

## 2022-01-01 RX ADMIN — SEVELAMER CARBONATE 800 MILLIGRAM(S): 2400 POWDER, FOR SUSPENSION ORAL at 12:11

## 2022-01-01 RX ADMIN — HEPARIN SODIUM 2000 UNIT(S)/HR: 5000 INJECTION INTRAVENOUS; SUBCUTANEOUS at 23:27

## 2022-01-01 RX ADMIN — TRAMADOL HYDROCHLORIDE 50 MILLIGRAM(S): 50 TABLET ORAL at 23:40

## 2022-01-01 RX ADMIN — GABAPENTIN 100 MILLIGRAM(S): 400 CAPSULE ORAL at 05:43

## 2022-01-01 RX ADMIN — Medication 650 MILLIGRAM(S): at 15:10

## 2022-01-01 RX ADMIN — MIDODRINE HYDROCHLORIDE 10 MILLIGRAM(S): 2.5 TABLET ORAL at 17:50

## 2022-01-01 RX ADMIN — HEPARIN SODIUM 1800 UNIT(S)/HR: 5000 INJECTION INTRAVENOUS; SUBCUTANEOUS at 09:26

## 2022-01-01 RX ADMIN — TAMSULOSIN HYDROCHLORIDE 0.4 MILLIGRAM(S): 0.4 CAPSULE ORAL at 21:18

## 2022-01-01 RX ADMIN — SEVELAMER CARBONATE 800 MILLIGRAM(S): 2400 POWDER, FOR SUSPENSION ORAL at 18:56

## 2022-01-01 RX ADMIN — HEPARIN SODIUM 5000 UNIT(S): 5000 INJECTION INTRAVENOUS; SUBCUTANEOUS at 21:39

## 2022-01-01 RX ADMIN — MIDODRINE HYDROCHLORIDE 2.5 MILLIGRAM(S): 2.5 TABLET ORAL at 17:41

## 2022-01-01 RX ADMIN — HEPARIN SODIUM 5000 UNIT(S): 5000 INJECTION INTRAVENOUS; SUBCUTANEOUS at 18:37

## 2022-01-01 RX ADMIN — Medication 650 MILLIGRAM(S): at 22:53

## 2022-01-01 RX ADMIN — Medication 5 MILLIGRAM(S): at 11:51

## 2022-01-01 RX ADMIN — LAMOTRIGINE 200 MILLIGRAM(S): 25 TABLET, ORALLY DISINTEGRATING ORAL at 12:38

## 2022-01-01 RX ADMIN — HEPARIN SODIUM 5000 UNIT(S): 5000 INJECTION INTRAVENOUS; SUBCUTANEOUS at 15:09

## 2022-01-01 RX ADMIN — GABAPENTIN 600 MILLIGRAM(S): 400 CAPSULE ORAL at 18:21

## 2022-01-01 RX ADMIN — SEVELAMER CARBONATE 800 MILLIGRAM(S): 2400 POWDER, FOR SUSPENSION ORAL at 17:38

## 2022-01-01 RX ADMIN — Medication 650 MILLIGRAM(S): at 21:20

## 2022-01-01 RX ADMIN — GABAPENTIN 100 MILLIGRAM(S): 400 CAPSULE ORAL at 06:02

## 2022-01-01 RX ADMIN — MIDODRINE HYDROCHLORIDE 5 MILLIGRAM(S): 2.5 TABLET ORAL at 19:59

## 2022-01-01 RX ADMIN — MIDODRINE HYDROCHLORIDE 2.5 MILLIGRAM(S): 2.5 TABLET ORAL at 13:09

## 2022-01-01 RX ADMIN — MIDODRINE HYDROCHLORIDE 2.5 MILLIGRAM(S): 2.5 TABLET ORAL at 05:54

## 2022-01-01 RX ADMIN — POLYETHYLENE GLYCOL 3350 17 GRAM(S): 17 POWDER, FOR SOLUTION ORAL at 12:44

## 2022-01-01 RX ADMIN — HEPARIN SODIUM 5000 UNIT(S): 5000 INJECTION INTRAVENOUS; SUBCUTANEOUS at 05:32

## 2022-01-01 RX ADMIN — MIDODRINE HYDROCHLORIDE 10 MILLIGRAM(S): 2.5 TABLET ORAL at 15:53

## 2022-01-01 RX ADMIN — PANTOPRAZOLE SODIUM 40 MILLIGRAM(S): 20 TABLET, DELAYED RELEASE ORAL at 06:35

## 2022-01-01 RX ADMIN — Medication 325 MILLIGRAM(S): at 12:11

## 2022-01-01 RX ADMIN — TRAMADOL HYDROCHLORIDE 50 MILLIGRAM(S): 50 TABLET ORAL at 12:23

## 2022-01-01 RX ADMIN — SODIUM ZIRCONIUM CYCLOSILICATE 10 GRAM(S): 10 POWDER, FOR SUSPENSION ORAL at 18:18

## 2022-01-01 RX ADMIN — GABAPENTIN 600 MILLIGRAM(S): 400 CAPSULE ORAL at 05:53

## 2022-01-01 RX ADMIN — MORPHINE SULFATE 2 MILLIGRAM(S): 50 CAPSULE, EXTENDED RELEASE ORAL at 00:42

## 2022-01-01 RX ADMIN — GABAPENTIN 100 MILLIGRAM(S): 400 CAPSULE ORAL at 13:50

## 2022-01-01 RX ADMIN — GABAPENTIN 100 MILLIGRAM(S): 400 CAPSULE ORAL at 22:06

## 2022-01-01 RX ADMIN — POLYETHYLENE GLYCOL 3350 17 GRAM(S): 17 POWDER, FOR SOLUTION ORAL at 13:10

## 2022-01-01 RX ADMIN — MIDODRINE HYDROCHLORIDE 2.5 MILLIGRAM(S): 2.5 TABLET ORAL at 05:32

## 2022-01-01 RX ADMIN — HYDROMORPHONE HYDROCHLORIDE 1 MILLIGRAM(S): 2 INJECTION INTRAMUSCULAR; INTRAVENOUS; SUBCUTANEOUS at 13:18

## 2022-01-01 RX ADMIN — GABAPENTIN 100 MILLIGRAM(S): 400 CAPSULE ORAL at 21:52

## 2022-01-01 RX ADMIN — Medication 650 MILLIGRAM(S): at 06:11

## 2022-01-01 RX ADMIN — ARIPIPRAZOLE 15 MILLIGRAM(S): 15 TABLET ORAL at 11:20

## 2022-01-01 RX ADMIN — PANTOPRAZOLE SODIUM 40 MILLIGRAM(S): 20 TABLET, DELAYED RELEASE ORAL at 06:00

## 2022-01-01 RX ADMIN — MIDODRINE HYDROCHLORIDE 5 MILLIGRAM(S): 2.5 TABLET ORAL at 15:04

## 2022-01-01 RX ADMIN — LAMOTRIGINE 200 MILLIGRAM(S): 25 TABLET, ORALLY DISINTEGRATING ORAL at 16:17

## 2022-01-01 RX ADMIN — Medication 25 MILLIGRAM(S): at 20:27

## 2022-01-01 RX ADMIN — PANTOPRAZOLE SODIUM 40 MILLIGRAM(S): 20 TABLET, DELAYED RELEASE ORAL at 06:04

## 2022-01-01 RX ADMIN — Medication 40 MILLIGRAM(S): at 18:38

## 2022-01-01 RX ADMIN — MIDODRINE HYDROCHLORIDE 2.5 MILLIGRAM(S): 2.5 TABLET ORAL at 07:00

## 2022-01-01 RX ADMIN — SEVELAMER CARBONATE 800 MILLIGRAM(S): 2400 POWDER, FOR SUSPENSION ORAL at 18:21

## 2022-01-01 RX ADMIN — ARIPIPRAZOLE 15 MILLIGRAM(S): 15 TABLET ORAL at 12:13

## 2022-01-01 RX ADMIN — MIDODRINE HYDROCHLORIDE 2.5 MILLIGRAM(S): 2.5 TABLET ORAL at 05:19

## 2022-01-01 RX ADMIN — SEVELAMER CARBONATE 800 MILLIGRAM(S): 2400 POWDER, FOR SUSPENSION ORAL at 18:15

## 2022-01-01 RX ADMIN — PANTOPRAZOLE SODIUM 40 MILLIGRAM(S): 20 TABLET, DELAYED RELEASE ORAL at 06:43

## 2022-01-01 RX ADMIN — LAMOTRIGINE 200 MILLIGRAM(S): 25 TABLET, ORALLY DISINTEGRATING ORAL at 13:09

## 2022-01-01 RX ADMIN — ARIPIPRAZOLE 15 MILLIGRAM(S): 15 TABLET ORAL at 12:45

## 2022-01-01 RX ADMIN — ARIPIPRAZOLE 15 MILLIGRAM(S): 15 TABLET ORAL at 13:19

## 2022-01-01 RX ADMIN — PANTOPRAZOLE SODIUM 40 MILLIGRAM(S): 20 TABLET, DELAYED RELEASE ORAL at 06:51

## 2022-01-01 RX ADMIN — MIDODRINE HYDROCHLORIDE 5 MILLIGRAM(S): 2.5 TABLET ORAL at 21:49

## 2022-01-01 RX ADMIN — MIDODRINE HYDROCHLORIDE 10 MILLIGRAM(S): 2.5 TABLET ORAL at 06:00

## 2022-01-01 RX ADMIN — PANTOPRAZOLE SODIUM 40 MILLIGRAM(S): 20 TABLET, DELAYED RELEASE ORAL at 06:03

## 2022-01-01 RX ADMIN — MUPIROCIN 1 APPLICATION(S): 20 OINTMENT TOPICAL at 17:07

## 2022-01-01 RX ADMIN — Medication 25 MILLIGRAM(S): at 21:24

## 2022-01-01 RX ADMIN — FINASTERIDE 5 MILLIGRAM(S): 5 TABLET, FILM COATED ORAL at 12:37

## 2022-01-01 RX ADMIN — MUPIROCIN 1 APPLICATION(S): 20 OINTMENT TOPICAL at 05:18

## 2022-01-01 RX ADMIN — Medication 500 MICROGRAM(S): at 17:25

## 2022-01-01 RX ADMIN — Medication 200 GRAM(S): at 18:40

## 2022-01-01 RX ADMIN — Medication 650 MILLIGRAM(S): at 22:18

## 2022-01-01 RX ADMIN — SEVELAMER CARBONATE 800 MILLIGRAM(S): 2400 POWDER, FOR SUSPENSION ORAL at 08:55

## 2022-01-01 RX ADMIN — PANTOPRAZOLE SODIUM 40 MILLIGRAM(S): 20 TABLET, DELAYED RELEASE ORAL at 05:48

## 2022-01-01 RX ADMIN — SEVELAMER CARBONATE 800 MILLIGRAM(S): 2400 POWDER, FOR SUSPENSION ORAL at 13:45

## 2022-01-01 RX ADMIN — LAMOTRIGINE 200 MILLIGRAM(S): 25 TABLET, ORALLY DISINTEGRATING ORAL at 12:43

## 2022-01-01 RX ADMIN — MIDODRINE HYDROCHLORIDE 5 MILLIGRAM(S): 2.5 TABLET ORAL at 06:30

## 2022-01-01 RX ADMIN — GABAPENTIN 100 MILLIGRAM(S): 400 CAPSULE ORAL at 06:28

## 2022-01-01 RX ADMIN — ARIPIPRAZOLE 15 MILLIGRAM(S): 15 TABLET ORAL at 12:27

## 2022-01-01 RX ADMIN — TAMSULOSIN HYDROCHLORIDE 0.4 MILLIGRAM(S): 0.4 CAPSULE ORAL at 21:39

## 2022-01-01 RX ADMIN — TAMSULOSIN HYDROCHLORIDE 0.4 MILLIGRAM(S): 0.4 CAPSULE ORAL at 21:23

## 2022-01-01 RX ADMIN — HEPARIN SODIUM 5000 UNIT(S): 5000 INJECTION INTRAVENOUS; SUBCUTANEOUS at 21:23

## 2022-01-01 RX ADMIN — GABAPENTIN 600 MILLIGRAM(S): 400 CAPSULE ORAL at 18:33

## 2022-01-01 RX ADMIN — CHLORHEXIDINE GLUCONATE 1 APPLICATION(S): 213 SOLUTION TOPICAL at 11:21

## 2022-01-01 RX ADMIN — FINASTERIDE 5 MILLIGRAM(S): 5 TABLET, FILM COATED ORAL at 13:48

## 2022-01-01 RX ADMIN — MUPIROCIN 1 APPLICATION(S): 20 OINTMENT TOPICAL at 18:21

## 2022-01-01 RX ADMIN — Medication 650 MILLIGRAM(S): at 23:05

## 2022-01-01 RX ADMIN — TAMSULOSIN HYDROCHLORIDE 0.4 MILLIGRAM(S): 0.4 CAPSULE ORAL at 21:38

## 2022-01-01 RX ADMIN — TAMSULOSIN HYDROCHLORIDE 0.4 MILLIGRAM(S): 0.4 CAPSULE ORAL at 22:06

## 2022-01-01 RX ADMIN — PANTOPRAZOLE SODIUM 40 MILLIGRAM(S): 20 TABLET, DELAYED RELEASE ORAL at 06:31

## 2022-01-01 RX ADMIN — GABAPENTIN 100 MILLIGRAM(S): 400 CAPSULE ORAL at 06:43

## 2022-01-01 RX ADMIN — HEPARIN SODIUM 5000 UNIT(S): 5000 INJECTION INTRAVENOUS; SUBCUTANEOUS at 05:20

## 2022-01-01 RX ADMIN — Medication 650 MILLIGRAM(S): at 05:21

## 2022-01-01 RX ADMIN — MIDODRINE HYDROCHLORIDE 2.5 MILLIGRAM(S): 2.5 TABLET ORAL at 06:11

## 2022-01-01 RX ADMIN — MIDODRINE HYDROCHLORIDE 2.5 MILLIGRAM(S): 2.5 TABLET ORAL at 17:38

## 2022-01-01 RX ADMIN — TAMSULOSIN HYDROCHLORIDE 0.4 MILLIGRAM(S): 0.4 CAPSULE ORAL at 21:20

## 2022-01-01 RX ADMIN — Medication 650 MILLIGRAM(S): at 04:44

## 2022-01-01 RX ADMIN — Medication 70 MEQ/KG/HR: at 21:05

## 2022-01-01 RX ADMIN — Medication 25 MILLIGRAM(S): at 06:28

## 2022-01-01 RX ADMIN — FINASTERIDE 5 MILLIGRAM(S): 5 TABLET, FILM COATED ORAL at 12:42

## 2022-01-01 RX ADMIN — GABAPENTIN 600 MILLIGRAM(S): 400 CAPSULE ORAL at 07:01

## 2022-01-01 RX ADMIN — TAMSULOSIN HYDROCHLORIDE 0.4 MILLIGRAM(S): 0.4 CAPSULE ORAL at 22:34

## 2022-01-01 RX ADMIN — SEVELAMER CARBONATE 800 MILLIGRAM(S): 2400 POWDER, FOR SUSPENSION ORAL at 08:23

## 2022-01-01 RX ADMIN — Medication 25 MILLIGRAM(S): at 22:59

## 2022-01-01 RX ADMIN — Medication 40 MILLIGRAM(S): at 17:20

## 2022-01-01 RX ADMIN — TAMSULOSIN HYDROCHLORIDE 0.4 MILLIGRAM(S): 0.4 CAPSULE ORAL at 21:49

## 2022-01-01 RX ADMIN — GABAPENTIN 600 MILLIGRAM(S): 400 CAPSULE ORAL at 18:18

## 2022-01-01 RX ADMIN — ARIPIPRAZOLE 15 MILLIGRAM(S): 15 TABLET ORAL at 13:48

## 2022-01-01 RX ADMIN — SEVELAMER CARBONATE 800 MILLIGRAM(S): 2400 POWDER, FOR SUSPENSION ORAL at 08:33

## 2022-01-01 RX ADMIN — SEVELAMER CARBONATE 800 MILLIGRAM(S): 2400 POWDER, FOR SUSPENSION ORAL at 12:35

## 2022-01-01 RX ADMIN — Medication 325 MILLIGRAM(S): at 22:12

## 2022-01-01 RX ADMIN — Medication 125 MICROGRAM(S): at 19:09

## 2022-01-01 RX ADMIN — SEVELAMER CARBONATE 800 MILLIGRAM(S): 2400 POWDER, FOR SUSPENSION ORAL at 09:50

## 2022-01-01 RX ADMIN — SODIUM ZIRCONIUM CYCLOSILICATE 10 GRAM(S): 10 POWDER, FOR SUSPENSION ORAL at 13:50

## 2022-01-01 RX ADMIN — CHLORHEXIDINE GLUCONATE 1 APPLICATION(S): 213 SOLUTION TOPICAL at 18:28

## 2022-01-01 RX ADMIN — GABAPENTIN 100 MILLIGRAM(S): 400 CAPSULE ORAL at 05:19

## 2022-01-01 RX ADMIN — PANTOPRAZOLE SODIUM 40 MILLIGRAM(S): 20 TABLET, DELAYED RELEASE ORAL at 06:50

## 2022-01-01 RX ADMIN — HYDROMORPHONE HYDROCHLORIDE 0.5 MILLIGRAM(S): 2 INJECTION INTRAMUSCULAR; INTRAVENOUS; SUBCUTANEOUS at 20:53

## 2022-01-01 RX ADMIN — CHLORHEXIDINE GLUCONATE 1 APPLICATION(S): 213 SOLUTION TOPICAL at 13:51

## 2022-01-01 RX ADMIN — SEVELAMER CARBONATE 800 MILLIGRAM(S): 2400 POWDER, FOR SUSPENSION ORAL at 13:47

## 2022-01-01 RX ADMIN — HYDROMORPHONE HYDROCHLORIDE 0.5 MILLIGRAM(S): 2 INJECTION INTRAMUSCULAR; INTRAVENOUS; SUBCUTANEOUS at 20:29

## 2022-01-01 RX ADMIN — MIDODRINE HYDROCHLORIDE 2.5 MILLIGRAM(S): 2.5 TABLET ORAL at 05:17

## 2022-01-01 RX ADMIN — LAMOTRIGINE 200 MILLIGRAM(S): 25 TABLET, ORALLY DISINTEGRATING ORAL at 13:19

## 2022-01-01 RX ADMIN — Medication 325 MILLIGRAM(S): at 17:42

## 2022-01-01 RX ADMIN — Medication 650 MILLIGRAM(S): at 13:19

## 2022-01-01 RX ADMIN — GABAPENTIN 600 MILLIGRAM(S): 400 CAPSULE ORAL at 22:11

## 2022-01-01 RX ADMIN — FINASTERIDE 5 MILLIGRAM(S): 5 TABLET, FILM COATED ORAL at 12:44

## 2022-01-01 RX ADMIN — GABAPENTIN 600 MILLIGRAM(S): 400 CAPSULE ORAL at 06:11

## 2022-01-01 RX ADMIN — LAMOTRIGINE 200 MILLIGRAM(S): 25 TABLET, ORALLY DISINTEGRATING ORAL at 12:13

## 2022-01-01 RX ADMIN — SEVELAMER CARBONATE 800 MILLIGRAM(S): 2400 POWDER, FOR SUSPENSION ORAL at 10:33

## 2022-01-01 RX ADMIN — HEPARIN SODIUM 8000 UNIT(S): 5000 INJECTION INTRAVENOUS; SUBCUTANEOUS at 01:31

## 2022-01-01 RX ADMIN — MUPIROCIN 1 APPLICATION(S): 20 OINTMENT TOPICAL at 18:23

## 2022-01-01 RX ADMIN — SEVELAMER CARBONATE 800 MILLIGRAM(S): 2400 POWDER, FOR SUSPENSION ORAL at 12:29

## 2022-01-01 RX ADMIN — MIDODRINE HYDROCHLORIDE 2.5 MILLIGRAM(S): 2.5 TABLET ORAL at 17:20

## 2022-01-01 RX ADMIN — CHLORHEXIDINE GLUCONATE 1 APPLICATION(S): 213 SOLUTION TOPICAL at 14:09

## 2022-01-01 RX ADMIN — HEPARIN SODIUM 1800 UNIT(S)/HR: 5000 INJECTION INTRAVENOUS; SUBCUTANEOUS at 19:16

## 2022-01-01 RX ADMIN — GABAPENTIN 600 MILLIGRAM(S): 400 CAPSULE ORAL at 06:33

## 2022-01-01 RX ADMIN — HEPARIN SODIUM 5000 UNIT(S): 5000 INJECTION INTRAVENOUS; SUBCUTANEOUS at 21:40

## 2022-01-01 RX ADMIN — Medication 25 MILLIGRAM(S): at 21:40

## 2022-01-01 RX ADMIN — HEPARIN SODIUM 5000 UNIT(S): 5000 INJECTION INTRAVENOUS; SUBCUTANEOUS at 14:27

## 2022-01-01 RX ADMIN — SEVELAMER CARBONATE 800 MILLIGRAM(S): 2400 POWDER, FOR SUSPENSION ORAL at 08:40

## 2022-01-01 RX ADMIN — Medication 25 MILLIGRAM(S): at 21:23

## 2022-01-01 RX ADMIN — TAMSULOSIN HYDROCHLORIDE 0.4 MILLIGRAM(S): 0.4 CAPSULE ORAL at 21:53

## 2022-01-01 RX ADMIN — HEPARIN SODIUM 1800 UNIT(S)/HR: 5000 INJECTION INTRAVENOUS; SUBCUTANEOUS at 17:06

## 2022-01-01 RX ADMIN — SODIUM CHLORIDE 75 MILLILITER(S): 9 INJECTION, SOLUTION INTRAVENOUS at 18:57

## 2022-01-01 RX ADMIN — HEPARIN SODIUM 1800 UNIT(S)/HR: 5000 INJECTION INTRAVENOUS; SUBCUTANEOUS at 07:35

## 2022-01-01 RX ADMIN — SEVELAMER CARBONATE 800 MILLIGRAM(S): 2400 POWDER, FOR SUSPENSION ORAL at 18:30

## 2022-01-01 RX ADMIN — Medication 650 MILLIGRAM(S): at 13:08

## 2022-01-01 RX ADMIN — FINASTERIDE 5 MILLIGRAM(S): 5 TABLET, FILM COATED ORAL at 13:46

## 2022-01-01 RX ADMIN — SEVELAMER CARBONATE 800 MILLIGRAM(S): 2400 POWDER, FOR SUSPENSION ORAL at 17:03

## 2022-01-01 RX ADMIN — Medication 325 MILLIGRAM(S): at 13:09

## 2022-01-01 RX ADMIN — GABAPENTIN 600 MILLIGRAM(S): 400 CAPSULE ORAL at 18:44

## 2022-01-01 RX ADMIN — SEVELAMER CARBONATE 800 MILLIGRAM(S): 2400 POWDER, FOR SUSPENSION ORAL at 08:59

## 2022-01-01 RX ADMIN — GABAPENTIN 100 MILLIGRAM(S): 400 CAPSULE ORAL at 21:48

## 2022-01-01 RX ADMIN — Medication 650 MILLIGRAM(S): at 21:40

## 2022-01-01 RX ADMIN — PANTOPRAZOLE SODIUM 40 MILLIGRAM(S): 20 TABLET, DELAYED RELEASE ORAL at 05:19

## 2022-01-01 RX ADMIN — MIDODRINE HYDROCHLORIDE 2.5 MILLIGRAM(S): 2.5 TABLET ORAL at 05:21

## 2022-01-01 RX ADMIN — Medication 25 MILLIGRAM(S): at 22:11

## 2022-01-01 RX ADMIN — ARIPIPRAZOLE 15 MILLIGRAM(S): 15 TABLET ORAL at 13:09

## 2022-01-01 RX ADMIN — ARIPIPRAZOLE 15 MILLIGRAM(S): 15 TABLET ORAL at 22:52

## 2022-01-01 RX ADMIN — SEVELAMER CARBONATE 800 MILLIGRAM(S): 2400 POWDER, FOR SUSPENSION ORAL at 17:07

## 2022-01-01 RX ADMIN — Medication 25 MILLIGRAM(S): at 22:05

## 2022-01-01 RX ADMIN — MIDODRINE HYDROCHLORIDE 2.5 MILLIGRAM(S): 2.5 TABLET ORAL at 22:12

## 2022-01-01 RX ADMIN — MIDODRINE HYDROCHLORIDE 2.5 MILLIGRAM(S): 2.5 TABLET ORAL at 04:44

## 2022-01-01 RX ADMIN — TAMSULOSIN HYDROCHLORIDE 0.4 MILLIGRAM(S): 0.4 CAPSULE ORAL at 23:31

## 2022-01-01 RX ADMIN — Medication 25 MILLIGRAM(S): at 21:47

## 2022-01-01 RX ADMIN — Medication 325 MILLIGRAM(S): at 13:48

## 2022-01-01 RX ADMIN — SEVELAMER CARBONATE 800 MILLIGRAM(S): 2400 POWDER, FOR SUSPENSION ORAL at 18:19

## 2022-01-01 RX ADMIN — Medication 650 MILLIGRAM(S): at 13:45

## 2022-01-01 RX ADMIN — GABAPENTIN 100 MILLIGRAM(S): 400 CAPSULE ORAL at 21:41

## 2022-01-01 RX ADMIN — Medication 25 MILLIGRAM(S): at 22:40

## 2022-01-01 RX ADMIN — Medication 650 MILLIGRAM(S): at 06:49

## 2022-01-01 RX ADMIN — Medication 125 MICROGRAM(S): at 06:38

## 2022-01-01 RX ADMIN — LAMOTRIGINE 200 MILLIGRAM(S): 25 TABLET, ORALLY DISINTEGRATING ORAL at 12:27

## 2022-01-01 RX ADMIN — SEVELAMER CARBONATE 800 MILLIGRAM(S): 2400 POWDER, FOR SUSPENSION ORAL at 17:25

## 2022-01-01 RX ADMIN — TAMSULOSIN HYDROCHLORIDE 0.4 MILLIGRAM(S): 0.4 CAPSULE ORAL at 22:42

## 2022-01-01 RX ADMIN — Medication 40 MILLIGRAM(S): at 05:15

## 2022-01-01 RX ADMIN — Medication 650 MILLIGRAM(S): at 21:23

## 2022-01-01 RX ADMIN — Medication 325 MILLIGRAM(S): at 12:38

## 2022-01-01 RX ADMIN — GABAPENTIN 100 MILLIGRAM(S): 400 CAPSULE ORAL at 15:03

## 2022-01-01 RX ADMIN — SEVELAMER CARBONATE 800 MILLIGRAM(S): 2400 POWDER, FOR SUSPENSION ORAL at 12:43

## 2022-01-01 RX ADMIN — ARIPIPRAZOLE 15 MILLIGRAM(S): 15 TABLET ORAL at 12:34

## 2022-01-01 RX ADMIN — PANTOPRAZOLE SODIUM 40 MILLIGRAM(S): 20 TABLET, DELAYED RELEASE ORAL at 22:11

## 2022-01-01 RX ADMIN — Medication 325 MILLIGRAM(S): at 12:35

## 2022-01-01 RX ADMIN — Medication 650 MILLIGRAM(S): at 05:15

## 2022-01-01 RX ADMIN — Medication 650 MILLIGRAM(S): at 18:38

## 2022-01-01 RX ADMIN — GABAPENTIN 600 MILLIGRAM(S): 400 CAPSULE ORAL at 17:07

## 2022-01-01 RX ADMIN — FINASTERIDE 5 MILLIGRAM(S): 5 TABLET, FILM COATED ORAL at 13:44

## 2022-01-01 RX ADMIN — TAMSULOSIN HYDROCHLORIDE 0.4 MILLIGRAM(S): 0.4 CAPSULE ORAL at 21:40

## 2022-01-01 RX ADMIN — Medication 25 MILLIGRAM(S): at 21:39

## 2022-01-01 RX ADMIN — Medication 40 MILLIGRAM(S): at 05:35

## 2022-01-01 RX ADMIN — MIDODRINE HYDROCHLORIDE 10 MILLIGRAM(S): 2.5 TABLET ORAL at 13:35

## 2022-01-01 RX ADMIN — Medication 25 MILLIGRAM(S): at 21:17

## 2022-01-01 RX ADMIN — LAMOTRIGINE 200 MILLIGRAM(S): 25 TABLET, ORALLY DISINTEGRATING ORAL at 13:45

## 2022-01-01 RX ADMIN — MIDODRINE HYDROCHLORIDE 2.5 MILLIGRAM(S): 2.5 TABLET ORAL at 17:58

## 2022-01-01 RX ADMIN — GABAPENTIN 100 MILLIGRAM(S): 400 CAPSULE ORAL at 13:03

## 2022-01-01 RX ADMIN — MIDODRINE HYDROCHLORIDE 2.5 MILLIGRAM(S): 2.5 TABLET ORAL at 18:37

## 2022-01-01 RX ADMIN — HYDROMORPHONE HYDROCHLORIDE 0.5 MILLIGRAM(S): 2 INJECTION INTRAMUSCULAR; INTRAVENOUS; SUBCUTANEOUS at 02:01

## 2022-01-01 RX ADMIN — HEPARIN SODIUM 5000 UNIT(S): 5000 INJECTION INTRAVENOUS; SUBCUTANEOUS at 22:12

## 2022-01-01 RX ADMIN — PANTOPRAZOLE SODIUM 40 MILLIGRAM(S): 20 TABLET, DELAYED RELEASE ORAL at 13:59

## 2022-01-01 RX ADMIN — HYDROMORPHONE HYDROCHLORIDE 0.5 MILLIGRAM(S): 2 INJECTION INTRAMUSCULAR; INTRAVENOUS; SUBCUTANEOUS at 02:41

## 2022-01-01 RX ADMIN — SEVELAMER CARBONATE 800 MILLIGRAM(S): 2400 POWDER, FOR SUSPENSION ORAL at 18:23

## 2022-01-01 RX ADMIN — GABAPENTIN 600 MILLIGRAM(S): 400 CAPSULE ORAL at 05:32

## 2022-01-01 RX ADMIN — HEPARIN SODIUM 1800 UNIT(S)/HR: 5000 INJECTION INTRAVENOUS; SUBCUTANEOUS at 19:13

## 2022-01-01 RX ADMIN — Medication 650 MILLIGRAM(S): at 21:54

## 2022-01-01 RX ADMIN — PHENYLEPHRINE HYDROCHLORIDE 3.68 MICROGRAM(S)/KG/MIN: 10 INJECTION INTRAVENOUS at 14:42

## 2022-01-01 RX ADMIN — GABAPENTIN 600 MILLIGRAM(S): 400 CAPSULE ORAL at 05:21

## 2022-01-01 RX ADMIN — TAMSULOSIN HYDROCHLORIDE 0.4 MILLIGRAM(S): 0.4 CAPSULE ORAL at 23:00

## 2022-01-01 RX ADMIN — PANTOPRAZOLE SODIUM 40 MILLIGRAM(S): 20 TABLET, DELAYED RELEASE ORAL at 08:59

## 2022-01-01 RX ADMIN — FINASTERIDE 5 MILLIGRAM(S): 5 TABLET, FILM COATED ORAL at 12:35

## 2022-01-01 RX ADMIN — SEVELAMER CARBONATE 800 MILLIGRAM(S): 2400 POWDER, FOR SUSPENSION ORAL at 12:42

## 2022-01-01 RX ADMIN — TRAMADOL HYDROCHLORIDE 50 MILLIGRAM(S): 50 TABLET ORAL at 21:06

## 2022-01-01 RX ADMIN — MIDODRINE HYDROCHLORIDE 2.5 MILLIGRAM(S): 2.5 TABLET ORAL at 17:25

## 2022-01-01 RX ADMIN — ARIPIPRAZOLE 15 MILLIGRAM(S): 15 TABLET ORAL at 13:45

## 2022-01-01 RX ADMIN — HYDROMORPHONE HYDROCHLORIDE 1 MILLIGRAM(S): 2 INJECTION INTRAMUSCULAR; INTRAVENOUS; SUBCUTANEOUS at 13:35

## 2022-01-01 RX ADMIN — MIDODRINE HYDROCHLORIDE 10 MILLIGRAM(S): 2.5 TABLET ORAL at 12:10

## 2022-01-01 RX ADMIN — Medication 5 MG/HR: at 16:00

## 2022-01-01 RX ADMIN — Medication 25 MILLIGRAM(S): at 21:06

## 2022-01-01 RX ADMIN — Medication 650 MILLIGRAM(S): at 21:39

## 2022-01-01 RX ADMIN — FINASTERIDE 5 MILLIGRAM(S): 5 TABLET, FILM COATED ORAL at 13:56

## 2022-01-01 RX ADMIN — GABAPENTIN 600 MILLIGRAM(S): 400 CAPSULE ORAL at 05:15

## 2022-01-01 RX ADMIN — MIDODRINE HYDROCHLORIDE 5 MILLIGRAM(S): 2.5 TABLET ORAL at 05:43

## 2022-01-01 RX ADMIN — MIDODRINE HYDROCHLORIDE 2.5 MILLIGRAM(S): 2.5 TABLET ORAL at 06:49

## 2022-01-01 RX ADMIN — GABAPENTIN 600 MILLIGRAM(S): 400 CAPSULE ORAL at 17:20

## 2022-01-01 RX ADMIN — Medication 25 MILLIGRAM(S): at 23:30

## 2022-01-01 RX ADMIN — MUPIROCIN 1 APPLICATION(S): 20 OINTMENT TOPICAL at 06:04

## 2022-01-01 RX ADMIN — SEVELAMER CARBONATE 800 MILLIGRAM(S): 2400 POWDER, FOR SUSPENSION ORAL at 17:20

## 2022-01-01 RX ADMIN — MIDODRINE HYDROCHLORIDE 5 MILLIGRAM(S): 2.5 TABLET ORAL at 18:14

## 2022-01-01 RX ADMIN — GABAPENTIN 600 MILLIGRAM(S): 400 CAPSULE ORAL at 04:44

## 2022-01-01 RX ADMIN — LAMOTRIGINE 200 MILLIGRAM(S): 25 TABLET, ORALLY DISINTEGRATING ORAL at 22:53

## 2022-01-01 RX ADMIN — SEVELAMER CARBONATE 800 MILLIGRAM(S): 2400 POWDER, FOR SUSPENSION ORAL at 18:44

## 2022-01-01 RX ADMIN — HEPARIN SODIUM 5000 UNIT(S): 5000 INJECTION INTRAVENOUS; SUBCUTANEOUS at 06:34

## 2022-01-01 RX ADMIN — MIDODRINE HYDROCHLORIDE 2.5 MILLIGRAM(S): 2.5 TABLET ORAL at 19:42

## 2022-01-01 RX ADMIN — Medication 325 MILLIGRAM(S): at 12:28

## 2022-01-01 RX ADMIN — Medication 40 MILLIGRAM(S): at 18:39

## 2022-01-01 RX ADMIN — SEVELAMER CARBONATE 800 MILLIGRAM(S): 2400 POWDER, FOR SUSPENSION ORAL at 08:58

## 2022-01-01 RX ADMIN — HEPARIN SODIUM 1800 UNIT(S)/HR: 5000 INJECTION INTRAVENOUS; SUBCUTANEOUS at 14:58

## 2022-01-01 RX ADMIN — Medication 650 MILLIGRAM(S): at 18:44

## 2022-01-01 RX ADMIN — TAMSULOSIN HYDROCHLORIDE 0.4 MILLIGRAM(S): 0.4 CAPSULE ORAL at 21:05

## 2022-01-01 RX ADMIN — TRAMADOL HYDROCHLORIDE 50 MILLIGRAM(S): 50 TABLET ORAL at 22:00

## 2022-01-01 RX ADMIN — MORPHINE SULFATE 2 MILLIGRAM(S): 50 CAPSULE, EXTENDED RELEASE ORAL at 22:54

## 2022-01-01 RX ADMIN — HEPARIN SODIUM 1800 UNIT(S)/HR: 5000 INJECTION INTRAVENOUS; SUBCUTANEOUS at 07:45

## 2022-01-01 RX ADMIN — Medication 650 MILLIGRAM(S): at 05:18

## 2022-01-01 RX ADMIN — MIDODRINE HYDROCHLORIDE 5 MILLIGRAM(S): 2.5 TABLET ORAL at 05:21

## 2022-01-01 RX ADMIN — POLYETHYLENE GLYCOL 3350 17 GRAM(S): 17 POWDER, FOR SOLUTION ORAL at 12:37

## 2022-01-01 RX ADMIN — GABAPENTIN 600 MILLIGRAM(S): 400 CAPSULE ORAL at 17:31

## 2022-01-01 RX ADMIN — LAMOTRIGINE 200 MILLIGRAM(S): 25 TABLET, ORALLY DISINTEGRATING ORAL at 12:35

## 2022-01-01 RX ADMIN — Medication 650 MILLIGRAM(S): at 22:32

## 2022-01-01 RX ADMIN — HEPARIN SODIUM 1800 UNIT(S)/HR: 5000 INJECTION INTRAVENOUS; SUBCUTANEOUS at 05:59

## 2022-01-01 RX ADMIN — SEVELAMER CARBONATE 800 MILLIGRAM(S): 2400 POWDER, FOR SUSPENSION ORAL at 09:00

## 2022-01-01 RX ADMIN — TAMSULOSIN HYDROCHLORIDE 0.4 MILLIGRAM(S): 0.4 CAPSULE ORAL at 21:52

## 2022-01-01 RX ADMIN — PANTOPRAZOLE SODIUM 40 MILLIGRAM(S): 20 TABLET, DELAYED RELEASE ORAL at 06:14

## 2022-01-01 RX ADMIN — HEPARIN SODIUM 1800 UNIT(S)/HR: 5000 INJECTION INTRAVENOUS; SUBCUTANEOUS at 13:20

## 2022-01-01 RX ADMIN — Medication 50 MILLILITER(S): at 18:07

## 2022-01-01 RX ADMIN — ARIPIPRAZOLE 15 MILLIGRAM(S): 15 TABLET ORAL at 12:41

## 2022-01-01 RX ADMIN — GABAPENTIN 600 MILLIGRAM(S): 400 CAPSULE ORAL at 06:03

## 2022-01-01 RX ADMIN — HEPARIN SODIUM 5000 UNIT(S): 5000 INJECTION INTRAVENOUS; SUBCUTANEOUS at 05:16

## 2022-01-01 RX ADMIN — LAMOTRIGINE 200 MILLIGRAM(S): 25 TABLET, ORALLY DISINTEGRATING ORAL at 12:33

## 2022-01-01 RX ADMIN — MIDODRINE HYDROCHLORIDE 2.5 MILLIGRAM(S): 2.5 TABLET ORAL at 12:44

## 2022-01-01 RX ADMIN — Medication 650 MILLIGRAM(S): at 05:17

## 2022-01-01 RX ADMIN — Medication 650 MILLIGRAM(S): at 15:46

## 2022-01-01 RX ADMIN — SEVELAMER CARBONATE 800 MILLIGRAM(S): 2400 POWDER, FOR SUSPENSION ORAL at 17:41

## 2022-01-01 RX ADMIN — LAMOTRIGINE 200 MILLIGRAM(S): 25 TABLET, ORALLY DISINTEGRATING ORAL at 11:20

## 2022-01-01 RX ADMIN — PANTOPRAZOLE SODIUM 40 MILLIGRAM(S): 20 TABLET, DELAYED RELEASE ORAL at 05:24

## 2022-01-01 RX ADMIN — ARIPIPRAZOLE 15 MILLIGRAM(S): 15 TABLET ORAL at 17:41

## 2022-01-01 RX ADMIN — Medication 650 MILLIGRAM(S): at 23:15

## 2022-01-01 RX ADMIN — SENNA PLUS 2 TABLET(S): 8.6 TABLET ORAL at 21:53

## 2022-01-01 RX ADMIN — Medication 650 MILLIGRAM(S): at 18:30

## 2022-01-01 RX ADMIN — HEPARIN SODIUM 2000 UNIT(S)/HR: 5000 INJECTION INTRAVENOUS; SUBCUTANEOUS at 04:54

## 2022-01-01 RX ADMIN — Medication 325 MILLIGRAM(S): at 12:13

## 2022-01-01 RX ADMIN — SEVELAMER CARBONATE 800 MILLIGRAM(S): 2400 POWDER, FOR SUSPENSION ORAL at 17:50

## 2022-01-01 RX ADMIN — MORPHINE SULFATE 2 MILLIGRAM(S): 50 CAPSULE, EXTENDED RELEASE ORAL at 21:16

## 2022-01-01 RX ADMIN — Medication 325 MILLIGRAM(S): at 13:44

## 2022-01-01 RX ADMIN — MIDODRINE HYDROCHLORIDE 2.5 MILLIGRAM(S): 2.5 TABLET ORAL at 06:03

## 2022-01-01 RX ADMIN — Medication 325 MILLIGRAM(S): at 11:20

## 2022-01-01 RX ADMIN — HEPARIN SODIUM 1800 UNIT(S)/HR: 5000 INJECTION INTRAVENOUS; SUBCUTANEOUS at 01:25

## 2022-01-01 RX ADMIN — HEPARIN SODIUM 5000 UNIT(S): 5000 INJECTION INTRAVENOUS; SUBCUTANEOUS at 21:21

## 2022-01-01 RX ADMIN — Medication 40 MILLIGRAM(S): at 22:13

## 2022-01-01 RX ADMIN — GABAPENTIN 600 MILLIGRAM(S): 400 CAPSULE ORAL at 17:25

## 2022-01-01 RX ADMIN — SEVELAMER CARBONATE 800 MILLIGRAM(S): 2400 POWDER, FOR SUSPENSION ORAL at 09:07

## 2022-01-01 RX ADMIN — HEPARIN SODIUM 1800 UNIT(S)/HR: 5000 INJECTION INTRAVENOUS; SUBCUTANEOUS at 22:23

## 2022-01-01 RX ADMIN — Medication 25 MILLIGRAM(S): at 21:21

## 2022-01-01 RX ADMIN — GABAPENTIN 600 MILLIGRAM(S): 400 CAPSULE ORAL at 05:14

## 2022-01-01 RX ADMIN — MIDODRINE HYDROCHLORIDE 2.5 MILLIGRAM(S): 2.5 TABLET ORAL at 18:18

## 2022-01-01 RX ADMIN — MIDODRINE HYDROCHLORIDE 2.5 MILLIGRAM(S): 2.5 TABLET ORAL at 18:31

## 2022-01-01 RX ADMIN — FINASTERIDE 5 MILLIGRAM(S): 5 TABLET, FILM COATED ORAL at 17:42

## 2022-01-01 RX ADMIN — MIDODRINE HYDROCHLORIDE 2.5 MILLIGRAM(S): 2.5 TABLET ORAL at 05:16

## 2022-01-01 RX ADMIN — GABAPENTIN 100 MILLIGRAM(S): 400 CAPSULE ORAL at 13:47

## 2022-01-01 RX ADMIN — HEPARIN SODIUM 5000 UNIT(S): 5000 INJECTION INTRAVENOUS; SUBCUTANEOUS at 13:46

## 2022-01-01 RX ADMIN — Medication 40 MILLIGRAM(S): at 05:17

## 2022-01-01 RX ADMIN — CHLORHEXIDINE GLUCONATE 1 APPLICATION(S): 213 SOLUTION TOPICAL at 06:13

## 2022-01-01 RX ADMIN — LAMOTRIGINE 200 MILLIGRAM(S): 25 TABLET, ORALLY DISINTEGRATING ORAL at 12:11

## 2022-01-01 RX ADMIN — AMIODARONE HYDROCHLORIDE 33.3 MG/MIN: 400 TABLET ORAL at 18:43

## 2022-01-01 RX ADMIN — MUPIROCIN 1 APPLICATION(S): 20 OINTMENT TOPICAL at 17:38

## 2022-01-01 RX ADMIN — PANTOPRAZOLE SODIUM 40 MILLIGRAM(S): 20 TABLET, DELAYED RELEASE ORAL at 04:44

## 2022-03-25 NOTE — PROCEDURE NOTE - NSINFORMCONSENT_GEN_A_CORE
[FreeTextEntry1] : Reviewed and reconciled medications, allergies, PMHx, PSHx, SocHx, FMHx. \par \par h/o sleep apnea - using CPAP\par \par Plan:\par cerumen removed. FU 6 months \par compliance report reviewed pt compliant despite saying he does not use machine all the time because of recall. machine works well with ahi of 1.5  should continue with cpap machine or obtain new machine.  Benefits, risks, and possible complications of procedure explained to patient/caregiver who verbalized understanding and gave written consent.

## 2022-04-28 NOTE — ED PROVIDER NOTE - CLINICAL SUMMARY MEDICAL DECISION MAKING FREE TEXT BOX
71M presenting with sob and leg swelling. concern for new onset chf. hypoxic to 92% on room air. will likely need admission.

## 2022-04-28 NOTE — H&P ADULT - PROBLEM SELECTOR PLAN 9
Hx of BPH and Prostate CA  home meds - Flomax, Proscar  no LUTS  resume home meds Hx of Bipolar Disorder  home meds - Aripiprazole, Trazodone, Lamotrigine  resume home meds

## 2022-04-28 NOTE — H&P ADULT - PROBLEM SELECTOR PLAN 3
p/w K - 6.1  could be in setting of CKD  EKG - NSR, low voltage criteria for LVH (no peak T waves)  given Hyperkalemia cocktail in ED (lokelma, calcium gluconate)  monitor BMP

## 2022-04-28 NOTE — H&P ADULT - PROBLEM SELECTOR PLAN 7
Hx of Crohn disease  (+) ileostomy, R  denies any diarrhea, constipation Hx of HLD  home med - lipitor  f/u A1c, lipid profile  resume home med

## 2022-04-28 NOTE — ED ADULT NURSE NOTE - OBJECTIVE STATEMENT
Pt. sent from [facility] for b/l LE swelling and shortness of breath that started 2 days ago. Pt. denies any CP.

## 2022-04-28 NOTE — H&P ADULT - PROBLEM SELECTOR PLAN 8
Hx of Bipolar Disorder  home meds - Aripiprazole, Trazodone, Lamotrigine  resume home meds Hx of Crohn disease  (+) ileostomy, R  denies any diarrhea, constipation

## 2022-04-28 NOTE — H&P ADULT - PROBLEM SELECTOR PLAN 10
Start heparin SQ for DVT prophylaxis  Resume PPI for GI prophylaxis Hx of BPH and Prostate CA  home meds - Flomax, Proscar  no LUTS  resume home meds

## 2022-04-28 NOTE — ED ADULT NURSE NOTE - CHIEF COMPLAINT QUOTE
PT SENT FROM [facility] FOR SWELLING OF BLE AND SOB SINCE YESTERDAY

## 2022-04-28 NOTE — ED PROVIDER NOTE - PROGRESS NOTE DETAILS
(Rodo) - s/o to fu labs  labs - Hgb and CKD at baseline; K 6.1 (not hemolzyed)  Treated for hyperK and given lasix  Endorsed to Dr PATIENCE Rush and MAR

## 2022-04-28 NOTE — H&P ADULT - PROBLEM SELECTOR PLAN 5
Hx of HTN  home med - lasix  BP monitoring  resume home med p/w Hgb/Hct - 10.8/33/7 (baseline)  home meds - EPO, FeSO4  no signs of active bleeding  could be in setting of CKD  resume home meds  Nephro (Dr. Pond) consulted

## 2022-04-28 NOTE — ED PROVIDER NOTE - PHYSICAL EXAMINATION
General: well appearing male, no acute distress   HEENT: normocephalic, atraumatic   Respiratory: normal work of breathing, lungs clear to auscultation bilaterally   Cardiac: regular rate and rhythm   Abdomen: soft, non-tender, no guarding or rebound   MSK: bilateral 2+ pitting edema   Skin: warm, dry   Neuro: A&Ox3  Psych: appropriate affect

## 2022-04-28 NOTE — H&P ADULT - PROBLEM SELECTOR PLAN 1
Hx of CHF  Echo - GIDD, EF 50-55% (2020)  home meds - lasix  EKG - NSR, low voltage criteria for LVH  CXR - bilateral vascular congestion (intiial read)  Trop x 1 - neg  pro-BNP - 398 (mildly elevated)  monitor VS  monitor I&O  Lasix 40 mg IV BID  monitor on Telemetry  f/u Echo  Cardio (Dr. Archer) consulted Hx of CHF  Echo - GIDD, EF 50-55% (2020)  home meds - lasix  EKG - NSR, low voltage criteria for LVH  CXR - bilateral vascular congestion (intiial read)  Trop x 1 - neg  pro-BNP - 398 (mildly elevated)  monitor VS  monitor I&O, Daily Weight  Lasix 40 mg IV OD  monitor on Telemetry  f/u Echo  Cardio (Dr. Archer) consulted Hx of CHF  Echo - GIDD, EF 50-55% (2020)  home meds - lasix  EKG - NSR, low voltage criteria for LVH  CXR - bilateral vascular congestion (intiial read)  Trop x 1 - neg  pro-BNP - 398 (mildly elevated)  monitor VS  monitor I&O, Daily Weight  Lasix 40 mg IV OD  monitor on Telemetry  f/u Echo  f/u TSH  Cardio (Dr. Archer) consulted

## 2022-04-28 NOTE — H&P ADULT - NSHPPHYSICALEXAM_GEN_ALL_CORE
Vital Signs  - T - 99.8F (37.7C)  - HR - 88  - BP - 127/73  - RR - 20  - SO2 - 92%    PHYSICAL EXAM:  GENERAL: NAD, speaks in full sentences, no signs of respiratory distress; on nasal cannula  HEAD:  Atraumatic, Normocephalic  EYES: EOMI, PERRLA, conjunctiva and sclera clear  NECK: Supple, No JVD  CHEST/LUNG: Clear to auscultation bilaterally; No wheeze; No crackles; No accessory muscles used  HEART: Regular rate and rhythm; No murmurs;   ABDOMEN: Soft, Nontender, (+) distended; Bowel sounds present; No guarding  EXTREMITIES:  2+ Peripheral Pulses, No cyanosis , (+) bilateral 1+ non-pitting edema  PSYCH: AAOx3  NEUROLOGY: non-focal  SKIN: No rashes or lesions

## 2022-04-28 NOTE — H&P ADULT - PROBLEM SELECTOR PLAN 2
Hx of CKD IV  baseline Cr - 3  BUN/Cr - 88/3.28 , eGFR - 19  home meds - NaHCO3, Calcitriol  monitor BMP  monitor I&O  resume home meds  Nephro (Dr. Bowers) consulted Hx of CKD IV  baseline Cr - 3  BUN/Cr - 88/3.28 , eGFR - 19  home meds - NaHCO3, Calcitriol  monitor BMP  monitor I&O  resume home meds  Nephro (Dr. Pond) consulted

## 2022-04-28 NOTE — H&P ADULT - HISTORY OF PRESENT ILLNESS
Patient is a 71M from [facility], who is ambulatory at baseline with a walker. He has Hx of HTN, CHF, COPD, HLD, PAD, BPH, Prostate CA, Bipolar Disorder, IBD - Crohn's Disease, s/p R ileostomy bag. He was brought to ED due to bilateral leg swelling. For the past 2 days he has been having progressive swelling/ edema of both his legs with associated ambulatory dysfunction. He also started having episodes of shortness of breath and difficulty of breathing. He denies any fever, cough, chest pain, palpitation. When he came to the ED, he was saturating at 93-94% on room air. He was placed on nasal cannula. EKG was done showing NSR. Troponin was negative and BNP was mildly elevated at 398. CXR showed bilateral vascular congestion. Serum potassium was elevated at 6.1. He got a dose of lasix and was given Hyperkalemia cocktail in the ED. Of note, he had Hx of COVID in 2020 andwas vaccinated with COVID-19 x 3 (Pfizer) and Influenza in 2021. He was subsequently admitted for acute exacerbation of his heart failure.    GOC: FULL CODE Patient is a 71M from [facility], who is ambulatory at baseline with a walker. He has Hx of HTN, CHF?, HLD, PAD, BPH, Prostate CA, Bipolar Disorder, IBD - Crohn's Disease, s/p R ileostomy bag. He was brought to ED due to bilateral leg swelling. For the past 2 days he has been having progressive swelling/ edema of both his legs with associated ambulatory dysfunction. He also started having episodes of shortness of breath and difficulty of breathing. He denies any fever, cough, chest pain, palpitation. When he came to the ED, he was saturating at 93-94% on room air. He was placed on nasal cannula. EKG was done showing NSR. Troponin was negative and BNP was mildly elevated at 398. CXR showed bilateral vascular congestion. Serum potassium was elevated at 6.1. He got a dose of lasix and was given Hyperkalemia cocktail in the ED. Of note, he had Hx of COVID in 2020 andwas vaccinated with COVID-19 x 3 (Pfizer) and Influenza in 2021. He was subsequently admitted for acute exacerbation of his heart failure.    GOC: FULL CODE Patient is a 71M from [facility], who is ambulatory at baseline with a walker. He has Hx of HTN, CHF?, COPD, HLD, PAD, BPH, Prostate CA (s/p radiation), Bipolar Disorder, IBD - Crohn's Disease, R ileostomy bag (s/p sigmoid resection). He was brought to ED due to bilateral leg swelling. For the past 2 days he has been having progressive swelling/ edema of both his legs with associated ambulatory dysfunction. He also started having episodes of shortness of breath and difficulty of breathing. He denies any fever, cough, chest pain, palpitation. When he came to the ED, he was saturating at 93-94% on room air. He was placed on nasal cannula. EKG was done showing NSR. Troponin was negative and BNP was mildly elevated at 398. CXR showed bilateral vascular congestion. Serum potassium was elevated at 6.1. He got a dose of lasix and was given Hyperkalemia cocktail in the ED. Of note, he had Hx of COVID in 2020 andwas vaccinated with COVID-19 x 3 (Pfizer) and Influenza in 2021. He was subsequently admitted for acute exacerbation of his heart failure.    GOC: FULL CODE

## 2022-04-28 NOTE — H&P ADULT - PROBLEM/PLAN-7
Subjective


Date of service: 02/19/19


Principal diagnosis: colon screening





Objective





- Constitutional


Vitals: 


                               Vital Signs - 12hr











  02/19/19 02/19/19 02/19/19





  07:41 07:45 09:27


 


Temperature 98.6 F 98.6 F 


 


Pulse Rate 81 81 85


 


Respiratory 16 16 17





Rate   


 


Blood Pressure 139/93 139/93 115/79


 


O2 Sat by Pulse 98 98 99





Oximetry   














  02/19/19





  09:34


 


Temperature 


 


Pulse Rate 71


 


Respiratory 15





Rate 


 


Blood Pressure 120/79


 


O2 Sat by Pulse 100





Oximetry
DISPLAY PLAN FREE TEXT

## 2022-04-28 NOTE — H&P ADULT - PROBLEM SELECTOR PLAN 6
Hx of HLD  home med - lipitor  f/u A1c, lipid profile  resume home med Hx of HTN  home med - lasix  BP monitoring  resume home med

## 2022-04-28 NOTE — H&P ADULT - ASSESSMENT
Patient is a 71M from [facility], who is ambulatory at baseline with a walker. He has Hx of HTN, CHF, COPD, HLD, PAD, BPH, Prostate CA, Bipolar Disorder, IBD - Crohn's Disease, s/p R ileostomy bag. He was subsequently admitted for acute exacerbation of his heart failure. Patient is a 71M from [facility], who is ambulatory at baseline with a walker. He has Hx of HTN, CHF?, HLD, PAD, BPH, Prostate CA, Bipolar Disorder, IBD - Crohn's Disease, s/p R ileostomy bag. He was subsequently admitted for acute exacerbation of his heart failure. Patient is a 71M from [facility], who is ambulatory at baseline with a walker. He has Hx of HTN, CHF?, COPD, HLD, PAD, BPH, Prostate CA (s/p radiation), Bipolar Disorder, IBD - Crohn's Disease, R ileostomy bag (s/p sigmoid resection). He was subsequently admitted for acute exacerbation of heart failure.

## 2022-04-28 NOTE — H&P ADULT - PROBLEM SELECTOR PLAN 4
p/w elevated LFT  ALP - 191  AST/ALT - 302/121  could be in setting of CHF exacerbation  monitor LFTs

## 2022-04-28 NOTE — PATIENT PROFILE ADULT - FALL HARM RISK - HARM RISK INTERVENTIONS
Assistance OOB with selected safe patient handling equipment/Communicate Risk of Fall with Harm to all staff/Discuss with provider need for PT consult/Monitor gait and stability/Reinforce activity limits and safety measures with patient and family/Tailored Fall Risk Interventions/Visual Cue: Yellow wristband and red socks/Bed in lowest position, wheels locked, appropriate side rails in place/Call bell, personal items and telephone in reach/Instruct patient to call for assistance before getting out of bed or chair/Non-slip footwear when patient is out of bed/[place] to call system/Physically safe environment - no spills, clutter or unnecessary equipment/Purposeful Proactive Rounding/Room/bathroom lighting operational, light cord in reach

## 2022-04-28 NOTE — H&P ADULT - NSHPREVIEWOFSYSTEMS_GEN_ALL_CORE
- CONSTITUTIONAL: Denies fever and chills  - HEENT: Denies changes in vision and hearing.  - RESPIRATORY: (+) SOB. Denies cough.  - CV: Denies chest pain and palpitations  - GI: Denies abdominal pain, nausea, vomiting and diarrhea.  - : Denies dysuria and urinary frequency.  - SKIN: Denies rash and pruritus.  - NEUROLOGICAL: Denies headache and syncope.  - EXTREMITIES: (+) bilateral leg swelling  - PSYCHIATRIC: Denies recent changes in mood. Denies anxiety and depression.

## 2022-04-28 NOTE — H&P ADULT - CONVERSATION DETAILS
Living Will and Proxy documented in Nursing Home Papers (9/28/2022)  actual documents not included in chart  As per patient no change in GOC  remains FULL CODE

## 2022-04-28 NOTE — ED PROVIDER NOTE - OBJECTIVE STATEMENT
71M, pmh of COPD, CKD, prostate cancer, presenting with swelling of lower legs and shortness of breath. no headache, chest pain, nausea, vomiting.

## 2022-04-29 NOTE — INPATIENT CERTIFICATION FOR MEDICARE PATIENTS - NS ICMP TWO DAYS INPATIENT

## 2022-04-29 NOTE — PROGRESS NOTE ADULT - PROBLEM SELECTOR PLAN 4
p/w elevated LFT  ALP - 191  AST/ALT - 302/121  could be in setting of CHF exacerbation  monitor LFTs downtrending   avoid hepatotoxins   trend CMP

## 2022-04-29 NOTE — PROGRESS NOTE ADULT - PROBLEM SELECTOR PLAN 6
Hx of HTN  home med - lasix  BP monitoring  resume home med controlled  's   home med - lasix  monitor bp

## 2022-04-29 NOTE — PROGRESS NOTE ADULT - PROBLEM SELECTOR PLAN 2
Hx of CKD IV  baseline Cr - 3  BUN/Cr - 88/3.28 , eGFR - 19  home meds - NaHCO3, Calcitriol  monitor BMP  monitor I&O  resume home meds  Nephro (Dr. Pond) consulted Hx of CKD IV  baseline Cr - 3  BUN/Cr - 88/3.28 , eGFR - 19  home meds - NaHCO3, Calcitriol  monitor BMP  monitor I&O  resume home meds  avoid nephrotoxins   F/U renal sono   Nephro (Dr. Pond) consulted

## 2022-04-29 NOTE — CONSULT NOTE ADULT - SUBJECTIVE AND OBJECTIVE BOX
C A R D I O L O G Y  *********************    DATE OF SERVICE: 04-29-22    HISTORY OF PRESENT ILLNESS: HPI:  Patient is a 71M from [facility], who is ambulatory at baseline with a walker. He has Hx of HTN, CHF?, COPD, HLD, PAD, BPH, Prostate CA (s/p radiation), Bipolar Disorder, IBD - Crohn's Disease, R ileostomy bag (s/p sigmoid resection) historically preserved LV and RV function, normal perfusion on a nuclear stress test 2020 He was brought to ED due to bilateral leg swelling. For the past 2 days he has been having progressive swelling/ edema of both his legs with associated ambulatory dysfunction. He also started having episodes of shortness of breath and difficulty of breathing. He denies any fever, cough, chest pain, palpitation. When he came to the ED, he was saturating at 93-94% on room air. He was placed on nasal cannula. EKG was done showing NSR. Troponin was negative and BNP was mildly elevated at 398. CXR showed bilateral vascular congestion. Serum potassium was elevated at 6.1. He got a dose of lasix and was given Hyperkalemia cocktail in the ED. Of note, he had Hx of COVID in 2020 and was vaccinated with COVID-19 x 3 (Pfizer) and Influenza in 2021. He was subsequently admitted for acute exacerbation of his heart failure.  HE denies CP or LOC     GOC: FULL CODE (28 Apr 2022 18:40)      PAST MEDICAL & SURGICAL HISTORY:    COPD, mild  Anemia  Bipolar disorder  IBD (inflammatory bowel disease)  HTN (hypertension)  HLD (hyperlipidemia)  PAD (peripheral artery disease)  CKD (chronic kidney disease)  Prostate CA  BPH with urinary obstruction  History of creation of ostomy    MEDICATIONS:  MEDICATIONS  (STANDING):  ARIPiprazole 15 milliGRAM(s) Oral daily  atorvastatin 10 milliGRAM(s) Oral at bedtime  calcitriol   Capsule 0.25 MICROGram(s) Oral daily  ferrous    sulfate 325 milliGRAM(s) Oral daily  finasteride 5 milliGRAM(s) Oral daily  furosemide   Injectable 40 milliGRAM(s) IV Push daily  gabapentin 600 milliGRAM(s) Oral two times a day  heparin   Injectable 5000 Unit(s) SubCutaneous every 8 hours  lamoTRIgine 200 milliGRAM(s) Oral daily  midodrine. 2.5 milliGRAM(s) Oral three times a day  pantoprazole    Tablet 40 milliGRAM(s) Oral before breakfast  polyethylene glycol 3350 17 Gram(s) Oral daily  senna 2 Tablet(s) Oral at bedtime  sodium bicarbonate 650 milliGRAM(s) Oral three times a day  tamsulosin 0.4 milliGRAM(s) Oral at bedtime  traZODone 25 milliGRAM(s) Oral at bedtime      Allergies    No Known Allergies    Intolerances    FAMILY HISTORY:  FH: HTN (hypertension) (Father)      Non-contributary for premature coronary disease or sudden cardiac death    SOCIAL HISTORY:    [ X] Non-smoker  [ ] Smoker  [ ] Alcohol      REVIEW OF SYSTEMS:  [ ]chest pain  [  ]shortness of breath  [  ]palpitations  [  ]syncope  [ ]near syncope [ ]upper extremity weakness   [ ] lower extremity weakness  [  ]diplopia  [  ]altered mental status   [  ]fevers  [ ]chills [ ]nausea  [ ]vomitting  [  ]dysphagia    [ ]abdominal pain  [ ]melena  [ ]BRBPR    [  ]epistaxis  [  ]rash    [ ]lower extremity edema        [X] All others negative	  [ ] Unable to obtain      LABS:	 	    CARDIAC MARKERS:        Troponin I, High Sensitivity Result: 17.8 ng/L (04-28-22 @ 16:37)                            10.4   7.98  )-----------( 178      ( 29 Apr 2022 07:00 )             32.6     Hb Trend: 10.4<--, 10.8<--    04-29    142  |  114<H>  |  90<H>  ----------------------------<  91  4.7   |  17<L>  |  3.54<H>    Ca    10.2      29 Apr 2022 07:00  Phos  5.0     04-29  Mg     2.0     04-29    TPro  7.7  /  Alb  3.4<L>  /  TBili  1.2  /  DBili  0.4<H>  /  AST  257<H>  /  ALT  111<H>  /  AlkPhos  192<H>  04-29    Creatinine Trend: 3.54<--, 3.43<--, 3.28<--    Coags:  PT/INR - ( 29 Apr 2022 07:00 )   PT: 14.1 sec;   INR: 1.18 ratio         PTT - ( 29 Apr 2022 07:00 )  PTT:25.9 sec    proBNP: Serum Pro-Brain Natriuretic Peptide: 398 pg/mL (04-28 @ 16:37)    Lipid Profile:   HgA1c:   TSH: Thyroid Stimulating Hormone, Serum: 2.95 uU/mL (04-28 @ 22:58)          PHYSICAL EXAM:  T(C): 36.4 (04-29-22 @ 11:00), Max: 37.7 (04-28-22 @ 12:41)  HR: 85 (04-29-22 @ 11:00) (71 - 88)  BP: 126/54 (04-29-22 @ 11:00) (108/54 - 144/72)  RR: 18 (04-29-22 @ 11:00) (18 - 20)  SpO2: 97% (04-29-22 @ 11:00) (92% - 97%)  Wt(kg): --   BMI (kg/m2): 31 (04-28-22 @ 12:41)  I&O's Summary    28 Apr 2022 07:01  -  29 Apr 2022 07:00  --------------------------------------------------------  IN: 0 mL / OUT: 400 mL / NET: -400 mL    29 Apr 2022 07:01  -  29 Apr 2022 11:05  --------------------------------------------------------  IN: 0 mL / OUT: 300 mL / NET: -300 mL        Gen: Appears well in NAD  HEENT:  (-)icterus (-)pallor  CV: N S1 S2 1/6 MARCELLE (+)2 Pulses B/l  Resp:  Clear to ausculatation B/L, normal effort  GI: (+) BS Soft, NT, ND  Lymph:  (+)Edema, (-)obvious lymphadenopathy  Skin: Warm to touch, Normal turgor  Psych: Appropriate mood and affect      ECG:  	Sinus 84 BPM, LVH    RADIOLOGY:         CXR:   < from: Xray Chest 1 View-PORTABLE IMMEDIATE (Xray Chest 1 View-PORTABLE IMMEDIATE .) (04.06.20 @ 16:35) >  Increasing bibasilar interstitial infiltrates.    < end of copied text >      ASSESSMENT/PLAN: 	71y Male  HTN, CHF?, COPD, HLD, PAD, BPH, Prostate CA (s/p radiation), Bipolar Disorder, IBD - Crohn's Disease, R ileostomy bag (s/p sigmoid resection) historically preserved LV and RV function, normal perfusion on a nuclear stress test 2020 He was brought to ED due to bilateral leg swelling    - Doubt this is sequale of CHF.  A low BNP suggests a low left ventricular end diastolic pressure  - would check prealbumin ? Third spacing.  ? protein loosing enteropathy as a result of Crohn's disease and/or surgical resection  - check echo for interval changes   - keep net negative   - monitor renal function, his edema is at least partially due ot renal disease     I once again thank you for allowing me to participate in the care of your patient.  If you have any questions or concerns please do not hesitate to contact me.    Joey Archer MD, [facility]  BEEPER [phone]

## 2022-04-29 NOTE — PHYSICAL THERAPY INITIAL EVALUATION ADULT - PERTINENT HX OF CURRENT PROBLEM, REHAB EVAL
Hx of HTN, CHF?, COPD, HLD, PAD, BPH, Prostate CA (s/p radiation), Bipolar Disorder, IBD - Crohn's Disease, R ileostomy bag (s/p sigmoid resection). He was brought to ED due to bilateral leg swelling. For the past 2 days he has been having progressive swelling/ edema of both his legs with associated ambulatory dysfunction.

## 2022-04-29 NOTE — CONSULT NOTE ADULT - ASSESSMENT
1. PAWAN due to diuretics and CKD progression.   -Scr is slight above the baseline and patient is clinically hypervolemic and increase lasix 40mg iv bid and will accept high bun/cr to optimize volume and respiratory status.   Urinalysis shows no proteinuria. send urine lytes (uosm, urine sodium, urine creatinine, chloride, potassium), spot protein to creatinine ratio  -order renal sono to assess kidney size and r/o hydronephrosis.   -Adjust meds to eGFR and avoid IV Gadolinium contrast,NSAIDs, and phosphate enema.  -Monitor I/O's daily.   -Monitor SMA daily.  2. CKD stage 4 most likely due to ischemic nephropathy and h/o ATN with renal recovery.  -Baseline Scr around 3.2mg/dL in April 2020.  -Keep patient euvolemic and renal diet  -Avoid Nephrotoxic Meds/ Agents such as (NSAIDs, IV contrast, Aminoglycosides such as gentamicin, -Gadolinium contrast, Phosphate containing enemas, etc..)  -Adjust Medications according to eGFR  3. Anemia:   -hb is stable; continue iron tabs. monitor CBC  4. MBD:   -elevated phos; mild hyperca; d/c calcitirol for now; add renvela 1 tab tid.   5. Hyperkalemia due to pawan on ckd.   -K is resolved and s/p medical management.  -start low K diet. give Lokelma prn if K >5.5  -Keep pt euvolemic. Avoid ACE inh/ ARBs, NSAIDs, and Aldactone or potassium sparing diuretics. Monitor K+ daily.  6. Fluid Overload:  -continue with diuretics as above.   7. Acidosis:  -CO2 improving.   -on sodium bicarbonate 650mg oral tid. monitor CO2  8. h/o Hypotension:  -bp is low normal and increase midodrine 5mg tid since would like to increase lasix bid for few days.      Discussed with patient in detail regarding the renal plan and care  Discussed the assessment and plan with Primary Team/Nurse     1. PAWAN due to diuretics and CKD progression.   -Scr is slight above the baseline and patient is clinically hypervolemic and increase lasix 40mg iv bid and will accept high bun/cr to optimize volume and respiratory status.   Urinalysis shows no proteinuria. send urine lytes (uosm, urine sodium, urine creatinine, chloride, potassium), spot protein to creatinine ratio  -order renal sono to assess kidney size and r/o hydronephrosis.   -Adjust meds to eGFR and avoid IV Gadolinium contrast,NSAIDs, and phosphate enema.  -Monitor I/O's daily.   -Monitor SMA daily.  2. CKD stage 4 most likely due to ischemic nephropathy and h/o ATN with renal recovery.  -Baseline Scr around 3.2mg/dL in April 2020.  -Keep patient euvolemic and renal diet  -Avoid Nephrotoxic Meds/ Agents such as (NSAIDs, IV contrast, Aminoglycosides such as gentamicin, -Gadolinium contrast, Phosphate containing enemas, etc..)  -Adjust Medications according to eGFR  3. Anemia:   -hb is stable; continue iron tabs. monitor CBC  4. MBD:   -elevated phos; mild hyperca; d/c calcitirol for now; add renvela 1 tab tid.   5. Hyperkalemia due to pawan on ckd.   -K is resolved and s/p medical management.  -start low K diet. give Lokelma prn if K >5.5  -Keep pt euvolemic. Avoid ACE inh/ ARBs, NSAIDs, and Aldactone or potassium sparing diuretics. Monitor K+ daily.  6. Fluid Overload:  -continue with diuretics as above.   -recommend CT chest w/o contrast to determine if benefit from pleural tap.  7. Acidosis:  -CO2 improving.   -on sodium bicarbonate 650mg oral tid. monitor CO2  8. h/o Hypotension:  -bp is low normal and increase midodrine 5mg tid since would like to increase lasix bid for few days.    9. Elevated LFTs  -possible hepatic congestion. hold lipitor for now.  -send hepatitis panel in am.    Discussed with patient in detail regarding the renal plan and care  Discussed the assessment and plan with Primary Team/Nurse

## 2022-04-29 NOTE — PROGRESS NOTE ADULT - ASSESSMENT
Patient is a 71M from [facility], who is ambulatory at baseline with a walker. He has Hx of HTN, CHF?, COPD, HLD, PAD, BPH, Prostate CA (s/p radiation), Bipolar Disorder, IBD - Crohn's Disease, R ileostomy bag (s/p sigmoid resection). He was subsequently admitted for acute exacerbation of heart failure. Patient is a 71M from [facility], who is ambulatory at baseline with a walker. He has Hx of HTN, CHF?, COPD, HLD, PAD, BPH, Prostate CA (s/p radiation), Bipolar Disorder, IBD - Crohn's Disease, R ileostomy bag (s/p sigmoid resection). Presented with SOB and LE edema, found with hyperkalemia on admission. Admitted to [facility] for cardio work up R/O CHF. Pending cardiology and nephrology consult

## 2022-04-29 NOTE — PROGRESS NOTE ADULT - PROBLEM SELECTOR PLAN 5
p/w Hgb/Hct - 10.8/33/7 (baseline)  home meds - EPO, FeSO4  no signs of active bleeding  could be in setting of CKD  resume home meds  Nephro (Dr. Pond) consulted Stable   likely anemia of chronic diease   home meds - EPO, FeSO4  no signs of active bleeding  trend CBC

## 2022-04-29 NOTE — PROGRESS NOTE ADULT - SUBJECTIVE AND OBJECTIVE BOX
Patient is a 71y old  Male who presents with a chief complaint of acute CHF exacerbation (29 Apr 2022 10:08)    PATIENT IS SEEN AND EXAMINED IN MEDICAL FLOOR.  PETEY [    ]    ERASTO [   ]      GT [   ]    ALLERGIES:  No Known Allergies      Daily Height in cm: 177.8 (28 Apr 2022 12:41)    Daily     VITALS:    Vital Signs Last 24 Hrs  T(C): 36.8 (29 Apr 2022 05:13), Max: 37.7 (28 Apr 2022 12:41)  T(F): 98.3 (29 Apr 2022 05:13), Max: 99.8 (28 Apr 2022 12:41)  HR: 71 (29 Apr 2022 05:13) (71 - 88)  BP: 111/47 (29 Apr 2022 05:13) (108/54 - 144/72)  BP(mean): --  RR: 18 (29 Apr 2022 05:13) (18 - 20)  SpO2: 96% (29 Apr 2022 05:13) (92% - 97%)    LABS:    CBC Full  -  ( 29 Apr 2022 07:00 )  WBC Count : 7.98 K/uL  RBC Count : 3.95 M/uL  Hemoglobin : 10.4 g/dL  Hematocrit : 32.6 %  Platelet Count - Automated : 178 K/uL  Mean Cell Volume : 82.5 fl  Mean Cell Hemoglobin : 26.3 pg  Mean Cell Hemoglobin Concentration : 31.9 gm/dL  Auto Neutrophil # : 6.51 K/uL  Auto Lymphocyte # : 0.54 K/uL  Auto Monocyte # : 0.62 K/uL  Auto Eosinophil # : 0.21 K/uL  Auto Basophil # : 0.04 K/uL  Auto Neutrophil % : 81.5 %  Auto Lymphocyte % : 6.8 %  Auto Monocyte % : 7.8 %  Auto Eosinophil % : 2.6 %  Auto Basophil % : 0.5 %    PT/INR - ( 29 Apr 2022 07:00 )   PT: 14.1 sec;   INR: 1.18 ratio         PTT - ( 29 Apr 2022 07:00 )  PTT:25.9 sec  04-29    142  |  114<H>  |  90<H>  ----------------------------<  91  4.7   |  17<L>  |  3.54<H>    Ca    10.2      29 Apr 2022 07:00  Phos  5.0     04-29  Mg     2.0     04-29    TPro  7.7  /  Alb  3.4<L>  /  TBili  1.2  /  DBili  0.4<H>  /  AST  257<H>  /  ALT  111<H>  /  AlkPhos  192<H>  04-29    CAPILLARY BLOOD GLUCOSE            LIVER FUNCTIONS - ( 29 Apr 2022 07:00 )  Alb: 3.4 g/dL / Pro: 7.7 g/dL / ALK PHOS: 192 U/L / ALT: 111 U/L DA / AST: 257 U/L / GGT: x           Creatinine Trend: 3.54<--, 3.43<--, 3.28<--  I&O's Summary    28 Apr 2022 07:01  -  29 Apr 2022 07:00  --------------------------------------------------------  IN: 0 mL / OUT: 400 mL / NET: -400 mL    29 Apr 2022 07:01  -  29 Apr 2022 10:29  --------------------------------------------------------  IN: 0 mL / OUT: 300 mL / NET: -300 mL                MEDICATIONS:    MEDICATIONS  (STANDING):  ARIPiprazole 15 milliGRAM(s) Oral daily  atorvastatin 10 milliGRAM(s) Oral at bedtime  calcitriol   Capsule 0.25 MICROGram(s) Oral daily  ferrous    sulfate 325 milliGRAM(s) Oral daily  finasteride 5 milliGRAM(s) Oral daily  furosemide   Injectable 40 milliGRAM(s) IV Push daily  gabapentin 600 milliGRAM(s) Oral two times a day  heparin   Injectable 5000 Unit(s) SubCutaneous every 8 hours  lamoTRIgine 200 milliGRAM(s) Oral daily  midodrine. 2.5 milliGRAM(s) Oral three times a day  pantoprazole    Tablet 40 milliGRAM(s) Oral before breakfast  polyethylene glycol 3350 17 Gram(s) Oral daily  senna 2 Tablet(s) Oral at bedtime  sodium bicarbonate 650 milliGRAM(s) Oral three times a day  tamsulosin 0.4 milliGRAM(s) Oral at bedtime  traZODone 25 milliGRAM(s) Oral at bedtime      MEDICATIONS  (PRN):      REVIEW OF SYSTEMS:                           ALL ROS DONE [ X   ]    CONSTITUTIONAL:  LETHARGIC [   ], FEVER [   ], UNRESPONSIVE [   ]  CVS:  CP  [   ], SOB, [   ], PALPITATIONS [   ], DIZZYNESS [   ]  RS: COUGH [   ], SPUTUM [   ]  GI: ABDOMINAL PAIN [   ], NAUSEA [   ], VOMITINGS [   ], DIARRHEA [   ], CONSTIPATION [   ]  :  DYSURIA [   ], NOCTURIA [   ], INCREASED FREQUENCY [   ], DRIBLING [   ],  SKELETAL: PAINFUL JOINTS [   ], SWOLLEN JOINTS [   ], NECK ACHE [   ], LOW BACK ACHE [   ],  SKIN : ULCERS [   ], RASH [   ], ITCHING [   ]  CNS: HEAD ACHE [   ], DOUBLE VISION [   ], BLURRED VISION [   ], AMS / CONFUSION [   ], SEIZURES [   ], WEAKNESS [   ],TINGLING / NUMBNESS [   ]    PHYSICAL EXAMINATION:  GENERAL APPEARANCE: NO DISTRESS  HEENT:  NO PALLOR, NO  JVD,  NO   NODES, NECK SUPPLE  CVS: S1 +, S2 +,   RS: AEEB,  OCCASIONAL  RALES +,   NO RONCHI  ABD: SOFT, NT, NO, BS +  EXT: NO PE  SKIN: WARM,   SKELETAL:  ROM ACCEPTABLE  CNS:  AAO X    ,   DEFICITS    RADIOLOGY :      ASSESSMENT :     Hyperkalemia    No pertinent past medical history    COPD, mild    Anemia    Bipolar disorder    IBD (inflammatory bowel disease)    HTN (hypertension)    HLD (hyperlipidemia)    PAD (peripheral artery disease)    CKD (chronic kidney disease)    Prostate CA    BPH with urinary obstruction    No significant past surgical history    History of creation of ostomy        PLAN:  HPI:  Patient is a 71M from [facility], who is ambulatory at baseline with a walker. He has Hx of HTN, CHF?, COPD, HLD, PAD, BPH, Prostate CA (s/p radiation), Bipolar Disorder, IBD - Crohn's Disease, R ileostomy bag (s/p sigmoid resection). He was brought to ED due to bilateral leg swelling. For the past 2 days he has been having progressive swelling/ edema of both his legs with associated ambulatory dysfunction. He also started having episodes of shortness of breath and difficulty of breathing. He denies any fever, cough, chest pain, palpitation. When he came to the ED, he was saturating at 93-94% on room air. He was placed on nasal cannula. EKG was done showing NSR. Troponin was negative and BNP was mildly elevated at 398. CXR showed bilateral vascular congestion. Serum potassium was elevated at 6.1. He got a dose of lasix and was given Hyperkalemia cocktail in the ED. Of note, he had Hx of COVID in 2020 andwas vaccinated with COVID-19 x 3 (Pfizer) and Influenza in 2021. He was subsequently admitted for acute exacerbation of his heart failure.    [facility]: FULL CODE (28 Apr 2022 18:40)      # ACUTE HYPOXIC RESPIRATORY FAILURE SUSPECT S/T PULMONARY VASCULAR CONGESTION  # R/O CHF  # PAWAN ON CKD  # BPH  # HX OF PROSTATE CA S/P RADIATION  - PLACE ON TELEMETRY  - NOTED CXR  - LASIX  - F/U TSH  - F/U BLADDER SCAN, F/U UA  - STRICT IS AND OS  - F/U ECHOCARDIOGRAM  - NEPHROLOGY CONSULT, CARDIOLOGY CONSULT    # HYPERKALEMIA  - GIVEN LASIX, JOHNATHONKELMA  - EKG DONE  - NEPHROLOGY CONSULT    # CROHN'S DISEASE  # RIGHT ILEOSTOMY BAG S/P SIGMOID RESECTION  - MONITORING OUTPUT    # NORMOCYTIC ANEMIA    # HTN  # COPD  # HLD  # PAD  # BIPOLAR DX  # GI AND DVT PPX .     Patient is a 71y old  Male who presents with a chief complaint of acute CHF exacerbation (29 Apr 2022 10:08)    PATIENT IS SEEN AND EXAMINED IN MEDICAL FLOOR    ALLERGIES:  No Known Allergies      Daily Height in cm: 177.8 (28 Apr 2022 12:41)    Daily     VITALS:    Vital Signs Last 24 Hrs  T(C): 36.8 (29 Apr 2022 05:13), Max: 37.7 (28 Apr 2022 12:41)  T(F): 98.3 (29 Apr 2022 05:13), Max: 99.8 (28 Apr 2022 12:41)  HR: 71 (29 Apr 2022 05:13) (71 - 88)  BP: 111/47 (29 Apr 2022 05:13) (108/54 - 144/72)  BP(mean): --  RR: 18 (29 Apr 2022 05:13) (18 - 20)  SpO2: 96% (29 Apr 2022 05:13) (92% - 97%)    LABS:    CBC Full  -  ( 29 Apr 2022 07:00 )  WBC Count : 7.98 K/uL  RBC Count : 3.95 M/uL  Hemoglobin : 10.4 g/dL  Hematocrit : 32.6 %  Platelet Count - Automated : 178 K/uL  Mean Cell Volume : 82.5 fl  Mean Cell Hemoglobin : 26.3 pg  Mean Cell Hemoglobin Concentration : 31.9 gm/dL  Auto Neutrophil # : 6.51 K/uL  Auto Lymphocyte # : 0.54 K/uL  Auto Monocyte # : 0.62 K/uL  Auto Eosinophil # : 0.21 K/uL  Auto Basophil # : 0.04 K/uL  Auto Neutrophil % : 81.5 %  Auto Lymphocyte % : 6.8 %  Auto Monocyte % : 7.8 %  Auto Eosinophil % : 2.6 %  Auto Basophil % : 0.5 %    PT/INR - ( 29 Apr 2022 07:00 )   PT: 14.1 sec;   INR: 1.18 ratio         PTT - ( 29 Apr 2022 07:00 )  PTT:25.9 sec  04-29    142  |  114<H>  |  90<H>  ----------------------------<  91  4.7   |  17<L>  |  3.54<H>    Ca    10.2      29 Apr 2022 07:00  Phos  5.0     04-29  Mg     2.0     04-29    TPro  7.7  /  Alb  3.4<L>  /  TBili  1.2  /  DBili  0.4<H>  /  AST  257<H>  /  ALT  111<H>  /  AlkPhos  192<H>  04-29    CAPILLARY BLOOD GLUCOSE            LIVER FUNCTIONS - ( 29 Apr 2022 07:00 )  Alb: 3.4 g/dL / Pro: 7.7 g/dL / ALK PHOS: 192 U/L / ALT: 111 U/L DA / AST: 257 U/L / GGT: x           Creatinine Trend: 3.54<--, 3.43<--, 3.28<--  I&O's Summary    28 Apr 2022 07:01  -  29 Apr 2022 07:00  --------------------------------------------------------  IN: 0 mL / OUT: 400 mL / NET: -400 mL    29 Apr 2022 07:01  -  29 Apr 2022 10:29  --------------------------------------------------------  IN: 0 mL / OUT: 300 mL / NET: -300 mL                MEDICATIONS:    MEDICATIONS  (STANDING):  ARIPiprazole 15 milliGRAM(s) Oral daily  atorvastatin 10 milliGRAM(s) Oral at bedtime  calcitriol   Capsule 0.25 MICROGram(s) Oral daily  ferrous    sulfate 325 milliGRAM(s) Oral daily  finasteride 5 milliGRAM(s) Oral daily  furosemide   Injectable 40 milliGRAM(s) IV Push daily  gabapentin 600 milliGRAM(s) Oral two times a day  heparin   Injectable 5000 Unit(s) SubCutaneous every 8 hours  lamoTRIgine 200 milliGRAM(s) Oral daily  midodrine. 2.5 milliGRAM(s) Oral three times a day  pantoprazole    Tablet 40 milliGRAM(s) Oral before breakfast  polyethylene glycol 3350 17 Gram(s) Oral daily  senna 2 Tablet(s) Oral at bedtime  sodium bicarbonate 650 milliGRAM(s) Oral three times a day  tamsulosin 0.4 milliGRAM(s) Oral at bedtime  traZODone 25 milliGRAM(s) Oral at bedtime      MEDICATIONS  (PRN):      REVIEW OF SYSTEMS:                           ALL ROS DONE [ X   ]    CONSTITUTIONAL:  LETHARGIC [   ], FEVER [   ], UNRESPONSIVE [   ]  CVS:  CP  [   ], SOB, [   ], PALPITATIONS [   ], DIZZYNESS [   ]  RS: COUGH [   ], SPUTUM [   ]  GI: ABDOMINAL PAIN [   ], NAUSEA [   ], VOMITINGS [   ], DIARRHEA [   ], CONSTIPATION [   ]  :  DYSURIA [   ], NOCTURIA [   ], INCREASED FREQUENCY [   ], DRIBLING [   ],  SKELETAL: PAINFUL JOINTS [   ], SWOLLEN JOINTS [   ], NECK ACHE [   ], LOW BACK ACHE [   ],  SKIN : ULCERS [   ], RASH [   ], ITCHING [   ]  CNS: HEAD ACHE [   ], DOUBLE VISION [   ], BLURRED VISION [   ], AMS / CONFUSION [   ], SEIZURES [   ], WEAKNESS [   ],TINGLING / NUMBNESS [   ]    PHYSICAL EXAMINATION:  GENERAL APPEARANCE: NO DISTRESS  HEENT:  NO PALLOR, NO  JVD,  NO   NODES, NECK SUPPLE  CVS: S1 +, S2 +,   RS: AEEB,  OCCASIONAL  RALES +,   NO RONCHI  ABD: SOFT, NT, NO, BS +   ILEOSTOMY [LOOSE BROWN STOOL]  EXT: NO PE  SKIN: WARM,   SKELETAL:  ROM ACCEPTABLE  CNS:  AAO X 3    RADIOLOGY :    ACC: [id number] EXAM:  XR CHEST PORTABLE URGENT 1V                          PROCEDURE DATE:  04/28/2022          INTERPRETATION:  Clinical history: 71-year-old male, chest pain.    Portable/expiratory view of the chest is compared to 20 and demonstrates   a normal cardiac silhouette with no lobar consolidation, gross effusion,   pneumothorax or acute osseous finding.    Bilateral lower lobe patchy opacities consistent with infiltrates/areas   of atelectasis in the correct clinical context, increased. Elevated right   hemidiaphragm are evident.    IMPRESSION:    Bilateral lower lobe interstitial infiltrates/platelike atelectasis,   increased      ASSESSMENT :     Hyperkalemia    No pertinent past medical history    COPD, mild    Anemia    Bipolar disorder    IBD (inflammatory bowel disease)    HTN (hypertension)    HLD (hyperlipidemia)    PAD (peripheral artery disease)    CKD (chronic kidney disease)    Prostate CA    BPH with urinary obstruction    No significant past surgical history    History of creation of ostomy        PLAN:  HPI:  Patient is a 71M from [facility], who is ambulatory at baseline with a walker. He has Hx of HTN, CHF?, COPD, HLD, PAD, BPH, Prostate CA (s/p radiation), Bipolar Disorder, IBD - Crohn's Disease, R ileostomy bag (s/p sigmoid resection). He was brought to ED due to bilateral leg swelling. For the past 2 days he has been having progressive swelling/ edema of both his legs with associated ambulatory dysfunction. He also started having episodes of shortness of breath and difficulty of breathing. He denies any fever, cough, chest pain, palpitation. When he came to the ED, he was saturating at 93-94% on room air. He was placed on nasal cannula. EKG was done showing NSR. Troponin was negative and BNP was mildly elevated at 398. CXR showed bilateral vascular congestion. Serum potassium was elevated at 6.1. He got a dose of lasix and was given Hyperkalemia cocktail in the ED. Of note, he had Hx of COVID in 2020 andwas vaccinated with COVID-19 x 3 (Pfizer) and Influenza in 2021. He was subsequently admitted for acute exacerbation of his heart failure.    [facility]: FULL CODE (28 Apr 2022 18:40)      # ACUTE HYPOXIC RESPIRATORY FAILURE SUSPECT S/T PULMONARY VASCULAR CONGESTION  # RULED OUT CHF CHF  # PAWAN ON CKD  # BPH  # HX OF PROSTATE CA S/P RADIATION  - PLACE ON TELEMETRY  - NOTED CXR  - LASIX  - F/U TSH  - F/U BLADDER SCAN, F/U UA  - STRICT IS AND OS  - ECHOCARDIOGRAM - NORMAL LVEF, NORMAL DIASTOLIC FUNCTION  - NEPHROLOGY CONSULT, CARDIOLOGY CONSULT    # HYPERKALEMIA - RESOLVED  - GIVEN LASIX, LOKELMA  - EKG DONE  - NEPHROLOGY CONSULT    # CROHN'S DISEASE  # RIGHT ILEOSTOMY BAG S/P SIGMOID RESECTION  - MONITORING OUTPUT    # NORMOCYTIC ANEMIA    # HTN  # COPD  # HLD  # PAD  # BIPOLAR DX  # GI AND DVT PPX .

## 2022-04-29 NOTE — PROGRESS NOTE ADULT - PROBLEM SELECTOR PLAN 7
Hx of HLD  home med - lipitor  f/u A1c, lipid profile  resume home med Lipid panel WNL   continue meds   monitor LFTs

## 2022-04-29 NOTE — PROGRESS NOTE ADULT - SUBJECTIVE AND OBJECTIVE BOX
-*-* incomplete  NP Note discussed with  primary attending    Patient is a 71y old  Male who presents with a chief complaint of acute CHF exacerbation (2022 10:28)      INTERVAL HPI/OVERNIGHT EVENTS: admits to "feeling better"     MEDICATIONS  (STANDING):  ARIPiprazole 15 milliGRAM(s) Oral daily  atorvastatin 10 milliGRAM(s) Oral at bedtime  calcitriol   Capsule 0.25 MICROGram(s) Oral daily  ferrous    sulfate 325 milliGRAM(s) Oral daily  finasteride 5 milliGRAM(s) Oral daily  furosemide   Injectable 40 milliGRAM(s) IV Push daily  gabapentin 600 milliGRAM(s) Oral two times a day  heparin   Injectable 5000 Unit(s) SubCutaneous every 8 hours  lamoTRIgine 200 milliGRAM(s) Oral daily  midodrine. 2.5 milliGRAM(s) Oral three times a day  pantoprazole    Tablet 40 milliGRAM(s) Oral before breakfast  polyethylene glycol 3350 17 Gram(s) Oral daily  senna 2 Tablet(s) Oral at bedtime  sodium bicarbonate 650 milliGRAM(s) Oral three times a day  tamsulosin 0.4 milliGRAM(s) Oral at bedtime  traZODone 25 milliGRAM(s) Oral at bedtime    MEDICATIONS  (PRN):      __________________________________________________  REVIEW OF SYSTEMS:    CONSTITUTIONAL: No fever,   EYES: no acute visual disturbances  NECK: No pain or stiffness  RESPIRATORY: No cough; + shortness of breath  CARDIOVASCULAR: No chest pain, no palpitations  GASTROINTESTINAL: No pain. No nausea or vomiting; No diarrhea   NEUROLOGICAL: No headache or numbness, no tremors  MUSCULOSKELETAL: No joint pain, no muscle pain  GENITOURINARY: no dysuria, no frequency, no hesitancy  PSYCHIATRY: no depression , no anxiety  ALL OTHER  ROS negative        Vital Signs Last 24 Hrs  T(C): 36.8 (2022 05:13), Max: 37.7 (2022 12:41)  T(F): 98.3 (2022 05:13), Max: 99.8 (2022 12:41)  HR: 87 (2022 10:32) (71 - 88)  BP: 111/47 (2022 05:13) (108/54 - 144/72)  BP(mean): --  RR: 18 (2022 05:13) (18 - 20)  SpO2: 97% (2022 10:32) (92% - 97%)    ________________________________________________  PHYSICAL EXAM:  GENERAL: NAD  HEENT: Normocephalic;  conjunctivae and sclerae clear;  2L N/C   NECK : supple  CHEST/LUNG: Clear B/L   HEART: S1 S2  regular; no murmurs, gallops or rubs  ABDOMEN: + colostomy, +distended nontender soft   EXTREMITIES: +2 pitting edema   SKIN: warm and dry; no rash  NERVOUS SYSTEM:  Awake and alert; Oriented  to place, person and time     _________________________________________________  LABS:                        10.4   7.98  )-----------( 178      ( 2022 07:00 )             32.6         142  |  114<H>  |  90<H>  ----------------------------<  91  4.7   |  17<L>  |  3.54<H>    Ca    10.2      2022 07:00  Phos  5.0       Mg     2.0         TPro  7.7  /  Alb  3.4<L>  /  TBili  1.2  /  DBili  0.4<H>  /  AST  257<H>  /  ALT  111<H>  /  AlkPhos  192<H>      PT/INR - ( 2022 07:00 )   PT: 14.1 sec;   INR: 1.18 ratio         PTT - ( 2022 07:00 )  PTT:25.9 sec  Urinalysis Basic - ( 2022 07:41 )    Color: Yellow / Appearance: Clear / S.010 / pH: x  Gluc: x / Ketone: Negative  / Bili: Negative / Urobili: Negative   Blood: x / Protein: Negative / Nitrite: Negative   Leuk Esterase: Negative / RBC: 0-2 /HPF / WBC 0-2 /HPF   Sq Epi: x / Non Sq Epi: Few /HPF / Bacteria: Few /HPF      CAPILLARY BLOOD GLUCOSE      RADIOLOGY & ADDITIONAL TESTS:  pending final read     Plan of care was discussed with patient and /or primary care giver; all questions and concerns were addressed and care was aligned with patient's wishes.

## 2022-04-29 NOTE — PROGRESS NOTE ADULT - PROBLEM SELECTOR PLAN 8
Hx of Crohn disease  (+) ileostomy, R  denies any diarrhea, constipation Hx of Crohn disease  (+) ileostomy, R

## 2022-04-29 NOTE — CONSULT NOTE ADULT - SUBJECTIVE AND OBJECTIVE BOX
NEPHROLOGY MEDICAL CARE, [facility] - Dr. Emerson Pond/ Dr. Nallely Curran/ Dr. Kirby Angel/ Dr. Pippa Reis    Patient was seen and examined at bedside.     Consultation requested by:  Mimi Rush    Reason for Consult: katarzyna on ckd    HPI:  Patient is a 71M from [facility], who is ambulatory at baseline with a walker. He has Hx of HTN, CHF, COPD, HLD, PAD, BPH, Prostate CA (s/p radiation), Bipolar Disorder, IBD - Crohn's Disease, R ileostomy bag (s/p sigmoid resection), CKD came to ED due to bilateral leg swelling. For the past 2 days he has been having progressive swelling/ edema of both his legs with associated ambulatory dysfunction. He also started having episodes of shortness of breath and difficulty of breathing. Patient stopped taking the water since it was making him urinating to much and he was urinating on himself. He denies any fever, cough, chest pain, palpitation. When he came to the ED, he was saturating at 93-94% on room air. He was placed on nasal cannula. EKG was done showing NSR. Troponin was negative and BNP was mildly elevated at 398. CXR showed bilateral vascular congestion. Serum potassium was elevated at 6.1. He got a dose of lasix and was given Hyperkalemia cocktail in the ED. Of note, he had Hx of COVID in  andwas vaccinated with COVID-19 x 3 (Pfizer) and Influenza in . He was subsequently admitted for acute exacerbation of his heart failure.  Patient was admitted with scr around 3.5mg/dL which is his baseline 3.2mg/dL in 2020. Of note, patient had ATN in Feb requiring few session of HD then had renal recovery.     GOC: FULL CODE (2022 18:40)      PMH:   No pertinent past medical history    COPD, mild    Anemia    Bipolar disorder    IBD (inflammatory bowel disease)    HTN (hypertension)    HLD (hyperlipidemia)    PAD (peripheral artery disease)    CKD (chronic kidney disease)    Prostate CA    BPH with urinary obstruction        PSH:   History of creation of ostomy        FAMILY HISTORY:  FH: HTN (hypertension) (Father)        Social History:  non-smoker/ non-alcoholic     Home Meds:  Home Medications:  ARIPiprazole 15 mg oral tablet: 1 tab(s) orally once a day (2022 17:55)  atorvastatin 10 mg oral tablet: 1 tab(s) orally once a day (2022 17:55)  calcitriol 0.25 mcg oral capsule: 1 cap(s) orally once a day (2022 17:55)  epoetin anoop: 6000 unit(s) subcutaneous 3 times a week,  (2022 17:55)  ferrous sulfate 325 mg (65 mg elemental iron) oral tablet: 1 tab(s) orally once a day (2022 17:55)  Flomax 0.4 mg oral capsule: 1 cap(s) orally once a day (2022 17:55)  gabapentin 600 mg oral tablet: 1 tab(s) orally 2 times a day (2022 17:55)  halobetasol 0.05% topical cream: Apply topically to affected area once a day (2022 17:55)  lamoTRIgine 200 mg oral tablet: 1 tab(s) orally once a day (2022 17:55)  Lasix 20 mg oral tablet: 1 tab(s) orally once a day (2022 17:55)  Proscar 5 mg oral tablet: 1 tab(s) orally once a day (2022 17:55)  Protonix 40 mg oral delayed release tablet: 1 tab(s) orally once a day (2022 17:55)  traZODone 50 mg oral tablet: 0.5 tab(s) orally once a day (at bedtime) (2022 17:55)      Allergies:  Allergies    No Known Allergies    Intolerances        REVIEW OF SYSTEMS:  CONSTITUTIONAL: No fever; No weight loss; No fatigue  EYES: No eye pain; No visual disturbances; No discharge  ENMT:  No difficulty hearing; No tinnitus;  No vertigo; No sinus; No throat pain  NECK: No pain; No stiffness  BREASTS: No pain; No masses; No nipple discharge  RESPIRATORY: No cough; No wheezing; No chills; No hemoptysis; shortness of breath  CARDIOVASCULAR: No chest pain; No palpitations; No dizziness; leg swelling  GASTROINTESTINAL: No abdominal pain; No epigastric pain; No nausea; No vomiting; No hematemesis; No diarrhea; No constipation. No melena   GENITOURINARY: No dysuria No frequency; No hematuria; No incontinence  NEUROLOGICAL: No headaches; No memory loss; No loss of strength; No numbness; No tremors  SKIN: No itching; No burning; No rashes  ENDOCRINE: No heat or cold intolerance; No hair loss  MUSCULOSKELETAL: No joint pain or swelling; No muscle, back, or extremity pain  PSYCHIATRIC: No depression; No anxiety; No mood swings; No difficulty sleeping  HEME/LYMPH: No easy bruising; No bleeding gums  ALLERY AND IMMUNOLOGIC: No hives or eczema    Vital Signs Last 24 Hrs  T(C): 36.4 (:00), Max: 37.6 (2022 20:05)  T(F): 97.6 (:00), Max: 99.7 (2022 20:05)  HR: 85 () (71 - 87)  BP: 126/54 (:) (108/54 - 144/72)  BP(mean): --  RR: 18 (:) (18 - 20)  SpO2: 97% (:) (94% - 97%)     @ 07: @ 07:00  --------------------------------------------------------  IN: 0 mL / OUT: 400 mL / NET: -400 mL     @ 07: @ 12:54  --------------------------------------------------------  IN: 0 mL / OUT: 300 mL / NET: -300 mL          PHYSICAL EXAM:  General: No acute respiratory distress.  Eyes: conjunctiva and sclera clear  ENMT: Atraumatic, Normocephalic, supple, No JVD present. Moist mucous membranes  Respiratory: Bilateral decreased BS and no rhonchi, wheezing  Cardiovascular: S1S2+; no m/r/g  Gastrointestinal: Soft, Non-tender, Nondistended; Bowel sounds present, no hepatosplenomegaly.   Neuro:  Awake, Alert & Oriented X3, No focal deficits present.   Ext:  2+ Peripheral Pulses and b/l 2+ pedal edema, No Cyanosis  Skin: No visible rashes        LABS:                        10.4   7.98  )-----------( 178      ( 2022 07:00 )             32.6         142  |  114<H>  |  90<H>  ----------------------------<  91  4.7   |  17<L>  |  3.54<H>    Ca    10.2      2022 07:00  Phos  5.0       Mg     2.0         TPro  7.7  /  Alb  3.4<L>  /  TBili  1.2  /  DBili  0.4<H>  /  AST  257<H>  /  ALT  111<H>  /  AlkPhos  192<H>      PT/INR - ( 2022 07:00 )   PT: 14.1 sec;   INR: 1.18 ratio         PTT - ( 2022 07:00 )  PTT:25.9 sec  Urinalysis Basic - ( 2022 07:41 )    Color: Yellow / Appearance: Clear / S.010 / pH: x  Gluc: x / Ketone: Negative  / Bili: Negative / Urobili: Negative   Blood: x / Protein: Negative / Nitrite: Negative   Leuk Esterase: Negative / RBC: 0-2 /HPF / WBC 0-2 /HPF   Sq Epi: x / Non Sq Epi: Few /HPF / Bacteria: Few /HPF      Magnesium, Serum: 2.0 mg/dL ( @ 07:00)  Phosphorus Level, Serum: 5.0 mg/dL *H* ( @ 07:00)  Magnesium, Serum: 1.8 mg/dL ( @ 16:37)    Urine studies      Medications:  MEDICATIONS  (STANDING):  ARIPiprazole 15 milliGRAM(s) Oral daily  atorvastatin 10 milliGRAM(s) Oral at bedtime  calcitriol   Capsule 0.25 MICROGram(s) Oral daily  ferrous    sulfate 325 milliGRAM(s) Oral daily  finasteride 5 milliGRAM(s) Oral daily  furosemide   Injectable 40 milliGRAM(s) IV Push daily  gabapentin 600 milliGRAM(s) Oral two times a day  heparin   Injectable 5000 Unit(s) SubCutaneous every 8 hours  lamoTRIgine 200 milliGRAM(s) Oral daily  midodrine. 2.5 milliGRAM(s) Oral three times a day  pantoprazole    Tablet 40 milliGRAM(s) Oral before breakfast  polyethylene glycol 3350 17 Gram(s) Oral daily  senna 2 Tablet(s) Oral at bedtime  sodium bicarbonate 650 milliGRAM(s) Oral three times a day  tamsulosin 0.4 milliGRAM(s) Oral at bedtime  traZODone 25 milliGRAM(s) Oral at bedtime    MEDICATIONS  (PRN):           NEPHROLOGY MEDICAL CARE, [facility] - Dr. Emerson Pond/ Dr. Nallely Curran/ Dr. Kirby Angel/ Dr. Pippa Reis    Patient was seen and examined at bedside.     Consultation requested by:  Mimi Rush    Reason for Consult: katarzyna on ckd    HPI:  Patient is a 71M from [facility], who is ambulatory at baseline with a walker. He has Hx of HTN, CHF?, COPD, HLD, PAD, BPH, Prostate CA (s/p radiation), Bipolar Disorder, IBD - Crohn's Disease, R ileostomy bag (s/p sigmoid resection), CKD came to ED due to bilateral leg swelling. For the past 2 days he has been having progressive swelling/ edema of both his legs with associated ambulatory dysfunction. He also started having episodes of shortness of breath and difficulty of breathing. Patient stopped taking the water since it was making him urinating to much and he was urinating on himself. He denies any fever, cough, chest pain, palpitation. When he came to the ED, he was saturating at 93-94% on room air. He was placed on nasal cannula. EKG was done showing NSR. Troponin was negative and BNP was mildly elevated at 398. CXR showed bilateral vascular congestion. Serum potassium was elevated at 6.1. He got a dose of lasix and was given Hyperkalemia cocktail in the ED. Of note, he had Hx of COVID in  andwas vaccinated with COVID-19 x 3 (Pfizer) and Influenza in . He was subsequently admitted for acute exacerbation of his heart failure.  Patient was admitted with scr around 3.5mg/dL which is his baseline 3.2mg/dL in 2020. Of note, patient had ATN in Feb requiring few session of HD then had renal recovery.     GOC: FULL CODE (2022 18:40)      PMH:   No pertinent past medical history    COPD, mild    Anemia    Bipolar disorder    IBD (inflammatory bowel disease)    HTN (hypertension)    HLD (hyperlipidemia)    PAD (peripheral artery disease)    CKD (chronic kidney disease)    Prostate CA    BPH with urinary obstruction        PSH:   History of creation of ostomy        FAMILY HISTORY:  FH: HTN (hypertension) (Father)        Social History:  non-smoker/ non-alcoholic     Home Meds:  Home Medications:  ARIPiprazole 15 mg oral tablet: 1 tab(s) orally once a day (2022 17:55)  atorvastatin 10 mg oral tablet: 1 tab(s) orally once a day (2022 17:55)  calcitriol 0.25 mcg oral capsule: 1 cap(s) orally once a day (2022 17:55)  epoetin anoop: 6000 unit(s) subcutaneous 3 times a week,  (2022 17:55)  ferrous sulfate 325 mg (65 mg elemental iron) oral tablet: 1 tab(s) orally once a day (2022 17:55)  Flomax 0.4 mg oral capsule: 1 cap(s) orally once a day (2022 17:55)  gabapentin 600 mg oral tablet: 1 tab(s) orally 2 times a day (2022 17:55)  halobetasol 0.05% topical cream: Apply topically to affected area once a day (2022 17:55)  lamoTRIgine 200 mg oral tablet: 1 tab(s) orally once a day (2022 17:55)  Lasix 20 mg oral tablet: 1 tab(s) orally once a day (2022 17:55)  Proscar 5 mg oral tablet: 1 tab(s) orally once a day (2022 17:55)  Protonix 40 mg oral delayed release tablet: 1 tab(s) orally once a day (2022 17:55)  traZODone 50 mg oral tablet: 0.5 tab(s) orally once a day (at bedtime) (2022 17:55)      Allergies:  Allergies    No Known Allergies    Intolerances        REVIEW OF SYSTEMS:  CONSTITUTIONAL: No fever; No weight loss; No fatigue  EYES: No eye pain; No visual disturbances; No discharge  ENMT:  No difficulty hearing; No tinnitus;  No vertigo; No sinus; No throat pain  NECK: No pain; No stiffness  BREASTS: No pain; No masses; No nipple discharge  RESPIRATORY: No cough; No wheezing; No chills; No hemoptysis; shortness of breath  CARDIOVASCULAR: No chest pain; No palpitations; No dizziness; leg swelling  GASTROINTESTINAL: No abdominal pain; No epigastric pain; No nausea; No vomiting; No hematemesis; No diarrhea; No constipation. No melena   GENITOURINARY: No dysuria No frequency; No hematuria; No incontinence  NEUROLOGICAL: No headaches; No memory loss; No loss of strength; No numbness; No tremors  SKIN: No itching; No burning; No rashes  ENDOCRINE: No heat or cold intolerance; No hair loss  MUSCULOSKELETAL: No joint pain or swelling; No muscle, back, or extremity pain  PSYCHIATRIC: No depression; No anxiety; No mood swings; No difficulty sleeping  HEME/LYMPH: No easy bruising; No bleeding gums  ALLERY AND IMMUNOLOGIC: No hives or eczema    Vital Signs Last 24 Hrs  T(C): 36.4 (:00), Max: 37.6 (2022 20:05)  T(F): 97.6 (:), Max: 99.7 (2022 20:05)  HR: 85 () (71 - 87)  BP: 126/54 (:00) (108/54 - 144/72)  BP(mean): --  RR: 18 (:) (18 - 20)  SpO2: 97% (:) (94% - 97%)     @ :  -   @ 07:00  --------------------------------------------------------  IN: 0 mL / OUT: 400 mL / NET: -400 mL     @ 07: @ 12:54  --------------------------------------------------------  IN: 0 mL / OUT: 300 mL / NET: -300 mL          PHYSICAL EXAM:  General: No acute respiratory distress.  Eyes: conjunctiva and sclera clear  ENMT: Atraumatic, Normocephalic, supple, No JVD present. Moist mucous membranes  Respiratory: Bilateral decreased BS and no rhonchi, wheezing  Cardiovascular: S1S2+; no m/r/g  Gastrointestinal: Soft, Non-tender, Nondistended; Bowel sounds present, no hepatosplenomegaly.   Neuro:  Awake, Alert & Oriented X3, No focal deficits present.   Ext:  2+ Peripheral Pulses and b/l 2+ pedal edema, No Cyanosis  Skin: No visible rashes        LABS:                        10.4   7.98  )-----------( 178      ( 2022 07:00 )             32.6         142  |  114<H>  |  90<H>  ----------------------------<  91  4.7   |  17<L>  |  3.54<H>    Ca    10.2      2022 07:00  Phos  5.0       Mg     2.0         TPro  7.7  /  Alb  3.4<L>  /  TBili  1.2  /  DBili  0.4<H>  /  AST  257<H>  /  ALT  111<H>  /  AlkPhos  192<H>      PT/INR - ( 2022 07:00 )   PT: 14.1 sec;   INR: 1.18 ratio         PTT - ( 2022 07:00 )  PTT:25.9 sec  Urinalysis Basic - ( 2022 07:41 )    Color: Yellow / Appearance: Clear / S.010 / pH: x  Gluc: x / Ketone: Negative  / Bili: Negative / Urobili: Negative   Blood: x / Protein: Negative / Nitrite: Negative   Leuk Esterase: Negative / RBC: 0-2 /HPF / WBC 0-2 /HPF   Sq Epi: x / Non Sq Epi: Few /HPF / Bacteria: Few /HPF      Magnesium, Serum: 2.0 mg/dL ( @ 07:00)  Phosphorus Level, Serum: 5.0 mg/dL *H* ( @ 07:00)  Magnesium, Serum: 1.8 mg/dL ( @ 16:37)    Urine studies      Medications:  MEDICATIONS  (STANDING):  ARIPiprazole 15 milliGRAM(s) Oral daily  atorvastatin 10 milliGRAM(s) Oral at bedtime  calcitriol   Capsule 0.25 MICROGram(s) Oral daily  ferrous    sulfate 325 milliGRAM(s) Oral daily  finasteride 5 milliGRAM(s) Oral daily  furosemide   Injectable 40 milliGRAM(s) IV Push daily  gabapentin 600 milliGRAM(s) Oral two times a day  heparin   Injectable 5000 Unit(s) SubCutaneous every 8 hours  lamoTRIgine 200 milliGRAM(s) Oral daily  midodrine. 2.5 milliGRAM(s) Oral three times a day  pantoprazole    Tablet 40 milliGRAM(s) Oral before breakfast  polyethylene glycol 3350 17 Gram(s) Oral daily  senna 2 Tablet(s) Oral at bedtime  sodium bicarbonate 650 milliGRAM(s) Oral three times a day  tamsulosin 0.4 milliGRAM(s) Oral at bedtime  traZODone 25 milliGRAM(s) Oral at bedtime    MEDICATIONS  (PRN):

## 2022-04-29 NOTE — PROGRESS NOTE ADULT - PROBLEM SELECTOR PLAN 3
p/w K - 6.1  could be in setting of CKD  EKG - NSR, low voltage criteria for LVH (no peak T waves)  given Hyperkalemia cocktail in ED (lokelma, calcium gluconate)  monitor BMP K+ 6.1 on admission   S/P hyperkalemia cocktail  could be in setting of CKD  EKG - NSR, low voltage criteria for LVH (no peak T waves)  monitor BMP  nephro consult

## 2022-04-30 NOTE — CHART NOTE - NSCHARTNOTEFT_GEN_A_CORE
Permission obtained from pt to discuss his clinical condition to his brother Alexey Gustafson ([phone]).    Contacted Alexey Gustafson and updated on pt's clinical condition and plan of care.   All questions and concerns addressed.       Delia Simms, NP Medicine  [phone]

## 2022-04-30 NOTE — PROGRESS NOTE ADULT - ASSESSMENT
CARDIOLOGY ATTENDING    Agree with above. Unremarkable echo and low BNP are inconsistent with CHF. F/U renal, no further inpatient cardiac workup expected.

## 2022-04-30 NOTE — PROGRESS NOTE ADULT - SUBJECTIVE AND OBJECTIVE BOX
pt seen and examined, no complaints, ROS - .     ARIPiprazole 15 milliGRAM(s) Oral daily  ferrous    sulfate 325 milliGRAM(s) Oral daily  finasteride 5 milliGRAM(s) Oral daily  furosemide   Injectable 40 milliGRAM(s) IV Push two times a day  gabapentin 600 milliGRAM(s) Oral two times a day  heparin   Injectable 5000 Unit(s) SubCutaneous every 8 hours  lamoTRIgine 200 milliGRAM(s) Oral daily  midodrine. 2.5 milliGRAM(s) Oral three times a day  pantoprazole    Tablet 40 milliGRAM(s) Oral before breakfast  polyethylene glycol 3350 17 Gram(s) Oral daily  senna 2 Tablet(s) Oral at bedtime  sevelamer carbonate 800 milliGRAM(s) Oral three times a day with meals  sodium bicarbonate 650 milliGRAM(s) Oral three times a day  tamsulosin 0.4 milliGRAM(s) Oral at bedtime  traZODone 25 milliGRAM(s) Oral at bedtime                            10.4   7.98  )-----------( 178      ( 29 Apr 2022 07:00 )             32.6       Hemoglobin: 10.4 g/dL (04-29 @ 07:00)  Hemoglobin: 10.8 g/dL (04-28 @ 16:37)      04-29    142  |  114<H>  |  90<H>  ----------------------------<  91  4.7   |  17<L>  |  3.54<H>    Ca    10.2      29 Apr 2022 07:00  Phos  5.0     04-29  Mg     2.0     04-29    TPro  7.7  /  Alb  3.4<L>  /  TBili  1.2  /  DBili  0.4<H>  /  AST  257<H>  /  ALT  111<H>  /  AlkPhos  192<H>  04-29    Creatinine Trend: 3.54<--, 3.43<--, 3.28<--    COAGS:           T(C): 36.6 (04-30-22 @ 04:51), Max: 37.7 (04-30-22 @ 01:56)  HR: 75 (04-30-22 @ 04:51) (75 - 94)  BP: 107/52 (04-30-22 @ 04:51) (107/52 - 152/69)  RR: 18 (04-30-22 @ 04:51) (18 - 18)  SpO2: 95% (04-30-22 @ 04:51) (95% - 97%)  Wt(kg): --    I&O's Summary    28 Apr 2022 07:01  -  29 Apr 2022 07:00  --------------------------------------------------------  IN: 0 mL / OUT: 400 mL / NET: -400 mL    29 Apr 2022 07:01  -  30 Apr 2022 06:26  --------------------------------------------------------  IN: 0 mL / OUT: 850 mL / NET: -850 mL      Gen: Appears well in NAD  HEENT:  (-)icterus (-)pallor  CV: N S1 S2 1/6 MARCELLE (+)2 Pulses B/l  Resp:  Clear to ausculatation B/L, normal effort  GI: (+) BS Soft, NT, ND  Lymph:  (+)Edema, (-)obvious lymphadenopathy  Skin: Warm to touch, Normal turgor  Psych: Appropriate mood and affect      ECG:  	Sinus 84 BPM, LVH    RADIOLOGY:         CXR:   < from: Xray Chest 1 View-PORTABLE IMMEDIATE (Xray Chest 1 View-PORTABLE IMMEDIATE .) (04.06.20 @ 16:35) >  Increasing bibasilar interstitial infiltrates.    < end of copied text >    ECHO: < from: Transthoracic Echocardiogram (04.29.22 @ 07:17) >  ------------------------------------------------------------------------  CONCLUSIONS:  1. Normal mitral valve.  2. Normal trileaflet aortic valve. Mild aortic  regurgitation.  3. Aortic Root: 4.2 cm.    4. Normal left atrium.  LA volume index = 22 cc/m2.  5. Normal left ventricular internal dimensions and wall  thicknesses.  6. Normal Left Ventricular Systolic Function,  (EF = 55 to  60%)  7. Normal diastolic function.  8. Normal right atrium.  9. Normal right ventricular size and systolic function  (TAPSE  1.9cm).  10. Unable to estimate RVSP.  11. Normal tricuspid valve.  12. Normal pulmonic valve.  13. Trivial pericardial effusion is seen.    ------------------------------------------------------------------------  Confirmed on  4/29/2022 - 15:50:35 by Carmela Crump MD  ------------------------------------------------------------------------    < end of copied text >      ASSESSMENT/PLAN: 	71y Male  HTN, CHF?, COPD, HLD, PAD, BPH, Prostate CA (s/p radiation), Bipolar Disorder, IBD - Crohn's Disease, R ileostomy bag (s/p sigmoid resection) historically preserved LV and RV function, normal perfusion on a nuclear stress test 2020 He was brought to ED due to bilateral leg swelling    - Doubt this is sequale of CHF.  A low BNP suggests a low left ventricular end diastolic pressure  - would check prealbumin ? Third spacing.  ? protein loosing enteropathy as a result of Crohn's disease and/or surgical resection  - ECHO noted , normal LV sys fx .   - keep net negative   - monitor renal function, his edema is at least partially due ot renal disease           pt seen and examined, no complaints, ROS - .     ARIPiprazole 15 milliGRAM(s) Oral daily  ferrous    sulfate 325 milliGRAM(s) Oral daily  finasteride 5 milliGRAM(s) Oral daily  furosemide   Injectable 40 milliGRAM(s) IV Push two times a day  gabapentin 600 milliGRAM(s) Oral two times a day  heparin   Injectable 5000 Unit(s) SubCutaneous every 8 hours  lamoTRIgine 200 milliGRAM(s) Oral daily  midodrine. 2.5 milliGRAM(s) Oral three times a day  pantoprazole    Tablet 40 milliGRAM(s) Oral before breakfast  polyethylene glycol 3350 17 Gram(s) Oral daily  senna 2 Tablet(s) Oral at bedtime  sevelamer carbonate 800 milliGRAM(s) Oral three times a day with meals  sodium bicarbonate 650 milliGRAM(s) Oral three times a day  tamsulosin 0.4 milliGRAM(s) Oral at bedtime  traZODone 25 milliGRAM(s) Oral at bedtime                            10.4   7.98  )-----------( 178      ( 29 Apr 2022 07:00 )             32.6       Hemoglobin: 10.4 g/dL (04-29 @ 07:00)  Hemoglobin: 10.8 g/dL (04-28 @ 16:37)      04-29    142  |  114<H>  |  90<H>  ----------------------------<  91  4.7   |  17<L>  |  3.54<H>    Ca    10.2      29 Apr 2022 07:00  Phos  5.0     04-29  Mg     2.0     04-29    TPro  7.7  /  Alb  3.4<L>  /  TBili  1.2  /  DBili  0.4<H>  /  AST  257<H>  /  ALT  111<H>  /  AlkPhos  192<H>  04-29    Creatinine Trend: 3.54<--, 3.43<--, 3.28<--    COAGS:           T(C): 36.6 (04-30-22 @ 04:51), Max: 37.7 (04-30-22 @ 01:56)  HR: 75 (04-30-22 @ 04:51) (75 - 94)  BP: 107/52 (04-30-22 @ 04:51) (107/52 - 152/69)  RR: 18 (04-30-22 @ 04:51) (18 - 18)  SpO2: 95% (04-30-22 @ 04:51) (95% - 97%)  Wt(kg): --    I&O's Summary    28 Apr 2022 07:01  -  29 Apr 2022 07:00  --------------------------------------------------------  IN: 0 mL / OUT: 400 mL / NET: -400 mL    29 Apr 2022 07:01  -  30 Apr 2022 06:26  --------------------------------------------------------  IN: 0 mL / OUT: 850 mL / NET: -850 mL      Gen: Appears well in NAD  HEENT:  (-)icterus (-)pallor  CV: N S1 S2 1/6 MARCELLE (+)2 Pulses B/l  Resp:  Clear to ausculatation B/L, normal effort  GI: (+) BS Soft, NT, ND  Lymph:  (+)Edema, (-)obvious lymphadenopathy  Skin: Warm to touch, Normal turgor  Psych: Appropriate mood and affect      ECG:  	Sinus 84 BPM, LVH    RADIOLOGY:         CXR:   < from: Xray Chest 1 View-PORTABLE IMMEDIATE (Xray Chest 1 View-PORTABLE IMMEDIATE .) (04.06.20 @ 16:35) >  Increasing bibasilar interstitial infiltrates.    < end of copied text >    ECHO: < from: Transthoracic Echocardiogram (04.29.22 @ 07:17) >  ------------------------------------------------------------------------  CONCLUSIONS:  1. Normal mitral valve.  2. Normal trileaflet aortic valve. Mild aortic  regurgitation.  3. Aortic Root: 4.2 cm.    4. Normal left atrium.  LA volume index = 22 cc/m2.  5. Normal left ventricular internal dimensions and wall  thicknesses.  6. Normal Left Ventricular Systolic Function,  (EF = 55 to  60%)  7. Normal diastolic function.  8. Normal right atrium.  9. Normal right ventricular size and systolic function  (TAPSE  1.9cm).  10. Unable to estimate RVSP.  11. Normal tricuspid valve.  12. Normal pulmonic valve.  13. Trivial pericardial effusion is seen.    ------------------------------------------------------------------------  Confirmed on  4/29/2022 - 15:50:35 by Carmela Crump MD  ------------------------------------------------------------------------    < end of copied text >      ASSESSMENT/PLAN: 	71y Male  HTN, COPD, HLD, PAD, BPH, Prostate CA (s/p radiation), Bipolar Disorder, IBD - Crohn's Disease, R ileostomy bag (s/p sigmoid resection) historically preserved LV and RV function, normal perfusion on a nuclear stress test 2020 He was brought to ED due to bilateral leg swelling - echo unremarkable, BNP low.    - Doubt this is sequale of CHF.  A low BNP suggests a low left ventricular end diastolic pressure  - ECHO noted , normal LV sys fx .   - monitor renal function, his edema is at least partially due ot renal disease

## 2022-04-30 NOTE — PROGRESS NOTE ADULT - SUBJECTIVE AND OBJECTIVE BOX
`Patient is a 71y old  Male who presents with a chief complaint of acute CHF exacerbation (30 Apr 2022 06:25)    PATIENT IS SEEN AND EXAMINED IN MEDICAL FLOOR.  PETEY [    ]    ERASTO [   ]      GT [   ]    ALLERGIES:  No Known Allergies      Daily     Daily     VITALS:    Vital Signs Last 24 Hrs  T(C): 36.6 (30 Apr 2022 10:15), Max: 37.7 (30 Apr 2022 01:56)  T(F): 97.8 (30 Apr 2022 10:15), Max: 99.8 (30 Apr 2022 01:56)  HR: 84 (30 Apr 2022 10:15) (75 - 94)  BP: 128/60 (30 Apr 2022 10:15) (107/52 - 152/69)  BP(mean): --  RR: 18 (30 Apr 2022 10:15) (18 - 18)  SpO2: 97% (30 Apr 2022 10:15) (95% - 97%)    LABS:    CBC Full  -  ( 30 Apr 2022 06:49 )  WBC Count : 8.18 K/uL  RBC Count : 4.29 M/uL  Hemoglobin : 11.2 g/dL  Hematocrit : 36.0 %  Platelet Count - Automated : 188 K/uL  Mean Cell Volume : 83.9 fl  Mean Cell Hemoglobin : 26.1 pg  Mean Cell Hemoglobin Concentration : 31.1 gm/dL  Auto Neutrophil # : x  Auto Lymphocyte # : x  Auto Monocyte # : x  Auto Eosinophil # : x  Auto Basophil # : x  Auto Neutrophil % : x  Auto Lymphocyte % : x  Auto Monocyte % : x  Auto Eosinophil % : x  Auto Basophil % : x    PT/INR - ( 29 Apr 2022 07:00 )   PT: 14.1 sec;   INR: 1.18 ratio         PTT - ( 29 Apr 2022 07:00 )  PTT:25.9 sec  04-30    139  |  112<H>  |  87<H>  ----------------------------<  94  4.5   |  17<L>  |  3.71<H>    Ca    9.8      30 Apr 2022 06:49  Phos  5.0     04-29  Mg     2.0     04-29    TPro  8.2  /  Alb  3.4<L>  /  TBili  1.2  /  DBili  x   /  AST  242<H>  /  ALT  110<H>  /  AlkPhos  199<H>  04-30    CAPILLARY BLOOD GLUCOSE            LIVER FUNCTIONS - ( 30 Apr 2022 06:49 )  Alb: 3.4 g/dL / Pro: 8.2 g/dL / ALK PHOS: 199 U/L / ALT: 110 U/L DA / AST: 242 U/L / GGT: x           Creatinine Trend: 3.71<--, 3.54<--, 3.43<--, 3.28<--  I&O's Summary    29 Apr 2022 07:01  -  30 Apr 2022 07:00  --------------------------------------------------------  IN: 0 mL / OUT: 1150 mL / NET: -1150 mL                MEDICATIONS:    MEDICATIONS  (STANDING):  ARIPiprazole 15 milliGRAM(s) Oral daily  ferrous    sulfate 325 milliGRAM(s) Oral daily  finasteride 5 milliGRAM(s) Oral daily  furosemide   Injectable 40 milliGRAM(s) IV Push two times a day  gabapentin 600 milliGRAM(s) Oral two times a day  heparin   Injectable 5000 Unit(s) SubCutaneous every 8 hours  lamoTRIgine 200 milliGRAM(s) Oral daily  midodrine. 2.5 milliGRAM(s) Oral three times a day  pantoprazole    Tablet 40 milliGRAM(s) Oral before breakfast  polyethylene glycol 3350 17 Gram(s) Oral daily  senna 2 Tablet(s) Oral at bedtime  sevelamer carbonate 800 milliGRAM(s) Oral three times a day with meals  sodium bicarbonate 650 milliGRAM(s) Oral three times a day  tamsulosin 0.4 milliGRAM(s) Oral at bedtime  traZODone 25 milliGRAM(s) Oral at bedtime      MEDICATIONS  (PRN):      REVIEW OF SYSTEMS:                           ALL ROS DONE [ X   ]    CONSTITUTIONAL:  LETHARGIC [   ], FEVER [   ], UNRESPONSIVE [   ]  CVS:  CP  [   ], SOB, [   ], PALPITATIONS [   ], DIZZYNESS [   ]  RS: COUGH [   ], SPUTUM [   ]  GI: ABDOMINAL PAIN [   ], NAUSEA [   ], VOMITINGS [   ], DIARRHEA [   ], CONSTIPATION [   ]  :  DYSURIA [   ], NOCTURIA [   ], INCREASED FREQUENCY [   ], DRIBLING [   ],  SKELETAL: PAINFUL JOINTS [   ], SWOLLEN JOINTS [   ], NECK ACHE [   ], LOW BACK ACHE [   ],  SKIN : ULCERS [   ], RASH [   ], ITCHING [   ]  CNS: HEAD ACHE [   ], DOUBLE VISION [   ], BLURRED VISION [   ], AMS / CONFUSION [   ], SEIZURES [   ], WEAKNESS [   ],TINGLING / NUMBNESS [   ]    PHYSICAL EXAMINATION:  GENERAL APPEARANCE: NO DISTRESS  HEENT:  NO PALLOR, NO  JVD,  NO   NODES, NECK SUPPLE  CVS: S1 +, S2 +,   RS: AEEB,  OCCASIONAL  RALES +,   NO RONCHI  ABD: SOFT, NT, NO, BS +   ILEOSTOMY [LOOSE BROWN STOOL]  EXT: NO PE  SKIN: WARM,   SKELETAL:  ROM ACCEPTABLE  CNS:  AAO X 3    RADIOLOGY :    ACC: [id number] EXAM:  XR CHEST PORTABLE URGENT 1V                          PROCEDURE DATE:  04/28/2022          INTERPRETATION:  Clinical history: 71-year-old male, chest pain.    Portable/expiratory view of the chest is compared to 20 and demonstrates   a normal cardiac silhouette with no lobar consolidation, gross effusion,   pneumothorax or acute osseous finding.    Bilateral lower lobe patchy opacities consistent with infiltrates/areas   of atelectasis in the correct clinical context, increased. Elevated right   hemidiaphragm are evident.    IMPRESSION:    Bilateral lower lobe interstitial infiltrates/platelike atelectasis,   increased      ASSESSMENT :     Hyperkalemia    No pertinent past medical history    COPD, mild    Anemia    Bipolar disorder    IBD (inflammatory bowel disease)    HTN (hypertension)    HLD (hyperlipidemia)    PAD (peripheral artery disease)    CKD (chronic kidney disease)    Prostate CA    BPH with urinary obstruction    No significant past surgical history    History of creation of ostomy        PLAN:  HPI:  Patient is a 71M from [facility], who is ambulatory at baseline with a walker. He has Hx of HTN, CHF?, COPD, HLD, PAD, BPH, Prostate CA (s/p radiation), Bipolar Disorder, IBD - Crohn's Disease, R ileostomy bag (s/p sigmoid resection). He was brought to ED due to bilateral leg swelling. For the past 2 days he has been having progressive swelling/ edema of both his legs with associated ambulatory dysfunction. He also started having episodes of shortness of breath and difficulty of breathing. He denies any fever, cough, chest pain, palpitation. When he came to the ED, he was saturating at 93-94% on room air. He was placed on nasal cannula. EKG was done showing NSR. Troponin was negative and BNP was mildly elevated at 398. CXR showed bilateral vascular congestion. Serum potassium was elevated at 6.1. He got a dose of lasix and was given Hyperkalemia cocktail in the ED. Of note, he had Hx of COVID in 2020 andwas vaccinated with COVID-19 x 3 (Pfizer) and Influenza in 2021. He was subsequently admitted for acute exacerbation of his heart failure.    GOC: FULL CODE (28 Apr 2022 18:40)      # ACUTE HYPOXIC RESPIRATORY FAILURE SUSPECT S/T PULMONARY VASCULAR CONGESTION  # RULED OUT CHF CHF  # PAWAN ON CKD  # BPH  # HX OF PROSTATE CA S/P RADIATION  - PLACE ON TELEMETRY  - NOTED CXR  - LASIX  - F/U TSH  - F/U BLADDER SCAN, F/U UA  - STRICT IS AND OS  - ECHOCARDIOGRAM - NORMAL LVEF, NORMAL DIASTOLIC FUNCTION  - NEPHROLOGY CONSULT, CARDIOLOGY CONSULT    # HYPERKALEMIA - RESOLVED  - GIVEN LASIX, LOKELMA  - EKG DONE  - NEPHROLOGY CONSULT    # CROHN'S DISEASE  # RIGHT ILEOSTOMY BAG S/P SIGMOID RESECTION  - MONITORING OUTPUT    # NORMOCYTIC ANEMIA    # HTN  # COPD  # HLD  # PAD  # BIPOLAR DX  # GI AND DVT PPX .   `Patient is a 71y old  Male who presents with a chief complaint of acute CHF exacerbation (30 Apr 2022 06:25)    PATIENT IS SEEN AND EXAMINED IN MEDICAL FLOOR.  PETEY [    ]    ERASTO [   ]      GT [   ]    ALLERGIES:  No Known Allergies      Daily     Daily     VITALS:    Vital Signs Last 24 Hrs  T(C): 36.6 (30 Apr 2022 10:15), Max: 37.7 (30 Apr 2022 01:56)  T(F): 97.8 (30 Apr 2022 10:15), Max: 99.8 (30 Apr 2022 01:56)  HR: 84 (30 Apr 2022 10:15) (75 - 94)  BP: 128/60 (30 Apr 2022 10:15) (107/52 - 152/69)  BP(mean): --  RR: 18 (30 Apr 2022 10:15) (18 - 18)  SpO2: 97% (30 Apr 2022 10:15) (95% - 97%)    LABS:    CBC Full  -  ( 30 Apr 2022 06:49 )  WBC Count : 8.18 K/uL  RBC Count : 4.29 M/uL  Hemoglobin : 11.2 g/dL  Hematocrit : 36.0 %  Platelet Count - Automated : 188 K/uL  Mean Cell Volume : 83.9 fl  Mean Cell Hemoglobin : 26.1 pg  Mean Cell Hemoglobin Concentration : 31.1 gm/dL  Auto Neutrophil # : x  Auto Lymphocyte # : x  Auto Monocyte # : x  Auto Eosinophil # : x  Auto Basophil # : x  Auto Neutrophil % : x  Auto Lymphocyte % : x  Auto Monocyte % : x  Auto Eosinophil % : x  Auto Basophil % : x    PT/INR - ( 29 Apr 2022 07:00 )   PT: 14.1 sec;   INR: 1.18 ratio         PTT - ( 29 Apr 2022 07:00 )  PTT:25.9 sec  04-30    139  |  112<H>  |  87<H>  ----------------------------<  94  4.5   |  17<L>  |  3.71<H>    Ca    9.8      30 Apr 2022 06:49  Phos  5.0     04-29  Mg     2.0     04-29    TPro  8.2  /  Alb  3.4<L>  /  TBili  1.2  /  DBili  x   /  AST  242<H>  /  ALT  110<H>  /  AlkPhos  199<H>  04-30    CAPILLARY BLOOD GLUCOSE            LIVER FUNCTIONS - ( 30 Apr 2022 06:49 )  Alb: 3.4 g/dL / Pro: 8.2 g/dL / ALK PHOS: 199 U/L / ALT: 110 U/L DA / AST: 242 U/L / GGT: x           Creatinine Trend: 3.71<--, 3.54<--, 3.43<--, 3.28<--  I&O's Summary    29 Apr 2022 07:01  -  30 Apr 2022 07:00  --------------------------------------------------------  IN: 0 mL / OUT: 1150 mL / NET: -1150 mL                MEDICATIONS:    MEDICATIONS  (STANDING):  ARIPiprazole 15 milliGRAM(s) Oral daily  ferrous    sulfate 325 milliGRAM(s) Oral daily  finasteride 5 milliGRAM(s) Oral daily  furosemide   Injectable 40 milliGRAM(s) IV Push two times a day  gabapentin 600 milliGRAM(s) Oral two times a day  heparin   Injectable 5000 Unit(s) SubCutaneous every 8 hours  lamoTRIgine 200 milliGRAM(s) Oral daily  midodrine. 2.5 milliGRAM(s) Oral three times a day  pantoprazole    Tablet 40 milliGRAM(s) Oral before breakfast  polyethylene glycol 3350 17 Gram(s) Oral daily  senna 2 Tablet(s) Oral at bedtime  sevelamer carbonate 800 milliGRAM(s) Oral three times a day with meals  sodium bicarbonate 650 milliGRAM(s) Oral three times a day  tamsulosin 0.4 milliGRAM(s) Oral at bedtime  traZODone 25 milliGRAM(s) Oral at bedtime      MEDICATIONS  (PRN):      REVIEW OF SYSTEMS:                           ALL ROS DONE [ X   ]    CONSTITUTIONAL:  LETHARGIC [   ], FEVER [   ], UNRESPONSIVE [   ]  CVS:  CP  [   ], SOB, [   ], PALPITATIONS [   ], DIZZYNESS [   ]  RS: COUGH [   ], SPUTUM [   ]  GI: ABDOMINAL PAIN [   ], NAUSEA [   ], VOMITINGS [   ], DIARRHEA [   ], CONSTIPATION [   ]  :  DYSURIA [   ], NOCTURIA [   ], INCREASED FREQUENCY [   ], DRIBLING [   ],  SKELETAL: PAINFUL JOINTS [   ], SWOLLEN JOINTS [   ], NECK ACHE [   ], LOW BACK ACHE [   ],  SKIN : ULCERS [   ], RASH [   ], ITCHING [   ]  CNS: HEAD ACHE [   ], DOUBLE VISION [   ], BLURRED VISION [   ], AMS / CONFUSION [   ], SEIZURES [   ], WEAKNESS [   ],TINGLING / NUMBNESS [   ]    PHYSICAL EXAMINATION:  GENERAL APPEARANCE: NO DISTRESS  HEENT:  NO PALLOR, NO  JVD,  NO   NODES, NECK SUPPLE  CVS: S1 +, S2 +,   RS: AEEB,  OCCASIONAL  RALES +,   NO RONCHI  ABD: SOFT, NT, NO, BS +   ILEOSTOMY [LOOSE BROWN STOOL]  EXT: NO PE  SKIN: WARM,   SKELETAL:  ROM ACCEPTABLE  CNS:  AAO X 3    RADIOLOGY :    ACC: [id number] EXAM:  XR CHEST PORTABLE URGENT 1V                          PROCEDURE DATE:  04/28/2022          INTERPRETATION:  Clinical history: 71-year-old male, chest pain.    Portable/expiratory view of the chest is compared to 20 and demonstrates   a normal cardiac silhouette with no lobar consolidation, gross effusion,   pneumothorax or acute osseous finding.    Bilateral lower lobe patchy opacities consistent with infiltrates/areas   of atelectasis in the correct clinical context, increased. Elevated right   hemidiaphragm are evident.    IMPRESSION:    Bilateral lower lobe interstitial infiltrates/platelike atelectasis,   increased      ASSESSMENT :     Hyperkalemia    No pertinent past medical history    COPD, mild    Anemia    Bipolar disorder    IBD (inflammatory bowel disease)    HTN (hypertension)    HLD (hyperlipidemia)    PAD (peripheral artery disease)    CKD (chronic kidney disease)    Prostate CA    BPH with urinary obstruction    No significant past surgical history    History of creation of ostomy        PLAN:  HPI:  Patient is a 71M from [facility], who is ambulatory at baseline with a walker. He has Hx of HTN, CHF?, COPD, HLD, PAD, BPH, Prostate CA (s/p radiation), Bipolar Disorder, IBD - Crohn's Disease, R ileostomy bag (s/p sigmoid resection). He was brought to ED due to bilateral leg swelling. For the past 2 days he has been having progressive swelling/ edema of both his legs with associated ambulatory dysfunction. He also started having episodes of shortness of breath and difficulty of breathing. He denies any fever, cough, chest pain, palpitation. When he came to the ED, he was saturating at 93-94% on room air. He was placed on nasal cannula. EKG was done showing NSR. Troponin was negative and BNP was mildly elevated at 398. CXR showed bilateral vascular congestion. Serum potassium was elevated at 6.1. He got a dose of lasix and was given Hyperkalemia cocktail in the ED. Of note, he had Hx of COVID in 2020 andwas vaccinated with COVID-19 x 3 (Pfizer) and Influenza in 2021. He was subsequently admitted for acute exacerbation of his heart failure.    GOC: FULL CODE (28 Apr 2022 18:40)      # ACUTE HYPOXIC RESPIRATORY FAILURE SUSPECT S/T PULMONARY VASCULAR CONGESTION  # RULED OUT CHF CHF  # PAWAN ON CKD  # BPH  # HX OF PROSTATE CA S/P RADIATION  - PLACE ON TELEMETRY  - NOTED CXR  - LASIX  - F/U TSH  - F/U BLADDER SCAN, F/U UA  - STRICT IS AND OS  - ECHOCARDIOGRAM - NORMAL LVEF, NORMAL DIASTOLIC FUNCTION  - NEPHROLOGY CONSULT, CARDIOLOGY CONSULT    - WILL CONSULT GASTOENTEROLOGY    # HYPERKALEMIA - RESOLVED  - GIVEN LASIX, LOKELMA  - EKG DONE  - NEPHROLOGY CONSULT    # CROHN'S DISEASE  # RIGHT ILEOSTOMY BAG S/P SIGMOID RESECTION  - MONITORING OUTPUT    # NORMOCYTIC ANEMIA    # TRANSAMINITIS, NOTED HEP C AND HEP B MARKERS  - WILL CONSULT HEPATOLOGY    # HTN  # COPD  # HLD  # PAD  # BIPOLAR DX  # GI AND DVT PPX .   Patient is a 71y old  Male who presents with a chief complaint of acute CHF exacerbation (30 Apr 2022 06:25)    PATIENT IS SEEN AND EXAMINED IN MEDICAL FLOOR.    ALLERGIES:  No Known Allergies      VITALS:    Vital Signs Last 24 Hrs  T(C): 36.6 (30 Apr 2022 10:15), Max: 37.7 (30 Apr 2022 01:56)  T(F): 97.8 (30 Apr 2022 10:15), Max: 99.8 (30 Apr 2022 01:56)  HR: 84 (30 Apr 2022 10:15) (75 - 94)  BP: 128/60 (30 Apr 2022 10:15) (107/52 - 152/69)  BP(mean): --  RR: 18 (30 Apr 2022 10:15) (18 - 18)  SpO2: 97% (30 Apr 2022 10:15) (95% - 97%)    LABS:    CBC Full  -  ( 30 Apr 2022 06:49 )  WBC Count : 8.18 K/uL  RBC Count : 4.29 M/uL  Hemoglobin : 11.2 g/dL  Hematocrit : 36.0 %  Platelet Count - Automated : 188 K/uL  Mean Cell Volume : 83.9 fl  Mean Cell Hemoglobin : 26.1 pg  Mean Cell Hemoglobin Concentration : 31.1 gm/dL  Auto Neutrophil # : x  Auto Lymphocyte # : x  Auto Monocyte # : x  Auto Eosinophil # : x  Auto Basophil # : x  Auto Neutrophil % : x  Auto Lymphocyte % : x  Auto Monocyte % : x  Auto Eosinophil % : x  Auto Basophil % : x    PT/INR - ( 29 Apr 2022 07:00 )   PT: 14.1 sec;   INR: 1.18 ratio         PTT - ( 29 Apr 2022 07:00 )  PTT:25.9 sec  04-30    139  |  112<H>  |  87<H>  ----------------------------<  94  4.5   |  17<L>  |  3.71<H>    Ca    9.8      30 Apr 2022 06:49  Phos  5.0     04-29  Mg     2.0     04-29    TPro  8.2  /  Alb  3.4<L>  /  TBili  1.2  /  DBili  x   /  AST  242<H>  /  ALT  110<H>  /  AlkPhos  199<H>  04-30    CAPILLARY BLOOD GLUCOSE            LIVER FUNCTIONS - ( 30 Apr 2022 06:49 )  Alb: 3.4 g/dL / Pro: 8.2 g/dL / ALK PHOS: 199 U/L / ALT: 110 U/L DA / AST: 242 U/L / GGT: x           Creatinine Trend: 3.71<--, 3.54<--, 3.43<--, 3.28<--  I&O's Summary    29 Apr 2022 07:01  -  30 Apr 2022 07:00  --------------------------------------------------------  IN: 0 mL / OUT: 1150 mL / NET: -1150 mL                MEDICATIONS:    MEDICATIONS  (STANDING):  ARIPiprazole 15 milliGRAM(s) Oral daily  ferrous    sulfate 325 milliGRAM(s) Oral daily  finasteride 5 milliGRAM(s) Oral daily  furosemide   Injectable 40 milliGRAM(s) IV Push two times a day  gabapentin 600 milliGRAM(s) Oral two times a day  heparin   Injectable 5000 Unit(s) SubCutaneous every 8 hours  lamoTRIgine 200 milliGRAM(s) Oral daily  midodrine. 2.5 milliGRAM(s) Oral three times a day  pantoprazole    Tablet 40 milliGRAM(s) Oral before breakfast  polyethylene glycol 3350 17 Gram(s) Oral daily  senna 2 Tablet(s) Oral at bedtime  sevelamer carbonate 800 milliGRAM(s) Oral three times a day with meals  sodium bicarbonate 650 milliGRAM(s) Oral three times a day  tamsulosin 0.4 milliGRAM(s) Oral at bedtime  traZODone 25 milliGRAM(s) Oral at bedtime      MEDICATIONS  (PRN):      REVIEW OF SYSTEMS:                           ALL ROS DONE [ X   ]    CONSTITUTIONAL:  LETHARGIC [   ], FEVER [   ], UNRESPONSIVE [   ]  CVS:  CP  [   ], SOB, [   ], PALPITATIONS [   ], DIZZYNESS [   ]  RS: COUGH [   ], SPUTUM [   ]  GI: ABDOMINAL PAIN [   ], NAUSEA [   ], VOMITINGS [   ], DIARRHEA [   ], CONSTIPATION [   ]  :  DYSURIA [   ], NOCTURIA [   ], INCREASED FREQUENCY [   ], DRIBLING [   ],  SKELETAL: PAINFUL JOINTS [   ], SWOLLEN JOINTS [   ], NECK ACHE [   ], LOW BACK ACHE [   ],  SKIN : ULCERS [   ], RASH [   ], ITCHING [   ]  CNS: HEAD ACHE [   ], DOUBLE VISION [   ], BLURRED VISION [   ], AMS / CONFUSION [   ], SEIZURES [   ], WEAKNESS [   ],TINGLING / NUMBNESS [   ]    PHYSICAL EXAMINATION:  GENERAL APPEARANCE: NO DISTRESS  HEENT:  NO PALLOR, NO  JVD,  NO   NODES, NECK SUPPLE  CVS: S1 +, S2 +,   RS: AEEB,  OCCASIONAL  RALES +,   NO RONCHI  ABD: SOFT, NT, NO, BS +   ILEOSTOMY [LOOSE BROWN STOOL]  EXT: NO PE  SKIN: WARM,   SKELETAL:  ROM ACCEPTABLE  CNS:  AAO X 3    RADIOLOGY :    ACC: [id number] EXAM:  XR CHEST PORTABLE URGENT 1V                          PROCEDURE DATE:  04/28/2022          INTERPRETATION:  Clinical history: 71-year-old male, chest pain.    Portable/expiratory view of the chest is compared to 20 and demonstrates   a normal cardiac silhouette with no lobar consolidation, gross effusion,   pneumothorax or acute osseous finding.    Bilateral lower lobe patchy opacities consistent with infiltrates/areas   of atelectasis in the correct clinical context, increased. Elevated right   hemidiaphragm are evident.    IMPRESSION:    Bilateral lower lobe interstitial infiltrates/platelike atelectasis,   increased      ASSESSMENT :     Hyperkalemia    No pertinent past medical history    COPD, mild    Anemia    Bipolar disorder    IBD (inflammatory bowel disease)    HTN (hypertension)    HLD (hyperlipidemia)    PAD (peripheral artery disease)    CKD (chronic kidney disease)    Prostate CA    BPH with urinary obstruction    No significant past surgical history    History of creation of ostomy        PLAN:  HPI:  Patient is a 71M from [facility], who is ambulatory at baseline with a walker. He has Hx of HTN, CHF?, COPD, HLD, PAD, BPH, Prostate CA (s/p radiation), Bipolar Disorder, IBD - Crohn's Disease, R ileostomy bag (s/p sigmoid resection). He was brought to ED due to bilateral leg swelling. For the past 2 days he has been having progressive swelling/ edema of both his legs with associated ambulatory dysfunction. He also started having episodes of shortness of breath and difficulty of breathing. He denies any fever, cough, chest pain, palpitation. When he came to the ED, he was saturating at 93-94% on room air. He was placed on nasal cannula. EKG was done showing NSR. Troponin was negative and BNP was mildly elevated at 398. CXR showed bilateral vascular congestion. Serum potassium was elevated at 6.1. He got a dose of lasix and was given Hyperkalemia cocktail in the ED. Of note, he had Hx of COVID in 2020 andwas vaccinated with COVID-19 x 3 (Pfizer) and Influenza in 2021. He was subsequently admitted for acute exacerbation of his heart failure.    GOC: FULL CODE (28 Apr 2022 18:40)    # CASE D/W BROTHER KERI VIA PHONE AT BEDSIDE       # ACUTE HYPOXIC RESPIRATORY FAILURE SUSPECT S/T PULMONARY VASCULAR CONGESTION  # RULED OUT CHF CHF  # PAWAN ON CKD  # BPH  # HX OF PROSTATE CA S/P RADIATION  - PLACE ON TELEMETRY  - NOTED CXR  - LASIX  - F/U TSH  - F/U BLADDER SCAN, F/U UA  - STRICT IS AND OS  - ECHOCARDIOGRAM - NORMAL LVEF, NORMAL DIASTOLIC FUNCTION  - NEPHROLOGY CONSULT, CARDIOLOGY CONSULT    - WILL CONSULT GASTOENTEROLOGY    # HYPERKALEMIA - RESOLVED  - GIVEN LASIX, LOKELMA  - EKG DONE  - NEPHROLOGY CONSULT    # CROHN'S DISEASE  # RIGHT ILEOSTOMY BAG S/P SIGMOID RESECTION  - MONITORING OUTPUT    # NORMOCYTIC ANEMIA    # TRANSAMINITIS, NOTED HEP C AND HEP B MARKERS  - WILL CONSULT HEPATOLOGY    # HTN  # COPD  # HLD  # PAD  # BIPOLAR DX  # GI AND DVT PPX

## 2022-04-30 NOTE — PROGRESS NOTE ADULT - SUBJECTIVE AND OBJECTIVE BOX
NEPHROLOGY MEDICAL CARE, [facility] - Dr. Emerson Pond/ Dr. Nallely Curran/ Dr. Kirby Angel/ Dr. Pippa Reis    Patient was seen and examined at bedside.    CC: patient breathing is better and swelling is better.  pt complaining of blood in the urine    Vital Signs Last 24 Hrs  T(C): 36.6 (2022 10:15), Max: 37.7 (2022 01:56)  T(F): 97.8 (2022 10:15), Max: 99.8 (2022 01:56)  HR: 84 (2022 10:15) (75 - 94)  BP: 128/60 (2022 10:15) (107/52 - 152/69)  BP(mean): --  RR: 18 (2022 10:15) (18 - 18)  SpO2: 97% (2022 10:15) (95% - 97%)    04- @ 07:01  -   @ 07:00  --------------------------------------------------------  IN: 0 mL / OUT: 1150 mL / NET: -1150 mL        PHYSICAL EXAM:  General: No acute respiratory distress.  Eyes: conjunctiva and sclera clear  ENMT: Atraumatic, Normocephalic, supple, No JVD present. Moist mucous membranes  Respiratory: Bilateral decreased BS and no rhonchi, wheezing  Cardiovascular: S1S2+; no m/r/g  Gastrointestinal: Soft, Non-tender, Nondistended; Bowel sounds present   Neuro:  Awake, Alert & Oriented X3  Ext:  b/l 2+ pedal edema, No Cyanosis  Skin: No visible rashes    MEDICATIONS:  MEDICATIONS  (STANDING):  ARIPiprazole 15 milliGRAM(s) Oral daily  ferrous    sulfate 325 milliGRAM(s) Oral daily  finasteride 5 milliGRAM(s) Oral daily  furosemide   Injectable 40 milliGRAM(s) IV Push two times a day  gabapentin 600 milliGRAM(s) Oral two times a day  heparin   Injectable 5000 Unit(s) SubCutaneous every 8 hours  lamoTRIgine 200 milliGRAM(s) Oral daily  midodrine. 2.5 milliGRAM(s) Oral three times a day  pantoprazole    Tablet 40 milliGRAM(s) Oral before breakfast  polyethylene glycol 3350 17 Gram(s) Oral daily  senna 2 Tablet(s) Oral at bedtime  sevelamer carbonate 800 milliGRAM(s) Oral three times a day with meals  sodium bicarbonate 650 milliGRAM(s) Oral three times a day  tamsulosin 0.4 milliGRAM(s) Oral at bedtime  traZODone 25 milliGRAM(s) Oral at bedtime    MEDICATIONS  (PRN):          LABS:                        11.2   8.18  )-----------( 188      ( 2022 06:49 )             36.0         139  |  112<H>  |  87<H>  ----------------------------<  94  4.5   |  17<L>  |  3.71<H>    Ca    9.8      2022 06:49  Phos  5.0       Mg     2.0         TPro  8.2  /  Alb  3.4<L>  /  TBili  1.2  /  DBili  x   /  AST  242<H>  /  ALT  110<H>  /  AlkPhos  199<H>      PT/INR - ( 2022 07:00 )   PT: 14.1 sec;   INR: 1.18 ratio         PTT - ( 2022 07:00 )  PTT:25.9 sec  Urinalysis Basic - ( 2022 07:41 )    Color: Yellow / Appearance: Clear / S.010 / pH: x  Gluc: x / Ketone: Negative  / Bili: Negative / Urobili: Negative   Blood: x / Protein: Negative / Nitrite: Negative   Leuk Esterase: Negative / RBC: 0-2 /HPF / WBC 0-2 /HPF   Sq Epi: x / Non Sq Epi: Few /HPF / Bacteria: Few /HPF      Intact PTH: 41 pg/mL ( @ 09:56)    Urine studies    PTH and Vit D:  Intact PTH: 41 pg/mL ( @ 09:56)

## 2022-04-30 NOTE — PROGRESS NOTE ADULT - ASSESSMENT
1. PAWAN due to diuretics  -patient is breathing is better; Scr increased but stable electrolytes. patient is still clinically hypervolemic and continue lasix 40mg iv bid and will accept high bun/cr to optimize volume and respiratory status.   -If scr worsens tomorrow then decrease lasix to daily. Pt states has blood in the urine; rpt UA today and yesterday had no hematuria in UA.   Urinalysis shows no proteinuria. send spot protein to creatinine ratio  -order renal sono to assess kidney size and r/o hydronephrosis.   -Adjust meds to eGFR and avoid IV Gadolinium contrast,NSAIDs, and phosphate enema.  -Monitor I/O's daily.   -Monitor SMA daily.  2. CKD stage 4 most likely due to ischemic nephropathy and h/o ATN with renal recovery.  -Baseline Scr around 3.2mg/dL in April 2020.  -Keep patient euvolemic and renal diet  -Avoid Nephrotoxic Meds/ Agents such as (NSAIDs, IV contrast, Aminoglycosides such as gentamicin, -Gadolinium contrast, Phosphate containing enemas, etc..)  -Adjust Medications according to eGFR  3. Anemia:   -hb is stable; continue iron tabs. monitor CBC  4. MBD:   -elevated phos; mild hyperca, which improved and off calcitirol; on renvela 1 tab tid; check phos in am.  -pth is okay at 41.  5. Hyperkalemia due to pawan on ckd.   -K is resolved and s/p medical management.  -start low K diet. give Lokelma prn if K >5.5  -Keep pt euvolemic. Avoid ACE inh/ ARBs, NSAIDs, and Aldactone or potassium sparing diuretics. Monitor K+ daily.  6. Fluid Overload:  -improving clinically. continue with diuretics as above.   -f/u CT chest w/o contrast to determine if benefit from pleural tap.  7. Acidosis:  -CO2 is stable.    -on sodium bicarbonate 650mg oral tid. monitor CO2  8. h/o Hypotension:  -bp is low normal and increase midodrine 5mg tid if needed.     9. Elevated LFTs  -possible hepatic congestion. hold lipitor for now.  -f/u hepatitis panel in am.    Discussed with patient in detail regarding the renal plan and care  Discussed the assessment and plan with Primary Team/Nurse

## 2022-05-01 NOTE — DIETITIAN INITIAL EVALUATION ADULT - NSFNSGIIOFT_GEN_A_CORE
04-30-22 @ 07:01  -  05-01-22 @ 07:00  --------------------------------------------------------  OUT:    Colostomy (mL): 1000 mL  Total OUT: 1000 mL    Total NET: -1000 mL

## 2022-05-01 NOTE — PROGRESS NOTE ADULT - ASSESSMENT
1. PAWAN due to diuretics  -Scr increased with stable electrolytes; decrease lasix 40mg iv daily. if still elevated tomorrow then hold lasix for few days.   rpt UA today shows rbc and pt not complaining of blood in the urine.    Urinalysis shows no proteinuria. spot protein to creatinine ratio is 900mg.  -order renal sono to assess kidney size and r/o hydronephrosis.   -Adjust meds to eGFR and avoid IV Gadolinium contrast,NSAIDs, and phosphate enema.  -Monitor I/O's daily.   -Monitor SMA daily.  2. CKD stage 4 most likely due to ischemic nephropathy and h/o ATN with renal recovery.  -Baseline Scr around 3.2mg/dL in April 2020.  -Keep patient euvolemic and renal diet  -Avoid Nephrotoxic Meds/ Agents such as (NSAIDs, IV contrast, Aminoglycosides such as gentamicin, -Gadolinium contrast, Phosphate containing enemas, etc..)  -Adjust Medications according to eGFR  3. Anemia:   -hb is stable; continue iron tabs. monitor CBC  4. MBD:   -phos is improving; mild hyperca, which improved and off calcitirol; on renvela 1 tab tid;   -pth is okay at 41.  5. Hyperkalemia due to pawan on ckd.   -K is resolved and s/p medical management.  -start low K diet. give Lokelma prn if K >5.5  -Keep pt euvolemic. Avoid ACE inh/ ARBs, NSAIDs, and Aldactone or potassium sparing diuretics. Monitor K+ daily.  6. Fluid Overload:  -improving clinically. continue with diuretics as above.   -CT chest w/o contrast was not done; order CXR in am.  7. Acidosis:  -CO2 is stable.    -on sodium bicarbonate 650mg oral tid. monitor CO2  8. h/o Hypotension:  -bp is acceptable and on midodrine 2.5mg tid      9. Elevated LFTs  - off lipitor for now.  -hepatitis panel shows hep C+ve.  discussed with pt, pt had known hep C and was treated for in the past.   -check C3/C4, RF, cryoglobulinemia since LFTs not improving.     Discussed with patient in detail regarding the renal plan and care  Discussed the assessment and plan with Primary Team/Nurse   1. PAWAN due to diuretics  -Scr increased with stable electrolytes; decrease lasix 40mg iv daily. if still elevated tomorrow then hold lasix for few days.   rpt UA today shows rbc and pt not complaining of blood in the urine.    Urinalysis shows no proteinuria. spot protein to creatinine ratio is 900mg.  -order renal sono to assess kidney size and r/o hydronephrosis.   -Adjust meds to eGFR and avoid IV Gadolinium contrast,NSAIDs, and phosphate enema.  -Monitor I/O's daily.   -Monitor SMA daily.  2. CKD stage 4 most likely due to ischemic nephropathy and h/o ATN with renal recovery.  -Baseline Scr around 3.2mg/dL in April 2020.  -Keep patient euvolemic and renal diet  -Avoid Nephrotoxic Meds/ Agents such as (NSAIDs, IV contrast, Aminoglycosides such as gentamicin, -Gadolinium contrast, Phosphate containing enemas, etc..)  -Adjust Medications according to eGFR  3. Anemia:   -hb is stable; continue iron tabs. monitor CBC  4. MBD:   -phos is improving; mild hyperca, which improved and off calcitirol; on renvela 1 tab tid;   -pth is okay at 41.  5. Hyperkalemia due to pawan on ckd.   -K is resolved and s/p medical management.  -start low K diet. give Lokelma prn if K >5.5  -Keep pt euvolemic. Avoid ACE inh/ ARBs, NSAIDs, and Aldactone or potassium sparing diuretics. Monitor K+ daily.  6. Fluid Overload:  -improving clinically. continue with diuretics as above.   -CT chest w/o contrast was not done; order CXR in am.  7. Acidosis:  -CO2 is stable.    -on sodium bicarbonate 650mg oral tid. monitor CO2  8. h/o Hypotension:  -bp is acceptable and on midodrine 2.5mg tid      9. Elevated LFTs  - off lipitor for now.  -hepatitis panel shows hep C+ve.  discussed with pt, pt had known hep C and was treated for in the past.   -check C3/C4, RF, cryoglobulinemia since LFTs not improving.   -us abd pending.    Discussed with patient in detail regarding the renal plan and care  Discussed the assessment and plan with Primary Team/Nurse

## 2022-05-01 NOTE — DIETITIAN INITIAL EVALUATION ADULT - PERTINENT MEDS FT
MEDICATIONS  (STANDING):  ARIPiprazole 15 milliGRAM(s) Oral daily  ferrous    sulfate 325 milliGRAM(s) Oral daily  finasteride 5 milliGRAM(s) Oral daily  gabapentin 600 milliGRAM(s) Oral two times a day  heparin   Injectable 5000 Unit(s) SubCutaneous every 8 hours  lamoTRIgine 200 milliGRAM(s) Oral daily  midodrine. 2.5 milliGRAM(s) Oral three times a day  pantoprazole    Tablet 40 milliGRAM(s) Oral before breakfast  polyethylene glycol 3350 17 Gram(s) Oral daily  senna 2 Tablet(s) Oral at bedtime  sevelamer carbonate 800 milliGRAM(s) Oral three times a day with meals  sodium bicarbonate 650 milliGRAM(s) Oral three times a day  tamsulosin 0.4 milliGRAM(s) Oral at bedtime  traZODone 25 milliGRAM(s) Oral at bedtime    MEDICATIONS  (PRN):

## 2022-05-01 NOTE — PROGRESS NOTE ADULT - SUBJECTIVE AND OBJECTIVE BOX
pt seen and examined, no complaints, ROS - .   no events overnight ,         ARIPiprazole 15 milliGRAM(s) Oral daily  ferrous    sulfate 325 milliGRAM(s) Oral daily  finasteride 5 milliGRAM(s) Oral daily  gabapentin 600 milliGRAM(s) Oral two times a day  heparin   Injectable 5000 Unit(s) SubCutaneous every 8 hours  lamoTRIgine 200 milliGRAM(s) Oral daily  midodrine. 2.5 milliGRAM(s) Oral three times a day  pantoprazole    Tablet 40 milliGRAM(s) Oral before breakfast  polyethylene glycol 3350 17 Gram(s) Oral daily  senna 2 Tablet(s) Oral at bedtime  sevelamer carbonate 800 milliGRAM(s) Oral three times a day with meals  sodium bicarbonate 650 milliGRAM(s) Oral three times a day  tamsulosin 0.4 milliGRAM(s) Oral at bedtime  traZODone 25 milliGRAM(s) Oral at bedtime                            11.2   7.62  )-----------( 194      ( 01 May 2022 06:45 )             35.8       Hemoglobin: 11.2 g/dL (05-01 @ 06:45)  Hemoglobin: 11.2 g/dL (04-30 @ 06:49)  Hemoglobin: 10.4 g/dL (04-29 @ 07:00)  Hemoglobin: 10.8 g/dL (04-28 @ 16:37)      05-01    137  |  107  |  86<H>  ----------------------------<  92  4.3   |  18<L>  |  3.82<H>    Ca    9.4      01 May 2022 06:45  Phos  4.1     05-01  Mg     1.6     05-01    TPro  8.1  /  Alb  3.3<L>  /  TBili  1.0  /  DBili  x   /  AST  220<H>  /  ALT  92<H>  /  AlkPhos  194<H>  05-01    Creatinine Trend: 3.82<--, 3.71<--, 3.54<--, 3.43<--, 3.28<--    COAGS:           T(C): 36.7 (05-01-22 @ 05:23), Max: 36.8 (04-30-22 @ 18:13)  HR: 78 (05-01-22 @ 05:23) (78 - 86)  BP: 119/53 (05-01-22 @ 05:23) (111/54 - 139/66)  RR: 18 (05-01-22 @ 05:23) (18 - 18)  SpO2: 96% (05-01-22 @ 05:23) (93% - 97%)  Wt(kg): --    I&O's Summary    30 Apr 2022 07:01  -  01 May 2022 07:00  --------------------------------------------------------  IN: 0 mL / OUT: 1450 mL / NET: -1450 mL         Gen: Appears well in NAD  HEENT:  (-)icterus (-)pallor  CV: N S1 S2 1/6 MARCELLE (+)2 Pulses B/l  Resp:  Clear to ausculatation B/L, normal effort  GI: (+) BS Soft, NT, ND  Lymph:  (+)Edema, (-)obvious lymphadenopathy  Skin: Warm to touch, Normal turgor  Psych: Appropriate mood and affect      ECG:  	Sinus 84 BPM, LVH    RADIOLOGY:         CXR:   < from: Xray Chest 1 View-PORTABLE IMMEDIATE (Xray Chest 1 View-PORTABLE IMMEDIATE .) (04.06.20 @ 16:35) >  Increasing bibasilar interstitial infiltrates.    < end of copied text >    ECHO: < from: Transthoracic Echocardiogram (04.29.22 @ 07:17) >  ------------------------------------------------------------------------  CONCLUSIONS:  1. Normal mitral valve.  2. Normal trileaflet aortic valve. Mild aortic  regurgitation.  3. Aortic Root: 4.2 cm.    4. Normal left atrium.  LA volume index = 22 cc/m2.  5. Normal left ventricular internal dimensions and wall  thicknesses.  6. Normal Left Ventricular Systolic Function,  (EF = 55 to  60%)  7. Normal diastolic function.  8. Normal right atrium.  9. Normal right ventricular size and systolic function  (TAPSE  1.9cm).  10. Unable to estimate RVSP.  11. Normal tricuspid valve.  12. Normal pulmonic valve.  13. Trivial pericardial effusion is seen.    ------------------------------------------------------------------------  Confirmed on  4/29/2022 - 15:50:35 by Carmela Crump MD  ------------------------------------------------------------------------    < end of copied text >      ASSESSMENT/PLAN: 	71y Male  HTN, COPD, HLD, PAD, BPH, Prostate CA (s/p radiation), Bipolar Disorder, IBD - Crohn's Disease, R ileostomy bag (s/p sigmoid resection) historically preserved LV and RV function, normal perfusion on a nuclear stress test 2020 He was brought to ED due to bilateral leg swelling - echo unremarkable, BNP low.    - Doubt this is sequale of CHF.  A low BNP suggests a low left ventricular end diastolic pressure  - ECHO noted , normal LV sys fx .   - monitor renal function, his edema is at least partially due ot renal disease

## 2022-05-01 NOTE — PROGRESS NOTE ADULT - SUBJECTIVE AND OBJECTIVE BOX
NEPHROLOGY MEDICAL CARE, [facility] - Dr. Emerson Pond/ Dr. Nallely Curran/ Dr. Kirby Angel/ Dr. Pippa Reis    Patient was seen and examined at bedside.    CC: patient feels better; no hematuria today.    Vital Signs Last 24 Hrs  T(C): 36.7 (01 May 2022 05:23), Max: 36.8 (2022 18:13)  T(F): 98 (01 May 2022 05:23), Max: 98.3 (01 May 2022 01:32)  HR: 78 (01 May 2022 05:23) (78 - 86)  BP: 119/53 (01 May 2022 05:23) (111/54 - 139/66)  BP(mean): 69 (01 May 2022 05:23) (67 - 69)  RR: 18 (01 May 2022 05:23) (18 - 18)  SpO2: 96% (01 May 2022 05:23) (93% - 97%)    04-30 @ 07:01  -  05-01 @ 07:00  --------------------------------------------------------  IN: 0 mL / OUT: 1450 mL / NET: -1450 mL        PHYSICAL EXAM:  General: No acute respiratory distress.  Eyes: conjunctiva and sclera clear  ENMT: Atraumatic, Normocephalic, supple, No JVD present. Moist mucous membranes  Respiratory: Bilateral decreased BS and no rhonchi, wheezing  Cardiovascular: S1S2+; no m/r/g  Gastrointestinal: Soft, Non-tender, Nondistended; Bowel sounds present   Neuro:  Awake, Alert & Oriented X3  Ext:  b/l 1+ pedal edema, No Cyanosis  Skin: No visible rashes      MEDICATIONS:  MEDICATIONS  (STANDING):  ARIPiprazole 15 milliGRAM(s) Oral daily  ferrous    sulfate 325 milliGRAM(s) Oral daily  finasteride 5 milliGRAM(s) Oral daily  gabapentin 600 milliGRAM(s) Oral two times a day  heparin   Injectable 5000 Unit(s) SubCutaneous every 8 hours  lamoTRIgine 200 milliGRAM(s) Oral daily  midodrine. 2.5 milliGRAM(s) Oral three times a day  pantoprazole    Tablet 40 milliGRAM(s) Oral before breakfast  polyethylene glycol 3350 17 Gram(s) Oral daily  senna 2 Tablet(s) Oral at bedtime  sevelamer carbonate 800 milliGRAM(s) Oral three times a day with meals  sodium bicarbonate 650 milliGRAM(s) Oral three times a day  tamsulosin 0.4 milliGRAM(s) Oral at bedtime  traZODone 25 milliGRAM(s) Oral at bedtime    MEDICATIONS  (PRN):          LABS:                        11.2   7.62  )-----------( 194      ( 01 May 2022 06:45 )             35.8         137  |  107  |  86<H>  ----------------------------<  92  4.3   |  18<L>  |  3.82<H>    Ca    9.4      01 May 2022 06:45  Phos  4.1       Mg     1.6         TPro  8.1  /  Alb  3.3<L>  /  TBili  1.0  /  DBili  x   /  AST  220<H>  /  ALT  92<H>  /  AlkPhos  194<H>        Urinalysis Basic - ( 2022 15:24 )    Color: Yellow / Appearance: Clear / S.010 / pH: x  Gluc: x / Ketone: Negative  / Bili: Negative / Urobili: Negative   Blood: x / Protein: 30 mg/dL / Nitrite: Negative   Leuk Esterase: Negative / RBC: 25-50 /HPF / WBC 0-2 /HPF   Sq Epi: x / Non Sq Epi: Occasional /HPF / Bacteria: Few /HPF      Phosphorus Level, Serum: 4.1 mg/dL ( @ 06:45)  Magnesium, Serum: 1.6 mg/dL ( @ 06:45)    Urine studies  Urea Nitrogen,  Random Urine: 484 mg/dL ( @ 19:10)  Potassium, Random Urine: 37 mmol/L ( @ 15:25)  Sodium, Random Urine: 47 mmol/L ( @ 15:25)  Creatinine, Random Urine: 59 mg/dL ( @ 15:24)    PTH and Vit D:

## 2022-05-01 NOTE — PROGRESS NOTE ADULT - SUBJECTIVE AND OBJECTIVE BOX
`Patient is a 71y old  Male who presents with a chief complaint of acute CHF exacerbation (01 May 2022 10:52)    PATIENT IS SEEN AND EXAMINED IN MEDICAL FLOOR.  NGT [    ]    ERASTO [   ]      GT [   ]    ALLERGIES:  No Known Allergies      Daily     Daily     VITALS:    Vital Signs Last 24 Hrs  T(C): 36.7 (01 May 2022 05:23), Max: 36.8 (30 Apr 2022 18:13)  T(F): 98 (01 May 2022 05:23), Max: 98.3 (01 May 2022 01:32)  HR: 78 (01 May 2022 05:23) (78 - 86)  BP: 119/53 (01 May 2022 05:23) (111/54 - 139/66)  BP(mean): 69 (01 May 2022 05:23) (67 - 69)  RR: 18 (01 May 2022 05:23) (18 - 18)  SpO2: 96% (01 May 2022 05:23) (93% - 97%)    LABS:    CBC Full  -  ( 01 May 2022 06:45 )  WBC Count : 7.62 K/uL  RBC Count : 4.32 M/uL  Hemoglobin : 11.2 g/dL  Hematocrit : 35.8 %  Platelet Count - Automated : 194 K/uL  Mean Cell Volume : 82.9 fl  Mean Cell Hemoglobin : 25.9 pg  Mean Cell Hemoglobin Concentration : 31.3 gm/dL  Auto Neutrophil # : x  Auto Lymphocyte # : x  Auto Monocyte # : x  Auto Eosinophil # : x  Auto Basophil # : x  Auto Neutrophil % : x  Auto Lymphocyte % : x  Auto Monocyte % : x  Auto Eosinophil % : x  Auto Basophil % : x      05-01    137  |  107  |  86<H>  ----------------------------<  92  4.3   |  18<L>  |  3.82<H>    Ca    9.4      01 May 2022 06:45  Phos  4.1     05-01  Mg     1.6     05-01    TPro  8.1  /  Alb  3.3<L>  /  TBili  1.0  /  DBili  x   /  AST  220<H>  /  ALT  92<H>  /  AlkPhos  194<H>  05-01    CAPILLARY BLOOD GLUCOSE      POCT Blood Glucose.: 165 mg/dL (30 Apr 2022 20:50)        LIVER FUNCTIONS - ( 01 May 2022 06:45 )  Alb: 3.3 g/dL / Pro: 8.1 g/dL / ALK PHOS: 194 U/L / ALT: 92 U/L DA / AST: 220 U/L / GGT: x           Creatinine Trend: 3.82<--, 3.71<--, 3.54<--, 3.43<--, 3.28<--  I&O's Summary    30 Apr 2022 07:01  -  01 May 2022 07:00  --------------------------------------------------------  IN: 0 mL / OUT: 1450 mL / NET: -1450 mL                MEDICATIONS:    MEDICATIONS  (STANDING):  ARIPiprazole 15 milliGRAM(s) Oral daily  ferrous    sulfate 325 milliGRAM(s) Oral daily  finasteride 5 milliGRAM(s) Oral daily  gabapentin 600 milliGRAM(s) Oral two times a day  heparin   Injectable 5000 Unit(s) SubCutaneous every 8 hours  lamoTRIgine 200 milliGRAM(s) Oral daily  midodrine. 2.5 milliGRAM(s) Oral three times a day  pantoprazole    Tablet 40 milliGRAM(s) Oral before breakfast  polyethylene glycol 3350 17 Gram(s) Oral daily  senna 2 Tablet(s) Oral at bedtime  sevelamer carbonate 800 milliGRAM(s) Oral three times a day with meals  sodium bicarbonate 650 milliGRAM(s) Oral three times a day  tamsulosin 0.4 milliGRAM(s) Oral at bedtime  traZODone 25 milliGRAM(s) Oral at bedtime      MEDICATIONS  (PRN):      REVIEW OF SYSTEMS:                           ALL ROS DONE [ X   ]    CONSTITUTIONAL:  LETHARGIC [   ], FEVER [   ], UNRESPONSIVE [   ]  CVS:  CP  [   ], SOB, [   ], PALPITATIONS [   ], DIZZYNESS [   ]  RS: COUGH [   ], SPUTUM [   ]  GI: ABDOMINAL PAIN [   ], NAUSEA [   ], VOMITINGS [   ], DIARRHEA [   ], CONSTIPATION [   ]  :  DYSURIA [   ], NOCTURIA [   ], INCREASED FREQUENCY [   ], DRIBLING [   ],  SKELETAL: PAINFUL JOINTS [   ], SWOLLEN JOINTS [   ], NECK ACHE [   ], LOW BACK ACHE [   ],  SKIN : ULCERS [   ], RASH [   ], ITCHING [   ]  CNS: HEAD ACHE [   ], DOUBLE VISION [   ], BLURRED VISION [   ], AMS / CONFUSION [   ], SEIZURES [   ], WEAKNESS [   ],TINGLING / NUMBNESS [   ]    PHYSICAL EXAMINATION:  GENERAL APPEARANCE: NO DISTRESS  HEENT:  NO PALLOR, NO  JVD,  NO   NODES, NECK SUPPLE  CVS: S1 +, S2 +,   RS: AEEB,  OCCASIONAL  RALES +,   NO RONCHI  ABD: SOFT, NT, NO, BS +   ILEOSTOMY [LOOSE BROWN STOOL]  EXT: NO PE  SKIN: WARM,   SKELETAL:  ROM ACCEPTABLE  CNS:  AAO X 3    RADIOLOGY :    ACC: [id number] EXAM:  XR CHEST PORTABLE URGENT 1V                          PROCEDURE DATE:  04/28/2022          INTERPRETATION:  Clinical history: 71-year-old male, chest pain.    Portable/expiratory view of the chest is compared to 20 and demonstrates   a normal cardiac silhouette with no lobar consolidation, gross effusion,   pneumothorax or acute osseous finding.    Bilateral lower lobe patchy opacities consistent with infiltrates/areas   of atelectasis in the correct clinical context, increased. Elevated right   hemidiaphragm are evident.    IMPRESSION:    Bilateral lower lobe interstitial infiltrates/platelike atelectasis,   increased      ASSESSMENT :     Hyperkalemia    No pertinent past medical history    COPD, mild    Anemia    Bipolar disorder    IBD (inflammatory bowel disease)    HTN (hypertension)    HLD (hyperlipidemia)    PAD (peripheral artery disease)    CKD (chronic kidney disease)    Prostate CA    BPH with urinary obstruction    No significant past surgical history    History of creation of ostomy        PLAN:  HPI:  Patient is a 71M from [facility], who is ambulatory at baseline with a walker. He has Hx of HTN, CHF?, COPD, HLD, PAD, BPH, Prostate CA (s/p radiation), Bipolar Disorder, IBD - Crohn's Disease, R ileostomy bag (s/p sigmoid resection). He was brought to ED due to bilateral leg swelling. For the past 2 days he has been having progressive swelling/ edema of both his legs with associated ambulatory dysfunction. He also started having episodes of shortness of breath and difficulty of breathing. He denies any fever, cough, chest pain, palpitation. When he came to the ED, he was saturating at 93-94% on room air. He was placed on nasal cannula. EKG was done showing NSR. Troponin was negative and BNP was mildly elevated at 398. CXR showed bilateral vascular congestion. Serum potassium was elevated at 6.1. He got a dose of lasix and was given Hyperkalemia cocktail in the ED. Of note, he had Hx of COVID in 2020 andwas vaccinated with COVID-19 x 3 (Pfizer) and Influenza in 2021. He was subsequently admitted for acute exacerbation of his heart failure.    GOC: FULL CODE (28 Apr 2022 18:40)    # CASE D/W BROTHER KERI VIA PHONE AT BEDSIDE       # ACUTE HYPOXIC RESPIRATORY FAILURE SUSPECT S/T PULMONARY VASCULAR CONGESTION  # RULED OUT CHF CHF  # PAWAN ON CKD  # BPH  # HX OF PROSTATE CA S/P RADIATION  - PLACE ON TELEMETRY  - NOTED CXR  - LASIX  - F/U TSH  - F/U BLADDER SCAN, F/U UA  - STRICT IS AND OS  - ECHOCARDIOGRAM - NORMAL LVEF, NORMAL DIASTOLIC FUNCTION  - NEPHROLOGY CONSULT, CARDIOLOGY CONSULT    - WILL CONSULT GASTOENTEROLOGY    # HYPERKALEMIA - RESOLVED  - GIVEN LASIX, LOKELMA  - EKG DONE  - NEPHROLOGY CONSULT    # CROHN'S DISEASE  # RIGHT ILEOSTOMY BAG S/P SIGMOID RESECTION  - MONITORING OUTPUT    # NORMOCYTIC ANEMIA    # TRANSAMINITIS, NOTED HEP C AND HEP B MARKERS  - WILL CONSULT HEPATOLOGY    # HTN  # COPD  # HLD  # PAD  # BIPOLAR DX  # GI AND DVT PPX    `Patient is a 71y old  Male who presents with a chief complaint of acute CHF exacerbation (01 May 2022 10:52)    PATIENT IS SEEN AND EXAMINED IN MEDICAL FLOOR.  NGT [    ]    ERASTO [   ]      GT [   ]    ALLERGIES:  No Known Allergies      Daily     Daily     VITALS:    Vital Signs Last 24 Hrs  T(C): 36.7 (01 May 2022 05:23), Max: 36.8 (30 Apr 2022 18:13)  T(F): 98 (01 May 2022 05:23), Max: 98.3 (01 May 2022 01:32)  HR: 78 (01 May 2022 05:23) (78 - 86)  BP: 119/53 (01 May 2022 05:23) (111/54 - 139/66)  BP(mean): 69 (01 May 2022 05:23) (67 - 69)  RR: 18 (01 May 2022 05:23) (18 - 18)  SpO2: 96% (01 May 2022 05:23) (93% - 97%)    LABS:    CBC Full  -  ( 01 May 2022 06:45 )  WBC Count : 7.62 K/uL  RBC Count : 4.32 M/uL  Hemoglobin : 11.2 g/dL  Hematocrit : 35.8 %  Platelet Count - Automated : 194 K/uL  Mean Cell Volume : 82.9 fl  Mean Cell Hemoglobin : 25.9 pg  Mean Cell Hemoglobin Concentration : 31.3 gm/dL  Auto Neutrophil # : x  Auto Lymphocyte # : x  Auto Monocyte # : x  Auto Eosinophil # : x  Auto Basophil # : x  Auto Neutrophil % : x  Auto Lymphocyte % : x  Auto Monocyte % : x  Auto Eosinophil % : x  Auto Basophil % : x      05-01    137  |  107  |  86<H>  ----------------------------<  92  4.3   |  18<L>  |  3.82<H>    Ca    9.4      01 May 2022 06:45  Phos  4.1     05-01  Mg     1.6     05-01    TPro  8.1  /  Alb  3.3<L>  /  TBili  1.0  /  DBili  x   /  AST  220<H>  /  ALT  92<H>  /  AlkPhos  194<H>  05-01    CAPILLARY BLOOD GLUCOSE      POCT Blood Glucose.: 165 mg/dL (30 Apr 2022 20:50)        LIVER FUNCTIONS - ( 01 May 2022 06:45 )  Alb: 3.3 g/dL / Pro: 8.1 g/dL / ALK PHOS: 194 U/L / ALT: 92 U/L DA / AST: 220 U/L / GGT: x           Creatinine Trend: 3.82<--, 3.71<--, 3.54<--, 3.43<--, 3.28<--  I&O's Summary    30 Apr 2022 07:01  -  01 May 2022 07:00  --------------------------------------------------------  IN: 0 mL / OUT: 1450 mL / NET: -1450 mL                MEDICATIONS:    MEDICATIONS  (STANDING):  ARIPiprazole 15 milliGRAM(s) Oral daily  ferrous    sulfate 325 milliGRAM(s) Oral daily  finasteride 5 milliGRAM(s) Oral daily  gabapentin 600 milliGRAM(s) Oral two times a day  heparin   Injectable 5000 Unit(s) SubCutaneous every 8 hours  lamoTRIgine 200 milliGRAM(s) Oral daily  midodrine. 2.5 milliGRAM(s) Oral three times a day  pantoprazole    Tablet 40 milliGRAM(s) Oral before breakfast  polyethylene glycol 3350 17 Gram(s) Oral daily  senna 2 Tablet(s) Oral at bedtime  sevelamer carbonate 800 milliGRAM(s) Oral three times a day with meals  sodium bicarbonate 650 milliGRAM(s) Oral three times a day  tamsulosin 0.4 milliGRAM(s) Oral at bedtime  traZODone 25 milliGRAM(s) Oral at bedtime      MEDICATIONS  (PRN):      REVIEW OF SYSTEMS:                           ALL ROS DONE [ X   ]    CONSTITUTIONAL:  LETHARGIC [   ], FEVER [   ], UNRESPONSIVE [   ]  CVS:  CP  [   ], SOB, [   ], PALPITATIONS [   ], DIZZYNESS [   ]  RS: COUGH [   ], SPUTUM [   ]  GI: ABDOMINAL PAIN [   ], NAUSEA [   ], VOMITINGS [   ], DIARRHEA [   ], CONSTIPATION [   ]  :  DYSURIA [   ], NOCTURIA [   ], INCREASED FREQUENCY [   ], DRIBLING [   ],  SKELETAL: PAINFUL JOINTS [   ], SWOLLEN JOINTS [   ], NECK ACHE [   ], LOW BACK ACHE [   ],  SKIN : ULCERS [   ], RASH [   ], ITCHING [   ]  CNS: HEAD ACHE [   ], DOUBLE VISION [   ], BLURRED VISION [   ], AMS / CONFUSION [   ], SEIZURES [   ], WEAKNESS [   ],TINGLING / NUMBNESS [   ]    PHYSICAL EXAMINATION:  GENERAL APPEARANCE: NO DISTRESS  HEENT:  NO PALLOR, NO  JVD,  NO   NODES, NECK SUPPLE  CVS: S1 +, S2 +,   RS: AEEB,  OCCASIONAL  RALES +,   NO RONCHI  ABD: SOFT, NT, NO, BS +   ILEOSTOMY [LOOSE BROWN STOOL]  EXT: NO PE  SKIN: WARM,   SKELETAL:  ROM ACCEPTABLE  CNS:  AAO X 3    RADIOLOGY :    ACC: [id number] EXAM:  XR CHEST PORTABLE URGENT 1V                          PROCEDURE DATE:  04/28/2022          INTERPRETATION:  Clinical history: 71-year-old male, chest pain.    Portable/expiratory view of the chest is compared to 20 and demonstrates   a normal cardiac silhouette with no lobar consolidation, gross effusion,   pneumothorax or acute osseous finding.    Bilateral lower lobe patchy opacities consistent with infiltrates/areas   of atelectasis in the correct clinical context, increased. Elevated right   hemidiaphragm are evident.    IMPRESSION:    Bilateral lower lobe interstitial infiltrates/platelike atelectasis,   increased      ASSESSMENT :     Hyperkalemia    No pertinent past medical history    COPD, mild    Anemia    Bipolar disorder    IBD (inflammatory bowel disease)    HTN (hypertension)    HLD (hyperlipidemia)    PAD (peripheral artery disease)    CKD (chronic kidney disease)    Prostate CA    BPH with urinary obstruction    No significant past surgical history    History of creation of ostomy        PLAN:  HPI:  Patient is a 71M from [facility], who is ambulatory at baseline with a walker. He has Hx of HTN, CHF?, COPD, HLD, PAD, BPH, Prostate CA (s/p radiation), Bipolar Disorder, IBD - Crohn's Disease, R ileostomy bag (s/p sigmoid resection). He was brought to ED due to bilateral leg swelling. For the past 2 days he has been having progressive swelling/ edema of both his legs with associated ambulatory dysfunction. He also started having episodes of shortness of breath and difficulty of breathing. He denies any fever, cough, chest pain, palpitation. When he came to the ED, he was saturating at 93-94% on room air. He was placed on nasal cannula. EKG was done showing NSR. Troponin was negative and BNP was mildly elevated at 398. CXR showed bilateral vascular congestion. Serum potassium was elevated at 6.1. He got a dose of lasix and was given Hyperkalemia cocktail in the ED. Of note, he had Hx of COVID in 2020 andwas vaccinated with COVID-19 x 3 (Pfizer) and Influenza in 2021. He was subsequently admitted for acute exacerbation of his heart failure.    GOC: FULL CODE (28 Apr 2022 18:40)    # CASE D/W BROTHER KERI VIA PHONE AT BEDSIDE [4/30]      # ACUTE HYPOXIC RESPIRATORY FAILURE SUSPECT S/T PULMONARY VASCULAR CONGESTION  # RULED OUT CHF CHF  # PAWAN ON CKD  # BPH  # HX OF PROSTATE CA S/P RADIATION  - PLACE ON TELEMETRY  - NOTED CXR  - LASIX [REDUCED FROM BID TO QDAY]  - NOTE TSH  - STRICT IS AND OS  - ECHOCARDIOGRAM - NORMAL LVEF, NORMAL DIASTOLIC FUNCTION  - NEPHROLOGY CONSULT, CARDIOLOGY CONSULT    - WILL CONSULT GASTOENTEROLOGY    # HYPERKALEMIA - RESOLVED  - GIVEN LASIX, LOKELMA  - EKG DONE  - NEPHROLOGY CONSULT    # CROHN'S DISEASE  # RIGHT ILEOSTOMY BAG S/P SIGMOID RESECTION  - MONITORING OUTPUT    # NORMOCYTIC ANEMIA    # TRANSAMINITIS, NOTED HEP C AND HEP B MARKERS  - WILL CONSULT HEPATOLOGY    # HTN  # COPD  # HLD  # PAD  # BIPOLAR DX  # GI AND DVT PPX

## 2022-05-02 NOTE — CONSULT NOTE ADULT - SUBJECTIVE AND OBJECTIVE BOX
Chief Complaint:  Patient is a 71y old  Male who presents with a chief complaint of acute CHF exacerbation (02 May 2022 11:39)      HPI:WILMER SELLERS is a 71y Male    PMHX/PSHX:  No pertinent past medical history    COPD, mild    Anemia    Bipolar disorder    IBD (inflammatory bowel disease)    HTN (hypertension)    HLD (hyperlipidemia)    PAD (peripheral artery disease)    CKD (chronic kidney disease)    Prostate CA    BPH with urinary obstruction    No significant past surgical history    History of creation of ostomy      Allergies:  No Known Allergies      Home Medications: reviewed  Hospital Medications:  ARIPiprazole 15 milliGRAM(s) Oral daily  ferrous    sulfate 325 milliGRAM(s) Oral daily  finasteride 5 milliGRAM(s) Oral daily  gabapentin 600 milliGRAM(s) Oral two times a day  heparin   Injectable 5000 Unit(s) SubCutaneous every 8 hours  lamoTRIgine 200 milliGRAM(s) Oral daily  midodrine. 2.5 milliGRAM(s) Oral three times a day  pantoprazole    Tablet 40 milliGRAM(s) Oral before breakfast  senna 2 Tablet(s) Oral at bedtime  sevelamer carbonate 800 milliGRAM(s) Oral three times a day with meals  sodium bicarbonate 650 milliGRAM(s) Oral three times a day  tamsulosin 0.4 milliGRAM(s) Oral at bedtime  traZODone 25 milliGRAM(s) Oral at bedtime      Social History:   Tob: Denies  EtOH: Denies  Illicit Drugs: Denies    Family history:  No pertinent family history in first degree relatives    FH: HTN (hypertension) (Father)      Denies family history of colon cancer/polyps, stomach cancer/polyps, pancreatic cancer/masses, liver cancer/disease, ovarian cancer and endometrial cancer.    ROS:   General:  No  fevers, chills, night sweats, fatigue  Eyes:  Good vision, no reported pain  ENT:  No sore throat, pain, runny nose  CV:  No pain, palpitations  Pulm:  No dyspnea, cough  GI:  See HPI, otherwise negative  :  No  incontinence, nocturia  Muscle:  No pain, weakness  Neuro:  No memory problems  Psych:  No insomnia, mood problems, depression  Endocrine:  No polyuria, polydipsia, cold/heat intolerance  Heme:  No petechiae, ecchymosis, easy bruisability  Skin:  No rash    PHYSICAL EXAM:   Vital Signs:  Vital Signs Last 24 Hrs  T(C): 36.8 (02 May 2022 13:35), Max: 37.1 (02 May 2022 10:25)  T(F): 98.3 (02 May 2022 13:35), Max: 98.8 (02 May 2022 10:25)  HR: 95 (02 May 2022 13:35) (81 - 95)  BP: 134/71 (02 May 2022 13:35) (114/57 - 134/71)  BP(mean): --  RR: 18 (02 May 2022 13:35) (18 - 18)  SpO2: 99% (02 May 2022 13:35) (96% - 99%)  Daily     Daily     GENERAL: no acute distress  NEURO: alert, no asterixis  HEENT: anicteric sclera, no conjunctival pallor appreciated  CHEST: no respiratory distress, no accessory muscle use  CARDIAC: regular rate, rhythm  ABDOMEN: soft, non-tender, non-distended, no rebound or guarding  EXTREMITIES: warm, well perfused, no edema  SKIN: no lesions noted    LABS: reviewed                        11.6   7.99  )-----------( 204      ( 02 May 2022 12:16 )             36.3     05-02    135  |  107  |  99<H>  ----------------------------<  130<H>  4.4   |  17<L>  |  4.39<H>    Ca    9.4      02 May 2022 12:16  Phos  4.1     05-01  Mg     1.6     05-01    TPro  8.8<H>  /  Alb  3.2<L>  /  TBili  0.9  /  DBili  x   /  AST  240<H>  /  ALT  98<H>  /  AlkPhos  216<H>  05-02    LIVER FUNCTIONS - ( 02 May 2022 12:16 )  Alb: 3.2 g/dL / Pro: 8.8 g/dL / ALK PHOS: 216 U/L / ALT: 98 U/L DA / AST: 240 U/L / GGT: x               Diagnostic Studies: see sunrise for full report         Chief Complaint:  Patient is a 71y old  Male who presents with a chief complaint of acute CHF exacerbation (02 May 2022 11:39)    INCOMPLETE NOTE, FULL NOTE TO FOLLOW    HPI:WILMER SELLERS is a 71y Male from [facility] with Hx of HTN, CHF?, COPD, HLD, PAD, BPH, Prostate CA (s/p radiation), Bipolar Disorder, IBD - Crohn's Disease, R ileostomy bag (s/p sigmoid resection), COVID-19 (2020) and s/p COVID19 vaccination (Pfizer x3) was brought to [facility] ED on 4/28/22 b/o 2 days worsening bilateral leg swelling with difficulty ambulating, and SOB with SpO2 93-94% on RA on arrival.  He was found to have bilateral vascular congestion on CXR due to suspected acute exacerbation of CHF, and PAWAN on CKD (BUN/Cr 88/3.28), with hyperkalemia (K 6.1).  Being followed by cardiology, PBNP was mildly elevated, and TTE showed LVEF 55%, normal diastolic function and normal RV function, thus did not appear to be in acute CHF exacerbation.      Hepatology consulted b/o liver lesion and abnormal liver enzymes. He was noted to have mixed pattern liver enzyme elevation with AST> ALT pattern (-->240, -->98), -->216, with normal bilirubin and slightly lower albumin (3.2). Labs were also significant for mild normocytic anemia (Hb 11.2), mild coagulopathy (INR 1.18).   Noted pos Hep C ab but HCV PCR neg suggesting false pos or past infection cleared spontaneously. He reports known prior Hx of Hep C and s/p treatment. He was also noted to have HBcAb IgM, but HBsAg neg. Please, obtain HBcAb, HBsAb, HBeAb, HBeAg, HBV DNA.       PMHX/PSHX:    COPD, mild  Anemia  Bipolar disorder  IBD (inflammatory bowel disease)  HTN (hypertension)  HLD (hyperlipidemia)  PAD (peripheral artery disease)  CKD (chronic kidney disease)  Prostate CA  BPH with urinary obstruction  History of creation of ostomy      Allergies:  No Known Allergies      Home Medications: reviewed  Hospital Medications:  ARIPiprazole 15 milliGRAM(s) Oral daily  ferrous    sulfate 325 milliGRAM(s) Oral daily  finasteride 5 milliGRAM(s) Oral daily  gabapentin 600 milliGRAM(s) Oral two times a day  heparin   Injectable 5000 Unit(s) SubCutaneous every 8 hours  lamoTRIgine 200 milliGRAM(s) Oral daily  midodrine. 2.5 milliGRAM(s) Oral three times a day  pantoprazole    Tablet 40 milliGRAM(s) Oral before breakfast  senna 2 Tablet(s) Oral at bedtime  sevelamer carbonate 800 milliGRAM(s) Oral three times a day with meals  sodium bicarbonate 650 milliGRAM(s) Oral three times a day  tamsulosin 0.4 milliGRAM(s) Oral at bedtime  traZODone 25 milliGRAM(s) Oral at bedtime      Social History:   Tob: Denies  EtOH: Denies  Illicit Drugs: Denies    Family history:  No pertinent family history in first degree relatives    FH: HTN (hypertension) (Father)      Denies family history of colon cancer/polyps, stomach cancer/polyps, pancreatic cancer/masses, liver cancer/disease, ovarian cancer and endometrial cancer.    ROS:   General:  No  fevers, chills, night sweats, fatigue  Eyes:  Good vision, no reported pain  ENT:  No sore throat, pain, runny nose  CV:  No pain, palpitations  Pulm:  No dyspnea, cough  GI:  See HPI, otherwise negative  :  No  incontinence, nocturia  Muscle:  No pain, weakness  Neuro:  No memory problems  Psych:  No insomnia, mood problems, depression  Endocrine:  No polyuria, polydipsia, cold/heat intolerance  Heme:  No petechiae, ecchymosis, easy bruisability  Skin:  No rash    PHYSICAL EXAM:   Vital Signs:  Vital Signs Last 24 Hrs  T(C): 36.8 (02 May 2022 13:35), Max: 37.1 (02 May 2022 10:25)  T(F): 98.3 (02 May 2022 13:35), Max: 98.8 (02 May 2022 10:25)  HR: 95 (02 May 2022 13:35) (81 - 95)  BP: 134/71 (02 May 2022 13:35) (114/57 - 134/71)  BP(mean): --  RR: 18 (02 May 2022 13:35) (18 - 18)  SpO2: 99% (02 May 2022 13:35) (96% - 99%)  Daily     Daily     GENERAL: no acute distress  NEURO: alert, no asterixis  HEENT: anicteric sclera, no conjunctival pallor appreciated  CHEST: no respiratory distress, no accessory muscle use  CARDIAC: regular rate, rhythm  ABDOMEN: soft, non-tender, non-distended, no rebound or guarding  EXTREMITIES: warm, well perfused, no edema  SKIN: no lesions noted    LABS: reviewed                        11.6   7.99  )-----------( 204      ( 02 May 2022 12:16 )             36.3     05-02    135  |  107  |  99<H>  ----------------------------<  130<H>  4.4   |  17<L>  |  4.39<H>    Ca    9.4      02 May 2022 12:16  Phos  4.1     05-01  Mg     1.6     05-01    TPro  8.8<H>  /  Alb  3.2<L>  /  TBili  0.9  /  DBili  x   /  AST  240<H>  /  ALT  98<H>  /  AlkPhos  216<H>  05-02    LIVER FUNCTIONS - ( 02 May 2022 12:16 )  Alb: 3.2 g/dL / Pro: 8.8 g/dL / ALK PHOS: 216 U/L / ALT: 98 U/L DA / AST: 240 U/L / GGT: x               Diagnostic Studies: see sunrise for full report         Chief Complaint:  Patient is a 71y old  Male who presents with a chief complaint of acute CHF exacerbation (02 May 2022 11:39)      HPI:WILMER SELLERS is a 71y Male from [facility] with Hx of HTN, CHF?, COPD, HLD, PAD, BPH, Prostate CA (s/p radiation), Bipolar Disorder, IBD - Crohn's Disease, R ileostomy bag (s/p sigmoid resection), COVID-19 (2020) and s/p COVID19 vaccination (Pfizer x3) was brought to [facility] ED on 4/28/22 b/o 2 days worsening bilateral leg swelling with difficulty ambulating, and SOB with SpO2 93-94% on RA on arrival.  He was found to have bilateral vascular congestion on CXR due to suspected acute exacerbation of CHF, and PAWAN on CKD (BUN/Cr 88/3.28), with hyperkalemia (K 6.1).  Being followed by cardiology, PBNP was mildly elevated, and TTE showed LVEF 55%, normal diastolic function and normal RV function, thus did not appear to be in acute CHF exacerbation.      Hepatology consulted b/o liver lesion and abnormal liver enzymes. He was noted to have mixed pattern liver enzyme elevation with AST> ALT pattern (-->240, -->98), -->216, with normal bilirubin and slightly lower albumin (3.2). Labs were also significant for mild normocytic anemia (Hb 11.2), mild coagulopathy (INR 1.18).   Noted pos Hep C ab but HCV PCR neg and reports known prior Hx of Hep C and s/p treatment, with likely IFN+RBV with SVR. He was also noted to have HBcAb IgM, but HBsAg neg.  He reports following with hepatology outpatient. Denies any other liver disease beyond Hep C. Denies alcohol consumption.      PMHX/PSHX:    COPD, mild  Anemia  Bipolar disorder  IBD (inflammatory bowel disease)  HTN (hypertension)  HLD (hyperlipidemia)  PAD (peripheral artery disease)  CKD (chronic kidney disease)  Prostate CA  BPH with urinary obstruction  History of creation of ostomy      Allergies:  No Known Allergies      Home Medications: reviewed  Hospital Medications:  ARIPiprazole 15 milliGRAM(s) Oral daily  ferrous    sulfate 325 milliGRAM(s) Oral daily  finasteride 5 milliGRAM(s) Oral daily  gabapentin 600 milliGRAM(s) Oral two times a day  heparin   Injectable 5000 Unit(s) SubCutaneous every 8 hours  lamoTRIgine 200 milliGRAM(s) Oral daily  midodrine. 2.5 milliGRAM(s) Oral three times a day  pantoprazole    Tablet 40 milliGRAM(s) Oral before breakfast  senna 2 Tablet(s) Oral at bedtime  sevelamer carbonate 800 milliGRAM(s) Oral three times a day with meals  sodium bicarbonate 650 milliGRAM(s) Oral three times a day  tamsulosin 0.4 milliGRAM(s) Oral at bedtime  traZODone 25 milliGRAM(s) Oral at bedtime      Social History:   Tob: Denies  EtOH: Denies  Illicit Drugs: Denies    Family history:  No pertinent family history in first degree relatives    FH: HTN (hypertension) (Father)    ROS:   General:  No  fevers, chills, night sweats, fatigue  Eyes:  Good vision, no reported pain  ENT:  No sore throat, pain, runny nose  CV:  No pain, palpitations  Pulm:  No dyspnea, cough  GI:  No abdominal pain, N/V, has ileostomy bag, denies bleeding  :  No  dysuria  Muscle:  No pain, or focal  weakness  Neuro:  No memory problems  Skin:  No rash    PHYSICAL EXAM:   Vital Signs:  Vital Signs Last 24 Hrs  T(C): 36.8 (02 May 2022 13:35), Max: 37.1 (02 May 2022 10:25)  T(F): 98.3 (02 May 2022 13:35), Max: 98.8 (02 May 2022 10:25)  HR: 95 (02 May 2022 13:35) (81 - 95)  BP: 134/71 (02 May 2022 13:35) (114/57 - 134/71)  BP(mean): --  RR: 18 (02 May 2022 13:35) (18 - 18)  SpO2: 99% (02 May 2022 13:35) (96% - 99%)  Daily     Daily     GENERAL: no acute distress  NEURO: awake, alert, oriented x3, no asterixis  HEENT: anicteric sclera, no conjunctival pallor appreciated  CHEST: no respiratory distress, no accessory muscle use  CARDIAC: regular rate, rhythm  ABDOMEN: soft, non-tender, non-distended, no rebound or guarding, ileostomy bag  EXTREMITIES: b/l LE edema improved reportedly  SKIN: No rash    LABS: reviewed                        11.6   7.99  )-----------( 204      ( 02 May 2022 12:16 )             36.3     05-02    135  |  107  |  99<H>  ----------------------------<  130<H>  4.4   |  17<L>  |  4.39<H>    Ca    9.4      02 May 2022 12:16  Phos  4.1     05-01  Mg     1.6     05-01    TPro  8.8<H>  /  Alb  3.2<L>  /  TBili  0.9  /  DBili  x   /  AST  240<H>  /  ALT  98<H>  /  AlkPhos  216<H>  05-02    LIVER FUNCTIONS - ( 02 May 2022 12:16 )  Alb: 3.2 g/dL / Pro: 8.8 g/dL / ALK PHOS: 216 U/L / ALT: 98 U/L DA / AST: 240 U/L / GGT: x               Diagnostic Studies: see sunrise for full report

## 2022-05-02 NOTE — CONSULT NOTE ADULT - NS ATTEND AMEND GEN_ALL_CORE FT
- Increased ileostomy output.  - Elevated LFTs, liver lesions.    Patient seen and examined. Reports new onset increased ileostomy loose output since arrival to hospital. No abd pain. Ileostomy appears pink healthy, loose green stool in bag. Unclear etiology of increased output. States possible "Crohn's" after had surgery 10 yrs ago with ileostomy but does not follow with a GI, not on treatment, and has not had a scope ever since his surgery. Also notes was unclear if truly crohn's at that time. Less likely Crohn's flare, given new onset, favor medication related (of note on miralax and senna) vs infectious. Check stool culture, ova/parasites, fecal calprotectin from ostomy. LFTs elevated with liver lesions seen on CT. Recommend MRI abdomen/pelvis to further evaluate liver lesions and ileum/colon.

## 2022-05-02 NOTE — PROGRESS NOTE ADULT - SUBJECTIVE AND OBJECTIVE BOX
NP Note discussed with  Primary Attending    INTERVAL HPI/OVERNIGHT EVENTS: no new complaints, pt states feeling better today. on room air 93% at rest.     MEDICATIONS  (STANDING):  ARIPiprazole 15 milliGRAM(s) Oral daily  ferrous    sulfate 325 milliGRAM(s) Oral daily  finasteride 5 milliGRAM(s) Oral daily  gabapentin 600 milliGRAM(s) Oral two times a day  heparin   Injectable 5000 Unit(s) SubCutaneous every 8 hours  lamoTRIgine 200 milliGRAM(s) Oral daily  midodrine. 2.5 milliGRAM(s) Oral three times a day  pantoprazole    Tablet 40 milliGRAM(s) Oral before breakfast  senna 2 Tablet(s) Oral at bedtime  sevelamer carbonate 800 milliGRAM(s) Oral three times a day with meals  sodium bicarbonate 650 milliGRAM(s) Oral three times a day  sodium chloride 0.9%. 1000 milliLiter(s) (50 mL/Hr) IV Continuous <Continuous>  tamsulosin 0.4 milliGRAM(s) Oral at bedtime  traZODone 25 milliGRAM(s) Oral at bedtime    MEDICATIONS  (PRN):      __________________________________________________  REVIEW OF SYSTEMS:    CONSTITUTIONAL: No fever,   EYES: no acute visual disturbances  NECK: No pain or stiffness  RESPIRATORY: No cough; No shortness of breath  CARDIOVASCULAR: No chest pain, no palpitations  GASTROINTESTINAL: No pain. No nausea or vomiting; No diarrhea   NEUROLOGICAL: No headache or numbness, no tremors  MUSCULOSKELETAL: No joint pain, no muscle pain  GENITOURINARY: no dysuria, no frequency, no hesitancy  PSYCHIATRY: no depression , no anxiety  ALL OTHER  ROS negative        Vital Signs Last 24 Hrs  T(C): 36.8 (02 May 2022 13:35), Max: 37.1 (02 May 2022 10:25)  T(F): 98.3 (02 May 2022 13:35), Max: 98.8 (02 May 2022 10:25)  HR: 95 (02 May 2022 13:35) (81 - 95)  BP: 134/71 (02 May 2022 13:35) (114/57 - 134/71)  BP(mean): --  RR: 18 (02 May 2022 13:35) (18 - 18)  SpO2: 99% (02 May 2022 13:35) (96% - 99%)    ________________________________________________  PHYSICAL EXAM:  GENERAL: NAD  HEENT: Normocephalic;  conjunctivae and sclerae clear; moist mucous membranes;   NECK : supple  CHEST/LUNG: Clear to auscultation bilaterally with fair air entry Eupneic on room air.   HEART: S1 S2  regular; no murmurs, gallops or rubs  ABDOMEN: Soft, Nontender, Nondistended; Bowel sounds present, + R ileostomy  bag, loose BM  EXTREMITIES: no cyanosis; trace lower legs edema; no calf tenderness  SKIN: warm and dry; no rash  NERVOUS SYSTEM:  Awake and alert; Oriented  to place, person and time ; no new deficits    _________________________________________________  LABS:                        11.6   7.99  )-----------( 204      ( 02 May 2022 12:16 )             36.3     05-02    135  |  107  |  99<H>  ----------------------------<  130<H>  4.4   |  17<L>  |  4.39<H>    Ca    9.4      02 May 2022 12:16  Phos  4.1     05-01  Mg     1.6     05-01    TPro  8.8<H>  /  Alb  3.2<L>  /  TBili  0.9  /  DBili  x   /  AST  240<H>  /  ALT  98<H>  /  AlkPhos  216<H>  05-02    CAPILLARY BLOOD GLUCOSE  RADIOLOGY & ADDITIONAL TESTS:    Imaging  Reviewed:  YES  < from: CT Chest No Cont (05.02.22 @ 11:10) >    IMPRESSION:.    No pleural effusion.    Multiple hepatic lesions with largest area at the hepatic dome measuring   up to 4.7 cm; findings are new compared to 2/5/2020 CT abdomen. Recommend   dedicated CT or MRI abdomen to further assess and to exclude malignancy.    --- End of Report ---    < from: US Renal (05.02.22 @ 11:06) >    IMPRESSION:  No hydronephrosis. Bilateral renal cysts.    < end of copied text >    < end of copied text >    < from: Transthoracic Echocardiogram (04.29.22 @ 07:17) >  CONCLUSIONS:  1. Normal mitral valve.  2. Normal trileaflet aortic valve. Mild aortic  regurgitation.  3. Aortic Root: 4.2 cm.  4. Normal left atrium.  LA volume index = 22 cc/m2.  5. Normal left ventricular internal dimensions and wall  thicknesses.  6. Normal Left Ventricular Systolic Function,  (EF = 55 to  60%)  7. Normal diastolic function.  8. Normal right atrium.  9. Normal right ventricular size and systolic function  (TAPSE  1.9cm).  10. Unable to estimate RVSP.  11. Normal tricuspid valve.  12. Normal pulmonic valve.  13. Trivial pericardial effusion is seen.    ------------------------------------------------------------------------  Confirmed on  4/29/2022 - 15:50:35 by Carmela Crump MD  ------------------------------------------------------------------------    < end of copied text >    Consultant(s) Notes Reviewed:   YES      Plan of care was discussed with patient and /or primary care giver; all questions and concerns were addressed

## 2022-05-02 NOTE — PROGRESS NOTE ADULT - PROBLEM SELECTOR PLAN 12
patient from [facility]   PT clears to return-no skill needed  COVID - 4/28  hepatology consulted, MRI pending  monitor O2 sat on room air both ambulation/at rest.

## 2022-05-02 NOTE — PROGRESS NOTE ADULT - SUBJECTIVE AND OBJECTIVE BOX
NEPHROLOGY MEDICAL CARE, [facility] - Dr. Emerson Pond/ Dr. Nallely Curran/ Dr. Kirby Angel/ Dr. Pippa Reis    Patient was seen and examined at bedside.    CC: patient is okay and no sob present.    Vital Signs Last 24 Hrs  T(C): 36.8 (02 May 2022 13:35), Max: 37.1 (02 May 2022 10:25)  T(F): 98.3 (02 May 2022 13:35), Max: 98.8 (02 May 2022 10:25)  HR: 95 (02 May 2022 13:35) (81 - 95)  BP: 134/71 (02 May 2022 13:35) (114/57 - 134/71)  BP(mean): --  RR: 18 (02 May 2022 13:35) (18 - 18)  SpO2: 99% (02 May 2022 13:35) (96% - 99%)    05-01 @ 07:01  -  05-02 @ 07:00  --------------------------------------------------------  IN: 0 mL / OUT: 125 mL / NET: -125 mL        PHYSICAL EXAM:  General: No acute respiratory distress.  Eyes: conjunctiva and sclera clear  ENMT: Atraumatic, Normocephalic, supple, No JVD present. Moist mucous membranes  Respiratory: Bilateral air entry and no rhonchi, wheezing  Cardiovascular: S1S2+; no m/r/g  Gastrointestinal: Soft, Non-tender, Nondistended; Bowel sounds present   Neuro:  Awake, Alert & Oriented X3  Ext:  no pedal edema, No Cyanosis  Skin: No visible rashes      MEDICATIONS:  MEDICATIONS  (STANDING):  ARIPiprazole 15 milliGRAM(s) Oral daily  ferrous    sulfate 325 milliGRAM(s) Oral daily  finasteride 5 milliGRAM(s) Oral daily  gabapentin 600 milliGRAM(s) Oral two times a day  heparin   Injectable 5000 Unit(s) SubCutaneous every 8 hours  lamoTRIgine 200 milliGRAM(s) Oral daily  midodrine. 2.5 milliGRAM(s) Oral three times a day  pantoprazole    Tablet 40 milliGRAM(s) Oral before breakfast  senna 2 Tablet(s) Oral at bedtime  sevelamer carbonate 800 milliGRAM(s) Oral three times a day with meals  sodium bicarbonate 650 milliGRAM(s) Oral three times a day  sodium chloride 0.9%. 1000 milliLiter(s) (50 mL/Hr) IV Continuous <Continuous>  tamsulosin 0.4 milliGRAM(s) Oral at bedtime  traZODone 25 milliGRAM(s) Oral at bedtime    MEDICATIONS  (PRN):          LABS:                        11.6   7.99  )-----------( 204      ( 02 May 2022 12:16 )             36.3     05-    135  |  107  |  99<H>  ----------------------------<  130<H>  4.4   |  17<L>  |  4.39<H>    Ca    9.4      02 May 2022 12:16  Phos  4.1       Mg     1.6         TPro  8.8<H>  /  Alb  3.2<L>  /  TBili  0.9  /  DBili  x   /  AST  240<H>  /  ALT  98<H>  /  AlkPhos  216<H>        Urinalysis Basic - ( 2022 15:24 )    Color: Yellow / Appearance: Clear / S.010 / pH: x  Gluc: x / Ketone: Negative  / Bili: Negative / Urobili: Negative   Blood: x / Protein: 30 mg/dL / Nitrite: Negative   Leuk Esterase: Negative / RBC: 25-50 /HPF / WBC 0-2 /HPF   Sq Epi: x / Non Sq Epi: Occasional /HPF / Bacteria: Few /HPF        Urine studies  Urea Nitrogen,  Random Urine: 484 mg/dL ( @ 19:10)  Potassium, Random Urine: 37 mmol/L ( @ 15:25)  Sodium, Random Urine: 47 mmol/L ( @ 15:25)  Creatinine, Random Urine: 59 mg/dL ( @ 15:24)    PTH and Vit D:

## 2022-05-02 NOTE — PROGRESS NOTE ADULT - ASSESSMENT
1. PAWAN due to diuretics  -Scr worsening because of overdiuresis with stable electrolytes. lasix 40mg iv given early in the morning; off lasix now.    -okay to give fluids; NS at 50cc/hr over 10hrs.  Urinalysis shows no proteinuria. spot protein to creatinine ratio is 900mg.  -renal sono show no hydro.  -Adjust meds to eGFR and avoid IV Gadolinium contrast,NSAIDs, and phosphate enema.  -Monitor I/O's daily.   -Monitor SMA daily.  2. CKD stage 4 most likely due to ischemic nephropathy and h/o ATN with renal recovery.  -Baseline Scr around 3.2mg/dL in April 2020.  -Keep patient euvolemic and renal diet  -Avoid Nephrotoxic Meds/ Agents such as (NSAIDs, IV contrast, Aminoglycosides such as gentamicin, -Gadolinium contrast, Phosphate containing enemas, etc..)  -Adjust Medications according to eGFR  3. Anemia:   -hb is stable; continue iron tabs. monitor CBC  4. MBD:   -phos is improving; mild hyperca, which improved and off calcitirol; on renvela 1 tab tid;   -pth is okay at 41.  5. Hyperkalemia due to pawan on ckd.   -K is resolved and s/p medical management.  -on low K diet. give Lokelma prn if K >5.5  -Keep pt euvolemic. Avoid ACE inh/ ARBs, NSAIDs, and Aldactone or potassium sparing diuretics. Monitor K+ daily.  6. Fluid Overload:  -clinically improved. continue with diuretics as above.   -CT chest w/o contrast shows no effusion; CXR is about the same.   7. Acidosis:  -CO2 is stable.    -on sodium bicarbonate 650mg oral tid. monitor CO2  8. h/o Hypotension:  -bp is acceptable and on midodrine 2.5mg tid      9. Elevated LFTs  - off lipitor for now.  -hepatitis panel shows hep C+ve.  discussed with pt, pt had known hep C and was treated for in the past; hep C RNA is not detected.   -f/u C3/C4, RF, cryoglobulinemia since LFTs not improving.   -ct scan shows hepatic mass; Hepatology consulted and MRI pending.    Discussed with patient in detail regarding the renal plan and care  Discussed the assessment and plan with Primary Team/Nurse

## 2022-05-02 NOTE — CONSULT NOTE ADULT - ASSESSMENT
Patient is a 71M from [facility], who is ambulatory at baseline with a walker. He has Hx of HTN, CHF?, COPD, HLD, PAD, BPH, Prostate CA (s/p radiation), Bipolar Disorder, IBD - Crohn's Disease, R ileostomy bag (s/p sigmoid resection). He was brought to ED due to bilateral leg swelling. GI consult for excess ileostomy output. Patient has been admitted with CHF exacerbation and transaminitis. Labs significant for WBC 7.6 HH 11.2/35.8 CR/BUN 3.82/86 Tbili 1   ALT 92. As per patient had has his colon completely removed over 10 years ago and has an ileostomy now due to severe Crohn's and diverticulitis.  As per patient, he changes his ostomy back 2-3 a day. While not in the hospital his stool has been mostly soft. Since admission into the hospital stool is water. On average 300-500ml daily output. The ostomy output is non blood and green in color. Patient's last colonoscopy was around  the time of his colon surgery. While in the hospital he is on daily Miralax and Senna. Patient seen and examined at bedside. Abdomen soft, NTND. RLQ stoma. Stoma site D/C/I and in pink in color. Patient denies any abdominal pain, N&V.   #Ileostomy  -Normal daily ileostomy output is 300-800ml. Patient while in patient only having 300-55ml likely do diet modification and or medication induced.   -Hold Miralax and if possible Senna as well. Be mindful not to cause ANY constipation. Nothing harder then a soft consistency     #Transaminitis  -Tbili 1   ALT 92  -Hep C and HEP B markers elevated   -suggest consult hepatology    Patient is a 71M from [facility], who is ambulatory at baseline with a walker. He has Hx of HTN, CHF?, COPD, HLD, PAD, BPH, Prostate CA (s/p radiation), Bipolar Disorder, IBD - Crohn's Disease, R ileostomy bag (s/p sigmoid resection). He was brought to ED due to bilateral leg swelling. GI consult for excess ileostomy output. Patient has been admitted with CHF exacerbation and transaminitis. Labs significant for WBC 7.6 HH 11.2/35.8 CR/BUN 3.82/86 Tbili 1   ALT 92. As per patient had has his colon completely removed over 10 years ago and has an ileostomy now due to severe Crohn's and diverticulitis.  As per patient, he changes his ostomy back 2-3 a day. While not in the hospital his stool has been mostly soft. Since admission into the hospital stool is water. On average 300-500ml daily output. The ostomy output is non blood and green in color. Patient's last colonoscopy was around  the time of his colon surgery. While in the hospital he is on daily Miralax and Senna. Patient seen and examined at bedside. Abdomen soft, NTND. RLQ stoma. Stoma site D/C/I and in pink in color. Patient denies any abdominal pain, N&V.   #Ileostomy  -Normal daily ileostomy output is 300-800ml. Patient while in patient only having 300-55ml likely do diet modification and or medication induced.   -Hold Miralax and if possible Senna as well. Be mindful not to cause ANY constipation. Nothing harder then a soft consistency     #Transaminitis  -Tbili 1   ALT 92  -Hep C and HEP B markers elevated   -CT chest showed: Multiple hepatic lesions with largest area at the hepatic dome measuring up to 4.7 cm; findings are new compared to 2/5/2020 CT abdomen. Recommend  MRI abdomen with non contrast to further assess and to exclude malignancy.  -suggest consult hepatology    Patient is a 71M from [facility], who is ambulatory at baseline with a walker. He has Hx of HTN, CHF?, COPD, HLD, PAD, BPH, Prostate CA (s/p radiation), Bipolar Disorder, IBD - Crohn's Disease, R ileostomy bag (s/p sigmoid resection). He was brought to ED due to bilateral leg swelling. GI consult for excess ileostomy output. Patient has been admitted with CHF exacerbation and transaminitis. Labs significant for WBC 7.6 HH 11.2/35.8 CR/BUN 3.82/86 Tbili 1   ALT 92. As per patient had has his colon completely removed over 10 years ago and has an ileostomy now due to severe Crohn's and diverticulitis.  As per patient, he changes his ostomy back 2-3 a day. While not in the hospital his stool has been mostly soft. Since admission into the hospital stool is water. On average 300-500ml daily output. The ostomy output is non blood and green in color. Patient's last colonoscopy was around  the time of his colon surgery. While in the hospital he is on daily Miralax and Senna. Patient seen and examined at bedside. Abdomen soft, NTND. RLQ stoma. Stoma site D/C/I and in pink in color. Patient denies any abdominal pain, N&V.   #Ileostomy  -Normal daily ileostomy output is 300-800ml. Patient while in patient only having 300-55ml likely do diet modification and or medication induced.   -Hold Miralax and if possible Senna as well. Be mindful not to cause ANY constipation. Nothing harder then a soft consistency     #Transaminitis  -Tbili 1   ALT 92  -Hep C and HEP B markers elevated   -CT chest showed: Multiple hepatic lesions with largest area at the hepatic dome measuring up to 4.7 cm; findings are new compared to 2/5/2020 CT abdomen. Recommend  MRI abdomen with non contrast to further assess and to exclude malignancy.  -hepatology consult  -MRI abdomen/pelvis to evaluate hepatic lesions

## 2022-05-02 NOTE — PROGRESS NOTE ADULT - PROBLEM SELECTOR PLAN 5
Stable   likely anemia of chronic diease   home meds - EPO, FeSO4  no signs of active bleeding  trend CBC

## 2022-05-02 NOTE — PROGRESS NOTE ADULT - PROBLEM SELECTOR PLAN 1
SOB + LE edema on admission  Echo - GIDD, EF 50-55% (2020)  Echo resulted trivial pericardial effusion, normal EF.  EKG - NSR, low voltage criteria for LVH  CXR - bilateral vascular congestion   CT chest shows no pleural effusion  Trop x 1 - neg  pro-BNP - 398 (mildly elevated)  monitor I&O, Daily Weight  HOLD  lasix due to PAWAN on CKD  d/c'd tele  Cardio (Dr. Archer) consulted  repeat CXR-pending official read  monitor O2 sat on room air, at rest/ambulation

## 2022-05-02 NOTE — CONSULT NOTE ADULT - ASSESSMENT
71y Male from [facility] with Hx of HTN, CHF?, COPD, HLD, PAD, BPH, Prostate CA (s/p radiation), Bipolar Disorder, IBD - Crohn's Disease, R ileostomy bag (s/p sigmoid resection), COVID-19 (2020) and s/p COVID19 vaccination (Pf[employer] x3) was brought to [facility] ED on 4/28/22 b/o 2 days worsening bilateral leg swelling with difficulty ambulating, and SOB with SpO2 93-94% on RA on arrival.  He was found to have bilateral vascular congestion on CXR due to suspected acute exacerbation of CHF, and PAWAN on CKD (BUN/Cr 88/3.28), with hyperkalemia (K 6.1).  Being followed by cardiology, PBNP was mildly elevated, and TTE showed LVEF 55%, normal diastolic function and normal RV function, thus did not appear to be in acute CHF exacerbation.    Hepatology consulted b/o liver lesion and abnormal liver enzymes.     # Abnormal liver enzymes, AST >>ALT  - Please, order CPK.   - Hypotensive, requiring midodrine, with renal dysfunction, obtain Ig panel, SPEP, UPEP  - Obtain HIV  - No Hx of EtOH  - Hep B and C as below. Hep A IgM neg.     # Pos HCV ab  - HCV PCR neg with the Hx of Hep C and treatment, suggests SVR.   - F/u cryoglobulin     # HBcAb IgM pos, but HBsAg neg  - Please, obtain HBcAb, HBsAb, HBeAb, HBeAg, HBV DNA.     # Liver lesion  - on CT chest incidentally found a 4.7x4.3 cm partially imaged hypodense lesion and in L lobe another 2.7 cm lesion, as well as nodular thickening along hepatic capsule, all are new from the 2/2020 CT a/p  - Obtain AFP, CEA and CA 19-9  - Cannot get contrast CT b/o PAWAN on CKD. Consider MRI, to discuss with nephrology whether can get level 2  (Gadavist) agent. If not still can start with non contrast MRI, although information will be limited.   - No fever or leukocytosis    # PAWAN on CKD, hematuria  - F/u nephrology recommendations and cardiology recommendations    Thank you for consult  Will continue to follow  D/w primary team  INCOMPLETE NOTE, FULL NOTE TO FOLLOW   71y Male from [facility] with Hx of HTN, CHF?, COPD, HLD, PAD, BPH, Prostate CA (s/p radiation), Bipolar Disorder, IBD - Crohn's Disease, R ileostomy bag (s/p sigmoid resection), COVID-19 (2020) and s/p COVID19 vaccination (Pfizer x3) [facility] b/o 2 days worsening bilateral leg swelling with difficulty ambulating, and SOB with SpO2 93-94% on RA on arrival.  He was found to have bilateral vascular congestion on CXR due to suspected acute exacerbation of CHF, and PAWAN on CKD (BUN/Cr 88/3.28), with hyperkalemia (K 6.1).  Being followed by cardiology, PBNP was mildly elevated, and TTE showed LVEF 55%, normal diastolic function and normal RV function, thus did not appear to be in acute CHF exacerbation.    Hepatology consulted b/o liver lesion and abnormal liver enzymes.     # Abnormal liver enzymes, AST >>ALT  - Please, order CPK.   - Hypotensive, requiring midodrine, with renal dysfunction, obtain Ig panel, SPEP, UPEP  - Obtain HIV  - No Hx of EtOH  - Hep B and C as below. Hep A IgM neg.   - Avoid hepatotoxic medications, agree holding statin    # Pos HCV ab  - HCV PCR neg with the Hx of Hep C and treatment, suggests SVR.   - F/u cryoglobulin     # HBcAb IgM pos, but HBsAg neg  - Please, obtain HBcAb, HBsAb, HBeAb, HBeAg, HBV DNA.     # Liver lesion  - on CT chest incidentally found a 4.7x4.3 cm partially imaged hypodense lesion and in L lobe another 2.7 cm lesion, as well as nodular thickening along hepatic capsule, all are new from the 2/2020 CT a/p  - Obtain AFP, CEA and CA 19-9  - Cannot get contrast CT b/o PAWAN on CKD. Consider MRI, to discuss with nephrology whether can get level 2  (Gadavist) agent. If not still can start with non contrast MRI, although information will be limited.   - No fever or leukocytosis    # PAWAN on CKD, hematuria  - F/u nephrology recommendations and cardiology recommendations    Thank you for consult  Will continue to follow  D/w primary team  INCOMPLETE NOTE, FULL NOTE TO FOLLOW   71y Male from [facility] with Hx of HTN, CHF?, COPD, HLD, PAD, BPH, Prostate CA (s/p radiation), Bipolar Disorder, IBD - Crohn's Disease, R ileostomy bag (s/p sigmoid resection), COVID-19 (2020) and s/p COVID19 vaccination (Pfizer x3) was brought to [facility] ED on 4/28/22 b/o 2 days worsening bilateral leg swelling with difficulty ambulating, and SOB with SpO2 93-94% on RA on arrival.  He was found to have bilateral vascular congestion on CXR due to suspected acute exacerbation of CHF, and PAWAN on CKD (BUN/Cr 88/3.28), with hyperkalemia (K 6.1). 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  Being followed by cardiology, PBNP was mildly elevated, and TTE showed LVEF 55%, normal diastolic function and normal RV function, thus did not appear to be in acute CHF exacerbation.    Hepatology consulted b/o liver lesion and abnormal liver enzymes.     # Abnormal liver enzymes, AST >>ALT  - Please, order CPK.   - Hypotensive, requiring midodrine, with renal dysfunction, obtain Ig panel, SPEP, UPEP  - Obtain HIV  - No Hx of EtOH  - Hep B and C as below. Hep A IgM neg.   - Avoid hepatotoxic medications, agree holding statin  - Please, obtain collateral information about timing of lamotrigine, because its hepatotoxicity is well established, and consider alternative.     # Pos HCV ab  - HCV PCR neg with the Hx of Hep C and treatment, suggests SVR.   - F/u cryoglobulin     # HBcAb IgM pos, but HBsAg neg  - Please, obtain HBcAb, HBsAb, HBeAb, HBeAg, HBV DNA.     # Liver lesion  - on CT chest incidentally found a 4.7x4.3 cm partially imaged hypodense lesion and in L lobe another 2.7 cm lesion, as well as nodular thickening along hepatic capsule, all are new from the 2/2020 CT a/p  - Obtain AFP, CEA and CA 19-9  - Cannot get contrast CT b/o PAWAN on CKD. Consider MRI, to discuss with nephrology whether can get level 2  (Gadavist) agent. If not still can start with non contrast MRI, although information will be limited.   - No fever or leukocytosis    # PAWAN on CKD, hematuria  - F/u nephrology recommendations and cardiology recommendations    Thank you for consult  Will continue to follow  D/w primary team

## 2022-05-02 NOTE — PROGRESS NOTE ADULT - PROBLEM SELECTOR PLAN 3
K+ 6.1 on admission   S/P hyperkalemia cocktail  could be in setting of CKD  EKG - NSR, low voltage criteria for LVH (no peak T waves)  resolved K 4.4  monitor BMP

## 2022-05-02 NOTE — PROGRESS NOTE ADULT - PROBLEM SELECTOR PLAN 4
downtrending   Tbili 0.9, , , ALT 92  Hep C and HEP B markers elevated avoid hepatotoxins   CT chest shows hepatic lesion 4.7cm see above  hepatology consulted dr Portillo  trend CMP  f/u MRI abdomen no con (due to worsening APWAN)--no MRI tech available today, likely tomorrow. MRI form sent

## 2022-05-02 NOTE — PROGRESS NOTE ADULT - PROBLEM SELECTOR PLAN 2
Hx of CKD IV  baseline Cr - 3  BUN/Cr - 88/3.28 , eGFR - 19  home meds - NaHCO3, Calcitriol  renal US shows renal cysts, no hydro  monitor BMP  monitor I&O  resume home meds  avoid nephrotoxins HOLD lasix for now.   Nephro (Dr. Pond) consulted  Cr 4.39 from 3.8  give gentle IV hydration with NS @ 50ml/hr x 10hrs per nephro

## 2022-05-02 NOTE — CONSULT NOTE ADULT - SUBJECTIVE AND OBJECTIVE BOX
[facility]    Patient is a 71y old  Male who presents with a chief complaint of acute CHF exacerbation (02 May 2022 10:15)    HPI:  Patient is a 71M from [facility], who is ambulatory at baseline with a walker. He has Hx of HTN, CHF?, COPD, HLD, PAD, BPH, Prostate CA (s/p radiation), Bipolar Disorder, IBD - Crohn's Disease, R ileostomy bag (s/p sigmoid resection). He was brought to ED due to bilateral leg swelling. For the past 2 days he has been having progressive swelling/ edema of both his legs with associated ambulatory dysfunction. He also started having episodes of shortness of breath and difficulty of breathing. He denies any fever, cough, chest pain, palpitation. When he came to the ED, he was saturating at 93-94% on room air. He was placed on nasal cannula. EKG was done showing NSR. Troponin was negative and BNP was mildly elevated at 398. CXR showed bilateral vascular congestion. Serum potassium was elevated at 6.1. He got a dose of lasix and was given Hyperkalemia cocktail in the ED. Of note, he had Hx of COVID in 2020 andwas vaccinated with COVID-19 x 3 (Pfizer) and Influenza in 2021. He was subsequently admitted for acute exacerbation of his heart failure.    GOC: FULL CODE (28 Apr 2022 18:40)            PMH/PSH:  PAST MEDICAL & SURGICAL HISTORY:  COPD, mild    Anemia    Bipolar disorder    IBD (inflammatory bowel disease)    HTN (hypertension)    HLD (hyperlipidemia)    PAD (peripheral artery disease)    CKD (chronic kidney disease)    Prostate CA    BPH with urinary obstruction    History of creation of ostomy      FH:  FAMILY HISTORY:  FH: HTN (hypertension) (Father)        MEDS:  MEDICATIONS  (STANDING):  ARIPiprazole 15 milliGRAM(s) Oral daily  ferrous    sulfate 325 milliGRAM(s) Oral daily  finasteride 5 milliGRAM(s) Oral daily  furosemide   Injectable 40 milliGRAM(s) IV Push daily  gabapentin 600 milliGRAM(s) Oral two times a day  heparin   Injectable 5000 Unit(s) SubCutaneous every 8 hours  lamoTRIgine 200 milliGRAM(s) Oral daily  midodrine. 2.5 milliGRAM(s) Oral three times a day  pantoprazole    Tablet 40 milliGRAM(s) Oral before breakfast  senna 2 Tablet(s) Oral at bedtime  sevelamer carbonate 800 milliGRAM(s) Oral three times a day with meals  sodium bicarbonate 650 milliGRAM(s) Oral three times a day  tamsulosin 0.4 milliGRAM(s) Oral at bedtime  traZODone 25 milliGRAM(s) Oral at bedtime    MEDICATIONS  (PRN):    Allergies    No Known Allergies    Intolerances            CONSTITUTIONAL:  No weight loss, fever, chills, weakness or fatigue.  HEENT:  Eyes:  No visual loss, blurred vision, double vision or yellow sclerae. Ears, Nose, Throat:  No hearing loss, sneezing, congestion, runny nose or sore throat.  SKIN:  No rash or itching.  CARDIOVASCULAR:  No chest pain, chest pressure or chest discomfort. No palpitations or edema.  RESPIRATORY:  No shortness of breath, cough or sputum.  GASTROINTESTINAL:  SEE HPI  GENITOURINARY:  No dysuria, hematuria, urinary frequency  NEUROLOGICAL:  No headache, dizziness, syncope, paralysis, ataxia, numbness or tingling in the extremities. No change in bowel or bladder control.  MUSCULOSKELETAL:  No muscle, back pain, joint pain or stiffness.        ______________________________________________________________________  PHYSICAL EXAM:  T(C): 37.1 (05-02-22 @ 10:25), Max: 37.1 (05-02-22 @ 10:25)  HR: 89 (05-02-22 @ 10:25)  BP: 128/64 (05-02-22 @ 10:25)  RR: 18 (05-02-22 @ 10:25)  SpO2: 98% (05-02-22 @ 10:25)  Wt(kg): --    05-01 - 05-02  --------------------------------------------------------  IN:  Total IN: 0 mL    OUT:    Colostomy (mL): 125 mL  Total OUT: 125 mL    Total NET: -125 mL          GEN: NAD, normocephalic  CVS: S1S2+  CHEST: clear to auscultation  ABD: soft , nontender, nondistended, bowel sounds present  EXTR: no cyanosis, no clubbing, no edema  NEURO: Awake and alert; oriented .....  SKIN:  warm;  non icteric    ______________________________________________________________________  LABS:                        11.2   7.62  )-----------( 194      ( 01 May 2022 06:45 )             35.8     05-01    137  |  107  |  86<H>  ----------------------------<  92  4.3   |  18<L>  |  3.82<H>    Ca    9.4      01 May 2022 06:45  Phos  4.1     05-01  Mg     1.6     05-01    TPro  8.1  /  Alb  3.3<L>  /  TBili  1.0  /  DBili  x   /  AST  220<H>  /  ALT  92<H>  /  AlkPhos  194<H>  05-01    LIVER FUNCTIONS - ( 01 May 2022 06:45 )  Alb: 3.3 g/dL / Pro: 8.1 g/dL / ALK PHOS: 194 U/L / ALT: 92 U/L DA / AST: 220 U/L / GGT: x             ____________________________________________    IMAGING:    ______________________________________________________________________              [facility]    Patient is a 71y old  Male who presents with a chief complaint of acute CHF exacerbation (02 May 2022 10:15)    HPI:  Patient is a 71M from [facility], who is ambulatory at baseline with a walker. He has Hx of HTN, CHF?, COPD, HLD, PAD, BPH, Prostate CA (s/p radiation), Bipolar Disorder, IBD - Crohn's Disease, R ileostomy bag (s/p sigmoid resection). He was brought to ED due to bilateral leg swelling. For the past 2 days he has been having progressive swelling/ edema of both his legs with associated ambulatory dysfunction. He also started having episodes of shortness of breath and difficulty of breathing. He denies any fever, cough, chest pain, palpitation. When he came to the ED, he was saturating at 93-94% on room air. He was placed on nasal cannula. EKG was done showing NSR. Troponin was negative and BNP was mildly elevated at 398. CXR showed bilateral vascular congestion. Serum potassium was elevated at 6.1. He got a dose of lasix and was given Hyperkalemia cocktail in the ED. Of note, he had Hx of COVID in 2020 andwas vaccinated with COVID-19 x 3 (Pfizer) and Influenza in 2021. He was subsequently admitted for acute exacerbation of his heart failure.    GOC: FULL CODE (28 Apr 2022 18:40)            PMH/PSH:  PAST MEDICAL & SURGICAL HISTORY:  COPD, mild    Anemia    Bipolar disorder    IBD (inflammatory bowel disease)    HTN (hypertension)    HLD (hyperlipidemia)    PAD (peripheral artery disease)    CKD (chronic kidney disease)    Prostate CA    BPH with urinary obstruction    History of creation of ostomy      FH:  FAMILY HISTORY:  FH: HTN (hypertension) (Father)        MEDS:  MEDICATIONS  (STANDING):  ARIPiprazole 15 milliGRAM(s) Oral daily  ferrous    sulfate 325 milliGRAM(s) Oral daily  finasteride 5 milliGRAM(s) Oral daily  furosemide   Injectable 40 milliGRAM(s) IV Push daily  gabapentin 600 milliGRAM(s) Oral two times a day  heparin   Injectable 5000 Unit(s) SubCutaneous every 8 hours  lamoTRIgine 200 milliGRAM(s) Oral daily  midodrine. 2.5 milliGRAM(s) Oral three times a day  pantoprazole    Tablet 40 milliGRAM(s) Oral before breakfast  senna 2 Tablet(s) Oral at bedtime  sevelamer carbonate 800 milliGRAM(s) Oral three times a day with meals  sodium bicarbonate 650 milliGRAM(s) Oral three times a day  tamsulosin 0.4 milliGRAM(s) Oral at bedtime  traZODone 25 milliGRAM(s) Oral at bedtime    MEDICATIONS  (PRN):    Allergies    No Known Allergies    Intolerances            CONSTITUTIONAL:  No weight loss, fever, chills, weakness or fatigue.  HEENT:  Eyes:  No visual loss, blurred vision, double vision or yellow sclerae. Ears, Nose, Throat:  No hearing loss, sneezing, congestion, runny nose or sore throat.  SKIN:  No rash or itching.  CARDIOVASCULAR:  No chest pain, chest pressure or chest discomfort. No palpitations or edema.  RESPIRATORY:  No shortness of breath, cough or sputum.  GASTROINTESTINAL:  SEE HPI  GENITOURINARY:  No dysuria, hematuria, urinary frequency  NEUROLOGICAL:  No headache, dizziness, syncope, paralysis, ataxia, numbness or tingling in the extremities. No change in bowel or bladder control.  MUSCULOSKELETAL:  No muscle, back pain, joint pain or stiffness.        ______________________________________________________________________  PHYSICAL EXAM:  T(C): 37.1 (05-02-22 @ 10:25), Max: 37.1 (05-02-22 @ 10:25)  HR: 89 (05-02-22 @ 10:25)  BP: 128/64 (05-02-22 @ 10:25)  RR: 18 (05-02-22 @ 10:25)  SpO2: 98% (05-02-22 @ 10:25)  Wt(kg): --    05-01 - 05-02  --------------------------------------------------------  IN:  Total IN: 0 mL    OUT:    Colostomy (mL): 125 mL  Total OUT: 125 mL    Total NET: -125 mL          GEN: NAD, normocephalic  CVS: S1S2+  CHEST: clear to auscultation  ABD: soft , nontender, nondistended, bowel sounds present RLQ stoma. Site D/C/I and pink  EXTR: no cyanosis, no clubbing, no edema  NEURO: Awake and alert; oriented x3  SKIN:  warm;  non icteric    ______________________________________________________________________  LABS:                        11.2   7.62  )-----------( 194      ( 01 May 2022 06:45 )             35.8     05-01    137  |  107  |  86<H>  ----------------------------<  92  4.3   |  18<L>  |  3.82<H>    Ca    9.4      01 May 2022 06:45  Phos  4.1     05-01  Mg     1.6     05-01    TPro  8.1  /  Alb  3.3<L>  /  TBili  1.0  /  DBili  x   /  AST  220<H>  /  ALT  92<H>  /  AlkPhos  194<H>  05-01    LIVER FUNCTIONS - ( 01 May 2022 06:45 )  Alb: 3.3 g/dL / Pro: 8.1 g/dL / ALK PHOS: 194 U/L / ALT: 92 U/L DA / AST: 220 U/L / GGT: x             ____________________________________________    IMAGING:    ______________________________________________________________________              [facility]    Patient is a 71y old  Male who presents with a chief complaint of acute CHF exacerbation (02 May 2022 10:15)    HPI:  Patient is a 71M from [facility], who is ambulatory at baseline with a walker. He has Hx of HTN, CHF?, COPD, HLD, PAD, BPH, Prostate CA (s/p radiation), Bipolar Disorder, IBD - Crohn's Disease, R ileostomy bag (s/p sigmoid resection). He was brought to ED due to bilateral leg swelling. For the past 2 days he has been having progressive swelling/ edema of both his legs with associated ambulatory dysfunction. He also started having episodes of shortness of breath and difficulty of breathing. He denies any fever, cough, chest pain, palpitation. When he came to the ED, he was saturating at 93-94% on room air. He was placed on nasal cannula. EKG was done showing NSR. Troponin was negative and BNP was mildly elevated at 398. CXR showed bilateral vascular congestion. Serum potassium was elevated at 6.1. He got a dose of lasix and was given Hyperkalemia cocktail in the ED. Of note, he had Hx of COVID in 2020 andwas vaccinated with COVID-19 x 3 (Pfizer) and Influenza in 2021. He was subsequently admitted for acute exacerbation of his heart failure.    GOC: FULL CODE (28 Apr 2022 18:40)            PMH/PSH:  PAST MEDICAL & SURGICAL HISTORY:  COPD, mild    Anemia    Bipolar disorder    IBD (inflammatory bowel disease)    HTN (hypertension)    HLD (hyperlipidemia)    PAD (peripheral artery disease)    CKD (chronic kidney disease)    Prostate CA    BPH with urinary obstruction    History of creation of ostomy      FH:  FAMILY HISTORY:  FH: HTN (hypertension) (Father)        MEDS:  MEDICATIONS  (STANDING):  ARIPiprazole 15 milliGRAM(s) Oral daily  ferrous    sulfate 325 milliGRAM(s) Oral daily  finasteride 5 milliGRAM(s) Oral daily  furosemide   Injectable 40 milliGRAM(s) IV Push daily  gabapentin 600 milliGRAM(s) Oral two times a day  heparin   Injectable 5000 Unit(s) SubCutaneous every 8 hours  lamoTRIgine 200 milliGRAM(s) Oral daily  midodrine. 2.5 milliGRAM(s) Oral three times a day  pantoprazole    Tablet 40 milliGRAM(s) Oral before breakfast  senna 2 Tablet(s) Oral at bedtime  sevelamer carbonate 800 milliGRAM(s) Oral three times a day with meals  sodium bicarbonate 650 milliGRAM(s) Oral three times a day  tamsulosin 0.4 milliGRAM(s) Oral at bedtime  traZODone 25 milliGRAM(s) Oral at bedtime    MEDICATIONS  (PRN):    Allergies    No Known Allergies    Intolerances            CONSTITUTIONAL:  No weight loss, fever, chills, weakness or fatigue.  HEENT:  Eyes:  No visual loss, blurred vision, double vision or yellow sclerae. Ears, Nose, Throat:  No hearing loss, sneezing, congestion, runny nose or sore throat.  SKIN:  No rash or itching.  CARDIOVASCULAR:  No chest pain, chest pressure or chest discomfort. No palpitations or edema.  RESPIRATORY:  No shortness of breath, cough or sputum.  GASTROINTESTINAL:  SEE HPI  GENITOURINARY:  No dysuria, hematuria, urinary frequency  NEUROLOGICAL:  No headache, dizziness, syncope, paralysis, ataxia, numbness or tingling in the extremities. No change in bowel or bladder control.  MUSCULOSKELETAL:  No muscle, back pain, joint pain or stiffness.        ______________________________________________________________________  PHYSICAL EXAM:  T(C): 37.1 (05-02-22 @ 10:25), Max: 37.1 (05-02-22 @ 10:25)  HR: 89 (05-02-22 @ 10:25)  BP: 128/64 (05-02-22 @ 10:25)  RR: 18 (05-02-22 @ 10:25)  SpO2: 98% (05-02-22 @ 10:25)  Wt(kg): --    05-01 - 05-02  --------------------------------------------------------  IN:  Total IN: 0 mL    OUT:    Colostomy (mL): 125 mL  Total OUT: 125 mL    Total NET: -125 mL          GEN: NAD, normocephalic  CVS: S1S2+  CHEST: clear to auscultation  ABD: soft , nontender, nondistended, bowel sounds present RLQ stoma. Site D/C/I and pink  EXTR: no cyanosis, no clubbing, no edema  NEURO: Awake and alert; oriented x3  SKIN:  warm;  non icteric    ______________________________________________________________________  LABS:                        11.2   7.62  )-----------( 194      ( 01 May 2022 06:45 )             35.8     05-01    137  |  107  |  86<H>  ----------------------------<  92  4.3   |  18<L>  |  3.82<H>    Ca    9.4      01 May 2022 06:45  Phos  4.1     05-01  Mg     1.6     05-01    TPro  8.1  /  Alb  3.3<L>  /  TBili  1.0  /  DBili  x   /  AST  220<H>  /  ALT  92<H>  /  AlkPhos  194<H>  05-01    LIVER FUNCTIONS - ( 01 May 2022 06:45 )  Alb: 3.3 g/dL / Pro: 8.1 g/dL / ALK PHOS: 194 U/L / ALT: 92 U/L DA / AST: 220 U/L / GGT: x             ____________________________________________    IMAGING:    ______________________________________________________________________  < from: CT Chest No Cont (05.02.22 @ 11:10) >    ACC: [id number] EXAM:  CT CHEST                          PROCEDURE DATE:  05/02/2022          INTERPRETATION:  HISTORY: Admitting Dxs: E87.5 HYPERKALEMIA. Evaluate   pleural effusion.    EXAMINATION: CT CHEST was performed without IV contrast.    COMPARISON: None available.    FINDINGS:    AIRWAYS, LUNGS, PLEURA: Bilateral lower lobe bronchiolectasis. Right   middle lobe and bibasilar subsegmental atelectasis. No pleural effusion.    MEDIASTINUM: Normal heart size. Coronary calcifications. No pericardial   effusion. Thoracic aorta normal caliber.  No large mediastinal lymph   nodes. Small hiatal hernia.    IMAGED ABDOMEN: The liver is only partially imaged. Hepatic hypodense   region measuring 4.7 x 4.3 cm (image 109, series 3). Additional left   hepatic lobe 2.7 cm lesion suspected (image 147, series 3) and nodular   thickening along the hepatic capsule in multiple locations; these   findings are new compared to 2/5/2020 CT abdomen. Left renal hypodense   lesions better demonstrated on same day renal ultrasound.    SOFT TISSUES: Unremarkable.    BONES: Unremarkable.      IMPRESSION:.    No pleural effusion.    Multiple hepatic lesions with largest area at the hepatic dome measuring   up to 4.7 cm; findings are new compared to 2/5/2020 CT abdomen. Recommend   dedicated CT or MRI abdomen to further assess and to exclude malignancy.    --- End of Report ---    < end of copied text >              [facility]    Patient is a 71y old  Male who presents with a chief complaint of acute CHF exacerbation (02 May 2022 10:15)    HPI:  Patient is a 71M from [facility], who is ambulatory at baseline with a walker. He has Hx of HTN, CHF?, COPD, HLD, PAD, BPH, Prostate CA (s/p radiation), Bipolar Disorder, IBD - Crohn's Disease, R ileostomy bag (s/p sigmoid resection). He was brought to ED due to bilateral leg swelling. For the past 2 days he has been having progressive swelling/ edema of both his legs with associated ambulatory dysfunction. He also started having episodes of shortness of breath and difficulty of breathing. He denies any fever, cough, chest pain, palpitation. When he came to the ED, he was saturating at 93-94% on room air. He was placed on nasal cannula. EKG was done showing NSR. Troponin was negative and BNP was mildly elevated at 398. CXR showed bilateral vascular congestion. Serum potassium was elevated at 6.1. He got a dose of lasix and was given Hyperkalemia cocktail in the ED. Of note, he had Hx of COVID in 2020 andwas vaccinated with COVID-19 x 3 (Pfizer) and Influenza in 2021. He was subsequently admitted for acute exacerbation of his heart failure.    GOC: FULL CODE (28 Apr 2022 18:40)            PMH/PSH:  PAST MEDICAL & SURGICAL HISTORY:  COPD, mild    Anemia    Bipolar disorder    IBD (inflammatory bowel disease)    HTN (hypertension)    HLD (hyperlipidemia)    PAD (peripheral artery disease)    CKD (chronic kidney disease)    Prostate CA    BPH with urinary obstruction    History of creation of ostomy    SH:   Denies tobacco, etoh, or drug use  FH:  FAMILY HISTORY:  FH: HTN (hypertension) (Father)        MEDS:  MEDICATIONS  (STANDING):  ARIPiprazole 15 milliGRAM(s) Oral daily  ferrous    sulfate 325 milliGRAM(s) Oral daily  finasteride 5 milliGRAM(s) Oral daily  furosemide   Injectable 40 milliGRAM(s) IV Push daily  gabapentin 600 milliGRAM(s) Oral two times a day  heparin   Injectable 5000 Unit(s) SubCutaneous every 8 hours  lamoTRIgine 200 milliGRAM(s) Oral daily  midodrine. 2.5 milliGRAM(s) Oral three times a day  pantoprazole    Tablet 40 milliGRAM(s) Oral before breakfast  senna 2 Tablet(s) Oral at bedtime  sevelamer carbonate 800 milliGRAM(s) Oral three times a day with meals  sodium bicarbonate 650 milliGRAM(s) Oral three times a day  tamsulosin 0.4 milliGRAM(s) Oral at bedtime  traZODone 25 milliGRAM(s) Oral at bedtime    MEDICATIONS  (PRN):    Allergies    No Known Allergies    Intolerances            CONSTITUTIONAL:  No weight loss, fever, chills, weakness or fatigue.  HEENT:  Eyes:  No visual loss, blurred vision, double vision or yellow sclerae. Ears, Nose, Throat:  No hearing loss, sneezing, congestion, runny nose or sore throat.  SKIN:  No rash or itching.  CARDIOVASCULAR:  No chest pain, chest pressure or chest discomfort. No palpitations or edema.  RESPIRATORY:  No shortness of breath, cough or sputum.  GASTROINTESTINAL:  SEE HPI  GENITOURINARY:  No dysuria, hematuria, urinary frequency  NEUROLOGICAL:  No headache, dizziness, syncope, paralysis, ataxia, numbness or tingling in the extremities. No change in bowel or bladder control.  MUSCULOSKELETAL:  No muscle, back pain, joint pain or stiffness.        ______________________________________________________________________  PHYSICAL EXAM:  T(C): 37.1 (05-02-22 @ 10:25), Max: 37.1 (05-02-22 @ 10:25)  HR: 89 (05-02-22 @ 10:25)  BP: 128/64 (05-02-22 @ 10:25)  RR: 18 (05-02-22 @ 10:25)  SpO2: 98% (05-02-22 @ 10:25)  Wt(kg): --    05-01 - 05-02  --------------------------------------------------------  IN:  Total IN: 0 mL    OUT:    Colostomy (mL): 125 mL  Total OUT: 125 mL    Total NET: -125 mL          GEN: NAD, normocephalic  CVS: S1S2+  CHEST: clear to auscultation  ABD: soft , nontender, nondistended, bowel sounds present RLQ stoma. Site D/C/I and pink  EXTR: no cyanosis, no clubbing, no edema  NEURO: Awake and alert; oriented x3  SKIN:  warm;  non icteric    ______________________________________________________________________  LABS:                        11.2   7.62  )-----------( 194      ( 01 May 2022 06:45 )             35.8     05-01    137  |  107  |  86<H>  ----------------------------<  92  4.3   |  18<L>  |  3.82<H>    Ca    9.4      01 May 2022 06:45  Phos  4.1     05-01  Mg     1.6     05-01    TPro  8.1  /  Alb  3.3<L>  /  TBili  1.0  /  DBili  x   /  AST  220<H>  /  ALT  92<H>  /  AlkPhos  194<H>  05-01    LIVER FUNCTIONS - ( 01 May 2022 06:45 )  Alb: 3.3 g/dL / Pro: 8.1 g/dL / ALK PHOS: 194 U/L / ALT: 92 U/L DA / AST: 220 U/L / GGT: x             ____________________________________________    IMAGING:    ______________________________________________________________________  < from: CT Chest No Cont (05.02.22 @ 11:10) >    ACC: [id number] EXAM:  CT CHEST                          PROCEDURE DATE:  05/02/2022          INTERPRETATION:  HISTORY: Admitting Dxs: E87.5 HYPERKALEMIA. Evaluate   pleural effusion.    EXAMINATION: CT CHEST was performed without IV contrast.    COMPARISON: None available.    FINDINGS:    AIRWAYS, LUNGS, PLEURA: Bilateral lower lobe bronchiolectasis. Right   middle lobe and bibasilar subsegmental atelectasis. No pleural effusion.    MEDIASTINUM: Normal heart size. Coronary calcifications. No pericardial   effusion. Thoracic aorta normal caliber.  No large mediastinal lymph   nodes. Small hiatal hernia.    IMAGED ABDOMEN: The liver is only partially imaged. Hepatic hypodense   region measuring 4.7 x 4.3 cm (image 109, series 3). Additional left   hepatic lobe 2.7 cm lesion suspected (image 147, series 3) and nodular   thickening along the hepatic capsule in multiple locations; these   findings are new compared to 2/5/2020 CT abdomen. Left renal hypodense   lesions better demonstrated on same day renal ultrasound.    SOFT TISSUES: Unremarkable.    BONES: Unremarkable.      IMPRESSION:.    No pleural effusion.    Multiple hepatic lesions with largest area at the hepatic dome measuring   up to 4.7 cm; findings are new compared to 2/5/2020 CT abdomen. Recommend   dedicated CT or MRI abdomen to further assess and to exclude malignancy.    --- End of Report ---    < end of copied text >

## 2022-05-02 NOTE — PROGRESS NOTE ADULT - ASSESSMENT
Patient is a 71M from [facility], who is ambulatory at baseline with a walker. He has Hx of HTN, CHF?, COPD, HLD, PAD, BPH, Prostate CA (s/p radiation), Bipolar Disorder, IBD - Crohn's Disease, R ileostomy bag (s/p sigmoid resection). Presented with SOB and LE edema, found with hyperkalemia on admission. Admitted to [facility] for cardio work up R/O CHF and hyperkalemia with PAWAN on CKD 4.  cardiology and nephrology consulted. started lasix. renal US shows no hyronephrosis, + renal cysts.   CT chest shows no pleural effusion, but with hepatic lesion 4.7cm largest. Also with transaminitis. GI and Hepatology consulted. ordered MRI abdomen r/o malignancy.   PAWAN worsening today Cr 4.39 from 3.82. started low dose IVF as discussed nephrology.   Pt was on 2L Oxygen, will monitor O2 sat on ambulation/at rest room air.

## 2022-05-02 NOTE — PROGRESS NOTE ADULT - SUBJECTIVE AND OBJECTIVE BOX
Patient is a 71y old  Male who presents with a chief complaint of Hyperkalemia     (01 May 2022 12:07)    PATIENT IS SEEN AND EXAMINED IN MEDICAL FLOOR.  NGT [    ]    ERASTO [   ]      GT [   ]    ALLERGIES:  No Known Allergies      Daily     Daily     VITALS:    Vital Signs Last 24 Hrs  T(C): 37 (02 May 2022 04:54), Max: 37 (02 May 2022 04:54)  T(F): 98.6 (02 May 2022 04:54), Max: 98.6 (02 May 2022 04:54)  HR: 81 (02 May 2022 04:54) (81 - 87)  BP: 114/57 (02 May 2022 04:54) (114/57 - 130/57)  BP(mean): --  RR: 18 (02 May 2022 04:54) (18 - 18)  SpO2: 96% (02 May 2022 04:54) (96% - 97%)    LABS:    CBC Full  -  ( 01 May 2022 06:45 )  WBC Count : 7.62 K/uL  RBC Count : 4.32 M/uL  Hemoglobin : 11.2 g/dL  Hematocrit : 35.8 %  Platelet Count - Automated : 194 K/uL  Mean Cell Volume : 82.9 fl  Mean Cell Hemoglobin : 25.9 pg  Mean Cell Hemoglobin Concentration : 31.3 gm/dL  Auto Neutrophil # : x  Auto Lymphocyte # : x  Auto Monocyte # : x  Auto Eosinophil # : x  Auto Basophil # : x  Auto Neutrophil % : x  Auto Lymphocyte % : x  Auto Monocyte % : x  Auto Eosinophil % : x  Auto Basophil % : x      05-01    137  |  107  |  86<H>  ----------------------------<  92  4.3   |  18<L>  |  3.82<H>    Ca    9.4      01 May 2022 06:45  Phos  4.1     05-01  Mg     1.6     05-01    TPro  8.1  /  Alb  3.3<L>  /  TBili  1.0  /  DBili  x   /  AST  220<H>  /  ALT  92<H>  /  AlkPhos  194<H>  05-01    CAPILLARY BLOOD GLUCOSE            LIVER FUNCTIONS - ( 01 May 2022 06:45 )  Alb: 3.3 g/dL / Pro: 8.1 g/dL / ALK PHOS: 194 U/L / ALT: 92 U/L DA / AST: 220 U/L / GGT: x           Creatinine Trend: 3.82<--, 3.71<--, 3.54<--, 3.43<--, 3.28<--  I&O's Summary    01 May 2022 07:01  -  02 May 2022 07:00  --------------------------------------------------------  IN: 0 mL / OUT: 125 mL / NET: -125 mL                MEDICATIONS:    MEDICATIONS  (STANDING):  ARIPiprazole 15 milliGRAM(s) Oral daily  ferrous    sulfate 325 milliGRAM(s) Oral daily  finasteride 5 milliGRAM(s) Oral daily  furosemide   Injectable 40 milliGRAM(s) IV Push daily  gabapentin 600 milliGRAM(s) Oral two times a day  heparin   Injectable 5000 Unit(s) SubCutaneous every 8 hours  lamoTRIgine 200 milliGRAM(s) Oral daily  midodrine. 2.5 milliGRAM(s) Oral three times a day  pantoprazole    Tablet 40 milliGRAM(s) Oral before breakfast  senna 2 Tablet(s) Oral at bedtime  sevelamer carbonate 800 milliGRAM(s) Oral three times a day with meals  sodium bicarbonate 650 milliGRAM(s) Oral three times a day  tamsulosin 0.4 milliGRAM(s) Oral at bedtime  traZODone 25 milliGRAM(s) Oral at bedtime      MEDICATIONS  (PRN):      REVIEW OF SYSTEMS:                           ALL ROS DONE [ X   ]    CONSTITUTIONAL:  LETHARGIC [   ], FEVER [   ], UNRESPONSIVE [   ]  CVS:  CP  [   ], SOB, [   ], PALPITATIONS [   ], DIZZYNESS [   ]  RS: COUGH [   ], SPUTUM [   ]  GI: ABDOMINAL PAIN [   ], NAUSEA [   ], VOMITINGS [   ], DIARRHEA [   ], CONSTIPATION [   ]  :  DYSURIA [   ], NOCTURIA [   ], INCREASED FREQUENCY [   ], DRIBLING [   ],  SKELETAL: PAINFUL JOINTS [   ], SWOLLEN JOINTS [   ], NECK ACHE [   ], LOW BACK ACHE [   ],  SKIN : ULCERS [   ], RASH [   ], ITCHING [   ]  CNS: HEAD ACHE [   ], DOUBLE VISION [   ], BLURRED VISION [   ], AMS / CONFUSION [   ], SEIZURES [   ], WEAKNESS [   ],TINGLING / NUMBNESS [   ]    PHYSICAL EXAMINATION:  GENERAL APPEARANCE: NO DISTRESS  HEENT:  NO PALLOR, NO  JVD,  NO   NODES, NECK SUPPLE  CVS: S1 +, S2 +,   RS: AEEB,  OCCASIONAL  RALES +,   NO RONCHI  ABD: SOFT, NT, NO, BS +   ILEOSTOMY [LOOSE BROWN STOOL]  EXT: NO PE  SKIN: WARM,   SKELETAL:  ROM ACCEPTABLE  CNS:  AAO X 3    RADIOLOGY :    ACC: [id number] EXAM:  XR CHEST PORTABLE URGENT 1V                          PROCEDURE DATE:  04/28/2022          INTERPRETATION:  Clinical history: 71-year-old male, chest pain.    Portable/expiratory view of the chest is compared to 20 and demonstrates   a normal cardiac silhouette with no lobar consolidation, gross effusion,   pneumothorax or acute osseous finding.    Bilateral lower lobe patchy opacities consistent with infiltrates/areas   of atelectasis in the correct clinical context, increased. Elevated right   hemidiaphragm are evident.    IMPRESSION:    Bilateral lower lobe interstitial infiltrates/platelike atelectasis,   increased      ASSESSMENT :     Hyperkalemia    No pertinent past medical history    COPD, mild    Anemia    Bipolar disorder    IBD (inflammatory bowel disease)    HTN (hypertension)    HLD (hyperlipidemia)    PAD (peripheral artery disease)    CKD (chronic kidney disease)    Prostate CA    BPH with urinary obstruction    No significant past surgical history    History of creation of ostomy        PLAN:  HPI:  Patient is a 71M from [facility], who is ambulatory at baseline with a walker. He has Hx of HTN, CHF?, COPD, HLD, PAD, BPH, Prostate CA (s/p radiation), Bipolar Disorder, IBD - Crohn's Disease, R ileostomy bag (s/p sigmoid resection). He was brought to ED due to bilateral leg swelling. For the past 2 days he has been having progressive swelling/ edema of both his legs with associated ambulatory dysfunction. He also started having episodes of shortness of breath and difficulty of breathing. He denies any fever, cough, chest pain, palpitation. When he came to the ED, he was saturating at 93-94% on room air. He was placed on nasal cannula. EKG was done showing NSR. Troponin was negative and BNP was mildly elevated at 398. CXR showed bilateral vascular congestion. Serum potassium was elevated at 6.1. He got a dose of lasix and was given Hyperkalemia cocktail in the ED. Of note, he had Hx of COVID in 2020 andwas vaccinated with COVID-19 x 3 (Pfizer) and Influenza in 2021. He was subsequently admitted for acute exacerbation of his heart failure.    [facility]: FULL CODE (28 Apr 2022 18:40)    # CASE D/W BROTHER KERI VIA PHONE AT BEDSIDE [4/30]      # ACUTE HYPOXIC RESPIRATORY FAILURE SUSPECT S/T PULMONARY VASCULAR CONGESTION  # RULED OUT CHF CHF  # PAWAN ON CKD  # BPH  # HX OF PROSTATE CA S/P RADIATION  - PLACE ON TELEMETRY  - NOTED CXR  - LASIX [REDUCED FROM BID TO QDAY]  - NOTE TSH  - STRICT IS AND OS  - ECHOCARDIOGRAM - NORMAL LVEF, NORMAL DIASTOLIC FUNCTION  - NEPHROLOGY CONSULT, CARDIOLOGY CONSULT    - WILL CONSULT GASTOENTEROLOGY    # HYPERKALEMIA - RESOLVED  - GIVEN LASIX, LOKELMA  - EKG DONE  - NEPHROLOGY CONSULT    # CROHN'S DISEASE  # RIGHT ILEOSTOMY BAG S/P SIGMOID RESECTION  - MONITORING OUTPUT    # NORMOCYTIC ANEMIA    # TRANSAMINITIS, NOTED HEP C AND HEP B MARKERS  - WILL CONSULT HEPATOLOGY    # HTN  # COPD  # HLD  # PAD  # BIPOLAR DX  # GI AND DVT PPX    Patient is a 71y old  Male who presents with a chief complaint of Hyperkalemia     (01 May 2022 12:07)    PATIENT IS SEEN AND EXAMINED IN MEDICAL FLOOR.    ALLERGIES:  No Known Allergies    VITALS:    Vital Signs Last 24 Hrs  T(C): 37 (02 May 2022 04:54), Max: 37 (02 May 2022 04:54)  T(F): 98.6 (02 May 2022 04:54), Max: 98.6 (02 May 2022 04:54)  HR: 81 (02 May 2022 04:54) (81 - 87)  BP: 114/57 (02 May 2022 04:54) (114/57 - 130/57)  BP(mean): --  RR: 18 (02 May 2022 04:54) (18 - 18)  SpO2: 96% (02 May 2022 04:54) (96% - 97%)    LABS:    CBC Full  -  ( 01 May 2022 06:45 )  WBC Count : 7.62 K/uL  RBC Count : 4.32 M/uL  Hemoglobin : 11.2 g/dL  Hematocrit : 35.8 %  Platelet Count - Automated : 194 K/uL  Mean Cell Volume : 82.9 fl  Mean Cell Hemoglobin : 25.9 pg  Mean Cell Hemoglobin Concentration : 31.3 gm/dL  Auto Neutrophil # : x  Auto Lymphocyte # : x  Auto Monocyte # : x  Auto Eosinophil # : x  Auto Basophil # : x  Auto Neutrophil % : x  Auto Lymphocyte % : x  Auto Monocyte % : x  Auto Eosinophil % : x  Auto Basophil % : x      05-01    137  |  107  |  86<H>  ----------------------------<  92  4.3   |  18<L>  |  3.82<H>    Ca    9.4      01 May 2022 06:45  Phos  4.1     05-01  Mg     1.6     05-01    TPro  8.1  /  Alb  3.3<L>  /  TBili  1.0  /  DBili  x   /  AST  220<H>  /  ALT  92<H>  /  AlkPhos  194<H>  05-01    CAPILLARY BLOOD GLUCOSE            LIVER FUNCTIONS - ( 01 May 2022 06:45 )  Alb: 3.3 g/dL / Pro: 8.1 g/dL / ALK PHOS: 194 U/L / ALT: 92 U/L DA / AST: 220 U/L / GGT: x           Creatinine Trend: 3.82<--, 3.71<--, 3.54<--, 3.43<--, 3.28<--  I&O's Summary    01 May 2022 07:01  -  02 May 2022 07:00  --------------------------------------------------------  IN: 0 mL / OUT: 125 mL / NET: -125 mL                MEDICATIONS:    MEDICATIONS  (STANDING):  ARIPiprazole 15 milliGRAM(s) Oral daily  ferrous    sulfate 325 milliGRAM(s) Oral daily  finasteride 5 milliGRAM(s) Oral daily  furosemide   Injectable 40 milliGRAM(s) IV Push daily  gabapentin 600 milliGRAM(s) Oral two times a day  heparin   Injectable 5000 Unit(s) SubCutaneous every 8 hours  lamoTRIgine 200 milliGRAM(s) Oral daily  midodrine. 2.5 milliGRAM(s) Oral three times a day  pantoprazole    Tablet 40 milliGRAM(s) Oral before breakfast  senna 2 Tablet(s) Oral at bedtime  sevelamer carbonate 800 milliGRAM(s) Oral three times a day with meals  sodium bicarbonate 650 milliGRAM(s) Oral three times a day  tamsulosin 0.4 milliGRAM(s) Oral at bedtime  traZODone 25 milliGRAM(s) Oral at bedtime      MEDICATIONS  (PRN):      REVIEW OF SYSTEMS:                           ALL ROS DONE [ X   ]    CONSTITUTIONAL:  LETHARGIC [   ], FEVER [   ], UNRESPONSIVE [   ]  CVS:  CP  [   ], SOB, [   ], PALPITATIONS [   ], DIZZYNESS [   ]  RS: COUGH [   ], SPUTUM [   ]  GI: ABDOMINAL PAIN [   ], NAUSEA [   ], VOMITINGS [   ], DIARRHEA [   ], CONSTIPATION [   ]  :  DYSURIA [   ], NOCTURIA [   ], INCREASED FREQUENCY [   ], DRIBLING [   ],  SKELETAL: PAINFUL JOINTS [   ], SWOLLEN JOINTS [   ], NECK ACHE [   ], LOW BACK ACHE [   ],  SKIN : ULCERS [   ], RASH [   ], ITCHING [   ]  CNS: HEAD ACHE [   ], DOUBLE VISION [   ], BLURRED VISION [   ], AMS / CONFUSION [   ], SEIZURES [   ], WEAKNESS [   ],TINGLING / NUMBNESS [   ]    PHYSICAL EXAMINATION:  GENERAL APPEARANCE: NO DISTRESS  HEENT:  NO PALLOR, NO  JVD,  NO   NODES, NECK SUPPLE  CVS: S1 +, S2 +,   RS: AEEB,  OCCASIONAL  RALES +,   NO RONCHI  ABD: SOFT, NT, NO, BS +   ILEOSTOMY [LOOSE BROWN STOOL]  EXT: NO PE  SKIN: WARM,   SKELETAL:  ROM ACCEPTABLE  CNS:  AAO X 3    RADIOLOGY :    ACC: [id number] EXAM:  XR CHEST PORTABLE URGENT 1V                          PROCEDURE DATE:  04/28/2022          INTERPRETATION:  Clinical history: 71-year-old male, chest pain.    Portable/expiratory view of the chest is compared to 20 and demonstrates   a normal cardiac silhouette with no lobar consolidation, gross effusion,   pneumothorax or acute osseous finding.    Bilateral lower lobe patchy opacities consistent with infiltrates/areas   of atelectasis in the correct clinical context, increased. Elevated right   hemidiaphragm are evident.    IMPRESSION:    Bilateral lower lobe interstitial infiltrates/platelike atelectasis,   increased      ASSESSMENT :     Hyperkalemia    No pertinent past medical history    COPD, mild    Anemia    Bipolar disorder    IBD (inflammatory bowel disease)    HTN (hypertension)    HLD (hyperlipidemia)    PAD (peripheral artery disease)    CKD (chronic kidney disease)    Prostate CA    BPH with urinary obstruction    No significant past surgical history    History of creation of ostomy        PLAN:  HPI:  Patient is a 71M from [facility], who is ambulatory at baseline with a walker. He has Hx of HTN, CHF?, COPD, HLD, PAD, BPH, Prostate CA (s/p radiation), Bipolar Disorder, IBD - Crohn's Disease, R ileostomy bag (s/p sigmoid resection). He was brought to ED due to bilateral leg swelling. For the past 2 days he has been having progressive swelling/ edema of both his legs with associated ambulatory dysfunction. He also started having episodes of shortness of breath and difficulty of breathing. He denies any fever, cough, chest pain, palpitation. When he came to the ED, he was saturating at 93-94% on room air. He was placed on nasal cannula. EKG was done showing NSR. Troponin was negative and BNP was mildly elevated at 398. CXR showed bilateral vascular congestion. Serum potassium was elevated at 6.1. He got a dose of lasix and was given Hyperkalemia cocktail in the ED. Of note, he had Hx of COVID in 2020 andwas vaccinated with COVID-19 x 3 (Pfizer) and Influenza in 2021. He was subsequently admitted for acute exacerbation of his heart failure.    [facility]: FULL CODE (28 Apr 2022 18:40)    # CASE D/W BROTHER KERI VIA PHONE AT BEDSIDE [4/30]      # ACUTE HYPOXIC RESPIRATORY FAILURE SUSPECT S/T PULMONARY VASCULAR CONGESTION  # RULED OUT CHF CHF  # PAWAN ON CKD - WORSENING, HOLD LASIX  # BPH  # HX OF PROSTATE CA S/P RADIATION  - PLACE ON TELEMETRY  - NOTED CXR  - HOLD LASIX  - NOTE TSH  - STRICT IS AND OS  - ECHOCARDIOGRAM - NORMAL LVEF, NORMAL DIASTOLIC FUNCTION  - NEPHROLOGY CONSULT, CARDIOLOGY CONSULT    # LIVER LESIONS  - F/U MRI ABDOMEN  - F/U TUMOR MARKERS  - HEPATOLOGY CONSULT    # HYPERKALEMIA - RESOLVED  - GIVEN LASIX, LOKELMA  - EKG DONE  - NEPHROLOGY CONSULT    # CROHN'S DISEASE  # RIGHT ILEOSTOMY BAG S/P SIGMOID RESECTION  - MONITORING OUTPUT  - GASTROENTEROLOGY CONSULT    # NORMOCYTIC ANEMIA    # TRANSAMINITIS, NOTED HEP C AND HEP B MARKERS  - WILL CONSULT HEPATOLOGY    # HTN  # COPD  # HLD  # PAD  # BIPOLAR DX  # GI AND DVT PPX

## 2022-05-02 NOTE — PROGRESS NOTE ADULT - SUBJECTIVE AND OBJECTIVE BOX
C A [facility]  **********************************     DATE OF SERVICE: 05-02-22    Patient denies chest pain or shortness of breath.   Review of systems otherwise (-)  	  MEDICATIONS:  MEDICATIONS  (STANDING):  ARIPiprazole 15 milliGRAM(s) Oral daily  ferrous    sulfate 325 milliGRAM(s) Oral daily  finasteride 5 milliGRAM(s) Oral daily  furosemide   Injectable 40 milliGRAM(s) IV Push daily  gabapentin 600 milliGRAM(s) Oral two times a day  heparin   Injectable 5000 Unit(s) SubCutaneous every 8 hours  lamoTRIgine 200 milliGRAM(s) Oral daily  midodrine. 2.5 milliGRAM(s) Oral three times a day  pantoprazole    Tablet 40 milliGRAM(s) Oral before breakfast  senna 2 Tablet(s) Oral at bedtime  sevelamer carbonate 800 milliGRAM(s) Oral three times a day with meals  sodium bicarbonate 650 milliGRAM(s) Oral three times a day  tamsulosin 0.4 milliGRAM(s) Oral at bedtime  traZODone 25 milliGRAM(s) Oral at bedtime      LABS:	 	    CARDIAC MARKERS:                                    11.2   7.62  )-----------( 194      ( 01 May 2022 06:45 )             35.8     Hemoglobin: 11.2 g/dL (05-01 @ 06:45)  Hemoglobin: 11.2 g/dL (04-30 @ 06:49)  Hemoglobin: 10.4 g/dL (04-29 @ 07:00)  Hemoglobin: 10.8 g/dL (04-28 @ 16:37)      05-01    137  |  107  |  86<H>  ----------------------------<  92  4.3   |  18<L>  |  3.82<H>    Ca    9.4      01 May 2022 06:45  Phos  4.1     05-01  Mg     1.6     05-01    TPro  8.1  /  Alb  3.3<L>  /  TBili  1.0  /  DBili  x   /  AST  220<H>  /  ALT  92<H>  /  AlkPhos  194<H>  05-01    Creatinine Trend: 3.82<--, 3.71<--, 3.54<--, 3.43<--, 3.28<--    COAGS:       proBNP:   Lipid Profile:   HgA1c:   TSH:       PHYSICAL EXAM:  T(C): 37.1 (05-02-22 @ 10:25), Max: 37.1 (05-02-22 @ 10:25)  HR: 89 (05-02-22 @ 10:25) (81 - 89)  BP: 128/64 (05-02-22 @ 10:25) (114/57 - 130/57)  RR: 18 (05-02-22 @ 10:25) (18 - 18)  SpO2: 98% (05-02-22 @ 10:25) (96% - 98%)  Wt(kg): --  I&O's Summary    01 May 2022 07:01  -  02 May 2022 07:00  --------------------------------------------------------  IN: 0 mL / OUT: 125 mL / NET: -125 mL      HEENT:  (-)icterus (-)pallor  CV: N S1 S2 1/6 MARCELLE (+)2 Pulses B/l  Resp:  Clear to ausculatation B/L, normal effort  GI: (+) BS Soft, NT, ND  Lymph:  (-)Edema, (-)obvious lymphadenopathy  Skin: Warm to touch, Normal turgor  Psych: Appropriate mood and affect      TELEMETRY: 	  sinus        ASSESSMENT/PLAN: 	71y  Male  HTN, CHF?, COPD, HLD, PAD, BPH, Prostate CA (s/p radiation), Bipolar Disorder, IBD - Crohn's Disease, R ileostomy bag (s/p sigmoid resection) historically preserved LV and RV function, normal perfusion on a nuclear stress test 2020 He was brought to ED due to bilateral leg swelling    - Doubt this is sequale of CHF.  A low BNP suggests a low left ventricular end diastolic pressure  - would check prealbumin ? Third spacing.  ? protein loosing enteropathy as a result of Crohn's disease and/or surgical resection  - monitor renal function, his edema is at least partially due ot renal disease   - no pertinent findings on echo  - Not in clinical CHF    Joey Archer MD, [facility]  BEEPER [phone]

## 2022-05-03 NOTE — PROGRESS NOTE ADULT - PROBLEM SELECTOR PLAN 3
Multiple liver lesions seen on CT and MRI abd, suggestive of metastasis.  The largest lesion measures 13.8 x 11.7 x 11.7 cm in the liver dome. Multiple liver lesions seen on CT and MRI abd, suggestive of metastasis.  The largest lesion measures 13.8 x 11.7 x 11.7 cm in the liver dome.  IR consult for bx  Bone scan  Tumor markers: AFP, CEA, CA 19-9  Hepatology following   D/w primary team

## 2022-05-03 NOTE — PROGRESS NOTE ADULT - SUBJECTIVE AND OBJECTIVE BOX
NP Note discussed with  Primary Attending    INTERVAL HPI/OVERNIGHT EVENTS: no new complaints, pt states feeling better today. on room air 93% at rest.     MEDICATIONS  (STANDING):  ARIPiprazole 15 milliGRAM(s) Oral daily  ferrous    sulfate 325 milliGRAM(s) Oral daily  finasteride 5 milliGRAM(s) Oral daily  gabapentin 600 milliGRAM(s) Oral two times a day  heparin   Injectable 5000 Unit(s) SubCutaneous every 8 hours  lamoTRIgine 200 milliGRAM(s) Oral daily  midodrine. 2.5 milliGRAM(s) Oral three times a day  pantoprazole    Tablet 40 milliGRAM(s) Oral before breakfast  sevelamer carbonate 800 milliGRAM(s) Oral three times a day with meals  sodium bicarbonate 650 milliGRAM(s) Oral three times a day  sodium chloride 0.9%. 1000 milliLiter(s) (50 mL/Hr) IV Continuous <Continuous>  tamsulosin 0.4 milliGRAM(s) Oral at bedtime  traZODone 25 milliGRAM(s) Oral at bedtime    MEDICATIONS  (PRN):      __________________________________________________  REVIEW OF SYSTEMS:    CONSTITUTIONAL: No fever, chills  EYES: no acute visual disturbances  NECK: No pain or stiffness  RESPIRATORY: No cough; No shortness of breath  CARDIOVASCULAR: No chest pain, no palpitations  GASTROINTESTINAL: No pain. No nausea or vomiting; No diarrhea   NEUROLOGICAL: No headache or numbness, no tremors  MUSCULOSKELETAL: No joint pain, no muscle pain  GENITOURINARY: no dysuria, no frequency, no hesitancy  PSYCHIATRY: no depression , no anxiety  ALL OTHER  ROS negative        Vital Signs Last 24 Hrs  T(C): 37.7 (03 May 2022 13:01), Max: 38.1 (02 May 2022 20:55)  T(F): 99.9 (03 May 2022 13:01), Max: 100.5 (02 May 2022 20:55)  HR: 90 (03 May 2022 13:01) (82 - 90)  BP: 133/74 (03 May 2022 13:01) (115/70 - 133/74)  BP(mean): --  RR: 18 (03 May 2022 13:02) (17 - 18)  SpO2: 95% (03 May 2022 13:02) (90% - 96%)      ________________________________________________  PHYSICAL EXAM:  GENERAL: No acute distress  HEENT: Normocephalic;  conjunctivae and sclerae clear; moist mucous membranes;   NECK : supple.  No JVD noted  CHEST/LUNG: Clear to auscultation bilaterally with fair air entry Eupneic on room air.   HEART: S1 S2  regular; no murmurs, gallops or rubs  ABDOMEN: Soft, Nontender, Nondistended; Bowel sounds present, + R ileostomy  bag, loose BM  EXTREMITIES: no cyanosis; trace lower legs edema; no calf tenderness  SKIN: warm and dry; no rash  NERVOUS SYSTEM:  Awake and alert; Oriented  to place, person and time ; no new deficits    _________________________________________________  LABS:                        11.6   7.99  )-----------( 204      ( 02 May 2022 12:16 )             36.3     05-02    135  |  107  |  99<H>  ----------------------------<  130<H>  4.4   |  17<L>  |  4.39<H>    Ca    9.4      02 May 2022 12:16  Phos  4.1     05-01  Mg     1.6     05-01    TPro  8.8<H>  /  Alb  3.2<L>  /  TBili  0.9  /  DBili  x   /  AST  240<H>  /  ALT  98<H>  /  AlkPhos  216<H>  05-02    CAPILLARY BLOOD GLUCOSE  RADIOLOGY & ADDITIONAL TESTS:    Imaging  Reviewed:  YES  < from: CT Chest No Cont (05.02.22 @ 11:10) >    IMPRESSION:.    No pleural effusion.    Multiple hepatic lesions with largest area at the hepatic dome measuring   up to 4.7 cm; findings are new compared to 2/5/2020 CT abdomen. Recommend   dedicated CT or MRI abdomen to further assess and to exclude malignancy.    --- End of Report ---    < from: US Renal (05.02.22 @ 11:06) >    IMPRESSION:  No hydronephrosis. Bilateral renal cysts.    < end of copied text >    < end of copied text >    < from: Transthoracic Echocardiogram (04.29.22 @ 07:17) >  CONCLUSIONS:  1. Normal mitral valve.  2. Normal trileaflet aortic valve. Mild aortic  regurgitation.  3. Aortic Root: 4.2 cm.  4. Normal left atrium.  LA volume index = 22 cc/m2.  5. Normal left ventricular internal dimensions and wall  thicknesses.  6. Normal Left Ventricular Systolic Function,  (EF = 55 to  60%)  7. Normal diastolic function.  8. Normal right atrium.  9. Normal right ventricular size and systolic function  (TAPSE  1.9cm).  10. Unable to estimate RVSP.  11. Normal tricuspid valve.  12. Normal pulmonic valve.  13. Trivial pericardial effusion is seen.    ------------------------------------------------------------------------  Confirmed on  4/29/2022 - 15:50:35 by Carmela Crump MD  ------------------------------------------------------------------------    < end of copied text >    Consultant(s) Notes Reviewed:   YES      Plan of care was discussed with patient and /or primary care giver; all questions and concerns were addressed  NP Note discussed with  Primary Attending    INTERVAL HPI/OVERNIGHT EVENTS: no new complaints, pt states feeling better today.     MEDICATIONS  (STANDING):  ARIPiprazole 15 milliGRAM(s) Oral daily  ferrous    sulfate 325 milliGRAM(s) Oral daily  finasteride 5 milliGRAM(s) Oral daily  gabapentin 600 milliGRAM(s) Oral two times a day  heparin   Injectable 5000 Unit(s) SubCutaneous every 8 hours  lamoTRIgine 200 milliGRAM(s) Oral daily  midodrine. 2.5 milliGRAM(s) Oral three times a day  pantoprazole    Tablet 40 milliGRAM(s) Oral before breakfast  sevelamer carbonate 800 milliGRAM(s) Oral three times a day with meals  sodium bicarbonate 650 milliGRAM(s) Oral three times a day  sodium chloride 0.9%. 1000 milliLiter(s) (50 mL/Hr) IV Continuous <Continuous>  tamsulosin 0.4 milliGRAM(s) Oral at bedtime  traZODone 25 milliGRAM(s) Oral at bedtime    MEDICATIONS  (PRN):      __________________________________________________  REVIEW OF SYSTEMS:    CONSTITUTIONAL: No fever, chills  EYES: no acute visual disturbances  NECK: No pain or stiffness  RESPIRATORY: No cough; mild SOB  CARDIOVASCULAR: No chest pain, no palpitations  GASTROINTESTINAL: No pain. No nausea or vomiting; No diarrhea   NEUROLOGICAL: No headache or numbness, no tremors  MUSCULOSKELETAL: No joint pain, no muscle pain  GENITOURINARY: no dysuria, no frequency, no hesitancy  PSYCHIATRY: no depression , no anxiety  ALL OTHER  ROS negative        Vital Signs Last 24 Hrs  T(C): 37.7 (03 May 2022 13:01), Max: 38.1 (02 May 2022 20:55)  T(F): 99.9 (03 May 2022 13:01), Max: 100.5 (02 May 2022 20:55)  HR: 90 (03 May 2022 13:01) (82 - 90)  BP: 133/74 (03 May 2022 13:01) (115/70 - 133/74)  BP(mean): --  RR: 18 (03 May 2022 13:02) (17 - 18)  SpO2: 95% (03 May 2022 13:02) (90% - 96%)    ________________________________________________  PHYSICAL EXAM:  GENERAL: No acute distress  HEENT: Normocephalic;  conjunctivae and sclerae clear; moist mucous membranes;   NECK : supple.  No JVD noted  CHEST/LUNG: Clear to auscultation.  No rales, wheezes or rhonchi   HEART: S1 S2  regular; no murmurs.  ABDOMEN: Soft, Nontender, Nondistended; Bowel sounds present, + R ileostomy  bag intact with liquid stool draining  EXTREMITIES: no cyanosis; trace lower legs edema; no calf tenderness  SKIN: warm and dry; no rash  NERVOUS SYSTEM:  Awake and alert; Oriented  to place, person and time ; no new deficits    _________________________________________________  LABS:                                   11.6   8.71  )-----------( 204      ( 03 May 2022 07:18 )             36.3       05-03    134<L>  |  106  |  89<H>  ----------------------------<  107<H>  5.5<H>   |  17<L>  |  4.47<H>    Ca    9.6      03 May 2022 07:18  Phos  3.8     05-03  Mg     1.5     05-03    TPro  8.8<H>  /  Alb  3.2<L>  /  TBili  1.3<H>  /  DBili  x   /  AST  233<H>  /  ALT  87<H>  /  AlkPhos  208<H>  05-03              CAPILLARY BLOOD GLUCOSE  RADIOLOGY & ADDITIONAL TESTS:    Imaging  Reviewed:  YES  < from: CT Chest No Cont (05.02.22 @ 11:10) >    IMPRESSION:.    No pleural effusion.    Multiple hepatic lesions with largest area at the hepatic dome measuring   up to 4.7 cm; findings are new compared to 2/5/2020 CT abdomen. Recommend   dedicated CT or MRI abdomen to further assess and to exclude malignancy.    --- End of Report ---    < from: US Renal (05.02.22 @ 11:06) >    IMPRESSION:  No hydronephrosis. Bilateral renal cysts.      < from: Transthoracic Echocardiogram (04.29.22 @ 07:17) >  CONCLUSIONS:  1. Normal mitral valve.  2. Normal trileaflet aortic valve. Mild aortic  regurgitation.  3. Aortic Root: 4.2 cm.  4. Normal left atrium.  LA volume index = 22 cc/m2.  5. Normal left ventricular internal dimensions and wall  thicknesses.  6. Normal Left Ventricular Systolic Function,  (EF = 55 to  60%)  7. Normal diastolic function.  8. Normal right atrium.  9. Normal right ventricular size and systolic function  (TAPSE  1.9cm).  10. Unable to estimate RVSP.  11. Normal tricuspid valve.  12. Normal pulmonic valve.  13. Trivial pericardial effusion is seen.    ------------------------------------------------------------------------  Confirmed on  4/29/2022 - 15:50:35 by Carmela Crump MD  ------------------------------------------------------------------------        Consultant(s) Notes Reviewed:   YES      Plan of care was discussed with patient and /or primary care giver; all questions and concerns were addressed

## 2022-05-03 NOTE — PROGRESS NOTE ADULT - SUBJECTIVE AND OBJECTIVE BOX
Patient is a 71y old  Male who presents with a chief complaint of acute CHF exacerbation (02 May 2022 15:42)    PATIENT IS SEEN AND EXAMINED IN MEDICAL FLOOR.  TIMIT [    ]    ERASTO [   ]      GT [   ]    ALLERGIES:  No Known Allergies      Daily     Daily     VITALS:    Vital Signs Last 24 Hrs  T(C): 37.7 (03 May 2022 05:36), Max: 38.1 (02 May 2022 20:55)  T(F): 99.8 (03 May 2022 05:36), Max: 100.5 (02 May 2022 20:55)  HR: 90 (03 May 2022 05:36) (89 - 95)  BP: 115/70 (03 May 2022 05:36) (115/70 - 134/71)  BP(mean): --  RR: 17 (03 May 2022 05:36) (17 - 18)  SpO2: 93% (03 May 2022 05:36) (93% - 99%)    LABS:    CBC Full  -  ( 03 May 2022 07:18 )  WBC Count : 8.71 K/uL  RBC Count : 4.46 M/uL  Hemoglobin : 11.6 g/dL  Hematocrit : 36.3 %  Platelet Count - Automated : 204 K/uL  Mean Cell Volume : 81.4 fl  Mean Cell Hemoglobin : 26.0 pg  Mean Cell Hemoglobin Concentration : 32.0 gm/dL  Auto Neutrophil # : x  Auto Lymphocyte # : x  Auto Monocyte # : x  Auto Eosinophil # : x  Auto Basophil # : x  Auto Neutrophil % : x  Auto Lymphocyte % : x  Auto Monocyte % : x  Auto Eosinophil % : x  Auto Basophil % : x      05-03    134<L>  |  106  |  89<H>  ----------------------------<  107<H>  5.5<H>   |  17<L>  |  4.47<H>    Ca    9.6      03 May 2022 07:18  Phos  3.8     05-03  Mg     1.5     05-03    TPro  8.8<H>  /  Alb  3.2<L>  /  TBili  1.3<H>  /  DBili  x   /  AST  233<H>  /  ALT  87<H>  /  AlkPhos  208<H>  05-03    CAPILLARY BLOOD GLUCOSE            LIVER FUNCTIONS - ( 03 May 2022 07:18 )  Alb: 3.2 g/dL / Pro: 8.8 g/dL / ALK PHOS: 208 U/L / ALT: 87 U/L DA / AST: 233 U/L / GGT: x           Creatinine Trend: 4.47<--, 4.39<--, 3.82<--, 3.71<--, 3.54<--, 3.43<--  I&O's Summary    02 May 2022 07:01  -  03 May 2022 07:00  --------------------------------------------------------  IN: 0 mL / OUT: 575 mL / NET: -575 mL                MEDICATIONS:    MEDICATIONS  (STANDING):  ARIPiprazole 15 milliGRAM(s) Oral daily  ferrous    sulfate 325 milliGRAM(s) Oral daily  finasteride 5 milliGRAM(s) Oral daily  gabapentin 600 milliGRAM(s) Oral two times a day  heparin   Injectable 5000 Unit(s) SubCutaneous every 8 hours  lamoTRIgine 200 milliGRAM(s) Oral daily  midodrine. 2.5 milliGRAM(s) Oral three times a day  pantoprazole    Tablet 40 milliGRAM(s) Oral before breakfast  sevelamer carbonate 800 milliGRAM(s) Oral three times a day with meals  sodium bicarbonate 650 milliGRAM(s) Oral three times a day  sodium chloride 0.9%. 1000 milliLiter(s) (50 mL/Hr) IV Continuous <Continuous>  sodium zirconium cyclosilicate 10 Gram(s) Oral once  tamsulosin 0.4 milliGRAM(s) Oral at bedtime  traZODone 25 milliGRAM(s) Oral at bedtime      MEDICATIONS  (PRN):      REVIEW OF SYSTEMS:                           ALL ROS DONE [ X   ]    CONSTITUTIONAL:  LETHARGIC [   ], FEVER [   ], UNRESPONSIVE [   ]  CVS:  CP  [   ], SOB, [   ], PALPITATIONS [   ], DIZZYNESS [   ]  RS: COUGH [   ], SPUTUM [   ]  GI: ABDOMINAL PAIN [   ], NAUSEA [   ], VOMITINGS [   ], DIARRHEA [   ], CONSTIPATION [   ]  :  DYSURIA [   ], NOCTURIA [   ], INCREASED FREQUENCY [   ], DRIBLING [   ],  SKELETAL: PAINFUL JOINTS [   ], SWOLLEN JOINTS [   ], NECK ACHE [   ], LOW BACK ACHE [   ],  SKIN : ULCERS [   ], RASH [   ], ITCHING [   ]  CNS: HEAD ACHE [   ], DOUBLE VISION [   ], BLURRED VISION [   ], AMS / CONFUSION [   ], SEIZURES [   ], WEAKNESS [   ],TINGLING / NUMBNESS [   ]      PHYSICAL EXAMINATION:  GENERAL APPEARANCE: NO DISTRESS  HEENT:  NO PALLOR, NO  JVD,  NO   NODES, NECK SUPPLE  CVS: S1 +, S2 +,   RS: AEEB,  OCCASIONAL  RALES +,   NO RONCHI  ABD: SOFT, NT, NO, BS +   ILEOSTOMY [LOOSE BROWN STOOL]  EXT: NO PE  SKIN: WARM,   SKELETAL:  ROM ACCEPTABLE  CNS:  AAO X 3    RADIOLOGY :    ACC: [id number] EXAM:  XR CHEST PORTABLE URGENT 1V                          PROCEDURE DATE:  04/28/2022          INTERPRETATION:  Clinical history: 71-year-old male, chest pain.    Portable/expiratory view of the chest is compared to 20 and demonstrates   a normal cardiac silhouette with no lobar consolidation, gross effusion,   pneumothorax or acute osseous finding.    Bilateral lower lobe patchy opacities consistent with infiltrates/areas   of atelectasis in the correct clinical context, increased. Elevated right   hemidiaphragm are evident.    IMPRESSION:    Bilateral lower lobe interstitial infiltrates/platelike atelectasis,   increased      ASSESSMENT :     Hyperkalemia    No pertinent past medical history    COPD, mild    Anemia    Bipolar disorder    IBD (inflammatory bowel disease)    HTN (hypertension)    HLD (hyperlipidemia)    PAD (peripheral artery disease)    CKD (chronic kidney disease)    Prostate CA    BPH with urinary obstruction    No significant past surgical history    History of creation of ostomy        PLAN:  HPI:  Patient is a 71M from [facility], who is ambulatory at baseline with a walker. He has Hx of HTN, CHF?, COPD, HLD, PAD, BPH, Prostate CA (s/p radiation), Bipolar Disorder, IBD - Crohn's Disease, R ileostomy bag (s/p sigmoid resection). He was brought to ED due to bilateral leg swelling. For the past 2 days he has been having progressive swelling/ edema of both his legs with associated ambulatory dysfunction. He also started having episodes of shortness of breath and difficulty of breathing. He denies any fever, cough, chest pain, palpitation. When he came to the ED, he was saturating at 93-94% on room air. He was placed on nasal cannula. EKG was done showing NSR. Troponin was negative and BNP was mildly elevated at 398. CXR showed bilateral vascular congestion. Serum potassium was elevated at 6.1. He got a dose of lasix and was given Hyperkalemia cocktail in the ED. Of note, he had Hx of COVID in 2020 andwas vaccinated with COVID-19 x 3 (Pfizer) and Influenza in 2021. He was subsequently admitted for acute exacerbation of his heart failure.    GOC: FULL CODE (28 Apr 2022 18:40)    # CASE D/W BROTHER KERI VIA PHONE AT BEDSIDE [4/30]      # ACUTE HYPOXIC RESPIRATORY FAILURE SUSPECT S/T PULMONARY VASCULAR CONGESTION  # RULED OUT CHF CHF  # PAWAN ON CKD - WORSENING, HOLD LASIX  # BPH  # HX OF PROSTATE CA S/P RADIATION  - PLACE ON TELEMETRY  - NOTED CXR  - HOLD LASIX  - NOTE TSH  - STRICT IS AND OS  - ECHOCARDIOGRAM - NORMAL LVEF, NORMAL DIASTOLIC FUNCTION  - NEPHROLOGY CONSULT, CARDIOLOGY CONSULT    # LIVER LESIONS  - F/U MRI ABDOMEN  - F/U TUMOR MARKERS  - HEPATOLOGY CONSULT    # HYPERKALEMIA - RESOLVED  - GIVEN LASIX, LOKELMA  - EKG DONE  - NEPHROLOGY CONSULT    # CROHN'S DISEASE  # RIGHT ILEOSTOMY BAG S/P SIGMOID RESECTION  - MONITORING OUTPUT  - GASTROENTEROLOGY CONSULT    # NORMOCYTIC ANEMIA    # TRANSAMINITIS, NOTED HEP C AND HEP B MARKERS  - WILL CONSULT HEPATOLOGY    # HTN  # COPD  # HLD  # PAD  # BIPOLAR DX  # GI AND DVT PPX      Patient is a 71y old  Male who presents with a chief complaint of acute CHF exacerbation (02 May 2022 15:42)    PATIENT IS SEEN AND EXAMINED IN MEDICAL FLOOR.      ALLERGIES:  No Known Allergies      VITALS:    Vital Signs Last 24 Hrs  T(C): 37.7 (03 May 2022 05:36), Max: 38.1 (02 May 2022 20:55)  T(F): 99.8 (03 May 2022 05:36), Max: 100.5 (02 May 2022 20:55)  HR: 90 (03 May 2022 05:36) (89 - 95)  BP: 115/70 (03 May 2022 05:36) (115/70 - 134/71)  BP(mean): --  RR: 17 (03 May 2022 05:36) (17 - 18)  SpO2: 93% (03 May 2022 05:36) (93% - 99%)    LABS:    CBC Full  -  ( 03 May 2022 07:18 )  WBC Count : 8.71 K/uL  RBC Count : 4.46 M/uL  Hemoglobin : 11.6 g/dL  Hematocrit : 36.3 %  Platelet Count - Automated : 204 K/uL  Mean Cell Volume : 81.4 fl  Mean Cell Hemoglobin : 26.0 pg  Mean Cell Hemoglobin Concentration : 32.0 gm/dL  Auto Neutrophil # : x  Auto Lymphocyte # : x  Auto Monocyte # : x  Auto Eosinophil # : x  Auto Basophil # : x  Auto Neutrophil % : x  Auto Lymphocyte % : x  Auto Monocyte % : x  Auto Eosinophil % : x  Auto Basophil % : x      05-03    134<L>  |  106  |  89<H>  ----------------------------<  107<H>  5.5<H>   |  17<L>  |  4.47<H>    Ca    9.6      03 May 2022 07:18  Phos  3.8     05-03  Mg     1.5     05-03    TPro  8.8<H>  /  Alb  3.2<L>  /  TBili  1.3<H>  /  DBili  x   /  AST  233<H>  /  ALT  87<H>  /  AlkPhos  208<H>  05-03    CAPILLARY BLOOD GLUCOSE            LIVER FUNCTIONS - ( 03 May 2022 07:18 )  Alb: 3.2 g/dL / Pro: 8.8 g/dL / ALK PHOS: 208 U/L / ALT: 87 U/L DA / AST: 233 U/L / GGT: x           Creatinine Trend: 4.47<--, 4.39<--, 3.82<--, 3.71<--, 3.54<--, 3.43<--  I&O's Summary    02 May 2022 07:01  -  03 May 2022 07:00  --------------------------------------------------------  IN: 0 mL / OUT: 575 mL / NET: -575 mL                MEDICATIONS:    MEDICATIONS  (STANDING):  ARIPiprazole 15 milliGRAM(s) Oral daily  ferrous    sulfate 325 milliGRAM(s) Oral daily  finasteride 5 milliGRAM(s) Oral daily  gabapentin 600 milliGRAM(s) Oral two times a day  heparin   Injectable 5000 Unit(s) SubCutaneous every 8 hours  lamoTRIgine 200 milliGRAM(s) Oral daily  midodrine. 2.5 milliGRAM(s) Oral three times a day  pantoprazole    Tablet 40 milliGRAM(s) Oral before breakfast  sevelamer carbonate 800 milliGRAM(s) Oral three times a day with meals  sodium bicarbonate 650 milliGRAM(s) Oral three times a day  sodium chloride 0.9%. 1000 milliLiter(s) (50 mL/Hr) IV Continuous <Continuous>  sodium zirconium cyclosilicate 10 Gram(s) Oral once  tamsulosin 0.4 milliGRAM(s) Oral at bedtime  traZODone 25 milliGRAM(s) Oral at bedtime      MEDICATIONS  (PRN):      REVIEW OF SYSTEMS:                           ALL ROS DONE [ X   ]    CONSTITUTIONAL:  LETHARGIC [   ], FEVER [   ], UNRESPONSIVE [   ]  CVS:  CP  [   ], SOB, [   ], PALPITATIONS [   ], DIZZYNESS [   ]  RS: COUGH [   ], SPUTUM [   ]  GI: ABDOMINAL PAIN [   ], NAUSEA [   ], VOMITINGS [   ], DIARRHEA [   ], CONSTIPATION [   ]  :  DYSURIA [   ], NOCTURIA [   ], INCREASED FREQUENCY [   ], DRIBLING [   ],  SKELETAL: PAINFUL JOINTS [   ], SWOLLEN JOINTS [   ], NECK ACHE [   ], LOW BACK ACHE [   ],  SKIN : ULCERS [   ], RASH [   ], ITCHING [   ]  CNS: HEAD ACHE [   ], DOUBLE VISION [   ], BLURRED VISION [   ], AMS / CONFUSION [   ], SEIZURES [   ], WEAKNESS [   ],TINGLING / NUMBNESS [   ]      PHYSICAL EXAMINATION:  GENERAL APPEARANCE: NO DISTRESS  HEENT:  NO PALLOR, NO  JVD,  NO   NODES, NECK SUPPLE  CVS: S1 +, S2 +,   RS: AEEB,  OCCASIONAL  RALES +,   NO RONCHI  ABD: SOFT, NT, NO, BS +   ILEOSTOMY [LOOSE BROWN STOOL]  EXT: NO PE  SKIN: WARM,   SKELETAL:  ROM ACCEPTABLE  CNS:  AAO X 3    RADIOLOGY :    ACC: [id number] EXAM:  XR CHEST PORTABLE URGENT 1V                          PROCEDURE DATE:  04/28/2022          INTERPRETATION:  Clinical history: 71-year-old male, chest pain.    Portable/expiratory view of the chest is compared to 20 and demonstrates   a normal cardiac silhouette with no lobar consolidation, gross effusion,   pneumothorax or acute osseous finding.    Bilateral lower lobe patchy opacities consistent with infiltrates/areas   of atelectasis in the correct clinical context, increased. Elevated right   hemidiaphragm are evident.    IMPRESSION:    Bilateral lower lobe interstitial infiltrates/platelike atelectasis,   increased    ================================      ACC: [id number] EXAM:  MR ABDOMEN                          *** ADDENDUM***    Some of the hepatic lesions have small T1 hyperintense components on   fat-suppressed T1-weighted gradient echo images, suggestive of blood   products.    --- End of Report---    *** END OF ADDENDUM***      PROCEDURE DATE:  05/03/2022          INTERPRETATION:  CLINICAL INFORMATION: Hepatic lesions. History of   prostate cancer.    COMPARISON: None.    CONTRAST/COMPLICATIONS:  IV Contrast: NONE  Oral Contrast: NONE  Complications: None reported at time of study completion    PROCEDURE:  MRI of the abdomen was performed.    FINDINGS:  LOWER CHEST: Within normal limits.    LIVER: Innumerable mildly T2 hyperintense lesions are seen throughout the   liver, suggestive ofmetastasis. The largest lesion measures 13.8 x 11.7   x 11.7 cm in the liver dome.  BILE DUCTS: Normal caliber.  GALLBLADDER: Cholelithiasis.  SPLEEN: Within normal limits.  PANCREAS: Within normal limits.  ADRENALS: Within normal limits.  KIDNEYS/URETERS: Multiple brightly T2 hyperintense lesion in the kidneys   bilaterally, representing probable cysts.    VISUALIZED PORTIONS:  BOWEL: Small hiatal hernia. Right lower quadrant ostomy.  PERITONEUM: No ascites.  VESSELS: Within normal limits.  RETROPERITONEUM/LYMPH NODES: No lymphadenopathy.  ABDOMINAL WALL: Within normal limits.  BONES: Multiple T2 hyperintense and T2 hyperintense lesions in the   thoracolumbar spine may represent osseous metastasis. Whole-body bone   scan may be pursued for further evaluation.    IMPRESSION:  Innumerable mildly T2 hyperintense lesions are seen throughout the liver,   suggestive of metastasis. The largest lesion measures 13.8 x 11.7 x 11.7   cm in the liver dome.    Multiple T2 hyperintense and T2 hyperintense lesions in the thoracolumbar   spine may represent osseous metastasis. Whole-body bone scan may be   pursued for further evaluation.    Cholelithiasis.      ASSESSMENT :     Hyperkalemia    No pertinent past medical history    COPD, mild    Anemia    Bipolar disorder    IBD (inflammatory bowel disease)    HTN (hypertension)    HLD (hyperlipidemia)    PAD (peripheral artery disease)    CKD (chronic kidney disease)    Prostate CA    BPH with urinary obstruction    No significant past surgical history    History of creation of ostomy        PLAN:  HPI:  Patient is a 71M from [facility], who is ambulatory at baseline with a walker. He has Hx of HTN, CHF?, COPD, HLD, PAD, BPH, Prostate CA (s/p radiation), Bipolar Disorder, IBD - Crohn's Disease, R ileostomy bag (s/p sigmoid resection). He was brought to ED due to bilateral leg swelling. For the past 2 days he has been having progressive swelling/ edema of both his legs with associated ambulatory dysfunction. He also started having episodes of shortness of breath and difficulty of breathing. He denies any fever, cough, chest pain, palpitation. When he came to the ED, he was saturating at 93-94% on room air. He was placed on nasal cannula. EKG was done showing NSR. Troponin was negative and BNP was mildly elevated at 398. CXR showed bilateral vascular congestion. Serum potassium was elevated at 6.1. He got a dose of lasix and was given Hyperkalemia cocktail in the ED. Of note, he had Hx of COVID in 2020 andwas vaccinated with COVID-19 x 3 (Pfizer) and Influenza in 2021. He was subsequently admitted for acute exacerbation of his heart failure.    GOC: FULL CODE (28 Apr 2022 18:40)    # CASE D/W BROTHER KERI VIA PHONE AT BEDSIDE [ C  ; H  ] - CASE DISCUSSED AT LENGTH, ALL QUESTIONS ANSWERED    # HEMATURIA       # ACUTE HYPOXIC RESPIRATORY FAILURE SUSPECT S/T PULMONARY VASCULAR CONGESTION  # RULED OUT CHF   # PAWAN ON CKD - WORSENING, HOLD LASIX  # BPH  # HX OF PROSTATE CA S/P RADIATION  - PLACE ON TELEMETRY  - NOTED CXR  - HOLD LASIX  - NOTE TSH  - STRICT IS AND OS  - ECHOCARDIOGRAM - NORMAL LVEF, NORMAL DIASTOLIC FUNCTION  - NEPHROLOGY CONSULT, CARDIOLOGY CONSULT    # LIVER LESIONS  - F/U MRI ABDOMEN  - F/U TUMOR MARKERS  - HEPATOLOGY CONSULT    # HYPERKALEMIA - RESOLVED  - GIVEN LASIX, LOKELMA  - EKG DONE  - NEPHROLOGY CONSULT    # CROHN'S DISEASE  # RIGHT ILEOSTOMY BAG S/P SIGMOID RESECTION  - MONITORING OUTPUT  - GASTROENTEROLOGY CONSULT    # NORMOCYTIC ANEMIA    # TRANSAMINITIS, NOTED HEP C AND HEP B MARKERS  - WILL CONSULT HEPATOLOGY    # HTN  # COPD  # HLD  # PAD  # BIPOLAR DX  # GI AND DVT PPX      Patient is a 71y old  Male who presents with a chief complaint of acute CHF exacerbation (02 May 2022 15:42)    PATIENT IS SEEN AND EXAMINED IN MEDICAL FLOOR.      ALLERGIES:  No Known Allergies      VITALS:    Vital Signs Last 24 Hrs  T(C): 37.7 (03 May 2022 05:36), Max: 38.1 (02 May 2022 20:55)  T(F): 99.8 (03 May 2022 05:36), Max: 100.5 (02 May 2022 20:55)  HR: 90 (03 May 2022 05:36) (89 - 95)  BP: 115/70 (03 May 2022 05:36) (115/70 - 134/71)  BP(mean): --  RR: 17 (03 May 2022 05:36) (17 - 18)  SpO2: 93% (03 May 2022 05:36) (93% - 99%)    LABS:    CBC Full  -  ( 03 May 2022 07:18 )  WBC Count : 8.71 K/uL  RBC Count : 4.46 M/uL  Hemoglobin : 11.6 g/dL  Hematocrit : 36.3 %  Platelet Count - Automated : 204 K/uL  Mean Cell Volume : 81.4 fl  Mean Cell Hemoglobin : 26.0 pg  Mean Cell Hemoglobin Concentration : 32.0 gm/dL  Auto Neutrophil # : x  Auto Lymphocyte # : x  Auto Monocyte # : x  Auto Eosinophil # : x  Auto Basophil # : x  Auto Neutrophil % : x  Auto Lymphocyte % : x  Auto Monocyte % : x  Auto Eosinophil % : x  Auto Basophil % : x      05-03    134<L>  |  106  |  89<H>  ----------------------------<  107<H>  5.5<H>   |  17<L>  |  4.47<H>    Ca    9.6      03 May 2022 07:18  Phos  3.8     05-03  Mg     1.5     05-03    TPro  8.8<H>  /  Alb  3.2<L>  /  TBili  1.3<H>  /  DBili  x   /  AST  233<H>  /  ALT  87<H>  /  AlkPhos  208<H>  05-03    CAPILLARY BLOOD GLUCOSE            LIVER FUNCTIONS - ( 03 May 2022 07:18 )  Alb: 3.2 g/dL / Pro: 8.8 g/dL / ALK PHOS: 208 U/L / ALT: 87 U/L DA / AST: 233 U/L / GGT: x           Creatinine Trend: 4.47<--, 4.39<--, 3.82<--, 3.71<--, 3.54<--, 3.43<--  I&O's Summary    02 May 2022 07:01  -  03 May 2022 07:00  --------------------------------------------------------  IN: 0 mL / OUT: 575 mL / NET: -575 mL      MEDICATIONS:    MEDICATIONS  (STANDING):  ARIPiprazole 15 milliGRAM(s) Oral daily  ferrous    sulfate 325 milliGRAM(s) Oral daily  finasteride 5 milliGRAM(s) Oral daily  gabapentin 600 milliGRAM(s) Oral two times a day  heparin   Injectable 5000 Unit(s) SubCutaneous every 8 hours  lamoTRIgine 200 milliGRAM(s) Oral daily  midodrine. 2.5 milliGRAM(s) Oral three times a day  pantoprazole    Tablet 40 milliGRAM(s) Oral before breakfast  sevelamer carbonate 800 milliGRAM(s) Oral three times a day with meals  sodium bicarbonate 650 milliGRAM(s) Oral three times a day  sodium chloride 0.9%. 1000 milliLiter(s) (50 mL/Hr) IV Continuous <Continuous>  sodium zirconium cyclosilicate 10 Gram(s) Oral once  tamsulosin 0.4 milliGRAM(s) Oral at bedtime  traZODone 25 milliGRAM(s) Oral at bedtime      MEDICATIONS  (PRN):      REVIEW OF SYSTEMS:                           ALL ROS DONE [ X   ]    CONSTITUTIONAL:  LETHARGIC [   ], FEVER [   ], UNRESPONSIVE [   ]  CVS:  CP  [   ], SOB, [   ], PALPITATIONS [   ], DIZZYNESS [   ]  RS: COUGH [   ], SPUTUM [   ]  GI: ABDOMINAL PAIN [   ], NAUSEA [   ], VOMITINGS [   ], DIARRHEA [   ], CONSTIPATION [   ]  :  DYSURIA [   ], NOCTURIA [   ], INCREASED FREQUENCY [   ], DRIBLING [   ],  SKELETAL: PAINFUL JOINTS [   ], SWOLLEN JOINTS [   ], NECK ACHE [   ], LOW BACK ACHE [   ],  SKIN : ULCERS [   ], RASH [   ], ITCHING [   ]  CNS: HEAD ACHE [   ], DOUBLE VISION [   ], BLURRED VISION [   ], AMS / CONFUSION [   ], SEIZURES [   ], WEAKNESS [   ],TINGLING / NUMBNESS [   ]      PHYSICAL EXAMINATION:  GENERAL APPEARANCE: NO DISTRESS  HEENT:  NO PALLOR, NO  JVD,  NO   NODES, NECK SUPPLE  CVS: S1 +, S2 +,   RS: AEEB,  OCCASIONAL  RALES +,   CRACKLES AT BASES +  ABD: SOFT, NT, NO, BS +   ILEOSTOMY [STOMA W/ PINK BASE]  EXT: NO PE  SKIN: WARM,   SKELETAL:  ROM ACCEPTABLE  CNS:  AAO X 3    RADIOLOGY :    ACC: [id number] EXAM:  XR CHEST PORTABLE URGENT 1V                          PROCEDURE DATE:  04/28/2022          INTERPRETATION:  Clinical history: 71-year-old male, chest pain.    Portable/expiratory view of the chest is compared to 20 and demonstrates   a normal cardiac silhouette with no lobar consolidation, gross effusion,   pneumothorax or acute osseous finding.    Bilateral lower lobe patchy opacities consistent with infiltrates/areas   of atelectasis in the correct clinical context, increased. Elevated right   hemidiaphragm are evident.    IMPRESSION:    Bilateral lower lobe interstitial infiltrates/platelike atelectasis,   increased    ================================      ACC: [id number] EXAM:  MR ABDOMEN                          *** ADDENDUM***    Some of the hepatic lesions have small T1 hyperintense components on   fat-suppressed T1-weighted gradient echo images, suggestive of blood   products.    --- End of Report---    *** END OF ADDENDUM***      PROCEDURE DATE:  05/03/2022          INTERPRETATION:  CLINICAL INFORMATION: Hepatic lesions. History of   prostate cancer.    COMPARISON: None.    CONTRAST/COMPLICATIONS:  IV Contrast: NONE  Oral Contrast: NONE  Complications: None reported at time of study completion    PROCEDURE:  MRI of the abdomen was performed.    FINDINGS:  LOWER CHEST: Within normal limits.    LIVER: Innumerable mildly T2 hyperintense lesions are seen throughout the   liver, suggestive ofmetastasis. The largest lesion measures 13.8 x 11.7   x 11.7 cm in the liver dome.  BILE DUCTS: Normal caliber.  GALLBLADDER: Cholelithiasis.  SPLEEN: Within normal limits.  PANCREAS: Within normal limits.  ADRENALS: Within normal limits.  KIDNEYS/URETERS: Multiple brightly T2 hyperintense lesion in the kidneys   bilaterally, representing probable cysts.    VISUALIZED PORTIONS:  BOWEL: Small hiatal hernia. Right lower quadrant ostomy.  PERITONEUM: No ascites.  VESSELS: Within normal limits.  RETROPERITONEUM/LYMPH NODES: No lymphadenopathy.  ABDOMINAL WALL: Within normal limits.  BONES: Multiple T2 hyperintense and T2 hyperintense lesions in the   thoracolumbar spine may represent osseous metastasis. Whole-body bone   scan may be pursued for further evaluation.    IMPRESSION:  Innumerable mildly T2 hyperintense lesions are seen throughout the liver,   suggestive of metastasis. The largest lesion measures 13.8 x 11.7 x 11.7   cm in the liver dome.    Multiple T2 hyperintense and T2 hyperintense lesions in the thoracolumbar   spine may represent osseous metastasis. Whole-body bone scan may be   pursued for further evaluation.    Cholelithiasis.      ASSESSMENT :     Hyperkalemia    No pertinent past medical history    COPD, mild    Anemia    Bipolar disorder    IBD (inflammatory bowel disease)    HTN (hypertension)    HLD (hyperlipidemia)    PAD (peripheral artery disease)    CKD (chronic kidney disease)    Prostate CA    BPH with urinary obstruction    No significant past surgical history    History of creation of ostomy        PLAN:  HPI:  Patient is a 71M from [facility], who is ambulatory at baseline with a walker. He has Hx of HTN, CHF?, COPD, HLD, PAD, BPH, Prostate CA (s/p radiation), Bipolar Disorder, IBD - Crohn's Disease, R ileostomy bag (s/p sigmoid resection). He was brought to ED due to bilateral leg swelling. For the past 2 days he has been having progressive swelling/ edema of both his legs with associated ambulatory dysfunction. He also started having episodes of shortness of breath and difficulty of breathing. He denies any fever, cough, chest pain, palpitation. When he came to the ED, he was saturating at 93-94% on room air. He was placed on nasal cannula. EKG was done showing NSR. Troponin was negative and BNP was mildly elevated at 398. CXR showed bilateral vascular congestion. Serum potassium was elevated at 6.1. He got a dose of lasix and was given Hyperkalemia cocktail in the ED. Of note, he had Hx of COVID in 2020 andwas vaccinated with COVID-19 x 3 (Pfizer) and Influenza in 2021. He was subsequently admitted for acute exacerbation of his heart failure.    [facility]: FULL CODE (28 Apr 2022 18:40)    # CASE D/W BROTHER KERI VIA PHONE AT BEDSIDE [ C  ; H  ] - CASE DISCUSSED AT LENGTH, ALL QUESTIONS ANSWERED    # HEMATURIA   # HX OF PROSTATE CA S/P RADIATION  - HOLD A/C, TREND CBC, UROLOGY CONSULT  - F/U BLADDER SCAN, F/U UA  - F/U BLADDER ULTRASOUND    # ACUTE HYPOXIC RESPIRATORY FAILURE SUSPECT S/T PULMONARY VASCULAR CONGESTION  # RULED OUT CHF   # PAWAN ON CKD - WORSENING, HOLD LASIX  # BPH  # HX OF PROSTATE CA S/P RADIATION  - PLACE ON TELEMETRY  - NOTED CXR  - HOLD LASIX  - NOTE TSH  - STRICT IS AND OS  - ECHOCARDIOGRAM - NORMAL LVEF, NORMAL DIASTOLIC FUNCTION  - NEPHROLOGY CONSULT, CARDIOLOGY CONSULT    # LIVER LESIONS, BONE LESIONS  - NOTED MRI ABDOMEN  - F/U TUMOR MARKERS  - HEPATOLOGY CONSULT  - ONCOLOGY CONSULT    # HYPERKALEMIA - RESOLVED  - GIVEN LASIX, LOKELMA  - EKG DONE  - NEPHROLOGY CONSULT    # CROHN'S DISEASE  # RIGHT ILEOSTOMY BAG S/P SIGMOID RESECTION  - MONITORING OUTPUT  - GASTROENTEROLOGY CONSULT    # NORMOCYTIC ANEMIA    # TRANSAMINITIS, NOTED HEP C AND HEP B MARKERS  - WILL CONSULT HEPATOLOGY    # HTN  # COPD  # HLD  # PAD  # BIPOLAR DX  # GI AND DVT PPX

## 2022-05-03 NOTE — PROGRESS NOTE ADULT - PROBLEM SELECTOR PLAN 1
SOB + LE edema on admission  Echo - GIDD, EF 50-55% (2020)  Echo resulted trivial pericardial effusion, normal EF.  EKG - NSR, low voltage criteria for LVH  CXR - bilateral vascular congestion   CT chest shows no pleural effusion  Trop x 1 - neg  pro-BNP - 398 (mildly elevated)  monitor I&O, Daily Weight  HOLD  lasix due to PAWAN on CKD  d/c'd tele  Cardio (Dr. Archer) consulted  repeat CXR-pending official read  monitor O2 sat on room air, at rest/ambulation -  Patient presented with SOB and LE edema   -  Echo -  EF 50-55% (2020)  -  Echo resulted trivial pericardial effusion, normal EF.  -  EKG - NSR, low voltage criteria for LVH  -  CXR - bilateral vascular congestion   -  CT chest shows no pleural effusion  -  Trop x 1 - neg  -  Pro-BNP - 398   -  monitor O2 sat on room air, at rest/ambulation

## 2022-05-03 NOTE — PROGRESS NOTE ADULT - PROBLEM SELECTOR PLAN 12
patient from [facility]   PT clears to return-no skill needed  COVID - 4/28  hepatology consulted, MRI pending  monitor O2 sat on room air both ambulation/at rest. -  Patient from [facility]   -  PT clears to return-no skill needed  -  COVID - 4/28  -  Plan to return to [facility] when medically stable  -  Patient remains active

## 2022-05-03 NOTE — PROGRESS NOTE ADULT - PROBLEM SELECTOR PLAN 2
Hx of CKD IV  baseline Cr - 3  BUN/Cr - 88/3.28 , eGFR - 19  home meds - NaHCO3, Calcitriol  renal US shows renal cysts, no hydro  monitor BMP  monitor I&O  resume home meds  avoid nephrotoxins HOLD lasix for now.   Nephro (Dr. Pond) consulted  Cr 4.39 from 3.8  give gentle IV hydration with NS @ 50ml/hr x 10hrs per nephro -  History of CKD IV  -  Creatinine 4.47 today,  up trending  -  Home meds - NaHCO3, Calcitriol  -  Continue with Sodium bicarbonate  -  Continue with Sevelamer  -  renal US shows renal cysts, no hydronephrosis  -  monitor BMP daily  -  monitor I&O  -  avoid nephrotoxins HOLD lasix for now.   -  Nephro (Dr. Pond) consulted

## 2022-05-03 NOTE — PROGRESS NOTE ADULT - ASSESSMENT
71M from [facility], who is ambulatory at baseline with a walker. He has Hx of HTN, CHF?, COPD, HLD, PAD, BPH, Prostate CA (s/p radiation), Bipolar Disorder, IBD - Crohn's Disease, R ileostomy bag (s/p sigmoid resection). He was brought to ED due to bilateral leg swelling. Admitted for  CHF exacerbation and transaminitis. GI consult for excess ileostomy output, nonbloody .  Labs significant for WBC 7.6 HH 11.2/35.8 CR/BUN 3.82/86 Tbili 1   ALT 92. As per patient had has his colon completely removed over 10 years ago and has an ileostomy now due to severe Crohn's and diverticulitis.

## 2022-05-03 NOTE — PROGRESS NOTE ADULT - PROBLEM SELECTOR PLAN 2
LFT's elevated, downtrending  Tbili now elevated at 1.3  Hep C and HEP B markers elevated   MRI abd results as above  showing : Innumerable mildly T2 hyperintense lesions are seen throughout the   liver, suggestive ofmetastasis. The largest lesion measures 13.8 x 11.7   Avoid hepatotoxic agents   Hepatology following LFT's elevated, downtrending  Tbili now elevated at 1.3  Hep C and HEP B markers elevated   MRI abd results as above  showing : Innumerable mildly T2 hyperintense lesions are seen throughout the   liver, suggestive of metastasis. The largest lesion measures 13.8 x 11.7   Avoid hepatotoxic agents   Hepatology following

## 2022-05-03 NOTE — PROGRESS NOTE ADULT - PROBLEM SELECTOR PLAN 10
Hx of BPH and Prostate CA  home meds - Flomax, Proscar  no LUTS  resume home meds -  History  of BPH and Prostate CA  -  Continue with Finasteride  -  Continue with Flomax

## 2022-05-03 NOTE — PROGRESS NOTE ADULT - PROBLEM SELECTOR PLAN 3
K+ 6.1 on admission   S/P hyperkalemia cocktail  could be in setting of CKD  EKG - NSR, low voltage criteria for LVH (no peak T waves)  resolved K 4.4  monitor BMP -  K+ 6.1 on admission   -  S/P hyperkalemia cocktail  -  Potassium this morning 5.5  -  Lokelma x 1 dose given   -  repeat BMP at 2200  -  Hyperkalemia could be in setting of CKD  -  EKG - NSR, low voltage criteria for LVH (no peak T waves)

## 2022-05-03 NOTE — PROGRESS NOTE ADULT - PROBLEM SELECTOR PLAN 1
p/w increased ileostomy output, improving   Hold standing Miralax. Add as prn. Avoid constipation . Nothing harder then a soft consistency  F/u stool culture , O&P

## 2022-05-03 NOTE — PROGRESS NOTE ADULT - ASSESSMENT
1. PAWAN due to diuretics  -Scr slight increase but not as much with stable electrolytes. No response with gentle hydration  off lasix now.    Urinalysis shows no proteinuria. spot protein to creatinine ratio is 900mg.  -renal sono show no hydro.  -Adjust meds to eGFR and avoid IV Gadolinium contrast,NSAIDs, and phosphate enema.  -Monitor I/O's daily.   -Monitor SMA daily.  2. CKD stage 4 most likely due to ischemic nephropathy and h/o ATN with renal recovery.  -Baseline Scr around 3.2mg/dL in April 2020.  -Keep patient euvolemic and renal diet  -Avoid Nephrotoxic Meds/ Agents such as (NSAIDs, IV contrast, Aminoglycosides such as gentamicin, -Gadolinium contrast, Phosphate containing enemas, etc..)  -Adjust Medications according to eGFR  3. Anemia:   -hb is stable; continue iron tabs. monitor CBC  4. MBD:   -phos is improving; mild hyperca, which improved and off calcitirol; on renvela 1 tab tid;   -pth is okay at 41.  5. Hyperkalemia due to pawan on ckd.   -K is elevated and give 1 dose of lokelma.   -on low K diet. give Lokelma prn if K >5.5  -Keep pt euvolemic. Avoid ACE inh/ ARBs, NSAIDs, and Aldactone or potassium sparing diuretics. Monitor K+ daily.  6. Fluid Overload:  -clinically improved. off diuretics for now.   -CT chest w/o contrast shows no effusion; CXR is about the same.   7. Acidosis:  -CO2 is stable.    -on sodium bicarbonate 650mg oral tid. monitor CO2  8. h/o Hypotension:  -bp is acceptable and on midodrine 2.5mg tid      9. Elevated LFTs  - off lipitor for now.  -hepatitis panel shows hep C+ve.  discussed with pt, pt had known hep C and was treated for in the past; hep C RNA is not detected.   -C3/C4 and RF negative, cryoglobulinemia is pending.    -ct scan shows hepatic mass; Hepatology consulted and MRI shows liver mets.    Discussed with patient in detail regarding the renal plan and care  Discussed the assessment and plan with Primary Team/Nurse

## 2022-05-03 NOTE — PROGRESS NOTE ADULT - PROBLEM SELECTOR PLAN 9
Hx of Bipolar Disorder  home meds - Aripiprazole, Trazodone, Lamotrigine  resume home meds -  History  of Bipolar Disorder  -  Continue with Aripiprazole  -  Continue with Trazadone  -  Continue with Lamotrigine

## 2022-05-03 NOTE — PROGRESS NOTE ADULT - ASSESSMENT
71y Male from [facility] with Hx of HTN, CHF?, COPD, HLD, PAD, BPH, Prostate CA (s/p radiation), Bipolar Disorder, IBD - Crohn's Disease, R ileostomy bag (s/p sigmoid resection), COVID-19 (2020) and s/p COVID19 vaccination ([employer] x3) was brought to [facility] ED on 4/28/22 b/o 2 days worsening bilateral leg swelling with difficulty ambulating, and SOB with SpO2 93-94% on RA on arrival.  He was found to have bilateral vascular congestion on CXR due to suspected acute exacerbation of CHF, and PAWAN on CKD (BUN/Cr 88/3.28), with hyperkalemia (K 6.1). 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  Being followed by cardiology, PBNP was mildly elevated, and TTE showed LVEF 55%, normal diastolic function and normal RV function, thus did not appear to be in acute CHF exacerbation.    Hepatology was consulted b/o liver lesion and abnormal liver enzymes.     # Abnormal liver enzymes, AST >>ALT  - Please, order CPK.   - Hypotensive, requiring midodrine, with renal dysfunction, obtain Ig panel, SPEP, UPEP  - Obtain HIV  - No Hx of EtOH  - Hep B and C as below. Hep A IgM neg.   - Avoid hepatotoxic medications, agree holding statin  - Please, obtain collateral information about timing of lamotrigine, because its hepatotoxicity is well established, and consider alternative.     # Pos HCV ab  - HCV PCR neg with the Hx of Hep C and treatment, suggests SVR.   - F/u cryoglobulin     # HBcAb IgM pos, but HBsAg neg  - Please, obtain HBcAb, HBsAb, HBeAb, HBeAg, HBV DNA.     # Liver lesion  - on CT chest incidentally found a 4.7x4.3 cm partially imaged hypodense lesion and in L lobe another 2.7 cm lesion, as well as nodular thickening along hepatic capsule, all are new from the 2/2020 CT a/p  - Obtain AFP, CEA and CA 19-9  - Could not get contrast CT b/o PAWAN on CKD. MRI was done w/o contrast showing Innumerable mildly T2 hyperintense lesions  throughout the liver, suggestive of metastasis. The largest lesion measures 13.8 x 11.7 x 11.7 cm in the liver dome. Multiple T2 hyperintense and T2 hyperintense lesions in the thoracolumbar spine may represent osseous metastasis.  - No fever or leukocytosis  - Spoke to IR, Dr. Santiago will review images in am and depending potentially schedule for liver biopsy for Friday, 5/6. F/u with IR in am.  - Consider bone scan.  - Consider hem/onc evaluation.     # PAWAN on CKD, hematuria  - F/u nephrology recommendations and cardiology recommendations    Thank you for consult  Will continue to follow  D/w primary team, IR, GI

## 2022-05-03 NOTE — PROGRESS NOTE ADULT - SUBJECTIVE AND OBJECTIVE BOX
NEPHROLOGY MEDICAL CARE, [facility] - Dr. Emerson Pond/ Dr. Nallely Curran/ Dr. Kirby Angel/ Dr. Pippa Reis    Patient was seen and examined at bedside.    CC: pt has some sob but no distress.    Vital Signs Last 24 Hrs  T(C): 37.7 (03 May 2022 13:01), Max: 38.1 (02 May 2022 20:55)  T(F): 99.9 (03 May 2022 13:01), Max: 100.5 (02 May 2022 20:55)  HR: 90 (03 May 2022 13:01) (82 - 90)  BP: 133/74 (03 May 2022 13:01) (115/70 - 133/74)  BP(mean): --  RR: 18 (03 May 2022 13:02) (17 - 18)  SpO2: 95% (03 May 2022 13:02) (90% - 96%)    05-02 @ 07:01  -  05-03 @ 07:00  --------------------------------------------------------  IN: 0 mL / OUT: 575 mL / NET: -575 mL    05-03 @ 07:01  -  05-03 @ 15:55  --------------------------------------------------------  IN: 0 mL / OUT: 200 mL / NET: -200 mL        PHYSICAL EXAM:  General: No acute respiratory distress.  Eyes: conjunctiva and sclera clear  ENMT: Atraumatic, Normocephalic, supple, No JVD present. Moist mucous membranes  Respiratory: Bilateral rales at base and no rhonchi, wheezing  Cardiovascular: S1S2+; no m/r/g  Gastrointestinal: Soft, Non-tender, Nondistended; Bowel sounds present   Neuro:  Awake, Alert & Oriented X3  Ext:  no pedal edema, No Cyanosis  Skin: No visible rashes    MEDICATIONS:  MEDICATIONS  (STANDING):  ARIPiprazole 15 milliGRAM(s) Oral daily  ferrous    sulfate 325 milliGRAM(s) Oral daily  finasteride 5 milliGRAM(s) Oral daily  gabapentin 600 milliGRAM(s) Oral two times a day  heparin   Injectable 5000 Unit(s) SubCutaneous every 8 hours  lamoTRIgine 200 milliGRAM(s) Oral daily  midodrine. 2.5 milliGRAM(s) Oral three times a day  pantoprazole    Tablet 40 milliGRAM(s) Oral before breakfast  sevelamer carbonate 800 milliGRAM(s) Oral three times a day with meals  sodium bicarbonate 650 milliGRAM(s) Oral three times a day  sodium chloride 0.9%. 1000 milliLiter(s) (50 mL/Hr) IV Continuous <Continuous>  tamsulosin 0.4 milliGRAM(s) Oral at bedtime  traZODone 25 milliGRAM(s) Oral at bedtime    MEDICATIONS  (PRN):          LABS:                        11.6   8.71  )-----------( 204      ( 03 May 2022 07:18 )             36.3     05-03    134<L>  |  106  |  89<H>  ----------------------------<  107<H>  5.5<H>   |  17<L>  |  4.47<H>    Ca    9.6      03 May 2022 07:18  Phos  3.8     05-03  Mg     1.5     05-03    TPro  8.8<H>  /  Alb  3.2<L>  /  TBili  1.3<H>  /  DBili  x   /  AST  233<H>  /  ALT  87<H>  /  AlkPhos  208<H>  05-03        Phosphorus Level, Serum: 3.8 mg/dL (05-03 @ 07:18)  Magnesium, Serum: 1.5 mg/dL (05-03 @ 07:18)  C3 Complement, Serum: 190 mg/dL (05-02 @ 18:29)  C4 Complement, Serum: 67 mg/dL (05-02 @ 18:29)    Urine studies  Urea Nitrogen,  Random Urine: 484 mg/dL (04-30 @ 19:10)    PTH and Vit D:

## 2022-05-03 NOTE — PROGRESS NOTE ADULT - PROBLEM SELECTOR PLAN 8
Hx of Crohn disease  (+) ileostomy, R  GI consulted for evaluation ileostomy-too liquid stool  hold Miralax and senna for now per GI. -  History  of Crohn disease  -  (+) ileostomy, R  -  GI consulted for evaluation ileostomy-too liquid stool  -  hold Miralax and senna for now per GI.  -  stool studies pending.  O&P, urine culture,

## 2022-05-03 NOTE — PROGRESS NOTE ADULT - PROBLEM SELECTOR PLAN 4
downtrending   Tbili 0.9, , , ALT 92  Hep C and HEP B markers elevated avoid hepatotoxins   CT chest shows hepatic lesion 4.7cm see above  hepatology consulted dr Portillo  trend CMP  f/u MRI abdomen no con (due to worsening PAWAN)--no MRI tech available today, likely tomorrow. MRI form sent -  Downtrending   -  Tbili 0.9, , , ALT 92  -  Hep C and HEP B markers elevated avoid hepatotoxins   -  CT chest shows hepatic lesion 4.7cm see above  -  Hepatology consulted dr Portillo  -  Monitor CMP  -  f/u MRI abdomen no con (due to worsening PAWAN)  -  MRI  -  Multiple liver lesions  -   Tumor markers and bone scan ordered  -  Oncology to be consulted in AM  -  IR to be consulted for biopsy in AM

## 2022-05-03 NOTE — PROGRESS NOTE ADULT - SUBJECTIVE AND OBJECTIVE BOX
C A R D I O L O G Y  **********************************     DATE OF SERVICE: 05-03-22    Patient denies chest pain or shortness of breath.   Review of symptoms otherwise negative.    ARIPiprazole 15 milliGRAM(s) Oral daily  ferrous    sulfate 325 milliGRAM(s) Oral daily  finasteride 5 milliGRAM(s) Oral daily  gabapentin 600 milliGRAM(s) Oral two times a day  heparin   Injectable 5000 Unit(s) SubCutaneous every 8 hours  lamoTRIgine 200 milliGRAM(s) Oral daily  midodrine. 2.5 milliGRAM(s) Oral three times a day  pantoprazole    Tablet 40 milliGRAM(s) Oral before breakfast  sevelamer carbonate 800 milliGRAM(s) Oral three times a day with meals  sodium bicarbonate 650 milliGRAM(s) Oral three times a day  sodium chloride 0.9%. 1000 milliLiter(s) IV Continuous <Continuous>  tamsulosin 0.4 milliGRAM(s) Oral at bedtime  traZODone 25 milliGRAM(s) Oral at bedtime                            11.6   8.71  )-----------( 204      ( 03 May 2022 07:18 )             36.3       Hemoglobin: 11.6 g/dL (05-03 @ 07:18)  Hemoglobin: 11.6 g/dL (05-02 @ 12:16)  Hemoglobin: 11.2 g/dL (05-01 @ 06:45)  Hemoglobin: 11.2 g/dL (04-30 @ 06:49)  Hemoglobin: 10.4 g/dL (04-29 @ 07:00)      05-03    134<L>  |  106  |  89<H>  ----------------------------<  107<H>  5.5<H>   |  17<L>  |  4.47<H>    Ca    9.6      03 May 2022 07:18  Phos  3.8     05-03  Mg     1.5     05-03    TPro  8.8<H>  /  Alb  3.2<L>  /  TBili  1.3<H>  /  DBili  x   /  AST  233<H>  /  ALT  87<H>  /  AlkPhos  208<H>  05-03    Creatinine Trend: 4.47<--, 4.39<--, 3.82<--, 3.71<--, 3.54<--, 3.43<--    COAGS:           T(C): 37.7 (05-03-22 @ 05:36), Max: 38.1 (05-02-22 @ 20:55)  HR: 82 (05-03-22 @ 10:57) (82 - 95)  BP: 115/70 (05-03-22 @ 05:36) (115/70 - 134/71)  RR: 17 (05-03-22 @ 05:36) (17 - 18)  SpO2: 95% (05-03-22 @ 10:57) (93% - 99%)  Wt(kg): --    I&O's Summary    02 May 2022 07:01  -  03 May 2022 07:00  --------------------------------------------------------  IN: 0 mL / OUT: 575 mL / NET: -575 mL        HEENT:  (-)icterus (-)pallor  CV: N S1 S2 1/6 MARCELLE (+)2 Pulses B/l  Resp:  Clear to ausculatation B/L, normal effort  GI: (+) BS Soft, NT, ND  Lymph:  (-)Edema, (-)obvious lymphadenopathy  Skin: Warm to touch, Normal turgor  Psych: Appropriate mood and affect              ASSESSMENT/PLAN: 	71y  Male  HTN, CHF?, COPD, HLD, PAD, BPH, Prostate CA (s/p radiation), Bipolar Disorder, IBD - Crohn's Disease, R ileostomy bag (s/p sigmoid resection) historically preserved LV and RV function, normal perfusion on a nuclear stress test 2020 He was brought to ED due to bilateral leg swelling    - No clinical CHF A low BNP suggests a low left ventricular end diastolic pressure  - would check prealbumin ? Third spacing.  ? protein loosing enteropathy as a result of Crohn's disease and/or surgical resection  - monitor renal function, his edema is at least partially due ot renal disease   - no pertinent findings on echo      Joey Archer MD, [facility]  BEEPER [phone]

## 2022-05-03 NOTE — PROGRESS NOTE ADULT - SUBJECTIVE AND OBJECTIVE BOX
Chief Complaint:  Patient is a 71y old  Male who presents with a chief complaint of acute CHF exacerbation (03 May 2022 17:47)      Reason for consult: Liver lesion    Interval Events: Patient was seen and examined at bedside. MRI abd w/o contrast suggests metastatic process in liver and bone mets.     Hospital Medications:  ARIPiprazole 15 milliGRAM(s) Oral daily  ferrous    sulfate 325 milliGRAM(s) Oral daily  finasteride 5 milliGRAM(s) Oral daily  gabapentin 600 milliGRAM(s) Oral two times a day  heparin   Injectable 5000 Unit(s) SubCutaneous every 8 hours  lamoTRIgine 200 milliGRAM(s) Oral daily  midodrine. 2.5 milliGRAM(s) Oral three times a day  pantoprazole    Tablet 40 milliGRAM(s) Oral before breakfast  sevelamer carbonate 800 milliGRAM(s) Oral three times a day with meals  sodium bicarbonate 650 milliGRAM(s) Oral three times a day  sodium chloride 0.9%. 1000 milliLiter(s) IV Continuous <Continuous>  tamsulosin 0.4 milliGRAM(s) Oral at bedtime  traZODone 25 milliGRAM(s) Oral at bedtime      ROS:   General:  No  fevers, chills, night sweats, fatigue  Eyes:  Good vision, no reported pain  ENT:  No sore throat, pain, runny nose  CV:  No pain, palpitations  Pulm:  No dyspnea, cough  GI:  No abdominal pain, N/V, has ileostomy bag, denies bleeding  :  No  dysuria  Muscle:  No pain, or focal  weakness  Neuro:  No memory problems  Skin:  No rash      PHYSICAL EXAM:   Vital Signs:  Vital Signs Last 24 Hrs  T(C): 37.7 (03 May 2022 13:01), Max: 38.1 (02 May 2022 20:55)  T(F): 99.9 (03 May 2022 13:01), Max: 100.5 (02 May 2022 20:55)  HR: 90 (03 May 2022 13:01) (82 - 90)  BP: 133/74 (03 May 2022 13:01) (115/70 - 133/74)  BP(mean): --  RR: 18 (03 May 2022 13:02) (17 - 18)  SpO2: 95% (03 May 2022 13:02) (90% - 96%)  Daily     Daily     GENERAL: no acute distress  NEURO: awake, alert, oriented x3, no asterixis  HEENT: anicteric sclera, no conjunctival pallor appreciated  CHEST: no respiratory distress, no accessory muscle use  CARDIAC: regular rate, rhythm  ABDOMEN: soft, non-tender, non-distended, no rebound or guarding, ileostomy bag  EXTREMITIES: b/l LE edema improved reportedly  SKIN: No rash    LABS: reviewed                        11.6   8.71  )-----------( 204      ( 03 May 2022 07:18 )             36.3     05-03    134<L>  |  106  |  89<H>  ----------------------------<  107<H>  5.5<H>   |  17<L>  |  4.47<H>    Ca    9.6      03 May 2022 07:18  Phos  3.8     05-03  Mg     1.5     05-03    TPro  8.8<H>  /  Alb  3.2<L>  /  TBili  1.3<H>  /  DBili  x   /  AST  233<H>  /  ALT  87<H>  /  AlkPhos  208<H>  05-03    LIVER FUNCTIONS - ( 03 May 2022 07:18 )  Alb: 3.2 g/dL / Pro: 8.8 g/dL / ALK PHOS: 208 U/L / ALT: 87 U/L DA / AST: 233 U/L / GGT: x             Interval Diagnostic Studies: see sunrise for full report

## 2022-05-03 NOTE — CONSULT NOTE ADULT - SUBJECTIVE AND OBJECTIVE BOX
71 year old M from [facility], who is ambulatory at baseline with a walker. He has Hx of HTN, CHF?, COPD, HLD, PAD, BPH, Prostate CA (s/p radiation), Bipolar Disorder, IBD - Crohn's Disease, R ileostomy bag (s/p sigmoid resection). Presented with SOB and LE edema, found with hyperkalemia on admission. Admitted to [facility] for cardio work up R/O CHF and hyperkalemia with PAWAN on CKD 4.  cardiology and nephrology consulted. started lasix. renal US shows no hyronephrosis, + renal cysts. CT chest shows no pleural effusion, but with hepatic lesion 4.7cm largest. Also with transaminitis. GI and Hepatology consulted. ordered MRI abdomen r/o malignancy. PAWAN worsening today Cr 4.39 from 3.82. started low dose IVF as discussed nephrology. 5/3  MRI reveals multiple liver lesions with mets  -  Oncology to be consulted in AM.     SUBJECTIVE: Pt seen and examined at bedside, A&Ox3, appears in NAD. He reports episodes of hematuria throughout the day. On exam, noted with abel, red blood on his penis, in between his thighs, and on the bed linens. No signs of trauma noted. Of note, pt with history of Prostate CA s/p radiation.     Vital Signs Last 24 Hrs  T(C): 36.5 (03 May 2022 21:05), Max: 37.7 (03 May 2022 05:36)  T(F): 97.7 (03 May 2022 21:05), Max: 99.9 (03 May 2022 13:01)  HR: 85 (03 May 2022 21:05) (82 - 90)  BP: 136/62 (03 May 2022 21:05) (115/70 - 136/62)  BP(mean): --  RR: 19 (03 May 2022 21:05) (17 - 19)  SpO2: 98% (03 May 2022 21:05) (90% - 98%)                        11.6   8.71  )-----------( 204      ( 03 May 2022 07:18 )             36.3     05-03    133<L>  |  105  |  91<H>  ----------------------------<  112<H>  4.5   |  16<L>  |  4.38<H>    Ca    8.8      03 May 2022 21:43  Phos  3.8     05-03  Mg     1.5     05-03    TPro  8.8<H>  /  Alb  3.2<L>  /  TBili  1.3<H>  /  DBili  x   /  AST  233<H>  /  ALT  87<H>  /  AlkPhos  208<H>  05-03    Pt awake, alert  appears comfortable  no abd pain, soft  blood around head of penis and some staining on sheets    pt refuses phillips catheter at this time  may obtain bedside sono to assess for retention  case discussed with Dr Nicky salamanca to monitor

## 2022-05-03 NOTE — PROGRESS NOTE ADULT - SUBJECTIVE AND OBJECTIVE BOX
GI PROGRESS NOTE    Patient is a 71y old  Male who presents with a chief complaint of acute CHF exacerbation (03 May 2022 11:30)      HPI:  Patient is a 71M from [facility], who is ambulatory at baseline with a walker. He has Hx of HTN, CHF?, COPD, HLD, PAD, BPH, Prostate CA (s/p radiation), Bipolar Disorder, IBD - Crohn's Disease, R ileostomy bag (s/p sigmoid resection). He was brought to ED due to bilateral leg swelling. For the past 2 days he has been having progressive swelling/ edema of both his legs with associated ambulatory dysfunction. He also started having episodes of shortness of breath and difficulty of breathing. He denies any fever, cough, chest pain, palpitation. When he came to the ED, he was saturating at 93-94% on room air. He was placed on nasal cannula. EKG was done showing NSR. Troponin was negative and BNP was mildly elevated at 398. CXR showed bilateral vascular congestion. Serum potassium was elevated at 6.1. He got a dose of lasix and was given Hyperkalemia cocktail in the ED. Of note, he had Hx of COVID in 2020 andwas vaccinated with COVID-19 x 3 (Pfizer) and Influenza in 2021. He was subsequently admitted for acute exacerbation of his heart failure.    GOC: FULL CODE (28 Apr 2022 18:40)    GI HPI:   Pt seen and examined at bedside. Pt endorses feeling better, had MRI . Ileostomy with less output. Pt denies N/V/abdominal discomfort.       ______________________________________________________________________  PMH/PSH:  PAST MEDICAL & SURGICAL HISTORY:  COPD, mild    Anemia    Bipolar disorder    IBD (inflammatory bowel disease)    HTN (hypertension)    HLD (hyperlipidemia)    PAD (peripheral artery disease)    CKD (chronic kidney disease)    Prostate CA    BPH with urinary obstruction    History of creation of ostomy      ______________________________________________________________________  MEDS:  MEDICATIONS  (STANDING):  ARIPiprazole 15 milliGRAM(s) Oral daily  ferrous    sulfate 325 milliGRAM(s) Oral daily  finasteride 5 milliGRAM(s) Oral daily  gabapentin 600 milliGRAM(s) Oral two times a day  heparin   Injectable 5000 Unit(s) SubCutaneous every 8 hours  lamoTRIgine 200 milliGRAM(s) Oral daily  midodrine. 2.5 milliGRAM(s) Oral three times a day  pantoprazole    Tablet 40 milliGRAM(s) Oral before breakfast  sevelamer carbonate 800 milliGRAM(s) Oral three times a day with meals  sodium bicarbonate 650 milliGRAM(s) Oral three times a day  sodium chloride 0.9%. 1000 milliLiter(s) (50 mL/Hr) IV Continuous <Continuous>  tamsulosin 0.4 milliGRAM(s) Oral at bedtime  traZODone 25 milliGRAM(s) Oral at bedtime    MEDICATIONS  (PRN):    ______________________________________________________________________  ALL:   Allergies    No Known Allergies    Intolerances      ______________________________________________________________________  SH: Social History:  from [facility]  non-smoker  non-alcoholic beverage drinker  no illicit drug use (28 Apr 2022 18:40)    ______________________________________________________________________  FH:  FAMILY HISTORY:  FH: HTN (hypertension) (Father)      ______________________________________________________________________  ROS:    Patient endorses feeling better. Pt denies fever, chills, headache, cough, SOB, chest discomfort, N/V/D/C, abdominal discomfort, dysuria, urinary frequency/urgency, joint/muscle discomfort.    PHYSICAL EXAM:  T(C): 37.7 (05-03-22 @ 13:01), Max: 38.1 (05-02-22 @ 20:55)  HR: 90 (05-03-22 @ 13:01)  BP: 133/74 (05-03-22 @ 13:01)  RR: 18 (05-03-22 @ 13:02)  SpO2: 95% (05-03-22 @ 13:02)  Wt(kg): --    05-02 - 05-03  --------------------------------------------------------  IN:  Total IN: 0 mL    OUT:    Colostomy (mL): 575 mL  Total OUT: 575 mL    Total NET: -575 mL      05-03 - 05-03  --------------------------------------------------------  IN:  Total IN: 0 mL    OUT:    Ileostomy (mL): 200 mL  Total OUT: 200 mL    Total NET: -200 mL          GEN: NAD   CVS- S1 S2  ABD: soft nontender, non distended, bowel sounds+. Ileostomy with small brown BM.   LUNGS: clear to auscultation  NEURO: non focal neuro exam; AAO x 3  Extremities: 1+ edema BLE, PPP. no cyanosis, no calf tenderness,    ______________________________________________________________________  LABS:                        11.6   8.71  )-----------( 204      ( 03 May 2022 07:18 )             36.3     05-03    134<L>  |  106  |  89<H>  ----------------------------<  107<H>  5.5<H>   |  17<L>  |  4.47<H>    Ca    9.6      03 May 2022 07:18  Phos  3.8     05-03  Mg     1.5     05-03    TPro  8.8<H>  /  Alb  3.2<L>  /  TBili  1.3<H>  /  DBili  x   /  AST  233<H>  /  ALT  87<H>  /  AlkPhos  208<H>  05-03    LIVER FUNCTIONS - ( 03 May 2022 07:18 )  Alb: 3.2 g/dL / Pro: 8.8 g/dL / ALK PHOS: 208 U/L / ALT: 87 U/L DA / AST: 233 U/L / GGT: x           ______________________________________________________________________  IMAGING:    < from: MR Abdomen No Cont (05.03.22 @ 11:23) >    *** ADDENDUM***    Some of the hepatic lesions have small T1 hyperintense components on   fat-suppressed T1-weighted gradient echo images, suggestive of blood   products.    --- End of Report---    *** END OF ADDENDUM***      PROCEDURE DATE:  05/03/2022          INTERPRETATION:  CLINICAL INFORMATION: Hepatic lesions. History of   prostate cancer.    COMPARISON: None.    CONTRAST/COMPLICATIONS:  IV Contrast: NONE  Oral Contrast: NONE  Complications: None reported at time of study completion    PROCEDURE:  MRI of the abdomen was performed.    FINDINGS:  LOWER CHEST: Within normal limits.    LIVER: Innumerable mildly T2 hyperintense lesions are seen throughout the   liver, suggestive ofmetastasis. The largest lesion measures 13.8 x 11.7   x 11.7 cm in the liver dome.  BILE DUCTS: Normal caliber.  GALLBLADDER: Cholelithiasis.  SPLEEN: Within normal limits.  PANCREAS: Within normal limits.  ADRENALS: Within normal limits.  KIDNEYS/URETERS: Multiple brightly T2 hyperintense lesion in the kidneys   bilaterally, representing probable cysts.    VISUALIZED PORTIONS:  BOWEL: Small hiatal hernia. Right lower quadrant ostomy.  PERITONEUM: No ascites.  VESSELS: Within normal limits.  RETROPERITONEUM/LYMPH NODES: No lymphadenopathy.  ABDOMINAL WALL: Within normal limits.  BONES: Multiple T2 hyperintense and T2 hyperintense lesions in the   thoracolumbar spine may represent osseous metastasis. Whole-body bone   scan may be pursued for further evaluation.    IMPRESSION:  Innumerable mildly T2 hyperintense lesions are seen throughout the liver,   suggestive of metastasis. The largest lesion measures 13.8 x 11.7 x 11.7   cm in the liver dome.    Multiple T2 hyperintense and T2 hyperintense lesions in the thoracolumbar   spine may represent osseous metastasis. Whole-body bone scan may be   pursued for further evaluation.    Cholelithiasis.    < end of copied text >  < from: Xray Chest 1 View- PORTABLE-Routine (Xray Chest 1 View- PORTABLE-Routine in AM.) (05.02.22 @ 13:07) >  COMPARISON: 4/28/2022 chest .    FINDINGS:  CATHETERS AND TUBES: None    PULMONARY: Elevation of RIGHT hemidiaphragm. The visualized lungs are   clear of airspace consolidations or effusions.   No pneumothorax.    HEART/VASCULAR: The  heartis enlarged in transverse diameter.    BONES: Visualized osseous structures are intact.    IMPRESSION:   No radiographic evidence of active chest disease.     Cardiomegaly.    < end of copied text >  < from: CT Chest No Cont (05.02.22 @ 11:10) >  INTERPRETATION:  HISTORY: Admitting Dxs: E87.5 HYPERKALEMIA. Evaluate   pleural effusion.    EXAMINATION: CT CHEST was performed without IV contrast.    COMPARISON: None available.    FINDINGS:    AIRWAYS, LUNGS, PLEURA: Bilateral lower lobe bronchiolectasis. Right   middle lobe and bibasilar subsegmental atelectasis. No pleural effusion.    MEDIASTINUM: Normal heart size. Coronary calcifications. No pericardial   effusion. Thoracic aorta normal caliber.  No large mediastinal lymph   nodes. Small hiatal hernia.    IMAGED ABDOMEN: The liver is only partially imaged. Hepatic hypodense   region measuring 4.7 x 4.3 cm (image 109, series 3). Additional left   hepatic lobe 2.7 cm lesion suspected (image 147, series 3) and nodular   thickening along the hepatic capsule in multiple locations; these   findings are new compared to 2/5/2020 CT abdomen. Left renal hypodense   lesions better demonstrated on same day renal ultrasound.    SOFT TISSUES: Unremarkable.    BONES: Unremarkable.      IMPRESSION:.    No pleural effusion.    Multiple hepatic lesions with largest area at the hepatic dome measuring   up to 4.7 cm; findings are new compared to 2/5/2020 CT abdomen. Recommend   dedicated CT or MRI abdomen to further assess and to exclude malignancy.    < end of copied text >  < from: US Renal (05.02.22 @ 11:06) >  IMPRESSION:  No hydronephrosis. Bilateral renal cysts.    < end of copied text >  < from: Xray Chest 1 View- PORTABLE-Urgent (04.28.22 @ 19:29) >    IMPRESSION:    Bilateral lower lobe interstitial infiltrates/platelike atelectasis,   increased    < end of copied text >

## 2022-05-03 NOTE — PROGRESS NOTE ADULT - PROBLEM SELECTOR PLAN 6
controlled  's   home med - lasix  monitor bp -  Controlled  -  SBP 110s to 130s  -  Lasix on hold due to worsening  PAWAN  -  Monitor blood pressures routinely

## 2022-05-03 NOTE — PROGRESS NOTE ADULT - ASSESSMENT
Patient is a 71M from [facility], who is ambulatory at baseline with a walker. He has Hx of HTN, CHF?, COPD, HLD, PAD, BPH, Prostate CA (s/p radiation), Bipolar Disorder, IBD - Crohn's Disease, R ileostomy bag (s/p sigmoid resection). Presented with SOB and LE edema, found with hyperkalemia on admission. Admitted to [facility] for cardio work up R/O CHF and hyperkalemia with PAWAN on CKD 4.  cardiology and nephrology consulted. started lasix. renal US shows no hyronephrosis, + renal cysts.   CT chest shows no pleural effusion, but with hepatic lesion 4.7cm largest. Also with transaminitis. GI and Hepatology consulted. ordered MRI abdomen r/o malignancy.   PAWAN worsening today Cr 4.39 from 3.82. started low dose IVF as discussed nephrology.   Pt was on 2L Oxygen, will monitor O2 sat on ambulation/at rest room air.    Patient is a 71M from [facility], who is ambulatory at baseline with a walker. He has Hx of HTN, CHF?, COPD, HLD, PAD, BPH, Prostate CA (s/p radiation), Bipolar Disorder, IBD - Crohn's Disease, R ileostomy bag (s/p sigmoid resection). Presented with SOB and LE edema, found with hyperkalemia on admission. Admitted to [facility] for cardio work up R/O CHF and hyperkalemia with PAWAN on CKD 4.  cardiology and nephrology consulted. started lasix. renal US shows no hyronephrosis, + renal cysts.   CT chest shows no pleural effusion, but with hepatic lesion 4.7cm largest. Also with transaminitis. GI and Hepatology consulted. ordered MRI abdomen r/o malignancy.   PAWAN worsening today Cr 4.39 from 3.82. started low dose IVF as discussed nephrology.   Pt was on 2L Oxygen, will monitor O2 sat on ambulation/at rest room air.   5/3  -   MRI reveals multiple liver lesions with mets  -  Oncology to be consulted

## 2022-05-03 NOTE — PROGRESS NOTE ADULT - PROBLEM SELECTOR PLAN 11
c/w heparin SQ for DVT prophylaxis  c/w  PPI for GI prophylaxis -  DVT prophylaxis with Heparin gtt  -  GI prophylaxis with Protonix

## 2022-05-03 NOTE — PROGRESS NOTE ADULT - PROBLEM SELECTOR PLAN 5
Stable   likely anemia of chronic diease   home meds - EPO, FeSO4  no signs of active bleeding  trend CBC -  Stable   -  likely anemia of chronic diease   -  Monitor CBC daily

## 2022-05-03 NOTE — CHART NOTE - NSCHARTNOTEFT_GEN_A_CORE
EVENT:     SUBJECTIVE:    OBJECTIVE:  Vital Signs Last 24 Hrs  T(C): 36.5 (03 May 2022 21:05), Max: 37.7 (03 May 2022 05:36)  T(F): 97.7 (03 May 2022 21:05), Max: 99.9 (03 May 2022 13:01)  HR: 85 (03 May 2022 21:05) (82 - 90)  BP: 136/62 (03 May 2022 21:05) (115/70 - 136/62)  BP(mean): --  RR: 19 (03 May 2022 21:05) (17 - 19)  SpO2: 98% (03 May 2022 21:05) (90% - 98%)    PHYSICAL EXAM:  Neuro: Awake and alert, oriented to person, place, and time  Cardiovascular: + S1, S2, no murmurs, rubs, or bruits  Respiratory: clear to auscultation bilaterally with good air entry   GI: Abdomen soft, non-tender, bowel sounds present   : Non distended;   Skin: warm and dry; no rash      LABS:                        11.6   8.71  )-----------( 204      ( 03 May 2022 07:18 )             36.3     05-03    133<L>  |  105  |  91<H>  ----------------------------<  112<H>  4.5   |  16<L>  |  x     Ca    8.8      03 May 2022 21:43  Phos  3.8     05-03  Mg     1.5     05-03    TPro  8.8<H>  /  Alb  3.2<L>  /  TBili  1.3<H>  /  DBili  x   /  AST  233<H>  /  ALT  87<H>  /  AlkPhos  208<H>  05-03        EKG:   IMAGING:    ASSESSMENT:  HPI:  Patient is a 71M from [facility], who is ambulatory at baseline with a walker. He has Hx of HTN, CHF?, COPD, HLD, PAD, BPH, Prostate CA (s/p radiation), Bipolar Disorder, IBD - Crohn's Disease, R ileostomy bag (s/p sigmoid resection). He was brought to ED due to bilateral leg swelling. For the past 2 days he has been having progressive swelling/ edema of both his legs with associated ambulatory dysfunction. He also started having episodes of shortness of breath and difficulty of breathing. He denies any fever, cough, chest pain, palpitation. When he came to the ED, he was saturating at 93-94% on room air. He was placed on nasal cannula. EKG was done showing NSR. Troponin was negative and BNP was mildly elevated at 398. CXR showed bilateral vascular congestion. Serum potassium was elevated at 6.1. He got a dose of lasix and was given Hyperkalemia cocktail in the ED. Of note, he had Hx of COVID in 2020 andwas vaccinated with COVID-19 x 3 (Pfizer) and Influenza in 2021. He was subsequently admitted for acute exacerbation of his heart failure.    GOC: FULL CODE (28 Apr 2022 18:40)      PLAN:     FOLLOW UP / RESULT: EVENT: Paged by RN, pt with reports of hematuria     HPI:  71 year old M from [facility], who is ambulatory at baseline with a walker. He has Hx of HTN, CHF?, COPD, HLD, PAD, BPH, Prostate CA (s/p radiation), Bipolar Disorder, IBD - Crohn's Disease, R ileostomy bag (s/p sigmoid resection). Presented with SOB and LE edema, found with hyperkalemia on admission. Admitted to [facility] for cardio work up R/O CHF and hyperkalemia with PAWAN on CKD 4.  cardiology and nephrology consulted. started lasix. renal US shows no hyronephrosis, + renal cysts. CT chest shows no pleural effusion, but with hepatic lesion 4.7cm largest. Also with transaminitis. GI and Hepatology consulted. ordered MRI abdomen r/o malignancy. PAWAN worsening today Cr 4.39 from 3.82. started low dose IVF as discussed nephrology. 5/3  MRI reveals multiple liver lesions with mets  -  Oncology to be consulted in AM.     SUBJECTIVE: Pt seen and examined at bedside, A&Ox3, appears in NAD. He reports episodes of hematuria throughout the day. On exam, noted with abel, red blood on his penis, in between his thighs, and on the bed linens. No signs of trauma noted. Of note, pt with history of Prostate CA s/p radiation.     OBJECTIVE:  Vital Signs Last 24 Hrs  T(C): 36.5 (03 May 2022 21:05), Max: 37.7 (03 May 2022 05:36)  T(F): 97.7 (03 May 2022 21:05), Max: 99.9 (03 May 2022 13:01)  HR: 85 (03 May 2022 21:05) (82 - 90)  BP: 136/62 (03 May 2022 21:05) (115/70 - 136/62)  BP(mean): --  RR: 19 (03 May 2022 21:05) (17 - 19)  SpO2: 98% (03 May 2022 21:05) (90% - 98%)    PHYSICAL EXAM:  Neuro: Awake and alert, oriented to person, place, and time  Cardiovascular: + S1, S2, no murmurs, rubs, or bruits  Respiratory: clear to auscultation bilaterally with good air entry   GI: Abdomen soft, non-tender, bowel sounds present. RLQ ileostomy.   : Non distended;   Skin: warm and dry; no rash      LABS:                        11.6   8.71  )-----------( 204      ( 03 May 2022 07:18 )             36.3     05-03    133<L>  |  105  |  91<H>  ----------------------------<  112<H>  4.5   |  16<L>  |  x     Ca    8.8      03 May 2022 21:43  Phos  3.8     05-03  Mg     1.5     05-03    TPro  8.8<H>  /  Alb  3.2<L>  /  TBili  1.3<H>  /  DBili  x   /  AST  233<H>  /  ALT  87<H>  /  AlkPhos  208<H>  05-03        EKG:   IMAGING:    ASSESSMENT/PROBLEM: Hematuria     PLAN:     -STAT CBC, ordered  -SQ heparin discontinued, resume once hematuria resolves  -Urology consulted, will f/u recommendations   -Discussed with Attending    FOLLOW UP / RESULT:    -CBC  -Urology recommendations EVENT: Paged by RN, pt with reports of hematuria     HPI:  71 year old M from [facility], who is ambulatory at baseline with a walker. He has Hx of HTN, CHF?, COPD, HLD, PAD, BPH, Prostate CA (s/p radiation), Bipolar Disorder, IBD - Crohn's Disease, R ileostomy bag (s/p sigmoid resection). Presented with SOB and LE edema, found with hyperkalemia on admission. Admitted to [facility] for cardio work up R/O CHF and hyperkalemia with PAWAN on CKD 4.  cardiology and nephrology consulted. started lasix. renal US shows no hyronephrosis, + renal cysts. CT chest shows no pleural effusion, but with hepatic lesion 4.7cm largest. Also with transaminitis. GI and Hepatology consulted. ordered MRI abdomen r/o malignancy. PAWAN worsening today Cr 4.39 from 3.82. started low dose IVF as discussed nephrology. 5/3  MRI reveals multiple liver lesions with mets  -  Oncology to be consulted in AM.     SUBJECTIVE: Pt seen and examined at bedside, A&Ox3, appears in NAD. He reports episodes of hematuria throughout the day. On exam, noted with abel, red blood on his penis, in between his thighs, and on the bed linens. No signs of trauma noted. Of note, pt with history of Prostate CA s/p radiation.     OBJECTIVE:  Vital Signs Last 24 Hrs  T(C): 36.5 (03 May 2022 21:05), Max: 37.7 (03 May 2022 05:36)  T(F): 97.7 (03 May 2022 21:05), Max: 99.9 (03 May 2022 13:01)  HR: 85 (03 May 2022 21:05) (82 - 90)  BP: 136/62 (03 May 2022 21:05) (115/70 - 136/62)  BP(mean): --  RR: 19 (03 May 2022 21:05) (17 - 19)  SpO2: 98% (03 May 2022 21:05) (90% - 98%)    PHYSICAL EXAM:  Neuro: Awake and alert, oriented to person, place, and time  Cardiovascular: + S1, S2, no murmurs, rubs, or bruits  Respiratory: clear to auscultation bilaterally with good air entry   GI: Abdomen soft, non-tender, bowel sounds present. RLQ ileostomy.   : Non distended;   Skin: warm and dry; no rash      LABS:                        11.6   8.71  )-----------( 204      ( 03 May 2022 07:18 )             36.3     05-03    133<L>  |  105  |  91<H>  ----------------------------<  112<H>  4.5   |  16<L>  |  x     Ca    8.8      03 May 2022 21:43  Phos  3.8     05-03  Mg     1.5     05-03    TPro  8.8<H>  /  Alb  3.2<L>  /  TBili  1.3<H>  /  DBili  x   /  AST  233<H>  /  ALT  87<H>  /  AlkPhos  208<H>  05-03        EKG:   IMAGING:    ASSESSMENT/PROBLEM: Hematuria     PLAN:     -STAT CBC, ordered  -SQ heparin discontinued, resume once hematuria resolves  -SCDs for DVT ppx  -Urology consulted, will f/u recommendations   -Discussed with Attending    FOLLOW UP / RESULT:    -CBC  -Urology recommendations EVENT: Paged by RN, pt with reports of hematuria     HPI:  71 year old M from [facility], who is ambulatory at baseline with a walker. He has Hx of HTN, CHF?, COPD, HLD, PAD, BPH, Prostate CA (s/p radiation), Bipolar Disorder, IBD - Crohn's Disease, R ileostomy bag (s/p sigmoid resection). Presented with SOB and LE edema, found with hyperkalemia on admission. Admitted to [facility] for cardio work up R/O CHF and hyperkalemia with PAWAN on CKD 4.  cardiology and nephrology consulted. started lasix. renal US shows no hyronephrosis, + renal cysts. CT chest shows no pleural effusion, but with hepatic lesion 4.7cm largest. Also with transaminitis. GI and Hepatology consulted. ordered MRI abdomen r/o malignancy. PAWAN worsening today Cr 4.39 from 3.82. started low dose IVF as discussed nephrology. 5/3  MRI reveals multiple liver lesions with mets  -  Oncology to be consulted in AM.     SUBJECTIVE: Pt seen and examined at bedside, A&Ox3, appears in NAD. He reports episodes of hematuria throughout the day. On exam, noted with abel, red blood on his penis, in between his thighs, and on the bed linens. No signs of trauma noted. Of note, pt with history of Prostate CA s/p radiation.     OBJECTIVE:  Vital Signs Last 24 Hrs  T(C): 36.5 (03 May 2022 21:05), Max: 37.7 (03 May 2022 05:36)  T(F): 97.7 (03 May 2022 21:05), Max: 99.9 (03 May 2022 13:01)  HR: 85 (03 May 2022 21:05) (82 - 90)  BP: 136/62 (03 May 2022 21:05) (115/70 - 136/62)  BP(mean): --  RR: 19 (03 May 2022 21:05) (17 - 19)  SpO2: 98% (03 May 2022 21:05) (90% - 98%)    PHYSICAL EXAM:  Neuro: Awake and alert, oriented to person, place, and time  Cardiovascular: + S1, S2, no murmurs, rubs, or bruits  Respiratory: clear to auscultation bilaterally with good air entry   GI: Abdomen soft, non-tender, bowel sounds present. RLQ ileostomy.   : Non distended;   Skin: warm and dry; no rash      LABS:                        11.6   8.71  )-----------( 204      ( 03 May 2022 07:18 )             36.3     05-03    133<L>  |  105  |  91<H>  ----------------------------<  112<H>  4.5   |  16<L>  |  x     Ca    8.8      03 May 2022 21:43  Phos  3.8     05-03  Mg     1.5     05-03    TPro  8.8<H>  /  Alb  3.2<L>  /  TBili  1.3<H>  /  DBili  x   /  AST  233<H>  /  ALT  87<H>  /  AlkPhos  208<H>  05-03        EKG:   IMAGING:    ASSESSMENT/PROBLEM: Hematuria     PLAN:     -STAT CBC, ordered  -SQ heparin discontinued, resume once hematuria resolves  -Urology consulted, will f/u recommendations   -Discussed with Attending    FOLLOW UP / RESULT:    -CBC  -Urology recommendations

## 2022-05-04 NOTE — PROGRESS NOTE ADULT - ASSESSMENT
71 year old male with hx of prostate cancer (s/p radiation) now with sclerotic bone lesions and gross hematuria     - No urine at bedside to assess   - Reviewed US images: bladder without significant clot on 5/2/22  - Bone scan pending   - Must document PVR, low threshold for phillips catheter   - Patient refusing phillips catheter at this time.   - If patient becomes uncomfortable from urinary retention needs to be offered phillips again  - if patient should agree to catheter placement. He will need a 22 Fr 3 way phillips

## 2022-05-04 NOTE — CONSULT NOTE ADULT - ASSESSMENT
WILMER SELLERS is a 71y Male who presents with a chief complaint of CHF exacerbation.    Liver and Bone Lesions  - MRI and CT imaging reviewed; multiple liver lesions and skeletal lesions suspicious of metastatic disease.  - AFP elevated at 5,338.   - Will check serum markers for prostate cancer and multiple myeloma.  - Bone scan is pending.  - Interventional radiology consulted for liver biopsy. Will follow-up pathology.    Hematuria  - Urology following; patient refusing phillips catheter at this time.     Acute Kidney Injury  - Nephrology following; thought to be secondary to diuresis.     Will continue to follow. Thank you for the consultation.    Colby Chao MD  Hematology/Oncology  C: [phone]  O: [phone] WILMER SELLERS is a 71y Male who presents with a chief complaint of CHF exacerbation.    Liver and Bone Lesions  - MRI and CT imaging reviewed; multiple liver lesions and skeletal lesions suspicious of metastatic disease.  - AFP elevated at 5,338.   - Will check serum markers for prostate cancer and multiple myeloma.  - Bone scan is pending.  - Interventional radiology consulted for liver biopsy. Will follow-up pathology.    Hematuria  - Urology following; patient refusing phillips catheter at this time.     Acute Kidney Injury  - Nephrology following; thought to be secondary to diuresis.     Will continue to follow. Thank you for the consultation.    Colby Chao MD  Hematology/Oncology  O: [phone]

## 2022-05-04 NOTE — PROGRESS NOTE ADULT - PROBLEM SELECTOR PLAN 3
-  K+ 6.1 on admission   -  s/p Lokelma  - resolved  - monitor BMP qd  -  Hyperkalemia could be in setting of CKD  -  EKG - NSR, low voltage criteria for LVH (no peak T waves)  - cardio dr. Archer  - nephro dr. Pond

## 2022-05-04 NOTE — PROGRESS NOTE ADULT - SUBJECTIVE AND OBJECTIVE BOX
C A R D I O L O G Y  **********************************     DATE OF SERVICE: 05-04-22    Patient denies chest pain or shortness of breath.   Review of symptoms otherwise negative.    ARIPiprazole 15 milliGRAM(s) Oral daily  ferrous    sulfate 325 milliGRAM(s) Oral daily  finasteride 5 milliGRAM(s) Oral daily  gabapentin 600 milliGRAM(s) Oral two times a day  lamoTRIgine 200 milliGRAM(s) Oral daily  midodrine. 2.5 milliGRAM(s) Oral three times a day  pantoprazole    Tablet 40 milliGRAM(s) Oral before breakfast  sevelamer carbonate 800 milliGRAM(s) Oral three times a day with meals  sodium bicarbonate 650 milliGRAM(s) Oral three times a day  sodium chloride 0.9%. 1000 milliLiter(s) IV Continuous <Continuous>  tamsulosin 0.4 milliGRAM(s) Oral at bedtime  traMADol 50 milliGRAM(s) Oral every 6 hours PRN  traZODone 25 milliGRAM(s) Oral at bedtime                            11.7   8.88  )-----------( 207      ( 04 May 2022 00:23 )             36.4       Hemoglobin: 11.7 g/dL (05-04 @ 00:23)  Hemoglobin: 11.6 g/dL (05-03 @ 07:18)  Hemoglobin: 11.6 g/dL (05-02 @ 12:16)  Hemoglobin: 11.2 g/dL (05-01 @ 06:45)  Hemoglobin: 11.2 g/dL (04-30 @ 06:49)      05-04    133<L>  |  x   |  x   ----------------------------<  x   x    |  x   |  4.51<H>    Ca    8.8      03 May 2022 21:43  Phos  3.8     05-03  Mg     1.5     05-03    TPro  7.7  /  Alb  x   /  TBili  x   /  DBili  x   /  AST  x   /  ALT  x   /  AlkPhos  x   05-04    Creatinine Trend: 4.51<--, 4.38<--, 4.47<--, 4.39<--, 3.82<--, 3.71<--    COAGS: PT/INR - ( 04 May 2022 06:15 )   PT: 15.9 sec;   INR: 1.33 ratio             CARDIAC MARKERS ( 04 May 2022 06:15 )  x     / x     / 42 U/L / x     / x            T(C): 37 (05-04-22 @ 05:15), Max: 37.7 (05-03-22 @ 13:01)  HR: 89 (05-04-22 @ 05:15) (85 - 90)  BP: 119/57 (05-04-22 @ 05:15) (119/57 - 136/62)  RR: 19 (05-04-22 @ 05:15) (18 - 19)  SpO2: 97% (05-04-22 @ 05:15) (90% - 98%)  Wt(kg): --    I&O's Summary    03 May 2022 07:01  -  04 May 2022 07:00  --------------------------------------------------------  IN: 0 mL / OUT: 500 mL / NET: -500 mL          HEENT:  (-)icterus (-)pallor  CV: N S1 S2 1/6 MARCELLE (+)2 Pulses B/l  Resp:  Clear to ausculatation B/L, normal effort  GI: (+) BS Soft, NT, ND  Lymph:  (-)Edema, (-)obvious lymphadenopathy  Skin: Warm to touch, Normal turgor  Psych: Appropriate mood and affect              ASSESSMENT/PLAN: 	71y  Male  HTN, CHF?, COPD, HLD, PAD, BPH, Prostate CA (s/p radiation), Bipolar Disorder, IBD - Crohn's Disease, R ileostomy bag (s/p sigmoid resection) historically preserved LV and RV function, normal perfusion on a nuclear stress test 2020 He was brought to ED due to bilateral leg swelling    - No clinical CHF A low BNP suggests a low left ventricular end diastolic pressure  - would check prealbumin ? Third spacing.  ? protein loosing enteropathy as a result of Crohn's disease and/or surgical resection  - monitor renal function, his edema is at least partially due ot renal disease   - no pertinent findings on echo  - Bone lesions noted, for bone scan.  Heme/ onc w/u in progress      Joey Archer MD, [facility]  BEEPER [phone]

## 2022-05-04 NOTE — PROGRESS NOTE ADULT - ASSESSMENT
71y Male from [facility] with Hx of HTN, CHF?, COPD, HLD, PAD, BPH, Prostate CA (s/p radiation), Bipolar Disorder, IBD - Crohn's Disease, R ileostomy bag (s/p sigmoid resection), COVID-19 (2020) and s/p COVID19 vaccination (Pfizer x3) was brought to [facility] ED on 4/28/22 b/o 2 days worsening bilateral leg swelling with difficulty ambulating, and SOB with SpO2 93-94% on RA on arrival.  He was found to have bilateral vascular congestion on CXR due to suspected acute exacerbation of CHF, and PAWAN on CKD (BUN/Cr 88/3.28), with hyperkalemia (K 6.1). 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  Being followed by cardiology, PBNP was mildly elevated, and TTE showed LVEF 55%, normal diastolic function and normal RV function, thus did not appear to be in acute CHF exacerbation.    Hepatology was consulted b/o liver lesion and abnormal liver enzymes.     # Abnormal liver enzymes, AST >>ALT  - CPK nl.   - Hypotensive, requiring midodrine, with renal dysfunction, Ig panel wnl, f/u SPEP, UPEP  - HIV neg  - No Hx of EtOH  - Hep B and C as below. Hep A IgM neg.   - Avoid hepatotoxic medications, agree holding statin  - Please, obtain collateral information about timing of lamotrigine, because its hepatotoxicity is well established, and consider alternative.     # Pos HCV ab  - HCV PCR neg with the Hx of Hep C and treatment, suggests SVR.   - F/u cryoglobulin     # HBcAb IgM pos, but HBsAg neg  - HBsAg neg, HBV DNA neg, but HBcAb pos, HBeAb pos, past infection.    # Liver lesion  - on CT chest incidentally found a 4.7x4.3 cm partially imaged hypodense lesion and in L lobe another 2.7 cm lesion, as well as nodular thickening along hepatic capsule, all are new from the 2/2020 CT a/p  - CEA and CA 19-9 WNL, but AFP> 5000  - Could not get contrast CT b/o PAWAN on CKD. MRI was done w/o contrast showing Innumerable mildly T2 hyperintense lesions  throughout the liver, suggestive of metastasis. The largest lesion measures 13.8 x 11.7 x 11.7 cm in the liver dome. Multiple T2 hyperintense and T2 hyperintense lesions in the thoracolumbar spine may represent osseous metastasis.  - No fever or leukocytosis  - IR consult appreciated, tentatively scheduled for liver biopsy. Nephrology follow up appreciated, no Gadavist was recommended.   - Pending bone scan.  - Hem/onc consult appreciated.     # PAWAN on CKD, hematuria  - F/u nephrology recommendations and cardiology recommendations    Thank you for consult  Will continue to follow  D/w primary team, IR, GI

## 2022-05-04 NOTE — PROGRESS NOTE ADULT - ASSESSMENT
1. PAWAN due to multifactorial due to ATN and diuretics  -Scr slight increase but not as much with stable electrolytes. Patient has no uremic symptoms. No response with gentle hydration  off lasix now.    Urinalysis shows no proteinuria. spot protein to creatinine ratio is 900mg.  -renal sono show no hydro.  -Adjust meds to eGFR and avoid IV Gadolinium contrast,NSAIDs, and phosphate enema.  -Monitor I/O's daily.   -Monitor SMA daily.  2. CKD stage 4 most likely due to ischemic nephropathy and h/o ATN with renal recovery.  -Baseline Scr around 3.2mg/dL in April 2020.  -Keep patient euvolemic and renal diet  -Avoid Nephrotoxic Meds/ Agents such as (NSAIDs, IV contrast, Aminoglycosides such as gentamicin, -Gadolinium contrast, Phosphate containing enemas, etc..)  -Adjust Medications according to eGFR  3. Anemia:   -hb is stable; continue iron tabs. monitor CBC  4. MBD:   -phos is improving; mild hyperca, which improved and off calcitirol; on renvela 1 tab tid;   -pth is okay at 41.  5. Hyperkalemia due to pawan on ckd.   -K is elevated and give 1 dose of lokelma.   -on low K diet. give Lokelma prn if K >5.5  -Keep pt euvolemic. Avoid ACE inh/ ARBs, NSAIDs, and Aldactone or potassium sparing diuretics. Monitor K+ daily.  6. Fluid Overload:  -clinically improved. off diuretics for now.   -CT chest w/o contrast shows no effusion; CXR is about the same.   7. Acidosis:  -CO2 is stable.    -on sodium bicarbonate 650mg oral tid. monitor CO2  8. h/o Hypotension:  -bp is acceptable and on midodrine 2.5mg tid      9. Elevated LFTs  - off lipitor for now.  -hepatitis panel shows hep C+ve.  discussed with pt, pt had known hep C and was treated for in the past; hep C RNA is not detected.   -C3/C4 and RF negative, cryoglobulinemia is pending.    -ct scan shows hepatic mass; Hepatology consulted and MRI w/o contrast shows liver mass and mets.  -Hepatology and Oncology consulted.   -Plan for IR guided liver biopsy on friday.   -Bone scan is pending r/o bone mets. Suspecting myeloma and myeloma labs (spep, upep, IF and serum FLC).  -pt has pawan on ckd and GFR <15cc/min at present; At present, the risk for nephrogenic systemic fibrosis, weigh out the benefits thus would avoid gadolinium contrast administration. Would consider alternative diagnostic strategies including and other imaging modalities and would recommend to proceed with liver biopsy.     Discussed with patient in detail regarding the renal plan and care  Discussed the assessment and plan with Primary Team/Nurse

## 2022-05-04 NOTE — CONSULT NOTE ADULT - SUBJECTIVE AND OBJECTIVE BOX
Patient is a 71M from [facility], who is ambulatory at baseline with a walker. He has Hx of HTN, CHF?, COPD, HLD, PAD, BPH, Prostate CA (s/p radiation), Bipolar Disorder, IBD - Crohn's Disease, R ileostomy bag (s/p sigmoid resection). He was brought to ED due to bilateral leg swelling. For the past 2 days he has been having progressive swelling/ edema of both his legs with associated ambulatory dysfunction. He also started having episodes of shortness of breath and difficulty of breathing. He denies any fever, cough, chest pain, palpitation. When he came to the ED, he was saturating at 93-94% on room air. He was placed on nasal cannula. EKG was done showing NSR. Troponin was negative and BNP was mildly elevated at 398. CXR showed bilateral vascular congestion. Serum potassium was elevated at 6.1. He got a dose of lasix and was given Hyperkalemia cocktail in the ED. Of note, he had Hx of COVID in 2020 andwas vaccinated with COVID-19 x 3 (Pfizer) and Influenza in 2021. He was subsequently admitted for acute exacerbation of his heart failure.   This is a 70yo male  from [facility], who is ambulatory at baseline with a walker. He has PMHx of HTN, CHF?, COPD, HLD, PAD, BPH, Prostate CA (s/p radiation), Bipolar Disorder, IBD - Crohn's Disease, R ileostomy bag (s/p sigmoid resection). He was brought to Emergency Room due to bilateral leg swelling. For the past 2 days he had been having progressive swelling of both his legs with associated ambulatory dysfunction. He also started having episodes of shortness of breath and difficulty of breathing. He denies any fever, cough, chest pain, palpitation. When he came to the ED, he was saturating at 93-94% on room air. He was placed on nasal cannula. EKG was done showing NSR. Troponin was negative and BNP was mildly elevated at 398. CXR showed bilateral vascular congestion. Serum potassium was elevated at 6.1. He got a dose of lasix and was given Hyperkalemia cocktail in the ED. Of note, he had Hx of COVID in 2020 andwas vaccinated with COVID-19 x 3 (Pfizer) and Influenza in 2021. He was subsequently admitted for acute exacerbation of his heart failure.   This is a 70yo male  from [facility], who is ambulatory at baseline with a walker. He has PMHx of HTN, CHF?, COPD, HLD, PAD, BPH, Prostate CA (s/p radiation), Bipolar Disorder, IBD - Crohn's Disease, R ileostomy bag (s/p sigmoid resection). He was brought to Emergency Room due to bilateral leg swelling. For the past 2 days he had been having progressive swelling of both his legs with associated ambulatory dysfunction. He admitted to having episodes of shortness of breath, but  denied any fever, cough, chest pain, palpitation. When he came to the ED, he was saturating at 93-94% on room air. He was placed on nasal cannula. EKG was done showing NSR. Troponin was negative and BNP was mildly elevated at 398. CXR showed bilateral vascular congestion. Serum potassium was elevated at 6.1. He got a dose of lasix and was given Hyperkalemia cocktail in the ED. Of note, he had Hx of COVID in 2020 andwas vaccinated with COVID-19 x 3 (Pfizer) and Influenza in 2021. He was subsequently admitted for acute exacerbation of his heart failure.   This is a 70yo male  from [facility], who is ambulatory at baseline with a walker. He has PMHx of HTN, CHF?, COPD, HLD, PAD, BPH, Prostate CA (s/p radiation), Bipolar Disorder, IBD - Crohn's Disease, R ileostomy bag (s/p sigmoid resection). He was brought to Emergency Room due to bilateral leg swelling. For the past 2 days he had been having progressive swelling of both his legs with associated ambulatory dysfunction. He admitted to having episodes of shortness of breath, but  denied any fever, cough, chest pain, or palpitation.   When he arrived in the ER, he was saturating at 93-94% on room air, and was placed on a nasal cannula. An EKG was done showing NSR. Troponin was negative and BNP was mildly elevated at 398. His CXR showed bilateral vascular congestion. Serum potassium was elevated at 6.1. He got a dose of lasix and was given Hyperkalemia cocktail in the ED. Of note, he had COVID in 2020 and was vaccinated with COVID-19 x 3 (Pfizer) and Influenza in 2021.    On admission a CXR was done and he was found to have bilateral lower lobe infiltrates and a increase in the platelike atelectasis.  A CT of the chest was then done a few days after admission to further evaluate and he was then found to have no pleural effusions but multiple hepatic lesions.  The liver was not fully imaged and a CT or MRI was recommended. The MRI further revealed innumerable hyperdense lesions throughout the liver suggestive of metastasis.    IR was asked to do a liver biopsy.    PAST MEDICAL & SURGICAL HISTORY:  COPD, mild  Anemia  Bipolar disorder  IBD (inflammatory bowel disease)  HTN (hypertension)  HLD (hyperlipidemia)  PAD (peripheral artery disease)  CKD (chronic kidney disease)  Prostate CA  BPH with urinary obstruction  History of creation of ostomy    Home Medications:  ARIPiprazole 15 mg oral tablet: 1 tab(s) orally once a day (28 Apr 2022 17:55)  atorvastatin 10 mg oral tablet: 1 tab(s) orally once a day (28 Apr 2022 17:55)  calcitriol 0.25 mcg oral capsule: 1 cap(s) orally once a day (28 Apr 2022 17:55)  epoetin anoop: 6000 unit(s) subcutaneous 3 times a week, M-W-F (28 Apr 2022 17:55)  ferrous sulfate 325 mg (65 mg elemental iron) oral tablet: 1 tab(s) orally once a day (28 Apr 2022 17:55)  Flomax 0.4 mg oral capsule: 1 cap(s) orally once a day (28 Apr 2022 17:55)  gabapentin 600 mg oral tablet: 1 tab(s) orally 2 times a day (28 Apr 2022 17:55)  halobetasol 0.05% topical cream: Apply topically to affected area once a day (28 Apr 2022 17:55)  lamoTRIgine 200 mg oral tablet: 1 tab(s) orally once a day (28 Apr 2022 17:55)  Lasix 20 mg oral tablet: 1 tab(s) orally once a day (28 Apr 2022 17:55)  Proscar 5 mg oral tablet: 1 tab(s) orally once a day (28 Apr 2022 17:55)  Protonix 40 mg oral delayed release tablet: 1 tab(s) orally once a day (28 Apr 2022 17:55)  traZODone 50 mg oral tablet: 0.5 tab(s) orally once a day (at bedtime) (28 Apr 2022 17:55)    MEDICATIONS  (STANDING):  ARIPiprazole 15 milliGRAM(s) Oral daily  ferrous    sulfate 325 milliGRAM(s) Oral daily  finasteride 5 milliGRAM(s) Oral daily  gabapentin 600 milliGRAM(s) Oral two times a day  lamoTRIgine 200 milliGRAM(s) Oral daily  midodrine. 2.5 milliGRAM(s) Oral three times a day  pantoprazole    Tablet 40 milliGRAM(s) Oral before breakfast  sevelamer carbonate 800 milliGRAM(s) Oral three times a day with meals  sodium bicarbonate 650 milliGRAM(s) Oral three times a day  tamsulosin 0.4 milliGRAM(s) Oral at bedtime  traZODone 25 milliGRAM(s) Oral at bedtime    MEDICATIONS  (PRN):  traMADol 50 milliGRAM(s) Oral every 6 hours PRN Severe Pain (7 - 10)    Vital Signs Last 24 Hrs  T(C): 37 (04 May 2022 05:15), Max: 37.7 (03 May 2022 13:01)  T(F): 98.6 (04 May 2022 05:15), Max: 99.9 (03 May 2022 13:01)  HR: 89 (04 May 2022 05:15) (85 - 90)  BP: 119/57 (04 May 2022 05:15) (119/57 - 136/62)  BP(mean): --  RR: 19 (04 May 2022 05:15) (18 - 19)  SpO2: 97% (04 May 2022 05:15) (90% - 98%)                          11.7   8.88  )-----------( 207      ( 04 May 2022 00:23 )             36.4   05-04    133<L>  |  x   |  x   ----------------------------<  x   x    |  x   |  4.51<H>    Ca    8.8      03 May 2022 21:43  Phos  3.8     05-03  Mg     1.5     05-03    TPro  7.7  /  Alb  x   /  TBili  x   /  DBili  x   /  AST  x   /  ALT  x   /  AlkPhos  x   05-04    Alpha Fetoprotein - Tumor Marker (05.04.22 @ 09:26)    Alpha Fetoprotein - Tumor Marker: 5338.0: Test Repeated    CT Chest No Cont (05.02.22 @ 11:10) >  IMPRESSION:.    No pleural effusion.    Multiple hepatic lesions with largest area at the hepatic dome measuring   up to 4.7 cm; findings are new compared to 2/5/2020 CT abdomen. Recommend   dedicated CT or MRI abdomen to further assess and to exclude malignancy.    --- End of Report ---    MR Abdomen No Cont (05.03.22 @ 11:23) >  IMPRESSION:  Innumerable mildly T2 hyperintense lesions are seen throughout the liver,   suggestive of metastasis. The largest lesion measures 13.8 x 11.7 x 11.7   cm in the liver dome.    Multiple T2 hyperintense and T2 hyperintense lesions in the thoracolumbar   spine may represent osseous metastasis. Whole-body bone scan may be   pursued for further evaluation.    Cholelithiasis.    *** ADDENDUM***    Some of the hepatic lesions have small T1 hyperintense components on   fat-suppressed T1-weighted gradient echo images, suggestive of blood   products.    < end of copied text >       History obtained from chart:  This is a 72yo male  from [facility], who is ambulatory at baseline with a walker. He has PMHx of HTN, CHF?, COPD, HLD, PAD, BPH, Prostate CA (s/p radiation), Bipolar Disorder, IBD - Crohn's Disease, R ileostomy bag (s/p sigmoid resection). He was brought to Emergency Room due to bilateral leg swelling. For the past 2 days he had been having progressive swelling of both his legs with associated ambulatory dysfunction. He admitted to having episodes of shortness of breath, but  denied any fever, cough, chest pain, or palpitation.   When he arrived in the ER, he was saturating at 93-94% on room air, and was placed on a nasal cannula. An EKG was done showing NSR. Troponin was negative and BNP was mildly elevated at 398. His CXR showed bilateral vascular congestion. Serum potassium was elevated at 6.1. He got a dose of lasix and was given Hyperkalemia cocktail in the ED. Of note, he had COVID in 2020 and was vaccinated with COVID-19 x 3 (Pfizer) and Influenza in 2021.    On admission a CXR was done and he was found to have bilateral lower lobe infiltrates and a increase in the platelike atelectasis.  A CT of the chest was then done a few days after admission to further evaluate and he was then found to have no pleural effusions but multiple hepatic lesions.  The liver was not fully imaged and a CT or MRI was recommended. The MRI further revealed innumerable hyperdense lesions throughout the liver suggestive of metastasis.    IR was asked to do a liver mass biopsy.    PAST MEDICAL & SURGICAL HISTORY:  COPD, mild  Anemia  Bipolar disorder  IBD (inflammatory bowel disease)  HTN (hypertension)  HLD (hyperlipidemia)  PAD (peripheral artery disease)  CKD (chronic kidney disease)  Prostate CA  BPH with urinary obstruction  History of creation of ostomy    Home Medications:  ARIPiprazole 15 mg oral tablet: 1 tab(s) orally once a day (28 Apr 2022 17:55)  atorvastatin 10 mg oral tablet: 1 tab(s) orally once a day (28 Apr 2022 17:55)  calcitriol 0.25 mcg oral capsule: 1 cap(s) orally once a day (28 Apr 2022 17:55)  epoetin anoop: 6000 unit(s) subcutaneous 3 times a week, M-W-F (28 Apr 2022 17:55)  ferrous sulfate 325 mg (65 mg elemental iron) oral tablet: 1 tab(s) orally once a day (28 Apr 2022 17:55)  Flomax 0.4 mg oral capsule: 1 cap(s) orally once a day (28 Apr 2022 17:55)  gabapentin 600 mg oral tablet: 1 tab(s) orally 2 times a day (28 Apr 2022 17:55)  halobetasol 0.05% topical cream: Apply topically to affected area once a day (28 Apr 2022 17:55)  lamoTRIgine 200 mg oral tablet: 1 tab(s) orally once a day (28 Apr 2022 17:55)  Lasix 20 mg oral tablet: 1 tab(s) orally once a day (28 Apr 2022 17:55)  Proscar 5 mg oral tablet: 1 tab(s) orally once a day (28 Apr 2022 17:55)  Protonix 40 mg oral delayed release tablet: 1 tab(s) orally once a day (28 Apr 2022 17:55)  traZODone 50 mg oral tablet: 0.5 tab(s) orally once a day (at bedtime) (28 Apr 2022 17:55)    MEDICATIONS  (STANDING):  ARIPiprazole 15 milliGRAM(s) Oral daily  ferrous    sulfate 325 milliGRAM(s) Oral daily  finasteride 5 milliGRAM(s) Oral daily  gabapentin 600 milliGRAM(s) Oral two times a day  lamoTRIgine 200 milliGRAM(s) Oral daily  midodrine. 2.5 milliGRAM(s) Oral three times a day  pantoprazole    Tablet 40 milliGRAM(s) Oral before breakfast  sevelamer carbonate 800 milliGRAM(s) Oral three times a day with meals  sodium bicarbonate 650 milliGRAM(s) Oral three times a day  tamsulosin 0.4 milliGRAM(s) Oral at bedtime  traZODone 25 milliGRAM(s) Oral at bedtime    MEDICATIONS  (PRN):  traMADol 50 milliGRAM(s) Oral every 6 hours PRN Severe Pain (7 - 10)    Vital Signs Last 24 Hrs  T(C): 37 (04 May 2022 05:15), Max: 37.7 (03 May 2022 13:01)  T(F): 98.6 (04 May 2022 05:15), Max: 99.9 (03 May 2022 13:01)  HR: 89 (04 May 2022 05:15) (85 - 90)  BP: 119/57 (04 May 2022 05:15) (119/57 - 136/62)  BP(mean): --  RR: 19 (04 May 2022 05:15) (18 - 19)  SpO2: 97% (04 May 2022 05:15) (90% - 98%)                          11.7   8.88  )-----------( 207      ( 04 May 2022 00:23 )             36.4   05-04    133<L>  |  x   |  x   ----------------------------<  x   x    |  x   |  4.51<H>    Ca    8.8      03 May 2022 21:43  Phos  3.8     05-03  Mg     1.5     05-03    TPro  7.7  /  Alb  x   /  TBili  x   /  DBili  x   /  AST  x   /  ALT  x   /  AlkPhos  x   05-04    Alpha Fetoprotein - Tumor Marker (05.04.22 @ 09:26)    Alpha Fetoprotein - Tumor Marker: 5338.0: Test Repeated    CT Chest No Cont (05.02.22 @ 11:10) >  IMPRESSION:.    No pleural effusion.    Multiple hepatic lesions with largest area at the hepatic dome measuring   up to 4.7 cm; findings are new compared to 2/5/2020 CT abdomen. Recommend   dedicated CT or MRI abdomen to further assess and to exclude malignancy.    --- End of Report ---    MR Abdomen No Cont (05.03.22 @ 11:23) >  IMPRESSION:  Innumerable mildly T2 hyperintense lesions are seen throughout the liver,   suggestive of metastasis. The largest lesion measures 13.8 x 11.7 x 11.7   cm in the liver dome.    Multiple T2 hyperintense and T2 hyperintense lesions in the thoracolumbar   spine may represent osseous metastasis. Whole-body bone scan may be   pursued for further evaluation.    Cholelithiasis.    *** ADDENDUM***    Some of the hepatic lesions have small T1 hyperintense components on   fat-suppressed T1-weighted gradient echo images, suggestive of blood   products.    < end of copied text >

## 2022-05-04 NOTE — PROGRESS NOTE ADULT - PROBLEM SELECTOR PLAN 8
-  History  of Crohn disease  -  (+) ileostomy, R  -  GI consulted for evaluation ileostomy-too liquid stool  -  hold Miralax and senna for now per GI.  -  stool studies negative.    - GI dr Azevedo consulted

## 2022-05-04 NOTE — PROGRESS NOTE ADULT - SUBJECTIVE AND OBJECTIVE BOX
Patient is a 71y old  Male who presents with a chief complaint of acute CHF exacerbation (04 May 2022 17:13)    PATIENT IS SEEN AND EXAMINED IN MEDICAL FLOOR.  NGT [    ]    ERASTO [   ]      GT [   ]    ALLERGIES:  No Known Allergies      Daily     Daily     VITALS:    Vital Signs Last 24 Hrs  T(C): 36.9 (04 May 2022 13:16), Max: 37 (04 May 2022 05:15)  T(F): 98.5 (04 May 2022 13:16), Max: 98.6 (04 May 2022 05:15)  HR: 90 (04 May 2022 13:16) (89 - 90)  BP: 124/57 (04 May 2022 13:16) (119/57 - 124/57)  BP(mean): --  RR: 18 (04 May 2022 13:16) (18 - 19)  SpO2: 95% (04 May 2022 13:16) (95% - 97%)    LABS:    CBC Full  -  ( 04 May 2022 00:23 )  WBC Count : 8.88 K/uL  RBC Count : 4.47 M/uL  Hemoglobin : 11.7 g/dL  Hematocrit : 36.4 %  Platelet Count - Automated : 207 K/uL  Mean Cell Volume : 81.4 fl  Mean Cell Hemoglobin : 26.2 pg  Mean Cell Hemoglobin Concentration : 32.1 gm/dL  Auto Neutrophil # : x  Auto Lymphocyte # : x  Auto Monocyte # : x  Auto Eosinophil # : x  Auto Basophil # : x  Auto Neutrophil % : x  Auto Lymphocyte % : x  Auto Monocyte % : x  Auto Eosinophil % : x  Auto Basophil % : x    PT/INR - ( 04 May 2022 06:15 )   PT: 15.9 sec;   INR: 1.33 ratio           05-04    133<L>  |  x   |  x   ----------------------------<  x   x    |  x   |  4.51<H>    Ca    8.8      03 May 2022 21:43  Phos  3.8     05-03  Mg     1.5     05-03    TPro  7.7  /  Alb  x   /  TBili  x   /  DBili  x   /  AST  x   /  ALT  x   /  AlkPhos  x   05-04    CAPILLARY BLOOD GLUCOSE        CARDIAC MARKERS ( 04 May 2022 06:15 )  x     / x     / 42 U/L / x     / x          LIVER FUNCTIONS - ( 04 May 2022 09:21 )  Alb: x     / Pro: 7.7 g/dL / ALK PHOS: x     / ALT: x     / AST: x     / GGT: x           Creatinine Trend: 4.51<--, 4.38<--, 4.47<--, 4.39<--, 3.82<--, 3.71<--  I&O's Summary    03 May 2022 07:01  -  04 May 2022 07:00  --------------------------------------------------------  IN: 0 mL / OUT: 500 mL / NET: -500 mL            .Stool Feces  05-03 @ 18:27   No enteric pathogens to date: Final culture pending  No enteric gram negative rods isolated  --  --          MEDICATIONS:    MEDICATIONS  (STANDING):  ARIPiprazole 15 milliGRAM(s) Oral daily  ferrous    sulfate 325 milliGRAM(s) Oral daily  finasteride 5 milliGRAM(s) Oral daily  gabapentin 600 milliGRAM(s) Oral two times a day  lamoTRIgine 200 milliGRAM(s) Oral daily  midodrine. 2.5 milliGRAM(s) Oral three times a day  pantoprazole    Tablet 40 milliGRAM(s) Oral before breakfast  sevelamer carbonate 800 milliGRAM(s) Oral three times a day with meals  sodium bicarbonate 650 milliGRAM(s) Oral three times a day  sodium chloride 0.9%. 1000 milliLiter(s) (50 mL/Hr) IV Continuous <Continuous>  tamsulosin 0.4 milliGRAM(s) Oral at bedtime  traZODone 25 milliGRAM(s) Oral at bedtime      MEDICATIONS  (PRN):  traMADol 50 milliGRAM(s) Oral every 6 hours PRN Severe Pain (7 - 10)      REVIEW OF SYSTEMS:                           ALL ROS DONE [ X   ]    CONSTITUTIONAL:  LETHARGIC [   ], FEVER [   ], UNRESPONSIVE [   ]  CVS:  CP  [   ], SOB, [   ], PALPITATIONS [   ], DIZZYNESS [   ]  RS: COUGH [   ], SPUTUM [   ]  GI: ABDOMINAL PAIN [   ], NAUSEA [   ], VOMITINGS [   ], DIARRHEA [   ], CONSTIPATION [   ]  :  DYSURIA [   ], NOCTURIA [   ], INCREASED FREQUENCY [   ], DRIBLING [   ],  SKELETAL: PAINFUL JOINTS [   ], SWOLLEN JOINTS [   ], NECK ACHE [   ], LOW BACK ACHE [   ],  SKIN : ULCERS [   ], RASH [   ], ITCHING [   ]  CNS: HEAD ACHE [   ], DOUBLE VISION [   ], BLURRED VISION [   ], AMS / CONFUSION [   ], SEIZURES [   ], WEAKNESS [   ],TINGLING / NUMBNESS [   ]    PHYSICAL EXAMINATION:  GENERAL APPEARANCE: NO DISTRESS  HEENT:  NO PALLOR, NO  JVD,  NO   NODES, NECK SUPPLE  CVS: S1 +, S2 +,   RS: AEEB,  OCCASIONAL  RALES +,   CRACKLES AT BASES +  ABD: SOFT, NT, NO, BS +   ILEOSTOMY [STOMA W/ PINK BASE]  EXT: NO PE  SKIN: WARM,   SKELETAL:  ROM ACCEPTABLE  CNS:  AAO X 3    RADIOLOGY :    ACC: [id number] EXAM:  XR CHEST PORTABLE URGENT 1V                          PROCEDURE DATE:  04/28/2022          INTERPRETATION:  Clinical history: 71-year-old male, chest pain.    Portable/expiratory view of the chest is compared to 20 and demonstrates   a normal cardiac silhouette with no lobar consolidation, gross effusion,   pneumothorax or acute osseous finding.    Bilateral lower lobe patchy opacities consistent with infiltrates/areas   of atelectasis in the correct clinical context, increased. Elevated right   hemidiaphragm are evident.    IMPRESSION:    Bilateral lower lobe interstitial infiltrates/platelike atelectasis,   increased    ================================      ACC: [id number] EXAM:  MR ABDOMEN                          *** ADDENDUM***    Some of the hepatic lesions have small T1 hyperintense components on   fat-suppressed T1-weighted gradient echo images, suggestive of blood   products.    --- End of Report---    *** END OF ADDENDUM***      PROCEDURE DATE:  05/03/2022          INTERPRETATION:  CLINICAL INFORMATION: Hepatic lesions. History of   prostate cancer.    COMPARISON: None.    CONTRAST/COMPLICATIONS:  IV Contrast: NONE  Oral Contrast: NONE  Complications: None reported at time of study completion    PROCEDURE:  MRI of the abdomen was performed.    FINDINGS:  LOWER CHEST: Within normal limits.    LIVER: Innumerable mildly T2 hyperintense lesions are seen throughout the   liver, suggestive ofmetastasis. The largest lesion measures 13.8 x 11.7   x 11.7 cm in the liver dome.  BILE DUCTS: Normal caliber.  GALLBLADDER: Cholelithiasis.  SPLEEN: Within normal limits.  PANCREAS: Within normal limits.  ADRENALS: Within normal limits.  KIDNEYS/URETERS: Multiple brightly T2 hyperintense lesion in the kidneys   bilaterally, representing probable cysts.    VISUALIZED PORTIONS:  BOWEL: Small hiatal hernia. Right lower quadrant ostomy.  PERITONEUM: No ascites.  VESSELS: Within normal limits.  RETROPERITONEUM/LYMPH NODES: No lymphadenopathy.  ABDOMINAL WALL: Within normal limits.  BONES: Multiple T2 hyperintense and T2 hyperintense lesions in the   thoracolumbar spine may represent osseous metastasis. Whole-body bone   scan may be pursued for further evaluation.    IMPRESSION:  Innumerable mildly T2 hyperintense lesions are seen throughout the liver,   suggestive of metastasis. The largest lesion measures 13.8 x 11.7 x 11.7   cm in the liver dome.    Multiple T2 hyperintense and T2 hyperintense lesions in the thoracolumbar   spine may represent osseous metastasis. Whole-body bone scan may be   pursued for further evaluation.    Cholelithiasis.      ASSESSMENT :     Hyperkalemia    No pertinent past medical history    COPD, mild    Anemia    Bipolar disorder    IBD (inflammatory bowel disease)    HTN (hypertension)    HLD (hyperlipidemia)    PAD (peripheral artery disease)    CKD (chronic kidney disease)    Prostate CA    BPH with urinary obstruction    No significant past surgical history    History of creation of ostomy        PLAN:  HPI:  Patient is a 71M from [facility], who is ambulatory at baseline with a walker. He has Hx of HTN, CHF?, COPD, HLD, PAD, BPH, Prostate CA (s/p radiation), Bipolar Disorder, IBD - Crohn's Disease, R ileostomy bag (s/p sigmoid resection). He was brought to ED due to bilateral leg swelling. For the past 2 days he has been having progressive swelling/ edema of both his legs with associated ambulatory dysfunction. He also started having episodes of shortness of breath and difficulty of breathing. He denies any fever, cough, chest pain, palpitation. When he came to the ED, he was saturating at 93-94% on room air. He was placed on nasal cannula. EKG was done showing NSR. Troponin was negative and BNP was mildly elevated at 398. CXR showed bilateral vascular congestion. Serum potassium was elevated at 6.1. He got a dose of lasix and was given Hyperkalemia cocktail in the ED. Of note, he had Hx of COVID in 2020 andwas vaccinated with COVID-19 x 3 (Pfizer) and Influenza in 2021. He was subsequently admitted for acute exacerbation of his heart failure.    [facility]: FULL CODE (28 Apr 2022 18:40)    # CASE D/W BROTHER KERI VIA PHONE AT BEDSIDE [ C  ; H  ] - CASE DISCUSSED AT LENGTH, ALL QUESTIONS ANSWERED [5/3]    # HEMATURIA   # HX OF PROSTATE CA S/P RADIATION  - HOLD A/C, TREND CBC, UROLOGY CONSULT  - NOTED BLADDER SCAN, NOTED RECENT U/S  - PER UROLOGY, REPEAT BLADDER SCAN W/ PVR - THEN WILL DECIDE REGARDING MAURER PLACEMENT    # ACUTE HYPOXIC RESPIRATORY FAILURE SUSPECT S/T PULMONARY VASCULAR CONGESTION  # RULED OUT CHF   # PAWAN ON CKD - WORSENING, HOLD LASIX  # BPH  # HX OF PROSTATE CA S/P RADIATION  - PLACE ON TELEMETRY  - NOTED CXR  - HOLD LASIX  - NOTE TSH  - STRICT IS AND OS  - ECHOCARDIOGRAM - NORMAL LVEF, NORMAL DIASTOLIC FUNCTION  - NEPHROLOGY CONSULT, CARDIOLOGY CONSULT    # LIVER LESIONS, BONE LESIONS  - NOTED MRI ABDOMEN  - NOTED TUMOR MARKERS  - F/U BONE SCAN  - HEPATOLOGY CONSULT  - ONCOLOGY CONSULT    - PLANNED FOR IR GUIDED BIOPSY ON 5/6    # HYPERKALEMIA - RESOLVED  - GIVEN LASIX, LOKELMA  - EKG DONE  - NEPHROLOGY CONSULT    # CROHN'S DISEASE  # RIGHT ILEOSTOMY BAG S/P SIGMOID RESECTION  - MONITORING OUTPUT  - GASTROENTEROLOGY CONSULT    # NORMOCYTIC ANEMIA    # TRANSAMINITIS, NOTED HEP C AND HEP B MARKERS  - WILL CONSULT HEPATOLOGY    # HTN  # COPD  # HLD  # PAD  # BIPOLAR DX  # GI AND DVT PPX

## 2022-05-04 NOTE — CONSULT NOTE ADULT - SUBJECTIVE AND OBJECTIVE BOX
CHIEF COMPLAINT  CHF Exacerbation    HISTORY OF PRESENT ILLNESS  WILMER SELLERS is a 71y Male who presents with a chief complaint of CHF exacerbation.    Patient was admitted on April 28th after presenting with bilateral leg swelling and dyspnea with exertion. In the Emergency Department he was found to have hyperkalemia and bilateral vascular congestion on chest x-ray, and he was admitted for further workup and management.    Patient has history of prostate cancer, s/p radiation. Imaging showed liver and bone lesions and oncology was consulted.     PAST MEDICAL AND SURGICAL HISTORY  Congestive Heart Failure  Chronic Obstructive Pulmonary disease  Anemia  Hypertension  Hyperlipidemia  Prostate Cancer    FAMILY HISTORY  Non-contributory    SOCIAL HISTORY  Lives in Nursing Home    REVIEW OF SYSTEMS  A complete review of systems was performed; negative except per HPI    PHYSICAL EXAM  T(C): 37 (05-04-22 @ 05:15), Max: 37.7 (05-03-22 @ 13:01)  HR: 89 (05-04-22 @ 05:15) (85 - 90)  BP: 119/57 (05-04-22 @ 05:15) (119/57 - 136/62)  RR: 19 (05-04-22 @ 05:15) (18 - 19)  SpO2: 97% (05-04-22 @ 05:15) (90% - 98%)  Constitutional: alert, awake, in no acute distress  Eyes: PERRL, EOMI  HEENT: normocephalic, atraumatic  Neck: supple, non-tender  Cardiovascular: normal perfusion, no peripheral edema  Respiratory: normal respiratory efforts; no increased use of accessory muscles  Gastrointestinal: soft, non-tender  Musculoskeletal: normal range of motion, no deformities noted  Neurological: alert, CN II to XI grossly intact  Skin: warm, dry    LABORATORY DATA                        11.7   8.88  )-----------( 207      ( 04 May 2022 00:23 )             36.4     05-04    133<L>  |  x   |  x   ----------------------------<  x   x    |  x   |  4.51<H>    Ca    8.8      03 May 2022 21:43  Phos  3.8     05-03  Mg     1.5     05-03    TPro  7.7  /  Alb  x   /  TBili  x   /  DBili  x   /  AST  x   /  ALT  x   /  AlkPhos  x   05-04    RADIOLOGY REVIEW  IMPRESSION:  Innumerable mildly T2 hyperintense lesions are seen throughout the liver,   suggestive of metastasis. The largest lesion measures 13.8 x 11.7 x 11.7   cm in the liver dome.    Multiple T2 hyperintense and T2 hyperintense lesions in the thoracolumbar   spine may represent osseous metastasis. Whole-body bone scan may be   pursued for further evaluation.    Cholelithiasis.

## 2022-05-04 NOTE — PROGRESS NOTE ADULT - PROBLEM SELECTOR PLAN 2
LFT's elevated, downtrending  Tbili now elevated at 1.3  Hep C and HEP B markers elevated   MRI abd without con results as above  showing : Innumerable mildly T2 hyperintense lesions are seen throughout the   liver, suggestive of metastasis. The largest lesion measures 13.8 x 11.7   Avoid hepatotoxic agents   MRI with contrast  F/u with IR  Hepatology following

## 2022-05-04 NOTE — CONSULT NOTE ADULT - ASSESSMENT
71 o male with multiple medical problems and now found to have innumerable hepatic lesions and a AFP level >5000. 70 yo male with multiple comorbidities, who presented to the hospital with CHF exacerbation and during workup was found to have innumerable hepatic lesions. Patient also with renal failure, therefore only noncontrast imaging is available. Liver mass biopsy was requested prior to AFP levels were known. AFP level >5000.   - If patient has an MRI with contrast, it may potentially preclude liver mass biopsy. Although there is a small risk of NSF, risks/benefits of performing MRI with contrast vs invasive procedure should be discussed with the patient.   - Agree with bone scan, as it may reveal other lesions which may be amenable to biopsy.   - Patient is very uremic, which also predisposes patient to higher risk of bleeding after procedure. Monitor BUN.  - If the above is addressed, and liver mass biopsy is deemed best/safest option for the patient, will plan to perform procedure on Friday (5/6).  - NPO past midnight the night before procedure.  - Early morning labs (CBC, BMP, Coags) day of procedure.  - HOLD anticoagulation the night before procedure.  - NO aspirin/NSAIDs until procedure day.  - Please call IR if clinical situation changes and/or new information becomes available.    Case d/w Dr. Portillo.

## 2022-05-04 NOTE — PROGRESS NOTE ADULT - SUBJECTIVE AND OBJECTIVE BOX
NP Note discussed with  Primary Attending    Patient is a 71y old  Male who presents with a chief complaint of acute CHF exacerbation (04 May 2022 11:54)      INTERVAL HPI/OVERNIGHT EVENTS: no new complaints    MEDICATIONS  (STANDING):  ARIPiprazole 15 milliGRAM(s) Oral daily  ferrous    sulfate 325 milliGRAM(s) Oral daily  finasteride 5 milliGRAM(s) Oral daily  gabapentin 600 milliGRAM(s) Oral two times a day  lamoTRIgine 200 milliGRAM(s) Oral daily  midodrine. 2.5 milliGRAM(s) Oral three times a day  pantoprazole    Tablet 40 milliGRAM(s) Oral before breakfast  sevelamer carbonate 800 milliGRAM(s) Oral three times a day with meals  sodium bicarbonate 650 milliGRAM(s) Oral three times a day  sodium chloride 0.9%. 1000 milliLiter(s) (50 mL/Hr) IV Continuous <Continuous>  tamsulosin 0.4 milliGRAM(s) Oral at bedtime  traZODone 25 milliGRAM(s) Oral at bedtime    MEDICATIONS  (PRN):  traMADol 50 milliGRAM(s) Oral every 6 hours PRN Severe Pain (7 - 10)      __________________________________________________  REVIEW OF SYSTEMS:          Vital Signs Last 24 Hrs  T(C): 36.9 (04 May 2022 13:16), Max: 37 (04 May 2022 05:15)  T(F): 98.5 (04 May 2022 13:16), Max: 98.6 (04 May 2022 05:15)  HR: 90 (04 May 2022 13:16) (85 - 90)  BP: 124/57 (04 May 2022 13:16) (119/57 - 136/62)  BP(mean): --  RR: 18 (04 May 2022 13:16) (18 - 19)  SpO2: 95% (04 May 2022 13:16) (95% - 98%)    ________________________________________________  PHYSICAL EXAM:      _________________________________________________  LABS:                        11.7   8.88  )-----------( 207      ( 04 May 2022 00:23 )             36.4     05-04    133<L>  |  x   |  x   ----------------------------<  x   x    |  x   |  4.51<H>    Ca    8.8      03 May 2022 21:43  Phos  3.8     05-03  Mg     1.5     05-03    TPro  7.7  /  Alb  x   /  TBili  x   /  DBili  x   /  AST  x   /  ALT  x   /  AlkPhos  x   05-04    PT/INR - ( 04 May 2022 06:15 )   PT: 15.9 sec;   INR: 1.33 ratio             CAPILLARY BLOOD GLUCOSE      POCT Blood Glucose.: 124 mg/dL (03 May 2022 16:38)    COVID-19 PCR: NotDetec (01 May 2022 07:17)      RADIOLOGY & ADDITIONAL TESTS:    Imaging Personally Reviewed:  YES/NO        Consultant(s) Notes Reviewed:   YES/ No    Care Discussed with Consultants :     Plan of care was discussed with patient and /or primary care giver; all questions and concerns were addressed and care was aligned with patient's wishes.     NP Note discussed with  Primary Attending    Patient is a 71y old  Male who presents with a chief complaint of acute CHF exacerbation (04 May 2022 11:54)    GI HPI:    71M from [facility], who is ambulatory at baseline with a walker. He has Hx of HTN, CHF?, COPD, HLD, PAD, BPH, Prostate CA (s/p radiation), Bipolar Disorder, IBD - Crohn's Disease, R ileostomy bag (s/p sigmoid resection). He was brought to ED due to bilateral leg swelling. Admitted for  CHF exacerbation and transaminitis. GI consult for excess ileostomy output, nonbloody .  Labs significant for WBC 7.6 HH 11.2/35.8 CR/BUN 3.82/86 Tbili 1   ALT 92. As per patient had has his colon completely removed over 10 years ago and has an ileostomy now due to severe Crohn's and diverticulitis.      INTERVAL HPI/OVERNIGHT EVENTS:Hematuria ovenight.  following.     MEDICATIONS  (STANDING):  ARIPiprazole 15 milliGRAM(s) Oral daily  ferrous    sulfate 325 milliGRAM(s) Oral daily  finasteride 5 milliGRAM(s) Oral daily  gabapentin 600 milliGRAM(s) Oral two times a day  lamoTRIgine 200 milliGRAM(s) Oral daily  midodrine. 2.5 milliGRAM(s) Oral three times a day  pantoprazole    Tablet 40 milliGRAM(s) Oral before breakfast  sevelamer carbonate 800 milliGRAM(s) Oral three times a day with meals  sodium bicarbonate 650 milliGRAM(s) Oral three times a day  sodium chloride 0.9%. 1000 milliLiter(s) (50 mL/Hr) IV Continuous <Continuous>  tamsulosin 0.4 milliGRAM(s) Oral at bedtime  traZODone 25 milliGRAM(s) Oral at bedtime    MEDICATIONS  (PRN):  traMADol 50 milliGRAM(s) Oral every 6 hours PRN Severe Pain (7 - 10)      __________________________________________________  REVIEW OF SYSTEMS:          Vital Signs Last 24 Hrs  T(C): 36.9 (04 May 2022 13:16), Max: 37 (04 May 2022 05:15)  T(F): 98.5 (04 May 2022 13:16), Max: 98.6 (04 May 2022 05:15)  HR: 90 (04 May 2022 13:16) (85 - 90)  BP: 124/57 (04 May 2022 13:16) (119/57 - 136/62)  BP(mean): --  RR: 18 (04 May 2022 13:16) (18 - 19)  SpO2: 95% (04 May 2022 13:16) (95% - 98%)    ________________________________________________  PHYSICAL EXAM:      _________________________________________________  LABS:                        11.7   8.88  )-----------( 207      ( 04 May 2022 00:23 )             36.4     05-04    133<L>  |  x   |  x   ----------------------------<  x   x    |  x   |  4.51<H>    Ca    8.8      03 May 2022 21:43  Phos  3.8     05-03  Mg     1.5     05-03    TPro  7.7  /  Alb  x   /  TBili  x   /  DBili  x   /  AST  x   /  ALT  x   /  AlkPhos  x   05-04    PT/INR - ( 04 May 2022 06:15 )   PT: 15.9 sec;   INR: 1.33 ratio             CAPILLARY BLOOD GLUCOSE      POCT Blood Glucose.: 124 mg/dL (03 May 2022 16:38)    COVID-19 PCR: NotDetec (01 May 2022 07:17)      RADIOLOGY & ADDITIONAL TESTS:    Imaging Personally Reviewed:  YES/NO        Consultant(s) Notes Reviewed:   YES/ No    Care Discussed with Consultants :     Plan of care was discussed with patient and /or primary care giver; all questions and concerns were addressed and care was aligned with patient's wishes.     NP Note discussed with  Primary Attending    Patient is a 71y old  Male who presents with a chief complaint of acute CHF exacerbation (04 May 2022 11:54)    GI HPI:    71M from [facility], who is ambulatory at baseline with a walker. He has Hx of HTN, CHF?, COPD, HLD, PAD, BPH, Prostate CA (s/p radiation), Bipolar Disorder, IBD - Crohn's Disease, R ileostomy bag (s/p sigmoid resection), who presented  to ED due to bilateral leg swelling. Admitted for  CHF exacerbation and transaminitis. GI consult for excess ileostomy output, nonbloody .      INTERVAL HPI/OVERNIGHT EVENTS:Hematuria ovenight.  following.     MEDICATIONS  (STANDING):  ARIPiprazole 15 milliGRAM(s) Oral daily  ferrous    sulfate 325 milliGRAM(s) Oral daily  finasteride 5 milliGRAM(s) Oral daily  gabapentin 600 milliGRAM(s) Oral two times a day  lamoTRIgine 200 milliGRAM(s) Oral daily  midodrine. 2.5 milliGRAM(s) Oral three times a day  pantoprazole    Tablet 40 milliGRAM(s) Oral before breakfast  sevelamer carbonate 800 milliGRAM(s) Oral three times a day with meals  sodium bicarbonate 650 milliGRAM(s) Oral three times a day  sodium chloride 0.9%. 1000 milliLiter(s) (50 mL/Hr) IV Continuous <Continuous>  tamsulosin 0.4 milliGRAM(s) Oral at bedtime  traZODone 25 milliGRAM(s) Oral at bedtime    MEDICATIONS  (PRN):  traMADol 50 milliGRAM(s) Oral every 6 hours PRN Severe Pain (7 - 10)      __________________________________________________  REVIEW OF SYSTEMS:  Patient endorses right back side discomfort "around my kidneys" , difficulty urinating. Pt  denies fever, chills, headache, cough, chest discomfort, N/V/D/C, abdominal discomfort.      Vital Signs Last 24 Hrs  T(C): 36.9 (04 May 2022 13:16), Max: 37 (04 May 2022 05:15)  T(F): 98.5 (04 May 2022 13:16), Max: 98.6 (04 May 2022 05:15)  HR: 90 (04 May 2022 13:16) (85 - 90)  BP: 124/57 (04 May 2022 13:16) (119/57 - 136/62)  BP(mean): --  RR: 18 (04 May 2022 13:16) (18 - 19)  SpO2: 95% (04 May 2022 13:16) (95% - 98%)    ________________________________________________  PHYSICAL EXAM:    Gen: distress due to back discomfort  HEENT: PERRL, anicteric, no oral mucositis  Resp: Clear breath sounds upper airway on auscultation. No rales, no wheezing  CVS: S1S2 present, regular  GI: BS(+), Soft, ND, NT. Ileostomy with green/brown liquid/loose  BM  Extremities : BLE 1+  edema. No  calf tenderness. PPP  Neuro: Awake/alert.  no new neuro deficits  Skin: warm,dry,no rash    _________________________________________________  LABS:                        11.7   8.88  )-----------( 207      ( 04 May 2022 00:23 )             36.4     05-04    133<L>  |  x   |  x   ----------------------------<  x   x    |  x   |  4.51<H>    Ca    8.8      03 May 2022 21:43  Phos  3.8     05-03  Mg     1.5     05-03    TPro  7.7  /  Alb  x   /  TBili  x   /  DBili  x   /  AST  x   /  ALT  x   /  AlkPhos  x   05-04    PT/INR - ( 04 May 2022 06:15 )   PT: 15.9 sec;   INR: 1.33 ratio         CAPILLARY BLOOD GLUCOSE      POCT Blood Glucose.: 124 mg/dL (03 May 2022 16:38)    COVID-19 PCR: NotDetec (01 May 2022 07:17)      RADIOLOGY & ADDITIONAL TESTS:    < from: MR Abdomen No Cont (05.03.22 @ 11:23) >  *** ADDENDUM***    Some of the hepatic lesions have small T1 hyperintense components on   fat-suppressed T1-weighted gradient echo images, suggestive of blood   products.    --- End of Report---    *** END OF ADDENDUM***      PROCEDURE DATE:  05/03/2022          INTERPRETATION:  CLINICAL INFORMATION: Hepatic lesions. History of   prostate cancer.    COMPARISON: None.    CONTRAST/COMPLICATIONS:  IV Contrast: NONE  Oral Contrast: NONE  Complications: None reported at time of study completion    PROCEDURE:  MRI of the abdomen was performed.    FINDINGS:  LOWER CHEST: Within normal limits.    LIVER: Innumerable mildly T2 hyperintense lesions are seen throughout the   liver, suggestive ofmetastasis. The largest lesion measures 13.8 x 11.7   x 11.7 cm in the liver dome.  BILE DUCTS: Normal caliber.  GALLBLADDER: Cholelithiasis.  SPLEEN: Within normal limits.  PANCREAS: Within normal limits.  ADRENALS: Within normal limits.  KIDNEYS/URETERS: Multiple brightly T2 hyperintense lesion in the kidneys   bilaterally, representing probable cysts.    VISUALIZED PORTIONS:  BOWEL: Small hiatal hernia. Right lower quadrant ostomy.  PERITONEUM: No ascites.  VESSELS: Within normal limits.  RETROPERITONEUM/LYMPH NODES: No lymphadenopathy.  ABDOMINAL WALL: Within normal limits.  BONES: Multiple T2 hyperintense and T2 hyperintense lesions in the   thoracolumbar spine may represent osseous metastasis. Whole-body bone   scan may be pursued for further evaluation.    IMPRESSION:  Innumerable mildly T2 hyperintense lesions are seen throughout the liver,   suggestive of metastasis. The largest lesion measures 13.8 x 11.7 x 11.7   cm in the liver dome.    Multiple T2 hyperintense and T2 hyperintense lesions in the thoracolumbar   spine may represent osseous metastasis. Whole-body bone scan may be   pursued for further evaluation.    Cholelithiasis.    < end of copied text >          Consultant(s) Notes Reviewed:   YES/ No    Care Discussed with Consultants :     Plan of care was discussed with patient and /or primary care giver; all questions and concerns were addressed and care was aligned with patient's wishes.

## 2022-05-04 NOTE — PROGRESS NOTE ADULT - SUBJECTIVE AND OBJECTIVE BOX
Interval events:   reports hematuria. No urine at bedside to evaluate. No recorded voided volumes.     SUBJECTIVE:  Patient has pain in right lower quadrant       OBJECTIVE:  Vital Signs Last 24 Hrs  T(C): 37 (04 May 2022 05:15), Max: 37.7 (03 May 2022 13:01)  T(F): 98.6 (04 May 2022 05:15), Max: 99.9 (03 May 2022 13:01)  HR: 89 (04 May 2022 05:15) (82 - 90)  BP: 119/57 (04 May 2022 05:15) (119/57 - 136/62)  BP(mean): --  RR: 19 (04 May 2022 05:15) (18 - 19)  SpO2: 97% (04 May 2022 05:15) (90% - 98%)    Physical Examination:  GEN: NAD, resting quietly  PULM: symmetric chest rise bilaterally, no increased WOB  ABD: soft  : voiding       LABS:                        11.7   8.88  )-----------( 207      ( 04 May 2022 00:23 )             36.4       05-04    133<L>  |  x   |  x   ----------------------------<  x   x    |  x   |  4.51<H>    Ca    8.8      03 May 2022 21:43  Phos  3.8     05-03  Mg     1.5     05-03    TPro  x   /  Alb  x   /  TBili  1.2  /  DBili  x   /  AST  x   /  ALT  x   /  AlkPhos  x   05-04

## 2022-05-04 NOTE — PROGRESS NOTE ADULT - PROBLEM SELECTOR PLAN 4
-  Downtrending   -  Tbili 0.9, , , ALT 92  -  Hep C and HEP B markers elevated avoid hepatotoxins   -  CT chest shows hepatic lesion 4.7cm see above  -  Hepatology consulted dr Portillo  -  Monitor CMP  -  MRI abdomen no con (due to worsening PAWAN)  -  MRI  -  Multiple liver lesions  -   Tumor markers and bone scan ordered  -  Oncology dr. Chao consulted  -  IR liver biopsy planned for Friday  - hepatology dr. Portillo is folowing  - hem/onc dr. Chao is following

## 2022-05-04 NOTE — PROGRESS NOTE ADULT - PROBLEM SELECTOR PLAN 9
-  History  of Bipolar Disorder  -  Continue with Aripiprazole  -  Continue with Trazadone  -  Continue with Lamotrigine

## 2022-05-04 NOTE — PROGRESS NOTE ADULT - ASSESSMENT
71M from [facility], who is ambulatory at baseline with a walker. He has Hx of HTN, CHF?, COPD, HLD, PAD, BPH, Prostate CA (s/p radiation), Bipolar Disorder, IBD - Crohn's Disease, R ileostomy bag (s/p sigmoid resection). He was brought to ED due to bilateral leg swelling. Admitted for  CHF exacerbation and transaminitis. GI consult for excess ileostomy output, nonbloody .  Labs significant for WBC 7.6 HH 11.2/35.8 CR/BUN 3.82/86 Tbili 1   ALT 92. As per patient had has his colon completely removed over 10 years ago and has an ileostomy now due to severe Crohn's and diverticulitis.

## 2022-05-04 NOTE — PROGRESS NOTE ADULT - SUBJECTIVE AND OBJECTIVE BOX
NEPHROLOGY MEDICAL CARE, [facility] - Dr. Emerson Pond/ Dr. Nallely Curran/ Dr. Kirby Angel/ Dr. Pippa Reis    Patient was seen and examined at bedside.    CC: patient is okay and no sob at rest  no uremic symptoms present.     Vital Signs Last 24 Hrs  T(C): 36.9 (04 May 2022 13:16), Max: 37 (04 May 2022 05:15)  T(F): 98.5 (04 May 2022 13:16), Max: 98.6 (04 May 2022 05:15)  HR: 90 (04 May 2022 13:16) (85 - 90)  BP: 124/57 (04 May 2022 13:16) (119/57 - 136/62)  BP(mean): --  RR: 18 (04 May 2022 13:16) (18 - 19)  SpO2: 95% (04 May 2022 13:16) (95% - 98%)    05-03 @ 07:01  -  05-04 @ 07:00  --------------------------------------------------------  IN: 0 mL / OUT: 500 mL / NET: -500 mL        PHYSICAL EXAM:  General: No acute respiratory distress.  Eyes: conjunctiva and sclera clear  ENMT: Atraumatic, Normocephalic, supple, No JVD present. Moist mucous membranes  Respiratory: Bilateral decreased BS and no rhonchi, wheezing  Cardiovascular: S1S2+; no m/r/g  Gastrointestinal: Soft, Non-tender, Nondistended; Bowel sounds present   Neuro:  Awake, Alert & Oriented X3  Ext:  no pedal edema, No Cyanosis  Skin: No visible rashes    MEDICATIONS:  MEDICATIONS  (STANDING):  ARIPiprazole 15 milliGRAM(s) Oral daily  ferrous    sulfate 325 milliGRAM(s) Oral daily  finasteride 5 milliGRAM(s) Oral daily  gabapentin 600 milliGRAM(s) Oral two times a day  lamoTRIgine 200 milliGRAM(s) Oral daily  midodrine. 2.5 milliGRAM(s) Oral three times a day  pantoprazole    Tablet 40 milliGRAM(s) Oral before breakfast  sevelamer carbonate 800 milliGRAM(s) Oral three times a day with meals  sodium bicarbonate 650 milliGRAM(s) Oral three times a day  sodium chloride 0.9%. 1000 milliLiter(s) (50 mL/Hr) IV Continuous <Continuous>  tamsulosin 0.4 milliGRAM(s) Oral at bedtime  traZODone 25 milliGRAM(s) Oral at bedtime    MEDICATIONS  (PRN):  traMADol 50 milliGRAM(s) Oral every 6 hours PRN Severe Pain (7 - 10)          LABS:                        11.7   8.88  )-----------( 207      ( 04 May 2022 00:23 )             36.4     05-04    133<L>  |  x   |  x   ----------------------------<  x   x    |  x   |  4.51<H>    Ca    8.8      03 May 2022 21:43  Phos  3.8     05-03  Mg     1.5     05-03    TPro  7.7  /  Alb  x   /  TBili  x   /  DBili  x   /  AST  x   /  ALT  x   /  AlkPhos  x   05-04    PT/INR - ( 04 May 2022 06:15 )   PT: 15.9 sec;   INR: 1.33 ratio               Urine studies    PTH and Vit D:

## 2022-05-04 NOTE — PROGRESS NOTE ADULT - PROBLEM SELECTOR PLAN 1
p/w increased ileostomy output, improving   Hold standing Miralax. Add as prn. Avoid constipation . Nothing harder then a soft consistency  stool culture, calprotectin processing  Obtain stool for O&P

## 2022-05-04 NOTE — PROGRESS NOTE ADULT - PROBLEM SELECTOR PLAN 1
-  Patient presented with SOB and LE edema   -  Echo -  EF 50-55% (2020)  -  Echo resulted trivial pericardial effusion, normal EF.  -  EKG - NSR, low voltage criteria for LVH  -  CXR - bilateral vascular congestion   -  CT chest shows no pleural effusion  -  Trop x 1 - neg  -  Pro-BNP - 398   -  monitor O2 sat on room air, at rest/ambulation

## 2022-05-04 NOTE — PROGRESS NOTE ADULT - PROBLEM SELECTOR PLAN 2
-  History of CKD IV  -  Creatinine 4.51 today,  up trending  -  Home meds - NaHCO3, Calcitriol  -  Continue with Sodium bicarbonate  -  Continue with Sevelamer  -  renal US shows renal cysts, no hydronephrosis  -  monitor BMP daily  -  monitor I&O  -  avoid nephrotoxins HOLD lasix for now.   -  Nephro (Dr. Pond) consulted

## 2022-05-04 NOTE — PROGRESS NOTE ADULT - PROBLEM SELECTOR PLAN 3
Multiple liver lesions seen on CT and MRI abd, suggestive of metastasis.  The largest lesion measures 13.8 x 11.7 x 11.7 cm in the liver dome.  IR consult for bx  Bone scan  Tumor markers: AFP elevated.  CEA & CA 19-9 WNL   Pain control   MRI with contrast   Hepatology following  Heme/Onc following

## 2022-05-04 NOTE — PROGRESS NOTE ADULT - PROBLEM SELECTOR PLAN 12
-  Patient from [facility]   -  PT clears to return-no skill needed  -  COVID - 5/1 negative  -  Plan to return to [facility] when medically stable  -  Patient remains active

## 2022-05-04 NOTE — PROGRESS NOTE ADULT - PROBLEM SELECTOR PLAN 6
-  Controlled  -  SBP 110s to 130s  -  Lasix on hold due to worsening  PAWAN  -  Monitor blood pressures routinely

## 2022-05-04 NOTE — PROGRESS NOTE ADULT - SUBJECTIVE AND OBJECTIVE BOX
NP Note discussed with Primary Attending.    Patient is a 71y old  Male who presents with a chief complaint of acute CHF exacerbation (04 May 2022 15:39)      INTERVAL HPI/OVERNIGHT EVENTS: no new complaints    MEDICATIONS  (STANDING):  ARIPiprazole 15 milliGRAM(s) Oral daily  ferrous    sulfate 325 milliGRAM(s) Oral daily  finasteride 5 milliGRAM(s) Oral daily  gabapentin 600 milliGRAM(s) Oral two times a day  lamoTRIgine 200 milliGRAM(s) Oral daily  midodrine. 2.5 milliGRAM(s) Oral three times a day  pantoprazole    Tablet 40 milliGRAM(s) Oral before breakfast  sevelamer carbonate 800 milliGRAM(s) Oral three times a day with meals  sodium bicarbonate 650 milliGRAM(s) Oral three times a day  sodium chloride 0.9%. 1000 milliLiter(s) (50 mL/Hr) IV Continuous <Continuous>  tamsulosin 0.4 milliGRAM(s) Oral at bedtime  traZODone 25 milliGRAM(s) Oral at bedtime    MEDICATIONS  (PRN):  traMADol 50 milliGRAM(s) Oral every 6 hours PRN Severe Pain (7 - 10)      __________________________________________________  REVIEW OF SYSTEMS:    CONSTITUTIONAL: No fever,   EYES: no acute visual disturbances  NECK: No pain or stiffness  RESPIRATORY: No cough; No shortness of breath  CARDIOVASCULAR: No chest pain, no palpitations  GASTROINTESTINAL:RUQ pain. No nausea or vomiting; No diarrhea   NEUROLOGICAL: No headache or numbness, no tremors  MUSCULOSKELETAL: No joint pain, no muscle pain  GENITOURINARY: Hematuria, no dysuria, no frequency, no hesitancy  PSYCHIATRY: no depression , no anxiety  ALL OTHER  ROS negative        Vital Signs Last 24 Hrs  T(C): 36.9 (04 May 2022 13:16), Max: 37 (04 May 2022 05:15)  T(F): 98.5 (04 May 2022 13:16), Max: 98.6 (04 May 2022 05:15)  HR: 90 (04 May 2022 13:16) (85 - 90)  BP: 124/57 (04 May 2022 13:16) (119/57 - 136/62)  BP(mean): --  RR: 18 (04 May 2022 13:16) (18 - 19)  SpO2: 95% (04 May 2022 13:16) (95% - 98%)    ________________________________________________  PHYSICAL EXAM:  GENERAL: NAD  HEENT: Normocephalic;  conjunctivae and sclerae clear; moist mucous membranes;   NECK : supple  CHEST/LUNG: Clear to auscultation bilaterally with good air entry   HEART: S1 S2  regular; no murmurs, gallops or rubs  ABDOMEN: Soft, Nontender, Nondistended; Bowel sounds present  EXTREMITIES: no cyanosis; no edema; no calf tenderness  SKIN: warm and dry; no rash  NERVOUS SYSTEM:  Awake and alert; Oriented  to place, person and time ; no new deficits    _________________________________________________  LABS:                        11.7   8.88  )-----------( 207      ( 04 May 2022 00:23 )             36.4     05-04    133<L>  |  x   |  x   ----------------------------<  x   x    |  x   |  4.51<H>    Ca    8.8      03 May 2022 21:43  Phos  3.8     05-03  Mg     1.5     05-03    TPro  7.7  /  Alb  x   /  TBili  x   /  DBili  x   /  AST  x   /  ALT  x   /  AlkPhos  x   05-04    PT/INR - ( 04 May 2022 06:15 )   PT: 15.9 sec;   INR: 1.33 ratio             CAPILLARY BLOOD GLUCOSE      POCT Blood Glucose.: 124 mg/dL (03 May 2022 16:38)        RADIOLOGY & ADDITIONAL TESTS:  ACC: [id number] EXAM:  MR ABDOMEN                          *** ADDENDUM***    Some of the hepatic lesions have small T1 hyperintense components on   fat-suppressed T1-weighted gradient echo images, suggestive of blood   products.    --- End of Report---    *** END OF ADDENDUM***      PROCEDURE DATE:  05/03/2022          INTERPRETATION:  CLINICAL INFORMATION: Hepatic lesions. History of   prostate cancer.    COMPARISON: None.    CONTRAST/COMPLICATIONS:  IV Contrast: NONE  Oral Contrast: NONE  Complications: None reported at time of study completion    PROCEDURE:  MRI of the abdomen was performed.    FINDINGS:  LOWER CHEST: Within normal limits.    LIVER: Innumerable mildly T2 hyperintense lesions are seen throughout the   liver, suggestive ofmetastasis. The largest lesion measures 13.8 x 11.7   x 11.7 cm in the liver dome.  BILE DUCTS: Normal caliber.  GALLBLADDER: Cholelithiasis.  SPLEEN: Within normal limits.  PANCREAS: Within normal limits.  ADRENALS: Within normal limits.  KIDNEYS/URETERS: Multiple brightly T2 hyperintense lesion in the kidneys   bilaterally, representing probable cysts.    VISUALIZED PORTIONS:  BOWEL: Small hiatal hernia. Right lower quadrant ostomy.  PERITONEUM: No ascites.  VESSELS: Within normal limits.  RETROPERITONEUM/LYMPH NODES: No lymphadenopathy.  ABDOMINAL WALL: Within normal limits.  BONES: Multiple T2 hyperintense and T2 hyperintense lesions in the   thoracolumbar spine may represent osseous metastasis. Whole-body bone   scan may be pursued for further evaluation.    IMPRESSION:  Innumerable mildly T2 hyperintense lesions are seen throughout the liver,   suggestive of metastasis. The largest lesion measures 13.8 x 11.7 x 11.7   cm in the liver dome.    Multiple T2 hyperintense and T2 hyperintense lesions in the thoracolumbar   spine may represent osseous metastasis. Whole-body bone scan may be   pursued for further evaluation.    Cholelithiasis.    --- End of Report ---    ***Please see the addendum at the top of this report. It may contain   additional important information or changes.****        JESSENIA LONGORIA MD; Attending Radiologist  This document has been electronically signed. May  3 2022 12:34PM  Addend:JESSENIA LONGORIA MD; Attending Radiologist  This addendum was electronically signed on: May  3 2022 12:45PM.    ACC: [id number] EXAM:  XR CHEST PORTABLE ROUTINE 1V                          PROCEDURE DATE:  05/02/2022          INTERPRETATION:  Portable chest radiograph    CLINICAL INFORMATION: Dyspnea, shortness of breath.    TECHNIQUE:  Portable  AP chest radiograph.    COMPARISON: 4/28/2022 chest .    FINDINGS:  CATHETERS AND TUBES: None    PULMONARY: Elevation of RIGHT hemidiaphragm. The visualized lungs are   clear of airspace consolidations or effusions.   No pneumothorax.    HEART/VASCULAR: The  heartis enlarged in transverse diameter.    BONES: Visualized osseous structures are intact.    IMPRESSION:   No radiographic evidence of active chest disease.     Cardiomegaly.    --- End of Report ---            LEELA BEST MD; Attending Radiologist  This document has been electronically signed. May  3 2022  8:18AM  ACC: [id number] EXAM:  CT CHEST                          PROCEDURE DATE:  05/02/2022          INTERPRETATION:  HISTORY: Admitting Dxs: E87.5 HYPERKALEMIA. Evaluate   pleural effusion.    EXAMINATION: CT CHEST was performed without IV contrast.    COMPARISON: None available.    FINDINGS:    AIRWAYS, LUNGS, PLEURA: Bilateral lower lobe bronchiolectasis. Right   middle lobe and bibasilar subsegmental atelectasis. No pleural effusion.    MEDIASTINUM: Normal heart size. Coronary calcifications. No pericardial   effusion. Thoracic aorta normal caliber.  No large mediastinal lymph   nodes. Small hiatal hernia.    IMAGED ABDOMEN: The liver is only partially imaged. Hepatic hypodense   region measuring 4.7 x 4.3 cm (image 109, series 3). Additional left   hepatic lobe 2.7 cm lesion suspected (image 147, series 3) and nodular   thickening along the hepatic capsule in multiple locations; these   findings are new compared to 2/5/2020 CT abdomen. Left renal hypodense   lesions better demonstrated on same day renal ultrasound.    SOFT TISSUES: Unremarkable.    BONES: Unremarkable.      IMPRESSION:.    No pleural effusion.    Multiple hepatic lesions with largest area at the hepatic dome measuring   up to 4.7 cm; findings are new compared to 2/5/2020 CT abdomen. Recommend   dedicated CT or MRI abdomen to further assess and to exclude malignancy.    --- End of Report ---             DUANE AL MD; Attending Radiologist  This document has been electronically signed. May  2 2022 12:06PM    ACC: [id number] EXAM:  US KIDNEY(S)                          PROCEDURE DATE:  05/02/2022          INTERPRETATION:  CLINICAL INFORMATION: 71 years  Male with CKD.    COMPARISON: 12/25/2019    TECHNIQUE: Sonography of the kidneys and bladder.    FINDINGS:  Right kidney: 10.8 cm. No hydronephrosis. 2.1 x 2.0 x 2.0 cm midpole cyst   and 1.8 x 1.3 x 1.2 cm lower pole cyst.    Left kidney: 11.1 cm. 3.7 x 4.3 x 3.3 cm midpole cyst and 2.3 x 2.3 x 2.5   cm septated lower pole cyst.    Urinary bladder: Within normal limits.    IMPRESSION:  No hydronephrosis. Bilateral renal cysts.        --- End of Report ---            FLOYD VELAZCO MD; Attending Radiologist  This document has been electronically signed. May  2 2022 11:23AM    Imaging  Reviewed:  YES/NO    Consultant(s) Notes Reviewed:   YES/ No      Plan of care was discussed with patient and /or primary care giver; all questions and concerns were addressed

## 2022-05-04 NOTE — PROGRESS NOTE ADULT - ASSESSMENT
Patient is a 71M from [facility], who is ambulatory at baseline with a walker. He has Hx of HTN, CHF?, COPD, HLD, PAD, BPH, Prostate CA (s/p radiation), Bipolar Disorder, IBD - Crohn's Disease, R ileostomy bag (s/p sigmoid resection). Presented with SOB and LE edema, found with hyperkalemia on admission. Admitted to [facility] for cardio work up R/O CHF and hyperkalemia with PAWAN on CKD 4.  cardiology and nephrology consulted. started lasix. renal US shows no hyronephrosis, + renal cysts.   CT chest shows no pleural effusion, but with hepatic lesion 4.7cm largest. Also with transaminitis. GI and Hepatology consulted. ordered MRI abdomen r/o malignancy.   PAWAN worsening SCr,  started low dose IVF as discussed nephrology.   Pt was on 2L Oxygen, will monitor O2 sat on ambulation/at rest room air.   MRI reveals multiple liver lesions with mets  -  Hem/onc consulted, pending bone scan and liver biopsy. Patient having still hematuria but denies difficulty urinating, bladder scan negative for urinary retention. Will c/w post void bladder scan to monitor for urinary retention as pt refuse indwelling catheter.

## 2022-05-04 NOTE — PROGRESS NOTE ADULT - SUBJECTIVE AND OBJECTIVE BOX
Chief Complaint:  Patient is a 71y old  Male who presents with a chief complaint of acute CHF exacerbation (04 May 2022 13:27)      Reason for consult:    Interval Events:     Hospital Medications:  ARIPiprazole 15 milliGRAM(s) Oral daily  ferrous    sulfate 325 milliGRAM(s) Oral daily  finasteride 5 milliGRAM(s) Oral daily  gabapentin 600 milliGRAM(s) Oral two times a day  lamoTRIgine 200 milliGRAM(s) Oral daily  midodrine. 2.5 milliGRAM(s) Oral three times a day  pantoprazole    Tablet 40 milliGRAM(s) Oral before breakfast  sevelamer carbonate 800 milliGRAM(s) Oral three times a day with meals  sodium bicarbonate 650 milliGRAM(s) Oral three times a day  sodium chloride 0.9%. 1000 milliLiter(s) IV Continuous <Continuous>  tamsulosin 0.4 milliGRAM(s) Oral at bedtime  traMADol 50 milliGRAM(s) Oral every 6 hours PRN  traZODone 25 milliGRAM(s) Oral at bedtime      ROS:   General:  No  fevers, chills, night sweats, fatigue  Eyes:  Good vision, no reported pain  ENT:  No sore throat, pain, runny nose  CV:  No pain, palpitations  Pulm:  No dyspnea, cough  GI:  See HPI, otherwise negative  :  No  incontinence, nocturia  Muscle:  No pain, weakness  Neuro:  No memory problems  Psych:  No insomnia, mood problems, depression  Endocrine:  No polyuria, polydipsia, cold/heat intolerance  Heme:  No petechiae, ecchymosis, easy bruisability  Skin:  No rash    PHYSICAL EXAM:   Vital Signs:  Vital Signs Last 24 Hrs  T(C): 36.9 (04 May 2022 13:16), Max: 37 (04 May 2022 05:15)  T(F): 98.5 (04 May 2022 13:16), Max: 98.6 (04 May 2022 05:15)  HR: 90 (04 May 2022 13:16) (85 - 90)  BP: 124/57 (04 May 2022 13:16) (119/57 - 136/62)  BP(mean): --  RR: 18 (04 May 2022 13:16) (18 - 19)  SpO2: 95% (04 May 2022 13:16) (95% - 98%)  Daily     Daily     GENERAL: no acute distress  NEURO: alert, no asterixis  HEENT: anicteric sclera, no conjunctival pallor appreciated  CHEST: no respiratory distress, no accessory muscle use  CARDIAC: regular rate, rhythm  ABDOMEN: soft, non-tender, non-distended, no rebound or guarding  EXTREMITIES: warm, well perfused, no edema  SKIN: no lesions noted    LABS: reviewed                        11.7   8.88  )-----------( 207      ( 04 May 2022 00:23 )             36.4     05-04    133<L>  |  x   |  x   ----------------------------<  x   x    |  x   |  4.51<H>    Ca    8.8      03 May 2022 21:43  Phos  3.8     05-03  Mg     1.5     05-03    TPro  7.7  /  Alb  x   /  TBili  x   /  DBili  x   /  AST  x   /  ALT  x   /  AlkPhos  x   05-04    LIVER FUNCTIONS - ( 04 May 2022 09:21 )  Alb: x     / Pro: 7.7 g/dL / ALK PHOS: x     / ALT: x     / AST: x     / GGT: x             Interval Diagnostic Studies: see sunrise for full report   Chief Complaint:  Patient is a 71y old  Male who presents with a chief complaint of acute CHF exacerbation (04 May 2022 13:27)      Reason for consult: Liver lesions    Interval Events: Patient was seen and examined at bedside. Noted AFP> 5000. Pending bone scan. IR consult appreciated. Liver biopsy tentatively scheduled for 5/6, Friday.    Hospital Medications:  ARIPiprazole 15 milliGRAM(s) Oral daily  ferrous    sulfate 325 milliGRAM(s) Oral daily  finasteride 5 milliGRAM(s) Oral daily  gabapentin 600 milliGRAM(s) Oral two times a day  lamoTRIgine 200 milliGRAM(s) Oral daily  midodrine. 2.5 milliGRAM(s) Oral three times a day  pantoprazole    Tablet 40 milliGRAM(s) Oral before breakfast  sevelamer carbonate 800 milliGRAM(s) Oral three times a day with meals  sodium bicarbonate 650 milliGRAM(s) Oral three times a day  sodium chloride 0.9%. 1000 milliLiter(s) IV Continuous <Continuous>  tamsulosin 0.4 milliGRAM(s) Oral at bedtime  traMADol 50 milliGRAM(s) Oral every 6 hours PRN  traZODone 25 milliGRAM(s) Oral at bedtime      ROS:   General:  No  fevers, chills, night sweats, fatigue  Eyes:  Good vision, no reported pain  ENT:  No sore throat, pain, runny nose  CV:  No pain, palpitations  Pulm:  No dyspnea, cough  GI:  No abdominal pain, N/V, has ileostomy bag, denies bleeding  :  No  dysuria  Muscle:  No pain, or focal  weakness  Neuro:  No memory problems  Skin:  No rash      PHYSICAL EXAM:   Vital Signs:  Vital Signs Last 24 Hrs  T(C): 36.9 (04 May 2022 13:16), Max: 37 (04 May 2022 05:15)  T(F): 98.5 (04 May 2022 13:16), Max: 98.6 (04 May 2022 05:15)  HR: 90 (04 May 2022 13:16) (85 - 90)  BP: 124/57 (04 May 2022 13:16) (119/57 - 136/62)  BP(mean): --  RR: 18 (04 May 2022 13:16) (18 - 19)  SpO2: 95% (04 May 2022 13:16) (95% - 98%)  Daily     Daily     GENERAL: no acute distress  NEURO: awake, alert, oriented x3, no asterixis  HEENT: anicteric sclera, no conjunctival pallor appreciated  CHEST: no respiratory distress, no accessory muscle use  CARDIAC: regular rate, rhythm  ABDOMEN: soft, non-tender, non-distended, no rebound or guarding, ileostomy bag  EXTREMITIES: b/l LE edema improved reportedly  SKIN: No rash    LABS: reviewed                        11.7   8.88  )-----------( 207      ( 04 May 2022 00:23 )             36.4     05-04    133<L>  |  x   |  x   ----------------------------<  x   x    |  x   |  4.51<H>    Ca    8.8      03 May 2022 21:43  Phos  3.8     05-03  Mg     1.5     05-03    TPro  7.7  /  Alb  x   /  TBili  x   /  DBili  x   /  AST  x   /  ALT  x   /  AlkPhos  x   05-04    LIVER FUNCTIONS - ( 04 May 2022 09:21 )  Alb: x     / Pro: 7.7 g/dL / ALK PHOS: x     / ALT: x     / AST: x     / GGT: x             Interval Diagnostic Studies: see sunrise for full report

## 2022-05-05 NOTE — CONSULT NOTE ADULT - SUBJECTIVE AND OBJECTIVE BOX
Very pleasant 71 year old gentleman with history of HTN, CHF, COPD, HLD, PAD, BPH, Prostate CA (s/p radiation), Bipolar Disorder, IBD - Crohn's Disease, R ileostomy bag (s/p sigmoid resection). Presented to ED with SOB and LE edema, found with hyperkalemia on admission. Admitted to tele for cardio work up R/O CHF and hyperkalemia with PAWAN on CKD 4.  Renal US shows no hyronephrosis, + renal cysts. CT chest shows no pleural effusion, but with hepatic lesion 4.7cm largest. Also with transaminitis. 5/3  MRI reveals multiple liver lesions with mets. On admission he was noted to have no blood in the urine, however two days ago he reports painless gross hematuria. Urine is now clear yellow.    No specific timing to his symptoms. No aggravating or alleviating factors that he knows of. No pain.     PAST MEDICAL & SURGICAL HISTORY:  COPD, mild    Anemia    Bipolar disorder    IBD (inflammatory bowel disease)    HTN (hypertension)    HLD (hyperlipidemia)    PAD (peripheral artery disease)    CKD (chronic kidney disease)    Prostate CA    BPH with urinary obstruction    History of creation of ostomy    Family History: No family history of  malignancy  Social History: Does not smoke    ROS: All others negative except as noted above.    MEDICATIONS  (STANDING):  ARIPiprazole 15 milliGRAM(s) Oral daily  ferrous    sulfate 325 milliGRAM(s) Oral daily  finasteride 5 milliGRAM(s) Oral daily  gabapentin 600 milliGRAM(s) Oral two times a day  lamoTRIgine 200 milliGRAM(s) Oral daily  midodrine. 2.5 milliGRAM(s) Oral three times a day  pantoprazole    Tablet 40 milliGRAM(s) Oral before breakfast  sevelamer carbonate 800 milliGRAM(s) Oral three times a day with meals  sodium bicarbonate 650 milliGRAM(s) Oral three times a day  sodium chloride 0.9%. 1000 milliLiter(s) (70 mL/Hr) IV Continuous <Continuous>  sodium chloride 0.9%. 1000 milliLiter(s) (50 mL/Hr) IV Continuous <Continuous>  tamsulosin 0.4 milliGRAM(s) Oral at bedtime  traZODone 25 milliGRAM(s) Oral at bedtime    MEDICATIONS  (PRN):  traMADol 50 milliGRAM(s) Oral every 6 hours PRN Severe Pain (7 - 10)    Allergies    No Known Allergies    Vital Signs Last 24 Hrs  T(C): 36.4 (05 May 2022 04:33), Max: 36.9 (04 May 2022 13:16)  T(F): 97.6 (05 May 2022 04:33), Max: 98.5 (04 May 2022 13:16)  HR: 90 (05 May 2022 04:33) (90 - 90)  BP: 115/70 (05 May 2022 04:33) (115/70 - 124/57)  BP(mean): --  RR: 18 (05 May 2022 04:33) (18 - 18)  SpO2: 95% (05 May 2022 04:33) (95% - 95%)                          11.0   9.06  )-----------( 223      ( 05 May 2022 07:16 )             34.3     05-05    133<L>  |  104  |  127<H>  ----------------------------<  97  5.1   |  15<L>  |  5.02<H>    Ca    9.3      05 May 2022 07:16    TPro  8.5<H>  /  Alb  3.0<L>  /  TBili  1.1  /  DBili  x   /  AST  250<H>  /  ALT  75<H>  /  AlkPhos  199<H>  05-05      ACC: [id number] EXAM:  MR ABDOMEN                          *** ADDENDUM***    Some of the hepatic lesions have small T1 hyperintense components on   fat-suppressed T1-weighted gradient echo images, suggestive of blood   products.    --- End of Report---    *** END OF ADDENDUM***      PROCEDURE DATE:  05/03/2022          INTERPRETATION:  CLINICAL INFORMATION: Hepatic lesions. History of   prostate cancer.    COMPARISON: None.    CONTRAST/COMPLICATIONS:  IV Contrast: NONE  Oral Contrast: NONE  Complications: None reported at time of study completion    PROCEDURE:  MRI of the abdomen was performed.    FINDINGS:  LOWER CHEST: Within normal limits.    LIVER: Innumerable mildly T2 hyperintense lesions are seen throughout the   liver, suggestive ofmetastasis. The largest lesion measures 13.8 x 11.7   x 11.7 cm in the liver dome.  BILE DUCTS: Normal caliber.  GALLBLADDER: Cholelithiasis.  SPLEEN: Within normal limits.  PANCREAS: Within normal limits.  ADRENALS: Within normal limits.  KIDNEYS/URETERS: Multiple brightly T2 hyperintense lesion in the kidneys   bilaterally, representing probable cysts.    VISUALIZED PORTIONS:  BOWEL: Small hiatal hernia. Right lower quadrant ostomy.  PERITONEUM: No ascites.  VESSELS: Within normal limits.  RETROPERITONEUM/LYMPH NODES: No lymphadenopathy.  ABDOMINAL WALL: Within normal limits.  BONES: Multiple T2 hyperintense and T2 hyperintense lesions in the   thoracolumbar spine may represent osseous metastasis. Whole-body bone   scan may be pursued for further evaluation.    IMPRESSION:  Innumerable mildly T2 hyperintense lesions are seen throughout the liver,   suggestive of metastasis. The largest lesion measures 13.8 x 11.7 x 11.7   cm in the liver dome.    Multiple T2 hyperintense and T2 hyperintense lesions in the thoracolumbar   spine may represent osseous metastasis. Whole-body bone scan may be   pursued for further evaluation.    Cholelithiasis.    --- End of Report ---    ***Please see the addendum at the top of this report. It may contain   additional important information or changes.****        JESSENIA LONGORIA MD; Attending Radiologist  This document has been electronically signed. May  3 2022 12:34PM  Addend:JESSENIA LONGORIA MD; Attending Radiologist  This addendum was electronically signed on: May  3 2022 12:45PM.

## 2022-05-05 NOTE — PROGRESS NOTE ADULT - SUBJECTIVE AND OBJECTIVE BOX
Patient for liver biopsy in IR tomorrow.  Please keep patient NPO, send early AM labs including CBC, BMP, and PT, INR / PTT.  Hold all anticoagulation, NSAIDS, and antiplatelet medication. Please send a new Covid PCR today.

## 2022-05-05 NOTE — PROGRESS NOTE ADULT - ASSESSMENT
Patient is a 71M from [facility], who is ambulatory at baseline with a walker. He has Hx of HTN, CHF?, COPD, HLD, PAD, BPH, Prostate CA (s/p radiation), Bipolar Disorder, IBD - Crohn's Disease, R ileostomy bag (s/p sigmoid resection). Presented with SOB and LE edema, found with hyperkalemia on admission. Admitted to [facility] for cardio work up R/O CHF and hyperkalemia with PAWAN on CKD 4.  cardiology and nephrology consulted. started lasix. renal US shows no hyronephrosis, + renal cysts.   CT chest shows no pleural effusion, but with hepatic lesion 4.7cm largest. Also with transaminitis. GI and Hepatology consulted. ordered MRI abdomen r/o malignancy.   PAWAN worsening SCr,  started low dose IVF as discussed nephrology.   Pt was on 2L Oxygen, will monitor O2 sat on ambulation/at rest room air.   MRI reveals multiple liver lesions with mets  -  Hem/onc consulted, pending bone scan and liver biopsy. Patient having still hematuria but denies difficulty urinating, bladder scan negative for urinary retention. Will c/w post void bladder scan to monitor for urinary retention as pt refuse indwelling catheter.     05/05- Pt seen and examined at bedside, US guided IV inserted, plan for bone scan today and liver biopsy tomorrow. NPO after midnight, AM labs and dc ac. Patient is a 71M from [facility], who is ambulatory at baseline with a walker. He has Hx of HTN, CHF?, COPD, HLD, PAD, BPH, Prostate CA (s/p radiation), Bipolar Disorder, IBD - Crohn's Disease, R ileostomy bag (s/p sigmoid resection). Presented with SOB and LE edema, found with hyperkalemia on admission. Admitted to [facility] for cardio work up R/O CHF and hyperkalemia with PAWAN on CKD 4.  cardiology and nephrology consulted. started lasix. renal US shows no hyronephrosis, + renal cysts.   CT chest shows no pleural effusion, but with hepatic lesion 4.7cm largest. Also with transaminitis. GI and Hepatology consulted. ordered MRI abdomen r/o malignancy.   PAWAN worsening SCr,  started low dose IVF as discussed nephrology.   Pt was on 2L Oxygen, will monitor O2 sat on ambulation/at rest room air.   MRI reveals multiple liver lesions with mets  -  Hem/onc consulted, pending bone scan and liver biopsy. Patient having still hematuria but denies difficulty urinating, bladder scan negative for urinary retention. Will c/w post void bladder scan to monitor for urinary retention as pt refuse indwelling catheter.     05/05- Pt seen and examined at bedside, US guided IV inserted, plan for bone scan today and liver biopsy tomorrow. NPO after midnight, AM labs and dc ac. Patient will need HD as SCr is trending up. As per nephro dr Pond pt scheduled for  Fariha and after HD tomorrow. Patient would like to be DNR/DNI with his brother as HCP. Pending GOC and MOLST form.

## 2022-05-05 NOTE — PROGRESS NOTE ADULT - SUBJECTIVE AND OBJECTIVE BOX
Pt seen, feeling well, no complaints.     MEDICATIONS  (STANDING):  ARIPiprazole 15 milliGRAM(s) Oral daily  ferrous    sulfate 325 milliGRAM(s) Oral daily  finasteride 5 milliGRAM(s) Oral daily  gabapentin 600 milliGRAM(s) Oral two times a day  lamoTRIgine 200 milliGRAM(s) Oral daily  midodrine. 2.5 milliGRAM(s) Oral three times a day  pantoprazole    Tablet 40 milliGRAM(s) Oral before breakfast  sevelamer carbonate 800 milliGRAM(s) Oral three times a day with meals  sodium bicarbonate 650 milliGRAM(s) Oral three times a day  sodium chloride 0.9%. 1000 milliLiter(s) (70 mL/Hr) IV Continuous <Continuous>  sodium chloride 0.9%. 1000 milliLiter(s) (50 mL/Hr) IV Continuous <Continuous>  tamsulosin 0.4 milliGRAM(s) Oral at bedtime  traZODone 25 milliGRAM(s) Oral at bedtime    MEDICATIONS  (PRN):  traMADol 50 milliGRAM(s) Oral every 6 hours PRN Severe Pain (7 - 10)      ROS  no pain/sob/cp/n/v/d/ad pain  limited 2/2 participation    Vital Signs Last 24 Hrs  T(C): 36.4 (05 May 2022 04:33), Max: 36.9 (04 May 2022 13:16)  T(F): 97.6 (05 May 2022 04:33), Max: 98.5 (04 May 2022 13:16)  HR: 90 (05 May 2022 04:33) (90 - 90)  BP: 115/70 (05 May 2022 04:33) (115/70 - 124/57)  BP(mean): --  RR: 18 (05 May 2022 04:33) (18 - 18)  SpO2: 95% (05 May 2022 04:33) (95% - 95%)    PE  NAD  Awake, alert  Anicteric                          11.0   9.06  )-----------( 223      ( 05 May 2022 07:16 )             34.3       05-05    133<L>  |  104  |  127<H>  ----------------------------<  97  5.1   |  15<L>  |  5.02<H>    Ca    9.3      05 May 2022 07:16    TPro  8.5<H>  /  Alb  3.0<L>  /  TBili  1.1  /  DBili  x   /  AST  250<H>  /  ALT  75<H>  /  AlkPhos  199<H>  05-05

## 2022-05-05 NOTE — PROGRESS NOTE ADULT - ASSESSMENT
71y Male from [facility] with Hx of HTN, CHF?, COPD, HLD, PAD, BPH, Prostate CA (s/p radiation), Bipolar Disorder, IBD - Crohn's Disease, R ileostomy bag (s/p sigmoid resection), COVID-19 (2020) and s/p COVID19 vaccination (Pfizer x3) was brought to [facility] ED on 4/28/22 b/o 2 days worsening bilateral leg swelling with difficulty ambulating, and SOB with SpO2 93-94% on RA on arrival.  He was found to have bilateral vascular congestion on CXR due to suspected acute exacerbation of CHF, and PAWAN on CKD (BUN/Cr 88/3.28), with hyperkalemia (K 6.1). 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  Being followed by cardiology, PBNP was mildly elevated, and TTE showed LVEF 55%, normal diastolic function and normal RV function, thus did not appear to be in acute CHF exacerbation.    Hepatology was consulted b/o liver lesion and abnormal liver enzymes.     # Abnormal liver enzymes, AST >>ALT  - CPK nl. HIV neg. Hep B and C as below. Hep A IgM neg.   - Hypotensive, requiring midodrine, with renal dysfunction, Ig panel wnl, K/L ratio WNL, F/u UPEP  - No Hx of EtOH  - Follow up / obtain RUBIA, SMA, LKM, AMA, A1AT, ceruloplasmin  - Avoid hepatotoxic medications, agree holding statin  - Please, obtain collateral information about timing of lamotrigine, because its hepatotoxicity is well established, and consider alternative.     # Pos HCV ab  - HCV PCR neg with the Hx of Hep C and treatment, suggests SVR.   - Cryoglobulin negative  - HIV neg    # HBcAb IgM pos, but HBsAg neg  - HBsAg neg, HBV DNA neg, but HBcAb pos, HBeAb pos, past infection. Can repeat HBcAb IgM.     # Liver lesions  - on CT chest incidentally found a 4.7x4.3 cm partially imaged hypodense lesion and in L lobe another 2.7 cm lesion, as well as nodular thickening along hepatic capsule, all are new from the 2/2020 CT a/p  - CEA and CA 19-9 WNL, but AFP> 5000, PSA WNL  - Could not get contrast CT b/o PAWAN on CKD. MRI was done w/o contrast showing Innumerable mildly T2 hyperintense lesions  throughout the liver, suggestive of metastasis. The largest lesion measures 13.8 x 11.7 x 11.7 cm in the liver dome. Multiple T2 hyperintense and T2 hyperintense lesions in the thoracolumbar spine may represent osseous metastasis.  - No fever or leukocytosis  - IR consult appreciated, tentatively scheduled for liver biopsy for 5/6 from one of the liver lesions. To d/w IR possible parenchymal biopsy same time too. Nephrology follow up appreciated, no Gadavist was recommended. F/u IR recommendations.   - Pending bone scan.  - Hem/onc consult appreciated.     # PAWAN on CKD  # Hematuria  # Hx of prostate Ca  - F/u nephrology recommendations, HD initiation was discussed with patient. Rare reports exists of HRS in metastatic liver disease, but Nelia 45 and has macroscopic hematuria, also has underlying CKD.   - F/u urology recommendations, pending repeat CT  - F/u cardiology recommendations    Thank you for consult  Will continue to follow  D/w primary team

## 2022-05-05 NOTE — PROGRESS NOTE ADULT - ASSESSMENT
WILMER SELLERS is a 71y Male who presents with a chief complaint of CHF exacerbation.    Liver and Bone Lesions  - MRI and CT imaging reviewed; multiple liver lesions and skeletal lesions suspicious of metastatic disease.  - AFP elevated at 5,338.   - PSA, Ca125, CEA, Ca19-9 neg  - MM labs pending  - Bone scan is pending.  - Interventional radiology consulted for liver biopsy, since cannot get MRI with IV contrast due to CKD/PAWAN, planned for tomorrow  - if bx nondiagnostic, will plan for outpt pet/ct vs other methods of dx    Hematuria  - Urology following; patient refusing phillips catheter at this time.     Acute Kidney Injury  - Nephrology following; thought to be secondary to diuresis.     Will continue to follow. D/w pt and primary team.

## 2022-05-05 NOTE — PROGRESS NOTE ADULT - PROBLEM SELECTOR PLAN 1
Patient found to have RML lung mass likely metastatic  bone scan to r/o metastasis as pt has hx of prostate CA  Liver lesions found on MRI  Liver biopsy by IR scheduled for Friday  NPO after MN, AM labs  hepatology dr. Portillo  Hem/onc dr Rivers/ dr Chao

## 2022-05-05 NOTE — PROGRESS NOTE ADULT - CONVERSATION DETAILS
CASE DISCUSSED AT LENGTH WITH PATIENT. OF NOTE, PATIENT IS AWARE THAT HE HAS SIGNIFICANT CHRONIC COMORBIDITIES AND ACUTELY IS UNDERGOING WORKUP FOR MALIGNANCY AS WELL AS POSSIBLE INITIATION OF HD. PATIENT EXPRESSED HE IS AGREEABLE FOR HD. REGARDING CPR AND INTUBATION, IN THE EVENT OF CARDIOPULMONARY ARREST, PATIENT EXPRESSED HE WOULD WISH TO BE DNR/DNI. MOLST WAS FILLED TO ATTEST HIS WISHES.    PATIENT ALSO WISHED THAT THE ABOVE DISCUSSION BE SHARE WITH HIS BROTHER - KERI @   VIA PHONE [C ]. PATIENT WISHES FOR HIS BROTHER TO BE HIS EMERGENCY CONTACT AND HEALTH CARE PROXY.
discussed with patient for GOC, plan of care. He wishes full code, RASHID in chart.
Patient wants to remain full code, and all measures taken to save his life   if he is unable to make medical decisions he designates his brother   attempt to contact Alexey gray

## 2022-05-05 NOTE — PROGRESS NOTE ADULT - SUBJECTIVE AND OBJECTIVE BOX
NEPHROLOGY MEDICAL CARE, [facility] - Dr. Emerson Pond/ Dr. Nallely Curran/ Dr. Kirby Angel/ Dr. Pippa Reis    Patient was seen and examined at bedside.    CC: patient has hematuria last night but nothing this morning.  pt has mild sob but NAD with oxygen.    Vital Signs Last 24 Hrs  T(C): 36.4 (05 May 2022 04:33), Max: 36.9 (04 May 2022 13:16)  T(F): 97.6 (05 May 2022 04:33), Max: 98.5 (04 May 2022 13:16)  HR: 90 (05 May 2022 04:33) (90 - 90)  BP: 115/70 (05 May 2022 04:33) (115/70 - 124/57)  BP(mean): --  RR: 18 (05 May 2022 04:33) (18 - 18)  SpO2: 95% (05 May 2022 04:33) (95% - 95%)    05-04 @ 07:01  -  05-05 @ 07:00  --------------------------------------------------------  IN: 0 mL / OUT: 500 mL / NET: -500 mL    05-05 @ 07:01  -  05-05 @ 12:43  --------------------------------------------------------  IN: 0 mL / OUT: 100 mL / NET: -100 mL        PHYSICAL EXAM:  General: No acute respiratory distress.  Eyes: conjunctiva and sclera clear  ENMT: Atraumatic, Normocephalic, supple, No JVD present  Respiratory: Bilateral decreased BS and no rhonchi, wheezing  Cardiovascular: S1S2+; no m/r/g  Gastrointestinal: Soft, Non-tender, Nondistended; Bowel sounds present   Neuro:  Awake, Alert & Oriented X3  Ext:  no pedal edema, No Cyanosis  Skin: No visible rashes      MEDICATIONS:  MEDICATIONS  (STANDING):  ARIPiprazole 15 milliGRAM(s) Oral daily  ferrous    sulfate 325 milliGRAM(s) Oral daily  finasteride 5 milliGRAM(s) Oral daily  gabapentin 600 milliGRAM(s) Oral two times a day  lamoTRIgine 200 milliGRAM(s) Oral daily  midodrine. 2.5 milliGRAM(s) Oral three times a day  pantoprazole    Tablet 40 milliGRAM(s) Oral before breakfast  sevelamer carbonate 800 milliGRAM(s) Oral three times a day with meals  sodium bicarbonate 650 milliGRAM(s) Oral three times a day  tamsulosin 0.4 milliGRAM(s) Oral at bedtime  traZODone 25 milliGRAM(s) Oral at bedtime    MEDICATIONS  (PRN):  traMADol 50 milliGRAM(s) Oral every 6 hours PRN Severe Pain (7 - 10)          LABS:                        11.0   9.06  )-----------( 223      ( 05 May 2022 07:16 )             34.3     05-05    133<L>  |  104  |  127<H>  ----------------------------<  97  5.1   |  15<L>  |  5.02<H>    Ca    9.3      05 May 2022 07:16    TPro  8.5<H>  /  Alb  3.0<L>  /  TBili  1.1  /  DBili  x   /  AST  250<H>  /  ALT  75<H>  /  AlkPhos  199<H>  05-05    PT/INR - ( 04 May 2022 06:15 )   PT: 15.9 sec;   INR: 1.33 ratio               Urine studies    PTH and Vit D:

## 2022-05-05 NOTE — PROGRESS NOTE ADULT - CONVERSATION/DISCUSSION
Diagnosis/Prognosis/MOLST Discussed/Treatment Options

## 2022-05-05 NOTE — PROGRESS NOTE ADULT - SUBJECTIVE AND OBJECTIVE BOX
Chief Complaint:  Patient is a 71y old  Male who presents with a chief complaint of acute CHF exacerbation (04 May 2022 17:13)      Reason for consult:    Interval Events:     Hospital Medications:  ARIPiprazole 15 milliGRAM(s) Oral daily  ferrous    sulfate 325 milliGRAM(s) Oral daily  finasteride 5 milliGRAM(s) Oral daily  gabapentin 600 milliGRAM(s) Oral two times a day  lamoTRIgine 200 milliGRAM(s) Oral daily  midodrine. 2.5 milliGRAM(s) Oral three times a day  pantoprazole    Tablet 40 milliGRAM(s) Oral before breakfast  sevelamer carbonate 800 milliGRAM(s) Oral three times a day with meals  sodium bicarbonate 650 milliGRAM(s) Oral three times a day  sodium chloride 0.9%. 1000 milliLiter(s) IV Continuous <Continuous>  sodium chloride 0.9%. 1000 milliLiter(s) IV Continuous <Continuous>  tamsulosin 0.4 milliGRAM(s) Oral at bedtime  traMADol 50 milliGRAM(s) Oral every 6 hours PRN  traZODone 25 milliGRAM(s) Oral at bedtime      ROS:   General:  No  fevers, chills, night sweats, fatigue  Eyes:  Good vision, no reported pain  ENT:  No sore throat, pain, runny nose  CV:  No pain, palpitations  Pulm:  No dyspnea, cough  GI:  See HPI, otherwise negative  :  No  incontinence, nocturia  Muscle:  No pain, weakness  Neuro:  No memory problems  Psych:  No insomnia, mood problems, depression  Endocrine:  No polyuria, polydipsia, cold/heat intolerance  Heme:  No petechiae, ecchymosis, easy bruisability  Skin:  No rash    PHYSICAL EXAM:   Vital Signs:  Vital Signs Last 24 Hrs  T(C): 36.4 (05 May 2022 04:33), Max: 36.9 (04 May 2022 13:16)  T(F): 97.6 (05 May 2022 04:33), Max: 98.5 (04 May 2022 13:16)  HR: 90 (05 May 2022 04:33) (90 - 90)  BP: 115/70 (05 May 2022 04:33) (115/70 - 124/57)  BP(mean): --  RR: 18 (05 May 2022 04:33) (18 - 18)  SpO2: 95% (05 May 2022 04:33) (95% - 95%)  Daily     Daily     GENERAL: no acute distress  NEURO: alert, no asterixis  HEENT: anicteric sclera, no conjunctival pallor appreciated  CHEST: no respiratory distress, no accessory muscle use  CARDIAC: regular rate, rhythm  ABDOMEN: soft, non-tender, non-distended, no rebound or guarding  EXTREMITIES: warm, well perfused, no edema  SKIN: no lesions noted    LABS: reviewed                        11.0   9.06  )-----------( 223      ( 05 May 2022 07:16 )             34.3     05-05    133<L>  |  104  |  127<H>  ----------------------------<  97  5.1   |  15<L>  |  5.02<H>    Ca    9.3      05 May 2022 07:16    TPro  8.5<H>  /  Alb  3.0<L>  /  TBili  1.1  /  DBili  x   /  AST  250<H>  /  ALT  75<H>  /  AlkPhos  199<H>  05-05    LIVER FUNCTIONS - ( 05 May 2022 07:16 )  Alb: 3.0 g/dL / Pro: 8.5 g/dL / ALK PHOS: 199 U/L / ALT: 75 U/L DA / AST: 250 U/L / GGT: x             Interval Diagnostic Studies: see sunrise for full report   Chief Complaint:  Patient is a 71y old  Male who presents with a chief complaint of acute CHF exacerbation (04 May 2022 17:13)      Reason for consult: Liver lesions    Interval Events: Patient was seen and examined at bedtime. No new complaints. Cr uptrending. Pending bone scan due to lack of iv access, pending midline today. Still noted hematuria. Tentatively scheduled for liver biopsy tomorrow.    Hospital Medications:  ARIPiprazole 15 milliGRAM(s) Oral daily  ferrous    sulfate 325 milliGRAM(s) Oral daily  finasteride 5 milliGRAM(s) Oral daily  gabapentin 600 milliGRAM(s) Oral two times a day  lamoTRIgine 200 milliGRAM(s) Oral daily  midodrine. 2.5 milliGRAM(s) Oral three times a day  pantoprazole    Tablet 40 milliGRAM(s) Oral before breakfast  sevelamer carbonate 800 milliGRAM(s) Oral three times a day with meals  sodium bicarbonate 650 milliGRAM(s) Oral three times a day  sodium chloride 0.9%. 1000 milliLiter(s) IV Continuous <Continuous>  sodium chloride 0.9%. 1000 milliLiter(s) IV Continuous <Continuous>  tamsulosin 0.4 milliGRAM(s) Oral at bedtime  traMADol 50 milliGRAM(s) Oral every 6 hours PRN  traZODone 25 milliGRAM(s) Oral at bedtime      ROS:   General:  No  fevers, chills, night sweats, fatigue  Eyes:  Good vision, no reported pain  ENT:  No sore throat, pain, runny nose  CV:  No pain, palpitations  Pulm:  No dyspnea, cough  GI:  No abdominal pain, N/V, has ileostomy bag, denies bleeding  :  Hematuria  Muscle:  No pain, or focal  weakness  Neuro:  No memory problems  Skin:  No rash        PHYSICAL EXAM:   Vital Signs:  Vital Signs Last 24 Hrs  T(C): 36.4 (05 May 2022 04:33), Max: 36.9 (04 May 2022 13:16)  T(F): 97.6 (05 May 2022 04:33), Max: 98.5 (04 May 2022 13:16)  HR: 90 (05 May 2022 04:33) (90 - 90)  BP: 115/70 (05 May 2022 04:33) (115/70 - 124/57)  BP(mean): --  RR: 18 (05 May 2022 04:33) (18 - 18)  SpO2: 95% (05 May 2022 04:33) (95% - 95%)  Daily     Daily     GENERAL: no acute distress  NEURO: awake, alert, oriented x3, no asterixis  HEENT: anicteric sclera, no conjunctival pallor appreciated  CHEST: no respiratory distress, no accessory muscle use  CARDIAC: regular rate, rhythm  ABDOMEN: soft, non-tender, non-distended, no rebound or guarding, ileostomy bag  EXTREMITIES: b/l LE edema improved reportedly  SKIN: No rash    LABS: reviewed                        11.0   9.06  )-----------( 223      ( 05 May 2022 07:16 )             34.3     05-05    133<L>  |  104  |  127<H>  ----------------------------<  97  5.1   |  15<L>  |  5.02<H>    Ca    9.3      05 May 2022 07:16    TPro  8.5<H>  /  Alb  3.0<L>  /  TBili  1.1  /  DBili  x   /  AST  250<H>  /  ALT  75<H>  /  AlkPhos  199<H>  05-05    LIVER FUNCTIONS - ( 05 May 2022 07:16 )  Alb: 3.0 g/dL / Pro: 8.5 g/dL / ALK PHOS: 199 U/L / ALT: 75 U/L DA / AST: 250 U/L / GGT: x             Interval Diagnostic Studies: see sunrise for full report

## 2022-05-05 NOTE — PROGRESS NOTE ADULT - ASSESSMENT
1. PAWAN due to ATN   -Bun/Cr elevated. Patient has no uremic symptoms.  off lasix since tuesday. No response to fluids before.   - Patient was explained in detail the risks (hypotension, angina, MI, and cardiac arrest), benefits and alternative modalities (no dialysis and conseravative medical management) for dialysis. Patient given time to ask questions and all questions were answered. Patient gave consent for renal replacement therapy.      Urinalysis shows no proteinuria. spot protein to creatinine ratio is 900mg.  -renal sono show no hydro.  -Adjust meds to eGFR and avoid IV Gadolinium contrast,NSAIDs, and phosphate enema.  -Monitor I/O's daily.   -Monitor SMA daily.  2. CKD stage 4 most likely due to ischemic nephropathy and h/o ATN with renal recovery.  -Baseline Scr around 3.2mg/dL in April 2020.  -Keep patient euvolemic and renal diet  -Avoid Nephrotoxic Meds/ Agents such as (NSAIDs, IV contrast, Aminoglycosides such as gentamicin, -Gadolinium contrast, Phosphate containing enemas, etc..)  -Adjust Medications according to eGFR  3. Anemia:   -hb is stable; continue iron tabs. monitor CBC  4. MBD:   -phos is improving; mild hyperca, which improved and off calcitirol; on renvela 1 tab tid;   -pth is okay at 41.  5. Hyperkalemia due to pawan on ckd.   -K is elevated and give 1 dose of lokelma.   -on low K diet. give Lokelma prn if K >5.5  -Keep pt euvolemic. Avoid ACE inh/ ARBs, NSAIDs, and Aldactone or potassium sparing diuretics. Monitor K+ daily.  6. Fluid Overload:  -clinically improved. off diuretics for now.   -CT chest w/o contrast shows no effusion; CXR is about the same.   7. Acidosis:  -CO2 is stable.    -on sodium bicarbonate 650mg oral tid. monitor CO2  8. h/o Hypotension:  -bp is acceptable and on midodrine 2.5mg tid      9. Elevated LFTs  - off lipitor for now.  -hepatitis panel shows hep C+ve.  discussed with pt, pt had known hep C and was treated for in the past; hep C RNA is not detected.   -C3/C4 and RF negative, cryoglobulinemia is pending.    -ct scan shows hepatic mass; Hepatology consulted and MRI w/o contrast shows liver mass and mets.  -Hepatology and Oncology consulted.   -Plan for IR guided liver biopsy on friday.   -Bone scan is pending r/o bone mets. Suspecting myeloma and myeloma labs (spep, upep, IF and serum FLC).  -pt has pawan on ckd and GFR <15cc/min at present; At present, the risk for nephrogenic systemic fibrosis, weigh out the benefits thus would avoid gadolinium contrast administration. Would consider alternative diagnostic strategies including and other imaging modalities and would recommend to proceed with liver biopsy.     Discussed with patient in detail regarding the renal plan and care  Discussed the assessment and plan with Primary Team/Nurse   1. PAWAN due to ATN with questionable HRS.  -Bun/Cr elevated and worsening everyday for last 4 days and not improving seems like ATN, recommend to initiate HD.  Patient has no uremic symptoms.  off lasix since tuesday. No response to fluids before.   - Patient was explained in detail the risks (hypotension, angina, MI, and cardiac arrest), benefits and alternative modalities (no dialysis and conseravative medical management) for dialysis. Patient given time to ask questions and all questions were answered. Patient gave consent for renal replacement therapy.  -pt also had gross hematuria and unclear etiology seems less likely nephritis and send RUBIA, ANCA and Anit-GBM.   -consult IR for shiley placement tomorrow and plan for HD afterwards.   Urinalysis shows no proteinuria. spot protein to creatinine ratio is 900mg.  -renal sono show no hydro.  -Adjust meds to eGFR and avoid IV Gadolinium contrast,NSAIDs, and phosphate enema.  -Monitor I/O's daily.   -Monitor SMA daily.  2. CKD stage 4 most likely due to ischemic nephropathy and h/o ATN with renal recovery.  -Baseline Scr around 3.2mg/dL in April 2020.  -Keep patient euvolemic and renal diet  -Avoid Nephrotoxic Meds/ Agents such as (NSAIDs, IV contrast, Aminoglycosides such as gentamicin, -Gadolinium contrast, Phosphate containing enemas, etc..)  -Adjust Medications according to eGFR  3. Anemia:   -hb is stable; continue iron tabs. monitor CBC  4. MBD:   -phos is stable. previous mild hyperca, which improved and off calcitirol; on renvela 1 tab tid;   -pth is okay at 41.  5. Hyperkalemia due to pawan on ckd.   -K resolved.   -on low K diet. give Lokelma prn if K >5.5  -Keep pt euvolemic. Avoid ACE inh/ ARBs, NSAIDs, and Aldactone or potassium sparing diuretics. Monitor K+ daily.  6. Fluid Overload:  -clinically improved. off diuretics for now.   -CT chest w/o contrast shows no effusion;   7. Acidosis:  -CO2 is trending down.  -on sodium bicarbonate 650mg oral tid. monitor CO2  8. h/o Hypotension:  -bp is acceptable and on midodrine 2.5mg tid      9. Elevated LFTs  - off lipitor for now.  -hepatitis panel shows hep C+ve.  discussed with pt, pt had known hep C and was treated for in the past; hep C RNA is not detected.   -C3/C4 and RF negative, cryoglobulinemia is pending.    -ct scan shows hepatic mass; Hepatology consulted and MRI w/o contrast shows liver mass and mets.  -Hepatology and Oncology consulted.   -Plan for IR guided liver biopsy on friday?   -Bone scan is pending r/o bone mets. Suspecting myeloma and myeloma labs f/u spep, upep, IF and serum FLC is within range for ckd.  -pt has pawan on ckd and GFR <15cc/min at present; At present, the risk for nephrogenic systemic fibrosis, weigh out the benefits thus would avoid gadolinium contrast administration. Would consider alternative diagnostic strategies including and other imaging modalities.    Discussed with patient in detail regarding the renal plan and care  Called Alexey Sidhu (ph: [phone]), no response.   Discussed the assessment and plan with Primary Team/Nurse   1. PAWAN due to ATN with questionable HRS.  -Bun/Cr elevated and worsening everyday for last 4 days and not improving seems like ATN, recommend to initiate HD.  Patient has no uremic symptoms.  off lasix since tuesday. No response to fluids before.   - Patient was explained in detail the risks (hypotension, angina, MI, and cardiac arrest), benefits and alternative modalities (no dialysis and conseravative medical management) for dialysis. Patient given time to ask questions and all questions were answered. Patient gave consent for renal replacement therapy.  -pt also had gross hematuria and unclear etiology seems less likely nephritis and send RUBIA, ANCA and Anit-GBM.   -consult IR for shiley placement tomorrow and plan for HD afterwards.   Urinalysis shows no proteinuria. spot protein to creatinine ratio is 900mg.  -renal sono show no hydro.  -Adjust meds to eGFR and avoid IV Gadolinium contrast,NSAIDs, and phosphate enema.  -Monitor I/O's daily.   -Monitor SMA daily.  2. CKD stage 4 most likely due to ischemic nephropathy and h/o ATN with renal recovery.  -Baseline Scr around 3.2mg/dL in April 2020.  -Keep patient euvolemic and renal diet  -Avoid Nephrotoxic Meds/ Agents such as (NSAIDs, IV contrast, Aminoglycosides such as gentamicin, -Gadolinium contrast, Phosphate containing enemas, etc..)  -Adjust Medications according to eGFR  3. Anemia:   -hb is stable; continue iron tabs. monitor CBC  4. MBD:   -phos is stable. previous mild hyperca, which improved and off calcitirol; on renvela 1 tab tid;   -pth is okay at 41.  5. Hyperkalemia due to pawan on ckd.   -K resolved.   -on low K diet. give Lokelma prn if K >5.5  -Keep pt euvolemic. Avoid ACE inh/ ARBs, NSAIDs, and Aldactone or potassium sparing diuretics. Monitor K+ daily.  6. Fluid Overload:  -clinically improved. off diuretics for now.   -CT chest w/o contrast shows no effusion;   7. Acidosis:  -CO2 is trending down.  -on sodium bicarbonate 650mg oral tid. monitor CO2  8. h/o Hypotension:  -bp is acceptable and on midodrine 2.5mg tid      9. Elevated LFTs  - off lipitor for now.  -hepatitis panel shows hep C+ve.  discussed with pt, pt had known hep C and was treated for in the past; hep C RNA is not detected.   -C3/C4 and RF negative, cryoglobulinemia is pending.    -ct scan shows hepatic mass; Hepatology consulted and MRI w/o contrast shows liver mass and mets.  -Hepatology and Oncology consulted.   -Plan for IR guided liver biopsy on friday?   -Bone scan is pending r/o bone mets. Suspecting myeloma and myeloma labs f/u spep, upep, IF and serum FLC is within range for ckd.  -pt has pawan on ckd and GFR <15cc/min at present; At present, the risk for nephrogenic systemic fibrosis, weigh out the benefits thus would avoid gadolinium contrast administration. Would consider alternative diagnostic strategies including and other imaging modalities.    Discussed with patient in detail regarding the renal plan and care  Called Alexey Sidhu ph: [phone]; spoke with brother in detail about the renal failure and initiating dialysis.  Discussed the assessment and plan with Primary Team/Nurse

## 2022-05-05 NOTE — PROGRESS NOTE ADULT - SUBJECTIVE AND OBJECTIVE BOX
NP Note discussed with Primary attending.    Patient is a 71y old  Male who presents with a chief complaint of acute CHF exacerbation (05 May 2022 10:15)      INTERVAL HPI/OVERNIGHT EVENTS: no new complaints    MEDICATIONS  (STANDING):  ARIPiprazole 15 milliGRAM(s) Oral daily  ferrous    sulfate 325 milliGRAM(s) Oral daily  finasteride 5 milliGRAM(s) Oral daily  gabapentin 600 milliGRAM(s) Oral two times a day  lamoTRIgine 200 milliGRAM(s) Oral daily  midodrine. 2.5 milliGRAM(s) Oral three times a day  pantoprazole    Tablet 40 milliGRAM(s) Oral before breakfast  sevelamer carbonate 800 milliGRAM(s) Oral three times a day with meals  sodium bicarbonate 650 milliGRAM(s) Oral three times a day  sodium chloride 0.9%. 1000 milliLiter(s) (70 mL/Hr) IV Continuous <Continuous>  sodium chloride 0.9%. 1000 milliLiter(s) (50 mL/Hr) IV Continuous <Continuous>  tamsulosin 0.4 milliGRAM(s) Oral at bedtime  traZODone 25 milliGRAM(s) Oral at bedtime    MEDICATIONS  (PRN):  traMADol 50 milliGRAM(s) Oral every 6 hours PRN Severe Pain (7 - 10)      __________________________________________________  REVIEW OF SYSTEMS:    CONSTITUTIONAL: No fever,   EYES: no acute visual disturbances  NECK: No pain or stiffness  RESPIRATORY: No cough; No shortness of breath  CARDIOVASCULAR: No chest pain, no palpitations  GASTROINTESTINAL: RUQ abdominal  pain. No nausea or vomiting; No diarrhea   NEUROLOGICAL: No headache or numbness, no tremors  MUSCULOSKELETAL: No joint pain, no muscle pain  GENITOURINARY: Hematuria, no dysuria, no frequency, no hesitancy  PSYCHIATRY: no depression , no anxiety  ALL OTHER  ROS negative        Vital Signs Last 24 Hrs  T(C): 36.4 (05 May 2022 04:33), Max: 36.9 (04 May 2022 13:16)  T(F): 97.6 (05 May 2022 04:33), Max: 98.5 (04 May 2022 13:16)  HR: 90 (05 May 2022 04:33) (90 - 90)  BP: 115/70 (05 May 2022 04:33) (115/70 - 124/57)  BP(mean): --  RR: 18 (05 May 2022 04:33) (18 - 18)  SpO2: 95% (05 May 2022 04:33) (95% - 95%)    ________________________________________________  PHYSICAL EXAM:  GENERAL: NAD  HEENT: Normocephalic;  conjunctivae and sclerae clear; moist mucous membranes;   NECK : supple  CHEST/LUNG: Clear to auscultation bilaterally with good air entry   HEART: S1 S2  regular; no murmurs, gallops or rubs  ABDOMEN: Soft, Nontender, Nondistended; Bowel sounds present  EXTREMITIES: no cyanosis; no edema; no calf tenderness  SKIN: warm and dry; no rash  NERVOUS SYSTEM:  Awake and alert; Oriented  to place, person and time ; no new deficits    _________________________________________________  LABS:                        11.0   9.06  )-----------( 223      ( 05 May 2022 07:16 )             34.3     05-05    133<L>  |  104  |  127<H>  ----------------------------<  97  5.1   |  15<L>  |  5.02<H>    Ca    9.3      05 May 2022 07:16    TPro  8.5<H>  /  Alb  3.0<L>  /  TBili  1.1  /  DBili  x   /  AST  250<H>  /  ALT  75<H>  /  AlkPhos  199<H>  05-05    PT/INR - ( 04 May 2022 06:15 )   PT: 15.9 sec;   INR: 1.33 ratio             CAPILLARY BLOOD GLUCOSE            RADIOLOGY & ADDITIONAL TESTS:    ACC: [id number] EXAM:  MR ABDOMEN                          *** ADDENDUM***    Some of the hepatic lesions have small T1 hyperintense components on   fat-suppressed T1-weighted gradient echo images, suggestive of blood   products.    --- End of Report---    *** END OF ADDENDUM***      PROCEDURE DATE:  05/03/2022          INTERPRETATION:  CLINICAL INFORMATION: Hepatic lesions. History of   prostate cancer.    COMPARISON: None.    CONTRAST/COMPLICATIONS:  IV Contrast: NONE  Oral Contrast: NONE  Complications: None reported at time of study completion    PROCEDURE:  MRI of the abdomen was performed.    FINDINGS:  LOWER CHEST: Within normal limits.    LIVER: Innumerable mildly T2 hyperintense lesions are seen throughout the   liver, suggestive ofmetastasis. The largest lesion measures 13.8 x 11.7   x 11.7 cm in the liver dome.  BILE DUCTS: Normal caliber.  GALLBLADDER: Cholelithiasis.  SPLEEN: Within normal limits.  PANCREAS: Within normal limits.  ADRENALS: Within normal limits.  KIDNEYS/URETERS: Multiple brightly T2 hyperintense lesion in the kidneys   bilaterally, representing probable cysts.    VISUALIZED PORTIONS:  BOWEL: Small hiatal hernia. Right lower quadrant ostomy.  PERITONEUM: No ascites.  VESSELS: Within normal limits.  RETROPERITONEUM/LYMPH NODES: No lymphadenopathy.  ABDOMINAL WALL: Within normal limits.  BONES: Multiple T2 hyperintense and T2 hyperintense lesions in the   thoracolumbar spine may represent osseous metastasis. Whole-body bone   scan may be pursued for further evaluation.    IMPRESSION:  Innumerable mildly T2 hyperintense lesions are seen throughout the liver,   suggestive of metastasis. The largest lesion measures 13.8 x 11.7 x 11.7   cm in the liver dome.    Multiple T2 hyperintense and T2 hyperintense lesions in the thoracolumbar   spine may represent osseous metastasis. Whole-body bone scan may be   pursued for further evaluation.    Cholelithiasis.    --- End of Report ---    ***Please see the addendum at the top of this report. It may contain   additional important information or changes.****        JESSENIA LONGORIA MD; Attending Radiologist  This document has been electronically signed. May  3 2022 12:34PM  Addend:JESSENIA LONGORIA MD; Attending Radiologist  This addendum was electronically signed on: May  3 2022 12:45PM.  ACC: [id number] EXAM:  XR CHEST PORTABLE ROUTINE 1V                          PROCEDURE DATE:  05/02/2022          INTERPRETATION:  Portable chest radiograph    CLINICAL INFORMATION: Dyspnea, shortness of breath.    TECHNIQUE:  Portable  AP chest radiograph.    COMPARISON: 4/28/2022 chest .    FINDINGS:  CATHETERS AND TUBES: None    PULMONARY: Elevation of RIGHT hemidiaphragm. The visualized lungs are   clear of airspace consolidations or effusions.   No pneumothorax.    HEART/VASCULAR: The  heartis enlarged in transverse diameter.    BONES: Visualized osseous structures are intact.    IMPRESSION:   No radiographic evidence of active chest disease.     Cardiomegaly.    --- End of Report ---            LEELA BEST MD; Attending Radiologist  This document has been electronically signed. May  3 2022  8:18AM    ACC: [id number] EXAM:  CT CHEST                          PROCEDURE DATE:  05/02/2022          INTERPRETATION:  HISTORY: Admitting Dxs: E87.5 HYPERKALEMIA. Evaluate   pleural effusion.    EXAMINATION: CT CHEST was performed without IV contrast.    COMPARISON: None available.    FINDINGS:    AIRWAYS, LUNGS, PLEURA: Bilateral lower lobe bronchiolectasis. Right   middle lobe and bibasilar subsegmental atelectasis. No pleural effusion.    MEDIASTINUM: Normal heart size. Coronary calcifications. No pericardial   effusion. Thoracic aorta normal caliber.  No large mediastinal lymph   nodes. Small hiatal hernia.    IMAGED ABDOMEN: The liver is only partially imaged. Hepatic hypodense   region measuring 4.7 x 4.3 cm (image 109, series 3). Additional left   hepatic lobe 2.7 cm lesion suspected (image 147, series 3) and nodular   thickening along the hepatic capsule in multiple locations; these   findings are new compared to 2/5/2020 CT abdomen. Left renal hypodense   lesions better demonstrated on same day renal ultrasound.    SOFT TISSUES: Unremarkable.    BONES: Unremarkable.      IMPRESSION:.    No pleural effusion.    Multiple hepatic lesions with largest area at the hepatic dome measuring   up to 4.7 cm; findings are new compared to 2/5/2020 CT abdomen. Recommend   dedicated CT or MRI abdomen to further assess and to exclude malignancy.    --- End of Report ---             DUANE AL MD; Attending Radiologist  This document has been electronically signed. May  2 2022 12:06PM    ACC: [id number] EXAM:  US KIDNEY(S)                          PROCEDURE DATE:  05/02/2022          INTERPRETATION:  CLINICAL INFORMATION: 71 years  Male with CKD.    COMPARISON: 12/25/2019    TECHNIQUE: Sonography of the kidneys and bladder.    FINDINGS:  Right kidney: 10.8 cm. No hydronephrosis. 2.1 x 2.0 x 2.0 cm midpole cyst   and 1.8 x 1.3 x 1.2 cm lower pole cyst.    Left kidney: 11.1 cm. 3.7 x 4.3 x 3.3 cm midpole cyst and 2.3 x 2.3 x 2.5   cm septated lower pole cyst.    Urinary bladder: Within normal limits.    IMPRESSION:  No hydronephrosis. Bilateral renal cysts.        --- End of Report ---            FLOYD VELAZCO MD; Attending Radiologist  This document has been electronically signed. May  2 2022 11:23AM        Imaging  Reviewed:  YES/NO    Consultant(s) Notes Reviewed:   YES/ No      Plan of care was discussed with patient and /or primary care giver; all questions and concerns were addressed

## 2022-05-05 NOTE — CONSULT NOTE ADULT - ASSESSMENT
Very pleasant 71 year old gentleman who presents for acute CHF exacerbation, found to have PAWAN on CKD, gross hematuria, evidence of liver metastasis on MRI  -MRI images reviewed demonstrating concern for liver mets  -Check PSA given known history of prostate cancer  -PSA 7/2021 was undetectable (<0.01)  -Oncology consult  -labs reviewed  -noncontrast CT given gross hematuria  -discussed with PA staff  -patient will need a cystoscopy as an outpatient for workup of hematuria

## 2022-05-06 NOTE — PROGRESS NOTE ADULT - PROBLEM SELECTOR PLAN 4
-  History of CKD IV  -  Creatinine 4.51 today,  up trending  -  Home meds - NaHCO3, Calcitriol  -  Continue with Sodium bicarbonate  -  Continue with Sevelamer  -  renal US shows renal cysts, no hydronephrosis  -  monitor BMP daily  -  monitor I&O  -  avoid nephrotoxins HOLD lasix for now.   -  Nephro (Dr. Pond) consulted SCr 5.54  Pt continues to void  F/U PVR bladder scan  IR to place HD cath today  HD to be initiated  Avoid nephrotoxins  Trend BMP

## 2022-05-06 NOTE — PROGRESS NOTE ADULT - ASSESSMENT
· Assessment	  WILMER SELLERS is a 71y Male who presents with a chief complaint of CHF exacerbation.    Liver and Bone Lesions  - MRI and CT imaging reviewed; multiple liver lesions and skeletal lesions suspicious of metastatic disease.  - AFP elevated at 5,338.   - PSA, Ca125, CEA, Ca19-9 neg  - MM labs pending  - Bone scan is pending.  - Interventional radiology consulted for liver biopsy, since cannot get MRI with IV contrast due to CKD/PAWAN, planned for tomorrow  - if bx nondiagnostic, will plan for outpt pet/ct vs other methods of dx    Hematuria  - Urology following; patient refusing phillips catheter at this time.     Acute Kidney Injury  - Nephrology following; thought to be secondary to diuresis.     Will continue to follow. D/w pt and primary team.

## 2022-05-06 NOTE — PROGRESS NOTE ADULT - PROBLEM SELECTOR PLAN 11
-  History  of BPH and Prostate CA  -  Continue with Finasteride  -  Continue with Flomax Pt takes Finasteride and Flomax at home  Continue home regimen

## 2022-05-06 NOTE — PROGRESS NOTE ADULT - ASSESSMENT
71y Male from [facility] with Hx of HTN, CHF?, COPD, HLD, PAD, BPH, Prostate CA (s/p radiation), Bipolar Disorder, IBD - Crohn's Disease, R ileostomy bag (s/p sigmoid resection), COVID-19 (2020) and s/p COVID19 vaccination ([employer] x3) was brought to [facility] ED on 4/28/22 b/o 2 days worsening bilateral leg swelling with difficulty ambulating, and SOB with SpO2 93-94% on RA on arrival.  He was found to have bilateral vascular congestion on CXR due to suspected acute exacerbation of CHF, and PAWAN on CKD (BUN/Cr 88/3.28), with hyperkalemia (K 6.1). 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  Being followed by cardiology, PBNP was mildly elevated, and TTE showed LVEF 55%, normal diastolic function and normal RV function, thus did not appear to be in acute CHF exacerbation.    Hepatology was consulted b/o liver lesion and abnormal liver enzymes.     # Abnormal liver enzymes, AST >>ALT  - CPK nl. HIV neg. Hep B and C as below. Hep A IgM neg.   - Hypotensive, requiring midodrine, with renal dysfunction, Ig panel wnl, K/L ratio WNL, F/u UPEP  - No Hx of EtOH  - Follow up / obtain RUBIA, SMA, LKM, AMA, A1AT, ceruloplasmin  - Avoid hepatotoxic medications, agree holding statin  - Please, obtain collateral information about timing of lamotrigine, because its hepatotoxicity is well established, and consider alternative.     # Pos HCV ab  - HCV PCR neg with the Hx of Hep C and treatment, suggests SVR.   - Cryoglobulin negative  - HIV neg    # HBcAb IgM pos, but HBsAg neg  - HBsAg neg, HBV DNA neg, but HBcAb pos, HBeAb pos, past infection. Can repeat HBcAb IgM.     # Liver lesions  - on CT chest incidentally found a 4.7x4.3 cm partially imaged hypodense lesion and in L lobe another 2.7 cm lesion, as well as nodular thickening along hepatic capsule, all are new from the 2/2020 CT a/p  - CEA and CA 19-9 WNL, but AFP> 5000, PSA WNL  - Could not get contrast CT b/o PAWAN on CKD. MRI was done w/o contrast showing Innumerable mildly T2 hyperintense lesions  throughout the liver, suggestive of metastasis. The largest lesion measures 13.8 x 11.7 x 11.7 cm in the liver dome. Multiple T2 hyperintense and T2 hyperintense lesions in the thoracolumbar spine may represent osseous metastasis.  - No fever or leukocytosis  - IR consult appreciated, liver biopsy was postponed b/o concern for higher risk of bleeding due to uremia. Nephrology follow up appreciated, no Gadavist was recommended. F/u IR recommendations in terms of timing and whether he needs DDAVP prior. To d/w IR if can obtain parenchymal biopsy as well when performing biopsy of liver mass.    - Bone scan did not suggest metastatic disease.  - Hem/onc consult appreciated.     # PAWAN on CKD  # Hematuria  # Hx of prostate Ca  - F/u nephrology recommendations, HD initiation was discussed with patient by nephrology, and dialysis access was obtained today.   - Rare reports exists of HRS in metastatic liver disease, but Nelia 45 and has macroscopic hematuria, also has underlying CKD.   - F/u urology recommendations, repeat CT noted  - F/u cardiology recommendations    Thank you for consult  Will continue to follow. Hepatology returns Monday 5/9/22. Please, consult GI on call if change in status, questions, concerns.   D/w primary team

## 2022-05-06 NOTE — PROCEDURE NOTE - ADDITIONAL PROCEDURE DETAILS
Right IJ vein scarred and diminutive. Diminutive left IJ vein. Patent right external jugular vein. Tip of catheter in SVC-RA junction.

## 2022-05-06 NOTE — PROGRESS NOTE ADULT - SUBJECTIVE AND OBJECTIVE BOX
Patient is a 71y old  Male who presents with a chief complaint of acute CHF exacerbation (05 May 2022 12:43)    PATIENT IS SEEN AND EXAMINED IN MEDICAL FLOOR.  TIMIT [    ]    ERASTO [   ]      GT [   ]    ALLERGIES:  No Known Allergies      Daily     Daily     VITALS:    Vital Signs Last 24 Hrs  T(C): 36.9 (06 May 2022 04:53), Max: 37.5 (05 May 2022 14:19)  T(F): 98.4 (06 May 2022 04:53), Max: 99.5 (05 May 2022 14:19)  HR: 90 (06 May 2022 04:53) (89 - 96)  BP: 112/63 (06 May 2022 04:53) (112/63 - 135/64)  BP(mean): --  RR: 18 (06 May 2022 04:53) (18 - 18)  SpO2: 95% (06 May 2022 04:53) (93% - 96%)    LABS:    CBC Full  -  ( 06 May 2022 06:59 )  WBC Count : 8.52 K/uL  RBC Count : 4.04 M/uL  Hemoglobin : 10.5 g/dL  Hematocrit : 33.2 %  Platelet Count - Automated : 230 K/uL  Mean Cell Volume : 82.2 fl  Mean Cell Hemoglobin : 26.0 pg  Mean Cell Hemoglobin Concentration : 31.6 gm/dL  Auto Neutrophil # : x  Auto Lymphocyte # : x  Auto Monocyte # : x  Auto Eosinophil # : x  Auto Basophil # : x  Auto Neutrophil % : x  Auto Lymphocyte % : x  Auto Monocyte % : x  Auto Eosinophil % : x  Auto Basophil % : x    PT/INR - ( 06 May 2022 06:59 )   PT: 14.1 sec;   INR: 1.18 ratio         PTT - ( 06 May 2022 06:59 )  PTT:27.1 sec  05-06    132<L>  |  101  |  125<H>  ----------------------------<  104<H>  5.0   |  17<L>  |  5.54<H>    Ca    9.2      06 May 2022 06:59    TPro  8.2  /  Alb  3.0<L>  /  TBili  1.1  /  DBili  x   /  AST  334<H>  /  ALT  73<H>  /  AlkPhos  197<H>  05-06    CAPILLARY BLOOD GLUCOSE            LIVER FUNCTIONS - ( 06 May 2022 06:59 )  Alb: 3.0 g/dL / Pro: 8.2 g/dL / ALK PHOS: 197 U/L / ALT: 73 U/L DA / AST: 334 U/L / GGT: x           Creatinine Trend: 5.54<--, 5.02<--, 4.51<--, 4.38<--, 4.47<--, 4.39<--  I&O's Summary    05 May 2022 07:01  -  06 May 2022 07:00  --------------------------------------------------------  IN: 0 mL / OUT: 300 mL / NET: -300 mL            .Stool Feces  05-03 @ 18:27   No enteric pathogens isolated.  (Stool culture examined for Salmonella,  Shigella, Campylobacter, Aeromonas, Plesiomonas,  Vibrio, E.coli O157 and Yersinia)  No enteric gram negative rods isolated  --  --          MEDICATIONS:    MEDICATIONS  (STANDING):  ARIPiprazole 15 milliGRAM(s) Oral daily  ferrous    sulfate 325 milliGRAM(s) Oral daily  finasteride 5 milliGRAM(s) Oral daily  gabapentin 600 milliGRAM(s) Oral two times a day  lamoTRIgine 200 milliGRAM(s) Oral daily  midodrine. 2.5 milliGRAM(s) Oral three times a day  pantoprazole    Tablet 40 milliGRAM(s) Oral before breakfast  sevelamer carbonate 800 milliGRAM(s) Oral three times a day with meals  sodium bicarbonate  Infusion 0.107 mEq/kG/Hr (70 mL/Hr) IV Continuous <Continuous>  tamsulosin 0.4 milliGRAM(s) Oral at bedtime  traZODone 25 milliGRAM(s) Oral at bedtime      MEDICATIONS  (PRN):  traMADol 50 milliGRAM(s) Oral every 6 hours PRN Severe Pain (7 - 10)      REVIEW OF SYSTEMS:                           ALL ROS DONE [ X   ]    CONSTITUTIONAL:  LETHARGIC [   ], FEVER [   ], UNRESPONSIVE [   ]  CVS:  CP  [   ], SOB, [   ], PALPITATIONS [   ], DIZZYNESS [   ]  RS: COUGH [   ], SPUTUM [   ]  GI: ABDOMINAL PAIN [   ], NAUSEA [   ], VOMITINGS [   ], DIARRHEA [   ], CONSTIPATION [   ]  :  DYSURIA [   ], NOCTURIA [   ], INCREASED FREQUENCY [   ], DRIBLING [   ],  SKELETAL: PAINFUL JOINTS [   ], SWOLLEN JOINTS [   ], NECK ACHE [   ], LOW BACK ACHE [   ],  SKIN : ULCERS [   ], RASH [   ], ITCHING [   ]  CNS: HEAD ACHE [   ], DOUBLE VISION [   ], BLURRED VISION [   ], AMS / CONFUSION [   ], SEIZURES [   ], WEAKNESS [   ],TINGLING / NUMBNESS [   ]      PHYSICAL EXAMINATION:  GENERAL APPEARANCE: NO DISTRESS  HEENT:  NO PALLOR, NO  JVD,  NO   NODES, NECK SUPPLE  CVS: S1 +, S2 +,   RS: AEEB,  OCCASIONAL  RALES +,   CRACKLES AT BASES +  ABD: SOFT, NT, NO, BS +   ILEOSTOMY [STOMA W/ PINK BASE]  EXT: NO PE  SKIN: WARM,   SKELETAL:  ROM ACCEPTABLE  CNS:  AAO X 3    RADIOLOGY :    ACC: [id number] EXAM:  XR CHEST PORTABLE URGENT 1V                          PROCEDURE DATE:  04/28/2022          INTERPRETATION:  Clinical history: 71-year-old male, chest pain.    Portable/expiratory view of the chest is compared to 20 and demonstrates   a normal cardiac silhouette with no lobar consolidation, gross effusion,   pneumothorax or acute osseous finding.    Bilateral lower lobe patchy opacities consistent with infiltrates/areas   of atelectasis in the correct clinical context, increased. Elevated right   hemidiaphragm are evident.    IMPRESSION:    Bilateral lower lobe interstitial infiltrates/platelike atelectasis,   increased    ================================      ACC: [id number] EXAM:  MR ABDOMEN                          *** ADDENDUM***    Some of the hepatic lesions have small T1 hyperintense components on   fat-suppressed T1-weighted gradient echo images, suggestive of blood   products.    --- End of Report---    *** END OF ADDENDUM***      PROCEDURE DATE:  05/03/2022          INTERPRETATION:  CLINICAL INFORMATION: Hepatic lesions. History of   prostate cancer.    COMPARISON: None.    CONTRAST/COMPLICATIONS:  IV Contrast: NONE  Oral Contrast: NONE  Complications: None reported at time of study completion    PROCEDURE:  MRI of the abdomen was performed.    FINDINGS:  LOWER CHEST: Within normal limits.    LIVER: Innumerable mildly T2 hyperintense lesions are seen throughout the   liver, suggestive ofmetastasis. The largest lesion measures 13.8 x 11.7   x 11.7 cm in the liver dome.  BILE DUCTS: Normal caliber.  GALLBLADDER: Cholelithiasis.  SPLEEN: Within normal limits.  PANCREAS: Within normal limits.  ADRENALS: Within normal limits.  KIDNEYS/URETERS: Multiple brightly T2 hyperintense lesion in the kidneys   bilaterally, representing probable cysts.    VISUALIZED PORTIONS:  BOWEL: Small hiatal hernia. Right lower quadrant ostomy.  PERITONEUM: No ascites.  VESSELS: Within normal limits.  RETROPERITONEUM/LYMPH NODES: No lymphadenopathy.  ABDOMINAL WALL: Within normal limits.  BONES: Multiple T2 hyperintense and T2 hyperintense lesions in the   thoracolumbar spine may represent osseous metastasis. Whole-body bone   scan may be pursued for further evaluation.    IMPRESSION:  Innumerable mildly T2 hyperintense lesions are seen throughout the liver,   suggestive of metastasis. The largest lesion measures 13.8 x 11.7 x 11.7   cm in the liver dome.    Multiple T2 hyperintense and T2 hyperintense lesions in the thoracolumbar   spine may represent osseous metastasis. Whole-body bone scan may be   pursued for further evaluation.    Cholelithiasis.      ASSESSMENT :     Hyperkalemia    No pertinent past medical history    COPD, mild    Anemia    Bipolar disorder    IBD (inflammatory bowel disease)    HTN (hypertension)    HLD (hyperlipidemia)    PAD (peripheral artery disease)    CKD (chronic kidney disease)    Prostate CA    BPH with urinary obstruction    No significant past surgical history    History of creation of ostomy        PLAN:  HPI:  Patient is a 71M from [facility], who is ambulatory at baseline with a walker. He has Hx of HTN, CHF?, COPD, HLD, PAD, BPH, Prostate CA (s/p radiation), Bipolar Disorder, IBD - Crohn's Disease, R ileostomy bag (s/p sigmoid resection). He was brought to ED due to bilateral leg swelling. For the past 2 days he has been having progressive swelling/ edema of both his legs with associated ambulatory dysfunction. He also started having episodes of shortness of breath and difficulty of breathing. He denies any fever, cough, chest pain, palpitation. When he came to the ED, he was saturating at 93-94% on room air. He was placed on nasal cannula. EKG was done showing NSR. Troponin was negative and BNP was mildly elevated at 398. CXR showed bilateral vascular congestion. Serum potassium was elevated at 6.1. He got a dose of lasix and was given Hyperkalemia cocktail in the ED. Of note, he had Hx of COVID in 2020 andwas vaccinated with COVID-19 x 3 (Pfizer) and Influenza in 2021. He was subsequently admitted for acute exacerbation of his heart failure.    [facility]: FULL CODE (28 Apr 2022 18:40)    # CASE D/W BROTHER KERI VIA PHONE [ C  ; H  ] - CASE DISCUSSED AT LENGTH, ALL QUESTIONS ANSWERED [5/5]    # HEMATURIA   # HX OF PROSTATE CA S/P RADIATION  - HOLD A/C, TREND CBC,   - NOTED BLADDER SCAN, NOTED RECENT U/S  - PER UROLOGY, REPEAT BLADDER SCAN W/ PVR - THEN WILL DECIDE REGARDING MAURER PLACEMENT  - F/U PSA  - F/U CT A/P  - UROLOGY CONSULT DR. CHAN    # ACUTE HYPOXIC RESPIRATORY FAILURE SUSPECT S/T PULMONARY VASCULAR CONGESTION  # RULED OUT CHF   # PAWAN ON CKD - WORSENING, HOLD LASIX  # BPH  # HX OF PROSTATE CA S/P RADIATION  - PLACE ON TELEMETRY  - NOTED CXR  - HOLD LASIX  - NOTE TSH  - STRICT IS AND OS  - ECHOCARDIOGRAM - NORMAL LVEF, NORMAL DIASTOLIC FUNCTION  - NEPHROLOGY CONSULT, CARDIOLOGY CONSULT    - NEPHROLOGY RECC FOR PLACEMENT OF SHILEY FOR POSSIBLE INITIATION OF HD - D/W IR TEAM    # LIVER LESIONS, BONE LESIONS  # TRANSAMINITIS, NOTED HEP C AND HEP B MARKERS  - NOTED MRI ABDOMEN  - NOTED TUMOR MARKERS  - F/U BONE SCAN  - HEPATOLOGY CONSULT  - ONCOLOGY CONSULT    - PLANNED FOR IR GUIDED BIOPSY ON 5/6    # HYPERKALEMIA - RESOLVED  - GIVEN LASIX, LOKELMA  - EKG DONE  - NEPHROLOGY CONSULT    # CROHN'S DISEASE  # RIGHT ILEOSTOMY BAG S/P SIGMOID RESECTION  - MONITORING OUTPUT  - GASTROENTEROLOGY CONSULT    # NORMOCYTIC ANEMIA    # HTN  # COPD  # HLD  # PAD  # BIPOLAR DX  # GI AND DVT PPX    Patient is a 71y old  Male who presents with a chief complaint of acute CHF exacerbation (05 May 2022 12:43)    PATIENT IS SEEN AND EXAMINED IN MEDICAL FLOOR.  TIMIT [    ]    ERASTO [   ]      GT [   ]    ALLERGIES:  No Known Allergies      Daily     Daily     VITALS:    Vital Signs Last 24 Hrs  T(C): 36.9 (06 May 2022 04:53), Max: 37.5 (05 May 2022 14:19)  T(F): 98.4 (06 May 2022 04:53), Max: 99.5 (05 May 2022 14:19)  HR: 90 (06 May 2022 04:53) (89 - 96)  BP: 112/63 (06 May 2022 04:53) (112/63 - 135/64)  BP(mean): --  RR: 18 (06 May 2022 04:53) (18 - 18)  SpO2: 95% (06 May 2022 04:53) (93% - 96%)    LABS:    CBC Full  -  ( 06 May 2022 06:59 )  WBC Count : 8.52 K/uL  RBC Count : 4.04 M/uL  Hemoglobin : 10.5 g/dL  Hematocrit : 33.2 %  Platelet Count - Automated : 230 K/uL  Mean Cell Volume : 82.2 fl  Mean Cell Hemoglobin : 26.0 pg  Mean Cell Hemoglobin Concentration : 31.6 gm/dL  Auto Neutrophil # : x  Auto Lymphocyte # : x  Auto Monocyte # : x  Auto Eosinophil # : x  Auto Basophil # : x  Auto Neutrophil % : x  Auto Lymphocyte % : x  Auto Monocyte % : x  Auto Eosinophil % : x  Auto Basophil % : x    PT/INR - ( 06 May 2022 06:59 )   PT: 14.1 sec;   INR: 1.18 ratio         PTT - ( 06 May 2022 06:59 )  PTT:27.1 sec  05-06    132<L>  |  101  |  125<H>  ----------------------------<  104<H>  5.0   |  17<L>  |  5.54<H>    Ca    9.2      06 May 2022 06:59    TPro  8.2  /  Alb  3.0<L>  /  TBili  1.1  /  DBili  x   /  AST  334<H>  /  ALT  73<H>  /  AlkPhos  197<H>  05-06    CAPILLARY BLOOD GLUCOSE            LIVER FUNCTIONS - ( 06 May 2022 06:59 )  Alb: 3.0 g/dL / Pro: 8.2 g/dL / ALK PHOS: 197 U/L / ALT: 73 U/L DA / AST: 334 U/L / GGT: x           Creatinine Trend: 5.54<--, 5.02<--, 4.51<--, 4.38<--, 4.47<--, 4.39<--  I&O's Summary    05 May 2022 07:01  -  06 May 2022 07:00  --------------------------------------------------------  IN: 0 mL / OUT: 300 mL / NET: -300 mL            .Stool Feces  05-03 @ 18:27   No enteric pathogens isolated.  (Stool culture examined for Salmonella,  Shigella, Campylobacter, Aeromonas, Plesiomonas,  Vibrio, E.coli O157 and Yersinia)  No enteric gram negative rods isolated  --  --          MEDICATIONS:    MEDICATIONS  (STANDING):  ARIPiprazole 15 milliGRAM(s) Oral daily  ferrous    sulfate 325 milliGRAM(s) Oral daily  finasteride 5 milliGRAM(s) Oral daily  gabapentin 600 milliGRAM(s) Oral two times a day  lamoTRIgine 200 milliGRAM(s) Oral daily  midodrine. 2.5 milliGRAM(s) Oral three times a day  pantoprazole    Tablet 40 milliGRAM(s) Oral before breakfast  sevelamer carbonate 800 milliGRAM(s) Oral three times a day with meals  sodium bicarbonate  Infusion 0.107 mEq/kG/Hr (70 mL/Hr) IV Continuous <Continuous>  tamsulosin 0.4 milliGRAM(s) Oral at bedtime  traZODone 25 milliGRAM(s) Oral at bedtime      MEDICATIONS  (PRN):  traMADol 50 milliGRAM(s) Oral every 6 hours PRN Severe Pain (7 - 10)      REVIEW OF SYSTEMS:                           ALL ROS DONE [ X   ]    CONSTITUTIONAL:  LETHARGIC [   ], FEVER [   ], UNRESPONSIVE [   ]  CVS:  CP  [   ], SOB, [   ], PALPITATIONS [   ], DIZZYNESS [   ]  RS: COUGH [   ], SPUTUM [   ]  GI: ABDOMINAL PAIN [   ], NAUSEA [   ], VOMITINGS [   ], DIARRHEA [   ], CONSTIPATION [   ]  :  DYSURIA [   ], NOCTURIA [   ], INCREASED FREQUENCY [   ], DRIBLING [   ],  SKELETAL: PAINFUL JOINTS [   ], SWOLLEN JOINTS [   ], NECK ACHE [   ], LOW BACK ACHE [   ],  SKIN : ULCERS [   ], RASH [   ], ITCHING [   ]  CNS: HEAD ACHE [   ], DOUBLE VISION [   ], BLURRED VISION [   ], AMS / CONFUSION [   ], SEIZURES [   ], WEAKNESS [   ],TINGLING / NUMBNESS [   ]      PHYSICAL EXAMINATION:  GENERAL APPEARANCE: NO DISTRESS  HEENT:  NO PALLOR, NO  JVD,  NO   NODES, NECK SUPPLE  CVS: S1 +, S2 +,   RS: AEEB,  OCCASIONAL  RALES +,   CRACKLES AT BASES +  ABD: SOFT, NT, NO, BS +   ILEOSTOMY [STOMA W/ PINK BASE]  EXT: NO PE  SKIN: WARM,   SKELETAL:  ROM ACCEPTABLE  CNS:  AAO X 3    RADIOLOGY :    ACC: [id number] EXAM:  XR CHEST PORTABLE URGENT 1V                          PROCEDURE DATE:  04/28/2022          INTERPRETATION:  Clinical history: 71-year-old male, chest pain.    Portable/expiratory view of the chest is compared to 20 and demonstrates   a normal cardiac silhouette with no lobar consolidation, gross effusion,   pneumothorax or acute osseous finding.    Bilateral lower lobe patchy opacities consistent with infiltrates/areas   of atelectasis in the correct clinical context, increased. Elevated right   hemidiaphragm are evident.    IMPRESSION:    Bilateral lower lobe interstitial infiltrates/platelike atelectasis,   increased    ================================      ACC: [id number] EXAM:  MR ABDOMEN                          *** ADDENDUM***    Some of the hepatic lesions have small T1 hyperintense components on   fat-suppressed T1-weighted gradient echo images, suggestive of blood   products.    --- End of Report---    *** END OF ADDENDUM***      PROCEDURE DATE:  05/03/2022          INTERPRETATION:  CLINICAL INFORMATION: Hepatic lesions. History of   prostate cancer.    COMPARISON: None.    CONTRAST/COMPLICATIONS:  IV Contrast: NONE  Oral Contrast: NONE  Complications: None reported at time of study completion    PROCEDURE:  MRI of the abdomen was performed.    FINDINGS:  LOWER CHEST: Within normal limits.    LIVER: Innumerable mildly T2 hyperintense lesions are seen throughout the   liver, suggestive ofmetastasis. The largest lesion measures 13.8 x 11.7   x 11.7 cm in the liver dome.  BILE DUCTS: Normal caliber.  GALLBLADDER: Cholelithiasis.  SPLEEN: Within normal limits.  PANCREAS: Within normal limits.  ADRENALS: Within normal limits.  KIDNEYS/URETERS: Multiple brightly T2 hyperintense lesion in the kidneys   bilaterally, representing probable cysts.    VISUALIZED PORTIONS:  BOWEL: Small hiatal hernia. Right lower quadrant ostomy.  PERITONEUM: No ascites.  VESSELS: Within normal limits.  RETROPERITONEUM/LYMPH NODES: No lymphadenopathy.  ABDOMINAL WALL: Within normal limits.  BONES: Multiple T2 hyperintense and T2 hyperintense lesions in the   thoracolumbar spine may represent osseous metastasis. Whole-body bone   scan may be pursued for further evaluation.    IMPRESSION:  Innumerable mildly T2 hyperintense lesions are seen throughout the liver,   suggestive of metastasis. The largest lesion measures 13.8 x 11.7 x 11.7   cm in the liver dome.    Multiple T2 hyperintense and T2 hyperintense lesions in the thoracolumbar   spine may represent osseous metastasis. Whole-body bone scan may be   pursued for further evaluation.    Cholelithiasis.      ASSESSMENT :     Hyperkalemia    No pertinent past medical history    COPD, mild    Anemia    Bipolar disorder    IBD (inflammatory bowel disease)    HTN (hypertension)    HLD (hyperlipidemia)    PAD (peripheral artery disease)    CKD (chronic kidney disease)    Prostate CA    BPH with urinary obstruction    No significant past surgical history    History of creation of ostomy        PLAN:  HPI:  Patient is a 71M from [facility], who is ambulatory at baseline with a walker. He has Hx of HTN, CHF?, COPD, HLD, PAD, BPH, Prostate CA (s/p radiation), Bipolar Disorder, IBD - Crohn's Disease, R ileostomy bag (s/p sigmoid resection). He was brought to ED due to bilateral leg swelling. For the past 2 days he has been having progressive swelling/ edema of both his legs with associated ambulatory dysfunction. He also started having episodes of shortness of breath and difficulty of breathing. He denies any fever, cough, chest pain, palpitation. When he came to the ED, he was saturating at 93-94% on room air. He was placed on nasal cannula. EKG was done showing NSR. Troponin was negative and BNP was mildly elevated at 398. CXR showed bilateral vascular congestion. Serum potassium was elevated at 6.1. He got a dose of lasix and was given Hyperkalemia cocktail in the ED. Of note, he had Hx of COVID in 2020 andwas vaccinated with COVID-19 x 3 (Pfizer) and Influenza in 2021. He was subsequently admitted for acute exacerbation of his heart failure.    [facility]: FULL CODE (28 Apr 2022 18:40)    # CASE D/W BROTHER KERI VIA PHONE [ C  ; H  ] - CASE DISCUSSED AT LENGTH, ALL QUESTIONS ANSWERED [5/5]    # HEMATURIA   # HX OF PROSTATE CA S/P RADIATION  - HOLD A/C, TREND CBC,   - NOTED BLADDER SCAN, NOTED RECENT U/S  - PER UROLOGY, REPEAT BLADDER SCAN W/ PVR - THEN WILL DECIDE REGARDING MAURER PLACEMENT  - F/U PSA  - F/U CT A/P  - UROLOGY CONSULT DR. CHAN    # ACUTE HYPOXIC RESPIRATORY FAILURE SUSPECT S/T PULMONARY VASCULAR CONGESTION  # RULED OUT CHF   # PAWAN ON CKD - WORSENING, HOLD LASIX  # BPH  # HX OF PROSTATE CA S/P RADIATION  - PLACE ON TELEMETRY  - NOTED CXR  - HOLD LASIX  - NOTE TSH  - STRICT IS AND OS  - ECHOCARDIOGRAM - NORMAL LVEF, NORMAL DIASTOLIC FUNCTION  - NEPHROLOGY CONSULT, CARDIOLOGY CONSULT    - NEPHROLOGY RECC FOR PLACEMENT OF SHILEY FOR POSSIBLE INITIATION OF HD - D/W IR TEAM    # LIVER LESIONS, BONE LESIONS  # TRANSAMINITIS, NOTED HEP C AND HEP B MARKERS  - NOTED MRI ABDOMEN  - NOTED TUMOR MARKERS  - F/U BONE SCAN  - HEPATOLOGY CONSULT  - ONCOLOGY CONSULT    - PLANNED FOR IR GUIDED BIOPSY ON 5/6  --- DELAYED BY IR DUE TO BLEEDING RISK    # HYPERKALEMIA - RESOLVED  - GIVEN LASIX, LOKELMA  - EKG DONE  - NEPHROLOGY CONSULT    # CROHN'S DISEASE  # RIGHT ILEOSTOMY BAG S/P SIGMOID RESECTION  - MONITORING OUTPUT  - GASTROENTEROLOGY CONSULT    # NORMOCYTIC ANEMIA    # HTN  # COPD  # HLD  # PAD  # BIPOLAR DX  # GI AND DVT PPX

## 2022-05-06 NOTE — PROGRESS NOTE ADULT - SUBJECTIVE AND OBJECTIVE BOX
Chief Complaint:  Patient is a 71y old  Male who presents with a chief complaint of acute CHF exacerbation (06 May 2022 12:57)      Reason for consult: Liver lesions    Interval Events: Patient is off unit for procedure. Chart reviewed. Asper d/w primary team liver biopsy was rescheduled, due to higher bleeding risk b/o uremia. He got dialysis access today.     Hospital Medications:  ARIPiprazole 15 milliGRAM(s) Oral daily  ferrous    sulfate 325 milliGRAM(s) Oral daily  finasteride 5 milliGRAM(s) Oral daily  gabapentin 600 milliGRAM(s) Oral two times a day  lamoTRIgine 200 milliGRAM(s) Oral daily  midodrine. 2.5 milliGRAM(s) Oral three times a day  pantoprazole    Tablet 40 milliGRAM(s) Oral before breakfast  sevelamer carbonate 800 milliGRAM(s) Oral three times a day with meals  sodium bicarbonate 650 milliGRAM(s) Oral three times a day  tamsulosin 0.4 milliGRAM(s) Oral at bedtime  traMADol 50 milliGRAM(s) Oral every 6 hours PRN  traZODone 25 milliGRAM(s) Oral at bedtime        PHYSICAL EXAM:   Vital Signs:  Vital Signs Last 24 Hrs  T(C): 36.9 (06 May 2022 04:53), Max: 37.2 (05 May 2022 21:00)  T(F): 98.4 (06 May 2022 04:53), Max: 98.9 (05 May 2022 21:00)  HR: 90 (06 May 2022 04:53) (89 - 90)  BP: 112/63 (06 May 2022 04:53) (112/63 - 133/66)  BP(mean): --  RR: 18 (06 May 2022 04:53) (18 - 18)  SpO2: 95% (06 May 2022 04:53) (93% - 95%)  Daily     Daily         LABS: reviewed                        10.5   8.52  )-----------( 230      ( 06 May 2022 06:59 )             33.2     05-06    132<L>  |  101  |  125<H>  ----------------------------<  104<H>  5.0   |  17<L>  |  5.54<H>    Ca    9.2      06 May 2022 06:59    TPro  8.2  /  Alb  3.0<L>  /  TBili  1.1  /  DBili  x   /  AST  334<H>  /  ALT  73<H>  /  AlkPhos  197<H>  05-06    LIVER FUNCTIONS - ( 06 May 2022 06:59 )  Alb: 3.0 g/dL / Pro: 8.2 g/dL / ALK PHOS: 197 U/L / ALT: 73 U/L DA / AST: 334 U/L / GGT: x             Interval Diagnostic Studies: see sunrise for full report

## 2022-05-06 NOTE — PROGRESS NOTE ADULT - SUBJECTIVE AND OBJECTIVE BOX
NEPHROLOGY MEDICAL CARE, [facility] - Dr. Emerson Pond/ Dr. Nallely Curran/ Dr. Kirby Angel/ Dr. Pippa Reis    Patient was seen and examined at bedside.    CC: patient is NAD; bicarbonate drip given overnight.    Vital Signs Last 24 Hrs  T(C): 36.9 (06 May 2022 04:53), Max: 37.5 (05 May 2022 14:19)  T(F): 98.4 (06 May 2022 04:53), Max: 99.5 (05 May 2022 14:19)  HR: 90 (06 May 2022 04:53) (89 - 96)  BP: 112/63 (06 May 2022 04:53) (112/63 - 135/64)  BP(mean): --  RR: 18 (06 May 2022 04:53) (18 - 18)  SpO2: 95% (06 May 2022 04:53) (93% - 96%)    05-05 @ 07:01  -  05-06 @ 07:00  --------------------------------------------------------  IN: 0 mL / OUT: 300 mL / NET: -300 mL        PHYSICAL EXAM:  General: No acute respiratory distress.  Eyes: conjunctiva and sclera clear  ENMT: Atraumatic, Normocephalic, supple, No JVD present  Respiratory: Bilateral decreased BS and no rhonchi, wheezing  Cardiovascular: S1S2+; no m/r/g  Gastrointestinal: Soft, Non-tender, Nondistended; Bowel sounds present   Neuro:  Awake, Alert & Oriented X3  Ext:  no pedal edema, No Cyanosis  Skin: No visible rashes      MEDICATIONS:  MEDICATIONS  (STANDING):  ARIPiprazole 15 milliGRAM(s) Oral daily  ferrous    sulfate 325 milliGRAM(s) Oral daily  finasteride 5 milliGRAM(s) Oral daily  gabapentin 600 milliGRAM(s) Oral two times a day  lamoTRIgine 200 milliGRAM(s) Oral daily  midodrine. 2.5 milliGRAM(s) Oral three times a day  pantoprazole    Tablet 40 milliGRAM(s) Oral before breakfast  sevelamer carbonate 800 milliGRAM(s) Oral three times a day with meals  sodium bicarbonate 650 milliGRAM(s) Oral three times a day  tamsulosin 0.4 milliGRAM(s) Oral at bedtime  traZODone 25 milliGRAM(s) Oral at bedtime    MEDICATIONS  (PRN):  traMADol 50 milliGRAM(s) Oral every 6 hours PRN Severe Pain (7 - 10)          LABS:                        10.5   8.52  )-----------( 230      ( 06 May 2022 06:59 )             33.2     05-06    132<L>  |  101  |  125<H>  ----------------------------<  104<H>  5.0   |  17<L>  |  5.54<H>    Ca    9.2      06 May 2022 06:59    TPro  8.2  /  Alb  3.0<L>  /  TBili  1.1  /  DBili  x   /  AST  334<H>  /  ALT  73<H>  /  AlkPhos  197<H>  05-06    PT/INR - ( 06 May 2022 06:59 )   PT: 14.1 sec;   INR: 1.18 ratio         PTT - ( 06 May 2022 06:59 )  PTT:27.1 sec      Urine studies    PTH and Vit D:

## 2022-05-06 NOTE — PROGRESS NOTE ADULT - PROBLEM SELECTOR PLAN 3
-  Patient presented with SOB and LE edema   -  Echo -  EF 50-55% (2020)  -  Echo resulted trivial pericardial effusion, normal EF.  -  EKG - NSR, low voltage criteria for LVH  -  CXR - bilateral vascular congestion   -  CT chest shows no pleural effusion  -  Trop x 1 - neg  -  Pro-BNP - 398   -  monitor O2 sat on room air, at rest/ambulation Resolved  SpO2 95% on 2L n/c  Per cardiology fluid status secondary to CKD not HF  Discontinue tele PAST MEDICAL HISTORY:  Arthritis     Current smoker     Environmental allergies

## 2022-05-06 NOTE — PROGRESS NOTE ADULT - PROBLEM SELECTOR PLAN 7
-  Controlled  -  SBP 110s to 130s  -  Lasix on hold due to worsening  PAWAN  -  Monitor blood pressures routinely Resolved  Pt now on Midodrine  Monitor BP

## 2022-05-06 NOTE — PROGRESS NOTE ADULT - ASSESSMENT
Patient is a 71M from [facility], who is ambulatory at baseline with a walker. He has Hx of HTN, CHF?, COPD, HLD, PAD, BPH, Prostate CA (s/p radiation), Bipolar Disorder, IBD - Crohn's Disease, R ileostomy bag (s/p sigmoid resection). Presented with SOB and LE edema, found with hyperkalemia on admission. Admitted to [facility] for cardio work up R/O CHF and hyperkalemia with PAWAN on CKD 4.  cardiology and nephrology consulted. started lasix. renal US shows no hyronephrosis, + renal cysts.   CT chest shows no pleural effusion, but with hepatic lesion 4.7cm largest. Also with transaminitis. GI and Hepatology consulted. ordered MRI abdomen r/o malignancy.   PAWAN worsening SCr,  started low dose IVF as discussed nephrology.   Pt was on 2L Oxygen, will monitor O2 sat on ambulation/at rest room air.   MRI reveals multiple liver lesions with mets  -  Hem/onc consulted, pending bone scan and liver biopsy. Patient having still hematuria but denies difficulty urinating, bladder scan negative for urinary retention. Will c/w post void bladder scan to monitor for urinary retention as pt refuse indwelling catheter.     05/05- Pt seen and examined at bedside, US guided IV inserted, plan for bone scan today and liver biopsy tomorrow. NPO after midnight, AM labs and dc ac. Patient will need HD as SCr is trending up. As per nephro dr Pond pt scheduled for  Fariha and after HD tomorrow. Patient would like to be DNR/DNI with his brother as HCP. Pending GOC and MOLST form.

## 2022-05-06 NOTE — PROGRESS NOTE ADULT - ASSESSMENT
1. PAWAN due to ATN with questionable HRS.  -Bun/Cr elevated and worsening today. Trial of fluids given overnight and no improvement most likely like ATN, recommend to initiate HD.  Patient has no uremic symptoms.  off lasix since tuesday.    - Patient was explained in detail the risks (hypotension, angina, MI, and cardiac arrest), benefits and alternative modalities (no dialysis and conseravative medical management) for dialysis. Patient given time to ask questions and all questions were answered. Patient gave consent for renal replacement therapy.  -pt also had gross hematuria and unclear etiology seems less likely nephritis and f/u RUBIA, ANCA and Anit-GBM.   -consult IR for shiley placement today and plan for HD today and tomorrow  Urinalysis shows no proteinuria. spot protein to creatinine ratio is 900mg.  -renal sono show no hydro.  -Adjust meds to eGFR and avoid IV Gadolinium contrast,NSAIDs, and phosphate enema.  -Monitor I/O's daily.   -Monitor SMA daily.  2. CKD stage 4 most likely due to ischemic nephropathy and h/o ATN with renal recovery.  -Baseline Scr around 3.2mg/dL in April 2020.  -Keep patient euvolemic and renal diet  -Avoid Nephrotoxic Meds/ Agents such as (NSAIDs, IV contrast, Aminoglycosides such as gentamicin, -Gadolinium contrast, Phosphate containing enemas, etc..)  -Adjust Medications according to eGFR  3. Anemia:   -hb is stable; continue iron tabs. monitor CBC  4. MBD:   -phos is stable. previous mild hyperca, which improved and off calcitirol; on renvela 1 tab tid;   -pth is okay at 41.  5. Hyperkalemia due to pawan on ckd.   -K resolved.   -on low K diet. give Lokelma prn if K >5.5  -Keep pt euvolemic. Avoid ACE inh/ ARBs, NSAIDs, and Aldactone or potassium sparing diuretics. Monitor K+ daily.  6. Fluid Overload:  -clinically improved. off diuretics for now.   -CT chest w/o contrast shows no effusion;   7. Acidosis:  -CO2 is better with bicarbonate drip and switch back to oral tabs.  -on sodium bicarbonate 650mg oral tid. monitor CO2  8. h/o Hypotension:  -bp is acceptable and on midodrine 2.5mg tid      9. Elevated LFTs  - off lipitor for now.  -hepatitis panel shows hep C+ve.  discussed with pt, pt had known hep C and was treated for in the past; hep C RNA is not detected.   -C3/C4 and RF negative, cryoglobulinemia is negative.    -ct scan shows hepatic mass; Hepatology consulted and MRI w/o contrast shows liver mass and mets.  -Hepatology and Oncology consulted.   -Plan for IR guided liver biopsy on monday since elevated bun as per IR attending.  -Bone scan shows no bone mets. Suspecting myeloma and myeloma labs f/u spep, upep, IF and serum FLC is within range for ckd.  -pt has pawan on ckd and GFR <15cc/min at present; At present, the risk for nephrogenic systemic fibrosis, weigh out the benefits thus would avoid gadolinium contrast administration. Would consider alternative diagnostic strategies including and other imaging modalities.    Discussed with patient in detail regarding the renal plan and care  Discussed the assessment and plan with Primary Team/Nurse

## 2022-05-06 NOTE — PROGRESS NOTE ADULT - PROBLEM SELECTOR PLAN 9
-  History  of Crohn disease  -  (+) ileostomy, R  -  GI consulted for evaluation ileostomy-too liquid stool  -  hold Miralax and senna for now per GI.  -  stool studies negative.    - GI dr Azevedo consulted Pt has R ileostomy  Stool studies neg  Continue ileostomy care  Dr. Azevedo, GI, following   Recommendations appreciated

## 2022-05-06 NOTE — PROGRESS NOTE ADULT - SUBJECTIVE AND OBJECTIVE BOX
HPI:  71 YOM admitted with HF.  IR to place HD cath today.  HD to be initiated over the weekend.    OVERNIGHT EVENTS:  No new overnight events.  Seen and examined at bedside.     REVIEW OF SYSTEMS:      CONSTITUTIONAL: No fever  EYES: no acute visual disturbances  NECK: No pain or stiffness  RESPIRATORY: No cough; No shortness of breath  CARDIOVASCULAR: No chest pain, no palpitations  GASTROINTESTINAL: No pain. No nausea, vomiting or diarrhea   NEUROLOGICAL: No headache or numbness, no tremors  MUSCULOSKELETAL: No joint pain, no muscle pain  GENITOURINARY: no dysuria, no frequency, no hesitancy  PSYCHIATRY: no depression, no anxiety  ALL OTHER  ROS negative        Vital Signs Last 24 Hrs  T(C): 36.9 (06 May 2022 04:53), Max: 37.5 (05 May 2022 14:19)  T(F): 98.4 (06 May 2022 04:53), Max: 99.5 (05 May 2022 14:19)  HR: 90 (06 May 2022 04:53) (89 - 96)  BP: 112/63 (06 May 2022 04:53) (112/63 - 135/64)  BP(mean): --  RR: 18 (06 May 2022 04:53) (18 - 18)  SpO2: 95% (06 May 2022 04:53) (93% - 96%)    ________________________________________________  PHYSICAL EXAM:    GENERAL: NAD  HEENT: Normocephalic; conjunctivae and sclerae clear;  NECK : supple, no JVD  CHEST/LUNG: Clear to auscultation; Nonlabored  HEART: S1 S2  regular  ABDOMEN: Soft, Nontender, Nondistended; Bowel sounds present  EXTREMITIES: no cyanosis; no LE edema; no calf tenderness  SKIN: warm and dry; No rashes or lesions  NERVOUS SYSTEM:  Alert; no new deficits    _________________________________________________  CURRENT MEDICATIONS:    MEDICATIONS  (STANDING):  ARIPiprazole 15 milliGRAM(s) Oral daily  ferrous    sulfate 325 milliGRAM(s) Oral daily  finasteride 5 milliGRAM(s) Oral daily  gabapentin 600 milliGRAM(s) Oral two times a day  lamoTRIgine 200 milliGRAM(s) Oral daily  midodrine. 2.5 milliGRAM(s) Oral three times a day  pantoprazole    Tablet 40 milliGRAM(s) Oral before breakfast  sevelamer carbonate 800 milliGRAM(s) Oral three times a day with meals  sodium bicarbonate 650 milliGRAM(s) Oral three times a day  tamsulosin 0.4 milliGRAM(s) Oral at bedtime  traZODone 25 milliGRAM(s) Oral at bedtime    MEDICATIONS  (PRN):  traMADol 50 milliGRAM(s) Oral every 6 hours PRN Severe Pain (7 - 10)      __________________________________________________  LABS:                          10.5   8.52  )-----------( 230      ( 06 May 2022 06:59 )             33.2     05-06    132<L>  |  101  |  125<H>  ----------------------------<  104<H>  5.0   |  17<L>  |  5.54<H>    Ca    9.2      06 May 2022 06:59    TPro  8.2  /  Alb  3.0<L>  /  TBili  1.1  /  DBili  x   /  AST  334<H>  /  ALT  73<H>  /  AlkPhos  197<H>  05-06    PT/INR - ( 06 May 2022 06:59 )   PT: 14.1 sec;   INR: 1.18 ratio         PTT - ( 06 May 2022 06:59 )  PTT:27.1 sec    CAPILLARY BLOOD GLUCOSE          __________________________________________________  RADIOLOGY & ADDITIONAL TESTS:    Imaging Personally Reviewed:  YES    < from: NM Bone Imaging Total (05.05.22 @ 14:37) >  IMPRESSION: Bone scan demonstrates:    Foci of increased uptake in the right anterolateral mid ribs probably   post traumatic.    Heterogeneous tracer activity in the right and left femurs, nonspecific.   Suggest radiographic correlation.    Degenerative disease in the major joints.    < end of copied text >    < from: MR Abdomen No Cont (05.03.22 @ 11:23) >  Innumerable mildly T2 hyperintense lesions are seen throughout the liver,   suggestive of metastasis. The largest lesion measures 13.8 x 11.7 x 11.7   cm in the liver dome.    Multiple T2 hyperintense and T2 hyperintense lesions in the thoracolumbar   spine may represent osseous metastasis. Whole-body bone scan may be   pursued for further evaluation.    Cholelithiasis.    < end of copied text >        Consultant(s) Notes Reviewed:   YES     Plan of care was discussed with patient and /or primary care giver; all questions and concerns were addressed and care was aligned with patient's wishes.    Plan discussed with attending and consulting physicians.   HPI:  71 YOM admitted with HF.  IR to place HD cath today.  HD to be initiated over the weekend.    OVERNIGHT EVENTS:  No new overnight events.  Seen and examined at bedside.     REVIEW OF SYSTEMS:      CONSTITUTIONAL: No fever  EYES: no acute visual disturbances  NECK: No pain or stiffness  RESPIRATORY: No cough; No shortness of breath  CARDIOVASCULAR: No chest pain, no palpitations  GASTROINTESTINAL: No pain. No nausea, vomiting or diarrhea   NEUROLOGICAL: No headache or numbness, no tremors  MUSCULOSKELETAL: No joint pain, no muscle pain  GENITOURINARY: no dysuria, no frequency, no hesitancy  PSYCHIATRY: no depression, no anxiety  ALL OTHER  ROS negative        Vital Signs Last 24 Hrs  T(C): 36.9 (06 May 2022 04:53), Max: 37.5 (05 May 2022 14:19)  T(F): 98.4 (06 May 2022 04:53), Max: 99.5 (05 May 2022 14:19)  HR: 90 (06 May 2022 04:53) (89 - 96)  BP: 112/63 (06 May 2022 04:53) (112/63 - 135/64)  BP(mean): --  RR: 18 (06 May 2022 04:53) (18 - 18)  SpO2: 95% (06 May 2022 04:53) (93% - 96%)    ________________________________________________  PHYSICAL EXAM:    GENERAL: NAD  HEENT: Normocephalic; conjunctivae and sclerae clear;  NECK : supple, no JVD  CHEST/LUNG: Clear to auscultation; Nonlabored  HEART: S1 S2  regular  ABDOMEN: Soft, Nontender, Nondistended; Bowel sounds present  EXTREMITIES: no cyanosis; no LE edema; no calf tenderness  SKIN: warm and dry; No rashes or lesions  NERVOUS SYSTEM:  Alert; no new deficits    _________________________________________________  CURRENT MEDICATIONS:    MEDICATIONS  (STANDING):  ARIPiprazole 15 milliGRAM(s) Oral daily  ferrous    sulfate 325 milliGRAM(s) Oral daily  finasteride 5 milliGRAM(s) Oral daily  gabapentin 600 milliGRAM(s) Oral two times a day  lamoTRIgine 200 milliGRAM(s) Oral daily  midodrine. 2.5 milliGRAM(s) Oral three times a day  pantoprazole    Tablet 40 milliGRAM(s) Oral before breakfast  sevelamer carbonate 800 milliGRAM(s) Oral three times a day with meals  sodium bicarbonate 650 milliGRAM(s) Oral three times a day  tamsulosin 0.4 milliGRAM(s) Oral at bedtime  traZODone 25 milliGRAM(s) Oral at bedtime    MEDICATIONS  (PRN):  traMADol 50 milliGRAM(s) Oral every 6 hours PRN Severe Pain (7 - 10)      __________________________________________________  LABS:                          10.5   8.52  )-----------( 230      ( 06 May 2022 06:59 )             33.2     05-06    132<L>  |  101  |  125<H>  ----------------------------<  104<H>  5.0   |  17<L>  |  5.54<H>    Ca    9.2      06 May 2022 06:59    TPro  8.2  /  Alb  3.0<L>  /  TBili  1.1  /  DBili  x   /  AST  334<H>  /  ALT  73<H>  /  AlkPhos  197<H>  05-06    PT/INR - ( 06 May 2022 06:59 )   PT: 14.1 sec;   INR: 1.18 ratio         PTT - ( 06 May 2022 06:59 )  PTT:27.1 sec    CAPILLARY BLOOD GLUCOSE          __________________________________________________  RADIOLOGY & ADDITIONAL TESTS:    Imaging Personally Reviewed:  YES    < from: NM Bone Imaging Total (05.05.22 @ 14:37) >  IMPRESSION: Bone scan demonstrates:    Foci of increased uptake in the right anterolateral mid ribs probably   post traumatic.    Heterogeneous tracer activity in the right and left femurs, nonspecific.   Suggest radiographic correlation.    Degenerative disease in the major joints.    < end of copied text >    < from: MR Abdomen No Cont (05.03.22 @ 11:23) >  Innumerable mildly T2 hyperintense lesions are seen throughout the liver,   suggestive of metastasis. The largest lesion measures 13.8 x 11.7 x 11.7   cm in the liver dome.    Multiple T2 hyperintense and T2 hyperintense lesions in the thoracolumbar   spine may represent osseous metastasis. Whole-body bone scan may be   pursued for further evaluation.    Cholelithiasis.    < end of copied text >    < from: CT Chest No Cont (05.02.22 @ 11:10) >  IMPRESSION:.    No pleural effusion.    Multiple hepatic lesions with largest area at the hepatic dome measuring   up to 4.7 cm; findings are new compared to 2/5/2020 CT abdomen. Recommend   dedicated CT or MRI abdomen to further assess and to exclude malignancy.    < end of copied text >    Consultant(s) Notes Reviewed:   YES     Plan of care was discussed with patient and /or primary care giver; all questions and concerns were addressed and care was aligned with patient's wishes.    Plan discussed with attending and consulting physicians.

## 2022-05-06 NOTE — PROGRESS NOTE ADULT - PROBLEM SELECTOR PLAN 5
-  K+ 6.1 on admission   -  s/p Lokelma  - resolved  - monitor BMP qd  -  Hyperkalemia could be in setting of CKD  -  EKG - NSR, low voltage criteria for LVH (no peak T waves)  - cardio dr. Archer  - nephro dr. Pond Resolved  K 5.0  Trend BMP  Pt to start HD  Dr. Pond, Nephrology, following  Recommendations appreciated

## 2022-05-06 NOTE — PROGRESS NOTE ADULT - SUBJECTIVE AND OBJECTIVE BOX
awake  has pain at RUQ  no fever    MEDICATIONS  (STANDING):  ARIPiprazole 15 milliGRAM(s) Oral daily  ferrous    sulfate 325 milliGRAM(s) Oral daily  finasteride 5 milliGRAM(s) Oral daily  gabapentin 600 milliGRAM(s) Oral two times a day  lamoTRIgine 200 milliGRAM(s) Oral daily  midodrine. 2.5 milliGRAM(s) Oral three times a day  pantoprazole    Tablet 40 milliGRAM(s) Oral before breakfast  sevelamer carbonate 800 milliGRAM(s) Oral three times a day with meals  sodium bicarbonate  Infusion 0.107 mEq/kG/Hr (70 mL/Hr) IV Continuous <Continuous>  tamsulosin 0.4 milliGRAM(s) Oral at bedtime  traZODone 25 milliGRAM(s) Oral at bedtime    MEDICATIONS  (PRN):  traMADol 50 milliGRAM(s) Oral every 6 hours PRN Severe Pain (7 - 10)      Allergies    No Known Allergies    Intolerances        Vital Signs Last 24 Hrs  T(C): 36.9 (06 May 2022 04:53), Max: 37.5 (05 May 2022 14:19)  T(F): 98.4 (06 May 2022 04:53), Max: 99.5 (05 May 2022 14:19)  HR: 90 (06 May 2022 04:53) (89 - 96)  BP: 112/63 (06 May 2022 04:53) (112/63 - 135/64)  BP(mean): --  RR: 18 (06 May 2022 04:53) (18 - 18)  SpO2: 95% (06 May 2022 04:53) (93% - 96%)    PHYSICAL EXAM  General: adult in NAD  HEENT: clear oropharynx, anicteric sclera, pink conjunctiva  Neck: supple  CV: normal S1/S2 with no murmur rubs or gallops  Lungs: positive air movement b/l ant lungs,clear to auscultation, no wheezes, no rales  Abdomen: soft non-tender non-distended, no hepatosplenomegaly  Ext: no clubbing cyanosis or edema  Skin: no rashes and no petechiae  Neuro: alert and oriented X 4, no focal deficits  LABS:                          10.5   8.52  )-----------( 230      ( 06 May 2022 06:59 )             33.2         Mean Cell Volume : 82.2 fl  Mean Cell Hemoglobin : 26.0 pg  Mean Cell Hemoglobin Concentration : 31.6 gm/dL  Auto Neutrophil # : x  Auto Lymphocyte # : x  Auto Monocyte # : x  Auto Eosinophil # : x  Auto Basophil # : x  Auto Neutrophil % : x  Auto Lymphocyte % : x  Auto Monocyte % : x  Auto Eosinophil % : x  Auto Basophil % : x    Serial CBC  Hematocrit 33.2  Hemoglobin 10.5  Plat 230  RBC 4.04  WBC 8.52  Serial CBC  Hematocrit 34.3  Hemoglobin 11.0  Plat 223  RBC 4.22  WBC 9.06  Serial CBC  Hematocrit 36.4  Hemoglobin 11.7  Plat 207  RBC 4.47  WBC 8.88  Serial CBC  Hematocrit 36.3  Hemoglobin 11.6  Plat 204  RBC 4.46  WBC 8.71  Serial CBC  Hematocrit 36.3  Hemoglobin 11.6  Plat 204  RBC 4.41  WBC 7.99        132<L>  |  101  |  125<H>  ----------------------------<  104<H>  5.0   |  17<L>  |  5.54<H>    Ca    9.2      06 May 2022 06:59    TPro  8.2  /  Alb  3.0<L>  /  TBili  1.1  /  DBili  x   /  AST  334<H>  /  ALT  73<H>  /  AlkPhos  197<H>        PT/INR - ( 06 May 2022 06:59 )   PT: 14.1 sec;   INR: 1.18 ratio         PTT - ( 06 May 2022 06:59 )  PTT:27.1 sec        Quantitative Ig mg/dL ( @ 10:12)  Quantitative IgA: 226 mg/dL ( @ 10:12)  Quantitative IgM: 127 mg/dL ( @ 10:12)  HARESH Kappa: 11.57 mg/dL ( @ 10:12)  HARESH Lambda: 8.44 mg/dL ( @ 10:12)  Quantitative Ig mg/dL ( @ 09:21)  Quantitative IgA: 226 mg/dL ( @ 09:21)  Quantitative IgM: 121 mg/dL ( @ 09:21)  HARESH Kappa: 10.40 mg/dL ( @ 09:21)  HARESH Lambda: 7.37 mg/dL ( @ 09:21)        BLOOD SMEAR INTERPRETATION:       RADIOLOGY & ADDITIONAL STUDIES:

## 2022-05-06 NOTE — PROGRESS NOTE ADULT - PROBLEM SELECTOR PLAN 2
Tbili 1.1, , , ALT 73  Avoid hepatotoxins  -  Hep C and HEP B markers elevated avoid hepatotoxins   -  CT chest shows hepatic lesion 4.7cm see above  -  Hepatology consulted dr Portillo  -  Monitor CMP  -  MRI abdomen no con (due to worsening PAWAN)  -  MRI  -  Multiple liver lesions  -   Tumor markers and bone scan ordered  -  Oncology dr. Chao consulted  -  IR liver biopsy planned for Friday  - hepatology dr. Portillo is folowing  - hem/onc dr. Chao is following Tbili 1.1, , , ALT 73  Avoid hepatotoxins  Trend LFTs  Plan as above

## 2022-05-06 NOTE — PROGRESS NOTE ADULT - SUBJECTIVE AND OBJECTIVE BOX
C A R D I O L O G Y  **********************************     DATE OF SERVICE: 05-06-22    Patient denies chest pain or shortness of breath.   Review of symptoms otherwise negative.    ARIPiprazole 15 milliGRAM(s) Oral daily  ferrous    sulfate 325 milliGRAM(s) Oral daily  finasteride 5 milliGRAM(s) Oral daily  gabapentin 600 milliGRAM(s) Oral two times a day  lamoTRIgine 200 milliGRAM(s) Oral daily  midodrine. 2.5 milliGRAM(s) Oral three times a day  pantoprazole    Tablet 40 milliGRAM(s) Oral before breakfast  sevelamer carbonate 800 milliGRAM(s) Oral three times a day with meals  sodium bicarbonate 650 milliGRAM(s) Oral three times a day  tamsulosin 0.4 milliGRAM(s) Oral at bedtime  traMADol 50 milliGRAM(s) Oral every 6 hours PRN  traZODone 25 milliGRAM(s) Oral at bedtime                            10.5   8.52  )-----------( 230      ( 06 May 2022 06:59 )             33.2       Hemoglobin: 10.5 g/dL (05-06 @ 06:59)  Hemoglobin: 11.0 g/dL (05-05 @ 07:16)  Hemoglobin: 11.7 g/dL (05-04 @ 00:23)  Hemoglobin: 11.6 g/dL (05-03 @ 07:18)  Hemoglobin: 11.6 g/dL (05-02 @ 12:16)      05-06    132<L>  |  101  |  125<H>  ----------------------------<  104<H>  5.0   |  17<L>  |  5.54<H>    Ca    9.2      06 May 2022 06:59    TPro  8.2  /  Alb  3.0<L>  /  TBili  1.1  /  DBili  x   /  AST  334<H>  /  ALT  73<H>  /  AlkPhos  197<H>  05-06    Creatinine Trend: 5.54<--, 5.02<--, 4.51<--, 4.38<--, 4.47<--, 4.39<--    COAGS: PT/INR - ( 06 May 2022 06:59 )   PT: 14.1 sec;   INR: 1.18 ratio         PTT - ( 06 May 2022 06:59 )  PTT:27.1 sec    CARDIAC MARKERS ( 04 May 2022 06:15 )  x     / x     / 42 U/L / x     / x            T(C): 36.9 (05-06-22 @ 04:53), Max: 37.5 (05-05-22 @ 14:19)  HR: 90 (05-06-22 @ 04:53) (89 - 96)  BP: 112/63 (05-06-22 @ 04:53) (112/63 - 135/64)  RR: 18 (05-06-22 @ 04:53) (18 - 18)  SpO2: 95% (05-06-22 @ 04:53) (93% - 96%)  Wt(kg): --    I&O's Summary    05 May 2022 07:01  -  06 May 2022 07:00  --------------------------------------------------------  IN: 0 mL / OUT: 300 mL / NET: -300 mL          HEENT:  (-)icterus (-)pallor  CV: N S1 S2 1/6 MARCELLE (+)2 Pulses B/l  Resp:  Clear to ausculatation B/L, normal effort  GI: (+) BS Soft, NT, ND  Lymph:  (-)Edema, (-)obvious lymphadenopathy  Skin: Warm to touch, Normal turgor  Psych: Appropriate mood and affect              ASSESSMENT/PLAN: 	71y  Male  HTN, CHF?, COPD, HLD, PAD, BPH, Prostate CA (s/p radiation), Bipolar Disorder, IBD - Crohn's Disease, R ileostomy bag (s/p sigmoid resection) historically preserved LV and RV function, normal perfusion on a nuclear stress test 2020 He was brought to ED due to bilateral leg swelling    - No clinical CHF A low BNP suggests a low left ventricular end diastolic pressure  - monitor renal function, his edema is at least partially due ot renal disease   - no pertinent findings on echo  - Bone lesions noted, for bone scan.  Heme/ onc w/u in progress  - for liver biopsy      Joey Archer MD, [facility]  BEEPER [phone]       Detail Level: Simple Render Risk Assessment In Note?: no Comment: Had been using hibiclens with flares… relatively mild case… if tx regimen, which includes Ximino,for acne… Humira is a consideration Comment: Pt with pcos…. On spironolactone and is followed by Endocrinologist

## 2022-05-06 NOTE — PROGRESS NOTE ADULT - PROBLEM SELECTOR PLAN 1
MRI noted above  Bone scan noted above  Per IR pt at increased risk for bleeding secondary to elevated BUN  Follow up s/p HD for liver biopsy in IR  Dr. Portillo, Hepatology, following   Dr. Rivers, Heme/Onc, following   Recommendations appreciated CTC, MRI and Bone Scan noted above  Per IR pt at increased risk for bleeding secondary to elevated BUN  Follow up s/p HD for liver biopsy in IR  Dr. Portillo, Hepatology, following   Dr. Rivers/Jeremie, Heme/Onc, following   Recommendations appreciated

## 2022-05-06 NOTE — PROGRESS NOTE ADULT - PROBLEM SELECTOR PLAN 12
-  DVT prophylaxis with SCD due to hematuria  -  GI prophylaxis with Protonix DVT PPX: SCDs   GI PPX: PPI

## 2022-05-06 NOTE — PROGRESS NOTE ADULT - PROBLEM SELECTOR PLAN 6
-  Stable   -  likely anemia of chronic diease   -  Monitor CBC daily Likely of chronic disease  Hgb 10.7  No overt s/s bleeding  Transfuse Hgb < 7  Avoid ASA and NSAIDs  Trend CBC

## 2022-05-06 NOTE — PROGRESS NOTE ADULT - PROBLEM SELECTOR PLAN 10
-  History  of Bipolar Disorder  -  Continue with Aripiprazole  -  Continue with Trazadone  -  Continue with Lamotrigine Pt takes Aripiprazole, Trazadone, Lamotrigine at home  Continue home regimen   Continue supportive care

## 2022-05-07 NOTE — PROGRESS NOTE ADULT - SUBJECTIVE AND OBJECTIVE BOX
C A R D I O L O G Y  **********************************     DATE OF SERVICE: 05-07-22    Patient denies chest pain or shortness of breath.   Review of symptoms otherwise negative.    ARIPiprazole 15 milliGRAM(s) Oral daily  chlorhexidine 2% Cloths 1 Application(s) Topical daily  ferrous    sulfate 325 milliGRAM(s) Oral daily  finasteride 5 milliGRAM(s) Oral daily  gabapentin 600 milliGRAM(s) Oral two times a day  lamoTRIgine 200 milliGRAM(s) Oral daily  midodrine. 2.5 milliGRAM(s) Oral three times a day  mupirocin 2% Ointment 1 Application(s) Both Nostrils every 12 hours  pantoprazole    Tablet 40 milliGRAM(s) Oral before breakfast  sevelamer carbonate 800 milliGRAM(s) Oral three times a day with meals  sodium bicarbonate 650 milliGRAM(s) Oral three times a day  tamsulosin 0.4 milliGRAM(s) Oral at bedtime  traMADol 50 milliGRAM(s) Oral every 6 hours PRN  traZODone 25 milliGRAM(s) Oral at bedtime                            10.6   8.32  )-----------( 242      ( 07 May 2022 06:05 )             32.7       Hemoglobin: 10.6 g/dL (05-07 @ 06:05)  Hemoglobin: 10.5 g/dL (05-06 @ 06:59)  Hemoglobin: 11.0 g/dL (05-05 @ 07:16)  Hemoglobin: 11.7 g/dL (05-04 @ 00:23)  Hemoglobin: 11.6 g/dL (05-03 @ 07:18)      05-07    132<L>  |  98  |  93<H>  ----------------------------<  101<H>  4.6   |  21<L>  |  5.10<H>    Ca    9.3      07 May 2022 06:05  Phos  5.3     05-07  Mg     2.2     05-07    TPro  8.2  /  Alb  3.0<L>  /  TBili  1.1  /  DBili  x   /  AST  334<H>  /  ALT  73<H>  /  AlkPhos  197<H>  05-06    Creatinine Trend: 5.10<--, 5.54<--, 5.02<--, 4.51<--, 4.38<--, 4.47<--    COAGS:           T(C): 37.9 (05-07-22 @ 13:20), Max: 37.9 (05-07-22 @ 13:20)  HR: 112 (05-07-22 @ 13:20) (92 - 112)  BP: 111/59 (05-07-22 @ 13:20) (102/62 - 121/63)  RR: 18 (05-07-22 @ 13:20) (16 - 20)  SpO2: 94% (05-07-22 @ 13:20) (94% - 100%)  Wt(kg): --    I&O's Summary    06 May 2022 07:01  -  07 May 2022 07:00  --------------------------------------------------------  IN: 0 mL / OUT: 150 mL / NET: -150 mL    07 May 2022 07:01  -  07 May 2022 16:39  --------------------------------------------------------  IN: 600 mL / OUT: 1100 mL / NET: -500 mL        HEENT:  (-)icterus (-)pallor  CV: N S1 S2 1/6 MARCELLE (+)2 Pulses B/l  Resp:  Clear to ausculatation B/L, normal effort  GI: (+) BS Soft, NT, ND  Lymph:  (-)Edema, (-)obvious lymphadenopathy  Skin: Warm to touch, Normal turgor  Psych: Appropriate mood and affect              ASSESSMENT/PLAN: 	71y  Male  HTN, CHF?, COPD, HLD, PAD, BPH, Prostate CA (s/p radiation), Bipolar Disorder, IBD - Crohn's Disease, R ileostomy bag (s/p sigmoid resection) historically preserved LV and RV function, normal perfusion on a nuclear stress test 2020 He was brought to ED due to bilateral leg swelling    - No clinical CHF A low BNP suggests a low left ventricular end diastolic pressure  - monitor renal function, his edema is at least partially due ot renal disease   - no pertinent findings on echo  -   Heme/ onc w/u in progress      Joey Archer MD, [facility]  BEEPER [phone]

## 2022-05-07 NOTE — PROGRESS NOTE ADULT - SUBJECTIVE AND OBJECTIVE BOX
CC: Patient denies SOB, fever or chills. Dialysis is in progress.    Vital Signs Last 24 Hrs  T(C): 37.6 (07 May 2022 09:11), Max: 37.7 (06 May 2022 20:45)  T(F): 99.7 (07 May 2022 09:11), Max: 99.9 (06 May 2022 20:45)  HR: 100 (07 May 2022 09:11) (92 - 112)  BP: 118/54 (07 May 2022 09:11) (102/62 - 133/66)  BP(mean): --  RR: 18 (07 May 2022 09:11) (18 - 21)  SpO2: 94% (07 May 2022 09:11) (94% - 100%)    05-06 @ 07:01  -  05-07 @ 07:00  --------------------------------------------------------  IN: 0 mL / OUT: 150 mL / NET: -150 mL    PHYSICAL EXAM:  General: No acute respiratory distress.  Eyes: Sclera anicteric  ENMT: Atraumatic, Normocephalic, supple, No JVD present  Respiratory: Bilateral decreased BS and no rhonchi, wheezing  Cardiovascular: S1S2+; no murmurs  Gastrointestinal: Soft, Non-tender, Nondistended; Bowel sounds present   Neuro:  Awake, Alert & Oriented X3  Ext:  no pedal edema  Skin: Normal turgor    MEDICATIONS:  ARIPiprazole 15 milliGRAM(s) Oral daily  chlorhexidine 2% Cloths 1 Application(s) Topical daily  ferrous    sulfate 325 milliGRAM(s) Oral daily  finasteride 5 milliGRAM(s) Oral daily  gabapentin 600 milliGRAM(s) Oral two times a day  lamoTRIgine 200 milliGRAM(s) Oral daily  midodrine. 2.5 milliGRAM(s) Oral three times a day  pantoprazole    Tablet 40 milliGRAM(s) Oral before breakfast  sevelamer carbonate 800 milliGRAM(s) Oral three times a day with meals  sodium bicarbonate 650 milliGRAM(s) Oral three times a day  tamsulosin 0.4 milliGRAM(s) Oral at bedtime  traMADol 50 milliGRAM(s) Oral every 6 hours PRN  traZODone 25 milliGRAM(s) Oral at bedtime      LABS:                        10.6   8.32  )-----------( 242      ( 07 May 2022 06:05 )             32.7     05-07    132<L>  |  98  |  93<H>  ----------------------------<  101<H>  4.6   |  21<L>  |  5.10<H>    Ca    9.3      07 May 2022 06:05  Phos  5.3     05-07  Mg     2.2     05-07    TPro  8.2  /  Alb  3.0<L>  /  TBili  1.1  /  DBili  x   /  AST  334<H>  /  ALT  73<H>  /  AlkPhos  197<H>  05-06    PT/INR - ( 06 May 2022 06:59 )   PT: 14.1 sec;   INR: 1.18 ratio         PTT - ( 06 May 2022 06:59 )  PTT:27.1 sec    Magnesium, Serum: 2.2 mg/dL (05-07 @ 06:05)  Phosphorus Level, Serum: 5.3 mg/dL (05-07 @ 06:05)      Assessment and Plan:   	  1. PAWAN due to ATN with questionable HRS. Tolerating HD  -cont HD with UF as tolerated  -pt also had gross hematuria and unclear etiology seems less likely nephritis and f/u RUBIA, ANCA and Anit-GBM.   2. CKD stage 4 most likely due to ischemic nephropathy and h/o ATN with renal recovery.  -Baseline Scr around 3.2mg/dL in April 2020.  -Keep patient euvolemic and renal diet  -Avoid Nephrotoxic Meds/ Agents such as (NSAIDs, IV contrast, Aminoglycosides such as gentamicin, -Gadolinium contrast, Phosphate containing enemas, etc..)  -Adjust Medications according to eGFR  3. Anemia:   -hb is stable; continue iron tabs. monitor CBC  4. MBD:   -phos is stable. previous mild hypercalcemia, which improved and off calcitirol; on renvela 1 tab tid;   -pth is okay at 41.  5. Hyperkalemia due to pawan on ckd.   -K resolved.   -on low K diet. give Lokelma prn if K >5.5  -Keep pt euvolemic. Avoid ACE inh/ ARBs, NSAIDs, and Aldactone or potassium sparing diuretics. Monitor K+ daily.  6. Fluid Overload:  -clinically improved. off diuretics for now.   -CT chest w/o contrast shows no effusion;   7. Acidosis:  -CO2 is better   -on sodium bicarbonate 650mg oral tid. monitor CO2  8. h/o Hypotension:  -bp is acceptable and on midodrine 2.5mg tid      9. Elevated LFTs  - off lipitor for now.  -hepatitis panel shows hep C+ve.  discussed with pt, pt had known hep C and was treated for in the past; hep C RNA is not detected.   -C3/C4 and RF negative, cryoglobulinemia is negative.    -ct scan shows hepatic mass; Hepatology consulted and MRI w/o contrast shows liver mass and mets.  -Hepatology and Oncology consulted.   -Plan for IR guided liver biopsy on monday since elevated bun as per IR attending.  -pt has pawan on ckd and GFR <15cc/min at present; At present, the risk for nephrogenic systemic fibrosis, weigh out the benefits thus would avoid gadolinium contrast administration. Would consider alternative diagnostic strategies including and other imaging modalities.

## 2022-05-07 NOTE — PROGRESS NOTE ADULT - PROBLEM SELECTOR PLAN 5
-K resolved.   -on low K diet. give Lokelma prn if K >5.5  -Keep pt euvolemic. Avoid ACE inh/ ARBs, NSAIDs, and Aldactone or potassium sparing diuretics. Monitor K+ daily.

## 2022-05-07 NOTE — PROGRESS NOTE ADULT - ASSESSMENT
Patient is a 71M from [facility], who is ambulatory at baseline with a walker. He has Hx of HTN, CHF?, COPD, HLD, PAD, BPH, Prostate CA (s/p radiation), Bipolar Disorder, IBD - Crohn's Disease, R ileostomy bag (s/p sigmoid resection). Presented with SOB and LE edema, found with hyperkalemia on admission. Admitted to [facility] for cardio work up R/O CHF and hyperkalemia with PAWAN on CKD 4.  cardiology and nephrology consulted. started lasix. renal US shows no hyronephrosis, + renal cysts.   CT chest shows no pleural effusion, but with hepatic lesion 4.7cm largest. Also with transaminitis. GI and Hepatology consulted. ordered MRI abdomen r/o malignancy.   PAWAN worsening SCr,  started low dose IVF as discussed nephrology.   Pt was on 2L Oxygen, will monitor O2 sat on ambulation/at rest room air.   MRI reveals multiple liver lesions with mets  -  Hem/onc consulted, pending bone scan and liver biopsy. Patient having still hematuria but denies difficulty urinating, bladder scan negative for urinary retention. Will c/w post void bladder scan to monitor for urinary retention as pt refuse indwelling catheter.

## 2022-05-07 NOTE — PROGRESS NOTE ADULT - PROBLEM SELECTOR PLAN 4
SCr 5.54  -Baseline Scr around 3.2mg/dL in April 2020.  -Keep patient euvolemic and renal diet  -Avoid Nephrotoxic Meds/ Agents such as (NSAIDs, IV contrast, Aminoglycosides such as gentamicin, -Gadolinium contrast, Phosphate containing enemas, etc..)

## 2022-05-07 NOTE — PROGRESS NOTE ADULT - SUBJECTIVE AND OBJECTIVE BOX
INTERVAL HPI/OVERNIGHT EVENTS:  No acute events overnight. Pt reports he is voiding very little dark urine. Denies abd pain.     MEDICATIONS  (STANDING):  ARIPiprazole 15 milliGRAM(s) Oral daily  chlorhexidine 2% Cloths 1 Application(s) Topical daily  ferrous    sulfate 325 milliGRAM(s) Oral daily  finasteride 5 milliGRAM(s) Oral daily  gabapentin 600 milliGRAM(s) Oral two times a day  lamoTRIgine 200 milliGRAM(s) Oral daily  midodrine. 2.5 milliGRAM(s) Oral three times a day  pantoprazole    Tablet 40 milliGRAM(s) Oral before breakfast  sevelamer carbonate 800 milliGRAM(s) Oral three times a day with meals  sodium bicarbonate 650 milliGRAM(s) Oral three times a day  tamsulosin 0.4 milliGRAM(s) Oral at bedtime  traZODone 25 milliGRAM(s) Oral at bedtime    MEDICATIONS  (PRN):  traMADol 50 milliGRAM(s) Oral every 6 hours PRN Severe Pain (7 - 10)      Vital Signs Last 24 Hrs  T(C): 37.2 (07 May 2022 04:38), Max: 37.7 (06 May 2022 20:45)  T(F): 98.9 (07 May 2022 04:38), Max: 99.9 (06 May 2022 20:45)  HR: 92 (07 May 2022 04:38) (92 - 112)  BP: 116/63 (07 May 2022 04:38) (102/62 - 133/66)  BP(mean): --  RR: 18 (07 May 2022 04:38) (18 - 21)  SpO2: 96% (07 May 2022 04:38) (94% - 100%)    Physical:  General: Alert and oriented, not in acute distress  Resp: Breathing unlabored  Abdomen: Soft, distended, tympanic, ostomy with air and stool  : No phillips catheter, no dysuria or hematuria; reports urinating very dark urine  Extremities: No pedal edema    I&O's Detail  06 May 2022 07:01  -  07 May 2022 07:00  --------------------------------------------------------  IN:  Total IN: 0 mL    OUT:    Ileostomy (mL): 150 mL  Total OUT: 150 mL  Total NET: -150 mL    LABS:                        10.6   8.32  )-----------( 242      ( 07 May 2022 06:05 )             32.7             05-07    132<L>  |  98  |  93<H>  ----------------------------<  101<H>  4.6   |  21<L>  |  5.10<H>    Ca    9.3      07 May 2022 06:05  Phos  5.3     05-07  Mg     2.2     05-07    TPro  8.2  /  Alb  3.0<L>  /  TBili  1.1  /  DBili  x   /  AST  334<H>  /  ALT  73<H>  /  AlkPhos  197<H>  05-06    < from: CT Abdomen and Pelvis No Cont (05.06.22 @ 10:00) >  FINDINGS:  LOWER CHEST: Stable right middle lobe discoid atelectasis. Bilateral   lower lobe dependent atelectasis.    LIVER: Heterogeneous liver with innumerable indistinct hypodense and   hyperdense masses.  BILE DUCTS: Normal caliber.  GALLBLADDER: Cholelithiasis. Porcelain gallbladder.  SPLEEN: Within normal limits.  PANCREAS: Within normal limits.  ADRENALS: Within normal limits.  KIDNEYS/URETERS: Bilateral cysts. No hydronephrosis.    BLADDER: Within normal limits.  REPRODUCTIVE ORGANS: Lobulated prostate.    BOWEL: Subtotal colectomy with Connors's pouch. Right-sided ileostomy. No   bowelobstruction. Small sliding hiatus hernia. Decompressed stomach   cannot be assessed. Nonobstructed small bowel extends into bilateral   inguinal hernias  PERITONEUM: No ascites.  VESSELS: IVC filter.  RETROPERITONEUM/LYMPH NODES: No lymphadenopathy.  ABDOMINAL WALL: Bilateral inguinal hernias containing nonobstructed small   bowel.  BONES: L5-S1 degenerative disc disease. Lytic lucency in the right iliac   bone (2:105), right L4 pedicle (2:90) and right L3 vertebra (2:79) are   unchanged from 2020.    IMPRESSION:  Evaluation of the solid organs, vascular structures and GI tract is   limited without oral and IV contrast.  Hepatic metastases.  Subtotal colectomy with Connors's pouch and right ileostomy.   Nonobstructed small bowel in bilateral inguinal hernias.  Osseous lucency is unchanged from 2020, which are not active on nuclear   bone scan 5/5/2022  Other chronic findings as above.  --- End of Report ---  < end of copied text >   INTERVAL HPI/OVERNIGHT EVENTS:  No acute events overnight. Pt reports he is voiding very little dark urine. Denies abd pain.     MEDICATIONS  (STANDING):  ARIPiprazole 15 milliGRAM(s) Oral daily  chlorhexidine 2% Cloths 1 Application(s) Topical daily  ferrous    sulfate 325 milliGRAM(s) Oral daily  finasteride 5 milliGRAM(s) Oral daily  gabapentin 600 milliGRAM(s) Oral two times a day  lamoTRIgine 200 milliGRAM(s) Oral daily  midodrine. 2.5 milliGRAM(s) Oral three times a day  pantoprazole    Tablet 40 milliGRAM(s) Oral before breakfast  sevelamer carbonate 800 milliGRAM(s) Oral three times a day with meals  sodium bicarbonate 650 milliGRAM(s) Oral three times a day  tamsulosin 0.4 milliGRAM(s) Oral at bedtime  traZODone 25 milliGRAM(s) Oral at bedtime    MEDICATIONS  (PRN):  traMADol 50 milliGRAM(s) Oral every 6 hours PRN Severe Pain (7 - 10)      Vital Signs Last 24 Hrs  T(C): 37.2 (07 May 2022 04:38), Max: 37.7 (06 May 2022 20:45)  T(F): 98.9 (07 May 2022 04:38), Max: 99.9 (06 May 2022 20:45)  HR: 92 (07 May 2022 04:38) (92 - 112)  BP: 116/63 (07 May 2022 04:38) (102/62 - 133/66)  BP(mean): --  RR: 18 (07 May 2022 04:38) (18 - 21)  SpO2: 96% (07 May 2022 04:38) (94% - 100%)    Physical:  General: Alert and oriented, not in acute distress  Resp: Breathing unlabored  Abdomen: Soft, distended, tympanic, ostomy with air and stool  : No phillips catheter, no dysuria; reports urinating very dark urine  Extremities: No pedal edema    I&O's Detail  06 May 2022 07:01  -  07 May 2022 07:00  --------------------------------------------------------  IN:  Total IN: 0 mL    OUT:    Ileostomy (mL): 150 mL  Total OUT: 150 mL  Total NET: -150 mL    LABS:                        10.6   8.32  )-----------( 242      ( 07 May 2022 06:05 )             32.7             05-07    132<L>  |  98  |  93<H>  ----------------------------<  101<H>  4.6   |  21<L>  |  5.10<H>    Ca    9.3      07 May 2022 06:05  Phos  5.3     05-07  Mg     2.2     05-07    TPro  8.2  /  Alb  3.0<L>  /  TBili  1.1  /  DBili  x   /  AST  334<H>  /  ALT  73<H>  /  AlkPhos  197<H>  05-06    < from: CT Abdomen and Pelvis No Cont (05.06.22 @ 10:00) >  FINDINGS:  LOWER CHEST: Stable right middle lobe discoid atelectasis. Bilateral   lower lobe dependent atelectasis.    LIVER: Heterogeneous liver with innumerable indistinct hypodense and   hyperdense masses.  BILE DUCTS: Normal caliber.  GALLBLADDER: Cholelithiasis. Porcelain gallbladder.  SPLEEN: Within normal limits.  PANCREAS: Within normal limits.  ADRENALS: Within normal limits.  KIDNEYS/URETERS: Bilateral cysts. No hydronephrosis.    BLADDER: Within normal limits.  REPRODUCTIVE ORGANS: Lobulated prostate.    BOWEL: Subtotal colectomy with Connors's pouch. Right-sided ileostomy. No   bowelobstruction. Small sliding hiatus hernia. Decompressed stomach   cannot be assessed. Nonobstructed small bowel extends into bilateral   inguinal hernias  PERITONEUM: No ascites.  VESSELS: IVC filter.  RETROPERITONEUM/LYMPH NODES: No lymphadenopathy.  ABDOMINAL WALL: Bilateral inguinal hernias containing nonobstructed small   bowel.  BONES: L5-S1 degenerative disc disease. Lytic lucency in the right iliac   bone (2:105), right L4 pedicle (2:90) and right L3 vertebra (2:79) are   unchanged from 2020.    IMPRESSION:  Evaluation of the solid organs, vascular structures and GI tract is   limited without oral and IV contrast.  Hepatic metastases.  Subtotal colectomy with Connors's pouch and right ileostomy.   Nonobstructed small bowel in bilateral inguinal hernias.  Osseous lucency is unchanged from 2020, which are not active on nuclear   bone scan 5/5/2022  Other chronic findings as above.  --- End of Report ---  < end of copied text >

## 2022-05-07 NOTE — PROGRESS NOTE ADULT - SUBJECTIVE AND OBJECTIVE BOX
pt seen and examined    REVIEW OF SYSTEMS:      CONSTITUTIONAL: No fever  EYES: no acute visual disturbances  NECK: No pain or stiffness  RESPIRATORY: No cough; No shortness of breath  CARDIOVASCULAR: No chest pain, no palpitations  GASTROINTESTINAL: No pain. No nausea, vomiting or diarrhea   NEUROLOGICAL: No headache or numbness, no tremors  MUSCULOSKELETAL: No joint pain, no muscle pain  GENITOURINARY: no dysuria, no frequency, no hesitancy  PSYCHIATRY: no depression, no anxiety  ALL OTHER  ROS negative      MEDICATIONS  (STANDING):  ARIPiprazole 15 milliGRAM(s) Oral daily  chlorhexidine 2% Cloths 1 Application(s) Topical daily  ferrous    sulfate 325 milliGRAM(s) Oral daily  finasteride 5 milliGRAM(s) Oral daily  gabapentin 600 milliGRAM(s) Oral two times a day  lamoTRIgine 200 milliGRAM(s) Oral daily  midodrine. 2.5 milliGRAM(s) Oral three times a day  mupirocin 2% Ointment 1 Application(s) Both Nostrils every 12 hours  pantoprazole    Tablet 40 milliGRAM(s) Oral before breakfast  sevelamer carbonate 800 milliGRAM(s) Oral three times a day with meals  sodium bicarbonate 650 milliGRAM(s) Oral three times a day  tamsulosin 0.4 milliGRAM(s) Oral at bedtime  traZODone 25 milliGRAM(s) Oral at bedtime    MEDICATIONS  (PRN):  traMADol 50 milliGRAM(s) Oral every 6 hours PRN Severe Pain (7 - 10)    Vital Signs Last 24 Hrs  T(C): 36.7 (05-07-22 @ 17:26), Max: 37.9 (05-07-22 @ 13:20)  T(F): 98 (05-07-22 @ 17:26), Max: 100.2 (05-07-22 @ 13:20)  HR: 109 (05-07-22 @ 17:26) (92 - 112)  BP: 105/65 (05-07-22 @ 17:26) (105/65 - 118/54)  BP(mean): --  RR: 18 (05-07-22 @ 17:26) (16 - 18)  SpO2: 94% (05-07-22 @ 17:26) (94% - 96%)      ________________________________________________  PHYSICAL EXAM:    GENERAL: NAD  HEENT: Normocephalic; conjunctivae and sclerae clear;  NECK : supple, no JVD  CHEST/LUNG: dec breath sounds at bases  HEART: S1 S2  regular  ABDOMEN: Soft, Nontender, Nondistended; Bowel sounds present  EXTREMITIES: no cyanosis; no LE edema; no calf tenderness  SKIN: warm and dry; No rashes or lesions  NERVOUS SYSTEM:  Alert; no new deficits      LABS:  05-07    132<L>  |  98  |  93<H>  ----------------------------<  101<H>  4.6   |  21<L>  |  5.10<H>    Ca    9.3      07 May 2022 06:05  Phos  5.3     05-07  Mg     2.2     05-07    TPro  8.2  /  Alb  3.0<L>  /  TBili  1.1  /  DBili      /  AST  334<H>  /  ALT  73<H>  /  AlkPhos  197<H>  05-06    Creatinine Trend: 5.10 <--, 5.54 <--, 5.02 <--, 4.51 <--, 4.38 <--, 4.47 <--, 4.39 <--, 3.82 <--                        10.6   8.32  )-----------( 242      ( 07 May 2022 06:05 )             32.7     Urine Studies:            LIVER FUNCTIONS - ( 06 May 2022 06:59 )  Alb: 3.0 g/dL / Pro: 8.2 g/dL / ALK PHOS: 197 U/L / ALT: 73 U/L DA / AST: 334 U/L / GGT: x           PT/INR - ( 06 May 2022 06:59 )   PT: 14.1 sec;   INR: 1.18 ratio         PTT - ( 06 May 2022 06:59 )  PTT:27.1 sec      __________________________________________________  RADIOLOGY & ADDITIONAL TESTS:    Imaging Personally Reviewed:  YES    < from: NM Bone Imaging Total (05.05.22 @ 14:37) >  IMPRESSION: Bone scan demonstrates:    Foci of increased uptake in the right anterolateral mid ribs probably   post traumatic.    Heterogeneous tracer activity in the right and left femurs, nonspecific.   Suggest radiographic correlation.    Degenerative disease in the major joints.    < end of copied text >    < from: MR Abdomen No Cont (05.03.22 @ 11:23) >  Innumerable mildly T2 hyperintense lesions are seen throughout the liver,   suggestive of metastasis. The largest lesion measures 13.8 x 11.7 x 11.7   cm in the liver dome.    Multiple T2 hyperintense and T2 hyperintense lesions in the thoracolumbar   spine may represent osseous metastasis. Whole-body bone scan may be   pursued for further evaluation.    Cholelithiasis.    < end of copied text >    < from: CT Chest No Cont (05.02.22 @ 11:10) >  IMPRESSION:.    No pleural effusion.    Multiple hepatic lesions with largest area at the hepatic dome measuring   up to 4.7 cm; findings are new compared to 2/5/2020 CT abdomen. Recommend   dedicated CT or MRI abdomen to further assess and to exclude malignancy.    < end of copied text >    Consultant(s) Notes Reviewed:   YES     Plan of care was discussed with patient and /or primary care giver; all questions and concerns were addressed and care was aligned with patient's wishes.    Plan discussed with attending and consulting physicians.

## 2022-05-07 NOTE — PROGRESS NOTE ADULT - PROBLEM SELECTOR PLAN 1
CTC, MRI and Bone Scan noted above  Per IR pt at increased risk for bleeding secondary to elevated BUN  - Interventional radiology consulted for liver biopsy, since cannot get MRI with IV contrast due to CKD/PAWAN,   - if bx nondiagnostic, will plan for outpt pet/ct vs other methods of dx

## 2022-05-07 NOTE — PROGRESS NOTE ADULT - ASSESSMENT
71M p/w acute CHF exacerbation, found to have PAWAN on CKD with gross hematuria  also with evidence of liver metastasis on MRI    Prostate Ca Screen, PSA Total (05.05.22 @ 10:04)    Prostate Ca Screen, PSA Total: <0.01: Test Repeated    -Oncology consult appreciated  -Patient will need to follow-up with Dr. Connors on discharge for outpatient cystoscopy for hematuria workup   71M p/w acute CHF exacerbation, found to have PAWAN on CKD with gross hematuria  also with evidence of liver metastasis on MRI    Prostate Ca Screen, PSA Total (05.05.22 @ 10:04)    Prostate Ca Screen, PSA Total: <0.01: Test Repeated    -Bladder sonogram to r/o clot retention if clinically indicated (signs/symptoms of retention)  -Oncology consult appreciated  -Patient will need to follow-up with Dr. Connors on discharge for outpatient cystoscopy for hematuria workup

## 2022-05-07 NOTE — PROGRESS NOTE ADULT - PROBLEM SELECTOR PLAN 9
Pt has R ileostomy  Stool studies neg  Continue ileostomy care  Dr. Azevedo, GI, following   Recommendations appreciated

## 2022-05-07 NOTE — PROGRESS NOTE ADULT - PROBLEM SELECTOR PLAN 10
Pt takes Aripiprazole, Trazadone, Lamotrigine at home  Continue home regimen   Continue supportive care

## 2022-05-08 NOTE — PROGRESS NOTE ADULT - SUBJECTIVE AND OBJECTIVE BOX
C A R D I O L O G Y  **********************************     DATE OF SERVICE: 05-08-22    Patient denies chest pain or shortness of breath.   Review of symptoms otherwise negative.    ARIPiprazole 15 milliGRAM(s) Oral daily  chlorhexidine 2% Cloths 1 Application(s) Topical daily  ferrous    sulfate 325 milliGRAM(s) Oral daily  finasteride 5 milliGRAM(s) Oral daily  gabapentin 600 milliGRAM(s) Oral two times a day  lamoTRIgine 200 milliGRAM(s) Oral daily  midodrine. 2.5 milliGRAM(s) Oral three times a day  mupirocin 2% Ointment 1 Application(s) Both Nostrils every 12 hours  pantoprazole    Tablet 40 milliGRAM(s) Oral before breakfast  sevelamer carbonate 800 milliGRAM(s) Oral three times a day with meals  sodium bicarbonate 650 milliGRAM(s) Oral three times a day  tamsulosin 0.4 milliGRAM(s) Oral at bedtime  traMADol 50 milliGRAM(s) Oral every 6 hours PRN  traZODone 25 milliGRAM(s) Oral at bedtime                            11.5   9.20  )-----------( 267      ( 08 May 2022 08:33 )             35.9       Hemoglobin: 11.5 g/dL (05-08 @ 08:33)  Hemoglobin: 10.6 g/dL (05-07 @ 06:05)  Hemoglobin: 10.5 g/dL (05-06 @ 06:59)  Hemoglobin: 11.0 g/dL (05-05 @ 07:16)  Hemoglobin: 11.7 g/dL (05-04 @ 00:23)      05-08    133<L>  |  95<L>  |  72<H>  ----------------------------<  102<H>  4.5   |  22  |  5.82<H>    Ca    9.5      08 May 2022 08:33  Phos  5.4     05-08  Mg     2.5     05-08      Creatinine Trend: 5.82<--, 5.10<--, 5.54<--, 5.02<--, 4.51<--, 4.38<--    COAGS:           T(C): 37.4 (05-08-22 @ 13:32), Max: 37.4 (05-08-22 @ 13:32)  HR: 105 (05-08-22 @ 13:32) (98 - 109)  BP: 115/73 (05-08-22 @ 13:32) (102/64 - 141/64)  RR: 18 (05-08-22 @ 13:32) (17 - 18)  SpO2: 93% (05-08-22 @ 13:32) (93% - 95%)  Wt(kg): --    I&O's Summary    07 May 2022 07:01  -  08 May 2022 07:00  --------------------------------------------------------  IN: 600 mL / OUT: 1525 mL / NET: -925 mL        HEENT:  (-)icterus (-)pallor  CV: N S1 S2 1/6 MARCELLE (+)2 Pulses B/l  Resp:  Clear to ausculatation B/L, normal effort  GI: (+) BS Soft, NT, ND  Lymph:  (-)Edema, (-)obvious lymphadenopathy  Skin: Warm to touch, Normal turgor  Psych: Appropriate mood and affect              ASSESSMENT/PLAN: 	71y  Male  HTN, CHF?, COPD, HLD, PAD, BPH, Prostate CA (s/p radiation), Bipolar Disorder, IBD - Crohn's Disease, R ileostomy bag (s/p sigmoid resection) historically preserved LV and RV function, normal perfusion on a nuclear stress test 2020 He was brought to ED due to bilateral leg swelling    - No clinical CHF A low BNP suggests a low left ventricular end diastolic pressure  - monitor renal function, his edema is at least partially due ot renal disease   - no pertinent findings on echo  -   Heme/ onc w/u in progress      Joey Archer MD, [facility]  BEEPER [phone]

## 2022-05-09 NOTE — PROGRESS NOTE ADULT - PROBLEM SELECTOR PLAN 11
Pt takes Finasteride and Flomax at home  Continue home regimen -discharge disposition [facility] pending liver biopsy on Wed and replacement of HDC  -CM consult

## 2022-05-09 NOTE — CHART NOTE - NSCHARTNOTEFT_GEN_A_CORE
As per Nephro Dr. Pond, pt will require Permacath for dialysis, Spoke to Darleen in IR, case would be discussed with IR physician  tomorrow. Will keep pt NPO after midnight tonight. f/u with IR in AM

## 2022-05-09 NOTE — PROGRESS NOTE ADULT - SUBJECTIVE AND OBJECTIVE BOX
72 yo male found to have a liver lesion.  IR asked to perform biopsy which was planned for today.  Patient was not NPO and ate early this morning.  Unable to perform at this time will plan for Wednesday 5/11/22.

## 2022-05-09 NOTE — PROGRESS NOTE ADULT - SUBJECTIVE AND OBJECTIVE BOX
Patient is a 71y old  Male who presents with a chief complaint of acute CHF exacerbation (09 May 2022 08:32)    OVERNIGHT EVENTS: no acute events overnight, sitting up in bed offering no new complaints       REVIEW OF SYSTEMS:  CONSTITUTIONAL: No fever, chills  NECK: No pain or stiffness  RESPIRATORY: No cough, SOB  CARDIOVASCULAR: No chest pain, palpitations  GASTROINTESTINAL: No abdominal pain. No nausea, vomiting, or diarrhea  NEUROLOGICAL: No HA  MUSCULOSKELETAL: No joint pain or swelling    T(C): 36.7 (05-09-22 @ 05:13), Max: 37.4 (05-08-22 @ 13:32)  HR: 94 (05-09-22 @ 05:13) (94 - 105)  BP: 105/66 (05-09-22 @ 05:13) (105/66 - 119/71)  RR: 18 (05-09-22 @ 05:13) (18 - 21)  SpO2: 94% (05-09-22 @ 05:13) (93% - 94%)  Wt(kg): --Vital Signs Last 24 Hrs  T(C): 36.7 (09 May 2022 05:13), Max: 37.4 (08 May 2022 13:32)  T(F): 98.1 (09 May 2022 05:13), Max: 99.4 (08 May 2022 13:32)  HR: 94 (09 May 2022 05:13) (94 - 105)  BP: 105/66 (09 May 2022 05:13) (105/66 - 119/71)  BP(mean): --  RR: 18 (09 May 2022 05:13) (18 - 21)  SpO2: 94% (09 May 2022 05:13) (93% - 94%)    MEDICATIONS  (STANDING):  ARIPiprazole 15 milliGRAM(s) Oral daily  chlorhexidine 2% Cloths 1 Application(s) Topical daily  ferrous    sulfate 325 milliGRAM(s) Oral daily  finasteride 5 milliGRAM(s) Oral daily  gabapentin 600 milliGRAM(s) Oral two times a day  lamoTRIgine 200 milliGRAM(s) Oral daily  midodrine. 2.5 milliGRAM(s) Oral three times a day  mupirocin 2% Ointment 1 Application(s) Both Nostrils every 12 hours  pantoprazole    Tablet 40 milliGRAM(s) Oral before breakfast  sevelamer carbonate 800 milliGRAM(s) Oral three times a day with meals  sodium bicarbonate 650 milliGRAM(s) Oral three times a day  sodium zirconium cyclosilicate 10 Gram(s) Oral once  tamsulosin 0.4 milliGRAM(s) Oral at bedtime  traZODone 25 milliGRAM(s) Oral at bedtime    MEDICATIONS  (PRN):  traMADol 50 milliGRAM(s) Oral every 6 hours PRN Severe Pain (7 - 10)      PHYSICAL EXAM:  GENERAL: frail   EYES: clear conjunctiva  ENMT: Moist mucous membranes  NECK: Supple, No JVD  CHEST/LUNG: Clear to auscultation bilaterally; No rales, rhonchi, wheezing, or rubs  HEART: S1, S2, Regular rate and rhythm  ABDOMEN: Soft, Nontender, Nondistended; Bowel sounds present, +ve right ileostomy   NEURO: Alert & Oriented X3  EXTREMITIES: No LE edema, no calf tenderness  SKIN: right neck HDC     Consultant(s) Notes Reviewed:  [x ] YES  [ ] NO  Care Discussed with Consultants/Other Providers [ x] YES  [ ] NO    LABS:                        11.2   9.59  )-----------( 270      ( 09 May 2022 07:05 )             35.3     05-09    128<L>  |  94<L>  |  88<H>  ----------------------------<  104<H>  5.5<H>   |  18<L>  |  7.88<H>    Ca    9.4      09 May 2022 07:05  Phos  5.2     05-09  Mg     2.4     05-09    CAPILLARY BLOOD GLUCOSE  RADIOLOGY & ADDITIONAL TESTS:    < from: CT Abdomen and Pelvis No Cont (05.06.22 @ 10:00) >    ACC: [id number] EXAM:  CT ABDOMEN AND PELVIS                          PROCEDURE DATE:  05/06/2022          INTERPRETATION:  CLINICAL INFORMATION: 71 years  Male with Gross   hematuria. Prostate cancer    COMPARISON: MRI abdomen 5/3/2022, chest CT 5/2/2022 and CT abdomen and   pelvis 2/5/2020    CONTRAST/COMPLICATIONS:  IV Contrast: NONE  Oral Contrast: NONE  Complications: None reported at time of study completion    PROCEDURE:  CT of the Abdomen and Pelvis was performed.  Sagittal and coronal reformats were performed.    LIMITATIONS: Evaluation of the solid organs, vascular structures and GI   tract is limited without oral and IV contrast.    FINDINGS:  LOWER CHEST: Stable right middle lobe discoid atelectasis. Bilateral   lower lobe dependent atelectasis.    LIVER: Heterogeneous liver with innumerable indistinct hypodense and   hyperdense masses.  BILE DUCTS: Normal caliber.  GALLBLADDER: Cholelithiasis. Porcelain gallbladder.  SPLEEN: Within normal limits.  PANCREAS: Within normal limits.  ADRENALS: Within normal limits.  KIDNEYS/URETERS: Bilateral cysts. No hydronephrosis.    BLADDER: Within normal limits.  REPRODUCTIVE ORGANS: Lobulated prostate.    BOWEL: Subtotal colectomy with Connors's pouch. Right-sided ileostomy. No   bowelobstruction. Small sliding hiatus hernia. Decompressed stomach   cannot be assessed. Nonobstructed small bowel extends into bilateral   inguinal hernias  PERITONEUM: No ascites.  VESSELS: IVC filter.  RETROPERITONEUM/LYMPH NODES: No lymphadenopathy.  ABDOMINAL WALL: Bilateral inguinal hernias containing nonobstructed small   bowel.  BONES: L5-S1 degenerative disc disease. Lytic lucency in the right iliac   bone (2:105), right L4 pedicle (2:90) and right L3 vertebra (2:79) are   unchanged from 2020.    IMPRESSION:  Evaluation of the solid organs, vascular structures and GI tract is   limited without oral and IV contrast.    Hepatic metastases.    Subtotal colectomy with Connors's pouch and right ileostomy.   Nonobstructed small bowel in bilateral inguinal hernias.    Osseous lucency is unchanged from 2020, which are not active on nuclear   bone scan 5/5/2022    Other chronic findings as above.    < end of copied text >  < from: NM Bone Imaging Total (05.05.22 @ 14:37) >    ACC: [id number] EXAM:  NM BONE IMG WHOLE BODY                          PROCEDURE DATE:  05/05/2022          INTERPRETATION:  CLINICAL INFORMATION: 71 year-old male with prostate   carcinoma, liver lesions, referred to evaluate for osseous metastasis,    RADIOPHARMACEUTICAL: 20.1 mCi Tc-99m HDP, I.V.    TECHNIQUE:  Whole-body planar images were obtained in the anterior and   posterior projections approximately 2-3 hours after tracer injection.   Additional static views of the chest and femurs in the anterior,   posterior and lateral projections were also obtained.    COMPARISON: No previous bone scan for comparison    FINDINGS: There are foci of increased tracer uptake in the right   anterolateral mid ribs near the costochondral junction, probably post   traumatic. There is mildly increased, heterogeneous tracer activity in   the right and left femurs. There are degenerative changes in the major   joints. There is physiologic tracer distribution in the remainder of the   visualized skeleton.    Both kidneys are visualized.    IMPRESSION: Bone scan demonstrates:    Foci of increased uptake in the right anterolateral mid ribs probably   post traumatic.    Heterogeneous tracer activity in the right and left femurs, nonspecific.   Suggest radiographic correlation.    Degenerative disease in the major joints.      < end of copied text >      Imaging Personally Reviewed:  [ ] YES  [ ] NO

## 2022-05-09 NOTE — PROGRESS NOTE ADULT - SUBJECTIVE AND OBJECTIVE BOX
C A R D I O L O G Y  **********************************     DATE OF SERVICE: 05-09-22    Patient denies chest pain or shortness of breath.   Review of symptoms otherwise negative.    ARIPiprazole 15 milliGRAM(s) Oral daily  chlorhexidine 2% Cloths 1 Application(s) Topical daily  ferrous    sulfate 325 milliGRAM(s) Oral daily  finasteride 5 milliGRAM(s) Oral daily  gabapentin 600 milliGRAM(s) Oral two times a day  lamoTRIgine 200 milliGRAM(s) Oral daily  midodrine. 2.5 milliGRAM(s) Oral three times a day  mupirocin 2% Ointment 1 Application(s) Both Nostrils every 12 hours  pantoprazole    Tablet 40 milliGRAM(s) Oral before breakfast  sevelamer carbonate 800 milliGRAM(s) Oral three times a day with meals  sodium bicarbonate 650 milliGRAM(s) Oral three times a day  sodium zirconium cyclosilicate 10 Gram(s) Oral once  tamsulosin 0.4 milliGRAM(s) Oral at bedtime  traMADol 50 milliGRAM(s) Oral every 6 hours PRN  traZODone 25 milliGRAM(s) Oral at bedtime                            11.2   9.59  )-----------( 270      ( 09 May 2022 07:05 )             35.3       Hemoglobin: 11.2 g/dL (05-09 @ 07:05)  Hemoglobin: 11.5 g/dL (05-08 @ 08:33)  Hemoglobin: 10.6 g/dL (05-07 @ 06:05)  Hemoglobin: 10.5 g/dL (05-06 @ 06:59)  Hemoglobin: 11.0 g/dL (05-05 @ 07:16)      05-09    128<L>  |  94<L>  |  88<H>  ----------------------------<  104<H>  5.5<H>   |  18<L>  |  7.88<H>    Ca    9.4      09 May 2022 07:05  Phos  5.2     05-09  Mg     2.4     05-09      Creatinine Trend: 7.88<--, 5.82<--, 5.10<--, 5.54<--, 5.02<--, 4.51<--    COAGS:           T(C): 36.4 (05-09-22 @ 11:15), Max: 37.4 (05-08-22 @ 13:32)  HR: 98 (05-09-22 @ 11:15) (94 - 105)  BP: 112/79 (05-09-22 @ 11:15) (105/66 - 119/71)  RR: 18 (05-09-22 @ 11:15) (18 - 21)  SpO2: 95% (05-09-22 @ 11:15) (93% - 95%)  Wt(kg): --    I&O's Summary    08 May 2022 07:01  -  09 May 2022 07:00  --------------------------------------------------------  IN: 0 mL / OUT: 480 mL / NET: -480 mL          HEENT:  (-)icterus (-)pallor  CV: N S1 S2 1/6 MARCELLE (+)2 Pulses B/l  Resp:  Clear to ausculatation B/L, normal effort  GI: (+) BS Soft, NT, ND  Lymph:  (-)Edema, (-)obvious lymphadenopathy  Skin: Warm to touch, Normal turgor  Psych: Appropriate mood and affect              ASSESSMENT/PLAN: 	71y  Male  HTN, CHF?, COPD, HLD, PAD, BPH, Prostate CA (s/p radiation), Bipolar Disorder, IBD - Crohn's Disease, R ileostomy bag (s/p sigmoid resection) historically preserved LV and RV function, normal perfusion on a nuclear stress test 2020 He was brought to ED due to bilateral leg swelling    - No clinical CHF A low BNP suggests a low left ventricular end diastolic pressure  - monitor renal function, his edema is at least partially due ot renal disease   - no pertinent findings on echo  -   Heme/ onc w/u in progress awaiting liver biopsy       Joey Archer MD, [facility]  BEEPER [phone]

## 2022-05-09 NOTE — PROGRESS NOTE ADULT - PROBLEM SELECTOR PLAN 1
-MRI of abdomen revealed  multiple liver lesions with mets  -bone scan with no osseous metastasis   -pending liver biopsy, initially cancelled for elevated BUN and high risk of bleeding   -will follow with IR today regarding further recs   -Hepatology Dr. Portillo   -Heme Dr. Rivers/Dr. Chao -MRI of abdomen revealed  multiple liver lesions with mets  -bone scan with no osseous metastasis   -pending liver biopsy, initially cancelled for elevated BUN and high risk of bleeding   -tentative planned for liver biopsy on Wed with IR   -Hepatology Dr. Portillo   -Heme Dr. Rivers/Dr. Chao  -IR following

## 2022-05-09 NOTE — PROGRESS NOTE ADULT - ASSESSMENT
Very pleasant 71 year old gentleman who presents for acute CHF exacerbation, found to have PAWAN on CKD, gross hematuria, evidence of liver metastasis on MRI  -MRI images reviewed demonstrating concern for liver mets  -PSA 7/2021 was undetectable (<0.01)  -Repeat PSA May 2022 undetectable <0.01  -Oncology consult  -labs reviewed  -noncontrast CT given gross hematuria: no hydro, stones, renal mass  -patient will need a cystoscopy as an outpatient for workup of hematuria

## 2022-05-09 NOTE — PROGRESS NOTE ADULT - PROBLEM SELECTOR PLAN 5
-s  Dr. Pond, Nephrology, following  Recommendations appreciated -start Lokelma  -rest plan as above

## 2022-05-09 NOTE — PROGRESS NOTE ADULT - PROBLEM SELECTOR PLAN 10
Pt takes Aripiprazole, Trazadone, Lamotrigine at home  Continue home regimen   Continue supportive care -dvt ppx: SCDs, hold chemo ppx prior to liver biopsy   -gi ppx: PPI

## 2022-05-09 NOTE — PROGRESS NOTE ADULT - SUBJECTIVE AND OBJECTIVE BOX
Chief Complaint:  Patient is a 71y old  Male who presents with a chief complaint of acute CHF exacerbation (09 May 2022 09:42)      Reason for consult:    Interval Events:     Hospital Medications:  ARIPiprazole 15 milliGRAM(s) Oral daily  chlorhexidine 2% Cloths 1 Application(s) Topical daily  ferrous    sulfate 325 milliGRAM(s) Oral daily  finasteride 5 milliGRAM(s) Oral daily  gabapentin 600 milliGRAM(s) Oral two times a day  lamoTRIgine 200 milliGRAM(s) Oral daily  midodrine. 2.5 milliGRAM(s) Oral three times a day  mupirocin 2% Ointment 1 Application(s) Both Nostrils every 12 hours  pantoprazole    Tablet 40 milliGRAM(s) Oral before breakfast  sevelamer carbonate 800 milliGRAM(s) Oral three times a day with meals  sodium bicarbonate 650 milliGRAM(s) Oral three times a day  sodium zirconium cyclosilicate 10 Gram(s) Oral once  tamsulosin 0.4 milliGRAM(s) Oral at bedtime  traMADol 50 milliGRAM(s) Oral every 6 hours PRN  traZODone 25 milliGRAM(s) Oral at bedtime      ROS:   General:  No  fevers, chills, night sweats, fatigue  Eyes:  Good vision, no reported pain  ENT:  No sore throat, pain, runny nose  CV:  No pain, palpitations  Pulm:  No dyspnea, cough  GI:  See HPI, otherwise negative  :  No  incontinence, nocturia  Muscle:  No pain, weakness  Neuro:  No memory problems  Psych:  No insomnia, mood problems, depression  Endocrine:  No polyuria, polydipsia, cold/heat intolerance  Heme:  No petechiae, ecchymosis, easy bruisability  Skin:  No rash    PHYSICAL EXAM:   Vital Signs:  Vital Signs Last 24 Hrs  T(C): 36.7 (09 May 2022 05:13), Max: 37.4 (08 May 2022 13:32)  T(F): 98.1 (09 May 2022 05:13), Max: 99.4 (08 May 2022 13:32)  HR: 94 (09 May 2022 05:13) (94 - 105)  BP: 105/66 (09 May 2022 05:13) (105/66 - 119/71)  BP(mean): --  RR: 18 (09 May 2022 05:13) (18 - 21)  SpO2: 94% (09 May 2022 05:13) (93% - 94%)  Daily     Daily     GENERAL: no acute distress  NEURO: alert, no asterixis  HEENT: anicteric sclera, no conjunctival pallor appreciated  CHEST: no respiratory distress, no accessory muscle use  CARDIAC: regular rate, rhythm  ABDOMEN: soft, non-tender, non-distended, no rebound or guarding  EXTREMITIES: warm, well perfused, no edema  SKIN: no lesions noted    LABS: reviewed                        11.2   9.59  )-----------( 270      ( 09 May 2022 07:05 )             35.3     05-09    128<L>  |  94<L>  |  88<H>  ----------------------------<  104<H>  5.5<H>   |  18<L>  |  7.88<H>    Ca    9.4      09 May 2022 07:05  Phos  5.2     05-09  Mg     2.4     05-09          Interval Diagnostic Studies: see sunrise for full report   Chief Complaint:  Patient is a 71y old  Male who presents with a chief complaint of acute CHF exacerbation (09 May 2022 09:42)      Reason for consult: Liver lesions    Interval Events: Patient was seen and examined at bedside. No new complaints. He had HD today. Liver biopsy was postponed to 5/11 Wed, because patient ate this morning.     Hospital Medications:  ARIPiprazole 15 milliGRAM(s) Oral daily  chlorhexidine 2% Cloths 1 Application(s) Topical daily  ferrous    sulfate 325 milliGRAM(s) Oral daily  finasteride 5 milliGRAM(s) Oral daily  gabapentin 600 milliGRAM(s) Oral two times a day  lamoTRIgine 200 milliGRAM(s) Oral daily  midodrine. 2.5 milliGRAM(s) Oral three times a day  mupirocin 2% Ointment 1 Application(s) Both Nostrils every 12 hours  pantoprazole    Tablet 40 milliGRAM(s) Oral before breakfast  sevelamer carbonate 800 milliGRAM(s) Oral three times a day with meals  sodium bicarbonate 650 milliGRAM(s) Oral three times a day  sodium zirconium cyclosilicate 10 Gram(s) Oral once  tamsulosin 0.4 milliGRAM(s) Oral at bedtime  traMADol 50 milliGRAM(s) Oral every 6 hours PRN  traZODone 25 milliGRAM(s) Oral at bedtime      ROS:   General:  No  fevers, chills, but fatigued  Eyes:  Good vision, no reported pain  ENT:  No sore throat, pain, runny nose  CV:  No pain, palpitations  Pulm:  No dyspnea, cough  GI:  No abdominal pain, N/V, has ileostomy bag, denies bleeding  :  Hematuria  Muscle:  No pain, or focal  weakness  Neuro:  No memory problems  Skin:  No rash        PHYSICAL EXAM:   Vital Signs:  Vital Signs Last 24 Hrs  T(C): 36.7 (09 May 2022 05:13), Max: 37.4 (08 May 2022 13:32)  T(F): 98.1 (09 May 2022 05:13), Max: 99.4 (08 May 2022 13:32)  HR: 94 (09 May 2022 05:13) (94 - 105)  BP: 105/66 (09 May 2022 05:13) (105/66 - 119/71)  BP(mean): --  RR: 18 (09 May 2022 05:13) (18 - 21)  SpO2: 94% (09 May 2022 05:13) (93% - 94%)  Daily     Daily     GENERAL: no acute distress  NEURO: awake, alert, oriented x3, no asterixis  HEENT: anicteric sclera, no conjunctival pallor appreciated  CHEST: no respiratory distress, no accessory muscle use  CARDIAC: regular rate, rhythm  ABDOMEN: soft, non-tender, non-distended, no rebound or guarding, ileostomy bag  EXTREMITIES: b/l LE edema improved reportedly  SKIN: No rash      LABS: reviewed                        11.2   9.59  )-----------( 270      ( 09 May 2022 07:05 )             35.3     05-09    128<L>  |  94<L>  |  88<H>  ----------------------------<  104<H>  5.5<H>   |  18<L>  |  7.88<H>    Ca    9.4      09 May 2022 07:05  Phos  5.2     05-09  Mg     2.4     05-09          Interval Diagnostic Studies: see sunrise for full report

## 2022-05-09 NOTE — PROGRESS NOTE ADULT - PROBLEM SELECTOR PLAN 12
DVT PPX: SCDs   GI PPX: PPI -pt's HCP is his brother Alexey Sidhu   -pt is DNR/DNI   -RASHID in chart reviewed

## 2022-05-09 NOTE — PROGRESS NOTE ADULT - PROBLEM SELECTOR PLAN 4
-baseline CKD now progressed to ESRD requiring hemodialysis   -s/p HDC insertion with IR on 5/6   -s/p first dialysis session on 5/6  -Nephro Dr. Pond -baseline CKD now progressed to ESRD requiring hemodialysis   -s/p HDC insertion with IR on 5/6   -s/p first dialysis session on 5/6  -will likely need long term  dialysis catheter placement prior to d/c   -for HD today   -Nephro Dr. Pond

## 2022-05-09 NOTE — PROGRESS NOTE ADULT - ASSESSMENT
WILMER SELLERS is a 71y Male who presents with a chief complaint of CHF exacerbation.    Liver and Bone Lesions  - MRI and CT imaging reviewed; multiple liver lesions and skeletal lesions suspicious of metastatic disease. Bone scan did not show any suspicious lesions.   - AFP elevated at 5,338. PSA is 0. Pending plasma cell neoplasm markers.   - Interventional radiology consulted for liver biopsy. Will follow-up pathology. Postponed from May 6th due to concern for uremia.     Hematuria  - Urology following. Planning on outpatient cystoscopy.     Acute Kidney Injury  - Nephrology following. Patient is currently on dialysis at this time.   - Continue to monitor urine output and kidney function.     Will continue to follow. Thank you for the consultation.    Colby Chao MD  Hematology/Oncology  O: [phone]

## 2022-05-09 NOTE — PROGRESS NOTE ADULT - PROBLEM SELECTOR PLAN 6
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

## 2022-05-09 NOTE — PROGRESS NOTE ADULT - PROBLEM SELECTOR PLAN 9
Pt has R ileostomy  Stool studies neg  Continue ileostomy care  Dr. Azevedo, GI, following   Recommendations appreciated -cont home dose of Aripiprazole, Trazadone, Lamotrigine   -supportive measures

## 2022-05-09 NOTE — PROGRESS NOTE ADULT - ASSESSMENT
71y Male from [facility] with Hx of HTN, CHF?, COPD, HLD, PAD, BPH, Prostate CA (s/p radiation), Bipolar Disorder, IBD - Crohn's Disease, R ileostomy bag (s/p sigmoid resection), COVID-19 (2020) and s/p COVID19 vaccination (Pf[employer] x3) was brought to [facility] ED on 4/28/22 b/o 2 days worsening bilateral leg swelling with difficulty ambulating, and SOB with SpO2 93-94% on RA on arrival.  He was found to have bilateral vascular congestion on CXR due to suspected acute exacerbation of CHF, and PAWAN on CKD (BUN/Cr 88/3.28), with hyperkalemia (K 6.1). 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  Being followed by cardiology, PBNP was mildly elevated, and TTE showed LVEF 55%, normal diastolic function and normal RV function, thus did not appear to be in acute CHF exacerbation.    Hepatology was consulted b/o liver lesion and abnormal liver enzymes.     # Abnormal liver enzymes, AST >>ALT  - CPK nl. HIV neg. Hep B and C as below. Hep A IgM neg.   - Hypotensive, requiring midodrine, with renal dysfunction, Ig panel wnl, K/L ratio WNL, F/u UPEP  - No Hx of EtOH  - Follow up / obtain RUBIA, SMA, LKM, AMA, A1AT, ceruloplasmin, Hep E  - Avoid hepatotoxic medications, agree holding statin  - Please, obtain collateral information about timing of lamotrigine, because its hepatotoxicity is well established, and consider alternative.     # Pos HCV ab  - HCV PCR neg with the Hx of Hep C and treatment, suggests SVR.   - Cryoglobulin negative  - HIV neg    # HBcAb IgM pos, but HBsAg neg  - HBsAg neg, HBV DNA neg, but HBcAb pos, HBeAb pos, past infection. Also obtain Hep D serology.     # Liver lesions  - On CT chest incidentally found a 4.7x4.3 cm partially imaged hypodense lesion and in L lobe another 2.7 cm lesion, as well as nodular thickening along hepatic capsule, all are new from the 2/2020 CT a/p  - CEA and CA 19-9 WNL, but AFP> 5000, PSA WNL  - Could not get contrast CT b/o PAWAN on CKD. MRI was done w/o contrast showing Innumerable mildly T2 hyperintense lesions  throughout the liver, suggestive of metastasis. The largest lesion measures 13.8 x 11.7 x 11.7 cm in the liver dome. Multiple T2 hyperintense and T2 hyperintense lesions in the thoracolumbar spine may represent osseous metastasis.  - No fever or leukocytosis  - IR consult appreciated, liver biopsy was postponed b/o concern for higher risk of bleeding due to uremia from 5/6 and b/o lack of NPO status from today. Nephrology follow up appreciated, no Gadavist was recommended. F/u IR recommendations in terms of timing and whether he needs DDAVP prior. To d/w IR if can obtain parenchymal biopsy as well when performing biopsy of liver mass.    - Bone scan did not suggest metastatic disease.  - Hem/onc follow up.     # PAWAN on CKD, now on HD  # Hematuria  # Hx of prostate Ca  - F/u nephrology recommendations  - Rare reports exists of HRS in metastatic liver disease, but Nelia 45 and has macroscopic hematuria, also has underlying CKD.   - F/u urology recommendations, repeat CT noted  - F/u cardiology recommendations    Thank you for consult  Will continue to follow.   D/w primary team

## 2022-05-09 NOTE — PROGRESS NOTE ADULT - SUBJECTIVE AND OBJECTIVE BOX
CHIEF COMPLAINT  CHF Exacerbation    HISTORY OF PRESENT ILLNESS  WILMER SELLERS is a 71y Male who presents with a chief complaint of CHF exacerbation.    No acute events. No complaints.    REVIEW OF SYSTEMS  A complete review of systems was performed; negative except per HPI    PHYSICAL EXAM  T(C): 36.7 (05-09-22 @ 05:13), Max: 37.4 (05-08-22 @ 13:32)  HR: 94 (05-09-22 @ 05:13) (94 - 105)  BP: 105/66 (05-09-22 @ 05:13) (105/66 - 119/71)  RR: 18 (05-09-22 @ 05:13) (18 - 21)  SpO2: 94% (05-09-22 @ 05:13) (93% - 94%)  Constitutional: alert, awake, in no acute distress  Eyes: PERRL, EOMI  HEENT: normocephalic, atraumatic  Neck: supple, non-tender  Cardiovascular: normal perfusion, no peripheral edema  Respiratory: normal respiratory efforts; no increased use of accessory muscles  Gastrointestinal: soft, non-tender  Musculoskeletal: normal range of motion, no deformities noted  Neurological: alert, CN II to XI grossly intact  Skin: warm, dry    LABORATORY DATA                        11.2   9.59  )-----------( 270      ( 09 May 2022 07:05 )             35.3     05-09    128<L>  |  94<L>  |  88<H>  ----------------------------<  104<H>  5.5<H>   |  18<L>  |  7.88<H>    Ca    9.4      09 May 2022 07:05  Phos  5.2     05-09  Mg     2.4     05-09

## 2022-05-09 NOTE — PROGRESS NOTE ADULT - SUBJECTIVE AND OBJECTIVE BOX
NEPHROLOGY MEDICAL CARE, [facility] - Dr. Emerson Pond/ Dr. Nallely Curran/ Dr. Kirby Angel/ Dr. Pippa Reis    Patient was seen and examined at bedside.    CC: patient is okay and NAD    Vital Signs Last 24 Hrs  T(C): 36.4 (09 May 2022 11:15), Max: 36.8 (08 May 2022 22:02)  T(F): 97.6 (09 May 2022 11:15), Max: 98.3 (08 May 2022 22:02)  HR: 98 (09 May 2022 11:15) (94 - 100)  BP: 112/79 (09 May 2022 11:15) (105/66 - 119/71)  BP(mean): --  RR: 18 (09 May 2022 11:15) (18 - 21)  SpO2: 95% (09 May 2022 11:15) (94% - 95%)    05-08 @ 07:01  -  05-09 @ 07:00  --------------------------------------------------------  IN: 0 mL / OUT: 480 mL / NET: -480 mL        PHYSICAL EXAM:  General: No acute respiratory distress.  Eyes: conjunctiva and sclera clear  ENMT: Atraumatic, Normocephalic, supple, No JVD present  Respiratory: Bilateral decreased BS and no rhonchi, wheezing  Cardiovascular: S1S2+; no m/r/g  Gastrointestinal: Soft, Non-tender, Nondistended; Bowel sounds present   Neuro:  Awake, Alert & Oriented X3  Ext:  no pedal edema, No Cyanosis  Skin: No visible rashes  Dialysis Access: Rt DORA nichols    MEDICATIONS:  MEDICATIONS  (STANDING):  ARIPiprazole 15 milliGRAM(s) Oral daily  chlorhexidine 2% Cloths 1 Application(s) Topical daily  ferrous    sulfate 325 milliGRAM(s) Oral daily  finasteride 5 milliGRAM(s) Oral daily  gabapentin 600 milliGRAM(s) Oral two times a day  lamoTRIgine 200 milliGRAM(s) Oral daily  midodrine. 2.5 milliGRAM(s) Oral three times a day  mupirocin 2% Ointment 1 Application(s) Both Nostrils every 12 hours  pantoprazole    Tablet 40 milliGRAM(s) Oral before breakfast  sevelamer carbonate 800 milliGRAM(s) Oral three times a day with meals  sodium bicarbonate 650 milliGRAM(s) Oral three times a day  sodium zirconium cyclosilicate 10 Gram(s) Oral once  tamsulosin 0.4 milliGRAM(s) Oral at bedtime  traZODone 25 milliGRAM(s) Oral at bedtime    MEDICATIONS  (PRN):  traMADol 50 milliGRAM(s) Oral every 6 hours PRN Severe Pain (7 - 10)          LABS:                        11.2   9.59  )-----------( 270      ( 09 May 2022 07:05 )             35.3     05-09    128<L>  |  94<L>  |  88<H>  ----------------------------<  104<H>  5.5<H>   |  18<L>  |  7.88<H>    Ca    9.4      09 May 2022 07:05  Phos  5.2     05-09  Mg     2.4     05-09          Magnesium, Serum: 2.4 mg/dL (05-09 @ 07:05)  Phosphorus Level, Serum: 5.2 mg/dL (05-09 @ 07:05)    Urine studies    PTH and Vit D:

## 2022-05-09 NOTE — PROGRESS NOTE ADULT - SUBJECTIVE AND OBJECTIVE BOX
Interval events:   PSA < 0.001  CT without hydro, renal mass, stones    SUBJECTIVE:  no complaints    OBJECTIVE:  Vital Signs Last 24 Hrs  T(C): 36.7 (09 May 2022 05:13), Max: 37.4 (08 May 2022 13:32)  T(F): 98.1 (09 May 2022 05:13), Max: 99.4 (08 May 2022 13:32)  HR: 94 (09 May 2022 05:13) (94 - 105)  BP: 105/66 (09 May 2022 05:13) (105/66 - 119/71)  BP(mean): --  RR: 18 (09 May 2022 05:13) (18 - 21)  SpO2: 94% (09 May 2022 05:13) (93% - 94%)    Physical Examination:  GEN: NAD, resting quietly  PULM: symmetric chest rise bilaterally, no increased WOB  ABD: soft  : voiding       LABS:                        11.2   9.59  )-----------( 270      ( 09 May 2022 07:05 )             35.3       05-08    133<L>  |  95<L>  |  72<H>  ----------------------------<  102<H>  4.5   |  22  |  5.82<H>    Ca    9.5      08 May 2022 08:33  Phos  5.4     05-08  Mg     2.5     05-08

## 2022-05-09 NOTE — PROGRESS NOTE ADULT - SUBJECTIVE AND OBJECTIVE BOX
Patient is a 71y old  Male who presents with a chief complaint of acute CHF exacerbation (09 May 2022 09:23)    PATIENT IS SEEN AND EXAMINED IN MEDICAL FLOOR.  PETEY [    ]    ERSATO [   ]      GT [   ]    ALLERGIES:  No Known Allergies      Daily     Daily     VITALS:    Vital Signs Last 24 Hrs  T(C): 36.7 (09 May 2022 05:13), Max: 37.4 (08 May 2022 13:32)  T(F): 98.1 (09 May 2022 05:13), Max: 99.4 (08 May 2022 13:32)  HR: 94 (09 May 2022 05:13) (94 - 105)  BP: 105/66 (09 May 2022 05:13) (105/66 - 119/71)  BP(mean): --  RR: 18 (09 May 2022 05:13) (18 - 21)  SpO2: 94% (09 May 2022 05:13) (93% - 94%)    LABS:    CBC Full  -  ( 09 May 2022 07:05 )  WBC Count : 9.59 K/uL  RBC Count : 4.33 M/uL  Hemoglobin : 11.2 g/dL  Hematocrit : 35.3 %  Platelet Count - Automated : 270 K/uL  Mean Cell Volume : 81.5 fl  Mean Cell Hemoglobin : 25.9 pg  Mean Cell Hemoglobin Concentration : 31.7 gm/dL  Auto Neutrophil # : x  Auto Lymphocyte # : x  Auto Monocyte # : x  Auto Eosinophil # : x  Auto Basophil # : x  Auto Neutrophil % : x  Auto Lymphocyte % : x  Auto Monocyte % : x  Auto Eosinophil % : x  Auto Basophil % : x      05-09    128<L>  |  94<L>  |  88<H>  ----------------------------<  104<H>  5.5<H>   |  18<L>  |  7.88<H>    Ca    9.4      09 May 2022 07:05  Phos  5.2     05-09  Mg     2.4     05-09      CAPILLARY BLOOD GLUCOSE              Creatinine Trend: 7.88<--, 5.82<--, 5.10<--, 5.54<--, 5.02<--, 4.51<--  I&O's Summary    08 May 2022 07:01  -  09 May 2022 07:00  --------------------------------------------------------  IN: 0 mL / OUT: 480 mL / NET: -480 mL            .Stool Feces  05-03 @ 18:27   No enteric pathogens isolated.  (Stool culture examined for Salmonella,  Shigella, Campylobacter, Aeromonas, Plesiomonas,  Vibrio, E.coli O157 and Yersinia)  No enteric gram negative rods isolated  --  --          MEDICATIONS:    MEDICATIONS  (STANDING):  ARIPiprazole 15 milliGRAM(s) Oral daily  chlorhexidine 2% Cloths 1 Application(s) Topical daily  ferrous    sulfate 325 milliGRAM(s) Oral daily  finasteride 5 milliGRAM(s) Oral daily  gabapentin 600 milliGRAM(s) Oral two times a day  lamoTRIgine 200 milliGRAM(s) Oral daily  midodrine. 2.5 milliGRAM(s) Oral three times a day  mupirocin 2% Ointment 1 Application(s) Both Nostrils every 12 hours  pantoprazole    Tablet 40 milliGRAM(s) Oral before breakfast  sevelamer carbonate 800 milliGRAM(s) Oral three times a day with meals  sodium bicarbonate 650 milliGRAM(s) Oral three times a day  sodium zirconium cyclosilicate 10 Gram(s) Oral once  tamsulosin 0.4 milliGRAM(s) Oral at bedtime  traZODone 25 milliGRAM(s) Oral at bedtime      MEDICATIONS  (PRN):  traMADol 50 milliGRAM(s) Oral every 6 hours PRN Severe Pain (7 - 10)      REVIEW OF SYSTEMS:                           ALL ROS DONE [ X   ]    CONSTITUTIONAL:  LETHARGIC [   ], FEVER [   ], UNRESPONSIVE [   ]  CVS:  CP  [   ], SOB, [   ], PALPITATIONS [   ], DIZZYNESS [   ]  RS: COUGH [   ], SPUTUM [   ]  GI: ABDOMINAL PAIN [   ], NAUSEA [   ], VOMITINGS [   ], DIARRHEA [   ], CONSTIPATION [   ]  :  DYSURIA [   ], NOCTURIA [   ], INCREASED FREQUENCY [   ], DRIBLING [   ],  SKELETAL: PAINFUL JOINTS [   ], SWOLLEN JOINTS [   ], NECK ACHE [   ], LOW BACK ACHE [   ],  SKIN : ULCERS [   ], RASH [   ], ITCHING [   ]  CNS: HEAD ACHE [   ], DOUBLE VISION [   ], BLURRED VISION [   ], AMS / CONFUSION [   ], SEIZURES [   ], WEAKNESS [   ],TINGLING / NUMBNESS [   ]      PHYSICAL EXAMINATION:  GENERAL APPEARANCE: NO DISTRESS  HEENT:  NO PALLOR, NO  JVD,  NO   NODES, NECK SUPPLE  CVS: S1 +, S2 +,   RS: AEEB,  OCCASIONAL  RALES +,   CRACKLES AT BASES +  ABD: SOFT, NT, NO, BS +   ILEOSTOMY [STOMA W/ PINK BASE]  EXT: NO PE  SKIN: WARM,   SKELETAL:  ROM ACCEPTABLE  CNS:  AAO X 3    RADIOLOGY :    ACC: [id number] EXAM:  XR CHEST PORTABLE URGENT 1V                          PROCEDURE DATE:  04/28/2022          INTERPRETATION:  Clinical history: 71-year-old male, chest pain.    Portable/expiratory view of the chest is compared to 20 and demonstrates   a normal cardiac silhouette with no lobar consolidation, gross effusion,   pneumothorax or acute osseous finding.    Bilateral lower lobe patchy opacities consistent with infiltrates/areas   of atelectasis in the correct clinical context, increased. Elevated right   hemidiaphragm are evident.    IMPRESSION:    Bilateral lower lobe interstitial infiltrates/platelike atelectasis,   increased    ================================      ACC: [id number] EXAM:  MR ABDOMEN                          *** ADDENDUM***    Some of the hepatic lesions have small T1 hyperintense components on   fat-suppressed T1-weighted gradient echo images, suggestive of blood   products.    --- End of Report---    *** END OF ADDENDUM***      PROCEDURE DATE:  05/03/2022          INTERPRETATION:  CLINICAL INFORMATION: Hepatic lesions. History of   prostate cancer.    COMPARISON: None.    CONTRAST/COMPLICATIONS:  IV Contrast: NONE  Oral Contrast: NONE  Complications: None reported at time of study completion    PROCEDURE:  MRI of the abdomen was performed.    FINDINGS:  LOWER CHEST: Within normal limits.    LIVER: Innumerable mildly T2 hyperintense lesions are seen throughout the   liver, suggestive ofmetastasis. The largest lesion measures 13.8 x 11.7   x 11.7 cm in the liver dome.  BILE DUCTS: Normal caliber.  GALLBLADDER: Cholelithiasis.  SPLEEN: Within normal limits.  PANCREAS: Within normal limits.  ADRENALS: Within normal limits.  KIDNEYS/URETERS: Multiple brightly T2 hyperintense lesion in the kidneys   bilaterally, representing probable cysts.    VISUALIZED PORTIONS:  BOWEL: Small hiatal hernia. Right lower quadrant ostomy.  PERITONEUM: No ascites.  VESSELS: Within normal limits.  RETROPERITONEUM/LYMPH NODES: No lymphadenopathy.  ABDOMINAL WALL: Within normal limits.  BONES: Multiple T2 hyperintense and T2 hyperintense lesions in the   thoracolumbar spine may represent osseous metastasis. Whole-body bone   scan may be pursued for further evaluation.    IMPRESSION:  Innumerable mildly T2 hyperintense lesions are seen throughout the liver,   suggestive of metastasis. The largest lesion measures 13.8 x 11.7 x 11.7   cm in the liver dome.    Multiple T2 hyperintense and T2 hyperintense lesions in the thoracolumbar   spine may represent osseous metastasis. Whole-body bone scan may be   pursued for further evaluation.    Cholelithiasis.      ASSESSMENT :     Hyperkalemia    No pertinent past medical history    COPD, mild    Anemia    Bipolar disorder    IBD (inflammatory bowel disease)    HTN (hypertension)    HLD (hyperlipidemia)    PAD (peripheral artery disease)    CKD (chronic kidney disease)    Prostate CA    BPH with urinary obstruction    No significant past surgical history    History of creation of ostomy        PLAN:  HPI:  Patient is a 71M from [facility], who is ambulatory at baseline with a walker. He has Hx of HTN, CHF?, COPD, HLD, PAD, BPH, Prostate CA (s/p radiation), Bipolar Disorder, IBD - Crohn's Disease, R ileostomy bag (s/p sigmoid resection). He was brought to ED due to bilateral leg swelling. For the past 2 days he has been having progressive swelling/ edema of both his legs with associated ambulatory dysfunction. He also started having episodes of shortness of breath and difficulty of breathing. He denies any fever, cough, chest pain, palpitation. When he came to the ED, he was saturating at 93-94% on room air. He was placed on nasal cannula. EKG was done showing NSR. Troponin was negative and BNP was mildly elevated at 398. CXR showed bilateral vascular congestion. Serum potassium was elevated at 6.1. He got a dose of lasix and was given Hyperkalemia cocktail in the ED. Of note, he had Hx of COVID in 2020 andwas vaccinated with COVID-19 x 3 (Pfizer) and Influenza in 2021. He was subsequently admitted for acute exacerbation of his heart failure.    GOC: FULL CODE (28 Apr 2022 18:40)    # CASE D/W BROTHER KERI VIA PHONE [ C  ; H  ] - CASE DISCUSSED AT LENGTH, ALL QUESTIONS ANSWERED [5/5]    # HEMATURIA   # HX OF PROSTATE CA S/P RADIATION  - HOLD A/C, TREND CBC,   - NOTED BLADDER SCAN, NOTED RECENT U/S  - PER UROLOGY, REPEAT BLADDER SCAN W/ PVR - THEN WILL DECIDE REGARDING MAURER PLACEMENT  - F/U PSA  - F/U CT A/P  - UROLOGY CONSULT DR. CHAN    # ACUTE HYPOXIC RESPIRATORY FAILURE SUSPECT S/T PULMONARY VASCULAR CONGESTION  # RULED OUT CHF   # PAWAN ON CKD - WORSENING, HOLD LASIX  # BPH  # HX OF PROSTATE CA S/P RADIATION  - PLACE ON TELEMETRY  - NOTED CXR  - HOLD LASIX  - NOTE TSH  - STRICT IS AND OS  - ECHOCARDIOGRAM - NORMAL LVEF, NORMAL DIASTOLIC FUNCTION  - NEPHROLOGY CONSULT, CARDIOLOGY CONSULT    - NEPHROLOGY RECC FOR PLACEMENT OF SHILEY FOR POSSIBLE INITIATION OF HD - D/W IR TEAM    # LIVER LESIONS, BONE LESIONS  # TRANSAMINITIS, NOTED HEP C AND HEP B MARKERS  - NOTED MRI ABDOMEN  - NOTED TUMOR MARKERS  - F/U BONE SCAN  - HEPATOLOGY CONSULT  - ONCOLOGY CONSULT    - PLANNED FOR IR GUIDED BIOPSY ON 5/6  --- DELAYED BY IR DUE TO BLEEDING RISK    # HYPERKALEMIA - RESOLVED  - GIVEN LASIX, LOKELMA  - EKG DONE  - NEPHROLOGY CONSULT    # CROHN'S DISEASE  # RIGHT ILEOSTOMY BAG S/P SIGMOID RESECTION  - MONITORING OUTPUT  - GASTROENTEROLOGY CONSULT    # NORMOCYTIC ANEMIA    # HTN  # COPD  # HLD  # PAD  # BIPOLAR DX  # GI AND DVT PPX      Patient is a 71y old  Male who presents with a chief complaint of acute CHF exacerbation (09 May 2022 09:23)    PATIENT IS SEEN AND EXAMINED IN MEDICAL FLOOR.  PETEY [    ]    ERASTO [   ]      GT [   ]    ALLERGIES:  No Known Allergies      Daily     Daily     VITALS:    Vital Signs Last 24 Hrs  T(C): 36.7 (09 May 2022 05:13), Max: 37.4 (08 May 2022 13:32)  T(F): 98.1 (09 May 2022 05:13), Max: 99.4 (08 May 2022 13:32)  HR: 94 (09 May 2022 05:13) (94 - 105)  BP: 105/66 (09 May 2022 05:13) (105/66 - 119/71)  BP(mean): --  RR: 18 (09 May 2022 05:13) (18 - 21)  SpO2: 94% (09 May 2022 05:13) (93% - 94%)    LABS:    CBC Full  -  ( 09 May 2022 07:05 )  WBC Count : 9.59 K/uL  RBC Count : 4.33 M/uL  Hemoglobin : 11.2 g/dL  Hematocrit : 35.3 %  Platelet Count - Automated : 270 K/uL  Mean Cell Volume : 81.5 fl  Mean Cell Hemoglobin : 25.9 pg  Mean Cell Hemoglobin Concentration : 31.7 gm/dL  Auto Neutrophil # : x  Auto Lymphocyte # : x  Auto Monocyte # : x  Auto Eosinophil # : x  Auto Basophil # : x  Auto Neutrophil % : x  Auto Lymphocyte % : x  Auto Monocyte % : x  Auto Eosinophil % : x  Auto Basophil % : x      05-09    128<L>  |  94<L>  |  88<H>  ----------------------------<  104<H>  5.5<H>   |  18<L>  |  7.88<H>    Ca    9.4      09 May 2022 07:05  Phos  5.2     05-09  Mg     2.4     05-09      CAPILLARY BLOOD GLUCOSE              Creatinine Trend: 7.88<--, 5.82<--, 5.10<--, 5.54<--, 5.02<--, 4.51<--  I&O's Summary    08 May 2022 07:01  -  09 May 2022 07:00  --------------------------------------------------------  IN: 0 mL / OUT: 480 mL / NET: -480 mL            .Stool Feces  05-03 @ 18:27   No enteric pathogens isolated.  (Stool culture examined for Salmonella,  Shigella, Campylobacter, Aeromonas, Plesiomonas,  Vibrio, E.coli O157 and Yersinia)  No enteric gram negative rods isolated  --  --          MEDICATIONS:    MEDICATIONS  (STANDING):  ARIPiprazole 15 milliGRAM(s) Oral daily  chlorhexidine 2% Cloths 1 Application(s) Topical daily  ferrous    sulfate 325 milliGRAM(s) Oral daily  finasteride 5 milliGRAM(s) Oral daily  gabapentin 600 milliGRAM(s) Oral two times a day  lamoTRIgine 200 milliGRAM(s) Oral daily  midodrine. 2.5 milliGRAM(s) Oral three times a day  mupirocin 2% Ointment 1 Application(s) Both Nostrils every 12 hours  pantoprazole    Tablet 40 milliGRAM(s) Oral before breakfast  sevelamer carbonate 800 milliGRAM(s) Oral three times a day with meals  sodium bicarbonate 650 milliGRAM(s) Oral three times a day  sodium zirconium cyclosilicate 10 Gram(s) Oral once  tamsulosin 0.4 milliGRAM(s) Oral at bedtime  traZODone 25 milliGRAM(s) Oral at bedtime      MEDICATIONS  (PRN):  traMADol 50 milliGRAM(s) Oral every 6 hours PRN Severe Pain (7 - 10)      REVIEW OF SYSTEMS:                           ALL ROS DONE [ X   ]    CONSTITUTIONAL:  LETHARGIC [   ], FEVER [   ], UNRESPONSIVE [   ]  CVS:  CP  [   ], SOB, [   ], PALPITATIONS [   ], DIZZYNESS [   ]  RS: COUGH [   ], SPUTUM [   ]  GI: ABDOMINAL PAIN [   ], NAUSEA [   ], VOMITINGS [   ], DIARRHEA [   ], CONSTIPATION [   ]  :  DYSURIA [   ], NOCTURIA [   ], INCREASED FREQUENCY [   ], DRIBLING [   ],  SKELETAL: PAINFUL JOINTS [   ], SWOLLEN JOINTS [   ], NECK ACHE [   ], LOW BACK ACHE [   ],  SKIN : ULCERS [   ], RASH [   ], ITCHING [   ]  CNS: HEAD ACHE [   ], DOUBLE VISION [   ], BLURRED VISION [   ], AMS / CONFUSION [   ], SEIZURES [   ], WEAKNESS [   ],TINGLING / NUMBNESS [   ]      PHYSICAL EXAMINATION:  GENERAL APPEARANCE: NO DISTRESS  HEENT:  NO PALLOR, NO  JVD,  NO   NODES, NECK SUPPLE  CVS: S1 +, S2 +,   RS: AEEB,  OCCASIONAL  RALES +,   CRACKLES AT BASES +  ABD: SOFT, NT, NO, BS +   ILEOSTOMY [STOMA W/ PINK BASE]  EXT: NO PE  SKIN: WARM,   SKELETAL:  ROM ACCEPTABLE  CNS:  AAO X 3    RADIOLOGY :    ACC: [id number] EXAM:  XR CHEST PORTABLE URGENT 1V                          PROCEDURE DATE:  04/28/2022          INTERPRETATION:  Clinical history: 71-year-old male, chest pain.    Portable/expiratory view of the chest is compared to 20 and demonstrates   a normal cardiac silhouette with no lobar consolidation, gross effusion,   pneumothorax or acute osseous finding.    Bilateral lower lobe patchy opacities consistent with infiltrates/areas   of atelectasis in the correct clinical context, increased. Elevated right   hemidiaphragm are evident.    IMPRESSION:    Bilateral lower lobe interstitial infiltrates/platelike atelectasis,   increased    ================================      ACC: [id number] EXAM:  MR ABDOMEN                          *** ADDENDUM***    Some of the hepatic lesions have small T1 hyperintense components on   fat-suppressed T1-weighted gradient echo images, suggestive of blood   products.    --- End of Report---    *** END OF ADDENDUM***      PROCEDURE DATE:  05/03/2022          INTERPRETATION:  CLINICAL INFORMATION: Hepatic lesions. History of   prostate cancer.    COMPARISON: None.    CONTRAST/COMPLICATIONS:  IV Contrast: NONE  Oral Contrast: NONE  Complications: None reported at time of study completion    PROCEDURE:  MRI of the abdomen was performed.    FINDINGS:  LOWER CHEST: Within normal limits.    LIVER: Innumerable mildly T2 hyperintense lesions are seen throughout the   liver, suggestive ofmetastasis. The largest lesion measures 13.8 x 11.7   x 11.7 cm in the liver dome.  BILE DUCTS: Normal caliber.  GALLBLADDER: Cholelithiasis.  SPLEEN: Within normal limits.  PANCREAS: Within normal limits.  ADRENALS: Within normal limits.  KIDNEYS/URETERS: Multiple brightly T2 hyperintense lesion in the kidneys   bilaterally, representing probable cysts.    VISUALIZED PORTIONS:  BOWEL: Small hiatal hernia. Right lower quadrant ostomy.  PERITONEUM: No ascites.  VESSELS: Within normal limits.  RETROPERITONEUM/LYMPH NODES: No lymphadenopathy.  ABDOMINAL WALL: Within normal limits.  BONES: Multiple T2 hyperintense and T2 hyperintense lesions in the   thoracolumbar spine may represent osseous metastasis. Whole-body bone   scan may be pursued for further evaluation.    IMPRESSION:  Innumerable mildly T2 hyperintense lesions are seen throughout the liver,   suggestive of metastasis. The largest lesion measures 13.8 x 11.7 x 11.7   cm in the liver dome.    Multiple T2 hyperintense and T2 hyperintense lesions in the thoracolumbar   spine may represent osseous metastasis. Whole-body bone scan may be   pursued for further evaluation.    Cholelithiasis.      ASSESSMENT :     Hyperkalemia    No pertinent past medical history    COPD, mild    Anemia    Bipolar disorder    IBD (inflammatory bowel disease)    HTN (hypertension)    HLD (hyperlipidemia)    PAD (peripheral artery disease)    CKD (chronic kidney disease)    Prostate CA    BPH with urinary obstruction    No significant past surgical history    History of creation of ostomy        PLAN:  HPI:  Patient is a 71M from [facility], who is ambulatory at baseline with a walker. He has Hx of HTN, CHF?, COPD, HLD, PAD, BPH, Prostate CA (s/p radiation), Bipolar Disorder, IBD - Crohn's Disease, R ileostomy bag (s/p sigmoid resection). He was brought to ED due to bilateral leg swelling. For the past 2 days he has been having progressive swelling/ edema of both his legs with associated ambulatory dysfunction. He also started having episodes of shortness of breath and difficulty of breathing. He denies any fever, cough, chest pain, palpitation. When he came to the ED, he was saturating at 93-94% on room air. He was placed on nasal cannula. EKG was done showing NSR. Troponin was negative and BNP was mildly elevated at 398. CXR showed bilateral vascular congestion. Serum potassium was elevated at 6.1. He got a dose of lasix and was given Hyperkalemia cocktail in the ED. Of note, he had Hx of COVID in 2020 andwas vaccinated with COVID-19 x 3 (Pfizer) and Influenza in 2021. He was subsequently admitted for acute exacerbation of his heart failure.    GOC: FULL CODE (28 Apr 2022 18:40)    # CASE D/W BROTHER KERI VIA PHONE [ C  ; H  ] - CASE DISCUSSED AT LENGTH, ALL QUESTIONS ANSWERED [5/5]    # HEMATURIA   # HX OF PROSTATE CA S/P RADIATION  - HOLD A/C, TREND CBC,   - NOTED BLADDER SCAN, NOTED RECENT U/S  - PER UROLOGY, REPEAT BLADDER SCAN W/ PVR - THEN WILL DECIDE REGARDING MAURER PLACEMENT  - F/U PSA  - NOTED CT A/P  - UROLOGY CONSULT DR. CHAN    # ACUTE HYPOXIC RESPIRATORY FAILURE SUSPECT S/T PULMONARY VASCULAR CONGESTION  # RULED OUT CHF   # PAWAN ON CKD - WORSENING, HOLD LASIX  # BPH  # HX OF PROSTATE CA S/P RADIATION  - PLACE ON TELEMETRY  - NOTED CXR  - HOLD LASIX  - NOTE TSH  - STRICT IS AND OS  - ECHOCARDIOGRAM - NORMAL LVEF, NORMAL DIASTOLIC FUNCTION  - NEPHROLOGY CONSULT, CARDIOLOGY CONSULT    - NEPHROLOGY RECC FOR PLACEMENT OF SHILEY W/ INITIATION OF HD    - PLANNED FOR TUNNELED HD CATH BY IR    # LIVER LESIONS, BONE LESIONS  # TRANSAMINITIS, NOTED HEP C AND HEP B MARKERS  - NOTED MRI ABDOMEN  - NOTED TUMOR MARKERS  - F/U BONE SCAN  - HEPATOLOGY CONSULT  - ONCOLOGY CONSULT    - PLANNED FOR IR GUIDED BIOPSY ON 5/6  --- DELAYED BY IR DUE TO BLEEDING RISK    # HYPERKALEMIA - RESOLVED  - GIVEN LASIX, LOKELMA  - EKG DONE  - NEPHROLOGY CONSULT    # CROHN'S DISEASE  # RIGHT ILEOSTOMY BAG S/P SIGMOID RESECTION  - MONITORING OUTPUT  - GASTROENTEROLOGY CONSULT    # NORMOCYTIC ANEMIA    # HTN  # COPD  # HLD  # PAD  # BIPOLAR DX  # GI AND DVT PPX

## 2022-05-09 NOTE — PROGRESS NOTE ADULT - ASSESSMENT
1. PAWAN due to ATN with questionable HRS (less likely)  -First HD on 5/6  -Bun/Cr elevated and worsening today. plan for HD today with bfr 350cc/hr for 3hrs. pre bun/Scr worsening even after having HD from Saturday, doesn't seem like there is renal recovery and will plan for permacath conversion on wednesday.    -pt also had gross hematuria and unclear etiology seems less likely nephritis and f/u RUBIA, ANCA and Anit-GBM.   Urinalysis shows no proteinuria. spot protein to creatinine ratio is 900mg.  -renal sono show no hydro.  -Adjust meds to eGFR and avoid IV Gadolinium contrast,NSAIDs, and phosphate enema.  -Monitor I/O's daily.   -Monitor SMA daily.  2. CKD stage 4 most likely due to ischemic nephropathy and h/o ATN with renal recovery.  -Baseline Scr around 3.2mg/dL in April 2020.  -Keep patient euvolemic and renal diet  -Avoid Nephrotoxic Meds/ Agents such as (NSAIDs, IV contrast, Aminoglycosides such as gentamicin, -Gadolinium contrast, Phosphate containing enemas, etc..)  -Adjust Medications according to eGFR  3. Anemia:   -hb is stable; continue iron tabs. monitor CBC  4. MBD:   -phos is stable. previous mild hyperca, which improved and off calcitirol; on renvela 1 tab tid;   -pth is okay at 41.  5. Hyperkalemia due to pawan on ckd.   -elevated and lokelma given. use 2k bath.  -on low K diet. give Lokelma prn if K >5.5  -Keep pt euvolemic. Avoid ACE inh/ ARBs, NSAIDs, and Aldactone or potassium sparing diuretics. Monitor K+ daily.  6. Fluid Overload:  -clinically improved. off diuretics for now.   -CT chest w/o contrast shows no effusion;   7. Acidosis:  -CO2 is better with HD.  -d/c sodium bicarbonate 650mg oral tid. monitor CO2  8. h/o Hypotension:  -bp is acceptable and on midodrine 2.5mg tid      9. Elevated LFTs  - off lipitor for now.  -hepatitis panel shows hep C+ve.  discussed with pt, pt had known hep C and was treated for in the past; hep C RNA is not detected.   -C3/C4 and RF negative, cryoglobulinemia is negative.    -ct scan shows hepatic mass; Hepatology consulted and MRI w/o contrast shows liver mass and mets.  -Hepatology and Oncology consulted.   -Plan for IR guided liver biopsy on Wednesday since pt was not npo today.   -Bone scan shows no bone mets. Suspecting myeloma and myeloma labs f/u spep, upep, IF and serum FLC is within range for ckd.  -pt has pawan on ckd and GFR <15cc/min at present; At present, the risk for nephrogenic systemic fibrosis, weigh out the benefits thus would avoid gadolinium contrast administration. Would consider alternative diagnostic strategies including and other imaging modalities.    Discussed with patient in detail regarding the renal plan and care  Discussed the assessment and plan with Primary Team/Nurse

## 2022-05-09 NOTE — PROGRESS NOTE ADULT - ASSESSMENT
71M from [facility], who is ambulatory at baseline with a walker. He has Hx of HTN, CHF?, COPD, HLD, PAD, BPH, Prostate CA (s/p radiation), Bipolar Disorder, IBD - Crohn's Disease, R ileostomy bag (s/p sigmoid resection) presented with SOB and LE edema, found with hyperkalemia on admission. Admitted to [facility] for cardio work up r/o CHF,  hyperkalemia with PAWAN on CKD 4.  Cardiology and nephrology consulted, started on lasix. Renal US shows no hydronephrosis and  + renal cysts.   CT chest shows no pleural effusion, but with hepatic lesion 4.7cm largest. Also noted with transaminitis. GI and Hepatology consulted, s/p MRI abdomen revealed  multiple liver lesions with mets, Heme/onc followed and pt was recommended for liver biopsy and bone scan. Bone scan with no osseous metastasis, Liver biopsy was postponed due to elevated BUN and risk of bleeding.   Hospital course complicated with worsening renal function, s/p IR guided HDC on 5/6 and dialysis was started  Hospital coourse further complicated with episodes of hematuria, noncontrast CT showed no hydro, stones or  renal mass, Urology followed and pt was recommended for outpt cystoscopy   Pending liver biopsy after renal function improving

## 2022-05-10 NOTE — PROGRESS NOTE ADULT - SUBJECTIVE AND OBJECTIVE BOX
Chief Complaint:  Patient is a 71y old  Male who presents with a chief complaint of acute CHF exacerbation (10 May 2022 16:00)      Reason for consult:    Interval Events:     Hospital Medications:  ARIPiprazole 15 milliGRAM(s) Oral daily  chlorhexidine 2% Cloths 1 Application(s) Topical daily  ferrous    sulfate 325 milliGRAM(s) Oral daily  finasteride 5 milliGRAM(s) Oral daily  gabapentin 600 milliGRAM(s) Oral two times a day  lamoTRIgine 200 milliGRAM(s) Oral daily  midodrine. 2.5 milliGRAM(s) Oral three times a day  mupirocin 2% Ointment 1 Application(s) Both Nostrils every 12 hours  pantoprazole    Tablet 40 milliGRAM(s) Oral before breakfast  sevelamer carbonate 800 milliGRAM(s) Oral three times a day with meals  sodium zirconium cyclosilicate 10 Gram(s) Oral once  tamsulosin 0.4 milliGRAM(s) Oral at bedtime  traMADol 50 milliGRAM(s) Oral every 6 hours PRN  traZODone 25 milliGRAM(s) Oral at bedtime      ROS:   General:  No  fevers, chills, night sweats, fatigue  Eyes:  Good vision, no reported pain  ENT:  No sore throat, pain, runny nose  CV:  No pain, palpitations  Pulm:  No dyspnea, cough  GI:  See HPI, otherwise negative  :  No  incontinence, nocturia  Muscle:  No pain, weakness  Neuro:  No memory problems  Psych:  No insomnia, mood problems, depression  Endocrine:  No polyuria, polydipsia, cold/heat intolerance  Heme:  No petechiae, ecchymosis, easy bruisability  Skin:  No rash    PHYSICAL EXAM:   Vital Signs:  Vital Signs Last 24 Hrs  T(C): 36.4 (10 May 2022 13:50), Max: 37.1 (09 May 2022 20:38)  T(F): 97.6 (10 May 2022 13:50), Max: 98.8 (09 May 2022 20:38)  HR: 104 (10 May 2022 13:50) (98 - 104)  BP: 93/51 (10 May 2022 13:50) (93/51 - 114/54)  BP(mean): --  RR: 18 (10 May 2022 13:50) (18 - 18)  SpO2: 92% (10 May 2022 13:50) (92% - 95%)  Daily     Daily     GENERAL: no acute distress  NEURO: alert, no asterixis  HEENT: anicteric sclera, no conjunctival pallor appreciated  CHEST: no respiratory distress, no accessory muscle use  CARDIAC: regular rate, rhythm  ABDOMEN: soft, non-tender, non-distended, no rebound or guarding  EXTREMITIES: warm, well perfused, no edema  SKIN: no lesions noted    LABS: reviewed                        10.3   8.86  )-----------( 267      ( 10 May 2022 06:21 )             32.6     05-10    134<L>  |  96  |  53<H>  ----------------------------<  98  4.2   |  24  |  6.12<H>    Ca    9.3      10 May 2022 06:21  Phos  5.2     05-09  Mg     2.4     05-09    TPro  7.8  /  Alb  2.7<L>  /  TBili  1.4<H>  /  DBili  x   /  AST  436<H>  /  ALT  95<H>  /  AlkPhos  247<H>  05-10    LIVER FUNCTIONS - ( 10 May 2022 06:21 )  Alb: 2.7 g/dL / Pro: 7.8 g/dL / ALK PHOS: 247 U/L / ALT: 95 U/L DA / AST: 436 U/L / GGT: x             Interval Diagnostic Studies: see sunrise for full report   Chief Complaint:  Patient is a 71y old  Male who presents with a chief complaint of acute CHF exacerbation (10 May 2022 16:00)      Reason for consult: Liver lesions    Interval Events: Patient been seen and examined at bedside. Pending liver biopsy.    Hospital Medications:  ARIPiprazole 15 milliGRAM(s) Oral daily  chlorhexidine 2% Cloths 1 Application(s) Topical daily  ferrous    sulfate 325 milliGRAM(s) Oral daily  finasteride 5 milliGRAM(s) Oral daily  gabapentin 600 milliGRAM(s) Oral two times a day  lamoTRIgine 200 milliGRAM(s) Oral daily  midodrine. 2.5 milliGRAM(s) Oral three times a day  mupirocin 2% Ointment 1 Application(s) Both Nostrils every 12 hours  pantoprazole    Tablet 40 milliGRAM(s) Oral before breakfast  sevelamer carbonate 800 milliGRAM(s) Oral three times a day with meals  sodium zirconium cyclosilicate 10 Gram(s) Oral once  tamsulosin 0.4 milliGRAM(s) Oral at bedtime  traMADol 50 milliGRAM(s) Oral every 6 hours PRN  traZODone 25 milliGRAM(s) Oral at bedtime      ROS:   General:  No  fevers, chills, but fatigued  Eyes:  Good vision, no reported pain  ENT:  No sore throat, pain, runny nose  CV:  No pain, palpitations  Pulm:  No dyspnea, cough  GI:  No abdominal pain, N/V, has ileostomy bag, denies bleeding  :  Hematuria  Muscle:  No pain, or focal  weakness  Neuro:  No memory problems  Skin:  No rash        PHYSICAL EXAM:   Vital Signs:  Vital Signs Last 24 Hrs  T(C): 36.4 (10 May 2022 13:50), Max: 37.1 (09 May 2022 20:38)  T(F): 97.6 (10 May 2022 13:50), Max: 98.8 (09 May 2022 20:38)  HR: 104 (10 May 2022 13:50) (98 - 104)  BP: 93/51 (10 May 2022 13:50) (93/51 - 114/54)  BP(mean): --  RR: 18 (10 May 2022 13:50) (18 - 18)  SpO2: 92% (10 May 2022 13:50) (92% - 95%)  Daily     Daily     GENERAL: no acute distress  NEURO: awake, alert, oriented x3, no asterixis  HEENT: anicteric sclera, no conjunctival pallor appreciated  CHEST: no respiratory distress, no accessory muscle use  CARDIAC: regular rate, rhythm  ABDOMEN: soft, non-tender, non-distended, no rebound or guarding, ileostomy bag  EXTREMITIES: b/l LE edema improved reportedly  SKIN: No rash      LABS: reviewed                        10.3   8.86  )-----------( 267      ( 10 May 2022 06:21 )             32.6     05-10    134<L>  |  96  |  53<H>  ----------------------------<  98  4.2   |  24  |  6.12<H>    Ca    9.3      10 May 2022 06:21  Phos  5.2     05-09  Mg     2.4     05-09    TPro  7.8  /  Alb  2.7<L>  /  TBili  1.4<H>  /  DBili  x   /  AST  436<H>  /  ALT  95<H>  /  AlkPhos  247<H>  05-10    LIVER FUNCTIONS - ( 10 May 2022 06:21 )  Alb: 2.7 g/dL / Pro: 7.8 g/dL / ALK PHOS: 247 U/L / ALT: 95 U/L DA / AST: 436 U/L / GGT: x             Interval Diagnostic Studies: see sunrise for full report

## 2022-05-10 NOTE — PROGRESS NOTE ADULT - SUBJECTIVE AND OBJECTIVE BOX
C A R D I O L O G Y  **********************************     DATE OF SERVICE: 05-10-22    Patient denies chest pain or shortness of breath.   Review of symptoms otherwise negative.    ARIPiprazole 15 milliGRAM(s) Oral daily  chlorhexidine 2% Cloths 1 Application(s) Topical daily  ferrous    sulfate 325 milliGRAM(s) Oral daily  finasteride 5 milliGRAM(s) Oral daily  gabapentin 600 milliGRAM(s) Oral two times a day  lamoTRIgine 200 milliGRAM(s) Oral daily  midodrine. 2.5 milliGRAM(s) Oral three times a day  mupirocin 2% Ointment 1 Application(s) Both Nostrils every 12 hours  pantoprazole    Tablet 40 milliGRAM(s) Oral before breakfast  sevelamer carbonate 800 milliGRAM(s) Oral three times a day with meals  sodium bicarbonate 650 milliGRAM(s) Oral three times a day  sodium zirconium cyclosilicate 10 Gram(s) Oral once  tamsulosin 0.4 milliGRAM(s) Oral at bedtime  traMADol 50 milliGRAM(s) Oral every 6 hours PRN  traZODone 25 milliGRAM(s) Oral at bedtime                            10.3   8.86  )-----------( 267      ( 10 May 2022 06:21 )             32.6       Hemoglobin: 10.3 g/dL (05-10 @ 06:21)  Hemoglobin: 11.2 g/dL (05-09 @ 07:05)  Hemoglobin: 11.5 g/dL (05-08 @ 08:33)  Hemoglobin: 10.6 g/dL (05-07 @ 06:05)  Hemoglobin: 10.5 g/dL (05-06 @ 06:59)      05-10    134<L>  |  96  |  53<H>  ----------------------------<  98  4.2   |  24  |  6.12<H>    Ca    9.3      10 May 2022 06:21  Phos  5.2     05-09  Mg     2.4     05-09    TPro  7.8  /  Alb  2.7<L>  /  TBili  1.4<H>  /  DBili  x   /  AST  436<H>  /  ALT  95<H>  /  AlkPhos  247<H>  05-10    Creatinine Trend: 6.12<--, 7.88<--, 5.82<--, 5.10<--, 5.54<--, 5.02<--    COAGS:           T(C): 37.1 (05-10-22 @ 05:06), Max: 37.1 (05-09-22 @ 20:38)  HR: 98 (05-10-22 @ 05:06) (98 - 101)  BP: 114/54 (05-10-22 @ 05:06) (110/54 - 114/54)  RR: 18 (05-10-22 @ 05:06) (18 - 18)  SpO2: 95% (05-10-22 @ 05:06) (93% - 95%)  Wt(kg): --    I&O's Summary    09 May 2022 07:01  -  10 May 2022 07:00  --------------------------------------------------------  IN: 0 mL / OUT: 200 mL / NET: -200 mL      HEENT:  (-)icterus (-)pallor  CV: N S1 S2 1/6 MARCELLE (+)2 Pulses B/l  Resp:  Clear to ausculatation B/L, normal effort  GI: (+) BS Soft, NT, ND  Lymph:  (-)Edema, (-)obvious lymphadenopathy  Skin: Warm to touch, Normal turgor  Psych: Appropriate mood and affect              ASSESSMENT/PLAN: 	71y  Male  HTN, CHF?, COPD, HLD, PAD, BPH, Prostate CA (s/p radiation), Bipolar Disorder, IBD - Crohn's Disease, R ileostomy bag (s/p sigmoid resection) historically preserved LV and RV function, normal perfusion on a nuclear stress test 2020 He was brought to ED due to bilateral leg swelling    - No clinical CHF A low BNP suggests a low left ventricular end diastolic pressure  - monitor renal function, his edema is at least partially due ot renal disease   - no pertinent findings on echo  -   Heme/ onc w/u in progress awaiting liver biopsy   - Permacath per renal      Joey Archer MD, [facility]  BEEPER [phone]

## 2022-05-10 NOTE — PROGRESS NOTE ADULT - SUBJECTIVE AND OBJECTIVE BOX
Patient for liver biopsy and possible conversion of temporary non-tunneled  HD catheter to a tunneled dialysis catheter in IR tomorrow.  Please keep patient NPO, send early AM labs including CBC, BMP, and PT, INR / PTT.  Hold all anticoagulation, NSAIDS, and antiplatelet medication.  Please send a new Covid PCR today.

## 2022-05-10 NOTE — PROGRESS NOTE ADULT - SUBJECTIVE AND OBJECTIVE BOX
Patient seen/examined. No events overnight. No complaints.    Vital Signs Last 24 Hrs  T(C): 36.4 (10 May 2022 13:50), Max: 37.1 (10 May 2022 05:06)  T(F): 97.6 (10 May 2022 13:50), Max: 98.7 (10 May 2022 05:06)  HR: 104 (10 May 2022 13:50) (98 - 104)  BP: 93/51 (10 May 2022 13:50) (93/51 - 114/54)  BP(mean): --  RR: 18 (10 May 2022 13:50) (18 - 18)  SpO2: 92% (10 May 2022 13:50) (92% - 95%)    General: NAD. AAOx3  Abd: soft. NT/ND                          10.3   8.86  )-----------( 267      ( 10 May 2022 06:21 )             32.6     05-10    134<L>  |  96  |  53<H>  ----------------------------<  98  4.2   |  24  |  6.12<H>    Ca    9.3      10 May 2022 06:21  Phos  5.2     05-09  Mg     2.4     05-09    TPro  7.8  /  Alb  2.7<L>  /  TBili  1.4<H>  /  DBili  x   /  AST  436<H>  /  ALT  95<H>  /  AlkPhos  247<H>  05-10

## 2022-05-10 NOTE — PROGRESS NOTE ADULT - SUBJECTIVE AND OBJECTIVE BOX
NEPHROLOGY MEDICAL CARE, [facility] - Dr. Emerson Pond/ Dr. Nallely Curran/ Dr. Kirby Angel/ Dr. Pippa Reis    Patient was seen and examined at bedside.    CC: patient is okay and NAD; pt still anuric.    Vital Signs Last 24 Hrs  T(C): 36.4 (10 May 2022 13:50), Max: 37.1 (09 May 2022 20:38)  T(F): 97.6 (10 May 2022 13:50), Max: 98.8 (09 May 2022 20:38)  HR: 104 (10 May 2022 13:50) (98 - 104)  BP: 93/51 (10 May 2022 13:50) (93/51 - 114/54)  BP(mean): --  RR: 18 (10 May 2022 13:50) (18 - 18)  SpO2: 92% (10 May 2022 13:50) (92% - 95%)    05-09 @ 07:01  -  05-10 @ 07:00  --------------------------------------------------------  IN: 0 mL / OUT: 200 mL / NET: -200 mL        PHYSICAL EXAM:  General: No acute respiratory distress.  Eyes: conjunctiva and sclera clear  ENMT: Atraumatic, Normocephalic, supple, No JVD present  Respiratory: Bilateral decreased BS and no rhonchi, wheezing  Cardiovascular: S1S2+; no m/r/g  Gastrointestinal: Soft, Non-tender, Nondistended; Bowel sounds present   Neuro:  Awake, Alert & Oriented X3  Ext:  no pedal edema, No Cyanosis  Skin: No visible rashes  Dialysis Access: Rt DORA nichols    MEDICATIONS:  MEDICATIONS  (STANDING):  ARIPiprazole 15 milliGRAM(s) Oral daily  chlorhexidine 2% Cloths 1 Application(s) Topical daily  ferrous    sulfate 325 milliGRAM(s) Oral daily  finasteride 5 milliGRAM(s) Oral daily  gabapentin 600 milliGRAM(s) Oral two times a day  lamoTRIgine 200 milliGRAM(s) Oral daily  midodrine. 2.5 milliGRAM(s) Oral three times a day  mupirocin 2% Ointment 1 Application(s) Both Nostrils every 12 hours  pantoprazole    Tablet 40 milliGRAM(s) Oral before breakfast  sevelamer carbonate 800 milliGRAM(s) Oral three times a day with meals  sodium bicarbonate 650 milliGRAM(s) Oral three times a day  sodium zirconium cyclosilicate 10 Gram(s) Oral once  tamsulosin 0.4 milliGRAM(s) Oral at bedtime  traZODone 25 milliGRAM(s) Oral at bedtime    MEDICATIONS  (PRN):  traMADol 50 milliGRAM(s) Oral every 6 hours PRN Severe Pain (7 - 10)          LABS:                        10.3   8.86  )-----------( 267      ( 10 May 2022 06:21 )             32.6     05-10    134<L>  |  96  |  53<H>  ----------------------------<  98  4.2   |  24  |  6.12<H>    Ca    9.3      10 May 2022 06:21  Phos  5.2     05-09  Mg     2.4     05-09    TPro  7.8  /  Alb  2.7<L>  /  TBili  1.4<H>  /  DBili  x   /  AST  436<H>  /  ALT  95<H>  /  AlkPhos  247<H>  05-10          Urine studies    PTH and Vit D:

## 2022-05-10 NOTE — PROGRESS NOTE ADULT - ASSESSMENT
Very pleasant 71 year old gentleman who presents for acute CHF exacerbation, found to have PAWAN on CKD, gross hematuria, evidence of liver metastasis on MRI  -MRI images reviewed demonstrating concern for liver mets  -PSA 7/2021 was undetectable (<0.01)  -Repeat PSA May 2022 undetectable <0.01  -Oncology consult  -labs reviewed  -noncontrast CT given gross hematuria: no hydro, stones, renal mass  -patient will need a cystoscopy as an outpatient for workup of hematuria  -discussed with house staff

## 2022-05-10 NOTE — PROGRESS NOTE ADULT - ASSESSMENT
1. PAWAN due to ATN.  -First HD on 5/6. HD initiated for worsening bun/cr due to ATN with underlying CKD stage 4 and anuria.   -s/p HD yesterday. Plan for HD tomorrow. Pre dialysis bun/scr increasing and anuric, thus no signs of renal recovery at present and will plan for permacath conversion on Wednesday   - consulted for placement of outpatient HD center.  -pt also had gross hematuria and unclear etiology seems less likely nephritis and f/u RUBIA, ANCA and Anit-GBM (-ve)  Urinalysis shows no proteinuria. spot protein to creatinine ratio is 900mg.  -renal sono show no hydro.  -Adjust meds to eGFR and avoid IV Gadolinium contrast,NSAIDs, and phosphate enema.  -Monitor I/O's daily.   -Monitor SMA daily.  2. CKD stage 4 most likely due to ischemic nephropathy and h/o ATN with renal recovery.  -Baseline Scr around 3.2mg/dL in April 2020.  -Keep patient euvolemic and renal diet  -Avoid Nephrotoxic Meds/ Agents such as (NSAIDs, IV contrast, Aminoglycosides such as gentamicin, -Gadolinium contrast, Phosphate containing enemas, etc..)  -Adjust Medications according to eGFR  3. Anemia:   -hb is stable; continue iron tabs. monitor CBC  4. MBD:   -phos is stable. previous mild hyperca, which improved and off calcitirol; on renvela 1 tab tid;   -pth is okay at 41.  5. Hyperkalemia due to pawan on ckd.   -stable. use 2k bath during Hd.  -on low K diet. give Lokelma prn if K >5.5  -Keep pt euvolemic. Avoid ACE inh/ ARBs, NSAIDs, and Aldactone or potassium sparing diuretics. Monitor K+ daily.  6. Fluid Overload:  -clinically improved. off diuretics for now.   -CT chest w/o contrast shows no effusion;   7. Acidosis:  -CO2 is better with HD.  -stable. monitor CO2  8. h/o Hypotension:  -bp is acceptable and on midodrine 2.5mg tid      9. Elevated LFTs  - off lipitor for now.  -hepatitis panel shows hep C+ve.  discussed with pt, pt had known hep C and was treated for in the past; hep C RNA is not detected.   -C3/C4 and RF negative, cryoglobulinemia is negative.    -ct scan shows hepatic mass; Hepatology consulted and MRI w/o contrast shows liver mass and mets.  -Hepatology and Oncology consulted.   -Plan for IR guided liver biopsy on Wednesday.  -Bone scan shows no bone mets. spep and IF are nl and serum FLC is within range for ckd.      Discussed with patient in detail regarding the renal plan and care  spoke with brother (prudencio) and discussed in detail about pt's renal condition.  Discussed the assessment and plan with Primary Team/Nurse

## 2022-05-10 NOTE — PROGRESS NOTE ADULT - ASSESSMENT
71y Male from [facility] with Hx of HTN, CHF?, COPD, HLD, PAD, BPH, Prostate CA (s/p radiation), Bipolar Disorder, IBD - Crohn's Disease, R ileostomy bag (s/p sigmoid resection), COVID-19 (2020) and s/p COVID19 vaccination ([employer] x3) was brought to [facility] ED on 4/28/22 b/o 2 days worsening bilateral leg swelling with difficulty ambulating, and SOB with SpO2 93-94% on RA on arrival.  He was found to have bilateral vascular congestion on CXR due to suspected acute exacerbation of CHF, and PAWAN on CKD (BUN/Cr 88/3.28), with hyperkalemia (K 6.1). 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  Being followed by cardiology, PBNP was mildly elevated, and TTE showed LVEF 55%, normal diastolic function and normal RV function, thus did not appear to be in acute CHF exacerbation.    Hepatology was consulted b/o liver lesion and abnormal liver enzymes.     # Abnormal liver enzymes, AST >>ALT  - CPK nl. HIV neg. Hep B and C as below. Hep A IgM neg.   - Hypotensive, requiring midodrine, with renal dysfunction, Ig panel wnl, K/L ratio WNL, F/u UPEP  - No Hx of EtOH  - Follow up / obtain RUBIA, SMA, LKM, AMA, A1AT, ceruloplasmin, Hep E  - Avoid hepatotoxic medications, agree holding statin  - Please, obtain collateral information about timing of lamotrigine, because its hepatotoxicity is well established, and consider alternative.     # Pos HCV ab  - HCV PCR neg with the Hx of Hep C and treatment, suggests SVR.   - Cryoglobulin negative  - HIV neg    # HBcAb IgM pos, but HBsAg neg  - HBsAg neg, HBV DNA neg, but HBcAb pos, HBeAb pos, past infection. Also obtain Hep D serology.     # Liver lesions  - On CT chest incidentally found a 4.7x4.3 cm partially imaged hypodense lesion and in L lobe another 2.7 cm lesion, as well as nodular thickening along hepatic capsule, all are new from the 2/2020 CT a/p  - CEA and CA 19-9 WNL, but AFP> 5000, PSA WNL  - Could not get contrast CT b/o PAWAN on CKD. MRI was done w/o contrast showing Innumerable mildly T2 hyperintense lesions  throughout the liver, suggestive of metastasis. The largest lesion measures 13.8 x 11.7 x 11.7 cm in the liver dome. Multiple T2 hyperintense and T2 hyperintense lesions in the thoracolumbar spine may represent osseous metastasis.  - No fever or leukocytosis  - IR consult appreciated, liver biopsy was postponed b/o concern for higher risk of bleeding due to uremia from 5/6 and b/o lack of NPO status from 5/9. Nephrology follow up appreciated, no Gadavist was recommended. F/u IR recommendations in terms of timing and whether he needs DDAVP prior. To d/w IR if can obtain parenchymal biopsy as well when performing biopsy of liver mass.    - Bone scan did not suggest metastatic disease.  - Hem/onc follow up.     # PAWAN on CKD, now on HD  # Hematuria  # Hx of prostate Ca  - F/u nephrology recommendations  - Rare reports exists of HRS in metastatic liver disease, but Nelia 45 and has macroscopic hematuria, also has underlying CKD.   - F/u urology recommendations,  - F/u cardiology recommendations    Thank you for consult  Will continue to follow.   D/w primary team

## 2022-05-10 NOTE — PROGRESS NOTE ADULT - SUBJECTIVE AND OBJECTIVE BOX
NP Note discussed with  primary attending    Patient is a 71y old  Male who presents with a chief complaint of acute CHF exacerbation (10 May 2022 09:21)      INTERVAL HPI/OVERNIGHT EVENTS: no new complaints    MEDICATIONS  (STANDING):  ARIPiprazole 15 milliGRAM(s) Oral daily  chlorhexidine 2% Cloths 1 Application(s) Topical daily  ferrous    sulfate 325 milliGRAM(s) Oral daily  finasteride 5 milliGRAM(s) Oral daily  gabapentin 600 milliGRAM(s) Oral two times a day  lamoTRIgine 200 milliGRAM(s) Oral daily  midodrine. 2.5 milliGRAM(s) Oral three times a day  mupirocin 2% Ointment 1 Application(s) Both Nostrils every 12 hours  pantoprazole    Tablet 40 milliGRAM(s) Oral before breakfast  sevelamer carbonate 800 milliGRAM(s) Oral three times a day with meals  sodium bicarbonate 650 milliGRAM(s) Oral three times a day  sodium zirconium cyclosilicate 10 Gram(s) Oral once  tamsulosin 0.4 milliGRAM(s) Oral at bedtime  traZODone 25 milliGRAM(s) Oral at bedtime    MEDICATIONS  (PRN):  traMADol 50 milliGRAM(s) Oral every 6 hours PRN Severe Pain (7 - 10)      __________________________________________________  REVIEW OF SYSTEMS:    CONSTITUTIONAL: No fever,   EYES: no acute visual disturbances  NECK: No pain or stiffness  RESPIRATORY: No cough; No shortness of breath  CARDIOVASCULAR: No chest pain, no palpitations  GASTROINTESTINAL: No pain. No nausea or vomiting; No diarrhea   NEUROLOGICAL: No headache or numbness, no tremors  MUSCULOSKELETAL: No joint pain, no muscle pain  GENITOURINARY: no dysuria, no frequency, no hesitancy  PSYCHIATRY: no depression , no anxiety  ALL OTHER  ROS negative        Vital Signs Last 24 Hrs  T(C): 37.1 (10 May 2022 05:06), Max: 37.1 (09 May 2022 20:38)  T(F): 98.7 (10 May 2022 05:06), Max: 98.8 (09 May 2022 20:38)  HR: 98 (10 May 2022 05:06) (98 - 101)  BP: 114/54 (10 May 2022 05:06) (110/54 - 114/54)  BP(mean): --  RR: 18 (10 May 2022 05:06) (18 - 18)  SpO2: 95% (10 May 2022 05:06) (93% - 95%)    ________________________________________________  PHYSICAL EXAM:  GENERAL: NAD  HEENT: Normocephalic;  conjunctivae and sclerae clear; moist mucous membranes;   NECK : R Jug HD line, supple  CHEST/LUNG: Clear to auscultation bilaterally with good air entry   HEART: S1 S2  regular; no murmurs, gallops or rubs  ABDOMEN: + R ileostomy, Soft, Nontender, Nondistended; Bowel sounds present  EXTREMITIES: no cyanosis; no edema; no calf tenderness  SKIN: warm and dry; no rash  NERVOUS SYSTEM:  Awake and alert x3 ; no new deficits    _________________________________________________  LABS:                        10.3   8.86  )-----------( 267      ( 10 May 2022 06:21 )             32.6     05-10    134<L>  |  96  |  53<H>  ----------------------------<  98  4.2   |  24  |  6.12<H>    Ca    9.3      10 May 2022 06:21  Phos  5.2     05-09  Mg     2.4     05-09    TPro  7.8  /  Alb  2.7<L>  /  TBili  1.4<H>  /  DBili  x   /  AST  436<H>  /  ALT  95<H>  /  AlkPhos  247<H>  05-10    CAPILLARY BLOOD GLUCOSE      RADIOLOGY & ADDITIONAL TESTS:  < from: CT Abdomen and Pelvis No Cont (05.06.22 @ 10:00) >  Evaluation of the solid organs, vascular structures and GI tract is   limited without oral and IV contrast.    Hepatic metastases.    Subtotal colectomy with Connors's pouch and right ileostomy.   Nonobstructed small bowel in bilateral inguinal hernias.    Osseous lucency is unchanged from 2020, which are not active on nuclear   bone scan 5/5/2022    < end of copied text >  `< from: MR Abdomen No Cont (05.03.22 @ 11:23) >  Innumerable mildly T2 hyperintense lesions are seen throughout the liver,   suggestive of metastasis. The largest lesion measures 13.8 x 11.7 x 11.7   cm in the liver dome.    Multiple T2 hyperintense and T2 hyperintense lesions in the thoracolumbar   spine may represent osseous metastasis. Whole-body bone scan may be   pursued for further evaluation.    Cholelithiasis.    < end of copied text >      Imaging Personally Reviewed:  YES/NO    Consultant(s) Notes Reviewed:   YES/ No    Care Discussed with Consultants :     Plan of care was discussed with patient and /or primary care giver; all questions and concerns were addressed and care was aligned with patient's wishes.

## 2022-05-10 NOTE — PROGRESS NOTE ADULT - SUBJECTIVE AND OBJECTIVE BOX
Patient is a 71y old  Male who presents with a chief complaint of acute CHF exacerbation (10 May 2022 17:53)    PATIENT IS SEEN AND EXAMINED IN MEDICAL FLOOR.  TIMIT [    ]    ERASTO [   ]      GT [   ]    ALLERGIES:  No Known Allergies      Daily     Daily     VITALS:    Vital Signs Last 24 Hrs  T(C): 36.4 (10 May 2022 13:50), Max: 37.1 (09 May 2022 20:38)  T(F): 97.6 (10 May 2022 13:50), Max: 98.8 (09 May 2022 20:38)  HR: 104 (10 May 2022 13:50) (98 - 104)  BP: 93/51 (10 May 2022 13:50) (93/51 - 114/54)  BP(mean): --  RR: 18 (10 May 2022 13:50) (18 - 18)  SpO2: 92% (10 May 2022 13:50) (92% - 95%)    LABS:    CBC Full  -  ( 10 May 2022 06:21 )  WBC Count : 8.86 K/uL  RBC Count : 4.04 M/uL  Hemoglobin : 10.3 g/dL  Hematocrit : 32.6 %  Platelet Count - Automated : 267 K/uL  Mean Cell Volume : 80.7 fl  Mean Cell Hemoglobin : 25.5 pg  Mean Cell Hemoglobin Concentration : 31.6 gm/dL  Auto Neutrophil # : x  Auto Lymphocyte # : x  Auto Monocyte # : x  Auto Eosinophil # : x  Auto Basophil # : x  Auto Neutrophil % : x  Auto Lymphocyte % : x  Auto Monocyte % : x  Auto Eosinophil % : x  Auto Basophil % : x      05-10    134<L>  |  96  |  53<H>  ----------------------------<  98  4.2   |  24  |  6.12<H>    Ca    9.3      10 May 2022 06:21  Phos  5.2     05-09  Mg     2.4     05-09    TPro  7.8  /  Alb  2.7<L>  /  TBili  1.4<H>  /  DBili  x   /  AST  436<H>  /  ALT  95<H>  /  AlkPhos  247<H>  05-10    CAPILLARY BLOOD GLUCOSE            LIVER FUNCTIONS - ( 10 May 2022 06:21 )  Alb: 2.7 g/dL / Pro: 7.8 g/dL / ALK PHOS: 247 U/L / ALT: 95 U/L DA / AST: 436 U/L / GGT: x           Creatinine Trend: 6.12<--, 7.88<--, 5.82<--, 5.10<--, 5.54<--, 5.02<--  I&O's Summary    09 May 2022 07:01  -  10 May 2022 07:00  --------------------------------------------------------  IN: 0 mL / OUT: 200 mL / NET: -200 mL            .Stool Feces  05-03 @ 18:27   No enteric pathogens isolated.  (Stool culture examined for Salmonella,  Shigella, Campylobacter, Aeromonas, Plesiomonas,  Vibrio, E.coli O157 and Yersinia)  No enteric gram negative rods isolated  --  --          MEDICATIONS:    MEDICATIONS  (STANDING):  ARIPiprazole 15 milliGRAM(s) Oral daily  chlorhexidine 2% Cloths 1 Application(s) Topical daily  ferrous    sulfate 325 milliGRAM(s) Oral daily  finasteride 5 milliGRAM(s) Oral daily  gabapentin 600 milliGRAM(s) Oral two times a day  lamoTRIgine 200 milliGRAM(s) Oral daily  midodrine. 2.5 milliGRAM(s) Oral three times a day  mupirocin 2% Ointment 1 Application(s) Both Nostrils every 12 hours  pantoprazole    Tablet 40 milliGRAM(s) Oral before breakfast  sevelamer carbonate 800 milliGRAM(s) Oral three times a day with meals  sodium zirconium cyclosilicate 10 Gram(s) Oral once  tamsulosin 0.4 milliGRAM(s) Oral at bedtime  traZODone 25 milliGRAM(s) Oral at bedtime      MEDICATIONS  (PRN):  traMADol 50 milliGRAM(s) Oral every 6 hours PRN Severe Pain (7 - 10)      REVIEW OF SYSTEMS:                           ALL ROS DONE [ X   ]    CONSTITUTIONAL:  LETHARGIC [   ], FEVER [   ], UNRESPONSIVE [   ]  CVS:  CP  [   ], SOB, [   ], PALPITATIONS [   ], DIZZYNESS [   ]  RS: COUGH [   ], SPUTUM [   ]  GI: ABDOMINAL PAIN [   ], NAUSEA [   ], VOMITINGS [   ], DIARRHEA [   ], CONSTIPATION [   ]  :  DYSURIA [   ], NOCTURIA [   ], INCREASED FREQUENCY [   ], DRIBLING [   ],  SKELETAL: PAINFUL JOINTS [   ], SWOLLEN JOINTS [   ], NECK ACHE [   ], LOW BACK ACHE [   ],  SKIN : ULCERS [   ], RASH [   ], ITCHING [   ]  CNS: HEAD ACHE [   ], DOUBLE VISION [   ], BLURRED VISION [   ], AMS / CONFUSION [   ], SEIZURES [   ], WEAKNESS [   ],TINGLING / NUMBNESS [   ]      PHYSICAL EXAMINATION:  GENERAL APPEARANCE: NO DISTRESS  HEENT:  NO PALLOR, NO  JVD,  NO   NODES, NECK SUPPLE  CVS: S1 +, S2 +,   RS: AEEB,  OCCASIONAL  RALES +,   CRACKLES AT BASES +  ABD: SOFT, NT, NO, BS +   ILEOSTOMY [STOMA W/ PINK BASE]  EXT: NO PE  SKIN: WARM,   SKELETAL:  ROM ACCEPTABLE  CNS:  AAO X 3    RADIOLOGY :    ACC: [id number] EXAM:  XR CHEST PORTABLE URGENT 1V                          PROCEDURE DATE:  04/28/2022          INTERPRETATION:  Clinical history: 71-year-old male, chest pain.    Portable/expiratory view of the chest is compared to 20 and demonstrates   a normal cardiac silhouette with no lobar consolidation, gross effusion,   pneumothorax or acute osseous finding.    Bilateral lower lobe patchy opacities consistent with infiltrates/areas   of atelectasis in the correct clinical context, increased. Elevated right   hemidiaphragm are evident.    IMPRESSION:    Bilateral lower lobe interstitial infiltrates/platelike atelectasis,   increased    ================================      ACC: [id number] EXAM:  MR ABDOMEN                          *** ADDENDUM***    Some of the hepatic lesions have small T1 hyperintense components on   fat-suppressed T1-weighted gradient echo images, suggestive of blood   products.    --- End of Report---    *** END OF ADDENDUM***      PROCEDURE DATE:  05/03/2022          INTERPRETATION:  CLINICAL INFORMATION: Hepatic lesions. History of   prostate cancer.    COMPARISON: None.    CONTRAST/COMPLICATIONS:  IV Contrast: NONE  Oral Contrast: NONE  Complications: None reported at time of study completion    PROCEDURE:  MRI of the abdomen was performed.    FINDINGS:  LOWER CHEST: Within normal limits.    LIVER: Innumerable mildly T2 hyperintense lesions are seen throughout the   liver, suggestive ofmetastasis. The largest lesion measures 13.8 x 11.7   x 11.7 cm in the liver dome.  BILE DUCTS: Normal caliber.  GALLBLADDER: Cholelithiasis.  SPLEEN: Within normal limits.  PANCREAS: Within normal limits.  ADRENALS: Within normal limits.  KIDNEYS/URETERS: Multiple brightly T2 hyperintense lesion in the kidneys   bilaterally, representing probable cysts.    VISUALIZED PORTIONS:  BOWEL: Small hiatal hernia. Right lower quadrant ostomy.  PERITONEUM: No ascites.  VESSELS: Within normal limits.  RETROPERITONEUM/LYMPH NODES: No lymphadenopathy.  ABDOMINAL WALL: Within normal limits.  BONES: Multiple T2 hyperintense and T2 hyperintense lesions in the   thoracolumbar spine may represent osseous metastasis. Whole-body bone   scan may be pursued for further evaluation.    IMPRESSION:  Innumerable mildly T2 hyperintense lesions are seen throughout the liver,   suggestive of metastasis. The largest lesion measures 13.8 x 11.7 x 11.7   cm in the liver dome.    Multiple T2 hyperintense and T2 hyperintense lesions in the thoracolumbar   spine may represent osseous metastasis. Whole-body bone scan may be   pursued for further evaluation.    Cholelithiasis.      ASSESSMENT :     Hyperkalemia    No pertinent past medical history    COPD, mild    Anemia    Bipolar disorder    IBD (inflammatory bowel disease)    HTN (hypertension)    HLD (hyperlipidemia)    PAD (peripheral artery disease)    CKD (chronic kidney disease)    Prostate CA    BPH with urinary obstruction    No significant past surgical history    History of creation of ostomy        PLAN:  HPI:  Patient is a 71M from [facility], who is ambulatory at baseline with a walker. He has Hx of HTN, CHF?, COPD, HLD, PAD, BPH, Prostate CA (s/p radiation), Bipolar Disorder, IBD - Crohn's Disease, R ileostomy bag (s/p sigmoid resection). He was brought to ED due to bilateral leg swelling. For the past 2 days he has been having progressive swelling/ edema of both his legs with associated ambulatory dysfunction. He also started having episodes of shortness of breath and difficulty of breathing. He denies any fever, cough, chest pain, palpitation. When he came to the ED, he was saturating at 93-94% on room air. He was placed on nasal cannula. EKG was done showing NSR. Troponin was negative and BNP was mildly elevated at 398. CXR showed bilateral vascular congestion. Serum potassium was elevated at 6.1. He got a dose of lasix and was given Hyperkalemia cocktail in the ED. Of note, he had Hx of COVID in 2020 andwas vaccinated with COVID-19 x 3 (Pfizer) and Influenza in 2021. He was subsequently admitted for acute exacerbation of his heart failure.    [facility]: FULL CODE (28 Apr 2022 18:40)    # CASE D/W BROTHER KERI VIA PHONE [ C  ; H  ] - CASE DISCUSSED AT LENGTH, ALL QUESTIONS ANSWERED [5/5]    # HEMATURIA   # HX OF PROSTATE CA S/P RADIATION  - HOLD A/C, TREND CBC,   - NOTED BLADDER SCAN, NOTED RECENT U/S  - PER UROLOGY, REPEAT BLADDER SCAN W/ PVR - THEN WILL DECIDE REGARDING MAURER PLACEMENT  - F/U PSA  - NOTED CT A/P  - UROLOGY CONSULT DR. CHAN    # ACUTE HYPOXIC RESPIRATORY FAILURE SUSPECT S/T PULMONARY VASCULAR CONGESTION  # RULED OUT CHF   # PAWAN ON CKD - WORSENING, HOLD LASIX  # BPH  # HX OF PROSTATE CA S/P RADIATION  - PLACE ON TELEMETRY  - NOTED CXR  - HOLD LASIX  - NOTE TSH  - STRICT IS AND OS  - ECHOCARDIOGRAM - NORMAL LVEF, NORMAL DIASTOLIC FUNCTION  - NEPHROLOGY CONSULT, CARDIOLOGY CONSULT    - NEPHROLOGY RECC FOR PLACEMENT OF SHILEY W/ INITIATION OF HD    - PLANNED FOR TUNNELED HD CATH BY IR -- PLANNED FOR 5/11    # LIVER LESIONS, BONE LESIONS  # TRANSAMINITIS, NOTED HEP C AND HEP B MARKERS  - NOTED MRI ABDOMEN  - NOTED TUMOR MARKERS  - F/U BONE SCAN  - HEPATOLOGY CONSULT  - ONCOLOGY CONSULT    - PLANNED FOR IR GUIDED BIOPSY ON 5/6  --- DELAYED BY IR DUE TO BLEEDING RISK  -- PLANNED FOR 5/11    # HYPERKALEMIA - RESOLVED  - GIVEN LASIX, LOKELMA  - EKG DONE  - NEPHROLOGY CONSULT    # CROHN'S DISEASE  # RIGHT ILEOSTOMY BAG S/P SIGMOID RESECTION  - MONITORING OUTPUT  - GASTROENTEROLOGY CONSULT    # NORMOCYTIC ANEMIA    # HTN  # COPD  # HLD  # PAD  # BIPOLAR DX  # GI AND DVT PPX

## 2022-05-10 NOTE — PROGRESS NOTE ADULT - PROBLEM SELECTOR PLAN 4
-baseline CKD now progressed to ESRD requiring hemodialysis   -s/p HDC insertion with IR on 5/6   -s/p first dialysis session on 5/6, 2nd on 5/9  -will likely need long term  dialysis catheter placement prior to d/c    -Nephro Dr. Pond

## 2022-05-10 NOTE — PROGRESS NOTE ADULT - ASSESSMENT
71M from [facility], who is ambulatory at baseline with a walker. He has Hx of HTN, CHF?, COPD, HLD, PAD, BPH, Prostate CA (s/p radiation), Bipolar Disorder, IBD - Crohn's Disease, R ileostomy bag (s/p sigmoid resection) presented with SOB and LE edema, found with hyperkalemia on admission. Admitted to [facility] for cardio work up r/o CHF,  hyperkalemia with PAWAN on CKD 4.  Cardiology and nephrology consulted, started on lasix. Renal US shows no hydronephrosis and  + renal cysts.   CT chest shows no pleural effusion, but with hepatic lesion 4.7cm largest. Also noted with transaminitis. GI and Hepatology consulted, s/p MRI abdomen revealed  multiple liver lesions with mets, Heme/onc followed and pt was recommended for liver biopsy and bone scan. Bone scan with no osseous metastasis, Liver biopsy was postponed due to elevated BUN and risk of bleeding.   Hospital course complicated with worsening renal function, s/p IR guided HDC on 5/6 and dialysis was started  Hospital coourse further complicated with episodes of hematuria, noncontrast CT showed no hydro, stones or  renal mass, Urology followed and pt was recommended for outpt cystoscopy   Pending liver biopsy & IR permacath placement on Wednesday 5/11

## 2022-05-10 NOTE — PROGRESS NOTE ADULT - PROBLEM SELECTOR PLAN 1
-MRI of abdomen revealed  multiple liver lesions with mets  -bone scan with no osseous metastasis   -pending liver biopsy, initially cancelled for elevated BUN and high risk of bleeding   -tentative planned for liver biopsy on Wed with IR   -Hepatology Dr. Portillo   -Heme Dr. Rivers/Dr. Chao  -IR following

## 2022-05-11 NOTE — DISCHARGE NOTE PROVIDER - NSDCCPCAREPLAN_GEN_ALL_CORE_FT
PRINCIPAL DISCHARGE DIAGNOSIS  Diagnosis: Acute renal failure superimposed on chronic kidney disease, unspecified CKD stage, unspecified acute renal failure type  Assessment and Plan of Treatment: You were sent to the hopital for elevated Potassium level which was likely due to the progression of your Chronic kidney disease, you were started on medications to help lower Potassium level and improve your kidney function. You were evaluated by Nephrologist. Your Kidney function did not recover and continued to worsen, you were eventually started on Dialysis and had a Dialysis catheter placed   Continue dialysis as recommended    you should avoid agents that can cause kidney injury.  Some of these agents include NSAIDs, Gadolinium contrast, and Phosphate containing enemas.        SECONDARY DISCHARGE DIAGNOSES  Diagnosis: Anemia of chronic renal failure, unspecified CKD stage  Assessment and Plan of Treatment: You blood count is lower than normal likely due to worsening kidney disease. you did not have any sign or symptoms of bleeding   Continue medications as prescibed   Symptoms to report, bleeding, palpitations, fatigue, pale skin, cold skin, dizziness. Take medications as ordered by PCP      Diagnosis: BPH (benign prostatic hyperplasia)  Assessment and Plan of Treatment: You were found to have blood in the urine which improved you were evaluated by Urology and it is recommended that you follow up with Urology out patient for further follow up and recommendations    Diagnosis: Acute exacerbation of congestive heart failure  Assessment and Plan of Treatment: your CHF was exacerbated likely due to the progression of your kidney disease   Weigh yourself daily.  If you gain 3lbs in 3 days, or 5lbs in a week call your Health Care Provider.  Do not eat or drink foods containing more than 2000mg of salt (sodium) in your diet every day.  Call your Health Care Provider if you have any swelling or increased swelling in your feet, ankles, and/or stomach.  Take all of your medication as directed.  If you become dizzy call your Health Care Provider.      Diagnosis: Liver lesion  Assessment and Plan of Treatment: you were found to have Liver lesions with possible spread which is suspicious for malignancy. You were evaluated by Liver specialist and Cancer specialist. You had a Liver biopsy done in the hospital   Follow up with Liver and Cancer specialist outpateint for biopsy results and further monitoring and treatment recommendations

## 2022-05-11 NOTE — DISCHARGE NOTE PROVIDER - HOSPITAL COURSE
71M from [facility], who is ambulatory at baseline with a walker. He has Hx of HTN, CHF?, COPD, HLD, PAD, BPH, Prostate CA (s/p radiation), Bipolar Disorder, IBD - Crohn's Disease, R ileostomy bag (s/p sigmoid resection) presented with SOB and LE edema, found with hyperkalemia on admission. Admitted to [facility] for cardio work up r/o CHF,  hyperkalemia with PAWAN on CKD 4.  Cardiology and nephrology consulted, started on lasix. Renal US shows no hydronephrosis and  + renal cysts.   CT chest shows no pleural effusion, but with hepatic lesion 4.7cm largest. Also noted with transaminitis. GI and Hepatology consulted, s/p MRI abdomen revealed  multiple liver lesions with mets, Heme/onc followed and pt was recommended for liver biopsy and bone scan. Bone scan with no osseous metastasis, Liver biopsy was initially postponed due to elevated BUN and risk of bleeding.   Hospital course complicated with worsening renal function, s/p IR guided HDC on 5/6 and dialysis was started  Hospital course further complicated with episodes of hematuria, noncontrast CT showed no hydro, stones or  renal mass, Urology followed and pt was recommended for outpt cystoscopy   For liver biopsy & IR permacath placement on Wednesday 5/11, HD was resumed     NOT COMPLETE 5/11  Pt for discharge to [facility] with HD   Please note that this a brief summary of hospital course please refer to daily progress notes and consult notes for full course and events 71M from [facility], who is ambulatory at baseline with a walker. He has Hx of HTN, CHF?, COPD, HLD, PAD, BPH, Prostate CA (s/p radiation), Bipolar Disorder, IBD - Crohn's Disease, R ileostomy bag (s/p sigmoid resection) presented with SOB and LE edema, found with hyperkalemia on admission. Admitted to [facility] for cardio work up r/o CHF,  hyperkalemia with PAWAN on CKD 4.  Cardiology and nephrology consulted, started on lasix. Renal US shows no hydronephrosis and  + renal cysts.   CT chest shows no pleural effusion, but with hepatic lesion 4.7cm largest. Also noted with transaminitis. GI and Hepatology consulted, s/p MRI abdomen revealed  multiple liver lesions with mets, Heme/onc followed and pt was recommended for liver biopsy and bone scan. Bone scan with no osseous metastasis, Liver biopsy was initially postponed due to elevated BUN and risk of bleeding.   Hospital course complicated with worsening renal function, s/p IR guided HDC on 5/6 and dialysis was started  Hospital course further complicated with episodes of hematuria, noncontrast CT showed no hydro, stones or  renal mass, Urology followed and pt was recommended for outpt cystoscopy   For liver biopsy & IR permacath placement on Wednesday 5/11, HD was resumed     NOT COMPLETE 5/11  Pt for discharge to [facility] with HD   Please note that this a brief summary of hospital course please refer to daily progress notes and consult notes for full course and events

## 2022-05-11 NOTE — PROGRESS NOTE ADULT - SUBJECTIVE AND OBJECTIVE BOX
Chief Complaint:  Patient is a 71y old  Male who presents with a chief complaint of acute CHF exacerbation (11 May 2022 14:47)      Reason for consult:    Interval Events:     Hospital Medications:  ARIPiprazole 15 milliGRAM(s) Oral daily  chlorhexidine 2% Cloths 1 Application(s) Topical daily  ferrous    sulfate 325 milliGRAM(s) Oral daily  finasteride 5 milliGRAM(s) Oral daily  gabapentin 600 milliGRAM(s) Oral two times a day  lamoTRIgine 200 milliGRAM(s) Oral daily  midodrine. 2.5 milliGRAM(s) Oral three times a day  mupirocin 2% Ointment 1 Application(s) Both Nostrils every 12 hours  pantoprazole    Tablet 40 milliGRAM(s) Oral before breakfast  sevelamer carbonate 800 milliGRAM(s) Oral three times a day with meals  sodium zirconium cyclosilicate 10 Gram(s) Oral once  tamsulosin 0.4 milliGRAM(s) Oral at bedtime  traMADol 50 milliGRAM(s) Oral every 6 hours PRN  traZODone 25 milliGRAM(s) Oral at bedtime      ROS:   General:  No  fevers, chills, night sweats, fatigue  Eyes:  Good vision, no reported pain  ENT:  No sore throat, pain, runny nose  CV:  No pain, palpitations  Pulm:  No dyspnea, cough  GI:  See HPI, otherwise negative  :  No  incontinence, nocturia  Muscle:  No pain, weakness  Neuro:  No memory problems  Psych:  No insomnia, mood problems, depression  Endocrine:  No polyuria, polydipsia, cold/heat intolerance  Heme:  No petechiae, ecchymosis, easy bruisability  Skin:  No rash    PHYSICAL EXAM:   Vital Signs:  Vital Signs Last 24 Hrs  T(C): 37.7 (11 May 2022 14:11), Max: 37.7 (11 May 2022 14:11)  T(F): 99.8 (11 May 2022 14:11), Max: 99.8 (11 May 2022 14:11)  HR: 95 (11 May 2022 14:11) (90 - 97)  BP: 101/49 (11 May 2022 14:11) (94/48 - 109/58)  BP(mean): 68 (11 May 2022 13:28) (62 - 71)  RR: 18 (11 May 2022 14:11) (17 - 24)  SpO2: 93% (11 May 2022 14:11) (90% - 95%)  Daily     Daily     GENERAL: no acute distress  NEURO: alert, no asterixis  HEENT: anicteric sclera, no conjunctival pallor appreciated  CHEST: no respiratory distress, no accessory muscle use  CARDIAC: regular rate, rhythm  ABDOMEN: soft, non-tender, non-distended, no rebound or guarding  EXTREMITIES: warm, well perfused, no edema  SKIN: no lesions noted    LABS: reviewed                        10.4   9.68  )-----------( 271      ( 11 May 2022 08:47 )             33.4     05-11    130<L>  |  94<L>  |  77<H>  ----------------------------<  93  4.9   |  21<L>  |  8.59<H>    Ca    9.3      11 May 2022 08:47    TPro  7.9  /  Alb  2.9<L>  /  TBili  1.1  /  DBili  x   /  AST  346<H>  /  ALT  85<H>  /  AlkPhos  254<H>  05-11    LIVER FUNCTIONS - ( 11 May 2022 08:47 )  Alb: 2.9 g/dL / Pro: 7.9 g/dL / ALK PHOS: 254 U/L / ALT: 85 U/L DA / AST: 346 U/L / GGT: x             Interval Diagnostic Studies: see sunrise for full report   Chief Complaint:  Patient is a 71y old  Male who presents with a chief complaint of acute CHF exacerbation (11 May 2022 14:47)      Reason for consult: Liver lesions, abnormal liver enzymes    Interval Events: Patient was seen and examined at bedside. S/p liver biopsy. Denies new complaints.    Hospital Medications:  ARIPiprazole 15 milliGRAM(s) Oral daily  chlorhexidine 2% Cloths 1 Application(s) Topical daily  ferrous    sulfate 325 milliGRAM(s) Oral daily  finasteride 5 milliGRAM(s) Oral daily  gabapentin 600 milliGRAM(s) Oral two times a day  lamoTRIgine 200 milliGRAM(s) Oral daily  midodrine. 2.5 milliGRAM(s) Oral three times a day  mupirocin 2% Ointment 1 Application(s) Both Nostrils every 12 hours  pantoprazole    Tablet 40 milliGRAM(s) Oral before breakfast  sevelamer carbonate 800 milliGRAM(s) Oral three times a day with meals  sodium zirconium cyclosilicate 10 Gram(s) Oral once  tamsulosin 0.4 milliGRAM(s) Oral at bedtime  traMADol 50 milliGRAM(s) Oral every 6 hours PRN  traZODone 25 milliGRAM(s) Oral at bedtime      ROS:   General:  No  fevers, chills, but fatigued  Eyes:  Good vision, no reported pain  ENT:  No sore throat, pain, runny nose  CV:  No pain, palpitations  Pulm:  No dyspnea, cough  GI:  No abdominal pain, N/V, has ileostomy bag, denies bleeding  :  Hematuria  Muscle:  No pain, or focal  weakness  Neuro:  No memory problems  Skin:  No rash          PHYSICAL EXAM:   Vital Signs:  Vital Signs Last 24 Hrs  T(C): 37.7 (11 May 2022 14:11), Max: 37.7 (11 May 2022 14:11)  T(F): 99.8 (11 May 2022 14:11), Max: 99.8 (11 May 2022 14:11)  HR: 95 (11 May 2022 14:11) (90 - 97)  BP: 101/49 (11 May 2022 14:11) (94/48 - 109/58)  BP(mean): 68 (11 May 2022 13:28) (62 - 71)  RR: 18 (11 May 2022 14:11) (17 - 24)  SpO2: 93% (11 May 2022 14:11) (90% - 95%)  Daily     Daily     GENERAL: no acute distress  NEURO: awake, alert, oriented x3, no asterixis  HEENT: anicteric sclera, no conjunctival pallor appreciated  CHEST: no respiratory distress, no accessory muscle use, dialysis catheter  CARDIAC: regular rate, rhythm  ABDOMEN: soft, non-tender, non-distended, no rebound or guarding, ileostomy bag  EXTREMITIES: b/l LE edema improved reportedly  SKIN: No rash    LABS: reviewed                        10.4   9.68  )-----------( 271      ( 11 May 2022 08:47 )             33.4     05-11    130<L>  |  94<L>  |  77<H>  ----------------------------<  93  4.9   |  21<L>  |  8.59<H>    Ca    9.3      11 May 2022 08:47    TPro  7.9  /  Alb  2.9<L>  /  TBili  1.1  /  DBili  x   /  AST  346<H>  /  ALT  85<H>  /  AlkPhos  254<H>  05-11    LIVER FUNCTIONS - ( 11 May 2022 08:47 )  Alb: 2.9 g/dL / Pro: 7.9 g/dL / ALK PHOS: 254 U/L / ALT: 85 U/L DA / AST: 346 U/L / GGT: x             Interval Diagnostic Studies: see sunrise for full report

## 2022-05-11 NOTE — DISCHARGE NOTE PROVIDER - PROVIDER TOKENS
PROVIDER:[TOKEN:[[id number]],FOLLOWUP:[1 week]],PROVIDER:[TOKEN:[[id number]],FOLLOWUP:[1 week]],PROVIDER:[TOKEN:[[id number]],FOLLOWUP:[1 week]],PROVIDER:[TOKEN:[[id number]],FOLLOWUP:[1 week]]

## 2022-05-11 NOTE — PROGRESS NOTE ADULT - ASSESSMENT
Very pleasant 71 year old gentleman who presents for acute CHF exacerbation, found to have PAWAN on CKD, gross hematuria, evidence of liver metastasis on MRI  -MRI images reviewed demonstrating concern for liver mets  -PSA 7/2021 was undetectable (<0.01)  -Repeat PSA May 2022 undetectable <0.01  -Agree with IR liver biopsy, await results  -labs reviewed  -noncontrast CT given gross hematuria: no hydro, stones, renal mass  -patient will need a cystoscopy as an outpatient for workup of hematuria  -discussed with house staff

## 2022-05-11 NOTE — DISCHARGE NOTE PROVIDER - CARE PROVIDER_API CALL
Mimi Rush)  Internal Medicine  [address] [place] [address]  Phone: [phone]  Fax: [phone]  Follow Up Time: 1 week    Emerson Pond)  Internal Medicine  [address] [place] [address]  Phone: [phone]  Fax: [phone]  Follow Up Time: 1 week    Renetta Portillo)  Internal Medicine  [address] [place] [address]  Phone: [phone]  Fax: [phone]  Follow Up Time: 1 week    Freddy Connors)  Urology  [address] [facility], [address]nd [address]  [place] [address]  Phone: [phone]  Fax: [phone]  Follow Up Time: 1 week

## 2022-05-11 NOTE — PROGRESS NOTE ADULT - PROBLEM SELECTOR PLAN 11
-pt's HCP is his brother Alexey Sidhu,   -pt is DNR/DNI   -RASHID in chart reviewed -pt's HCP is his brother Alexey Sidhu,   -pt is DNR/DNI   -RASHID in chart

## 2022-05-11 NOTE — PROGRESS NOTE ADULT - PROBLEM SELECTOR PLAN 9
-dvt ppx: SCDs, no AC due to liver lesion  -gi ppx: PPI -dvt ppx: SCDs, no AC on hold for liver biopsy today  -gi ppx: PPI

## 2022-05-11 NOTE — PROGRESS NOTE ADULT - SUBJECTIVE AND OBJECTIVE BOX
Patient seen/examined. Scheduled for IR liver biopsy today. No complaints.    Vital Signs Last 24 Hrs  T(C): 37.3 (11 May 2022 11:28), Max: 37.3 (11 May 2022 11:28)  T(F): 99.1 (11 May 2022 11:28), Max: 99.1 (11 May 2022 11:28)  HR: 93 (11 May 2022 12:28) (90 - 104)  BP: 94/48 (11 May 2022 12:28) (93/51 - 109/58)  BP(mean): 62 (11 May 2022 12:28) (62 - 71)  RR: 18 (11 May 2022 12:28) (17 - 24)  SpO2: 90% (11 May 2022 12:28) (90% - 95%)    General: NAD. AAOx3  Abd: soft. NT/ND                          10.4   9.68  )-----------( 271      ( 11 May 2022 08:47 )             33.4     05-11    130<L>  |  94<L>  |  77<H>  ----------------------------<  93  4.9   |  21<L>  |  8.59<H>    Ca    9.3      11 May 2022 08:47    TPro  7.9  /  Alb  2.9<L>  /  TBili  1.1  /  DBili  x   /  AST  346<H>  /  ALT  85<H>  /  AlkPhos  254<H>  05-11    Prostate Ca Screen, PSA Total (05.05.22 @ 10:04)    Prostate Ca Screen, PSA Total: <0.01: Test Repeated  Method: Roche Electrochemiluminescence Immuno Assay  Values obtained with different assay methods or kits cannot be  used interchangeably.  Results cannot be interpreted as absolute evidence of the presence  or absence of malignant disease. ng/mL

## 2022-05-11 NOTE — PROGRESS NOTE ADULT - ASSESSMENT
71y Male from [facility] with Hx of HTN, CHF?, COPD, HLD, PAD, BPH, Prostate CA (s/p radiation), Bipolar Disorder, IBD - Crohn's Disease, R ileostomy bag (s/p sigmoid resection), COVID-19 (2020) and s/p COVID19 vaccination (Pfizer x3) was brought to [facility] ED on 4/28/22 b/o 2 days worsening bilateral leg swelling with difficulty ambulating, and SOB with SpO2 93-94% on RA on arrival.  He was found to have bilateral vascular congestion on CXR due to suspected acute exacerbation of CHF, and PAWAN on CKD (BUN/Cr 88/3.28), with hyperkalemia (K 6.1). 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  Being followed by cardiology, PBNP was mildly elevated, and TTE showed LVEF 55%, normal diastolic function and normal RV function, thus did not appear to be in acute CHF exacerbation.    Hepatology was consulted b/o liver lesion and abnormal liver enzymes.     # Abnormal liver enzymes, AST >>ALT  - CPK nl. HIV neg. Hep B and C as below. Hep A IgM neg.   - Hypotensive, requiring midodrine, with renal dysfunction, Ig panel wnl, K/L ratio WNL, F/u UPEP  - No Hx of EtOH  - RUBIA neg, Follow up / obtain SMA, LKM, AMA, A1AT, ceruloplasmin, Hep E  - Avoid hepatotoxic medications, agree holding statin  - Please, obtain collateral information about timing of lamotrigine, because its hepatotoxicity is well established, and consider alternative.     # Pos HCV ab  - HCV PCR neg with the Hx of Hep C and treatment, suggests SVR.   - Cryoglobulin negative  - HIV neg    # HBcAb IgM pos, but HBsAg neg  - HBsAg neg, HBV DNA neg, but HBcAb pos, HBeAb pos, past infection. Also obtain Hep D serology.     # Liver lesions  - On CT chest incidentally found a 4.7x4.3 cm partially imaged hypodense lesion and in L lobe another 2.7 cm lesion, as well as nodular thickening along hepatic capsule, all are new from the 2/2020 CT a/p  - CEA and CA 19-9 WNL, but AFP> 5000, PSA WNL  - Could not get contrast CT b/o PAWAN on CKD. MRI was done w/o contrast showing Innumerable mildly T2 hyperintense lesions  throughout the liver, suggestive of metastasis. The largest lesion measures 13.8 x 11.7 x 11.7 cm in the liver dome. Multiple T2 hyperintense and T2 hyperintense lesions in the thoracolumbar spine may represent osseous metastasis.  - No fever or leukocytosis  - S/p liver biopsy per IR ( L lobe mass)  - Bone scan did not suggest metastatic disease.  - Hem/onc follow up.     # PAWAN on CKD, now on HD without signs of renal recovery  # Hematuria  # Hx of prostate Ca  - F/u nephrology recommendations  - Rare reports exists of HRS in metastatic liver disease, but Nelia 45 and has macroscopic hematuria, also has underlying CKD.   - F/u urology recommendations,  - F/u cardiology recommendations    Thank you for consult  Will continue to follow.   D/w primary team

## 2022-05-11 NOTE — PROCEDURE NOTE - PROCEDURE FINDINGS AND DETAILS
Multiple hepatic masses again identified. Core biopsy of left hepatic lobe mass performed. 5 core specimens obtained. Specimens handed to and confirmed for adequacy by the pathologist in attendance.

## 2022-05-11 NOTE — PROCEDURE NOTE - PLAN
- PACU x 2 hours.   - Bedrest x 4 hours.   - F/u final pathology results.   - Call IR with questions/concerns regarding procedure.     Official report to follow.

## 2022-05-11 NOTE — PROGRESS NOTE ADULT - PROBLEM SELECTOR PLAN 1
-MRI of abdomen revealed  multiple liver lesions with mets  -bone scan with no osseous metastasis   -S/P IR liver biopsy 5/11  -Hepatology Dr. Portillo   -Heme Dr. Rivers/Dr. Chao  -IR following

## 2022-05-11 NOTE — DISCHARGE NOTE PROVIDER - NSDCMRMEDTOKEN_GEN_ALL_CORE_FT
ARIPiprazole 15 mg oral tablet: 1 tab(s) orally once a day  atorvastatin 10 mg oral tablet: 1 tab(s) orally once a day  calcitriol 0.25 mcg oral capsule: 1 cap(s) orally once a day  epoetin anoop: 6000 unit(s) subcutaneous 3 times a week, M-W-F  ferrous sulfate 325 mg (65 mg elemental iron) oral tablet: 1 tab(s) orally once a day  Flomax 0.4 mg oral capsule: 1 cap(s) orally once a day  gabapentin 600 mg oral tablet: 1 tab(s) orally 2 times a day  halobetasol 0.05% topical cream: Apply topically to affected area once a day  lamoTRIgine 200 mg oral tablet: 1 tab(s) orally once a day  Lasix 20 mg oral tablet: 1 tab(s) orally once a day  midodrine 2.5 mg oral tablet: 1 tab(s) orally 3 times a day  Proscar 5 mg oral tablet: 1 tab(s) orally once a day  Protonix 40 mg oral delayed release tablet: 1 tab(s) orally once a day  sodium bicarbonate 650 mg oral tablet: 1 tab(s) orally 3 times a day (with meals)   traZODone 50 mg oral tablet: 0.5 tab(s) orally once a day (at bedtime)

## 2022-05-11 NOTE — PROGRESS NOTE ADULT - SUBJECTIVE AND OBJECTIVE BOX
NP Note discussed with  primary attending    Patient is a 71y old  Male who presents with a chief complaint of acute CHF exacerbation (11 May 2022 15:06)      INTERVAL HPI/OVERNIGHT EVENTS: no new complaints    MEDICATIONS  (STANDING):  ARIPiprazole 15 milliGRAM(s) Oral daily  chlorhexidine 2% Cloths 1 Application(s) Topical daily  ferrous    sulfate 325 milliGRAM(s) Oral daily  finasteride 5 milliGRAM(s) Oral daily  gabapentin 600 milliGRAM(s) Oral two times a day  lamoTRIgine 200 milliGRAM(s) Oral daily  midodrine. 2.5 milliGRAM(s) Oral three times a day  mupirocin 2% Ointment 1 Application(s) Both Nostrils every 12 hours  pantoprazole    Tablet 40 milliGRAM(s) Oral before breakfast  sevelamer carbonate 800 milliGRAM(s) Oral three times a day with meals  sodium zirconium cyclosilicate 10 Gram(s) Oral once  tamsulosin 0.4 milliGRAM(s) Oral at bedtime  traZODone 25 milliGRAM(s) Oral at bedtime    MEDICATIONS  (PRN):  traMADol 50 milliGRAM(s) Oral every 6 hours PRN Severe Pain (7 - 10)      __________________________________________________  REVIEW OF SYSTEMS:    CONSTITUTIONAL: No fever,   EYES: no acute visual disturbances  NECK: No pain or stiffness  RESPIRATORY: No cough; No shortness of breath  CARDIOVASCULAR: No chest pain, no palpitations  GASTROINTESTINAL: No pain. No nausea or vomiting; No diarrhea   NEUROLOGICAL: No headache or numbness, no tremors  MUSCULOSKELETAL: No joint pain, no muscle pain  GENITOURINARY: no dysuria, no frequency, no hesitancy  PSYCHIATRY: no depression , no anxiety  ALL OTHER  ROS negative        Vital Signs Last 24 Hrs  T(C): 37.7 (11 May 2022 14:11), Max: 37.7 (11 May 2022 14:11)  T(F): 99.8 (11 May 2022 14:11), Max: 99.8 (11 May 2022 14:11)  HR: 95 (11 May 2022 14:11) (90 - 97)  BP: 101/49 (11 May 2022 14:11) (94/48 - 109/58)  BP(mean): 68 (11 May 2022 13:28) (62 - 71)  RR: 18 (11 May 2022 14:11) (17 - 24)  SpO2: 93% (11 May 2022 14:11) (90% - 95%)    ________________________________________________  PHYSICAL EXAM:  GENERAL: NAD  HEENT: Normocephalic;  conjunctivae and sclerae clear; moist mucous membranes;   NECK : supple  CHEST/LUNG: Clear to auscultation bilaterally with good air entry   HEART: S1 S2  regular; no murmurs, gallops or rubs  ABDOMEN: right ileostomy, Soft, Nontender, Nondistended; Bowel sounds present  EXTREMITIES: no cyanosis; no edema; no calf tenderness  SKIN: warm and dry; no rash  NERVOUS SYSTEM:  Awake and alert; Oriented  to place, person and time ; no new deficits    _________________________________________________  LABS:                        10.4   9.68  )-----------( 271      ( 11 May 2022 08:47 )             33.4     05-11    130<L>  |  94<L>  |  77<H>  ----------------------------<  93  4.9   |  21<L>  |  8.59<H>    Ca    9.3      11 May 2022 08:47    TPro  7.9  /  Alb  2.9<L>  /  TBili  1.1  /  DBili  x   /  AST  346<H>  /  ALT  85<H>  /  AlkPhos  254<H>  05-11    PT/INR - ( 11 May 2022 08:47 )   PT: 15.2 sec;   INR: 1.27 ratio         PTT - ( 11 May 2022 08:47 )  PTT:27.0 sec    CAPILLARY BLOOD GLUCOSE            RADIOLOGY & ADDITIONAL TESTS:    Imaging Personally Reviewed:  YES/NO    Consultant(s) Notes Reviewed:   YES/ No    Care Discussed with Consultants :     Plan of care was discussed with patient and /or primary care giver; all questions and concerns were addressed and care was aligned with patient's wishes.     NP Note discussed with  primary attending    Patient is a 71y old  Male who presents with a chief complaint of acute CHF exacerbation (11 May 2022 15:06)      INTERVAL HPI/OVERNIGHT EVENTS: no new complaints    MEDICATIONS  (STANDING):  ARIPiprazole 15 milliGRAM(s) Oral daily  chlorhexidine 2% Cloths 1 Application(s) Topical daily  ferrous    sulfate 325 milliGRAM(s) Oral daily  finasteride 5 milliGRAM(s) Oral daily  gabapentin 600 milliGRAM(s) Oral two times a day  lamoTRIgine 200 milliGRAM(s) Oral daily  midodrine. 2.5 milliGRAM(s) Oral three times a day  mupirocin 2% Ointment 1 Application(s) Both Nostrils every 12 hours  pantoprazole    Tablet 40 milliGRAM(s) Oral before breakfast  sevelamer carbonate 800 milliGRAM(s) Oral three times a day with meals  sodium zirconium cyclosilicate 10 Gram(s) Oral once  tamsulosin 0.4 milliGRAM(s) Oral at bedtime  traZODone 25 milliGRAM(s) Oral at bedtime    MEDICATIONS  (PRN):  traMADol 50 milliGRAM(s) Oral every 6 hours PRN Severe Pain (7 - 10)      __________________________________________________  REVIEW OF SYSTEMS:    CONSTITUTIONAL: No fever,   EYES: no acute visual disturbances  NECK: No pain or stiffness  RESPIRATORY: No cough; No shortness of breath  CARDIOVASCULAR: No chest pain, no palpitations  GASTROINTESTINAL: No pain. No nausea or vomiting; No diarrhea   NEUROLOGICAL: No headache or numbness, no tremors  MUSCULOSKELETAL: No joint pain, no muscle pain  GENITOURINARY: no dysuria, no frequency, no hesitancy  PSYCHIATRY: no depression , no anxiety  ALL OTHER  ROS negative        Vital Signs Last 24 Hrs  T(C): 37.7 (11 May 2022 14:11), Max: 37.7 (11 May 2022 14:11)  T(F): 99.8 (11 May 2022 14:11), Max: 99.8 (11 May 2022 14:11)  HR: 95 (11 May 2022 14:11) (90 - 97)  BP: 101/49 (11 May 2022 14:11) (94/48 - 109/58)  BP(mean): 68 (11 May 2022 13:28) (62 - 71)  RR: 18 (11 May 2022 14:11) (17 - 24)  SpO2: 93% (11 May 2022 14:11) (90% - 95%)    ________________________________________________  PHYSICAL EXAM:  GENERAL: NAD  HEENT: Normocephalic;  conjunctivae and sclerae clear; moist mucous membranes;   NECK : Right jug HD line, supple  CHEST/LUNG: Clear to auscultation bilaterally with good air entry   HEART: S1 S2  regular; no murmurs, gallops or rubs  ABDOMEN: right ileostomy, Soft, Nontender, Nondistended; Bowel sounds present  EXTREMITIES: no cyanosis; no edema; no calf tenderness  SKIN: warm and dry; no rash  NERVOUS SYSTEM:  Awake and alert; Oriented  to place, person and time ; no new deficits    _________________________________________________  LABS:                        10.4   9.68  )-----------( 271      ( 11 May 2022 08:47 )             33.4     05-11    130<L>  |  94<L>  |  77<H>  ----------------------------<  93  4.9   |  21<L>  |  8.59<H>    Ca    9.3      11 May 2022 08:47    TPro  7.9  /  Alb  2.9<L>  /  TBili  1.1  /  DBili  x   /  AST  346<H>  /  ALT  85<H>  /  AlkPhos  254<H>  05-11    PT/INR - ( 11 May 2022 08:47 )   PT: 15.2 sec;   INR: 1.27 ratio         PTT - ( 11 May 2022 08:47 )  PTT:27.0 sec    CAPILLARY BLOOD GLUCOSE            RADIOLOGY & ADDITIONAL TESTS:      Imaging Personally Reviewed:  YES/NO    Consultant(s) Notes Reviewed:   YES/ No    Care Discussed with Consultants :     Plan of care was discussed with patient and /or primary care giver; all questions and concerns were addressed and care was aligned with patient's wishes.

## 2022-05-11 NOTE — PROGRESS NOTE ADULT - SUBJECTIVE AND OBJECTIVE BOX
C A R D I O L O G Y  **********************************     DATE OF SERVICE: 05-11-22    Patient denies chest pain or shortness of breath.   Review of symptoms otherwise negative.    ARIPiprazole 15 milliGRAM(s) Oral daily  chlorhexidine 2% Cloths 1 Application(s) Topical daily  ferrous    sulfate 325 milliGRAM(s) Oral daily  finasteride 5 milliGRAM(s) Oral daily  gabapentin 600 milliGRAM(s) Oral two times a day  lamoTRIgine 200 milliGRAM(s) Oral daily  midodrine. 2.5 milliGRAM(s) Oral three times a day  mupirocin 2% Ointment 1 Application(s) Both Nostrils every 12 hours  pantoprazole    Tablet 40 milliGRAM(s) Oral before breakfast  sevelamer carbonate 800 milliGRAM(s) Oral three times a day with meals  sodium zirconium cyclosilicate 10 Gram(s) Oral once  tamsulosin 0.4 milliGRAM(s) Oral at bedtime  traMADol 50 milliGRAM(s) Oral every 6 hours PRN  traZODone 25 milliGRAM(s) Oral at bedtime                            10.4   9.68  )-----------( 271      ( 11 May 2022 08:47 )             33.4       Hemoglobin: 10.4 g/dL (05-11 @ 08:47)  Hemoglobin: 10.3 g/dL (05-10 @ 06:21)  Hemoglobin: 11.2 g/dL (05-09 @ 07:05)  Hemoglobin: 11.5 g/dL (05-08 @ 08:33)  Hemoglobin: 10.6 g/dL (05-07 @ 06:05)      05-11    130<L>  |  94<L>  |  77<H>  ----------------------------<  93  4.9   |  21<L>  |  8.59<H>    Ca    9.3      11 May 2022 08:47    TPro  7.9  /  Alb  2.9<L>  /  TBili  1.1  /  DBili  x   /  AST  346<H>  /  ALT  85<H>  /  AlkPhos  254<H>  05-11    Creatinine Trend: 8.59<--, 6.12<--, 7.88<--, 5.82<--, 5.10<--, 5.54<--    COAGS: PT/INR - ( 11 May 2022 08:47 )   PT: 15.2 sec;   INR: 1.27 ratio         PTT - ( 11 May 2022 08:47 )  PTT:27.0 sec          T(C): 37.7 (05-11-22 @ 14:11), Max: 37.7 (05-11-22 @ 14:11)  HR: 95 (05-11-22 @ 14:11) (90 - 97)  BP: 101/49 (05-11-22 @ 14:11) (94/48 - 109/58)  RR: 18 (05-11-22 @ 14:11) (17 - 24)  SpO2: 93% (05-11-22 @ 14:11) (90% - 95%)  Wt(kg): --    I&O's Summary    HEENT:  (-)icterus (-)pallor  CV: N S1 S2 1/6 MARCELLE (+)2 Pulses B/l  Resp:  Clear to ausculatation B/L, normal effort  GI: (+) BS Soft, NT, ND  Lymph:  (-)Edema, (-)obvious lymphadenopathy  Skin: Warm to touch, Normal turgor  Psych: Appropriate mood and affect              ASSESSMENT/PLAN: 	71y  Male  HTN, CHF?, COPD, HLD, PAD, BPH, Prostate CA (s/p radiation), Bipolar Disorder, IBD - Crohn's Disease, R ileostomy bag (s/p sigmoid resection) historically preserved LV and RV function, normal perfusion on a nuclear stress test 2020 He was brought to ED due to bilateral leg swelling    - No clinical CHF A low BNP suggests a low left ventricular end diastolic pressure  - monitor renal function, his edema is at least partially due ot renal disease   - no pertinent findings on echo  -   Heme/ onc w/u in progress , f/u liver biopsy         Joey Archer MD, [facility]  BEEPER [phone]

## 2022-05-11 NOTE — PROGRESS NOTE ADULT - SUBJECTIVE AND OBJECTIVE BOX
NEPHROLOGY MEDICAL CARE, [facility] - Dr. Emerson Pond/ Dr. Nallely Curran/ Dr. Kirby Angel/ Dr. Pippa Reis    Patient was seen and examined at bedside.    CC: patient is okay and lying flat on the bed.    Vital Signs Last 24 Hrs  T(C): 37.7 (11 May 2022 14:11), Max: 37.7 (11 May 2022 14:11)  T(F): 99.8 (11 May 2022 14:11), Max: 99.8 (11 May 2022 14:11)  HR: 95 (11 May 2022 14:11) (90 - 97)  BP: 101/49 (11 May 2022 14:11) (94/48 - 109/58)  BP(mean): 68 (11 May 2022 13:28) (62 - 71)  RR: 18 (11 May 2022 14:11) (17 - 24)  SpO2: 93% (11 May 2022 14:11) (90% - 95%)      PHYSICAL EXAM:  General: No acute respiratory distress.  Eyes: conjunctiva and sclera clear  ENMT: Atraumatic, Normocephalic, supple, No JVD present  Respiratory: Bilateral decreased BS and no rhonchi, wheezing  Cardiovascular: S1S2+; no m/r/g  Gastrointestinal: Soft, Non-tender, Nondistended; Bowel sounds present   Neuro:  Awake, Alert & Oriented X3  Ext:  no pedal edema, No Cyanosis  Skin: No visible rashes  Dialysis Access: Rt DORA nichols      MEDICATIONS:  MEDICATIONS  (STANDING):  ARIPiprazole 15 milliGRAM(s) Oral daily  chlorhexidine 2% Cloths 1 Application(s) Topical daily  ferrous    sulfate 325 milliGRAM(s) Oral daily  finasteride 5 milliGRAM(s) Oral daily  gabapentin 600 milliGRAM(s) Oral two times a day  lamoTRIgine 200 milliGRAM(s) Oral daily  midodrine. 2.5 milliGRAM(s) Oral three times a day  mupirocin 2% Ointment 1 Application(s) Both Nostrils every 12 hours  pantoprazole    Tablet 40 milliGRAM(s) Oral before breakfast  sevelamer carbonate 800 milliGRAM(s) Oral three times a day with meals  sodium zirconium cyclosilicate 10 Gram(s) Oral once  tamsulosin 0.4 milliGRAM(s) Oral at bedtime  traZODone 25 milliGRAM(s) Oral at bedtime    MEDICATIONS  (PRN):  traMADol 50 milliGRAM(s) Oral every 6 hours PRN Severe Pain (7 - 10)          LABS:                        10.4   9.68  )-----------( 271      ( 11 May 2022 08:47 )             33.4     05-11    130<L>  |  94<L>  |  77<H>  ----------------------------<  93  4.9   |  21<L>  |  8.59<H>    Ca    9.3      11 May 2022 08:47    TPro  7.9  /  Alb  2.9<L>  /  TBili  1.1  /  DBili  x   /  AST  346<H>  /  ALT  85<H>  /  AlkPhos  254<H>  05-11    PT/INR - ( 11 May 2022 08:47 )   PT: 15.2 sec;   INR: 1.27 ratio         PTT - ( 11 May 2022 08:47 )  PTT:27.0 sec      Urine studies    PTH and Vit D:

## 2022-05-11 NOTE — PROGRESS NOTE ADULT - ASSESSMENT
1. PAWAN due to ATN.  -First HD on 5/6. HD initiated for worsening bun/cr due to ATN with underlying CKD stage 4 and anuria. Pre dialysis bun/scr increasing and anuric, thus no signs of renal recovery at present  -HD postponed til tomorrow. Plan for HD tomorrow since getting liver biopsy today by IR. IR will do permacath conversion on Friday.   - consulted for placement of outpatient HD center.  -pt also had gross hematuria and unclear etiology seems less likely nephritis and RUBIA, ANCA and Anit-GBM are -ve.  Urinalysis shows no proteinuria. spot protein to creatinine ratio is 900mg.  -renal sono show no hydro.  -Adjust meds to eGFR and avoid IV Gadolinium contrast,NSAIDs, and phosphate enema.  -Monitor I/O's daily.   -Monitor SMA daily.  2. CKD stage 4 most likely due to ischemic nephropathy and h/o ATN with renal recovery.  -Baseline Scr around 3.2mg/dL in April 2020.  -Keep patient euvolemic and renal diet  -Avoid Nephrotoxic Meds/ Agents such as (NSAIDs, IV contrast, Aminoglycosides such as gentamicin, -Gadolinium contrast, Phosphate containing enemas, etc..)  -Adjust Medications according to eGFR  3. Anemia:   -hb is stable; continue iron tabs. monitor CBC  4. MBD:   -phos is stable. previous mild hyperca, which improved and off calcitirol; on renvela 1 tab tid;   -pth is okay at 41.  5. Hyperkalemia due to pawan on ckd.   -stable. use 2k bath during Hd.  -on low K diet. give Lokelma prn if K >5.5  -Keep pt euvolemic. Avoid ACE inh/ ARBs, NSAIDs, and Aldactone or potassium sparing diuretics. Monitor K+ daily.  6. Fluid Overload:  -clinically improved. off diuretics for now.   -CT chest w/o contrast shows no effusion;   7. Acidosis:  -CO2 is better with HD.  -stable. monitor CO2  8. h/o Hypotension:  -bp is acceptable and on midodrine 2.5mg tid      9. Elevated LFTs  - off lipitor for now.  -hepatitis panel shows hep C+ve.  discussed with pt, pt had known hep C and was treated for in the past; hep C RNA is not detected.   -C3/C4 and RF negative, cryoglobulinemia is negative.    -ct scan shows hepatic mass; Hepatology consulted and MRI w/o contrast shows liver mass and mets.  -Hepatology and Oncology consulted.   -Plan for IR guided liver biopsy on today.  -Bone scan shows no bone mets. spep and IF are nl and serum FLC is within range for ckd.      Discussed with patient in detail regarding the renal plan and care  Discussed the assessment and plan with Primary Team/Nurse

## 2022-05-11 NOTE — PROGRESS NOTE ADULT - PROBLEM SELECTOR PLAN 10
-discharge disposition [facility] pending perma cath placement on 5/13  - consult  - spoke with HCP Alexey Sidhu & updated him on plan of care. All questions answered

## 2022-05-11 NOTE — PROGRESS NOTE ADULT - ASSESSMENT
71M from [facility], who is ambulatory at baseline with a walker. He has Hx of HTN, CHF?, COPD, HLD, PAD, BPH, Prostate CA (s/p radiation), Bipolar Disorder, IBD - Crohn's Disease, R ileostomy bag (s/p sigmoid resection) presented with SOB and LE edema, found with hyperkalemia on admission. Admitted to [facility] for cardio work up r/o CHF,  hyperkalemia with PAWAN on CKD 4.  Cardiology and nephrology consulted, started on lasix. Renal US shows no hydronephrosis and  + renal cysts.   CT chest shows no pleural effusion, but with hepatic lesion 4.7cm largest. Also noted with transaminitis. GI and Hepatology consulted, s/p MRI abdomen revealed  multiple liver lesions with mets, Heme/onc followed and pt was recommended for liver biopsy and bone scan. Bone scan with no osseous metastasis, Liver biopsy was postponed due to elevated BUN and risk of bleeding.   Hospital course complicated with worsening renal function, s/p IR guided HDC on 5/6 and dialysis was started  Hospital coourse further complicated with episodes of hematuria, noncontrast CT showed no hydro, stones or  renal mass, Urology followed and pt was recommended for out pt cystoscopy   Pt is S/P IR liver biopsy on Wednesday 5/11. IR was unable to place HD perma cath today. Perma cath placement planned for tentative Friday 5/13.

## 2022-05-11 NOTE — PROGRESS NOTE ADULT - SUBJECTIVE AND OBJECTIVE BOX
`Patient is a 71y old  Male who presents with a chief complaint of acute CHF exacerbation (11 May 2022 08:33)    PATIENT IS SEEN AND EXAMINED IN MEDICAL FLOOR.  TIMIT [    ]    ERASTO [   ]      GT [   ]    ALLERGIES:  No Known Allergies      Daily     Daily     VITALS:    Vital Signs Last 24 Hrs  T(C): 36.9 (11 May 2022 05:08), Max: 37.2 (10 May 2022 21:02)  T(F): 98.4 (11 May 2022 05:08), Max: 99 (10 May 2022 21:02)  HR: 90 (11 May 2022 05:08) (90 - 104)  BP: 104/65 (11 May 2022 05:08) (93/51 - 104/65)  BP(mean): --  RR: 18 (11 May 2022 05:08) (18 - 18)  SpO2: 95% (11 May 2022 05:08) (92% - 95%)    LABS:    CBC Full  -  ( 11 May 2022 08:47 )  WBC Count : 9.68 K/uL  RBC Count : 4.09 M/uL  Hemoglobin : 10.4 g/dL  Hematocrit : 33.4 %  Platelet Count - Automated : 271 K/uL  Mean Cell Volume : 81.7 fl  Mean Cell Hemoglobin : 25.4 pg  Mean Cell Hemoglobin Concentration : 31.1 gm/dL  Auto Neutrophil # : x  Auto Lymphocyte # : x  Auto Monocyte # : x  Auto Eosinophil # : x  Auto Basophil # : x  Auto Neutrophil % : x  Auto Lymphocyte % : x  Auto Monocyte % : x  Auto Eosinophil % : x  Auto Basophil % : x    PT/INR - ( 11 May 2022 08:47 )   PT: 15.2 sec;   INR: 1.27 ratio         PTT - ( 11 May 2022 08:47 )  PTT:27.0 sec  05-11    130<L>  |  94<L>  |  77<H>  ----------------------------<  93  4.9   |  21<L>  |  8.59<H>    Ca    9.3      11 May 2022 08:47    TPro  7.9  /  Alb  2.9<L>  /  TBili  1.1  /  DBili  x   /  AST  346<H>  /  ALT  85<H>  /  AlkPhos  254<H>  05-11    CAPILLARY BLOOD GLUCOSE            LIVER FUNCTIONS - ( 11 May 2022 08:47 )  Alb: 2.9 g/dL / Pro: 7.9 g/dL / ALK PHOS: 254 U/L / ALT: 85 U/L DA / AST: 346 U/L / GGT: x           Creatinine Trend: 8.59<--, 6.12<--, 7.88<--, 5.82<--, 5.10<--, 5.54<--  I&O's Summary          .Stool Feces  05-03 @ 18:27   No enteric pathogens isolated.  (Stool culture examined for Salmonella,  Shigella, Campylobacter, Aeromonas, Plesiomonas,  Vibrio, E.coli O157 and Yersinia)  No enteric gram negative rods isolated  --  --          MEDICATIONS:    MEDICATIONS  (STANDING):  ARIPiprazole 15 milliGRAM(s) Oral daily  chlorhexidine 2% Cloths 1 Application(s) Topical daily  ferrous    sulfate 325 milliGRAM(s) Oral daily  finasteride 5 milliGRAM(s) Oral daily  gabapentin 600 milliGRAM(s) Oral two times a day  lamoTRIgine 200 milliGRAM(s) Oral daily  midodrine. 2.5 milliGRAM(s) Oral three times a day  mupirocin 2% Ointment 1 Application(s) Both Nostrils every 12 hours  pantoprazole    Tablet 40 milliGRAM(s) Oral before breakfast  sevelamer carbonate 800 milliGRAM(s) Oral three times a day with meals  sodium zirconium cyclosilicate 10 Gram(s) Oral once  tamsulosin 0.4 milliGRAM(s) Oral at bedtime  traZODone 25 milliGRAM(s) Oral at bedtime      MEDICATIONS  (PRN):  traMADol 50 milliGRAM(s) Oral every 6 hours PRN Severe Pain (7 - 10)      REVIEW OF SYSTEMS:                           ALL ROS DONE [ X   ]    CONSTITUTIONAL:  LETHARGIC [   ], FEVER [   ], UNRESPONSIVE [   ]  CVS:  CP  [   ], SOB, [   ], PALPITATIONS [   ], DIZZYNESS [   ]  RS: COUGH [   ], SPUTUM [   ]  GI: ABDOMINAL PAIN [   ], NAUSEA [   ], VOMITINGS [   ], DIARRHEA [   ], CONSTIPATION [   ]  :  DYSURIA [   ], NOCTURIA [   ], INCREASED FREQUENCY [   ], DRIBLING [   ],  SKELETAL: PAINFUL JOINTS [   ], SWOLLEN JOINTS [   ], NECK ACHE [   ], LOW BACK ACHE [   ],  SKIN : ULCERS [   ], RASH [   ], ITCHING [   ]  CNS: HEAD ACHE [   ], DOUBLE VISION [   ], BLURRED VISION [   ], AMS / CONFUSION [   ], SEIZURES [   ], WEAKNESS [   ],TINGLING / NUMBNESS [   ]      PHYSICAL EXAMINATION:  GENERAL APPEARANCE: NO DISTRESS  HEENT:  NO PALLOR, NO  JVD,  NO   NODES, NECK SUPPLE  CVS: S1 +, S2 +,   RS: AEEB,  OCCASIONAL  RALES +,   CRACKLES AT BASES +  ABD: SOFT, NT, NO, BS +   ILEOSTOMY [STOMA W/ PINK BASE]  EXT: NO PE  SKIN: WARM,   SKELETAL:  ROM ACCEPTABLE  CNS:  AAO X 3    RADIOLOGY :    ACC: [id number] EXAM:  XR CHEST PORTABLE URGENT 1V                          PROCEDURE DATE:  04/28/2022          INTERPRETATION:  Clinical history: 71-year-old male, chest pain.    Portable/expiratory view of the chest is compared to 20 and demonstrates   a normal cardiac silhouette with no lobar consolidation, gross effusion,   pneumothorax or acute osseous finding.    Bilateral lower lobe patchy opacities consistent with infiltrates/areas   of atelectasis in the correct clinical context, increased. Elevated right   hemidiaphragm are evident.    IMPRESSION:    Bilateral lower lobe interstitial infiltrates/platelike atelectasis,   increased    ================================      ACC: [id number] EXAM:  MR ABDOMEN                          *** ADDENDUM***    Some of the hepatic lesions have small T1 hyperintense components on   fat-suppressed T1-weighted gradient echo images, suggestive of blood   products.    --- End of Report---    *** END OF ADDENDUM***      PROCEDURE DATE:  05/03/2022          INTERPRETATION:  CLINICAL INFORMATION: Hepatic lesions. History of   prostate cancer.    COMPARISON: None.    CONTRAST/COMPLICATIONS:  IV Contrast: NONE  Oral Contrast: NONE  Complications: None reported at time of study completion    PROCEDURE:  MRI of the abdomen was performed.    FINDINGS:  LOWER CHEST: Within normal limits.    LIVER: Innumerable mildly T2 hyperintense lesions are seen throughout the   liver, suggestive ofmetastasis. The largest lesion measures 13.8 x 11.7   x 11.7 cm in the liver dome.  BILE DUCTS: Normal caliber.  GALLBLADDER: Cholelithiasis.  SPLEEN: Within normal limits.  PANCREAS: Within normal limits.  ADRENALS: Within normal limits.  KIDNEYS/URETERS: Multiple brightly T2 hyperintense lesion in the kidneys   bilaterally, representing probable cysts.    VISUALIZED PORTIONS:  BOWEL: Small hiatal hernia. Right lower quadrant ostomy.  PERITONEUM: No ascites.  VESSELS: Within normal limits.  RETROPERITONEUM/LYMPH NODES: No lymphadenopathy.  ABDOMINAL WALL: Within normal limits.  BONES: Multiple T2 hyperintense and T2 hyperintense lesions in the   thoracolumbar spine may represent osseous metastasis. Whole-body bone   scan may be pursued for further evaluation.    IMPRESSION:  Innumerable mildly T2 hyperintense lesions are seen throughout the liver,   suggestive of metastasis. The largest lesion measures 13.8 x 11.7 x 11.7   cm in the liver dome.    Multiple T2 hyperintense and T2 hyperintense lesions in the thoracolumbar   spine may represent osseous metastasis. Whole-body bone scan may be   pursued for further evaluation.    Cholelithiasis.      ASSESSMENT :     Hyperkalemia    No pertinent past medical history    COPD, mild    Anemia    Bipolar disorder    IBD (inflammatory bowel disease)    HTN (hypertension)    HLD (hyperlipidemia)    PAD (peripheral artery disease)    CKD (chronic kidney disease)    Prostate CA    BPH with urinary obstruction    No significant past surgical history    History of creation of ostomy        PLAN:  HPI:  Patient is a 71M from [facility], who is ambulatory at baseline with a walker. He has Hx of HTN, CHF?, COPD, HLD, PAD, BPH, Prostate CA (s/p radiation), Bipolar Disorder, IBD - Crohn's Disease, R ileostomy bag (s/p sigmoid resection). He was brought to ED due to bilateral leg swelling. For the past 2 days he has been having progressive swelling/ edema of both his legs with associated ambulatory dysfunction. He also started having episodes of shortness of breath and difficulty of breathing. He denies any fever, cough, chest pain, palpitation. When he came to the ED, he was saturating at 93-94% on room air. He was placed on nasal cannula. EKG was done showing NSR. Troponin was negative and BNP was mildly elevated at 398. CXR showed bilateral vascular congestion. Serum potassium was elevated at 6.1. He got a dose of lasix and was given Hyperkalemia cocktail in the ED. Of note, he had Hx of COVID in 2020 andwas vaccinated with COVID-19 x 3 (Pfizer) and Influenza in 2021. He was subsequently admitted for acute exacerbation of his heart failure.    [facility]: FULL CODE (28 Apr 2022 18:40)    # CASE D/W BROTHER KERI VIA PHONE [ C  ; H  ] - CASE DISCUSSED AT LENGTH, ALL QUESTIONS ANSWERED [5/5]    # HEMATURIA   # HX OF PROSTATE CA S/P RADIATION  - HOLD A/C, TREND CBC,   - NOTED BLADDER SCAN, NOTED RECENT U/S  - PER UROLOGY, REPEAT BLADDER SCAN W/ PVR - THEN WILL DECIDE REGARDING MAURER PLACEMENT  - F/U PSA  - NOTED CT A/P  - UROLOGY CONSULT DR. CHAN    # ACUTE HYPOXIC RESPIRATORY FAILURE SUSPECT S/T PULMONARY VASCULAR CONGESTION  # RULED OUT CHF   # PAWAN ON CKD - WORSENING, HOLD LASIX  # BPH  # HX OF PROSTATE CA S/P RADIATION  - PLACE ON TELEMETRY  - NOTED CXR  - HOLD LASIX  - NOTE TSH  - STRICT IS AND OS  - ECHOCARDIOGRAM - NORMAL LVEF, NORMAL DIASTOLIC FUNCTION  - NEPHROLOGY CONSULT, CARDIOLOGY CONSULT    - NEPHROLOGY RECC FOR PLACEMENT OF SHILEY W/ INITIATION OF HD    - PLANNED FOR TUNNELED HD CATH BY IR -- PLANNED FOR 5/11    # LIVER LESIONS, BONE LESIONS  # TRANSAMINITIS, NOTED HEP C AND HEP B MARKERS  - NOTED MRI ABDOMEN  - NOTED TUMOR MARKERS  - F/U BONE SCAN  - HEPATOLOGY CONSULT  - ONCOLOGY CONSULT    - PLANNED FOR IR GUIDED BIOPSY ON 5/6  --- DELAYED BY IR DUE TO BLEEDING RISK  -- PLANNED FOR 5/11    # HYPERKALEMIA - RESOLVED  - GIVEN LASIX, LOKELMA  - EKG DONE  - NEPHROLOGY CONSULT    # CROHN'S DISEASE  # RIGHT ILEOSTOMY BAG S/P SIGMOID RESECTION  - MONITORING OUTPUT  - GASTROENTEROLOGY CONSULT    # NORMOCYTIC ANEMIA    # HTN  # COPD  # HLD  # PAD  # BIPOLAR DX  # GI AND DVT PPX      `Patient is a 71y old  Male who presents with a chief complaint of acute CHF exacerbation (11 May 2022 08:33)    PATIENT IS SEEN AND EXAMINED IN MEDICAL FLOOR.  TIMIT [    ]    ERASTO [   ]      GT [   ]    ALLERGIES:  No Known Allergies      Daily     Daily     VITALS:    Vital Signs Last 24 Hrs  T(C): 36.9 (11 May 2022 05:08), Max: 37.2 (10 May 2022 21:02)  T(F): 98.4 (11 May 2022 05:08), Max: 99 (10 May 2022 21:02)  HR: 90 (11 May 2022 05:08) (90 - 104)  BP: 104/65 (11 May 2022 05:08) (93/51 - 104/65)  BP(mean): --  RR: 18 (11 May 2022 05:08) (18 - 18)  SpO2: 95% (11 May 2022 05:08) (92% - 95%)    LABS:    CBC Full  -  ( 11 May 2022 08:47 )  WBC Count : 9.68 K/uL  RBC Count : 4.09 M/uL  Hemoglobin : 10.4 g/dL  Hematocrit : 33.4 %  Platelet Count - Automated : 271 K/uL  Mean Cell Volume : 81.7 fl  Mean Cell Hemoglobin : 25.4 pg  Mean Cell Hemoglobin Concentration : 31.1 gm/dL  Auto Neutrophil # : x  Auto Lymphocyte # : x  Auto Monocyte # : x  Auto Eosinophil # : x  Auto Basophil # : x  Auto Neutrophil % : x  Auto Lymphocyte % : x  Auto Monocyte % : x  Auto Eosinophil % : x  Auto Basophil % : x    PT/INR - ( 11 May 2022 08:47 )   PT: 15.2 sec;   INR: 1.27 ratio         PTT - ( 11 May 2022 08:47 )  PTT:27.0 sec  05-11    130<L>  |  94<L>  |  77<H>  ----------------------------<  93  4.9   |  21<L>  |  8.59<H>    Ca    9.3      11 May 2022 08:47    TPro  7.9  /  Alb  2.9<L>  /  TBili  1.1  /  DBili  x   /  AST  346<H>  /  ALT  85<H>  /  AlkPhos  254<H>  05-11    CAPILLARY BLOOD GLUCOSE            LIVER FUNCTIONS - ( 11 May 2022 08:47 )  Alb: 2.9 g/dL / Pro: 7.9 g/dL / ALK PHOS: 254 U/L / ALT: 85 U/L DA / AST: 346 U/L / GGT: x           Creatinine Trend: 8.59<--, 6.12<--, 7.88<--, 5.82<--, 5.10<--, 5.54<--  I&O's Summary          .Stool Feces  05-03 @ 18:27   No enteric pathogens isolated.  (Stool culture examined for Salmonella,  Shigella, Campylobacter, Aeromonas, Plesiomonas,  Vibrio, E.coli O157 and Yersinia)  No enteric gram negative rods isolated  --  --          MEDICATIONS:    MEDICATIONS  (STANDING):  ARIPiprazole 15 milliGRAM(s) Oral daily  chlorhexidine 2% Cloths 1 Application(s) Topical daily  ferrous    sulfate 325 milliGRAM(s) Oral daily  finasteride 5 milliGRAM(s) Oral daily  gabapentin 600 milliGRAM(s) Oral two times a day  lamoTRIgine 200 milliGRAM(s) Oral daily  midodrine. 2.5 milliGRAM(s) Oral three times a day  mupirocin 2% Ointment 1 Application(s) Both Nostrils every 12 hours  pantoprazole    Tablet 40 milliGRAM(s) Oral before breakfast  sevelamer carbonate 800 milliGRAM(s) Oral three times a day with meals  sodium zirconium cyclosilicate 10 Gram(s) Oral once  tamsulosin 0.4 milliGRAM(s) Oral at bedtime  traZODone 25 milliGRAM(s) Oral at bedtime      MEDICATIONS  (PRN):  traMADol 50 milliGRAM(s) Oral every 6 hours PRN Severe Pain (7 - 10)      REVIEW OF SYSTEMS:                           ALL ROS DONE [ X   ]    CONSTITUTIONAL:  LETHARGIC [   ], FEVER [   ], UNRESPONSIVE [   ]  CVS:  CP  [   ], SOB, [   ], PALPITATIONS [   ], DIZZYNESS [   ]  RS: COUGH [   ], SPUTUM [   ]  GI: ABDOMINAL PAIN [   ], NAUSEA [   ], VOMITINGS [   ], DIARRHEA [   ], CONSTIPATION [   ]  :  DYSURIA [   ], NOCTURIA [   ], INCREASED FREQUENCY [   ], DRIBLING [   ],  SKELETAL: PAINFUL JOINTS [   ], SWOLLEN JOINTS [   ], NECK ACHE [   ], LOW BACK ACHE [   ],  SKIN : ULCERS [   ], RASH [   ], ITCHING [   ]  CNS: HEAD ACHE [   ], DOUBLE VISION [   ], BLURRED VISION [   ], AMS / CONFUSION [   ], SEIZURES [   ], WEAKNESS [   ],TINGLING / NUMBNESS [   ]      PHYSICAL EXAMINATION:  GENERAL APPEARANCE: NO DISTRESS  HEENT:  NO PALLOR, NO  JVD,  NO   NODES, NECK SUPPLE  CVS: S1 +, S2 +,   RS: AEEB,  OCCASIONAL  RALES +  ABD: SOFT, NT, NO, BS +   ILEOSTOMY [STOMA W/ PINK BASE]  EXT: PE + [IMPROVING]  SKIN: WARM,   RIGHT IJ VASC CATH+  SKELETAL:  ROM ACCEPTABLE  CNS:  AAO X 3    RADIOLOGY :    ACC: [id number] EXAM:  XR CHEST PORTABLE URGENT 1V                          PROCEDURE DATE:  04/28/2022          INTERPRETATION:  Clinical history: 71-year-old male, chest pain.    Portable/expiratory view of the chest is compared to 20 and demonstrates   a normal cardiac silhouette with no lobar consolidation, gross effusion,   pneumothorax or acute osseous finding.    Bilateral lower lobe patchy opacities consistent with infiltrates/areas   of atelectasis in the correct clinical context, increased. Elevated right   hemidiaphragm are evident.    IMPRESSION:    Bilateral lower lobe interstitial infiltrates/platelike atelectasis,   increased    ================================      ACC: [id number] EXAM:  MR ABDOMEN                          *** ADDENDUM***    Some of the hepatic lesions have small T1 hyperintense components on   fat-suppressed T1-weighted gradient echo images, suggestive of blood   products.    --- End of Report---    *** END OF ADDENDUM***      PROCEDURE DATE:  05/03/2022          INTERPRETATION:  CLINICAL INFORMATION: Hepatic lesions. History of   prostate cancer.    COMPARISON: None.    CONTRAST/COMPLICATIONS:  IV Contrast: NONE  Oral Contrast: NONE  Complications: None reported at time of study completion    PROCEDURE:  MRI of the abdomen was performed.    FINDINGS:  LOWER CHEST: Within normal limits.    LIVER: Innumerable mildly T2 hyperintense lesions are seen throughout the   liver, suggestive ofmetastasis. The largest lesion measures 13.8 x 11.7   x 11.7 cm in the liver dome.  BILE DUCTS: Normal caliber.  GALLBLADDER: Cholelithiasis.  SPLEEN: Within normal limits.  PANCREAS: Within normal limits.  ADRENALS: Within normal limits.  KIDNEYS/URETERS: Multiple brightly T2 hyperintense lesion in the kidneys   bilaterally, representing probable cysts.    VISUALIZED PORTIONS:  BOWEL: Small hiatal hernia. Right lower quadrant ostomy.  PERITONEUM: No ascites.  VESSELS: Within normal limits.  RETROPERITONEUM/LYMPH NODES: No lymphadenopathy.  ABDOMINAL WALL: Within normal limits.  BONES: Multiple T2 hyperintense and T2 hyperintense lesions in the   thoracolumbar spine may represent osseous metastasis. Whole-body bone   scan may be pursued for further evaluation.    IMPRESSION:  Innumerable mildly T2 hyperintense lesions are seen throughout the liver,   suggestive of metastasis. The largest lesion measures 13.8 x 11.7 x 11.7   cm in the liver dome.    Multiple T2 hyperintense and T2 hyperintense lesions in the thoracolumbar   spine may represent osseous metastasis. Whole-body bone scan may be   pursued for further evaluation.    Cholelithiasis.      ASSESSMENT :     Hyperkalemia    No pertinent past medical history    COPD, mild    Anemia    Bipolar disorder    IBD (inflammatory bowel disease)    HTN (hypertension)    HLD (hyperlipidemia)    PAD (peripheral artery disease)    CKD (chronic kidney disease)    Prostate CA    BPH with urinary obstruction    No significant past surgical history    History of creation of ostomy        PLAN:  HPI:  Patient is a 71M from [facility], who is ambulatory at baseline with a walker. He has Hx of HTN, CHF?, COPD, HLD, PAD, BPH, Prostate CA (s/p radiation), Bipolar Disorder, IBD - Crohn's Disease, R ileostomy bag (s/p sigmoid resection). He was brought to ED due to bilateral leg swelling. For the past 2 days he has been having progressive swelling/ edema of both his legs with associated ambulatory dysfunction. He also started having episodes of shortness of breath and difficulty of breathing. He denies any fever, cough, chest pain, palpitation. When he came to the ED, he was saturating at 93-94% on room air. He was placed on nasal cannula. EKG was done showing NSR. Troponin was negative and BNP was mildly elevated at 398. CXR showed bilateral vascular congestion. Serum potassium was elevated at 6.1. He got a dose of lasix and was given Hyperkalemia cocktail in the ED. Of note, he had Hx of COVID in 2020 andwas vaccinated with COVID-19 x 3 (Pfizer) and Influenza in 2021. He was subsequently admitted for acute exacerbation of his heart failure.    [facility]: FULL CODE (28 Apr 2022 18:40)    # CASE D/W BROTHER KERI VIA PHONE [ C  ; H  ] - CASE DISCUSSED AT LENGTH, ALL QUESTIONS ANSWERED [5/5]    # HEMATURIA   # HX OF PROSTATE CA S/P RADIATION  - HOLD A/C, TREND CBC,   - NOTED BLADDER SCAN, NOTED RECENT U/S  - PER UROLOGY, REPEAT BLADDER SCAN W/ PVR - THEN WILL DECIDE REGARDING MAURER PLACEMENT  - F/U PSA  - NOTED CT A/P  - UROLOGY CONSULT DR. CHAN    # ACUTE HYPOXIC RESPIRATORY FAILURE SUSPECT S/T PULMONARY VASCULAR CONGESTION  # RULED OUT CHF   # PAWAN ON CKD - STARTED ON HD, S/P SHILEY PLACEMENT AND EXCHANGE FOR VASC CATH 5/11  # BPH  # HX OF PROSTATE CA S/P RADIATION  - PLACE ON TELEMETRY  - NOTED CXR  - HOLD LASIX  - NOTE TSH  - STRICT IS AND OS  - ECHOCARDIOGRAM - NORMAL LVEF, NORMAL DIASTOLIC FUNCTION  - NEPHROLOGY CONSULT, CARDIOLOGY CONSULT  - NEPHROLOGY RECC FOR PLACEMENT OF SHILEY W/ INITIATION OF HD    # LIVER LESIONS, BONE LESIONS  S/P LIVER BIOPSY [5/11]  # TRANSAMINITIS, NOTED HEP C AND HEP B MARKERS  - NOTED MRI ABDOMEN  - NOTED TUMOR MARKERS  - REVIEWED BONE SCAN  - HEPATOLOGY CONSULT  - ONCOLOGY CONSULT    - PLANNED FOR IR GUIDED BIOPSY ON 5/6  --- DELAYED BY IR DUE TO BLEEDING RISK  -- PLANNED FOR 5/11    # HYPERKALEMIA - RESOLVED  - GIVEN LASIX, LOKELMA  - EKG DONE  - NEPHROLOGY CONSULT    # CROHN'S DISEASE  # RIGHT ILEOSTOMY BAG S/P SIGMOID RESECTION  - MONITORING OUTPUT  - GASTROENTEROLOGY CONSULT    # NORMOCYTIC ANEMIA    # HTN  # COPD  # HLD  # PAD  # BIPOLAR DX  # GI AND DVT PPX

## 2022-05-11 NOTE — CHART NOTE - NSCHARTNOTEFT_GEN_A_CORE
Assessment:     Factors impacting intake: [ ] none [ ] nausea  [ ] vomiting [ ] diarrhea [ ] constipation  [ ]chewing problems [ ] swallowing issues  [ ] other:     Diet Presciption: Diet, NPO after Midnight:      NPO Start Date: 10-May-2022,   NPO Start Time: 23:59 (05-10-22 @ 18:52)  Diet, Regular:   DASH/TLC {Sodium & Cholesterol Restricted}  1200mL Fluid Restriction ([id number])  No Concentrated Potassium  No Concentrated Phosphorus (04-30-22 @ 08:27)    Intake:     Daily   % Weight Change    Pertinent Medications: MEDICATIONS  (STANDING):  ARIPiprazole 15 milliGRAM(s) Oral daily  chlorhexidine 2% Cloths 1 Application(s) Topical daily  ferrous    sulfate 325 milliGRAM(s) Oral daily  finasteride 5 milliGRAM(s) Oral daily  gabapentin 600 milliGRAM(s) Oral two times a day  lamoTRIgine 200 milliGRAM(s) Oral daily  midodrine. 2.5 milliGRAM(s) Oral three times a day  mupirocin 2% Ointment 1 Application(s) Both Nostrils every 12 hours  pantoprazole    Tablet 40 milliGRAM(s) Oral before breakfast  sevelamer carbonate 800 milliGRAM(s) Oral three times a day with meals  sodium zirconium cyclosilicate 10 Gram(s) Oral once  tamsulosin 0.4 milliGRAM(s) Oral at bedtime  traZODone 25 milliGRAM(s) Oral at bedtime    MEDICATIONS  (PRN):  traMADol 50 milliGRAM(s) Oral every 6 hours PRN Severe Pain (7 - 10)    Pertinent Labs: 05-10 Na134 mmol/L<L> Glu 98 mg/dL K+ 4.2 mmol/L Cr  6.12 mg/dL<H> BUN 53 mg/dL<H> 05-09 Phos 5.2 mg/dL<H> 05-10 Alb 2.7 g/dL<L> 04-29 Chol 129 mg/dL LDL --    HDL 38 mg/dL<L> Trig 110 mg/dL     CAPILLARY BLOOD GLUCOSE          Skin:     Estimated Needs:   [ ] no change since previous assessment  [ ] recalculated:     Previous Nutrition Diagnosis:   [ ] Inadequate Energy Intake [ ]Inadequate Oral Intake [ ] Excessive Energy Intake   [ ] Underweight [ ] Increased Nutrient Needs [ ] Overweight/Obesity  [ ] Swallowing Difficult   [ ] Altered GI Function [ ] Unintended Weight Loss [ ] Food & Nutrition Related Knowledge Deficit [ ] Malnutrition   [ ] Not Ready for Diet/Life Style Changes     Nutrition Diagnosis is [ ] ongoing  [ ] Improving   [ ] resolved [ ] not applicable     New Nutrition Diagnosis: [ ] not applicable       Interventions:   Recommend  [ ] Change Diet To:  [ ] Nutrition Supplement  [ ] Nutrition Support  [ ] Other:     Monitoring and Evaluation:   [ ] PO intake [ x ] Tolerance to diet prescription [ x ] weights [ x ] labs[ x ] follow up per protocol  [ ] other: Assessment:       Nutrition reassessment for follow-up. Chart reviewed, pt visited, alert, oriented, well-communicated; reported appetite good, denied GI distress, chewing or swallowing problem at present, no specific food choices to update; NPO today for procedure; HD initiated 5/6/22, for out-pt HD per Nephrologist; pt from assisted living facility, Pending discharge planning to skilled nursing facility for rehab when medically ready per team    Factors impacting intake:  [ X ] other: acute on chronic comorbidities including PAWAN due to ATN, s/p hyperkalemia, fluid overload, CHF    Diet Prescription: Diet, NPO after Midnight:      NPO Start Date: 10-May-2022,   NPO Start Time: 23:59 (05-10-22 @ 18:52)  Diet, Regular:   DASH/TLC {Sodium & Cholesterol Restricted}  1200mL Fluid Restriction ([id number])  No Concentrated Potassium  No Concentrated Phosphorus (04-30-22 @ 08:27)    Intake: see above     Daily   % Weight Change    Pertinent Medications: MEDICATIONS  (STANDING):  ARIPiprazole 15 milliGRAM(s) Oral daily  chlorhexidine 2% Cloths 1 Application(s) Topical daily  ferrous    sulfate 325 milliGRAM(s) Oral daily  finasteride 5 milliGRAM(s) Oral daily  gabapentin 600 milliGRAM(s) Oral two times a day  lamoTRIgine 200 milliGRAM(s) Oral daily  midodrine. 2.5 milliGRAM(s) Oral three times a day  mupirocin 2% Ointment 1 Application(s) Both Nostrils every 12 hours  pantoprazole    Tablet 40 milliGRAM(s) Oral before breakfast  sevelamer carbonate 800 milliGRAM(s) Oral three times a day with meals  sodium zirconium cyclosilicate 10 Gram(s) Oral once  tamsulosin 0.4 milliGRAM(s) Oral at bedtime  traZODone 25 milliGRAM(s) Oral at bedtime    MEDICATIONS  (PRN):  traMADol 50 milliGRAM(s) Oral every 6 hours PRN Severe Pain (7 - 10)    Pertinent Labs: 05-10 Na134 mmol/L<L> Glu 98 mg/dL K+ 4.2 mmol/L Cr  6.12 mg/dL<H> BUN 53 mg/dL<H> 05-09 Phos 5.2 mg/dL<H> 05-10 Alb 2.7 g/dL<L> 04-29 Chol 129 mg/dL LDL --    HDL 38 mg/dL<L> Trig 110 mg/dL     CAPILLARY BLOOD GLUCOSE    Skin: skin intact     Estimated Needs:   [ X ] no change since previous assessment  [ ] recalculated:     Previous Nutrition Diagnosis:   [ X ] Altered Nutrition Related Laboratory Values     Nutrition Diagnosis is [ X ] ongoing  [ ] Improving   [ ] resolved [ ] not applicable     New Nutrition Diagnosis: [ X ] not applicable       Interventions:   Recommend  [ X ] Change Diet To: Advance diet or consider alternate nutrition support if NPO prolonged further as medically feasible   [ ] Nutrition Supplement  [ ] Nutrition Support  [ X ] Other: Discussed with team\                   Provide food choices within diet Rx as available/updated                    Pt to be followed by dietitian at skilled nursing facility and dialysis center when medically ready to be discharged     Monitoring and Evaluation:   [ X ] PO intake [ x ] Tolerance to diet prescription [ x ] weights [ x ] labs[ x ] follow up per protocol  [ ] other: Assessment:       Nutrition reassessment for follow-up. Chart reviewed, pt visited, alert, oriented, well-communicated; reported appetite good, denied GI distress, chewing or swallowing problem at present, no specific food choices to update; NPO today for procedure; HD initiated 5/6/22, for out-pt HD per Nephrologist; pt from assisted living facility, Pending discharge planning to skilled nursing facility for rehab when medically ready per team    Factors impacting intake:  [ X ] other: acute on chronic comorbidities including PAWAN due to ATN, s/p hyperkalemia, fluid overload, CHF    Diet Prescription: Diet, NPO after Midnight:      NPO Start Date: 10-May-2022,   NPO Start Time: 23:59 (05-10-22 @ 18:52)  Diet, Regular:   DASH/TLC {Sodium & Cholesterol Restricted}  1200mL Fluid Restriction ([id number])  No Concentrated Potassium  No Concentrated Phosphorus (04-30-22 @ 08:27)    Intake: see above     Daily   % Weight Change    Pertinent Medications: MEDICATIONS  (STANDING):  ARIPiprazole 15 milliGRAM(s) Oral daily  chlorhexidine 2% Cloths 1 Application(s) Topical daily  ferrous    sulfate 325 milliGRAM(s) Oral daily  finasteride 5 milliGRAM(s) Oral daily  gabapentin 600 milliGRAM(s) Oral two times a day  lamoTRIgine 200 milliGRAM(s) Oral daily  midodrine. 2.5 milliGRAM(s) Oral three times a day  mupirocin 2% Ointment 1 Application(s) Both Nostrils every 12 hours  pantoprazole    Tablet 40 milliGRAM(s) Oral before breakfast  sevelamer carbonate 800 milliGRAM(s) Oral three times a day with meals  sodium zirconium cyclosilicate 10 Gram(s) Oral once  tamsulosin 0.4 milliGRAM(s) Oral at bedtime  traZODone 25 milliGRAM(s) Oral at bedtime    MEDICATIONS  (PRN):  traMADol 50 milliGRAM(s) Oral every 6 hours PRN Severe Pain (7 - 10)    Pertinent Labs: 05-10 Na134 mmol/L<L> Glu 98 mg/dL K+ 4.2 mmol/L Cr  6.12 mg/dL<H> BUN 53 mg/dL<H> 05-09 Phos 5.2 mg/dL<H> 05-10 Alb 2.7 g/dL<L> 04-29 Chol 129 mg/dL LDL --    HDL 38 mg/dL<L> Trig 110 mg/dL     CAPILLARY BLOOD GLUCOSE    Skin: skin intact     Estimated Needs:   [ X ] no change since previous assessment  [ ] recalculated:     Previous Nutrition Diagnosis:   [ X ] Altered Nutrition Related Laboratory Values     Nutrition Diagnosis is [ X ] ongoing  [ ] Improving   [ ] resolved [ ] not applicable     New Nutrition Diagnosis: [ X ] not applicable       Interventions:   Recommend  [ X ] Change Diet To: Advance diet: Regular, Renal Replacement, 1200 ml fluid as medically feasible   [ ] Nutrition Supplement  [ ] Nutrition Support  [ X ] Other: Discussed with team\                   Provide food choices within diet Rx as available/updated                    Pt to be followed by dietitian at skilled nursing facility and dialysis center when medically ready to be discharged     Monitoring and Evaluation:   [ X ] PO intake [ x ] Tolerance to diet prescription [ x ] weights [ x ] labs[ x ] follow up per protocol  [ ] other:

## 2022-05-11 NOTE — PROGRESS NOTE ADULT - PROBLEM SELECTOR PLAN 4
-baseline CKD now progressed to ESRD requiring hemodialysis   -s/p HDC insertion with IR on 5/6   -s/p first dialysis session on 5/6,   -HD perma cath placement planned for Friday 5/13 tentatively  -Nephro Dr. Pond

## 2022-05-12 NOTE — PROGRESS NOTE ADULT - PROBLEM SELECTOR PLAN 1
-MRI of abdomen revealed  multiple liver lesions with mets  -bone scan with no osseous metastasis   -S/P IR liver biopsy 5/11  -Hepatology Dr. Portillo   -Heme Dr. Rivers/Dr. Chao  -IR following  -f/u liver Bx path

## 2022-05-12 NOTE — PROGRESS NOTE ADULT - ASSESSMENT
1. PAWAN due to ATN.  -First HD on 5/6. HD initiated for worsening bun/cr due to ATN with underlying CKD stage 4 and anuria. Pre dialysis bun/scr increasing and anuric, thus no signs of renal recovery at present  -No new labs. HD today with mild hypotension and will do HD without UF due to hypotension. Plan for permacath conversion by IR tomorrow.    -awaiting for outpatient HD dialysis center placement.  -pt also had gross hematuria and unclear etiology seems less likely nephritis and RUBIA, ANCA and Anit-GBM are -ve.  Urinalysis shows no proteinuria. spot protein to creatinine ratio is 900mg.  -renal sono show no hydro.  -Adjust meds to eGFR and avoid IV Gadolinium contrast,NSAIDs, and phosphate enema.  -Monitor I/O's daily.   -Monitor SMA daily.  2. CKD stage 4 most likely due to ischemic nephropathy and h/o ATN with renal recovery.  -Baseline Scr around 3.2mg/dL in April 2020.  -Keep patient euvolemic and renal diet  -Avoid Nephrotoxic Meds/ Agents such as (NSAIDs, IV contrast, Aminoglycosides such as gentamicin, -Gadolinium contrast, Phosphate containing enemas, etc..)  -Adjust Medications according to eGFR  3. Anemia:   -hb is stable; continue iron tabs. monitor CBC  4. MBD:   -phos is stable. previous mild hyperca, which improved and off calcitirol; on renvela 1 tab tid;   -pth is okay at 41.  5. Hyperkalemia due to pawan on ckd.   -stable. use 2k bath during Hd.  -on low K diet. give Lokelma prn if K >5.5  -Keep pt euvolemic. Avoid ACE inh/ ARBs, NSAIDs, and Aldactone or potassium sparing diuretics. Monitor K+ daily.  6. Fluid Overload:  -clinically improved. off diuretics for now.   -CT chest w/o contrast shows no effusion;   7. Acidosis:  -CO2 is better with HD.  -stable. monitor CO2  8. h/o Hypotension:  -bp is acceptable and on midodrine 2.5mg tid      9. Elevated LFTs  - off lipitor for now.  -hepatitis panel shows hep C+ve.  discussed with pt, pt had known hep C and was treated for in the past; hep C RNA is not detected.   -C3/C4 and RF negative, cryoglobulinemia is negative.    -ct scan shows hepatic mass; Hepatology consulted and MRI w/o contrast shows liver mass and mets.  -Hepatology and Oncology consulted.   -Plan for IR guided liver biopsy on 5/12. awaiting results.   -Bone scan shows no bone mets. spep and IF are nl and serum FLC is within range for ckd.      Discussed with patient in detail regarding the renal plan and care  Discussed the assessment and plan with Primary Team/Nurse   1. PAWAN due to ATN.  -First HD on 5/6. HD initiated for worsening bun/cr due to ATN with underlying CKD stage 4 and anuria. Pre dialysis bun/scr increasing and anuric, thus no signs of renal recovery at present  -HD today with mild hypotension and will do HD without UF due to hypotension. Plan for permacath conversion by IR tomorrow.    -awaiting for outpatient HD dialysis center placement.  -pt also had gross hematuria and unclear etiology seems less likely nephritis and RUBIA, ANCA and Anit-GBM are -ve.  Urinalysis shows no proteinuria. spot protein to creatinine ratio is 900mg.  -renal sono show no hydro.  -Adjust meds to eGFR and avoid IV Gadolinium contrast,NSAIDs, and phosphate enema.  -Monitor I/O's daily.   -Monitor SMA daily.  2. CKD stage 4 most likely due to ischemic nephropathy and h/o ATN with renal recovery.  -Baseline Scr around 3.2mg/dL in April 2020.  -Keep patient euvolemic and renal diet  -Avoid Nephrotoxic Meds/ Agents such as (NSAIDs, IV contrast, Aminoglycosides such as gentamicin, -Gadolinium contrast, Phosphate containing enemas, etc..)  -Adjust Medications according to eGFR  3. Anemia:   -hb is stable; continue iron tabs. monitor CBC  4. MBD:   -phos is stable. previous mild hyperca, which improved and off calcitirol; on renvela 1 tab tid;   -pth is okay at 41.  5. Hyperkalemia due to pawan on ckd.   -elevated K and use 2k bath during Hd.  -on low K diet. give Lokelma prn if K >5.5  -Keep pt euvolemic. Avoid ACE inh/ ARBs, NSAIDs, and Aldactone or potassium sparing diuretics. Monitor K+ daily.  6. Fluid Overload:  -clinically improved. off diuretics for now.   -CT chest w/o contrast shows no effusion;   7. Acidosis:  -CO2 is better with HD.  -stable. monitor CO2  8. h/o Hypotension:  -bp is low during HD and increase midodrine to 5mg tid      9. Elevated LFTs  - off lipitor for now.  -hepatitis panel shows hep C+ve.  discussed with pt, pt had known hep C and was treated for in the past; hep C RNA is not detected.   -C3/C4 and RF negative, cryoglobulinemia is negative.    -ct scan shows hepatic mass; Hepatology consulted and MRI w/o contrast shows liver mass and mets.  -Hepatology and Oncology consulted.   -Plan for IR guided liver biopsy on 5/12. awaiting results.   -Bone scan shows no bone mets. spep and IF are nl and serum FLC is within range for ckd.  10. Hyponatremia: due to lack free water intake.   -Na should improve with HD.  -place on 1L fluid restriction/day.   -monitor Na.      Discussed with patient in detail regarding the renal plan and care  Discussed the assessment and plan with Primary Team/Nurse   1. PAWAN due to ATN.  -First HD on 5/6. HD initiated for worsening bun/cr due to ATN with underlying CKD stage 4 and anuria. Pre dialysis bun/scr increasing and anuric, thus no signs of renal recovery at present  -HD today with mild hypotension and will do HD without UF due to hypotension. Plan for permacath conversion by IR tomorrow.    -awaiting for outpatient HD dialysis center placement.  -pt also had gross hematuria and unclear etiology seems less likely nephritis and RUBIA, ANCA and Anit-GBM are -ve.  Urinalysis shows no proteinuria. spot protein to creatinine ratio is 900mg.  -renal sono show no hydro.  -Adjust meds to eGFR and avoid IV Gadolinium contrast,NSAIDs, and phosphate enema.  -Monitor I/O's daily.   -Monitor SMA daily.  2. CKD stage 4 most likely due to ischemic nephropathy and h/o ATN with renal recovery.  -Baseline Scr around 3.2mg/dL in April 2020.  -Keep patient euvolemic and renal diet  -Avoid Nephrotoxic Meds/ Agents such as (NSAIDs, IV contrast, Aminoglycosides such as gentamicin, -Gadolinium contrast, Phosphate containing enemas, etc..)  -Adjust Medications according to eGFR  3. Anemia:   -hb is stable; continue iron tabs. monitor CBC  4. MBD:   -phos is stable. previous mild hyperca, which improved and off calcitirol; on renvela 1 tab tid;   -pth is okay at 41.  5. Hyperkalemia due to pawan on ckd.   -elevated K and use 2k bath during Hd.  -on low K diet. give Lokelma prn if K >5.5  -Keep pt euvolemic. Avoid ACE inh/ ARBs, NSAIDs, and Aldactone or potassium sparing diuretics. Monitor K+ daily.  6. Fluid Overload:  -clinically improved. off diuretics for now.   -CT chest w/o contrast shows no effusion;   7. Acidosis:  -CO2 is better with HD.  -stable. monitor CO2  8. h/o Hypotension:  -bp is low during HD and increase midodrine to 5mg tid      9. Elevated LFTs  - off lipitor for now.  -hepatitis panel shows hep C+ve.  discussed with pt, pt had known hep C and was treated for in the past; hep C RNA is not detected.   -C3/C4 and RF negative, cryoglobulinemia is negative.    -ct scan shows hepatic mass; Hepatology consulted and MRI w/o contrast shows liver mass and mets.  -Hepatology and Oncology consulted.   -Plan for IR guided liver biopsy on 5/12. awaiting results.   -Bone scan shows no bone mets. spep and IF are nl and serum FLC is within range for ckd.  10. Hyponatremia: due to lack free water intake.   -Na should improve with HD.  -place on 1L fluid restriction/day.   -monitor Na.  10. Neuropathy:  -reduced gabapentin to 100mg tid due to renal failure.      Discussed with patient in detail regarding the renal plan and care  Discussed the assessment and plan with Primary Team/Nurse

## 2022-05-12 NOTE — PROGRESS NOTE ADULT - SUBJECTIVE AND OBJECTIVE BOX
NP Note discussed with  Primary Attending    Patient is a 71y old  Male who presents with a chief complaint of acute CHF exacerbation (11 May 2022 16:27)      INTERVAL HPI/OVERNIGHT EVENTS: no new complaints    MEDICATIONS  (STANDING):  ARIPiprazole 15 milliGRAM(s) Oral daily  chlorhexidine 2% Cloths 1 Application(s) Topical daily  ferrous    sulfate 325 milliGRAM(s) Oral daily  finasteride 5 milliGRAM(s) Oral daily  gabapentin 600 milliGRAM(s) Oral two times a day  lamoTRIgine 200 milliGRAM(s) Oral daily  midodrine. 2.5 milliGRAM(s) Oral three times a day  pantoprazole    Tablet 40 milliGRAM(s) Oral before breakfast  sevelamer carbonate 800 milliGRAM(s) Oral three times a day with meals  sodium zirconium cyclosilicate 10 Gram(s) Oral once  tamsulosin 0.4 milliGRAM(s) Oral at bedtime  traZODone 25 milliGRAM(s) Oral at bedtime    MEDICATIONS  (PRN):      __________________________________________________  REVIEW OF SYSTEMS:  pain at biopsy site    CONSTITUTIONAL: No fever,   EYES: no acute visual disturbances  NECK: No pain or stiffness  RESPIRATORY: No cough; No shortness of breath  CARDIOVASCULAR: No chest pain, no palpitations  GASTROINTESTINAL: No pain. No nausea or vomiting; No diarrhea   NEUROLOGICAL: No headache or numbness, no tremors  MUSCULOSKELETAL: No joint pain, no muscle pain  GENITOURINARY: no dysuria, no frequency, no hesitancy  PSYCHIATRY: no depression , no anxiety  ALL OTHER  ROS negative        Vital Signs Last 24 Hrs  T(C): 36.5 (12 May 2022 04:43), Max: 37.7 (11 May 2022 14:11)  T(F): 97.7 (12 May 2022 04:43), Max: 99.8 (11 May 2022 14:11)  HR: 89 (12 May 2022 04:43) (89 - 97)  BP: 106/63 (12 May 2022 04:43) (94/48 - 109/58)  BP(mean): 68 (11 May 2022 13:28) (62 - 71)  RR: 18 (12 May 2022 04:43) (17 - 24)  SpO2: 95% (12 May 2022 04:43) (90% - 95%)    ________________________________________________  PHYSICAL EXAM:  Chronically iii appearing  GENERAL: NAD  HEENT: Normocephalic;  conjunctivae and sclerae clear; moist mucous membranes;   NECK : supple  CHEST/LUNG: REJ dialysis cath, Clear to auscultation bilaterally with good air entry   HEART: S1 S2  regular; no murmurs, gallops or rubs  ABDOMEN: Soft, Nontender, Nondistended; Bowel sounds present  EXTREMITIES: no cyanosis; no edema; no calf tenderness  SKIN: warm and dry; no rash  NERVOUS SYSTEM:  Awake and alert; Oriented  to place, person and time ; no new deficits    _________________________________________________  LABS:                        10.4   9.68  )-----------( 271      ( 11 May 2022 08:47 )             33.4     05-11    130<L>  |  94<L>  |  77<H>  ----------------------------<  93  4.9   |  21<L>  |  8.59<H>    Ca    9.3      11 May 2022 08:47    TPro  7.9  /  Alb  2.9<L>  /  TBili  1.1  /  DBili  x   /  AST  346<H>  /  ALT  85<H>  /  AlkPhos  254<H>  05-11    PT/INR - ( 11 May 2022 08:47 )   PT: 15.2 sec;   INR: 1.27 ratio         PTT - ( 11 May 2022 08:47 )  PTT:27.0 sec    CAPILLARY BLOOD GLUCOSE      RADIOLOGY & ADDITIONAL TESTS:    < from: CT Biopsy Liver (05.11.22 @ 11:23) >    ACC: [id number] EXAM:  CT BX LIVER#                          PROCEDURE DATE:  05/11/2022          INTERPRETATION:  PROCEDURE: COMPUTED TOMOGRAPHY GUIDED CORE NEEDLE BIOPSY   OF LIVER MASS.    : ELYSIA Ortiz MD    CLINICAL INDICATION: 71-year-old male with multiple comorbidities,   including history of prostate cancer, who was found to have multiple   liver masses. Biopsy of a liver mass was requested.    ANESTHESIA: Intravenous sedation was administered by the anesthesiology   attending. Atotal of 9 mL of 1% lidocaine was used for local anesthesia.    TECHNIQUE: Following discussions of the risks, benefits, and alternatives   to the procedure informed consent was obtained. A timeout was performed.    The patient was placed supine on the CT examination table and connected   to physiologic monitoring. Transaxial images of the abdomen were obtained   without the use of oral or intravenous contrast materials to select the   best path for percutaneous biopsy. Multiple hepatic masses were again   identified and one of the masses in the left hepatic lobe was targeted   for biopsy. The overlying skin was prepped and draped in the usual   sterile fashion.    Using CT guidance, a 17-gauge trocar needle was advanced to the targeted   left hepatic lobe mass. An 18-gauge core needle gun was advanced via the   trocar into the left hepatic lobe mass, and 5 core specimens were   obtained. The obtained specimens were deemed adequate by the pathologist   in attendance. D-stat hemostatic agent was administered via trocar and   compression was held to achieve hemostasis. Completion CT images   demonstrated expected postbiopsy changes with no evidence of acute   complications. A dry sterile dressing was applied.    The patient tolerated the procedure well and was transferred to the   recovery area in stable condition. There were no immediate complications.    IMPRESSION: SUCCESSFUL COMPUTED TOMOGRAPHY GUIDED CORE NEEDLE BIOPSY OF   LIVER MASS.    < end of copied text >      Imaging Personally Reviewed:  YES/NO    Consultant(s) Notes Reviewed:   YES/ No    Care Discussed with Consultants :     Plan of care was discussed with patient and /or primary care giver; all questions and concerns were addressed and care was aligned with patient's wishes.

## 2022-05-12 NOTE — PROGRESS NOTE ADULT - SUBJECTIVE AND OBJECTIVE BOX
Patient is a 71y old  Male who presents with a chief complaint of acute CHF exacerbation (12 May 2022 15:32)    PATIENT IS SEEN AND EXAMINED IN MEDICAL FLOOR.  PETEY [    ]    ERASTO [   ]      GT [   ]    ALLERGIES:  No Known Allergies      Daily     Daily     VITALS:    Vital Signs Last 24 Hrs  T(C): 37.7 (12 May 2022 22:27), Max: 37.7 (12 May 2022 22:27)  T(F): 99.9 (12 May 2022 22:27), Max: 99.9 (12 May 2022 22:27)  HR: 109 (12 May 2022 22:27) (72 - 109)  BP: 110/58 (12 May 2022 22:27) (91/60 - 118/52)  BP(mean): --  RR: 17 (12 May 2022 22:27) (16 - 18)  SpO2: 93% (12 May 2022 22:27) (93% - 97%)    LABS:    CBC Full  -  ( 12 May 2022 14:52 )  WBC Count : 10.71 K/uL  RBC Count : 4.03 M/uL  Hemoglobin : 10.4 g/dL  Hematocrit : 32.0 %  Platelet Count - Automated : 302 K/uL  Mean Cell Volume : 79.4 fl  Mean Cell Hemoglobin : 25.8 pg  Mean Cell Hemoglobin Concentration : 32.5 gm/dL  Auto Neutrophil # : x  Auto Lymphocyte # : x  Auto Monocyte # : x  Auto Eosinophil # : x  Auto Basophil # : x  Auto Neutrophil % : x  Auto Lymphocyte % : x  Auto Monocyte % : x  Auto Eosinophil % : x  Auto Basophil % : x    PT/INR - ( 11 May 2022 08:47 )   PT: 15.2 sec;   INR: 1.27 ratio         PTT - ( 11 May 2022 08:47 )  PTT:27.0 sec  05-12    127<L>  |  91<L>  |  101<H>  ----------------------------<  132<H>  5.7<H>   |  19<L>  |  11.00<H>    Ca    9.7      12 May 2022 14:52    TPro  8.2  /  Alb  3.0<L>  /  TBili  1.2  /  DBili  x   /  AST  340<H>  /  ALT  83<H>  /  AlkPhos  302<H>  05-12    CAPILLARY BLOOD GLUCOSE            LIVER FUNCTIONS - ( 12 May 2022 14:52 )  Alb: 3.0 g/dL / Pro: 8.2 g/dL / ALK PHOS: 302 U/L / ALT: 83 U/L DA / AST: 340 U/L / GGT: x           Creatinine Trend: 11.00<--, 8.59<--, 6.12<--, 7.88<--, 5.82<--, 5.10<--  I&O's Summary    11 May 2022 07:01  -  12 May 2022 07:00  --------------------------------------------------------  IN: 0 mL / OUT: 250 mL / NET: -250 mL    12 May 2022 07:01  -  12 May 2022 23:23  --------------------------------------------------------  IN: 700 mL / OUT: 600 mL / NET: 100 mL            .Stool Feces  05-03 @ 18:27   No enteric pathogens isolated.  (Stool culture examined for Salmonella,  Shigella, Campylobacter, Aeromonas, Plesiomonas,  Vibrio, E.coli O157 and Yersinia)  No enteric gram negative rods isolated  --  --          MEDICATIONS:    MEDICATIONS  (STANDING):  ARIPiprazole 15 milliGRAM(s) Oral daily  chlorhexidine 2% Cloths 1 Application(s) Topical daily  ferrous    sulfate 325 milliGRAM(s) Oral daily  finasteride 5 milliGRAM(s) Oral daily  gabapentin 100 milliGRAM(s) Oral three times a day  lamoTRIgine 200 milliGRAM(s) Oral daily  midodrine. 5 milliGRAM(s) Oral three times a day  pantoprazole    Tablet 40 milliGRAM(s) Oral before breakfast  sevelamer carbonate 800 milliGRAM(s) Oral three times a day with meals  sodium zirconium cyclosilicate 10 Gram(s) Oral once  tamsulosin 0.4 milliGRAM(s) Oral at bedtime  traZODone 25 milliGRAM(s) Oral at bedtime      MEDICATIONS  (PRN):      REVIEW OF SYSTEMS:                           ALL ROS DONE [ X   ]    CONSTITUTIONAL:  LETHARGIC [   ], FEVER [   ], UNRESPONSIVE [   ]  CVS:  CP  [   ], SOB, [   ], PALPITATIONS [   ], DIZZYNESS [   ]  RS: COUGH [   ], SPUTUM [   ]  GI: ABDOMINAL PAIN [   ], NAUSEA [   ], VOMITINGS [   ], DIARRHEA [   ], CONSTIPATION [   ]  :  DYSURIA [   ], NOCTURIA [   ], INCREASED FREQUENCY [   ], DRIBLING [   ],  SKELETAL: PAINFUL JOINTS [   ], SWOLLEN JOINTS [   ], NECK ACHE [   ], LOW BACK ACHE [   ],  SKIN : ULCERS [   ], RASH [   ], ITCHING [   ]  CNS: HEAD ACHE [   ], DOUBLE VISION [   ], BLURRED VISION [   ], AMS / CONFUSION [   ], SEIZURES [   ], WEAKNESS [   ],TINGLING / NUMBNESS [   ]      PHYSICAL EXAMINATION:  GENERAL APPEARANCE: NO DISTRESS  HEENT:  NO PALLOR, NO  JVD,  NO   NODES, NECK SUPPLE  CVS: S1 +, S2 +,   RS: AEEB,  OCCASIONAL  RALES +  ABD: SOFT, NT, NO, BS +   ILEOSTOMY [STOMA W/ PINK BASE]  EXT: PE + [IMPROVING]  SKIN: WARM,   RIGHT IJ SHILEY+  SKELETAL:  ROM ACCEPTABLE  CNS:  AAO X 3    RADIOLOGY :    ACC: [id number] EXAM:  XR CHEST PORTABLE URGENT 1V                          PROCEDURE DATE:  04/28/2022          INTERPRETATION:  Clinical history: 71-year-old male, chest pain.    Portable/expiratory view of the chest is compared to 20 and demonstrates   a normal cardiac silhouette with no lobar consolidation, gross effusion,   pneumothorax or acute osseous finding.    Bilateral lower lobe patchy opacities consistent with infiltrates/areas   of atelectasis in the correct clinical context, increased. Elevated right   hemidiaphragm are evident.    IMPRESSION:    Bilateral lower lobe interstitial infiltrates/platelike atelectasis,   increased    ================================      ACC: [id number] EXAM:  MR ABDOMEN                          *** ADDENDUM***    Some of the hepatic lesions have small T1 hyperintense components on   fat-suppressed T1-weighted gradient echo images, suggestive of blood   products.    --- End of Report---    *** END OF ADDENDUM***      PROCEDURE DATE:  05/03/2022          INTERPRETATION:  CLINICAL INFORMATION: Hepatic lesions. History of   prostate cancer.    COMPARISON: None.    CONTRAST/COMPLICATIONS:  IV Contrast: NONE  Oral Contrast: NONE  Complications: None reported at time of study completion    PROCEDURE:  MRI of the abdomen was performed.    FINDINGS:  LOWER CHEST: Within normal limits.    LIVER: Innumerable mildly T2 hyperintense lesions are seen throughout the   liver, suggestive ofmetastasis. The largest lesion measures 13.8 x 11.7   x 11.7 cm in the liver dome.  BILE DUCTS: Normal caliber.  GALLBLADDER: Cholelithiasis.  SPLEEN: Within normal limits.  PANCREAS: Within normal limits.  ADRENALS: Within normal limits.  KIDNEYS/URETERS: Multiple brightly T2 hyperintense lesion in the kidneys   bilaterally, representing probable cysts.    VISUALIZED PORTIONS:  BOWEL: Small hiatal hernia. Right lower quadrant ostomy.  PERITONEUM: No ascites.  VESSELS: Within normal limits.  RETROPERITONEUM/LYMPH NODES: No lymphadenopathy.  ABDOMINAL WALL: Within normal limits.  BONES: Multiple T2 hyperintense and T2 hyperintense lesions in the   thoracolumbar spine may represent osseous metastasis. Whole-body bone   scan may be pursued for further evaluation.    IMPRESSION:  Innumerable mildly T2 hyperintense lesions are seen throughout the liver,   suggestive of metastasis. The largest lesion measures 13.8 x 11.7 x 11.7   cm in the liver dome.    Multiple T2 hyperintense and T2 hyperintense lesions in the thoracolumbar   spine may represent osseous metastasis. Whole-body bone scan may be   pursued for further evaluation.    Cholelithiasis.      ASSESSMENT :     Hyperkalemia    No pertinent past medical history    COPD, mild    Anemia    Bipolar disorder    IBD (inflammatory bowel disease)    HTN (hypertension)    HLD (hyperlipidemia)    PAD (peripheral artery disease)    CKD (chronic kidney disease)    Prostate CA    BPH with urinary obstruction    No significant past surgical history    History of creation of ostomy        PLAN:  HPI:  Patient is a 71M from [facility], who is ambulatory at baseline with a walker. He has Hx of HTN, CHF?, COPD, HLD, PAD, BPH, Prostate CA (s/p radiation), Bipolar Disorder, IBD - Crohn's Disease, R ileostomy bag (s/p sigmoid resection). He was brought to ED due to bilateral leg swelling. For the past 2 days he has been having progressive swelling/ edema of both his legs with associated ambulatory dysfunction. He also started having episodes of shortness of breath and difficulty of breathing. He denies any fever, cough, chest pain, palpitation. When he came to the ED, he was saturating at 93-94% on room air. He was placed on nasal cannula. EKG was done showing NSR. Troponin was negative and BNP was mildly elevated at 398. CXR showed bilateral vascular congestion. Serum potassium was elevated at 6.1. He got a dose of lasix and was given Hyperkalemia cocktail in the ED. Of note, he had Hx of COVID in 2020 andwas vaccinated with COVID-19 x 3 (Pfizer) and Influenza in 2021. He was subsequently admitted for acute exacerbation of his heart failure.    [facility]: FULL CODE (28 Apr 2022 18:40)    # CASE D/W BROTHER KERI VIA PHONE [ C  ; H  ] - CASE DISCUSSED AT LENGTH, ALL QUESTIONS ANSWERED [5/5]    # HEMATURIA   # HX OF PROSTATE CA S/P RADIATION  - HOLD A/C, TREND CBC,   - NOTED BLADDER SCAN, NOTED RECENT U/S  - PER UROLOGY, REPEAT BLADDER SCAN W/ PVR - THEN WILL DECIDE REGARDING MAURER PLACEMENT  - NOTED CT A/P ; PSA < 0.01  - UROLOGY CONSULT DR. CHAN    # ACUTE HYPOXIC RESPIRATORY FAILURE SUSPECT S/T PULMONARY VASCULAR CONGESTION  # RULED OUT CHF   # PAWAN ON CKD - STARTED ON HD, S/P SHILEY PLACEMENT AND EXCHANGE FOR VASC CATH 5/11  # BPH  # HX OF PROSTATE CA S/P RADIATION  - PLACE ON TELEMETRY  - NOTED CXR  - HOLD LASIX  - NOTE TSH  - STRICT IS AND OS  - ECHOCARDIOGRAM - NORMAL LVEF, NORMAL DIASTOLIC FUNCTION  - NEPHROLOGY CONSULT, CARDIOLOGY CONSULT  - NEPHROLOGY RECC FOR PLACEMENT OF SHILEY W/ INITIATION OF HD    - PLANNED FOR TUNNELLED CATH PLACEMENT [5/13]    # LIVER LESIONS, BONE LESIONS  S/P LIVER BIOPSY [5/11]  # TRANSAMINITIS, NOTED HEP C AND HEP B MARKERS  - NOTED MRI ABDOMEN  - NOTED TUMOR MARKERS  - REVIEWED BONE SCAN  - HEPATOLOGY CONSULT  - ONCOLOGY CONSULT    - PLANNED FOR IR GUIDED BIOPSY ON 5/6  --- DELAYED BY IR DUE TO BLEEDING RISK  -- PLANNED FOR 5/11  - F/U PATH    # HYPERKALEMIA - RESOLVED  - GIVEN LASIX, LOKELMA  - EKG DONE  - NEPHROLOGY CONSULT    # CROHN'S DISEASE  # RIGHT ILEOSTOMY BAG S/P SIGMOID RESECTION  - MONITORING OUTPUT  - GASTROENTEROLOGY CONSULT    # NORMOCYTIC ANEMIA    # HTN  # COPD  # HLD  # PAD  # BIPOLAR DX  # GI AND DVT PPX

## 2022-05-12 NOTE — PROGRESS NOTE ADULT - SUBJECTIVE AND OBJECTIVE BOX
Patient for conversion of the temporary dialysis catheter to a tunneled HD catheter in IR tomorrow.  Please keep patient NPO, send early AM labs including CBC, BMP, and PT, INR / PTT.  Hold all anticoagulation, NSAIDS, and antiplatelet medication.

## 2022-05-12 NOTE — PROGRESS NOTE ADULT - SUBJECTIVE AND OBJECTIVE BOX
C A R D I O L O G Y  **********************************     DATE OF SERVICE: 05-12-22    Patient denies chest pain or shortness of breath.   Review of symptoms otherwise negative.    ARIPiprazole 15 milliGRAM(s) Oral daily  chlorhexidine 2% Cloths 1 Application(s) Topical daily  ferrous    sulfate 325 milliGRAM(s) Oral daily  finasteride 5 milliGRAM(s) Oral daily  gabapentin 600 milliGRAM(s) Oral two times a day  lamoTRIgine 200 milliGRAM(s) Oral daily  midodrine. 2.5 milliGRAM(s) Oral three times a day  pantoprazole    Tablet 40 milliGRAM(s) Oral before breakfast  sevelamer carbonate 800 milliGRAM(s) Oral three times a day with meals  sodium zirconium cyclosilicate 10 Gram(s) Oral once  tamsulosin 0.4 milliGRAM(s) Oral at bedtime  traZODone 25 milliGRAM(s) Oral at bedtime                          10.4   9.68  )-----------( 271      ( 11 May 2022 08:47 )             33.4       Hemoglobin: 10.4 g/dL (05-11 @ 08:47)  Hemoglobin: 10.3 g/dL (05-10 @ 06:21)  Hemoglobin: 11.2 g/dL (05-09 @ 07:05)  Hemoglobin: 11.5 g/dL (05-08 @ 08:33)      05-11    130<L>  |  94<L>  |  77<H>  ----------------------------<  93  4.9   |  21<L>  |  8.59<H>    Ca    9.3      11 May 2022 08:47    TPro  7.9  /  Alb  2.9<L>  /  TBili  1.1  /  DBili  x   /  AST  346<H>  /  ALT  85<H>  /  AlkPhos  254<H>  05-11    Creatinine Trend: 8.59<--, 6.12<--, 7.88<--, 5.82<--, 5.10<--, 5.54<--    COAGS:     T(C): 36.5 (05-12-22 @ 04:43), Max: 37.7 (05-11-22 @ 14:11)  HR: 89 (05-12-22 @ 04:43) (89 - 97)  BP: 106/63 (05-12-22 @ 04:43) (94/48 - 109/58)  RR: 18 (05-12-22 @ 04:43) (18 - 24)  SpO2: 95% (05-12-22 @ 04:43) (90% - 95%)  Wt(kg): --    I&O's Summary    11 May 2022 07:01  -  12 May 2022 07:00  --------------------------------------------------------  IN: 0 mL / OUT: 250 mL / NET: -250 mL      HEENT:  (-)icterus (-)pallor  CV: N S1 S2 1/6 MARCELLE (+)2 Pulses B/l  Resp:  Clear to ausculatation B/L, normal effort  GI: (+) BS Soft, NT, ND  Lymph:  (-)Edema, (-)obvious lymphadenopathy  Skin: Warm to touch, Normal turgor  Psych: Appropriate mood and affect        ASSESSMENT/PLAN: 	71y  Male  HTN, CHF?, COPD, HLD, PAD, BPH, Prostate CA (s/p radiation), Bipolar Disorder, IBD - Crohn's Disease, R ileostomy bag (s/p sigmoid resection) historically preserved LV and RV function, normal perfusion on a nuclear stress test 2020 He was brought to ED due to bilateral leg swelling    - No clinical CHF A low BNP suggests a low left ventricular end diastolic pressure  - monitor renal function, his edema is at least partially due ot renal disease   - no pertinent findings on echo  - Heme/ onc w/u in progress , f/u liver biopsy     Joey Archer MD, [facility]  BEEPER [phone]

## 2022-05-12 NOTE — PROGRESS NOTE ADULT - PROBLEM SELECTOR PLAN 4
-baseline CKD now progressed to ESRD requiring hemodialysis   -s/p HDC insertion with IR on 5/6   -s/p first dialysis session on 5/6,   -HD perma cath placement planned for Friday 5/13 tentatively  -Nephro Dr. Pond  -HD today (5/12)

## 2022-05-12 NOTE — PROGRESS NOTE ADULT - ASSESSMENT
Very pleasant 71 year old gentleman who presents for acute CHF exacerbation, found to have PAWAN on CKD, gross hematuria, evidence of liver metastasis on MRI  -MRI images reviewed demonstrating concern for liver mets  -PSA 7/2021 was undetectable (<0.01)  -Repeat PSA May 2022 undetectable <0.01  -F/U results of liver biopsy  -labs reviewed  -noncontrast CT given gross hematuria: no hydro, stones, renal mass  -patient will need a cystoscopy as an outpatient for workup of hematuria  -discussed with house staff

## 2022-05-12 NOTE — PROGRESS NOTE ADULT - SUBJECTIVE AND OBJECTIVE BOX
NEPHROLOGY MEDICAL CARE, [facility] - Dr. Emerson Pond/ Dr. Nallely Curran/ Dr. Kirby Angel/ Dr. Pippa Reis    Patient was seen and examined at bedside.    CC: patient is okay; HD in progress; hypotensive and asymptomatic.  given 250cc NS and given another 200cc bolus.    Vital Signs Last 24 Hrs  T(C): 36.7 (12 May 2022 14:45), Max: 37.1 (11 May 2022 21:17)  T(F): 98.1 (12 May 2022 14:45), Max: 98.8 (11 May 2022 21:17)  HR: 91 (12 May 2022 14:45) (89 - 96)  BP: 112/52 (12 May 2022 14:45) (106/63 - 118/52)  BP(mean): --  RR: 16 (12 May 2022 14:45) (16 - 18)  SpO2: 96% (12 May 2022 14:45) (93% - 96%)    05-11 @ 07:01  -  05-12 @ 07:00  --------------------------------------------------------  IN: 0 mL / OUT: 250 mL / NET: -250 mL        PHYSICAL EXAM:  General: No acute respiratory distress.  Eyes: conjunctiva and sclera clear  ENMT: Atraumatic, Normocephalic, supple, No JVD present  Respiratory: Bilateral decreased BS and no rhonchi, wheezing  Cardiovascular: S1S2+; no m/r/g  Gastrointestinal: Soft, Non-tender, Nondistended; Bowel sounds present   Neuro:  Awake, Alert & Oriented X3  Ext:  no pedal edema, No Cyanosis  Skin: No visible rashes  Dialysis Access: Rt DORA nichols      MEDICATIONS:  MEDICATIONS  (STANDING):  ARIPiprazole 15 milliGRAM(s) Oral daily  chlorhexidine 2% Cloths 1 Application(s) Topical daily  ferrous    sulfate 325 milliGRAM(s) Oral daily  finasteride 5 milliGRAM(s) Oral daily  gabapentin 600 milliGRAM(s) Oral two times a day  lamoTRIgine 200 milliGRAM(s) Oral daily  midodrine. 2.5 milliGRAM(s) Oral three times a day  pantoprazole    Tablet 40 milliGRAM(s) Oral before breakfast  sevelamer carbonate 800 milliGRAM(s) Oral three times a day with meals  sodium zirconium cyclosilicate 10 Gram(s) Oral once  tamsulosin 0.4 milliGRAM(s) Oral at bedtime  traZODone 25 milliGRAM(s) Oral at bedtime    MEDICATIONS  (PRN):          LABS:                        10.4   10.71 )-----------( 302      ( 12 May 2022 14:52 )             32.0     05-12    127<L>  |  91<L>  |  101<H>  ----------------------------<  132<H>  5.7<H>   |  19<L>  |  11.00<H>    Ca    9.7      12 May 2022 14:52    TPro  8.2  /  Alb  3.0<L>  /  TBili  1.2  /  DBili  x   /  AST  340<H>  /  ALT  83<H>  /  AlkPhos  302<H>  05-12    PT/INR - ( 11 May 2022 08:47 )   PT: 15.2 sec;   INR: 1.27 ratio         PTT - ( 11 May 2022 08:47 )  PTT:27.0 sec      Urine studies    PTH and Vit D:

## 2022-05-12 NOTE — PROGRESS NOTE ADULT - ASSESSMENT
71y Male from [facility] with Hx of HTN, CHF?, COPD, HLD, PAD, BPH, Prostate CA (s/p radiation), Bipolar Disorder, IBD - Crohn's Disease, R ileostomy bag (s/p sigmoid resection), COVID-19 (2020) and s/p COVID19 vaccination (Pfizer x3) was brought to [facility] ED on 4/28/22 b/o 2 days worsening bilateral leg swelling with difficulty ambulating, and SOB with SpO2 93-94% on RA on arrival.  He was found to have bilateral vascular congestion on CXR due to suspected acute exacerbation of CHF, and PAWAN on CKD (BUN/Cr 88/3.28), with hyperkalemia (K 6.1). 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  Being followed by cardiology, PBNP was mildly elevated, and TTE showed LVEF 55%, normal diastolic function and normal RV function, thus did not appear to be in acute CHF exacerbation.    Hepatology was consulted b/o liver lesion and abnormal liver enzymes.     # Abnormal liver enzymes, AST >>ALT  - CPK nl. HIV neg. Hep B and C as below. Hep A IgM neg.   - Hypotensive, requiring midodrine, with renal dysfunction, Ig panel wnl, K/L ratio WNL, F/u UPEP  - No Hx of EtOH  - RUBIA neg, Follow up / obtain SMA, LKM, AMA, A1AT, ceruloplasmin, Hep E  - Avoid hepatotoxic medications, agree holding statin  - Please, obtain collateral information about timing of lamotrigine, because its hepatotoxicity is well established, and consider alternative.     # Pos HCV ab  - HCV PCR neg with the Hx of Hep C and treatment, suggests SVR.   - Cryoglobulin negative  - HIV neg    # HBcAb IgM pos, but HBsAg neg  - HBsAg neg, HBV DNA neg, but HBcAb pos, HBeAb pos, past infection. Also obtain Hep D serology.     # Liver lesions  - On CT chest incidentally found a 4.7x4.3 cm partially imaged hypodense lesion and in L lobe another 2.7 cm lesion, as well as nodular thickening along hepatic capsule, all are new from the 2/2020 CT a/p  - CEA and CA 19-9 WNL, but AFP> 5000, PSA WNL  - Could not get contrast CT b/o PAWAN on CKD. MRI was done w/o contrast showing Innumerable mildly T2 hyperintense lesions  throughout the liver, suggestive of metastasis. The largest lesion measures 13.8 x 11.7 x 11.7 cm in the liver dome. Multiple T2 hyperintense and T2 hyperintense lesions in the thoracolumbar spine may represent osseous metastasis.  - No fever or leukocytosis  - S/p liver biopsy per IR ( L lobe mass), f/u result  - Bone scan did not suggest metastatic disease.  - Hem/onc follow up.     # PAWAN on CKD, now on HD without signs of renal recovery  # Hematuria  # Hx of prostate Ca  - F/u nephrology recommendations  - Rare reports exists of HRS in metastatic liver disease, but Nelia 45 and has macroscopic hematuria, also has underlying CKD.   - F/u urology recommendations,  - F/u cardiology recommendations  - Pending transition of dialysis catheter tomorrow, f/u IR recommendations    Thank you for consult  Will continue to follow.   D/w primary team

## 2022-05-12 NOTE — PROGRESS NOTE ADULT - SUBJECTIVE AND OBJECTIVE BOX
Patient seen/examined. No events overnight. No problems after liver biopsy yesterday.    Vital Signs Last 24 Hrs  T(C): 36.5 (12 May 2022 04:43), Max: 37.7 (11 May 2022 14:11)  T(F): 97.7 (12 May 2022 04:43), Max: 99.8 (11 May 2022 14:11)  HR: 89 (12 May 2022 04:43) (89 - 96)  BP: 106/63 (12 May 2022 04:43) (94/48 - 108/58)  BP(mean): 68 (11 May 2022 13:28) (62 - 70)  RR: 18 (12 May 2022 04:43) (18 - 23)  SpO2: 95% (12 May 2022 04:43) (92% - 95%)    General: NAD. AAOx3  Abd: soft. NT/ND                          10.4   9.68  )-----------( 271      ( 11 May 2022 08:47 )             33.4     05-11    130<L>  |  94<L>  |  77<H>  ----------------------------<  93  4.9   |  21<L>  |  8.59<H>    Ca    9.3      11 May 2022 08:47    TPro  7.9  /  Alb  2.9<L>  /  TBili  1.1  /  DBili  x   /  AST  346<H>  /  ALT  85<H>  /  AlkPhos  254<H>  05-11

## 2022-05-12 NOTE — PROGRESS NOTE ADULT - ASSESSMENT
71M from [facility], who is ambulatory at baseline with a walker. He has Hx of HTN, CHF?, COPD, HLD, PAD, BPH, Prostate CA (s/p radiation), Bipolar Disorder, IBD - Crohn's Disease, R ileostomy bag (s/p sigmoid resection) presented with SOB and LE edema, found with hyperkalemia on admission. Admitted to [facility] for cardio work up r/o CHF,  hyperkalemia with PAWAN on CKD 4.  Cardiology and nephrology consulted, started on lasix. Renal US shows no hydronephrosis and  + renal cysts.   CT chest shows no pleural effusion, but with hepatic lesion 4.7cm largest. Also noted with transaminitis. GI and Hepatology consulted, s/p MRI abdomen revealed  multiple liver lesions with mets, Heme/onc followed and pt was recommended for liver biopsy and bone scan. Bone scan with no osseous metastasis, Liver biopsy was postponed due to elevated BUN and risk of bleeding.   Hospital course complicated with worsening renal function, s/p IR guided HDC on 5/6 and dialysis was started  Hospital course further complicated with episodes of hematuria, noncontrast CT showed no hydro, stones or  renal mass, Urology followed and pt was recommended for out pt cystoscopy   Pt is S/P IR liver biopsy on Wednesday 5/11. IR was unable to place HD perma cath today. Perma cath placement planned for tentative Friday 5/13.  5/12-Awaiting HD today, does not appear fluid overloaded and with no s&s of resp distress.  Pt. for IR P/C placement 5/13.  SW following re OP HD facility.

## 2022-05-12 NOTE — PROGRESS NOTE ADULT - SUBJECTIVE AND OBJECTIVE BOX
Chief Complaint:  Patient is a 71y old  Male who presents with a chief complaint of acute CHF exacerbation (12 May 2022 11:33)      Reason for consult:    Interval Events:     Hospital Medications:  ARIPiprazole 15 milliGRAM(s) Oral daily  chlorhexidine 2% Cloths 1 Application(s) Topical daily  ferrous    sulfate 325 milliGRAM(s) Oral daily  finasteride 5 milliGRAM(s) Oral daily  gabapentin 600 milliGRAM(s) Oral two times a day  lamoTRIgine 200 milliGRAM(s) Oral daily  midodrine. 2.5 milliGRAM(s) Oral three times a day  pantoprazole    Tablet 40 milliGRAM(s) Oral before breakfast  sevelamer carbonate 800 milliGRAM(s) Oral three times a day with meals  sodium zirconium cyclosilicate 10 Gram(s) Oral once  tamsulosin 0.4 milliGRAM(s) Oral at bedtime  traZODone 25 milliGRAM(s) Oral at bedtime      ROS:   General:  No  fevers, chills, night sweats, fatigue  Eyes:  Good vision, no reported pain  ENT:  No sore throat, pain, runny nose  CV:  No pain, palpitations  Pulm:  No dyspnea, cough  GI:  See HPI, otherwise negative  :  No  incontinence, nocturia  Muscle:  No pain, weakness  Neuro:  No memory problems  Psych:  No insomnia, mood problems, depression  Endocrine:  No polyuria, polydipsia, cold/heat intolerance  Heme:  No petechiae, ecchymosis, easy bruisability  Skin:  No rash    PHYSICAL EXAM:   Vital Signs:  Vital Signs Last 24 Hrs  T(C): 36.5 (12 May 2022 04:43), Max: 37.7 (11 May 2022 14:11)  T(F): 97.7 (12 May 2022 04:43), Max: 99.8 (11 May 2022 14:11)  HR: 92 (12 May 2022 12:04) (89 - 96)  BP: 106/63 (12 May 2022 04:43) (94/48 - 108/58)  BP(mean): 68 (11 May 2022 13:28) (62 - 68)  RR: 18 (12 May 2022 04:43) (18 - 22)  SpO2: 93% (12 May 2022 12:04) (92% - 95%)  Daily     Daily     GENERAL: no acute distress  NEURO: alert, no asterixis  HEENT: anicteric sclera, no conjunctival pallor appreciated  CHEST: no respiratory distress, no accessory muscle use  CARDIAC: regular rate, rhythm  ABDOMEN: soft, non-tender, non-distended, no rebound or guarding  EXTREMITIES: warm, well perfused, no edema  SKIN: no lesions noted    LABS: reviewed                        10.4   9.68  )-----------( 271      ( 11 May 2022 08:47 )             33.4     05-11    130<L>  |  94<L>  |  77<H>  ----------------------------<  93  4.9   |  21<L>  |  8.59<H>    Ca    9.3      11 May 2022 08:47    TPro  7.9  /  Alb  2.9<L>  /  TBili  1.1  /  DBili  x   /  AST  346<H>  /  ALT  85<H>  /  AlkPhos  254<H>  05-11    LIVER FUNCTIONS - ( 11 May 2022 08:47 )  Alb: 2.9 g/dL / Pro: 7.9 g/dL / ALK PHOS: 254 U/L / ALT: 85 U/L DA / AST: 346 U/L / GGT: x             Interval Diagnostic Studies: see sunrise for full report   Chief Complaint:  Patient is a 71y old  Male who presents with a chief complaint of acute CHF exacerbation (12 May 2022 11:33)      Reason for consult:    Interval Events: Patient was seen and examined at bedside. Reportedly was hypotensive during HD. Denies new complaints.    Hospital Medications:  ARIPiprazole 15 milliGRAM(s) Oral daily  chlorhexidine 2% Cloths 1 Application(s) Topical daily  ferrous    sulfate 325 milliGRAM(s) Oral daily  finasteride 5 milliGRAM(s) Oral daily  gabapentin 600 milliGRAM(s) Oral two times a day  lamoTRIgine 200 milliGRAM(s) Oral daily  midodrine. 2.5 milliGRAM(s) Oral three times a day  pantoprazole    Tablet 40 milliGRAM(s) Oral before breakfast  sevelamer carbonate 800 milliGRAM(s) Oral three times a day with meals  sodium zirconium cyclosilicate 10 Gram(s) Oral once  tamsulosin 0.4 milliGRAM(s) Oral at bedtime  traZODone 25 milliGRAM(s) Oral at bedtime      ROS:   General:  No  fevers, chills, but fatigued  Eyes:  Good vision, no reported pain  ENT:  No sore throat, pain, runny nose  CV:  No pain, palpitations  Pulm:  No dyspnea, cough  GI:  No abdominal pain, N/V, has ileostomy bag, denies bleeding  :  Hematuria  Muscle:  No pain, or focal  weakness  Neuro:  No memory problems  Skin:  No rash          PHYSICAL EXAM:   Vital Signs:  Vital Signs Last 24 Hrs  T(C): 36.5 (12 May 2022 04:43), Max: 37.7 (11 May 2022 14:11)  T(F): 97.7 (12 May 2022 04:43), Max: 99.8 (11 May 2022 14:11)  HR: 92 (12 May 2022 12:04) (89 - 96)  BP: 106/63 (12 May 2022 04:43) (94/48 - 108/58)  BP(mean): 68 (11 May 2022 13:28) (62 - 68)  RR: 18 (12 May 2022 04:43) (18 - 22)  SpO2: 93% (12 May 2022 12:04) (92% - 95%)  Daily     Daily     GENERAL: no acute distress  NEURO: awake, alert, oriented to self, place and partially in time, no asterixis  HEENT: anicteric sclera, no conjunctival pallor appreciated  CHEST: no respiratory distress, no accessory muscle use, dialysis catheter  CARDIAC: regular rate, rhythm  ABDOMEN: soft, non-tender, non-distended, no rebound or guarding, ileostomy bag  EXTREMITIES: b/l LE edema improved reportedly  SKIN: No rash    LABS: reviewed                        10.4   9.68  )-----------( 271      ( 11 May 2022 08:47 )             33.4     05-11    130<L>  |  94<L>  |  77<H>  ----------------------------<  93  4.9   |  21<L>  |  8.59<H>    Ca    9.3      11 May 2022 08:47    TPro  7.9  /  Alb  2.9<L>  /  TBili  1.1  /  DBili  x   /  AST  346<H>  /  ALT  85<H>  /  AlkPhos  254<H>  05-11    LIVER FUNCTIONS - ( 11 May 2022 08:47 )  Alb: 2.9 g/dL / Pro: 7.9 g/dL / ALK PHOS: 254 U/L / ALT: 85 U/L DA / AST: 346 U/L / GGT: x             Interval Diagnostic Studies: see sunrise for full report

## 2022-05-12 NOTE — PROGRESS NOTE ADULT - PROBLEM SELECTOR PLAN 9
[phone]  T BENT DOWN TO GET FLASH LIGHT AND HIT HER HEAD ON THE SIDE OF HER DESK. IT IS TENDER TO TOUCH AND HAS A SMALL LUMP. PLEASE ADVISE.   -dvt ppx: SCDs, no AC on hold for liver biopsy today  -gi ppx: PPI

## 2022-05-13 NOTE — PROGRESS NOTE ADULT - SUBJECTIVE AND OBJECTIVE BOX
Chief Complaint:  Patient is a 71y old  Male who presents with a chief complaint of acute CHF exacerbation (13 May 2022 11:11)      Reason for consult:    Interval Events:     Hospital Medications:  ARIPiprazole 15 milliGRAM(s) Oral daily  chlorhexidine 2% Cloths 1 Application(s) Topical daily  ferrous    sulfate 325 milliGRAM(s) Oral daily  finasteride 5 milliGRAM(s) Oral daily  gabapentin 100 milliGRAM(s) Oral three times a day  lamoTRIgine 200 milliGRAM(s) Oral daily  midodrine. 5 milliGRAM(s) Oral three times a day  pantoprazole    Tablet 40 milliGRAM(s) Oral before breakfast  sevelamer carbonate 800 milliGRAM(s) Oral three times a day with meals  sodium zirconium cyclosilicate 10 Gram(s) Oral once  tamsulosin 0.4 milliGRAM(s) Oral at bedtime  traZODone 25 milliGRAM(s) Oral at bedtime      ROS:   General:  No  fevers, chills, night sweats, fatigue  Eyes:  Good vision, no reported pain  ENT:  No sore throat, pain, runny nose  CV:  No pain, palpitations  Pulm:  No dyspnea, cough  GI:  See HPI, otherwise negative  :  No  incontinence, nocturia  Muscle:  No pain, weakness  Neuro:  No memory problems  Psych:  No insomnia, mood problems, depression  Endocrine:  No polyuria, polydipsia, cold/heat intolerance  Heme:  No petechiae, ecchymosis, easy bruisability  Skin:  No rash    PHYSICAL EXAM:   Vital Signs:  Vital Signs Last 24 Hrs  T(C): 36.9 (13 May 2022 13:23), Max: 37.7 (12 May 2022 22:27)  T(F): 98.4 (13 May 2022 13:23), Max: 99.9 (12 May 2022 22:27)  HR: 98 (13 May 2022 13:44) (68 - 121)  BP: 100/57 (13 May 2022 13:23) (91/60 - 110/58)  BP(mean): --  RR: 18 (13 May 2022 13:23) (17 - 18)  SpO2: 94% (13 May 2022 13:23) (93% - 97%)  Daily     Daily     GENERAL: no acute distress  NEURO: alert, no asterixis  HEENT: anicteric sclera, no conjunctival pallor appreciated  CHEST: no respiratory distress, no accessory muscle use  CARDIAC: regular rate, rhythm  ABDOMEN: soft, non-tender, non-distended, no rebound or guarding  EXTREMITIES: warm, well perfused, no edema  SKIN: no lesions noted    LABS: reviewed                        11.0   10.49 )-----------( 285      ( 13 May 2022 07:23 )             35.0     05-13    134<L>  |  98  |  55<H>  ----------------------------<  94  4.9   |  22  |  7.21<H>    Ca    9.5      13 May 2022 07:23    TPro  8.0  /  Alb  2.9<L>  /  TBili  1.3<H>  /  DBili  x   /  AST  396<H>  /  ALT  84<H>  /  AlkPhos  298<H>  05-13    LIVER FUNCTIONS - ( 13 May 2022 07:23 )  Alb: 2.9 g/dL / Pro: 8.0 g/dL / ALK PHOS: 298 U/L / ALT: 84 U/L DA / AST: 396 U/L / GGT: x             Interval Diagnostic Studies: see sunrise for full report   Chief Complaint:  Patient is a 71y old  Male who presents with a chief complaint of acute CHF exacerbation (13 May 2022 11:11)      Reason for consult: Liver lesion    Interval Events: Patient was seen and examined at bedside. Awaiting change of HD catheter. Biopsy report pending.     Hospital Medications:  ARIPiprazole 15 milliGRAM(s) Oral daily  chlorhexidine 2% Cloths 1 Application(s) Topical daily  ferrous    sulfate 325 milliGRAM(s) Oral daily  finasteride 5 milliGRAM(s) Oral daily  gabapentin 100 milliGRAM(s) Oral three times a day  lamoTRIgine 200 milliGRAM(s) Oral daily  midodrine. 5 milliGRAM(s) Oral three times a day  pantoprazole    Tablet 40 milliGRAM(s) Oral before breakfast  sevelamer carbonate 800 milliGRAM(s) Oral three times a day with meals  sodium zirconium cyclosilicate 10 Gram(s) Oral once  tamsulosin 0.4 milliGRAM(s) Oral at bedtime  traZODone 25 milliGRAM(s) Oral at bedtime      ROS:   General:  No  fevers, chills, but fatigued  Eyes:  Good vision, no reported pain  ENT:  No sore throat, pain, runny nose  CV:  No pain, palpitations  Pulm:  No dyspnea, cough  GI:  No abdominal pain, N/V, has ileostomy bag, denies bleeding  :  Hematuria  Muscle:  No pain, or focal  weakness  Neuro:  No memory problems  Skin:  No rash        PHYSICAL EXAM:   Vital Signs:  Vital Signs Last 24 Hrs  T(C): 36.9 (13 May 2022 13:23), Max: 37.7 (12 May 2022 22:27)  T(F): 98.4 (13 May 2022 13:23), Max: 99.9 (12 May 2022 22:27)  HR: 98 (13 May 2022 13:44) (68 - 121)  BP: 100/57 (13 May 2022 13:23) (91/60 - 110/58)  BP(mean): --  RR: 18 (13 May 2022 13:23) (17 - 18)  SpO2: 94% (13 May 2022 13:23) (93% - 97%)  Daily     Daily     GENERAL: no acute distress  NEURO: awake, alert, oriented to self, place and partially in time, no asterixis  HEENT: anicteric sclera, no conjunctival pallor appreciated  CHEST: no respiratory distress, no accessory muscle use, dialysis catheter  CARDIAC: regular rate, rhythm  ABDOMEN: soft, non-tender, non-distended, no rebound or guarding, ileostomy bag  EXTREMITIES: b/l LE edema improved reportedly  SKIN: No rash    LABS: reviewed                        11.0   10.49 )-----------( 285      ( 13 May 2022 07:23 )             35.0     05-13    134<L>  |  98  |  55<H>  ----------------------------<  94  4.9   |  22  |  7.21<H>    Ca    9.5      13 May 2022 07:23    TPro  8.0  /  Alb  2.9<L>  /  TBili  1.3<H>  /  DBili  x   /  AST  396<H>  /  ALT  84<H>  /  AlkPhos  298<H>  05-13    LIVER FUNCTIONS - ( 13 May 2022 07:23 )  Alb: 2.9 g/dL / Pro: 8.0 g/dL / ALK PHOS: 298 U/L / ALT: 84 U/L DA / AST: 396 U/L / GGT: x             Interval Diagnostic Studies: see sunrise for full report

## 2022-05-13 NOTE — CHART NOTE - NSCHARTNOTEFT_GEN_A_CORE
71M from [facility], who is ambulatory at baseline with a walker. He has Hx of HTN, CHF?, COPD, HLD, PAD, BPH, Prostate CA (s/p radiation), Bipolar Disorder, IBD - Crohn's Disease, R ileostomy bag (s/p sigmoid resection) presented with SOB and LE edema, found with hyperkalemia on admission. Admitted to tele for cardio work up r/o CHF,  hyperkalemia with PAWAN on CKD 4.    HPI - Hospital course complicated with worsening renal function, s/p IR guided HDC on 5/6 and dialysis was started. Patient presented to IR today for conversion of non-tunneled to tunneled HD catheter when he was found to be tachycardiac with irregular 's-150's, therefore procedure canceled. Patient seen lying in bed in NAD. Denies chest pain, dizziness, SOB or palpitations. No nausea or vomiting. Patient states he just feels fatigued.    PHYSICAL EXAM:  GENERAL: NAD  HEENT: Normocephalic;    NECK : supple  CHEST/LUNG: Clear to auscultation bilaterally with good air entry   HEART: S1 S2  Irregular, no murmurs, gallops or rubs  ABDOMEN: Right iliostomy in place, Soft, Nontender, Nondistended; Bowel sounds present  EXTREMITIES: no cyanosis; no edema; no calf tenderness  SKIN: warm and dry; no rash  NERVOUS SYSTEM:  Awake and alert; Oriented  to place, person and time ; no new deficits    71y  Male with HTN, CHF?, COPD, HLD, PAD, BPH, Prostate CA (s/p radiation), Bipolar Disorder, IBD - Crohn's Disease, R ileostomy bag (s/p sigmoid resection) historically preserved LV and RV function, normal perfusion on a nuclear stress test 2020 recently started on HD now with new onset rapid Afib    Stat EKG showing afib with rapid v response  remote tele  lopressor 25mg bid  Cardizem gtt ordered - patient awaiting transfer to [facility]  Heparin gtt  Dr. Rush and cardiology (Dr. Archer) made aware 71M from [facility], who is ambulatory at baseline with a walker. He has Hx of HTN, CHF?, COPD, HLD, PAD, BPH, Prostate CA (s/p radiation), Bipolar Disorder, IBD - Crohn's Disease, R ileostomy bag (s/p sigmoid resection) presented with SOB and LE edema, found with hyperkalemia on admission. Admitted to tele for cardio work up r/o CHF,  hyperkalemia with PAWAN on CKD 4.    HPI - Hospital course complicated with worsening renal function, s/p IR guided HDC on 5/6 and dialysis was started. Patient presented to IR today for conversion of non-tunneled to tunneled HD catheter when he was found to be tachycardiac with irregular 's-150's, therefore procedure canceled. Patient seen lying in bed in NAD. Denies chest pain, dizziness, SOB or palpitations. No nausea or vomiting. Patient states he just feels fatigued. 128/49 's 150 97% saturation on 2L nc    PHYSICAL EXAM:  GENERAL: NAD  HEENT: Normocephalic;    NECK : supple  CHEST/LUNG: Clear to auscultation bilaterally with good air entry   HEART: S1 S2  Irregular, no murmurs, gallops or rubs  ABDOMEN: Right iliostomy in place, Soft, Nontender, Nondistended; Bowel sounds present  EXTREMITIES: no cyanosis; no edema; no calf tenderness  SKIN: warm and dry; no rash  NERVOUS SYSTEM:  Awake and alert; Oriented  to place, person and time ; no new deficits    71y  Male with HTN, CHF?, COPD, HLD, PAD, BPH, Prostate CA (s/p radiation), Bipolar Disorder, IBD - Crohn's Disease, R ileostomy bag (s/p sigmoid resection) historically preserved LV and RV function, normal perfusion on a nuclear stress test 2020 recently started on HD now with new onset rapid Afib    Stat EKG showing afib with rapid v response  remote tele  lopressor 25mg bid  Cardizem gtt ordered - patient awaiting transfer to [facility]  Heparin gtt  Dr. Rush and cardiology (Dr. Archer) made aware

## 2022-05-13 NOTE — PROGRESS NOTE ADULT - PROBLEM SELECTOR PLAN 4
-baseline CKD now progressed to ESRD requiring hemodialysis   -s/p HDC insertion with IR on 5/6   -s/p first dialysis session on 5/6,   -HD perma cath placement planned for Friday 5/13 tentatively  -Nephro Dr. Pond  -HD today (5/12) -baseline CKD now progressed to ESRD requiring hemodialysis   -s/p HDC insertion with IR on 5/6   -s/p first dialysis session on 5/6,   -HD perma cath placement planned for Friday 5/13 tentatively  -Nephro Dr. Pond  -Last HD 5/12  -f/u nephro reccs

## 2022-05-13 NOTE — PROGRESS NOTE ADULT - SUBJECTIVE AND OBJECTIVE BOX
NEPHROLOGY MEDICAL CARE, [facility] - Dr. Emerson Pond/ Dr. Nallely Curran/ Dr. Kirby Angel/ Dr. Pippa Reis    Patient was seen and examined at bedside.    CC: patient is okay and NAd    Vital Signs Last 24 Hrs  T(C): 36.9 (13 May 2022 13:23), Max: 37.7 (12 May 2022 22:27)  T(F): 98.4 (13 May 2022 13:23), Max: 99.9 (12 May 2022 22:27)  HR: 98 (13 May 2022 13:44) (68 - 121)  BP: 100/57 (13 May 2022 13:23) (91/60 - 110/58)  BP(mean): --  RR: 18 (13 May 2022 13:23) (17 - 18)  SpO2: 94% (13 May 2022 13:23) (93% - 97%)    05-12 @ 07:01  -  05-13 @ 07:00  --------------------------------------------------------  IN: 700 mL / OUT: 600 mL / NET: 100 mL        PHYSICAL EXAM:  General: No acute respiratory distress.  Eyes: conjunctiva and sclera clear  ENMT: Atraumatic, Normocephalic, supple, No JVD present  Respiratory: Bilateral decreased BS and no rhonchi, wheezing  Cardiovascular: S1S2+; no m/r/g  Gastrointestinal: Soft, Non-tender, Nondistended; Bowel sounds present   Neuro:  Awake, Alert & Oriented X3  Ext:  no pedal edema, No Cyanosis  Skin: No visible rashes  Dialysis Access: Rt DORA nichols      MEDICATIONS:  MEDICATIONS  (STANDING):  ARIPiprazole 15 milliGRAM(s) Oral daily  chlorhexidine 2% Cloths 1 Application(s) Topical daily  ferrous    sulfate 325 milliGRAM(s) Oral daily  finasteride 5 milliGRAM(s) Oral daily  gabapentin 100 milliGRAM(s) Oral three times a day  lamoTRIgine 200 milliGRAM(s) Oral daily  midodrine. 5 milliGRAM(s) Oral three times a day  pantoprazole    Tablet 40 milliGRAM(s) Oral before breakfast  sevelamer carbonate 800 milliGRAM(s) Oral three times a day with meals  sodium zirconium cyclosilicate 10 Gram(s) Oral once  tamsulosin 0.4 milliGRAM(s) Oral at bedtime  traZODone 25 milliGRAM(s) Oral at bedtime    MEDICATIONS  (PRN):          LABS:                        11.0   10.49 )-----------( 285      ( 13 May 2022 07:23 )             35.0     05-13    134<L>  |  98  |  55<H>  ----------------------------<  94  4.9   |  22  |  7.21<H>    Ca    9.5      13 May 2022 07:23    TPro  8.0  /  Alb  2.9<L>  /  TBili  1.3<H>  /  DBili  x   /  AST  396<H>  /  ALT  84<H>  /  AlkPhos  298<H>  05-13    PT/INR - ( 13 May 2022 10:05 )   PT: 15.2 sec;   INR: 1.27 ratio         PTT - ( 13 May 2022 10:05 )  PTT:30.8 sec      Urine studies    PTH and Vit D:

## 2022-05-13 NOTE — PROGRESS NOTE ADULT - ASSESSMENT
1. PAWAN due to ATN.  -First HD on 5/6. HD initiated for worsening bun/cr due to ATN with underlying CKD stage 4 and anuria. Pre dialysis bun/scr increasing and anuric, thus no signs of renal recovery at present  -s/p HD yesterday plan for HD tomorrow. Permacath conversion by IR was cancelled due to tachycardia to 140s and plan for monday.    -awaiting for outpatient HD dialysis center placement.  -pt also had gross hematuria and unclear etiology seems less likely nephritis and RUBIA, ANCA and Anit-GBM are -ve.  Urinalysis shows no proteinuria. spot protein to creatinine ratio is 900mg.  -renal sono show no hydro.  -Adjust meds to eGFR and avoid IV Gadolinium contrast,NSAIDs, and phosphate enema.  -Monitor I/O's daily.   -Monitor SMA daily.  2. CKD stage 4 most likely due to ischemic nephropathy and h/o ATN with renal recovery.  -Baseline Scr around 3.2mg/dL in April 2020.  -Keep patient euvolemic and renal diet  -Avoid Nephrotoxic Meds/ Agents such as (NSAIDs, IV contrast, Aminoglycosides such as gentamicin, -Gadolinium contrast, Phosphate containing enemas, etc..)  -Adjust Medications according to eGFR  3. Anemia:   -hb is stable; continue iron tabs. monitor CBC  4. MBD:   -phos is stable. previous mild hyperca, which improved and off calcitirol; on renvela 1 tab tid;   -pth is okay at 41.  5. Hyperkalemia due to pawan on ckd.   -stable.  -on low K diet. give Lokelma prn if K >5.5  -Keep pt euvolemic. Avoid ACE inh/ ARBs, NSAIDs, and Aldactone or potassium sparing diuretics. Monitor K+ daily.  6. Fluid Overload:  -clinically improved. off diuretics for now.   -CT chest w/o contrast shows no effusion;   7. Acidosis:  -CO2 is better with HD.  -stable. monitor CO2  8. h/o Hypotension:  -bp is low during HD and continue midodrine to 5mg tid      9. Elevated LFTs  - off lipitor for now.  -hepatitis panel shows hep C+ve.  discussed with pt, pt had known hep C and was treated for in the past; hep C RNA is not detected.   -C3/C4 and RF negative, cryoglobulinemia is negative.    -ct scan shows hepatic mass; Hepatology consulted and MRI w/o contrast shows liver mass and mets.  -Hepatology and Oncology consulted.   -Plan for IR guided liver biopsy on 5/12. awaiting results.   -Bone scan shows no bone mets. spep and IF are nl and serum FLC is within range for ckd.  10. Hyponatremia: due to lack free water intake.   -Na is better..  -place on 1L fluid restriction/day.   -monitor Na.  10. Neuropathy:  -continue gabapentin to 100mg tid (renal dosage)      Discussed with patient in detail regarding the renal plan and care  Discussed the assessment and plan with Primary Team/Nurse

## 2022-05-13 NOTE — PROGRESS NOTE ADULT - ASSESSMENT
71M from [facility], who is ambulatory at baseline with a walker. He has Hx of HTN, CHF?, COPD, HLD, PAD, BPH, Prostate CA (s/p radiation), Bipolar Disorder, IBD - Crohn's Disease, R ileostomy bag (s/p sigmoid resection) presented with SOB and LE edema, found with hyperkalemia on admission. Admitted to [facility] for cardio work up r/o CHF,  hyperkalemia with PAWAN on CKD 4.  Cardiology and nephrology consulted, started on lasix. Renal US shows no hydronephrosis and  + renal cysts.   CT chest shows no pleural effusion, but with hepatic lesion 4.7cm largest. Also noted with transaminitis. GI and Hepatology consulted, s/p MRI abdomen revealed  multiple liver lesions with mets, Heme/onc followed and pt was recommended for liver biopsy and bone scan. Bone scan with no osseous metastasis, Liver biopsy was postponed due to elevated BUN and risk of bleeding.   Hospital course complicated with worsening renal function, s/p IR guided HDC on 5/6 and dialysis was started  Hospital course further complicated with episodes of hematuria, noncontrast CT showed no hydro, stones or  renal mass, Urology followed and pt was recommended for out pt cystoscopy   Pt is S/P IR liver biopsy on Wednesday 5/11. IR was unable to place HD perma cath today. Perma cath placement planned for tentative Friday 5/13.  5/12-Awaiting HD today, does not appear fluid overloaded and with no s&s of resp distress.  Pt. for IR P/C placement 5/13.  SW following re OP HD facility.         71M from [facility], who is ambulatory at baseline with a walker. He has Hx of HTN, CHF?, COPD, HLD, PAD, BPH, Prostate CA (s/p radiation), Bipolar Disorder, IBD - Crohn's Disease, R ileostomy bag (s/p sigmoid resection) presented with SOB and LE edema, found with hyperkalemia on admission. Admitted to [facility] for cardio work up r/o CHF,  hyperkalemia with PAWAN on CKD 4.  Cardiology and nephrology consulted, started on lasix. Renal US shows no hydronephrosis and  + renal cysts.   CT chest shows no pleural effusion, but with hepatic lesion 4.7cm largest. Also noted with transaminitis. GI and Hepatology consulted, s/p MRI abdomen revealed  multiple liver lesions with mets, Heme/onc followed and pt was recommended for liver biopsy and bone scan. Bone scan with no osseous metastasis, Liver biopsy was postponed due to elevated BUN and risk of bleeding.   Hospital course complicated with worsening renal function, s/p IR guided HDC on 5/6 and dialysis was started  Hospital course further complicated with episodes of hematuria, noncontrast CT showed no hydro, stones or  renal mass, Urology followed and pt was recommended for out pt cystoscopy   Pt is S/P IR liver biopsy on Wednesday 5/11. IR was unable to place HD perma cath today. Perma cath placement planned for tentative Friday 5/13.  5/12-Awaiting HD today, does not appear fluid overloaded and with no s&s of resp distress.  Pt. for IR P/C placement 5/13.  SW following re OP HD facility.  5/13-Awaiting IR P/C placement.  For possible discharge to [facility] Monday pending successful HD tx via P/C.

## 2022-05-13 NOTE — PROGRESS NOTE ADULT - SUBJECTIVE AND OBJECTIVE BOX
NP Note discussed with  Primary Attending    Patient is a 71y old  Male who presents with a chief complaint of acute CHF exacerbation (13 May 2022 08:41)      INTERVAL HPI/OVERNIGHT EVENTS: no new complaints    MEDICATIONS  (STANDING):  ARIPiprazole 15 milliGRAM(s) Oral daily  chlorhexidine 2% Cloths 1 Application(s) Topical daily  ferrous    sulfate 325 milliGRAM(s) Oral daily  finasteride 5 milliGRAM(s) Oral daily  gabapentin 100 milliGRAM(s) Oral three times a day  lamoTRIgine 200 milliGRAM(s) Oral daily  midodrine. 5 milliGRAM(s) Oral three times a day  pantoprazole    Tablet 40 milliGRAM(s) Oral before breakfast  sevelamer carbonate 800 milliGRAM(s) Oral three times a day with meals  sodium zirconium cyclosilicate 10 Gram(s) Oral once  tamsulosin 0.4 milliGRAM(s) Oral at bedtime  traZODone 25 milliGRAM(s) Oral at bedtime    MEDICATIONS  (PRN):      __________________________________________________  REVIEW OF SYSTEMS:    CONSTITUTIONAL: No fever,   EYES: no acute visual disturbances  NECK: No pain or stiffness  RESPIRATORY: No cough; No shortness of breath  CARDIOVASCULAR: No chest pain, no palpitations  GASTROINTESTINAL: No pain. No nausea or vomiting; No diarrhea   NEUROLOGICAL: No headache or numbness, no tremors  MUSCULOSKELETAL: No joint pain, no muscle pain  GENITOURINARY: no dysuria, no frequency, no hesitancy  PSYCHIATRY: no depression , no anxiety  ALL OTHER  ROS negative        Vital Signs Last 24 Hrs  T(C): 36.7 (13 May 2022 05:33), Max: 37.7 (12 May 2022 22:27)  T(F): 98 (13 May 2022 05:33), Max: 99.9 (12 May 2022 22:27)  HR: 68 (13 May 2022 05:33) (68 - 109)  BP: 100/52 (13 May 2022 05:33) (91/60 - 118/52)  BP(mean): --  RR: 18 (13 May 2022 05:33) (16 - 18)  SpO2: 95% (13 May 2022 05:33) (93% - 97%)    ________________________________________________  PHYSICAL EXAM:  GENERAL: NAD  HEENT: Normocephalic;  conjunctivae and sclerae clear; moist mucous membranes;   NECK : supple  CHEST/LUNG: Clear to auscultation bilaterally with good air entry   HEART: S1 S2  regular; no murmurs, gallops or rubs  ABDOMEN: Right iliostomy in place, Soft, Nontender, Nondistended; Bowel sounds present  EXTREMITIES: no cyanosis; no edema; no calf tenderness  SKIN: warm and dry; no rash  NERVOUS SYSTEM:  Awake and alert; Oriented  to place, person and time ; no new deficits    _________________________________________________  LABS:                        11.0   10.49 )-----------( 285      ( 13 May 2022 07:23 )             35.0     05-13    134<L>  |  98  |  55<H>  ----------------------------<  94  4.9   |  22  |  7.21<H>    Ca    9.5      13 May 2022 07:23    TPro  8.0  /  Alb  2.9<L>  /  TBili  1.3<H>  /  DBili  x   /  AST  396<H>  /  ALT  84<H>  /  AlkPhos  298<H>  05-13        CAPILLARY BLOOD GLUCOSE    RADIOLOGY & ADDITIONAL TESTS:  < from: US Renal (05.02.22 @ 11:06) >  ACC: [id number] EXAM:  US KIDNEY(S)                          PROCEDURE DATE:  05/02/2022          INTERPRETATION:  CLINICAL INFORMATION: 71 years  Male with CKD.    COMPARISON: 12/25/2019    TECHNIQUE: Sonography of the kidneys and bladder.    FINDINGS:  Right kidney: 10.8 cm. No hydronephrosis. 2.1 x 2.0 x 2.0 cm midpole cyst   and 1.8 x 1.3 x 1.2 cm lower pole cyst.    Left kidney: 11.1 cm. 3.7 x 4.3 x 3.3 cm midpole cyst and 2.3 x 2.3 x 2.5   cm septated lower pole cyst.    Urinary bladder: Within normal limits.    IMPRESSION:  No hydronephrosis. Bilateral renal cysts.    < end of copied text >    < from: CT Chest No Cont (05.02.22 @ 11:10) >    ACC: [id number] EXAM:  CT CHEST                          PROCEDURE DATE:  05/02/2022          INTERPRETATION:  HISTORY: Admitting Dxs: E87.5 HYPERKALEMIA. Evaluate   pleural effusion.    EXAMINATION: CT CHEST was performed without IV contrast.    COMPARISON: None available.    FINDINGS:    AIRWAYS, LUNGS, PLEURA: Bilateral lower lobe bronchiolectasis. Right   middle lobe and bibasilar subsegmental atelectasis. No pleural effusion.    MEDIASTINUM: Normal heart size. Coronary calcifications. No pericardial   effusion. Thoracic aorta normal caliber.  No large mediastinal lymph   nodes. Small hiatal hernia.    IMAGED ABDOMEN: The liver is only partially imaged. Hepatic hypodense   region measuring 4.7 x 4.3 cm (image 109, series 3). Additional left   hepatic lobe 2.7 cm lesion suspected (image 147, series 3) and nodular   thickening along the hepatic capsule in multiple locations; these   findings are new compared to 2/5/2020 CT abdomen. Left renal hypodense   lesions better demonstrated on same day renal ultrasound.    SOFT TISSUES: Unremarkable.    BONES: Unremarkable.      IMPRESSION:.    No pleural effusion.    Multiple hepatic lesions with largest area at the hepatic dome measuring   up to 4.7 cm; findings are new compared to 2/5/2020 CT abdomen. Recommend   dedicated CT or MRI abdomen to further assess and to exclude malignancy.    --- End of Report ---    < end of copied text >    < from: MR Abdomen No Cont (05.03.22 @ 11:23) >    ACC: [id number] EXAM:  MR ABDOMEN                          *** ADDENDUM***    Some of the hepatic lesions have small T1 hyperintense components on   fat-suppressed T1-weighted gradient echo images, suggestive of blood   products.    --- End of Report---    *** END OF ADDENDUM***      PROCEDURE DATE:  05/03/2022          INTERPRETATION:  CLINICAL INFORMATION: Hepatic lesions. History of   prostate cancer.    COMPARISON: None.    CONTRAST/COMPLICATIONS:  IV Contrast: NONE  Oral Contrast: NONE  Complications: None reported at time of study completion    PROCEDURE:  MRI of the abdomen was performed.    FINDINGS:  LOWER CHEST: Within normal limits.    LIVER: Innumerable mildly T2 hyperintense lesions are seen throughout the   liver, suggestive ofmetastasis. The largest lesion measures 13.8 x 11.7   x 11.7 cm in the liver dome.  BILE DUCTS: Normal caliber.  GALLBLADDER: Cholelithiasis.  SPLEEN: Within normal limits.  PANCREAS: Within normal limits.  ADRENALS: Within normal limits.  KIDNEYS/URETERS: Multiple brightly T2 hyperintense lesion in the kidneys   bilaterally, representing probable cysts.    VISUALIZED PORTIONS:  BOWEL: Small hiatal hernia. Right lower quadrant ostomy.  PERITONEUM: No ascites.  VESSELS: Within normal limits.  RETROPERITONEUM/LYMPH NODES: No lymphadenopathy.  ABDOMINAL WALL: Within normal limits.  BONES: Multiple T2 hyperintense and T2 hyperintense lesions in the   thoracolumbar spine may represent osseous metastasis. Whole-body bone   scan may be pursued for further evaluation.    IMPRESSION:  Innumerable mildly T2 hyperintense lesions are seen throughout the liver,   suggestive of metastasis. The largest lesion measures 13.8 x 11.7 x 11.7   cm in the liver dome.    Multiple T2 hyperintense and T2 hyperintense lesions in the thoracolumbar   spine may represent osseous metastasis. Whole-body bone scan may be   pursued for further evaluation.    Cholelithiasis.    --- End of Report ---    < end of copied text >    < from: NM Bone Imaging Total (05.05.22 @ 14:37) >  ACC: [id number] EXAM:  NM BONE IMG WHOLE BODY                          PROCEDURE DATE:  05/05/2022          INTERPRETATION:  CLINICAL INFORMATION: 71 year-old male with prostate   carcinoma, liver lesions, referred to evaluate for osseous metastasis,    RADIOPHARMACEUTICAL: 20.1 mCi Tc-99m HDP, I.V.    TECHNIQUE:  Whole-body planar images were obtained in the anterior and   posterior projections approximately 2-3 hours after tracer injection.   Additional static views of the chest and femurs in the anterior,   posterior and lateral projections were also obtained.    COMPARISON: No previous bone scan for comparison    FINDINGS: There are foci of increased tracer uptake in the right   anterolateral mid ribs near the costochondral junction, probably post   traumatic. There is mildly increased, heterogeneous tracer activity in   the right and left femurs. There are degenerative changes in the major   joints. There is physiologic tracer distribution in the remainder of the   visualized skeleton.    Both kidneys are visualized.    IMPRESSION: Bone scan demonstrates:    Foci of increased uptake in the right anterolateral mid ribs probably   post traumatic.    Heterogeneous tracer activity in the right and left femurs, nonspecific.   Suggest radiographic correlation.    Degenerative disease in the major joints.    --- End of Report ---    < end of copied text >    < from: CT Abdomen and Pelvis No Cont (05.06.22 @ 10:00) >    ACC: [id number] EXAM:  CT ABDOMEN AND PELVIS                          PROCEDURE DATE:  05/06/2022          INTERPRETATION:  CLINICAL INFORMATION: 71 years  Male with Gross   hematuria. Prostate cancer    COMPARISON: MRI abdomen 5/3/2022, chest CT 5/2/2022 and CT abdomen and   pelvis 2/5/2020    CONTRAST/COMPLICATIONS:  IV Contrast: NONE  Oral Contrast: NONE  Complications: None reported at time of study completion    PROCEDURE:  CT of the Abdomen and Pelvis was performed.  Sagittal and coronal reformats were performed.    LIMITATIONS: Evaluation of the solid organs, vascular structures and GI   tract is limited without oral and IV contrast.    FINDINGS:  LOWER CHEST: Stable right middle lobe discoid atelectasis. Bilateral   lower lobe dependent atelectasis.    LIVER: Heterogeneous liver with innumerable indistinct hypodense and   hyperdense masses.  BILE DUCTS: Normal caliber.  GALLBLADDER: Cholelithiasis. Porcelain gallbladder.  SPLEEN: Within normal limits.  PANCREAS: Within normal limits.  ADRENALS: Within normal limits.  KIDNEYS/URETERS: Bilateral cysts. No hydronephrosis.    BLADDER: Within normal limits.  REPRODUCTIVE ORGANS: Lobulated prostate.    BOWEL: Subtotal colectomy with Connors's pouch. Right-sided ileostomy. No   bowelobstruction. Small sliding hiatus hernia. Decompressed stomach   cannot be assessed. Nonobstructed small bowel extends into bilateral   inguinal hernias  PERITONEUM: No ascites.  VESSELS: IVC filter.  RETROPERITONEUM/LYMPH NODES: No lymphadenopathy.  ABDOMINAL WALL: Bilateral inguinal hernias containing nonobstructed small   bowel.  BONES: L5-S1 degenerative disc disease. Lytic lucency in the right iliac   bone (2:105), right L4 pedicle (2:90) and right L3 vertebra (2:79) are   unchanged from 2020.    IMPRESSION:  Evaluation of the solid organs, vascular structures and GI tract is   limited without oral and IV contrast.    Hepatic metastases.    Subtotal colectomy with Connros's pouch and right ileostomy.   Nonobstructed small bowel in bilateral inguinal hernias.    Osseous lucency is unchanged from 2020, which are not active on nuclear   bone scan 5/5/2022    Other chronic findings as above.    < end of copied text >    Imaging Personally Reviewed:  YES/NO    Consultant(s) Notes Reviewed:   YES/ No    Care Discussed with Consultants :     Plan of care was discussed with patient and /or primary care giver; all questions and concerns were addressed and care was aligned with patient's wishes.

## 2022-05-13 NOTE — PROGRESS NOTE ADULT - ASSESSMENT
71y Male from [facility] with Hx of HTN, CHF?, COPD, HLD, PAD, BPH, Prostate CA (s/p radiation), Bipolar Disorder, IBD - Crohn's Disease, R ileostomy bag (s/p sigmoid resection), COVID-19 (2020) and s/p COVID19 vaccination ([employer] x3) was brought to [facility] ED on 4/28/22 b/o 2 days worsening bilateral leg swelling with difficulty ambulating, and SOB with SpO2 93-94% on RA on arrival.  He was found to have bilateral vascular congestion on CXR due to suspected acute exacerbation of CHF, and PAWAN on CKD (BUN/Cr 88/3.28), with hyperkalemia (K 6.1). 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  Being followed by cardiology, PBNP was mildly elevated, and TTE showed LVEF 55%, normal diastolic function and normal RV function, thus did not appear to be in acute CHF exacerbation.    Hepatology was consulted b/o liver lesion and abnormal liver enzymes.     # Abnormal liver enzymes, AST >>ALT  - CPK nl. HIV neg. Hep B and C as below. Hep A IgM neg.   - Hypotensive, requiring midodrine, with renal dysfunction, Ig panel wnl, K/L ratio WNL, F/u UPEP  - No Hx of EtOH  - RUBIA neg, IgG 1723, Follow up / obtain SMA, LKM, AMA, A1AT, ceruloplasmin, Hep E  - Avoid hepatotoxic medications, agree holding statin  - Please, obtain collateral information about timing of lamotrigine, because its hepatotoxicity is well established, and consider alternative.     # Pos HCV ab  - HCV PCR neg with the Hx of Hep C and treatment, suggests SVR.   - Cryoglobulin negative  - HIV neg    # HBcAb IgM pos, but HBsAg neg  - HBsAg neg, HBV DNA neg, but HBcAb pos, HBeAb pos, past infection. Also obtain Hep D serology.     # Liver lesions  - On CT chest incidentally found a 4.7x4.3 cm partially imaged hypodense lesion and in L lobe another 2.7 cm lesion, as well as nodular thickening along hepatic capsule, all are new from the 2/2020 CT a/p  - CEA and CA 19-9 WNL, but AFP> 5000, PSA WNL  - Could not get contrast CT b/o PAWAN on CKD. MRI was done w/o contrast showing Innumerable mildly T2 hyperintense lesions  throughout the liver, suggestive of metastasis. The largest lesion measures 13.8 x 11.7 x 11.7 cm in the liver dome. Multiple T2 hyperintense and T2 hyperintense lesions in the thoracolumbar spine may represent osseous metastasis.  - No fever or leukocytosis  - S/p liver biopsy per IR ( L lobe mass), f/u result  - Bone scan did not suggest metastatic disease.  - Hem/onc follow up.     # PAWAN on CKD, now on HD without signs of renal recovery  # Hematuria  # Hx of prostate Ca  - F/u nephrology recommendations  - Rare reports exists of HRS in metastatic liver disease, but Nelia 45 and has macroscopic hematuria, also has underlying CKD.   - F/u urology recommendations,  - F/u cardiology recommendations  - Pending transition of dialysis catheter today f/u  IR recommendations    Thank you for consult  Will continue to follow. Hepatology returns Monday. Please, consult GI on call if change in status, questions, concerns.   D/w primary team

## 2022-05-13 NOTE — PROGRESS NOTE ADULT - SUBJECTIVE AND OBJECTIVE BOX
no fever  no pain or sob    MEDICATIONS  (STANDING):  ARIPiprazole 15 milliGRAM(s) Oral daily  chlorhexidine 2% Cloths 1 Application(s) Topical daily  ferrous    sulfate 325 milliGRAM(s) Oral daily  finasteride 5 milliGRAM(s) Oral daily  gabapentin 100 milliGRAM(s) Oral three times a day  lamoTRIgine 200 milliGRAM(s) Oral daily  midodrine. 5 milliGRAM(s) Oral three times a day  pantoprazole    Tablet 40 milliGRAM(s) Oral before breakfast  sevelamer carbonate 800 milliGRAM(s) Oral three times a day with meals  sodium zirconium cyclosilicate 10 Gram(s) Oral once  tamsulosin 0.4 milliGRAM(s) Oral at bedtime  traZODone 25 milliGRAM(s) Oral at bedtime    MEDICATIONS  (PRN):      Allergies    No Known Allergies    Intolerances        Vital Signs Last 24 Hrs  T(C): 36.7 (13 May 2022 05:33), Max: 37.7 (12 May 2022 22:27)  T(F): 98 (13 May 2022 05:33), Max: 99.9 (12 May 2022 22:27)  HR: 68 (13 May 2022 05:33) (68 - 109)  BP: 100/52 (13 May 2022 05:33) (91/60 - 118/52)  BP(mean): --  RR: 18 (13 May 2022 05:33) (16 - 18)  SpO2: 95% (13 May 2022 05:33) (93% - 97%)    PHYSICAL EXAM  General: adult in NAD  HEENT: clear oropharynx, anicteric sclera, pink conjunctiva  Neck: supple  CV: normal S1/S2 with no murmur rubs or gallops  Lungs: positive air movement b/l ant lungs,clear to auscultation, no wheezes, no rales  Abdomen: soft non-tender non-distended, no hepatosplenomegaly  Ext: no clubbing cyanosis or edema  Skin: no rashes and no petechiae  Neuro: alert and oriented X 4, no focal deficits  LABS:                          11.0   10.49 )-----------( 285      ( 13 May 2022 07:23 )             35.0         Mean Cell Volume : 81.2 fl  Mean Cell Hemoglobin : 25.5 pg  Mean Cell Hemoglobin Concentration : 31.4 gm/dL  Auto Neutrophil # : x  Auto Lymphocyte # : x  Auto Monocyte # : x  Auto Eosinophil # : x  Auto Basophil # : x  Auto Neutrophil % : x  Auto Lymphocyte % : x  Auto Monocyte % : x  Auto Eosinophil % : x  Auto Basophil % : x    Serial CBC  Hematocrit 35.0  Hemoglobin 11.0  Plat 285  RBC 4.31  WBC 10.49  Serial CBC  Hematocrit 32.0  Hemoglobin 10.4  Plat 302  RBC 4.03  WBC 10.71  Serial CBC  Hematocrit 33.4  Hemoglobin 10.4  Plat 271  RBC 4.09  WBC 9.68  Serial CBC  Hematocrit 32.6  Hemoglobin 10.3  Plat 267  RBC 4.04  WBC 8.86    05-13    134<L>  |  98  |  55<H>  ----------------------------<  94  4.9   |  22  |  7.21<H>    Ca    9.5      13 May 2022 07:23    TPro  8.0  /  Alb  2.9<L>  /  TBili  1.3<H>  /  DBili  x   /  AST  396<H>  /  ALT  84<H>  /  AlkPhos  298<H>  05-13      PT/INR - ( 11 May 2022 08:47 )   PT: 15.2 sec;   INR: 1.27 ratio         PTT - ( 11 May 2022 08:47 )  PTT:27.0 sec              BLOOD SMEAR INTERPRETATION:       RADIOLOGY & ADDITIONAL STUDIES:

## 2022-05-13 NOTE — PRE PROCEDURE NOTE - PRE PROCEDURE EVALUATION
Interventional Radiology Pre-Procedure Note    Diagnosis/Indication: Patient is a 71y old Male with renal failure, requiring prolonged dialysis. Tunneled HD catheter placement was requested.     PAST MEDICAL & SURGICAL HISTORY:  COPD, mild  Anemia  Bipolar disorder  IBD (inflammatory bowel disease)  HTN (hypertension)  HLD (hyperlipidemia)  PAD (peripheral artery disease)  CKD (chronic kidney disease)  Prostate CA  BPH with urinary obstruction  History of creation of ostomy       Allergies: No Known Allergies      LABS:  CBC Full  -  ( 13 May 2022 07:23 )  WBC Count : 10.49 K/uL  RBC Count : 4.31 M/uL  Hemoglobin : 11.0 g/dL  Hematocrit : 35.0 %  Platelet Count - Automated : 285 K/uL  Mean Cell Volume : 81.2 fl  Mean Cell Hemoglobin : 25.5 pg  Mean Cell Hemoglobin Concentration : 31.4 gm/dL    05-13    134<L>  |  98  |  55<H>  ----------------------------<  94  4.9   |  22  |  7.21<H>    Ca    9.5      13 May 2022 07:23    TPro  8.0  /  Alb  2.9<L>  /  TBili  1.3<H>  /  DBili  x   /  AST  396<H>  /  ALT  84<H>  /  AlkPhos  298<H>  05-13    PT/INR - ( 13 May 2022 10:05 )   PT: 15.2 sec;   INR: 1.27 ratio       PTT - ( 13 May 2022 10:05 )  PTT:30.8 sec    Procedure/ risks/ benefits/ alternatives were discussed with the patient, who verbalizes understanding, and witnessed informed consent was obtained. 
Interventional Radiology Pre-Procedure Note    Diagnosis/Indication: Patient is a 71y old M with multiple comorbidities who was found to have hepatic masses. Biopsy of a hepatic mass was requested.     PAST MEDICAL & SURGICAL HISTORY:  COPD, mild  Anemia  Bipolar disorder  IBD (inflammatory bowel disease)  HTN (hypertension)  HLD (hyperlipidemia)  PAD (peripheral artery disease)  CKD (chronic kidney disease)  Prostate CA  BPH with urinary obstruction  History of creation of ostomy       Allergies: No Known Allergies      LABS:  CBC Full  -  ( 11 May 2022 08:47 )  WBC Count : 9.68 K/uL  RBC Count : 4.09 M/uL  Hemoglobin : 10.4 g/dL  Hematocrit : 33.4 %  Platelet Count - Automated : 271 K/uL  Mean Cell Volume : 81.7 fl  Mean Cell Hemoglobin : 25.4 pg  Mean Cell Hemoglobin Concentration : 31.1 gm/dL    05-11    130<L>  |  94<L>  |  77<H>  ----------------------------<  93  4.9   |  21<L>  |  8.59<H>    Ca    9.3      11 May 2022 08:47    TPro  7.9  /  Alb  2.9<L>  /  TBili  1.1  /  DBili  x   /  AST  346<H>  /  ALT  85<H>  /  AlkPhos  254<H>  05-11    PT/INR - ( 11 May 2022 08:47 )   PT: 15.2 sec;   INR: 1.27 ratio      PTT - ( 11 May 2022 08:47 )  PTT:27.0 sec    Procedure/ risks/ benefits/ alternatives were discussed with the patient, including but not limited to bleeding and infection. The patient verbalizes understanding, and witnessed informed consent was obtained. 
Interventional Radiology Pre-Procedure Note    Diagnosis/Indication: Patient is a 71y old Male who presents with a chief complaint of acute CHF exacerbation, renal failure with marked uremia. Nontunneled HD catheter placement was requested. Case was discussed with Dr. Pond and Dr. Rush.     PAST MEDICAL & SURGICAL HISTORY:  COPD, mild  Anemia  Bipolar disorder  IBD (inflammatory bowel disease)  HTN (hypertension)  HLD (hyperlipidemia)  PAD (peripheral artery disease)  CKD (chronic kidney disease)  Prostate CA  BPH with urinary obstruction  History of creation of ostomy    Allergies: No Known Allergies    LABS:  CBC Full  -  ( 06 May 2022 06:59 )  WBC Count : 8.52 K/uL  RBC Count : 4.04 M/uL  Hemoglobin : 10.5 g/dL  Hematocrit : 33.2 %  Platelet Count - Automated : 230 K/uL  Mean Cell Volume : 82.2 fl  Mean Cell Hemoglobin : 26.0 pg  Mean Cell Hemoglobin Concentration : 31.6 gm/dL    05-06    132<L>  |  101  |  125<H>  ----------------------------<  104<H>  5.0   |  17<L>  |  5.54<H>    Ca    9.2      06 May 2022 06:59    TPro  8.2  /  Alb  3.0<L>  /  TBili  1.1  /  DBili  x   /  AST  334<H>  /  ALT  73<H>  /  AlkPhos  197<H>  05-06    PT/INR - ( 06 May 2022 06:59 )   PT: 14.1 sec;   INR: 1.18 ratio       PTT - ( 06 May 2022 06:59 )  PTT:27.1 sec    Procedure/ risks/ benefits/ alternatives were discussed with the patient, who verbalizes understanding, and witnessed informed consent was obtained.

## 2022-05-13 NOTE — PROGRESS NOTE ADULT - ASSESSMENT
· Assessment	  WILMER SELLERS is a 71y Male who presents with a chief complaint of CHF exacerbation.    Liver and Bone Lesions  - MRI and CT imaging reviewed; multiple liver lesions and skeletal lesions suspicious of metastatic disease. Bone scan did not show any suspicious lesions.   - AFP elevated at 5,338. PSA is 0. plasma cell neoplasm markers are neg   - liver biopsy done, path pending    Hematuria  - Urology following. Planning on outpatient cystoscopy.   Acute Kidney Injury  - Nephrology following. Patient is currently on dialysis at this time.   - Continue to monitor urine output and kidney function.     Will continue to follow. Thank you for the consultation. · Assessment	  WILMER SELLERS is a 71y Male who presents with a chief complaint of CHF exacerbation.    Liver and Bone Lesions  - MRI and CT imaging reviewed; multiple liver lesions and skeletal lesions suspicious of metastatic disease. Bone scan did not show any suspicious lesions.   - AFP elevated at 5,338. PSA is 0. plasma cell neoplasm markers are neg   - liver biopsy done, path pending.  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 Planning on outpatient cystoscopy.   Acute Kidney Injury  - Nephrology following. Patient is currently on dialysis at this time.   - Continue to monitor urine output and kidney function.     Will continue to follow. Thank you for the consultation.

## 2022-05-13 NOTE — PROGRESS NOTE ADULT - SUBJECTIVE AND OBJECTIVE BOX
Patient seen/examined. No events overnight. No complaints.    Vital Signs Last 24 Hrs  T(C): 36.7 (13 May 2022 05:33), Max: 37.7 (12 May 2022 22:27)  T(F): 98 (13 May 2022 05:33), Max: 99.9 (12 May 2022 22:27)  HR: 68 (13 May 2022 05:33) (68 - 109)  BP: 100/52 (13 May 2022 05:33) (91/60 - 118/52)  BP(mean): --  RR: 18 (13 May 2022 05:33) (16 - 18)  SpO2: 95% (13 May 2022 05:33) (93% - 97%)    General: NAD. AAOx3  Abd: soft. NT/ND                          11.0   10.49 )-----------( 285      ( 13 May 2022 07:23 )             35.0     05-13    134<L>  |  98  |  55<H>  ----------------------------<  94  4.9   |  22  |  7.21<H>    Ca    9.5      13 May 2022 07:23    TPro  8.0  /  Alb  2.9<L>  /  TBili  1.3<H>  /  DBili  x   /  AST  396<H>  /  ALT  84<H>  /  AlkPhos  298<H>  05-13

## 2022-05-13 NOTE — PROGRESS NOTE ADULT - SUBJECTIVE AND OBJECTIVE BOX
Patient presented to IR for conversion of nontunneled to tunneled HD catheter. Informed consent was obtained and patient was placed on procedure table. Patient was connected to physiologic monitoring and was noted to be tachycardic (-150s), ? rapid afib. Procedure was therefore not performed today, due to patient safety concerns. Patient was transported from the department in stable condition.   - cardiac evaluation and optimization.  - continue using nontunneled HD catheter for dialysis until patient optimized for procedure.  - Call IR if clinical situation changes and/or new information becomes available.    Case d/w Dr. Rush.

## 2022-05-13 NOTE — PROGRESS NOTE ADULT - SUBJECTIVE AND OBJECTIVE BOX
Patient is a 71y old  Male who presents with a chief complaint of acute CHF exacerbation (13 May 2022 09:17)    PATIENT IS SEEN AND EXAMINED IN MEDICAL FLOOR.  TIMIT [    ]    ERASTO [   ]      GT [   ]    ALLERGIES:  No Known Allergies      Daily     Daily     VITALS:    Vital Signs Last 24 Hrs  T(C): 36.7 (13 May 2022 05:33), Max: 37.7 (12 May 2022 22:27)  T(F): 98 (13 May 2022 05:33), Max: 99.9 (12 May 2022 22:27)  HR: 68 (13 May 2022 05:33) (68 - 109)  BP: 100/52 (13 May 2022 05:33) (91/60 - 118/52)  BP(mean): --  RR: 18 (13 May 2022 05:33) (16 - 18)  SpO2: 95% (13 May 2022 05:33) (93% - 97%)    LABS:    CBC Full  -  ( 13 May 2022 07:23 )  WBC Count : 10.49 K/uL  RBC Count : 4.31 M/uL  Hemoglobin : 11.0 g/dL  Hematocrit : 35.0 %  Platelet Count - Automated : 285 K/uL  Mean Cell Volume : 81.2 fl  Mean Cell Hemoglobin : 25.5 pg  Mean Cell Hemoglobin Concentration : 31.4 gm/dL  Auto Neutrophil # : x  Auto Lymphocyte # : x  Auto Monocyte # : x  Auto Eosinophil # : x  Auto Basophil # : x  Auto Neutrophil % : x  Auto Lymphocyte % : x  Auto Monocyte % : x  Auto Eosinophil % : x  Auto Basophil % : x    PT/INR - ( 13 May 2022 10:05 )   PT: 15.2 sec;   INR: 1.27 ratio         PTT - ( 13 May 2022 10:05 )  PTT:30.8 sec  05-13    134<L>  |  98  |  55<H>  ----------------------------<  94  4.9   |  22  |  7.21<H>    Ca    9.5      13 May 2022 07:23    TPro  8.0  /  Alb  2.9<L>  /  TBili  1.3<H>  /  DBili  x   /  AST  396<H>  /  ALT  84<H>  /  AlkPhos  298<H>  05-13    CAPILLARY BLOOD GLUCOSE            LIVER FUNCTIONS - ( 13 May 2022 07:23 )  Alb: 2.9 g/dL / Pro: 8.0 g/dL / ALK PHOS: 298 U/L / ALT: 84 U/L DA / AST: 396 U/L / GGT: x           Creatinine Trend: 7.21<--, 11.00<--, 8.59<--, 6.12<--, 7.88<--, 5.82<--  I&O's Summary    12 May 2022 07:01  -  13 May 2022 07:00  --------------------------------------------------------  IN: 700 mL / OUT: 600 mL / NET: 100 mL            .Stool Feces  05-03 @ 18:27   No enteric pathogens isolated.  (Stool culture examined for Salmonella,  Shigella, Campylobacter, Aeromonas, Plesiomonas,  Vibrio, E.coli O157 and Yersinia)  No enteric gram negative rods isolated  --  --          MEDICATIONS:    MEDICATIONS  (STANDING):  ARIPiprazole 15 milliGRAM(s) Oral daily  chlorhexidine 2% Cloths 1 Application(s) Topical daily  ferrous    sulfate 325 milliGRAM(s) Oral daily  finasteride 5 milliGRAM(s) Oral daily  gabapentin 100 milliGRAM(s) Oral three times a day  lamoTRIgine 200 milliGRAM(s) Oral daily  midodrine. 5 milliGRAM(s) Oral three times a day  pantoprazole    Tablet 40 milliGRAM(s) Oral before breakfast  sevelamer carbonate 800 milliGRAM(s) Oral three times a day with meals  sodium zirconium cyclosilicate 10 Gram(s) Oral once  tamsulosin 0.4 milliGRAM(s) Oral at bedtime  traZODone 25 milliGRAM(s) Oral at bedtime      MEDICATIONS  (PRN):      REVIEW OF SYSTEMS:                           ALL ROS DONE [ X   ]    CONSTITUTIONAL:  LETHARGIC [   ], FEVER [   ], UNRESPONSIVE [   ]  CVS:  CP  [   ], SOB, [   ], PALPITATIONS [   ], DIZZYNESS [   ]  RS: COUGH [   ], SPUTUM [   ]  GI: ABDOMINAL PAIN [   ], NAUSEA [   ], VOMITINGS [   ], DIARRHEA [   ], CONSTIPATION [   ]  :  DYSURIA [   ], NOCTURIA [   ], INCREASED FREQUENCY [   ], DRIBLING [   ],  SKELETAL: PAINFUL JOINTS [   ], SWOLLEN JOINTS [   ], NECK ACHE [   ], LOW BACK ACHE [   ],  SKIN : ULCERS [   ], RASH [   ], ITCHING [   ]  CNS: HEAD ACHE [   ], DOUBLE VISION [   ], BLURRED VISION [   ], AMS / CONFUSION [   ], SEIZURES [   ], WEAKNESS [   ],TINGLING / NUMBNESS [   ]      PHYSICAL EXAMINATION:  GENERAL APPEARANCE: NO DISTRESS  HEENT:  NO PALLOR, NO  JVD,  NO   NODES, NECK SUPPLE  CVS: S1 +, S2 +,   RS: AEEB,  OCCASIONAL  RALES +  ABD: SOFT, NT, NO, BS +   ILEOSTOMY [STOMA W/ PINK BASE]  EXT: PE + [IMPROVING]  SKIN: WARM,   RIGHT IJ SHILEY+  SKELETAL:  ROM ACCEPTABLE  CNS:  AAO X 3    RADIOLOGY :    ACC: [id number] EXAM:  XR CHEST PORTABLE URGENT 1V                          PROCEDURE DATE:  04/28/2022          INTERPRETATION:  Clinical history: 71-year-old male, chest pain.    Portable/expiratory view of the chest is compared to 20 and demonstrates   a normal cardiac silhouette with no lobar consolidation, gross effusion,   pneumothorax or acute osseous finding.    Bilateral lower lobe patchy opacities consistent with infiltrates/areas   of atelectasis in the correct clinical context, increased. Elevated right   hemidiaphragm are evident.    IMPRESSION:    Bilateral lower lobe interstitial infiltrates/platelike atelectasis,   increased    ================================      ACC: [id number] EXAM:  MR ABDOMEN                          *** ADDENDUM***    Some of the hepatic lesions have small T1 hyperintense components on   fat-suppressed T1-weighted gradient echo images, suggestive of blood   products.    --- End of Report---    *** END OF ADDENDUM***      PROCEDURE DATE:  05/03/2022          INTERPRETATION:  CLINICAL INFORMATION: Hepatic lesions. History of   prostate cancer.    COMPARISON: None.    CONTRAST/COMPLICATIONS:  IV Contrast: NONE  Oral Contrast: NONE  Complications: None reported at time of study completion    PROCEDURE:  MRI of the abdomen was performed.    FINDINGS:  LOWER CHEST: Within normal limits.    LIVER: Innumerable mildly T2 hyperintense lesions are seen throughout the   liver, suggestive ofmetastasis. The largest lesion measures 13.8 x 11.7   x 11.7 cm in the liver dome.  BILE DUCTS: Normal caliber.  GALLBLADDER: Cholelithiasis.  SPLEEN: Within normal limits.  PANCREAS: Within normal limits.  ADRENALS: Within normal limits.  KIDNEYS/URETERS: Multiple brightly T2 hyperintense lesion in the kidneys   bilaterally, representing probable cysts.    VISUALIZED PORTIONS:  BOWEL: Small hiatal hernia. Right lower quadrant ostomy.  PERITONEUM: No ascites.  VESSELS: Within normal limits.  RETROPERITONEUM/LYMPH NODES: No lymphadenopathy.  ABDOMINAL WALL: Within normal limits.  BONES: Multiple T2 hyperintense and T2 hyperintense lesions in the   thoracolumbar spine may represent osseous metastasis. Whole-body bone   scan may be pursued for further evaluation.    IMPRESSION:  Innumerable mildly T2 hyperintense lesions are seen throughout the liver,   suggestive of metastasis. The largest lesion measures 13.8 x 11.7 x 11.7   cm in the liver dome.    Multiple T2 hyperintense and T2 hyperintense lesions in the thoracolumbar   spine may represent osseous metastasis. Whole-body bone scan may be   pursued for further evaluation.    Cholelithiasis.      ASSESSMENT :     Hyperkalemia    No pertinent past medical history    COPD, mild    Anemia    Bipolar disorder    IBD (inflammatory bowel disease)    HTN (hypertension)    HLD (hyperlipidemia)    PAD (peripheral artery disease)    CKD (chronic kidney disease)    Prostate CA    BPH with urinary obstruction    No significant past surgical history    History of creation of ostomy        PLAN:  HPI:  Patient is a 71M from [facility], who is ambulatory at baseline with a walker. He has Hx of HTN, CHF?, COPD, HLD, PAD, BPH, Prostate CA (s/p radiation), Bipolar Disorder, IBD - Crohn's Disease, R ileostomy bag (s/p sigmoid resection). He was brought to ED due to bilateral leg swelling. For the past 2 days he has been having progressive swelling/ edema of both his legs with associated ambulatory dysfunction. He also started having episodes of shortness of breath and difficulty of breathing. He denies any fever, cough, chest pain, palpitation. When he came to the ED, he was saturating at 93-94% on room air. He was placed on nasal cannula. EKG was done showing NSR. Troponin was negative and BNP was mildly elevated at 398. CXR showed bilateral vascular congestion. Serum potassium was elevated at 6.1. He got a dose of lasix and was given Hyperkalemia cocktail in the ED. Of note, he had Hx of COVID in 2020 andwas vaccinated with COVID-19 x 3 (Pfizer) and Influenza in 2021. He was subsequently admitted for acute exacerbation of his heart failure.    [facility]: FULL CODE (28 Apr 2022 18:40)    # CASE D/W BROTHER KERI VIA PHONE [ C  ; H  ] - CASE DISCUSSED AT LENGTH, ALL QUESTIONS ANSWERED [5/5]    # HEMATURIA   # HX OF PROSTATE CA S/P RADIATION  - HOLD A/C, TREND CBC,   - NOTED BLADDER SCAN, NOTED RECENT U/S  - PER UROLOGY, REPEAT BLADDER SCAN W/ PVR - THEN WILL DECIDE REGARDING MAURER PLACEMENT  - NOTED CT A/P ; PSA < 0.01  - UROLOGY CONSULT DR. CHAN    # ACUTE HYPOXIC RESPIRATORY FAILURE SUSPECT S/T PULMONARY VASCULAR CONGESTION  # RULED OUT CHF   # PAWAN ON CKD - STARTED ON HD, S/P SHILEY PLACEMENT AND EXCHANGE FOR VASC CATH 5/11  # BPH  # HX OF PROSTATE CA S/P RADIATION  - PLACE ON TELEMETRY  - NOTED CXR  - HOLD LASIX  - NOTE TSH  - STRICT IS AND OS  - ECHOCARDIOGRAM - NORMAL LVEF, NORMAL DIASTOLIC FUNCTION  - NEPHROLOGY CONSULT, CARDIOLOGY CONSULT  - NEPHROLOGY RECC FOR PLACEMENT OF SHILEY W/ INITIATION OF HD    - PLANNED FOR TUNNELLED CATH PLACEMENT [5/13]    # LIVER LESIONS, BONE LESIONS  S/P LIVER BIOPSY [5/11]  # TRANSAMINITIS, NOTED HEP C AND HEP B MARKERS  - NOTED MRI ABDOMEN  - NOTED TUMOR MARKERS  - REVIEWED BONE SCAN  - HEPATOLOGY CONSULT  - ONCOLOGY CONSULT    - PLANNED FOR IR GUIDED BIOPSY ON 5/6  --- DELAYED BY IR DUE TO BLEEDING RISK  -- PLANNED FOR 5/11  - F/U PATH    # HYPERKALEMIA - RESOLVED  - GIVEN LASIX, LOKELMA  - EKG DONE  - NEPHROLOGY CONSULT    # CROHN'S DISEASE  # RIGHT ILEOSTOMY BAG S/P SIGMOID RESECTION  - MONITORING OUTPUT  - GASTROENTEROLOGY CONSULT    # NORMOCYTIC ANEMIA    # HTN  # COPD  # HLD  # PAD  # BIPOLAR DX  # GI AND DVT PPX      Patient is a 71y old  Male who presents with a chief complaint of acute CHF exacerbation (13 May 2022 09:17)    PATIENT IS SEEN AND EXAMINED IN MEDICAL FLOOR.  TIMIT [    ]    ERASTO [   ]      GT [   ]    ALLERGIES:  No Known Allergies      Daily     Daily     VITALS:    Vital Signs Last 24 Hrs  T(C): 36.7 (13 May 2022 05:33), Max: 37.7 (12 May 2022 22:27)  T(F): 98 (13 May 2022 05:33), Max: 99.9 (12 May 2022 22:27)  HR: 68 (13 May 2022 05:33) (68 - 109)  BP: 100/52 (13 May 2022 05:33) (91/60 - 118/52)  BP(mean): --  RR: 18 (13 May 2022 05:33) (16 - 18)  SpO2: 95% (13 May 2022 05:33) (93% - 97%)    LABS:    CBC Full  -  ( 13 May 2022 07:23 )  WBC Count : 10.49 K/uL  RBC Count : 4.31 M/uL  Hemoglobin : 11.0 g/dL  Hematocrit : 35.0 %  Platelet Count - Automated : 285 K/uL  Mean Cell Volume : 81.2 fl  Mean Cell Hemoglobin : 25.5 pg  Mean Cell Hemoglobin Concentration : 31.4 gm/dL  Auto Neutrophil # : x  Auto Lymphocyte # : x  Auto Monocyte # : x  Auto Eosinophil # : x  Auto Basophil # : x  Auto Neutrophil % : x  Auto Lymphocyte % : x  Auto Monocyte % : x  Auto Eosinophil % : x  Auto Basophil % : x    PT/INR - ( 13 May 2022 10:05 )   PT: 15.2 sec;   INR: 1.27 ratio         PTT - ( 13 May 2022 10:05 )  PTT:30.8 sec  05-13    134<L>  |  98  |  55<H>  ----------------------------<  94  4.9   |  22  |  7.21<H>    Ca    9.5      13 May 2022 07:23    TPro  8.0  /  Alb  2.9<L>  /  TBili  1.3<H>  /  DBili  x   /  AST  396<H>  /  ALT  84<H>  /  AlkPhos  298<H>  05-13    CAPILLARY BLOOD GLUCOSE            LIVER FUNCTIONS - ( 13 May 2022 07:23 )  Alb: 2.9 g/dL / Pro: 8.0 g/dL / ALK PHOS: 298 U/L / ALT: 84 U/L DA / AST: 396 U/L / GGT: x           Creatinine Trend: 7.21<--, 11.00<--, 8.59<--, 6.12<--, 7.88<--, 5.82<--  I&O's Summary    12 May 2022 07:01  -  13 May 2022 07:00  --------------------------------------------------------  IN: 700 mL / OUT: 600 mL / NET: 100 mL            .Stool Feces  05-03 @ 18:27   No enteric pathogens isolated.  (Stool culture examined for Salmonella,  Shigella, Campylobacter, Aeromonas, Plesiomonas,  Vibrio, E.coli O157 and Yersinia)  No enteric gram negative rods isolated  --  --          MEDICATIONS:    MEDICATIONS  (STANDING):  ARIPiprazole 15 milliGRAM(s) Oral daily  chlorhexidine 2% Cloths 1 Application(s) Topical daily  ferrous    sulfate 325 milliGRAM(s) Oral daily  finasteride 5 milliGRAM(s) Oral daily  gabapentin 100 milliGRAM(s) Oral three times a day  lamoTRIgine 200 milliGRAM(s) Oral daily  midodrine. 5 milliGRAM(s) Oral three times a day  pantoprazole    Tablet 40 milliGRAM(s) Oral before breakfast  sevelamer carbonate 800 milliGRAM(s) Oral three times a day with meals  sodium zirconium cyclosilicate 10 Gram(s) Oral once  tamsulosin 0.4 milliGRAM(s) Oral at bedtime  traZODone 25 milliGRAM(s) Oral at bedtime      MEDICATIONS  (PRN):      REVIEW OF SYSTEMS:                           ALL ROS DONE [ X   ]    CONSTITUTIONAL:  LETHARGIC [   ], FEVER [   ], UNRESPONSIVE [   ]  CVS:  CP  [   ], SOB, [   ], PALPITATIONS [   ], DIZZYNESS [   ]  RS: COUGH [   ], SPUTUM [   ]  GI: ABDOMINAL PAIN [   ], NAUSEA [   ], VOMITINGS [   ], DIARRHEA [   ], CONSTIPATION [   ]  :  DYSURIA [   ], NOCTURIA [   ], INCREASED FREQUENCY [   ], DRIBLING [   ],  SKELETAL: PAINFUL JOINTS [   ], SWOLLEN JOINTS [   ], NECK ACHE [   ], LOW BACK ACHE [   ],  SKIN : ULCERS [   ], RASH [   ], ITCHING [   ]  CNS: HEAD ACHE [   ], DOUBLE VISION [   ], BLURRED VISION [   ], AMS / CONFUSION [   ], SEIZURES [   ], WEAKNESS [   ],TINGLING / NUMBNESS [   ]      PHYSICAL EXAMINATION:  GENERAL APPEARANCE: NO DISTRESS  HEENT:  NO PALLOR, NO  JVD,  NO   NODES, NECK SUPPLE  CVS: S1 +, S2 +,   RS: AEEB,  OCCASIONAL  RALES +  ABD: SOFT, NT, NO, BS +   ILEOSTOMY [STOMA W/ PINK BASE]  EXT: PE + [IMPROVING]  SKIN: WARM,   RIGHT IJ SHILEY+  SKELETAL:  ROM ACCEPTABLE  CNS:  AAO X 3    RADIOLOGY :    ACC: [id number] EXAM:  XR CHEST PORTABLE URGENT 1V                          PROCEDURE DATE:  04/28/2022          INTERPRETATION:  Clinical history: 71-year-old male, chest pain.    Portable/expiratory view of the chest is compared to 20 and demonstrates   a normal cardiac silhouette with no lobar consolidation, gross effusion,   pneumothorax or acute osseous finding.    Bilateral lower lobe patchy opacities consistent with infiltrates/areas   of atelectasis in the correct clinical context, increased. Elevated right   hemidiaphragm are evident.    IMPRESSION:    Bilateral lower lobe interstitial infiltrates/platelike atelectasis,   increased    ================================      ACC: [id number] EXAM:  MR ABDOMEN                          *** ADDENDUM***    Some of the hepatic lesions have small T1 hyperintense components on   fat-suppressed T1-weighted gradient echo images, suggestive of blood   products.    --- End of Report---    *** END OF ADDENDUM***      PROCEDURE DATE:  05/03/2022          INTERPRETATION:  CLINICAL INFORMATION: Hepatic lesions. History of   prostate cancer.    COMPARISON: None.    CONTRAST/COMPLICATIONS:  IV Contrast: NONE  Oral Contrast: NONE  Complications: None reported at time of study completion    PROCEDURE:  MRI of the abdomen was performed.    FINDINGS:  LOWER CHEST: Within normal limits.    LIVER: Innumerable mildly T2 hyperintense lesions are seen throughout the   liver, suggestive ofmetastasis. The largest lesion measures 13.8 x 11.7   x 11.7 cm in the liver dome.  BILE DUCTS: Normal caliber.  GALLBLADDER: Cholelithiasis.  SPLEEN: Within normal limits.  PANCREAS: Within normal limits.  ADRENALS: Within normal limits.  KIDNEYS/URETERS: Multiple brightly T2 hyperintense lesion in the kidneys   bilaterally, representing probable cysts.    VISUALIZED PORTIONS:  BOWEL: Small hiatal hernia. Right lower quadrant ostomy.  PERITONEUM: No ascites.  VESSELS: Within normal limits.  RETROPERITONEUM/LYMPH NODES: No lymphadenopathy.  ABDOMINAL WALL: Within normal limits.  BONES: Multiple T2 hyperintense and T2 hyperintense lesions in the   thoracolumbar spine may represent osseous metastasis. Whole-body bone   scan may be pursued for further evaluation.    IMPRESSION:  Innumerable mildly T2 hyperintense lesions are seen throughout the liver,   suggestive of metastasis. The largest lesion measures 13.8 x 11.7 x 11.7   cm in the liver dome.    Multiple T2 hyperintense and T2 hyperintense lesions in the thoracolumbar   spine may represent osseous metastasis. Whole-body bone scan may be   pursued for further evaluation.    Cholelithiasis.      ASSESSMENT :     Hyperkalemia    No pertinent past medical history    COPD, mild    Anemia    Bipolar disorder    IBD (inflammatory bowel disease)    HTN (hypertension)    HLD (hyperlipidemia)    PAD (peripheral artery disease)    CKD (chronic kidney disease)    Prostate CA    BPH with urinary obstruction    No significant past surgical history    History of creation of ostomy        PLAN:  HPI:  Patient is a 71M from [facility], who is ambulatory at baseline with a walker. He has Hx of HTN, CHF?, COPD, HLD, PAD, BPH, Prostate CA (s/p radiation), Bipolar Disorder, IBD - Crohn's Disease, R ileostomy bag (s/p sigmoid resection). He was brought to ED due to bilateral leg swelling. For the past 2 days he has been having progressive swelling/ edema of both his legs with associated ambulatory dysfunction. He also started having episodes of shortness of breath and difficulty of breathing. He denies any fever, cough, chest pain, palpitation. When he came to the ED, he was saturating at 93-94% on room air. He was placed on nasal cannula. EKG was done showing NSR. Troponin was negative and BNP was mildly elevated at 398. CXR showed bilateral vascular congestion. Serum potassium was elevated at 6.1. He got a dose of lasix and was given Hyperkalemia cocktail in the ED. Of note, he had Hx of COVID in 2020 andwas vaccinated with COVID-19 x 3 (Pfizer) and Influenza in 2021. He was subsequently admitted for acute exacerbation of his heart failure.    [facility]: FULL CODE (28 Apr 2022 18:40)    # CASE D/W BROTHER KERI VIA PHONE [ C  ; H  ] - CASE DISCUSSED AT LENGTH, ALL QUESTIONS ANSWERED [5/13]    # NEW-ONSET A.FIB [DURING ATTEMPT TO EXCHANGE SHILEY]  - EKG ORDERED; PLACED ON TELEMETRY  - STARTED ON LOPRESSOR, CARDIZEM GTT  - HEPARIN DRIP GTT  - CARDIOLOGY CONSULT IN PROGRESS    # HEMATURIA - RESOLVED  # HX OF PROSTATE CA S/P RADIATION  - HOLD A/C, TREND CBC,   - NOTED BLADDER SCAN, NOTED RECENT U/S  - PER UROLOGY, REPEAT BLADDER SCAN W/ PVR - THEN WILL DECIDE REGARDING MAURER PLACEMENT  - NOTED CT A/P ; PSA < 0.01  - UROLOGY CONSULT DR. CHAN    # ACUTE HYPOXIC RESPIRATORY FAILURE SUSPECT S/T PULMONARY VASCULAR CONGESTION  # RULED OUT CHF   # PAWAN ON CKD - STARTED ON HD, S/P SHILEY PLACEMENT   # BPH  # HX OF PROSTATE CA S/P RADIATION  - PLACE ON TELEMETRY  - NOTED CXR  - HOLD LASIX  - NOTE TSH  - STRICT IS AND OS  - ECHOCARDIOGRAM - NORMAL LVEF, NORMAL DIASTOLIC FUNCTION  - NEPHROLOGY CONSULT, CARDIOLOGY CONSULT  - NEPHROLOGY RECC FOR PLACEMENT OF SHILEY W/ INITIATION OF HD    - PLANNED FOR TUNNELLED CATH PLACEMENT DELAYED DUE TO NEW ONSET A.FIB W/ RVR     # LIVER LESIONS, BONE LESIONS  S/P LIVER BIOPSY [5/11]  # TRANSAMINITIS, NOTED HEP C AND HEP B MARKERS  - NOTED MRI ABDOMEN  - NOTED TUMOR MARKERS  - REVIEWED BONE SCAN  - HEPATOLOGY CONSULT  - ONCOLOGY CONSULT    - PLANNED FOR IR GUIDED BIOPSY ON 5/6  --- DELAYED BY IR DUE TO BLEEDING RISK  -- PLANNED FOR 5/11  - F/U PATH    # HYPERKALEMIA - RESOLVED  - GIVEN LASIX, LOKELMA  - EKG DONE  - NEPHROLOGY CONSULT    # CROHN'S DISEASE  # RIGHT ILEOSTOMY BAG S/P SIGMOID RESECTION  - MONITORING OUTPUT  - GASTROENTEROLOGY CONSULT    # NORMOCYTIC ANEMIA    # HTN  # COPD  # HLD  # PAD  # BIPOLAR DX  # GI AND DVT PPX

## 2022-05-13 NOTE — PROGRESS NOTE ADULT - SUBJECTIVE AND OBJECTIVE BOX
C A R D I O L O G Y  **********************************    DATE OF SERVICE: 05-13-22    Patient denies chest pain or shortness of breath.   Review of symptoms otherwise negative.    ARIPiprazole 15 milliGRAM(s) Oral daily  chlorhexidine 2% Cloths 1 Application(s) Topical daily  ferrous    sulfate 325 milliGRAM(s) Oral daily  finasteride 5 milliGRAM(s) Oral daily  gabapentin 100 milliGRAM(s) Oral three times a day  lamoTRIgine 200 milliGRAM(s) Oral daily  midodrine. 5 milliGRAM(s) Oral three times a day  pantoprazole    Tablet 40 milliGRAM(s) Oral before breakfast  sevelamer carbonate 800 milliGRAM(s) Oral three times a day with meals  sodium zirconium cyclosilicate 10 Gram(s) Oral once  tamsulosin 0.4 milliGRAM(s) Oral at bedtime  traZODone 25 milliGRAM(s) Oral at bedtime                            11.0   10.49 )-----------( 285      ( 13 May 2022 07:23 )             35.0       Hemoglobin: 11.0 g/dL (05-13 @ 07:23)  Hemoglobin: 10.4 g/dL (05-12 @ 14:52)  Hemoglobin: 10.4 g/dL (05-11 @ 08:47)  Hemoglobin: 10.3 g/dL (05-10 @ 06:21)  Hemoglobin: 11.2 g/dL (05-09 @ 07:05)      05-13    134<L>  |  98  |  55<H>  ----------------------------<  94  4.9   |  22  |  7.21<H>    Ca    9.5      13 May 2022 07:23    TPro  8.0  /  Alb  2.9<L>  /  TBili  1.3<H>  /  DBili  x   /  AST  396<H>  /  ALT  84<H>  /  AlkPhos  298<H>  05-13    Creatinine Trend: 7.21<--, 11.00<--, 8.59<--, 6.12<--, 7.88<--, 5.82<--    COAGS: PT/INR - ( 13 May 2022 10:05 )   PT: 15.2 sec;   INR: 1.27 ratio         PTT - ( 13 May 2022 10:05 )  PTT:30.8 sec          T(C): 36.7 (05-13-22 @ 05:33), Max: 37.7 (05-12-22 @ 22:27)  HR: 68 (05-13-22 @ 05:33) (68 - 109)  BP: 100/52 (05-13-22 @ 05:33) (91/60 - 118/52)  RR: 18 (05-13-22 @ 05:33) (16 - 18)  SpO2: 95% (05-13-22 @ 05:33) (93% - 97%)  Wt(kg): --    I&O's Summary    12 May 2022 07:01  -  13 May 2022 07:00  --------------------------------------------------------  IN: 700 mL / OUT: 600 mL / NET: 100 mL          HEENT:  (-)icterus (-)pallor  CV: N S1 S2 1/6 MARCELLE (+)2 Pulses B/l  Resp:  Clear to ausculatation B/L, normal effort  GI: (+) BS Soft, NT, ND  Lymph:  (-)Edema, (-)obvious lymphadenopathy  Skin: Warm to touch, Normal turgor  Psych: Appropriate mood and affect        ASSESSMENT/PLAN: 	71y  Male  HTN, CHF?, COPD, HLD, PAD, BPH, Prostate CA (s/p radiation), Bipolar Disorder, IBD - Crohn's Disease, R ileostomy bag (s/p sigmoid resection) historically preserved LV and RV function, normal perfusion on a nuclear stress test 2020 He was brought to ED due to bilateral leg swelling    - No clinical CHF A low BNP suggests a low left ventricular end diastolic pressure  - monitor renal function, his edema is at least partially due ot renal disease   - no pertinent findings on echo  - Heme/ onc w/u in progress , f/u liver biopsy   - awaiting AbimbolaCatemir Archer MD, [facility]  BEEPER [phone]

## 2022-05-14 NOTE — PROGRESS NOTE ADULT - SUBJECTIVE AND OBJECTIVE BOX
Patient is a 71y old  Male who presents with a chief complaint of acute CHF exacerbation (14 May 2022 11:42)    PATIENT IS SEEN AND EXAMINED IN MEDICAL FLOOR.  TIMIT [    ]    ERASTO [   ]      GT [   ]    ALLERGIES:  No Known Allergies      Daily     Daily     VITALS:    Vital Signs Last 24 Hrs  T(C): 36.5 (14 May 2022 11:08), Max: 37.2 (13 May 2022 20:16)  T(F): 97.7 (14 May 2022 11:08), Max: 98.9 (13 May 2022 20:16)  HR: 85 (14 May 2022 11:08) (80 - 129)  BP: 118/58 (14 May 2022 11:08) (98/55 - 128/49)  BP(mean): --  RR: 19 (14 May 2022 11:08) (18 - 19)  SpO2: 95% (14 May 2022 11:08) (92% - 98%)    LABS:    CBC Full  -  ( 14 May 2022 07:18 )  WBC Count : 11.44 K/uL  RBC Count : 4.09 M/uL  Hemoglobin : 10.5 g/dL  Hematocrit : 33.1 %  Platelet Count - Automated : 299 K/uL  Mean Cell Volume : 80.9 fl  Mean Cell Hemoglobin : 25.7 pg  Mean Cell Hemoglobin Concentration : 31.7 gm/dL  Auto Neutrophil # : x  Auto Lymphocyte # : x  Auto Monocyte # : x  Auto Eosinophil # : x  Auto Basophil # : x  Auto Neutrophil % : x  Auto Lymphocyte % : x  Auto Monocyte % : x  Auto Eosinophil % : x  Auto Basophil % : x    PT/INR - ( 13 May 2022 10:05 )   PT: 15.2 sec;   INR: 1.27 ratio         PTT - ( 13 May 2022 10:05 )  PTT:30.8 sec  05-14    132<L>  |  96  |  74<H>  ----------------------------<  89  5.4<H>   |  19<L>  |  9.28<H>    Ca    9.2      14 May 2022 07:18    TPro  8.0  /  Alb  2.9<L>  /  TBili  1.3<H>  /  DBili  x   /  AST  396<H>  /  ALT  84<H>  /  AlkPhos  298<H>  05-13    CAPILLARY BLOOD GLUCOSE            LIVER FUNCTIONS - ( 13 May 2022 07:23 )  Alb: 2.9 g/dL / Pro: 8.0 g/dL / ALK PHOS: 298 U/L / ALT: 84 U/L DA / AST: 396 U/L / GGT: x           Creatinine Trend: 9.28<--, 7.21<--, 11.00<--, 8.59<--, 6.12<--, 7.88<--  I&O's Summary          .Stool Feces  05-03 @ 18:27   No enteric pathogens isolated.  (Stool culture examined for Salmonella,  Shigella, Campylobacter, Aeromonas, Plesiomonas,  Vibrio, E.coli O157 and Yersinia)  No enteric gram negative rods isolated  --  --          MEDICATIONS:    MEDICATIONS  (STANDING):  ARIPiprazole 15 milliGRAM(s) Oral daily  chlorhexidine 2% Cloths 1 Application(s) Topical daily  diltiazem Infusion 5 mG/Hr (5 mL/Hr) IV Continuous <Continuous>  ferrous    sulfate 325 milliGRAM(s) Oral daily  finasteride 5 milliGRAM(s) Oral daily  gabapentin 100 milliGRAM(s) Oral three times a day  lamoTRIgine 200 milliGRAM(s) Oral daily  metoprolol tartrate 25 milliGRAM(s) Oral two times a day  midodrine. 5 milliGRAM(s) Oral three times a day  pantoprazole    Tablet 40 milliGRAM(s) Oral before breakfast  sevelamer carbonate 800 milliGRAM(s) Oral three times a day with meals  sodium zirconium cyclosilicate 10 Gram(s) Oral once  tamsulosin 0.4 milliGRAM(s) Oral at bedtime  traZODone 25 milliGRAM(s) Oral at bedtime      MEDICATIONS  (PRN):      REVIEW OF SYSTEMS:                           ALL ROS DONE [ X   ]    CONSTITUTIONAL:  LETHARGIC [   ], FEVER [   ], UNRESPONSIVE [   ]  CVS:  CP  [   ], SOB, [   ], PALPITATIONS [   ], DIZZYNESS [   ]  RS: COUGH [   ], SPUTUM [   ]  GI: ABDOMINAL PAIN [   ], NAUSEA [   ], VOMITINGS [   ], DIARRHEA [   ], CONSTIPATION [   ]  :  DYSURIA [   ], NOCTURIA [   ], INCREASED FREQUENCY [   ], DRIBLING [   ],  SKELETAL: PAINFUL JOINTS [   ], SWOLLEN JOINTS [   ], NECK ACHE [   ], LOW BACK ACHE [   ],  SKIN : ULCERS [   ], RASH [   ], ITCHING [   ]  CNS: HEAD ACHE [   ], DOUBLE VISION [   ], BLURRED VISION [   ], AMS / CONFUSION [   ], SEIZURES [   ], WEAKNESS [   ],TINGLING / NUMBNESS [   ]    PHYSICAL EXAMINATION:  GENERAL APPEARANCE: NO DISTRESS  HEENT:  NO PALLOR, NO  JVD,  NO   NODES, NECK SUPPLE  CVS: S1 +, S2 +,   RS: AEEB,  OCCASIONAL  RALES +  ABD: SOFT, NT, NO, BS +   ILEOSTOMY [STOMA W/ PINK BASE]  EXT: PE + [IMPROVING]  SKIN: WARM,   RIGHT IJ SHILEY+  SKELETAL:  ROM ACCEPTABLE  CNS:  AAO X 3    RADIOLOGY :    ACC: [id number] EXAM:  XR CHEST PORTABLE URGENT 1V                          PROCEDURE DATE:  04/28/2022          INTERPRETATION:  Clinical history: 71-year-old male, chest pain.    Portable/expiratory view of the chest is compared to 20 and demonstrates   a normal cardiac silhouette with no lobar consolidation, gross effusion,   pneumothorax or acute osseous finding.    Bilateral lower lobe patchy opacities consistent with infiltrates/areas   of atelectasis in the correct clinical context, increased. Elevated right   hemidiaphragm are evident.    IMPRESSION:    Bilateral lower lobe interstitial infiltrates/platelike atelectasis,   increased    ================================      ACC: [id number] EXAM:  MR ABDOMEN                          *** ADDENDUM***    Some of the hepatic lesions have small T1 hyperintense components on   fat-suppressed T1-weighted gradient echo images, suggestive of blood   products.    --- End of Report---    *** END OF ADDENDUM***      PROCEDURE DATE:  05/03/2022          INTERPRETATION:  CLINICAL INFORMATION: Hepatic lesions. History of   prostate cancer.    COMPARISON: None.    CONTRAST/COMPLICATIONS:  IV Contrast: NONE  Oral Contrast: NONE  Complications: None reported at time of study completion    PROCEDURE:  MRI of the abdomen was performed.    FINDINGS:  LOWER CHEST: Within normal limits.    LIVER: Innumerable mildly T2 hyperintense lesions are seen throughout the   liver, suggestive ofmetastasis. The largest lesion measures 13.8 x 11.7   x 11.7 cm in the liver dome.  BILE DUCTS: Normal caliber.  GALLBLADDER: Cholelithiasis.  SPLEEN: Within normal limits.  PANCREAS: Within normal limits.  ADRENALS: Within normal limits.  KIDNEYS/URETERS: Multiple brightly T2 hyperintense lesion in the kidneys   bilaterally, representing probable cysts.    VISUALIZED PORTIONS:  BOWEL: Small hiatal hernia. Right lower quadrant ostomy.  PERITONEUM: No ascites.  VESSELS: Within normal limits.  RETROPERITONEUM/LYMPH NODES: No lymphadenopathy.  ABDOMINAL WALL: Within normal limits.  BONES: Multiple T2 hyperintense and T2 hyperintense lesions in the   thoracolumbar spine may represent osseous metastasis. Whole-body bone   scan may be pursued for further evaluation.    IMPRESSION:  Innumerable mildly T2 hyperintense lesions are seen throughout the liver,   suggestive of metastasis. The largest lesion measures 13.8 x 11.7 x 11.7   cm in the liver dome.    Multiple T2 hyperintense and T2 hyperintense lesions in the thoracolumbar   spine may represent osseous metastasis. Whole-body bone scan may be   pursued for further evaluation.    Cholelithiasis.      ASSESSMENT :     Hyperkalemia    No pertinent past medical history    COPD, mild    Anemia    Bipolar disorder    IBD (inflammatory bowel disease)    HTN (hypertension)    HLD (hyperlipidemia)    PAD (peripheral artery disease)    CKD (chronic kidney disease)    Prostate CA    BPH with urinary obstruction    No significant past surgical history    History of creation of ostomy        PLAN:  HPI:  Patient is a 71M from [facility], who is ambulatory at baseline with a walker. He has Hx of HTN, CHF?, COPD, HLD, PAD, BPH, Prostate CA (s/p radiation), Bipolar Disorder, IBD - Crohn's Disease, R ileostomy bag (s/p sigmoid resection). He was brought to ED due to bilateral leg swelling. For the past 2 days he has been having progressive swelling/ edema of both his legs with associated ambulatory dysfunction. He also started having episodes of shortness of breath and difficulty of breathing. He denies any fever, cough, chest pain, palpitation. When he came to the ED, he was saturating at 93-94% on room air. He was placed on nasal cannula. EKG was done showing NSR. Troponin was negative and BNP was mildly elevated at 398. CXR showed bilateral vascular congestion. Serum potassium was elevated at 6.1. He got a dose of lasix and was given Hyperkalemia cocktail in the ED. Of note, he had Hx of COVID in 2020 andwas vaccinated with COVID-19 x 3 (Pfizer) and Influenza in 2021. He was subsequently admitted for acute exacerbation of his heart failure.    [facility]: FULL CODE (28 Apr 2022 18:40)    # CASE D/W BROTHER KERI VIA PHONE [ C  ; H  ] - CASE DISCUSSED AT LENGTH, ALL QUESTIONS ANSWERED [5/13]    # NEW-ONSET A.FIB [DURING ATTEMPT TO EXCHANGE SHILEY]  - EKG ORDERED; PLACED ON TELEMETRY  - STARTED ON LOPRESSOR, CARDIZEM GTT  - HEPARIN DRIP GTT  - CARDIOLOGY CONSULT IN PROGRESS    # HEMATURIA - RESOLVED  # HX OF PROSTATE CA S/P RADIATION  - HOLD A/C, TREND CBC,   - NOTED BLADDER SCAN, NOTED RECENT U/S  - PER UROLOGY, REPEAT BLADDER SCAN W/ PVR - THEN WILL DECIDE REGARDING MAURER PLACEMENT  - NOTED CT A/P ; PSA < 0.01  - UROLOGY CONSULT DR. CHAN    # ACUTE HYPOXIC RESPIRATORY FAILURE SUSPECT S/T PULMONARY VASCULAR CONGESTION  # RULED OUT CHF   # PAWAN ON CKD - STARTED ON HD, S/P SHILEY PLACEMENT   # BPH  # HX OF PROSTATE CA S/P RADIATION  - PLACE ON TELEMETRY  - NOTED CXR  - HOLD LASIX  - NOTE TSH  - STRICT IS AND OS  - ECHOCARDIOGRAM - NORMAL LVEF, NORMAL DIASTOLIC FUNCTION  - NEPHROLOGY CONSULT, CARDIOLOGY CONSULT  - NEPHROLOGY RECC FOR PLACEMENT OF SHILEY W/ INITIATION OF HD    - PLANNED FOR TUNNELLED CATH PLACEMENT DELAYED DUE TO NEW ONSET A.FIB W/ RVR     # LIVER LESIONS, BONE LESIONS  S/P LIVER BIOPSY [5/11]  # TRANSAMINITIS, NOTED HEP C AND HEP B MARKERS  - NOTED MRI ABDOMEN  - NOTED TUMOR MARKERS  - REVIEWED BONE SCAN  - HEPATOLOGY CONSULT  - ONCOLOGY CONSULT    - PLANNED FOR IR GUIDED BIOPSY ON 5/6  --- DELAYED BY IR DUE TO BLEEDING RISK  -- PLANNED FOR 5/11  - F/U PATH    # HYPERKALEMIA - RESOLVED  - GIVEN LASIX, LOKELMA  - EKG DONE  - NEPHROLOGY CONSULT    # CROHN'S DISEASE  # RIGHT ILEOSTOMY BAG S/P SIGMOID RESECTION  - MONITORING OUTPUT  - GASTROENTEROLOGY CONSULT    # NORMOCYTIC ANEMIA    # HTN  # COPD  # HLD  # PAD  # BIPOLAR DX  # GI AND DVT PPX    Patient is a 71y old  Male who presents with a chief complaint of acute CHF exacerbation (14 May 2022 11:42)    PATIENT IS SEEN AND EXAMINED IN MEDICAL FLOOR.  TIMIT [    ]    ERASTO [   ]      GT [   ]    ALLERGIES:  No Known Allergies      Daily     Daily     VITALS:    Vital Signs Last 24 Hrs  T(C): 36.5 (14 May 2022 11:08), Max: 37.2 (13 May 2022 20:16)  T(F): 97.7 (14 May 2022 11:08), Max: 98.9 (13 May 2022 20:16)  HR: 85 (14 May 2022 11:08) (80 - 129)  BP: 118/58 (14 May 2022 11:08) (98/55 - 128/49)  BP(mean): --  RR: 19 (14 May 2022 11:08) (18 - 19)  SpO2: 95% (14 May 2022 11:08) (92% - 98%)    LABS:    CBC Full  -  ( 14 May 2022 07:18 )  WBC Count : 11.44 K/uL  RBC Count : 4.09 M/uL  Hemoglobin : 10.5 g/dL  Hematocrit : 33.1 %  Platelet Count - Automated : 299 K/uL  Mean Cell Volume : 80.9 fl  Mean Cell Hemoglobin : 25.7 pg  Mean Cell Hemoglobin Concentration : 31.7 gm/dL  Auto Neutrophil # : x  Auto Lymphocyte # : x  Auto Monocyte # : x  Auto Eosinophil # : x  Auto Basophil # : x  Auto Neutrophil % : x  Auto Lymphocyte % : x  Auto Monocyte % : x  Auto Eosinophil % : x  Auto Basophil % : x    PT/INR - ( 13 May 2022 10:05 )   PT: 15.2 sec;   INR: 1.27 ratio         PTT - ( 13 May 2022 10:05 )  PTT:30.8 sec  05-14    132<L>  |  96  |  74<H>  ----------------------------<  89  5.4<H>   |  19<L>  |  9.28<H>    Ca    9.2      14 May 2022 07:18    TPro  8.0  /  Alb  2.9<L>  /  TBili  1.3<H>  /  DBili  x   /  AST  396<H>  /  ALT  84<H>  /  AlkPhos  298<H>  05-13    CAPILLARY BLOOD GLUCOSE            LIVER FUNCTIONS - ( 13 May 2022 07:23 )  Alb: 2.9 g/dL / Pro: 8.0 g/dL / ALK PHOS: 298 U/L / ALT: 84 U/L DA / AST: 396 U/L / GGT: x           Creatinine Trend: 9.28<--, 7.21<--, 11.00<--, 8.59<--, 6.12<--, 7.88<--  I&O's Summary          .Stool Feces  05-03 @ 18:27   No enteric pathogens isolated.  (Stool culture examined for Salmonella,  Shigella, Campylobacter, Aeromonas, Plesiomonas,  Vibrio, E.coli O157 and Yersinia)  No enteric gram negative rods isolated  --  --          MEDICATIONS:    MEDICATIONS  (STANDING):  ARIPiprazole 15 milliGRAM(s) Oral daily  chlorhexidine 2% Cloths 1 Application(s) Topical daily  diltiazem Infusion 5 mG/Hr (5 mL/Hr) IV Continuous <Continuous>  ferrous    sulfate 325 milliGRAM(s) Oral daily  finasteride 5 milliGRAM(s) Oral daily  gabapentin 100 milliGRAM(s) Oral three times a day  lamoTRIgine 200 milliGRAM(s) Oral daily  metoprolol tartrate 25 milliGRAM(s) Oral two times a day  midodrine. 5 milliGRAM(s) Oral three times a day  pantoprazole    Tablet 40 milliGRAM(s) Oral before breakfast  sevelamer carbonate 800 milliGRAM(s) Oral three times a day with meals  sodium zirconium cyclosilicate 10 Gram(s) Oral once  tamsulosin 0.4 milliGRAM(s) Oral at bedtime  traZODone 25 milliGRAM(s) Oral at bedtime      MEDICATIONS  (PRN):      REVIEW OF SYSTEMS:                           ALL ROS DONE [ X   ]    CONSTITUTIONAL:  LETHARGIC [   ], FEVER [   ], UNRESPONSIVE [   ]  CVS:  CP  [   ], SOB, [   ], PALPITATIONS [   ], DIZZYNESS [   ]  RS: COUGH [   ], SPUTUM [   ]  GI: ABDOMINAL PAIN [   ], NAUSEA [   ], VOMITINGS [   ], DIARRHEA [   ], CONSTIPATION [   ]  :  DYSURIA [   ], NOCTURIA [   ], INCREASED FREQUENCY [   ], DRIBLING [   ],  SKELETAL: PAINFUL JOINTS [   ], SWOLLEN JOINTS [   ], NECK ACHE [   ], LOW BACK ACHE [   ],  SKIN : ULCERS [   ], RASH [   ], ITCHING [   ]  CNS: HEAD ACHE [   ], DOUBLE VISION [   ], BLURRED VISION [   ], AMS / CONFUSION [   ], SEIZURES [   ], WEAKNESS [   ],TINGLING / NUMBNESS [   ]    PHYSICAL EXAMINATION:  GENERAL APPEARANCE: NO DISTRESS  HEENT:  NO PALLOR, NO  JVD,  NO   NODES, NECK SUPPLE  CVS: S1 +, S2 +,   RS: AEEB,  OCCASIONAL  RALES +  ABD: SOFT, NT, NO, BS +   ILEOSTOMY [STOMA W/ PINK BASE]  EXT: PE + [IMPROVING]  SKIN: WARM,   RIGHT IJ SHILEY+  SKELETAL:  ROM ACCEPTABLE  CNS:  AAO X 3    RADIOLOGY :    ACC: [id number] EXAM:  XR CHEST PORTABLE URGENT 1V                          PROCEDURE DATE:  04/28/2022          INTERPRETATION:  Clinical history: 71-year-old male, chest pain.    Portable/expiratory view of the chest is compared to 20 and demonstrates   a normal cardiac silhouette with no lobar consolidation, gross effusion,   pneumothorax or acute osseous finding.    Bilateral lower lobe patchy opacities consistent with infiltrates/areas   of atelectasis in the correct clinical context, increased. Elevated right   hemidiaphragm are evident.    IMPRESSION:    Bilateral lower lobe interstitial infiltrates/platelike atelectasis,   increased    ================================      ACC: [id number] EXAM:  MR ABDOMEN                          *** ADDENDUM***    Some of the hepatic lesions have small T1 hyperintense components on   fat-suppressed T1-weighted gradient echo images, suggestive of blood   products.    --- End of Report---    *** END OF ADDENDUM***      PROCEDURE DATE:  05/03/2022          INTERPRETATION:  CLINICAL INFORMATION: Hepatic lesions. History of   prostate cancer.    COMPARISON: None.    CONTRAST/COMPLICATIONS:  IV Contrast: NONE  Oral Contrast: NONE  Complications: None reported at time of study completion    PROCEDURE:  MRI of the abdomen was performed.    FINDINGS:  LOWER CHEST: Within normal limits.    LIVER: Innumerable mildly T2 hyperintense lesions are seen throughout the   liver, suggestive ofmetastasis. The largest lesion measures 13.8 x 11.7   x 11.7 cm in the liver dome.  BILE DUCTS: Normal caliber.  GALLBLADDER: Cholelithiasis.  SPLEEN: Within normal limits.  PANCREAS: Within normal limits.  ADRENALS: Within normal limits.  KIDNEYS/URETERS: Multiple brightly T2 hyperintense lesion in the kidneys   bilaterally, representing probable cysts.    VISUALIZED PORTIONS:  BOWEL: Small hiatal hernia. Right lower quadrant ostomy.  PERITONEUM: No ascites.  VESSELS: Within normal limits.  RETROPERITONEUM/LYMPH NODES: No lymphadenopathy.  ABDOMINAL WALL: Within normal limits.  BONES: Multiple T2 hyperintense and T2 hyperintense lesions in the   thoracolumbar spine may represent osseous metastasis. Whole-body bone   scan may be pursued for further evaluation.    IMPRESSION:  Innumerable mildly T2 hyperintense lesions are seen throughout the liver,   suggestive of metastasis. The largest lesion measures 13.8 x 11.7 x 11.7   cm in the liver dome.    Multiple T2 hyperintense and T2 hyperintense lesions in the thoracolumbar   spine may represent osseous metastasis. Whole-body bone scan may be   pursued for further evaluation.    Cholelithiasis.      ASSESSMENT :     Hyperkalemia    No pertinent past medical history    COPD, mild    Anemia    Bipolar disorder    IBD (inflammatory bowel disease)    HTN (hypertension)    HLD (hyperlipidemia)    PAD (peripheral artery disease)    CKD (chronic kidney disease)    Prostate CA    BPH with urinary obstruction    No significant past surgical history    History of creation of ostomy        PLAN:  HPI:  Patient is a 71M from [facility], who is ambulatory at baseline with a walker. He has Hx of HTN, CHF?, COPD, HLD, PAD, BPH, Prostate CA (s/p radiation), Bipolar Disorder, IBD - Crohn's Disease, R ileostomy bag (s/p sigmoid resection). He was brought to ED due to bilateral leg swelling. For the past 2 days he has been having progressive swelling/ edema of both his legs with associated ambulatory dysfunction. He also started having episodes of shortness of breath and difficulty of breathing. He denies any fever, cough, chest pain, palpitation. When he came to the ED, he was saturating at 93-94% on room air. He was placed on nasal cannula. EKG was done showing NSR. Troponin was negative and BNP was mildly elevated at 398. CXR showed bilateral vascular congestion. Serum potassium was elevated at 6.1. He got a dose of lasix and was given Hyperkalemia cocktail in the ED. Of note, he had Hx of COVID in 2020 andwas vaccinated with COVID-19 x 3 (Pfizer) and Influenza in 2021. He was subsequently admitted for acute exacerbation of his heart failure.    [facility]: FULL CODE (28 Apr 2022 18:40)    # CASE D/W BROTHER KERI VIA PHONE [ C  ; H  ] - CASE DISCUSSED AT LENGTH, ALL QUESTIONS ANSWERED [5/13]    # NEW-ONSET A.FIB [DURING ATTEMPT TO EXCHANGE SHILEY]  - PLACED ON TELEMETRY  - STARTED ON LOPRESSOR, CARDIZEM GTT  - HEPARIN DRIP GTT  - CARDIOLOGY CONSULT IN PROGRESS    # HEMATURIA - RESOLVED  # HX OF PROSTATE CA S/P RADIATION  - HOLD A/C, TREND CBC,   - NOTED BLADDER SCAN, NOTED RECENT U/S  - PER UROLOGY, REPEAT BLADDER SCAN W/ PVR - THEN WILL DECIDE REGARDING MAURER PLACEMENT  - NOTED CT A/P ; PSA < 0.01  - UROLOGY CONSULT DR. CHAN    # ACUTE HYPOXIC RESPIRATORY FAILURE SUSPECT S/T PULMONARY VASCULAR CONGESTION  # RULED OUT CHF   # PAWAN ON CKD - STARTED ON HD, S/P SHILEY PLACEMENT   # BPH  # HX OF PROSTATE CA S/P RADIATION  - PLACE ON TELEMETRY  - NOTED CXR  - HOLD LASIX  - NOTE TSH  - STRICT IS AND OS  - ECHOCARDIOGRAM - NORMAL LVEF, NORMAL DIASTOLIC FUNCTION  - NEPHROLOGY CONSULT, CARDIOLOGY CONSULT  - NEPHROLOGY RECC FOR PLACEMENT OF SHILEY W/ INITIATION OF HD    - PLANNED FOR TUNNELLED CATH PLACEMENT DELAYED DUE TO NEW ONSET A.FIB W/ RVR     # LIVER LESIONS, BONE LESIONS  S/P LIVER BIOPSY [5/11]  # TRANSAMINITIS, NOTED HEP C AND HEP B MARKERS  - NOTED MRI ABDOMEN  - NOTED TUMOR MARKERS  - REVIEWED BONE SCAN  - HEPATOLOGY CONSULT  - ONCOLOGY CONSULT    - PLANNED FOR IR GUIDED BIOPSY ON 5/6  --- DELAYED BY IR DUE TO BLEEDING RISK  -- PLANNED FOR 5/11  - F/U PATH    # HYPERKALEMIA   - NEPHROLOGY CONSULT    - [5/14] - UNDERWENT HD    # CROHN'S DISEASE  # RIGHT ILEOSTOMY BAG S/P SIGMOID RESECTION  - MONITORING OUTPUT  - GASTROENTEROLOGY CONSULT    # NORMOCYTIC ANEMIA    # HTN  # COPD  # HLD  # PAD  # BIPOLAR DX  # GI AND DVT PPX

## 2022-05-14 NOTE — PROGRESS NOTE ADULT - SUBJECTIVE AND OBJECTIVE BOX
Patient seen/examined. No events overnight.    Vital Signs Last 24 Hrs  T(C): 36.4 (14 May 2022 05:14), Max: 37.2 (13 May 2022 20:16)  T(F): 97.5 (14 May 2022 05:14), Max: 98.9 (13 May 2022 20:16)  HR: 81 (14 May 2022 05:14) (80 - 129)  BP: 102/52 (14 May 2022 05:14) (98/55 - 128/49)  BP(mean): --  RR: 18 (14 May 2022 05:14) (18 - 18)  SpO2: 94% (14 May 2022 05:14) (92% - 98%)      General: NAD.   Abd: soft. NT/ND                          11.0   10.49 )-----------( 285      ( 13 May 2022 07:23 )             35.0     05-13    134<L>  |  98  |  55<H>  ----------------------------<  94  4.9   |  22  |  7.21<H>    Ca    9.5      13 May 2022 07:23    TPro  8.0  /  Alb  2.9<L>  /  TBili  1.3<H>  /  DBili  x   /  AST  396<H>  /  ALT  84<H>  /  AlkPhos  298<H>  05-13

## 2022-05-14 NOTE — PROGRESS NOTE ADULT - SUBJECTIVE AND OBJECTIVE BOX
C A R D I O L O G Y  **********************************    DATE OF SERVICE: 05-14-22           ARIPiprazole 15 milliGRAM(s) Oral daily  chlorhexidine 2% Cloths 1 Application(s) Topical daily  diltiazem Infusion 5 mG/Hr IV Continuous <Continuous>  ferrous    sulfate 325 milliGRAM(s) Oral daily  finasteride 5 milliGRAM(s) Oral daily  gabapentin 100 milliGRAM(s) Oral three times a day  lamoTRIgine 200 milliGRAM(s) Oral daily  metoprolol tartrate 25 milliGRAM(s) Oral two times a day  midodrine. 5 milliGRAM(s) Oral three times a day  pantoprazole    Tablet 40 milliGRAM(s) Oral before breakfast  sevelamer carbonate 800 milliGRAM(s) Oral three times a day with meals  sodium zirconium cyclosilicate 10 Gram(s) Oral once  tamsulosin 0.4 milliGRAM(s) Oral at bedtime  traZODone 25 milliGRAM(s) Oral at bedtime                            10.5   11.44 )-----------( 299      ( 14 May 2022 07:18 )             33.1       Hemoglobin: 10.5 g/dL (05-14 @ 07:18)  Hemoglobin: 11.0 g/dL (05-13 @ 07:23)  Hemoglobin: 10.4 g/dL (05-12 @ 14:52)  Hemoglobin: 10.4 g/dL (05-11 @ 08:47)  Hemoglobin: 10.3 g/dL (05-10 @ 06:21)      05-14    132<L>  |  96  |  74<H>  ----------------------------<  89  5.4<H>   |  19<L>  |  9.28<H>    Ca    9.2      14 May 2022 07:18    TPro  8.0  /  Alb  2.9<L>  /  TBili  1.3<H>  /  DBili  x   /  AST  396<H>  /  ALT  84<H>  /  AlkPhos  298<H>  05-13    Creatinine Trend: 9.28<--, 7.21<--, 11.00<--, 8.59<--, 6.12<--, 7.88<--    COAGS:           T(C): 36.4 (05-14-22 @ 05:14), Max: 37.2 (05-13-22 @ 20:16)  HR: 81 (05-14-22 @ 05:14) (80 - 129)  BP: 102/52 (05-14-22 @ 05:14) (98/55 - 128/49)  RR: 18 (05-14-22 @ 05:14) (18 - 18)  SpO2: 94% (05-14-22 @ 05:14) (92% - 98%)  Wt(kg): --    I&O's Summary    HEENT:  (-)icterus (-)pallor  CV: N S1 S2 1/6 MARCELLE (+)2 Pulses B/l  Resp:  Clear to ausculatation B/L, normal effort  GI: (+) BS Soft, NT, ND  Lymph:  (-)Edema, (-)obvious lymphadenopathy  Skin: Warm to touch, Normal turgor  Psych: Appropriate mood and affect        ASSESSMENT/PLAN: 	71y  Male  HTN, CHF?, COPD, HLD, PAD, BPH, Prostate CA (s/p radiation), Bipolar Disorder, IBD - Crohn's Disease, R ileostomy bag (s/p sigmoid resection) historically preserved LV and RV function, normal perfusion on a nuclear stress test 2020 He was brought to ED due to bilateral leg swelling    - No clinical CHF A low BNP suggests a low left ventricular end diastolic pressure  - monitor renal function, his edema is at least partially due ot renal disease   - no pertinent findings on echo  - Heme/ onc  following  - Urology follow up , cystoscopy as outpatient.

## 2022-05-14 NOTE — CONSULT NOTE ADULT - ASSESSMENT
1. PAWAN due to diuretics and CKD progression.   2. CKD stage 4   3. pt with permacath for HD presently  plan for LUE AVF.  If pt planning for discharge early next week, may followup in office for planned Outpatient AVF creation  Avoid blood draw, IV in Left arm.  Please reconsult Vascular surgery if pt planned extended inpatient stay

## 2022-05-14 NOTE — PROGRESS NOTE ADULT - SUBJECTIVE AND OBJECTIVE BOX
NEPHROLOGY MEDICAL CARE, [facility] - Dr. Emerson Pond/ Dr. Nallely Curran/ Dr. Kirby Angel/ Dr. Pippa Reis    Patient was seen and examined at bedside.    CC: patient had new onset afib overnight and moved to 5N    Vital Signs Last 24 Hrs  T(C): 36.5 (14 May 2022 11:08), Max: 37.2 (13 May 2022 20:16)  T(F): 97.7 (14 May 2022 11:08), Max: 98.9 (13 May 2022 20:16)  HR: 85 (14 May 2022 11:08) (80 - 129)  BP: 118/58 (14 May 2022 11:08) (98/55 - 128/49)  BP(mean): --  RR: 19 (14 May 2022 11:08) (18 - 19)  SpO2: 95% (14 May 2022 11:08) (92% - 98%)      PHYSICAL EXAM:  General: No acute respiratory distress.  Eyes: conjunctiva and sclera clear  ENMT: Atraumatic, Normocephalic, supple, No JVD present  Respiratory: Bilateral decreased BS and no rhonchi, wheezing  Cardiovascular: S1S2+; no m/r/g  Gastrointestinal: Soft, Non-tender, Nondistended; Bowel sounds present   Neuro:  Awake, Alert & Oriented X3  Ext:  no pedal edema, No Cyanosis  Skin: No visible rashes    MEDICATIONS:  MEDICATIONS  (STANDING):  ARIPiprazole 15 milliGRAM(s) Oral daily  chlorhexidine 2% Cloths 1 Application(s) Topical daily  diltiazem Infusion 5 mG/Hr (5 mL/Hr) IV Continuous <Continuous>  ferrous    sulfate 325 milliGRAM(s) Oral daily  finasteride 5 milliGRAM(s) Oral daily  gabapentin 100 milliGRAM(s) Oral three times a day  lamoTRIgine 200 milliGRAM(s) Oral daily  metoprolol tartrate 25 milliGRAM(s) Oral two times a day  midodrine. 5 milliGRAM(s) Oral three times a day  pantoprazole    Tablet 40 milliGRAM(s) Oral before breakfast  sevelamer carbonate 800 milliGRAM(s) Oral three times a day with meals  sodium zirconium cyclosilicate 10 Gram(s) Oral once  tamsulosin 0.4 milliGRAM(s) Oral at bedtime  traZODone 25 milliGRAM(s) Oral at bedtime    MEDICATIONS  (PRN):          LABS:                        10.5   11.44 )-----------( 299      ( 14 May 2022 07:18 )             33.1     05-14    132<L>  |  96  |  74<H>  ----------------------------<  89  5.4<H>   |  19<L>  |  9.28<H>    Ca    9.2      14 May 2022 07:18    TPro  8.0  /  Alb  2.9<L>  /  TBili  1.3<H>  /  DBili  x   /  AST  396<H>  /  ALT  84<H>  /  AlkPhos  298<H>  05-13    PT/INR - ( 13 May 2022 10:05 )   PT: 15.2 sec;   INR: 1.27 ratio         PTT - ( 13 May 2022 10:05 )  PTT:30.8 sec      Urine studies    PTH and Vit D:

## 2022-05-14 NOTE — CONSULT NOTE ADULT - SUBJECTIVE AND OBJECTIVE BOX
Patient is a 71M from [facility], who is ambulatory at baseline with a walker. He has Hx of HTN, CHF?, COPD, HLD, PAD, BPH, Prostate CA (s/p radiation), Bipolar Disorder, IBD - Crohn's Disease, R ileostomy bag (s/p sigmoid resection), CKD came to ED due to bilateral leg swelling. Pt had been having progressive swelling/ edema of both his legs with associated ambulatory dysfunction. He also started having episodes of shortness of breath and difficulty of breathing. Patient stopped taking the water since it was making him urinating to much with incontinence. He denies any fever, cough, chest pain, palpitation. When he came to the ED, he was saturating at 93-94% on room air. He was placed on nasal cannula. EKG was done showing NSR. Troponin was negative and BNP was mildly elevated at 398. CXR showed bilateral vascular congestion. Serum potassium was elevated at 6.1. He got a dose of lasix and was given Hyperkalemia cocktail in the ED. Of note, he had Hx of COVID in  andwas vaccinated with COVID-19 x 3 (Pfizer) and Influenza in . He was subsequently admitted for acute exacerbation of his heart failure.  Patient was admitted with scr around 3.5mg/dL which is his baseline 3.2mg/dL in 2020. Of note, patient had ATN in Feb requiring few sessions.        PMH:   No pertinent past medical history    COPD, mild    Anemia    Bipolar disorder    IBD (inflammatory bowel disease)    HTN (hypertension)    HLD (hyperlipidemia)    PAD (peripheral artery disease)    CKD (chronic kidney disease)    Prostate CA    BPH with urinary obstruction        PSH:   History of creation of ostomy        FAMILY HISTORY:  FH: HTN (hypertension) (Father)        Social History:  non-smoker/ non-alcoholic     Home Meds:  Home Medications:  ARIPiprazole 15 mg oral tablet: 1 tab(s) orally once a day (2022 17:55)  atorvastatin 10 mg oral tablet: 1 tab(s) orally once a day (2022 17:55)  calcitriol 0.25 mcg oral capsule: 1 cap(s) orally once a day (2022 17:55)  epoetin anoop: 6000 unit(s) subcutaneous 3 times a week,  (2022 17:55)  ferrous sulfate 325 mg (65 mg elemental iron) oral tablet: 1 tab(s) orally once a day (2022 17:55)  Flomax 0.4 mg oral capsule: 1 cap(s) orally once a day (2022 17:55)  gabapentin 600 mg oral tablet: 1 tab(s) orally 2 times a day (2022 17:55)  halobetasol 0.05% topical cream: Apply topically to affected area once a day (2022 17:55)  lamoTRIgine 200 mg oral tablet: 1 tab(s) orally once a day (2022 17:55)  Lasix 20 mg oral tablet: 1 tab(s) orally once a day (2022 17:55)  Proscar 5 mg oral tablet: 1 tab(s) orally once a day (2022 17:55)  Protonix 40 mg oral delayed release tablet: 1 tab(s) orally once a day (2022 17:55)  traZODone 50 mg oral tablet: 0.5 tab(s) orally once a day (at bedtime) (2022 17:55)      Allergies:  Allergies    No Known Allergies    Intolerances        REVIEW OF SYSTEMS:  CONSTITUTIONAL: No fever; No weight loss; No fatigue  EYES: No eye pain; No visual disturbances; No discharge  ENMT:  No difficulty hearing; No tinnitus;  No vertigo; No sinus; No throat pain  NECK: No pain; No stiffness  BREASTS: No pain; No masses; No nipple discharge  RESPIRATORY: No cough; No wheezing; No chills; No hemoptysis; shortness of breath  CARDIOVASCULAR: No chest pain; No palpitations; No dizziness; leg swelling  GASTROINTESTINAL: No abdominal pain; No epigastric pain; No nausea; No vomiting; No hematemesis; No diarrhea; No constipation. No melena   GENITOURINARY: No dysuria No frequency; No hematuria; No incontinence  NEUROLOGICAL: No headaches; No memory loss; No loss of strength; No numbness; No tremors  SKIN: No itching; No burning; No rashes  ENDOCRINE: No heat or cold intolerance; No hair loss  MUSCULOSKELETAL: No joint pain or swelling; No muscle, back, or extremity pain  PSYCHIATRIC: No depression; No anxiety; No mood swings; No difficulty sleeping  HEME/LYMPH: No easy bruising; No bleeding gums  ALLERY AND IMMUNOLOGIC: No hives or eczema    ICU Vital Signs Last 24 Hrs  T(C): 36.4 (14 May 2022 15:00), Max: 37.2 (13 May 2022 20:16)  T(F): 97.5 (14 May 2022 15:00), Max: 98.9 (13 May 2022 20:16)  HR: 85 (14 May 2022 15:00) (80 - 129)  BP: 115/55 (14 May 2022 15:00) (98/55 - 128/49)  BP(mean): --  ABP: --  ABP(mean): --  RR: 18 (14 May 2022 15:00) (18 - 19)  SpO2: 93% (14 May 2022 15:00) (92% - 98%)            PHYSICAL EXAM:  General: No acute respiratory distress.  Eyes: conjunctiva and sclera clear  ENMT: Atraumatic, Normocephalic, supple, No JVD present. Moist mucous membranes  Respiratory: Bilateral decreased BS and no rhonchi, wheezing  Cardiovascular: S1S2+; no m/r/g  Gastrointestinal: Soft, Non-tender, Nondistended; Bowel sounds present, no hepatosplenomegaly.   Neuro:  Awake, Alert & Oriented X3, No focal deficits present.   Ext:  2+ Peripheral Pulses and b/l 2+ pedal edema, No Cyanosis  Skin: No visible rashes        LABS:                                        10.5   11.44 )-----------( 299      ( 14 May 2022 07:18 )             33.1   05-14    132<L>  |  96  |  74<H>  ----------------------------<  89  5.4<H>   |  19<L>  |  9.28<H>    Ca    9.2      14 May 2022 07:18    TPro  8.0  /  Alb  2.9<L>  /  TBili  1.3<H>  /  DBili  x   /  AST  396<H>  /  ALT  84<H>  /  AlkPhos  298<H>  05-13        Color: Yellow / Appearance: Clear / S.010 / pH: x  Gluc: x / Ketone: Negative  / Bili: Negative / Urobili: Negative   Blood: x / Protein: Negative / Nitrite: Negative   Leuk Esterase: Negative / RBC: 0-2 /HPF / WBC 0-2 /HPF   Sq Epi: x / Non Sq Epi: Few /HPF / Bacteria: Few /HPF      Magnesium, Serum: 2.0 mg/dL ( @ 07:00)  Phosphorus Level, Serum: 5.0 mg/dL *H* ( @ 07:00)  Magnesium, Serum: 1.8 mg/dL ( @ 16:37)    Urine studies

## 2022-05-14 NOTE — PROGRESS NOTE ADULT - ASSESSMENT
1. PAWAN due to ATN.  -First HD on 5/6. HD initiated for worsening bun/cr due to ATN with underlying CKD stage 4 and anuria. Pre dialysis bun/scr increasing and anuric, thus no signs of renal recovery at present. Pt is CKD progressing to ESRD on HD.  -plan for HD today. Permacath conversion by IR was cancelled due to tachycardia to 140s and plan for monday.    -awaiting for outpatient HD dialysis center placement.  -consult vascular (Dr. Fermin, informed) for fistula placement which can be done as outpatient but at least get workup done prior to discharge.  -pt also had gross hematuria and unclear etiology seems less likely nephritis and RUBIA, ANCA and Anit-GBM are -ve.  Urinalysis shows no proteinuria. spot protein to creatinine ratio is 900mg.  -renal sono show no hydro.  -Adjust meds to eGFR and avoid IV Gadolinium contrast,NSAIDs, and phosphate enema.  -Monitor I/O's daily.   -Monitor SMA daily.  2. CKD stage 4 most likely due to ischemic nephropathy and h/o ATN with renal recovery.  -Baseline Scr around 3.2mg/dL in April 2020.  -Keep patient euvolemic and renal diet  -Avoid Nephrotoxic Meds/ Agents such as (NSAIDs, IV contrast, Aminoglycosides such as gentamicin, -Gadolinium contrast, Phosphate containing enemas, etc..)  -Adjust Medications according to eGFR  3. Anemia:   -hb is stable; continue iron tabs. monitor CBC  4. MBD:   -phos is stable. previous mild hyperca, which improved and off calcitirol; on renvela 1 tab tid;   -pth is okay at 41.  5. Hyperkalemia due to pawan on ckd.   -stable.  -on low K diet. give Lokelma prn if K >5.5  -Keep pt euvolemic. Avoid ACE inh/ ARBs, NSAIDs, and Aldactone or potassium sparing diuretics. Monitor K+ daily.  6. Fluid Overload:  -clinically improved. off diuretics for now.   -CT chest w/o contrast shows no effusion;   7. Acidosis:  -CO2 is better with HD.  -stable. monitor CO2  8. h/o Hypotension:  -bp is low during HD and continue midodrine to 5mg tid      9. Elevated LFTs  - off lipitor for now.  -hepatitis panel shows hep C+ve.  discussed with pt, pt had known hep C and was treated for in the past; hep C RNA is not detected.   -C3/C4 and RF negative, cryoglobulinemia is negative.    -ct scan shows hepatic mass; Hepatology consulted and MRI w/o contrast shows liver mass and mets.  -Hepatology and Oncology consulted.   -Plan for IR guided liver biopsy on 5/12. awaiting results.   -Bone scan shows no bone mets. spep and IF are nl and serum FLC is within range for ckd.  10. Hyponatremia: due to lack free water intake.   -Na is better..  -place on 1L fluid restriction/day.   -monitor Na.  10. Neuropathy:  -continue gabapentin to 100mg tid (renal dosage)  11. New onset Afib:  -on tele and on diltiazem drip.      Discussed with patient and prudencio ([phone]) in detail regarding the renal plan and care  Discussed the assessment and plan with Primary Team/Nurse

## 2022-05-15 NOTE — CHART NOTE - NSCHARTNOTEFT_GEN_A_CORE
Paged that pt had multiple episodes of Afib  per chart review, pt developed new onset Afib on 5/13  Was started on Cardizem drip and lopressor  Pt in not on AC, and was suppose to be started on heparin drip  Will start heparin drip   HR well controlled Paged that pt had multiple episodes of Afib  per chart review, pt developed new onset Afib on 5/13  Was previously started on Cardizem drip and lopressor  HR well controlled  Pt in not on AC, and was suppose to be started on heparin drip  Will hold heparin drip and will confirm with primary team in AM if pt is having a procedure or if heparin drip can be restarted

## 2022-05-15 NOTE — PROCEDURE NOTE - ADDITIONAL PROCEDURE DETAILS
20 G Extended Dwell inserted under ultrasound guidance. 20G 6cm Extended dwell IV cathter placed under ultrasound guidance.

## 2022-05-15 NOTE — PROGRESS NOTE ADULT - ASSESSMENT
1. PAWAN due to ATN.  -First HD on 5/6. HD initiated for worsening bun/cr due to ATN with underlying CKD stage 4 and anuria. Pre dialysis bun/scr increasing and anuric, thus no signs of renal recovery at present. Pt is CKD progressing to ESRD on HD.  -No new labs. s/p HD yesterday with UF 600cc due to hypotension. Permacath conversion by IR plan for monday.    -awaiting for outpatient HD dialysis center placement.  -Vascular consulted (Dr. Fermin, informed) for fistula placement on Thursday if pt stay otherwise can be as outpatient.   -pt also had gross hematuria and unclear etiology seems less likely nephritis and RUBIA, ANCA and Anit-GBM are -ve.  Urinalysis shows no proteinuria. spot protein to creatinine ratio is 900mg.  -renal sono show no hydro.  -Adjust meds to eGFR and avoid IV Gadolinium contrast,NSAIDs, and phosphate enema.  -Monitor I/O's daily.   -Monitor SMA daily.  2. CKD stage 4 most likely due to ischemic nephropathy and h/o ATN with renal recovery.  -Baseline Scr around 3.2mg/dL in April 2020.  -Keep patient euvolemic and renal diet  -Avoid Nephrotoxic Meds/ Agents such as (NSAIDs, IV contrast, Aminoglycosides such as gentamicin, -Gadolinium contrast, Phosphate containing enemas, etc..)  -Adjust Medications according to eGFR  3. Anemia:   -hb is stable; continue iron tabs. monitor CBC  4. MBD:   -phos is stable. previous mild hyperca, which improved and off calcitirol; on renvela 1 tab tid;   -pth is okay at 41.  5. Hyperkalemia due to pawan on ckd.   -stable.  -on low K diet. give Lokelma prn if K >5.5  -Keep pt euvolemic. Avoid ACE inh/ ARBs, NSAIDs, and Aldactone or potassium sparing diuretics. Monitor K+ daily.  6. Fluid Overload:  -clinically improved. off diuretics for now.   -CT chest w/o contrast shows no effusion;   7. Acidosis:  -CO2 is better with HD.  -stable. monitor CO2  8. h/o Hypotension:  -bp is low during HD and continue midodrine to 5mg tid      9. Elevated LFTs  - off lipitor for now.  -hepatitis panel shows hep C+ve.  discussed with pt, pt had known hep C and was treated for in the past; hep C RNA is not detected.   -C3/C4 and RF negative, cryoglobulinemia is negative.    -ct scan shows hepatic mass; Hepatology consulted and MRI w/o contrast shows liver mass and mets.  -Hepatology and Oncology consulted.   -Plan for IR guided liver biopsy on 5/12. awaiting results.   -Bone scan shows no bone mets. spep and IF are nl and serum FLC is within range for ckd.  10. Hyponatremia: due to lack free water intake.   -Na is stable.  -place on 1L fluid restriction/day.   -monitor Na.  10. Neuropathy:  -continue gabapentin to 100mg tid (renal dosage)  11. New onset Afib:  -on tele and on diltiazem drip.  -Plan as per cardio.      Discussed with patient in detail regarding the renal plan and care  Discussed the assessment and plan with Primary Team/Nurse

## 2022-05-15 NOTE — PROGRESS NOTE ADULT - SUBJECTIVE AND OBJECTIVE BOX
C A R D I O L O G Y  **********************************    DATE OF SERVICE: 05-15-22    pt seen and examined, no complaints, ROS - .       ARIPiprazole 15 milliGRAM(s) Oral daily  chlorhexidine 2% Cloths 1 Application(s) Topical daily  diltiazem Infusion 5 mG/Hr IV Continuous <Continuous>  ferrous    sulfate 325 milliGRAM(s) Oral daily  finasteride 5 milliGRAM(s) Oral daily  gabapentin 100 milliGRAM(s) Oral three times a day  lamoTRIgine 200 milliGRAM(s) Oral daily  metoprolol tartrate 25 milliGRAM(s) Oral two times a day  midodrine. 5 milliGRAM(s) Oral three times a day  pantoprazole    Tablet 40 milliGRAM(s) Oral before breakfast  sevelamer carbonate 800 milliGRAM(s) Oral three times a day with meals  sodium zirconium cyclosilicate 10 Gram(s) Oral once  tamsulosin 0.4 milliGRAM(s) Oral at bedtime  traZODone 25 milliGRAM(s) Oral at bedtime                            10.5   11.44 )-----------( 299      ( 14 May 2022 07:18 )             33.1       Hemoglobin: 10.5 g/dL (05-14 @ 07:18)  Hemoglobin: 11.0 g/dL (05-13 @ 07:23)  Hemoglobin: 10.4 g/dL (05-12 @ 14:52)  Hemoglobin: 10.4 g/dL (05-11 @ 08:47)      05-14    132<L>  |  96  |  74<H>  ----------------------------<  89  5.4<H>   |  19<L>  |  9.28<H>    Ca    9.2      14 May 2022 07:18      Creatinine Trend: 9.28<--, 7.21<--, 11.00<--, 8.59<--, 6.12<--, 7.88<--    COAGS: PT/INR - ( 15 May 2022 04:18 )   PT: 15.8 sec;   INR: 1.32 ratio         PTT - ( 15 May 2022 04:18 )  PTT:39.2 sec          T(C): 36.5 (05-15-22 @ 07:56), Max: 37.1 (05-14-22 @ 16:15)  HR: 88 (05-15-22 @ 07:56) (81 - 114)  BP: 106/51 (05-15-22 @ 07:56) (100/70 - 122/54)  RR: 18 (05-15-22 @ 07:56) (18 - 19)  SpO2: 92% (05-15-22 @ 07:56) (90% - 95%)  Wt(kg): --    I&O's Summary    14 May 2022 07:01  -  15 May 2022 07:00  --------------------------------------------------------  IN: 650 mL / OUT: 1407 mL / NET: -757 mL        HEENT:  (-)icterus (-)pallor  CV: N S1 S2 1/6 MARCELLE (+)2 Pulses B/l  Resp:  Clear to ausculatation B/L, normal effort  GI: (+) BS Soft, NT, ND  Lymph:  (-)Edema, (-)obvious lymphadenopathy  Skin: Warm to touch, Normal turgor  Psych: Appropriate mood and affect        ASSESSMENT/PLAN: 	71y  Male  HTN, CHF?, COPD, HLD, PAD, BPH, Prostate CA (s/p radiation), Bipolar Disorder, IBD - Crohn's Disease, R ileostomy bag (s/p sigmoid resection) historically preserved LV and RV function, normal perfusion on a nuclear stress test 2020 He was brought to ED due to bilateral leg swelling    - No clinical CHF A low BNP suggests a low left ventricular end diastolic pressure  - HD per renal   - no pertinent findings on echo  - Heme/ onc  following  - Vascular for outpatient AV fistula   - Urology follow up    - cont BB , hr stable . weaned off ccb gtt

## 2022-05-15 NOTE — PROGRESS NOTE ADULT - NS ATTEND OPT1 GEN_ALL_CORE
I independently performed the documented:
I independently performed the documented:
I attest my time as attending is greater than 50% of the total combined time spent on qualifying patient care activities by the PA/NP and attending.
I attest my time as attending is greater than 50% of the total combined time spent on qualifying patient care activities by the PA/NP and attending.

## 2022-05-15 NOTE — PROGRESS NOTE ADULT - SUBJECTIVE AND OBJECTIVE BOX
NEPHROLOGY MEDICAL CARE, [facility] - Dr. Emerson Pond/ Dr. Nallely Curran/ Dr. Kirby Angel/ Dr. Pippa Reis    Patient was seen and examined at bedside.    CC: pt had more episodes afib overnight.    Vital Signs Last 24 Hrs  T(C): 36.8 (15 May 2022 11:04), Max: 37.1 (14 May 2022 16:15)  T(F): 98.3 (15 May 2022 11:04), Max: 98.8 (14 May 2022 16:15)  HR: 92 (15 May 2022 11:04) (85 - 114)  BP: 108/46 (15 May 2022 11:04) (100/70 - 115/55)  BP(mean): --  RR: 19 (15 May 2022 11:04) (18 - 19)  SpO2: 91% (15 May 2022 11:04) (90% - 95%)    05-14 @ 07:01  -  05-15 @ 07:00  --------------------------------------------------------  IN: 650 mL / OUT: 1407 mL / NET: -757 mL        PHYSICAL EXAM:  General: No acute respiratory distress.  Eyes: conjunctiva and sclera clear  ENMT: Atraumatic, Normocephalic, supple, No JVD present  Respiratory: Bilateral decreased BS and no rhonchi, wheezing  Cardiovascular: S1S2+; no m/r/g  Gastrointestinal: Soft, Non-tender, Nondistended; Bowel sounds present   Neuro:  Awake, Alert & Oriented X3  Ext:  no pedal edema, No Cyanosis  Skin: No visible rashes  Dialysis Access: Rt DORA nichols    MEDICATIONS:  MEDICATIONS  (STANDING):  ARIPiprazole 15 milliGRAM(s) Oral daily  chlorhexidine 2% Cloths 1 Application(s) Topical daily  diltiazem Infusion 5 mG/Hr (5 mL/Hr) IV Continuous <Continuous>  ferrous    sulfate 325 milliGRAM(s) Oral daily  finasteride 5 milliGRAM(s) Oral daily  gabapentin 100 milliGRAM(s) Oral three times a day  lamoTRIgine 200 milliGRAM(s) Oral daily  metoprolol tartrate 25 milliGRAM(s) Oral two times a day  midodrine. 5 milliGRAM(s) Oral three times a day  pantoprazole    Tablet 40 milliGRAM(s) Oral before breakfast  sevelamer carbonate 800 milliGRAM(s) Oral three times a day with meals  sodium zirconium cyclosilicate 10 Gram(s) Oral once  tamsulosin 0.4 milliGRAM(s) Oral at bedtime  traZODone 25 milliGRAM(s) Oral at bedtime    MEDICATIONS  (PRN):          LABS:                        10.5   11.44 )-----------( 299      ( 14 May 2022 07:18 )             33.1     05-14    132<L>  |  96  |  74<H>  ----------------------------<  89  5.4<H>   |  19<L>  |  9.28<H>    Ca    9.2      14 May 2022 07:18      PT/INR - ( 15 May 2022 04:18 )   PT: 15.8 sec;   INR: 1.32 ratio         PTT - ( 15 May 2022 04:18 )  PTT:39.2 sec      Urine studies    PTH and Vit D:

## 2022-05-16 NOTE — PROGRESS NOTE ADULT - ASSESSMENT
WILMER SELLERS is a 71y Male who presents with a chief complaint of CHF exacerbation.    Liver and Bone Lesions  - MRI and CT imaging reviewed; multiple liver lesions and skeletal lesions suspicious of metastatic disease. Bone scan did not show any suspicious lesions.   - AFP elevated at 5,338. PSA is 0.   - Liver biopsy performed on May 11th. Follow-up on pathology.    Hematuria  - Urology following. Planning on outpatient cystoscopy.     Acute Kidney Injury  - Nephrology following. Patient is currently on dialysis at this time.   - Continue to monitor urine output and kidney function.     Atrial Fibrillation  - Cardiology following; recommending long-term anticoagulation.    Will continue to follow. Thank you for the consultation.    Colby Chao MD  Hematology/Oncology  O: [phone]

## 2022-05-16 NOTE — PROGRESS NOTE ADULT - PROBLEM SELECTOR PLAN 4
-baseline CKD now progressed to ESRD requiring hemodialysis   -s/p HDC insertion with IR on 5/6   -s/p first dialysis session on 5/6,   -HD perma cath placement planned for Friday 5/13 tentatively  -Nephro Dr. Pond  -Last HD 5/12  -f/u nephro reccs -MRI of abdomen revealed  multiple liver lesions with mets  -bone scan with no osseous metastasis   -S/P IR liver biopsy 5/11  -Hepatology Dr. Portillo   -Heme Dr. Rivers/Dr. Chao  -IR following  -f/u liver Bx path

## 2022-05-16 NOTE — PROGRESS NOTE ADULT - PROBLEM SELECTOR PLAN 10
-discharge disposition [facility] pending perma cath placement on 5/13  -CM consult -dvt ppx: SCDs, no AC on hold for liver biopsy today  -gi ppx: PPI

## 2022-05-16 NOTE — ADVANCED PRACTICE NURSE CONSULT - ASSESSMENT
This is a 71yr old male patient admitted for Hyperkalemia, presenting with a Stage 2 Pressure Injury to the Bilateral Post Ears with red tissue and scant drainage

## 2022-05-16 NOTE — PROGRESS NOTE ADULT - PROBLEM SELECTOR PLAN 9
-dvt ppx: SCDs, no AC on hold for liver biopsy today  -gi ppx: PPI -cont home dose of Aripiprazole, Trazadone, Lamotrigine   -supportive measures

## 2022-05-16 NOTE — PROGRESS NOTE ADULT - PROBLEM SELECTOR PLAN 5
-likely anemia of ESRD, no acute sign or symptoms of bleeding   -mon h/h -likely anemia of ESRD, no acute sign or symptoms of bleeding   -mon h/h (stable)

## 2022-05-16 NOTE — PROGRESS NOTE ADULT - SUBJECTIVE AND OBJECTIVE BOX
PGY-1 Progress Note discussed with attending    PAGER #: [[phone]] TILL 5:00 PM  PLEASE CONTACT ON CALL TEAM:  - On Call Team (Please refer to Mihir) FROM 5:00 PM - 8:30PM  - Nightfloat Team FROM 8:30 -7:30 AM    CHIEF COMPLAINT & BRIEF HOSPITAL COURSE:    71M from [facility], who is ambulatory at baseline with a walker. He has Hx of HTN, CHF?, COPD, HLD, PAD, BPH, Prostate CA (s/p radiation), Bipolar Disorder, IBD - Crohn's Disease, R ileostomy bag (s/p sigmoid resection). He was subsequently admitted for acute exacerbation of heart failure.    INTERVAL HPI/OVERNIGHT EVENTS:       REVIEW OF SYSTEMS:  CONSTITUTIONAL: No fever, weight loss, or fatigue  RESPIRATORY: No cough, wheezing, chills or hemoptysis; No shortness of breath  CARDIOVASCULAR: No chest pain, palpitations, dizziness, or leg swelling  GASTROINTESTINAL: No abdominal pain. No nausea, vomiting, or hematemesis; No diarrhea or constipation. No melena or hematochezia.  GENITOURINARY: No dysuria or hematuria, urinary frequency  NEUROLOGICAL: No headaches, memory loss, loss of strength, numbness, or tremors  SKIN: No itching, burning, rashes, or lesions     MEDICATIONS  (STANDING):  ARIPiprazole 15 milliGRAM(s) Oral daily  chlorhexidine 2% Cloths 1 Application(s) Topical daily  ferrous    sulfate 325 milliGRAM(s) Oral daily  finasteride 5 milliGRAM(s) Oral daily  gabapentin 100 milliGRAM(s) Oral three times a day  lamoTRIgine 200 milliGRAM(s) Oral daily  metoprolol tartrate 25 milliGRAM(s) Oral two times a day  midodrine. 5 milliGRAM(s) Oral three times a day  pantoprazole    Tablet 40 milliGRAM(s) Oral before breakfast  sevelamer carbonate 800 milliGRAM(s) Oral three times a day with meals  tamsulosin 0.4 milliGRAM(s) Oral at bedtime  traZODone 25 milliGRAM(s) Oral at bedtime    MEDICATIONS  (PRN):      Vital Signs Last 24 Hrs  T(C): 36.9 (16 May 2022 08:01), Max: 37 (15 May 2022 23:36)  T(F): 98.5 (16 May 2022 08:01), Max: 98.6 (15 May 2022 23:36)  HR: 84 (16 May 2022 08:01) (84 - 92)  BP: 98/48 (16 May 2022 08:01) (97/59 - 113/53)  BP(mean): --  RR: 18 (16 May 2022 08:01) (18 - 19)  SpO2: 93% (16 May 2022 08:01) (91% - 95%)    PHYSICAL EXAMINATION:  GENERAL: NAD, well built  HEAD:  Atraumatic, Normocephalic  EYES:  conjunctiva and sclera clear  NECK: Supple, No JVD, Normal thyroid  CHEST/LUNG: Clear to auscultation. Clear to percussion bilaterally; No rales, rhonchi, wheezing, or rubs  HEART: Regular rate and rhythm; No murmurs, rubs, or gallops  ABDOMEN: Soft, Nontender, Nondistended; Bowel sounds present, no pain or masses on palpation  NERVOUS SYSTEM:  Alert & Oriented X3  : voiding well  EXTREMITIES:  2+ Peripheral Pulses, No clubbing, cyanosis, or edema  SKIN: warm dry                          10.2   13.67 )-----------( 249      ( 16 May 2022 07:24 )             32.8     05-16    130<L>  |  95<L>  |  61<H>  ----------------------------<  97  5.0   |  22  |  8.32<H>    Ca    9.6      16 May 2022 07:24            PT/INR - ( 15 May 2022 04:18 )   PT: 15.8 sec;   INR: 1.32 ratio         PTT - ( 15 May 2022 22:57 )  PTT:46.2 sec    I&O's Summary    15 May 2022 07:01  -  16 May 2022 07:00  --------------------------------------------------------  IN: 194 mL / OUT: 0 mL / NET: 194 mL            CAPILLARY BLOOD GLUCOSE      RADIOLOGY & ADDITIONAL TESTS:                   PGY-1 Progress Note discussed with attending    PAGER #: [[phone]] TILL 5:00 PM  PLEASE CONTACT ON CALL TEAM:  - On Call Team (Please refer to Mihir) FROM 5:00 PM - 8:30PM  - Nightfloat Team FROM 8:30 -7:30 AM    CHIEF COMPLAINT & BRIEF HOSPITAL COURSE:    71M from [facility], who is ambulatory at baseline with a walker. He has Hx of HTN, CHF?, COPD, HLD, PAD, BPH, Prostate CA (s/p radiation), Bipolar Disorder, IBD - Crohn's Disease, R ileostomy bag (s/p sigmoid resection). He was subsequently admitted for acute exacerbation of heart failure.    INTERVAL HPI/OVERNIGHT EVENTS:     Patient was examined at bedside, AAOx3, stable, NAD. He is lethargic at times but arousable. Currently saturating well on 3LPM O2 via nasal cannula. Rhythm converted to Sinus. Will continuen on telemetry. Cardio (Dr. Archer) cleared patient for permacath. IR plans to do procedure later today. Will keep on NPO. Nephro (Dr. Pond) aware and plans to do HD kel (5/17/22).    REVIEW OF SYSTEMS:  CONSTITUTIONAL: No fever, weight loss, or fatigue  RESPIRATORY: No cough, wheezing, chills or hemoptysis; (+) shortness of breath  CARDIOVASCULAR: No chest pain, palpitations, dizziness, or leg swelling  GASTROINTESTINAL: No abdominal pain. No nausea, vomiting, or hematemesis; No diarrhea or constipation. No melena or hematochezia.  GENITOURINARY: No dysuria or hematuria, urinary frequency  NEUROLOGICAL: No headaches, memory loss, loss of strength, numbness, or tremors  SKIN: No itching, burning, rashes, or lesions     MEDICATIONS  (STANDING):  ARIPiprazole 15 milliGRAM(s) Oral daily  chlorhexidine 2% Cloths 1 Application(s) Topical daily  ferrous    sulfate 325 milliGRAM(s) Oral daily  finasteride 5 milliGRAM(s) Oral daily  gabapentin 100 milliGRAM(s) Oral three times a day  lamoTRIgine 200 milliGRAM(s) Oral daily  metoprolol tartrate 25 milliGRAM(s) Oral two times a day  midodrine. 5 milliGRAM(s) Oral three times a day  pantoprazole    Tablet 40 milliGRAM(s) Oral before breakfast  sevelamer carbonate 800 milliGRAM(s) Oral three times a day with meals  tamsulosin 0.4 milliGRAM(s) Oral at bedtime  traZODone 25 milliGRAM(s) Oral at bedtime    MEDICATIONS  (PRN):      Vital Signs Last 24 Hrs  T(C): 36.9 (16 May 2022 08:01), Max: 37 (15 May 2022 23:36)  T(F): 98.5 (16 May 2022 08:01), Max: 98.6 (15 May 2022 23:36)  HR: 84 (16 May 2022 08:01) (84 - 92)  BP: 98/48 (16 May 2022 08:01) (97/59 - 113/53)  BP(mean): --  RR: 18 (16 May 2022 08:01) (18 - 19)  SpO2: 93% (16 May 2022 08:01) (91% - 95%)    PHYSICAL EXAMINATION:  GENERAL: NAD  HEAD:  Atraumatic, Normocephalic  EYES:  conjunctiva and sclera clear  NECK: Supple, No JVD, Normal thyroid  CHEST/LUNG: Clear to auscultation. Clear to percussion bilaterally; No rales, rhonchi, wheezing, or rubs  HEART: Regular rate and rhythm; No murmurs, rubs, or gallops  ABDOMEN: Soft, Nontender, Nondistended; Bowel sounds present, no pain or masses on palpation  NERVOUS SYSTEM:  Alert & Oriented X3  : voiding well  EXTREMITIES:  2+ Peripheral Pulses, No clubbing, cyanosis, or edema  SKIN: warm dry                          10.2   13.67 )-----------( 249      ( 16 May 2022 07:24 )             32.8     05-16    130<L>  |  95<L>  |  61<H>  ----------------------------<  97  5.0   |  22  |  8.32<H>    Ca    9.6      16 May 2022 07:24            PT/INR - ( 15 May 2022 04:18 )   PT: 15.8 sec;   INR: 1.32 ratio         PTT - ( 15 May 2022 22:57 )  PTT:46.2 sec    I&O's Summary    15 May 2022 07:01  -  16 May 2022 07:00  --------------------------------------------------------  IN: 194 mL / OUT: 0 mL / NET: 194 mL            CAPILLARY BLOOD GLUCOSE      RADIOLOGY & ADDITIONAL TESTS:

## 2022-05-16 NOTE — PROGRESS NOTE ADULT - SUBJECTIVE AND OBJECTIVE BOX
C A R D I O L O G Y  **********************************    DATE OF SERVICE: 05-16-22    Patient denies chest pain or shortness of breath.   Review of symptoms otherwise negative.    ARIPiprazole 15 milliGRAM(s) Oral daily  chlorhexidine 2% Cloths 1 Application(s) Topical daily  ferrous    sulfate 325 milliGRAM(s) Oral daily  finasteride 5 milliGRAM(s) Oral daily  gabapentin 100 milliGRAM(s) Oral three times a day  lamoTRIgine 200 milliGRAM(s) Oral daily  metoprolol tartrate 25 milliGRAM(s) Oral two times a day  midodrine. 5 milliGRAM(s) Oral three times a day  pantoprazole    Tablet 40 milliGRAM(s) Oral before breakfast  sevelamer carbonate 800 milliGRAM(s) Oral three times a day with meals  tamsulosin 0.4 milliGRAM(s) Oral at bedtime  traZODone 25 milliGRAM(s) Oral at bedtime                            10.2   13.67 )-----------( 249      ( 16 May 2022 07:24 )             32.8       Hemoglobin: 10.2 g/dL (05-16 @ 07:24)  Hemoglobin: 10.5 g/dL (05-14 @ 07:18)  Hemoglobin: 11.0 g/dL (05-13 @ 07:23)  Hemoglobin: 10.4 g/dL (05-12 @ 14:52)      05-16    130<L>  |  95<L>  |  61<H>  ----------------------------<  97  5.0   |  22  |  8.32<H>    Ca    9.6      16 May 2022 07:24      Creatinine Trend: 8.32<--, 9.28<--, 7.21<--, 11.00<--, 8.59<--, 6.12<--    COAGS: PTT - ( 15 May 2022 22:57 )  PTT:46.2 sec          T(C): 36.8 (05-16-22 @ 11:06), Max: 37 (05-15-22 @ 23:36)  HR: 86 (05-16-22 @ 11:06) (84 - 92)  BP: 100/53 (05-16-22 @ 11:06) (97/59 - 113/53)  RR: 18 (05-16-22 @ 11:06) (18 - 19)  SpO2: 92% (05-16-22 @ 11:06) (91% - 95%)  Wt(kg): --    I&O's Summary    15 May 2022 07:01  -  16 May 2022 07:00  --------------------------------------------------------  IN: 194 mL / OUT: 0 mL / NET: 194 mL          HEENT:  (-)icterus (-)pallor  CV: N S1 S2 1/6 MARCELLE (+)2 Pulses B/l  Resp:  Clear to ausculatation B/L, normal effort  GI: (+) BS Soft, NT, ND  Lymph:  (-)Edema, (-)obvious lymphadenopathy  Skin: Warm to touch, Normal turgor  Psych: Appropriate mood and affect     TELE: Sinus    ASSESSMENT/PLAN: 	71y  Male  HTN, CHF?, COPD, HLD, PAD, BPH, Prostate CA (s/p radiation), Bipolar Disorder, IBD - Crohn's Disease, R ileostomy bag (s/p sigmoid resection) historically preserved LV and RV function, normal perfusion on a nuclear stress test 2020 He was brought to ED due to bilateral leg swelling    - No clinical CHF A low BNP suggests a low left ventricular end diastolic pressure  - monitor renal function, his edema is at least partially due ot renal disease   - no pertinent findings on echo  - Heme/ onc w/u in progress , f/u liver biopsy   - awaiting PermCath, cleared for permacath from a cardiac prospective  - Long term AC for CVA prevention  - cont lopressor      Joey Archer MD, [facility]  BEEPER [phone]

## 2022-05-16 NOTE — ADVANCED PRACTICE NURSE CONSULT - RECOMMEDATIONS
Patients mother would like copy of heart rate for the three years being seen. Please call when ready for pickup   -Clean all wounds with normal saline and apply skin prep to the surrounding skin  -Apply a non-adherent dry dressing to the Bilateral Post Ears wounds Daily PRN  -Frequent toileting  -Elevate/float the patients heels using heel protectors and reposition the patient Q 2hrs using wedges or pillows

## 2022-05-16 NOTE — PROCEDURE NOTE - NSPROCDETAILS_GEN_ALL_CORE
guidewire recovered/lumen(s) aspirated and flushed/sterile dressing applied/sterile technique, catheter placed/ultrasound guidance with use of sterile gel and probe cove
guidewire recovered/lumen(s) aspirated and flushed/sterile dressing applied/sterile technique, catheter placed
blood seen on insertion/dressing applied/flushes easily/secured in place

## 2022-05-16 NOTE — PROGRESS NOTE ADULT - ASSESSMENT
1. PAWAN due to ATN.  -First HD on 5/6. HD initiated for worsening bun/cr due to ATN with underlying CKD stage 4 and anuria. Pre dialysis bun/scr increasing and anuric, thus no signs of renal recovery at present. Pt is CKD progressing to ESRD on HD.  -Permacath conversion by IR plan for today. HD tomorrow morning.  -awaiting for outpatient HD dialysis center placement.  -Vascular consulted (Dr. Fermin, informed) for fistula placement on Thursday if pt stay otherwise can be as outpatient.   -pt also had gross hematuria and unclear etiology seems less likely nephritis and RUBIA, ANCA and Anit-GBM are -ve.  Urinalysis shows no proteinuria. spot protein to creatinine ratio is 900mg.  -renal sono show no hydro.  -Adjust meds to eGFR and avoid IV Gadolinium contrast,NSAIDs, and phosphate enema.  -Monitor I/O's daily.   -Monitor SMA daily.  2. CKD stage 4 most likely due to ischemic nephropathy and h/o ATN with renal recovery.  -Baseline Scr around 3.2mg/dL in April 2020.  -Keep patient euvolemic and renal diet  -Avoid Nephrotoxic Meds/ Agents such as (NSAIDs, IV contrast, Aminoglycosides such as gentamicin, -Gadolinium contrast, Phosphate containing enemas, etc..)  -Adjust Medications according to eGFR  3. Anemia:   -hb is stable; continue iron tabs. monitor CBC  4. MBD:   -phos is stable. previous mild hyperca, which improved and off calcitirol; on renvela 1 tab tid;   -pth is okay at 41.  5. Hyperkalemia due to pawan on ckd.   -stable.  -on low K diet. give Lokelma prn if K >5.5  -Keep pt euvolemic. Avoid ACE inh/ ARBs, NSAIDs, and Aldactone or potassium sparing diuretics. Monitor K+ daily.  6. Fluid Overload:  -clinically improved. off diuretics for now.   -CT chest w/o contrast shows no effusion;   7. Acidosis:  -CO2 is better with HD.  -stable. monitor CO2  8. h/o Hypotension:  -bp is low during HD and continue midodrine to 5mg tid      9. Elevated LFTs  - off lipitor for now.  -hepatitis panel shows hep C+ve.  discussed with pt, pt had known hep C and was treated for in the past; hep C RNA is not detected.   -C3/C4 and RF negative, cryoglobulinemia is negative.    -ct scan shows hepatic mass; Hepatology consulted and MRI w/o contrast shows liver mass and mets.  -Hepatology and Oncology consulted.   -Plan for IR guided liver biopsy on 5/12. awaiting results.   -Bone scan shows no bone mets. spep and IF are nl and serum FLC is within range for ckd.  10. Hyponatremia: due to lack free water intake.   -Na is stable.  -place on 1L fluid restriction/day.   -monitor Na.  10. Neuropathy:  -continue gabapentin to 100mg tid (renal dosage)  11. New onset Afib:  -on tele and on metoprolol; converted to NSR.  -Plan as per cardio.      Discussed with patient in detail regarding the renal plan and care  Discussed the assessment and plan with Primary Team/Nurse

## 2022-05-16 NOTE — PROGRESS NOTE ADULT - ASSESSMENT
71M from [facility], who is ambulatory at baseline with a walker. He has Hx of HTN, CHF?, COPD, HLD, PAD, BPH, Prostate CA (s/p radiation), Bipolar Disorder, IBD - Crohn's Disease, R ileostomy bag (s/p sigmoid resection) presented with SOB and LE edema, found with hyperkalemia on admission. Admitted to [facility] for cardio work up r/o CHF,  hyperkalemia with PAWAN on CKD 4.  Cardiology and nephrology consulted, started on lasix. Renal US shows no hydronephrosis and  + renal cysts.   CT chest shows no pleural effusion, but with hepatic lesion 4.7cm largest. Also noted with transaminitis. GI and Hepatology consulted, s/p MRI abdomen revealed  multiple liver lesions with mets, Heme/onc followed and pt was recommended for liver biopsy and bone scan. Bone scan with no osseous metastasis, Liver biopsy was postponed due to elevated BUN and risk of bleeding.   Hospital course complicated with worsening renal function, s/p IR guided HDC on 5/6 and dialysis was started  Hospital course further complicated with episodes of hematuria, noncontrast CT showed no hydro, stones or  renal mass, Urology followed and pt was recommended for out pt cystoscopy   Pt is S/P IR liver biopsy on Wednesday 5/11. IR was unable to place HD perma cath today. Perma cath placement planned for tentative Friday 5/13.  5/12-Awaiting HD today, does not appear fluid overloaded and with no s&s of resp distress.  Pt. for IR P/C placement 5/13.  SW following re OP HD facility.  5/13-Awaiting IR P/C placement.  For possible discharge to [facility] Monday pending successful HD tx via P/C.         71M from [facility], who is ambulatory at baseline with a walker. He has Hx of HTN, CHF?, COPD, HLD, PAD, BPH, Prostate CA (s/p radiation), Bipolar Disorder, IBD - Crohn's Disease, R ileostomy bag (s/p sigmoid resection) presented with SOB and LE edema, found with hyperkalemia on admission. Admitted to [facility] for cardio work up r/o CHF,  hyperkalemia with PAWAN on CKD 4.

## 2022-05-16 NOTE — PROGRESS NOTE ADULT - PROBLEM SELECTOR PLAN 2
Slowly trending down  -avoid hepatotoxins  -monitor LFTs  -rest plan as above had AFib w/ RVR during permacath last Friday (5/13/22)  admitted to Telemetry  started Lopressor  s/p Cardizem drip, heparin drip  converted to Sinus Rhythm  continue on telemetry until after permacath placement  Cardio (Dr. Archer/Duyen) consulted

## 2022-05-16 NOTE — PROGRESS NOTE ADULT - PROBLEM SELECTOR PLAN 7
-has R ileostomy  -stool studies neg  -cont supportive measures -controlled   -cont Midodrine   -mon BP

## 2022-05-16 NOTE — PROGRESS NOTE ADULT - PROBLEM SELECTOR PLAN 3
-likely in setting of worsening CKD, now resolved   -Cardio Dr. Archer -likely in setting of worsening CKD, now resolved  -EKG on admission - NSR  -Trop - neg  -BNP - 398  -Echo - wnl (EF 55-60%)  -Cardio Dr. Archer

## 2022-05-16 NOTE — PROGRESS NOTE ADULT - SUBJECTIVE AND OBJECTIVE BOX
Chief Complaint:  Patient is a 71y old  Male who presents with a chief complaint of acute CHF exacerbation (16 May 2022 11:22)      Reason for consult:    Interval Events:     Hospital Medications:  ARIPiprazole 15 milliGRAM(s) Oral daily  chlorhexidine 2% Cloths 1 Application(s) Topical daily  ferrous    sulfate 325 milliGRAM(s) Oral daily  finasteride 5 milliGRAM(s) Oral daily  gabapentin 100 milliGRAM(s) Oral three times a day  lamoTRIgine 200 milliGRAM(s) Oral daily  metoprolol tartrate 25 milliGRAM(s) Oral two times a day  midodrine. 5 milliGRAM(s) Oral three times a day  pantoprazole    Tablet 40 milliGRAM(s) Oral before breakfast  sevelamer carbonate 800 milliGRAM(s) Oral three times a day with meals  tamsulosin 0.4 milliGRAM(s) Oral at bedtime  traZODone 25 milliGRAM(s) Oral at bedtime      ROS:   General:  No  fevers, chills, night sweats, fatigue  Eyes:  Good vision, no reported pain  ENT:  No sore throat, pain, runny nose  CV:  No pain, palpitations  Pulm:  No dyspnea, cough  GI:  See HPI, otherwise negative  :  No  incontinence, nocturia  Muscle:  No pain, weakness  Neuro:  No memory problems  Psych:  No insomnia, mood problems, depression  Endocrine:  No polyuria, polydipsia, cold/heat intolerance  Heme:  No petechiae, ecchymosis, easy bruisability  Skin:  No rash    PHYSICAL EXAM:   Vital Signs:  Vital Signs Last 24 Hrs  T(C): 36.8 (16 May 2022 11:06), Max: 37 (15 May 2022 23:36)  T(F): 98.3 (16 May 2022 11:06), Max: 98.6 (15 May 2022 23:36)  HR: 86 (16 May 2022 11:06) (84 - 92)  BP: 100/53 (16 May 2022 11:06) (98/48 - 113/53)  BP(mean): --  RR: 18 (16 May 2022 11:06) (18 - 19)  SpO2: 92% (16 May 2022 11:06) (92% - 95%)  Daily     Daily     GENERAL: no acute distress  NEURO: alert, no asterixis  HEENT: anicteric sclera, no conjunctival pallor appreciated  CHEST: no respiratory distress, no accessory muscle use  CARDIAC: regular rate, rhythm  ABDOMEN: soft, non-tender, non-distended, no rebound or guarding  EXTREMITIES: warm, well perfused, no edema  SKIN: no lesions noted    LABS: reviewed                        10.2   13.67 )-----------( 249      ( 16 May 2022 07:24 )             32.8     05-16    130<L>  |  95<L>  |  61<H>  ----------------------------<  97  5.0   |  22  |  8.32<H>    Ca    9.6      16 May 2022 07:24          Interval Diagnostic Studies: see sunrise for full report   Chief Complaint:  Patient is a 71y old  Male who presents with a chief complaint of acute CHF exacerbation (16 May 2022 11:22)      Reason for consult: Liver lesions    Interval Events: Attempted to see patient, but was off the floor in procedure (permacat) and PACU.  Chart reviewed.    Hospital Medications:  ARIPiprazole 15 milliGRAM(s) Oral daily  chlorhexidine 2% Cloths 1 Application(s) Topical daily  ferrous    sulfate 325 milliGRAM(s) Oral daily  finasteride 5 milliGRAM(s) Oral daily  gabapentin 100 milliGRAM(s) Oral three times a day  lamoTRIgine 200 milliGRAM(s) Oral daily  metoprolol tartrate 25 milliGRAM(s) Oral two times a day  midodrine. 5 milliGRAM(s) Oral three times a day  pantoprazole    Tablet 40 milliGRAM(s) Oral before breakfast  sevelamer carbonate 800 milliGRAM(s) Oral three times a day with meals  tamsulosin 0.4 milliGRAM(s) Oral at bedtime  traZODone 25 milliGRAM(s) Oral at bedtime      PHYSICAL EXAM:   Vital Signs:  Vital Signs Last 24 Hrs  T(C): 36.8 (16 May 2022 11:06), Max: 37 (15 May 2022 23:36)  T(F): 98.3 (16 May 2022 11:06), Max: 98.6 (15 May 2022 23:36)  HR: 86 (16 May 2022 11:06) (84 - 92)  BP: 100/53 (16 May 2022 11:06) (98/48 - 113/53)  BP(mean): --  RR: 18 (16 May 2022 11:06) (18 - 19)  SpO2: 92% (16 May 2022 11:06) (92% - 95%)  Daily     Daily       LABS: reviewed                        10.2   13.67 )-----------( 249      ( 16 May 2022 07:24 )             32.8     05-16    130<L>  |  95<L>  |  61<H>  ----------------------------<  97  5.0   |  22  |  8.32<H>    Ca    9.6      16 May 2022 07:24          Interval Diagnostic Studies: see sunrise for full report

## 2022-05-16 NOTE — PROGRESS NOTE ADULT - PROBLEM SELECTOR PLAN 6
-controlled   -cont Midodrine   -mon BP Slowly trending down  -avoid hepatotoxins  -monitor LFTs  -rest plan as above

## 2022-05-16 NOTE — PROGRESS NOTE ADULT - PROBLEM SELECTOR PLAN 1
-MRI of abdomen revealed  multiple liver lesions with mets  -bone scan with no osseous metastasis   -S/P IR liver biopsy 5/11  -Hepatology Dr. Portillo   -Heme Dr. Rivers/Dr. Chao  -IR following  -f/u liver Bx path -baseline CKD now progressed to ESRD requiring hemodialysis   -s/p HDC insertion with IR on 5/6   -s/p first dialysis session on 5/6,   -HD perma cath placement planned for today (5/16/22)  -Nephro Dr. Pond  -Last HD 5/14; next HD kel. (5/17/22)  -f/u nephro reccs

## 2022-05-16 NOTE — PROGRESS NOTE ADULT - ATTENDING COMMENTS
HPI:  Patient is a 71M from [facility], who is ambulatory at baseline with a walker. He has Hx of HTN, CHF?, COPD, HLD, PAD, BPH, Prostate CA (s/p radiation), Bipolar Disorder, IBD - Crohn's Disease, R ileostomy bag (s/p sigmoid resection). He was brought to ED due to bilateral leg swelling. For the past 2 days he has been having progressive swelling/ edema of both his legs with associated ambulatory dysfunction. He also started having episodes of shortness of breath and difficulty of breathing. He denies any fever, cough, chest pain, palpitation. When he came to the ED, he was saturating at 93-94% on room air. He was placed on nasal cannula. EKG was done showing NSR. Troponin was negative and BNP was mildly elevated at 398. CXR showed bilateral vascular congestion. Serum potassium was elevated at 6.1. He got a dose of lasix and was given Hyperkalemia cocktail in the ED. Of note, he had Hx of COVID in 2020 andwas vaccinated with COVID-19 x 3 (Pfizer) and Influenza in 2021. He was subsequently admitted for acute exacerbation of his heart failure.    GOC: FULL CODE (28 Apr 2022 18:40)    # CASE D/W BROTHER KERI VIA PHONE [ C  ; H  ] - CASE DISCUSSED AT LENGTH, ALL QUESTIONS ANSWERED [5/13]  - CASE D/W BROTHER KERI @ C  - CASE DISCUSSED AT LENGTH. ALL QUESTIONS ANSWERED. BROTHER VERBALIZED UNDERSTANDING THAT PATIENT'S PROGNOSIS IS GUARDED    # NEW-ONSET A.FIB [DURING ATTEMPT TO EXCHANGE SHILEY]  - PLACED ON TELEMETRY  - STARTED ON LOPRESSOR  - S/P CARDIZEM GTT  - HEPARIN DRIP GTT  - CARDIOLOGY CONSULT IN PROGRESS    # HEMATURIA - RESOLVED  # HX OF PROSTATE CA S/P RADIATION  - HOLD A/C, TREND CBC,   - NOTED BLADDER SCAN, NOTED RECENT U/S  - PER UROLOGY, REPEAT BLADDER SCAN W/ PVR - THEN WILL DECIDE REGARDING MAURER PLACEMENT  - NOTED CT A/P ; PSA < 0.01  - UROLOGY CONSULT DR. CHAN    # ACUTE HYPOXIC RESPIRATORY FAILURE SUSPECT S/T PULMONARY VASCULAR CONGESTION  # RULED OUT CHF   # PAWAN ON CKD - STARTED ON HD, S/P SHILEY PLACEMENT   # BPH  # HX OF PROSTATE CA S/P RADIATION  - PLACE ON TELEMETRY  - NOTED CXR  - HOLD LASIX  - NOTE TSH  - STRICT IS AND OS  - ECHOCARDIOGRAM - NORMAL LVEF, NORMAL DIASTOLIC FUNCTION  - NEPHROLOGY CONSULT, CARDIOLOGY CONSULT  - NEPHROLOGY RECC FOR PLACEMENT OF SHILEY W/ INITIATION OF HD    - PLANNED FOR TUNNELLED CATH PLACEMENT DELAYED DUE TO NEW ONSET A.FIB W/ RVR     - [5/14] - CXR ORDERED, LASIX GIVEN, D/W NEPHROLOGY TEAM REGARDING HD  - [5/16] - TUNNELED CATH PLACED    # LIVER LESIONS, BONE LESIONS  S/P LIVER BIOPSY [5/11]  # TRANSAMINITIS, NOTED HEP C AND HEP B MARKERS  - NOTED MRI ABDOMEN  - NOTED TUMOR MARKERS  - REVIEWED BONE SCAN  - HEPATOLOGY CONSULT  - ONCOLOGY CONSULT    - PLANNED FOR IR GUIDED BIOPSY ON 5/6  --- DELAYED BY IR DUE TO BLEEDING RISK  -- PLANNED FOR 5/11   - F/U PATH    # HYPERKALEMIA   - NEPHROLOGY CONSULT    - [5/14] - UNDERWENT HD    # CROHN'S DISEASE  # RIGHT ILEOSTOMY BAG S/P SIGMOID RESECTION  - MONITORING OUTPUT  - s/p GASTROENTEROLOGY CONSULT FOR EVALUATION OF OUTPUT    # NORMOCYTIC ANEMIA    # HTN  # COPD  # HLD  # PAD  # BIPOLAR DX  # GI AND DVT PPX

## 2022-05-16 NOTE — PROCEDURE NOTE - NSINFORMCONSENT_GEN_A_CORE
Benefits, risks, and possible complications of procedure explained to patient/caregiver who verbalized understanding and gave written consent.
Benefits, risks, and possible complications of procedure explained to patient/caregiver who verbalized understanding and gave verbal consent.

## 2022-05-16 NOTE — PROGRESS NOTE ADULT - PROBLEM SELECTOR PLAN 11
-pt's HCP is his brother Alexey Sidhu,   -pt is DNR/DNI   -RASHID in chart -discharge disposition [facility] pending perma cath placement today (5/16/22)  -CM consult

## 2022-05-16 NOTE — PROGRESS NOTE ADULT - PROBLEM SELECTOR PLAN 8
-cont home dose of Aripiprazole, Trazadone, Lamotrigine   -supportive measures -has R ileostomy  -stool studies neg  -cont supportive measures

## 2022-05-16 NOTE — PROGRESS NOTE ADULT - SUBJECTIVE AND OBJECTIVE BOX
NEPHROLOGY MEDICAL CARE, [facility] - Dr. Emerson Pond/ Dr. Nallely Curran/ Dr. Kirby Angel/ Dr. Pippa Reis    Patient was seen and examined at bedside.    CC:  patient is okay and afib better controlled.    Vital Signs Last 24 Hrs  T(C): 36.8 (16 May 2022 11:06), Max: 37 (15 May 2022 23:36)  T(F): 98.3 (16 May 2022 11:06), Max: 98.6 (15 May 2022 23:36)  HR: 86 (16 May 2022 11:06) (84 - 92)  BP: 100/53 (16 May 2022 11:06) (98/48 - 113/53)  BP(mean): --  RR: 18 (16 May 2022 11:06) (18 - 19)  SpO2: 92% (16 May 2022 11:06) (92% - 95%)    05-15 @ 07:01  -  05-16 @ 07:00  --------------------------------------------------------  IN: 194 mL / OUT: 0 mL / NET: 194 mL        PHYSICAL EXAM:  General: No acute respiratory distress.  Eyes: conjunctiva and sclera clear  ENMT: Atraumatic, Normocephalic, supple, No JVD present  Respiratory: Bilateral decreased BS and no rhonchi, wheezing  Cardiovascular: S1S2+; no m/r/g  Gastrointestinal: Soft, Non-tender, Nondistended; Bowel sounds present   Neuro:  Awake, Alert & Oriented X3  Ext:  no pedal edema, No Cyanosis  Skin: No visible rashes  Dialysis Access: Rt DORA nichols      MEDICATIONS:  MEDICATIONS  (STANDING):  ARIPiprazole 15 milliGRAM(s) Oral daily  chlorhexidine 2% Cloths 1 Application(s) Topical daily  ferrous    sulfate 325 milliGRAM(s) Oral daily  finasteride 5 milliGRAM(s) Oral daily  gabapentin 100 milliGRAM(s) Oral three times a day  lamoTRIgine 200 milliGRAM(s) Oral daily  metoprolol tartrate 25 milliGRAM(s) Oral two times a day  midodrine. 5 milliGRAM(s) Oral three times a day  pantoprazole    Tablet 40 milliGRAM(s) Oral before breakfast  sevelamer carbonate 800 milliGRAM(s) Oral three times a day with meals  tamsulosin 0.4 milliGRAM(s) Oral at bedtime  traZODone 25 milliGRAM(s) Oral at bedtime    MEDICATIONS  (PRN):          LABS:                        10.2   13.67 )-----------( 249      ( 16 May 2022 07:24 )             32.8     05-16    130<L>  |  95<L>  |  61<H>  ----------------------------<  97  5.0   |  22  |  8.32<H>    Ca    9.6      16 May 2022 07:24      PT/INR - ( 16 May 2022 12:21 )   PT: 14.9 sec;   INR: 1.25 ratio         PTT - ( 16 May 2022 12:21 )  PTT:31.9 sec      Urine studies    PTH and Vit D:

## 2022-05-16 NOTE — PROCEDURE NOTE - ADDITIONAL PROCEDURE DETAILS
- Right external jugular vein nontunneled HD catheter successfully converted to Tunneled HD catheter.  - Right external jugular vein Tunneled HD catheter may be used for hemodialysis.   - HOLD IV heparin for 6 hours. May resume IV heparin after 10 PM tonight WITHOUT a bolus dose.   - Monitor catheter site for bleeding.

## 2022-05-16 NOTE — PROGRESS NOTE ADULT - SUBJECTIVE AND OBJECTIVE BOX
CHIEF COMPLAINT  CHF Exacerbation    HISTORY OF PRESENT ILLNESS  WILMER SELLERS is a 71y Male who presents with a chief complaint of CHF exacerbation    No acute events. No complaints.    REVIEW OF SYSTEMS  A complete review of systems was performed; negative except per HPI    PHYSICAL EXAM  T(C): 36.9 (05-16-22 @ 08:01), Max: 37 (05-15-22 @ 23:36)  HR: 84 (05-16-22 @ 08:01) (84 - 92)  BP: 98/48 (05-16-22 @ 08:01) (97/59 - 113/53)  RR: 18 (05-16-22 @ 08:01) (18 - 19)  SpO2: 93% (05-16-22 @ 08:01) (91% - 95%)  Constitutional: alert, awake, in no acute distress  Eyes: PERRL, EOMI  HEENT: normocephalic, atraumatic  Neck: supple, non-tender  Cardiovascular: normal perfusion, no peripheral edema  Respiratory: normal respiratory efforts; no increased use of accessory muscles  Gastrointestinal: soft, non-tender  Musculoskeletal: normal range of motion, no deformities noted  Neurological: alert, CN II to XI grossly intact  Skin: warm, dry    LABORATORY DATA                        10.2   13.67 )-----------( 249      ( 16 May 2022 07:24 )             32.8     05-16    130<L>  |  95<L>  |  61<H>  ----------------------------<  97  5.0   |  22  |  8.32<H>    Ca    9.6      16 May 2022 07:24   CHIEF COMPLAINT  CHF Exacerbation    HISTORY OF PRESENT ILLNESS  WILMER SELLERS is a 71y Male who presents with a chief complaint of CHF exacerbation    No acute events. No complaints.    REVIEW OF SYSTEMS  A complete review of systems was performed; negative except per HPI    PHYSICAL EXAM  T(C): 36.9 (05-16-22 @ 08:01), Max: 37 (05-15-22 @ 23:36)  HR: 84 (05-16-22 @ 08:01) (84 - 92)  BP: 98/48 (05-16-22 @ 08:01) (97/59 - 113/53)  RR: 18 (05-16-22 @ 08:01) (18 - 19)  SpO2: 93% (05-16-22 @ 08:01) (91% - 95%)  Constitutional: alert, awake, in no acute distress  Eyes: PERRL, EOMI  HEENT: normocephalic, atraumatic  Neck: supple, non-tender  Cardiovascular: normal perfusion, no peripheral edema  Respiratory: normal respiratory efforts; no increased use of accessory muscles  Gastrointestinal: soft, non-tender  Neurological: alert, CN II to XI grossly intact  Skin: warm, dry    LABORATORY DATA                        10.2   13.67 )-----------( 249      ( 16 May 2022 07:24 )             32.8     05-16    130<L>  |  95<L>  |  61<H>  ----------------------------<  97  5.0   |  22  |  8.32<H>    Ca    9.6      16 May 2022 07:24

## 2022-05-16 NOTE — PROGRESS NOTE ADULT - ASSESSMENT
71y Male from [facility] with Hx of HTN, CHF?, COPD, HLD, PAD, BPH, Prostate CA (s/p radiation), Bipolar Disorder, IBD - Crohn's Disease, R ileostomy bag (s/p sigmoid resection), COVID-19 (2020) and s/p COVID19 vaccination (Pfizer x3) was brought to [facility] ED on 4/28/22 b/o 2 days worsening bilateral leg swelling with difficulty ambulating, and SOB with SpO2 93-94% on RA on arrival.  He was found to have bilateral vascular congestion on CXR due to suspected acute exacerbation of CHF, and PAWAN on CKD (BUN/Cr 88/3.28), with hyperkalemia (K 6.1). 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  Being followed by cardiology, PBNP was mildly elevated, and TTE showed LVEF 55%, normal diastolic function and normal RV function, thus did not appear to be in acute CHF exacerbation.    Hepatology was consulted b/o liver lesion and abnormal liver enzymes and been following since 5/2/22.     # Abnormal liver enzymes, AST >>ALT  - CPK nl. HIV neg. Hep B and C as below. Hep A IgM neg.   - Been hypotensive, requiring midodrine, with renal dysfunction, Ig panel wnl, K/L ratio WNL, F/u UPEP  - No Hx of EtOH  - RUBIA neg, IgG 1723, Still obtain / follow up SMA, LKM, AMA, A1AT, ceruloplasmin, Hep E  - Avoid hepatotoxic medications, agree holding statin  - Please, obtain collateral information about timing of lamotrigine, because its hepatotoxicity is well established, and consider alternative.     # Pos HCV ab  - HCV PCR neg with the Hx of Hep C and treatment, suggests SVR.   - Cryoglobulin negative  - HIV neg    # HBcAb IgM pos, but HBsAg neg  - HBsAg neg, HBV DNA neg, but HBcAb pos, HBeAb pos, past infection. Also obtain Hep D serology.     # Liver lesions  - On CT chest incidentally found a 4.7x4.3 cm partially imaged hypodense lesion and in L lobe another 2.7 cm lesion, as well as nodular thickening along hepatic capsule, all are new from the 2/2020 CT a/p  - CEA and CA 19-9 WNL, but AFP> 5000, PSA WNL  - Could not get contrast CT b/o PAWAN on CKD. MRI was done w/o contrast showing Innumerable mildly T2 hyperintense lesions  throughout the liver, suggestive of metastasis. The largest lesion measures 13.8 x 11.7 x 11.7 cm in the liver dome. Multiple T2 hyperintense and T2 hyperintense lesions in the thoracolumbar spine may represent osseous metastasis.  - No fever or leukocytosis  - S/p liver biopsy per IR ( L lobe mass) 5/11/22, f/u result  - Bone scan did not suggest metastatic disease.  - Hem/onc follow up.     # PAWAN on CKD, now on HD without signs of renal recovery  # Hematuria  # Hx of prostate Ca  - F/u nephrology and urology recommendations      Thank you for consult  Will continue to follow.   D/w primary team

## 2022-05-17 NOTE — PROGRESS NOTE ADULT - NSPROGADDITIONALINFOA_GEN_ALL_CORE
# CASE D/W BROTHER KERI VIA PHONE [ C  ; H  ] - CASE DISCUSSED AT LENGTH, ALL QUESTIONS ANSWERED [5/13]  - CASE D/W BROTHER KERI @ C  - CASE DISCUSSED AT LENGTH. ALL QUESTIONS ANSWERED. BROTHER VERBALIZED UNDERSTANDING THAT PATIENT'S PROGNOSIS IS GUARDED  # NEW-ONSET A.FIB [DURING ATTEMPT TO EXCHANGE SHILEY]  - PLACED ON TELEMETRY  - STARTED ON LOPRESSOR  - S/P CARDIZEM GTT  - HEPARIN DRIP GTT  - CARDIOLOGY CONSULT IN PROGRESS  # HEMATURIA - RESOLVED  # HX OF PROSTATE CA S/P RADIATION  - HOLD A/C, TREND CBC,   - NOTED BLADDER SCAN, NOTED RECENT U/S  - PER UROLOGY, REPEAT BLADDER SCAN W/ PVR - THEN WILL DECIDE REGARDING MAURER PLACEMENT  - NOTED CT A/P ; PSA < 0.01  - UROLOGY CONSULT DR. CHAN      - ECHOCARDIOGRAM - NORMAL LVEF, NORMAL DIASTOLIC FUNCTION  - NEPHROLOGY CONSULT, CARDIOLOGY CONSULT  - NEPHROLOGY RECC FOR PLACEMENT OF SHILEY W/ INITIATION OF HD    - PLANNED FOR TUNNELLED CATH PLACEMENT DELAYED DUE TO NEW ONSET A.FIB W/ RVR     - [5/14] - CXR ORDERED, LASIX GIVEN, D/W NEPHROLOGY TEAM REGARDING HD  - [5/16] - TUNNELED CATH PLACED    # LIVER LESIONS, BONE LESIONS  S/P LIVER BIOPSY [5/11]  # TRANSAMINITIS, NOTED HEP C AND HEP B MARKERS  - NOTED MRI ABDOMEN  - NOTED TUMOR MARKERS  - REVIEWED BONE SCAN  - HEPATOLOGY CONSULT  - ONCOLOGY CONSULT    # BIPOLAR DX  # GI AND DVT PPX

## 2022-05-17 NOTE — PROGRESS NOTE ADULT - SUBJECTIVE AND OBJECTIVE BOX
Chief Complaint:  Patient is a 71y old  Male who presents with a chief complaint of acute CHF exacerbation (17 May 2022 11:37)      Reason for consult:    Interval Events:     Hospital Medications:  ARIPiprazole 15 milliGRAM(s) Oral daily  chlorhexidine 2% Cloths 1 Application(s) Topical daily  ferrous    sulfate 325 milliGRAM(s) Oral daily  finasteride 5 milliGRAM(s) Oral daily  gabapentin 100 milliGRAM(s) Oral three times a day  heparin   Injectable 4000 Unit(s) IV Push every 6 hours PRN  heparin   Injectable 8000 Unit(s) IV Push every 6 hours PRN  heparin  Infusion.  Unit(s)/Hr IV Continuous <Continuous>  lamoTRIgine 200 milliGRAM(s) Oral daily  metoprolol tartrate 25 milliGRAM(s) Oral two times a day  midodrine. 10 milliGRAM(s) Oral three times a day  pantoprazole    Tablet 40 milliGRAM(s) Oral before breakfast  sevelamer carbonate 800 milliGRAM(s) Oral three times a day with meals  tamsulosin 0.4 milliGRAM(s) Oral at bedtime  traZODone 25 milliGRAM(s) Oral at bedtime      ROS:   General:  No  fevers, chills, night sweats, fatigue  Eyes:  Good vision, no reported pain  ENT:  No sore throat, pain, runny nose  CV:  No pain, palpitations  Pulm:  No dyspnea, cough  GI:  See HPI, otherwise negative  :  No  incontinence, nocturia  Muscle:  No pain, weakness  Neuro:  No memory problems  Psych:  No insomnia, mood problems, depression  Endocrine:  No polyuria, polydipsia, cold/heat intolerance  Heme:  No petechiae, ecchymosis, easy bruisability  Skin:  No rash    PHYSICAL EXAM:   Vital Signs:  Vital Signs Last 24 Hrs  T(C): 36.7 (17 May 2022 13:40), Max: 37.2 (16 May 2022 23:50)  T(F): 98 (17 May 2022 13:40), Max: 98.9 (16 May 2022 23:50)  HR: 130 (17 May 2022 13:40) (84 - 130)  BP: 104/50 (17 May 2022 13:40) (93/64 - 123/67)  BP(mean): 62 (16 May 2022 16:50) (62 - 69)  RR: 19 (17 May 2022 13:40) (18 - 26)  SpO2: 89% (17 May 2022 13:40) (89% - 96%)  Daily     Daily     GENERAL: no acute distress  NEURO: alert, no asterixis  HEENT: anicteric sclera, no conjunctival pallor appreciated  CHEST: no respiratory distress, no accessory muscle use  CARDIAC: regular rate, rhythm  ABDOMEN: soft, non-tender, non-distended, no rebound or guarding  EXTREMITIES: warm, well perfused, no edema  SKIN: no lesions noted    LABS: reviewed                        10.1 12.22 )-----------( 269      ( 17 May 2022 07:27 )             32.2     05-17    131<L>  |  95<L>  |  71<H>  ----------------------------<  94  5.6<H>   |  18<L>  |  10.00<H>    Ca    9.8      17 May 2022 07:27  Phos  6.0     05-17  Mg     2.4     05-17    TPro  7.8  /  Alb  2.8<L>  /  TBili  1.6<H>  /  DBili  x   /  AST  383<H>  /  ALT  74<H>  /  AlkPhos  310<H>  05-17    LIVER FUNCTIONS - ( 17 May 2022 07:27 )  Alb: 2.8 g/dL / Pro: 7.8 g/dL / ALK PHOS: 310 U/L / ALT: 74 U/L DA / AST: 383 U/L / GGT: x             Interval Diagnostic Studies: see sunrise for full report

## 2022-05-17 NOTE — PROGRESS NOTE ADULT - PROBLEM SELECTOR PROBLEM 5
Hyperkalemia
Hyperkalemia
Anemia of chronic renal failure, unspecified CKD stage
Hyperkalemia
Anemia of chronic renal failure, unspecified CKD stage
Hyperkalemia
Anemia of chronic renal failure, unspecified CKD stage
Anemia of chronic renal failure, unspecified CKD stage
Hyperkalemia

## 2022-05-17 NOTE — PROGRESS NOTE ADULT - PROBLEM SELECTOR PLAN 3
-likely in setting of worsening CKD, now resolved  -EKG on admission - NSR  -Trop - neg  -BNP - 398  -Echo - wnl (EF 55-60%)  -Cardio Dr. Archer

## 2022-05-17 NOTE — PROGRESS NOTE ADULT - SUBJECTIVE AND OBJECTIVE BOX
NEPHROLOGY MEDICAL CARE, [facility] - Dr. Emerson Pond/ Dr. Nallely Curran/ Dr. Kirby Angel/ Dr. Pippa Reis    Patient was seen and examined at bedside.    CC: dialysis nurse called that pt was hypotensive to 80s and the afib to 130s but  completed HD session.       Vital Signs Last 24 Hrs  T(C): 36.9 (17 May 2022 15:22), Max: 37.2 (16 May 2022 23:50)  T(F): 98.4 (17 May 2022 15:22), Max: 98.9 (16 May 2022 23:50)  HR: 103 (17 May 2022 15:22) (84 - 130)  BP: 107/60 (17 May 2022 15:22) (93/64 - 123/67)  BP(mean): --  RR: 18 (17 May 2022 15:22) (18 - 20)  SpO2: 93% (17 May 2022 15:22) (89% - 96%)    05-16 @ 07:01  -  05-17 @ 07:00  --------------------------------------------------------  IN: 0 mL / OUT: 250 mL / NET: -250 mL    05-17 @ 07:01  -  05-17 @ 18:15  --------------------------------------------------------  IN: 600 mL / OUT: 490 mL / NET: 110 mL        PHYSICAL EXAM:  General: No acute respiratory distress.  Eyes: conjunctiva and sclera clear  ENMT: Atraumatic, Normocephalic, supple, No JVD present  Respiratory: Bilateral decreased BS and no rhonchi, wheezing  Cardiovascular: S1S2+; no m/r/g  Gastrointestinal: Soft, Non-tender, Nondistended; Bowel sounds present   Neuro:  Awake, Alert & Oriented X3  Ext:  no pedal edema, No Cyanosis  Skin: No visible rashes  Dialysis Access: Rt Chest Permcath.      MEDICATIONS:  MEDICATIONS  (STANDING):  aMIOdarone Infusion 1 mG/Min (33.3 mL/Hr) IV Continuous <Continuous>  aMIOdarone Infusion 0.5 mG/Min (16.7 mL/Hr) IV Continuous <Continuous>  aMIOdarone IVPB 150 milliGRAM(s) IV Intermittent once  ARIPiprazole 15 milliGRAM(s) Oral daily  chlorhexidine 2% Cloths 1 Application(s) Topical daily  ferrous    sulfate 325 milliGRAM(s) Oral daily  finasteride 5 milliGRAM(s) Oral daily  gabapentin 100 milliGRAM(s) Oral three times a day  heparin  Infusion.  Unit(s)/Hr (18 mL/Hr) IV Continuous <Continuous>  lamoTRIgine 200 milliGRAM(s) Oral daily  metoprolol tartrate 25 milliGRAM(s) Oral two times a day  midodrine. 10 milliGRAM(s) Oral three times a day  pantoprazole    Tablet 40 milliGRAM(s) Oral before breakfast  sevelamer carbonate 800 milliGRAM(s) Oral three times a day with meals  tamsulosin 0.4 milliGRAM(s) Oral at bedtime  traZODone 25 milliGRAM(s) Oral at bedtime    MEDICATIONS  (PRN):  heparin   Injectable 8000 Unit(s) IV Push every 6 hours PRN For aPTT less than 40  heparin   Injectable 4000 Unit(s) IV Push every 6 hours PRN For aPTT between 40 - 57          LABS:                        10.1   12.22 )-----------( 269      ( 17 May 2022 07:27 )             32.2     05-17    131<L>  |  95<L>  |  71<H>  ----------------------------<  94  5.6<H>   |  18<L>  |  10.00<H>    Ca    9.8      17 May 2022 07:27  Phos  6.0     05-17  Mg     2.4     05-17    TPro  7.8  /  Alb  2.8<L>  /  TBili  1.6<H>  /  DBili  x   /  AST  383<H>  /  ALT  74<H>  /  AlkPhos  310<H>  05-17    PT/INR - ( 16 May 2022 12:21 )   PT: 14.9 sec;   INR: 1.25 ratio         PTT - ( 17 May 2022 15:00 )  PTT:72.1 sec    Magnesium, Serum: 2.4 mg/dL (05-17 @ 07:27)  Phosphorus Level, Serum: 6.0 mg/dL (05-17 @ 07:27)    Urine studies    PTH and Vit D:

## 2022-05-17 NOTE — PROGRESS NOTE ADULT - PROBLEM SELECTOR PLAN 1
-baseline CKD now progressed to ESRD requiring hemodialysis   -s/p HDC insertion with IR on 5/6   -s/p first dialysis session on 5/6,   -HD perma cath placement planned for today (5/16/22)  -Nephro Dr. Pond  -Last HD 5/14; next HD kel. (5/17/22)  -f/u nephro reccs -baseline CKD now progressed to ESRD requiring hemodialysis   -s/p HDC insertion with IR on 5/6   -s/p first dialysis session on 5/6,   -HD perma cath placement planned for today (5/16/22)  -Nephro Dr. Pond  -s/p HD today (5/17/22); next HD kel. (5/18/22)  -inc. midodrine to 10 mg TID  -f/u nephro reccs

## 2022-05-17 NOTE — PROGRESS NOTE ADULT - PROBLEM SELECTOR PROBLEM 3
Acute exacerbation of congestive heart failure
Liver lesion
Hyperkalemia
Acute exacerbation of congestive heart failure
Liver lesion
Acute exacerbation of congestive heart failure
Hyperkalemia
Acute exacerbation of congestive heart failure
Hyperkalemia
Hyperkalemia
Acute exacerbation of congestive heart failure

## 2022-05-17 NOTE — CONSULT NOTE ADULT - SUBJECTIVE AND OBJECTIVE BOX
Patient is a 71y old  Male who presents with a chief complaint of acute CHF exacerbation (17 May 2022 16:42)      Initial HPI on admission:  HPI:  Patient is a 71M from [facility], who is ambulatory at baseline with a walker. He has Hx of HTN, CHF?, COPD, HLD, PAD, BPH, Prostate CA (s/p radiation), Bipolar Disorder, IBD - Crohn's Disease, R ileostomy bag (s/p sigmoid resection). He was brought to ED due to bilateral leg swelling. For the past 2 days he has been having progressive swelling/ edema of both his legs with associated ambulatory dysfunction. He also started having episodes of shortness of breath and difficulty of breathing. He denies any fever, cough, chest pain, palpitation. When he came to the ED, he was saturating at 93-94% on room air. He was placed on nasal cannula. EKG was done showing NSR. Troponin was negative and BNP was mildly elevated at 398. CXR showed bilateral vascular congestion. Serum potassium was elevated at 6.1. He got a dose of lasix and was given Hyperkalemia cocktail in the ED. Of note, he had Hx of COVID in 2020 andwas vaccinated with COVID-19 x 3 (Pfizer) and Influenza in 2021. He was subsequently admitted for acute exacerbation of his heart failure.    C: FULL CODE (28 Apr 2022 18:40)      BRIEF HOSPITAL COURSE:  Patient has been started on HD for worsening kidney function and hyperkalemia not responsive to medical treatment. Patient also developed A fib with RVR during the course of admission and was started on Lopressor. Tunneled catheter was placed on 5/16. During HD session on 5/17, patient developed A fib with RVR with progressive dyspnea.   Patient is also being followed up by Heme onc for liver mets and skeletal lesions.     PAST MEDICAL & SURGICAL HISTORY:  COPD, mild      Anemia      Bipolar disorder      IBD (inflammatory bowel disease)      HTN (hypertension)      HLD (hyperlipidemia)      PAD (peripheral artery disease)      CKD (chronic kidney disease)      Prostate CA      BPH with urinary obstruction      History of creation of ostomy        Allergies    No Known Allergies    Intolerances      FAMILY HISTORY:  FH: HTN (hypertension) (Father)            Medications:  aMIOdarone Infusion 1 mG/Min IV Continuous <Continuous>  aMIOdarone Infusion 0.5 mG/Min IV Continuous <Continuous>  aMIOdarone IVPB 150 milliGRAM(s) IV Intermittent once  ARIPiprazole 15 milliGRAM(s) Oral daily  chlorhexidine 2% Cloths 1 Application(s) Topical daily  ferrous    sulfate 325 milliGRAM(s) Oral daily  finasteride 5 milliGRAM(s) Oral daily  gabapentin 100 milliGRAM(s) Oral three times a day  heparin   Injectable 8000 Unit(s) IV Push every 6 hours PRN  heparin   Injectable 4000 Unit(s) IV Push every 6 hours PRN  heparin  Infusion.  Unit(s)/Hr IV Continuous <Continuous>  lamoTRIgine 200 milliGRAM(s) Oral daily  metoprolol tartrate 25 milliGRAM(s) Oral two times a day  midodrine. 10 milliGRAM(s) Oral three times a day  pantoprazole    Tablet 40 milliGRAM(s) Oral before breakfast  sevelamer carbonate 800 milliGRAM(s) Oral three times a day with meals  tamsulosin 0.4 milliGRAM(s) Oral at bedtime  traZODone 25 milliGRAM(s) Oral at bedtime      vent settings      Vital Signs Last 24 Hrs  T(C): 36.9 (17 May 2022 15:22), Max: 37.2 (16 May 2022 23:50)  T(F): 98.4 (17 May 2022 15:22), Max: 98.9 (16 May 2022 23:50)  HR: 103 (17 May 2022 15:22) (84 - 130)  BP: 107/60 (17 May 2022 15:22) (93/64 - 123/67)  BP(mean): 62 (16 May 2022 16:50) (62 - 62)  RR: 18 (17 May 2022 15:22) (18 - 25)  SpO2: 93% (17 May 2022 15:22) (89% - 96%)          05-16 @ 07:01  -  05-17 @ 07:00  --------------------------------------------------------  IN: 0 mL / OUT: 250 mL / NET: -250 mL          LABS:                        10.1   12.22 )-----------( 269      ( 17 May 2022 07:27 )             32.2     05-17    131<L>  |  95<L>  |  71<H>  ----------------------------<  94  5.6<H>   |  18<L>  |  10.00<H>    Ca    9.8      17 May 2022 07:27  Phos  6.0     05-17  Mg     2.4     05-17    TPro  7.8  /  Alb  2.8<L>  /  TBili  1.6<H>  /  DBili  x   /  AST  383<H>  /  ALT  74<H>  /  AlkPhos  310<H>  05-17          CAPILLARY BLOOD GLUCOSE        PT/INR - ( 16 May 2022 12:21 )   PT: 14.9 sec;   INR: 1.25 ratio         PTT - ( 17 May 2022 15:00 )  PTT:72.1 sec    CULTURES:        Physical Examination:    PHYSICAL EXAM:  GENERAL: moderate respiratory distress  HEAD:  Atraumatic, Normocephalic  EYES: EOMI, PERRLA, conjunctiva and sclera clear  NECK: Supple, No JVD  CHEST/LUNG: Clear to auscultation bilaterally; Decreased breath sounds on right side , right sided dialysis catheter   HEART: Regular rate and rhythm; No murmurs;   ABDOMEN: Soft, Nontender, Nondistended; Bowel sounds present; No guarding, ileostomy  bag   EXTREMITIES:  2+ Peripheral Pulses, No cyanosis or edema  PSYCH: AAOx3  NEUROLOGY: non-focal  SKIN: No rashes or lesions      RADIOLOGY REVIEWED Yes            ASSESSMENT AND PLAN:  71M from [facility], who is ambulatory at baseline with a walker. He has Hx of HTN, CHF?, COPD, HLD, PAD, BPH, Prostate CA (s/p radiation), Bipolar Disorder, IBD - Crohn's Disease, R ileostomy bag (s/p sigmoid resection) is being admitted to ICU for worsening shortness of breath and Atrial fibrillation with RVR.     1- Acute Hypoxic Respiratory Failure   2- Atrial Fibrillation with RVR   3- ESRD on  Hemodialysis  4- Liver lesions (metastasis to bone?)  4- Hypertension   5- Hyperlipidemia   6- Bipolar disorder  7- Crohn's disease s/p ileostomy     Neuro  #Bipolar disorder   Continue home medications     Cardiovascular    #Atrial fibrillation with RVR   patient has new onset A fib last Friday, was on lopressor   Currently in RVR with HR in 150-180's started during HD session ,   s/p lopressor IVP and Dig 0.125mg x 1,   Load with amiodarone drip, On Heparin drip  Normal EF , Cardio Dr Archer onboard     #Hypertension     Will only continue lopressor for HR control, closely monitor BP       Pulmonary  #Acute Respiratory failure with Hypoxia  Patient has increased work of breathing likely due to ineffective dialysis  CXR shows right sided pleural effusion with pulmonary edema B/L (follow official report)  Patient on 4L NC saturating 92%,   s/p Solu medrol as patient also noted to have wheezing   Will control HR with amiodarone and likely will get HD session tomorrow     #Asthma   Not on medications   Will hold any standing meds for now     Infections  No issues     Nephro  #PAWAN/ ESRD   Patient is on dialysis now, likely fluid overload leading to symptomatic dyspnea and tachypnea  500ml UF removed only today   K 5.6, will repeat one set of labs in the evening   Monitor Phos and electrolytes     Gastrointestinal  #Liver lesions   Patient has liver mets with bone involvement , Hepatology and Heme/Onc onboard for workup  AFP 5000, PSA normal         Heme  #AOCD   Monitor HnH for now   Continue iron tabs     Endocrine  No issues     Skin/Catheters  right sided perma catheter   right arm extended dwell catheter         Prophylaxis   heparin Drip for AC   Protonix for GI ppx    Disposition:   Transfer to ICU     Prognosis:   Guarded

## 2022-05-17 NOTE — PROGRESS NOTE ADULT - PROBLEM SELECTOR PLAN 2
had AFib w/ RVR during permacath last Friday (5/13/22)  admitted to Telemetry  started Lopressor  s/p Cardizem drip, heparin drip  converted to Sinus Rhythm  continue on telemetry until after permacath placement  Cardio (Dr. Archer/Duyen) consulted

## 2022-05-17 NOTE — PROGRESS NOTE ADULT - ATTENDING COMMENTS
ATTENDING COVERING DR. IVY: Patient seen and examined this afternoon after HD    Patient is a 71M from [facility], who is ambulatory at baseline with a walker. He has Hx of HTN, CHF?, COPD, HLD, PAD, BPH, Prostate CA (s/p radiation), Bipolar Disorder, IBD - Crohn's Disease, Right ileostomy bag (s/p sigmoid resection).  admitted with bilateral leg swelling worsening with associated ambulatory dysfunction. He also started having episodes of shortness of breath and difficulty of breathing. H had Hx of COVID in 2020 andwas vaccinated with COVID-19 x 3 (Pfizer) and Influenza in 2021. He was subsequently admitted for acute exacerbation of his heart failure.  Patient noted to be having acute worsening of kidney function needing to be initiated on RRT here and also with new onset Atrial fibrillation on anticoagulation with Heparin. He had a Tunneled catheter placed right chest 5/16/22 and planned for discharge to Rehab at [facility]  Vascular Sx also planned to put a AVF this Thursday    Patient was noted to be in moderate distress on return to floor from HD where he was given Lopressor 2.5 mg IV for rapid A. Fib and Dialysis could not remove excess fluid due to low BP. Patient noted to be wheezing and tachypneic with RR to above 35. Housestaff spoke with Cardio Dr. Archer advised to place on Amiodarone load for chemical cardioversion. Tele with HR to 140s to 170s.     Vital Signs Last 24 Hrs  T(C): 36.9 (17 May 2022 15:22), Max: 37.2 (16 May 2022 23:50)  T(F): 98.4 (17 May 2022 15:22), Max: 98.9 (16 May 2022 23:50)  HR: 103 (17 May 2022 15:22) (84 - 130)  BP: 107/60 (17 May 2022 15:22) (98/61 - 123/67)  RR: 18 (17 May 2022 15:22) (18 - 20)  SpO2: 93% (17 May 2022 15:22) (89% - 96%)    P/E: as above  elderly male, in moderate distress and tachypnic  Psych: AAO x2-3  Neuro: No gross focal deficits; Power and sensation intact  CVS: S1S2 present, regular, no edema  Resp: BLAE+, B/L upper zone wheezing   GI: Soft, BS+, mildly distended,   Extr: No  calf tenderness B/L Lower extremities  Skin: Warm and moist without any rashes    Labs:                        10.1   12.22 )-----------( 269      ( 17 May 2022 07:27 )             32.2     D/D:   Acute Hypoxic RF due to fluid overload plus rapid a. fib  PAWAN on CKD 4/5 now ESRD   Hx Crohn's disease s/p Ileostomy s/p sigmoid resection  Hx COPD  Hx Bipolar disorder  Anemia likely related ESRD/ CKD  # HX OF PROSTATE CA S/P RADIATION  HTN  HLD  PAD    Plan:  Continue Telemetry;   Cardio f/u: Amio load;  Given acuity of illness and need for close monitoring of respiratory status as well as need for better HR control and need for adequate fluid removal ATTENDING COVERING DR. IVY: Patient seen and examined this afternoon after HD    Patient is a 71M from [facility], who is ambulatory at baseline with a walker. He has Hx of HTN, CHF?, COPD, HLD, PAD, BPH, Prostate CA (s/p radiation), Bipolar Disorder, IBD - Crohn's Disease, Right ileostomy bag (s/p sigmoid resection).  admitted with bilateral leg swelling worsening with associated ambulatory dysfunction. He also started having episodes of shortness of breath and difficulty of breathing. H had Hx of COVID in 2020 andwas vaccinated with COVID-19 x 3 (Pfizer) and Influenza in 2021. He was subsequently admitted for acute exacerbation of his heart failure.  Patient noted to be having acute worsening of kidney function needing to be initiated on RRT here and also with new onset Atrial fibrillation on anticoagulation with Heparin. He had a Tunneled catheter placed right chest 5/16/22 and planned for discharge to Rehab at [facility]  Vascular Sx also planned to put a AVF this Thursday    Patient was noted to be in moderate distress on return to floor from HD where he was given Lopressor 2.5 mg IV for rapid A. Fib and Dialysis could not remove excess fluid due to low BP. Patient noted to be wheezing and tachypneic with RR to above 35. Housestaff spoke with Cardio Dr. Archer advised to place on Amiodarone load for chemical cardioversion. Tele with HR to 140s to 170s.     Vital Signs Last 24 Hrs  T(C): 36.9 (17 May 2022 15:22), Max: 37.2 (16 May 2022 23:50)  T(F): 98.4 (17 May 2022 15:22), Max: 98.9 (16 May 2022 23:50)  HR: 103 (17 May 2022 15:22) (84 - 130)  BP: 107/60 (17 May 2022 15:22) (98/61 - 123/67)  RR: 18 (17 May 2022 15:22) (18 - 20)  SpO2: 93% (17 May 2022 15:22) (89% - 96%)    P/E: as above  elderly male, in moderate distress and tachypnic  Psych: AAO x2-3  Neuro: No gross focal deficits; Power and sensation intact  CVS: S1S2 present, regular, no edema  Resp: BLAE+, B/L upper zone wheezing   GI: Soft, BS+, mildly distended,   Extr: No  calf tenderness B/L Lower extremities  Skin: Warm and moist without any rashes    Labs: reviewed as above               D/D:   Acute Hypoxic RF due to fluid overload plus rapid a. fib  PAWAN on CKD 4/5 now ESRD   Hx Crohn's disease s/p Ileostomy s/p sigmoid resection  Hx COPD  Hx Bipolar disorder  Anemia likely related ESRD/ CKD  # HX OF PROSTATE CA S/P RADIATION  HTN  HLD  PAD    Plan:  Continue Telemetry;   Cardio f/u: Amio load;  Given acuity of illness and need for close monitoring of respiratory status as well as need for better HR control and need for adequate fluid removal Patient will need close monitoring in ICU; ICU consult requested and accepted.  Continue Hemodialysis as tolerated with close monitoring of BP in ICU; s/p Tunneled catheter 5/16; Nephrology f/u  Spoke with Brother in the afternoon (called Nursing station) and updated patient clinical status and overall prognosis appears poor. GOC were discussed and confirmed and in agreement with patient wishes for DNR and DNI.   Follow up Biopsy results concern for HCC; Hepatology follow up  Oncology folow up     Overall Prognosis is guarded   Discussed with PGY1 Dr. Brambila and PGY3 and RN and ICU team ATTENDING COVERING DR. IVY: Patient seen and examined this afternoon after HD    Patient is a 71M from [facility], who is ambulatory at baseline with a walker. He has Hx of HTN, CHF?, COPD, HLD, PAD, BPH, Prostate CA (s/p radiation), Bipolar Disorder, IBD - Crohn's Disease, Right ileostomy bag (s/p sigmoid resection).  admitted with bilateral leg swelling worsening with associated ambulatory dysfunction. He also started having episodes of shortness of breath and difficulty of breathing. H had Hx of COVID in 2020 andwas vaccinated with COVID-19 x 3 (Pfizer) and Influenza in 2021. He was subsequently admitted for acute exacerbation of his heart failure.  Patient noted to be having acute worsening of kidney function needing to be initiated on RRT here and also with new onset Atrial fibrillation on anticoagulation with Heparin. He had a Tunneled catheter placed right chest 5/16/22 and planned for discharge to Rehab at [facility]  Vascular Sx also planned to put a AVF this Thursday    Patient was noted to be in moderate distress on return to floor from HD where he was given Lopressor 2.5 mg IV for rapid A. Fib and Dialysis could not remove excess fluid due to low BP. Patient noted to be wheezing and tachypneic with RR to above 35. Housestaff spoke with Cardio Dr. Archer advised to place on Amiodarone load for chemical cardioversion. Tele with HR to 140s to 170s.     Vital Signs Last 24 Hrs  T(C): 36.9 (17 May 2022 15:22), Max: 37.2 (16 May 2022 23:50)  T(F): 98.4 (17 May 2022 15:22), Max: 98.9 (16 May 2022 23:50)  HR: 103 (17 May 2022 15:22) (84 - 130)  BP: 107/60 (17 May 2022 15:22) (98/61 - 123/67)  RR: 18 (17 May 2022 15:22) (18 - 20)  SpO2: 93% (17 May 2022 15:22) (89% - 96%)    P/E: as above  elderly male, in moderate distress and tachypnic  Psych: AAO x2-3  Neuro: No gross focal deficits; Power and sensation intact  CVS: S1S2 present, regular, no edema  Resp: BLAE+, B/L upper zone wheezing   GI: Soft, BS+, mildly distended,   Extr: No  calf tenderness B/L Lower extremities  Skin: Warm and moist without any rashes    Labs: reviewed as above               D/D:   Acute Hypoxic RF due to fluid overload plus rapid a. fib  PAWAN on CKD 4/5 now ESRD   Hx Crohn's disease s/p Ileostomy s/p sigmoid resection  Hx COPD  Hx Bipolar disorder  Anemia likely related ESRD/ CKD  # HX OF PROSTATE CA S/P RADIATION  HTN  HLD  PAD    Plan:  Continue Telemetry;   Cardio f/u: Amio load;  Given acuity of illness and need for close monitoring of respiratory status as well as need for better HR control and need for adequate fluid removal Patient will need close monitoring in ICU; ICU consult requested and accepted.  Continue Hemodialysis as tolerated with close monitoring of BP in ICU; s/p Tunneled catheter 5/16; Nephrology f/u  Spoke with Brother in the afternoon (called Nursing station) and updated patient clinical status and overall prognosis appears poor. GOC were discussed and confirmed and in agreement with patient wishes for DNR and DNI.   Follow up Biopsy results concern for HCC; Hepatology follow up  Oncology follow up   GI and DVT ppx  Rest as per PGY1 above    Overall Prognosis is guarded   Discussed with PGY1 Dr. Brambila and PGY3 and RN and ICU team

## 2022-05-17 NOTE — PROGRESS NOTE ADULT - SUBJECTIVE AND OBJECTIVE BOX
C A R D I O L O G Y  **********************************    DATE OF SERVICE: 05-17-22    Patient denies chest pain or shortness of breath.   Review of symptoms otherwise negative.    ARIPiprazole 15 milliGRAM(s) Oral daily  chlorhexidine 2% Cloths 1 Application(s) Topical daily  ferrous    sulfate 325 milliGRAM(s) Oral daily  finasteride 5 milliGRAM(s) Oral daily  gabapentin 100 milliGRAM(s) Oral three times a day  heparin   Injectable 4000 Unit(s) IV Push every 6 hours PRN  heparin   Injectable 8000 Unit(s) IV Push every 6 hours PRN  heparin  Infusion.  Unit(s)/Hr IV Continuous <Continuous>  lamoTRIgine 200 milliGRAM(s) Oral daily  metoprolol tartrate 25 milliGRAM(s) Oral two times a day  midodrine. 5 milliGRAM(s) Oral three times a day  pantoprazole    Tablet 40 milliGRAM(s) Oral before breakfast  sevelamer carbonate 800 milliGRAM(s) Oral three times a day with meals  tamsulosin 0.4 milliGRAM(s) Oral at bedtime  traZODone 25 milliGRAM(s) Oral at bedtime                            10.1   12.22 )-----------( 269      ( 17 May 2022 07:27 )             32.2       Hemoglobin: 10.1 g/dL (05-17 @ 07:27)  Hemoglobin: 10.2 g/dL (05-16 @ 07:24)  Hemoglobin: 10.5 g/dL (05-14 @ 07:18)  Hemoglobin: 11.0 g/dL (05-13 @ 07:23)  Hemoglobin: 10.4 g/dL (05-12 @ 14:52)      05-17    131<L>  |  95<L>  |  71<H>  ----------------------------<  94  5.6<H>   |  18<L>  |  10.00<H>    Ca    9.8      17 May 2022 07:27  Phos  6.0     05-17  Mg     2.4     05-17    TPro  7.8  /  Alb  2.8<L>  /  TBili  1.6<H>  /  DBili  x   /  AST  383<H>  /  ALT  74<H>  /  AlkPhos  310<H>  05-17    Creatinine Trend: 10.00<--, 8.32<--, 9.28<--, 7.21<--, 11.00<--, 8.59<--    COAGS: PTT - ( 17 May 2022 07:31 )  PTT:80.6 sec          T(C): 36.6 (05-17-22 @ 09:33), Max: 37.2 (05-16-22 @ 23:50)  HR: 93 (05-17-22 @ 09:33) (84 - 106)  BP: 107/62 (05-17-22 @ 09:33) (93/64 - 123/67)  RR: 18 (05-17-22 @ 09:33) (18 - 26)  SpO2: 96% (05-17-22 @ 09:33) (90% - 96%)  Wt(kg): --    I&O's Summary    16 May 2022 07:01  -  17 May 2022 07:00  --------------------------------------------------------  IN: 0 mL / OUT: 250 mL / NET: -250 mL        HEENT:  (-)icterus (-)pallor  CV: N S1 S2 1/6 MARCELLE (+)2 Pulses B/l  Resp:  Clear to ausculatation B/L, normal effort  GI: (+) BS Soft, NT, ND  Lymph:  (-)Edema, (-)obvious lymphadenopathy  Skin: Warm to touch, Normal turgor  Psych: Appropriate mood and affect     TELE: Sinus    ASSESSMENT/PLAN: 	71y  Male  HTN, COPD, HLD, PAD, BPH, Prostate CA (s/p radiation), Bipolar Disorder, IBD - Crohn's Disease, R ileostomy bag (s/p sigmoid resection) historically preserved LV and RV function, normal perfusion on a nuclear stress test 2020 He was brought to ED due to bilateral leg swelling    - No clinical CHF A low BNP suggests a low left ventricular end diastolic pressure  - monitor renal function, his edema is at least partially due to renal disease   - no pertinent findings on echo  - Heme/ onc w/u in progress, f/u liver biopsy   - low cardiac risk for AV fistula  - Long term AC for CVA prevention  - cont lopressor      Joey Archer MD, [facility]  BEEPER [phone]

## 2022-05-17 NOTE — CONSULT NOTE ADULT - ATTENDING COMMENTS
Patient is a 70 y/o male from [facility] admitted in late April with chief complaint of lower extremity swelling and SOB. He has a PMH of HTN, COPD, CKD , prostate cancer s/p radiation , Crohn's disease s/p sigmoid resection and ileostomy and Bipolar disorder. Since admission he has had progression of renal failure and a dialysis catheter was placed and he had HD on the 15th and today. The problem is that despite a session that went  2-3 hours they were not able to remove significant fluid. He actually went into AFIB with a rapid VR near the end of today's session. The hospitalist requested transfer of the patient to the ICU because of ongoing AFIB with rapid VR with new and worsening tachypnea ( RR- 28-30 with abdominal paradox ). The patient remains lucid and able to speak in complete sentences. Saturation 92% on 4L oxygen via nasal canula. CXR shows peribronchial cuffing, cephalization and a right pleural effusion. Auscultation 1+ wheeze scattered throughout the chest, mostly anterior. His last echocardiogram showed EF 50-55% with grade 1 diastolic dysfunction, LVH. The patient has had CT and MRI evidence of hepatic mass or masses consistent with metastasis. He there fore has a poor prognosis but his dx at present is fluid overload, AFIB with rapid VR. I have transferred him to ICU and spoke directly with the attending nephrologist about the need for longer HD sessions with more fluid removal. He has agreed to see the patient again today ( done ) and coordinate repeat HD early tomorrow morning. Patient is DNR/DNI.

## 2022-05-17 NOTE — PROGRESS NOTE ADULT - ASSESSMENT
71M from [facility], who is ambulatory at baseline with a walker. He has Hx of HTN, CHF?, COPD, HLD, PAD, BPH, Prostate CA (s/p radiation), Bipolar Disorder, IBD - Crohn's Disease, R ileostomy bag (s/p sigmoid resection) presented with SOB and LE edema, found with hyperkalemia on admission. Admitted to [facility] for cardio work up r/o CHF,  hyperkalemia with PAWAN on CKD 4.

## 2022-05-17 NOTE — CHART NOTE - NSCHARTNOTEFT_GEN_A_CORE
Patient had episodes of AFib on RVR (150-160s) during HD. He is tachypneic and tachycardic and complaints of shortness of breath. He denies any chest pain, palpitation. BP was bordeline 90s/60s. Lopressor 2.5 mg IV was given. HR went down to 130-140s and BP maintained at 90s/60s. Digoxin 0.125 mcg IV was given. Patient had episodes of AFib on RVR (150-160s) during HD. He is tachypneic and tachycardic and complaints of shortness of breath. He denies any chest pain, palpitation. BP was bordeline 90s/60s. Lopressor 2.5 mg IV was given. HR went down to 130-140s and BP maintained at 90s/60s. Digoxin 0.125 mcg IV was given. BP improved to . Midodrine was increased to 10mg TID and was given. HR went up to 130-140s. Another Lopressor 2.5 mg IV was given. Patient had episodes of AFib on RVR (150-160s) during HD. He is tachypneic and tachycardic and complaints of shortness of breath. He denies any chest pain, palpitation. BP was bordeline 90s/60s. Lopressor 2.5 mg IV was given. HR went down to 130-140s and BP maintained at 90s/60s. Digoxin 0.125 mcg IV was given. BP improved to . Midodrine was increased to 10mg TID and was given. HR went up to 130-140s. Another Lopressor 2.5 mg IV was given. HR went back to 120-130s. His conditioned improved and he was brought back to his room.

## 2022-05-17 NOTE — PROGRESS NOTE ADULT - ASSESSMENT
1. PAWAN due to ATN.  -First HD on 5/6. HD initiated for worsening bun/cr due to ATN with underlying CKD stage 4 and anuria. Pre dialysis bun/scr increasing and anuric, thus no signs of renal recovery at present. Pt is CKD progressing to ESRD on HD.  -Permacath conversion by IR on 5/16. Outpatient HD dialysis center placement accepted at [place].   -s/p HD done for 3hrs without fluid removal since pt became hypotensive to 80s and afib to 130s. ICU consulted for respiratory distress and RVR with afib. Patient is possible hypervolemic state and   will plan for HD again tomorrow with UF goal 2kg will use pressors to assistance with UF.  -Vascular consulted (Dr. Fermin, informed) for fistula placement as outpatient.   -pt also had gross hematuria and unclear etiology seems less likely nephritis and RUBIA, ANCA and Anit-GBM are -ve.  Urinalysis shows no proteinuria. spot protein to creatinine ratio is 900mg.  -renal sono show no hydro.  -Adjust meds to eGFR and avoid IV Gadolinium contrast,NSAIDs, and phosphate enema.  -Monitor I/O's daily.   -Monitor SMA daily.  2. CKD stage 4 most likely due to ischemic nephropathy and h/o ATN with renal recovery.  -Baseline Scr around 3.2mg/dL in April 2020.  -Keep patient euvolemic and renal diet  -Avoid Nephrotoxic Meds/ Agents such as (NSAIDs, IV contrast, Aminoglycosides such as gentamicin, -Gadolinium contrast, Phosphate containing enemas, etc..)  -Adjust Medications according to eGFR  3. Anemia:   -hb is stable; continue iron tabs. monitor CBC  4. MBD:   -phos is stable. previous mild hyperca, which improved and off calcitirol; on renvela 1 tab tid;   -pth is okay at 41.  5. Hyperkalemia due to pawan on ckd.   -should improve with dialysis.  -on low K diet. give Lokelma prn if K >5.5  -Keep pt euvolemic. Avoid ACE inh/ ARBs, NSAIDs, and Aldactone or potassium sparing diuretics. Monitor K+ daily.  6. Fluid Overload:  -clinically improved. off diuretics for now.   -CT chest w/o contrast shows no effusion;   7. Acidosis:  -stable.  -stable. monitor CO2  8. h/o Hypotension:  -bp is low during HD and continue midodrine to 5mg tid      9. Elevated LFTs  - off lipitor for now.  -hepatitis panel shows hep C+ve.  discussed with pt, pt had known hep C and was treated for in the past; hep C RNA is not detected.   -C3/C4 and RF negative, cryoglobulinemia is negative.    -ct scan shows hepatic mass; Hepatology consulted and MRI w/o contrast shows liver mass and mets.  -Hepatology and Oncology consulted.   -Plan for IR guided liver biopsy on 5/12. awaiting results.   -Bone scan shows no bone mets. spep and IF are nl and serum FLC is within range for ckd.  10. Hyponatremia: due to lack free water intake.   -Na is stable.  -place on 1L fluid restriction/day.   -monitor Na.  10. Neuropathy:  -continue gabapentin to 100mg tid (renal dosage)  11. New onset Afib:  -given digoxin earlier in the day.   -on heparin drip.     Patient is critically ill. Time Spent: 35mins.  Discussed with patient and prudencio in detail regarding the renal plan and care  Discussed the assessment and plan with ICU Team/Nurse

## 2022-05-17 NOTE — PROGRESS NOTE ADULT - SUBJECTIVE AND OBJECTIVE BOX
PGY-1 Progress Note discussed with attending    PAGER #: [[phone]] TILL 5:00 PM  PLEASE CONTACT ON CALL TEAM:  - On Call Team (Please refer to Mihir) FROM 5:00 PM - 8:30PM  - Nightfloat Team FROM 8:30 -7:30 AM    CHIEF COMPLAINT & BRIEF HOSPITAL COURSE:    71M from [facility], who is ambulatory at baseline with a walker. He has Hx of HTN, CHF?, COPD, HLD, PAD, BPH, Prostate CA (s/p radiation), Bipolar Disorder, IBD - Crohn's Disease, R ileostomy bag (s/p sigmoid resection). He was subsequently admitted for acute exacerbation of heart failure.    INTERVAL HPI/OVERNIGHT EVENTS:       REVIEW OF SYSTEMS:  CONSTITUTIONAL: No fever, weight loss, or fatigue  RESPIRATORY: No cough, wheezing, chills or hemoptysis; No shortness of breath  CARDIOVASCULAR: No chest pain, palpitations, dizziness, or leg swelling  GASTROINTESTINAL: No abdominal pain. No nausea, vomiting, or hematemesis; No diarrhea or constipation. No melena or hematochezia.  GENITOURINARY: No dysuria or hematuria, urinary frequency  NEUROLOGICAL: No headaches, memory loss, loss of strength, numbness, or tremors  SKIN: No itching, burning, rashes, or lesions     MEDICATIONS  (STANDING):  ARIPiprazole 15 milliGRAM(s) Oral daily  chlorhexidine 2% Cloths 1 Application(s) Topical daily  ferrous    sulfate 325 milliGRAM(s) Oral daily  finasteride 5 milliGRAM(s) Oral daily  gabapentin 100 milliGRAM(s) Oral three times a day  heparin  Infusion.  Unit(s)/Hr (18 mL/Hr) IV Continuous <Continuous>  lamoTRIgine 200 milliGRAM(s) Oral daily  metoprolol tartrate 25 milliGRAM(s) Oral two times a day  midodrine. 5 milliGRAM(s) Oral three times a day  pantoprazole    Tablet 40 milliGRAM(s) Oral before breakfast  sevelamer carbonate 800 milliGRAM(s) Oral three times a day with meals  tamsulosin 0.4 milliGRAM(s) Oral at bedtime  traZODone 25 milliGRAM(s) Oral at bedtime    MEDICATIONS  (PRN):  heparin   Injectable 4000 Unit(s) IV Push every 6 hours PRN For aPTT between 40 - 57  heparin   Injectable 8000 Unit(s) IV Push every 6 hours PRN For aPTT less than 40      Vital Signs Last 24 Hrs  T(C): 36.6 (17 May 2022 09:33), Max: 37.2 (16 May 2022 23:50)  T(F): 97.8 (17 May 2022 09:33), Max: 98.9 (16 May 2022 23:50)  HR: 93 (17 May 2022 09:33) (84 - 106)  BP: 107/62 (17 May 2022 09:33) (93/64 - 123/67)  BP(mean): 62 (16 May 2022 16:50) (62 - 69)  RR: 18 (17 May 2022 09:33) (18 - 26)  SpO2: 96% (17 May 2022 09:33) (90% - 96%)    PHYSICAL EXAMINATION:  GENERAL: NAD, well built  HEAD:  Atraumatic, Normocephalic  EYES:  conjunctiva and sclera clear  NECK: Supple, No JVD, Normal thyroid  CHEST/LUNG: Clear to auscultation. Clear to percussion bilaterally; No rales, rhonchi, wheezing, or rubs  HEART: Regular rate and rhythm; No murmurs, rubs, or gallops  ABDOMEN: Soft, Nontender, Nondistended; Bowel sounds present, no pain or masses on palpation  NERVOUS SYSTEM:  Alert & Oriented X3  : voiding well  EXTREMITIES:  2+ Peripheral Pulses, No clubbing, cyanosis, or edema  SKIN: warm dry                          10.1   12.22 )-----------( 269      ( 17 May 2022 07:27 )             32.2     05-17    131<L>  |  95<L>  |  71<H>  ----------------------------<  94  5.6<H>   |  18<L>  |  10.00<H>    Ca    9.8      17 May 2022 07:27  Phos  6.0     05-17  Mg     2.4     05-17    TPro  7.8  /  Alb  2.8<L>  /  TBili  1.6<H>  /  DBili  x   /  AST  383<H>  /  ALT  74<H>  /  AlkPhos  310<H>  05-17    LIVER FUNCTIONS - ( 17 May 2022 07:27 )  Alb: 2.8 g/dL / Pro: 7.8 g/dL / ALK PHOS: 310 U/L / ALT: 74 U/L DA / AST: 383 U/L / GGT: x               PT/INR - ( 16 May 2022 12:21 )   PT: 14.9 sec;   INR: 1.25 ratio         PTT - ( 17 May 2022 07:31 )  PTT:80.6 sec    I&O's Summary    16 May 2022 07:01  -  17 May 2022 07:00  --------------------------------------------------------  IN: 0 mL / OUT: 250 mL / NET: -250 mL            CAPILLARY BLOOD GLUCOSE      RADIOLOGY & ADDITIONAL TESTS:                   PGY-1 Progress Note discussed with attending    PAGER #: [[phone]] TILL 5:00 PM  PLEASE CONTACT ON CALL TEAM:  - On Call Team (Please refer to Mihir) FROM 5:00 PM - 8:30PM  - Nightfloat Team FROM 8:30 -7:30 AM    CHIEF COMPLAINT & BRIEF HOSPITAL COURSE:    71M from [facility], who is ambulatory at baseline with a walker. He has Hx of HTN, CHF?, COPD, HLD, PAD, BPH, Prostate CA (s/p radiation), Bipolar Disorder, IBD - Crohn's Disease, R ileostomy bag (s/p sigmoid resection). He was subsequently admitted for acute exacerbation of heart failure.    INTERVAL HPI/OVERNIGHT EVENTS:     Patient was examined at bedside, AAOx2, stable, NAD and saturating well at 2LPM O2 via nasal cannula. He had episodes of tachycardia during HD. IV Lopressor and     REVIEW OF SYSTEMS:  CONSTITUTIONAL: No fever, weight loss, or fatigue  RESPIRATORY: No cough, wheezing, chills or hemoptysis; No shortness of breath  CARDIOVASCULAR: No chest pain, palpitations, dizziness, or leg swelling  GASTROINTESTINAL: No abdominal pain. No nausea, vomiting, or hematemesis; No diarrhea or constipation. No melena or hematochezia.  GENITOURINARY: No dysuria or hematuria, urinary frequency  NEUROLOGICAL: No headaches, memory loss, loss of strength, numbness, or tremors  SKIN: No itching, burning, rashes, or lesions     MEDICATIONS  (STANDING):  ARIPiprazole 15 milliGRAM(s) Oral daily  chlorhexidine 2% Cloths 1 Application(s) Topical daily  ferrous    sulfate 325 milliGRAM(s) Oral daily  finasteride 5 milliGRAM(s) Oral daily  gabapentin 100 milliGRAM(s) Oral three times a day  heparin  Infusion.  Unit(s)/Hr (18 mL/Hr) IV Continuous <Continuous>  lamoTRIgine 200 milliGRAM(s) Oral daily  metoprolol tartrate 25 milliGRAM(s) Oral two times a day  midodrine. 5 milliGRAM(s) Oral three times a day  pantoprazole    Tablet 40 milliGRAM(s) Oral before breakfast  sevelamer carbonate 800 milliGRAM(s) Oral three times a day with meals  tamsulosin 0.4 milliGRAM(s) Oral at bedtime  traZODone 25 milliGRAM(s) Oral at bedtime    MEDICATIONS  (PRN):  heparin   Injectable 4000 Unit(s) IV Push every 6 hours PRN For aPTT between 40 - 57  heparin   Injectable 8000 Unit(s) IV Push every 6 hours PRN For aPTT less than 40      Vital Signs Last 24 Hrs  T(C): 36.6 (17 May 2022 09:33), Max: 37.2 (16 May 2022 23:50)  T(F): 97.8 (17 May 2022 09:33), Max: 98.9 (16 May 2022 23:50)  HR: 93 (17 May 2022 09:33) (84 - 106)  BP: 107/62 (17 May 2022 09:33) (93/64 - 123/67)  BP(mean): 62 (16 May 2022 16:50) (62 - 69)  RR: 18 (17 May 2022 09:33) (18 - 26)  SpO2: 96% (17 May 2022 09:33) (90% - 96%)    PHYSICAL EXAMINATION:  GENERAL: NAD, well built  HEAD:  Atraumatic, Normocephalic  EYES:  conjunctiva and sclera clear  NECK: Supple, No JVD, Normal thyroid  CHEST/LUNG: Clear to auscultation. Clear to percussion bilaterally; No rales, rhonchi, wheezing, or rubs  HEART: Regular rate and rhythm; No murmurs, rubs, or gallops  ABDOMEN: Soft, Nontender, Nondistended; Bowel sounds present, no pain or masses on palpation  NERVOUS SYSTEM:  Alert & Oriented X3  : voiding well  EXTREMITIES:  2+ Peripheral Pulses, No clubbing, cyanosis, or edema  SKIN: warm dry                          10.1   12.22 )-----------( 269      ( 17 May 2022 07:27 )             32.2     05-17    131<L>  |  95<L>  |  71<H>  ----------------------------<  94  5.6<H>   |  18<L>  |  10.00<H>    Ca    9.8      17 May 2022 07:27  Phos  6.0     05-17  Mg     2.4     05-17    TPro  7.8  /  Alb  2.8<L>  /  TBili  1.6<H>  /  DBili  x   /  AST  383<H>  /  ALT  74<H>  /  AlkPhos  310<H>  05-17    LIVER FUNCTIONS - ( 17 May 2022 07:27 )  Alb: 2.8 g/dL / Pro: 7.8 g/dL / ALK PHOS: 310 U/L / ALT: 74 U/L DA / AST: 383 U/L / GGT: x               PT/INR - ( 16 May 2022 12:21 )   PT: 14.9 sec;   INR: 1.25 ratio         PTT - ( 17 May 2022 07:31 )  PTT:80.6 sec    I&O's Summary    16 May 2022 07:01  -  17 May 2022 07:00  --------------------------------------------------------  IN: 0 mL / OUT: 250 mL / NET: -250 mL            CAPILLARY BLOOD GLUCOSE      RADIOLOGY & ADDITIONAL TESTS:                   PGY-1 Progress Note discussed with attending    PAGER #: [[phone]] TILL 5:00 PM  PLEASE CONTACT ON CALL TEAM:  - On Call Team (Please refer to Mihir) FROM 5:00 PM - 8:30PM  - Nightfloat Team FROM 8:30 -7:30 AM    CHIEF COMPLAINT & BRIEF HOSPITAL COURSE:    71M from [facility], who is ambulatory at baseline with a walker. He has Hx of HTN, CHF?, COPD, HLD, PAD, BPH, Prostate CA (s/p radiation), Bipolar Disorder, IBD - Crohn's Disease, R ileostomy bag (s/p sigmoid resection). He was subsequently admitted for acute exacerbation of heart failure.    INTERVAL HPI/OVERNIGHT EVENTS:     Patient was examined at bedside, AAOx2, stable, NAD and saturating well at 2LPM O2 via nasal cannula. After dialysis, he had episodes of tachycardia during HD. IV Lopressor and Digoxin. He also has low BP. Midodrine was increased to 10mg TID. Oxygen was raised to 3LPM due to desaturation of low 90s.    REVIEW OF SYSTEMS:  CONSTITUTIONAL: No fever, weight loss, or fatigue  RESPIRATORY: No cough, wheezing, chills or hemoptysis; (+) shortness of breath  CARDIOVASCULAR: No chest pain, palpitations, dizziness, or leg swelling  GASTROINTESTINAL: No abdominal pain. No nausea, vomiting, or hematemesis; No diarrhea or constipation. No melena or hematochezia.  GENITOURINARY: No dysuria or hematuria, urinary frequency  NEUROLOGICAL: No headaches, memory loss, loss of strength, numbness, or tremors  SKIN: No itching, burning, rashes, or lesions     MEDICATIONS  (STANDING):  ARIPiprazole 15 milliGRAM(s) Oral daily  chlorhexidine 2% Cloths 1 Application(s) Topical daily  ferrous    sulfate 325 milliGRAM(s) Oral daily  finasteride 5 milliGRAM(s) Oral daily  gabapentin 100 milliGRAM(s) Oral three times a day  heparin  Infusion.  Unit(s)/Hr (18 mL/Hr) IV Continuous <Continuous>  lamoTRIgine 200 milliGRAM(s) Oral daily  metoprolol tartrate 25 milliGRAM(s) Oral two times a day  midodrine. 5 milliGRAM(s) Oral three times a day  pantoprazole    Tablet 40 milliGRAM(s) Oral before breakfast  sevelamer carbonate 800 milliGRAM(s) Oral three times a day with meals  tamsulosin 0.4 milliGRAM(s) Oral at bedtime  traZODone 25 milliGRAM(s) Oral at bedtime    MEDICATIONS  (PRN):  heparin   Injectable 4000 Unit(s) IV Push every 6 hours PRN For aPTT between 40 - 57  heparin   Injectable 8000 Unit(s) IV Push every 6 hours PRN For aPTT less than 40      Vital Signs Last 24 Hrs  T(C): 36.6 (17 May 2022 09:33), Max: 37.2 (16 May 2022 23:50)  T(F): 97.8 (17 May 2022 09:33), Max: 98.9 (16 May 2022 23:50)  HR: 93 (17 May 2022 09:33) (84 - 106)  BP: 107/62 (17 May 2022 09:33) (93/64 - 123/67)  BP(mean): 62 (16 May 2022 16:50) (62 - 69)  RR: 18 (17 May 2022 09:33) (18 - 26)  SpO2: 96% (17 May 2022 09:33) (90% - 96%)    PHYSICAL EXAMINATION:  GENERAL: NAD  HEAD:  Atraumatic, Normocephalic  EYES:  conjunctiva and sclera clear  NECK: Supple, No JVD, Normal thyroid  CHEST/LUNG: Clear to auscultation. Clear to percussion bilaterally; No rales, rhonchi, wheezing, or rubs  HEART: Regular rate and rhythm; No murmurs, rubs, or gallops  ABDOMEN: Soft, Nontender, Nondistended; Bowel sounds present, no pain or masses on palpation  NERVOUS SYSTEM:  Alert & Oriented X2  : voiding well  EXTREMITIES:  2+ Peripheral Pulses, No clubbing, cyanosis, or edema  SKIN: warm dry                          10.1   12.22 )-----------( 269      ( 17 May 2022 07:27 )             32.2     05-17    131<L>  |  95<L>  |  71<H>  ----------------------------<  94  5.6<H>   |  18<L>  |  10.00<H>    Ca    9.8      17 May 2022 07:27  Phos  6.0     05-17  Mg     2.4     05-17    TPro  7.8  /  Alb  2.8<L>  /  TBili  1.6<H>  /  DBili  x   /  AST  383<H>  /  ALT  74<H>  /  AlkPhos  310<H>  05-17    LIVER FUNCTIONS - ( 17 May 2022 07:27 )  Alb: 2.8 g/dL / Pro: 7.8 g/dL / ALK PHOS: 310 U/L / ALT: 74 U/L DA / AST: 383 U/L / GGT: x               PT/INR - ( 16 May 2022 12:21 )   PT: 14.9 sec;   INR: 1.25 ratio         PTT - ( 17 May 2022 07:31 )  PTT:80.6 sec    I&O's Summary    16 May 2022 07:01  -  17 May 2022 07:00  --------------------------------------------------------  IN: 0 mL / OUT: 250 mL / NET: -250 mL            CAPILLARY BLOOD GLUCOSE      RADIOLOGY & ADDITIONAL TESTS:

## 2022-05-18 NOTE — PROGRESS NOTE ADULT - ASSESSMENT
1. PAWAN due to ATN.  -First HD on 5/6. HD initiated for worsening bun/cr due to ATN with underlying CKD stage 4 and anuria. Pre dialysis bun/scr increasing and anuric, thus no signs of renal recovery at present. Pt is CKD progressing to ESRD on HD.  -Permacath conversion by IR on 5/16. Outpatient HD dialysis center placement accepted at [place].   -s/p HD today for 3hrs with UF 700cc and unable to take off more due to hypotensive and tachycardia. Patient is possible hypervolemic state.   -Vascular consulted (Dr. Fermin, informed) for fistula placement as outpatient.   -pt also had gross hematuria and unclear etiology seems less likely nephritis and RUBIA, ANCA and Anit-GBM are -ve.  Urinalysis shows no proteinuria. spot protein to creatinine ratio is 900mg.  -renal sono show no hydro.  -Adjust meds to eGFR and avoid IV Gadolinium contrast,NSAIDs, and phosphate enema.  -Monitor I/O's daily.   -Monitor SMA daily.  2. CKD stage 4 most likely due to ischemic nephropathy and h/o ATN with renal recovery.  -Baseline Scr around 3.2mg/dL in April 2020.  -Keep patient euvolemic and renal diet  -Avoid Nephrotoxic Meds/ Agents such as (NSAIDs, IV contrast, Aminoglycosides such as gentamicin, -Gadolinium contrast, Phosphate containing enemas, etc..)  -Adjust Medications according to eGFR  3. Anemia:   -hb is stable; continue iron tabs. monitor CBC  4. MBD:   -phos is stable. previous mild hyperca, which improved and off calcitirol; on renvela 1 tab tid;   -pth is okay at 41.  5. Hyperkalemia due to pawan on ckd.   -should improve with dialysis.  -on low K diet. give Lokelma prn if K >5.5  -Keep pt euvolemic. Avoid ACE inh/ ARBs, NSAIDs, and Aldactone or potassium sparing diuretics. Monitor K+ daily.  6. Fluid Overload:  -clinically improved. off diuretics for now.   -CT chest w/o contrast shows no effusion;   7. Acidosis:  -stable.  -stable. monitor CO2  8. h/o Hypotension:  -bp is low during HD and continue midodrine to 5mg tid      9. Elevated LFTs  - off lipitor for now.  -hepatitis panel shows hep C+ve.  discussed with pt, pt had known hep C and was treated for in the past; hep C RNA is not detected.   -C3/C4 and RF negative, cryoglobulinemia is negative.    -ct scan shows hepatic mass; Hepatology consulted and MRI w/o contrast shows liver mass and mets.  -Hepatology and Oncology consulted.   -Plan for IR guided liver biopsy on 5/12. verbal preliminary patho report results show HCC.    -Bone scan shows no bone mets. spep and IF are nl and serum FLC is within range for ckd.  10. Hyponatremia: due to lack free water intake.   -Na is stable.  -place on 1L fluid restriction/day.   -monitor Na.  10. Neuropathy:  -continue gabapentin to 100mg tid (renal dosage)  11. New onset Afib:  -on dilitazem drip.  -on heparin drip.   12. Palliative consulted for discussion of overall prognosis and goals of care.   Pt is DNI/DNR.     Patient is critically ill. Time Spent: 35mins.  Discussed with patient and prudencio in detail regarding the renal plan and care  Discussed the assessment and plan with ICU Team/Nurse

## 2022-05-18 NOTE — CONSULT NOTE ADULT - REASON FOR ADMISSION
acute CHF exacerbation

## 2022-05-18 NOTE — CONSULT NOTE ADULT - PROVIDER SPECIALTY LIST ADULT
Nephrology
Palliative Care
Urology
Hepatology
Critical Care
Gastroenterology
Cardiology
Vascular Surgery
Heme/Onc
Intervent Radiology
Urology

## 2022-05-18 NOTE — PROGRESS NOTE ADULT - ATTENDING COMMENTS
71M from [facility], who is ambulatory at baseline with a walker. He has Hx of HTN, CHF?, COPD, HLD, PAD, BPH, Prostate CA (s/p radiation), Bipolar Disorder, IBD - Crohn's Disease, R ileostomy bag (s/p sigmoid resection) is being admitted to ICU for worsening shortness of breath and Atrial fibrillation with RVR.     1- Acute Hypoxic Respiratory Failure   2- Atrial Fibrillation with RVR   3- ESRD on  Hemodialysis  4- Liver lesions (metastasis to bone?)  4- Hypertension   5- Hyperlipidemia   6- Bipolar disorder  7- Crohn's disease s/p ileostomy   8- Hepatocellular carcinoma       Plan   - Continue O2 supp to maintain sat >90%  - Hemodynamic monitoring   - Cardiazem for rate control    - Heparin gtt  - Continue hemodialysis   - Dialysis as per Neph   - Symbicort   - Spiriva   - F/u official path report   - Hemo-Onco f/u   - Palliative care f/u ; DNR DNI

## 2022-05-18 NOTE — CONSULT NOTE ADULT - PROBLEM SELECTOR RECOMMENDATION 4
Pt reports ambulatory independently prior to admission.  Currently bedbound, high risk for skin failure  Supportive care  Frequent positioning, off loading    Pt reeval Pt reports ambulatory independently prior to admission.  Currently bedbound, high risk for skin failure  Supportive care  Frequent positioning, off loading    Pt re-eval

## 2022-05-18 NOTE — CONSULT NOTE ADULT - CONSULT REQUESTED DATE/TIME
17-May-2022 16:43
02-May-2022 10:37
04-May-2022
03-May-2022 22:43
29-Apr-2022 12:54
18-May-2022 12:26
02-May-2022
04-May-2022
29-Apr-2022 11:05
05-May-2022
14-May-2022 15:54

## 2022-05-18 NOTE — PROGRESS NOTE ADULT - ASSESSMENT
ASSESSMENT AND PLAN:  71M from [facility], who is ambulatory at baseline with a walker. He has Hx of HTN, CHF?, COPD, HLD, PAD, BPH, Prostate CA (s/p radiation), Bipolar Disorder, IBD - Crohn's Disease, R ileostomy bag (s/p sigmoid resection) is being admitted to ICU for worsening shortness of breath and Atrial fibrillation with RVR.     1- Acute Hypoxic Respiratory Failure   2- Atrial Fibrillation with RVR   3- ESRD on  Hemodialysis  4- Liver lesions (metastasis to bone?)  4- Hypertension   5- Hyperlipidemia   6- Bipolar disorder  7- Crohn's disease s/p ileostomy     Neuro  #Bipolar disorder   Continue home medications     Cardiovascular    #Atrial fibrillation with RVR   patient has new onset A fib last Friday, was on lopressor   Currently in RVR with HR in 150-180's started during HD session ,   s/p lopressor IVP and Dig 0.125mg x 1,   Load with amiodarone drip, On Heparin drip  Normal EF , Cardio Dr Archer onboard     #Hypertension     Will only continue lopressor for HR control, closely monitor BP       Pulmonary  #Acute Respiratory failure with Hypoxia  Patient has increased work of breathing likely due to ineffective dialysis  CXR shows right sided pleural effusion with pulmonary edema B/L (follow official report)  Patient on 4L NC saturating 92%,   s/p Solu medrol as patient also noted to have wheezing   Will control HR with amiodarone and likely will get HD session tomorrow     #Asthma   Not on medications   Will hold any standing meds for now     Infections  No issues     Nephro  #PAWAN/ ESRD   Patient is on dialysis now, likely fluid overload leading to symptomatic dyspnea and tachypnea  500ml UF removed only today   K 5.6, will repeat one set of labs in the evening   Monitor Phos and electrolytes     Gastrointestinal  #Liver lesions   Patient has liver mets with bone involvement , Hepatology and Heme/Onc onboard for workup  AFP 5000, PSA normal         Heme  #AOCD   Monitor HnH for now   Continue iron tabs     Endocrine  No issues     Skin/Catheters  right sided perma catheter   right arm extended dwell catheter         Prophylaxis   heparin Drip for AC   Protonix for GI ppx    Disposition:   Transfer to ICU     Prognosis:   Guarded    ASSESSMENT & PLAN:  71-year-old man with a history of hypertension, COPD, CKD, HLD, PAD, BPH, Prostate cancer (status post radiation), Bipolar Disorder, Crohn's Disease (status post sigmoid resection with right ileostomy), presented to  from [facility] on 4/28/22 for acute respiratory failure secondary to volume overload, course complicated by transaminitis, discovery of hepatic mass with possible metastatic disease,      ACTIVE ISSUES:  1- Acute Hypoxic Respiratory Failure   2- Atrial Fibrillation with RVR   3- ESRD on  Hemodialysis  4- Liver lesions (metastasis to bone?)  4- Hypertension   5- Hyperlipidemia   6- Bipolar disorder  7- Crohn's disease s/p ileostomy     PLAN:  NEUROLOGIC:  #Bipolar disorder   Continue home medications     Cardiovascular    #Atrial fibrillation with RVR   patient has new onset A fib last Friday, was on lopressor   Currently in RVR with HR in 150-180's started during HD session ,   s/p lopressor IVP and Dig 0.125mg x 1,   Load with amiodarone drip, On Heparin drip  Normal EF , Cardio Dr Archer onboard     #Hypertension     Will only continue lopressor for HR control, closely monitor BP       Pulmonary  #Acute Respiratory failure with Hypoxia  Patient has increased work of breathing likely due to ineffective dialysis  CXR shows right sided pleural effusion with pulmonary edema B/L (follow official report)  Patient on 4L NC saturating 92%,   s/p Solu medrol as patient also noted to have wheezing   Will control HR with amiodarone and likely will get HD session tomorrow     #Asthma   Not on medications   Will hold any standing meds for now     Infections  No issues     Nephro  #PAWAN/ ESRD   Patient is on dialysis now, likely fluid overload leading to symptomatic dyspnea and tachypnea  500ml UF removed only today   K 5.6, will repeat one set of labs in the evening   Monitor Phos and electrolytes     Gastrointestinal  #Liver lesions   Patient has liver mets with bone involvement , Hepatology and Heme/Onc onboard for workup  AFP 5000, PSA normal         Heme  #AOCD   Monitor HnH for now   Continue iron tabs     Endocrine  No issues     Skin/Catheters  right sided perma catheter   right arm extended dwell catheter     Prophylaxis   heparin Drip for AC   Protonix for GI ppx    Disposition:   Transfer to ICU     Prognosis:   Guarded    ASSESSMENT & PLAN:  71-year-old man with a history of hypertension, COPD, CKD, HLD, PAD, BPH, Prostate cancer (status post radiation), Bipolar Disorder, Crohn's Disease (status post sigmoid resection with right ileostomy), presented to  from [facility] on 4/28/22 for acute respiratory failure secondary to volume overload, course complicated by transaminitis, discovery of hepatic mass with possible metastatic disease, hematuria, and PAWAN on CKD requiring initiation of HD (5/6). During HD sessions, went into AFib with RVR and hypotension, unable to tolerate fluid removal, transferred to ICU (5/17) for enhanced monitoring during HD.      ACTIVE ISSUES:  - Acute Hypoxic Respiratory Failure   - Pulmonary Edema  - Volume Overload  - PAWAN on CKD requiring HD  - AFib with RVR  - Liver Mass with possible Metastatic Disease  - Hematuria  - Transaminitis  - Hypertension  - Hyperlipidemia  - Bipolar Disorder  - Crohn's Disease status post Sigmoid Resection with Ileostomy Placement.   - BPH  - Prostate Cancer status post Radiation Therapy  - COPD  - PAD    PLAN:  NEUROLOGIC:  #Bipolar disorder   Continue home medications     Cardiovascular    #Atrial fibrillation with RVR   patient has new onset A fib last Friday, was on lopressor   Currently in RVR with HR in 150-180's started during HD session ,   s/p lopressor IVP and Dig 0.125mg x 1,   Load with amiodarone drip, On Heparin drip  Normal EF , Cardio Dr Archer onboard     #Hypertension     Will only continue lopressor for HR control, closely monitor BP       Pulmonary  #Acute Respiratory failure with Hypoxia  Patient has increased work of breathing likely due to ineffective dialysis  CXR shows right sided pleural effusion with pulmonary edema B/L (follow official report)  Patient on 4L NC saturating 92%,   s/p Solu medrol as patient also noted to have wheezing   Will control HR with amiodarone and likely will get HD session tomorrow     #Asthma   Not on medications   Will hold any standing meds for now     Infections  No issues     Nephro  #PAWAN/ ESRD   Patient is on dialysis now, likely fluid overload leading to symptomatic dyspnea and tachypnea  500ml UF removed only today   K 5.6, will repeat one set of labs in the evening   Monitor Phos and electrolytes     Gastrointestinal  #Liver lesions   Patient has liver mets with bone involvement , Hepatology and Heme/Onc onboard for workup  AFP 5000, PSA normal         Heme  #AOCD   Monitor HnH for now   Continue iron tabs     Endocrine  No issues     Skin/Catheters  right sided perma catheter   right arm extended dwell catheter     Prophylaxis   heparin Drip for AC   Protonix for GI ppx    Disposition:   Transfer to ICU     Prognosis:   Guarded    ASSESSMENT & PLAN:  71-year-old man with a history of hypertension, COPD, CKD, HLD, PAD, BPH, Prostate cancer (status post radiation), Bipolar Disorder, Crohn's Disease (status post sigmoid resection with right ileostomy), presented to  from [facility] on 4/28/22 for acute respiratory failure secondary to volume overload, course complicated by transaminitis, discovery of hepatic mass with possible metastatic disease, hematuria, and PAWAN on CKD requiring initiation of HD (5/6). During HD sessions, went into AFib with RVR and hypotension, unable to tolerate fluid removal, transferred to ICU (5/17) for enhanced monitoring during HD.      ACTIVE ISSUES:  - Acute Hypoxic Respiratory Failure   - Pulmonary Edema  - Volume Overload  - PAWAN on CKD requiring HD  - AFib with RVR  - Liver Mass with possible Metastatic Disease  - Hematuria  - Transaminitis  - Hypertension  - Hyperlipidemia  - Bipolar Disorder  - Crohn's Disease status post Sigmoid Resection with Ileostomy Placement.   - BPH  - Prostate Cancer status post Radiation Therapy  - COPD  - PAD    PLAN:  NEUROLOGIC:  #Bipolar Disorder  - At baseline mentation.    - On Abilify 15mg daily, Gabapentin 100mg TID, Lamictal 200mg daily, and Trazadone 25mg QHS. All home medications.   - Lamictal could potentiate transaminitis, will discuss with Psychiatry to transition to alternative therapy.   - Neurologic checks per ICU protocol.     CV  #Atrial fibrillation with RVR   - New AFib (5/13) during HD, likely related to volume/electrolyte shift.   - Previously on Lopressor IVP, Digoxin, and Amio load while on medical floor. Amio discontinued due to transaminitis.   - Lopressor 12.5 mg TID, up-titrate as tolerated. PRN IVP Lopressor during HD sessions as BP tolerates, renal dosed Digoxin if RVR and hypotension.    - Cardiology Dr. Archer following.   - Initial concern for CHF, however TTE without emergent findings and EF 55%    #Hypotension  - Transient hypotension during HD sessions and episodes of RVR, leading to shortened HD sessions and less volume removed.   - Vasopressor support during HD if required.   - Increase Midodrine from 5mg TID to 10mg TID. Continue to up-titrate if needed.     PULMONARY  #Acute Hypoxic Respiratory Failure  #Pulmonary Edema/Volume Overload  - Mild shortness of breath, hypoxia requiring supplemental O2 with NC, and B-lines on POCUS.   - AHRF is largely secondary to volume overload, continue HD in ICU.     #COPD Exacerbation   - Mild wheezing on exam, given Solu-medrol in case of potential exacerbation, not on any COPD medications at home, more likely wheezing is related to volume overload.   - Add PRN Albuterol if continued wheezing when euvolemic.     GI  #Transaminitis  - Elevated liver enzymes, uncertain etiology.   - Diagnostic work-up underway with Hepatology Dr. Portillo.   - Follow pending labs.     #Liver Mass  - Status post IR biopsy 5/16, pathology pending.     #Nutrition  - Renal and DASH/TLC diet.   - Monitor Ileostomy output.      NEPHROLOGY  #PAWAN on CKD  #ESRD  #Volume Overload  - PAWAN on CKD now on HD (5/6) status post conversion to tunneled catheter (5/16).   - Not tolerating volume removal during HD sessions due to AFib with RVR and hypotension, has been getting UF.   - HD in ICU for now to allow for closing monitoring and vasopressor assistance to facilitate adequate fluid removal.   - Nephrology Dr. Pond following.     #Hyperphosphatemia   - Renvela TID.   - Monitor electrolytes     #Hematuria  - Had episodes of gross hematuria earlier in hospital course, now resolved.   - Seen by Urology Dr. Connors, planned for outpatient cystoscopy and evaluation.   - Currently anuric.     Patient is on dialysis now, likely fluid overload leading to symptomatic dyspnea and tachypnea  500ml UF removed only today   K 5.6, will repeat one set of labs in the evening   Monitor Phos and electrolytes     Infections  No issues         Heme  #AOCD   Monitor HnH for now   Continue iron tabs     Endocrine  No issues     Skin/Catheters  right sided perma catheter   right arm extended dwell catheter     Prophylaxis   heparin Drip for AC   Protonix for GI ppx    Disposition:   Transfer to ICU     Prognosis:   Guarded    ASSESSMENT & PLAN:  71-year-old man with a history of hypertension, COPD, CKD, HLD, PAD, BPH, Prostate cancer (status post radiation), Bipolar Disorder, Crohn's Disease (status post sigmoid resection with right ileostomy), presented to  from [facility] on 4/28/22 for acute respiratory failure secondary to volume overload, course complicated by transaminitis, discovery of hepatic mass with possible metastatic disease, hematuria, and PAWAN on CKD requiring initiation of HD (5/6). During HD sessions, went into AFib with RVR and hypotension, unable to tolerate fluid removal, transferred to ICU (5/17) for enhanced monitoring during HD.      ACTIVE ISSUES:  - Acute Hypoxic Respiratory Failure   - Pulmonary Edema  - Volume Overload  - PAWAN on CKD requiring HD  - AFib with RVR  - Liver Mass with possible Metastatic Disease  - Hematuria  - Transaminitis  - Hypertension  - Hyperlipidemia  - Bipolar Disorder  - Crohn's Disease status post Sigmoid Resection with Ileostomy Placement.   - BPH  - Prostate Cancer status post Radiation Therapy  - COPD  - PAD    PLAN:  NEUROLOGIC:  #Bipolar Disorder  - At baseline mentation.    - On Abilify 15mg daily, Gabapentin 100mg TID, Lamictal 200mg daily, and Trazadone 25mg QHS. All home medications.   - Lamictal could potentiate transaminitis, will discuss with Psychiatry to transition to alternative therapy.   - Neurologic checks per ICU protocol.     CV  #Atrial fibrillation with RVR   - New AFib (5/13) during HD, likely related to volume/electrolyte shift.   - Previously on Lopressor IVP, Digoxin, and Amio load while on medical floor. Amio discontinued due to transaminitis.   - Lopressor 12.5 mg TID, up-titrate as tolerated. PRN IVP Lopressor during HD sessions as BP tolerates, renal dosed Digoxin if RVR and hypotension.    - Initial concern for CHF, however TTE without emergent findings and EF 55%  - Anticoagulate with Heparin infusion.   - Cardiology Dr. Archer following.     #Hypotension  - Transient hypotension during HD sessions and episodes of RVR, leading to shortened HD sessions and less volume removed.   - Vasopressor support during HD if required.   - Increase Midodrine from 5mg TID to 10mg TID. Continue to up-titrate if needed.     PULMONARY  #Acute Hypoxic Respiratory Failure  #Pulmonary Edema/Volume Overload  - Mild shortness of breath, hypoxia requiring supplemental O2 with NC, and B-lines on POCUS.   - AHRF is largely secondary to volume overload, continue HD in ICU.     #COPD Exacerbation   - Mild wheezing on exam, given Solu-medrol in case of potential exacerbation, not on any COPD medications at home, more likely wheezing is related to volume overload.   - Add PRN Albuterol if continued wheezing when euvolemic.     GI  #Transaminitis  - Elevated liver enzymes, uncertain etiology.   - Extensive diagnostic work-up underway with Hepatology Dr. Portillo.   - Follow pending labs.   - Will try to adjust medications to limit hepatotoxicity.     #Liver Mass  - Status post IR biopsy 5/111, pathology pending.     #Nutrition  - Renal and DASH/TLC diet.   - Monitor Ileostomy output.      NEPHROLOGY/  #PAWAN on CKD  #ESRD  #Volume Overload  - PAWAN on CKD now on HD (5/6) status post conversion to tunneled catheter (5/16).   - Not tolerating volume removal during HD sessions due to AFib with RVR and hypotension, has been getting UF.   - HD in ICU for now to allow for closing monitoring and vasopressor assistance to facilitate adequate fluid removal.   - Nephrology Dr. Pond following.     #Hyperphosphatemia   - Renvela TID.   - Monitor electrolytes     #Hematuria  - Had episodes of gross hematuria earlier in hospital course, now resolved.   - Seen by Urology Dr. Connors, planned for outpatient cystoscopy and evaluation.   - Currently anuric.     ID  #No Active Issues    HEME/ONC  #Liver Mass with possible Metastatic Disease  - Liver mass noted on initial imaging, additional noted bone lesions concerning for metasis.   - Underwent liver biopsy with IR on 5/11, results pending.   - Oncologist Dr. Chao following.     #Anemia  - Related to renal disease and chronic inflammatory disease.   - Continue Iron tablets.      VASCULAR  #HD Access  - Vascular surgery Dr. Fermin consulted for outpatient fistula creation.      ENDOCRINE  #No Active Issues    Skin/Catheters  - RIJ Tunneled HD Catheter  - Right Arm Extended Dwell (Arrow)     Prophylaxis   - VTE: On Heparin infusion for Full AC due to AFib.   - Pantoprazole daily.     Ethics/Prognosis:  - DNR/DNI status.   - Poor long term prognosis, palliative care consult.     Disposition:  - Maintain in ICU  - PT re-consult for disposition.    ASSESSMENT & PLAN:  71-year-old man with a history of hypertension, COPD, CKD, HLD, PAD, BPH, Prostate cancer (status post radiation), Bipolar Disorder, Crohn's Disease (status post sigmoid resection with right ileostomy), presented to  from [facility] on 4/28/22 for acute respiratory failure secondary to volume overload, course complicated by transaminitis, discovery of hepatic mass with possible metastatic disease, hematuria, and PAWAN on CKD requiring initiation of HD (5/6). During HD sessions, went into AFib with RVR and hypotension, unable to tolerate fluid removal, transferred to ICU (5/17) for enhanced monitoring during HD.      ACTIVE ISSUES:  - Acute Hypoxic Respiratory Failure   - Pulmonary Edema  - Volume Overload  - PAWAN on CKD requiring HD  - AFib with RVR  - Liver Mass with possible Metastatic Disease  - Hematuria  - Transaminitis  - Hypertension  - Hyperlipidemia  - Bipolar Disorder  - Crohn's Disease status post Sigmoid Resection with Ileostomy Placement.   - BPH  - Prostate Cancer status post Radiation Therapy  - COPD  - PAD    PLAN:  NEUROLOGIC:  #Bipolar Disorder  - At baseline mentation.    - On Abilify 15mg daily, Gabapentin 100mg TID, Lamictal 200mg daily, and Trazadone 25mg QHS. All home medications.   - Lamictal could potentiate transaminitis, will discuss with Psychiatry to transition to alternative therapy.   - Neurologic checks per ICU protocol.     CV  #Atrial fibrillation with RVR   - New AFib (5/13) during HD, likely related to volume/electrolyte shift.   - Previously on Lopressor IVP, Digoxin, and Amio load while on medical floor. Amio discontinued due to transaminitis.   - Lopressor 12.5 mg TID, up-titrate as tolerated. PRN IVP Lopressor during HD sessions as BP tolerates, renal dosed Digoxin if RVR and hypotension.    - Initial concern for CHF, however TTE without emergent findings and EF 55%  - Anticoagulate with Heparin infusion.   - Cardiology Dr. Archer following.     #Hypotension  - Transient hypotension during HD sessions and episodes of RVR, leading to shortened HD sessions and less volume removed.   - Vasopressor support during HD if required.   - Increase Midodrine from 5mg TID to 10mg TID. Continue to up-titrate if needed.     PULMONARY  #Acute Hypoxic Respiratory Failure  #Pulmonary Edema/Volume Overload  - Mild shortness of breath, hypoxia requiring supplemental O2 with NC, and B-lines on POCUS.   - AHRF is largely secondary to volume overload, continue HD in ICU.     #COPD Exacerbation   - Mild wheezing on exam, given Solu-medrol in case of potential exacerbation, not on any COPD medications at home, more likely wheezing is related to volume overload.   - Add PRN Albuterol if continued wheezing when euvolemic.     GI  #Transaminitis  - Elevated liver enzymes, uncertain etiology.   - Extensive diagnostic work-up underway with Hepatology Dr. Portillo.   - Follow pending labs.   - Will try to adjust medications to limit hepatotoxicity.     #Liver Mass  - Status post IR biopsy 5/111, pathology pending.     #Nutrition  - Renal and DASH/TLC diet.   - Monitor Ileostomy output.      NEPHROLOGY/  #PAWAN on CKD  #ESRD  #Volume Overload  - PAWAN on CKD now on HD (5/6) status post conversion to tunneled catheter (5/16).   - Not tolerating volume removal during HD sessions due to AFib with RVR and hypotension, has been getting UF.   - HD in ICU for now to allow for closing monitoring and vasopressor assistance to facilitate adequate fluid removal.   - Nephrology Dr. Pond following.     #Hyperphosphatemia   - Renvela TID.   - Monitor electrolytes     #Hematuria  - Had episodes of gross hematuria earlier in hospital course, now resolved.   - Seen by Urology Dr. Connors, planned for outpatient cystoscopy and evaluation.   - Currently anuric.     #BPH  - Continue Flomax and Proscar.     ID  #No Active Issues    HEME/ONC  #Liver Mass with possible Metastatic Disease  - Liver mass noted on initial imaging, additional noted bone lesions concerning for metasis.   - Underwent liver biopsy with IR on 5/11, results pending.   - Oncologist Dr. Chao following.     #Anemia  - Related to renal disease and chronic inflammatory disease.   - Continue Iron tablets.      VASCULAR  #HD Access  - Vascular surgery Dr. Fermin consulted for outpatient fistula creation.      ENDOCRINE  #No Active Issues    Skin/Catheters  - RIJ Tunneled HD Catheter  - Right Arm Extended Dwell (Arrow)     Prophylaxis   - VTE: On Heparin infusion for Full AC due to AFib.   - Pantoprazole daily.     Ethics/Prognosis:  - DNR/DNI status.   - Poor long term prognosis, palliative care consult.     Disposition:  - Maintain in ICU  - PT re-consult for disposition.

## 2022-05-18 NOTE — CHART NOTE - NSCHARTNOTEFT_GEN_A_CORE
Assessment:   Patient is a 71y old  Male who presents with a chief complaint of acute CHF exacerbation (18 May 2022 14:48) PAWAN on CKD, now on HD. Liver mass, possible metastatic disease. Ileostomy (Crohn's). Pt seen by Palliative MD. Pt seen for follow-up, pt for D/G to floor. Seen earlier in day. Pt reports ate well at breakfast but poor at lunch and dinner (becomes tired). Pt agreed to try puree and Nepro supplements as described. Discussed with PGY 2, diet order changed (see below). Re-visited after lunch, pt on dialysis reports better with puree.     Diet Prescription: Diet, Pureed:   Consistent Carbohydrate {No Snacks}  DASH/TLC {Sodium & Cholesterol Restricted}  For patients receiving Renal Replacement - No Protein Restr, No Conc K, No Conc Phos, Low Sodium (RENAL)  Supplement Feeding Modality:  Oral  Nepro Cans or Servings Per Day:  1       Frequency:  Three Times a day (22 @ 11:17)        Daily     Daily Weight in k.1 (18 May 2022 07:00)  Weight in k.1 (17 May 2022 19:00)    % Weight Change: 1+ generalized edema    Pertinent Medications: MEDICATIONS  (STANDING):  ARIPiprazole 15 milliGRAM(s) Oral daily  chlorhexidine 2% Cloths 1 Application(s) Topical daily  diltiazem Infusion 5 mG/Hr (5 mL/Hr) IV Continuous <Continuous>  ferrous    sulfate 325 milliGRAM(s) Oral daily  finasteride 5 milliGRAM(s) Oral daily  gabapentin 100 milliGRAM(s) Oral three times a day  heparin  Infusion.  Unit(s)/Hr (18 mL/Hr) IV Continuous <Continuous>  lamoTRIgine 200 milliGRAM(s) Oral daily  midodrine. 10 milliGRAM(s) Oral three times a day  pantoprazole    Tablet 40 milliGRAM(s) Oral before breakfast  sevelamer carbonate 800 milliGRAM(s) Oral three times a day with meals  tamsulosin 0.4 milliGRAM(s) Oral at bedtime  traZODone 25 milliGRAM(s) Oral at bedtime    MEDICATIONS  (PRN):    Pertinent Labs:  Na134 mmol/L<L> Glu 131 mg/dL<H> K+ 5.3 mmol/L Cr  6.95 mg/dL<H> BUN 48 mg/dL<H> 05-18 Phos 6.0 mg/dL<H> 05-18 Alb 2.6 g/dL<L> 04-29 Chol 129 mg/dL LDL --    HDL 38 mg/dL<L> Trig 110 mg/dL     CAPILLARY BLOOD GLUCOSE      POCT Blood Glucose.: 111 mg/dL (17 May 2022 19:10)  POCT Blood Glucose.: 100 mg/dL (17 May 2022 17:00)          Previous Nutrition Diagnosis:   [ ] Altered GI function  [ ]Inadequate Oral Intake [ ] Swallowing Difficulty   [x ] Altered nutrition related labs [ ] Increased Nutrient Needs [ ] Overweight/Obesity   [ ] Unintended Weight Loss [ ] Food & Nutrition Related Knowledge Deficit [ ] Malnutrition   [ ] Other:       New Nutrition Diagnosis: [x ] PCM (moderate) of acute on chronic illness as evidenced by <75% needs met > 7 days, with 1+ generalized edema      Interventions:   Recommend  [ ] Change Diet To: least restrictive diet, therapeutically :remove CHO control).   [x ] Nutrition Supplement: Nepro TID added   [ ] Nutrition Support  [x ] Other: MD to monitor. RD available.     Monitoring and Evaluation:   [x ] PO intake [ x ] Tolerance to diet prescription [ x ] weights [ x ] labs[ x ] follow up per protocol  [ ] other:

## 2022-05-18 NOTE — PROGRESS NOTE ADULT - SUBJECTIVE AND OBJECTIVE BOX
NEPHROLOGY MEDICAL CARE, [facility] - Dr. Emerson Pond/ Dr. Nallely Curran/ Dr. Kirby Angel/ Dr. Pippa Reis    Patient was seen and examined at bedside.    CC: pt's breathing is better; completed HD today.     Vital Signs Last 24 Hrs  T(C): 36.3 (18 May 2022 16:40), Max: 36.5 (17 May 2022 19:00)  T(F): 97.4 (18 May 2022 16:40), Max: 97.7 (17 May 2022 19:00)  HR: 104 (18 May 2022 17:00) (96 - 136)  BP: 119/54 (18 May 2022 17:00) (90/56 - 144/67)  BP(mean): 66 (18 May 2022 17:00) (56 - 96)  RR: 28 (18 May 2022 17:00) (19 - 40)  SpO2: 96% (18 May 2022 17:00) (88% - 96%)    05-17 @ 07:01  -  05-18 @ 07:00  --------------------------------------------------------  IN: 1034 mL / OUT: 640 mL / NET: 394 mL    05-18 @ 07:01  -  05-18 @ 17:40  --------------------------------------------------------  IN: 722 mL / OUT: 0 mL / NET: 722 mL        PHYSICAL EXAM:  General: No acute respiratory distress.  Eyes: conjunctiva and sclera clear  ENMT: Atraumatic, Normocephalic, supple, No JVD present  Respiratory: Bilateral decreased BS and no rhonchi, wheezing  Cardiovascular: S1S2+; no m/r/g  Gastrointestinal: Soft, Non-tender, Nondistended; Bowel sounds present   Neuro:  Awake, Alert & Oriented X3  Ext:  no pedal edema, No Cyanosis  Skin: No visible rashes  Dialysis Access: Rt Chest Permcath.    MEDICATIONS:  MEDICATIONS  (STANDING):  ARIPiprazole 15 milliGRAM(s) Oral daily  chlorhexidine 2% Cloths 1 Application(s) Topical daily  diltiazem Infusion 5 mG/Hr (5 mL/Hr) IV Continuous <Continuous>  ferrous    sulfate 325 milliGRAM(s) Oral daily  finasteride 5 milliGRAM(s) Oral daily  gabapentin 100 milliGRAM(s) Oral three times a day  heparin  Infusion.  Unit(s)/Hr (18 mL/Hr) IV Continuous <Continuous>  lamoTRIgine 200 milliGRAM(s) Oral daily  midodrine. 10 milliGRAM(s) Oral three times a day  pantoprazole    Tablet 40 milliGRAM(s) Oral before breakfast  sevelamer carbonate 800 milliGRAM(s) Oral three times a day with meals  tamsulosin 0.4 milliGRAM(s) Oral at bedtime  traZODone 25 milliGRAM(s) Oral at bedtime    MEDICATIONS  (PRN):          LABS:                        10.3   14.93 )-----------( 303      ( 18 May 2022 05:52 )             33.1     05-18    134<L>  |  98  |  48<H>  ----------------------------<  131<H>  5.3   |  21<L>  |  6.95<H>    Ca    9.5      18 May 2022 05:52  Phos  6.0     05-18  Mg     2.3     05-18    TPro  8.0  /  Alb  2.6<L>  /  TBili  1.3<H>  /  DBili  x   /  AST  418<H>  /  ALT  66<H>  /  AlkPhos  312<H>  05-18    PT/INR - ( 18 May 2022 10:40 )   PT: 17.4 sec;   INR: 1.46 ratio         PTT - ( 18 May 2022 10:40 )  PTT:62.0 sec    Magnesium, Serum: 2.3 mg/dL (05-18 @ 05:52)  Phosphorus Level, Serum: 6.0 mg/dL (05-18 @ 05:52)  Magnesium, Serum: 2.1 mg/dL (05-17 @ 19:32)  Phosphorus Level, Serum: 5.4 mg/dL (05-17 @ 19:32)    Urine studies    PTH and Vit D:

## 2022-05-18 NOTE — CONSULT NOTE ADULT - CONVERSATION DETAILS
Met with the pt and his brother Raghav at the bedside, discussed his clinical condition and further goals of care.  DNR/DNI on file.  Discussed the role of HCP, pt identified his brother Raghav as his HCP.  Pt stated he was unaware of liver mass.  Explained abnormal liver enzymes and incidental findings on imagining; w path pending.  Pt wants to confirm diagnosis and discuss possible treatment options.    Confirmed MOLST on file DNR/DNI.  Raghav support the pt's decision.    Pt's brother endorsed pt is accepted to [facility], he has been communicating with the SW there.    All questions answered.  Support provided.  Plan discussed with primary team and Hem/onc.

## 2022-05-18 NOTE — CONSULT NOTE ADULT - SUBJECTIVE AND OBJECTIVE BOX
Consult to: Discuss complex medical decision making related to goals of care    [facility] Geriatric and Palliative Consult Service:  Dr. Inocencia Vernon: cell ([phone])  Dr. Chelsea Card: cell ([phone])   Shereen De Anda NP: cell ([phone])  Adele Chaparro NP: cell ([phone])   Hussein Lopez LSW: cell ([phone])     HPI:  Patient is a 71M from [facility], who is ambulatory at baseline with a walker. He has Hx of HTN, CHF?, COPD, HLD, PAD, BPH, Prostate CA (s/p radiation), Bipolar Disorder, IBD - Crohn's Disease, R ileostomy bag (s/p sigmoid resection). He was brought to ED due to bilateral leg swelling. For the past 2 days he has been having progressive swelling/ edema of both his legs with associated ambulatory dysfunction. He also started having episodes of shortness of breath and difficulty of breathing. He denies any fever, cough, chest pain, palpitation. When he came to the ED, he was saturating at 93-94% on room air. He was placed on nasal cannula. EKG was done showing NSR. Troponin was negative and BNP was mildly elevated at 398. CXR showed bilateral vascular congestion. Serum potassium was elevated at 6.1. He got a dose of lasix and was given Hyperkalemia cocktail in the ED. Of note, he had Hx of COVID in 2020 andwas vaccinated with COVID-19 x 3 (Pfizer) and Influenza in 2021. He was subsequently admitted for acute exacerbation of his heart failure.    GOC: FULL CODE (28 Apr 2022 18:40)      PAST MEDICAL & SURGICAL HISTORY:  COPD, mild      Anemia      Bipolar disorder      IBD (inflammatory bowel disease)      HTN (hypertension)      HLD (hyperlipidemia)      PAD (peripheral artery disease)      CKD (chronic kidney disease)      Prostate CA      BPH with urinary obstruction      History of creation of ostomy          SOCIAL HISTORY:    Admitted from:  home  (with HHA)           assisted living          [facility]   Substance abuse history:              Tobacco hx:                  Alcohol hx:              Home Opioid hx:  Caodaism:                                    Preferred Language:    FAMILY HISTORY:  FH: HTN (hypertension) (Father)     unable to obtain from patient due to poor mentation, family unable to give information  Baseline ADLs (prior to admission):    Allergies    No Known Allergies    Intolerances      Present Symptoms:   Pain:  Fatigue:  Nausea:  Lack of Appetite:   SOB:  Depression:  Anxiety:  Review of Systems: [All others negative or Unable to obtain due to poor mentation]    MEDICATIONS  (STANDING):  ARIPiprazole 15 milliGRAM(s) Oral daily  chlorhexidine 2% Cloths 1 Application(s) Topical daily  diltiazem Infusion 5 mG/Hr (5 mL/Hr) IV Continuous <Continuous>  ferrous    sulfate 325 milliGRAM(s) Oral daily  finasteride 5 milliGRAM(s) Oral daily  gabapentin 100 milliGRAM(s) Oral three times a day  heparin  Infusion.  Unit(s)/Hr (18 mL/Hr) IV Continuous <Continuous>  lamoTRIgine 200 milliGRAM(s) Oral daily  midodrine. 10 milliGRAM(s) Oral three times a day  pantoprazole    Tablet 40 milliGRAM(s) Oral before breakfast  sevelamer carbonate 800 milliGRAM(s) Oral three times a day with meals  tamsulosin 0.4 milliGRAM(s) Oral at bedtime  traZODone 25 milliGRAM(s) Oral at bedtime    MEDICATIONS  (PRN):      PHYSICAL EXAM:  Vital Signs Last 24 Hrs  T(C): 36.4 (18 May 2022 12:00), Max: 36.9 (17 May 2022 15:22)  T(F): 97.5 (18 May 2022 12:00), Max: 98.4 (17 May 2022 15:22)  HR: 109 (18 May 2022 12:00) (96 - 136)  BP: 110/63 (18 May 2022 12:00) (90/56 - 142/80)  BP(mean): 75 (18 May 2022 12:00) (56 - 96)  RR: 29 (18 May 2022 12:00) (18 - 40)  SpO2: 93% (18 May 2022 12:00) (88% - 96%)    General: alert  oriented x ____    lethargic distressed cachexia  nonverbal  unarousable verbal    Palliative Performance Scale/Karnofsky Score:  ECOG Performance:    HEENT: no abnormal lesion, dry mouth  ET tube/trach oral lesions:  Lungs: tachypnea/labored breathing  excessive secretions  CV: RRR, S1S2, tachycardia  GI: soft non distended non tender  incontinent               PEG/NG/OG tube  constipation  last BM:   : incontinent  oliguria/anuria  phillips  Musculoskeletal: weakness  edema   ambulatory with assistance    bedbound/wheelchair bound  Skin: no abnormal skin lesions, poor skin turgor, pressure ulcer stage:   Neuro: no deficits, cognitive impairment dsyphagia/dysarthria paresis  Oral intake ability: unable/only mouth care, minimal moderate full capability    LABS:                        10.3   14.93 )-----------( 303      ( 18 May 2022 05:52 )             33.1     05-18    134<L>  |  98  |  48<H>  ----------------------------<  131<H>  5.3   |  21<L>  |  6.95<H>    Ca    9.5      18 May 2022 05:52  Phos  6.0     05-18  Mg     2.3     05-18    TPro  8.0  /  Alb  2.6<L>  /  TBili  1.3<H>  /  DBili  x   /  AST  418<H>  /  ALT  66<H>  /  AlkPhos  312<H>  05-18        RADIOLOGY & ADDITIONAL STUDIES:         [facility] Geriatric and Palliative Consult Service:  Dr. Inocencia Vernon: cell ([phone])  Dr. Chelsea aCrd: cell ([phone])   Shereen De Anda NP: cell ([phone])  Adele Chaparro NP: cell ([phone])   Hussein Wilderfrancine LSW: cell ([phone])     HPI:  Patient is a 71M from [facility], who is ambulatory at baseline with a walker. He has Hx of HTN, CHF?, COPD, HLD, PAD, BPH, Prostate CA (s/p radiation), Bipolar Disorder, IBD - Crohn's Disease, R ileostomy bag (s/p sigmoid resection). He was brought to ED due to bilateral leg swelling. For the past 2 days he has been having progressive swelling/ edema of both his legs with associated ambulatory dysfunction. He also started having episodes of shortness of breath and difficulty of breathing. He denies any fever, cough, chest pain, palpitation. When he came to the ED, he was saturating at 93-94% on room air. He was placed on nasal cannula. EKG was done showing NSR. Troponin was negative and BNP was mildly elevated at 398. CXR showed bilateral vascular congestion. Serum potassium was elevated at 6.1. He got a dose of lasix and was given Hyperkalemia cocktail in the ED. Of note, he had Hx of COVID in 2020 andwas vaccinated with COVID-19 x 3 (Pfizer) and Influenza in 2021. He was subsequently admitted for acute exacerbation of his heart failure.    Interval hx:  Pt AOX3.  5/6 HD initiated for worsening bun/cr due to ATN with underlying CKD stage 4 and anuria. S/p biopsy of liver mass.        PAST MEDICAL & SURGICAL HISTORY:  COPD, mild      Anemia      Bipolar disorder      IBD (inflammatory bowel disease)      HTN (hypertension)      HLD (hyperlipidemia)      PAD (peripheral artery disease)      CKD (chronic kidney disease)      Prostate CA      BPH with urinary obstruction      History of creation of ostomy          SOCIAL HISTORY:    Admitted from:  home  (with HHA)           assisted living          [facility]       [facility]   Substance abuse history:              Tobacco hx:                  Alcohol hx:              Home Opioid hx:  Denominational:                                    Preferred Language:    FAMILY HISTORY:  FH: HTN (hypertension) (Father)     unable to obtain from patient due to poor mentation, family unable to give information  Baseline ADLs (prior to admission):    Allergies    No Known Allergies    Intolerances      Present Symptoms:   Pain:  Fatigue:  Nausea:  Lack of Appetite:   SOB:  Depression:  Anxiety:  Review of Systems: [All others negative or Unable to obtain due to poor mentation]    MEDICATIONS  (STANDING):  ARIPiprazole 15 milliGRAM(s) Oral daily  chlorhexidine 2% Cloths 1 Application(s) Topical daily  diltiazem Infusion 5 mG/Hr (5 mL/Hr) IV Continuous <Continuous>  ferrous    sulfate 325 milliGRAM(s) Oral daily  finasteride 5 milliGRAM(s) Oral daily  gabapentin 100 milliGRAM(s) Oral three times a day  heparin  Infusion.  Unit(s)/Hr (18 mL/Hr) IV Continuous <Continuous>  lamoTRIgine 200 milliGRAM(s) Oral daily  midodrine. 10 milliGRAM(s) Oral three times a day  pantoprazole    Tablet 40 milliGRAM(s) Oral before breakfast  sevelamer carbonate 800 milliGRAM(s) Oral three times a day with meals  tamsulosin 0.4 milliGRAM(s) Oral at bedtime  traZODone 25 milliGRAM(s) Oral at bedtime    MEDICATIONS  (PRN):      PHYSICAL EXAM:  Vital Signs Last 24 Hrs  T(C): 36.4 (18 May 2022 12:00), Max: 36.9 (17 May 2022 15:22)  T(F): 97.5 (18 May 2022 12:00), Max: 98.4 (17 May 2022 15:22)  HR: 109 (18 May 2022 12:00) (96 - 136)  BP: 110/63 (18 May 2022 12:00) (90/56 - 142/80)  BP(mean): 75 (18 May 2022 12:00) (56 - 96)  RR: 29 (18 May 2022 12:00) (18 - 40)  SpO2: 93% (18 May 2022 12:00) (88% - 96%)    General: alert  oriented x ____    lethargic distressed cachexia  nonverbal  unarousable verbal    Palliative Performance Scale/Karnofsky Score:  ECOG Performance:    HEENT: no abnormal lesion, dry mouth  ET tube/trach oral lesions:  Lungs: tachypnea/labored breathing  excessive secretions  CV: RRR, S1S2, tachycardia  GI: soft non distended non tender  incontinent               PEG/NG/OG tube  constipation  last BM:   : incontinent  oliguria/anuria  phillips  Musculoskeletal: weakness  edema   ambulatory with assistance    bedbound/wheelchair bound  Skin: no abnormal skin lesions, poor skin turgor, pressure ulcer stage:   Neuro: no deficits, cognitive impairment dsyphagia/dysarthria paresis  Oral intake ability: unable/only mouth care, minimal moderate full capability    LABS:                        10.3   14.93 )-----------( 303      ( 18 May 2022 05:52 )             33.1     05-18    134<L>  |  98  |  48<H>  ----------------------------<  131<H>  5.3   |  21<L>  |  6.95<H>    Ca    9.5      18 May 2022 05:52  Phos  6.0     05-18  Mg     2.3     05-18    TPro  8.0  /  Alb  2.6<L>  /  TBili  1.3<H>  /  DBili  x   /  AST  418<H>  /  ALT  66<H>  /  AlkPhos  312<H>  05-18        RADIOLOGY & ADDITIONAL STUDIES:         [facility] Geriatric and Palliative Consult Service:  Dr. Inocencia Vernon: cell ([phone])  Dr. Chelsea Card: cell ([phone])   Shereen De Anda NP: cell ([phone])  Adele Chaparro NP: cell ([phone])   Hussein Jessica LSW: cell ([phone])     HPI:  Patient is a 71M from [facility], who is ambulatory at baseline with a walker. He has Hx of HTN, CHF?, COPD, HLD, PAD, BPH, Prostate CA (s/p radiation), Bipolar Disorder, IBD - Crohn's Disease, R ileostomy bag (s/p sigmoid resection). He was brought to ED due to bilateral leg swelling. For the past 2 days he has been having progressive swelling/ edema of both his legs with associated ambulatory dysfunction. He also started having episodes of shortness of breath and difficulty of breathing. He denies any fever, cough, chest pain, palpitation. When he came to the ED, he was saturating at 93-94% on room air. He was placed on nasal cannula. EKG was done showing NSR. Troponin was negative and BNP was mildly elevated at 398. CXR showed bilateral vascular congestion. Serum potassium was elevated at 6.1. He got a dose of lasix and was given Hyperkalemia cocktail in the ED. Of note, he had Hx of COVID in 2020 andwas vaccinated with COVID-19 x 3 (Pfizer) and Influenza in 2021. He was subsequently admitted for acute exacerbation of his heart failure.    Interval hx:  Pt AOX3.  5/6 HD initiated for worsening bun/cr due to ATN with underlying CKD stage 4 and anuria.  During HD sessions, went into AFib with RVR and hypotension, unable to tolerate fluid removal, transferred to ICU (5/17) for enhanced monitoring during HD.  S/p biopsy of liver mass.  Path pending         PAST MEDICAL & SURGICAL HISTORY:  COPD, mild      Anemia      Bipolar disorder      IBD (inflammatory bowel disease)      HTN (hypertension)      HLD (hyperlipidemia)      PAD (peripheral artery disease)      CKD (chronic kidney disease)      Prostate CA      BPH with urinary obstruction      History of creation of ostomy          SOCIAL HISTORY:    Admitted from:  [facility]  Pt has 2 children, he identifies his brother Raghav Musa as his HCP.    Latter day: n/a                                   Preferred Language: English    Raghav Musa  (HCP)   Phone# [phone]    FAMILY HISTORY:  FH: HTN (hypertension) (Father)    Baseline ADLs (prior to admission): independent    Allergies    No Known Allergies    Intolerances      Present Symptoms:   Pain: denies  Fatigue: denies  Nausea: denies  Lack of Appetite: denies  SOB: yes  Anxiety: denies  Review of Systems: [All others negative     MEDICATIONS  (STANDING):  ARIPiprazole 15 milliGRAM(s) Oral daily  chlorhexidine 2% Cloths 1 Application(s) Topical daily  diltiazem Infusion 5 mG/Hr (5 mL/Hr) IV Continuous <Continuous>  ferrous    sulfate 325 milliGRAM(s) Oral daily  finasteride 5 milliGRAM(s) Oral daily  gabapentin 100 milliGRAM(s) Oral three times a day  heparin  Infusion.  Unit(s)/Hr (18 mL/Hr) IV Continuous <Continuous>  lamoTRIgine 200 milliGRAM(s) Oral daily  midodrine. 10 milliGRAM(s) Oral three times a day  pantoprazole    Tablet 40 milliGRAM(s) Oral before breakfast  sevelamer carbonate 800 milliGRAM(s) Oral three times a day with meals  tamsulosin 0.4 milliGRAM(s) Oral at bedtime  traZODone 25 milliGRAM(s) Oral at bedtime    MEDICATIONS  (PRN):      PHYSICAL EXAM:  Vital Signs Last 24 Hrs  T(C): 36.4 (18 May 2022 12:00), Max: 36.9 (17 May 2022 15:22)  T(F): 97.5 (18 May 2022 12:00), Max: 98.4 (17 May 2022 15:22)  HR: 109 (18 May 2022 12:00) (96 - 136)  BP: 110/63 (18 May 2022 12:00) (90/56 - 142/80)  BP(mean): 75 (18 May 2022 12:00) (56 - 96)  RR: 29 (18 May 2022 12:00) (18 - 40)  SpO2: 93% (18 May 2022 12:00) (88% - 96%)    General: chronically ill appearing man, AOX3, tachypneic on NC.      Palliative Performance Scale/Karnofsky Score:  ECOG Performance:    HEENT: atraumatic, moist mucous membranes    Lungs: decreased bases, tachypnea  CV: RRR, S1S2, tachycardic  GI: soft non distended non tender on palpation + rt ileostomy  : incontinent    Musculoskeletal: bedbound, no edema  Skin: Stage 2 Pressure Injury to the Bilateral Post Ears, + Rt IJ  Neuro: no deficits, cognitive impairment   Oral intake ability: moderate po intake    LABS:                        10.3   14.93 )-----------( 303      ( 18 May 2022 05:52 )             33.1     05-18    134<L>  |  98  |  48<H>  ----------------------------<  131<H>  5.3   |  21<L>  |  6.95<H>    Ca    9.5      18 May 2022 05:52  Phos  6.0     05-18  Mg     2.3     05-18    TPro  8.0  /  Alb  2.6<L>  /  TBili  1.3<H>  /  DBili  x   /  AST  418<H>  /  ALT  66<H>  /  AlkPhos  312<H>  05-18    < from: CT Abdomen and Pelvis No Cont (05.06.22 @ 10:00) >  ACC: [id number] EXAM:  CT ABDOMEN AND PELVIS                          PROCEDURE DATE:  05/06/2022          INTERPRETATION:  CLINICAL INFORMATION: 71 years  Male with Gross   hematuria. Prostate cancer    COMPARISON: MRI abdomen 5/3/2022, chest CT 5/2/2022 and CT abdomen and   pelvis 2/5/2020    CONTRAST/COMPLICATIONS:  IV Contrast: NONE  Oral Contrast: NONE  Complications: None reported at time of study completion    PROCEDURE:  CT of the Abdomen and Pelvis was performed.  Sagittal and coronal reformats were performed.    LIMITATIONS: Evaluation of the solid organs, vascular structures and GI   tract is limited without oral and IV contrast.    FINDINGS:  LOWER CHEST: Stable right middle lobe discoid atelectasis. Bilateral   lower lobe dependent atelectasis.    LIVER: Heterogeneous liver with innumerable indistinct hypodense and   hyperdense masses.  BILE DUCTS: Normal caliber.  GALLBLADDER: Cholelithiasis. Porcelain gallbladder.  SPLEEN: Within normal limits.  PANCREAS: Within normal limits.  ADRENALS: Within normal limits.  KIDNEYS/URETERS: Bilateral cysts. No hydronephrosis.    BLADDER: Within normal limits.  REPRODUCTIVE ORGANS: Lobulated prostate.    BOWEL: Subtotal colectomy with Connors's pouch. Right-sided ileostomy. No   bowelobstruction. Small sliding hiatus hernia. Decompressed stomach   cannot be assessed. Nonobstructed small bowel extends into bilateral   inguinal hernias  PERITONEUM: No ascites.  VESSELS: IVC filter.  RETROPERITONEUM/LYMPH NODES: No lymphadenopathy.  ABDOMINAL WALL: Bilateral inguinal hernias containing nonobstructed small   bowel.  BONES: L5-S1 degenerative disc disease. Lytic lucency in the right iliac   bone (2:105), right L4 pedicle (2:90) and right L3 vertebra (2:79) are   unchanged from 2020.    IMPRESSION:  Evaluation of the solid organs, vascular structures and GI tract is   limited without oral and IV contrast.    Hepatic metastases.    Subtotal colectomy with Connors's pouch and right ileostomy.   Nonobstructed small bowel in bilateral inguinal hernias.    Osseous lucency is unchanged from 2020, which are not active on nuclear   bone scan 5/5/2022    < end of copied text >      RADIOLOGY & ADDITIONAL STUDIES: reviewed  ADVANCED DIRECTIVES:  DNR/DNI.

## 2022-05-18 NOTE — PROGRESS NOTE ADULT - ASSESSMENT
71y Male from [facility] with Hx of HTN, CHF?, COPD, HLD, PAD, BPH, Prostate CA (s/p radiation), Bipolar Disorder, IBD - Crohn's Disease, R ileostomy bag (s/p sigmoid resection), COVID-19 (2020) and s/p COVID19 vaccination (Pfizer x3) was brought to [facility] ED on 4/28/22 b/o 2 days worsening bilateral leg swelling with difficulty ambulating, and SOB with SpO2 93-94% on RA on arrival.  He was found to have bilateral vascular congestion on CXR due to suspected acute exacerbation of CHF, and PAWAN on CKD (BUN/Cr 88/3.28), with hyperkalemia (K 6.1). 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  Being followed by cardiology, PBNP was mildly elevated, and TTE showed LVEF 55%, normal diastolic function and normal RV function, thus did not appear to be in acute CHF exacerbation.    Hepatology was consulted b/o liver lesion and abnormal liver enzymes and been following since 5/2/22.     # Abnormal liver enzymes, AST >>ALT, possibly due to extensive malignancy  - CPK nl. HIV neg. Hep A IgM neg. Prior Hep C, s/p treatment with SVR and prior Hep B, unlikely to explain. No Hx of EtOH.   - Been hypotensive, requiring midodrine, with renal dysfunction, Ig panel wnl, K/L ratio WNL, F/u UPEP  - RUBIA neg, IgG 1723, ANCA neg. Still obtain / follow up SMA, LKM, AMA, A1AT, ceruloplasmin, Hep E  - Avoid hepatotoxic medications, continue holding statin  - Please, obtain collateral information about timing of lamotrigine, because its hepatotoxicity is well established, and consider alternative.     # Pos HCV ab  - HCV PCR neg with the Hx of Hep C and treatment, suggests SVR.   - Cryoglobulin negative  - HIV neg    # HBcAb IgM pos, but HBsAg neg  - HBsAg neg, HBV DNA neg, but HBcAb pos, HBeAb pos, past infection. Also obtain Hep D serology.     # Liver lesions - HCC  - On CT chest incidentally found a 4.7x4.3 cm partially imaged hypodense lesion and in L lobe another 2.7 cm lesion, as well as nodular thickening along hepatic capsule, all are new from the 2/2020 CT a/p  - CEA and CA 19-9 WNL, but AFP> 5000, PSA WNL  - Could not get contrast CT b/o PAWAN on CKD. MRI was done w/o contrast showing Innumerable mildly T2 hyperintense lesions  throughout the liver, suggestive of metastasis. The largest lesion measures 13.8 x 11.7 x 11.7 cm in the liver dome. Multiple T2 hyperintense and T2 hyperintense lesions in the thoracolumbar spine may represent osseous metastasis.  - No fever or leukocytosis  - S/p liver biopsy per IR ( L lobe mass) 5/11/22, f/u official result, but as per d/w pathology, it is moderately differentiated HCC  - Bone scan did not suggest metastatic disease.  - Hem/onc follow up.     # PAWAN on CKD, now on HD without signs of renal recovery  # Hematuria  # Hx of prostate Ca  - F/u nephrology and urology recommendations    Rest per primary team.    Thank you for consult  Will continue to follow.   D/w primary team

## 2022-05-18 NOTE — CONSULT NOTE ADULT - PROBLEM SELECTOR RECOMMENDATION 5
Pt with multiple comorbidities, declining functional status.  Appropriate for hospice.  Please see discussion noted above in GOC conversation.

## 2022-05-18 NOTE — CHART NOTE - NSCHARTNOTEFT_GEN_A_CORE
Spoke with patient, his brother/HCP Jacky Singh ([phone]), Roseanna Freeman ([phone]), and had an extensive conversation regarding the patient's current status, diagnostics, treatment plan, and overall prognosis. All questions answered.

## 2022-05-18 NOTE — CONSULT NOTE ADULT - PROBLEM SELECTOR RECOMMENDATION 3
Clinical evidence indicates that the patient has Moderate protein calorie malnutrition/ 2nd degree. Albumin 2.3.  Pt reports moderate po intake due to SOB    In context of Chronic Illness (>1 month)  Energy/Food intake <75% of estimated energy requirement >7 days  Weight loss: Mild  (lbs lost recently)  Body Fat loss: Mild    Muscle mass loss: Mild   Fluid Accumulation: severe   Strength: weakened Mild     Recommend:   c/w soft diet, aspiration precautions, keep head of bed at least 45 degrees during feeding

## 2022-05-18 NOTE — CONSULT NOTE ADULT - PROBLEM SELECTOR RECOMMENDATION 2
Hx of prostate cancer s/p radiation.  Abnormal liver enzymes.  Incidental findings on CT multiple liver lesions and skeletal lesions suspicious of metastatic disease. Bone scan did not show any suspicious lesions.   MRI confirmed. W/ elevated tumor markers.      - Liver biopsy performed on May 11th.  Path pending.  Hepatology and Hem/onc following   ECOG 4    Pt wants to know diagnosis and to discuss possible treatment options.

## 2022-05-18 NOTE — PROGRESS NOTE ADULT - ASSESSMENT
· Assessment	  WILMER SELLERS is a 71y Male who presents with a chief complaint of CHF exacerbation.    Liver and Bone Lesions  - MRI and CT imaging reviewed; multiple liver lesions and skeletal lesions suspicious of metastatic disease. Bone scan did not show any suspicious lesions.   - AFP elevated at 5,338. PSA is 0.   - Liver biopsy performed on May 11th. Follow-up on pathology.    Hematuria  - Urology following. Planning on outpatient cystoscopy. Acute Kidney Injury  - Nephrology following. Patient is currently on dialysis at this time.   - Continue to monitor urine output and kidney function.     Atrial Fibrillation  - Cardiology following; recommending long-term anticoagulation.    Will continue to follow. Thank you for the consultation.

## 2022-05-18 NOTE — PROGRESS NOTE ADULT - SUBJECTIVE AND OBJECTIVE BOX
Chief Complaint:  Patient is a 71y old  Male who presents with a chief complaint of acute CHF exacerbation (18 May 2022 12:25)      Reason for consult: Liver lesions    Interval Events: Patient was seen and examined at bedside in ICU. He is getting HD. Awake, alert. On NCO2, not in distress. Spoke to pathology and reviewed Genpath result, that suggests moderately differentiated HCC.     Hospital Medications:  ARIPiprazole 15 milliGRAM(s) Oral daily  chlorhexidine 2% Cloths 1 Application(s) Topical daily  diltiazem Infusion 5 mG/Hr IV Continuous <Continuous>  ferrous    sulfate 325 milliGRAM(s) Oral daily  finasteride 5 milliGRAM(s) Oral daily  gabapentin 100 milliGRAM(s) Oral three times a day  heparin  Infusion.  Unit(s)/Hr IV Continuous <Continuous>  lamoTRIgine 200 milliGRAM(s) Oral daily  midodrine. 10 milliGRAM(s) Oral three times a day  pantoprazole    Tablet 40 milliGRAM(s) Oral before breakfast  sevelamer carbonate 800 milliGRAM(s) Oral three times a day with meals  tamsulosin 0.4 milliGRAM(s) Oral at bedtime  traZODone 25 milliGRAM(s) Oral at bedtime      ROS:   General:  No  fevers, chills, night sweats, fatigue  Eyes:  Good vision, no reported pain  ENT:  No sore throat, pain, runny nose  CV:  No pain, palpitations  Pulm:  No dyspnea, cough  GI:  See HPI, otherwise negative  :  No  incontinence, nocturia  Muscle:  No pain, weakness  Neuro:  No memory problems  Psych:  No insomnia, mood problems, depression  Endocrine:  No polyuria, polydipsia, cold/heat intolerance  Heme:  No petechiae, ecchymosis, easy bruisability  Skin:  No rash    PHYSICAL EXAM:   Vital Signs:  Vital Signs Last 24 Hrs  T(C): 36.5 (18 May 2022 13:40), Max: 36.9 (17 May 2022 15:22)  T(F): 97.7 (18 May 2022 13:40), Max: 98.4 (17 May 2022 15:22)  HR: 110 (18 May 2022 13:40) (96 - 136)  BP: 121/78 (18 May 2022 13:40) (90/56 - 144/67)  BP(mean): 84 (18 May 2022 13:00) (56 - 96)  RR: 25 (18 May 2022 13:40) (18 - 40)  SpO2: 94% (18 May 2022 13:40) (88% - 96%)  Daily     Daily Weight in k.1 (18 May 2022 07:00)    GENERAL: no acute distress  NEURO: alert, no asterixis  HEENT: anicteric sclera, no conjunctival pallor appreciated  CHEST: no respiratory distress, no accessory muscle use  CARDIAC: regular rate, rhythm  ABDOMEN: soft, non-tender, non-distended, no rebound or guarding  EXTREMITIES: warm, well perfused, no edema  SKIN: no lesions noted    LABS: reviewed                        10.3   14 )-----------( 303      ( 18 May 2022 05:52 )             33.1     05-18    134<L>  |  98  |  48<H>  ----------------------------<  131<H>  5.3   |  21<L>  |  6.95<H>    Ca    9.5      18 May 2022 05:52  Phos  6.0     05-  Mg     2.3         TPro  8.0  /  Alb  2.6<L>  /  TBili  1.3<H>  /  DBili  x   /  AST  418<H>  /  ALT  66<H>  /  AlkPhos  312<H>  18    LIVER FUNCTIONS - ( 18 May 2022 05:52 )  Alb: 2.6 g/dL / Pro: 8.0 g/dL / ALK PHOS: 312 U/L / ALT: 66 U/L DA / AST: 418 U/L / GGT: x             Interval Diagnostic Studies: see sunrise for full report   Chief Complaint:  Patient is a 71y old  Male who presents with a chief complaint of acute CHF exacerbation (18 May 2022 12:25)      Reason for consult: Liver lesions    Interval Events: Patient was seen and examined at bedside in ICU. He is getting HD. Awake, alert. On NCO2, not in distress. Spoke to pathology and reviewed Genpath result, that suggests moderately differentiated HCC.     Hospital Medications:  ARIPiprazole 15 milliGRAM(s) Oral daily  chlorhexidine 2% Cloths 1 Application(s) Topical daily  diltiazem Infusion 5 mG/Hr IV Continuous <Continuous>  ferrous    sulfate 325 milliGRAM(s) Oral daily  finasteride 5 milliGRAM(s) Oral daily  gabapentin 100 milliGRAM(s) Oral three times a day  heparin  Infusion.  Unit(s)/Hr IV Continuous <Continuous>  lamoTRIgine 200 milliGRAM(s) Oral daily  midodrine. 10 milliGRAM(s) Oral three times a day  pantoprazole    Tablet 40 milliGRAM(s) Oral before breakfast  sevelamer carbonate 800 milliGRAM(s) Oral three times a day with meals  tamsulosin 0.4 milliGRAM(s) Oral at bedtime  traZODone 25 milliGRAM(s) Oral at bedtime      ROS:   General:  No  fevers, chill  CV:  No pain, palpitations  Pulm:  Not in respiratory distress, some improvement compared to yesterday, on NC O2  GI:  Denies abdominal pain, ileostomy bag in place, no bleeding, no N/V  :  No  dysuria  Muscle:  No pain, weakness  Skin:  No rash    PHYSICAL EXAM:   Vital Signs:  Vital Signs Last 24 Hrs  T(C): 36.5 (18 May 2022 13:40), Max: 36.9 (17 May 2022 15:22)  T(F): 97.7 (18 May 2022 13:40), Max: 98.4 (17 May 2022 15:22)  HR: 110 (18 May 2022 13:40) (96 - 136)  BP: 121/78 (18 May 2022 13:40) (90/56 - 144/67)  BP(mean): 84 (18 May 2022 13:00) (56 - 96)  RR: 25 (18 May 2022 13:40) (18 - 40)  SpO2: 94% (18 May 2022 13:40) (88% - 96%)  Daily     Daily Weight in k.1 (18 May 2022 07:00)    GENERAL: no acute distress  NEURO: awake, alert, oriented but not in time, no asterixis  HEENT: anicteric sclera, no conjunctival pallor appreciated  CHEST: no respiratory distress, no accessory muscle use (reported improvement compared to yesterday), on NC O2  CARDIAC: regular rate, rhythm  ABDOMEN: soft, non-tender, mildly distended, no rebound or guarding, ileostomy bag in place, BS+  EXTREMITIES: warm, well perfused, no edema  SKIN: no rash    LABS: reviewed                        10.3   14 )-----------( 303      ( 18 May 2022 05:52 )             33.1         134<L>  |  98  |  48<H>  ----------------------------<  131<H>  5.3   |  21<L>  |  6.95<H>    Ca    9.5      18 May 2022 05:52  Phos  6.0       Mg     2.3         TPro  8.0  /  Alb  2.6<L>  /  TBili  1.3<H>  /  DBili  x   /  AST  418<H>  /  ALT  66<H>  /  AlkPhos  312<H>      LIVER FUNCTIONS - ( 18 May 2022 05:52 )  Alb: 2.6 g/dL / Pro: 8.0 g/dL / ALK PHOS: 312 U/L / ALT: 66 U/L DA / AST: 418 U/L / GGT: x             Interval Diagnostic Studies: see sunrise for full report

## 2022-05-18 NOTE — CONSULT NOTE ADULT - PROBLEM SELECTOR RECOMMENDATION 9
PAWAN on CKD, progressed to ESRD requiring HD. 5/6 During HD sessions, went into AFib with RVR and hypotension, unable to tolerate fluid removal, transferred to ICU (5/17) for enhanced monitoring during HD.   Nephrology following   Pt stated he gets tired sometimes, however, he wants to continue with HD.    Avoid nephrotoxic medications/NSAIDs    Pt is DNR/DNI

## 2022-05-18 NOTE — PROGRESS NOTE ADULT - SUBJECTIVE AND OBJECTIVE BOX
INTERVAL HPI/OVERNIGHT EVENTS:       PRESSORS: [ ] YES [ ] NO  WHICH:    ANTIBIOTICS:                  DATE STARTED:  ANTIBIOTICS:                  DATE STARTED:    Antimicrobial:    Cardiovascular:  midodrine. 10 milliGRAM(s) Oral three times a day  tamsulosin 0.4 milliGRAM(s) Oral at bedtime    Pulmonary:    Hematalogic:  heparin  Infusion.  Unit(s)/Hr IV Continuous <Continuous>    Other:  ARIPiprazole 15 milliGRAM(s) Oral daily  chlorhexidine 2% Cloths 1 Application(s) Topical daily  ferrous    sulfate 325 milliGRAM(s) Oral daily  finasteride 5 milliGRAM(s) Oral daily  gabapentin 100 milliGRAM(s) Oral three times a day  lamoTRIgine 200 milliGRAM(s) Oral daily  pantoprazole    Tablet 40 milliGRAM(s) Oral before breakfast  sevelamer carbonate 800 milliGRAM(s) Oral three times a day with meals  traZODone 25 milliGRAM(s) Oral at bedtime    ARIPiprazole 15 milliGRAM(s) Oral daily  chlorhexidine 2% Cloths 1 Application(s) Topical daily  ferrous    sulfate 325 milliGRAM(s) Oral daily  finasteride 5 milliGRAM(s) Oral daily  gabapentin 100 milliGRAM(s) Oral three times a day  heparin  Infusion.  Unit(s)/Hr IV Continuous <Continuous>  lamoTRIgine 200 milliGRAM(s) Oral daily  midodrine. 10 milliGRAM(s) Oral three times a day  pantoprazole    Tablet 40 milliGRAM(s) Oral before breakfast  sevelamer carbonate 800 milliGRAM(s) Oral three times a day with meals  tamsulosin 0.4 milliGRAM(s) Oral at bedtime  traZODone 25 milliGRAM(s) Oral at bedtime    Drug Dosing Weight  Height (cm): 177.8 (28 Apr 2022 12:41)  Weight (kg): 98 (28 Apr 2022 12:41)  BMI (kg/m2): 31 (28 Apr 2022 12:41)  BSA (m2): 2.16 (28 Apr 2022 12:41)    PHYSICAL EXAM:  GENERAL: NAD  EYES: EOMI, PERRLA  NECK: Supple, No JVD; Normal thyroid; Trachea midline: No LAD   NERVOUS SYSTEM:  Alert & Oriented X3,  Motor Strength 5/5 B/L upper and lower extremities; DTRs 2+ intact and symmetric  CHEST/LUNG: No rales, rhonchi, wheezing, breath sounds present bilaterally  HEART: Regular rate and rhythm; No murmurs, no gallops  ABDOMEN: Soft, Nontender, Nondistended; Bowel sounds present, no pain or masses on palpation  : voiding well, Maurer in place  EXTREMITIES:  2+ Peripheral Pulses, No clubbing, cyanosis, or edema  SKIN: warm, intact, no lesions     LINES/DRAINS/DEVICES  CENTRAL LINE: [ ] YES [ ] NO  LOCATION:     MAURER: [ ] YES [ ] NO     A-LINE:  [ ] YES [ ] NO  LOCATION:       ICU Vital Signs Last 24 Hrs  T(C): 36.1 (18 May 2022 04:00), Max: 36.9 (17 May 2022 15:22)  T(F): 97 (18 May 2022 04:00), Max: 98.4 (17 May 2022 15:22)  HR: 108 (18 May 2022 06:00) (84 - 133)  BP: 114/56 (18 May 2022 06:00) (90/56 - 114/56)  BP(mean): 71 (18 May 2022 06:00) (56 - 71)  ABP: --  ABP(mean): --  RR: 28 (18 May 2022 06:00) (18 - 32)  SpO2: 92% (18 May 2022 06:00) (88% - 96%)            05-17 @ 07:01  -  05-18 @ 07:00  --------------------------------------------------------  IN: 1016 mL / OUT: 640 mL / NET: 376 mL              LABS:  CBC Full  -  ( 18 May 2022 05:52 )  WBC Count : 14.93 K/uL  RBC Count : 4.02 M/uL  Hemoglobin : 10.3 g/dL  Hematocrit : 33.1 %  Platelet Count - Automated : 303 K/uL  Mean Cell Volume : 82.3 fl  Mean Cell Hemoglobin : 25.6 pg  Mean Cell Hemoglobin Concentration : 31.1 gm/dL  Auto Neutrophil # : 12.23 K/uL  Auto Lymphocyte # : 0.77 K/uL  Auto Monocyte # : 0.60 K/uL  Auto Eosinophil # : 0.01 K/uL  Auto Basophil # : 0.12 K/uL  Auto Neutrophil % : 81.9 %  Auto Lymphocyte % : 5.2 %  Auto Monocyte % : 4.0 %  Auto Eosinophil % : 0.1 %  Auto Basophil % : 0.8 %    05-18    134<L>  |  98  |  48<H>  ----------------------------<  131<H>  5.3   |  21<L>  |  6.95<H>    Ca    9.5      18 May 2022 05:52  Phos  6.0     05-18  Mg     2.3     05-18    TPro  8.0  /  Alb  2.6<L>  /  TBili  1.3<H>  /  DBili  x   /  AST  418<H>  /  ALT  66<H>  /  AlkPhos  312<H>  05-18    PT/INR - ( 16 May 2022 12:21 )   PT: 14.9 sec;   INR: 1.25 ratio         PTT - ( 17 May 2022 15:00 )  PTT:72.1 sec        RADIOLOGY & ADDITIONAL STUDIES REVIEWED DURING TEAM ROUNDS    [ ]GOALS OF CARE DISCUSSION WITH PATIENT/FAMILY/PROXY:    CRITICAL CARE TIME SPENT: 35 minutes   INTERVAL HPI/OVERNIGHT EVENTS:   Admitted to ICU, had AFib with RVR and received rate controlled therapies, some blood oozing from tunneled catheter site.     PRESSORS: [ ] YES [X] NO  WHICH:    ANTIBIOTICS: None                 DATE STARTED:    Antimicrobial:    Cardiovascular:  midodrine. 10 milliGRAM(s) Oral three times a day  tamsulosin 0.4 milliGRAM(s) Oral at bedtime    Pulmonary:    Hematalogic:  heparin  Infusion.  Unit(s)/Hr IV Continuous <Continuous>    Other:  ARIPiprazole 15 milliGRAM(s) Oral daily  chlorhexidine 2% Cloths 1 Application(s) Topical daily  ferrous    sulfate 325 milliGRAM(s) Oral daily  finasteride 5 milliGRAM(s) Oral daily  gabapentin 100 milliGRAM(s) Oral three times a day  lamoTRIgine 200 milliGRAM(s) Oral daily  pantoprazole    Tablet 40 milliGRAM(s) Oral before breakfast  sevelamer carbonate 800 milliGRAM(s) Oral three times a day with meals  traZODone 25 milliGRAM(s) Oral at bedtime    ARIPiprazole 15 milliGRAM(s) Oral daily  chlorhexidine 2% Cloths 1 Application(s) Topical daily  ferrous    sulfate 325 milliGRAM(s) Oral daily  finasteride 5 milliGRAM(s) Oral daily  gabapentin 100 milliGRAM(s) Oral three times a day  heparin  Infusion.  Unit(s)/Hr IV Continuous <Continuous>  lamoTRIgine 200 milliGRAM(s) Oral daily  midodrine. 10 milliGRAM(s) Oral three times a day  pantoprazole    Tablet 40 milliGRAM(s) Oral before breakfast  sevelamer carbonate 800 milliGRAM(s) Oral three times a day with meals  tamsulosin 0.4 milliGRAM(s) Oral at bedtime  traZODone 25 milliGRAM(s) Oral at bedtime    Drug Dosing Weight  Height (cm): 177.8 (28 Apr 2022 12:41)  Weight (kg): 98 (28 Apr 2022 12:41)  BMI (kg/m2): 31 (28 Apr 2022 12:41)  BSA (m2): 2.16 (28 Apr 2022 12:41)    PHYSICAL EXAM:  GENERAL: moderate respiratory distress  HEAD:  Atraumatic, Normocephalic  EYES: EOMI, PERRLA, conjunctiva and sclera clear  NECK: Supple, No JVD  CHEST/LUNG: Clear to auscultation bilaterally; Decreased breath sounds on right side , right sided dialysis catheter   HEART: Regular rate and rhythm; No murmurs;   ABDOMEN: Soft, Nontender, Nondistended; Bowel sounds present; No guarding, ileostomy  bag   EXTREMITIES:  2+ Peripheral Pulses, No cyanosis or edema  PSYCH: AAOx3  NEUROLOGY: non-focal  SKIN: No rashes or lesions    LINES/DRAINS/DEVICES  CENTRAL LINE: [X] YES [ ] NO  LOCATION:  [facility] Tunneled HD Catheter  MAURER: [ ] YES [X] NO     A-LINE:  [ ] YES [X] NO  LOCATION:       ICU Vital Signs Last 24 Hrs  T(C): 36.1 (18 May 2022 04:00), Max: 36.9 (17 May 2022 15:22)  T(F): 97 (18 May 2022 04:00), Max: 98.4 (17 May 2022 15:22)  HR: 108 (18 May 2022 06:00) (84 - 133)  BP: 114/56 (18 May 2022 06:00) (90/56 - 114/56)  BP(mean): 71 (18 May 2022 06:00) (56 - 71)  ABP: --  ABP(mean): --  RR: 28 (18 May 2022 06:00) (18 - 32)  SpO2: 92% (18 May 2022 06:00) (88% - 96%)    05-17 @ 07:01  -  05-18 @ 07:00  --------------------------------------------------------  IN: 1016 mL / OUT: 640 mL / NET: 376 mL    LABS:  CBC Full  -  ( 18 May 2022 05:52 )  WBC Count : 14.93 K/uL  RBC Count : 4.02 M/uL  Hemoglobin : 10.3 g/dL  Hematocrit : 33.1 %  Platelet Count - Automated : 303 K/uL  Mean Cell Volume : 82.3 fl  Mean Cell Hemoglobin : 25.6 pg  Mean Cell Hemoglobin Concentration : 31.1 gm/dL  Auto Neutrophil # : 12.23 K/uL  Auto Lymphocyte # : 0.77 K/uL  Auto Monocyte # : 0.60 K/uL  Auto Eosinophil # : 0.01 K/uL  Auto Basophil # : 0.12 K/uL  Auto Neutrophil % : 81.9 %  Auto Lymphocyte % : 5.2 %  Auto Monocyte % : 4.0 %  Auto Eosinophil % : 0.1 %  Auto Basophil % : 0.8 %    05-18    134<L>  |  98  |  48<H>  ----------------------------<  131<H>  5.3   |  21<L>  |  6.95<H>    Ca    9.5      18 May 2022 05:52  Phos  6.0     05-18  Mg     2.3     05-18    TPro  8.0  /  Alb  2.6<L>  /  TBili  1.3<H>  /  DBili  x   /  AST  418<H>  /  ALT  66<H>  /  AlkPhos  312<H>  05-18  PT/INR - ( 16 May 2022 12:21 )   PT: 14.9 sec;   INR: 1.25 ratio    PTT - ( 17 May 2022 15:00 )  PTT:72.1 sec    RADIOLOGY & ADDITIONAL STUDIES REVIEWED DURING TEAM ROUNDS    [X]GOALS OF CARE DISCUSSION WITH PATIENT/FAMILY/PROXY: DNR/DNI    CRITICAL CARE TIME SPENT: 35 minutes

## 2022-05-18 NOTE — PROGRESS NOTE ADULT - SUBJECTIVE AND OBJECTIVE BOX
C A R D I O L O G Y  **********************************    DATE OF SERVICE: 05-18-22    Patient denies chest pain, breathing better events noted,  tx to ICU for rapid afib and hypotension.  Amio stopped for elevated LFTS   Review of symptoms otherwise negative.    ARIPiprazole 15 milliGRAM(s) Oral daily  chlorhexidine 2% Cloths 1 Application(s) Topical daily  diltiazem Infusion 5 mG/Hr IV Continuous <Continuous>  ferrous    sulfate 325 milliGRAM(s) Oral daily  finasteride 5 milliGRAM(s) Oral daily  gabapentin 100 milliGRAM(s) Oral three times a day  heparin  Infusion.  Unit(s)/Hr IV Continuous <Continuous>  lamoTRIgine 200 milliGRAM(s) Oral daily  midodrine. 10 milliGRAM(s) Oral three times a day  pantoprazole    Tablet 40 milliGRAM(s) Oral before breakfast  sevelamer carbonate 800 milliGRAM(s) Oral three times a day with meals  tamsulosin 0.4 milliGRAM(s) Oral at bedtime  traZODone 25 milliGRAM(s) Oral at bedtime                            10.3   14.93 )-----------( 303      ( 18 May 2022 05:52 )             33.1       Hemoglobin: 10.3 g/dL (05-18 @ 05:52)  Hemoglobin: 10.0 g/dL (05-17 @ 19:32)  Hemoglobin: 10.1 g/dL (05-17 @ 07:27)  Hemoglobin: 10.2 g/dL (05-16 @ 07:24)  Hemoglobin: 10.5 g/dL (05-14 @ 07:18)      05-18    134<L>  |  98  |  48<H>  ----------------------------<  131<H>  5.3   |  21<L>  |  6.95<H>    Ca    9.5      18 May 2022 05:52  Phos  6.0     05-18  Mg     2.3     05-18    TPro  8.0  /  Alb  2.6<L>  /  TBili  1.3<H>  /  DBili  x   /  AST  418<H>  /  ALT  66<H>  /  AlkPhos  312<H>  05-18    Creatinine Trend: 6.95<--, 6.17<--, 10.00<--, 8.32<--, 9.28<--, 7.21<--    COAGS: PT/INR - ( 18 May 2022 10:40 )   PT: 17.4 sec;   INR: 1.46 ratio         PTT - ( 18 May 2022 10:40 )  PTT:62.0 sec          T(C): 36.2 (05-18-22 @ 07:00), Max: 36.9 (05-17-22 @ 15:22)  HR: 136 (05-18-22 @ 11:00) (96 - 136)  BP: 142/60 (05-18-22 @ 11:00) (90/56 - 142/80)  RR: 40 (05-18-22 @ 11:00) (18 - 40)  SpO2: 93% (05-18-22 @ 11:00) (88% - 96%)  Wt(kg): --    I&O's Summary    17 May 2022 07:01  -  18 May 2022 07:00  --------------------------------------------------------  IN: 1034 mL / OUT: 640 mL / NET: 394 mL    18 May 2022 07:01  -  18 May 2022 11:19  --------------------------------------------------------  IN: 336 mL / OUT: 0 mL / NET: 336 mL          HEENT:  (-)icterus (-)pallor  CV: N S1 S2 1/6 MARCELLE (+)2 Pulses B/l  Resp:  Clear to ausculatation B/L, normal effort  GI: (+) BS Soft, NT, ND  Lymph:  (-)Edema, (-)obvious lymphadenopathy  Skin: Warm to touch, Normal turgor  Psych: Appropriate mood and affect     TELE: Sinus    ASSESSMENT/PLAN: 	71y  Male  HTN, COPD, HLD, PAD, BPH, Prostate CA (s/p radiation), Bipolar Disorder, IBD - Crohn's Disease, R ileostomy bag (s/p sigmoid resection) historically preserved LV and RV function, normal perfusion on a nuclear stress test 2020 He was brought to ED due to bilateral leg swelling    - BP improved, Start Cardizem gtt for rate control  - COnt BB  - HD per renal  - cont heparin gtt for CVA prevention  - Hepatalogy and Heme Onc f/u  - Supportive care    Joey Archer MD, [facility]  BEEPER [phone]       C A R D I O L O G Y  **********************************    DATE OF SERVICE: 05-18-22    Patient denies chest pain, breathing better events noted,  tx to ICU for rapid afib and hypotension.  Amio stopped for elevated LFTS   Review of symptoms otherwise negative.    ARIPiprazole 15 milliGRAM(s) Oral daily  chlorhexidine 2% Cloths 1 Application(s) Topical daily  diltiazem Infusion 5 mG/Hr IV Continuous <Continuous>  ferrous    sulfate 325 milliGRAM(s) Oral daily  finasteride 5 milliGRAM(s) Oral daily  gabapentin 100 milliGRAM(s) Oral three times a day  heparin  Infusion.  Unit(s)/Hr IV Continuous <Continuous>  lamoTRIgine 200 milliGRAM(s) Oral daily  midodrine. 10 milliGRAM(s) Oral three times a day  pantoprazole    Tablet 40 milliGRAM(s) Oral before breakfast  sevelamer carbonate 800 milliGRAM(s) Oral three times a day with meals  tamsulosin 0.4 milliGRAM(s) Oral at bedtime  traZODone 25 milliGRAM(s) Oral at bedtime                            10.3   14.93 )-----------( 303      ( 18 May 2022 05:52 )             33.1       Hemoglobin: 10.3 g/dL (05-18 @ 05:52)  Hemoglobin: 10.0 g/dL (05-17 @ 19:32)  Hemoglobin: 10.1 g/dL (05-17 @ 07:27)  Hemoglobin: 10.2 g/dL (05-16 @ 07:24)  Hemoglobin: 10.5 g/dL (05-14 @ 07:18)      05-18    134<L>  |  98  |  48<H>  ----------------------------<  131<H>  5.3   |  21<L>  |  6.95<H>    Ca    9.5      18 May 2022 05:52  Phos  6.0     05-18  Mg     2.3     05-18    TPro  8.0  /  Alb  2.6<L>  /  TBili  1.3<H>  /  DBili  x   /  AST  418<H>  /  ALT  66<H>  /  AlkPhos  312<H>  05-18    Creatinine Trend: 6.95<--, 6.17<--, 10.00<--, 8.32<--, 9.28<--, 7.21<--    COAGS: PT/INR - ( 18 May 2022 10:40 )   PT: 17.4 sec;   INR: 1.46 ratio         PTT - ( 18 May 2022 10:40 )  PTT:62.0 sec          T(C): 36.2 (05-18-22 @ 07:00), Max: 36.9 (05-17-22 @ 15:22)  HR: 136 (05-18-22 @ 11:00) (96 - 136)  BP: 142/60 (05-18-22 @ 11:00) (90/56 - 142/80)  RR: 40 (05-18-22 @ 11:00) (18 - 40)  SpO2: 93% (05-18-22 @ 11:00) (88% - 96%)  Wt(kg): --    I&O's Summary    17 May 2022 07:01  -  18 May 2022 07:00  --------------------------------------------------------  IN: 1034 mL / OUT: 640 mL / NET: 394 mL    18 May 2022 07:01  -  18 May 2022 11:19  --------------------------------------------------------  IN: 336 mL / OUT: 0 mL / NET: 336 mL          HEENT:  (-)icterus (-)pallor  CV: N S1 S2 1/6 MARCELLE (+)2 Pulses B/l  Resp:  Clear to ausculatation B/L, normal effort  GI: (+) BS Soft, NT, ND  Lymph:  (-)Edema, (-)obvious lymphadenopathy  Skin: Warm to touch, Normal turgor  Psych: Appropriate mood and affect     TELE: afib    ASSESSMENT/PLAN: 	71y  Male  HTN, COPD, HLD, PAD, BPH, Prostate CA (s/p radiation), Bipolar Disorder, IBD - Crohn's Disease, R ileostomy bag (s/p sigmoid resection) historically preserved LV and RV function, normal perfusion on a nuclear stress test 2020 He was brought to ED due to bilateral leg swelling    - BP improved, Start Cardizem gtt for rate control  - COnt BB  - HD per renal  - cont heparin gtt for CVA prevention  - Hepatalogy and Heme Onc f/u  - Supportive care    Joey Archer MD, [facility]  BEEPER [phone]

## 2022-05-19 NOTE — PROGRESS NOTE ADULT - SUBJECTIVE AND OBJECTIVE BOX
Pt seen, feeling SOB, tachypneic, weak    MEDICATIONS  (STANDING):  ARIPiprazole 15 milliGRAM(s) Oral daily  chlorhexidine 2% Cloths 1 Application(s) Topical daily  ferrous    sulfate 325 milliGRAM(s) Oral daily  finasteride 5 milliGRAM(s) Oral daily  gabapentin 100 milliGRAM(s) Oral three times a day  heparin  Infusion.  Unit(s)/Hr (18 mL/Hr) IV Continuous <Continuous>  iohexol 300 mG (iodine)/mL Oral Solution 30 milliLiter(s) Oral once  lamoTRIgine 200 milliGRAM(s) Oral daily  midodrine. 10 milliGRAM(s) Oral three times a day  pantoprazole    Tablet 40 milliGRAM(s) Oral before breakfast  sevelamer carbonate 800 milliGRAM(s) Oral three times a day with meals  tamsulosin 0.4 milliGRAM(s) Oral at bedtime  traZODone 25 milliGRAM(s) Oral at bedtime    MEDICATIONS  (PRN):      ROS  as above, limited 2/2 participation    Vital Signs Last 24 Hrs  T(C): 36.7 (19 May 2022 05:00), Max: 36.7 (19 May 2022 05:00)  T(F): 98.1 (19 May 2022 05:00), Max: 98.1 (19 May 2022 05:00)  HR: 133 (19 May 2022 11:28) (104 - 142)  BP: 101/59 (19 May 2022 11:28) (71/48 - 144/67)  BP(mean): 70 (19 May 2022 11:28) (53 - 85)  RR: 32 (19 May 2022 11:28) (19 - 36)  SpO2: 92% (19 May 2022 11:28) (92% - 97%)    PE  weak, tachypenic  Awake, alert  limited 2/2 covid pandemic                          11.0   12.87 )-----------( 290      ( 19 May 2022 04:55 )             35.1       05-19    134<L>  |  95<L>  |  36<H>  ----------------------------<  107<H>  3.9   |  25  |  5.28<H>    Ca    9.4      19 May 2022 04:55  Phos  2.1     05-19  Mg     2.0     05-19    TPro  8.2  /  Alb  3.0<L>  /  TBili  1.4<H>  /  DBili  x   /  AST  533<H>  /  ALT  83<H>  /  AlkPhos  336<H>  05-19

## 2022-05-19 NOTE — PROGRESS NOTE ADULT - PROVIDER SPECIALTY LIST ADULT
Cardiology
Cardiology
Heme/Onc
Hepatology
Hepatology
Internal Medicine
Intervent Radiology
Nephrology
Urology
Cardiology
Critical Care
Gastroenterology
Heme/Onc
Hepatology
Internal Medicine
Intervent Radiology
Nephrology
Urology
Cardiology
Critical Care
Gastroenterology
Heme/Onc
Hepatology
Internal Medicine
Intervent Radiology
Nephrology
Urology
Heme/Onc
Heme/Onc
Hepatology
Nephrology
Urology
Internal Medicine

## 2022-05-19 NOTE — PROGRESS NOTE ADULT - REASON FOR ADMISSION
acute CHF exacerbation

## 2022-05-19 NOTE — PROGRESS NOTE ADULT - ASSESSMENT
71y Male from [facility] with Hx of HTN, CHF?, COPD, HLD, PAD, BPH, Prostate CA (s/p radiation), Bipolar Disorder, IBD - Crohn's Disease, R ileostomy bag (s/p sigmoid resection), COVID-19 (2020) and s/p COVID19 vaccination (Pf[employer] x3) was brought to [facility] ED on 4/28/22 b/o 2 days worsening bilateral leg swelling with difficulty ambulating, and SOB with SpO2 93-94% on RA on arrival.  He was found to have bilateral vascular congestion on CXR due to suspected acute exacerbation of CHF, and PAWAN on CKD (BUN/Cr 88/3.28), with hyperkalemia (K 6.1). 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  Being followed by cardiology, PBNP was mildly elevated, and TTE showed LVEF 55%, normal diastolic function and normal RV function, thus did not appear to be in acute CHF exacerbation.    Hepatology was consulted b/o liver lesion and abnormal liver enzymes and been following since 5/2/22.     # Abnormal liver enzymes, AST >>ALT, possibly due to extensive infiltrative malignancy / HCC  - CPK nl. HIV neg. Hep A IgM neg. Prior Hep C, s/p treatment with SVR and prior Hep B, unlikely to explain. No Hx of EtOH.   - Been hypotensive, requiring midodrine, with renal dysfunction, Ig panel wnl, K/L ratio WNL, F/u UPEP  - RUBIA neg, IgG 1723, ANCA neg. Still can obtain / follow up SMA, LKM, AMA, A1AT, ceruloplasmin, Hep E  - Avoid hepatotoxic medications, continue holding statin  - Please, obtain collateral information about timing of lamotrigine, because its hepatotoxicity is well established, and consider alternative.     # Pos HCV ab  - HCV PCR neg with the Hx of Hep C and treatment, suggests SVR.   - Cryoglobulin negative  - HIV neg    # HBcAb IgM pos, but HBsAg neg  - HBsAg neg, HBV DNA neg, but HBcAb pos, HBeAb pos, past infection. Still can send Hep D serology.     # Liver lesions - HCC  - On CT chest incidentally found a 4.7x4.3 cm partially imaged hypodense lesion and in L lobe another 2.7 cm lesion, as well as nodular thickening along hepatic capsule, all are new from the 2/2020 CT a/p  - CEA and CA 19-9 WNL, but AFP> 5000, PSA WNL  - Could not get contrast CT b/o PAWAN on CKD. MRI was done w/o contrast showing Innumerable mildly T2 hyperintense lesions  throughout the liver, suggestive of metastasis. The largest lesion measures 13.8 x 11.7 x 11.7 cm in the liver dome. Multiple T2 hyperintense and T2 hyperintense lesions in the thoracolumbar spine may represent osseous metastasis.  - No fever or leukocytosis  - Bone scan did not suggest metastatic disease.  - S/p liver biopsy per IR ( L lobe mass) 5/11/22, moderately differentiated HCC. Uncertain if there is vascular invasion, bland or tumor thrombus, because all imaging were non contrast. Not transplant or surgical candidate, unlikely to be candidate for locoregional treatment, and since hospitalization ECOG seems >2. Hem/onc follow up to assess whether would be candidate for systemic treatment. (ECOG, ESRD might be limitation.) GOC discussions  - All imaging was non contrast. Now c/o abdominal pain, consider repeat CT and to d/w nephrology timing, because would benefit from contrast imaging. Consider obtaining lactate.     # PAWAN on CKD, now on HD without signs of renal recovery  # Hematuria  # Hx of prostate Ca  - F/u nephrology and urology recommendations    # A fib w/ RVR  # Acute hypoxic respiratory failure suspected due to volume overload  - Consider r/o PE  - Mgmt. / workup per ICU    Thank you for consult  Will continue to follow.   D/w primary team

## 2022-05-19 NOTE — PROGRESS NOTE ADULT - SUBJECTIVE AND OBJECTIVE BOX
Patient seen/examined. Feels weak. Reports shortness of breath. Not urinating.    Vital Signs Last 24 Hrs  T(C): 36.7 (19 May 2022 05:00), Max: 36.7 (19 May 2022 05:00)  T(F): 98.1 (19 May 2022 05:00), Max: 98.1 (19 May 2022 05:00)  HR: 130 (19 May 2022 09:00) (104 - 136)  BP: 99/55 (19 May 2022 09:00) (71/48 - 144/67)  BP(mean): 64 (19 May 2022 09:00) (53 - 85)  RR: 27 (19 May 2022 09:00) (19 - 40)  SpO2: 94% (19 May 2022 09:00) (92% - 97%)    General: NAD. AAOx3  Abd: soft. NT/ND                          11.0   12.87 )-----------( 290      ( 19 May 2022 04:55 )             35.1     05-19    134<L>  |  95<L>  |  36<H>  ----------------------------<  107<H>  3.9   |  25  |  5.28<H>    Ca    9.4      19 May 2022 04:55  Phos  2.1     05-19  Mg     2.0     05-19    TPro  8.2  /  Alb  3.0<L>  /  TBili  1.4<H>  /  DBili  x   /  AST  533<H>  /  ALT  83<H>  /  AlkPhos  336<H>  05-19

## 2022-05-19 NOTE — PROGRESS NOTE ADULT - SUBJECTIVE AND OBJECTIVE BOX
Chief Complaint:  Patient is a 71y old  Male who presents with a chief complaint of acute CHF exacerbation (19 May 2022 12:20)      Reason for consult: Abnormal liver enzymes / lesions    Interval Events: Patient was seen and examined at bedside, remains in ICU. Remains awake, alert, not in distress, but tachycardic till 130s and noted slightly tachypneic again. C/o abdominal pain and tenderness.    Hospital Medications:  ARIPiprazole 15 milliGRAM(s) Oral daily  chlorhexidine 2% Cloths 1 Application(s) Topical daily  ferrous    sulfate 325 milliGRAM(s) Oral daily  finasteride 5 milliGRAM(s) Oral daily  gabapentin 100 milliGRAM(s) Oral three times a day  heparin  Infusion.  Unit(s)/Hr IV Continuous <Continuous>  HYDROmorphone  Injectable 0.5 milliGRAM(s) IV Push every 4 hours PRN  iohexol 300 mG (iodine)/mL Oral Solution 30 milliLiter(s) Oral once  lamoTRIgine 200 milliGRAM(s) Oral daily  midodrine. 10 milliGRAM(s) Oral three times a day  pantoprazole    Tablet 40 milliGRAM(s) Oral before breakfast  sevelamer carbonate 800 milliGRAM(s) Oral three times a day with meals  tamsulosin 0.4 milliGRAM(s) Oral at bedtime  traZODone 25 milliGRAM(s) Oral at bedtime      ROS:   General:  No  fevers, chill  CV:  No pain, palpitations  Pulm:  Not in respiratory distress, but slightly tachypneic  GI:  Ileostomy bag in place, no bleeding, no N/V. Reports abdominal discomfort, diffuse.  :  No  dysuria  Muscle:  No pain, weakness  Skin:  No rash      PHYSICAL EXAM:   Vital Signs:  Vital Signs Last 24 Hrs  T(C): 36.7 (19 May 2022 05:00), Max: 36.7 (19 May 2022 05:00)  T(F): 98.1 (19 May 2022 05:00), Max: 98.1 (19 May 2022 05:00)  HR: 147 (19 May 2022 13:00) (104 - 147)  BP: 107/58 (19 May 2022 13:00) (71/48 - 124/49)  BP(mean): 93 (19 May 2022 13:00) (53 - 93)  RR: 29 (19 May 2022 13:00) (19 - 42)  SpO2: 91% (19 May 2022 13:00) (91% - 97%)  Daily     Daily Weight in k.6 (19 May 2022 08:00)    GENERAL: no acute distress  NEURO: awake, alert, oriented but not in time, no asterixis  HEENT: anicteric sclera, no conjunctival pallor appreciated  CHEST: no respiratory distress, no accessory muscle use, on NC O2, but slightly tachypneic again  CARDIAC: tachycardic to 1302  ABDOMEN: soft, mildly distended, mild diffuse tenderness, no rebound or guarding, ileostomy bag in place, BS+  EXTREMITIES: warm, well perfused, no edema  SKIN: no rash      LABS: reviewed                        11.0   12.87 )-----------( 290      ( 19 May 2022 04:55 )             35.1     05-    134<L>  |  95<L>  |  36<H>  ----------------------------<  107<H>  3.9   |  25  |  5.28<H>    Ca    9.4      19 May 2022 04:55  Phos  2.1     -  Mg     2.0         TPro  8.2  /  Alb  3.0<L>  /  TBili  1.4<H>  /  DBili  x   /  AST  533<H>  /  ALT  83<H>  /  AlkPhos  336<H>      LIVER FUNCTIONS - ( 19 May 2022 04:55 )  Alb: 3.0 g/dL / Pro: 8.2 g/dL / ALK PHOS: 336 U/L / ALT: 83 U/L DA / AST: 533 U/L / GGT: x             Interval Diagnostic Studies: see sunrise for full report

## 2022-05-19 NOTE — PROGRESS NOTE ADULT - ASSESSMENT
· Assessment	  WILMER SELLERS is a 71y Male who presents with a chief complaint of CHF exacerbation.    Liver and Bone Lesions  - MRI and CT imaging reviewed; multiple liver lesions and skeletal lesions suspicious of metastatic disease. Bone scan did not show any suspicious lesions.   - AFP elevated at 5,338. PSA is 0.   - Liver biopsy performed on May 11th c/w HCC  - I d/w pt re his dx today. Explained to him that he would not be a transplant candidate given possible met disease and other comorbidities. However, can consider systemic tx if his other clinical issues are under control. He understands  - pall care following    Hematuria  - Urology following. Planning on outpatient cystoscopy.     Acute Kidney Injury  - Nephrology following. Patient is currently on dialysis at this time.   - Continue to monitor urine output and kidney function.     Atrial Fibrillation  - Cardiology following; recommending long-term anticoagulation  - on hep gtt at this time    HCV, HCC -- hepatology following    Will continue to follow.  D/w pt and ICU team. · Assessment	  WILMER SELLERS is a 71y Male who presents with a chief complaint of CHF exacerbation.    Liver and Bone Lesions  - MRI and CT imaging reviewed; multiple liver lesions and skeletal lesions suspicious of metastatic disease. Bone scan did not show any suspicious lesions.   - AFP elevated at 5,338. PSA is 0.   - Liver biopsy performed on May 11th c/w HCC  - I d/w pt re his dx today. Explained to him that he would not be a transplant candidate given possible met disease and other comorbidities. However, can consider systemic tx if his other clinical issues are under control. He understands  - pall care following    Hematuria  - Urology following. Planning on outpatient cystoscopy.     Acute Kidney Injury  - Nephrology following. Patient is currently on dialysis at this time.   - Continue to monitor urine output and kidney function.     Atrial Fibrillation  - Cardiology following; recommending long-term anticoagulation  - on hep gtt at this time    HCV, HCC -- hepatology following    Tachpnea -- per ICU and renal, overload, with hospitalization and HCC, would check imaging to r/o PE as well, though lower risks since already on AC    Will continue to follow.  D/w pt and ICU team.

## 2022-05-19 NOTE — PROGRESS NOTE ADULT - ATTENDING COMMENTS
71M from [facility], who is ambulatory at baseline with a walker. He has Hx of HTN, CHF?, COPD, HLD, PAD, BPH, Prostate CA (s/p radiation), Bipolar Disorder, IBD - Crohn's Disease, R ileostomy bag (s/p sigmoid resection) is being admitted to ICU for worsening shortness of breath and Atrial fibrillation with RVR.     1- Acute Hypoxic Respiratory Failure   2- Atrial Fibrillation with RVR   3- ESRD on  Hemodialysis  4- Liver lesions (metastasis to bone?)  4- Hypertension   5- Hyperlipidemia   6- Bipolar disorder  7- Crohn's disease s/p ileostomy   8- Hepatocellular carcinoma       Plan   - Continue O2 supp to maintain sat >90%  - Hemodynamic monitoring   - Cardiazem for rate control    - Heparin gtt  - PE work up , pat currently with border line BP and uncontrol afib   - Cardiazem lower BP   - Started on vasopressor titrate to maintain MAP>65  - Possible bowel obstruction  - Abd x-ray with non specific gas pattern    - Continue hemodialysis   - Dialysis after CTPA  - Dialysis as per Neph   - Symbicort   - Spiriva   - New dx Hepatocellular carcinoma with suspicion for mets   - Hemo-Onco f/u   - Palliative care f/u ; DNR DNI .

## 2022-05-19 NOTE — PROGRESS NOTE ADULT - ASSESSMENT
1. PAWAN due to ATN.  -First HD on 5/6. HD initiated for worsening bun/cr due to ATN with underlying CKD stage 4 and anuria. Pre dialysis bun/scr increasing and anuric, thus no signs of renal recovery at present. Pt is CKD progressing to ESRD on HD.  -Permacath conversion by IR on 5/16. Outpatient HD dialysis center placement accepted at [facility].   -Plan for CT chest/abd/pelvis with iv contrast then plan for HD afterwards and will try UF 1 to 2.0 if bp/hr-controlled and might need to use pressor.   -Vascular consulted (Dr. Fermin, informed) for fistula placement as outpatient.   -pt also had gross hematuria and unclear etiology seems less likely nephritis and RUBIA, ANCA and Anit-GBM are -ve.  Urinalysis shows no proteinuria. spot protein to creatinine ratio is 900mg.  -renal sono show no hydro.  -Adjust meds to eGFR and avoid IV Gadolinium contrast,NSAIDs, and phosphate enema.  -Monitor I/O's daily.   -Monitor SMA daily.  2. CKD stage 4 most likely due to ischemic nephropathy and h/o ATN with renal recovery.  -Baseline Scr around 3.2mg/dL in April 2020.  -Keep patient euvolemic and renal diet  -Avoid Nephrotoxic Meds/ Agents such as (NSAIDs, IV contrast, Aminoglycosides such as gentamicin, -Gadolinium contrast, Phosphate containing enemas, etc..)  -Adjust Medications according to eGFR  3. Anemia:   -hb is stable; continue iron tabs. monitor CBC  4. MBD:   -phos is stable. previous mild hyperca, which improved and off calcitirol; on renvela 1 tab tid;   -pth is okay at 41.  5. Hyperkalemia due to pawan on ckd.   -stable.  -on low K diet. give Lokelma prn if K >5.5  -Keep pt euvolemic. Avoid ACE inh/ ARBs, NSAIDs, and Aldactone or potassium sparing diuretics. Monitor K+ daily.  6. Fluid Overload:  -clinically improved. off diuretics for now.   -CT chest w/o contrast shows no effusion;   7. Acidosis:  -stable.  -stable. monitor CO2  8. h/o Hypotension:  -bp is low during HD and continue midodrine to 5mg tid; use pressor.  9. Elevated LFTs  - off lipitor for now.  -hepatitis panel shows hep C+ve.  discussed with pt, pt had known hep C and was treated for in the past; hep C RNA is not detected.   -C3/C4 and RF negative, cryoglobulinemia is negative.    -ct scan shows hepatic mass; Hepatology consulted and MRI w/o contrast shows liver mass and mets.  -Hepatology and Oncology consulted.   -Plan for IR guided liver biopsy on 5/12. verbal preliminary patho report results show HCC.    -Bone scan shows no bone mets. spep and IF are nl and serum FLC is within range for ckd.  10. Hyponatremia: due to lack free water intake.   -Na is stable.  -place on 1L fluid restriction/day.   -monitor Na.  10. Neuropathy:  -continue gabapentin to 100mg tid (renal dosage)  11. New onset Afib:  -Plan as per cardio/icu.  -on heparin drip.   12. Palliative consulted for discussion of overall prognosis and goals of care.   Pt is DNI/DNR.     Patient is critically ill. Time Spent: 35mins.  Discussed with patient in detail regarding the renal plan and care  Discussed the assessment and plan with ICU Team/Nurse

## 2022-05-19 NOTE — PROGRESS NOTE ADULT - NUTRITIONAL ASSESSMENT
This patient has been assessed with a concern for Malnutrition and has been determined to have a diagnosis/diagnoses of Moderate protein-calorie malnutrition.    This patient is being managed with:   Diet Pureed-  Consistent Carbohydrate {No Snacks}  DASH/TLC {Sodium & Cholesterol Restricted}  For patients receiving Renal Replacement - No Protein Restr No Conc K No Conc Phos Low Sodium (RENAL)  Supplement Feeding Modality:  Oral  Nepro Cans or Servings Per Day:  1       Frequency:  Three Times a day  Entered: May 18 2022 11:17AM    
Diet, Regular:   DASH/TLC {Sodium & Cholesterol Restricted}  1200mL Fluid Restriction ([id number])  No Concentrated Potassium  No Concentrated Phosphorus (04-30-22 @ 08:27) [Active]
Diet, Regular:   DASH/TLC {Sodium & Cholesterol Restricted}  1200mL Fluid Restriction ([id number])  No Concentrated Potassium  No Concentrated Phosphorus (04-30-22 @ 08:27) [Active]
Diet, Regular:   DASH/TLC {Sodium & Cholesterol Restricted}  1200mL Fluid Restriction ([id number])  No Concentrated Potassium  No Concentrated Phosphorus (04-30-22 @ 08:27) [Active]
Diet, Regular:   DASH/TLC {Sodium & Cholesterol Restricted}  1200mL Fluid Restriction ([id number])  No Concentrated Potassium  No Concentrated Phosphorus (04-30-22 @ 08:27) [Active]
Diet, Regular:   DASH/TLC {Sodium & Cholesterol Restricted}  1200mL Fluid Restriction ([id number])  No Concentrated Potassium  No Concentrated Phosphorus (04-30-22 @ 08:27) [Active]
Diet, Regular:   DASH/TLC {Sodium & Cholesterol Restricted}  1200mL Fluid Restriction ([id number])  No Concentrated Potassium  No Concentrated Phosphorus (04-30-22 @ 08:27) [Active]
Diet, Regular:   DASH/TLC {Sodium & Cholesterol Restricted}  1200mL Fluid Restriction ([id number])  No Concentrated Potassium  No Concentrated Phosphorus (04-30-22 @ 08:27) [Active]
Diet, Regular:   DASH/TLC {Sodium & Cholesterol Restricted}  1200mL Fluid Restriction ([id number])  No Concentrated Potassium  No Concentrated Phosphorus (04-30-22 @ 08:27) [Active]
Diet, Regular:   DASH/TLC {Sodium & Cholesterol Restricted}  1200mL Fluid Restriction ([id number])  No Concentrated Potassium  No Concentrated Phosphorus (04-30-22 @ 08:27) [Active]
Diet, Regular:   DASH/TLC {Sodium & Cholesterol Restricted}  1200mL Fluid Restriction ([id number])  No Concentrated Potassium  No Concentrated Phosphorus (04-30-22 @ 08:27) [Active]
Diet, Regular:   DASH/TLC {Sodium & Cholesterol Restricted}  1200mL Fluid Restriction ([id number])  No Concentrated Potassium  No Concentrated Phosphorus (04-30-22 @ 08:27) [Active]
Diet, Regular:   DASH/TLC {Sodium & Cholesterol Restricted}  1200mL Fluid Restriction ([id number])  No Concentrated Potassium  No Concentrated Phosphorus (04-30-22 @ 08:27) [Active]

## 2022-05-19 NOTE — PROGRESS NOTE ADULT - SUBJECTIVE AND OBJECTIVE BOX
NEPHROLOGY MEDICAL CARE, [facility] - Dr. Emerson Pond/ Dr. Nallely Curran/ Dr. Kirby Angel/ Dr. Pippa Reis    Patient was seen and examined at bedside.    CC: patient complaining abd pain all over.    Vital Signs Last 24 Hrs  T(C): 36.7 (19 May 2022 05:00), Max: 36.7 (19 May 2022 05:00)  T(F): 98.1 (19 May 2022 05:00), Max: 98.1 (19 May 2022 05:00)  HR: 130 (19 May 2022 09:00) (104 - 136)  BP: 99/55 (19 May 2022 09:00) (71/48 - 144/67)  BP(mean): 64 (19 May 2022 09:00) (53 - 85)  RR: 27 (19 May 2022 09:00) (19 - 40)  SpO2: 94% (19 May 2022 09:00) (92% - 97%)    05-18 @ 07:01  -  05-19 @ 07:00  --------------------------------------------------------  IN: 1314 mL / OUT: 1450 mL / NET: -136 mL        PHYSICAL EXAM:  General: No acute respiratory distress.  Eyes: conjunctiva and sclera clear  ENMT: Atraumatic, Normocephalic, supple, No JVD present  Respiratory: Bilateral decreased BS and no rhonchi, wheezing  Cardiovascular: S1S2+; tachy; no m/r/g  Gastrointestinal: Soft, mild diffuse tenderness, Nondistended; Bowel sounds present; rt side osteotomy bag.  Neuro:  Awake, Alert & Oriented X3  Ext:  no pedal edema, No Cyanosis  Skin: No visible rashes  Dialysis Access: Rt Chest Permcath.    MEDICATIONS:  MEDICATIONS  (STANDING):  ARIPiprazole 15 milliGRAM(s) Oral daily  chlorhexidine 2% Cloths 1 Application(s) Topical daily  ferrous    sulfate 325 milliGRAM(s) Oral daily  finasteride 5 milliGRAM(s) Oral daily  gabapentin 100 milliGRAM(s) Oral three times a day  heparin  Infusion.  Unit(s)/Hr (18 mL/Hr) IV Continuous <Continuous>  lamoTRIgine 200 milliGRAM(s) Oral daily  midodrine. 10 milliGRAM(s) Oral three times a day  pantoprazole    Tablet 40 milliGRAM(s) Oral before breakfast  sevelamer carbonate 800 milliGRAM(s) Oral three times a day with meals  tamsulosin 0.4 milliGRAM(s) Oral at bedtime  traZODone 25 milliGRAM(s) Oral at bedtime    MEDICATIONS  (PRN):          LABS:                        11.0   12.87 )-----------( 290      ( 19 May 2022 04:55 )             35.1     05-19    134<L>  |  95<L>  |  36<H>  ----------------------------<  107<H>  3.9   |  25  |  5.28<H>    Ca    9.4      19 May 2022 04:55  Phos  2.1     05-19  Mg     2.0     05-19    TPro  8.2  /  Alb  3.0<L>  /  TBili  1.4<H>  /  DBili  x   /  AST  533<H>  /  ALT  83<H>  /  AlkPhos  336<H>  05-19    PT/INR - ( 18 May 2022 10:40 )   PT: 17.4 sec;   INR: 1.46 ratio         PTT - ( 19 May 2022 04:55 )  PTT:64.0 sec    Magnesium, Serum: 2.0 mg/dL (05-19 @ 04:55)  Phosphorus Level, Serum: 2.1 mg/dL (05-19 @ 04:55)    Urine studies    PTH and Vit D:

## 2022-05-19 NOTE — CHART NOTE - NSCHARTNOTEFT_GEN_A_CORE
Spoke to brother Alexey ([phone]) to inform about his brother's clinical deterioration including respiratory distress, persistent hypotension and abdominal pain - distention. As per previous palliative discussions, patient and brother wanted hospice but now patient is not doing well so brother and patient decided comfort measures only. Brother was granted permission to come visit. Spoke to brother Alexey ([phone]) to inform about his brother's clinical deterioration including respiratory distress, persistent hypotension and abdominal pain - distention. As per previous palliative discussions, patient and brother wanted hospice but now patient is not doing well so brother and patient decided comfort measures only. Will continue with pressor support, no escalation of care, discontinued heparin drip, amiodarone drip and oral medications. Brother was granted permission to come visit.

## 2022-05-19 NOTE — PROGRESS NOTE ADULT - SUBJECTIVE AND OBJECTIVE BOX
Patient is a 71y old  Male who presents with a chief complaint of acute CHF exacerbation (19 May 2022 11:29)    INTERVAL HISTORY/ OVERNIGHT EVENTS: Patient was examined while he was lying in bed, Aox3 (time, place, person), responding to questions/commands, in NAD/ventilated/O2 by NC. He denies headache, weakness, cough, chest pain, wheezing, abdominal pain. Bowel movement's, Maurer catheter placed. He is tolerating ___ diet with no nausea or vomiting.      PRESSORS: [ ] YES [ ] NO  WHICH:    ANTIBIOTICS:                  DATE STARTED:  ANTIBIOTICS:                  DATE STARTED:  ANTIBIOTICS:                  DATE STARTED:    Antimicrobial:    Cardiovascular:  midodrine. 10 milliGRAM(s) Oral three times a day  tamsulosin 0.4 milliGRAM(s) Oral at bedtime    Pulmonary:    Hematalogic:  heparin  Infusion.  Unit(s)/Hr IV Continuous <Continuous>    Other:  ARIPiprazole 15 milliGRAM(s) Oral daily  chlorhexidine 2% Cloths 1 Application(s) Topical daily  ferrous    sulfate 325 milliGRAM(s) Oral daily  finasteride 5 milliGRAM(s) Oral daily  gabapentin 100 milliGRAM(s) Oral three times a day  iohexol 300 mG (iodine)/mL Oral Solution 30 milliLiter(s) Oral once  lamoTRIgine 200 milliGRAM(s) Oral daily  pantoprazole    Tablet 40 milliGRAM(s) Oral before breakfast  sevelamer carbonate 800 milliGRAM(s) Oral three times a day with meals  traZODone 25 milliGRAM(s) Oral at bedtime    ARIPiprazole 15 milliGRAM(s) Oral daily  chlorhexidine 2% Cloths 1 Application(s) Topical daily  ferrous    sulfate 325 milliGRAM(s) Oral daily  finasteride 5 milliGRAM(s) Oral daily  gabapentin 100 milliGRAM(s) Oral three times a day  heparin  Infusion.  Unit(s)/Hr IV Continuous <Continuous>  iohexol 300 mG (iodine)/mL Oral Solution 30 milliLiter(s) Oral once  lamoTRIgine 200 milliGRAM(s) Oral daily  midodrine. 10 milliGRAM(s) Oral three times a day  pantoprazole    Tablet 40 milliGRAM(s) Oral before breakfast  sevelamer carbonate 800 milliGRAM(s) Oral three times a day with meals  tamsulosin 0.4 milliGRAM(s) Oral at bedtime  traZODone 25 milliGRAM(s) Oral at bedtime    Drug Dosing Weight  Height (cm): 177.8 (28 Apr 2022 12:41)  Weight (kg): 98 (28 Apr 2022 12:41)  BMI (kg/m2): 31 (28 Apr 2022 12:41)  BSA (m2): 2.16 (28 Apr 2022 12:41)    CENTRAL LINE: [ ] YES [ ] NO  LOCATION:     MAURER: [ ] YES [ ] NO      A-LINE:  [ ] YES [ ] NO  LOCATION:         ICU Vital Signs Last 24 Hrs  T(C): 36.7 (19 May 2022 05:00), Max: 36.7 (19 May 2022 05:00)  T(F): 98.1 (19 May 2022 05:00), Max: 98.1 (19 May 2022 05:00)  HR: 133 (19 May 2022 11:28) (104 - 142)  BP: 101/59 (19 May 2022 11:28) (71/48 - 144/67)  BP(mean): 70 (19 May 2022 11:28) (53 - 85)  ABP: --  ABP(mean): --  RR: 32 (19 May 2022 11:28) (19 - 36)  SpO2: 92% (19 May 2022 11:28) (92% - 97%)            05-18 @ 07:01  -  05-19 @ 07:00  --------------------------------------------------------  IN: 1314 mL / OUT: 1450 mL / NET: -136 mL            PHYSICAL EXAM:    GENERAL: NAD, well-groomed, well-developed  EYES: EOMI, PERRLA,   NECK: Supple, No JVD; Normal thyroid; Trachea midline  NERVOUS SYSTEM:  Alert & Oriented X3,  Motor Strength 5/5 B/L upper and lower extremities; DTRs 2+ intact and symmetric  CHEST/LUNG: No rales, rhonchi, wheezing   HEART: Regular rate and rhythm; No murmurs,   ABDOMEN: Soft, Nontender, Nondistended; Bowel sounds present  EXTREMITIES:  2+ Peripheral Pulses, No clubbing, cyanosis, or edema      LABS:                        11.0   12.87 )-----------( 290      ( 19 May 2022 04:55 )             35.1     05-19    134<L>  |  95<L>  |  36<H>  ----------------------------<  107<H>  3.9   |  25  |  5.28<H>    Ca    9.4      19 May 2022 04:55  Phos  2.1     05-19  Mg     2.0     05-19    TPro  8.2  /  Alb  3.0<L>  /  TBili  1.4<H>  /  DBili  x   /  AST  533<H>  /  ALT  83<H>  /  AlkPhos  336<H>  05-19    PT/INR - ( 18 May 2022 10:40 )   PT: 17.4 sec;   INR: 1.46 ratio         PTT - ( 19 May 2022 04:55 )  PTT:64.0 sec    CAPILLARY BLOOD GLUCOSE            RADIOLOGY & ADDITIONAL STUDIES REVIEWED:  ***     Patient is a 71y old  Male who presents with a chief complaint of acute CHF exacerbation (19 May 2022 11:29)    INTERVAL HISTORY/ OVERNIGHT EVENTS: Patient was examined while he was lying in bed, Aox3 (time, place, person), responding to questions/commands, in NAD/ventilated/O2 by NC. He denies headache, weakness, cough, chest pain, wheezing, abdominal pain. Bowel movement's, Maurer catheter placed. He is tolerating ___ diet with no nausea or vomiting.      PRESSORS: [ ] YES [ ] NO  WHICH:    ANTIBIOTICS:                  DATE STARTED:  ANTIBIOTICS:                  DATE STARTED:  ANTIBIOTICS:                  DATE STARTED:    Antimicrobial:    Cardiovascular:  midodrine. 10 milliGRAM(s) Oral three times a day  tamsulosin 0.4 milliGRAM(s) Oral at bedtime    Pulmonary:    Hematalogic:  heparin  Infusion.  Unit(s)/Hr IV Continuous <Continuous>    Other:  ARIPiprazole 15 milliGRAM(s) Oral daily  chlorhexidine 2% Cloths 1 Application(s) Topical daily  ferrous    sulfate 325 milliGRAM(s) Oral daily  finasteride 5 milliGRAM(s) Oral daily  gabapentin 100 milliGRAM(s) Oral three times a day  iohexol 300 mG (iodine)/mL Oral Solution 30 milliLiter(s) Oral once  lamoTRIgine 200 milliGRAM(s) Oral daily  pantoprazole    Tablet 40 milliGRAM(s) Oral before breakfast  sevelamer carbonate 800 milliGRAM(s) Oral three times a day with meals  traZODone 25 milliGRAM(s) Oral at bedtime    ARIPiprazole 15 milliGRAM(s) Oral daily  chlorhexidine 2% Cloths 1 Application(s) Topical daily  ferrous    sulfate 325 milliGRAM(s) Oral daily  finasteride 5 milliGRAM(s) Oral daily  gabapentin 100 milliGRAM(s) Oral three times a day  heparin  Infusion.  Unit(s)/Hr IV Continuous <Continuous>  iohexol 300 mG (iodine)/mL Oral Solution 30 milliLiter(s) Oral once  lamoTRIgine 200 milliGRAM(s) Oral daily  midodrine. 10 milliGRAM(s) Oral three times a day  pantoprazole    Tablet 40 milliGRAM(s) Oral before breakfast  sevelamer carbonate 800 milliGRAM(s) Oral three times a day with meals  tamsulosin 0.4 milliGRAM(s) Oral at bedtime  traZODone 25 milliGRAM(s) Oral at bedtime    Drug Dosing Weight  Height (cm): 177.8 (28 Apr 2022 12:41)  Weight (kg): 98 (28 Apr 2022 12:41)  BMI (kg/m2): 31 (28 Apr 2022 12:41)  BSA (m2): 2.16 (28 Apr 2022 12:41)    CENTRAL LINE: [ ] YES [ ] NO  LOCATION:     MAURER: [ ] YES [ ] NO      A-LINE:  [ ] YES [ ] NO  LOCATION:         ICU Vital Signs Last 24 Hrs  T(C): 36.7 (19 May 2022 05:00), Max: 36.7 (19 May 2022 05:00)  T(F): 98.1 (19 May 2022 05:00), Max: 98.1 (19 May 2022 05:00)  HR: 133 (19 May 2022 11:28) (104 - 142)  BP: 101/59 (19 May 2022 11:28) (71/48 - 144/67)  BP(mean): 70 (19 May 2022 11:28) (53 - 85)  ABP: --  ABP(mean): --  RR: 32 (19 May 2022 11:28) (19 - 36)  SpO2: 92% (19 May 2022 11:28) (92% - 97%)            05-18 @ 07:01  -  05-19 @ 07:00  --------------------------------------------------------  IN: 1314 mL / OUT: 1450 mL / NET: -136 mL            PHYSICAL EXAM:    GENERAL: NAD, well-groomed, well-developed  EYES: EOMI, PERRLA,   NECK: Supple, No JVD; Normal thyroid; Trachea midline  NERVOUS SYSTEM:  Alert & Oriented X3,  Motor Strength 5/5 B/L upper and lower extremities; DTRs 2+ intact and symmetric  CHEST/LUNG: No rales, rhonchi, wheezing   HEART: Regular rate and rhythm; No murmurs,   ABDOMEN: Soft, Nontender, Nondistended; Bowel sounds present + ileostomy with stool content and pink mucosa   EXTREMITIES:  2+ Peripheral Pulses, No clubbing, cyanosis, or edema      LABS:                        11.0   12.87 )-----------( 290      ( 19 May 2022 04:55 )             35.1     05-19    134<L>  |  95<L>  |  36<H>  ----------------------------<  107<H>  3.9   |  25  |  5.28<H>    Ca    9.4      19 May 2022 04:55  Phos  2.1     05-19  Mg     2.0     05-19    TPro  8.2  /  Alb  3.0<L>  /  TBili  1.4<H>  /  DBili  x   /  AST  533<H>  /  ALT  83<H>  /  AlkPhos  336<H>  05-19    PT/INR - ( 18 May 2022 10:40 )   PT: 17.4 sec;   INR: 1.46 ratio         PTT - ( 19 May 2022 04:55 )  PTT:64.0 sec    CAPILLARY BLOOD GLUCOSE            RADIOLOGY & ADDITIONAL STUDIES REVIEWED:  ***    X-RAY; < from: Xray Abdomen 1 View Portable, IMMEDIATE (Xray Abdomen 1 View Portable, IMMEDIATE .) (05.19.22 @ 12:59) >    ACC: [id number] EXAM:  XR ABDOMEN PORTABLE IMMED 1V                          PROCEDURE DATE:  05/19/2022          INTERPRETATION:  Clinical history: 71-year-old male, rule out obstruction.    Portable view of the abdomen is correlated with the abdominal CT of   5/6/2022. Post subtotal colectomy with Connors's pouch. Right-sided   ileostomy    Findings nonobstructive bowel gas pattern, IVC filter projected in   satisfactory position. No pneumoperitoneum or acute osseous finding.    IMPRESSION:  Nonobstructive bowel gas pattern    --- End of Report ---            ZULY FERREIRA DO; Attending Radiologist  This document has been electronically signed. May 19 2022  2:55PM    < end of copied text >    Path:Surgical Pathology Report (05.11.22 @ 11:00)    Surgical Pathology Report:   ACCESSION No:  [id number]  Patient:   WILMER SELLERS      Accession:                             [id number]    Collected Date/Time:                   5/11/2022 11:00 EDT  Received Date/Time:                    5/11/2022 11:30 EDT    Surgical Pathology Report - Auth (Verified)    Specimen(s) Submitted  1-Liver mass core x 5    Final Diagnosis  Liver mass; core biopsy:  - Moderately differentiated hepatocellular carcinoma.    Solid Tumor Immunohistochemical Analysis:    Block 1A  AntibodyResults    AE1/AE3   Focally Positive  CK7   Negative  CK20   Negative  TTF1   Negative  CDX2   Negative  AFP   Negative  PSMA   Negative  CEA monoclonal   Negative  CA19.9   Negative  Hepatocyte   Positive  Renal cell (RCC)   Negative  Napsin A   Negative  Glypican-3   Positive  PAX-2   Negative  Arginase-1   Positive (cytoplasmic)  NKX3.1   Negative  PSAP   Negative    Tumor cells are positive for heptaocellular markers Hepatocyte,  Glypican-3, and focally for Arginase-1. They are negative for  other primary site markers (lung, GI, kidney, prostate). Given the  immunomorphologic feature findings are diagnostic of HCC.    For the full interpretation summary of test(s), please refer to the report  issued by [employer].  This testwas performed by an outside laboratory. For all patient care  and clinical decision making purposes refer solely to original laboratory  report. Â  The information transcribed here is not the entire report.  Â  This  shortened version has been placedhere for the purpose of the electronic  record.    Outside report electronically signed by: Dr. Malaika Nickerson  Specimen ID: [id number]  Block tested: 1A, 1C    This test was performed and interpreted by [employer]  [address] [place] [address].   Â  [phone].    Verified by: Beba Jaimes MD  (Electronic Signature)  Reported on: 05/18/22 21:33 EDT, [address]  Phone: [phone]   Fax: [phone]  _________________________________________________________________    Comment  Adequate Assessment:  Site: Liver mass core x 5  Total Passes: 5  Pass #[id number]  By: Dr. Jaimes on 05/11/22.      Clinical Information  71 year old male with  PMH' HTN' COPD, BPH, prostate  Ca, s/p radiation,  CKD, right ileostomy/bag; s/p sigmoid resection, multiple liver masses    Gross Description  Received:  In formalin labeled  "liver biopsy"  Size:  Four cores ranging from 0.4 x 0.1 cm to 0.8 x 0.1 cm  Description:  Tan, friable soft  Eosin:  Eosin has been added.  Submitted:  Entirely in three cassettes: 1A-1C    Jenny Negar 05/11/2022 12:10 PM    Disclaimer  In addition to other data that may appear on the specimen containers, all  labels have been inspected to confirm the presence of the patient's name  and date of birth.    Histological processing performed at [facility], [place] [address].

## 2022-05-19 NOTE — PROGRESS NOTE ADULT - SUBJECTIVE AND OBJECTIVE BOX
C A R D I O L O G Y  **********************************    DATE OF SERVICE: 05-19-22    Patient denies chest pain  has some SOB, and abdominal  pain    ARIPiprazole 15 milliGRAM(s) Oral daily  chlorhexidine 2% Cloths 1 Application(s) Topical daily  ferrous    sulfate 325 milliGRAM(s) Oral daily  finasteride 5 milliGRAM(s) Oral daily  gabapentin 100 milliGRAM(s) Oral three times a day  heparin  Infusion.  Unit(s)/Hr IV Continuous <Continuous>  iohexol 300 mG (iodine)/mL Oral Solution 30 milliLiter(s) Oral once  lamoTRIgine 200 milliGRAM(s) Oral daily  midodrine. 10 milliGRAM(s) Oral three times a day  pantoprazole    Tablet 40 milliGRAM(s) Oral before breakfast  sevelamer carbonate 800 milliGRAM(s) Oral three times a day with meals  tamsulosin 0.4 milliGRAM(s) Oral at bedtime  traZODone 25 milliGRAM(s) Oral at bedtime                            11.0   12.87 )-----------( 290      ( 19 May 2022 04:55 )             35.1       Hemoglobin: 11.0 g/dL (05-19 @ 04:55)  Hemoglobin: 10.3 g/dL (05-18 @ 05:52)  Hemoglobin: 10.0 g/dL (05-17 @ 19:32)  Hemoglobin: 10.1 g/dL (05-17 @ 07:27)  Hemoglobin: 10.2 g/dL (05-16 @ 07:24)      05-19    134<L>  |  95<L>  |  36<H>  ----------------------------<  107<H>  3.9   |  25  |  5.28<H>    Ca    9.4      19 May 2022 04:55  Phos  2.1     05-19  Mg     2.0     05-19    TPro  8.2  /  Alb  3.0<L>  /  TBili  1.4<H>  /  DBili  x   /  AST  533<H>  /  ALT  83<H>  /  AlkPhos  336<H>  05-19    Creatinine Trend: 5.28<--, 6.95<--, 6.17<--, 10.00<--, 8.32<--, 9.28<--    COAGS: PTT - ( 19 May 2022 04:55 )  PTT:64.0 sec          T(C): 36.7 (05-19-22 @ 05:00), Max: 36.7 (05-19-22 @ 05:00)  HR: 142 (05-19-22 @ 12:00) (104 - 142)  BP: 108/66 (05-19-22 @ 12:00) (71/48 - 144/67)  RR: 28 (05-19-22 @ 12:00) (19 - 36)  SpO2: 92% (05-19-22 @ 12:00) (92% - 97%)  Wt(kg): --    I&O's Summary    18 May 2022 07:01  -  19 May 2022 07:00  --------------------------------------------------------  IN: 1314 mL / OUT: 1450 mL / NET: -136 mL      HEENT:  (-)icterus (-)pallor  CV: N S1 S2 1/6 MARCELLE (+)2 Pulses B/l  Resp:  Clear to ausculatation B/L, normal effort  GI: (+) BS Soft, NT, ND  Lymph:  (-)Edema, (-)obvious lymphadenopathy  Skin: Warm to touch, Normal turgor  Psych: Appropriate mood and affect     TELE: afib    ASSESSMENT/PLAN: 	71y  Male  HTN, COPD, HLD, PAD, BPH, Prostate CA (s/p radiation), Bipolar Disorder, IBD - Crohn's Disease, R ileostomy bag (s/p sigmoid resection) historically preserved LV and RV function, normal perfusion on a nuclear stress test 2020 He was brought to ED due to bilateral leg swelling    - refractory to rate control, D/W ICU ? if he should be ruled out for PE  - COnt BB  - HD per renal  - cont heparin gtt for CVA prevention  - Hepatalogy and Heme Onc f/u  - Supportive care    Joey Archer MD, [facility]  BEEPER [phone]

## 2022-05-19 NOTE — PROGRESS NOTE ADULT - ASSESSMENT
Very pleasant 71 year old gentleman who presents for acute CHF exacerbation, found to have PAWAN on CKD, gross hematuria, evidence of liver metastasis on MRI  -MRI images reviewed demonstrating concern for liver mets  -PSA 7/2021 was undetectable (<0.01)  -Repeat PSA May 2022 undetectable <0.01  -F/U results of liver biopsy  -labs reviewed  -noncontrast CT given gross hematuria  -patient will need a cystoscopy as an outpatient for workup of hematuria  -discussed with house staff

## 2022-05-20 NOTE — CHART NOTE - NSCHARTNOTESELECT_GEN_ALL_CORE
Event Note
Event Note
Family update/Event Note
Family update/Event Note
Nutrition Services
Event Note
Event Note
Fam update/Event Note
Family Update/Event Note
New onset Rapid Afib/Event Note
Nutrition Services
Nutrition Services

## 2022-05-20 NOTE — DISCHARGE NOTE FOR THE EXPIRED PATIENT - SECONDARY DIAGNOSIS.
Liver lesion BPH (benign prostatic hyperplasia) Anemia of chronic renal failure, unspecified CKD stage Acute exacerbation of congestive heart failure

## 2022-05-20 NOTE — CHART NOTE - NSCHARTNOTEFT_GEN_A_CORE
Patient's brother Alexey Sidhu called the Unit for an update regarding patient's clinical condition given he received missed calls from ICU team, was updated regarding patient's expiration including time of death, cause of death.

## 2022-05-20 NOTE — DISCHARGE NOTE FOR THE EXPIRED PATIENT - PRINCIPAL DIAGNOSIS
Please let patient know that form is completed  Thanks Acute renal failure superimposed on chronic kidney disease, unspecified CKD stage, unspecified acute renal failure type

## 2022-05-20 NOTE — DISCHARGE NOTE FOR THE EXPIRED PATIENT - HOSPITAL COURSE
Patient is a 71M from [facility], who is ambulatory at baseline with a walker. He has Hx of HTN, CHF?, COPD, HLD, PAD, BPH, Prostate CA (s/p radiation), Bipolar Disorder, IBD - Crohn's Disease, R ileostomy bag (s/p sigmoid resection). Presented with SOB and LE edema, found with hyperkalemia on admission. Admitted to [facility] for cardio work up R/O CHF and hyperkalemia with PAWAN on CKD 4.  started lasix. renal US shows no hyronephrosis, + renal cysts. CT chest shows no pleural effusion, but with hepatic lesion 4.7cm largest. Also with transaminitis.  PAWAN worsening today Cr 4.39 from 3.82. started low dose IVF as discussed nephrology. Pt was on 2L Oxygen, will monitor O2 sat on ambulation/at rest room air.  MRI reveals multiple liver lesions with mets. Patient was having progressive shortness of breath and respiratory failure, had HD initiated. Was on pressors to maintain BP. Patient was deteriorating, family was informed. Brother opted on comfort measures. Had no spontaneous breath and pulse at 4.04. he was pronounced to be  at 4.06 am. Family was called to inform, went to voice mail. Organ donation was called.

## 2022-05-22 LAB
MITOCHONDRIA AB SER-ACNC: SIGNIFICANT CHANGE UP
SMOOTH MUSCLE AB SER-ACNC: ABNORMAL

## 2022-05-25 LAB
HDV AB SER-ACNC: NEGATIVE — SIGNIFICANT CHANGE UP
HDV IGM SER QL IA: SIGNIFICANT CHANGE UP
HEV AB FLD QL: NEGATIVE — SIGNIFICANT CHANGE UP
HEV IGM SER QL: SIGNIFICANT CHANGE UP

## 2022-06-09 NOTE — H&P ADULT - NSHPPOAPULMEMBOLUS_GEN_A_CORE
Subjective   30-year-old female past medical history of migraines clotting disorder acid reflux coming in with 3 days of left lower rib pain states it started low at a 1 or 2 out of 10 now 5 out of 10 worse with deep breathing shoots through the left chest when she takes a deep breath.  No associated cough hemoptysis.  No leg swelling.  Nothing makes it better.  Never had blood clots before.  No long travel surgeries or OCPs.  She did say approximately 1 week ago she severe nausea vomiting diarrhea which resolved.          Review of Systems   Constitutional: Negative for chills and fever.   Respiratory: Positive for shortness of breath.    Cardiovascular: Positive for chest pain.   Gastrointestinal: Negative for abdominal pain, nausea and vomiting.   Genitourinary: Negative for dysuria.   Musculoskeletal: Negative for myalgias and neck stiffness.   Skin: Negative for rash.   Neurological: Negative for headaches.   All other systems reviewed and are negative.      Past Medical History:   Diagnosis Date   • Acid reflux    • Anxiety    • Migraine    • Migraines    • Urinary tract infection        Allergies   Allergen Reactions   • Ceclor [Cefaclor] Rash     childhood       Past Surgical History:   Procedure Laterality Date   •  SECTION Bilateral 2016    Procedure:  SECTION PRIMARY;  Surgeon: Valdez Bach DO;  Location: [facility] LABOR DELIVERY;  Service:        Family History   Problem Relation Age of Onset   • Stroke Father    • Breast cancer Maternal Grandmother        Social History     Socioeconomic History   • Marital status:    Tobacco Use   • Smoking status: Never Smoker   • Smokeless tobacco: Never Used   Substance and Sexual Activity   • Alcohol use: No   • Drug use: No   • Sexual activity: Yes     Partners: Male           Objective   Physical Exam  Vitals and nursing note reviewed.   Constitutional:       General: She is not in acute distress.  HENT:      Head:  Normocephalic and atraumatic.      Mouth/Throat:      Mouth: Mucous membranes are moist.   Eyes:      Extraocular Movements: Extraocular movements intact.      Pupils: Pupils are equal, round, and reactive to light.   Cardiovascular:      Rate and Rhythm: Normal rate and regular rhythm.      Heart sounds: Normal heart sounds. No murmur heard.    No friction rub. No gallop.   Pulmonary:      Effort: Pulmonary effort is normal.      Breath sounds: Normal breath sounds. No decreased breath sounds, wheezing, rhonchi or rales.   Chest:      Chest wall: No tenderness.   Abdominal:      General: Abdomen is flat. Bowel sounds are normal. There is no distension.      Palpations: Abdomen is soft. There is no mass.      Tenderness: There is no abdominal tenderness.      Hernia: No hernia is present.   Musculoskeletal:      Right lower leg: No tenderness. No edema.      Left lower leg: No tenderness. No edema.   Skin:     General: Skin is warm and dry.      Capillary Refill: Capillary refill takes less than 2 seconds.      Findings: No rash.   Neurological:      General: No focal deficit present.      Mental Status: She is alert and oriented to person, place, and time.         Procedures           ED Course  ED Course as of 06/09/22 0158   Wed Jun 08, 2022   2225 EKG normal sinus rhythm nonspecific T wave flattening and inversion diffusely, 98 bpm no STEMI [JM]      ED Course User Index  [] Sandro Good MD                                                 [facility]  Number of Diagnoses or Management Options  Pleuritic chest pain  Diagnosis management comments: CT and lab work negative for acute findings, likely related related to musculoskeletal.       Amount and/or Complexity of Data Reviewed  Clinical lab tests: reviewed  Tests in the radiology section of CPT®: reviewed  Tests in the medicine section of CPT®: reviewed    Patient Progress  Patient progress: improved      Final diagnoses:   Pleuritic chest pain       ED  Disposition  ED Disposition     ED Disposition   Discharge    Condition   Stable    Comment   --             Penny Sandy, APRN  [address]  [facility] [address]  [phone]    Schedule an appointment as soon as possible for a visit       [facility] Emergency Department  [address] [facility] [id number] [phone]  Go to   If symptoms worsen         Medication List      New Prescriptions    acetaminophen 500 MG tablet  Commonly known as: TYLENOL  Take 2 tablets by mouth Every 6 (Six) Hours As Needed for Mild Pain  for up to 10 days.     cyclobenzaprine 10 MG tablet  Commonly known as: FLEXERIL  Take 1 tablet by mouth 3 (Three) Times a Day As Needed for Muscle Spasms for up to 7 days.     lidocaine 5 %  Commonly known as: LIDODERM  Place 1 patch on the skin as directed by provider Daily for 7 days. Remove & Discard patch within 12 hours or as directed by MD        Changed    * ibuprofen 800 MG tablet  Commonly known as: ADVIL,MOTRIN  Take 1 tablet by mouth Every 8 (Eight) Hours As Needed for Mild Pain  or Moderate Pain .  What changed: Another medication with the same name was added. Make sure you understand how and when to take each.     * ibuprofen 800 MG tablet  Commonly known as: ADVIL,MOTRIN  Take 1 tablet by mouth 3 (Three) Times a Day With Meals for 7 days.  What changed: You were already taking a medication with the same name, and this prescription was added. Make sure you understand how and when to take each.         * This list has 2 medication(s) that are the same as other medications prescribed for you. Read the directions carefully, and ask your doctor or other care provider to review them with you.               Where to Get Your Medications      These medications were sent to [facility] - [facility] - [address] - [phone]  - [phone] FX  [address] [facility] [address] [facility] [address], [facility] [address]    Phone: [phone]   · acetaminophen 500 MG  tablet  · cyclobenzaprine 10 MG tablet  · ibuprofen 800 MG tablet  · lidocaine 5 %          Sandro Good MD  06/09/22 0156     no

## 2022-06-27 NOTE — PROGRESS NOTE ADULT - SUBJECTIVE AND OBJECTIVE BOX
C A R D I O L O G Y  **********************************     DATE OF SERVICE: 05-05-22    Patient denies chest pain or shortness of breath.   Review of symptoms otherwise negative.    ARIPiprazole 15 milliGRAM(s) Oral daily  ferrous    sulfate 325 milliGRAM(s) Oral daily  finasteride 5 milliGRAM(s) Oral daily  gabapentin 600 milliGRAM(s) Oral two times a day  lamoTRIgine 200 milliGRAM(s) Oral daily  midodrine. 2.5 milliGRAM(s) Oral three times a day  pantoprazole    Tablet 40 milliGRAM(s) Oral before breakfast  sevelamer carbonate 800 milliGRAM(s) Oral three times a day with meals  sodium bicarbonate 650 milliGRAM(s) Oral three times a day  tamsulosin 0.4 milliGRAM(s) Oral at bedtime  traMADol 50 milliGRAM(s) Oral every 6 hours PRN  traZODone 25 milliGRAM(s) Oral at bedtime                            11.0   9.06  )-----------( 223      ( 05 May 2022 07:16 )             34.3       Hemoglobin: 11.0 g/dL (05-05 @ 07:16)  Hemoglobin: 11.7 g/dL (05-04 @ 00:23)  Hemoglobin: 11.6 g/dL (05-03 @ 07:18)  Hemoglobin: 11.6 g/dL (05-02 @ 12:16)  Hemoglobin: 11.2 g/dL (05-01 @ 06:45)      05-05    133<L>  |  104  |  127<H>  ----------------------------<  97  5.1   |  15<L>  |  5.02<H>    Ca    9.3      05 May 2022 07:16    TPro  8.5<H>  /  Alb  3.0<L>  /  TBili  1.1  /  DBili  x   /  AST  250<H>  /  ALT  75<H>  /  AlkPhos  199<H>  05-05    Creatinine Trend: 5.02<--, 4.51<--, 4.38<--, 4.47<--, 4.39<--, 3.82<--    COAGS:     CARDIAC MARKERS ( 04 May 2022 06:15 )  x     / x     / 42 U/L / x     / x            T(C): 36.4 (05-05-22 @ 04:33), Max: 36.9 (05-04-22 @ 13:16)  HR: 90 (05-05-22 @ 04:33) (90 - 90)  BP: 115/70 (05-05-22 @ 04:33) (115/70 - 124/57)  RR: 18 (05-05-22 @ 04:33) (18 - 18)  SpO2: 95% (05-05-22 @ 04:33) (95% - 95%)  Wt(kg): --    I&O's Summary    04 May 2022 07:01  -  05 May 2022 07:00  --------------------------------------------------------  IN: 0 mL / OUT: 500 mL / NET: -500 mL    05 May 2022 07:01  -  05 May 2022 12:30  --------------------------------------------------------  IN: 0 mL / OUT: 100 mL / NET: -100 mL          HEENT:  (-)icterus (-)pallor  CV: N S1 S2 1/6 MARCELLE (+)2 Pulses B/l  Resp:  Clear to ausculatation B/L, normal effort  GI: (+) BS Soft, NT, ND  Lymph:  (-)Edema, (-)obvious lymphadenopathy  Skin: Warm to touch, Normal turgor  Psych: Appropriate mood and affect              ASSESSMENT/PLAN: 	71y  Male  HTN, CHF?, COPD, HLD, PAD, BPH, Prostate CA (s/p radiation), Bipolar Disorder, IBD - Crohn's Disease, R ileostomy bag (s/p sigmoid resection) historically preserved LV and RV function, normal perfusion on a nuclear stress test 2020 He was brought to ED due to bilateral leg swelling    - No clinical CHF A low BNP suggests a low left ventricular end diastolic pressure  - monitor renal function, his edema is at least partially due ot renal disease   - no pertinent findings on echo  - Bone lesions noted, for bone scan.  Heme/ onc w/u in progress      Joey Archer MD, [facility]  BEEPER [phone]       [Time Spent: ___ minutes] : I have spent [unfilled] minutes of time on the encounter.

## 2022-07-14 NOTE — ED ADULT NURSE NOTE - NSIMPLEMENTINTERV_GEN_ALL_ED
Implemented All Fall Risk Interventions:  [place] to call system. Call bell, personal items and telephone within reach. Instruct patient to call for assistance. Room bathroom lighting operational. Non-slip footwear when patient is off stretcher. Physically safe environment: no spills, clutter or unnecessary equipment. Stretcher in lowest position, wheels locked, appropriate side rails in place. Provide visual cue, wrist band, yellow gown, etc. Monitor gait and stability. Monitor for mental status changes and reorient to person, place, and time. Review medications for side effects contributing to fall risk. Reinforce activity limits and safety measures with patient and family.
full range of motion in all extremities

## 2022-07-26 NOTE — PROGRESS NOTE ADULT - SUBJECTIVE AND OBJECTIVE BOX
HPI:  has resp failure fron chf  now  he breathe better    MEDICATIONS  (STANDING):  ARIPiprazole 15 milliGRAM(s) Oral daily  chlorhexidine 2% Cloths 1 Application(s) Topical daily  diltiazem Infusion 5 mG/Hr (5 mL/Hr) IV Continuous <Continuous>  ferrous    sulfate 325 milliGRAM(s) Oral daily  finasteride 5 milliGRAM(s) Oral daily  gabapentin 100 milliGRAM(s) Oral three times a day  heparin  Infusion.  Unit(s)/Hr (18 mL/Hr) IV Continuous <Continuous>  lamoTRIgine 200 milliGRAM(s) Oral daily  midodrine. 10 milliGRAM(s) Oral three times a day  pantoprazole    Tablet 40 milliGRAM(s) Oral before breakfast  sevelamer carbonate 800 milliGRAM(s) Oral three times a day with meals  tamsulosin 0.4 milliGRAM(s) Oral at bedtime  traZODone 25 milliGRAM(s) Oral at bedtime    MEDICATIONS  (PRN):      Allergies    No Known Allergies    Intolerances        Vital Signs Last 24 Hrs  T(C): 36.2 (18 May 2022 07:00), Max: 36.9 (17 May 2022 15:22)  T(F): 97.1 (18 May 2022 07:00), Max: 98.4 (17 May 2022 15:22)  HR: 96 (18 May 2022 09:00) (96 - 133)  BP: 122/44 (18 May 2022 09:00) (90/56 - 142/80)  BP(mean): 63 (18 May 2022 09:00) (56 - 96)  RR: 32 (18 May 2022 09:00) (18 - 32)  SpO2: 91% (18 May 2022 09:00) (88% - 96%)    PHYSICAL EXAM  General: adult in NAD  HEENT: clear oropharynx, anicteric sclera, pink conjunctiva  Neck: supple  CV: normal S1/S2 with no murmur rubs or gallops  Lungs: positive air movement b/l ant lungs,clear to auscultation, no wheezes, no rales  Abdomen: soft non-tender non-distended, no hepatosplenomegaly  Ext: no clubbing cyanosis or edema  Skin: no rashes and no petechiae  Neuro: alert and oriented X 4, no focal deficits  LABS:                          10.3   14.93 )-----------( 303      ( 18 May 2022 05:52 )             33.1         Mean Cell Volume : 82.3 fl  Mean Cell Hemoglobin : 25.6 pg  Mean Cell Hemoglobin Concentration : 31.1 gm/dL  Auto Neutrophil # : 12.23 K/uL  Auto Lymphocyte # : 0.77 K/uL  Auto Monocyte # : 0.60 K/uL  Auto Eosinophil # : 0.01 K/uL  Auto Basophil # : 0.12 K/uL  Auto Neutrophil % : 81.9 %  Auto Lymphocyte % : 5.2 %  Auto Monocyte % : 4.0 %  Auto Eosinophil % : 0.1 %  Auto Basophil % : 0.8 %    Serial CBC  Hematocrit 33.1  Hemoglobin 10.3  Plat 303  RBC 4.02  WBC 14.93  Serial CBC  Hematocrit 31.9  Hemoglobin 10.0  Plat 285  RBC 3.87  WBC 14.74  Serial CBC  Hematocrit 32.2  Hemoglobin 10.1  Plat 269  RBC 3.90  WBC 12.22  Serial CBC  Hematocrit 32.8  Hemoglobin 10.2  Plat 249  RBC 3.99  WBC 13.67    05-18    134<L>  |  98  |  48<H>  ----------------------------<  131<H>  5.3   |  21<L>  |  6.95<H>    Ca    9.5      18 May 2022 05:52  Phos  6.0     05-18  Mg     2.3     05-18    TPro  8.0  /  Alb  2.6<L>  /  TBili  1.3<H>  /  DBili  x   /  AST  418<H>  /  ALT  66<H>  /  AlkPhos  312<H>  05-18      PT/INR - ( 16 May 2022 12:21 )   PT: 14.9 sec;   INR: 1.25 ratio         PTT - ( 17 May 2022 15:00 )  PTT:72.1 sec              BLOOD SMEAR INTERPRETATION:       RADIOLOGY & ADDITIONAL STUDIES:     Abdomen soft, non-tender, no guarding.

## 2022-10-15 NOTE — CHART NOTE - NSCHARTNOTEFT_GEN_A_CORE
Assessment:     Factors impacting intake: [ ] none [ ] nausea  [ ] vomiting [ ] diarrhea [ ] constipation  [ ]chewing problems [ ] swallowing issues  [ ] other:     Diet Presciption: Diet, Regular:   DASH/TLC {Sodium & Cholesterol Restricted}  1200mL Fluid Restriction ([id number])  No Concentrated Potassium  No Concentrated Phosphorus (04-30-22 @ 08:27)    Intake:     Daily   % Weight Change    Pertinent Medications: MEDICATIONS  (STANDING):  ARIPiprazole 15 milliGRAM(s) Oral daily  chlorhexidine 2% Cloths 1 Application(s) Topical daily  ferrous    sulfate 325 milliGRAM(s) Oral daily  finasteride 5 milliGRAM(s) Oral daily  gabapentin 600 milliGRAM(s) Oral two times a day  lamoTRIgine 200 milliGRAM(s) Oral daily  midodrine. 2.5 milliGRAM(s) Oral three times a day  mupirocin 2% Ointment 1 Application(s) Both Nostrils every 12 hours  pantoprazole    Tablet 40 milliGRAM(s) Oral before breakfast  sevelamer carbonate 800 milliGRAM(s) Oral three times a day with meals  sodium bicarbonate 650 milliGRAM(s) Oral three times a day  sodium zirconium cyclosilicate 10 Gram(s) Oral once  tamsulosin 0.4 milliGRAM(s) Oral at bedtime  traZODone 25 milliGRAM(s) Oral at bedtime    MEDICATIONS  (PRN):  traMADol 50 milliGRAM(s) Oral every 6 hours PRN Severe Pain (7 - 10)    Pertinent Labs: 05-10 Na134 mmol/L<L> Glu 98 mg/dL K+ 4.2 mmol/L Cr  6.12 mg/dL<H> BUN 53 mg/dL<H> 05-09 Phos 5.2 mg/dL<H> 05-10 Alb 2.7 g/dL<L> 04-29 Chol 129 mg/dL LDL --    HDL 38 mg/dL<L> Trig 110 mg/dL     CAPILLARY BLOOD GLUCOSE          Skin:     Estimated Needs:   [ ] no change since previous assessment  [ ] recalculated:     Previous Nutrition Diagnosis:   [ ] Inadequate Energy Intake [ ]Inadequate Oral Intake [ ] Excessive Energy Intake   [ ] Underweight [ ] Increased Nutrient Needs [ ] Overweight/Obesity  [ ] Swallowing Difficult   [ ] Altered GI Function [ ] Unintended Weight Loss [ ] Food & Nutrition Related Knowledge Deficit [ ] Malnutrition   [ ] Not Ready for Diet/Life Style Changes     Nutrition Diagnosis is [ ] ongoing  [ ] Improving   [ ] resolved [ ] not applicable     New Nutrition Diagnosis: [ ] not applicable       Interventions:   Recommend  [ ] Change Diet To:  [ ] Nutrition Supplement  [ ] Nutrition Support  [ ] Other:     Monitoring and Evaluation:   [ ] PO intake [ x ] Tolerance to diet prescription [ x ] weights [ x ] labs[ x ] follow up per protocol  [ ] other: (1) Minor paralysis (flattened nasolabial fold, asymmetry on smiling)

## 2022-10-28 NOTE — ED ADULT TRIAGE NOTE - DIRECT TO ROOM CARE INITIATED:
Caller: Bebe Steward    Relationship: Self    Best call back number: [phone]    What orders are you requesting (i.e. lab or imaging): MRI OF SPINE    In what timeframe would the patient need to come in: ASAP    Where will you receive your lab/imaging services: [facility]    Additional notes: PATIENT STATES THE MRI ORDERS THAT WERE DISCUSSED DURING HER LAST VISIT NEED TO BE SUBMITTED.     28-Apr-2022 12:45

## 2022-11-23 NOTE — PROGRESS NOTE ADULT - NS ATTEND AMEND GEN_ALL_CORE FT
has paroxysmal AFib.  long term, recommend Apixaban 2.5mg BID for stroke prevention.  if he is to stay in-hospital for fistula creation, can hold anticoag ahead of surgery or use heparin until the morning of the procedure.  if he is to go home and have fistula creation as an outpatient, recommend starting this today.    as he is no longer in rapid AFib, recommend to stop the diltiazem infusion, and increase metoprolol to 50mg BID.    Raúl Geller M.D.  Cardiac Electrophysiology  [phone]
resting comfortably.  wean off diltiazem.  may need more metoprolol (25mg BID --> q6hrs) to achieve desired heartrate.
Detail Level: Detailed
General Sunscreen Counseling: I recommended a broad spectrum sunscreen with a SPF of 30 or higher.  I explained that SPF 30 sunscreens block approximately 97 percent of the sun's harmful rays.  Sunscreens should be applied at least 15 minutes prior to expected sun exposure and then every 2 hours after that as long as sun exposure continues. If swimming or exercising sunscreen should be reapplied every 45 minutes to an hour after getting wet or sweating.  One ounce, or the equivalent of a shot glass full of sunscreen, is adequate to protect the skin not covered by a bathing suit. I also recommended a lip balm with a sunscreen as well. Sun protective clothing can be used in lieu of sunscreen but must be worn the entire time you are exposed to the sun's rays.
- Increased ileostomy output, resolving.  - Liver lesions.    Patient seen and examined. Less output today. Abd soft, NTND. Check stool studies as above. IR consultation for liver mass biopsy for diagnosis.
- Loose ostomy output, resolving.  - Liver lesions, elevated AFP.    Patient clinically stable today. Abd soft, NTND, loose brown stool in ostomy. AFP signiifcnatly elevated, suspicious for HCC. Awaiting triple phase imaging if feasible other IR biopsy. Hepatology following. Please call with any new issues.

## 2023-05-06 NOTE — PROGRESS NOTE ADULT - PROBLEM SELECTOR PLAN 1
Pt resting on cot stating he is very anxious and would like something to help if able. This RN to talk with provider. Call light in reach.       Stephanie Silverman RN  05/05/23 1105 Hx of CHF  Echo - GIDD, EF 50-55% (2020)  home meds - lasix  EKG - NSR, low voltage criteria for LVH  CXR - bilateral vascular congestion (intiial read)  Trop x 1 - neg  pro-BNP - 398 (mildly elevated)  monitor VS  monitor I&O, Daily Weight  Lasix 40 mg IV OD  monitor on Telemetry  f/u Echo  f/u TSH  Cardio (Dr. Archer) consulted SOB + LE edema on admission  Echo - GIDD, EF 50-55% (2020)  EKG - NSR, low voltage criteria for LVH  CXR - bilateral vascular congestion F/U offical read   Trop x 1 - neg  pro-BNP - 398 (mildly elevated)  monitor I&O, Daily Weight  Lasix 40 mg IV   monitor on Telemetry  f/u Echo  Cardio (Dr. Archer) consulted

## 2023-07-07 NOTE — PROGRESS NOTE ADULT - ASSESSMENT
1. PAWAN due to pre-renal azotemia due to volume depletion vs r/o ATN.   -pts renal function is improving , no need for hd   -Adjust meds to eGFR and avoid IV Gadolinium contrast,NSAIDs, and phosphate enema.  -Monitor I/O's daily.   -Monitor SMA daily.  2. CKD stage 3 due h/o renal recovery from ATN.  -baseline scr around 2.0mg/dL with non-nephrotic range proteinuria (uPCR 900mg)  -Keep patient euvolemic and renal diet  -Avoid Nephrotoxic Meds/ Agents such as (NSAIDs, IV contrast, Aminoglycosides such as gentamicin, -Gadolinium contrast, Phosphate containing enemas, etc..)  -Adjust Medications according to eGFR  3. Hyperkalemia: improved   - monitor K daily  -continue  low k diet  4.Hx of hypotension: bp still low, s/p iv saline bolus  - we will increase Midodrine dose to 5mg po tid  5. MBD: with normal phos level  -pt has mild high normal pth-no intervention needed at this time  COVID +ve pna. plan as per primary team.  6. Acidosis due to renal failure: improved  -we will d/c iv nahco3 drip  -f/u co2 daily  7. Anemia:multifactorial-mild  -continue   epogen as ordered  -F/u CBC daily 1. PAWAN due to pre-renal azotemia due to volume depletion vs r/o ATN.   -pts renal function is stable last 24 hrs , no need for hd   -Adjust meds to eGFR and avoid IV Gadolinium contrast,NSAIDs, and phosphate enema.  -Monitor I/O's daily.   -Monitor SMA daily.  2. CKD stage 3 due h/o renal recovery from ATN.  -baseline scr around 2.0mg/dL with non-nephrotic range proteinuria (uPCR 900mg)  -Keep patient euvolemic and renal diet  -Avoid Nephrotoxic Meds/ Agents such as (NSAIDs, IV contrast, Aminoglycosides such as gentamicin, -Gadolinium contrast, Phosphate containing enemas, etc..)  -Adjust Medications according to eGFR  3. Hyperkalemia: improved   - monitor K daily  -continue  low k diet  4.Hx of hypotension: bp still low, s/p iv saline bolus  - we will increase Midodrine dose to 5mg po tid  5. MBD: with normal phos level  -pt has mild high normal pth-no intervention needed at this time  COVID +ve pna. plan as per primary team.  6. Acidosis due to renal failure: improved  -we will d/c iv nahco3 drip  -f/u co2 daily  7. Anemia:multifactorial-mild  -continue   epogen as ordered  -F/u CBC daily Hemostasis: Drysol

## 2023-07-17 NOTE — PATIENT PROFILE ADULT - NSPROPTRIGHTREPNAME_GEN_A__NUR
Pt called and is running late. Will be there in 9 mins     Pt can be reached at [phone]     Alexey Musa (brother)

## 2023-07-28 NOTE — H&P ADULT - ATTENDING COMMENTS
I called [facility] regarding an appointment that they have scheduled with ROZ Garvin scheduled on 9/13/23     The patient states she is on vacation and will call us back to reschedule on Monday. A [employer] message (if applicable) has been sent to patient relaying the above information and advising patient to call [facility] and reschedule their appointment. Patient is a 71M from [facility], who is ambulatory at baseline with a walker. He has Hx of HTN, CHF?, COPD, HLD, PAD, BPH, Prostate CA (s/p radiation), Bipolar Disorder, IBD - Crohn's Disease, R ileostomy bag (s/p sigmoid resection). He was brought to ED due to bilateral leg swelling. For the past 2 days he has been having progressive swelling/ edema of both his legs with associated ambulatory dysfunction. He also started having episodes of shortness of breath and difficulty of breathing. He denies any fever, cough, chest pain, palpitation. When he came to the ED, he was saturating at 93-94% on room air. He was placed on nasal cannula. EKG was done showing NSR. Troponin was negative and BNP was mildly elevated at 398. CXR showed bilateral vascular congestion. Serum potassium was elevated at 6.1. He got a dose of lasix and was given Hyperkalemia cocktail in the ED. Of note, he had Hx of COVID in 2020 andwas vaccinated with COVID-19 x 3 (Pfizer) and Influenza in 2021.     GOC: FULL CODE    # ACUTE HYPOXIC RESPIRATORY FAILURE SUSPECT S/T PULMONARY VASCULAR CONGESTION  # R/O CHF  # PAWAN ON CKD  # BPH  # HX OF PROSTATE CA S/P RADIATION  - PLACE ON TELEMETRY  - NOTED CXR  - LASIX  - F/U TSH  - F/U BLADDER SCAN, F/U UA  - STRICT IS AND OS  - F/U ECHOCARDIOGRAM  - NEPHROLOGY CONSULT, CARDIOLOGY CONSULT    # HYPERKALEMIA  - GIVEN LASIX, LOKELMA  - EKG DONE  - NEPHROLOGY CONSULT    # CROHN'S DISEASE  # RIGHT ILEOSTOMY BAG S/P SIGMOID RESECTION  - MONITORING OUTPUT    # NORMOCYTIC ANEMIA    # HTN  # COPD  # HLD  # PAD  # BIPOLAR DX  # GI AND DVT PPX

## 2023-08-04 NOTE — PROGRESS NOTE ADULT - PROBLEM SELECTOR PLAN 6
Slowly trending down  -avoid hepatotoxins  -monitor LFTs  -rest plan as above Detail Level: Detailed Quality 130: Documentation Of Current Medications In The Medical Record: Current Medications Documented Quality 110: Preventive Care And Screening: Influenza Immunization: Influenza Immunization Administered during Influenza season Quality 226: Preventive Care And Screening: Tobacco Use: Screening And Cessation Intervention: Patient screened for tobacco use and is an ex/non-smoker Quality 265: Biopsy Follow-Up: Biopsy results reviewed, communicated, tracked, and documented Quality 431: Preventive Care And Screening: Unhealthy Alcohol Use - Screening: Patient screened for unhealthy alcohol use using a single question and scores less than 2 times per year

## 2024-01-05 NOTE — PATIENT PROFILE ADULT - DOES PATIENT HAVE ADVANCE DIRECTIVE
VSS. Denies pain. Medical/surgical history and medications reviewed. No distress noted. Procedural education completed with patient's questions addressed.      No

## 2024-01-24 NOTE — PATIENT PROFILE ADULT - DO YOU NEED ADDITIONAL SERVICES TO MANAGE ANY OF THESE MEDICAL CONDITIONS AT HOME?
Detail Level: Detailed Benzoyl Peroxide Pregnancy And Lactation Text: This medication is Pregnancy Category C. It is unknown if benzoyl peroxide is excreted in breast milk. Topical Retinoid Pregnancy And Lactation Text: This medication is Pregnancy Category C. It is unknown if this medication is excreted in breast milk. Bactrim Counseling:  I discussed with the patient the risks of sulfa antibiotics including but not limited to GI upset, allergic reaction, drug rash, diarrhea, dizziness, photosensitivity, and yeast infections.  Rarely, more serious reactions can occur including but not limited to aplastic anemia, agranulocytosis, methemoglobinemia, blood dyscrasias, liver or kidney failure, lung infiltrates or desquamative/blistering drug rashes. Birth Control Pills Counseling: Birth Control Pill Counseling: I discussed with the patient the potential side effects of OCPs including but not limited to increased risk of stroke, heart attack, thrombophlebitis, deep venous thrombosis, hepatic adenomas, breast changes, GI upset, headaches, and depression.  The patient verbalized understanding of the proper use and possible adverse effects of OCPs. All of the patient's questions and concerns were addressed. Dapsone Counseling: I discussed with the patient the risks of dapsone including but not limited to hemolytic anemia, agranulocytosis, rashes, methemoglobinemia, kidney failure, peripheral neuropathy, headaches, GI upset, and liver toxicity.  Patients who start dapsone require monitoring including baseline LFTs and weekly CBCs for the first month, then every month thereafter.  The patient verbalized understanding of the proper use and possible adverse effects of dapsone.  All of the patient's questions and concerns were addressed. Doxycycline Counseling:  Patient counseled regarding possible photosensitivity and increased risk for sunburn.  Patient instructed to avoid sunlight, if possible.  When exposed to sunlight, patients should wear protective clothing, sunglasses, and sunscreen.  The patient was instructed to call the office immediately if the following severe adverse effects occur:  hearing changes, easy bruising/bleeding, severe headache, or vision changes.  The patient verbalized understanding of the proper use and possible adverse effects of doxycycline.  All of the patient's questions and concerns were addressed. Minocycline Pregnancy And Lactation Text: This medication is Pregnancy Category D and not consider safe during pregnancy. It is also excreted in breast milk. Include Pregnancy/Lactation Warning?: No Spironolactone Pregnancy And Lactation Text: This medication can cause feminization of the male fetus and should be avoided during pregnancy. The active metabolite is also found in breast milk. Azelaic Acid Counseling: Patient counseled that medicine may cause skin irritation and to avoid applying near the eyes.  In the event of skin irritation, the patient was advised to reduce the amount of the drug applied or use it less frequently.   The patient verbalized understanding of the proper use and possible adverse effects of azelaic acid.  All of the patient's questions and concerns were addressed. Isotretinoin Pregnancy And Lactation Text: This medication is Pregnancy Category X and is considered extremely dangerous during pregnancy. It is unknown if it is excreted in breast milk. High Dose Vitamin A Pregnancy And Lactation Text: High dose vitamin A therapy is contraindicated during pregnancy and breast feeding. Winlevi Counseling:  I discussed with the patient the risks of topical clascoterone including but not limited to erythema, scaling, itching, and stinging. Patient voiced their understanding. Doxycycline Pregnancy And Lactation Text: This medication is Pregnancy Category D and not consider safe during pregnancy. It is also excreted in breast milk but is considered safe for shorter treatment courses. Erythromycin Pregnancy And Lactation Text: This medication is Pregnancy Category B and is considered safe during pregnancy. It is also excreted in breast milk. Topical Clindamycin Counseling: Patient counseled that this medication may cause skin irritation or allergic reactions.  In the event of skin irritation, the patient was advised to reduce the amount of the drug applied or use it less frequently.   The patient verbalized understanding of the proper use and possible adverse effects of clindamycin.  All of the patient's questions and concerns were addressed. Topical Sulfur Applications Counseling: Topical Sulfur Counseling: Patient counseled that this medication may cause skin irritation or allergic reactions.  In the event of skin irritation, the patient was advised to reduce the amount of the drug applied or use it less frequently.   The patient verbalized understanding of the proper use and possible adverse effects of topical sulfur application.  All of the patient's questions and concerns were addressed. Topical Retinoid counseling:  Patient advised to apply a pea-sized amount only at bedtime and wait 30 minutes after washing their face before applying.  If too drying, patient may add a non-comedogenic moisturizer. The patient verbalized understanding of the proper use and possible adverse effects of retinoids.  All of the patient's questions and concerns were addressed. Tazorac Counseling:  Patient advised that medication is irritating and drying.  Patient may need to apply sparingly and wash off after an hour before eventually leaving it on overnight.  The patient verbalized understanding of the proper use and possible adverse effects of tazorac.  All of the patient's questions and concerns were addressed. Azithromycin Pregnancy And Lactation Text: This medication is considered safe during pregnancy and is also secreted in breast milk. Bactrim Pregnancy And Lactation Text: This medication is Pregnancy Category D and is known to cause fetal risk.  It is also excreted in breast milk. Birth Control Pills Pregnancy And Lactation Text: This medication should be avoided if pregnant and for the first 30 days post-partum. Dapsone Pregnancy And Lactation Text: This medication is Pregnancy Category C and is not considered safe during pregnancy or breast feeding. Sarecycline Counseling: Patient advised regarding possible photosensitivity and discoloration of the teeth, skin, lips, tongue and gums.  Patient instructed to avoid sunlight, if possible.  When exposed to sunlight, patients should wear protective clothing, sunglasses, and sunscreen.  The patient was instructed to call the office immediately if the following severe adverse effects occur:  hearing changes, easy bruising/bleeding, severe headache, or vision changes.  The patient verbalized understanding of the proper use and possible adverse effects of sarecycline.  All of the patient's questions and concerns were addressed. Azithromycin Counseling:  I discussed with the patient the risks of azithromycin including but not limited to GI upset, allergic reaction, drug rash, diarrhea, and yeast infections. Tetracycline Counseling: Patient counseled regarding possible photosensitivity and increased risk for sunburn.  Patient instructed to avoid sunlight, if possible.  When exposed to sunlight, patients should wear protective clothing, sunglasses, and sunscreen.  The patient was instructed to call the office immediately if the following severe adverse effects occur:  hearing changes, easy bruising/bleeding, severe headache, or vision changes.  The patient verbalized understanding of the proper use and possible adverse effects of tetracycline.  All of the patient's questions and concerns were addressed. Patient understands to avoid pregnancy while on therapy due to potential birth defects. Benzoyl Peroxide Counseling: Patient counseled that medicine may cause skin irritation and bleach clothing.  In the event of skin irritation, the patient was advised to reduce the amount of the drug applied or use it less frequently.   The patient verbalized understanding of the proper use and possible adverse effects of benzoyl peroxide.  All of the patient's questions and concerns were addressed. Spironolactone Counseling: Patient advised regarding risks of diarrhea, abdominal pain, hyperkalemia, birth defects (for female patients), liver toxicity and renal toxicity. The patient may need blood work to monitor liver and kidney function and potassium levels while on therapy. The patient verbalized understanding of the proper use and possible adverse effects of spironolactone.  All of the patient's questions and concerns were addressed. Topical Sulfur Applications Pregnancy And Lactation Text: This medication is Pregnancy Category C and has an unknown safety profile during pregnancy. It is unknown if this topical medication is excreted in breast milk. Azelaic Acid Pregnancy And Lactation Text: This medication is considered safe during pregnancy and breast feeding. Isotretinoin Counseling: Patient should get monthly blood tests, not donate blood, not drive at night if vision affected, not share medication, and not undergo elective surgery for 6 months after tx completed. Side effects reviewed, pt to contact office should one occur. High Dose Vitamin A Counseling: Side effects reviewed, pt to contact office should one occur. Minocycline Counseling: Patient advised regarding possible photosensitivity and discoloration of the teeth, skin, lips, tongue and gums.  Patient instructed to avoid sunlight, if possible.  When exposed to sunlight, patients should wear protective clothing, sunglasses, and sunscreen.  The patient was instructed to call the office immediately if the following severe adverse effects occur:  hearing changes, easy bruising/bleeding, severe headache, or vision changes.  The patient verbalized understanding of the proper use and possible adverse effects of minocycline.  All of the patient's questions and concerns were addressed. Winlevi Pregnancy And Lactation Text: This medication is considered safe during pregnancy and breastfeeding. Erythromycin Counseling:  I discussed with the patient the risks of erythromycin including but not limited to GI upset, allergic reaction, drug rash, diarrhea, increase in liver enzymes, and yeast infections. Tazorac Pregnancy And Lactation Text: This medication is not safe during pregnancy. It is unknown if this medication is excreted in breast milk. Topical Clindamycin Pregnancy And Lactation Text: This medication is Pregnancy Category B and is considered safe during pregnancy. It is unknown if it is excreted in breast milk. no

## 2024-04-09 NOTE — DISCHARGE NOTE PROVIDER - NS AS DC PROVIDER CONTACT Y/N MULTI
Social Work discharge planning  Pt in IR presently. Plan remains to return to [facility] at [facility]. N17 started in [facility]. Transport forms in folder [employer] [phone].  Electronically signed by RENAE Graham on 4/9/2024 at 11:41 AM    Yes

## 2024-09-03 NOTE — ED ADULT NURSE NOTE - CADM POA URETHRAL CATHETER
CHIEF COMPLAINT:     Chief Complaint   Patient presents with    Wound Care     Follow up for left foot wound. No complaints at this time.      HPI:   Information obtained from patient and chart  7-18-24 INITIAL: 79 year old male with essential hypertension, chronic heart failure with preserved EF, pulmonary hypertension, atrial fibrillation sp Watchman, chronic kidney disease, SLE, polymyositis, chronic steroid use, hypothyroidism, LI who was admitted from July 8-12 for RLE redness and swelling.  His albumin level was low and felt to be a contributing factor to his leg swelling and hypotension.  He was given an albumin infusion. His discharge weight was 189. He followed up with heart failure clinic earlier this week and his weight was down 7# since may.  It was noted that he was hypotensive at the visit and he had stated he had increased his bumex to 2 mg over the weekend and taking the full tablet of entresto instead of the recommended 1/2.  At visit he was to stop the entresto and compression wraps were recommended.   His next appointment with heart failure is next wednesday.    Since his hospitalization he has been sleeping in a recliner and therefore not utilizing his cpap.  He states he will try to go back to his bed this weekend.  He denies intermittent claudication or rest pain.  Patient has a weeping area on the right lateral leg.  No s/s of infection or c/o pain.   He is here with his sister.    HPI PRIOR TO AUGUST 2024 SEE INDIVIDUAL PROGRESS NOTES  8-7-24 patient returns.  Patient followed up primary care.  He got ivig yesterday so he has had an increase in edema.  He did get compression stockings however feels they are too \"heavy\" material.  His f/u with heart failure clinic is next week.  He states he knows how to increase his bumex prn for weight gain.  His left foot rubbed on his sandal strap and he has a wound there now.  The right lower extremity wound is improved.    8-14-24 patient returns.  He  has continued with chronic cough, got cxr and also f/u with pulmonology. He has heart failure appointment tomorrow. His edema has reduced since last week, he states he has not had a chance to look for new shoes and the darco shoes were hurting his foot, so he returned to the sandals that was the offender that created the wound on left lateral foot. We did pad his sandal with a thick foam after marking the seam that was causing the friction in the area. He states he did find stockings that he thinks will work. I asked him to bring them next week. No s/s of infection or c/o pain.    8-22-24 patient returns.  He did see heart failure clinic and his weight was up 5#, he did get ivp diuretics and was advised that if he did not lose the 5# by Sunday to increase his bumex to 3 mg daily.  He states he has lost 10# since last week, his f/u with heart failure clinic is tomorrow.  Last week we utilized a thick foam to pad the side of his sandal that caused the ulceration on the left foot. He states he is still feeling friction and states he is going to try and go to [employer] today.  Today the right leg is resolved. He did bring stockings and we will transition the right leg to his personal compression.  The left foot is about the same, I debrided and we will attempt to utilize a foam to help offload. Will also run insurance for CTP.    8-28-24 patient returns.  Epifix is approved. Patient saw hf clinic last Friday and and got ivp diuretics-his weight was 192. He did go to [employer] [employer] store but tried on a bunch of shoes and all of them placed pressure on his lateral foot.  last week we transitioned the right leg to his personal compression, he states he couldn't get it off, so he brought a different type of compression stocking today to try on the right. Offloading has been an issue to the left* foot, we did an offloading donut with a foam dressing last week which the patient said felt good but it seemed to create undermining.  This was removed today and epifix placed. No acute s/s of infection. Patient states he did not sleep well last noc as his spouse with alzheimers was up.     9-4-24 Patient returns.  Epifix #1 was placed last week to the left lateral foot.  We applied compression stocking to the right.  Today, he presents his wound is smaller, no s/s of infection. He is seeing primary today with his spouse as she fell yesterday and fx her clavicle. There is not any s/s of infection or c/o pain. Patient has padded his sandal with a foam dressing. Left over epifix placed today.  MEDICATIONS:     Current Outpatient Medications:     bumetanide 1 MG Oral Tab, Take 2 tablets (2 mg total) by mouth every morning AND 1 tablet (1 mg total) Every afternoon at 2:00 pm., Disp: , Rfl:     predniSONE 1 MG Oral Tab, Take 2 tablets (2 mg total) by mouth daily with breakfast., Disp: 180 tablet, Rfl: 0    spironolactone 25 MG Oral Tab, Take 0.5 tablets (12.5 mg total) by mouth daily., Disp: 15 tablet, Rfl: 0    fexofenadine 180 MG Oral Tab, Take 1 tablet (180 mg total) by mouth daily as needed., Disp: , Rfl:     levothyroxine 50 MCG Oral Tab, Take 1 tablet (50 mcg total) by mouth before breakfast., Disp: , Rfl:     ipratropium 0.06 % Nasal Solution, 1 spray by Nasal route 2 (two) times daily., Disp: 1 each, Rfl: 5    omeprazole 20 MG Oral Capsule Delayed Release, Take 1 capsule (20 mg total) by mouth 2 (two) times daily., Disp: 180 capsule, Rfl: 1    ALLOPURINOL 300 MG Oral Tab, TAKE 1 TABLET BY MOUTH EVERY DAY, Disp: 90 tablet, Rfl: 3    predniSONE 5 MG Oral Tab, Take 1 tablet (5 mg total) by mouth daily with breakfast., Disp: 90 tablet, Rfl: 3    metoprolol succinate  MG Oral Tablet 24 Hr, Take 1 tablet (100 mg total) by mouth every morning AND 0.5 tablets (50 mg total) every evening., Disp: 60 tablet, Rfl: 3    aspirin 81 MG Oral Tab EC, Take 1 tablet (81 mg total) by mouth daily., Disp: 30 tablet, Rfl: 0    PREVIDENT 5000 BOOSTER PLUS 1.1 %  Dental Paste, , Disp: , Rfl:     Multiple Vitamins-Minerals (TAB-A-KEVIN) Oral Tab, Take 1 tablet by mouth daily., Disp: , Rfl:     Immune Globulin, Human, 10 g Intravenous Recon Soln, Inject 0.4 g/kg into the vein. Given for treatment of polymyositis, mixed connective tissue disease, and immune thrombocytopenia purpura monthly at [facility]. Every 5 weeks, Disp: 3 each, Rfl: 0    Calcium Citrate-Vitamin D (CITRACAL + D OR), Take 1 tablet by mouth daily., Disp: , Rfl:   ALLERGIES:     Allergies   Allergen Reactions    Empagliflozin OTHER (SEE COMMENTS)     Pancreatitis    \"pancreatitis\" per pt      REVIEW OF SYSTEMS:   This information was obtained from the patient/family and chart.    See HPI for pertinent positives, otherwise 10 pt ROS negative.  HISTORY:   Past medical, surgical, family and social history updated where appropriate.  PHYSICAL EXAM:     Vitals:    09/04/24 1100   BP: 107/58   Pulse: 76   Resp: 14   Temp: 98.1 °F (36.7 °C)         Estimated body mass index is 26.79 kg/m² as calculated from the following:    Height as of 8/6/24: 70.98\".    Weight as of 8/23/24: 192 lb (87.1 kg).   No results found for: \"PGLU\"    Vital signs reviewed.Appears stated age, well groomed.    Constitutional:  Bp wnl for patient. Pulse Regular and wnl for patient. Respirations easy and unlabored. Temperature wnl. Weight normal for height. Appearance neat and clean. Appears in no acute distress. Well nourished and well developed.  Lower extremity:  dp palpable left. Left lower extremity free of varicosities, +edema stable, + hemosideran staining and very atrophic skin. Capillary refill < 3 seconds. Digits are warm. toenails are thin, brittle with adequate length and hygeine. Skin is dry and flaky. no hairgrowth on legs.  Musculoskeletal:  Gait and station stable   Integumentary:  refer to wound characteristics and images   Psychiatric:  Judgment and insight intact. Alert and oriented times 3. No evidence of  depression, anxiety, or agitation. Calm, cooperative, and communicative.   EDEMA:   Calf  Point of Measurement - Left Calf: 37     Left Calf from:: Heel  Calf Left cm:: 31.4        Ankle  Point of Measurement - Left Ankle: 13     Left Ankle from:: Heel  Left Ankle cm:: 21.6              DIAGNOSTICS:     Lab Results   Component Value Date    BUN 31 (H) 08/23/2024    CREATSERUM 1.35 (H) 08/23/2024    ALB 3.9 07/24/2024    TP 5.0 (L) 07/09/2024    A1C 5.4 05/14/2024       WOUND ASSESSMENT:     Wound 08/07/24 #2 Left lateral foot Foot Left;Lateral (Active)   Date First Assessed/Time First Assessed: 08/07/24 1610    Wound Number (Wound Clinic Only): #2 Left lateral foot  Primary Wound Type: Pressure Injury  Location: Foot  Wound Location Orientation: Left;Lateral      Assessments 8/7/2024  4:15 PM 9/4/2024 11:24 AM   Wound Image       Drainage Amount Unable to assess Scant   Drainage Description -- Serous;Yellow   Treatments Compression --   Wound Length (cm) 0.6 cm 0.2 cm   Wound Width (cm) 0.7 cm 0.4 cm   Wound Surface Area (cm^2) 0.42 cm^2 0.08 cm^2   Wound Depth (cm) 0 cm 0.1 cm   Wound Volume (cm^3) 0 cm^3 0.008 cm^3   Margins Well-defined edges Well-defined edges   Non-staged Wound Description Full thickness Full thickness   Shelley-wound Assessment Edema;Blanchable erythema;Dry Dry   Wound Granulation Tissue -- Pale Grey;Firm   Wound Bed Granulation (%) -- 100 %   Wound Odor None None   Shape Scabbed, unable to view wound bed. --   Tunneling? No --   Undermining? No --   Sinus Tracts? No --   Pressure Injury Stage Unstageable Stage 3       Inactive Orders   Date Order Priority Status Authorizing Provider   08/28/24 1049 Cellular tissue product application Pressure Injury Left;Lateral Foot Routine Completed Charisma Morrow APRN   08/28/24 1048 Debridement Pressure Injury Left;Lateral Foot Routine Completed Charisma Morrow APRN   08/22/24 1410 Debridement Pressure Injury Left;Lateral Foot Routine Completed Odalys  SHEA Zafar       Compression Wrap 08/14/24 Foot Dorsal;Left (Active)   Placement Date/Time: 08/14/24 1559   Location: Foot  Wound Location Orientation: Dorsal;Left      Assessments 8/14/2024  3:59 PM 8/28/2024 10:47 AM   Response to Treatment Well tolerated Well tolerated   Compression Layers Multilayer Multilayer   Compression Product Type Unna Boot Unna Boot   Dressing Applied Yes Yes   Compression Wrap Location Toes to Knee Toes to Knee   Compression Wrap Status Dry;Clean Clean;Dry       No associated orders.         ASSESSMENT AND PLAN:    1. Pressure injury of left foot, stage 3 (HCC)    2. Lupus (HCC)          Risks, benefits, and alternatives of current treatment plan discussed in detail.  Questions and concerns addressed. Red flags to RTC or ED reviewed.  Patient (or parent) agrees to plan.      NOTE TO PATIENT: The 21st Century Cures Act makes clinical notes like these available to patients in the interest of transparency. Clinical notes are medical documents used by physicians and care providers to communicate with each other. These documents include medical language and terminology, abbreviations, and treatment information that may sound technical and at times possibly unfamiliar. In addition, at times, the verbiage may appear blunt or direct. These documents are one tool providers use to communicate relevant information and clinical opinions of the care providers in a way that allows common understanding of the clinical context.   I spent29 minutes with the patient. This time included:    preparing to see the patient (eg, review notes and recent diagnostics),  seeing the patient, obtaining and/or reviewing separately obtained history, performing a medically appropriate examination and/or evaluation, counseling and educating the patient, documenting in the record.    DISCHARGE:      Patient Instructions   Please return:   1 week with Chairsma      Patient discharge and wound care instructions  Miguel  Gaby [facility]  9/4/2024        You may shower with protection of the wound (ie a cast cover or similar).     Cleansing/dressing:     In clinic, with each dressing change:    please cleanse the limb, foot, and between toes with soap, water and washcloth.   Dry thoroughly.    Cleanse/soak the wound with VASHE (or other hypochlorous wound cleanser), dab dry.    Apply the following dressings:      RIGHT:your personal compression stocking  LEFT foot:  epifix (sample)>silicone contact>entire piece of ready>light 20-30mmhg    Managing your edema:  Avoid prolonged standing in one place. It is better to have your calf muscles moving to pump fluid out of the legs      Elevate leg(s) above the level of the heart when sitting or as much as possible.  Take you diuretics as directed by your physician. Do not skip doses or change doses unless instructed to do so by your physician.  Decrease salt intake, follow recommended 2 grams daily.  Do not get leg(s) with compression wrap wet. If wraps are too tight as indicated by pain, numbness/tingling or discoloration of toes remove wrap completely and call the wound center @ [phone].  Refer to the \"Multi-layer compression bandage application patient information sheet\" given to you in clinic.    Nutrition and blood sugar control:  Focus on the following:  Protein: Meats, beans, eggs, milk and yogurt particularly Greek yogurt), tofu, soy nuts, soy protein products (Follow the protein handout in your welcome folder)  Vitamin C: Citrus fruits and juices, strawberries, tomatoes, tomato juice, peppers, baked potatoes, spinach, broccoli, cauliflower, [place] sprouts, cabbage  Vitamin A: Dark green, leafy vegetables, orange or yellow vegetables, cantaloupe, fortified dairy products, liver, fortified cereals  Zinc: Fortified cereals, red meats, seafood  Consider supplementing with Damion by [employer]. It can be purchased on amazon, [employer] website, or local pharmacy may be able to order it  for you.  (These are essential branch chain amino acids that help with tissue building and wound healing).   When your blood sugar is consistently elevated greater than 180 your body can't heal or fight infection.     Concerns:  Signs of infection may include the following:  Increase in redness  Red \"streaks\" from wound  Increase in swelling  Fever  Unusual odor  Change in the amount of wound drainage     Should you experience any significant changes in your wound(s) or have any questions regarding your home care instructions please contact the [facility] @ [phone] If after regular business hours, please call your family doctor or local emergency room. The treatment plan has been discussed at length between you and your provider. Follow all instructions carefully, it is very important. If you do not follow all instructions you are at risk of your wound not healing, infection, possible loss of limb and even loss of life.       Charisma Morrow FNP-C, CWCN-AP, CFCN, CSWS, WCC, DWC  9/4/2024        No

## 2025-03-27 NOTE — DISCHARGE NOTE PROVIDER - HOSPITAL COURSE
69 year old Male from [facility], with PMHx of prostate Ca (s/p radiation in 2015, now in remission), asthma, COPD (not on O2), HTN, HLD, Bipolar, anemia, GERD, Ileostomy s/p sigmoid resection, PAD, and CKD presented to ED with Generalized back pain that has been worsening for several weeks.    In the ED, pt presents in mild painful distress, vitals WNL, and EKG concerning for long IL segments, and wide QRS intervals.  Remaining labs sig for K+ of 7.5 with EKG changes, and Creat of 8 (baseline 2.5).  In the ED, pt received the hyperkalemia cocktail consisting of insulin, dextrose, albuterol, calcium, and Kayexalate.  Nephrology consulted emergently who recommended emergent HD overnight for hyperkalemia and EKG changes.  Pt admitted to the ICU for management of symptomatic hyperkalemia requiring new HD. Vascular was consulted and emergent Left [place] was placed. Hemodialysis was done and started on Bicarb drip. Cardiologist was consulted for EKG changes and it is due to Hyperkalemia. Repeat K after HD was normal 4.6 and repeat EKG was normal sinus rhythm with no ischemic changes. Hyperkalemia 6 on 2/6 and s/p Cocktail and HD. Repeat K is normal. Started on Bicarb drip  and stopped after Few hours.        He was transferred to the floor and his potassium has been stable with no acute events. His shiley was removed and he was monitored for bleeding. He was treated with rocephin for concern of cystitis and good response.        He is stable for discharge at this time. 16

## 2025-06-11 NOTE — ED PROVIDER NOTE - DR. NAME
Hello! To initiate flecainide, we would need to check his coronaries. We could use amiodarone (200mg daily) short term if he agrees, planning to stop after his ablation.    Curry Christiansen)

## 2025-06-18 NOTE — H&P ADULT - PROBLEM SELECTOR PROBLEM 5
Condition:: Dull skin and uneven skin textures and tones on face.
Please Describe Your Condition:: Patient is interested in cosmetic consultation.
cigarettes
Need for prophylactic measure

## 2025-07-05 NOTE — PATIENT PROFILE ADULT - NSTRANSFERBELONGINGSDISPO_GEN_A_NUR
Dr. Sonya Shaver,     Sending as FYI:  Patient was seen in [facility] today. INR was 1.3; Warfarin dosing was increased.    [facility] order states that patient needs to be bridged with Lovenox for INR <2.0    Patient was previously prescribed Lovenox at 1mg/kg (70mg) BID. This dosing was ordered and sent to patient's preferred pharmacy. We will discontinue Lovenox once INR is therapeutic x1 unless otherwise instructed.     Please let us know if you would like anything done differently. Thank you!    Josiane, [facility] RN   with patient

## 2025-07-07 NOTE — ED ADULT TRIAGE NOTE - CHIEF COMPLAINT QUOTE
Sent patient rounding through [email or web address] for response.     send from [facility] for lower back pain  and leaking from colostomy